# Patient Record
Sex: MALE | Race: AMERICAN INDIAN OR ALASKA NATIVE | Employment: UNEMPLOYED | URBAN - METROPOLITAN AREA
[De-identification: names, ages, dates, MRNs, and addresses within clinical notes are randomized per-mention and may not be internally consistent; named-entity substitution may affect disease eponyms.]

---

## 2017-02-14 ENCOUNTER — PRE VISIT (OUTPATIENT)
Dept: CARDIOLOGY | Facility: CLINIC | Age: 57
End: 2017-02-14

## 2017-02-14 DIAGNOSIS — D63.1 ANEMIA IN CHRONIC RENAL DISEASE: ICD-10-CM

## 2017-02-14 DIAGNOSIS — N18.9 ANEMIA IN CHRONIC RENAL DISEASE: ICD-10-CM

## 2017-02-14 DIAGNOSIS — I27.20 PULMONARY HYPERTENSION (H): Primary | ICD-10-CM

## 2017-02-14 DIAGNOSIS — I50.22 CHRONIC SYSTOLIC CONGESTIVE HEART FAILURE (H): ICD-10-CM

## 2017-02-14 DIAGNOSIS — N18.4 CRD (CHRONIC RENAL DISEASE), STAGE IV (H): ICD-10-CM

## 2017-02-14 DIAGNOSIS — I25.5 ISCHEMIC CARDIOMYOPATHY: ICD-10-CM

## 2017-02-22 ENCOUNTER — OFFICE VISIT (OUTPATIENT)
Dept: CARDIOLOGY | Facility: CLINIC | Age: 57
End: 2017-02-22
Attending: INTERNAL MEDICINE
Payer: MEDICAID

## 2017-02-22 ENCOUNTER — RADIANT APPOINTMENT (OUTPATIENT)
Dept: CARDIOLOGY | Facility: CLINIC | Age: 57
End: 2017-02-22

## 2017-02-22 VITALS
OXYGEN SATURATION: 95 % | WEIGHT: 234 LBS | BODY MASS INDEX: 35.46 KG/M2 | DIASTOLIC BLOOD PRESSURE: 85 MMHG | SYSTOLIC BLOOD PRESSURE: 123 MMHG | HEIGHT: 68 IN | HEART RATE: 74 BPM

## 2017-02-22 DIAGNOSIS — I25.5 ISCHEMIC CARDIOMYOPATHY: ICD-10-CM

## 2017-02-22 DIAGNOSIS — I50.22 CHRONIC SYSTOLIC CONGESTIVE HEART FAILURE (H): ICD-10-CM

## 2017-02-22 DIAGNOSIS — I27.20 PULMONARY HYPERTENSION (H): ICD-10-CM

## 2017-02-22 DIAGNOSIS — N18.9 ANEMIA IN CHRONIC RENAL DISEASE: ICD-10-CM

## 2017-02-22 DIAGNOSIS — D63.1 ANEMIA IN CHRONIC RENAL DISEASE: ICD-10-CM

## 2017-02-22 DIAGNOSIS — I42.9 CARDIOMYOPATHY, UNSPECIFIED (H): Primary | ICD-10-CM

## 2017-02-22 DIAGNOSIS — N18.4 CRD (CHRONIC RENAL DISEASE), STAGE IV (H): ICD-10-CM

## 2017-02-22 LAB
ALBUMIN SERPL-MCNC: 3.5 G/DL (ref 3.4–5)
ALP SERPL-CCNC: 96 U/L (ref 40–150)
ALT SERPL W P-5'-P-CCNC: 22 U/L (ref 0–70)
ANION GAP SERPL CALCULATED.3IONS-SCNC: 7 MMOL/L (ref 3–14)
AST SERPL W P-5'-P-CCNC: 19 U/L (ref 0–45)
BILIRUB SERPL-MCNC: 1 MG/DL (ref 0.2–1.3)
BUN SERPL-MCNC: 24 MG/DL (ref 7–30)
CALCIUM SERPL-MCNC: 8.6 MG/DL (ref 8.5–10.1)
CHLORIDE SERPL-SCNC: 104 MMOL/L (ref 94–109)
CO2 SERPL-SCNC: 28 MMOL/L (ref 20–32)
CREAT SERPL-MCNC: 2.25 MG/DL (ref 0.66–1.25)
ERYTHROCYTE [DISTWIDTH] IN BLOOD BY AUTOMATED COUNT: 14.3 % (ref 10–15)
GFR SERPL CREATININE-BSD FRML MDRD: 30 ML/MIN/1.7M2
GLUCOSE SERPL-MCNC: 132 MG/DL (ref 70–99)
HCT VFR BLD AUTO: 48.4 % (ref 40–53)
HGB BLD-MCNC: 15.5 G/DL (ref 13.3–17.7)
MCH RBC QN AUTO: 29.1 PG (ref 26.5–33)
MCHC RBC AUTO-ENTMCNC: 32 G/DL (ref 31.5–36.5)
MCV RBC AUTO: 91 FL (ref 78–100)
NT-PROBNP SERPL-MCNC: 3344 PG/ML (ref 0–125)
PLATELET # BLD AUTO: 215 10E9/L (ref 150–450)
POTASSIUM SERPL-SCNC: 4.6 MMOL/L (ref 3.4–5.3)
PROT SERPL-MCNC: 8.1 G/DL (ref 6.8–8.8)
RBC # BLD AUTO: 5.32 10E12/L (ref 4.4–5.9)
SODIUM SERPL-SCNC: 139 MMOL/L (ref 133–144)
WBC # BLD AUTO: 7.1 10E9/L (ref 4–11)

## 2017-02-22 PROCEDURE — 36415 COLL VENOUS BLD VENIPUNCTURE: CPT | Performed by: INTERNAL MEDICINE

## 2017-02-22 PROCEDURE — 83880 ASSAY OF NATRIURETIC PEPTIDE: CPT | Performed by: INTERNAL MEDICINE

## 2017-02-22 PROCEDURE — 80053 COMPREHEN METABOLIC PANEL: CPT | Performed by: INTERNAL MEDICINE

## 2017-02-22 PROCEDURE — 99214 OFFICE O/P EST MOD 30 MIN: CPT | Mod: 25 | Performed by: INTERNAL MEDICINE

## 2017-02-22 PROCEDURE — 99212 OFFICE O/P EST SF 10 MIN: CPT | Mod: ZF

## 2017-02-22 PROCEDURE — 85027 COMPLETE CBC AUTOMATED: CPT | Performed by: INTERNAL MEDICINE

## 2017-02-22 RX ORDER — LIDOCAINE 40 MG/G
CREAM TOPICAL
Status: CANCELLED | OUTPATIENT
Start: 2017-02-22

## 2017-02-22 RX ADMIN — Medication 4 ML: at 11:45

## 2017-02-22 ASSESSMENT — PAIN SCALES - GENERAL: PAINLEVEL: NO PAIN (0)

## 2017-02-22 NOTE — PATIENT INSTRUCTIONS
You were seen today in the Cardiovascular Clinic at the [facility].   Cardiology Providers you saw during your visit:    Dr. Alen Trujillo  Recommendations:     Check-In  Time Check-In Location Estimated Length Procedure   Name        GOLD  waiting room 60-90 minutes Right Heart Catheterization**     Procedure Preparations & Instructions     This is an invasive procedure that DOES require preparation:    - Nothing to eat for 6 hours   - You may have clear liquids up until the time of your procedure  - A ride should be arranged for you in the instance you are unable to drive home, however you should be able to function as you normally would after the procedure     *For Patients with Diabetes: ? DO NOT take any oral diabetic medication, short-acting diabetes medications/insulin, humalog or regular insulin the morning of your test  ? Take   dose of long-acting insulin (Lantus, Levemir) the day of your test  ? Remember to  bring your glucometer and insulin with you to take after your test if needed     *For Patients on anticoagulants: ? Your Coumadin Clinic will give you instructions on medication adjustments or bridging prior to the procedure      Follow-up:   After testing    For emergencies call 911.     If you have any questions regarding your visit please contact your care team:     Cheyenne Pedroza RN  [facility]  Cardiology Care Coordinator-Heart Failure    Appointment scheduling or nurse questions: [phone]    On Call Cardiologist for after hours or on weekends: [phone]    option #4    If you need a medication refill please contact your pharmacy.  Please allow 3 business days for your refill to be completed.    As always, thank you for trusting us with your health care needs!

## 2017-02-22 NOTE — MR AVS SNAPSHOT
After Visit Summary   2/22/2017    Cole Jesus    MRN: [id number]           Patient Information     Date Of Birth          1960        Visit Information        Provider Department      2/22/2017 2:00 PM Alen Trujillo MD [facility]        Today's Diagnoses     Cardiomyopathy, unspecified (H)    -  1    Pulmonary hypertension (H)          Care Instructions    You were seen today in the Cardiovascular Clinic at the [facility].   Cardiology Providers you saw during your visit:    Dr. Alen Trujillo  Recommendations:     Check-In  Time Check-In Location Estimated Length Procedure   Name        GOLD  waiting room 60-90 minutes Right Heart Catheterization**     Procedure Preparations & Instructions     This is an invasive procedure that DOES require preparation:    - Nothing to eat for 6 hours   - You may have clear liquids up until the time of your procedure  - A ride should be arranged for you in the instance you are unable to drive home, however you should be able to function as you normally would after the procedure     *For Patients with Diabetes: ? DO NOT take any oral diabetic medication, short-acting diabetes medications/insulin, humalog or regular insulin the morning of your test  ? Take   dose of long-acting insulin (Lantus, Levemir) the day of your test  ? Remember to  bring your glucometer and insulin with you to take after your test if needed     *For Patients on anticoagulants: ? Your Coumadin Clinic will give you instructions on medication adjustments or bridging prior to the procedure      Follow-up:   After testing    For emergencies call 911.     If you have any questions regarding your visit please contact your care team:     Cheyenne Pedroza RN  [facility]  Cardiology Care Coordinator-Heart Failure    Appointment scheduling or nurse questions: [phone]    On Call Cardiologist for after hours or on weekends: [phone]    option  "#4    If you need a medication refill please contact your pharmacy.  Please allow 3 business days for your refill to be completed.    As always, thank you for trusting us with your health care needs!        Follow-ups after your visit        Future tests that were ordered for you today     Open Future Orders        Priority Expected Expires Ordered    Heart Cath Right heart cath Routine  2/22/2018 2/22/2017            Who to contact     If you have questions or need follow up information about today's clinic visit or your schedule please contact [facility] directly at [phone].  Normal or non-critical lab and imaging results will be communicated to you by Exotelhart, letter or phone within 4 business days after the clinic has received the results. If you do not hear from us within 7 days, please contact the clinic through LeukoDxt or phone. If you have a critical or abnormal lab result, we will notify you by phone as soon as possible.  Submit refill requests through Shopmium or call your pharmacy and they will forward the refill request to us. Please allow 3 business days for your refill to be completed.          Additional Information About Your Visit        ExotelharDragonfly Information     Shopmium gives you secure access to your electronic health record. If you see a primary care provider, you can also send messages to your care team and make appointments. If you have questions, please call your primary care clinic.  If you do not have a primary care provider, please call [phone] and they will assist you.        Care EveryWhere ID     This is your Care EveryWhere ID. This could be used by other organizations to access your [place] medical records  [id number]        Your Vitals Were     Pulse Height Pulse Oximetry BMI (Body Mass Index)          74 1.727 m (5' 8\") 95% 35.58 kg/m2         Blood Pressure from Last 3 Encounters:   02/22/17 123/85   12/19/16 126/89   11/21/16 131/87    Weight from Last 3 " Encounters:   02/22/17 106.1 kg (234 lb)   12/19/16 107.7 kg (237 lb 6.4 oz)   11/21/16 106.5 kg (234 lb 12.8 oz)               Primary Care Provider Office Phone # Fax #    Silas Enciso [phone]        [facility] [address]  [facility] [address]        Thank you!     Thank you for choosing [facility]  for your care. Our goal is always to provide you with excellent care. Hearing back from our patients is one way we can continue to improve our services. Please take a few minutes to complete the written survey that you may receive in the mail after your visit with us. Thank you!             Your Updated Medication List - Protect others around you: Learn how to safely use, store and throw away your medicines at www.disposemymeds.org.          This list is accurate as of: 2/22/17  2:58 PM.  Always use your most recent med list.                   Brand Name Dispense Instructions for use    atorvastatin 10 MG tablet    LIPITOR    90 tablet    Take 1 tablet (10 mg) by mouth daily       cholecalciferol 1000 UNITS capsule    vitamin  -D    60 capsule    Take 2 capsules (2,000 Units) by mouth daily       clopidogrel 75 MG tablet    PLAVIX    90 tablet    Take 1 tablet (75 mg) by mouth daily       furosemide 20 MG tablet    LASIX    200 tablet    Take 1 tablet (20 mg) by mouth 2 times daily       losartan 25 MG tablet    COZAAR    90 tablet    Take 1 tablet (25 mg) by mouth daily       metoprolol 25 MG 24 hr tablet    TOPROL-XL    60 tablet    Take 2 tablets (50 mg) by mouth daily       pantoprazole 40 MG EC tablet    PROTONIX    180 tablet    Take 1 tablet (40 mg) by mouth 2 times daily       sacubitril-valsartan 24-26 MG per tablet    ENTRESTO    60 tablet    Take 1 tablet by mouth 2 times daily       thiamine 100 MG tablet     90 tablet    Take 1 tablet (100 mg) by mouth daily       vitamin B complex with vitamin C Tabs tablet      Take 1 tablet by mouth daily

## 2017-02-22 NOTE — NURSING NOTE
Med Reconcile: Reviewed and verified all current medications with the patient. The updated medication list was printed and given to the patient.  Return Appointment: Patient given instructions regarding scheduling next clinic visit. Patient demonstrated understanding of this information and agreed to call with further questions or concerns.  Right Heart Catheterization: Patient was instructed regarding right heart catheterization, including discussion of the procedure, preparation, intra-procedural steps, and recovery at home. Patient demonstrated understanding of this information and agreed to call with further questions or concerns.  Patient stated he understood all health information given and agreed to call with further questions or concerns.     Cardiology Providers you saw during your visit:    Dr. Alen Trujillo  Recommendations:     Check-In  Time Check-In Location Estimated Length Procedure   Name        [facility]  waiting room 60-90 minutes Right Heart Catheterization**     Procedure Preparations & Instructions     This is an invasive procedure that DOES require preparation:    - Nothing to eat for 6 hours   - You may have clear liquids up until the time of your procedure  - A ride should be arranged for you in the instance you are unable to drive home, however you should be able to function as you normally would after the procedure     *For Patients with Diabetes: ? DO NOT take any oral diabetic medication, short-acting diabetes medications/insulin, humalog or regular insulin the morning of your test  ? Take   dose of long-acting insulin (Lantus, Levemir) the day of your test  ? Remember to  bring your glucometer and insulin with you to take after your test if needed     *For Patients on anticoagulants: ? Your Coumadin Clinic will give you instructions on medication adjustments or bridging prior to the procedure      Follow-up:   After testing

## 2017-02-22 NOTE — PROGRESS NOTES
"Alen Trujillo M.D.  Cardiovascular Medicine    I personally saw and examined this patient, discussed care with housestaff and other consultants, reviewed current laboratories and imaging studies, and conveyed impression and diagnostic/therapeutic plan to patient.    Problem List  ASHD with angioplasty  CAD with stenting  Atrial fibrillation  History of GI bleed  CKD    History  Patient with know disease returns for follow-up.  He has exertional dyspnea but well preserved function. He has no interim history of angina, PND, orthopnea, arrhythmia or syncope.    REVIEW of SYMPTOMS    Constitutional: without fever, chills, night sweats.  Weight is ___  HEENT: without dry eyes, dry mouth, sinusitis, corryza, visual changes  Endocrine: without polyuria, polydypsia, polyphagia, heat or cold intolerance, changing mental status  Cardiology: without chest pain, tightness, heaviness, pressure, paroxysmal dyspnea, orthopnea, palpitation, pre-syncope or syncope or device discharge (if present)  Pulmonary: without asthma, wheezing, cough, hemoptysis  GI: without nausea, emesis, jaundice, pain, hematemesis, melena  : without frequency, urgency, hematuria, stones, pain, abnormal bleeding, frequency, urgency  Neurologic: without TIA, CVA, trauma, seizure  Dermatologic: without lesions, abrasion rash,   Orthopedic/Rheum: without significant joint pain, impairment, limb, polyserositis, ulceration, Raynauds  Heme: without mass, bruising, frequent infection, anemia  Psychiatric: without substance abuse, hallucination, medication, depression    Exercise tolerance:   Objective  /85 (BP Location: Right arm, Patient Position: Chair, Cuff Size: Adult Large)  Pulse 74  Ht 1.727 m (5' 8\")  Wt 106.1 kg (234 lb)  SpO2 95%  BMI 35.58 kg/m2   Constitutional: alert, oriented, normal gait and station, normal mentation.  Oral: moist mucous membrans  Lymph: without pathologic adenopathy  Chest: clear to ausculation and percussion  Cor: No " evidence of left or right ventricular activity.  Rhythm is regular.  S1 normal, S2 split physiologically. Murmurs are not present  Abdomen: without tenderness, rebound, guarding, masses, ascites  Extremities: Edema not present  Neuro: no focal defects, normal mentation  Skin: without open lesions  Psych: oriented, verbal, mental status in tact    Wt Readings from Last 5 Encounters:   02/22/17 106.1 kg (234 lb)   12/19/16 107.7 kg (237 lb 6.4 oz)   11/21/16 106.5 kg (234 lb 12.8 oz)   11/02/16 109.3 kg (241 lb)   10/31/16 112.5 kg (248 lb 1.6 oz)       Meds    Current Outpatient Prescriptions on File Prior to Visit:  atorvastatin (LIPITOR) 10 MG tablet Take 1 tablet (10 mg) by mouth daily   losartan (COZAAR) 25 MG tablet Take 1 tablet (25 mg) by mouth daily   pantoprazole (PROTONIX) 40 MG enteric coated tablet Take 1 tablet (40 mg) by mouth 2 times daily   sacubitril-valsartan (ENTRESTO) 24-26 MG per tablet Take 1 tablet by mouth 2 times daily   furosemide (LASIX) 20 MG tablet Take 1 tablet (20 mg) by mouth 2 times daily   clopidogrel (PLAVIX) 75 MG tablet Take 1 tablet (75 mg) by mouth daily   metoprolol (TOPROL-XL) 25 MG 24 hr tablet Take 2 tablets (50 mg) by mouth daily   vitamin  B complex with vitamin C (VITAMIN  B COMPLEX) TABS Take 1 tablet by mouth daily   thiamine 100 MG tablet Take 1 tablet (100 mg) by mouth daily   cholecalciferol (VITAMIN  -D) 1000 UNITS capsule Take 2 capsules (2,000 Units) by mouth daily   [DISCONTINUED] ISOSORBIDE DINITRATE PO Take 15 mg by mouth 3 times daily     Current Facility-Administered Medications on File Prior to Visit:  [COMPLETED] perflutren diluted 1mL to 2mL with saline (OPTISON) diluted injection 4 mL         Labs  Results for MERCY SHAFER (MRN [id number]) as of 2/22/2017 13:32   Ref. Range 2/22/2017 11:44 2/22/2017 12:02   Sodium Latest Ref Range: 133 - 144 mmol/L  139   Potassium Latest Ref Range: 3.4 - 5.3 mmol/L  4.6   Chloride Latest Ref Range: 94 - 109 mmol/L   104   Carbon Dioxide Latest Ref Range: 20 - 32 mmol/L  28   Urea Nitrogen Latest Ref Range: 7 - 30 mg/dL  24   Creatinine Latest Ref Range: 0.66 - 1.25 mg/dL  2.25 (H)   GFR Estimate Latest Ref Range: >60 mL/min/1.7m2  30 (L)   GFR Estimate If Black Latest Ref Range: >60 mL/min/1.7m2  36 (L)   Calcium Latest Ref Range: 8.5 - 10.1 mg/dL  8.6   Anion Gap Latest Ref Range: 3 - 14 mmol/L  7   Albumin Latest Ref Range: 3.4 - 5.0 g/dL  3.5   Protein Total Latest Ref Range: 6.8 - 8.8 g/dL  8.1   Bilirubin Total Latest Ref Range: 0.2 - 1.3 mg/dL  1.0   Alkaline Phosphatase Latest Ref Range: 40 - 150 U/L  96   ALT Latest Ref Range: 0 - 70 U/L  22   AST Latest Ref Range: 0 - 45 U/L  19   N-Terminal Pro Bnp Latest Ref Range: 0 - 125 pg/mL  3344 (H)   Glucose Latest Ref Range: 70 - 99 mg/dL  132 (H)   WBC Latest Ref Range: 4.0 - 11.0 10e9/L  7.1   Hemoglobin Latest Ref Range: 13.3 - 17.7 g/dL  15.5   Hematocrit Latest Ref Range: 40.0 - 53.0 %  48.4   Platelet Count Latest Ref Range: 150 - 450 10e9/L  215   RBC Count Latest Ref Range: 4.4 - 5.9 10e12/L  5.32   MCV Latest Ref Range: 78 - 100 fl  91   MCH Latest Ref Range: 26.5 - 33.0 pg  29.1   MCHC Latest Ref Range: 31.5 - 36.5 g/dL  32.0   RDW Latest Ref Range: 10.0 - 15.0 %  14.3   ECHO COMPLETE WITH OPTISON Unknown Rpt      Imaging     Interpretation Summary  Severe left ventricular dilation is present.  Inferior wall akinesis is present.  Lateral wall akinesis is present.  Mild to moderate right ventricular dilation is present.  Global right ventricular function is mildly reduced.  Right ventricular systolic pressure is 58mmHg above the right atrial pressure.  The inferior vena cava is normal.  Small circumferential pericardial effusion is present without any hemodynamic  significance.  Mild decrease in LV function when compared to prior study.  _____________________________________________________________________________  __        Left Ventricle  Severe left ventricular  dilation is present. Relative wall thickness is  increased consistent with concentric remodeling. Grade III or advanced  diastolic dysfunction. Inferior wall akinesis is present. Lateral wall  akinesis is present.     Right Ventricle  Mild to moderate right ventricular dilation is present. Global right  ventricular function is mildly reduced.     Atria  Mild to moderate biatrial enlargement is present. The atrial septum is intact  as assessed by color Doppler .     Mitral Valve  Moderate mitral insufficiency is present.        Aortic Valve  Aortic valve is normal in structure and function.     Tricuspid Valve  The tricuspid valve is normal. Trace to mild tricuspid insufficiency is  present. Right ventricular systolic pressure is 58mmHg above the right atrial  pressure.     Pulmonic Valve  The pulmonic valve cannot be assessed.     Vessels  The aorta root is normal. The pulmonary artery cannot be assessed. The  inferior vena cava is normal.     Pericardium  Small circumferential pericardial effusion is present without any hemodynamic  significance.     _____________________________________________________________________________  __  MMode/2D Measurements & Calculations  IVSd: 1.00 cm  LVIDd: 6.8 cm  LVIDs: 6.0 cm  LVPWd: 0.94 cm  FS: 11.7 %  EDV(Teich): 235.5 ml  ESV(Teich): 177.4 ml     LV mass(C)d: 290.9 grams  asc Aorta Diam: 3.3 cm  LVOT diam: 2.5 cm  LVOT area: 4.8 cm2  LA Volume (BP): 87.4 ml  LA Volume Index (BP): 39.7 ml/m2        Doppler Measurements & Calculations  MV E max shavonne: 109.7 cm/sec  MV A max shavonne: 51.0 cm/sec  MV E/A: 2.2  MV dec time: 0.15 sec  TR max shavonne: 381.3 cm/sec  TR max P.1 mmHg     Lateral E/e': 14.3  Medial E/e': 23.1     Assessment/Plan     Right heart catherization for enhanced treatment options.  Risks and benefits discussed.

## 2017-02-27 NOTE — PROGRESS NOTES
Reminder message sent to pt via [employer] re: C scheduled for tomorrow. Included instructions and callback number should pt have questions.

## 2017-02-28 ENCOUNTER — APPOINTMENT (OUTPATIENT)
Dept: MEDSURG UNIT | Facility: CLINIC | Age: 57
End: 2017-02-28
Attending: INTERNAL MEDICINE
Payer: MEDICAID

## 2017-02-28 ENCOUNTER — APPOINTMENT (OUTPATIENT)
Dept: CARDIOLOGY | Facility: CLINIC | Age: 57
End: 2017-02-28
Attending: INTERNAL MEDICINE
Payer: MEDICAID

## 2017-02-28 ENCOUNTER — HOSPITAL ENCOUNTER (OUTPATIENT)
Facility: CLINIC | Age: 57
Discharge: HOME | End: 2017-02-28
Attending: INTERNAL MEDICINE | Admitting: INTERNAL MEDICINE
Payer: MEDICAID

## 2017-02-28 VITALS
RESPIRATION RATE: 16 BRPM | SYSTOLIC BLOOD PRESSURE: 133 MMHG | TEMPERATURE: 97.5 F | DIASTOLIC BLOOD PRESSURE: 89 MMHG | HEART RATE: 84 BPM | OXYGEN SATURATION: 98 %

## 2017-02-28 DIAGNOSIS — I27.20 PULMONARY HYPERTENSION (H): ICD-10-CM

## 2017-02-28 DIAGNOSIS — I42.9 CARDIOMYOPATHY, UNSPECIFIED (H): ICD-10-CM

## 2017-02-28 PROCEDURE — 4A023N6 MEASUREMENT OF CARDIAC SAMPLING AND PRESSURE, RIGHT HEART, PERCUTANEOUS APPROACH: ICD-10-PCS | Performed by: INTERNAL MEDICINE

## 2017-02-28 PROCEDURE — 27210787 ZZH MANIFOLD CR2

## 2017-02-28 PROCEDURE — 27210982 ZZH KIT RT HC TOTES DISP CR7

## 2017-02-28 PROCEDURE — 93454 CORONARY ARTERY ANGIO S&I: CPT | Mod: 26 | Performed by: INTERNAL MEDICINE

## 2017-02-28 PROCEDURE — 93451 RIGHT HEART CATH: CPT

## 2017-02-28 PROCEDURE — 27211181 ZZH BALLOON TIP PRESSURE CR5

## 2017-02-28 PROCEDURE — 40000166 ZZH STATISTIC PP CARE STAGE 1

## 2017-02-28 RX ORDER — LIDOCAINE 40 MG/G
CREAM TOPICAL
Status: COMPLETED | OUTPATIENT
Start: 2017-02-28 | End: 2017-02-28

## 2017-02-28 RX ADMIN — LIDOCAINE: 40 CREAM TOPICAL at 08:48

## 2017-02-28 NOTE — DISCHARGE INSTRUCTIONS
[facility]                        Interventional Cardiology  Discharge Instructions   Post Right Heart Cath      AFTER YOU GO HOME:    DO drink plenty of fluids    DO resume your regular diet and medications unless otherwise instructed by your Primary Physician    Do Not scrub the procedure site vigorously    No lotion or powder to the puncture site for 3 days    CALL YOUR PRIMARY PHYSICIAN IF: You may resume all normal activity.  Monitor neck site for bleeding, swelling, or voice changes. If you notice bleeding or swelling immediately apply pressure to the site and call number below to speak with Cardiology Fellow.  If you experience any changes in your breathing you should call your doctor immediately or come to the closest Emergency Department.  Do not drive yourself.    ADDITIONAL INSTRUCTIONS: Medications: You are to resume all home medications including anticoagulation therapy unless otherwise advised by your primary cardiologist or nurse coordinator.    Follow Up: Per your primary cardiology team    If you have any questions or concerns regarding your procedure site please call [phone] at anytime and ask for Cardiology Fellow on call.  They are available 24 hours a day.  You may also contact the Cardiology Clinic after hours number at [phone].                                                       Telephone Numbers [phone] Monday-Friday 8:00 am to 4:30 pm    [phone] After 4:30 pm Monday-Friday, Weekends & Holidays  Ask for Interventional Cardiologist on call. Someone is on call 24 hours/day   [facility] toll free number [phone] Monday-Friday 8:00 am to 4:30 pm   [facility] Emergency Dept [phone]

## 2017-02-28 NOTE — IP AVS SNAPSHOT
[address] [address]                                       After Visit Summary   2/28/2017    Cole Jesus    MRN: [id number]           After Visit Summary Signature Page     I have received my discharge instructions, and my questions have been answered. I have discussed any challenges I see with this plan with the nurse or doctor.    ..........................................................................................................................................  Patient/Patient Representative Signature      ..........................................................................................................................................  Patient Representative Print Name and Relationship to Patient    ..................................................               ................................................  Date                                            Time    ..........................................................................................................................................  Reviewed by Signature/Title    ...................................................              ..............................................  Date                                                            Time

## 2017-02-28 NOTE — PROCEDURES
PRELIMINARY CARDIAC CATH REPORT:     PROCEDURES PERFORMED:   Right Heart Catheterization    PHYSICIANS:  Attending Physician: Mickey Gaitan MD  Interventional Cardiology Fellow: Jim Heart MD  Cardiology Fellow: Vicki Mcclain    INDICATION:  Cole Jesus is a 56 year old male with history of pulmonary hypertension, afib, CAD s/p PCI, DM2 and ischemic cardiomyopathy with EF 40-45% who follows with Dr. Trujillo who presents for right heart catheterization. Most recent RHC prior is from 11/2014    DESCRIPTION:  1. Consent obtained with discussion of risks.  All questions were answered.  2. Sterile prep and procedure.  3. Location with Sheaths:   Rt IJ  7 Fr 25 cm [long]  4. Access: Local anesthetic with lidocaine.  A standard 18 guage needle with ultrasound guidance was used to establish vascular access using a modified Seldinger technique.  5. Diagnostic Catheters:   7 Fr Nondalton Messi catheter  6. Estimated blood loss: < 5 ml    MEDICATIONS:.  The procedure was performed under no conscious sedation  Heart rate, BP, respiration, oxygen saturation and patient responses were monitored throughout the procedure with the assistance of the RN under my supervision.    Procedures:    HEMODYNAMICS:  BSA 2.26 m^2  1. HR 85 bpm  2. /83 (98) mmHg  3. RA 7/7/7   4. RV 97/13  5. PA 97/45/60   6. PCW 30/35/30   7. PA sat 65.4%   8. PCW sat --%  9. Hgb 15.3 g/dL   10. Cecilia CO 4.1   11. Cecilia CI 1.9   12. TD CO 3.8   13. TD CI 1.7   14. PVR 7.3     Sheath Removal:  The 7 Fr RIJ sheath was manually removed in the cardiac catheterization laboratory.    Contrast: Isovue, 0 ml     Fluoroscopy Time: 1.6 min    COMPLICATIONS:  1. None    SUMMARY:   >> Normal right sided filling pressures.  >> High left sided filling pressures.  >> Severe Pulmonary Hypertension  >> Reduced cardiac output, 4.1 L/min with index 1.9 L/min/m2    PLAN:   >>. Discharge today per protocol    The attending interventional cardiologist was present and  supervised all critical aspects the procedure.    Findings discussed with Dr. Gaitan and Dr. Trujillo.    See CVIS report for final draft.    Vicki Mcclain   Cardiology Fellow    Dr. Gaitan   Cardiology Staff          I was present throughout the  procedure and performed all the critical parts of it. I agree with  the note above.     Mickey Gaitan MD  Interventional Cardiology  Pager: [phone]

## 2017-02-28 NOTE — PROGRESS NOTES
Pt returned from R heart cath; right neck is flat and dry. Family at bedside. Pt up on feet, steady. Discharge instructions reviewed, copy to pt. Discharged to home with family.

## 2017-02-28 NOTE — IP AVS SNAPSHOT
MRN:[id number]                      After Visit Summary   2/28/2017    Cole Jesus    MRN: [id number]           Visit Information        Department      2/28/2017  8:26 AM [address] [facility]          Review of your medicines      UNREVIEWED medicines. Ask your doctor about these medicines        Dose / Directions    atorvastatin 10 MG tablet   Commonly known as:  LIPITOR   Used for:  Routine health maintenance        Dose:  10 mg   Take 1 tablet (10 mg) by mouth daily   Quantity:  90 tablet   Refills:  3       cholecalciferol 1000 UNITS capsule   Commonly known as:  vitamin  -D   Used for:  Vitamin D deficiency        Dose:  2 capsule   Take 2 capsules (2,000 Units) by mouth daily   Quantity:  60 capsule   Refills:  3       clopidogrel 75 MG tablet   Commonly known as:  PLAVIX   Used for:  ST elevation myocardial infarction (STEMI) of inferior wall (H)        Dose:  75 mg   Take 1 tablet (75 mg) by mouth daily   Quantity:  90 tablet   Refills:  3       furosemide 20 MG tablet   Commonly known as:  LASIX   Used for:  Chronic systolic congestive heart failure (H), Ischemic cardiomyopathy, Essential hypertension with goal blood pressure less than 130/85        Dose:  20 mg   Take 1 tablet (20 mg) by mouth 2 times daily   Quantity:  200 tablet   Refills:  11       metoprolol 25 MG 24 hr tablet   Commonly known as:  TOPROL-XL   Used for:  Ischemic cardiomyopathy        Dose:  50 mg   Take 2 tablets (50 mg) by mouth daily   Quantity:  60 tablet   Refills:  11       pantoprazole 40 MG EC tablet   Commonly known as:  PROTONIX   Used for:  Lower GI bleeding        Dose:  40 mg   Take 1 tablet (40 mg) by mouth 2 times daily   Quantity:  180 tablet   Refills:  3       sacubitril-valsartan 24-26 MG per tablet   Commonly known as:  ENTRESTO   Used for:  Chronic systolic congestive heart failure (H)        Dose:  1 tablet   Take 1 tablet by mouth 2 times daily   Quantity:  60 tablet   Refills:  11        thiamine 100 MG tablet   Used for:  Chronic constipation        Dose:  100 mg   Take 1 tablet (100 mg) by mouth daily   Quantity:  90 tablet   Refills:  3       vitamin B complex with vitamin C Tabs tablet   Used for:  Atrial tachycardia (H), Renal insufficiency, Ischemic cardiomyopathy        Dose:  1 tablet   Take 1 tablet by mouth daily   Refills:  0                Protect others around you: Learn how to safely use, store and throw away your medicines at www.disposemymeds.org.         Follow-ups after your visit         Care Instructions        Further instructions from your care team       [facility]                        Interventional Cardiology  Discharge Instructions   Post Right Heart Cath      AFTER YOU GO HOME:    DO drink plenty of fluids    DO resume your regular diet and medications unless otherwise instructed by your Primary Physician    Do Not scrub the procedure site vigorously    No lotion or powder to the puncture site for 3 days    CALL YOUR PRIMARY PHYSICIAN IF: You may resume all normal activity.  Monitor neck site for bleeding, swelling, or voice changes. If you notice bleeding or swelling immediately apply pressure to the site and call number below to speak with Cardiology Fellow.  If you experience any changes in your breathing you should call your doctor immediately or come to the closest Emergency Department.  Do not drive yourself.    ADDITIONAL INSTRUCTIONS: Medications: You are to resume all home medications including anticoagulation therapy unless otherwise advised by your primary cardiologist or nurse coordinator.    Follow Up: Per your primary cardiology team    If you have any questions or concerns regarding your procedure site please call [phone] at anytime and ask for Cardiology Fellow on call.  They are available 24 hours a day.  You may also contact the Cardiology Clinic after hours number at [phone].                                                        Telephone Numbers [phone] Monday-Friday 8:00 am to 4:30 pm    [phone] After 4:30 pm Monday-Friday, Weekends & Holidays  Ask for Interventional Cardiologist on call. Someone is on call 24 hours/day   [facility] toll free number [phone] Monday-Friday 8:00 am to 4:30 pm   [facility] Emergency Dept [phone]                   Statement of Approval     Ordered          02/28/17 0950  I have reviewed and agree with all the recommendations and orders detailed in this document.  EFFECTIVE NOW     Approved and electronically signed by:  Consuelo Mcclain MD              Additional Information About Your Visit        [facility] Information     [facility] gives you secure access to your electronic health record. If you see a primary care provider, you can also send messages to your care team and make appointments. If you have questions, please call your primary care clinic.  If you do not have a primary care provider, please call [phone] and they will assist you.        Care EveryWhere ID     This is your Care EveryWhere ID. This could be used by other organizations to access your [facility] medical records  [id number]        Your Vitals Were     Blood Pressure Pulse Temperature Respirations Pulse Oximetry       127/86 (BP Location: Left arm) 85 97.5  F (36.4  C) (Oral) 20 99%        Primary Care Provider Office Phone # Fax #    Silas Enciso [phone]      Thank you!     Thank you for choosing [facility] for your care. Our goal is always to provide you with excellent care. Hearing back from our patients is one way we can continue to improve our services. Please take a few minutes to complete the written survey that you may receive in the mail after you visit with us. Thank you!             Medication List: This is a list of all your medications and when to take them. Check marks below indicate your daily home schedule. Keep this list as a reference.      Medications            Morning Afternoon Evening Bedtime As Needed    atorvastatin 10 MG tablet   Commonly known as:  LIPITOR   Take 1 tablet (10 mg) by mouth daily                                cholecalciferol 1000 UNITS capsule   Commonly known as:  vitamin  -D   Take 2 capsules (2,000 Units) by mouth daily                                clopidogrel 75 MG tablet   Commonly known as:  PLAVIX   Take 1 tablet (75 mg) by mouth daily                                furosemide 20 MG tablet   Commonly known as:  LASIX   Take 1 tablet (20 mg) by mouth 2 times daily                                metoprolol 25 MG 24 hr tablet   Commonly known as:  TOPROL-XL   Take 2 tablets (50 mg) by mouth daily                                pantoprazole 40 MG EC tablet   Commonly known as:  PROTONIX   Take 1 tablet (40 mg) by mouth 2 times daily                                sacubitril-valsartan 24-26 MG per tablet   Commonly known as:  ENTRESTO   Take 1 tablet by mouth 2 times daily                                thiamine 100 MG tablet   Take 1 tablet (100 mg) by mouth daily                                vitamin B complex with vitamin C Tabs tablet   Take 1 tablet by mouth daily

## 2017-03-06 ENCOUNTER — CARE COORDINATION (OUTPATIENT)
Dept: CARDIOLOGY | Facility: CLINIC | Age: 57
End: 2017-03-06

## 2017-03-06 NOTE — PROGRESS NOTES
Sent [employer] message to patient this am.  He had RHC last week and his PAP are very elevated.  He needs to see Dr. Trujillo sooner than later to address results and formulate plan.  I have given him the date of next Thursday, 3/16 at 2:30 pm for appt with Dr. Trujillo and will put on schedule once he confirms.

## 2017-03-15 ENCOUNTER — PRE VISIT (OUTPATIENT)
Dept: CARDIOLOGY | Facility: CLINIC | Age: 57
End: 2017-03-15

## 2017-03-15 DIAGNOSIS — I50.22 CHRONIC SYSTOLIC CONGESTIVE HEART FAILURE (H): ICD-10-CM

## 2017-03-15 DIAGNOSIS — R06.09 DYSPNEA ON EXERTION: Primary | ICD-10-CM

## 2017-03-16 ENCOUNTER — OFFICE VISIT (OUTPATIENT)
Dept: CARDIOLOGY | Facility: CLINIC | Age: 57
End: 2017-03-16
Attending: INTERNAL MEDICINE
Payer: MEDICAID

## 2017-03-16 VITALS
DIASTOLIC BLOOD PRESSURE: 83 MMHG | BODY MASS INDEX: 36.07 KG/M2 | HEART RATE: 79 BPM | OXYGEN SATURATION: 94 % | HEIGHT: 68 IN | SYSTOLIC BLOOD PRESSURE: 122 MMHG | WEIGHT: 238 LBS

## 2017-03-16 DIAGNOSIS — I50.22 CHRONIC SYSTOLIC CONGESTIVE HEART FAILURE (H): Primary | ICD-10-CM

## 2017-03-16 DIAGNOSIS — I50.22 CHRONIC SYSTOLIC CONGESTIVE HEART FAILURE (H): ICD-10-CM

## 2017-03-16 DIAGNOSIS — R06.09 DYSPNEA ON EXERTION: ICD-10-CM

## 2017-03-16 LAB
ANION GAP SERPL CALCULATED.3IONS-SCNC: 9 MMOL/L (ref 3–14)
BUN SERPL-MCNC: 29 MG/DL (ref 7–30)
CALCIUM SERPL-MCNC: 8.3 MG/DL (ref 8.5–10.1)
CHLORIDE SERPL-SCNC: 103 MMOL/L (ref 94–109)
CO2 SERPL-SCNC: 28 MMOL/L (ref 20–32)
CREAT SERPL-MCNC: 2.39 MG/DL (ref 0.66–1.25)
ERYTHROCYTE [DISTWIDTH] IN BLOOD BY AUTOMATED COUNT: 14.2 % (ref 10–15)
GFR SERPL CREATININE-BSD FRML MDRD: 28 ML/MIN/1.7M2
GLUCOSE SERPL-MCNC: 156 MG/DL (ref 70–99)
HCT VFR BLD AUTO: 46.9 % (ref 40–53)
HGB BLD-MCNC: 15.1 G/DL (ref 13.3–17.7)
MCH RBC QN AUTO: 29.4 PG (ref 26.5–33)
MCHC RBC AUTO-ENTMCNC: 32.2 G/DL (ref 31.5–36.5)
MCV RBC AUTO: 91 FL (ref 78–100)
NT-PROBNP SERPL-MCNC: 2536 PG/ML (ref 0–125)
PLATELET # BLD AUTO: 222 10E9/L (ref 150–450)
POTASSIUM SERPL-SCNC: 4.1 MMOL/L (ref 3.4–5.3)
RBC # BLD AUTO: 5.13 10E12/L (ref 4.4–5.9)
SODIUM SERPL-SCNC: 140 MMOL/L (ref 133–144)
WBC # BLD AUTO: 7.4 10E9/L (ref 4–11)

## 2017-03-16 PROCEDURE — 83880 ASSAY OF NATRIURETIC PEPTIDE: CPT | Performed by: INTERNAL MEDICINE

## 2017-03-16 PROCEDURE — 99213 OFFICE O/P EST LOW 20 MIN: CPT | Mod: ZP | Performed by: INTERNAL MEDICINE

## 2017-03-16 PROCEDURE — 80048 BASIC METABOLIC PNL TOTAL CA: CPT | Performed by: INTERNAL MEDICINE

## 2017-03-16 PROCEDURE — 99213 OFFICE O/P EST LOW 20 MIN: CPT | Mod: ZF

## 2017-03-16 PROCEDURE — 36415 COLL VENOUS BLD VENIPUNCTURE: CPT | Performed by: INTERNAL MEDICINE

## 2017-03-16 PROCEDURE — 85027 COMPLETE CBC AUTOMATED: CPT | Performed by: INTERNAL MEDICINE

## 2017-03-16 ASSESSMENT — PAIN SCALES - GENERAL: PAINLEVEL: NO PAIN (0)

## 2017-03-16 NOTE — NURSING NOTE
Chief Complaint   Patient presents with     Follow Up For     Return HF for follow up s/p RHC with labs prior

## 2017-03-16 NOTE — LETTER
3/16/2017      RE: Cole Jesus  [address]  [facility] [address]       Dear Colleague,    Thank you for the opportunity to participate in the care of your patient, Cole Jesus, at the [facility]. Please see a copy of my visit note below.                              Current Outpatient Prescriptions   Medication     atorvastatin (LIPITOR) 10 MG tablet     pantoprazole (PROTONIX) 40 MG enteric coated tablet     sacubitril-valsartan (ENTRESTO) 24-26 MG per tablet     furosemide (LASIX) 20 MG tablet     clopidogrel (PLAVIX) 75 MG tablet     metoprolol (TOPROL-XL) 25 MG 24 hr tablet     vitamin  B complex with vitamin C (VITAMIN  B COMPLEX) TABS     thiamine 100 MG tablet     cholecalciferol (VITAMIN  -D) 1000 UNITS capsule     [DISCONTINUED] ISOSORBIDE DINITRATE PO     No current facility-administered medications for this visit.        Please do not hesitate to contact me if you have any questions/concerns.     Sincerely,     Alen Trujillo MD

## 2017-03-16 NOTE — MR AVS SNAPSHOT
After Visit Summary   3/16/2017    Cole Jesus    MRN: [id number]           Patient Information     Date Of Birth          1960        Visit Information        Provider Department      3/16/2017 2:30 PM Alen Trujillo MD [facility]        Today's Diagnoses     Chronic systolic congestive heart failure (HCC)    -  1      Care Instructions    Medication Changes:  Increase furosemide to 60 mgs every morning which is three 20mg pills  Increase Toprol XL to 75mgs/day taken as 50mgs and 25mgs in evening    Patient Instructions:      Follow up Appointment Information:  Return to see nurse practitioner end of next week and have basic metabolic panel checked in laboratory  Return to me in 4 weeks    We are located on the third floor of the Clinic [facility] Surgery Center ([facility]) on the [facility].  Our address is     [address] [place] [address] on [address]rd [place] [address]    Thank you for allowing us to be a part of your care here at the [facility]    If you have questions or concerns please contact us at:    Vicki De Paz RN, BSN    Cody Purcell (Schedule,P.A.)  Nurse Coordinator     Clinic   Pulmonary Hypertension   Pulmonary Hypertension  [facility]  [phone])[phone]    (P) [phone]        (F)[phone]    ** Please note that you will NOT receive a reminder call regarding your scheduled testing, reminder calls are for provider appointments only.  If you are scheduled for testing within the [facility] system you may receive a call regarding pre-registration for billing purposes only.**     Remember to weigh yourself daily after voiding and before you consume any food or beverages and log the numbers.  If you have gained/lost 2 pounds overnight or 5 pounds in a week contact us immediately for medication adjustments or further instructions.              Follow-ups after your visit        Follow-up notes from your care team     Return in about 4 weeks (around 4/13/2017) for with Ronnie, Return PH, with, Labs.      Your next 10 appointments already scheduled     Mar 27, 2017  8:00 AM CDT   Lab with UC LAB   [facility] ([facility])    [address] [place] [id number]   [phone]            Mar 27, 2017  8:30 AM CDT   (Arrive by 8:15 AM)   CORE NEW with Bertha Phan NP   [facility] ([facility])    [address] [place] [address]  3rd [facility] [id number] [phone]            Apr 13, 2017  9:00 AM CDT   (Arrive by 8:45 AM)   RETURN HEART FAILURE with Alen Trujillo MD   [facility] ([facility])    [address] [place] [id number] [phone]              Who to contact     If you have questions or need follow up information about today's clinic visit or your schedule please contact [facility] directly at [phone].  Normal or non-critical lab and imaging results will be communicated to you by MyChart, letter or phone within 4 business days after the clinic has received the results. If you do not hear from us within 7 days, please contact the clinic through [facility] or phone. If you have a critical or abnormal lab result, we will notify you by phone as soon as possible.  Submit refill requests through [facility] or call your pharmacy and they will forward the refill request to us. Please allow 3 business days for your refill to be completed.          Additional Information About Your Visit        MyChart Information     [facility] gives you secure access to your electronic health record. If you see a primary care provider, you can also send messages to your care team and make appointments. If you have questions, please call your primary care clinic.  If you do not have a primary  "care provider, please call [phone] and they will assist you.        Care EveryWhere ID     This is your Care EveryWhere ID. This could be used by other organizations to access your [facility] medical records  [id number]        Your Vitals Were     Pulse Height Pulse Oximetry BMI (Body Mass Index)          79 1.727 m (5' 8\") 94% 36.19 kg/m2         Blood Pressure from Last 3 Encounters:   03/16/17 122/83   02/28/17 133/89   02/22/17 123/85    Weight from Last 3 Encounters:   03/16/17 108 kg (238 lb)   02/22/17 106.1 kg (234 lb)   12/19/16 107.7 kg (237 lb 6.4 oz)              Today, you had the following     No orders found for display       Primary Care Provider Office Phone # Fax #    Silas Enciso [phone]        [facility] [address] [facility] [address]  [facility] [address]        Thank you!     Thank you for choosing [facility]  for your care. Our goal is always to provide you with excellent care. Hearing back from our patients is one way we can continue to improve our services. Please take a few minutes to complete the written survey that you may receive in the mail after your visit with us. Thank you!             Your Updated Medication List - Protect others around you: Learn how to safely use, store and throw away your medicines at www.disposemymeds.org.          This list is accurate as of: 3/16/17  4:27 PM.  Always use your most recent med list.                   Brand Name Dispense Instructions for use    atorvastatin 10 MG tablet    LIPITOR    90 tablet    Take 1 tablet (10 mg) by mouth daily       cholecalciferol 1000 UNITS capsule    vitamin  -D    60 capsule    Take 2 capsules (2,000 Units) by mouth daily       clopidogrel 75 MG tablet    PLAVIX    90 tablet    Take 1 tablet (75 mg) by mouth daily       furosemide 20 MG tablet    LASIX    200 tablet    Take 1 tablet (20 mg) by mouth 2 times daily       metoprolol 25 MG 24 hr tablet    TOPROL-XL    60 tablet    " Take 2 tablets (50 mg) by mouth daily       pantoprazole 40 MG EC tablet    PROTONIX    180 tablet    Take 1 tablet (40 mg) by mouth 2 times daily       sacubitril-valsartan 24-26 MG per tablet    ENTRESTO    60 tablet    Take 1 tablet by mouth 2 times daily       thiamine 100 MG tablet     90 tablet    Take 1 tablet (100 mg) by mouth daily       vitamin B complex with vitamin C Tabs tablet      Take 1 tablet by mouth daily

## 2017-03-16 NOTE — PROGRESS NOTES
The patient returns for follow-up.  He is still walking but describes more shortness of breath.  He has no interim history of urticarial reaction.  There is no history of syncope, arrhythmia, increasing dependent edema, orthopnea, PND>     Constitutional: alert, oriented, normal gait and station, normal mentation.  Oral: moist mucous membrans  Lymph: without pathologic adenopathy  Chest: clear to ausculation and percussion  Cor: No evidence of left or right ventricular activity.  Rhythm is regular.  S1 normal, S2 split physiologically. Murmurs are not present  Abdomen: without tenderness, rebound, guarding, masses, ascites  Extremities: Edema not present  Neuro: no focal defects, normal mentation  Skin: without open lesions  Psych: oriented, verbal, mental status in tact    Plan:    Increase furosemide to 60 mgs every morning which is three 20mg pills  Increase Toprol XL to 75mgs/day taken as 50mgs and 25mgs in evening                          Current Outpatient Prescriptions   Medication     atorvastatin (LIPITOR) 10 MG tablet     pantoprazole (PROTONIX) 40 MG enteric coated tablet     sacubitril-valsartan (ENTRESTO) 24-26 MG per tablet     furosemide (LASIX) 20 MG tablet     clopidogrel (PLAVIX) 75 MG tablet     metoprolol (TOPROL-XL) 25 MG 24 hr tablet     vitamin  B complex with vitamin C (VITAMIN  B COMPLEX) TABS     thiamine 100 MG tablet     cholecalciferol (VITAMIN  -D) 1000 UNITS capsule     [DISCONTINUED] ISOSORBIDE DINITRATE PO     No current facility-administered medications for this visit.

## 2017-03-16 NOTE — NURSING NOTE
Diet: Patient instructed regarding a heart healthy diet, including discussion of reduced fat and sodium intake. Patient demonstrated understanding of this information and agreed to call with further questions or concerns.  Labs: Patient was given results of the laboratory testing obtained today. Patient was instructed to return for the next laboratory testing next week . Patient demonstrated understanding of this information and agreed to call with further questions or concerns.   Med Reconcile: Reviewed and verified all current medications with the patient. The updated medication list was printed and given to the patient.  Return Appointment: Patient given instructions regarding scheduling next clinic visit. Patient demonstrated understanding of this information and agreed to call with further questions or concerns.  Medication Change: Patient was educated regarding prescribed medication change, including discussion of the indication, administration, side effects, and when to report to MD or RN. Patient demonstrated understanding of this information and agreed to call with further questions or concerns.  Patient stated he understood all health information given and agreed to call with further questions or concerns.    Medication Changes:  Increase furosemide to 60 mgs every morning which is three 20mg pills  Increase Toprol XL to 75mgs/day taken as 50mgs and 25mgs in evening    Patient Instructions:      Follow up Appointment Information:  Return to see nurse practitioner end of next week and have basic metabolic panel checked in laboratory  Return to me in 4 weeks

## 2017-03-16 NOTE — PATIENT INSTRUCTIONS
Medication Changes:  Increase furosemide to 60 mgs every morning which is three 20mg pills  Increase Toprol XL to 75mgs/day taken as 50mgs and 25mgs in evening    Patient Instructions:      Follow up Appointment Information:  Return to see nurse practitioner end of next week and have basic metabolic panel checked in laboratory  Return to me in 4 weeks    We are located on the third floor of the [facility] ([facility] on the [facility].  Our address is     [address] [place] [address] on [address]rd [place] [address]    Thank you for allowing us to be a part of your care here at the [facility]    If you have questions or concerns please contact us at:    Vicki De Paz RN, BSN    Cody Purcell (Schedule,P.A.)  Nurse Coordinator     Clinic   Pulmonary Hypertension   Pulmonary Hypertension  [facility]  (P)[phone]    (P) [phone]        (F)[phone]    ** Please note that you will NOT receive a reminder call regarding your scheduled testing, reminder calls are for provider appointments only.  If you are scheduled for testing within the [facility] system you may receive a call regarding pre-registration for billing purposes only.**     Remember to weigh yourself daily after voiding and before you consume any food or beverages and log the numbers.  If you have gained/lost 2 pounds overnight or 5 pounds in a week contact us immediately for medication adjustments or further instructions.

## 2017-03-18 ENCOUNTER — PRE VISIT (OUTPATIENT)
Dept: CARDIOLOGY | Facility: CLINIC | Age: 57
End: 2017-03-18

## 2017-03-18 DIAGNOSIS — I50.22 CHRONIC SYSTOLIC HEART FAILURE (H): Primary | ICD-10-CM

## 2017-03-27 ENCOUNTER — OFFICE VISIT (OUTPATIENT)
Dept: CARDIOLOGY | Facility: CLINIC | Age: 57
End: 2017-03-27
Attending: INTERNAL MEDICINE
Payer: MEDICAID

## 2017-03-27 VITALS
HEART RATE: 76 BPM | DIASTOLIC BLOOD PRESSURE: 84 MMHG | SYSTOLIC BLOOD PRESSURE: 121 MMHG | BODY MASS INDEX: 36.12 KG/M2 | HEIGHT: 68 IN | OXYGEN SATURATION: 95 % | WEIGHT: 238.3 LBS

## 2017-03-27 DIAGNOSIS — I50.22 CHRONIC SYSTOLIC CONGESTIVE HEART FAILURE (H): ICD-10-CM

## 2017-03-27 DIAGNOSIS — I25.5 ISCHEMIC CARDIOMYOPATHY: ICD-10-CM

## 2017-03-27 DIAGNOSIS — I10 ESSENTIAL HYPERTENSION WITH GOAL BLOOD PRESSURE LESS THAN 130/85: ICD-10-CM

## 2017-03-27 DIAGNOSIS — I50.22 CHRONIC SYSTOLIC HEART FAILURE (H): ICD-10-CM

## 2017-03-27 DIAGNOSIS — I50.22 CHRONIC SYSTOLIC HEART FAILURE (H): Primary | ICD-10-CM

## 2017-03-27 LAB
ANION GAP SERPL CALCULATED.3IONS-SCNC: 6 MMOL/L (ref 3–14)
BUN SERPL-MCNC: 30 MG/DL (ref 7–30)
CALCIUM SERPL-MCNC: 8.3 MG/DL (ref 8.5–10.1)
CHLORIDE SERPL-SCNC: 105 MMOL/L (ref 94–109)
CHOLEST SERPL-MCNC: 159 MG/DL
CO2 SERPL-SCNC: 30 MMOL/L (ref 20–32)
CREAT SERPL-MCNC: 2.33 MG/DL (ref 0.66–1.25)
GFR SERPL CREATININE-BSD FRML MDRD: 29 ML/MIN/1.7M2
GLUCOSE SERPL-MCNC: 140 MG/DL (ref 70–99)
HDLC SERPL-MCNC: 30 MG/DL
LDLC SERPL CALC-MCNC: 94 MG/DL
NONHDLC SERPL-MCNC: 129 MG/DL
NT-PROBNP SERPL-MCNC: 3105 PG/ML (ref 0–125)
POTASSIUM SERPL-SCNC: 4.6 MMOL/L (ref 3.4–5.3)
SODIUM SERPL-SCNC: 140 MMOL/L (ref 133–144)
TRIGL SERPL-MCNC: 178 MG/DL

## 2017-03-27 PROCEDURE — 83880 ASSAY OF NATRIURETIC PEPTIDE: CPT | Performed by: INTERNAL MEDICINE

## 2017-03-27 PROCEDURE — 99214 OFFICE O/P EST MOD 30 MIN: CPT | Performed by: NURSE PRACTITIONER

## 2017-03-27 PROCEDURE — 99212 OFFICE O/P EST SF 10 MIN: CPT | Mod: ZF

## 2017-03-27 PROCEDURE — 36415 COLL VENOUS BLD VENIPUNCTURE: CPT | Performed by: INTERNAL MEDICINE

## 2017-03-27 PROCEDURE — 80048 BASIC METABOLIC PNL TOTAL CA: CPT | Performed by: INTERNAL MEDICINE

## 2017-03-27 ASSESSMENT — PAIN SCALES - GENERAL: PAINLEVEL: NO PAIN (0)

## 2017-03-27 NOTE — MR AVS SNAPSHOT
After Visit Summary   3/27/2017    Mercy Shafer    MRN: [id number]           Patient Information     Date Of Birth          1960        Visit Information        Provider Department      3/27/2017 8:30 AM Bertha Phan NP  [facility]        Care Instructions    You were seen today in the Cardiovascular Clinic at the [facility].     Cardiology Providers you saw during your visit: Bertha Phan NP       1.  No medication changes for today.  2.  Please make a follow-up CORE/heart failure appt with Bertha in 3 months with labs prior. In the meantime, we will be doing some investigating regarding your anticoagulation meds.     Results for MERCY SHAFER (MRN [id number]) as of 3/27/2017 08:44   Ref. Range 3/27/2017 08:16   Sodium Latest Ref Range: 133 - 144 mmol/L 140   Potassium Latest Ref Range: 3.4 - 5.3 mmol/L 4.6   Chloride Latest Ref Range: 94 - 109 mmol/L 105   Carbon Dioxide Latest Ref Range: 20 - 32 mmol/L 30   Urea Nitrogen Latest Ref Range: 7 - 30 mg/dL 30   Creatinine Latest Ref Range: 0.66 - 1.25 mg/dL 2.33 (H)   GFR Estimate Latest Ref Range: >60 mL/min/1.7m2 29 (L)   GFR Estimate If Black Latest Ref Range: >60 mL/min/1.7m2 35 (L)   Calcium Latest Ref Range: 8.5 - 10.1 mg/dL 8.3 (L)   Anion Gap Latest Ref Range: 3 - 14 mmol/L 6   Cholesterol Latest Ref Range: <200 mg/dL 159   HDL Cholesterol Latest Ref Range: >39 mg/dL 30 (L)   LDL Cholesterol Calculated Latest Ref Range: <100 mg/dL 94   Non HDL Cholesterol Latest Ref Range: <130 mg/dL 129   N-Terminal Pro Bnp Latest Ref Range: 0 - 125 pg/mL 3105 (H)   Triglycerides Latest Ref Range: <150 mg/dL 178 (H)   Glucose Latest Ref Range: 70 - 99 mg/dL 140 (H)       Please limit your fluid intake to 2 L (64 ounces) daily.  2 Liters a day = 8.5 cups, or 72 ounces.  Please limit your salt intake to 2 grams a day or less.    If you gain 2# in 24 hours or 5# in one week call Livia Joe RN so we can adjust your  "medications as needed over the phone.    Please feel free to call me with any questions or concerns.      Livia Joe RN BSN CHFN  [facility]  Cardiology Care Coordinator-Heart Failure Clinic    Questions and [phone].   First press #1 for the University and then press #3 for \"Medical Questions\" to reach us Cardiology Nurses.     On Call Cardiologist for after hours or on weekends: [phone]   option #4 and ask to speak to the on-call Cardiologist. Inform them you are a CORE/heart failure patient at the [facility].        If you need a medication refill please contact your pharmacy.  Please allow 3 business days for your refill to be completed.  _______________________________________________________  C.O.R.E. CLINIC Cardiomyopathy, Optimization, Rehabilitation, Education   The C.O.R.E. CLINIC is a heart failure specialty clinic within the [facility] where you will work with specialized nurse practitioners dedicated to helping patients with heart failure carefully adjust medications, receive education, and learn who and when to call if symptoms develop. They specialize in helping you better understand your condition, slow the progression of your disease, improve the length and quality of your life, help you detect future heart problems before they become life threatening, and avoid hospitalizations.  As always, thank you for trusting us with your health care needs!             Follow-ups after your visit        Your next 10 appointments already scheduled     2017  9:30 AM CDT   (Arrive by 9:15 AM)   RETURN HEART FAILURE with Alen Trujillo MD   [facility] ([facility])    [address] [place] [address]  3rd [facility] [id number] [phone]            2017  8:30 AM CDT   Lab with  LAB   [facility] Lab ([facility])    [address] [place] [address]  1st " "Floor  [facility] [id number] [phone]            Jun 30, 2017  9:00 AM CDT   (Arrive by 8:45 AM)   CORE RETURN with Bertha Phan NP   [facility] ([facility] [place])    [address]  3rd [facility] [id number] [phone]              Who to contact     If you have questions or need follow up information about today's clinic visit or your schedule please contact [facility] directly at [phone].  Normal or non-critical lab and imaging results will be communicated to you by Circadencehart, letter or phone within 4 business days after the clinic has received the results. If you do not hear from us within 7 days, please contact the clinic through nPicker or phone. If you have a critical or abnormal lab result, we will notify you by phone as soon as possible.  Submit refill requests through nPicker or call your pharmacy and they will forward the refill request to us. Please allow 3 business days for your refill to be completed.          Additional Information About Your Visit        nPicker Information     nPicker gives you secure access to your electronic health record. If you see a primary care provider, you can also send messages to your care team and make appointments. If you have questions, please call your primary care clinic.  If you do not have a primary care provider, please call [phone] and they will assist you.        Care EveryWhere ID     This is your Care EveryWhere ID. This could be used by other organizations to access your [facility] medical records  [id number]        Your Vitals Were     Pulse Height Pulse Oximetry BMI (Body Mass Index)          76 1.727 m (5' 8\") 95% 36.23 kg/m2         Blood Pressure from Last 3 Encounters:   03/27/17 121/84   03/16/17 122/83   02/28/17 133/89    Weight from Last 3 Encounters:   03/27/17 108.1 kg (238 lb 4.8 oz)   03/16/17 108 kg (238 lb)   02/22/17 106.1 kg (234 lb)              Today, you had " the following     No orders found for display       Primary Care Provider Office Phone # Fax #    Silas Enciso [phone]        [facility] [address] [facility] [address]  [facility] [address]        Thank you!     Thank you for choosing [facility]  for your care. Our goal is always to provide you with excellent care. Hearing back from our patients is one way we can continue to improve our services. Please take a few minutes to complete the written survey that you may receive in the mail after your visit with us. Thank you!             Your Updated Medication List - Protect others around you: Learn how to safely use, store and throw away your medicines at www.disposemymeds.org.          This list is accurate as of: 3/27/17  9:33 AM.  Always use your most recent med list.                   Brand Name Dispense Instructions for use    atorvastatin 10 MG tablet    LIPITOR    90 tablet    Take 1 tablet (10 mg) by mouth daily       cholecalciferol 1000 UNITS capsule    vitamin  -D    60 capsule    Take 2 capsules (2,000 Units) by mouth daily       clopidogrel 75 MG tablet    PLAVIX    90 tablet    Take 1 tablet (75 mg) by mouth daily       furosemide 20 MG tablet    LASIX    200 tablet    Take 1 tablet (20 mg) by mouth 2 times daily       metoprolol 25 MG 24 hr tablet    TOPROL-XL    60 tablet    Take 2 tablets (50 mg) by mouth daily       pantoprazole 40 MG EC tablet    PROTONIX    180 tablet    Take 1 tablet (40 mg) by mouth 2 times daily       sacubitril-valsartan 24-26 MG per tablet    ENTRESTO    60 tablet    Take 1 tablet by mouth 2 times daily       thiamine 100 MG tablet     90 tablet    Take 1 tablet (100 mg) by mouth daily       vitamin B complex with vitamin C Tabs tablet      Take 1 tablet by mouth daily

## 2017-03-27 NOTE — PATIENT INSTRUCTIONS
You were seen today in the Cardiovascular Clinic at the [facility].     Cardiology Providers you saw during your visit: Bertha Phan NP       1.  No medication changes for today.   2.  Please make a follow-up CORE/heart failure appt with Bertha in 3 months with labs prior. In the meantime, we will be doing some investigating regarding your anticoagulation meds.     Results for MERCY SHAFER (MRN [id number]) as of 3/27/2017 08:44   Ref. Range 3/27/2017 08:16   Sodium Latest Ref Range: 133 - 144 mmol/L 140   Potassium Latest Ref Range: 3.4 - 5.3 mmol/L 4.6   Chloride Latest Ref Range: 94 - 109 mmol/L 105   Carbon Dioxide Latest Ref Range: 20 - 32 mmol/L 30   Urea Nitrogen Latest Ref Range: 7 - 30 mg/dL 30   Creatinine Latest Ref Range: 0.66 - 1.25 mg/dL 2.33 (H)   GFR Estimate Latest Ref Range: >60 mL/min/1.7m2 29 (L)   GFR Estimate If Black Latest Ref Range: >60 mL/min/1.7m2 35 (L)   Calcium Latest Ref Range: 8.5 - 10.1 mg/dL 8.3 (L)   Anion Gap Latest Ref Range: 3 - 14 mmol/L 6   Cholesterol Latest Ref Range: <200 mg/dL 159   HDL Cholesterol Latest Ref Range: >39 mg/dL 30 (L)   LDL Cholesterol Calculated Latest Ref Range: <100 mg/dL 94   Non HDL Cholesterol Latest Ref Range: <130 mg/dL 129   N-Terminal Pro Bnp Latest Ref Range: 0 - 125 pg/mL 3105 (H)   Triglycerides Latest Ref Range: <150 mg/dL 178 (H)   Glucose Latest Ref Range: 70 - 99 mg/dL 140 (H)       Please limit your fluid intake to 2 L (64 ounces) daily.  2 Liters a day = 8.5 cups, or 72 ounces.  Please limit your salt intake to 2 grams a day or less.    If you gain 2# in 24 hours or 5# in one week call Livia Joe RN so we can adjust your medications as needed over the phone.    Please feel free to call me with any questions or concerns.      Livia Joe RN BSN CHFN  [facility]  Cardiology Care Coordinator-Heart Failure Clinic    Questions and [phone].   First press #1 for the University  "and then press #3 for \"Medical Questions\" to reach us Cardiology Nurses.     On Call Cardiologist for after hours or on weekends: [phone]   option #4 and ask to speak to the on-call Cardiologist. Inform them you are a CORE/heart failure patient at the [facility].        If you need a medication refill please contact your pharmacy.  Please allow 3 business days for your refill to be completed.  _______________________________________________________  C.O.R.E. CLINIC Cardiomyopathy, Optimization, Rehabilitation, Education   The C.O.R.E. CLINIC is a heart failure specialty clinic within the [facility] where you will work with specialized nurse practitioners dedicated to helping patients with heart failure carefully adjust medications, receive education, and learn who and when to call if symptoms develop. They specialize in helping you better understand your condition, slow the progression of your disease, improve the length and quality of your life, help you detect future heart problems before they become life threatening, and avoid hospitalizations.  As always, thank you for trusting us with your health care needs!       "

## 2017-03-27 NOTE — LETTER
3/27/2017    RE: Cole Jesus  [address]  [place] [address]       Dear Colleague,    Thank you for the opportunity to participate in the care of your patient, Cole Jesus, at the [facility]. Please see a copy of my visit note below.    HPI  Mr. Jesus is a 56 year old man with a history of Aib/flutter s/p ablation (2013) CAD s/p PCI, HTN, and ICM (EF 40%) who presents to CORE clinic for follow up. He recently saw Dr. Trujillo following a RHC showing elevated filling pressures. Diuretics were increased as well as, metoprolol.     He reports feeling well with less shortness of breath, though doesn't feel like he's been limited. In retrospect, he notes some dietary changes after his daughter passed away which precipitated more bloating and lower extremity edema. With recent increases in diuretics, he is feeling better. No SOB with a flight of stairs. No CP, palpitations, lightheadedness, edema, bloating. Appetite good. No nausea or early satiety. 1 pillow. No PND.     Mr. Jesus reports a history of snoring though it has improved recently. He has had a prior sleep study at outside facility (2015) though he is unclear of the results.     Past Medical History:   Diagnosis Date     Anemia in chronic renal disease 3/9/2015     Antiplatelet or antithrombotic long-term use      Atrial fibrillation and flutter      CAD (coronary artery disease)     Stemi in 12/11, s/p angioplasty     CRD (chronic renal disease), stage IV (H) 3/9/2015     GI bleed     massive lower GI bleed secondary to a cecal ulcer, s/p ileocecal resection in 12/11     Heart failure     Biventricular systolic HF, complicated by ARDS requiring tracheostomy     History of blood transfusion      Hyperlipidemia LDL goal <100 4/28/2013     Hypertension      Ischemic cardiomyopathy 4/28/2013    TTE revealing 40% EF     Other and unspecified nonspecific immunological findings      Anti JKa     Pulmonary edema     episodes of flash pulmonary edema in      Renal insufficiency 2013    hx ATN with dialysis complicating cardiogenic shock  2012     Subclinical hypothyroidism      Social History     Social History     Marital status: Significant other     Spouse name: N/A     Number of children: N/A     Years of education: N/A     Occupational History     Not on file.     Social History Main Topics     Smoking status: Former Smoker     Quit date: 12/3/2011     Smokeless tobacco: Never Used     Alcohol use No     Drug use: No     Sexual activity: Yes     Partners: Female     Other Topics Concern     Parent/Sibling W/ Cabg, Mi Or Angioplasty Before 65f 55m? Yes     Exercise Yes     limited walking     Social History Narrative     Family History   Problem Relation Age of Onset     DIABETES Mother      Hypertension Mother      HEART DISEASE Mother      HEART DISEASE Father       of heart attack, left when he was young     DIABETES Sister      DIABETES Sister      DIABETES Sister        Current Outpatient Prescriptions on File Prior to Visit:  atorvastatin (LIPITOR) 10 MG tablet Take 1 tablet (10 mg) by mouth daily   pantoprazole (PROTONIX) 40 MG enteric coated tablet Take 1 tablet (40 mg) by mouth 2 times daily   sacubitril-valsartan (ENTRESTO) 24-26 MG per tablet Take 1 tablet by mouth 2 times daily   furosemide (LASIX) 20 MG tablet Take 1 tablet (20 mg) by mouth 2 times daily   clopidogrel (PLAVIX) 75 MG tablet Take 1 tablet (75 mg) by mouth daily   metoprolol (TOPROL-XL) 25 MG 24 hr tablet Take 2 tablets (50 mg) by mouth daily   vitamin  B complex with vitamin C (VITAMIN  B COMPLEX) TABS Take 1 tablet by mouth daily   thiamine 100 MG tablet Take 1 tablet (100 mg) by mouth daily   cholecalciferol (VITAMIN  -D) 1000 UNITS capsule Take 2 capsules (2,000 Units) by mouth daily   [DISCONTINUED] ISOSORBIDE DINITRATE PO Take 15 mg by mouth 3 times daily     No current facility-administered  "medications on file prior to visit.     Review Of Systems  Skin: negative  Eyes: negative  Ears/Nose/Throat: negative  Respiratory: No shortness of breath, dyspnea on exertion, cough, or hemoptysis  Cardiovascular: negative and as above  Gastrointestinal: negative  Genitourinary: negative  Musculoskeletal: negative  Neurologic: negative  Psychiatric: negative  Hematologic/Lymphatic/Immunologic: negative  Endocrine: negative    Physical examination:  /84 (BP Location: Right arm, Patient Position: Chair, Cuff Size: Adult Large)  Pulse 76  Ht 1.727 m (5' 8\")  Wt 108.1 kg (238 lb 4.8 oz)  SpO2 95%  BMI 36.23 kg/m2  GENERAL APPEARANCE: healthy, alert and no distress  HEENT: no icterus, no xanthelasmas, normal pupil size and reaction, normal palate, mucosa moist, no cyanosis.  NECK: no adenopathy, no asymmetry, masses, or scars, thyroid normal to palpation  CHEST: lungs clear to auscultation - no rales, rhonchi or wheezes, no use of accessory muscles, no retractions, respirations are unlabored, normal respiratory rate, no kyphosis, no scoliosis  CARDIOVASCULAR: irregular rhythm, normal S1 and S2. JVP elevated to 12 cm of water, PMI displaced laterally  ABDOMEN: soft, non tender, without hepatomegaly, no masses palpable, bowel sounds normal  EXTREMITIES:  edema  NEURO: alert and oriented to person/place/time, normal speech, gait and affect  SKIN: no ecchymoses, no rashes    Last Basic Metabolic Panel:  Lab Results   Component Value Date     03/27/2017      Lab Results   Component Value Date    POTASSIUM 4.6 03/27/2017     Lab Results   Component Value Date    CHLORIDE 105 03/27/2017     Lab Results   Component Value Date    RONNY 8.3 03/27/2017     Lab Results   Component Value Date    CO2 30 03/27/2017     Lab Results   Component Value Date    BUN 30 03/27/2017     Lab Results   Component Value Date    CR 2.33 03/27/2017     Lab Results   Component Value Date     03/27/2017       Component      " Latest Ref Rng & Units 10/31/2016 12/19/2016 2/22/2017 3/16/2017   N-Terminal Pro Bnp      0 - 125 pg/mL 15017 (H) 4865 (H) 3344 (H) 2536 (H)     Component      Latest Ref Rng & Units 3/27/2017   N-Terminal Pro Bnp      0 - 125 pg/mL 3105 (H)       Assessment and Plan  Mr. Jesus is a pleasant 56 year old man with ICM who remains mildly volume up but appears to be trending in the right direction since increasing diuretics with Dr. Trujillo. We'll make no changes today but will investigate if he needs to continue Plavix (PCI in 2011, though in the setting of STEMI) or can be switched to ASA.It appears as though he self-discontinued the ASA quite some time ago. Also,he is not anticoagulated s/p AF ablation in 2013. His pulse is irregular today and the last EKG demonstrating AF in our system is from 2014. He does have a history of significant GI bleed requiring resection and diverting colostomy. We'll place a Zio monitor.    In addition, he would benefit from the addition of an aldosterone antagonist given mildly reduced EF and advanced diastolic dysfunction but is already on Entresto. Typically, Entresto is added if symptomatic despite optimized ACE, Beta, and Ald antagonist. His EF is reduced, though is not <35%. His creatinine is marginal, so I'm hesitant to add liz today. We'll defer the decision to Dr. Trujillo who will see Mr. Jesus in 2 weeks.     30 minutes spent in direct care, >50% in counseling    Please do not hesitate to contact me if you have any questions/concerns.     Sincerely,     Bertha Phan NP

## 2017-03-27 NOTE — PROGRESS NOTES
HPI  Mr. Jesus is a 56 year old man with a history of Aib/flutter s/p ablation (2013) CAD s/p PCI, HTN, and ICM (EF 40%) who presents to [facility] for follow up. He recently saw Dr. Trujillo following a RHC showing elevated filling pressures. Diuretics were increased as well as, metoprolol.     He reports feeling well with less shortness of breath, though doesn't feel like he's been limited. In retrospect, he notes some dietary changes after his daughter passed away which precipitated more bloating and lower extremity edema. With recent increases in diuretics, he is feeling better. No SOB with a flight of stairs. No CP, palpitations, lightheadedness, edema, bloating. Appetite good. No nausea or early satiety. 1 pillow. No PND.     Mr. Jesus reports a history of snoring though it has improved recently. He has had a prior sleep study at outside facility (2015) though he is unclear of the results.     Past Medical History:   Diagnosis Date     Anemia in chronic renal disease 3/9/2015     Antiplatelet or antithrombotic long-term use      Atrial fibrillation and flutter      CAD (coronary artery disease)     Stemi in 12/11, s/p angioplasty     CRD (chronic renal disease), stage IV (H) 3/9/2015     GI bleed     massive lower GI bleed secondary to a cecal ulcer, s/p ileocecal resection in 12/11     Heart failure     Biventricular systolic HF, complicated by ARDS requiring tracheostomy     History of blood transfusion      Hyperlipidemia LDL goal <100 4/28/2013     Hypertension      Ischemic cardiomyopathy 4/28/2013    TTE revealing 40% EF     Other and unspecified nonspecific immunological findings     Anti JKa     Pulmonary edema     episodes of flash pulmonary edema in 12/11     Renal insufficiency 5/6/2013    hx ATN with dialysis complicating cardiogenic shock  1/2012     Subclinical hypothyroidism      Social History     Social History     Marital status: Significant other     Spouse name: N/A     Number of  children: N/A     Years of education: N/A     Occupational History     Not on file.     Social History Main Topics     Smoking status: Former Smoker     Quit date: 12/3/2011     Smokeless tobacco: Never Used     Alcohol use No     Drug use: No     Sexual activity: Yes     Partners: Female     Other Topics Concern     Parent/Sibling W/ Cabg, Mi Or Angioplasty Before 65f 55m? Yes     Exercise Yes     limited walking     Social History Narrative     Family History   Problem Relation Age of Onset     DIABETES Mother      Hypertension Mother      HEART DISEASE Mother      HEART DISEASE Father       of heart attack, left when he was young     DIABETES Sister      DIABETES Sister      DIABETES Sister        Current Outpatient Prescriptions on File Prior to Visit:  atorvastatin (LIPITOR) 10 MG tablet Take 1 tablet (10 mg) by mouth daily   pantoprazole (PROTONIX) 40 MG enteric coated tablet Take 1 tablet (40 mg) by mouth 2 times daily   sacubitril-valsartan (ENTRESTO) 24-26 MG per tablet Take 1 tablet by mouth 2 times daily   furosemide (LASIX) 20 MG tablet Take 1 tablet (20 mg) by mouth 2 times daily   clopidogrel (PLAVIX) 75 MG tablet Take 1 tablet (75 mg) by mouth daily   metoprolol (TOPROL-XL) 25 MG 24 hr tablet Take 2 tablets (50 mg) by mouth daily   vitamin  B complex with vitamin C (VITAMIN  B COMPLEX) TABS Take 1 tablet by mouth daily   thiamine 100 MG tablet Take 1 tablet (100 mg) by mouth daily   cholecalciferol (VITAMIN  -D) 1000 UNITS capsule Take 2 capsules (2,000 Units) by mouth daily   [DISCONTINUED] ISOSORBIDE DINITRATE PO Take 15 mg by mouth 3 times daily     No current facility-administered medications on file prior to visit.     Review Of Systems  Skin: negative  Eyes: negative  Ears/Nose/Throat: negative  Respiratory: No shortness of breath, dyspnea on exertion, cough, or hemoptysis  Cardiovascular: negative and as above  Gastrointestinal: negative  Genitourinary: negative  Musculoskeletal:  "negative  Neurologic: negative  Psychiatric: negative  Hematologic/Lymphatic/Immunologic: negative  Endocrine: negative    Physical examination:  /84 (BP Location: Right arm, Patient Position: Chair, Cuff Size: Adult Large)  Pulse 76  Ht 1.727 m (5' 8\")  Wt 108.1 kg (238 lb 4.8 oz)  SpO2 95%  BMI 36.23 kg/m2  GENERAL APPEARANCE: healthy, alert and no distress  HEENT: no icterus, no xanthelasmas, normal pupil size and reaction, normal palate, mucosa moist, no cyanosis.  NECK: no adenopathy, no asymmetry, masses, or scars, thyroid normal to palpation  CHEST: lungs clear to auscultation - no rales, rhonchi or wheezes, no use of accessory muscles, no retractions, respirations are unlabored, normal respiratory rate, no kyphosis, no scoliosis  CARDIOVASCULAR: irregular rhythm, normal S1 and S2. JVP elevated to 12 cm of water, PMI displaced laterally  ABDOMEN: soft, non tender, without hepatomegaly, no masses palpable, bowel sounds normal  EXTREMITIES:  edema  NEURO: alert and oriented to person/place/time, normal speech, gait and affect  SKIN: no ecchymoses, no rashes    Last Basic Metabolic Panel:  Lab Results   Component Value Date     03/27/2017      Lab Results   Component Value Date    POTASSIUM 4.6 03/27/2017     Lab Results   Component Value Date    CHLORIDE 105 03/27/2017     Lab Results   Component Value Date    RONNY 8.3 03/27/2017     Lab Results   Component Value Date    CO2 30 03/27/2017     Lab Results   Component Value Date    BUN 30 03/27/2017     Lab Results   Component Value Date    CR 2.33 03/27/2017     Lab Results   Component Value Date     03/27/2017       Component      Latest Ref Rng & Units 10/31/2016 12/19/2016 2/22/2017 3/16/2017   N-Terminal Pro Bnp      0 - 125 pg/mL 95327 (H) 4865 (H) 3344 (H) 2536 (H)     Component      Latest Ref Rng & Units 3/27/2017   N-Terminal Pro Bnp      0 - 125 pg/mL 3105 (H)       Assessment and Plan  Mr. Jesus is a pleasant 56 year old man " with ICM who remains mildly volume up but appears to be trending in the right direction since increasing diuretics with Dr. Trujillo. We'll make no changes today but will investigate if he needs to continue Plavix (PCI in 2011, though in the setting of STEMI) or can be switched to ASA.It appears as though he self-discontinued the ASA quite some time ago. Also,he is not anticoagulated s/p AF ablation in 2013. His pulse is irregular today and the last EKG demonstrating AF in our system is from 2014. He does have a history of significant GI bleed requiring resection and diverting colostomy. We'll place a Zio monitor.    In addition, he would benefit from the addition of an aldosterone antagonist given mildly reduced EF and advanced diastolic dysfunction but is already on Entresto. Typically, Entresto is added if symptomatic despite optimized ACE, Beta, and Ald antagonist. His EF is reduced, though is not <35%. His creatinine is marginal, so I'm hesitant to add liz today. We'll defer the decision to Dr. Trujillo who will see Mr. Jesus in 2 weeks.     30 minutes spent in direct care, >50% in counseling

## 2017-03-29 DIAGNOSIS — I50.22 CHRONIC SYSTOLIC HEART FAILURE (H): Primary | ICD-10-CM

## 2017-03-29 DIAGNOSIS — I48.91 ATRIAL FIBRILLATION, UNSPECIFIED TYPE (H): ICD-10-CM

## 2017-03-29 DIAGNOSIS — I21.19 ST ELEVATION MYOCARDIAL INFARCTION (STEMI) OF INFERIOR WALL (H): ICD-10-CM

## 2017-03-29 NOTE — NURSING NOTE
Date: 3/29/2017  Time of Call: 2:33 PM  Diagnosis:  Heart failure, hx of STEMI, A.fib  VORB - Ordering provider: Bertha Phan NP   Order: Start taking ASA 81mg daily. Needs 7 day ZioPatch put on 3/30 or 3/31. Needs to be scheduled   Order received by: Livia Joe RN   Follow-up/additional notes: Solexanthart message sent. Waiting for patient to call back in to be scheduled.

## 2017-03-31 ENCOUNTER — ALLIED HEALTH/NURSE VISIT (OUTPATIENT)
Dept: CARDIOLOGY | Facility: CLINIC | Age: 57
End: 2017-03-31
Attending: NURSE PRACTITIONER
Payer: MEDICAID

## 2017-03-31 DIAGNOSIS — I48.91 ATRIAL FIBRILLATION, UNSPECIFIED TYPE (H): ICD-10-CM

## 2017-03-31 PROCEDURE — 0296T ZIO PATCH HOLTER: CPT | Mod: ZF

## 2017-03-31 PROCEDURE — 0298T ZZC EXT ECG > 48HR TO 21 DAY REVIEW AND INTERPRETATN: CPT | Performed by: INTERNAL MEDICINE

## 2017-03-31 RX ORDER — FUROSEMIDE 20 MG
60 TABLET ORAL DAILY
Qty: 360 TABLET | Refills: 3 | Status: SHIPPED | OUTPATIENT
Start: 2017-03-31 | End: 2017-11-03

## 2017-03-31 RX ORDER — METOPROLOL SUCCINATE 25 MG/1
75 TABLET, EXTENDED RELEASE ORAL DAILY
Qty: 360 TABLET | Refills: 3 | Status: SHIPPED | OUTPATIENT
Start: 2017-03-31 | End: 2017-08-03

## 2017-03-31 NOTE — MR AVS SNAPSHOT
After Visit Summary   3/31/2017    Cole Jesus    MRN: [id number]           Patient Information     Date Of Birth          1960        Visit Information        Provider Department      3/31/2017 10:00 AM Tech, Uc Cvc Monitor, [facility]        Today's Diagnoses     Atrial fibrillation, unspecified type (H)           Follow-ups after your visit        Your next 10 appointments already scheduled     Apr 12, 2017  9:30 AM CDT   (Arrive by 9:15 AM)   RETURN HEART FAILURE with Alen Trujillo MD   [facility] ([facility])    [address] [place] [id number] [phone]            Jun 30, 2017  8:30 AM CDT   Lab with ANUSHA LAB   [facility])    [address] [place] [id number] [phone]            Jun 30, 2017  9:00 AM CDT   (Arrive by 8:45 AM)   CORE RETURN with Bertha Phan NP   [facility])    [address] [place] [address] [id number] [phone]              Who to contact     If you have questions or need follow up information about today's clinic visit or your schedule please contact [facility] directly at [phone].  Normal or non-critical lab and imaging results will be communicated to you by MyChart, letter or phone within 4 business days after the clinic has received the results. If you do not hear from us within 7 days, please contact the clinic through MyChart or phone. If you have a critical or abnormal lab result, we will notify you by phone as soon as possible.  Submit refill requests through BlogBus or call your pharmacy and they will forward the refill request to us. Please allow 3 business days for your refill to be completed.          Additional Information About Your Visit        [email or web address] Information     BlogBus gives you secure access to your electronic  health record. If you see a primary care provider, you can also send messages to your care team and make appointments. If you have questions, please call your primary care clinic.  If you do not have a primary care provider, please call [phone] and they will assist you.        Care EveryWhere ID     This is your Care EveryWhere ID. This could be used by other organizations to access your [facility] medical records  [id number]         Blood Pressure from Last 3 Encounters:   No data found for BP    Weight from Last 3 Encounters:   No data found for Wt              Today, you had the following     No orders found for display       Primary Care Provider Office Phone # Fax #    Silas Enciso [phone]        [facility] [address] [facility] [address]  [facility] [address]        Thank you!     Thank you for choosing [facility]  for your care. Our goal is always to provide you with excellent care. Hearing back from our patients is one way we can continue to improve our services. Please take a few minutes to complete the written survey that you may receive in the mail after your visit with us. Thank you!             Your Updated Medication List - Protect others around you: Learn how to safely use, store and throw away your medicines at www.disposemymeds.org.          This list is accurate as of: 3/31/17 11:59 PM.  Always use your most recent med list.                   Brand Name Dispense Instructions for use    aspirin 81 MG EC tablet     90 tablet    Take 1 tablet (81 mg) by mouth daily       atorvastatin 10 MG tablet    LIPITOR    90 tablet    Take 1 tablet (10 mg) by mouth daily       cholecalciferol 1000 UNITS capsule    vitamin  -D    60 capsule    Take 2 capsules (2,000 Units) by mouth daily       clopidogrel 75 MG tablet    PLAVIX    90 tablet    Take 1 tablet (75 mg) by mouth daily       furosemide 20 MG tablet    LASIX    360 tablet    Take 3 tablets (60 mg) by mouth daily        metoprolol 25 MG 24 hr tablet    TOPROL-XL    360 tablet    Take 3 tablets (75 mg) by mouth daily       pantoprazole 40 MG EC tablet    PROTONIX    180 tablet    Take 1 tablet (40 mg) by mouth 2 times daily       sacubitril-valsartan 24-26 MG per tablet    ENTRESTO    60 tablet    Take 1 tablet by mouth 2 times daily       thiamine 100 MG tablet     90 tablet    Take 1 tablet (100 mg) by mouth daily       vitamin B complex with vitamin C Tabs tablet      Take 1 tablet by mouth daily

## 2017-03-31 NOTE — NURSING NOTE
Per NP- Bertha Ch, patient to have 7-DAY ZIO PATCH monitor placed.  Diagnosis: AFIB  Monitor placed: Yes  Patient Instructed: Yes  Patient verbalized understanding: Yes    #[id number]  Placed By-JAMES Tracy

## 2017-04-11 ENCOUNTER — PRE VISIT (OUTPATIENT)
Dept: CARDIOLOGY | Facility: CLINIC | Age: 57
End: 2017-04-11

## 2017-04-11 DIAGNOSIS — I50.22 CHRONIC SYSTOLIC CONGESTIVE HEART FAILURE (H): Primary | ICD-10-CM

## 2017-04-11 DIAGNOSIS — I27.20 PULMONARY HYPERTENSION (H): ICD-10-CM

## 2017-04-11 DIAGNOSIS — I25.5 ISCHEMIC CARDIOMYOPATHY: ICD-10-CM

## 2017-04-11 DIAGNOSIS — N18.4 CRD (CHRONIC RENAL DISEASE), STAGE IV (H): ICD-10-CM

## 2017-04-12 ENCOUNTER — OFFICE VISIT (OUTPATIENT)
Dept: CARDIOLOGY | Facility: CLINIC | Age: 57
End: 2017-04-12
Attending: INTERNAL MEDICINE
Payer: MEDICAID

## 2017-04-12 VITALS
WEIGHT: 240.4 LBS | SYSTOLIC BLOOD PRESSURE: 127 MMHG | HEIGHT: 68 IN | DIASTOLIC BLOOD PRESSURE: 83 MMHG | OXYGEN SATURATION: 98 % | HEART RATE: 82 BPM | BODY MASS INDEX: 36.43 KG/M2

## 2017-04-12 DIAGNOSIS — I25.5 ISCHEMIC CARDIOMYOPATHY: ICD-10-CM

## 2017-04-12 DIAGNOSIS — I27.20 PULMONARY HYPERTENSION (H): ICD-10-CM

## 2017-04-12 DIAGNOSIS — I27.20 PULMONARY HYPERTENSION (H): Primary | ICD-10-CM

## 2017-04-12 LAB
ANION GAP SERPL CALCULATED.3IONS-SCNC: 5 MMOL/L (ref 3–14)
BUN SERPL-MCNC: 31 MG/DL (ref 7–30)
CALCIUM SERPL-MCNC: 8.7 MG/DL (ref 8.5–10.1)
CHLORIDE SERPL-SCNC: 103 MMOL/L (ref 94–109)
CO2 SERPL-SCNC: 31 MMOL/L (ref 20–32)
CREAT SERPL-MCNC: 2.28 MG/DL (ref 0.66–1.25)
GFR SERPL CREATININE-BSD FRML MDRD: 30 ML/MIN/1.7M2
GLUCOSE SERPL-MCNC: 140 MG/DL (ref 70–99)
MAGNESIUM SERPL-MCNC: 2.4 MG/DL (ref 1.6–2.3)
POTASSIUM SERPL-SCNC: 4.4 MMOL/L (ref 3.4–5.3)
SODIUM SERPL-SCNC: 139 MMOL/L (ref 133–144)

## 2017-04-12 PROCEDURE — 36415 COLL VENOUS BLD VENIPUNCTURE: CPT | Performed by: INTERNAL MEDICINE

## 2017-04-12 PROCEDURE — 80048 BASIC METABOLIC PNL TOTAL CA: CPT | Performed by: INTERNAL MEDICINE

## 2017-04-12 PROCEDURE — 99212 OFFICE O/P EST SF 10 MIN: CPT

## 2017-04-12 PROCEDURE — 83735 ASSAY OF MAGNESIUM: CPT | Performed by: INTERNAL MEDICINE

## 2017-04-12 PROCEDURE — 99214 OFFICE O/P EST MOD 30 MIN: CPT | Mod: ZP | Performed by: INTERNAL MEDICINE

## 2017-04-12 ASSESSMENT — PAIN SCALES - GENERAL: PAINLEVEL: NO PAIN (0)

## 2017-04-12 NOTE — MR AVS SNAPSHOT
After Visit Summary   4/12/2017    Cole Jesus    MRN: [id number]           Patient Information     Date Of Birth          1960        Visit Information        Provider Department      4/12/2017 9:30 AM Alen Trujillo MD [facility]        Today's Diagnoses     Pulmonary hypertension (H)    -  1    Ischemic cardiomyopathy          Care Instructions    You were seen at the [facility] today.  You saw Dr. Trujillo  Here are your Instructions:    1.  Labs today.  2.  Labs next Wednesday w/ f/u w/ Dr. Trujillo also at 800, with labs at 730.      Leydi Felton RN  Nurse Care Coordinator  Office:  [phone] option #1, then #3 & ask for Leydi (nurse line)  Fax:  [phone]  After Hours:  [phone]  Appointments:  [phone] option #1, then option #1                      Follow-ups after your visit        Follow-up notes from your care team     Return for Dr. Trujillo.      Your next 10 appointments already scheduled     Apr 12, 2017 10:15 AM CDT   Lab with  LAB   [facility] ([facility])    [address] [place] [phone]   [phone]            Apr 19, 2017  7:30 AM CDT   Lab with  LAB   [facility] ([facility])    [address] [place] [id number] [phone]            Apr 19, 2017  8:00 AM CDT   (Arrive by 7:45 AM)   RETURN HEART FAILURE with Alen Trujillo MD   [facility] ([facility])    [address] [place] [phone] [phone]            Jun 30, 2017  8:30 AM CDT   Lab with  LAB   [facility] ([facility])    [address] [place] [id number] [phone]            Jun 30, 2017  9:00 AM CDT   (Arrive by 8:45 AM)   CORE RETURN with Bertha Phan NP   [facility] ([facility]  [facility])    [address] [facility]  [address] [facility] [id number] [phone]              Future tests that were ordered for you today     Open Future Orders        Priority Expected Expires Ordered    Basic metabolic panel Routine 4/19/2017 4/20/2017 4/12/2017    Magnesium Routine 4/19/2017 4/20/2017 4/12/2017    Basic metabolic panel Routine 4/12/2017 4/13/2017 4/12/2017    Magnesium Routine 4/12/2017 4/13/2017 4/12/2017            Who to contact     If you have questions or need follow up information about today's clinic visit or your schedule please contact [facility] directly at [phone].  Normal or non-critical lab and imaging results will be communicated to you by KeenSkimhart, letter or phone within 4 business days after the clinic has received the results. If you do not hear from us within 7 days, please contact the clinic through CodeNgot or phone. If you have a critical or abnormal lab result, we will notify you by phone as soon as possible.  Submit refill requests through Apartment [employer]da or call your pharmacy and they will forward the refill request to us. Please allow 3 business days for your refill to be completed.          Additional Information About Your Visit        [employer] Information     [employer] gives you secure access to your electronic health record. If you see a primary care provider, you can also send messages to your care team and make appointments. If you have questions, please call your primary care clinic.  If you do not have a primary care provider, please call [phone] and they will assist you.        Care EveryWhere ID     This is your Care EveryWhere ID. This could be used by other organizations to access your [facility] medical records  [id number]        Your Vitals Were     Pulse Height Pulse Oximetry BMI (Body Mass Index)          82 1.727 m (5' 8\") 98% 36.55 kg/m2         Blood Pressure from Last 3 Encounters:   04/12/17 127/83   03/27/17 121/84 "   03/16/17 122/83    Weight from Last 3 Encounters:   04/12/17 109 kg (240 lb 6.4 oz)   03/27/17 108.1 kg (238 lb 4.8 oz)   03/16/17 108 kg (238 lb)               Primary Care Provider Office Phone # Fax #    Silas Jono Enciso [phone]        [facility] [address] [facility] [address]  [facility] [address]        Thank you!     Thank you for choosing [facility]  for your care. Our goal is always to provide you with excellent care. Hearing back from our patients is one way we can continue to improve our services. Please take a few minutes to complete the written survey that you may receive in the mail after your visit with us. Thank you!             Your Updated Medication List - Protect others around you: Learn how to safely use, store and throw away your medicines at www.disposemymeds.org.          This list is accurate as of: 4/12/17  9:53 AM.  Always use your most recent med list.                   Brand Name Dispense Instructions for use    aspirin 81 MG EC tablet     90 tablet    Take 1 tablet (81 mg) by mouth daily       atorvastatin 10 MG tablet    LIPITOR    90 tablet    Take 1 tablet (10 mg) by mouth daily       cholecalciferol 1000 UNITS capsule    vitamin  -D    60 capsule    Take 2 capsules (2,000 Units) by mouth daily       clopidogrel 75 MG tablet    PLAVIX    90 tablet    Take 1 tablet (75 mg) by mouth daily       furosemide 20 MG tablet    LASIX    360 tablet    Take 3 tablets (60 mg) by mouth daily       metoprolol 25 MG 24 hr tablet    TOPROL-XL    360 tablet    Take 3 tablets (75 mg) by mouth daily       pantoprazole 40 MG EC tablet    PROTONIX    180 tablet    Take 1 tablet (40 mg) by mouth 2 times daily       sacubitril-valsartan 24-26 MG per tablet    ENTRESTO    60 tablet    Take 1 tablet by mouth 2 times daily       thiamine 100 MG tablet     90 tablet    Take 1 tablet (100 mg) by mouth daily       vitamin B complex with vitamin C Tabs tablet      Take 1 tablet  by mouth daily

## 2017-04-12 NOTE — PROGRESS NOTES
"Alen Trujillo M.D.  Cardiovascular Medicine    I personally saw and examined this patient, discussed care with housestaff and other consultants, reviewed current laboratories and imaging studies, and conveyed impression and diagnostic/therapeutic plan to patient.    Problem List  PAH  CAD with stenting  Ischemic cardiomyopathy  Diabetes  Elevated BMI  History of GI bleed/diverting colostomy    History    Patient returns for follow-up.  He is more sedentary but eating more.  Zeo not back.  No perceptions arrhythmia or embolic phenomena.  No syncope, pre-syncope, PND or orthopnea.        Objective  /83 (BP Location: Right arm, Patient Position: Chair, Cuff Size: Adult Regular)  Pulse 82  Ht 1.727 m (5' 8\")  Wt 109 kg (240 lb 6.4 oz)  SpO2 98%  BMI 36.55 kg/m2   Constitutional: alert, oriented, normal gait and station, normal mentation.  Oral: moist mucous membrans  Lymph: without pathologic adenopathy  Chest: clear to ausculation and percussion  Cor: No evidence of left or right ventricular activity.  Rhythm is regular.  S1 normal, S2 split physiologically. TR murmur present murmurs of TR and MR  Abdomen: without tenderness, rebound, guarding, masses, ascites  Extremities: Edema not present  Neuro: no focal defects, normal mentation  Skin: without open lesions  Psych: oriented, verbal, mental status in tact    Wt Readings from Last 5 Encounters:   04/12/17 109 kg (240 lb 6.4 oz)   03/27/17 108.1 kg (238 lb 4.8 oz)   03/16/17 108 kg (238 lb)   02/22/17 106.1 kg (234 lb)   12/19/16 107.7 kg (237 lb 6.4 oz)       Meds  Current Outpatient Prescriptions   Medication     metoprolol (TOPROL-XL) 25 MG 24 hr tablet     furosemide (LASIX) 20 MG tablet     aspirin 81 MG EC tablet     atorvastatin (LIPITOR) 10 MG tablet     pantoprazole (PROTONIX) 40 MG enteric coated tablet     sacubitril-valsartan (ENTRESTO) 24-26 MG per tablet     clopidogrel (PLAVIX) 75 MG tablet     vitamin  B complex with vitamin C (VITAMIN  B " COMPLEX) TABS     thiamine 100 MG tablet     cholecalciferol (VITAMIN  -D) 1000 UNITS capsule     [DISCONTINUED] ISOSORBIDE DINITRATE PO     No current facility-administered medications for this visit.          Labs  Results for MERCY SHAFER (MRN [id number]) as of 4/12/2017 08:31   Ref. Range 2/28/2017 09:48 3/16/2017 14:19 3/27/2017 08:16 3/31/2017 00:00   Sodium Latest Ref Range: 133 - 144 mmol/L  140 140    Potassium Latest Ref Range: 3.4 - 5.3 mmol/L  4.1 4.6    Chloride Latest Ref Range: 94 - 109 mmol/L  103 105    Carbon Dioxide Latest Ref Range: 20 - 32 mmol/L  28 30    Urea Nitrogen Latest Ref Range: 7 - 30 mg/dL  29 30    Creatinine Latest Ref Range: 0.66 - 1.25 mg/dL  2.39 (H) 2.33 (H)    GFR Estimate Latest Ref Range: >60 mL/min/1.7m2  28 (L) 29 (L)    GFR Estimate If Black Latest Ref Range: >60 mL/min/1.7m2  34 (L) 35 (L)    Calcium Latest Ref Range: 8.5 - 10.1 mg/dL  8.3 (L) 8.3 (L)    Anion Gap Latest Ref Range: 3 - 14 mmol/L  9 6    Cholesterol Latest Ref Range: <200 mg/dL   159    HDL Cholesterol Latest Ref Range: >39 mg/dL   30 (L)    LDL Cholesterol Calculated Latest Ref Range: <100 mg/dL   94    Non HDL Cholesterol Latest Ref Range: <130 mg/dL   129    N-Terminal Pro Bnp Latest Ref Range: 0 - 125 pg/mL  2536 (H) 3105 (H)    Triglycerides Latest Ref Range: <150 mg/dL   178 (H)    Glucose Latest Ref Range: 70 - 99 mg/dL  156 (H) 140 (H)    WBC Latest Ref Range: 4.0 - 11.0 10e9/L  7.4     Hemoglobin Latest Ref Range: 13.3 - 17.7 g/dL  15.1     Hematocrit Latest Ref Range: 40.0 - 53.0 %  46.9     Platelet Count Latest Ref Range: 150 - 450 10e9/L  222     RBC Count Latest Ref Range: 4.4 - 5.9 10e12/L  5.13     MCV Latest Ref Range: 78 - 100 fl  91     MCH Latest Ref Range: 26.5 - 33.0 pg  29.4     MCHC Latest Ref Range: 31.5 - 36.5 g/dL  32.2     RDW Latest Ref Range: 10.0 - 15.0 %  14.2     HEART CATH RIGHT HEART CATH Unknown Attch      ZIO PATCH HOLTER Unknown    Rpt       Imaging    HEMODYNAMICS:  BSA 2.26 m^2  1. HR 85 bpm  2. /83 (98) mmHg  3. RA 7/7/7   4. RV 97/13  5. PA 97/45/60   6. PCW 30/35/30   7. PA sat 65.4%   8. PCW sat --%  9. Hgb 15.3 g/dL   10. Cecilia CO 4.1   11. Cecilia CI 1.9   12. TD CO 3.8   13. TD CI 1.7   14. PVR 7.3     Assessment/Plan     1. Increase lasix to 40 mg BID  2. BNP, CMP today  3. Return to clinic next week to see APRN with repeat labs - BNP, CMP

## 2017-04-12 NOTE — LETTER
"4/12/2017      RE: Cole Jesus  [address]  [facility] [address]       Alen Trujillo M.D.  Cardiovascular Medicine    I personally saw and examined this patient, discussed care with housestaff and other consultants, reviewed current laboratories and imaging studies, and conveyed impression and diagnostic/therapeutic plan to patient.    Problem List  PAH  CAD with stenting  Ischemic cardiomyopathy  Diabetes  Elevated BMI  History of GI bleed/diverting colostomy    History    Patient returns for follow-up.  He is more sedentary but eating more.  Zeo not back.  No perceptions arrhythmia or embolic phenomena.  No syncope, pre-syncope, PND or orthopnea.        Objective  /83 (BP Location: Right arm, Patient Position: Chair, Cuff Size: Adult Regular)  Pulse 82  Ht 1.727 m (5' 8\")  Wt 109 kg (240 lb 6.4 oz)  SpO2 98%  BMI 36.55 kg/m2   Constitutional: alert, oriented, normal gait and station, normal mentation.  Oral: moist mucous membrans  Lymph: without pathologic adenopathy  Chest: clear to ausculation and percussion  Cor: No evidence of left or right ventricular activity.  Rhythm is regular.  S1 normal, S2 split physiologically. TR murmur present murmurs of TR and MR  Abdomen: without tenderness, rebound, guarding, masses, ascites  Extremities: Edema not present  Neuro: no focal defects, normal mentation  Skin: without open lesions  Psych: oriented, verbal, mental status in tact    Wt Readings from Last 5 Encounters:   04/12/17 109 kg (240 lb 6.4 oz)   03/27/17 108.1 kg (238 lb 4.8 oz)   03/16/17 108 kg (238 lb)   02/22/17 106.1 kg (234 lb)   12/19/16 107.7 kg (237 lb 6.4 oz)       Meds  Current Outpatient Prescriptions   Medication     metoprolol (TOPROL-XL) 25 MG 24 hr tablet     furosemide (LASIX) 20 MG tablet     aspirin 81 MG EC tablet     atorvastatin (LIPITOR) 10 MG tablet     pantoprazole (PROTONIX) 40 MG enteric coated tablet     sacubitril-valsartan (ENTRESTO) 24-26 MG per tablet     " clopidogrel (PLAVIX) 75 MG tablet     vitamin  B complex with vitamin C (VITAMIN  B COMPLEX) TABS     thiamine 100 MG tablet     cholecalciferol (VITAMIN  -D) 1000 UNITS capsule     [DISCONTINUED] ISOSORBIDE DINITRATE PO     No current facility-administered medications for this visit.          Labs  Results for MERCY SHAFER (MRN [id number]) as of 4/12/2017 08:31   Ref. Range 2/28/2017 09:48 3/16/2017 14:19 3/27/2017 08:16 3/31/2017 00:00   Sodium Latest Ref Range: 133 - 144 mmol/L  140 140    Potassium Latest Ref Range: 3.4 - 5.3 mmol/L  4.1 4.6    Chloride Latest Ref Range: 94 - 109 mmol/L  103 105    Carbon Dioxide Latest Ref Range: 20 - 32 mmol/L  28 30    Urea Nitrogen Latest Ref Range: 7 - 30 mg/dL  29 30    Creatinine Latest Ref Range: 0.66 - 1.25 mg/dL  2.39 (H) 2.33 (H)    GFR Estimate Latest Ref Range: >60 mL/min/1.7m2  28 (L) 29 (L)    GFR Estimate If Black Latest Ref Range: >60 mL/min/1.7m2  34 (L) 35 (L)    Calcium Latest Ref Range: 8.5 - 10.1 mg/dL  8.3 (L) 8.3 (L)    Anion Gap Latest Ref Range: 3 - 14 mmol/L  9 6    Cholesterol Latest Ref Range: <200 mg/dL   159    HDL Cholesterol Latest Ref Range: >39 mg/dL   30 (L)    LDL Cholesterol Calculated Latest Ref Range: <100 mg/dL   94    Non HDL Cholesterol Latest Ref Range: <130 mg/dL   129    N-Terminal Pro Bnp Latest Ref Range: 0 - 125 pg/mL  2536 (H) 3105 (H)    Triglycerides Latest Ref Range: <150 mg/dL   178 (H)    Glucose Latest Ref Range: 70 - 99 mg/dL  156 (H) 140 (H)    WBC Latest Ref Range: 4.0 - 11.0 10e9/L  7.4     Hemoglobin Latest Ref Range: 13.3 - 17.7 g/dL  15.1     Hematocrit Latest Ref Range: 40.0 - 53.0 %  46.9     Platelet Count Latest Ref Range: 150 - 450 10e9/L  222     RBC Count Latest Ref Range: 4.4 - 5.9 10e12/L  5.13     MCV Latest Ref Range: 78 - 100 fl  91     MCH Latest Ref Range: 26.5 - 33.0 pg  29.4     MCHC Latest Ref Range: 31.5 - 36.5 g/dL  32.2     RDW Latest Ref Range: 10.0 - 15.0 %  14.2     HEART CATH RIGHT HEART  CATH Unknown Attch      ZIO PATCH HOLTER Unknown    Rpt       Imaging   HEMODYNAMICS:  BSA 2.26 m^2  1. HR 85 bpm  2. /83 (98) mmHg  3. RA 7/7/7   4. RV 97/13  5. PA 97/45/60   6. PCW 30/35/30   7. PA sat 65.4%   8. PCW sat --%  9. Hgb 15.3 g/dL   10. Cecilia CO 4.1   11. Cecilia CI 1.9   12. TD CO 3.8   13. TD CI 1.7   14. PVR 7.3     Assessment/Plan     1. Increase lasix to 40 mg BID  2. BNP, CMP today  3. Return to clinic next week to see APRN with repeat labs - BNP, CMP      Alen Trujillo MD

## 2017-04-12 NOTE — PATIENT INSTRUCTIONS
You were seen at the [facility] today.  You saw Dr. Trujillo  Here are your Instructions:    1.  Labs today.  2.  Labs next Wednesday w/ f/u w/ Dr. Trujillo also at 800, with labs at 730.      Leydi Felton RN  Nurse Care Coordinator  Office:  [phone] option #1, then #3 & ask for Leydi (nurse line)  Fax:  [phone]  After Hours:  [phone]  Appointments:  [phone] option #1, then option #1

## 2017-04-12 NOTE — NURSING NOTE
Chief Complaint   Patient presents with     Follow Up For     Ischemic cardiomyopathy; chronic systolic heart failure

## 2017-04-12 NOTE — LETTER
"4/12/2017      RE: Cole Jesus  [address]  [facility] [address]       Dear Colleague,    Thank you for the opportunity to participate in the care of your patient, Cole Jesus, at the [facility]. Please see a copy of my visit note below.    Alen Trujillo M.D.  Cardiovascular Medicine    I personally saw and examined this patient, discussed care with housestaff and other consultants, reviewed current laboratories and imaging studies, and conveyed impression and diagnostic/therapeutic plan to patient.    Problem List  PAH  CAD with stenting  Ischemic cardiomyopathy  Diabetes  Elevated BMI  History of GI bleed/diverting colostomy    History    Patient returns for follow-up.  He is more sedentary but eating more.  Zeo not back.  No perceptions arrhythmia or embolic phenomena.  No syncope, pre-syncope, PND or orthopnea.        Objective  /83 (BP Location: Right arm, Patient Position: Chair, Cuff Size: Adult Regular)  Pulse 82  Ht 1.727 m (5' 8\")  Wt 109 kg (240 lb 6.4 oz)  SpO2 98%  BMI 36.55 kg/m2   Constitutional: alert, oriented, normal gait and station, normal mentation.  Oral: moist mucous membrans  Lymph: without pathologic adenopathy  Chest: clear to ausculation and percussion  Cor: No evidence of left or right ventricular activity.  Rhythm is regular.  S1 normal, S2 split physiologically. TR murmur present murmurs of TR and MR  Abdomen: without tenderness, rebound, guarding, masses, ascites  Extremities: Edema not present  Neuro: no focal defects, normal mentation  Skin: without open lesions  Psych: oriented, verbal, mental status in tact    Wt Readings from Last 5 Encounters:   04/12/17 109 kg (240 lb 6.4 oz)   03/27/17 108.1 kg (238 lb 4.8 oz)   03/16/17 108 kg (238 lb)   02/22/17 106.1 kg (234 lb)   12/19/16 107.7 kg (237 lb 6.4 oz)       Meds  Current Outpatient Prescriptions   Medication     metoprolol (TOPROL-XL) 25 MG " 24 hr tablet     furosemide (LASIX) 20 MG tablet     aspirin 81 MG EC tablet     atorvastatin (LIPITOR) 10 MG tablet     pantoprazole (PROTONIX) 40 MG enteric coated tablet     sacubitril-valsartan (ENTRESTO) 24-26 MG per tablet     clopidogrel (PLAVIX) 75 MG tablet     vitamin  B complex with vitamin C (VITAMIN  B COMPLEX) TABS     thiamine 100 MG tablet     cholecalciferol (VITAMIN  -D) 1000 UNITS capsule     [DISCONTINUED] ISOSORBIDE DINITRATE PO     No current facility-administered medications for this visit.          Labs  Results for MERCY SHAFER (MRN [id number]) as of 4/12/2017 08:31   Ref. Range 2/28/2017 09:48 3/16/2017 14:19 3/27/2017 08:16 3/31/2017 00:00   Sodium Latest Ref Range: 133 - 144 mmol/L  140 140    Potassium Latest Ref Range: 3.4 - 5.3 mmol/L  4.1 4.6    Chloride Latest Ref Range: 94 - 109 mmol/L  103 105    Carbon Dioxide Latest Ref Range: 20 - 32 mmol/L  28 30    Urea Nitrogen Latest Ref Range: 7 - 30 mg/dL  29 30    Creatinine Latest Ref Range: 0.66 - 1.25 mg/dL  2.39 (H) 2.33 (H)    GFR Estimate Latest Ref Range: >60 mL/min/1.7m2  28 (L) 29 (L)    GFR Estimate If Black Latest Ref Range: >60 mL/min/1.7m2  34 (L) 35 (L)    Calcium Latest Ref Range: 8.5 - 10.1 mg/dL  8.3 (L) 8.3 (L)    Anion Gap Latest Ref Range: 3 - 14 mmol/L  9 6    Cholesterol Latest Ref Range: <200 mg/dL   159    HDL Cholesterol Latest Ref Range: >39 mg/dL   30 (L)    LDL Cholesterol Calculated Latest Ref Range: <100 mg/dL   94    Non HDL Cholesterol Latest Ref Range: <130 mg/dL   129    N-Terminal Pro Bnp Latest Ref Range: 0 - 125 pg/mL  2536 (H) 3105 (H)    Triglycerides Latest Ref Range: <150 mg/dL   178 (H)    Glucose Latest Ref Range: 70 - 99 mg/dL  156 (H) 140 (H)    WBC Latest Ref Range: 4.0 - 11.0 10e9/L  7.4     Hemoglobin Latest Ref Range: 13.3 - 17.7 g/dL  15.1     Hematocrit Latest Ref Range: 40.0 - 53.0 %  46.9     Platelet Count Latest Ref Range: 150 - 450 10e9/L  222     RBC Count Latest Ref Range: 4.4  - 5.9 10e12/L  5.13     MCV Latest Ref Range: 78 - 100 fl  91     MCH Latest Ref Range: 26.5 - 33.0 pg  29.4     MCHC Latest Ref Range: 31.5 - 36.5 g/dL  32.2     RDW Latest Ref Range: 10.0 - 15.0 %  14.2     HEART CATH RIGHT HEART CATH Unknown Attch      ZIO PATCH HOLTER Unknown    Rpt       Imaging   HEMODYNAMICS:  BSA 2.26 m^2  1. HR 85 bpm  2. /83 (98) mmHg  3. RA 7/7/7   4. RV 97/13  5. PA 97/45/60   6. PCW 30/35/30   7. PA sat 65.4%   8. PCW sat --%  9. Hgb 15.3 g/dL   10. Cecilia CO 4.1   11. Cecilia CI 1.9   12. TD CO 3.8   13. TD CI 1.7   14. PVR 7.3     Assessment/Plan     1. Increase lasix to 40 mg BID  2. BNP, CMP today  3. Return to clinic next week to see APRN with repeat labs - BNP, CMP        Please do not hesitate to contact me if you have any questions/concerns.     Sincerely,     Alen Trujillo MD

## 2017-04-18 ENCOUNTER — PRE VISIT (OUTPATIENT)
Dept: CARDIOLOGY | Facility: CLINIC | Age: 57
End: 2017-04-18

## 2017-04-18 DIAGNOSIS — I50.22 CHRONIC SYSTOLIC CONGESTIVE HEART FAILURE (H): Primary | ICD-10-CM

## 2017-04-18 DIAGNOSIS — I25.5 ISCHEMIC CARDIOMYOPATHY: ICD-10-CM

## 2017-04-19 ENCOUNTER — CARE COORDINATION (OUTPATIENT)
Dept: CARDIOLOGY | Facility: CLINIC | Age: 57
End: 2017-04-19

## 2017-04-19 NOTE — PROGRESS NOTES
Pt called, rec'd msg last night about appt with Ronnie being cancelled this am.   Wondering when he should be seen next, he thought message said next week with NP.    Pt stated he uses y chart, so home number or my chart message-either way is ok.     Home: [phone]

## 2017-04-24 ENCOUNTER — CARE COORDINATION (OUTPATIENT)
Dept: CARDIOLOGY | Facility: CLINIC | Age: 57
End: 2017-04-24

## 2017-04-24 NOTE — PROGRESS NOTES
Patient's last appt on 4/19 was canceled as he had seen Dr. Trujillo the week before and was supposed to be scheduled with one of the CORE NP's, not Dr. Trujillo.  I sent an email message for patient to come get labs this week since Dr. Trujillo had increased his lasix the week before, and if labs look ok, we could spread out the appt with the NP.  Waiting to hear from pt to see what day will work best for him to come in for labs and we will make him a lab appt.

## 2017-04-28 DIAGNOSIS — I27.20 PULMONARY HYPERTENSION (H): ICD-10-CM

## 2017-04-28 DIAGNOSIS — I25.5 ISCHEMIC CARDIOMYOPATHY: ICD-10-CM

## 2017-04-28 DIAGNOSIS — I50.22 CHRONIC SYSTOLIC CONGESTIVE HEART FAILURE (H): ICD-10-CM

## 2017-04-28 LAB
ALBUMIN SERPL-MCNC: 3.3 G/DL (ref 3.4–5)
ALP SERPL-CCNC: 90 U/L (ref 40–150)
ALT SERPL W P-5'-P-CCNC: 15 U/L (ref 0–70)
ANION GAP SERPL CALCULATED.3IONS-SCNC: 6 MMOL/L (ref 3–14)
AST SERPL W P-5'-P-CCNC: 12 U/L (ref 0–45)
BILIRUB SERPL-MCNC: 1.3 MG/DL (ref 0.2–1.3)
BUN SERPL-MCNC: 32 MG/DL (ref 7–30)
CALCIUM SERPL-MCNC: 8.2 MG/DL (ref 8.5–10.1)
CHLORIDE SERPL-SCNC: 104 MMOL/L (ref 94–109)
CO2 SERPL-SCNC: 29 MMOL/L (ref 20–32)
CREAT SERPL-MCNC: 2.28 MG/DL (ref 0.66–1.25)
GFR SERPL CREATININE-BSD FRML MDRD: 30 ML/MIN/1.7M2
GLUCOSE SERPL-MCNC: 144 MG/DL (ref 70–99)
MAGNESIUM SERPL-MCNC: 2.4 MG/DL (ref 1.6–2.3)
NT-PROBNP SERPL-MCNC: 3131 PG/ML (ref 0–125)
POTASSIUM SERPL-SCNC: 4.1 MMOL/L (ref 3.4–5.3)
PROT SERPL-MCNC: 7.7 G/DL (ref 6.8–8.8)
SODIUM SERPL-SCNC: 139 MMOL/L (ref 133–144)

## 2017-06-24 ENCOUNTER — PRE VISIT (OUTPATIENT)
Dept: CARDIOLOGY | Facility: CLINIC | Age: 57
End: 2017-06-24

## 2017-06-24 DIAGNOSIS — I50.22 CHRONIC SYSTOLIC HEART FAILURE (H): Primary | ICD-10-CM

## 2017-06-30 ENCOUNTER — OFFICE VISIT (OUTPATIENT)
Dept: CARDIOLOGY | Facility: CLINIC | Age: 57
End: 2017-06-30
Attending: NURSE PRACTITIONER
Payer: MEDICAID

## 2017-06-30 VITALS
HEART RATE: 77 BPM | BODY MASS INDEX: 36.49 KG/M2 | WEIGHT: 240.8 LBS | DIASTOLIC BLOOD PRESSURE: 81 MMHG | OXYGEN SATURATION: 97 % | SYSTOLIC BLOOD PRESSURE: 119 MMHG | HEIGHT: 68 IN

## 2017-06-30 DIAGNOSIS — I50.22 CHRONIC SYSTOLIC HEART FAILURE (H): ICD-10-CM

## 2017-06-30 DIAGNOSIS — I50.22 CHRONIC SYSTOLIC HEART FAILURE (H): Primary | ICD-10-CM

## 2017-06-30 LAB
ANION GAP SERPL CALCULATED.3IONS-SCNC: 4 MMOL/L (ref 3–14)
BUN SERPL-MCNC: 32 MG/DL (ref 7–30)
CALCIUM SERPL-MCNC: 8.2 MG/DL (ref 8.5–10.1)
CHLORIDE SERPL-SCNC: 102 MMOL/L (ref 94–109)
CO2 SERPL-SCNC: 30 MMOL/L (ref 20–32)
CREAT SERPL-MCNC: 2.31 MG/DL (ref 0.66–1.25)
GFR SERPL CREATININE-BSD FRML MDRD: 29 ML/MIN/1.7M2
GLUCOSE SERPL-MCNC: 142 MG/DL (ref 70–99)
POTASSIUM SERPL-SCNC: 4.4 MMOL/L (ref 3.4–5.3)
SODIUM SERPL-SCNC: 137 MMOL/L (ref 133–144)

## 2017-06-30 PROCEDURE — 36415 COLL VENOUS BLD VENIPUNCTURE: CPT | Performed by: NURSE PRACTITIONER

## 2017-06-30 PROCEDURE — 80048 BASIC METABOLIC PNL TOTAL CA: CPT | Performed by: NURSE PRACTITIONER

## 2017-06-30 PROCEDURE — 99214 OFFICE O/P EST MOD 30 MIN: CPT | Mod: ZP | Performed by: NURSE PRACTITIONER

## 2017-06-30 PROCEDURE — 99212 OFFICE O/P EST SF 10 MIN: CPT | Mod: ZF

## 2017-06-30 RX ORDER — SPIRONOLACTONE 25 MG/1
12.5 TABLET ORAL DAILY
Qty: 45 TABLET | Refills: 3 | Status: SHIPPED | OUTPATIENT
Start: 2017-06-30 | End: 2018-06-27

## 2017-06-30 ASSESSMENT — PAIN SCALES - GENERAL: PAINLEVEL: NO PAIN (0)

## 2017-06-30 NOTE — PATIENT INSTRUCTIONS
You were seen today in the Cardiovascular Clinic at the [facility].     Cardiology Providers you saw during your visit: Bertah Phan NP     1. Please begin aldactone (spironolactone) 12.5 mg (half of a 25 mg pill)  2. Please continue lasix dose as needed.  3. Please stop taking your aspirin.  4. Continue healthy lifestyle changes.  5. Please have repeat labs in 10-14 days.  6. Please make a follow-up CORE/heart failure appt 6 weeks with labs prior.   7. We have included a list of potassium-rich foods.  Please monitor your intake given your current potassium lab level and new medication that you will begin.     Results for MERCY SHAFER (MRN [id number]) as of 6/30/2017 09:03   Ref. Range 6/30/2017 08:22   Sodium Latest Ref Range: 133 - 144 mmol/L 137   Potassium Latest Ref Range: 3.4 - 5.3 mmol/L 4.4   Chloride Latest Ref Range: 94 - 109 mmol/L 102   Carbon Dioxide Latest Ref Range: 20 - 32 mmol/L 30   Urea Nitrogen Latest Ref Range: 7 - 30 mg/dL 32 (H)   Creatinine Latest Ref Range: 0.66 - 1.25 mg/dL 2.31 (H)   GFR Estimate Latest Ref Range: >60 mL/min/1.7m2 29 (L)   GFR Estimate If Black Latest Ref Range: >60 mL/min/1.7m2 35 (L)   Calcium Latest Ref Range: 8.5 - 10.1 mg/dL 8.2 (L)   Anion Gap Latest Ref Range: 3 - 14 mmol/L 4   Glucose Latest Ref Range: 70 - 99 mg/dL 142 (H)     Potassium-Rich Foods  The normal adult diet usually contains 2,000 mg to 4,000 mg of potassium per day. More potassium is needed when you lose too much potassium from your body. This can happen if you have diarrhea or vomiting. It can also happen if you take a medicine to make you urinate more (diuretic). To increase the amount of potassium in your diet, include these high-potassium foods.    [The (*) indicates foods highest in potassium.]  Vegetables  Artichokes. Cooked 1/2 cup, 200 mg to 300 mg*  Asparagus. Cooked 1/2 cup, 200 mg to 300 mg  Beans. White, red, cook cooked 1/2 cup, 300 mg to 500 mg*  Beets. Cooked 1/2  cup, 200 mg to 300 mg  Broccoli. Cooked or raw 1 cup, 200 mg to 500 mg*  [place] sprouts. Cooked 1/2 cup, 200 mg to 300 mg  Cabbage. Raw 1 cup, 100 mg to 200 mg  Carrots. Raw or cooked 1/2 cup, 100 mg to 200 mg  Celery. Raw 1 cup, 200 mg to 300 mg  Lima beans. Fresh or frozen 1/2 cup, 300 mg to 500 mg*   Mushrooms. Raw or cooked 1/2 cup, 100 mg to 300 mg  Peas. Cooked 1/2 cup, 150 mg to 250 mg   Potatoes. Baked 1 medium, 500 mg to 900 mg*   Spinach. Cooked 1 cup, 800 mg to 900 mg*   Spinach. Raw 2 cups, 300 mg to 400 mg *  Squash, winter. Fresh, frozen, or cooked 1/2 cup, 200 mg to 400 mg   Tomato. Fresh 1 medium, 200 mg to 300 mg   Tomato juice. Canned 1/2 cup, 200 mg to 300 mg   Fruits  Apple juice. Unsweetened 1 cup, 200 mg to 300 mg   Apricots. Canned 1/2 cup, 200 mg to 300 mg   Apricots. Dried 4 pieces, 100 mg to 200 mg   Avocado. Raw 1/2 cup, 300 mg to 400 mg*  Banana. Fresh 1 small, 300 mg to 400 mg*   Cantaloupe. Fresh 1 cup diced, 300 mg to 400 mg*   Grape juice. Unsweetened 1 cup, 200 mg to 300 mg   Honeydew melon. Fresh 1 cup diced, 300 mg to 400 mg*   Orange. Fresh 1 medium, 200 mg to 300 mg    Orange juice. Unsweetened, fresh or frozen 1/2 cup, 200 mg to 300 mg  Pineapple juice. Unsweetened 1 cup, 300 mg to 400 mg   Prune juice. Unsweetened 1/2 cup, 300 mg to 400 mg*   Prunes. Dried 5 pieces, 300 mg to 400 mg*   Strawberries. Fresh or frozen 1 cup, 200 mg to 300 mg  Meat  Red meat. Cooked 3 ounces, 100 mg to 300 mg   Seafood  Cod, flounder, halibut. Cooked 3 ounces, 100 mg to 300 mg*  [place]. Cooked, 3 ounces 300 mg to 400 mg*   Scallops. Cooked 3 ounces, 200 mg to 300 mg*  Shrimp. Cooked 3/4 cup, 100 mg to 200 mg   Tuna. Fresh or canned 3/4 cup, 200 mg to 500 mg   Date Last Reviewed: 10/1/2016    3816-7124 [employer]. [address] [place] [address], [place] [address]. All rights reserved. This information is not intended as a substitute for professional medical care. Always follow your  "healthcare professional's instructions.             Please limit your fluid intake to 2 L (64 ounces) daily.  2 Liters a day = 8.5 cups, or 72 ounces.  Please limit your salt intake to 2 grams a day or less.    If you gain 2# in 24 hours or 5# in one week call Rosibel Crawford RN so we can adjust your medications as needed over the phone.    Please feel free to call me with any questions or concerns.      Rosibel Crawford RN BSN [facility]  Cardiology Care Coordinator-Heart Failure Clinic    Questions and [phone].   First press #1 for the University and then press #3 for \"Medical Questions\" to reach the Cardiology Nurses.     On Call Cardiologist for after hours or on weekends: [phone]   option #4 and ask to speak to the on-call Cardiologist. Inform them you are a CORE/heart failure patient at the [facility].        If you need a medication refill please contact your pharmacy.  Please allow 3 business days for your refill to be completed.  _______________________________________________________  C.O.R.E[facility]IN[facility] Cardiomyopathy, Optimization, Rehabilitation, Education   The [facility].O.R.E. CLINIC is a heart failure specialty clinic within the [facility] where you will work with specialized nurse practitioners dedicated to helping patients with heart failure carefully adjust medications, receive education, and learn who and when to call if symptoms develop. They specialize in helping you better understand your condition, slow the progression of your disease, improve the length and quality of your life, help you detect future heart problems before they become life threatening, and avoid hospitalizations.  As always, thank you for trusting us with your health care needs!          "

## 2017-06-30 NOTE — PROGRESS NOTES
HPI  Mr. Jesus is a 57 year old man with a history of Aib/flutter s/p ablation (2013) CAD s/p PCI, HTN, and ICM (EF 40%) who presents to CORE clinic for follow up. He was last seen in clinic about 2 months ago when we questioned his antiplatelet and lack of anticoagulation as well as use of Entresto. He has since seen Dr. Trujillo when his diuretics were increased. He returns for follow up. Of note, he was recently evaluated with a Ziopatch which revealed NSR with NSVT that appears to be ectopy, no arrhythmia.     Mr. Jesus states that since his most recent visit, things have been going well. He continues to exercise without significant limitation. He states that he and his wife are working together to be diet compliant and make healthy lifestyle choices such as fish, low salt, fruits/vegetables with every meal. He denies any chest pain, significant KING, light headedness, dizziness. He states he sleeps with 1 pillow without complaint of orthopnea or PND. He denies any significant lower extremity edema.     PMH  Past Medical History:   Diagnosis Date     Anemia in chronic renal disease 3/9/2015     Antiplatelet or antithrombotic long-term use      Atrial fibrillation and flutter      CAD (coronary artery disease)     Stemi in 12/11, s/p angioplasty     CRD (chronic renal disease), stage IV (H) 3/9/2015     GI bleed     massive lower GI bleed secondary to a cecal ulcer, s/p ileocecal resection in 12/11     Heart failure     Biventricular systolic HF, complicated by ARDS requiring tracheostomy     History of blood transfusion      Hyperlipidemia LDL goal <100 4/28/2013     Hypertension      Ischemic cardiomyopathy 4/28/2013    TTE revealing 40% EF     Other and unspecified nonspecific immunological findings     Anti JKa     Pulmonary edema     episodes of flash pulmonary edema in 12/11     Renal insufficiency 5/6/2013    hx ATN with dialysis complicating cardiogenic shock  1/2012     Subclinical hypothyroidism       Social History     Social History     Marital status: Significant other     Spouse name: N/A     Number of children: N/A     Years of education: N/A     Occupational History     Not on file.     Social History Main Topics     Smoking status: Former Smoker     Quit date: 12/3/2011     Smokeless tobacco: Never Used     Alcohol use No     Drug use: No     Sexual activity: Yes     Partners: Female     Other Topics Concern     Parent/Sibling W/ Cabg, Mi Or Angioplasty Before 65f 55m? Yes     Exercise Yes     limited walking     Social History Narrative     Family History   Problem Relation Age of Onset     DIABETES Mother      Hypertension Mother      HEART DISEASE Mother      HEART DISEASE Father       of heart attack, left when he was young     DIABETES Sister      DIABETES Sister      DIABETES Sister      MEDS  Current Outpatient Prescriptions on File Prior to Visit:  metoprolol (TOPROL-XL) 25 MG 24 hr tablet Take 3 tablets (75 mg) by mouth daily   furosemide (LASIX) 20 MG tablet Take 3 tablets (60 mg) by mouth daily (Patient taking differently: Take 80 mg by mouth daily )   aspirin 81 MG EC tablet Take 1 tablet (81 mg) by mouth daily   atorvastatin (LIPITOR) 10 MG tablet Take 1 tablet (10 mg) by mouth daily   pantoprazole (PROTONIX) 40 MG enteric coated tablet Take 1 tablet (40 mg) by mouth 2 times daily   sacubitril-valsartan (ENTRESTO) 24-26 MG per tablet Take 1 tablet by mouth 2 times daily   clopidogrel (PLAVIX) 75 MG tablet Take 1 tablet (75 mg) by mouth daily   vitamin  B complex with vitamin C (VITAMIN  B COMPLEX) TABS Take 1 tablet by mouth daily   thiamine 100 MG tablet Take 1 tablet (100 mg) by mouth daily   cholecalciferol (VITAMIN  -D) 1000 UNITS capsule Take 2 capsules (2,000 Units) by mouth daily   [DISCONTINUED] ISOSORBIDE DINITRATE PO Take 15 mg by mouth 3 times daily     No current facility-administered medications on file prior to visit.     Review Of Systems  Skin: negative  Eyes:  "negative  Ears/Nose/Throat: negative  Respiratory: No shortness of breath, dyspnea on exertion, cough, or hemoptysis  Cardiovascular: negative and as above  Gastrointestinal: negative  Genitourinary: negative  Musculoskeletal: negative  Neurologic: negative  Psychiatric: negative  Hematologic/Lymphatic/Immunologic: negative  Endocrine: negative    Physical examination:  /81 (BP Location: Left arm, Patient Position: Chair, Cuff Size: Adult Regular)  Pulse 77  Ht 1.727 m (5' 8\")  Wt 109.2 kg (240 lb 12.8 oz)  SpO2 97%  BMI 36.61 kg/m2  GENERAL APPEARANCE: healthy, alert and no distress  HEENT: no icterus, no xanthelasmas, normal pupil size and reaction, normal palate, mucosa moist, no cyanosis.  NECK: no adenopathy, no asymmetry, masses, or scars, thyroid normal to palpation  Pulm: lungs clear to auscultation - no rales, rhonchi or wheezes, no use of accessory muscles, no retractions, respirations are unlabored, normal respiratory rate, no kyphosis, no scoliosis  CV: Regular rhythm, normal S1 and S2. JVP difficult to assess, PMI displaced laterally  ABDOMEN: soft, non tender, without hepatomegaly, no masses palpable, bowel sounds normal  EXTREMITIES:  Trace edema  NEURO: alert and oriented to person/place/time, normal speech, gait and affect  SKIN: no ecchymoses, no rashes    LABS  Last Basic Metabolic Panel:  Lab Results   Component Value Date     06/30/2017      Lab Results   Component Value Date    POTASSIUM 4.4 06/30/2017     Lab Results   Component Value Date    CHLORIDE 102 06/30/2017     Lab Results   Component Value Date    RONNY 8.2 06/30/2017     Lab Results   Component Value Date    CO2 30 06/30/2017     Lab Results   Component Value Date    BUN 32 06/30/2017     Lab Results   Component Value Date    CR 2.31 06/30/2017     Lab Results   Component Value Date     06/30/2017         Assessment and Plan  Mr. Jesus is a pleasant 57 year old man with ICM who remains mildly volume up but " appears to be trending in the right direction since increasing diuretics with Dr. Trujillo. Last visit he did have an irregular pulse however a ziopatch did not reveal any significant arrhythmia. The patient is continued on DAPT despite his most recent PCI being 6 years ago; he does not have a significant history of blood clot, PVD, PAD, or other discernable reason that he would be continued on DAPT. Given his history of GI bleed we will stop daily aspirin and continue him on monotherapy with Plavix.       We will increase his spironolactone from 12.5 mg to 25 mg with outpatient labs in 10-14 days. Typically, Entresto is added if symptomatic despite optimized ACE, Beta, and Ald antagonist. His EF is reduced, though is not <35%. Creatinine is stable at 2.31, GFR is stable at 29-30 range.     Otherwise, he continues to make healthy lifestyle changes and is encouraged to continue in this regard.     Plan:   -Continue Toprol XL 25 daily   -Continue Lasix 20 mg TID or QID prn for weight gain or increased dyspnea  -Continue Plavix daily   -Continue Lipitor   -Continue Entresto   -We will increase [place] from 12.5 -> 25 mg with plan to check labs in 10-14 days   -We will have the patient stop taking DAPT and discontinue ASA        30 minutes spent in direct care, >50% in counseling    This document integrates our joint findings and assessment into one cohesive document. --TBF

## 2017-06-30 NOTE — LETTER
6/30/2017      RE: Cole Jesus  [address]  [facility] [address]       Dear Colleague,    Thank you for the opportunity to participate in the care of your patient, Cole Jesus, at the [facility] at [facility]. Please see a copy of my visit note below.    HPI  Mr. Jesus is a 57 year old man with a history of Aib/flutter s/p ablation (2013) CAD s/p PCI, HTN, and ICM (EF 40%) who presents to CORE clinic for follow up. He was last seen in clinic about 2 months ago when we questioned his antiplatelet and lack of anticoagulation as well as use of Entresto. He has since seen Dr. Trujillo when his diuretics were increased. He returns for follow up. Of note, he was recently evaluated with a Ziopatch which revealed NSR with NSVT that appears to be ectopy, no arrhythmia.     Mr. Jesus states that since his most recent visit, things have been going well. He continues to exercise without significant limitation. He states that he and his wife are working together to be diet compliant and make healthy lifestyle choices such as fish, low salt, fruits/vegetables with every meal. He denies any chest pain, significant KING, light headedness, dizziness. He states he sleeps with 1 pillow without complaint of orthopnea or PND. He denies any significant lower extremity edema.     PMH  Past Medical History:   Diagnosis Date     Anemia in chronic renal disease 3/9/2015     Antiplatelet or antithrombotic long-term use      Atrial fibrillation and flutter      CAD (coronary artery disease)     Stemi in 12/11, s/p angioplasty     CRD (chronic renal disease), stage IV (H) 3/9/2015     GI bleed     massive lower GI bleed secondary to a cecal ulcer, s/p ileocecal resection in 12/11     Heart failure     Biventricular systolic HF, complicated by ARDS requiring tracheostomy     History of blood transfusion      Hyperlipidemia LDL goal <100 4/28/2013     Hypertension      Ischemic  cardiomyopathy 2013    TTE revealing 40% EF     Other and unspecified nonspecific immunological findings     Anti JKa     Pulmonary edema     episodes of flash pulmonary edema in      Renal insufficiency 2013    hx ATN with dialysis complicating cardiogenic shock  2012     Subclinical hypothyroidism      Social History     Social History     Marital status: Significant other     Spouse name: N/A     Number of children: N/A     Years of education: N/A     Occupational History     Not on file.     Social History Main Topics     Smoking status: Former Smoker     Quit date: 12/3/2011     Smokeless tobacco: Never Used     Alcohol use No     Drug use: No     Sexual activity: Yes     Partners: Female     Other Topics Concern     Parent/Sibling W/ Cabg, Mi Or Angioplasty Before 65f 55m? Yes     Exercise Yes     limited walking     Social History Narrative     Family History   Problem Relation Age of Onset     DIABETES Mother      Hypertension Mother      HEART DISEASE Mother      HEART DISEASE Father       of heart attack, left when he was young     DIABETES Sister      DIABETES Sister      DIABETES Sister      MEDS  Current Outpatient Prescriptions on File Prior to Visit:  metoprolol (TOPROL-XL) 25 MG 24 hr tablet Take 3 tablets (75 mg) by mouth daily   furosemide (LASIX) 20 MG tablet Take 3 tablets (60 mg) by mouth daily (Patient taking differently: Take 80 mg by mouth daily )   aspirin 81 MG EC tablet Take 1 tablet (81 mg) by mouth daily   atorvastatin (LIPITOR) 10 MG tablet Take 1 tablet (10 mg) by mouth daily   pantoprazole (PROTONIX) 40 MG enteric coated tablet Take 1 tablet (40 mg) by mouth 2 times daily   sacubitril-valsartan (ENTRESTO) 24-26 MG per tablet Take 1 tablet by mouth 2 times daily   clopidogrel (PLAVIX) 75 MG tablet Take 1 tablet (75 mg) by mouth daily   vitamin  B complex with vitamin C (VITAMIN  B COMPLEX) TABS Take 1 tablet by mouth daily   thiamine 100 MG tablet Take 1 tablet (100  "mg) by mouth daily   cholecalciferol (VITAMIN  -D) 1000 UNITS capsule Take 2 capsules (2,000 Units) by mouth daily   [DISCONTINUED] ISOSORBIDE DINITRATE PO Take 15 mg by mouth 3 times daily     No current facility-administered medications on file prior to visit.     Review Of Systems  Skin: negative  Eyes: negative  Ears/Nose/Throat: negative  Respiratory: No shortness of breath, dyspnea on exertion, cough, or hemoptysis  Cardiovascular: negative and as above  Gastrointestinal: negative  Genitourinary: negative  Musculoskeletal: negative  Neurologic: negative  Psychiatric: negative  Hematologic/Lymphatic/Immunologic: negative  Endocrine: negative    Physical examination:  /81 (BP Location: Left arm, Patient Position: Chair, Cuff Size: Adult Regular)  Pulse 77  Ht 1.727 m (5' 8\")  Wt 109.2 kg (240 lb 12.8 oz)  SpO2 97%  BMI 36.61 kg/m2  GENERAL APPEARANCE: healthy, alert and no distress  HEENT: no icterus, no xanthelasmas, normal pupil size and reaction, normal palate, mucosa moist, no cyanosis.  NECK: no adenopathy, no asymmetry, masses, or scars, thyroid normal to palpation  Pulm: lungs clear to auscultation - no rales, rhonchi or wheezes, no use of accessory muscles, no retractions, respirations are unlabored, normal respiratory rate, no kyphosis, no scoliosis  CV: Regular rhythm, normal S1 and S2. JVP difficult to assess, PMI displaced laterally  ABDOMEN: soft, non tender, without hepatomegaly, no masses palpable, bowel sounds normal  EXTREMITIES:  Trace edema  NEURO: alert and oriented to person/place/time, normal speech, gait and affect  SKIN: no ecchymoses, no rashes    LABS  Last Basic Metabolic Panel:  Lab Results   Component Value Date     06/30/2017      Lab Results   Component Value Date    POTASSIUM 4.4 06/30/2017     Lab Results   Component Value Date    CHLORIDE 102 06/30/2017     Lab Results   Component Value Date    RONNY 8.2 06/30/2017     Lab Results   Component Value Date    CO2 30 " 06/30/2017     Lab Results   Component Value Date    BUN 32 06/30/2017     Lab Results   Component Value Date    CR 2.31 06/30/2017     Lab Results   Component Value Date     06/30/2017         Assessment and Plan  Mr. Jesus is a pleasant 57 year old man with ICM who remains mildly volume up but appears to be trending in the right direction since increasing diuretics with Dr. Trujillo. Last visit he did have an irregular pulse however a ziopatch did not reveal any significant arrhythmia. The patient is continued on DAPT despite his most recent PCI being 6 years ago; he does not have a significant history of blood clot, PVD, PAD, or other discernable reason that he would be continued on DAPT. Given his history of GI bleed we will stop daily aspirin and continue him on monotherapy with Plavix.       We will increase his spironolactone from 12.5 mg to 25 mg with outpatient labs in 10-14 days. Typically, Entresto is added if symptomatic despite optimized ACE, Beta, and Ald antagonist. His EF is reduced, though is not <35%. Creatinine is stable at 2.31, GFR is stable at 29-30 range.     Otherwise, he continues to make healthy lifestyle changes and is encouraged to continue in this regard.     Plan:   -Continue Toprol XL 25 daily   -Continue Lasix 20 mg TID or QID prn for weight gain or increased dyspnea  -Continue Plavix daily   -Continue Lipitor   -Continue Entresto   -We will increase Jessup from 12.5 -> 25 mg with plan to check labs in 10-14 days   -We will have the patient stop taking DAPT and discontinue ASA        30 minutes spent in direct care, >50% in counseling    This document integrates our joint findings and assessment into one cohesive document.     Bertha Phan NP

## 2017-06-30 NOTE — NURSING NOTE
Diet: Patient instructed regarding a heart failure healthy diet, including discussion of reduced fat and 2000 mg daily sodium restriction, daily weights, medication purpose and compliance, fluid restrictions and resources for patient and family to access for assistance with heart failure management.       Labs: Patient was given results of the laboratory testing obtained today and patient was instructed about when to return for the next laboratory testing.    Med Reconcile: Reviewed and verified all current medications with the patient. The updated medication list was printed and given to the patient.    Return Appointment: Patient given instructions regarding scheduling next clinic visit.     Patient stated that he understood all health information given and agreed to call with further questions or concerns.    Patient attended appt by himself.  He is to begin aldactone 12.5 mg and have repeat labs in 2 weeks (7/14) and return to [facility] in 6 weeks.  He is to continue lasix PRN. Due to his potassium level 4.4, and addition of aldactone, he was given a list of potassium-rich foods to monitor so as to not overdo it. Pt was encouraged to continue his healthy lifestyle changes.

## 2017-06-30 NOTE — MR AVS SNAPSHOT
After Visit Summary   6/30/2017    Mercy Shafer    MRN: [id number]           Patient Information     Date Of Birth          1960        Visit Information        Provider Department      6/30/2017 9:00 AM Bertha Phan NP [facility]        Today's Diagnoses     Chronic systolic heart failure (H)    -  1      Care Instructions    You were seen today in the Cardiovascular Clinic at the [facility].     Cardiology Providers you saw during your visit: Bertha Phan NP       1. Please begin aldactone (spironolactone) 12.5 mg (half of a 25 mg pill)  2. Please continue lasix dose as needed.  3. Please stop taking your aspirin.  4. Continue healthy lifestyle changes.  5. Please have repeat labs in 10-14 days.  6. Please make a follow-up CORE/heart failure appt 6 weeks with labs prior.   7. We have included a list of potassium-rich foods.  Please monitor your intake given your current potassium lab level and new medication that you will begin.     Results for MERCY SHAFER (MRN [id number]) as of 6/30/2017 09:03   Ref. Range 6/30/2017 08:22   Sodium Latest Ref Range: 133 - 144 mmol/L 137   Potassium Latest Ref Range: 3.4 - 5.3 mmol/L 4.4   Chloride Latest Ref Range: 94 - 109 mmol/L 102   Carbon Dioxide Latest Ref Range: 20 - 32 mmol/L 30   Urea Nitrogen Latest Ref Range: 7 - 30 mg/dL 32 (H)   Creatinine Latest Ref Range: 0.66 - 1.25 mg/dL 2.31 (H)   GFR Estimate Latest Ref Range: >60 mL/min/1.7m2 29 (L)   GFR Estimate If Black Latest Ref Range: >60 mL/min/1.7m2 35 (L)   Calcium Latest Ref Range: 8.5 - 10.1 mg/dL 8.2 (L)   Anion Gap Latest Ref Range: 3 - 14 mmol/L 4   Glucose Latest Ref Range: 70 - 99 mg/dL 142 (H)     Potassium-Rich Foods  The normal adult diet usually contains 2,000 mg to 4,000 mg of potassium per day. More potassium is needed when you lose too much potassium from your body. This can happen if you have diarrhea or vomiting. It can also happen if you take a  medicine to make you urinate more (diuretic). To increase the amount of potassium in your diet, include these high-potassium foods.    [The (*) indicates foods highest in potassium.]  Vegetables  Artichokes. Cooked 1/2 cup, 200 mg to 300 mg*  Asparagus. Cooked 1/2 cup, 200 mg to 300 mg  Beans. White, red, cook cooked 1/2 cup, 300 mg to 500 mg*  Beets. Cooked 1/2 cup, 200 mg to 300 mg  Broccoli. Cooked or raw 1 cup, 200 mg to 500 mg*  [place] sprouts. Cooked 1/2 cup, 200 mg to 300 mg  Cabbage. Raw 1 cup, 100 mg to 200 mg  Carrots. Raw or cooked 1/2 cup, 100 mg to 200 mg  Celery. Raw 1 cup, 200 mg to 300 mg  Lima beans. Fresh or frozen 1/2 cup, 300 mg to 500 mg*   Mushrooms. Raw or cooked 1/2 cup, 100 mg to 300 mg  Peas. Cooked 1/2 cup, 150 mg to 250 mg   Potatoes. Baked 1 medium, 500 mg to 900 mg*   Spinach. Cooked 1 cup, 800 mg to 900 mg*   Spinach. Raw 2 cups, 300 mg to 400 mg *  Squash, winter. Fresh, frozen, or cooked 1/2 cup, 200 mg to 400 mg   Tomato. Fresh 1 medium, 200 mg to 300 mg   Tomato juice. Canned 1/2 cup, 200 mg to 300 mg   Fruits  Apple juice. Unsweetened 1 cup, 200 mg to 300 mg   Apricots. Canned 1/2 cup, 200 mg to 300 mg   Apricots. Dried 4 pieces, 100 mg to 200 mg   Avocado. Raw 1/2 cup, 300 mg to 400 mg*  Banana. Fresh 1 small, 300 mg to 400 mg*   Cantaloupe. Fresh 1 cup diced, 300 mg to 400 mg*   Grape juice. Unsweetened 1 cup, 200 mg to 300 mg   Honeydew melon. Fresh 1 cup diced, 300 mg to 400 mg*   Orange. Fresh 1 medium, 200 mg to 300 mg    Orange juice. Unsweetened, fresh or frozen 1/2 cup, 200 mg to 300 mg  Pineapple juice. Unsweetened 1 cup, 300 mg to 400 mg   Prune juice. Unsweetened 1/2 cup, 300 mg to 400 mg*   Prunes. Dried 5 pieces, 300 mg to 400 mg*   Strawberries. Fresh or frozen 1 cup, 200 mg to 300 mg  Meat  Red meat. Cooked 3 ounces, 100 mg to 300 mg   Seafood  Cod, flounder, halibut. Cooked 3 ounces, 100 mg to 300 mg*  Zapata. Cooked, 3 ounces 300 mg to 400 mg*   Scallops.  "Cooked 3 ounces, 200 mg to 300 mg*  Shrimp. Cooked 3/4 cup, 100 mg to 200 mg   Tuna. Fresh or canned 3/4 cup, 200 mg to 500 mg   Date Last Reviewed: 10/1/2016    5097-8935 [employer]. [address] [place] [address]. All rights reserved. This information is not intended as a substitute for professional medical care. Always follow your healthcare professional's instructions.             Please limit your fluid intake to 2 L (64 ounces) daily.  2 Liters a day = 8.5 cups, or 72 ounces.  Please limit your salt intake to 2 grams a day or less.    If you gain 2# in 24 hours or 5# in one week call Rosibel Crawford RN so we can adjust your medications as needed over the phone.    Please feel free to call me with any questions or concerns.      Rosibel Crawford RN BSN [facility]  Cardiology Care Coordinator-Heart Failure Clinic    Questions and [phone].   First press #1 for the University and then press #3 for \"Medical Questions\" to reach the Cardiology Nurses.     On Call Cardiologist for after hours or on weekends: [phone]   option #4 and ask to speak to the on-call Cardiologist. Inform them you are a CORE/heart failure patient at the [facility].        If you need a medication refill please contact your pharmacy.  Please allow 3 business days for your refill to be completed.  _______________________________________________________  C.O.R.E. CLINIC Cardiomyopathy, Optimization, Rehabilitation, Education   The C.O.R.E. CLINIC is a heart failure specialty clinic within the [facility] where you will work with specialized nurse practitioners dedicated to helping patients with heart failure carefully adjust medications, receive education, and learn who and when to call if symptoms develop. They specialize in helping you better understand your condition, slow the progression of your disease, improve the length and quality of your " life, help you detect future heart problems before they become life threatening, and avoid hospitalizations.  As always, thank you for trusting us with your health care needs!                  Follow-ups after your visit        Your next 10 appointments already scheduled     Jul 14, 2017  8:30 AM CDT   Lab with  LAB   [facility] ([facility])    [address] [place] [address]  [address]st [facility] [address]   [phone]            Aug 11, 2017  8:30 AM CDT   Lab with  LAB   [facility] ([facility])    [address] [place] [address]   [phone]            Aug 11, 2017  9:00 AM CDT   (Arrive by 8:45 AM)   CORE RETURN with Bertha Phan NP   [facility] ([facility])    [address] [place] [id number] [phone]              Future tests that were ordered for you today     Open Future Orders        Priority Expected Expires Ordered    Basic metabolic panel Routine 7/14/2017 7/28/2017 6/30/2017            Who to contact     If you have questions or need follow up information about today's clinic visit or your schedule please contact [facility] directly at [phone].  Normal or non-critical lab and imaging results will be communicated to you by Therasishart, letter or phone within 4 business days after the clinic has received the results. If you do not hear from us within 7 days, please contact the clinic through Therasishart or phone. If you have a critical or abnormal lab result, we will notify you by phone as soon as possible.  Submit refill requests through [employer] or call your pharmacy and they will forward the refill request to us. Please allow 3 business days for your refill to be completed.          Additional Information About Your Visit        [employer] Information     [employer] gives you secure access to your electronic health record. If you see a primary care  "provider, you can also send messages to your care team and make appointments. If you have questions, please call your primary care clinic.  If you do not have a primary care provider, please call [phone] and they will assist you.        Care EveryWhere ID     This is your Care EveryWhere ID. This could be used by other organizations to access your [place] medical records  [id number]        Your Vitals Were     Pulse Height Pulse Oximetry BMI (Body Mass Index)          77 1.727 m (5' 8\") 97% 36.61 kg/m2         Blood Pressure from Last 3 Encounters:   06/30/17 119/81   04/12/17 127/83   03/27/17 121/84    Weight from Last 3 Encounters:   06/30/17 109.2 kg (240 lb 12.8 oz)   04/12/17 109 kg (240 lb 6.4 oz)   03/27/17 108.1 kg (238 lb 4.8 oz)                 Today's Medication Changes          These changes are accurate as of: 6/30/17  9:46 AM.  If you have any questions, ask your nurse or doctor.               Start taking these medicines.        Dose/Directions    spironolactone 25 MG tablet   Commonly known as:  ALDACTONE   Used for:  Chronic systolic heart failure (H)   Started by:  Bertha Phan, NP        Dose:  12.5 mg   Take 0.5 tablets (12.5 mg) by mouth daily   Quantity:  45 tablet   Refills:  3         These medicines have changed or have updated prescriptions.        Dose/Directions    furosemide 20 MG tablet   Commonly known as:  LASIX   This may have changed:  how much to take   Used for:  Chronic systolic congestive heart failure (H), Ischemic cardiomyopathy, Essential hypertension with goal blood pressure less than 130/85        Dose:  60 mg   Take 3 tablets (60 mg) by mouth daily   Quantity:  360 tablet   Refills:  3            Where to get your medicines      These medications were sent to [facility] [id number] [place] [address] [address] [place] [address] AVE AT [address] [address]    Hours:  24-hours Phone:  [phone]     " spironolactone 25 MG tablet                Primary Care Provider Office Phone # Fax #    Silas Jono Enciso [phone]        [facility] [address] [place] [address]        Equal Access to Services     GIANFRANCO VALENTIN : Beny vásquez kyrao Soomaali, waaxda luqadaha, qaybta kaalmada adeegyada, jordy escobarin hayaacolleen sherman nahumwayne rodriguez. So [facility] [phone].    ATENCIÓN: Si habla español, tiene a brown disposición servicios gratuitos de asistencia lingüística. Ll[facility] [phone].    We comply with applicable federal civil rights laws and [place] laws. We do not discriminate on the basis of race, color, national origin, age, disability sex, sexual orientation or gender identity.            Thank you!     Thank you for choosing [facility]  for your care. Our goal is always to provide you with excellent care. Hearing back from our patients is one way we can continue to improve our services. Please take a few minutes to complete the written survey that you may receive in the mail after your visit with us. Thank you!             Your Updated Medication List - Protect others around you: Learn how to safely use, store and throw away your medicines at www.disposemymeds.org.          This list is accurate as of: 6/30/17  9:46 AM.  Always use your most recent med list.                   Brand Name Dispense Instructions for use Diagnosis    aspirin 81 MG EC tablet     90 tablet    Take 1 tablet (81 mg) by mouth daily    ST elevation myocardial infarction (STEMI) of inferior wall (H)       atorvastatin 10 MG tablet    LIPITOR    90 tablet    Take 1 tablet (10 mg) by mouth daily    Routine health maintenance       cholecalciferol 1000 UNITS capsule    vitamin  -D    60 capsule    Take 2 capsules (2,000 Units) by mouth daily    Vitamin D deficiency       clopidogrel 75 MG tablet    PLAVIX    90 tablet    Take 1 tablet (75 mg) by mouth daily    ST elevation myocardial infarction  (STEMI) of inferior wall (H)       furosemide 20 MG tablet    LASIX    360 tablet    Take 3 tablets (60 mg) by mouth daily    Chronic systolic congestive heart failure (H), Ischemic cardiomyopathy, Essential hypertension with goal blood pressure less than 130/85       metoprolol 25 MG 24 hr tablet    TOPROL-XL    360 tablet    Take 3 tablets (75 mg) by mouth daily    Ischemic cardiomyopathy       pantoprazole 40 MG EC tablet    PROTONIX    180 tablet    Take 1 tablet (40 mg) by mouth 2 times daily    Lower GI bleeding       sacubitril-valsartan 24-26 MG per tablet    ENTRESTO    60 tablet    Take 1 tablet by mouth 2 times daily    Chronic systolic congestive heart failure (H)       spironolactone 25 MG tablet    ALDACTONE    45 tablet    Take 0.5 tablets (12.5 mg) by mouth daily    Chronic systolic heart failure (H)       thiamine 100 MG tablet     90 tablet    Take 1 tablet (100 mg) by mouth daily    Chronic constipation       vitamin B complex with vitamin C Tabs tablet      Take 1 tablet by mouth daily    Atrial tachycardia (H), Renal insufficiency, Ischemic cardiomyopathy

## 2017-06-30 NOTE — NURSING NOTE
Chief Complaint   Patient presents with     Follow Up For     Return CORE appt; 57yr old male with a h/o chronic systolic HF presenting for follow up with labs prior.     Vitals were taken and medications were reconciled.     Maurilio Saunders MA  9:04 AM

## 2017-07-14 DIAGNOSIS — I50.22 CHRONIC SYSTOLIC HEART FAILURE (H): ICD-10-CM

## 2017-07-14 LAB
ANION GAP SERPL CALCULATED.3IONS-SCNC: 5 MMOL/L (ref 3–14)
BUN SERPL-MCNC: 32 MG/DL (ref 7–30)
CALCIUM SERPL-MCNC: 8.2 MG/DL (ref 8.5–10.1)
CHLORIDE SERPL-SCNC: 104 MMOL/L (ref 94–109)
CO2 SERPL-SCNC: 28 MMOL/L (ref 20–32)
CREAT SERPL-MCNC: 2.23 MG/DL (ref 0.66–1.25)
GFR SERPL CREATININE-BSD FRML MDRD: 30 ML/MIN/1.7M2
GLUCOSE SERPL-MCNC: 130 MG/DL (ref 70–99)
POTASSIUM SERPL-SCNC: 4.3 MMOL/L (ref 3.4–5.3)
SODIUM SERPL-SCNC: 137 MMOL/L (ref 133–144)

## 2017-08-03 DIAGNOSIS — I25.5 ISCHEMIC CARDIOMYOPATHY: ICD-10-CM

## 2017-08-03 DIAGNOSIS — I21.19 ST ELEVATION MYOCARDIAL INFARCTION (STEMI) OF INFERIOR WALL (H): ICD-10-CM

## 2017-08-03 RX ORDER — METOPROLOL SUCCINATE 25 MG/1
75 TABLET, EXTENDED RELEASE ORAL DAILY
Qty: 360 TABLET | Refills: 3 | Status: SHIPPED | OUTPATIENT
Start: 2017-08-03 | End: 2018-08-21

## 2017-08-05 RX ORDER — CLOPIDOGREL BISULFATE 75 MG/1
75 TABLET ORAL DAILY
Qty: 90 TABLET | Refills: 3 | Status: SHIPPED | OUTPATIENT
Start: 2017-08-05 | End: 2018-08-08

## 2017-08-08 ENCOUNTER — PRE VISIT (OUTPATIENT)
Dept: CARDIOLOGY | Facility: CLINIC | Age: 57
End: 2017-08-08

## 2017-08-08 DIAGNOSIS — I50.22 CHRONIC SYSTOLIC HEART FAILURE (H): Primary | ICD-10-CM

## 2017-10-09 ENCOUNTER — PRE VISIT (OUTPATIENT)
Dept: CARDIOLOGY | Facility: CLINIC | Age: 57
End: 2017-10-09

## 2017-10-09 DIAGNOSIS — I50.22 CHRONIC SYSTOLIC HEART FAILURE (H): Primary | ICD-10-CM

## 2017-10-12 ENCOUNTER — OFFICE VISIT (OUTPATIENT)
Dept: CARDIOLOGY | Facility: CLINIC | Age: 57
End: 2017-10-12
Attending: NURSE PRACTITIONER
Payer: MEDICAID

## 2017-10-12 VITALS
DIASTOLIC BLOOD PRESSURE: 90 MMHG | HEART RATE: 79 BPM | SYSTOLIC BLOOD PRESSURE: 133 MMHG | BODY MASS INDEX: 36.53 KG/M2 | HEIGHT: 68 IN | OXYGEN SATURATION: 96 % | WEIGHT: 241 LBS

## 2017-10-12 DIAGNOSIS — I50.22 CHRONIC SYSTOLIC HEART FAILURE (H): ICD-10-CM

## 2017-10-12 DIAGNOSIS — I50.22 CHRONIC SYSTOLIC CONGESTIVE HEART FAILURE (H): ICD-10-CM

## 2017-10-12 LAB
ANION GAP SERPL CALCULATED.3IONS-SCNC: 5 MMOL/L (ref 3–14)
BUN SERPL-MCNC: 34 MG/DL (ref 7–30)
CALCIUM SERPL-MCNC: 8.9 MG/DL (ref 8.5–10.1)
CHLORIDE SERPL-SCNC: 102 MMOL/L (ref 94–109)
CO2 SERPL-SCNC: 30 MMOL/L (ref 20–32)
CREAT SERPL-MCNC: 2.2 MG/DL (ref 0.66–1.25)
GFR SERPL CREATININE-BSD FRML MDRD: 31 ML/MIN/1.7M2
GLUCOSE SERPL-MCNC: 148 MG/DL (ref 70–99)
POTASSIUM SERPL-SCNC: 4.4 MMOL/L (ref 3.4–5.3)
SODIUM SERPL-SCNC: 138 MMOL/L (ref 133–144)

## 2017-10-12 PROCEDURE — 99213 OFFICE O/P EST LOW 20 MIN: CPT | Mod: ZP | Performed by: NURSE PRACTITIONER

## 2017-10-12 PROCEDURE — 36415 COLL VENOUS BLD VENIPUNCTURE: CPT | Performed by: NURSE PRACTITIONER

## 2017-10-12 PROCEDURE — 80048 BASIC METABOLIC PNL TOTAL CA: CPT | Performed by: NURSE PRACTITIONER

## 2017-10-12 PROCEDURE — 99212 OFFICE O/P EST SF 10 MIN: CPT | Mod: ZF

## 2017-10-12 ASSESSMENT — PAIN SCALES - GENERAL: PAINLEVEL: NO PAIN (0)

## 2017-10-12 NOTE — NURSING NOTE
Chief Complaint   Patient presents with     Follow Up For     Return CORE appt; 57yr old male with a h/o chronic systolic HF presenting for follow up with labs prior.     Vitals were taken and medications were reconciled.  GARTH Phan  12:31 PM

## 2017-10-12 NOTE — PATIENT INSTRUCTIONS
"You were seen today in the Cardiovascular Clinic at the [facility].     Cardiology Providers you saw during your visit: Bertha Phan NP      1. Please limit fluids to 2 liters daily    2. A referral has been placed for you to see a dietician. Please call to coordinate    3. We will place a prior authorization to increase Entresto dosage. Please continue Entresto / twice daily for now.    Please return to see Dr. Trujillo in 3-4 months    Results for MERCY SHAFER (MRN [id number]) as of 10/12/2017 13:01   Ref. Range 10/12/2017 12:27   Sodium Latest Ref Range: 133 - 144 mmol/L 138   Potassium Latest Ref Range: 3.4 - 5.3 mmol/L 4.4   Chloride Latest Ref Range: 94 - 109 mmol/L 102   Carbon Dioxide Latest Ref Range: 20 - 32 mmol/L 30   Urea Nitrogen Latest Ref Range: 7 - 30 mg/dL 34 (H)   Creatinine Latest Ref Range: 0.66 - 1.25 mg/dL 2.20 (H)   GFR Estimate Latest Ref Range: >60 mL/min/1.7m2 31 (L)   GFR Estimate If Black Latest Ref Range: >60 mL/min/1.7m2 37 (L)   Calcium Latest Ref Range: 8.5 - 10.1 mg/dL 8.9   Anion Gap Latest Ref Range: 3 - 14 mmol/L 5   Glucose Latest Ref Range: 70 - 99 mg/dL 148 (H)       Please limit your fluid intake to 2 L (64 ounces) daily.  2 Liters a day = 8.5 cups, or 72 ounces.  Please limit your salt intake to 2 grams a day or less.    If you gain 2# in 24 hours or 5# in one week call Livia Joe RN so we can adjust your medications as needed over the phone.    Please feel free to call me with any questions or concerns.      Livia Joe RN BSN CHFN  [facility]  Cardiology Care Coordinator-Heart Failure Clinic    Questions and [phone].   First press #1 for the University and then press #3 for \"Medical Questions\" to reach us Cardiology Nurses.     On Call Cardiologist for after hours or on weekends: [phone]   option #4 and ask to speak to the on-call Cardiologist. Inform them you are a CORE/heart failure patient " at the [facility].        If you need a medication refill please contact your pharmacy.  Please allow 3 business days for your refill to be completed.  _______________________________________________________  [facility] Cardiomyopathy, Optimization, Rehabilitation, Education   The [facility] CLINIC is a heart failure specialty clinic within the [facility] where you will work with specialized nurse practitioners dedicated to helping patients with heart failure carefully adjust medications, receive education, and learn who and when to call if symptoms develop. They specialize in helping you better understand your condition, slow the progression of your disease, improve the length and quality of your life, help you detect future heart problems before they become life threatening, and avoid hospitalizations.  As always, thank you for trusting us with your health care needs!

## 2017-10-12 NOTE — LETTER
10/12/2017      RE: Cole Jesus  [address]  [place] [address]       Dear Colleague,    Thank you for the opportunity to participate in the care of your patient, Cole Jesus, at the [facility] at [facility]. Please see a copy of my visit note below.    HPI  Mr. Jesus is a 57 year old man with a history of Aib/flutter s/p ablation (2013) CAD s/p PCI, HTN, and ICM (EF 40%) who presents to CORE clinic for follow up. He was last seen 4 months ago when his spironolactone was increased. Follow up labs were stable. He returns for follow up.    Addison is feeling great. Able to walk 3 miles without distress. No chest pain, palpitations, edema, orthopnea, or PND. He does not snore and reports a prior sleep study was negative. Appetite is good and no early satiety. Planning to start dieting though no specific program.       PMH  Past Medical History:   Diagnosis Date     Anemia in chronic renal disease 3/9/2015     Antiplatelet or antithrombotic long-term use      Atrial fibrillation and flutter      CAD (coronary artery disease)     Stemi in 12/11, s/p angioplasty     CRD (chronic renal disease), stage IV (H) 3/9/2015     GI bleed     massive lower GI bleed secondary to a cecal ulcer, s/p ileocecal resection in 12/11     Heart failure     Biventricular systolic HF, complicated by ARDS requiring tracheostomy     History of blood transfusion      Hyperlipidemia LDL goal <100 4/28/2013     Hypertension      Ischemic cardiomyopathy 4/28/2013    TTE revealing 40% EF     Other and unspecified nonspecific immunological findings     Anti JKa     Pulmonary edema     episodes of flash pulmonary edema in 12/11     Renal insufficiency 5/6/2013    hx ATN with dialysis complicating cardiogenic shock  1/2012     Subclinical hypothyroidism      Social History     Social History     Marital status: Significant other     Spouse name: N/A     Number of children: N/A     Years  of education: N/A     Occupational History     Not on file.     Social History Main Topics     Smoking status: Former Smoker     Quit date: 12/3/2011     Smokeless tobacco: Never Used     Alcohol use No     Drug use: No     Sexual activity: Yes     Partners: Female     Other Topics Concern     Parent/Sibling W/ Cabg, Mi Or Angioplasty Before 65f 55m? Yes     Exercise Yes     limited walking     Social History Narrative     Family History   Problem Relation Age of Onset     DIABETES Mother      Hypertension Mother      HEART DISEASE Mother      HEART DISEASE Father       of heart attack, left when he was young     DIABETES Sister      DIABETES Sister      DIABETES Sister      MEDS  Current Outpatient Prescriptions on File Prior to Visit:  clopidogrel (PLAVIX) 75 MG tablet Take 1 tablet (75 mg) by mouth daily   metoprolol (TOPROL-XL) 25 MG 24 hr tablet Take 3 tablets (75 mg) by mouth daily   spironolactone (ALDACTONE) 25 MG tablet Take 0.5 tablets (12.5 mg) by mouth daily   furosemide (LASIX) 20 MG tablet Take 3 tablets (60 mg) by mouth daily (Patient taking differently: Take 80 mg by mouth daily )   atorvastatin (LIPITOR) 10 MG tablet Take 1 tablet (10 mg) by mouth daily   pantoprazole (PROTONIX) 40 MG enteric coated tablet Take 1 tablet (40 mg) by mouth 2 times daily   sacubitril-valsartan (ENTRESTO) 24-26 MG per tablet Take 1 tablet by mouth 2 times daily   vitamin  B complex with vitamin C (VITAMIN  B COMPLEX) TABS Take 1 tablet by mouth daily   thiamine 100 MG tablet Take 1 tablet (100 mg) by mouth daily   cholecalciferol (VITAMIN  -D) 1000 UNITS capsule Take 2 capsules (2,000 Units) by mouth daily   aspirin 81 MG EC tablet Take 1 tablet (81 mg) by mouth daily (Patient not taking: Reported on 10/12/2017)   [DISCONTINUED] ISOSORBIDE DINITRATE PO Take 15 mg by mouth 3 times daily     No current facility-administered medications on file prior to visit.     Review Of Systems  Skin: negative  Eyes:  "negative  Ears/Nose/Throat: negative  Respiratory: No shortness of breath, dyspnea on exertion, cough, or hemoptysis  Cardiovascular: negative and as above  Gastrointestinal: negative  Genitourinary: negative  Musculoskeletal: negative  Neurologic: negative  Psychiatric: negative  Hematologic/Lymphatic/Immunologic: negative  Endocrine: negative    Physical examination:  /90 (BP Location: Right arm, Patient Position: Chair, Cuff Size: Adult Regular)  Pulse 79  Ht 1.727 m (5' 8\")  Wt 109.3 kg (241 lb)  SpO2 96%  BMI 36.64 kg/m2  GENERAL APPEARANCE: healthy, alert and no distress  HEENT: no icterus, no xanthelasmas, normal pupil size and reaction, normal palate, mucosa moist, no cyanosis.  NECK: no adenopathy, no asymmetry, masses, or scars, thyroid normal to palpation  Pulm: lungs clear to auscultation - no rales, rhonchi or wheezes, no use of accessory muscles, no retractions, respirations are unlabored, normal respiratory rate, no kyphosis, no scoliosis  CV: Regular rhythm, normal S1 and S2. JVP appears flat. PMI displaced laterally  ABDOMEN: soft, non tender, without hepatomegaly, no masses palpable, bowel sounds normal  EXTREMITIES:  Trace edema  NEURO: alert and oriented to person/place/time, normal speech, gait and affect  SKIN: no ecchymoses, no rashes    LABS  Last Basic Metabolic Panel:  Lab Results   Component Value Date     10/12/2017      Lab Results   Component Value Date    POTASSIUM 4.4 10/12/2017     Lab Results   Component Value Date    CHLORIDE 102 10/12/2017     Lab Results   Component Value Date    RONNY 8.9 10/12/2017     Lab Results   Component Value Date    CO2 30 10/12/2017     Lab Results   Component Value Date    BUN 34 10/12/2017     Lab Results   Component Value Date    CR 2.20 10/12/2017     Lab Results   Component Value Date     10/12/2017       Assessment and Plan  Mr. Jesus is a pleasant 57 year old man with ICM who appears well. He has plenty of blood pressure " and we will plan to increase Entresto to 48/51 BID after prior authorization is approved. Until then, he'll continue current dosing. He'll continue spironolactone 12.5 mg daily We've placed a nutrition referral and applauded Addison for his plans to diet. We'll get a Hgb A1C with his next labs and he'll return to clinic to see Dr. Trujillo in 3-4 months and to CORE as needed.       20 minutes spent in direct care, >50% in counseling    Bertha Phan NP

## 2017-10-12 NOTE — PROGRESS NOTES
HPI  Mr. Jesus is a 57 year old man with a history of Aib/flutter s/p ablation (2013) CAD s/p PCI, HTN, and ICM (EF 40%) who presents to CORE clinic for follow up. He was last seen 4 months ago when his spironolactone was increased. Follow up labs were stable. He returns for follow up.    Addison is feeling great. Able to walk 3 miles without distress. No chest pain, palpitations, edema, orthopnea, or PND. He does not snore and reports a prior sleep study was negative. Appetite is good and no early satiety. Planning to start dieting though no specific program.       PMH  Past Medical History:   Diagnosis Date     Anemia in chronic renal disease 3/9/2015     Antiplatelet or antithrombotic long-term use      Atrial fibrillation and flutter      CAD (coronary artery disease)     Stemi in 12/11, s/p angioplasty     CRD (chronic renal disease), stage IV (H) 3/9/2015     GI bleed     massive lower GI bleed secondary to a cecal ulcer, s/p ileocecal resection in 12/11     Heart failure     Biventricular systolic HF, complicated by ARDS requiring tracheostomy     History of blood transfusion      Hyperlipidemia LDL goal <100 4/28/2013     Hypertension      Ischemic cardiomyopathy 4/28/2013    TTE revealing 40% EF     Other and unspecified nonspecific immunological findings     Anti JKa     Pulmonary edema     episodes of flash pulmonary edema in 12/11     Renal insufficiency 5/6/2013    hx ATN with dialysis complicating cardiogenic shock  1/2012     Subclinical hypothyroidism      Social History     Social History     Marital status: Significant other     Spouse name: N/A     Number of children: N/A     Years of education: N/A     Occupational History     Not on file.     Social History Main Topics     Smoking status: Former Smoker     Quit date: 12/3/2011     Smokeless tobacco: Never Used     Alcohol use No     Drug use: No     Sexual activity: Yes     Partners: Female     Other Topics Concern     Parent/Sibling W/ Cabg, Mi  Or Angioplasty Before 65f 55m? Yes     Exercise Yes     limited walking     Social History Narrative     Family History   Problem Relation Age of Onset     DIABETES Mother      Hypertension Mother      HEART DISEASE Mother      HEART DISEASE Father       of heart attack, left when he was young     DIABETES Sister      DIABETES Sister      DIABETES Sister      MEDS  Current Outpatient Prescriptions on File Prior to Visit:  clopidogrel (PLAVIX) 75 MG tablet Take 1 tablet (75 mg) by mouth daily   metoprolol (TOPROL-XL) 25 MG 24 hr tablet Take 3 tablets (75 mg) by mouth daily   spironolactone (ALDACTONE) 25 MG tablet Take 0.5 tablets (12.5 mg) by mouth daily   furosemide (LASIX) 20 MG tablet Take 3 tablets (60 mg) by mouth daily (Patient taking differently: Take 80 mg by mouth daily )   atorvastatin (LIPITOR) 10 MG tablet Take 1 tablet (10 mg) by mouth daily   pantoprazole (PROTONIX) 40 MG enteric coated tablet Take 1 tablet (40 mg) by mouth 2 times daily   sacubitril-valsartan (ENTRESTO) 24-26 MG per tablet Take 1 tablet by mouth 2 times daily   vitamin  B complex with vitamin C (VITAMIN  B COMPLEX) TABS Take 1 tablet by mouth daily   thiamine 100 MG tablet Take 1 tablet (100 mg) by mouth daily   cholecalciferol (VITAMIN  -D) 1000 UNITS capsule Take 2 capsules (2,000 Units) by mouth daily   aspirin 81 MG EC tablet Take 1 tablet (81 mg) by mouth daily (Patient not taking: Reported on 10/12/2017)   [DISCONTINUED] ISOSORBIDE DINITRATE PO Take 15 mg by mouth 3 times daily     No current facility-administered medications on file prior to visit.     Review Of Systems  Skin: negative  Eyes: negative  Ears/Nose/Throat: negative  Respiratory: No shortness of breath, dyspnea on exertion, cough, or hemoptysis  Cardiovascular: negative and as above  Gastrointestinal: negative  Genitourinary: negative  Musculoskeletal: negative  Neurologic: negative  Psychiatric: negative  Hematologic/Lymphatic/Immunologic: negative  Endocrine:  "negative    Physical examination:  /90 (BP Location: Right arm, Patient Position: Chair, Cuff Size: Adult Regular)  Pulse 79  Ht 1.727 m (5' 8\")  Wt 109.3 kg (241 lb)  SpO2 96%  BMI 36.64 kg/m2  GENERAL APPEARANCE: healthy, alert and no distress  HEENT: no icterus, no xanthelasmas, normal pupil size and reaction, normal palate, mucosa moist, no cyanosis.  NECK: no adenopathy, no asymmetry, masses, or scars, thyroid normal to palpation  Pulm: lungs clear to auscultation - no rales, rhonchi or wheezes, no use of accessory muscles, no retractions, respirations are unlabored, normal respiratory rate, no kyphosis, no scoliosis  CV: Regular rhythm, normal S1 and S2. JVP appears flat. PMI displaced laterally  ABDOMEN: soft, non tender, without hepatomegaly, no masses palpable, bowel sounds normal  EXTREMITIES:  Trace edema  NEURO: alert and oriented to person/place/time, normal speech, gait and affect  SKIN: no ecchymoses, no rashes    LABS  Last Basic Metabolic Panel:  Lab Results   Component Value Date     10/12/2017      Lab Results   Component Value Date    POTASSIUM 4.4 10/12/2017     Lab Results   Component Value Date    CHLORIDE 102 10/12/2017     Lab Results   Component Value Date    RONNY 8.9 10/12/2017     Lab Results   Component Value Date    CO2 30 10/12/2017     Lab Results   Component Value Date    BUN 34 10/12/2017     Lab Results   Component Value Date    CR 2.20 10/12/2017     Lab Results   Component Value Date     10/12/2017       Assessment and Plan  Mr. Jesus is a pleasant 57 year old man with ICM who appears well. He has plenty of blood pressure and we will plan to increase Entresto to 48/51 BID after prior authorization is approved. Until then, he'll continue current dosing. He'll continue spironolactone 12.5 mg daily We've placed a nutrition referral and applauded Addison for his plans to diet. We'll get a Hgb A1C with his next labs and he'll return to clinic to see Dr. Trujillo " in 3-4 months and to CORE as needed.       20 minutes spent in direct care, >50% in counseling

## 2017-10-12 NOTE — MR AVS SNAPSHOT
After Visit Summary   10/12/2017    Mercy Jesus    MRN: [id number]           Patient Information     Date Of Birth          1960        Visit Information        Provider Department      10/12/2017 12:30 PM Bertha Phan NP [facility]        Today's Diagnoses     Chronic systolic congestive heart failure (HCC)          Care Instructions    You were seen today in the Cardiovascular Clinic at the [facility].     Cardiology Providers you saw during your visit: Bertha Phan NP      1. Please limit fluids to 2 liters daily    2. A referral has been placed for you to see a dietician. Please call to coordinate    3. We will place a prior authorization to increase Entresto dosage. Please continue Entresto 24/26 twice daily for now.    Please return to see Dr. Trujillo in 3-4 months    Results for MERCY JESUS (MRN [id number]) as of 10/12/2017 13:01   Ref. Range 10/12/2017 12:27   Sodium Latest Ref Range: 133 - 144 mmol/L 138   Potassium Latest Ref Range: 3.4 - 5.3 mmol/L 4.4   Chloride Latest Ref Range: 94 - 109 mmol/L 102   Carbon Dioxide Latest Ref Range: 20 - 32 mmol/L 30   Urea Nitrogen Latest Ref Range: 7 - 30 mg/dL 34 (H)   Creatinine Latest Ref Range: 0.66 - 1.25 mg/dL 2.20 (H)   GFR Estimate Latest Ref Range: >60 mL/min/1.7m2 31 (L)   GFR Estimate If Black Latest Ref Range: >60 mL/min/1.7m2 37 (L)   Calcium Latest Ref Range: 8.5 - 10.1 mg/dL 8.9   Anion Gap Latest Ref Range: 3 - 14 mmol/L 5   Glucose Latest Ref Range: 70 - 99 mg/dL 148 (H)       Please limit your fluid intake to 2 L (64 ounces) daily.  2 Liters a day = 8.5 cups, or 72 ounces.  Please limit your salt intake to 2 grams a day or less.    If you gain 2# in 24 hours or 5# in one week call Livia Joe RN so we can adjust your medications as needed over the phone.    Please feel free to call me with any questions or concerns.      Livia Joe RN BSN [facility]  [facility]  "Health  Cardiology Care Coordinator-Heart Failure Clinic    Questions and [phone].   First press #1 for the University and then press #3 for \"Medical Questions\" to reach us Cardiology Nurses.     On Call Cardiologist for after hours or on weekends: [phone]   option #4 and ask to speak to the on-call Cardiologist. Inform them you are a CORE/heart failure patient at the [facility].        If you need a medication refill please contact your pharmacy.  Please allow 3 business days for your refill to be completed.  _______________________________________________________  C.O.R.E. CLINIC Cardiomyopathy, Optimization, Rehabilitation, Education   The C.O.R.E. CLINIC is a heart failure specialty clinic within the [facility] where you will work with specialized nurse practitioners dedicated to helping patients with heart failure carefully adjust medications, receive education, and learn who and when to call if symptoms develop. They specialize in helping you better understand your condition, slow the progression of your disease, improve the length and quality of your life, help you detect future heart problems before they become life threatening, and avoid hospitalizations.  As always, thank you for trusting us with your health care needs!                Follow-ups after your visit        Additional Services     NUTRITION REFERRAL       Your provider has referred you to: [facility]: [facility] (on call location)  - [place] [phone]   [email or web address]    Please be aware that coverage of these services is subject to the terms and limitations of your health insurance plan.  Call member services at your health plan with any benefit or coverage questions.      Please bring the following with you to your appointment:    (1) This referral request  (2) Any documents given to you regarding this referral  (3) Any specific " "questions you have about diet and/or food choices                  Your next 10 appointments already scheduled     Feb 13, 2018  8:00 AM CST   Lab with  LAB   [facility] ([facility])    [address]  1st Floor  [facility] [address] [phone]            Feb 13, 2018  8:30 AM CST   (Arrive by 8:15 AM)   RETURN HEART FAILURE with Alen Trujillo MD   [facility] ([facility])    [address]  3rd [facility] [address] [phone]              Who to contact     If you have questions or need follow up information about today's clinic visit or your schedule please contact [facility] directly at [phone].  Normal or non-critical lab and imaging results will be communicated to you by MyChart, letter or phone within 4 business days after the clinic has received the results. If you do not hear from us within 7 days, please contact the clinic through [employer] or phone. If you have a critical or abnormal lab result, we will notify you by phone as soon as possible.  Submit refill requests through [employer] or call your pharmacy and they will forward the refill request to us. Please allow 3 business days for your refill to be completed.          Additional Information About Your Visit        [employer] Information     [employer] gives you secure access to your electronic health record. If you see a primary care provider, you can also send messages to your care team and make appointments. If you have questions, please call your primary care clinic.  If you do not have a primary care provider, please call [phone] and they will assist you.        Care EveryWhere ID     This is your Care EveryWhere ID. This could be used by other organizations to access your [facility] medical records  [id number]        Your Vitals Were     Pulse Height Pulse Oximetry BMI (Body Mass Index)          79 1.727 m (5' 8\") 96% 36.64 kg/m2   "       Blood Pressure from Last 3 Encounters:   10/12/17 133/90   06/30/17 119/81   04/12/17 127/83    Weight from Last 3 Encounters:   10/12/17 109.3 kg (241 lb)   06/30/17 109.2 kg (240 lb 12.8 oz)   04/12/17 109 kg (240 lb 6.4 oz)              We Performed the Following     NUTRITION REFERRAL          Today's Medication Changes          These changes are accurate as of: 10/12/17  1:14 PM.  If you have any questions, ask your nurse or doctor.               Start taking these medicines.        Dose/Directions    sacubitril-valsartan 49-51 MG per tablet   Commonly known as:  ENTRESTO   Used for:  Chronic systolic congestive heart failure (H)   Replaces:  sacubitril-valsartan 24-26 MG per tablet   Started by:  Bertha Phan NP        Dose:  1 tablet   Take 1 tablet by mouth 2 times daily   Quantity:  60 tablet   Refills:  11         These medicines have changed or have updated prescriptions.        Dose/Directions    furosemide 20 MG tablet   Commonly known as:  LASIX   This may have changed:  how much to take   Used for:  Chronic systolic congestive heart failure (H), Ischemic cardiomyopathy, Essential hypertension with goal blood pressure less than 130/85        Dose:  60 mg   Take 3 tablets (60 mg) by mouth daily   Quantity:  360 tablet   Refills:  3         Stop taking these medicines if you haven't already. Please contact your care team if you have questions.     sacubitril-valsartan 24-26 MG per tablet   Commonly known as:  ENTRESTO   Replaced by:  sacubitril-valsartan 49-51 MG per tablet   Stopped by:  Bertha Phan NP                Where to get your medicines      These medications were sent to [facility] [address] [facility] [address] [place] [address] AT [address] [address]    Hours:  24-hours Phone:  [phone]     sacubitril-valsartan 49-51 MG per tablet                Primary Care Provider Office Phone # Fax #    Silas De La Cruz  MD Zaria [phone]       [address] [place] [address] [address]  [facility] [address]        Equal Access to Services     GIANFRANCO VALENTIN : Beny Diallo, madhu rosales, kavonmorales goldmaabbey shermancourtneyabbey, waxanand escobarin hayaacolleen holdenjuvenal sunshine laChitohallie rodriguez. So [facility] [phone].    ATENCIÓN: Si habla español, tiene a brown disposición servicios gratuitos de asistencia lingüística. Llame al [phone].    We comply with applicable federal civil rights laws and [place] laws. We do not discriminate on the basis of race, color, national origin, age, disability, sex, sexual orientation, or gender identity.            Thank you!     Thank you for choosing [facility]  for your care. Our goal is always to provide you with excellent care. Hearing back from our patients is one way we can continue to improve our services. Please take a few minutes to complete the written survey that you may receive in the mail after your visit with us. Thank you!             Your Updated Medication List - Protect others around you: Learn how to safely use, store and throw away your medicines at www.disposemymeds.org.          This list is accurate as of: 10/12/17  1:14 PM.  Always use your most recent med list.                   Brand Name Dispense Instructions for use Diagnosis    aspirin 81 MG EC tablet     90 tablet    Take 1 tablet (81 mg) by mouth daily    ST elevation myocardial infarction (STEMI) of inferior wall (H)       atorvastatin 10 MG tablet    LIPITOR    90 tablet    Take 1 tablet (10 mg) by mouth daily    Routine health maintenance       cholecalciferol 1000 UNITS capsule    vitamin  -D    60 capsule    Take 2 capsules (2,000 Units) by mouth daily    Vitamin D deficiency       clopidogrel 75 MG tablet    PLAVIX    90 tablet    Take 1 tablet (75 mg) by mouth daily    ST elevation myocardial infarction (STEMI) of inferior wall (H)       furosemide 20 MG tablet    LASIX    360 tablet    Take 3 tablets (60 mg) by mouth  daily    Chronic systolic congestive heart failure (H), Ischemic cardiomyopathy, Essential hypertension with goal blood pressure less than 130/85       metoprolol 25 MG 24 hr tablet    TOPROL-XL    360 tablet    Take 3 tablets (75 mg) by mouth daily    Ischemic cardiomyopathy       pantoprazole 40 MG EC tablet    PROTONIX    180 tablet    Take 1 tablet (40 mg) by mouth 2 times daily    Lower GI bleeding       sacubitril-valsartan 49-51 MG per tablet    ENTRESTO    60 tablet    Take 1 tablet by mouth 2 times daily    Chronic systolic congestive heart failure (H)       spironolactone 25 MG tablet    ALDACTONE    45 tablet    Take 0.5 tablets (12.5 mg) by mouth daily    Chronic systolic heart failure (H)       thiamine 100 MG tablet     90 tablet    Take 1 tablet (100 mg) by mouth daily    Chronic constipation       vitamin B complex with vitamin C Tabs tablet      Take 1 tablet by mouth daily    Atrial tachycardia (H), Renal insufficiency, Ischemic cardiomyopathy

## 2017-10-18 ENCOUNTER — TELEPHONE (OUTPATIENT)
Dept: CARDIOLOGY | Facility: CLINIC | Age: 57
End: 2017-10-18

## 2017-10-18 NOTE — TELEPHONE ENCOUNTER
Prior Authorization Retail Medication Request  Medication/Dose: sacubitril-valsartan (ENTRESTO) 49-51 MG  Diagnosis and ICD code: Chronic systolic congestive heart failure (HCC) [I50.22]   New/Renewal/Insurance Change PA:  No insurance change.  PA had already been approved for the lower dose and patient has been taking this for quite some time.  Recently seen in CORE/Heart Failure clinic and we want to increase his dose.  Ordering the next size up pill promoted a PA.   Previously Tried and Failed Therapies: has been taking Entresto 24-26, data supports up-titration and symptoms continuing     [facility] [address]  Telephone Fax   [phone]   Pharmacy Address and Hours   Address Hours   [address] [facility] [address]

## 2017-10-19 NOTE — TELEPHONE ENCOUNTER
PA Initiation    Medication: sacubitril-valsartan (ENTRESTO) 49-51 MG- INITIATED  Insurance Company: [place] Medicaid ([facility]) - Phone [phone] Fax [phone]  Pharmacy Filling the Rx: [facility] [address] [address] [address] AT [facility] & [address] [place] [address]  Filling Pharmacy Phone: [phone]  Filling Pharmacy Fax: [phone]  Start Date: 10/19/2017

## 2017-10-20 DIAGNOSIS — I50.22 CHRONIC SYSTOLIC HEART FAILURE (H): Primary | ICD-10-CM

## 2017-10-20 NOTE — TELEPHONE ENCOUNTER
Prior Authorization Approval    Authorization Effective Date: 10/19/2017  Authorization Expiration Date: 10/13/2018  Medication: sacubitril-valsartan (ENTRESTO) 49-51 MG- Approved  Approved Dose/Quantity:   Reference #: [id number]   Insurance Company: [place] Medicaid ([facility]) - Phone [phone] Fax [phone]  Expected CoPay: $0.00     CoPay Card Available:      Foundation Assistance Needed:    Which Pharmacy is filling the prescription (Not needed for infusion/clinic administered): [facility] [address] [place], [place] [address] AT [address]  Pharmacy Notified: Yes  Patient Notified: Yes

## 2017-10-20 NOTE — PROGRESS NOTES
Date: 10/20/2017  Time of Call: 2:49 PM  Diagnosis:  HF  VORB - Ordering provider: Bertha Phan NP   Order: BMP 1-2 weeks after increasing entresto  Order received by: Livia Joe RN   Follow-up/additional notes:

## 2017-11-01 DIAGNOSIS — I50.22 CHRONIC SYSTOLIC HEART FAILURE (H): ICD-10-CM

## 2017-11-01 LAB
ANION GAP SERPL CALCULATED.3IONS-SCNC: 6 MMOL/L (ref 3–14)
BUN SERPL-MCNC: 31 MG/DL (ref 7–30)
CALCIUM SERPL-MCNC: 8.4 MG/DL (ref 8.5–10.1)
CHLORIDE SERPL-SCNC: 102 MMOL/L (ref 94–109)
CO2 SERPL-SCNC: 28 MMOL/L (ref 20–32)
CREAT SERPL-MCNC: 2.37 MG/DL (ref 0.66–1.25)
GFR SERPL CREATININE-BSD FRML MDRD: 28 ML/MIN/1.7M2
GLUCOSE SERPL-MCNC: 159 MG/DL (ref 70–99)
POTASSIUM SERPL-SCNC: 4.5 MMOL/L (ref 3.4–5.3)
SODIUM SERPL-SCNC: 137 MMOL/L (ref 133–144)

## 2017-11-03 ENCOUNTER — CARE COORDINATION (OUTPATIENT)
Dept: CARDIOLOGY | Facility: CLINIC | Age: 57
End: 2017-11-03

## 2017-11-03 DIAGNOSIS — I25.5 ISCHEMIC CARDIOMYOPATHY: ICD-10-CM

## 2017-11-03 DIAGNOSIS — I10 ESSENTIAL HYPERTENSION WITH GOAL BLOOD PRESSURE LESS THAN 130/85: ICD-10-CM

## 2017-11-03 DIAGNOSIS — I50.22 CHRONIC SYSTOLIC CONGESTIVE HEART FAILURE (H): ICD-10-CM

## 2017-11-03 RX ORDER — FUROSEMIDE 20 MG
TABLET ORAL
Qty: 360 TABLET | Refills: 3 | Status: SHIPPED | OUTPATIENT
Start: 2017-11-03 | End: 2018-09-12

## 2017-11-03 NOTE — PROGRESS NOTES
"Results reviewed by Bertha Phan NP and discussed with Addison via phone.  Addison states when he started the higher dose of Entresto \"it was kyle intense for a few days\".  Reports he was more tired, but states \"I'm fine now.\" States he hasn't been tracking his weights lately, but thinks his weight is stable. He's agreeable to decrease lasix slightly to 40/20 (he had been taking 40 BID).  Will have labs rechecked. Addison verbalizes understanding and agrees with plan of care. Livia Joe RN      Date: 11/3/2017  Time of Call: 2:53 PM  Diagnosis:  Heart failure  VORB - Ordering provider: Bertha Phan NP   Order: Decrease lasix to 40/20.  Repeat BMP on 11/17  Order received by: Livia Joe RN   Follow-up/additional notes:         "

## 2017-11-17 DIAGNOSIS — I50.22 CHRONIC SYSTOLIC CONGESTIVE HEART FAILURE (H): ICD-10-CM

## 2017-11-17 LAB
ANION GAP SERPL CALCULATED.3IONS-SCNC: 6 MMOL/L (ref 3–14)
BUN SERPL-MCNC: 33 MG/DL (ref 7–30)
CALCIUM SERPL-MCNC: 8.1 MG/DL (ref 8.5–10.1)
CHLORIDE SERPL-SCNC: 106 MMOL/L (ref 94–109)
CO2 SERPL-SCNC: 26 MMOL/L (ref 20–32)
CREAT SERPL-MCNC: 2.25 MG/DL (ref 0.66–1.25)
GFR SERPL CREATININE-BSD FRML MDRD: 30 ML/MIN/1.7M2
GLUCOSE SERPL-MCNC: 148 MG/DL (ref 70–99)
POTASSIUM SERPL-SCNC: 4.4 MMOL/L (ref 3.4–5.3)
SODIUM SERPL-SCNC: 139 MMOL/L (ref 133–144)

## 2017-12-08 DIAGNOSIS — I50.22 CHRONIC SYSTOLIC CONGESTIVE HEART FAILURE (H): ICD-10-CM

## 2017-12-08 DIAGNOSIS — I25.5 ISCHEMIC CARDIOMYOPATHY: ICD-10-CM

## 2017-12-08 DIAGNOSIS — I10 ESSENTIAL HYPERTENSION WITH GOAL BLOOD PRESSURE LESS THAN 130/85: ICD-10-CM

## 2017-12-12 RX ORDER — FUROSEMIDE 20 MG
TABLET ORAL
Qty: 200 TABLET | Refills: 0 | OUTPATIENT
Start: 2017-12-12

## 2017-12-14 DIAGNOSIS — K92.2 LOWER GI BLEEDING: ICD-10-CM

## 2017-12-15 RX ORDER — PANTOPRAZOLE SODIUM 40 MG/1
40 TABLET, DELAYED RELEASE ORAL 2 TIMES DAILY
Qty: 180 TABLET | Refills: 3 | Status: SHIPPED | OUTPATIENT
Start: 2017-12-15 | End: 2018-12-26

## 2017-12-28 DIAGNOSIS — Z00.00 ROUTINE HEALTH MAINTENANCE: ICD-10-CM

## 2017-12-28 NOTE — LETTER
January 2, 2018        TO: Cole Jesus  [address]  [facility] [address]         Dear Mr. Cole Jesus,    This letter is a reminder that you are overdue to see your Primary Care Provider for an Annual Visit and needed labs. You must be seen by your Primary Care Provider on a yearly basis and have appropriate labs drawn for continued care and prescription refills. Please call [phone] to schedule an appointment for an Annual Visit with ASHLEY KNAPP MD. LAST SEEN  12/19/16     You have been given a 30 day supply/refill of your atorvastatin (LIPITOR) 10 MG while you get your clinic visit/labs completed.    [facility],  Primary Care Center

## 2018-01-02 RX ORDER — ATORVASTATIN CALCIUM 10 MG/1
10 TABLET, FILM COATED ORAL DAILY
Qty: 30 TABLET | Refills: 0 | Status: SHIPPED | OUTPATIENT
Start: 2018-01-02 | End: 2018-01-31

## 2018-01-02 NOTE — TELEPHONE ENCOUNTER
atorvastatin (LIPITOR) 10 MG  Last Written Prescription Date:  12/23/16  Last Fill Quantity: 90,   # refills: 3  Last Office Visit : 12/19/16  Future Office visit:  NONE  OVER DUE MD APPT.   LETTER SENT + 30 DAY RF

## 2018-01-31 DIAGNOSIS — Z00.00 ROUTINE HEALTH MAINTENANCE: ICD-10-CM

## 2018-02-01 RX ORDER — ATORVASTATIN CALCIUM 10 MG/1
10 TABLET, FILM COATED ORAL DAILY
Qty: 30 TABLET | Refills: 0 | Status: SHIPPED | OUTPATIENT
Start: 2018-02-01 | End: 2018-03-07

## 2018-02-12 ENCOUNTER — PRE VISIT (OUTPATIENT)
Dept: CARDIOLOGY | Facility: CLINIC | Age: 58
End: 2018-02-12

## 2018-02-12 DIAGNOSIS — I25.5 ISCHEMIC CARDIOMYOPATHY: Primary | ICD-10-CM

## 2018-02-12 DIAGNOSIS — I50.22 CHRONIC SYSTOLIC CONGESTIVE HEART FAILURE (H): ICD-10-CM

## 2018-02-12 NOTE — PROGRESS NOTES
Alen Trujillo M.D.  Cardiovascular Medicine    I personally saw and examined this patient, discussed care with housestaff and other consultants, reviewed current laboratories and imaging studies, and conveyed impression and diagnostic/therapeutic plan to patient.    Problem List  PAH  CAD with stenting  Ischemic cardiomyopathy  Diabetes  Elevated BMI  History of GI bleed/diverting colostomy  CKD    History    The patient returns for follow-up of heart failure.  There is no interim history of chest pain, tightness, paroxysmal nocturnal dyspnea, orthopnea, peripheral edema, palpitation, pre-syncope, syncope, device discharge.  Exercise tolerance is stable.  The patient is attempting to exercise regularly and following a sodium restricted, calorically appropriate diet.  Medications are reviewed and the patient is taking medications as prescribed.  The patient is generally sleeping well.       Objective  PERRLA, EOM full, normal gait and station, normal mentation, skin without lesions, chest is clear, no evidence of right or left ventricular overactivity, sternum stable, no carotid bruits, regular rhythm, S1, S2, no murmurs, abdomen without tenderness, rebound guarding or masses, no edema, no focal neurologic defects.    Wt Readings from Last 24 Encounters:   02/13/18 110.4 kg (243 lb 4.8 oz)   10/12/17 109.3 kg (241 lb)   06/30/17 109.2 kg (240 lb 12.8 oz)   04/12/17 109 kg (240 lb 6.4 oz)   03/27/17 108.1 kg (238 lb 4.8 oz)   03/16/17 108 kg (238 lb)   02/22/17 106.1 kg (234 lb)   12/19/16 107.7 kg (237 lb 6.4 oz)   11/21/16 106.5 kg (234 lb 12.8 oz)   11/02/16 109.3 kg (241 lb)   10/31/16 112.5 kg (248 lb 1.6 oz)   08/16/16 111.2 kg (245 lb 1.6 oz)   05/31/16 110.7 kg (244 lb)   02/16/16 111.6 kg (246 lb 1.6 oz)   02/01/16 110 kg (242 lb 9.6 oz)   10/12/15 110 kg (242 lb 9.6 oz)   08/03/15 109.3 kg (241 lb)   07/06/15 109.1 kg (240 lb 9.6 oz)   03/31/15 108.6 kg (239 lb 8 oz)   03/24/15 106.6 kg (235 lb 1.6 oz)    03/17/15 107.7 kg (237 lb 8 oz)   03/10/15 108.3 kg (238 lb 11.2 oz)   01/08/15 106.3 kg (234 lb 4.8 oz)   01/05/15 107.4 kg (236 lb 11.2 oz)       Meds  Current Outpatient Prescriptions   Medication     atorvastatin (LIPITOR) 10 MG tablet     pantoprazole (PROTONIX) 40 MG EC tablet     furosemide (LASIX) 20 MG tablet     sacubitril-valsartan (ENTRESTO) 49-51 MG per tablet     clopidogrel (PLAVIX) 75 MG tablet     metoprolol (TOPROL-XL) 25 MG 24 hr tablet     spironolactone (ALDACTONE) 25 MG tablet     aspirin 81 MG EC tablet     vitamin  B complex with vitamin C (VITAMIN  B COMPLEX) TABS     thiamine 100 MG tablet     cholecalciferol (VITAMIN  -D) 1000 UNITS capsule     [DISCONTINUED] ISOSORBIDE DINITRATE PO     No current facility-administered medications for this visit.        Labs  Results for MERCY SHAFER (MRN [id number]) as of 2/13/2018 08:58   Ref. Range 7/14/2017 08:26 10/12/2017 12:27 11/1/2017 08:16 11/17/2017 08:31 2/13/2018 08:12   Sodium Latest Ref Range: 133 - 144 mmol/L 137 138 137 139 139   Potassium Latest Ref Range: 3.4 - 5.3 mmol/L 4.3 4.4 4.5 4.4 4.3   Chloride Latest Ref Range: 94 - 109 mmol/L 104 102 102 106 104   Carbon Dioxide Latest Ref Range: 20 - 32 mmol/L 28 30 28 26 30   Urea Nitrogen Latest Ref Range: 7 - 30 mg/dL 32 (H) 34 (H) 31 (H) 33 (H) 37 (H)   Creatinine Latest Ref Range: 0.66 - 1.25 mg/dL 2.23 (H) 2.20 (H) 2.37 (H) 2.25 (H) 2.48 (H)   GFR Estimate Latest Ref Range: >60 mL/min/1.7m2 30 (L) 31 (L) 28 (L) 30 (L) 27 (L)   GFR Estimate If Black Latest Ref Range: >60 mL/min/1.7m2 37 (L) 37 (L) 34 (L) 36 (L) 32 (L)   Calcium Latest Ref Range: 8.5 - 10.1 mg/dL 8.2 (L) 8.9 8.4 (L) 8.1 (L) 8.6   Anion Gap Latest Ref Range: 3 - 14 mmol/L 5 5 6 6 5   N-Terminal Pro Bnp Latest Ref Range: 0 - 125 pg/mL     2674 (H)   Glucose Latest Ref Range: 70 - 99 mg/dL 130 (H) 148 (H) 159 (H) 148 (H) 129 (H)       Results for MERCY SHAFER (MRN [id number]) as of 2/12/2018 07:28   Ref. Range  2017 08:16 2017 08:31   Sodium Latest Ref Range: 133 - 144 mmol/L 137 139   Potassium Latest Ref Range: 3.4 - 5.3 mmol/L 4.5 4.4   Chloride Latest Ref Range: 94 - 109 mmol/L 102 106   Carbon Dioxide Latest Ref Range: 20 - 32 mmol/L 28 26   Urea Nitrogen Latest Ref Range: 7 - 30 mg/dL 31 (H) 33 (H)   Creatinine Latest Ref Range: 0.66 - 1.25 mg/dL 2.37 (H) 2.25 (H)   GFR Estimate Latest Ref Range: >60 mL/min/1.7m2 28 (L) 30 (L)   GFR Estimate If Black Latest Ref Range: >60 mL/min/1.7m2 34 (L) 36 (L)   Calcium Latest Ref Range: 8.5 - 10.1 mg/dL 8.4 (L) 8.1 (L)   Anion Gap Latest Ref Range: 3 - 14 mmol/L 6 6   Glucose Latest Ref Range: 70 - 99 mg/dL 159 (H) 148 (H)   Results for MERCY SHAFER (MRN [id number]) as of 2018 07:28   Ref. Range 2017 12:02 3/16/2017 14:19 3/27/2017 08:16 2017 10:19 2017 08:25 2017 08:22 2017 08:26 10/12/2017 12:27 2017 08:16 2017 08:31   Creatinine Latest Ref Range: 0.66 - 1.25 mg/dL 2.25 (H) 2.39 (H) 2.33 (H) 2.28 (H) 2.28 (H) 2.31 (H) 2.23 (H) 2.20 (H) 2.37 (H) 2.25 (H)     Name: MERCY SHAFER  MRN: [id number]  : 1960  Study Date: 2017 11:02 AM  Age: 56 yrs  Gender: Male  Patient Location: [facility]  Reason For Study: Chronic systolic (congestive) heart failure  Ordering Physician: HARVEY GUNDERSON  Referring Physician: HARVEY GUNDERSON  Performed By: Bertha Gillette RDCS     BSA: 2.2 m2  Height: 68 in  Weight: 237 lb  BP: 126/89 mmHg  _____________________________________________________________________________  __        Procedure  Echocardiogram with two-dimensional, color and spectral Doppler performed.  Contrast Optison. Technically difficult study. Optison (NDC #2430-1450-57)  given intravenously. Patient was given 4 ml mixture of 3 ml Optison and 6 ml  saline. 5 ml wasted. IV start location R Upper arm .  _____________________________________________________________________________  __        Interpretation  Summary  Severe left ventricular dilation is present.  Inferior wall akinesis is present.  Lateral wall akinesis is present.  Mild to moderate right ventricular dilation is present.  Global right ventricular function is mildly reduced.  Right ventricular systolic pressure is 58mmHg above the right atrial pressure.  The inferior vena cava is normal.  Small circumferential pericardial effusion is present without any hemodynamic  significance.  Mild decrease in LV function when compared to prior study.  _____________________________________________________________________________  __        Left Ventricle  Severe left ventricular dilation is present. Relative wall thickness is  increased consistent with concentric remodeling. Grade III or advanced  diastolic dysfunction. Inferior wall akinesis is present. Lateral wall  akinesis is present.     Right Ventricle  Mild to moderate right ventricular dilation is present. Global right  ventricular function is mildly reduced.     Atria  Mild to moderate biatrial enlargement is present. The atrial septum is intact  as assessed by color Doppler .     Mitral Valve  Moderate mitral insufficiency is present.        Aortic Valve  Aortic valve is normal in structure and function.     Tricuspid Valve  The tricuspid valve is normal. Trace to mild tricuspid insufficiency is  present. Right ventricular systolic pressure is 58mmHg above the right atrial  pressure.     Pulmonic Valve  The pulmonic valve cannot be assessed.     Vessels  The aorta root is normal. The pulmonary artery cannot be assessed. The  inferior vena cava is normal.     Pericardium  Small circumferential pericardial effusion is present without any hemodynamic  significance.     _____________________________________________________________________________  __  MMode/2D Measurements & Calculations  IVSd: 1.00 cm  LVIDd: 6.8 cm  LVIDs: 6.0 cm  LVPWd: 0.94 cm  FS: 11.7 %  EDV(Teich): 235.5 ml  ESV(Teich): 177.4 ml     LV  mass(C)d: 290.9 grams  asc Aorta Diam: 3.3 cm  LVOT diam: 2.5 cm  LVOT area: 4.8 cm2  LA Volume (BP): 87.4 ml  LA Volume Index (BP): 39.7 ml/m2        Doppler Measurements & Calculations  MV E max shavonne: 109.7 cm/sec  MV A max shavonne: 51.0 cm/sec  MV E/A: 2.2  MV dec time: 0.15 sec  TR max shavonne: 381.3 cm/sec  TR max P.1 mmHg     Lateral E/e': 14.3  Medial E/e': 23.1    HEMODYNAMICS:  BSA 2.26 m^2  1. HR 85 bpm  2. /83 (98) mmHg  3. RA 7/7/7   4. RV 97/13  5. PA 97/45/60   6. PCW 30/35/30   7. PA sat 65.4%   8. PCW sat --%  9. Hgb 15.3 g/dL   10. Cecilia CO 4.1   11. Cecilia CI 1.9   12. TD CO 3.8   13. TD CI 1.7   14. PVR 7.3     Assessment/Plan   1. Renal visit to be scheduled, though creatinine stable but elevated  2. Patient is NYHA II and walks and rides stationary bicycle without shortness, pre-syncope or syncopel  3. Repeat right heart catherization and echocardiogram for assessment of volume status and PA pressure treatment.  Echo to assess LV function for consideration of defibrillator.

## 2018-02-13 ENCOUNTER — OFFICE VISIT (OUTPATIENT)
Dept: CARDIOLOGY | Facility: CLINIC | Age: 58
End: 2018-02-13
Attending: INTERNAL MEDICINE
Payer: MEDICAID

## 2018-02-13 VITALS
HEIGHT: 68 IN | BODY MASS INDEX: 36.87 KG/M2 | DIASTOLIC BLOOD PRESSURE: 78 MMHG | WEIGHT: 243.3 LBS | HEART RATE: 75 BPM | SYSTOLIC BLOOD PRESSURE: 122 MMHG | OXYGEN SATURATION: 97 %

## 2018-02-13 DIAGNOSIS — I25.5 ISCHEMIC CARDIOMYOPATHY: ICD-10-CM

## 2018-02-13 DIAGNOSIS — I50.22 CHRONIC SYSTOLIC CONGESTIVE HEART FAILURE (H): ICD-10-CM

## 2018-02-13 DIAGNOSIS — I25.5 ISCHEMIC CARDIOMYOPATHY: Primary | ICD-10-CM

## 2018-02-13 LAB
ANION GAP SERPL CALCULATED.3IONS-SCNC: 5 MMOL/L (ref 3–14)
BUN SERPL-MCNC: 37 MG/DL (ref 7–30)
CALCIUM SERPL-MCNC: 8.6 MG/DL (ref 8.5–10.1)
CHLORIDE SERPL-SCNC: 104 MMOL/L (ref 94–109)
CO2 SERPL-SCNC: 30 MMOL/L (ref 20–32)
CREAT SERPL-MCNC: 2.48 MG/DL (ref 0.66–1.25)
GFR SERPL CREATININE-BSD FRML MDRD: 27 ML/MIN/1.7M2
GLUCOSE SERPL-MCNC: 129 MG/DL (ref 70–99)
NT-PROBNP SERPL-MCNC: 2674 PG/ML (ref 0–125)
POTASSIUM SERPL-SCNC: 4.3 MMOL/L (ref 3.4–5.3)
SODIUM SERPL-SCNC: 139 MMOL/L (ref 133–144)

## 2018-02-13 PROCEDURE — G0463 HOSPITAL OUTPT CLINIC VISIT: HCPCS | Mod: ZF

## 2018-02-13 PROCEDURE — 99213 OFFICE O/P EST LOW 20 MIN: CPT | Mod: ZP | Performed by: INTERNAL MEDICINE

## 2018-02-13 PROCEDURE — 83880 ASSAY OF NATRIURETIC PEPTIDE: CPT | Performed by: INTERNAL MEDICINE

## 2018-02-13 PROCEDURE — 36415 COLL VENOUS BLD VENIPUNCTURE: CPT | Performed by: INTERNAL MEDICINE

## 2018-02-13 PROCEDURE — 80048 BASIC METABOLIC PNL TOTAL CA: CPT | Performed by: INTERNAL MEDICINE

## 2018-02-13 RX ORDER — LIDOCAINE 40 MG/G
CREAM TOPICAL
Status: CANCELLED | OUTPATIENT
Start: 2018-02-13

## 2018-02-13 ASSESSMENT — PAIN SCALES - GENERAL: PAINLEVEL: NO PAIN (0)

## 2018-02-13 NOTE — NURSING NOTE
Chief Complaint   Patient presents with     Follow Up For     hf f/u w/ labs before     Vitals were taken and medications were reconciled.    GARTH Fregoso  8:30 AM

## 2018-02-13 NOTE — MR AVS SNAPSHOT
After Visit Summary   2/13/2018    Cole Jesus    MRN: [id number]           Patient Information     Date Of Birth          1960        Visit Information        Provider Department      2/13/2018 8:30 AM Alen Trujillo MD [facility]        Today's Diagnoses     Ischemic cardiomyopathy    -  1      Care Instructions    You were seen at the [facility] clinic today.  You saw Dr. Trujillo  Here are your Instructions:    1.  Right heart cath at the [place] [address], check into Gold Waiting Room.  No food 6h before, clear liquids are ok.  You can take your meds in the morning.  You need a  home in case you get sedation.  2.  Echo same day.  3.  If your Ejection Fraction is less than 35% we will refer you to Electrophysiology for ICD.  4.  Follow up based on tests.      Leydi Felton RN  Nurse Care Coordinator  Office:  [phone] option #1, then #3 & ask for Leydi (nurse line)  Fax:  [phone]  After Hours:  [phone]  Appointments:  [phone] option #1, then option #1                                Follow-ups after your visit        Your next 10 appointments already scheduled     Feb 22, 2018  9:40 AM CST   (Arrive by 9:25 AM)   PHYSICAL with Silas Enciso MD   [facility] ([facility])    [address] [place] [id number] [phone]              Future tests that were ordered for you today     Open Future Orders        Priority Expected Expires Ordered    Echocardiogram Complete Routine  2/13/2019 2/13/2018    Heart Cath Right heart cath Routine  2/13/2019 2/13/2018            Who to contact     If you have questions or need follow up information about today's clinic visit or your schedule please contact [facility] directly at [phone].  Normal or non-critical lab and imaging results will be communicated to you by  "MyChart, letter or phone within 4 business days after the clinic has received the results. If you do not hear from us within 7 days, please contact the clinic through [employer] or phone. If you have a critical or abnormal lab result, we will notify you by phone as soon as possible.  Submit refill requests through [employer] or call your pharmacy and they will forward the refill request to us. Please allow 3 business days for your refill to be completed.          Additional Information About Your Visit        [employer] Information     [employer] gives you secure access to your electronic health record. If you see a primary care provider, you can also send messages to your care team and make appointments. If you have questions, please call your primary care clinic.  If you do not have a primary care provider, please call [phone] and they will assist you.        Care EveryWhere ID     This is your Care EveryWhere ID. This could be used by other organizations to access your [facility] medical records  [id number]        Your Vitals Were     Pulse Height Pulse Oximetry BMI (Body Mass Index)          75 1.727 m (5' 8\") 97% 36.99 kg/m2         Blood Pressure from Last 3 Encounters:   02/13/18 122/78   10/12/17 133/90   06/30/17 119/81    Weight from Last 3 Encounters:   02/13/18 110.4 kg (243 lb 4.8 oz)   10/12/17 109.3 kg (241 lb)   06/30/17 109.2 kg (240 lb 12.8 oz)               Primary Care Provider Office Phone # Fax #    Silas Jono Enciso [phone]       [address] [place] [id number]        Equal Access to Services     [facility] Center[facility] J[facility]US : Hadii aad ku hadasho Soomaali, waaxda luqadaha, qaybta kaalmada jordy white. So [facility] [phone].    ATENCIÓN: Si habla español, tiene a brown disposición servicios gratuitos de asistencia lingüística. Rosangela al [phone].    We comply with applicable federal civil rights laws and [place] laws. We do not " discriminate on the basis of race, color, national origin, age, disability, sex, sexual orientation, or gender identity.            Thank you!     Thank you for choosing [facility]  for your care. Our goal is always to provide you with excellent care. Hearing back from our patients is one way we can continue to improve our services. Please take a few minutes to complete the written survey that you may receive in the mail after your visit with us. Thank you!             Your Updated Medication List - Protect others around you: Learn how to safely use, store and throw away your medicines at www.disposemymeds.org.          This list is accurate as of 2/13/18  9:16 AM.  Always use your most recent med list.                   Brand Name Dispense Instructions for use Diagnosis    aspirin 81 MG EC tablet     90 tablet    Take 1 tablet (81 mg) by mouth daily    ST elevation myocardial infarction (STEMI) of inferior wall (H)       atorvastatin 10 MG tablet    LIPITOR    30 tablet    Take 1 tablet (10 mg) by mouth daily MUST BE SEEN IN CLINIC FOR ANY REFILLS* 2/22/18 APPT*    Routine health maintenance       cholecalciferol 1000 UNITS capsule    vitamin  -D    60 capsule    Take 2 capsules (2,000 Units) by mouth daily    Vitamin D deficiency       clopidogrel 75 MG tablet    PLAVIX    90 tablet    Take 1 tablet (75 mg) by mouth daily    ST elevation myocardial infarction (STEMI) of inferior wall (H)       furosemide 20 MG tablet    LASIX    360 tablet    Take 40mg in the AM and 20mg in the PM    Chronic systolic congestive heart failure (H), Ischemic cardiomyopathy, Essential hypertension with goal blood pressure less than 130/85       metoprolol succinate 25 MG 24 hr tablet    TOPROL-XL    360 tablet    Take 3 tablets (75 mg) by mouth daily    Ischemic cardiomyopathy       pantoprazole 40 MG EC tablet    PROTONIX    180 tablet    Take 1 tablet (40 mg) by mouth 2 times daily    Lower GI bleeding        sacubitril-valsartan 49-51 MG per tablet    ENTRESTO    60 tablet    Take 1 tablet by mouth 2 times daily    Chronic systolic congestive heart failure (H)       spironolactone 25 MG tablet    ALDACTONE    45 tablet    Take 0.5 tablets (12.5 mg) by mouth daily    Chronic systolic heart failure (H)       thiamine 100 MG tablet     90 tablet    Take 1 tablet (100 mg) by mouth daily    Chronic constipation       vitamin B complex with vitamin C Tabs tablet      Take 1 tablet by mouth daily    Atrial tachycardia (H), Renal insufficiency, Ischemic cardiomyopathy

## 2018-02-13 NOTE — LETTER
2/13/2018      RE: Cole Jesus  [address]  [facility] [address]       Dear Colleague,    Thank you for the opportunity to participate in the care of your patient, Cole Jesus, at the [facility]. Please see a copy of my visit note below.    Alen Trujillo M.D.  Cardiovascular Medicine    I personally saw and examined this patient, discussed care with housestaff and other consultants, reviewed current laboratories and imaging studies, and conveyed impression and diagnostic/therapeutic plan to patient.    Problem List  PAH  CAD with stenting  Ischemic cardiomyopathy  Diabetes  Elevated BMI  History of GI bleed/diverting colostomy  CKD    History    The patient returns for follow-up of heart failure.  There is no interim history of chest pain, tightness, paroxysmal nocturnal dyspnea, orthopnea, peripheral edema, palpitation, pre-syncope, syncope, device discharge.  Exercise tolerance is stable.  The patient is attempting to exercise regularly and following a sodium restricted, calorically appropriate diet.  Medications are reviewed and the patient is taking medications as prescribed.  The patient is generally sleeping well.       Objective  PERRLA, EOM full, normal gait and station, normal mentation, skin without lesions, chest is clear, no evidence of right or left ventricular overactivity, sternum stable, no carotid bruits, regular rhythm, S1, S2, no murmurs, abdomen without tenderness, rebound guarding or masses, no edema, no focal neurologic defects.    Wt Readings from Last 24 Encounters:   02/13/18 110.4 kg (243 lb 4.8 oz)   10/12/17 109.3 kg (241 lb)   06/30/17 109.2 kg (240 lb 12.8 oz)   04/12/17 109 kg (240 lb 6.4 oz)   03/27/17 108.1 kg (238 lb 4.8 oz)   03/16/17 108 kg (238 lb)   02/22/17 106.1 kg (234 lb)   12/19/16 107.7 kg (237 lb 6.4 oz)   11/21/16 106.5 kg (234 lb 12.8 oz)   11/02/16 109.3 kg (241 lb)   10/31/16 112.5 kg (248  lb 1.6 oz)   08/16/16 111.2 kg (245 lb 1.6 oz)   05/31/16 110.7 kg (244 lb)   02/16/16 111.6 kg (246 lb 1.6 oz)   02/01/16 110 kg (242 lb 9.6 oz)   10/12/15 110 kg (242 lb 9.6 oz)   08/03/15 109.3 kg (241 lb)   07/06/15 109.1 kg (240 lb 9.6 oz)   03/31/15 108.6 kg (239 lb 8 oz)   03/24/15 106.6 kg (235 lb 1.6 oz)   03/17/15 107.7 kg (237 lb 8 oz)   03/10/15 108.3 kg (238 lb 11.2 oz)   01/08/15 106.3 kg (234 lb 4.8 oz)   01/05/15 107.4 kg (236 lb 11.2 oz)       Meds  Current Outpatient Prescriptions   Medication     atorvastatin (LIPITOR) 10 MG tablet     pantoprazole (PROTONIX) 40 MG EC tablet     furosemide (LASIX) 20 MG tablet     sacubitril-valsartan (ENTRESTO) 49-51 MG per tablet     clopidogrel (PLAVIX) 75 MG tablet     metoprolol (TOPROL-XL) 25 MG 24 hr tablet     spironolactone (ALDACTONE) 25 MG tablet     aspirin 81 MG EC tablet     vitamin  B complex with vitamin C (VITAMIN  B COMPLEX) TABS     thiamine 100 MG tablet     cholecalciferol (VITAMIN  -D) 1000 UNITS capsule     [DISCONTINUED] ISOSORBIDE DINITRATE PO     No current facility-administered medications for this visit.        Labs  Results for MERCY SHAFER (MRN [id number]) as of 2/13/2018 08:58   Ref. Range 7/14/2017 08:26 10/12/2017 12:27 11/1/2017 08:16 11/17/2017 08:31 2/13/2018 08:12   Sodium Latest Ref Range: 133 - 144 mmol/L 137 138 137 139 139   Potassium Latest Ref Range: 3.4 - 5.3 mmol/L 4.3 4.4 4.5 4.4 4.3   Chloride Latest Ref Range: 94 - 109 mmol/L 104 102 102 106 104   Carbon Dioxide Latest Ref Range: 20 - 32 mmol/L 28 30 28 26 30   Urea Nitrogen Latest Ref Range: 7 - 30 mg/dL 32 (H) 34 (H) 31 (H) 33 (H) 37 (H)   Creatinine Latest Ref Range: 0.66 - 1.25 mg/dL 2.23 (H) 2.20 (H) 2.37 (H) 2.25 (H) 2.48 (H)   GFR Estimate Latest Ref Range: >60 mL/min/1.7m2 30 (L) 31 (L) 28 (L) 30 (L) 27 (L)   GFR Estimate If Black Latest Ref Range: >60 mL/min/1.7m2 37 (L) 37 (L) 34 (L) 36 (L) 32 (L)   Calcium Latest Ref Range: 8.5 - 10.1 mg/dL 8.2 (L) 8.9  8.4 (L) 8.1 (L) 8.6   Anion Gap Latest Ref Range: 3 - 14 mmol/L 5 5 6 6 5   N-Terminal Pro Bnp Latest Ref Range: 0 - 125 pg/mL     2674 (H)   Glucose Latest Ref Range: 70 - 99 mg/dL 130 (H) 148 (H) 159 (H) 148 (H) 129 (H)       Results for MERCY SHAFER (MRN [id number]) as of 2018 07:28   Ref. Range 2017 08:16 2017 08:31   Sodium Latest Ref Range: 133 - 144 mmol/L 137 139   Potassium Latest Ref Range: 3.4 - 5.3 mmol/L 4.5 4.4   Chloride Latest Ref Range: 94 - 109 mmol/L 102 106   Carbon Dioxide Latest Ref Range: 20 - 32 mmol/L 28 26   Urea Nitrogen Latest Ref Range: 7 - 30 mg/dL 31 (H) 33 (H)   Creatinine Latest Ref Range: 0.66 - 1.25 mg/dL 2.37 (H) 2.25 (H)   GFR Estimate Latest Ref Range: >60 mL/min/1.7m2 28 (L) 30 (L)   GFR Estimate If Black Latest Ref Range: >60 mL/min/1.7m2 34 (L) 36 (L)   Calcium Latest Ref Range: 8.5 - 10.1 mg/dL 8.4 (L) 8.1 (L)   Anion Gap Latest Ref Range: 3 - 14 mmol/L 6 6   Glucose Latest Ref Range: 70 - 99 mg/dL 159 (H) 148 (H)   Results for MERCY SHAFER (MRN [id number]) as of 2018 07:28   Ref. Range 2017 12:02 3/16/2017 14:19 3/27/2017 08:16 2017 10:19 2017 08:25 2017 08:22 2017 08:26 10/12/2017 12:27 2017 08:16 2017 08:31   Creatinine Latest Ref Range: 0.66 - 1.25 mg/dL 2.25 (H) 2.39 (H) 2.33 (H) 2.28 (H) 2.28 (H) 2.31 (H) 2.23 (H) 2.20 (H) 2.37 (H) 2.25 (H)     Name: MERCY SHAFER  MRN: [id number]  : 1960  Study Date: 2017 11:02 AM  Age: 56 yrs  Gender: Male  [place] [facility] [place]: Chronic systolic (congestive) heart failure  Ordering Physician: HARVEY GUNDERSON  Referring Physician: HARVEY GUNDERSON  Performed By: Bertha Gillette RDCS     BSA: 2.2 m2  Height: 68 in  Weight: 237 lb  BP: 126/89 mmHg  _____________________________________________________________________________  __        Procedure  Echocardiogram with two-dimensional, color and spectral Doppler performed.  Contrast  Optison. Technically difficult study. Optison (NDC #0335-1038-09)  given intravenously. Patient was given 4 ml mixture of 3 ml Optison and 6 ml  saline. 5 ml wasted. IV start location R Upper arm .  _____________________________________________________________________________  __        Interpretation Summary  Severe left ventricular dilation is present.  Inferior wall akinesis is present.  Lateral wall akinesis is present.  Mild to moderate right ventricular dilation is present.  Global right ventricular function is mildly reduced.  Right ventricular systolic pressure is 58mmHg above the right atrial pressure.  The inferior vena cava is normal.  Small circumferential pericardial effusion is present without any hemodynamic  significance.  Mild decrease in LV function when compared to prior study.  _____________________________________________________________________________  __        Left Ventricle  Severe left ventricular dilation is present. Relative wall thickness is  increased consistent with concentric remodeling. Grade III or advanced  diastolic dysfunction. Inferior wall akinesis is present. Lateral wall  akinesis is present.     Right Ventricle  Mild to moderate right ventricular dilation is present. Global right  ventricular function is mildly reduced.     Atria  Mild to moderate biatrial enlargement is present. The atrial septum is intact  as assessed by color Doppler .     Mitral Valve  Moderate mitral insufficiency is present.        Aortic Valve  Aortic valve is normal in structure and function.     Tricuspid Valve  The tricuspid valve is normal. Trace to mild tricuspid insufficiency is  present. Right ventricular systolic pressure is 58mmHg above the right atrial  pressure.     Pulmonic Valve  The pulmonic valve cannot be assessed.     Vessels  The aorta root is normal. The pulmonary artery cannot be assessed. The  inferior vena cava is normal.     Pericardium  Small circumferential pericardial  effusion is present without any hemodynamic  significance.     _____________________________________________________________________________  __  MMode/2D Measurements & Calculations  IVSd: 1.00 cm  LVIDd: 6.8 cm  LVIDs: 6.0 cm  LVPWd: 0.94 cm  FS: 11.7 %  EDV(Teich): 235.5 ml  ESV(Teich): 177.4 ml     LV mass(C)d: 290.9 grams  asc Aorta Diam: 3.3 cm  LVOT diam: 2.5 cm  LVOT area: 4.8 cm2  LA Volume (BP): 87.4 ml  LA Volume Index (BP): 39.7 ml/m2        Doppler Measurements & Calculations  MV E max shavonne: 109.7 cm/sec  MV A max shavonne: 51.0 cm/sec  MV E/A: 2.2  MV dec time: 0.15 sec  TR max shavonne: 381.3 cm/sec  TR max P.1 mmHg     Lateral E/e': 14.3  Medial E/e': 23.1    HEMODYNAMICS:  BSA 2.26 m^2  1. HR 85 bpm  2. /83 (98) mmHg  3. RA 7/7/7   4. RV 97/13  5. PA 97/45/60   6. PCW 30/35/30   7. PA sat 65.4%   8. PCW sat --%  9. Hgb 15.3 g/dL   10. Cecilia CO 4.1   11. Cecilia CI 1.9   12. TD CO 3.8   13. TD CI 1.7   14. PVR 7.3     Assessment/Plan   1. Renal visit to be scheduled, though creatinine stable but elevated  2. Patient is NYHA II and walks and rides stationary bicycle without shortness, pre-syncope or syncopel  3. Repeat right heart catherization and echocardiogram for assessment of volume status and PA pressure treatment.  Echo to assess LV function for consideration of defibrillator.      Sincerely,     Alen Trujillo MD

## 2018-02-16 ENCOUNTER — MYC MEDICAL ADVICE (OUTPATIENT)
Dept: CARDIOLOGY | Facility: CLINIC | Age: 58
End: 2018-02-16

## 2018-02-22 ENCOUNTER — TELEPHONE (OUTPATIENT)
Dept: CARDIOLOGY | Facility: CLINIC | Age: 58
End: 2018-02-22

## 2018-02-22 ENCOUNTER — OFFICE VISIT (OUTPATIENT)
Dept: INTERNAL MEDICINE | Facility: CLINIC | Age: 58
End: 2018-02-22
Payer: MEDICAID

## 2018-02-22 VITALS
OXYGEN SATURATION: 97 % | RESPIRATION RATE: 16 BRPM | BODY MASS INDEX: 36.62 KG/M2 | TEMPERATURE: 97.6 F | HEIGHT: 68 IN | DIASTOLIC BLOOD PRESSURE: 82 MMHG | HEART RATE: 74 BPM | SYSTOLIC BLOOD PRESSURE: 123 MMHG | WEIGHT: 241.6 LBS

## 2018-02-22 DIAGNOSIS — Z23 NEED FOR PROPHYLACTIC VACCINATION AND INOCULATION AGAINST INFLUENZA: ICD-10-CM

## 2018-02-22 DIAGNOSIS — R73.02 IGT (IMPAIRED GLUCOSE TOLERANCE): ICD-10-CM

## 2018-02-22 DIAGNOSIS — E03.8 SUBCLINICAL HYPOTHYROIDISM: ICD-10-CM

## 2018-02-22 DIAGNOSIS — Z13.5 SCREENING FOR DIABETIC RETINOPATHY: ICD-10-CM

## 2018-02-22 DIAGNOSIS — R73.02 IGT (IMPAIRED GLUCOSE TOLERANCE): Primary | ICD-10-CM

## 2018-02-22 DIAGNOSIS — Z13.89 SCREENING FOR DIABETIC PERIPHERAL NEUROPATHY: ICD-10-CM

## 2018-02-22 LAB
CREAT UR-MCNC: 118 MG/DL
ERYTHROCYTE [DISTWIDTH] IN BLOOD BY AUTOMATED COUNT: 13.6 % (ref 10–15)
HBA1C MFR BLD: 7.3 % (ref 4.3–6)
HCT VFR BLD AUTO: 44.5 % (ref 40–53)
HGB BLD-MCNC: 14.5 G/DL (ref 13.3–17.7)
MCH RBC QN AUTO: 30.7 PG (ref 26.5–33)
MCHC RBC AUTO-ENTMCNC: 32.6 G/DL (ref 31.5–36.5)
MCV RBC AUTO: 94 FL (ref 78–100)
MICROALBUMIN UR-MCNC: 2060 MG/L
MICROALBUMIN/CREAT UR: 1745.76 MG/G CR (ref 0–17)
PLATELET # BLD AUTO: 219 10E9/L (ref 150–450)
RBC # BLD AUTO: 4.73 10E12/L (ref 4.4–5.9)
WBC # BLD AUTO: 7.5 10E9/L (ref 4–11)

## 2018-02-22 ASSESSMENT — PAIN SCALES - GENERAL: PAINLEVEL: NO PAIN (0)

## 2018-02-22 NOTE — PATIENT INSTRUCTIONS
[facility]: [phone]     [facility] Medication Refill Request Information:  * Please contact your pharmacy regarding ANY request for medication refills.  ** [facility] Prescription Fax = [phone]  * Please allow 3 business days for routine medication refills.  * Please allow 5 business days for controlled substance medication refills.     [facility] Test Result notification information:  *You will be notified with in 7-10 days of your appointment day regarding the results of your test.  If you are on MyChart you will be notified as soon as the provider has reviewed the results and signed off on them.

## 2018-02-22 NOTE — NURSING NOTE
"Injectable Influenza Immunization Documentation    1.  Has the patient received the information for the injectable influenza vaccine? YES     2. Is the patient 6 months of age or older? YES     3. Does the patient have any of the following contraindications?         Severe allergy to eggs?  No     Severe allergic reaction to previous influenza vaccines? No   Severe allergy to latex? No       History of Guillain-[place] syndrome? No     Currently have a temperature greater than 100.4F? No        4.  Severely egg allergic patients should have flu vaccine eligibility assessed by an MD, RN, or pharmacist, and those who received flu vaccine should be observed for 15 min by an MD, RN, Pharmacist, Medical Technician, or member of clinic staff.\": YES    5. Latex-allergic patients should be given latex-free influenza vaccine Yes. Please reference the Vaccine latex table to determine if your clinic s product is latex-containing.    See immunization list for administration details.  Patient tolerated injection well.      Vaccination given by Pao Briseno LPN at 9:43 AM on 2/22/2018.        "

## 2018-02-22 NOTE — MR AVS SNAPSHOT
After Visit Summary   2/22/2018    Cole Jesus    MRN: [id number]           Patient Information     Date Of Birth          1960        Visit Information        Provider Department      2/22/2018 9:40 AM Silas Enciso MD [facility]        Today's Diagnoses     IGT (impaired glucose tolerance)    -  1    Screening for diabetic peripheral neuropathy        Need for prophylactic vaccination and inoculation against influenza        Screening for diabetic retinopathy          Care Instructions    Primary Care Center: [phone]     Primary Care Center Medication Refill Request Information:  * Please contact your pharmacy regarding ANY request for medication refills.  ** [facility] Prescription Fax = [phone]  * Please allow 3 business days for routine medication refills.  * Please allow 5 business days for controlled substance medication refills.     Primary Care Center Test Result notification information:  *You will be notified with in 7-10 days of your appointment day regarding the results of your test.  If you are on MyChart you will be notified as soon as the provider has reviewed the results and signed off on them.          Follow-ups after your visit        Additional Services     OPHTHALMOLOGY ADULT REFERRAL       Your provider has referred you to: [facility]: Eye Clinic [facility] [phone]   [email or web address]    Please be aware that coverage of these services is subject to the terms and limitations of your health insurance plan.  Call member services at your health plan with any benefit or coverage questions.      Please bring the following with you to your appointment:    (1) Any X-Rays, CTs or MRIs which have been performed.  Contact the facility where they were done to arrange for  prior to your scheduled appointment.    (2) List of current medications  (3) This referral request   (4) Any documents/labs given to you for  this referral                  Your next 10 appointments already scheduled     Feb 22, 2018 10:45 AM CST   LAB with  LAB    Health Lab ([facility])    [address]  1st Floor  [facility] [id number] [phone]           Please do not eat 10-12 hours before your appointment if you are coming in fasting for labs on lipids, cholesterol, or glucose (sugar). This does not apply to pregnant women. Water, hot tea and black coffee (with nothing added) are okay. Do not drink other fluids, diet soda or chew gum.            Feb 27, 2018  8:30 AM CST   Ech [facility]CHR[facility]   [facility]    [address] [address] [phone]           1. Please bring or wear a comfortable two-piece outfit. 2. You may eat, drink and take your normal medicines. 3. For any questions that cannot be answered, please contact the ordering physician            Feb 27, 2018 10:00 AM CST   Procedure - 2.5 hour with U2A ROOM 17   [address] [facility] ([facility])    [address] [address]               Feb 27, 2018 11:00 AM CST   Heart Cath Right Heart Cath with UUHCVR3   [facility]    [address] [address] [phone]            Mar 07, 2018 10:20 AM CST   (Arrive by 9:50 AM)   Return Visit with Venus Vigil MD   [facility] Nephrology ([facility])    [address]  [address]  [facility] [id number] [phone]              Future tests that were ordered for you today     Open Future Orders        Priority Expected Expires Ordered    HEMOGLOBIN A1C -(Today) Routine 2/22/2018 2/22/2019 2/22/2018    Albumin Random Urine Quantitative with Creat Ratio Routine 2/22/2018 2/22/2019 2/22/2018    CBC with platelets  "Routine 2/22/2018 3/8/2018 2/22/2018            Who to contact     Please call your clinic at [phone] to:    Ask questions about your health    Make or cancel appointments    Discuss your medicines    Learn about your test results    Speak to your doctor            Additional Information About Your Visit        MyChart Information     [employer] gives you secure access to your electronic health record. If you see a primary care provider, you can also send messages to your care team and make appointments. If you have questions, please call your primary care clinic.  If you do not have a primary care provider, please call [phone] and they will assist you.      [employer] is an electronic gateway that provides easy, online access to your medical records. With [employer], you can request a clinic appointment, read your test results, renew a prescription or communicate with your care team.     To access your existing account, please contact your [facility] or call [phone] for assistance.        Care EveryWhere ID     This is your Care EveryWhere ID. This could be used by other organizations to access your [place] medical records  [id number]        Your Vitals Were     Pulse Temperature Respirations Height Pulse Oximetry BMI (Body Mass Index)    74 97.6  F (36.4  C) (Oral) 16 1.727 m (5' 8\") 97% 36.74 kg/m2       Blood Pressure from Last 3 Encounters:   02/22/18 123/82   02/13/18 122/78   10/12/17 133/90    Weight from Last 3 Encounters:   02/22/18 109.6 kg (241 lb 9.6 oz)   02/13/18 110.4 kg (243 lb 4.8 oz)   10/12/17 109.3 kg (241 lb)              We Performed the Following     FLU VACCINE, 3 YRS +, IM  [76053]     FOOT EXAM - NO CHARGE     OPHTHALMOLOGY ADULT REFERRAL        Primary Care Provider Office Phone # Fax #    Silas Enciso [phone]       [address] [place] [address]        Equal Access to Services     GIANFRANCO VALENTIN AH: Hadii " chance Diallo, waadada lukandiceadaha, qaybta kamarlo white, waxanand jah hermosillocarolannwayne hinson jennifer. So [facility] [phone].    ATENCIÓN: Si habla javiañol, tiene a brown disposición servicios gratuitos de asistencia lingüística. Abbeyame al [phone].    We comply with applicable federal civil rights laws and [place] laws. We do not discriminate on the basis of race, color, national origin, age, disability, sex, sexual orientation, or gender identity.            Thank you!     Thank you for choosing [facility]  for your care. Our goal is always to provide you with excellent care. Hearing back from our patients is one way we can continue to improve our services. Please take a few minutes to complete the written survey that you may receive in the mail after your visit with us. Thank you!             Your Updated Medication List - Protect others around you: Learn how to safely use, store and throw away your medicines at www.disposemymeds.org.          This list is accurate as of 2/22/18 10:34 AM.  Always use your most recent med list.                   Brand Name Dispense Instructions for use Diagnosis    aspirin 81 MG EC tablet     90 tablet    Take 1 tablet (81 mg) by mouth daily    ST elevation myocardial infarction (STEMI) of inferior wall (H)       atorvastatin 10 MG tablet    LIPITOR    30 tablet    Take 1 tablet (10 mg) by mouth daily MUST BE SEEN IN CLINIC FOR ANY REFILLS* 2/22/18 APPT*    Routine health maintenance       cholecalciferol 1000 UNITS capsule    vitamin  -D    60 capsule    Take 2 capsules (2,000 Units) by mouth daily    Vitamin D deficiency       clopidogrel 75 MG tablet    PLAVIX    90 tablet    Take 1 tablet (75 mg) by mouth daily    ST elevation myocardial infarction (STEMI) of inferior wall (H)       furosemide 20 MG tablet    LASIX    360 tablet    Take 40mg in the AM and 20mg in the PM    Chronic systolic congestive heart failure (H), Ischemic cardiomyopathy,  Essential hypertension with goal blood pressure less than 130/85       metoprolol succinate 25 MG 24 hr tablet    TOPROL-XL    360 tablet    Take 3 tablets (75 mg) by mouth daily    Ischemic cardiomyopathy       pantoprazole 40 MG EC tablet    PROTONIX    180 tablet    Take 1 tablet (40 mg) by mouth 2 times daily    Lower GI bleeding       sacubitril-valsartan 49-51 MG per tablet    ENTRESTO    60 tablet    Take 1 tablet by mouth 2 times daily    Chronic systolic congestive heart failure (H)       spironolactone 25 MG tablet    ALDACTONE    45 tablet    Take 0.5 tablets (12.5 mg) by mouth daily    Chronic systolic heart failure (H)       thiamine 100 MG tablet     90 tablet    Take 1 tablet (100 mg) by mouth daily    Chronic constipation       vitamin B complex with vitamin C Tabs tablet      Take 1 tablet by mouth daily    Atrial tachycardia (H), Renal insufficiency, Ischemic cardiomyopathy

## 2018-02-22 NOTE — NURSING NOTE
Chief Complaint   Patient presents with     Physical     Patient is here for annual physical.      Pao Briseno LPN at 9:33 AM on 2/22/2018.

## 2018-02-22 NOTE — TELEPHONE ENCOUNTER
Patient reporting he recently was in the hospital.  Patient was told someone would be calling him to have some testing or follow up and he has not received a call.  Please call to discuss.   [phone].

## 2018-02-22 NOTE — PROGRESS NOTES
Rooming Note  Health Maintenance   Health Maintenance Due   Topic Date Due     HF ACTION PLAN Q3 YR  1960     FOOT EXAM Q1 YEAR  04/15/1959     EYE EXAM Q1 YEAR  04/15/1959     DIABETIC EDUCATION Q1 YEAR  04/15/1959     ADVANCE DIRECTIVE PLANNING Q5 YRS  04/15/2013     A1C Q6 MO  02/16/2017     MICROALBUMIN Q1 YEAR  11/21/2017     HEMOGLOBIN Q1 YR  03/16/2018     CBC Q1 YR  03/16/2018    Health maintenance items discussed and ordered/forwarded to PCP.  Gave patient a copy of advanced directive.   Pao Briseno LPN at 9:47 AM on 2/22/2018.    HPI  57-year-old presents today for physical examination.  He is been doing quite well.  He was started on the Entresto and tolerating this well.  He is exercising daily on a recumbent bike tolerating this well without chest pain dyspnea dizziness or palpitations.  His pep and energy is improved.  He has had no palpitations or other complaints.  He has monitored his blood sugar periodically finds it runs less than 100 is no longer on any diabetic treatment.  Has not had any updated evaluation of this although recent blood sugars have been running in the 130s-140s.  Otherwise doing well and has no other complaints or problems  Past Medical History:   Diagnosis Date     Anemia in chronic renal disease 3/9/2015     Antiplatelet or antithrombotic long-term use      Atrial fibrillation and flutter      CAD (coronary artery disease)     Stemi in 12/11, s/p angioplasty     CRD (chronic renal disease), stage IV (H) 3/9/2015     GI bleed     massive lower GI bleed secondary to a cecal ulcer, s/p ileocecal resection in 12/11     Heart failure     Biventricular systolic HF, complicated by ARDS requiring tracheostomy     History of blood transfusion      Hyperlipidemia LDL goal <100 4/28/2013     Hypertension      Ischemic cardiomyopathy 4/28/2013    TTE revealing 40% EF     Other and unspecified nonspecific immunological findings     Anti JKa     Pulmonary edema     episodes of  "flash pulmonary edema in      Renal insufficiency 2013    hx ATN with dialysis complicating cardiogenic shock  2012     Subclinical hypothyroidism      Past Surgical History:   Procedure Laterality Date     ESOPHAGOSCOPY, GASTROSCOPY, DUODENOSCOPY (EGD), COMBINED  2011    Procedure:COMBINED ESOPHAGOSCOPY, GASTROSCOPY, DUODENOSCOPY (EGD); Surgeon:SAMARIA PUENTE; Location:[facility]     LAPAROTOMY EXPLORATORY  2011    Procedure:LAPAROTOMY EXPLORATORY; Explore laparotomy, Illeocectomy, Diverting Illeostomy; Surgeon:GABI MOSHER; Location:[facility]     ORIF right elbow fracture  age 12     TAKEDOWN ILEOSTOMY  2014    Procedure: TAKEDOWN ILEOSTOMY;  Surgeon: Gabi Mosher MD;  Location: [facility]     TRACHEOSTOMY  2011    Procedure:TRACHEOSTOMY; Tracheostomy; 80XLTCP-Proximal Extension-Cuffed 8.0 mm I.D.; Surgeon:LIZABETH DYE; Location:[facility]     Family History   Problem Relation Age of Onset     DIABETES Mother      Hypertension Mother      HEART DISEASE Mother      HEART DISEASE Father       of heart attack, left when he was young     DIABETES Sister      DIABETES Sister      DIABETES Sister      Social History     Social History     Marital status: Significant other     Spouse name: N/A     Number of children: N/A     Years of education: N/A     Social History Main Topics     Smoking status: Former Smoker     Quit date: 12/3/2011     Smokeless tobacco: Never Used     Alcohol use No     Drug use: No     Sexual activity: Yes     Partners: Female     Other Topics Concern     Parent/Sibling W/ Cabg, Mi Or Angioplasty Before 65f 55m? Yes     Exercise Yes     limited walking     Social History Narrative     Complete review of symptoms negative except as noted above.    Exam:  /82  Pulse 74  Temp 97.6  F (36.4  C) (Oral)  Resp 16  Ht 1.727 m (5' 8\")  Wt 109.6 kg (241 lb 9.6 oz)  SpO2 97%  BMI 36.74 kg/m2  241 lbs 9.6 oz  Physical Exam   The " patient is alert, oriented with a clear sensorium.   Skin shows no lesions or rashes and good turgor.   Head is normocephalic and atraumatic.   Eyes show cataracts bilaterally.   Ears show normal TMs bilaterally.   Mouth edentulous clear oral mucosa.   Neck shows no nodes, thyromegaly or bruits.   Back is non tender.  Lungs are clear to percussion and auscultation.   Heart shows normal S1 and S2 without murmur or gallop.   Abdomen is soft and nontender without masses or organomegaly.   Genitalia show normal testes. No evidence of inguinal hernia.  Rectal shows small smooth prostate without nodules or masses.  Extremities show no edema and no evidence of active synovitis.   Neurologic examination shows cranial nerves II-XII intact. Motor shows 5/5 strength. Reflexes are symmetric. Cerebellar testing shows normal tandem gait.  Romberg negative.    Results for orders placed or performed in visit on 02/13/18   Basic metabolic panel   Result Value Ref Range    Sodium 139 133 - 144 mmol/L    Potassium 4.3 3.4 - 5.3 mmol/L    Chloride 104 94 - 109 mmol/L    Carbon Dioxide 30 20 - 32 mmol/L    Anion Gap 5 3 - 14 mmol/L    Glucose 129 (H) 70 - 99 mg/dL    Urea Nitrogen 37 (H) 7 - 30 mg/dL    Creatinine 2.48 (H) 0.66 - 1.25 mg/dL    GFR Estimate 27 (L) >60 mL/min/1.7m2    GFR Estimate If Black 32 (L) >60 mL/min/1.7m2    Calcium 8.6 8.5 - 10.1 mg/dL   N terminal pro BNP outpatient   Result Value Ref Range    N-Terminal Pro Bnp 2674 (H) 0 - 125 pg/mL     ASSESSMENT  1 heart failure well compensated  2 coronary artery disease asymptomatic  3 history of diabetes and impaired glucose tolerance needs reevaluation  4 subclinical hypothyroidism needs reevaluation  5 chronic renal insufficiency stable  6 hypertension well-controlled  7 hyperlipidemia needs reevaluation    Plan  His immunizations are current his screening procedures are current will reassess his A1c along with his lipids and TSH encouraged him regarding his exercise  activity will have him follow-up for reassessment in a year.    This note was completed using Dragon voice recognition software.  Although reviewed after completion, some word and grammatical errors may occur.    Silas Enciso MD  General Internal Medicine  Primary Care Center  [phone]

## 2018-02-26 NOTE — TELEPHONE ENCOUNTER
No mention in chart of where pt was in the hospital, CareEverywhere not activated.  Phone # listed below disconnected, [facility] msg sent.  Pt is already scheduled for testing tomorrow per Dr. Trujillo.

## 2018-02-27 ENCOUNTER — APPOINTMENT (OUTPATIENT)
Dept: MEDSURG UNIT | Facility: CLINIC | Age: 58
End: 2018-02-27
Attending: INTERNAL MEDICINE
Payer: MEDICAID

## 2018-02-27 ENCOUNTER — HOSPITAL ENCOUNTER (OUTPATIENT)
Dept: CARDIOLOGY | Facility: CLINIC | Age: 58
End: 2018-02-27
Attending: INTERNAL MEDICINE
Payer: MEDICAID

## 2018-02-27 ENCOUNTER — HOSPITAL ENCOUNTER (OUTPATIENT)
Facility: CLINIC | Age: 58
Discharge: HOME | End: 2018-02-27
Attending: INTERNAL MEDICINE | Admitting: INTERNAL MEDICINE
Payer: MEDICAID

## 2018-02-27 VITALS
HEART RATE: 78 BPM | TEMPERATURE: 97.5 F | OXYGEN SATURATION: 98 % | RESPIRATION RATE: 18 BRPM | SYSTOLIC BLOOD PRESSURE: 131 MMHG | DIASTOLIC BLOOD PRESSURE: 79 MMHG

## 2018-02-27 DIAGNOSIS — I25.5 ISCHEMIC CARDIOMYOPATHY: ICD-10-CM

## 2018-02-27 LAB — GLUCOSE BLDC GLUCOMTR-MCNC: 140 MG/DL (ref 70–99)

## 2018-02-27 PROCEDURE — 93451 RIGHT HEART CATH: CPT | Mod: 26 | Performed by: INTERNAL MEDICINE

## 2018-02-27 PROCEDURE — 93463 DRUG ADMIN & HEMODYNMIC MEAS: CPT | Performed by: INTERNAL MEDICINE

## 2018-02-27 PROCEDURE — 25000125 ZZHC RX 250: Performed by: INTERNAL MEDICINE

## 2018-02-27 PROCEDURE — 93451 RIGHT HEART CATH: CPT

## 2018-02-27 PROCEDURE — 93463 DRUG ADMIN & HEMODYNMIC MEAS: CPT

## 2018-02-27 PROCEDURE — 93306 TTE W/DOPPLER COMPLETE: CPT | Mod: 26 | Performed by: INTERNAL MEDICINE

## 2018-02-27 PROCEDURE — 27211181 ZZH BALLOON TIP PRESSURE CR5

## 2018-02-27 PROCEDURE — 27210787 ZZH MANIFOLD CR2

## 2018-02-27 PROCEDURE — 25000128 H RX IP 250 OP 636: Performed by: INTERNAL MEDICINE

## 2018-02-27 PROCEDURE — 27210807 ZZH SHEATH CR6

## 2018-02-27 PROCEDURE — 82962 GLUCOSE BLOOD TEST: CPT

## 2018-02-27 PROCEDURE — 27210982 ZZH KIT RT HC TOTES DISP CR7

## 2018-02-27 PROCEDURE — 40000264 ECHO COMPLETE WITH LUMASON

## 2018-02-27 PROCEDURE — 40000166 ZZH STATISTIC PP CARE STAGE 1

## 2018-02-27 PROCEDURE — 25500064 ZZH RX 255 OP 636: Performed by: INTERNAL MEDICINE

## 2018-02-27 RX ORDER — LIDOCAINE 40 MG/G
CREAM TOPICAL
Status: COMPLETED | OUTPATIENT
Start: 2018-02-27 | End: 2018-02-27

## 2018-02-27 RX ADMIN — SODIUM NITROPRUSSIDE 1 MCG/KG/MIN: 25 INJECTION INTRAVENOUS at 11:22

## 2018-02-27 RX ADMIN — LIDOCAINE: 40 CREAM TOPICAL at 09:42

## 2018-02-27 RX ADMIN — SULFUR HEXAFLUORIDE 5 ML: KIT at 08:46

## 2018-02-27 NOTE — PROGRESS NOTES
Patient prepped for RH procedure.  Denies pain.  Has transport available post-procedure.  Labs current.  Consent needed.  H & P current.

## 2018-02-27 NOTE — PROCEDURES
[facility]  CARDIOLOGY   Right Heart Catheterization   February 27, 2018    Attending Cardiology Staff: Acosta Cho MD  Cardiology Fellow: Otto Metcalf MD    History: 57 year-old man with ICM on entresto and known secondary severe PH, CKD stage IV here for hemodynamic evaluation with right heart catheterization.    Procedure: Right Heart Cathterization  Access: 7 Fr sheath in RIJ obtained without complications using micropuncture access and ultrasound and fluoroscopic guidance   Findings   RA: 14/14/12  /14  /31/68  PCWP 35  Cecilia CO 4.0 (1.8) TD CO 3.9 (1.8)   TPR 16.7 PVR 8.5  PaSat 63.2 Hb 14    SVR 1800    Nipride 1mcg/kg/min for 8 min  PA 66/27/40  PCWP 18   Cecilia CO 5.0/2.3  TD CO 4.1/1.9  PVR 4.4  MAP 95  SVR 1328    Conclusions:  Severe secondary (mixed) pulmonary hypertension with reduced cardiac which is reversible (PVR 8.5 --> 4.4) with afterload reduction and vasodilatation     Recommendations: Discussed results with Dr. Trujillo. Discharge home.     Acosta Cho MD  Advanced Heart Failure Cardiologist

## 2018-02-27 NOTE — PROGRESS NOTES
D: Pt arrived in cath lab for Right Heart Catheterization  I: Right heart catheterization completed per MD.  7fr sheath removed from rij site, manual pressure applied until hemostasis achieved.  A: RIJ site clean, dry and intact. Soft, no hematoma.  P: Pt to transfer to  for discharge.  Pt educated on watching neck site for bleeding, hematoma, pressure on airway and voice changes.

## 2018-02-27 NOTE — PROGRESS NOTES
Pt arrived to floor with RN from CCL s/p [facility]. VSS. RIJ CDI no hematoma. IV discontinued.DC paperwork reviewed.Patients ride here to transport home.

## 2018-02-27 NOTE — DISCHARGE INSTRUCTIONS
[facility]                        Interventional Cardiology  Discharge Instructions   Post Right Heart Cath      AFTER YOU GO HOME:    DO drink plenty of fluids    DO resume your regular diet and medications unless otherwise instructed by your Primary Physician    Do Not scrub the procedure site vigorously    No lotion or powder to the puncture site for 3 days    CALL YOUR PRIMARY PHYSICIAN IF: You may resume all normal activity.  Monitor neck site for bleeding, swelling, or voice changes. If you notice bleeding or swelling immediately apply pressure to the site and call number below to speak with Cardiology Fellow.  If you experience any changes in your breathing you should call your doctor immediately or come to the closest Emergency Department.  Do not drive yourself.    ADDITIONAL INSTRUCTIONS: Medications: You are to resume all home medications including anticoagulation therapy unless otherwise advised by your primary cardiologist or nurse coordinator.    Follow Up: Per your primary cardiology team    If you have any questions or concerns regarding your procedure site please call [phone] at anytime and ask for Cardiology Fellow on call.  They are available 24 hours a day.  You may also contact the Cardiology Clinic after hours number at [phone].                                                       Telephone Numbers [phone] Monday-Friday 8:00 am to 4:30 pm    [phone] After 4:30 pm Monday-Friday, Weekends & Holidays  Ask for Interventional Cardiologist on call. Someone is on call 24 hours/day   [facility] toll free number [phone] Monday-Friday 8:00 am to 4:30 pm   [facility] Emergency Dept [phone]

## 2018-02-27 NOTE — IP AVS SNAPSHOT
[address] [address]                                       After Visit Summary   2/27/2018    Cole Jesus    MRN: [id number]           After Visit Summary Signature Page     I have received my discharge instructions, and my questions have been answered. I have discussed any challenges I see with this plan with the nurse or doctor.    ..........................................................................................................................................  Patient/Patient Representative Signature      ..........................................................................................................................................  Patient Representative Print Name and Relationship to Patient    ..................................................               ................................................  Date                                            Time    ..........................................................................................................................................  Reviewed by Signature/Title    ...................................................              ..............................................  Date                                                            Time

## 2018-02-27 NOTE — IP AVS SNAPSHOT
MRN:[id number]                      After Visit Summary   2/27/2018    Cole Jesus    MRN: [id number]           Visit Information        Department      2/27/2018  8:57 AM [address] [facility]          Review of your medicines      UNREVIEWED medicines. Ask your doctor about these medicines        Dose / Directions    aspirin 81 MG EC tablet   Used for:  ST elevation myocardial infarction (STEMI) of inferior wall (H)        Dose:  81 mg   Take 1 tablet (81 mg) by mouth daily   Quantity:  90 tablet   Refills:  3       atorvastatin 10 MG tablet   Commonly known as:  LIPITOR   Used for:  Routine health maintenance        Dose:  10 mg   Take 1 tablet (10 mg) by mouth daily MUST BE SEEN IN CLINIC FOR ANY REFILLS* 2/22/18 APPT*   Quantity:  30 tablet   Refills:  0       cholecalciferol 1000 UNITS capsule   Commonly known as:  vitamin  -D   Used for:  Vitamin D deficiency        Dose:  2 capsule   Take 2 capsules (2,000 Units) by mouth daily   Quantity:  60 capsule   Refills:  3       clopidogrel 75 MG tablet   Commonly known as:  PLAVIX   Used for:  ST elevation myocardial infarction (STEMI) of inferior wall (H)        Dose:  75 mg   Take 1 tablet (75 mg) by mouth daily   Quantity:  90 tablet   Refills:  3       furosemide 20 MG tablet   Commonly known as:  LASIX   Used for:  Chronic systolic congestive heart failure (H), Ischemic cardiomyopathy, Essential hypertension with goal blood pressure less than 130/85        Take 40mg in the AM and 20mg in the PM   Quantity:  360 tablet   Refills:  3       metoprolol succinate 25 MG 24 hr tablet   Commonly known as:  TOPROL-XL   Used for:  Ischemic cardiomyopathy        Dose:  75 mg   Take 3 tablets (75 mg) by mouth daily   Quantity:  360 tablet   Refills:  3       pantoprazole 40 MG EC tablet   Commonly known as:  PROTONIX   Used for:  Lower GI bleeding        Dose:  40 mg   Take 1 tablet (40 mg) by mouth 2 times daily   Quantity:  180 tablet   Refills:   3       sacubitril-valsartan 49-51 MG per tablet   Commonly known as:  ENTRESTO   Used for:  Chronic systolic congestive heart failure (H)        Dose:  1 tablet   Take 1 tablet by mouth 2 times daily   Quantity:  60 tablet   Refills:  11       spironolactone 25 MG tablet   Commonly known as:  ALDACTONE   Used for:  Chronic systolic heart failure (H)        Dose:  12.5 mg   Take 0.5 tablets (12.5 mg) by mouth daily   Quantity:  45 tablet   Refills:  3       thiamine 100 MG tablet   Used for:  Chronic constipation        Dose:  100 mg   Take 1 tablet (100 mg) by mouth daily   Quantity:  90 tablet   Refills:  3       vitamin B complex with vitamin C Tabs tablet   Used for:  Atrial tachycardia (H), Renal insufficiency, Ischemic cardiomyopathy        Dose:  1 tablet   Take 1 tablet by mouth daily   Refills:  0                Protect others around you: Learn how to safely use, store and throw away your medicines at www.disposemymeds.org.         Follow-ups after your visit        Your next 10 appointments already scheduled     Feb 27, 2018 10:00 AM CST   Procedure - 2.5 hour with U2A ROOM 17   [address] [facility] ([facility])    [address] [address]               Feb 27, 2018 11:00 AM CST   Heart Cath Right Heart Cath with [id number]   [facility]    [address] [address]   [phone]            Mar 07, 2018 10:20 AM CST   (Arrive by 9:50 AM)   Return Visit with Venus Vigil MD   [facility] ([facility])    [address]  [address]  [place] [address] [phone]               Care Instructions        Further instructions from your care team       [facility]                        Interventional Cardiology  Discharge Instructions   Post Right Heart Cath      AFTER YOU GO HOME:    DO drink  plenty of fluids    DO resume your regular diet and medications unless otherwise instructed by your Primary Physician    Do Not scrub the procedure site vigorously    No lotion or powder to the puncture site for 3 days    CALL YOUR PRIMARY PHYSICIAN IF: You may resume all normal activity.  Monitor neck site for bleeding, swelling, or voice changes. If you notice bleeding or swelling immediately apply pressure to the site and call number below to speak with Cardiology Fellow.  If you experience any changes in your breathing you should call your doctor immediately or come to the closest Emergency Department.  Do not drive yourself.    ADDITIONAL INSTRUCTIONS: Medications: You are to resume all home medications including anticoagulation therapy unless otherwise advised by your primary cardiologist or nurse coordinator.    Follow Up: Per your primary cardiology team    If you have any questions or concerns regarding your procedure site please call [phone] at anytime and ask for Cardiology Fellow on call.  They are available 24 hours a day.  You may also contact the Cardiology Clinic after hours number at [phone].                                                       Telephone Numbers [phone] Monday-Friday 8:00 am to 4:30 pm    [phone] After 4:30 pm Monday-Friday, Weekends & Holidays  Ask for Interventional Cardiologist on call. Someone is on call 24 hours/day   [facility] toll free number [phone] Monday-Friday 8:00 am to 4:30 pm   [facility] Emergency Dept [phone]                    Additional Information About Your Visit        [email or web address] Information     [email or web address] gives you secure access to your electronic health record. If you see a primary care provider, you can also send messages to your care team and make appointments. If you have questions, please call your primary care clinic.  If you do not have a primary care provider, please call [phone] and they will assist you.        Care  EveryWhere ID     This is your Care EveryWhere ID. This could be used by other organizations to access your [facility] medical records  [id number]         Primary Care Provider Office Phone # Fax #    Silas Enciso [phone]      Equal Access to Services     [facility] [place] BARBIE : Hadii chance vásquez haddiono Soomaali, waaxda luqadaha, qaybta kaalmada adeegyada, waxanand jah marekcolleen hermosillocarolannwayne rodriguez. So [facility] [phone].    ATENCIÓN: Si habla español, tiene a brown disposición servicios gratuitos de asistencia lingüística. Rosangela al [phone].    We comply with applicable federal civil rights laws and [place] laws. We do not discriminate on the basis of race, color, national origin, age, disability, sex, sexual orientation, or gender identity.            Thank you!     Thank you for choosing [facility] for your care. Our goal is always to provide you with excellent care. Hearing back from our patients is one way we can continue to improve our services. Please take a few minutes to complete the written survey that you may receive in the mail after you visit with us. Thank you!             Medication List: This is a list of all your medications and when to take them. Check marks below indicate your daily home schedule. Keep this list as a reference.      Medications           Morning Afternoon Evening Bedtime As Needed    aspirin 81 MG EC tablet   Take 1 tablet (81 mg) by mouth daily                                atorvastatin 10 MG tablet   Commonly known as:  LIPITOR   Take 1 tablet (10 mg) by mouth daily MUST BE SEEN IN CLINIC FOR ANY REFILLS* 2/22/18 APPT*                                cholecalciferol 1000 UNITS capsule   Commonly known as:  vitamin  -D   Take 2 capsules (2,000 Units) by mouth daily                                clopidogrel 75 MG tablet   Commonly known as:  PLAVIX   Take 1 tablet (75 mg) by mouth daily                                furosemide 20 MG tablet   Commonly known as:   LASIX   Take 40mg in the AM and 20mg in the PM                                metoprolol succinate 25 MG 24 hr tablet   Commonly known as:  TOPROL-XL   Take 3 tablets (75 mg) by mouth daily                                pantoprazole 40 MG EC tablet   Commonly known as:  PROTONIX   Take 1 tablet (40 mg) by mouth 2 times daily                                sacubitril-valsartan 49-51 MG per tablet   Commonly known as:  ENTRESTO   Take 1 tablet by mouth 2 times daily                                spironolactone 25 MG tablet   Commonly known as:  ALDACTONE   Take 0.5 tablets (12.5 mg) by mouth daily                                thiamine 100 MG tablet   Take 1 tablet (100 mg) by mouth daily                                vitamin B complex with vitamin C Tabs tablet   Take 1 tablet by mouth daily

## 2018-03-06 DIAGNOSIS — N18.4 CRD (CHRONIC RENAL DISEASE), STAGE IV (H): Primary | ICD-10-CM

## 2018-03-07 DIAGNOSIS — Z00.00 ROUTINE HEALTH MAINTENANCE: ICD-10-CM

## 2018-03-07 RX ORDER — ATORVASTATIN CALCIUM 10 MG/1
10 TABLET, FILM COATED ORAL DAILY
Qty: 30 TABLET | Refills: 0 | Status: SHIPPED | OUTPATIENT
Start: 2018-03-07 | End: 2018-04-11

## 2018-03-14 ENCOUNTER — OFFICE VISIT (OUTPATIENT)
Dept: NEPHROLOGY | Facility: CLINIC | Age: 58
End: 2018-03-14
Attending: INTERNAL MEDICINE
Payer: MEDICAID

## 2018-03-14 VITALS
DIASTOLIC BLOOD PRESSURE: 73 MMHG | OXYGEN SATURATION: 95 % | BODY MASS INDEX: 36.07 KG/M2 | TEMPERATURE: 97.8 F | RESPIRATION RATE: 18 BRPM | HEART RATE: 74 BPM | HEIGHT: 68 IN | SYSTOLIC BLOOD PRESSURE: 109 MMHG | WEIGHT: 238 LBS

## 2018-03-14 DIAGNOSIS — E11.9 TYPE 2 DIABETES MELLITUS WITHOUT COMPLICATION, WITHOUT LONG-TERM CURRENT USE OF INSULIN (H): ICD-10-CM

## 2018-03-14 DIAGNOSIS — R80.1 PERSISTENT PROTEINURIA: Primary | ICD-10-CM

## 2018-03-14 DIAGNOSIS — N18.4 CKD (CHRONIC KIDNEY DISEASE) STAGE 4, GFR 15-29 ML/MIN (H): ICD-10-CM

## 2018-03-14 PROCEDURE — G0463 HOSPITAL OUTPT CLINIC VISIT: HCPCS | Mod: ZF

## 2018-03-14 RX ORDER — VALSARTAN 80 MG/1
80 TABLET ORAL DAILY
Qty: 30 TABLET | Refills: 1 | Status: SHIPPED | OUTPATIENT
Start: 2018-03-14 | End: 2018-05-14

## 2018-03-14 ASSESSMENT — PAIN SCALES - GENERAL: PAINLEVEL: NO PAIN (0)

## 2018-03-14 NOTE — LETTER
3/14/2018      RE: Cole Jesus  [address]  [place] [address]       ASSESSMENT AND RECOMMENDATIONS:   57 year old male with proteinuric chronic kidney disease, stage 4, Scr 2.1-2.3  mainly due to an unresolving acute tubular necrosis after cardiogenic shock in 2012.  The patient is status post STEMI and has advanced ischemic cardiomyopathy.    1. CKD- Scr 2.1-2.4 mg/dL after EVA and complicated hospitalization, stable GFR , close to 27ml/min , with overt proteinuria  -  Moderate risk for progression given heavy proteinuria, will monitor at 3-4 months intervals.  2.  Systolic heart failure from ischemic cardiomyopathy- also has severe pulmonary hypetension amenable to afterload reduction.   - will increase lvalsartan to max tolerated   3.  Pulmonary hypertension- severe, managed by Dr Trujillo  4.  Type 2 diabetes mellitus- well controlled  5.  Anemia, mostly of chronic kidney disease: Resolved: his Hb is 14.1. No need for MAGGIE will monitor.  6.  HTN: BP control has been optimal.  7. Proteinuria. Has been on valsartan  - labs in 2 months on higher dose valsartan  -RTC 6 months or sooner    Assessment and plan was discussed with patient and he voiced his understanding and agreement.    REASON FOR VISIT:      The patient is here for followup on chronic kidney disease, stage 3b-4.      HISTORY OF PRESENT ILLNESS:      This is a 57-year-old,  gentleman wiho returns for followup of CKD after EVA.  He had a complicated hospitalization that started in December 2011with an ST elevation myocardial infarction complicated by cardiogenic shock from biventricular failure.  The patient was resuscitated.  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  After his presentation with cardiogenic shock, he had a prolonged hospital and ICU stay.  His course was complicated by ARDS that required tracheostomy and had  difficulty weaning from the ventilator, as well as acute kidney injury that was mainly attributed to acute tubular necrosis, for which he received initially continuous renal replacement therapy, and then he was transitioned to hemodialysis for a few treatments.  Of note, it seems that his peak serum creatinine at that time was around 7 mg/dL and he was started on renal replacement therapy.  He had also a lot of complications in the hospital including diabetic ketoacidosis and newly diagnosed diabetes  He had also a complication with a lower GI bleed and required ileocecal resection and has an ileostomy that was placed.  He was discharged from the hospital on 01/11/2012.    He recovered fairly well and was last seen by Dr Pacheco in 2016  His EF which was 30% to 35% improvedt 35% to 40%. He had ostomy take down complicated by heart failure exacerbation  He had right heart catheterization that showed severe pulmonary hypertension, in 2014 his mean PA pressure of 56 and recent RHC done was 68    He was placed on losartan at the last visit as he had stopped lisinopril as he associated it with leg pain  He had low grade albuminuria in 2014 but in 2016 it was quantified at 1726 mg/g cr and recently was still elevated at 1745 mg/g cr  His Scr has remained between 2.1-2.3 mg/dL over the last few years.  He was started on sacubitril-valsartan (entresto) and is doing well with that  His weight is stable and he functionally feels quite well. His PA pressure is very elevated - he had RHC   He is exercising and staying active. He is not watching BP at home but has a cuff. His diabetes is well controlled with A1C 7.2  He is watching his salt intake pretty well - he stays away for the most part  His blood pressure is well controlled and he denies symptoms of dizziness, weakness.    ROS:    A comprehensive review of systems was obtained and negative, except as noted in the HPI or PMH.    Active Medical Problems:  Patient Active  Problem List   Diagnosis     ST elevation myocardial infarction (STEMI) of inferior wall (H)     Physical deconditioning     Lower GI bleeding     S/P colon resection     Atrial fibrillation (H)     Type 2 diabetes, HbA1C goal < 8% (H)     Chronic systolic congestive heart failure (HCC)     Hypotension     Ischemic cardiomyopathy     Hyperlipidemia LDL goal <100     Renal insufficiency     Vitamin D deficiency     Atrial tachycardia (H)     h/o atrial flutter, ablation 13     Hx of ileostomy     Pulmonary hypertension     CRD (chronic renal disease), stage IV (H)     Anemia in chronic renal disease     Subclinical hypothyroidism       Personal Hx:   Social History     Social History     Marital status: Significant other     Spouse name: N/A     Number of children: N/A     Years of education: N/A     Occupational History     Not on file.     Social History Main Topics     Smoking status: Former Smoker     Quit date: 12/3/2011     Smokeless tobacco: Never Used     Alcohol use No     Drug use: No     Sexual activity: Yes     Partners: Female     Other Topics Concern     Parent/Sibling W/ Cabg, Mi Or Angioplasty Before 65f 55m? Yes     Exercise Yes     limited walking     Social History Narrative       Family History   Problem Relation Age of Onset     DIABETES Mother      Hypertension Mother      HEART DISEASE Mother      HEART DISEASE Father       of heart attack, left when he was young     DIABETES Sister      DIABETES Sister      DIABETES Sister      Allergies:  Allergies   Allergen Reactions     Hydralazine      Black spots     Simvastatin Other (See Comments)     Leg muscle weakness     Tylenol [Acetaminophen] Palpitations     Medications:  Current Outpatient Prescriptions   Medication     atorvastatin (LIPITOR) 10 MG tablet     pantoprazole (PROTONIX) 40 MG EC tablet     furosemide (LASIX) 20 MG tablet     sacubitril-valsartan (ENTRESTO) 49-51 MG per tablet     clopidogrel (PLAVIX) 75 MG tablet      metoprolol (TOPROL-XL) 25 MG 24 hr tablet     spironolactone (ALDACTONE) 25 MG tablet     vitamin  B complex with vitamin C (VITAMIN  B COMPLEX) TABS     thiamine 100 MG tablet     cholecalciferol (VITAMIN  -D) 1000 UNITS capsule     [DISCONTINUED] ISOSORBIDE DINITRATE PO     No current facility-administered medications for this visit.      Vitals:    There were no vitals taken for this visit.    Exam:    GENERAL APPEARANCE: alert and no distress  HENT: mouth without ulcers or lesions  LYMPHATICS: no cervical adenopathy  RESP: lungs clear to auscultation   CV: regular rhythm, normal rate, no rub, no murmur  EDEMA: trace LE edema bilaterally  ABDOMEN:  soft, nontender and bowel sounds normal  MS: extremities normal- no gross deformities noted  SKIN: no rash  NEURO: normal strength and tone, grossly non-focal  PSYCH: mentation appears normal and affect ok    Results:    Reviewed.      Venus Vigil MD

## 2018-03-14 NOTE — PATIENT INSTRUCTIONS
Preventive Care:    Diabetic Eye Exam Screening: During our visit today, we discussed that it is recommended you receive diabetic eye exam screening. Please call or make an appointment with your primary care provider to discuss this with them. You may also call the [facility] scheduling line ([phone]) to set up an eye exam at one of the [facility]ics.

## 2018-03-14 NOTE — PROGRESS NOTES
ASSESSMENT AND RECOMMENDATIONS:   57 year old male with proteinuric chronic kidney disease, stage 4, Scr 2.1-2.3  mainly due to an unresolving acute tubular necrosis after cardiogenic shock in 2012.  The patient is status post STEMI and has advanced ischemic cardiomyopathy.    1. CKD- Scr 2.1-2.4 mg/dL after EVA and complicated hospitalization, stable GFR , close to 27ml/min , with overt proteinuria  -  Moderate risk for progression given heavy proteinuria, will monitor at 3-4 months intervals.  2.  Systolic heart failure from ischemic cardiomyopathy- also has severe pulmonary hypetension amenable to afterload reduction.   - will increase lvalsartan to max tolerated   3.  Pulmonary hypertension- severe, managed by Dr Trujillo  4.  Type 2 diabetes mellitus- well controlled  5.  Anemia, mostly of chronic kidney disease: Resolved: his Hb is 14.1. No need for MAGGIE will monitor.  6.  HTN: BP control has been optimal.  7. Proteinuria. Has been on valsartan  - labs in 2 months on higher dose valsartan  -RTC 6 months or sooner    Assessment and plan was discussed with patient and he voiced his understanding and agreement.    REASON FOR VISIT:      The patient is here for followup on chronic kidney disease, stage 3b-4.      HISTORY OF PRESENT ILLNESS:      This is a 57-year-old,  gentleman wiho returns for followup of CKD after EVA.  He had a complicated hospitalization that started in December 2011with an ST elevation myocardial infarction complicated by cardiogenic shock from biventricular failure.  The patient was resuscitated.  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  After his presentation with cardiogenic shock, he had a prolonged hospital and ICU stay.  His course was complicated by ARDS that required tracheostomy and had difficulty weaning from the ventilator, as well as acute kidney injury that was mainly  attributed to acute tubular necrosis, for which he received initially continuous renal replacement therapy, and then he was transitioned to hemodialysis for a few treatments.  Of note, it seems that his peak serum creatinine at that time was around 7 mg/dL and he was started on renal replacement therapy.  He had also a lot of complications in the hospital including diabetic ketoacidosis and newly diagnosed diabetes  He had also a complication with a lower GI bleed and required ileocecal resection and has an ileostomy that was placed.  He was discharged from the hospital on 01/11/2012.    He recovered fairly well and was last seen by Dr Pacheco in 2016  His EF which was 30% to 35% improvedt 35% to 40%. He had ostomy take down complicated by heart failure exacerbation  He had right heart catheterization that showed severe pulmonary hypertension, in 2014 his mean PA pressure of 56 and recent RHC done was 68    He was placed on losartan at the last visit as he had stopped lisinopril as he associated it with leg pain  He had low grade albuminuria in 2014 but in 2016 it was quantified at 1726 mg/g cr and recently was still elevated at 1745 mg/g cr  His Scr has remained between 2.1-2.3 mg/dL over the last few years.  He was started on sacubitril-valsartan (entresto) and is doing well with that  His weight is stable and he functionally feels quite well. His PA pressure is very elevated - he had RHC   He is exercising and staying active. He is not watching BP at home but has a cuff. His diabetes is well controlled with A1C 7.2  He is watching his salt intake pretty well - he stays away for the most part  His blood pressure is well controlled and he denies symptoms of dizziness, weakness.    ROS:    A comprehensive review of systems was obtained and negative, except as noted in the HPI or PMH.    Active Medical Problems:  Patient Active Problem List   Diagnosis     ST elevation myocardial infarction (STEMI) of inferior wall (H)      Physical deconditioning     Lower GI bleeding     S/P colon resection     Atrial fibrillation (H)     Type 2 diabetes, HbA1C goal < 8% (H)     Chronic systolic congestive heart failure (HCC)     Hypotension     Ischemic cardiomyopathy     Hyperlipidemia LDL goal <100     Renal insufficiency     Vitamin D deficiency     Atrial tachycardia (H)     h/o atrial flutter, ablation 13     Hx of ileostomy     Pulmonary hypertension     CRD (chronic renal disease), stage IV (H)     Anemia in chronic renal disease     Subclinical hypothyroidism       Personal Hx:   Social History     Social History     Marital status: Significant other     Spouse name: N/A     Number of children: N/A     Years of education: N/A     Occupational History     Not on file.     Social History Main Topics     Smoking status: Former Smoker     Quit date: 12/3/2011     Smokeless tobacco: Never Used     Alcohol use No     Drug use: No     Sexual activity: Yes     Partners: Female     Other Topics Concern     Parent/Sibling W/ Cabg, Mi Or Angioplasty Before 65f 55m? Yes     Exercise Yes     limited walking     Social History Narrative       Family History   Problem Relation Age of Onset     DIABETES Mother      Hypertension Mother      HEART DISEASE Mother      HEART DISEASE Father       of heart attack, left when he was young     DIABETES Sister      DIABETES Sister      DIABETES Sister      Allergies:  Allergies   Allergen Reactions     Hydralazine      Black spots     Simvastatin Other (See Comments)     Leg muscle weakness     Tylenol [Acetaminophen] Palpitations     Medications:  Current Outpatient Prescriptions   Medication     atorvastatin (LIPITOR) 10 MG tablet     pantoprazole (PROTONIX) 40 MG EC tablet     furosemide (LASIX) 20 MG tablet     sacubitril-valsartan (ENTRESTO) 49-51 MG per tablet     clopidogrel (PLAVIX) 75 MG tablet     metoprolol (TOPROL-XL) 25 MG 24 hr tablet     spironolactone (ALDACTONE) 25 MG tablet     vitamin   B complex with vitamin C (VITAMIN  B COMPLEX) TABS     thiamine 100 MG tablet     cholecalciferol (VITAMIN  -D) 1000 UNITS capsule     [DISCONTINUED] ISOSORBIDE DINITRATE PO     No current facility-administered medications for this visit.      Vitals:    There were no vitals taken for this visit.    Exam:    GENERAL APPEARANCE: alert and no distress  HENT: mouth without ulcers or lesions  LYMPHATICS: no cervical adenopathy  RESP: lungs clear to auscultation   CV: regular rhythm, normal rate, no rub, no murmur  EDEMA: trace LE edema bilaterally  ABDOMEN:  soft, nontender and bowel sounds normal  MS: extremities normal- no gross deformities noted  SKIN: no rash  NEURO: normal strength and tone, grossly non-focal  PSYCH: mentation appears normal and affect ok    Results:    Reviewed.

## 2018-03-14 NOTE — MR AVS SNAPSHOT
After Visit Summary   3/14/2018    Cole Jesus    MRN: [id number]           Patient Information     Date Of Birth          1960        Visit Information        Provider Department      3/14/2018 7:50 AM Venus Vigil MD [facility]        Today's Diagnoses     Persistent proteinuria    -  1    Type 2 diabetes mellitus without complication, without long-term current use of insulin (H)        CKD (chronic kidney disease) stage 4, GFR 15-29 ml/min (H)          Care Instructions    Preventive Care:    Diabetic Eye Exam Screening: During our visit today, we discussed that it is recommended you receive diabetic eye exam screening. Please call or make an appointment with your primary care provider to discuss this with them. You may also call the [facility] scheduling line ([phone]) to set up an eye exam at one of the [facility].              Follow-ups after your visit        Follow-up notes from your care team     Return in about 6 months (around 9/14/2018).      Future tests that were ordered for you today     Open Future Orders        Priority Expected Expires Ordered    Protein  random urine with Creat Ratio Routine  4/13/2018 3/14/2018    Renal panel Routine  4/13/2018 3/14/2018            Who to contact     If you have questions or need follow up information about today's clinic visit or your schedule please contact [facility] directly at [phone].  Normal or non-critical lab and imaging results will be communicated to you by MyChart, letter or phone within 4 business days after the clinic has received the results. If you do not hear from us within 7 days, please contact the clinic through MyChart or phone. If you have a critical or abnormal lab result, we will notify you by phone as soon as possible.  Submit refill requests through Vrvana or call your pharmacy and they will forward the refill request to us. Please allow 3 business days for your  "refill to be completed.          Additional Information About Your Visit        PowerPlay Mobilehart Information     [employer] gives you secure access to your electronic health record. If you see a primary care provider, you can also send messages to your care team and make appointments. If you have questions, please call your primary care clinic.  If you do not have a primary care provider, please call [phone] and they will assist you.        Care EveryWhere ID     This is your Care EveryWhere ID. This could be used by other organizations to access your [facility] medical records  [id number]        Your Vitals Were     Pulse Temperature Respirations Height Pulse Oximetry BMI (Body Mass Index)    74 97.8  F (36.6  C) (Oral) 18 1.727 m (5' 8\") 95% 36.19 kg/m2       Blood Pressure from Last 3 Encounters:   03/14/18 109/73   02/27/18 131/79   02/22/18 123/82    Weight from Last 3 Encounters:   03/14/18 108 kg (238 lb)   02/22/18 109.6 kg (241 lb 9.6 oz)   02/13/18 110.4 kg (243 lb 4.8 oz)                 Today's Medication Changes          These changes are accurate as of 3/14/18 11:59 PM.  If you have any questions, ask your nurse or doctor.               Start taking these medicines.        Dose/Directions    valsartan 80 MG tablet   Commonly known as:  DIOVAN   Used for:  Persistent proteinuria   Started by:  Venus Vigil MD        Dose:  80 mg   Take 1 tablet (80 mg) by mouth daily   Quantity:  30 tablet   Refills:  1            Where to get your medicines      These medications were sent to [facility] [place], [place] - [address]  [address] [facility] [address]     Phone:  [phone]     valsartan 80 MG tablet                Primary Care Provider Office Phone # Fax #    Silas Enciso [phone]       [address] [place] [address]        Equal Access to Services     GIANFRANCO VALENTIN AH: Hadii chance ponce Soheather, madhu rosales, qaybta " jordy lopezjuvenal hinson ah. [facility] [phone].    ATENCIÓN: Si eunice angela, tiene a brown disposición servicios gratuitos de asistencia lingüística. Rosangela al [phone].    We comply with applicable federal civil rights laws and [place] laws. We do not discriminate on the basis of race, color, national origin, age, disability, sex, sexual orientation, or gender identity.            Thank you!     Thank you for choosing [facility]  for your care. Our goal is always to provide you with excellent care. Hearing back from our patients is one way we can continue to improve our services. Please take a few minutes to complete the written survey that you may receive in the mail after your visit with us. Thank you!             Your Updated Medication List - Protect others around you: Learn how to safely use, store and throw away your medicines at www.disposemymeds.org.          This list is accurate as of 3/14/18 11:59 PM.  Always use your most recent med list.                   Brand Name Dispense Instructions for use Diagnosis    atorvastatin 10 MG tablet    LIPITOR    30 tablet    Take 1 tablet (10 mg) by mouth daily *LABS DUE*    Routine health maintenance       cholecalciferol 1000 UNITS capsule    vitamin  -D    60 capsule    Take 2 capsules (2,000 Units) by mouth daily    Vitamin D deficiency       clopidogrel 75 MG tablet    PLAVIX    90 tablet    Take 1 tablet (75 mg) by mouth daily    ST elevation myocardial infarction (STEMI) of inferior wall (H)       furosemide 20 MG tablet    LASIX    360 tablet    Take 40mg in the AM and 20mg in the PM    Chronic systolic congestive heart failure (H), Ischemic cardiomyopathy, Essential hypertension with goal blood pressure less than 130/85       metoprolol succinate 25 MG 24 hr tablet    TOPROL-XL    360 tablet    Take 3 tablets (75 mg) by mouth daily    Ischemic cardiomyopathy       pantoprazole 40 MG EC tablet    PROTONIX     180 tablet    Take 1 tablet (40 mg) by mouth 2 times daily    Lower GI bleeding       sacubitril-valsartan 49-51 MG per tablet    ENTRESTO    60 tablet    Take 1 tablet by mouth 2 times daily    Chronic systolic congestive heart failure (H)       spironolactone 25 MG tablet    ALDACTONE    45 tablet    Take 0.5 tablets (12.5 mg) by mouth daily    Chronic systolic heart failure (H)       thiamine 100 MG tablet     90 tablet    Take 1 tablet (100 mg) by mouth daily    Chronic constipation       valsartan 80 MG tablet    DIOVAN    30 tablet    Take 1 tablet (80 mg) by mouth daily    Persistent proteinuria       vitamin B complex with vitamin C Tabs tablet      Take 1 tablet by mouth daily    Atrial tachycardia (H), Renal insufficiency, Ischemic cardiomyopathy

## 2018-03-14 NOTE — NURSING NOTE
"Chief Complaint   Patient presents with     RECHECK     CKD       Initial /73  Pulse 74  Temp 97.8  F (36.6  C) (Oral)  Resp 18  Ht 1.727 m (5' 8\")  Wt 108 kg (238 lb)  SpO2 95%  BMI 36.19 kg/m2 Estimated body mass index is 36.19 kg/(m^2) as calculated from the following:    Height as of this encounter: 1.727 m (5' 8\").    Weight as of this encounter: 108 kg (238 lb).  Medication Reconciliation: complete     Coreen Bocanegra [facility]  3/14/2018 8:12 AM        "

## 2018-03-29 ENCOUNTER — CARE COORDINATION (OUTPATIENT)
Dept: CARDIOLOGY | Facility: CLINIC | Age: 58
End: 2018-03-29

## 2018-03-29 NOTE — PROGRESS NOTES
Prior Authorization Retail Medication Request    Medication/Dose: Refill. Previously approved. Entresto 49/51mg BID  ICD code (if different than what is on RX):  Chronic systolic congestive heart failure (HCC) [I50.22]   Previously Tried and Failed:  HF guideline medications    Patient has been on Entresto.  Denial of refill was sent via fax today. See below

## 2018-03-30 DIAGNOSIS — Z53.9 ERRONEOUS ENCOUNTER--DISREGARD: Primary | ICD-10-CM

## 2018-04-11 DIAGNOSIS — Z00.00 ROUTINE HEALTH MAINTENANCE: ICD-10-CM

## 2018-04-12 NOTE — TELEPHONE ENCOUNTER
atorvastatin (LIPITOR) 10 MG tablet  Last Written Prescription Date:  3/7/18  Last Fill Quantity: 30,   # refills: 0  Last Office Visit :2/22/18  Future Office visit: none    Routing  Because: LDL past due

## 2018-04-13 RX ORDER — ATORVASTATIN CALCIUM 10 MG/1
10 TABLET, FILM COATED ORAL DAILY
Qty: 30 TABLET | Refills: 0 | Status: SHIPPED | OUTPATIENT
Start: 2018-04-13 | End: 2018-05-10

## 2018-05-10 DIAGNOSIS — Z00.00 ROUTINE HEALTH MAINTENANCE: ICD-10-CM

## 2018-05-12 NOTE — TELEPHONE ENCOUNTER
atorvastatin (LIPITOR) 10 MG tablet      Last Written Prescription Date:  4/13/18  Last Fill Quantity: 30,   # refills: 0  Last Office Visit : 2/22/18  Future Office visit:  none    Routing refill request to provider for review/approval because:  Lab past due- LDL future orders in chart.  Allergy warning

## 2018-05-14 ENCOUNTER — TELEPHONE (OUTPATIENT)
Dept: NEPHROLOGY | Facility: CLINIC | Age: 58
End: 2018-05-14

## 2018-05-14 DIAGNOSIS — R80.1 PERSISTENT PROTEINURIA: ICD-10-CM

## 2018-05-14 RX ORDER — VALSARTAN 80 MG/1
80 TABLET ORAL DAILY
Qty: 90 TABLET | Refills: 3 | Status: SHIPPED | OUTPATIENT
Start: 2018-05-14 | End: 2018-07-30

## 2018-05-15 RX ORDER — ATORVASTATIN CALCIUM 10 MG/1
10 TABLET, FILM COATED ORAL DAILY
Qty: 15 TABLET | Refills: 0 | Status: SHIPPED | OUTPATIENT
Start: 2018-05-15 | End: 2018-06-01

## 2018-05-15 NOTE — TELEPHONE ENCOUNTER
Left message for patient informing him fasting labs are needed for further refills.     Mai Singletary RN

## 2018-06-01 DIAGNOSIS — R80.1 PERSISTENT PROTEINURIA: ICD-10-CM

## 2018-06-01 DIAGNOSIS — R73.02 IGT (IMPAIRED GLUCOSE TOLERANCE): ICD-10-CM

## 2018-06-01 DIAGNOSIS — N18.4 CRD (CHRONIC RENAL DISEASE), STAGE IV (H): ICD-10-CM

## 2018-06-01 DIAGNOSIS — Z00.00 ROUTINE HEALTH MAINTENANCE: ICD-10-CM

## 2018-06-01 DIAGNOSIS — E03.8 SUBCLINICAL HYPOTHYROIDISM: ICD-10-CM

## 2018-06-01 LAB
ALBUMIN SERPL-MCNC: 3.7 G/DL (ref 3.4–5)
ANION GAP SERPL CALCULATED.3IONS-SCNC: 9 MMOL/L (ref 3–14)
BUN SERPL-MCNC: 40 MG/DL (ref 7–30)
CALCIUM SERPL-MCNC: 8.8 MG/DL (ref 8.5–10.1)
CHLORIDE SERPL-SCNC: 105 MMOL/L (ref 94–109)
CHOLEST SERPL-MCNC: 192 MG/DL
CO2 SERPL-SCNC: 25 MMOL/L (ref 20–32)
CREAT SERPL-MCNC: 2.7 MG/DL (ref 0.66–1.25)
CREAT UR-MCNC: 195 MG/DL
DEPRECATED CALCIDIOL+CALCIFEROL SERPL-MC: 49 UG/L (ref 20–75)
ERYTHROCYTE [DISTWIDTH] IN BLOOD BY AUTOMATED COUNT: 13.6 % (ref 10–15)
FERRITIN SERPL-MCNC: 30 NG/ML (ref 26–388)
GFR SERPL CREATININE-BSD FRML MDRD: 24 ML/MIN/1.7M2
GLUCOSE SERPL-MCNC: 128 MG/DL (ref 70–99)
HCT VFR BLD AUTO: 45.8 % (ref 40–53)
HDLC SERPL-MCNC: 28 MG/DL
HGB BLD-MCNC: 14.8 G/DL (ref 13.3–17.7)
IRON SATN MFR SERPL: 21 % (ref 15–46)
IRON SERPL-MCNC: 73 UG/DL (ref 35–180)
LDLC SERPL CALC-MCNC: 129 MG/DL
MCH RBC QN AUTO: 30.2 PG (ref 26.5–33)
MCHC RBC AUTO-ENTMCNC: 32.3 G/DL (ref 31.5–36.5)
MCV RBC AUTO: 94 FL (ref 78–100)
NONHDLC SERPL-MCNC: 164 MG/DL
PHOSPHATE SERPL-MCNC: 3.4 MG/DL (ref 2.5–4.5)
PLATELET # BLD AUTO: 220 10E9/L (ref 150–450)
POTASSIUM SERPL-SCNC: 4.5 MMOL/L (ref 3.4–5.3)
PROT UR-MCNC: 2.63 G/L
PROT/CREAT 24H UR: 1.35 G/G CR (ref 0–0.2)
PTH-INTACT SERPL-MCNC: 123 PG/ML (ref 18–80)
RBC # BLD AUTO: 4.9 10E12/L (ref 4.4–5.9)
SODIUM SERPL-SCNC: 139 MMOL/L (ref 133–144)
T4 FREE SERPL-MCNC: 0.96 NG/DL (ref 0.76–1.46)
TIBC SERPL-MCNC: 342 UG/DL (ref 240–430)
TRIGL SERPL-MCNC: 174 MG/DL
TSH SERPL DL<=0.005 MIU/L-ACNC: 6.07 MU/L (ref 0.4–4)
WBC # BLD AUTO: 7.6 10E9/L (ref 4–11)

## 2018-06-01 PROCEDURE — 82306 VITAMIN D 25 HYDROXY: CPT | Performed by: INTERNAL MEDICINE

## 2018-06-01 PROCEDURE — 83970 ASSAY OF PARATHORMONE: CPT | Performed by: INTERNAL MEDICINE

## 2018-06-03 RX ORDER — ATORVASTATIN CALCIUM 10 MG/1
10 TABLET, FILM COATED ORAL DAILY
Qty: 90 TABLET | Refills: 2 | Status: SHIPPED | OUTPATIENT
Start: 2018-06-03 | End: 2018-06-05

## 2018-06-05 DIAGNOSIS — Z00.00 ROUTINE HEALTH MAINTENANCE: ICD-10-CM

## 2018-06-05 RX ORDER — ATORVASTATIN CALCIUM 10 MG/1
20 TABLET, FILM COATED ORAL DAILY
Qty: 90 TABLET | Refills: 3 | Status: SHIPPED | OUTPATIENT
Start: 2018-06-05 | End: 2019-03-08

## 2018-06-27 DIAGNOSIS — I50.22 CHRONIC SYSTOLIC HEART FAILURE (H): ICD-10-CM

## 2018-06-27 RX ORDER — SPIRONOLACTONE 25 MG/1
12.5 TABLET ORAL DAILY
Qty: 45 TABLET | Refills: 3 | Status: SHIPPED | OUTPATIENT
Start: 2018-06-27 | End: 2018-08-02

## 2018-07-30 ENCOUNTER — TELEPHONE (OUTPATIENT)
Dept: NEPHROLOGY | Facility: CLINIC | Age: 58
End: 2018-07-30

## 2018-07-30 DIAGNOSIS — I10 BENIGN ESSENTIAL HYPERTENSION: Primary | ICD-10-CM

## 2018-07-30 DIAGNOSIS — I50.22 CHRONIC SYSTOLIC HEART FAILURE (H): ICD-10-CM

## 2018-07-30 RX ORDER — SPIRONOLACTONE 25 MG/1
12.5 TABLET ORAL 2 TIMES DAILY
Qty: 45 TABLET | Refills: 3 | Status: CANCELLED | OUTPATIENT
Start: 2018-07-30

## 2018-07-30 NOTE — TELEPHONE ENCOUNTER
[facility] Call Center    Phone Message    May a detailed message be left on voicemail: yes    Reason for Call: Other: Please follow up with [facility] to give alternative medication due to valsartan (DIOVAN) 80 MG tab being recalled.     Action Taken: Message routed to:  [facility] hay alejandra

## 2018-07-30 NOTE — TELEPHONE ENCOUNTER
Message sent to Dr. Yeboah for recommendation on alternative.  Kavita Chapman LPN  Nephrology  [phone]

## 2018-07-31 ENCOUNTER — TELEPHONE (OUTPATIENT)
Dept: NEPHROLOGY | Facility: CLINIC | Age: 58
End: 2018-07-31

## 2018-07-31 DIAGNOSIS — N18.4 CRD (CHRONIC RENAL DISEASE), STAGE IV (H): Primary | ICD-10-CM

## 2018-07-31 NOTE — TELEPHONE ENCOUNTER
Call to patient per Dr. Yeboah regarding Valsartan and Entresto prescription. Left VM. Provided number for call back regarding clarification on Valsartan. Will try again.  Kavita Chapman LPN  Nephrology  [phone]        Spoke to patient, who reports he was taking both valsartan and Entresto, reviewed his other BP medications, up to date. Patient states that he is not checking his BP, needs batteries for his cuff.   Kavita

## 2018-07-31 NOTE — TELEPHONE ENCOUNTER
Reviewed with Dr. Yeboah, stopped valsartan, recommend increasing spironolactone 12.5 to BID and BMP on Monday. Patient has been informed.  Kavita Chapman LPN  Nephrology  [phone]

## 2018-08-02 ENCOUNTER — TELEPHONE (OUTPATIENT)
Dept: INTERNAL MEDICINE | Facility: CLINIC | Age: 58
End: 2018-08-02

## 2018-08-02 DIAGNOSIS — I50.22 CHRONIC SYSTOLIC HEART FAILURE (H): ICD-10-CM

## 2018-08-02 RX ORDER — SPIRONOLACTONE 25 MG/1
12.5 TABLET ORAL 2 TIMES DAILY
Qty: 90 TABLET | Refills: 3 | Status: SHIPPED | OUTPATIENT
Start: 2018-08-02 | End: 2018-09-12

## 2018-08-02 NOTE — TELEPHONE ENCOUNTER
[facility] Call Center    Phone Message    May a detailed message be left on voicemail: yes    Reason for Call: Other: Please follow up with [facility] to get verification of medication atorvastatin (LIPITOR) 10 MG tablet      Action Taken: Message routed to:  [facility] hay mcgill

## 2018-08-02 NOTE — TELEPHONE ENCOUNTER
Verified atorvastatin dosage 20mg daily. Pharmacy verbalizes understanding.  Yakelin Ruelas LPN

## 2018-08-08 DIAGNOSIS — I21.19 ST ELEVATION MYOCARDIAL INFARCTION (STEMI) OF INFERIOR WALL (H): ICD-10-CM

## 2018-08-08 RX ORDER — CLOPIDOGREL BISULFATE 75 MG/1
TABLET ORAL
Qty: 180 TABLET | Refills: 1 | Status: SHIPPED | OUTPATIENT
Start: 2018-08-08 | End: 2019-08-16

## 2018-08-21 DIAGNOSIS — I25.5 ISCHEMIC CARDIOMYOPATHY: ICD-10-CM

## 2018-08-21 RX ORDER — METOPROLOL SUCCINATE 25 MG/1
75 TABLET, EXTENDED RELEASE ORAL DAILY
Qty: 270 TABLET | Refills: 3 | Status: SHIPPED | OUTPATIENT
Start: 2018-08-21 | End: 2019-02-06

## 2018-08-29 ENCOUNTER — MYC MEDICAL ADVICE (OUTPATIENT)
Dept: INTERNAL MEDICINE | Facility: CLINIC | Age: 58
End: 2018-08-29

## 2018-08-29 DIAGNOSIS — I47.19 ATRIAL TACHYCARDIA (H): Primary | ICD-10-CM

## 2018-08-29 DIAGNOSIS — I48.91 ATRIAL FIBRILLATION (H): ICD-10-CM

## 2018-09-10 ENCOUNTER — MYC MEDICAL ADVICE (OUTPATIENT)
Dept: CARDIOLOGY | Facility: CLINIC | Age: 58
End: 2018-09-10

## 2018-09-11 DIAGNOSIS — I50.22 CHRONIC SYSTOLIC CONGESTIVE HEART FAILURE (H): Primary | ICD-10-CM

## 2018-09-11 RX ORDER — LOSARTAN POTASSIUM 50 MG/1
50 TABLET ORAL DAILY
Qty: 90 TABLET | Refills: 3 | Status: SHIPPED | OUTPATIENT
Start: 2018-09-11 | End: 2018-09-11

## 2018-09-11 NOTE — TELEPHONE ENCOUNTER
I called the pharmacy and it appears that the patient does have an Entresto refill available for . Losartan canceled, pharmacy notified of discontinuation.    It appears from notes that patient was taking both Valsartan and Entresto at one point. I will route message to cardiology and nephrology for clarification.    Mai Singletary RN

## 2018-09-12 ENCOUNTER — OFFICE VISIT (OUTPATIENT)
Dept: CARDIOLOGY | Facility: CLINIC | Age: 58
End: 2018-09-12
Attending: INTERNAL MEDICINE
Payer: MEDICAID

## 2018-09-12 ENCOUNTER — TELEPHONE (OUTPATIENT)
Dept: CARDIOLOGY | Facility: CLINIC | Age: 58
End: 2018-09-12

## 2018-09-12 VITALS
SYSTOLIC BLOOD PRESSURE: 121 MMHG | HEART RATE: 62 BPM | DIASTOLIC BLOOD PRESSURE: 83 MMHG | BODY MASS INDEX: 38.04 KG/M2 | HEIGHT: 68 IN | WEIGHT: 251 LBS | OXYGEN SATURATION: 97 %

## 2018-09-12 DIAGNOSIS — I50.22 CHRONIC SYSTOLIC CONGESTIVE HEART FAILURE (H): ICD-10-CM

## 2018-09-12 DIAGNOSIS — I10 ESSENTIAL HYPERTENSION WITH GOAL BLOOD PRESSURE LESS THAN 130/85: ICD-10-CM

## 2018-09-12 DIAGNOSIS — I25.5 ISCHEMIC CARDIOMYOPATHY: Primary | ICD-10-CM

## 2018-09-12 LAB
ANION GAP SERPL CALCULATED.3IONS-SCNC: 7 MMOL/L (ref 3–14)
BUN SERPL-MCNC: 32 MG/DL (ref 7–30)
CALCIUM SERPL-MCNC: 8.9 MG/DL (ref 8.5–10.1)
CHLORIDE SERPL-SCNC: 103 MMOL/L (ref 94–109)
CO2 SERPL-SCNC: 28 MMOL/L (ref 20–32)
CREAT SERPL-MCNC: 2.68 MG/DL (ref 0.66–1.25)
GFR SERPL CREATININE-BSD FRML MDRD: 24 ML/MIN/1.7M2
GLUCOSE SERPL-MCNC: 134 MG/DL (ref 70–99)
POTASSIUM SERPL-SCNC: 4.4 MMOL/L (ref 3.4–5.3)
SODIUM SERPL-SCNC: 138 MMOL/L (ref 133–144)

## 2018-09-12 PROCEDURE — G0463 HOSPITAL OUTPT CLINIC VISIT: HCPCS | Mod: 25,ZF

## 2018-09-12 PROCEDURE — 99214 OFFICE O/P EST MOD 30 MIN: CPT | Mod: ZP | Performed by: NURSE PRACTITIONER

## 2018-09-12 PROCEDURE — 36415 COLL VENOUS BLD VENIPUNCTURE: CPT | Performed by: NURSE PRACTITIONER

## 2018-09-12 PROCEDURE — 80048 BASIC METABOLIC PNL TOTAL CA: CPT | Performed by: NURSE PRACTITIONER

## 2018-09-12 PROCEDURE — 93010 ELECTROCARDIOGRAM REPORT: CPT | Mod: ZP | Performed by: INTERNAL MEDICINE

## 2018-09-12 RX ORDER — FUROSEMIDE 20 MG
40 TABLET ORAL 2 TIMES DAILY
Qty: 360 TABLET | Refills: 3 | Status: SHIPPED | OUTPATIENT
Start: 2018-09-12 | End: 2018-09-13

## 2018-09-12 ASSESSMENT — PAIN SCALES - GENERAL: PAINLEVEL: NO PAIN (0)

## 2018-09-12 NOTE — NURSING NOTE
Diet: Patient instructed regarding a heart healthy diet, including discussion of reduced fat and sodium intake. Patient demonstrated understanding of this information and agreed to call with further questions or concerns.  Labs: Patient was given results of the laboratory testing obtained today. Patient was instructed to return for the next laboratory testing in 2 weeks . Patient demonstrated understanding of this information and agreed to call with further questions or concerns.   Return Appointment: Patient given instructions regarding scheduling next clinic visit. Patient demonstrated understanding of this information and agreed to call with further questions or concerns.  Medication Change: Patient was educated regarding prescribed medication change, including discussion of the indication, administration, side effects, and when to report to MD or RN. Patient demonstrated understanding of this information and agreed to call with further questions or concerns.  Patient stated he understood all health information given and agreed to call with further questions or concerns.   Patricia Vo RN

## 2018-09-12 NOTE — PATIENT INSTRUCTIONS
Cardiology Providers you saw during your visit:  Chayito Luque CNP    Medication changes: STOP taking spironolactone. Increase Entresto to 97/103 mg two times a day. A new prescription has been sent to your pharmacy.       Follow up:  Please have labs drawn in 2 weeks.     Please return to clinic for follow-up:   Follow up in 4 months with [facility]    Results for MERCY SHAFER (MRN [id number]) as of 9/12/2018 08:43   Ref. Range 6/1/2018 08:23 9/12/2018 08:06   Sodium Latest Ref Range: 133 - 144 mmol/L  138   Potassium Latest Ref Range: 3.4 - 5.3 mmol/L  4.4   Chloride Latest Ref Range: 94 - 109 mmol/L  103   Carbon Dioxide Latest Ref Range: 20 - 32 mmol/L  28   Urea Nitrogen Latest Ref Range: 7 - 30 mg/dL  32 (H)   Creatinine Latest Ref Range: 0.66 - 1.25 mg/dL  2.68 (H)   GFR Estimate Latest Ref Range: >60 mL/min/1.7m2  24 (L)   GFR Estimate If Black Latest Ref Range: >60 mL/min/1.7m2  30 (L)   Calcium Latest Ref Range: 8.5 - 10.1 mg/dL  8.9   Anion Gap Latest Ref Range: 3 - 14 mmol/L  7   Creatinine Urine Latest Units: mg/dL 195    Protein Random Urine Latest Units: g/L 2.63    Protein Total Urine g/gr Creatinine Latest Ref Range: 0 - 0.2 g/g Cr 1.35 (H)    Glucose Latest Ref Range: 70 - 99 mg/dL  134 (H)       Please call if you have:  1. Weight gain of more than 2 pounds in a day or 5 pounds in a week  2. Increased shortness of breath, swelling or bloating  3. Dizziness, lightheadedness   4. Any questions or concerns.     Follow the American Heart Association Diet and Lifestyle recommendations:  Limit saturated fat, trans fat, sodium, red meat, sweets and sugar-sweetened beverages. If you choose to eat red meat, compare labels and select the leanest cuts available.  Aim for at least 150 minutes of moderate physical activity or 75 minutes of vigorous physical activity - or an equal combination of both - each week.    During business hours: [phone], press option # 1 to be routed to the [facility] then  option # 3 for medical questions to speak with a nurse.     After hours, weekends or holidays: On Call Cardiologist- [phone]   option #4 and ask to speak to the on-call Cardiologist. Inform them you are a CORE/heart failure patient at the [facility].      Bobbi Guillen, RN  Cardiology Nurse Care Coordinator    Keep up the good work!    Take Care!

## 2018-09-12 NOTE — MR AVS SNAPSHOT
After Visit Summary   9/12/2018    Mercy Jesus    MRN: [id number]           Patient Information     Date Of Birth          1960        Visit Information        Provider Department      9/12/2018 8:30 AM Hoa Luque APRN CNP [facility]        Today's Diagnoses     Ischemic cardiomyopathy    -  1    Chronic systolic congestive heart failure (HCC)          Care Instructions    Cardiology Providers you saw during your visit:  Chayito Luque CNP    Medication changes: STOP taking spironolactone. Increase Entresto to 97/103 mg two times a day. A new prescription has been sent to your pharmacy.       Follow up:  Please have labs drawn in 2 weeks.     Please return to clinic for follow-up:   Follow up in 4 months with [facility]    Results for MERCY JESUS (MRN [id number]) as of 9/12/2018 08:43   Ref. Range 6/1/2018 08:23 9/12/2018 08:06   Sodium Latest Ref Range: 133 - 144 mmol/L  138   Potassium Latest Ref Range: 3.4 - 5.3 mmol/L  4.4   Chloride Latest Ref Range: 94 - 109 mmol/L  103   Carbon Dioxide Latest Ref Range: 20 - 32 mmol/L  28   Urea Nitrogen Latest Ref Range: 7 - 30 mg/dL  32 (H)   Creatinine Latest Ref Range: 0.66 - 1.25 mg/dL  2.68 (H)   GFR Estimate Latest Ref Range: >60 mL/min/1.7m2  24 (L)   GFR Estimate If Black Latest Ref Range: >60 mL/min/1.7m2  30 (L)   Calcium Latest Ref Range: 8.5 - 10.1 mg/dL  8.9   Anion Gap Latest Ref Range: 3 - 14 mmol/L  7   Creatinine Urine Latest Units: mg/dL 195    Protein Random Urine Latest Units: g/L 2.63    Protein Total Urine g/gr Creatinine Latest Ref Range: 0 - 0.2 g/g Cr 1.35 (H)    Glucose Latest Ref Range: 70 - 99 mg/dL  134 (H)       Please call if you have:  1. Weight gain of more than 2 pounds in a day or 5 pounds in a week  2. Increased shortness of breath, swelling or bloating  3. Dizziness, lightheadedness   4. Any questions or concerns.     Follow the American Heart Association Diet and Lifestyle recommendations:  Limit  saturated fat, trans fat, sodium, red meat, sweets and sugar-sweetened beverages. If you choose to eat red meat, compare labels and select the leanest cuts available.  Aim for at least 150 minutes of moderate physical activity or 75 minutes of vigorous physical activity - or an equal combination of both - each week.    During business hours: [phone], press option # 1 to be routed to the [facility] then option # 3 for medical questions to speak with a nurse.     After hours, weekends or holidays: On Call Cardiologist- [phone]   option #4 and ask to speak to the on-call Cardiologist. Inform them you are a CORE/heart failure patient at the [facility].      Bobbi Guillen, RN  Cardiology Nurse Care Coordinator    Keep up the good work!    Take Care!            Follow-ups after your visit        Additional Services     Follow-Up with [facility]       In 4 mos in [facility]                  Your next 10 appointments already scheduled     Sep 26, 2018  8:30 AM CDT   LAB with [facility] ([facility])    [address] [place] [address] [phone]           Please do not eat 10-12 hours before your appointment if you are coming in fasting for labs on lipids, cholesterol, or glucose (sugar). This does not apply to pregnant women. Water, hot tea and black coffee (with nothing added) are okay. Do not drink other fluids, diet soda or chew gum.            Jan 09, 2019  8:00 AM CST   Lab with [facility] ([facility])    [address] [place] [address] [phone]            Jan 09, 2019  8:30 AM CST   (Arrive by 8:15 AM)   CORE RETURN with [facility] ([facility])    [address] [place] [address]  [address] [place] [address] [phone]              Future tests that were ordered for you today     Open Future Orders        Priority  "Expected Expires Ordered    Follow-Up with [facility] Routine 1/1/2019 12/1/2020 9/12/2018    Basic metabolic panel Routine 9/24/2018 9/12/2019 9/12/2018            Who to contact     If you have questions or need follow up information about today's clinic visit or your schedule please contact [facility] directly at [phone].  Normal or non-critical lab and imaging results will be communicated to you by Playtikahart, letter or phone within 4 business days after the clinic has received the results. If you do not hear from us within 7 days, please contact the clinic through Buzzwiret or phone. If you have a critical or abnormal lab result, we will notify you by phone as soon as possible.  Submit refill requests through [employer] or call your pharmacy and they will forward the refill request to us. Please allow 3 business days for your refill to be completed.          Additional Information About Your Visit        Playtikahart Information     [employer] gives you secure access to your electronic health record. If you see a primary care provider, you can also send messages to your care team and make appointments. If you have questions, please call your primary care clinic.  If you do not have a primary care provider, please call [phone] and they will assist you.        Care EveryWhere ID     This is your Care EveryWhere ID. This could be used by other organizations to access your [facility] medical records  [id number]        Your Vitals Were     Pulse Height Pulse Oximetry BMI (Body Mass Index)          62 1.727 m (5' 8\") 97% 38.16 kg/m2         Blood Pressure from Last 3 Encounters:   09/12/18 121/83   03/14/18 109/73   02/27/18 131/79    Weight from Last 3 Encounters:   09/12/18 113.9 kg (251 lb)   03/14/18 108 kg (238 lb)   02/22/18 109.6 kg (241 lb 9.6 oz)              We Performed the Following     EKG 12-lead, tracing only (Same Day)          Today's Medication Changes          These changes are accurate as of " 9/12/18  9:15 AM.  If you have any questions, ask your nurse or doctor.               Start taking these medicines.        Dose/Directions    sacubitril-valsartan  MG per tablet   Commonly known as:  ENTRESTO   Used for:  Ischemic cardiomyopathy, Chronic systolic congestive heart failure (H)   Replaces:  sacubitril-valsartan 49-51 MG per tablet   Started by:  Hoa Luque APRN CNP        Dose:  1 tablet   Take 1 tablet by mouth 2 times daily   Quantity:  60 tablet   Refills:  3         Stop taking these medicines if you haven't already. Please contact your care team if you have questions.     sacubitril-valsartan 49-51 MG per tablet   Commonly known as:  ENTRESTO   Replaced by:  sacubitril-valsartan  MG per tablet   Stopped by:  Hoa Luque APRN CNP           spironolactone 25 MG tablet   Commonly known as:  ALDACTONE   Stopped by:  Hoa Luque APRN CNP                Where to get your medicines      These medications were sent to [facility] [address]  [address] [facility] [address]     Phone:  [phone]     sacubitril-valsartan  MG per tablet                Primary Care Provider Office Phone # Fax #    Silas Enciso [phone]       [address] [place] [address]  [facility] [address]        Equal Access to Services     [facility]: Hadii chance vásquez haddiono Soheather, waaxda luqadaha, qaybta kaalmada adejuvenalyada, jordy hinson . So [facility] [phone].    ATENCIÓN: Si habla español, tiene a brown disposición servicios gratuitos de asistencia lingüística. Rosangela tsang [phone].    We comply with applicable federal civil rights laws and [place] laws. We do not discriminate on the basis of race, color, national origin, age, disability, sex, sexual orientation, or gender identity.            Thank you!     Thank you for choosing [facility]  for your care. Our goal is always to provide  you with excellent care. Hearing back from our patients is one way we can continue to improve our services. Please take a few minutes to complete the written survey that you may receive in the mail after your visit with us. Thank you!             Your Updated Medication List - Protect others around you: Learn how to safely use, store and throw away your medicines at www.disposemymeds.org.          This list is accurate as of 9/12/18  9:15 AM.  Always use your most recent med list.                   Brand Name Dispense Instructions for use Diagnosis    atorvastatin 10 MG tablet    LIPITOR    90 tablet    Take 2 tablets (20 mg) by mouth daily    Routine health maintenance       cholecalciferol 1000 units capsule    vitamin  -D    60 capsule    Take 2 capsules (2,000 Units) by mouth daily    Vitamin D deficiency       clopidogrel 75 MG tablet    PLAVIX    180 tablet    TAKE ONE TABLET BY MOUTH EVERY DAY    ST elevation myocardial infarction (STEMI) of inferior wall (H)       furosemide 20 MG tablet    LASIX    360 tablet    Take 40mg in the AM and 20mg in the PM    Chronic systolic congestive heart failure (H), Ischemic cardiomyopathy, Essential hypertension with goal blood pressure less than 130/85       metoprolol succinate 25 MG 24 hr tablet    TOPROL-XL    270 tablet    Take 3 tablets (75 mg) by mouth daily    Ischemic cardiomyopathy       pantoprazole 40 MG EC tablet    PROTONIX    180 tablet    Take 1 tablet (40 mg) by mouth 2 times daily    Lower GI bleeding       sacubitril-valsartan  MG per tablet    ENTRESTO    60 tablet    Take 1 tablet by mouth 2 times daily    Ischemic cardiomyopathy, Chronic systolic congestive heart failure (H)       thiamine 100 MG tablet     90 tablet    Take 1 tablet (100 mg) by mouth daily    Chronic constipation       vitamin B complex with vitamin C Tabs tablet      Take 1 tablet by mouth daily    Atrial tachycardia (H), Renal insufficiency, Ischemic cardiomyopathy

## 2018-09-12 NOTE — LETTER
9/12/2018      RE: Cole Jesus  [address]  [facility] [address]       Dear Colleague,    Thank you for the opportunity to participate in the care of your patient, Cole Jesus, at the [facility]. Please see a copy of my visit note below.    HPI:   Mr. Jesus is a 58 year old male with a past medical history including  history of Afib/flutter s/p ablation (2013) CAD s/p PCI, HTN, and ICM who presents to [facility] clinic for follow up. He was last seen by Dr Trujillo in February.  Has had an echo since last visit and his ejection fraction was noted between 25 and 30% mild to moderate RV systolic dysfunction and pulmonary hypertension. He also had a right heart catheterization that showed an RA pressure of 12 PA pressure of 107/34 with a mean of 67 and a wedge pressure of 20.  His cardiac index by Td was 1.9. There were some recent phone calls from patient where we found he had been taking valsartan and trazodone.  He presents today to discuss.  He stopped taking the losartan about a month ago when he stopped the recall on the news.  He does not monitor blood pressures at home.  He denies lower extremity edema, orthopnea, PND, chest pain with exertion.  He continues to walk 3 miles a day.  He denies palpitations, presyncope, or syncope. He does not snore and reports a prior sleep study was negative.  He has no specific complaints today.    PAST MEDICAL HISTORY:  Past Medical History:   Diagnosis Date     Anemia in chronic renal disease 3/9/2015     Antiplatelet or antithrombotic long-term use      Atrial fibrillation and flutter      CAD (coronary artery disease)     Stemi in 12/11, s/p angioplasty     CRD (chronic renal disease), stage IV (H) 3/9/2015     GI bleed     massive lower GI bleed secondary to a cecal ulcer, s/p ileocecal resection in 12/11     Heart failure     Biventricular systolic HF, complicated by ARDS requiring tracheostomy      History of blood transfusion      Hyperlipidemia LDL goal <100 2013     Hypertension      Ischemic cardiomyopathy 2013    TTE revealing 40% EF     Other and unspecified nonspecific immunological findings     Anti JKa     Pulmonary edema     episodes of flash pulmonary edema in      Renal insufficiency 2013    hx ATN with dialysis complicating cardiogenic shock  2012     Subclinical hypothyroidism        FAMILY HISTORY:  Family History   Problem Relation Age of Onset     Diabetes Mother      Hypertension Mother      HEART DISEASE Mother      HEART DISEASE Father       of heart attack, left when he was young     Diabetes Sister      Diabetes Sister      Diabetes Sister        SOCIAL HISTORY:  Social History     Social History     Marital status: Significant other     Spouse name: N/A     Number of children: N/A     Years of education: N/A     Social History Main Topics     Smoking status: Former Smoker     Quit date: 12/3/2011     Smokeless tobacco: Never Used     Alcohol use No     Drug use: No     Sexual activity: Yes     Partners: Female     Other Topics Concern     Parent/Sibling W/ Cabg, Mi Or Angioplasty Before 65f 55m? Yes     Exercise Yes     limited walking     Social History Narrative       CURRENT MEDICATIONS:    Current Outpatient Prescriptions on File Prior to Visit:  atorvastatin (LIPITOR) 10 MG tablet Take 2 tablets (20 mg) by mouth daily   cholecalciferol (VITAMIN  -D) 1000 UNITS capsule Take 2 capsules (2,000 Units) by mouth daily   clopidogrel (PLAVIX) 75 MG tablet TAKE ONE TABLET BY MOUTH EVERY DAY   furosemide (LASIX) 20 MG tablet Take 40mg in the AM and 20mg in the PM   metoprolol succinate (TOPROL-XL) 25 MG 24 hr tablet Take 3 tablets (75 mg) by mouth daily   pantoprazole (PROTONIX) 40 MG EC tablet Take 1 tablet (40 mg) by mouth 2 times daily   sacubitril-valsartan (ENTRESTO) 49-51 MG per tablet Take 1 tablet by mouth 2 times daily   spironolactone (ALDACTONE) 25 MG  "tablet Take 0.5 tablets (12.5 mg) by mouth 2 times daily   thiamine 100 MG tablet Take 1 tablet (100 mg) by mouth daily   vitamin  B complex with vitamin C (VITAMIN  B COMPLEX) TABS Take 1 tablet by mouth daily   [DISCONTINUED] ISOSORBIDE DINITRATE PO Take 15 mg by mouth 3 times daily     No current facility-administered medications on file prior to visit.     ROS:   CONSTITUTIONAL: Denies fever, chills, fatigue, or weight fluctuations.   HEENT: Denies headache, vision changes, and changes in speech.   CV: Refer to HPI.   PULMONARY:Refer to HPI.   GI:Denies nausea, vomiting, diarrhea, and abdominal pain. Bowel movements are regular.   :Denies urinary alterations, dysuria, urinary frequency, hematuria, and abnormal drainage.   EXT:Denies lower extremity edema.   SKIN:Denies abnormal rashes or lesions.   MUSCULOSKELETAL:Denies upper or lower extremity weakness and pain.   NEUROLOGIC:Denies lightheadedness, dizziness, seizures, or upper or lower extremity paresthesia.     EXAM:  /83 (BP Location: Left arm, Patient Position: Chair, Cuff Size: Adult Large)  Pulse 62  Ht 1.727 m (5' 8\")  Wt 113.9 kg (251 lb)  SpO2 97%  BMI 38.16 kg/m2     GENERAL: Appears comfortable, in no acute distress.   HEENT: Eye symmetrical, no discharge or icterus bilaterally. Mucous membranes moist and without lesions.  CV: RRR, +S1S2, no murmur, rub, or gallop. JVP 2 centimeters above clavicle at 60 degrees.   RESPIRATORY: Respirations regular, even, and unlabored. Lungs CTA throughout.   GI: Soft, obese and non distended with normoactive bowel sounds present in all quadrants. No tenderness, rebound, guarding. No hepatomegaly.   EXTREMITIES: No peripheral edema. 2+ bilateral pedal pulses.   NEUROLOGIC: Alert and oriented x 3. No focal deficits.   MUSCULOSKELETAL: No joint swelling or tenderness.   SKIN: No jaundice. No rashes or lesions.     Labs, reviewed with patient in clinic today:  CBC RESULTS:  Lab Results   Component Value " Date    WBC 7.6 06/01/2018    RBC 4.90 06/01/2018    HGB 14.8 06/01/2018    HCT 45.8 06/01/2018    MCV 94 06/01/2018    MCH 30.2 06/01/2018    MCHC 32.3 06/01/2018    RDW 13.6 06/01/2018     06/01/2018       CMP RESULTS:  Lab Results   Component Value Date     06/01/2018    POTASSIUM 4.5 06/01/2018    CHLORIDE 105 06/01/2018    CO2 25 06/01/2018    ANIONGAP 9 06/01/2018     (H) 06/01/2018    BUN 40 (H) 06/01/2018    CR 2.70 (H) 06/01/2018    GFRESTIMATED 24 (L) 06/01/2018    GFRESTBLACK 29 (L) 06/01/2018    RONNY 8.8 06/01/2018    BILITOTAL 1.3 04/28/2017    ALBUMIN 3.7 06/01/2018    ALKPHOS 90 04/28/2017    ALT 15 04/28/2017    AST 12 04/28/2017        INR RESULTS:  Lab Results   Component Value Date    INR 1.26 (H) 06/06/2014       Lab Results   Component Value Date    MAG 2.4 (H) 04/28/2017     Lab Results   Component Value Date    NTBNPI 78982 (H) 01/12/2012     Lab Results   Component Value Date    NTBNP 2674 (H) 02/13/2018       Diagnostics:  TTE 2/27/18  Ischemic cardiomyopathy.Severe inferolateral and anterolateral wall  hypokinesis to akinesis. The Ejection Fraction is estimated at 25-30%.Traced  EF is 28%  Global right ventricular function is mildly to moderately reduced. Right  ventricular systolic pressure is 85mmHg above the right atrial pressure.Severe  (pulmonary artery systolic pressure >75mmHg) pulmonary hypertension is  present.  Moderate mitral insufficiency is present.  The inferior vena cava was normal in size with preserved respiratory  variability.Estimated mean right atrial pressure is 3 mmHg.  No pericardial effusion is present.  This study was compared with the study from 2/22/17, PAP has increased .    RHC 2/27/18      NM exercise test 7/13/15  1. Severely abnormal myocardial SPECT study with a summed stress score  of 32. A summed stress score of greater than 18 is associated with an  annual event rate of 2.5% and 4.2% for myocardial infarction and  cardiac death,  respectively (Pat. Circulation  1998;98:535-43).  2. Myocardial perfusion study consistent with prior transmural  myocardial infarction infarction in the inferior and lateral wall  segments of the left ventricle. No inducible ischemia was  demonstrated. The sensitivity of the test is reduced due to inability  to reach target heart rate. Consider vasodilator SPECT or cardiac  stress MRI to assess ischemia.  3. Severely dilated left ventricle.  4. Poor functional capacity.  5. Compared to prior study dated September 3, 2013 there has been no  significant change.      Assessment and Plan:   Mr. Jesus is a 58 year old male with history of Afib/flutter s/p ablation (2013) CAD s/p PCI, HTN, DM, CKD, and ICM (EF 25-30%) who presents for follow-up after noted confusion of his medications as he was taking both valsartan and Entresto.  He has since stopped the valsartan after the recall.  Creatinine is at baseline so we will maximize his Entresto today in favor of Spironolactone.  He has severe pulmonary hypertension by right heart cath so will discuss with his primary cardiologist.    # Chronic systolic heart failure/HFrEF secondary to ICM    # Pulmonary hypertension likely 2/2 left heart disease  Stage B. NYHA Class II.    Fluid status: difficult exam but appears slightly volume up and weight up from RHC when RA 12 and PCWP 20, increase lasix to 40 mg bid, higher Entresto should help as well  ACEi/ARB/ARNI: increase Entresto 97/103 mg bid, f/u BMP 10-14 days   BB: Toprol 75 mg daily  Aldosterone antagonist: d/c spironolactone as Cr >2.5  SCD prophylaxis: EF <35% on last echo, recommend EP consult to discuss primary prevention ICD for prevention of SCD, javi in light of NSVT on last Zio, patient declines but states he will think about it, should continue to discuss   NSAID use: contraindicated  Sleep apnea evaluation: tested negative  Remote monitoring: patient will monitor BP at home and contact if <100/60 with  Entresto change  Other: will discuss PA pressures with Dr. Gunderson    # CAD s/p PCI  -no angina or exertional symptoms, he is on Plavix, statin, and BB    # Hx afib/flutter s/p ablation  -ECG in clinic shows SR HR 70        Follow up BMP in 10-14 days and CORE in 4 months, attempt to increase Toprol at that time.       25 minutes spent face-to-face with patient, >50% in counseling and/or coordination of care as described above    Chayito Luuqe, MARITA, NP-C  9/12/2018    CC  HARVEY GUNDERSON

## 2018-09-12 NOTE — TELEPHONE ENCOUNTER
Prior Authorization Retail Medication Request    Medication/Dose: Entresto 97-103mg tabs  ICD code (if different than what is on RX):    Previously Tried and Failed:    Rationale:      Insurance Name:    Insurance ID:        Pharmacy Information (if different than what is on RX)  Name:  [facility] [place], [place] - [address]  Phone:  [phone]

## 2018-09-12 NOTE — PROGRESS NOTES
HPI:   Mr. Jesus is a 58 year old male with a past medical history including  history of Afib/flutter s/p ablation (2013) CAD s/p PCI, HTN, and ICM who presents to [facility] clinic for follow up. He was last seen by Dr Trujillo in February.  Has had an echo since last visit and his ejection fraction was noted between 25 and 30% mild to moderate RV systolic dysfunction and pulmonary hypertension. He also had a right heart catheterization that showed an RA pressure of 12 PA pressure of 107/34 with a mean of 67 and a wedge pressure of 20.  His cardiac index by Td was 1.9. There were some recent phone calls from patient where we found he had been taking valsartan and Entresto.  He presents today to discuss.  He stopped taking the losartan about a month ago when he stopped the recall on the news.  He does not monitor blood pressures at home.  He denies lower extremity edema, orthopnea, PND, chest pain with exertion.  He continues to walk 3 miles a day.  He denies palpitations, presyncope, or syncope. He does not snore and reports a prior sleep study was negative.  He has no specific complaints today.    PAST MEDICAL HISTORY:  Past Medical History:   Diagnosis Date     Anemia in chronic renal disease 3/9/2015     Antiplatelet or antithrombotic long-term use      Atrial fibrillation and flutter      CAD (coronary artery disease)     Stemi in 12/11, s/p angioplasty     CRD (chronic renal disease), stage IV (H) 3/9/2015     GI bleed     massive lower GI bleed secondary to a cecal ulcer, s/p ileocecal resection in 12/11     Heart failure     Biventricular systolic HF, complicated by ARDS requiring tracheostomy     History of blood transfusion      Hyperlipidemia LDL goal <100 4/28/2013     Hypertension      Ischemic cardiomyopathy 4/28/2013    TTE revealing 40% EF     Other and unspecified nonspecific immunological findings     Anti JKa     Pulmonary edema     episodes of flash pulmonary edema in 12/11     Renal insufficiency  2013    hx ATN with dialysis complicating cardiogenic shock  2012     Subclinical hypothyroidism        FAMILY HISTORY:  Family History   Problem Relation Age of Onset     Diabetes Mother      Hypertension Mother      HEART DISEASE Mother      HEART DISEASE Father       of heart attack, left when he was young     Diabetes Sister      Diabetes Sister      Diabetes Sister        SOCIAL HISTORY:  Social History     Social History     Marital status: Significant other     Spouse name: N/A     Number of children: N/A     Years of education: N/A     Social History Main Topics     Smoking status: Former Smoker     Quit date: 12/3/2011     Smokeless tobacco: Never Used     Alcohol use No     Drug use: No     Sexual activity: Yes     Partners: Female     Other Topics Concern     Parent/Sibling W/ Cabg, Mi Or Angioplasty Before 65f 55m? Yes     Exercise Yes     limited walking     Social History Narrative       CURRENT MEDICATIONS:    Current Outpatient Prescriptions on File Prior to Visit:  atorvastatin (LIPITOR) 10 MG tablet Take 2 tablets (20 mg) by mouth daily   cholecalciferol (VITAMIN  -D) 1000 UNITS capsule Take 2 capsules (2,000 Units) by mouth daily   clopidogrel (PLAVIX) 75 MG tablet TAKE ONE TABLET BY MOUTH EVERY DAY   furosemide (LASIX) 20 MG tablet Take 40mg in the AM and 20mg in the PM   metoprolol succinate (TOPROL-XL) 25 MG 24 hr tablet Take 3 tablets (75 mg) by mouth daily   pantoprazole (PROTONIX) 40 MG EC tablet Take 1 tablet (40 mg) by mouth 2 times daily   sacubitril-valsartan (ENTRESTO) 49-51 MG per tablet Take 1 tablet by mouth 2 times daily   spironolactone (ALDACTONE) 25 MG tablet Take 0.5 tablets (12.5 mg) by mouth 2 times daily   thiamine 100 MG tablet Take 1 tablet (100 mg) by mouth daily   vitamin  B complex with vitamin C (VITAMIN  B COMPLEX) TABS Take 1 tablet by mouth daily   [DISCONTINUED] ISOSORBIDE DINITRATE PO Take 15 mg by mouth 3 times daily     No current facility-administered  "medications on file prior to visit.     ROS:   CONSTITUTIONAL: Denies fever, chills, fatigue, or weight fluctuations.   HEENT: Denies headache, vision changes, and changes in speech.   CV: Refer to HPI.   PULMONARY:Refer to HPI.   GI:Denies nausea, vomiting, diarrhea, and abdominal pain. Bowel movements are regular.   :Denies urinary alterations, dysuria, urinary frequency, hematuria, and abnormal drainage.   EXT:Denies lower extremity edema.   SKIN:Denies abnormal rashes or lesions.   MUSCULOSKELETAL:Denies upper or lower extremity weakness and pain.   NEUROLOGIC:Denies lightheadedness, dizziness, seizures, or upper or lower extremity paresthesia.     EXAM:  /83 (BP Location: Left arm, Patient Position: Chair, Cuff Size: Adult Large)  Pulse 62  Ht 1.727 m (5' 8\")  Wt 113.9 kg (251 lb)  SpO2 97%  BMI 38.16 kg/m2     GENERAL: Appears comfortable, in no acute distress.   HEENT: Eye symmetrical, no discharge or icterus bilaterally. Mucous membranes moist and without lesions.  CV: RRR, +S1S2, no murmur, rub, or gallop. JVP 2 centimeters above clavicle at 60 degrees.   RESPIRATORY: Respirations regular, even, and unlabored. Lungs CTA throughout.   GI: Soft, obese and non distended with normoactive bowel sounds present in all quadrants. No tenderness, rebound, guarding. No hepatomegaly.   EXTREMITIES: No peripheral edema. 2+ bilateral pedal pulses.   NEUROLOGIC: Alert and oriented x 3. No focal deficits.   MUSCULOSKELETAL: No joint swelling or tenderness.   SKIN: No jaundice. No rashes or lesions.     Labs, reviewed with patient in clinic today:  CBC RESULTS:  Lab Results   Component Value Date    WBC 7.6 06/01/2018    RBC 4.90 06/01/2018    HGB 14.8 06/01/2018    HCT 45.8 06/01/2018    MCV 94 06/01/2018    MCH 30.2 06/01/2018    MCHC 32.3 06/01/2018    RDW 13.6 06/01/2018     06/01/2018       CMP RESULTS:  Lab Results   Component Value Date     06/01/2018    POTASSIUM 4.5 06/01/2018    CHLORIDE " 105 06/01/2018    CO2 25 06/01/2018    ANIONGAP 9 06/01/2018     (H) 06/01/2018    BUN 40 (H) 06/01/2018    CR 2.70 (H) 06/01/2018    GFRESTIMATED 24 (L) 06/01/2018    GFRESTBLACK 29 (L) 06/01/2018    RONNY 8.8 06/01/2018    BILITOTAL 1.3 04/28/2017    ALBUMIN 3.7 06/01/2018    ALKPHOS 90 04/28/2017    ALT 15 04/28/2017    AST 12 04/28/2017        INR RESULTS:  Lab Results   Component Value Date    INR 1.26 (H) 06/06/2014       Lab Results   Component Value Date    MAG 2.4 (H) 04/28/2017     Lab Results   Component Value Date    NTBNPI 88812 (H) 01/12/2012     Lab Results   Component Value Date    NTBNP 2674 (H) 02/13/2018       Diagnostics:  TTE 2/27/18  Ischemic cardiomyopathy.Severe inferolateral and anterolateral wall  hypokinesis to akinesis. The Ejection Fraction is estimated at 25-30%.Traced  EF is 28%  Global right ventricular function is mildly to moderately reduced. Right  ventricular systolic pressure is 85mmHg above the right atrial pressure.Severe  (pulmonary artery systolic pressure >75mmHg) pulmonary hypertension is  present.  Moderate mitral insufficiency is present.  The inferior vena cava was normal in size with preserved respiratory  variability.Estimated mean right atrial pressure is 3 mmHg.  No pericardial effusion is present.  This study was compared with the study from 2/22/17, PAP has increased .    RHC 2/27/18      NM exercise test 7/13/15  1. Severely abnormal myocardial SPECT study with a summed stress score  of 32. A summed stress score of greater than 18 is associated with an  annual event rate of 2.5% and 4.2% for myocardial infarction and  cardiac death, respectively (Pat. Circulation  1998;98:535-43).  2. Myocardial perfusion study consistent with prior transmural  myocardial infarction infarction in the inferior and lateral wall  segments of the left ventricle. No inducible ischemia was  demonstrated. The sensitivity of the test is reduced due to inability  to reach  target heart rate. Consider vasodilator SPECT or cardiac  stress MRI to assess ischemia.  3. Severely dilated left ventricle.  4. Poor functional capacity.  5. Compared to prior study dated September 3, 2013 there has been no  significant change.      Assessment and Plan:   Mr. Jesus is a 58 year old male with history of Afib/flutter s/p ablation (2013) CAD s/p PCI, HTN, DM, CKD, and ICM (EF 25-30%) who presents for follow-up after noted confusion of his medications as he was taking both valsartan and Entresto.  He has since stopped the valsartan after the recall.  Creatinine is at baseline so we will maximize his Entresto today in favor of Spironolactone.  He has severe pulmonary hypertension by right heart cath so will discuss with his primary cardiologist.    # Chronic systolic heart failure/HFrEF secondary to ICM    # Pulmonary hypertension likely 2/2 left heart disease  Stage B. NYHA Class II.    Fluid status: difficult exam but appears slightly volume up and weight up from RHC when RA 12 and PCWP 20, increase lasix to 40 mg bid, higher Entresto should help as well  ACEi/ARB/ARNI: increase Entresto 97/103 mg bid, f/u BMP 10-14 days   BB: Toprol 75 mg daily  Aldosterone antagonist: d/c spironolactone as Cr >2.5  SCD prophylaxis: EF <35% on last echo, recommend EP consult to discuss primary prevention ICD for prevention of SCD, javi in light of NSVT on last Zio, patient declines but states he will think about it, should continue to discuss   NSAID use: contraindicated  Sleep apnea evaluation: tested negative  Remote monitoring: patient will monitor BP at home and contact if <100/60 with Entresto change  Other: will discuss PA pressures with Dr. Trujillo    # CAD s/p PCI  -no angina or exertional symptoms, he is on Plavix, statin, and BB    # Hx afib/flutter s/p ablation  -ECG in clinic shows SR HR 70        Follow up BMP in 10-14 days and CORE in 4 months, attempt to increase Toprol at that time.       25  minutes spent face-to-face with patient, >50% in counseling and/or coordination of care as described above    Chayito Luque DNP, NP-C  9/12/2018          HARVEY GUERIN

## 2018-09-12 NOTE — NURSING NOTE
Chief Complaint   Patient presents with     Follow Up For     58 year old male presents with h/o chronic systolic heart failure, EF 25-30% for follow up, labs prior     Vitals were taken and medications were reconciled.     Margarita Alston RMA  8:15 AM

## 2018-09-13 DIAGNOSIS — I50.22 CHRONIC SYSTOLIC CONGESTIVE HEART FAILURE (H): ICD-10-CM

## 2018-09-13 DIAGNOSIS — I10 ESSENTIAL HYPERTENSION WITH GOAL BLOOD PRESSURE LESS THAN 130/85: ICD-10-CM

## 2018-09-13 DIAGNOSIS — I25.5 ISCHEMIC CARDIOMYOPATHY: ICD-10-CM

## 2018-09-13 LAB — INTERPRETATION ECG - MUSE: NORMAL

## 2018-09-13 RX ORDER — FUROSEMIDE 20 MG
40 TABLET ORAL 2 TIMES DAILY
Qty: 360 TABLET | Refills: 3 | Status: ON HOLD | OUTPATIENT
Start: 2018-09-13 | End: 2019-07-06

## 2018-09-13 NOTE — TELEPHONE ENCOUNTER
Central Prior Authorization Team   Phone: [phone]      PA Initiation    Medication: Entresto 97-103mg tabs  Insurance Company: [place] Medicaid ([facility]) - Phone [phone] Fax [phone]  Pharmacy Filling the Rx: [place], [place] - [address]  Filling Pharmacy Phone:    Filling Pharmacy Fax:    Start Date: 9/13/2018

## 2018-09-17 NOTE — TELEPHONE ENCOUNTER
Prior Authorization Approval    Authorization Effective Date: 9/12/2018  Authorization Expiration Date: 1/9/2019  Medication: Entresto 97-103mg tabs - Approved  Approved Dose/Quantity:   Reference #:     Insurance Company: [place] Medicaid ([facility]) - Phone [phone] Fax [phone]  Expected CoPay:       CoPay Card Available:      Foundation Assistance Needed:    Which Pharmacy is filling the prescription (Not needed for infusion/clinic administered): [facility] [place], [place] - [address]  Pharmacy Notified:    Patient Notified:

## 2018-09-28 ENCOUNTER — CARE COORDINATION (OUTPATIENT)
Dept: CARDIOLOGY | Facility: CLINIC | Age: 58
End: 2018-09-28

## 2018-09-28 NOTE — PROGRESS NOTES
Pt was to have labs drawn 2 weeks after CORE appt with Rebel SCHREIBER. Pt called and canceled appt, stating appt was no longer needed. Appt is needed.     Called pt, both pt's number and emergency contact number have been disconnected.     Bobbi Guillen, RN [facility] Care Coordinator

## 2018-10-31 DIAGNOSIS — I25.5 ISCHEMIC CARDIOMYOPATHY: ICD-10-CM

## 2018-10-31 LAB
ANION GAP SERPL CALCULATED.3IONS-SCNC: 8 MMOL/L (ref 3–14)
BUN SERPL-MCNC: 27 MG/DL (ref 7–30)
CALCIUM SERPL-MCNC: 8.4 MG/DL (ref 8.5–10.1)
CHLORIDE SERPL-SCNC: 103 MMOL/L (ref 94–109)
CO2 SERPL-SCNC: 27 MMOL/L (ref 20–32)
CREAT SERPL-MCNC: 2.63 MG/DL (ref 0.66–1.25)
GFR SERPL CREATININE-BSD FRML MDRD: 25 ML/MIN/1.7M2
GLUCOSE SERPL-MCNC: 199 MG/DL (ref 70–99)
POTASSIUM SERPL-SCNC: 3.8 MMOL/L (ref 3.4–5.3)
SODIUM SERPL-SCNC: 138 MMOL/L (ref 133–144)

## 2018-12-26 DIAGNOSIS — I50.22 CHRONIC SYSTOLIC CONGESTIVE HEART FAILURE (H): Primary | ICD-10-CM

## 2018-12-26 DIAGNOSIS — K92.2 LOWER GI BLEEDING: ICD-10-CM

## 2018-12-27 RX ORDER — PANTOPRAZOLE SODIUM 40 MG/1
TABLET, DELAYED RELEASE ORAL
Qty: 180 TABLET | Refills: 2 | Status: SHIPPED | OUTPATIENT
Start: 2018-12-27 | End: 2019-10-17

## 2019-01-03 ENCOUNTER — PATIENT OUTREACH (OUTPATIENT)
Dept: CARE COORDINATION | Facility: CLINIC | Age: 59
End: 2019-01-03

## 2019-01-17 DIAGNOSIS — I50.22 CHRONIC SYSTOLIC CONGESTIVE HEART FAILURE (H): Primary | ICD-10-CM

## 2019-01-22 ENCOUNTER — MYC REFILL (OUTPATIENT)
Dept: CARDIOLOGY | Facility: CLINIC | Age: 59
End: 2019-01-22

## 2019-01-22 DIAGNOSIS — I50.22 CHRONIC SYSTOLIC CONGESTIVE HEART FAILURE (H): ICD-10-CM

## 2019-01-22 DIAGNOSIS — I25.5 ISCHEMIC CARDIOMYOPATHY: ICD-10-CM

## 2019-01-24 ENCOUNTER — TELEPHONE (OUTPATIENT)
Dept: CARDIOLOGY | Facility: CLINIC | Age: 59
End: 2019-01-24

## 2019-01-24 ENCOUNTER — MYC REFILL (OUTPATIENT)
Dept: CARDIOLOGY | Facility: CLINIC | Age: 59
End: 2019-01-24

## 2019-01-24 DIAGNOSIS — I50.22 CHRONIC SYSTOLIC CONGESTIVE HEART FAILURE (H): ICD-10-CM

## 2019-01-24 DIAGNOSIS — I25.5 ISCHEMIC CARDIOMYOPATHY: ICD-10-CM

## 2019-01-24 NOTE — TELEPHONE ENCOUNTER
[facility] Call Center    Phone Message    May a detailed message be left on voicemail: yes    Reason for Call: Medication Question or concern regarding medication   Prescription Clarification  Name of Medication: ENTRESTO    Prescribing Provider: Bertha hPan   Pharmacy: [facility] [address] in [place]   What on the order needs clarification? Prior Auth Needed          Action Taken: Message routed to:  [facility] ([facility] cardiology

## 2019-01-28 ENCOUNTER — TELEPHONE (OUTPATIENT)
Dept: CARDIOLOGY | Facility: CLINIC | Age: 59
End: 2019-01-28

## 2019-01-28 DIAGNOSIS — I50.22 CHRONIC SYSTOLIC CONGESTIVE HEART FAILURE (H): Primary | ICD-10-CM

## 2019-01-28 NOTE — TELEPHONE ENCOUNTER
Prior Authorization Retail Medication Request    Medication/Dose: Entresto 97/104  ICD code (if different than what is on RX):  same  Previously Tried and Failed:  Previous PA last fall, per insurance, another PA is needed.   Rationale:  Pt at increased dose.     Insurance Name:  Medicaid  Insurance ID:  [id number]      Pharmacy Information (if different than what is on RX)  Name:  Same  Phone:  Same

## 2019-01-29 ENCOUNTER — MYC MEDICAL ADVICE (OUTPATIENT)
Dept: CARDIOLOGY | Facility: CLINIC | Age: 59
End: 2019-01-29

## 2019-01-29 DIAGNOSIS — I25.5 ISCHEMIC CARDIOMYOPATHY: ICD-10-CM

## 2019-01-29 DIAGNOSIS — I50.22 CHRONIC SYSTOLIC CONGESTIVE HEART FAILURE (H): ICD-10-CM

## 2019-01-30 NOTE — TELEPHONE ENCOUNTER
Central Prior Authorization Team   Phone: [phone]      PA Initiation    Medication: Entresto 97/103  Insurance Company: [place] Medicaid ([facility]) - Phone [phone] Fax [phone]  Pharmacy Filling the Rx: [place], [place] - [address]  Filling Pharmacy Phone: [phone]  Filling Pharmacy Fax:    Start Date: 1/30/2019

## 2019-02-04 ENCOUNTER — TELEPHONE (OUTPATIENT)
Dept: CARDIOLOGY | Facility: CLINIC | Age: 59
End: 2019-02-04

## 2019-02-04 NOTE — TELEPHONE ENCOUNTER
Called and spoke to patient, he received his medication yesterday. Notified PA department that prior auth not needed.

## 2019-02-04 NOTE — TELEPHONE ENCOUNTER
Prior Authorization Retail Medication Request    Medication/Dose: Entresto  MG, take one tablet twice daily.  ICD code (if different than what is on RX):    Previously Tried and Failed:  Lisinopril  Rationale:  More effective, good control of Heart failure     Insurance Name:    Insurance ID:  =COVERAGE INFORMATION:  [place]   Subscriber: Cole Dunn Group #:     Subscriber Sex: Male       Subscriber : 1960 Patient Rel'ship: Self   Subscriber Effective Date:   Member Effective Date: 2013    Subscriber ID [id number] Member ID: [id number]    COVERAGE INFORMATION:  [place]   Subscriber: Cole Dunn Group #:     Subscriber Sex: Male       Subscriber : 1960 Patient Rel'ship: Self   Subscriber Effective Date:   Member Effective Date: 2013    Subscriber ID [id number] Member ID: [id number]            Pharmacy Information (if different than what is on RX)  Name:    Phone:

## 2019-02-06 ENCOUNTER — HOSPITAL ENCOUNTER (OUTPATIENT)
Facility: CLINIC | Age: 59
End: 2019-02-06
Attending: INTERNAL MEDICINE | Admitting: INTERNAL MEDICINE
Payer: MEDICAID

## 2019-02-06 ENCOUNTER — OFFICE VISIT (OUTPATIENT)
Dept: CARDIOLOGY | Facility: CLINIC | Age: 59
End: 2019-02-06
Attending: NURSE PRACTITIONER
Payer: MEDICAID

## 2019-02-06 VITALS
BODY MASS INDEX: 36.43 KG/M2 | OXYGEN SATURATION: 98 % | DIASTOLIC BLOOD PRESSURE: 85 MMHG | SYSTOLIC BLOOD PRESSURE: 130 MMHG | HEIGHT: 68 IN | WEIGHT: 240.4 LBS | HEART RATE: 81 BPM

## 2019-02-06 DIAGNOSIS — N18.4 CRD (CHRONIC RENAL DISEASE), STAGE IV (H): ICD-10-CM

## 2019-02-06 DIAGNOSIS — I50.22 CHRONIC SYSTOLIC CONGESTIVE HEART FAILURE (H): ICD-10-CM

## 2019-02-06 DIAGNOSIS — I48.91 ATRIAL FIBRILLATION, UNSPECIFIED TYPE (H): ICD-10-CM

## 2019-02-06 DIAGNOSIS — I27.21 SEVERE PULMONARY ARTERIAL SYSTOLIC HYPERTENSION (H): ICD-10-CM

## 2019-02-06 DIAGNOSIS — I50.22 CHRONIC SYSTOLIC CONGESTIVE HEART FAILURE (H): Primary | ICD-10-CM

## 2019-02-06 DIAGNOSIS — I25.5 ISCHEMIC CARDIOMYOPATHY: ICD-10-CM

## 2019-02-06 LAB
ANION GAP SERPL CALCULATED.3IONS-SCNC: 5 MMOL/L (ref 3–14)
BUN SERPL-MCNC: 26 MG/DL (ref 7–30)
CALCIUM SERPL-MCNC: 8.5 MG/DL (ref 8.5–10.1)
CHLORIDE SERPL-SCNC: 102 MMOL/L (ref 94–109)
CO2 SERPL-SCNC: 31 MMOL/L (ref 20–32)
CREAT SERPL-MCNC: 2.45 MG/DL (ref 0.66–1.25)
GFR SERPL CREATININE-BSD FRML MDRD: 27 ML/MIN/{1.73_M2}
GLUCOSE SERPL-MCNC: 260 MG/DL (ref 70–99)
POTASSIUM SERPL-SCNC: 4.1 MMOL/L (ref 3.4–5.3)
SODIUM SERPL-SCNC: 138 MMOL/L (ref 133–144)

## 2019-02-06 PROCEDURE — 80048 BASIC METABOLIC PNL TOTAL CA: CPT | Performed by: NURSE PRACTITIONER

## 2019-02-06 PROCEDURE — 99214 OFFICE O/P EST MOD 30 MIN: CPT | Mod: ZP | Performed by: NURSE PRACTITIONER

## 2019-02-06 PROCEDURE — G0463 HOSPITAL OUTPT CLINIC VISIT: HCPCS | Mod: ZF

## 2019-02-06 PROCEDURE — 36415 COLL VENOUS BLD VENIPUNCTURE: CPT | Performed by: NURSE PRACTITIONER

## 2019-02-06 RX ORDER — METOPROLOL SUCCINATE 100 MG/1
100 TABLET, EXTENDED RELEASE ORAL DAILY
Qty: 90 TABLET | Refills: 3 | Status: SHIPPED | OUTPATIENT
Start: 2019-02-06 | End: 2019-07-03

## 2019-02-06 ASSESSMENT — MIFFLIN-ST. JEOR: SCORE: 1874.95

## 2019-02-06 ASSESSMENT — PAIN SCALES - GENERAL: PAINLEVEL: NO PAIN (0)

## 2019-02-06 NOTE — NURSING NOTE
Chief Complaint   Patient presents with     Follow Up     - reason for visit: Return CORE, 59 yo male, Systolic HF, EF 25-30%, labs prior.      Vitals were taken and medications were reconciled.    GARTH France  11:54 AM

## 2019-02-06 NOTE — LETTER
"2/6/2019      RE: Cole Jesus  [address]  [facility] [address]       Dear Colleague,    Thank you for the opportunity to participate in the care of your patient, Cole Jesus, at the [facility] at [facility]. Please see a copy of my visit note below.    HPI:   Mr. Jesus is a 58 year old male with a past medical history including history of Afib/flutter s/p ablation (2013) CAD s/p PCI, HTN, and ICM who presents to [facility] clinic for follow up. His last echo showed EF 25-30% mild to moderate RV systolic dysfunction and pulmonary hypertension. He had a right heart catheterization 2/2018 that showed an RA pressure of 12, PA pressure of 107/34 with a mean of 67, and a wedge pressure of 20.  His cardiac index by Td was 1.9. PA pressures responded well to nipride. No changes made at that time. However he has since been start on Entresto and dose has been increased. He did not have f/u BMP after this change as recommended.    Since last core visit in September patient reports feeling well.  He feels \"Entresto is doing its job\".  Feels he is much stronger and has better energy.  He denies any fluid retention issues, no swelling, bloating, orthopnea, PND.  States he is able to walk 3 miles on a flat surface without having to stop to rest.  He uses stationary bike bike and weights every day without exertional chest pain or shortness of breath.  No lightheadedness or dizziness.  Overall he has been losing weight.  His blood pressures are 110-140 over 80s.  Denies palpitations, syncope.     PAST MEDICAL HISTORY:  Past Medical History:   Diagnosis Date     Anemia in chronic renal disease 3/9/2015     Antiplatelet or antithrombotic long-term use      Atrial fibrillation and flutter      CAD (coronary artery disease)     Stemi in 12/11, s/p angioplasty     CRD (chronic renal disease), stage IV (H) 3/9/2015     GI bleed     massive lower GI bleed secondary to a cecal " ulcer, s/p ileocecal resection in      Heart failure     Biventricular systolic HF, complicated by ARDS requiring tracheostomy     History of blood transfusion      Hyperlipidemia LDL goal <100 2013     Hypertension      Ischemic cardiomyopathy 2013    TTE revealing 40% EF     Other and unspecified nonspecific immunological findings     Anti JKa     Pulmonary edema     episodes of flash pulmonary edema in      Renal insufficiency 2013    hx ATN with dialysis complicating cardiogenic shock  2012     Subclinical hypothyroidism      FAMILY HISTORY:  Family History   Problem Relation Age of Onset     Diabetes Mother      Hypertension Mother      Heart Disease Mother      Heart Disease Father          of heart attack, left when he was young     Diabetes Sister      Diabetes Sister      Diabetes Sister        SOCIAL HISTORY:  Social History     Marital status: Significant other     Social History Main Topics     Smoking status: Former Smoker     Quit date: 12/3/2011     Smokeless tobacco: Never Used     Alcohol use No     Drug use: No     Sexual activity: Yes     Partners: Female     Other Topics Concern     Parent/Sibling W/ Cabg, Mi Or Angioplasty Before 65f 55m? Yes     Exercise Yes     limited walking     CURRENT MEDICATIONS:    Current Outpatient Medications on File Prior to Visit:  atorvastatin (LIPITOR) 10 MG tablet Take 2 tablets (20 mg) by mouth daily   cholecalciferol (VITAMIN  -D) 1000 UNITS capsule Take 2 capsules (2,000 Units) by mouth daily   clopidogrel (PLAVIX) 75 MG tablet TAKE ONE TABLET BY MOUTH EVERY DAY   furosemide (LASIX) 20 MG tablet Take 2 tablets (40 mg) by mouth 2 times daily   pantoprazole (PROTONIX) 40 MG EC tablet TAKE ONE TABLET BY MOUTH TWICE A DAY   sacubitril-valsartan (ENTRESTO)  MG per tablet Take 1 tablet by mouth 2 times daily   thiamine 100 MG tablet Take 1 tablet (100 mg) by mouth daily   vitamin  B complex with vitamin C (VITAMIN  B COMPLEX) TABS  "Take 1 tablet by mouth daily   [DISCONTINUED] ISOSORBIDE DINITRATE PO Take 15 mg by mouth 3 times daily     No current facility-administered medications on file prior to visit.     ROS:   CONSTITUTIONAL: Denies fever, chills, fatigue, or weight fluctuations.   HEENT: Denies headache, vision changes, and changes in speech.   CV: Refer to HPI.   PULMONARY:Refer to HPI.   GI:Denies nausea, vomiting, diarrhea, and abdominal pain. Bowel movements are regular.   :Denies urinary alterations, dysuria, urinary frequency, hematuria, and abnormal drainage.   EXT:Denies lower extremity edema.   SKIN:Denies abnormal rashes or lesions.   MUSCULOSKELETAL:Denies upper or lower extremity weakness and pain.   NEUROLOGIC:Denies lightheadedness, dizziness, seizures, or upper or lower extremity paresthesia.     EXAM:  /85 (BP Location: Left arm, Patient Position: Chair, Cuff Size: Adult Large)   Pulse 81   Ht 1.727 m (5' 8\")   Wt 109 kg (240 lb 6.4 oz)   SpO2 98%   BMI 36.55 kg/m        GENERAL: Appears comfortable, in no acute distress.   HEENT: Eye symmetrical, no discharge or icterus bilaterally. Mucous membranes moist and without lesions.  CV: RRR, +S1S2, no murmur, rub, or gallop. JVP below clavicle at 60 degrees.   RESPIRATORY: Respirations regular, even, and unlabored. Lungs CTA throughout.   GI: Soft, obese and non distended with normoactive bowel sounds present in all quadrants. No tenderness, rebound, guarding. No hepatomegaly.   EXTREMITIES: No peripheral edema. 2+ bilateral pedal pulses.   NEUROLOGIC: Alert and oriented x 3. No focal deficits.   MUSCULOSKELETAL: No joint swelling or tenderness.   SKIN: No jaundice. No rashes or lesions.     Labs, reviewed with patient in clinic today:  CBC RESULTS:  Lab Results   Component Value Date    WBC 7.6 06/01/2018    RBC 4.90 06/01/2018    HGB 14.8 06/01/2018    HCT 45.8 06/01/2018    MCV 94 06/01/2018    MCH 30.2 06/01/2018    MCHC 32.3 06/01/2018    RDW 13.6 06/01/2018 "     06/01/2018       CMP RESULTS:  Lab Results   Component Value Date     02/06/2019    POTASSIUM 4.1 02/06/2019    CHLORIDE 102 02/06/2019    CO2 31 02/06/2019    ANIONGAP 5 02/06/2019     (H) 02/06/2019    BUN 26 02/06/2019    CR 2.45 (H) 02/06/2019    GFRESTIMATED 27 (L) 02/06/2019    GFRESTBLACK 32 (L) 02/06/2019    RONNY 8.5 02/06/2019    BILITOTAL 1.3 04/28/2017    ALBUMIN 3.7 06/01/2018    ALKPHOS 90 04/28/2017    ALT 15 04/28/2017    AST 12 04/28/2017        INR RESULTS:  Lab Results   Component Value Date    INR 1.26 (H) 06/06/2014       Lab Results   Component Value Date    MAG 2.4 (H) 04/28/2017     Lab Results   Component Value Date    NTBNPI 98718 (H) 01/12/2012     Lab Results   Component Value Date    NTBNP 2,674 (H) 02/13/2018     Diagnostics:  TTE 2/27/18  Ischemic cardiomyopathy.Severe inferolateral and anterolateral wall  hypokinesis to akinesis. The Ejection Fraction is estimated at 25-30%. Traced EF is 28%  Global right ventricular function is mildly to moderately reduced. Right ventricular systolic pressure is 85mmHg above the right atrial pressure. Severe(pulmonary artery systolic pressure >75mmHg) pulmonary hypertension is present.  Moderate mitral insufficiency is present.  The inferior vena cava was normal in size with preserved respiratory variability.Estimated mean right atrial pressure is 3 mmHg.  No pericardial effusion is present.  This study was compared with the study from 2/22/17, PAP has increased .    RHC 2/27/18      NM exercise test 7/13/15  1. Severely abnormal myocardial SPECT study with a summed stress score  of 32. A summed stress score of greater than 18 is associated with an  annual event rate of 2.5% and 4.2% for myocardial infarction and  cardiac death, respectively (Pat. Circulation  1998;98:535-43).  2. Myocardial perfusion study consistent with prior transmural  myocardial infarction infarction in the inferior and lateral wall  segments of  the left ventricle. No inducible ischemia was  demonstrated. The sensitivity of the test is reduced due to inability  to reach target heart rate. Consider vasodilator SPECT or cardiac  stress MRI to assess ischemia.  3. Severely dilated left ventricle.  4. Poor functional capacity.  5. Compared to prior study dated September 3, 2013 there has been no significant change.    Assessment and Plan:   Mr. Jesus is a 58 year old male with history of Afib/flutter s/p ablation (2013) CAD s/p PCI, HTN, DM, CKD, and ICM (EF 25-30%) who presents for follow-up after several canceled appointments in [facility].  Clinically he is doing very well on maximum dose of Entresto and he is euvolemic on exam.  We will increase beta-blocker today.  Discussed again recommendation for him to see EP to discuss primary prevention ICD and patient declines.  Will continue to address.  We will have him repeat echo and right heart cath in a few months when he is due to see Dr. Trujillo.     # Chronic systolic heart failure/HFrEF secondary to ICM    # Pulmonary hypertension likely 2/2 left heart disease, reversible with afterload reduction and vasodilation   Stage B. NYHA Class II.    -Fluid status: euvolemic on lasix to 40 mg bid  -ACEi/ARB/ARNI: Entresto 97/103 mg bid  -BB: increase Toprol to 100 mg daily  -Aldosterone antagonist: contraindicated due to renal dysfunction  -SCD prophylaxis: EF <35% on last echo, recommend EP consult to discuss primary prevention ICD for prevention of SCD, javi in light of NSVT on last Zio, patient declines but states he will think about it  -NSAID use: contraindicated  -Sleep apnea evaluation: tested negative  -Remote monitoring: patient will monitor BP at home and contact if <100/60 with Toprol increase    # CAD s/p PCI  No angina or exertional symptoms, he is on Plavix, statin, and BB    # Hx afib/flutter s/p ablation  Last ECG with SR HR 70. No longer anticoagulated.     # CKD Cr stable (below baseline) on  Entresto. He is overdue to see nephrology - recommend he call to schedule this.       Follow up with Dr Trujillo in two months after RHC and echo      25 minutes spent face-to-face with patient, >50% in counseling and/or coordination of care as described above    Chayito Luque, MARITA, NP-C  2/6/2019      HARVEY GUERIN

## 2019-02-06 NOTE — PROGRESS NOTES
"HPI:   Mr. Jesus is a 58 year old male with a past medical history including history of Afib/flutter s/p ablation (2013) CAD s/p PCI, HTN, and ICM who presents to CORE clinic for follow up. His last echo showed EF 25-30% mild to moderate RV systolic dysfunction and pulmonary hypertension. He had a right heart catheterization 2/2018 that showed an RA pressure of 12, PA pressure of 107/34 with a mean of 67, and a wedge pressure of 20.  His cardiac index by Td was 1.9. PA pressures responded well to nipride. No changes made at that time. However he has since been start on Entresto and dose has been increased. He did not have f/u BMP after this change as recommended.    Since last core visit in September patient reports feeling well.  He feels \"Entresto is doing its job\".  Feels he is much stronger and has better energy.  He denies any fluid retention issues, no swelling, bloating, orthopnea, PND.  States he is able to walk 3 miles on a flat surface without having to stop to rest.  He uses stationary bike bike and weights every day without exertional chest pain or shortness of breath.  No lightheadedness or dizziness.  Overall he has been losing weight.  His blood pressures are 110-140 over 80s.  Denies palpitations, syncope.     PAST MEDICAL HISTORY:  Past Medical History:   Diagnosis Date     Anemia in chronic renal disease 3/9/2015     Antiplatelet or antithrombotic long-term use      Atrial fibrillation and flutter      CAD (coronary artery disease)     Stemi in 12/11, s/p angioplasty     CRD (chronic renal disease), stage IV (H) 3/9/2015     GI bleed     massive lower GI bleed secondary to a cecal ulcer, s/p ileocecal resection in 12/11     Heart failure     Biventricular systolic HF, complicated by ARDS requiring tracheostomy     History of blood transfusion      Hyperlipidemia LDL goal <100 4/28/2013     Hypertension      Ischemic cardiomyopathy 4/28/2013    TTE revealing 40% EF     Other and unspecified " nonspecific immunological findings     Anti JKa     Pulmonary edema     episodes of flash pulmonary edema in      Renal insufficiency 2013    hx ATN with dialysis complicating cardiogenic shock  2012     Subclinical hypothyroidism      FAMILY HISTORY:  Family History   Problem Relation Age of Onset     Diabetes Mother      Hypertension Mother      Heart Disease Mother      Heart Disease Father          of heart attack, left when he was young     Diabetes Sister      Diabetes Sister      Diabetes Sister        SOCIAL HISTORY:  Social History     Marital status: Significant other     Social History Main Topics     Smoking status: Former Smoker     Quit date: 12/3/2011     Smokeless tobacco: Never Used     Alcohol use No     Drug use: No     Sexual activity: Yes     Partners: Female     Other Topics Concern     Parent/Sibling W/ Cabg, Mi Or Angioplasty Before 65f 55m? Yes     Exercise Yes     limited walking     CURRENT MEDICATIONS:    Current Outpatient Medications on File Prior to Visit:  atorvastatin (LIPITOR) 10 MG tablet Take 2 tablets (20 mg) by mouth daily   cholecalciferol (VITAMIN  -D) 1000 UNITS capsule Take 2 capsules (2,000 Units) by mouth daily   clopidogrel (PLAVIX) 75 MG tablet TAKE ONE TABLET BY MOUTH EVERY DAY   furosemide (LASIX) 20 MG tablet Take 2 tablets (40 mg) by mouth 2 times daily   pantoprazole (PROTONIX) 40 MG EC tablet TAKE ONE TABLET BY MOUTH TWICE A DAY   sacubitril-valsartan (ENTRESTO)  MG per tablet Take 1 tablet by mouth 2 times daily   thiamine 100 MG tablet Take 1 tablet (100 mg) by mouth daily   vitamin  B complex with vitamin C (VITAMIN  B COMPLEX) TABS Take 1 tablet by mouth daily   [DISCONTINUED] ISOSORBIDE DINITRATE PO Take 15 mg by mouth 3 times daily     No current facility-administered medications on file prior to visit.     ROS:   CONSTITUTIONAL: Denies fever, chills, fatigue, or weight fluctuations.   HEENT: Denies headache, vision changes, and changes  "in speech.   CV: Refer to HPI.   PULMONARY:Refer to HPI.   GI:Denies nausea, vomiting, diarrhea, and abdominal pain. Bowel movements are regular.   :Denies urinary alterations, dysuria, urinary frequency, hematuria, and abnormal drainage.   EXT:Denies lower extremity edema.   SKIN:Denies abnormal rashes or lesions.   MUSCULOSKELETAL:Denies upper or lower extremity weakness and pain.   NEUROLOGIC:Denies lightheadedness, dizziness, seizures, or upper or lower extremity paresthesia.     EXAM:  /85 (BP Location: Left arm, Patient Position: Chair, Cuff Size: Adult Large)   Pulse 81   Ht 1.727 m (5' 8\")   Wt 109 kg (240 lb 6.4 oz)   SpO2 98%   BMI 36.55 kg/m       GENERAL: Appears comfortable, in no acute distress.   HEENT: Eye symmetrical, no discharge or icterus bilaterally. Mucous membranes moist and without lesions.  CV: RRR, +S1S2, no murmur, rub, or gallop. JVP below clavicle at 60 degrees.   RESPIRATORY: Respirations regular, even, and unlabored. Lungs CTA throughout.   GI: Soft, obese and non distended with normoactive bowel sounds present in all quadrants. No tenderness, rebound, guarding. No hepatomegaly.   EXTREMITIES: No peripheral edema. 2+ bilateral pedal pulses.   NEUROLOGIC: Alert and oriented x 3. No focal deficits.   MUSCULOSKELETAL: No joint swelling or tenderness.   SKIN: No jaundice. No rashes or lesions.     Labs, reviewed with patient in clinic today:  CBC RESULTS:  Lab Results   Component Value Date    WBC 7.6 06/01/2018    RBC 4.90 06/01/2018    HGB 14.8 06/01/2018    HCT 45.8 06/01/2018    MCV 94 06/01/2018    MCH 30.2 06/01/2018    MCHC 32.3 06/01/2018    RDW 13.6 06/01/2018     06/01/2018       CMP RESULTS:  Lab Results   Component Value Date     02/06/2019    POTASSIUM 4.1 02/06/2019    CHLORIDE 102 02/06/2019    CO2 31 02/06/2019    ANIONGAP 5 02/06/2019     (H) 02/06/2019    BUN 26 02/06/2019    CR 2.45 (H) 02/06/2019    GFRESTIMATED 27 (L) 02/06/2019    " GFRESTBLACK 32 (L) 02/06/2019    RONNY 8.5 02/06/2019    BILITOTAL 1.3 04/28/2017    ALBUMIN 3.7 06/01/2018    ALKPHOS 90 04/28/2017    ALT 15 04/28/2017    AST 12 04/28/2017        INR RESULTS:  Lab Results   Component Value Date    INR 1.26 (H) 06/06/2014       Lab Results   Component Value Date    MAG 2.4 (H) 04/28/2017     Lab Results   Component Value Date    NTBNPI 23437 (H) 01/12/2012     Lab Results   Component Value Date    NTBNP 2,674 (H) 02/13/2018     Diagnostics:  TTE 2/27/18  Ischemic cardiomyopathy.Severe inferolateral and anterolateral wall  hypokinesis to akinesis. The Ejection Fraction is estimated at 25-30%. Traced EF is 28%  Global right ventricular function is mildly to moderately reduced. Right ventricular systolic pressure is 85mmHg above the right atrial pressure. Severe(pulmonary artery systolic pressure >75mmHg) pulmonary hypertension is present.  Moderate mitral insufficiency is present.  The inferior vena cava was normal in size with preserved respiratory variability.Estimated mean right atrial pressure is 3 mmHg.  No pericardial effusion is present.  This study was compared with the study from 2/22/17, PAP has increased .    RHC 2/27/18      NM exercise test 7/13/15  1. Severely abnormal myocardial SPECT study with a summed stress score  of 32. A summed stress score of greater than 18 is associated with an  annual event rate of 2.5% and 4.2% for myocardial infarction and  cardiac death, respectively (Pat. Circulation  1998;98:535-43).  2. Myocardial perfusion study consistent with prior transmural  myocardial infarction infarction in the inferior and lateral wall  segments of the left ventricle. No inducible ischemia was  demonstrated. The sensitivity of the test is reduced due to inability  to reach target heart rate. Consider vasodilator SPECT or cardiac  stress MRI to assess ischemia.  3. Severely dilated left ventricle.  4. Poor functional capacity.  5. Compared to prior study  dated September 3, 2013 there has been no significant change.    Assessment and Plan:   Mr. Jesus is a 58 year old male with history of Afib/flutter s/p ablation (2013) CAD s/p PCI, HTN, DM, CKD, and ICM (EF 25-30%) who presents for follow-up after several canceled appointments in [facility].  Clinically he is doing very well on maximum dose of Entresto and he is euvolemic on exam.  We will increase beta-blocker today.  Discussed again recommendation for him to see EP to discuss primary prevention ICD and patient declines.  Will continue to address.  We will have him repeat echo and right heart cath in a few months when he is due to see Dr. Trujillo.     # Chronic systolic heart failure/HFrEF secondary to ICM    # Pulmonary hypertension likely 2/2 left heart disease, reversible with afterload reduction and vasodilation   Stage B. NYHA Class II.    -Fluid status: euvolemic on lasix to 40 mg bid  -ACEi/ARB/ARNI: Entresto 97/103 mg bid  -BB: increase Toprol to 100 mg daily  -Aldosterone antagonist: contraindicated due to renal dysfunction  -SCD prophylaxis: EF <35% on last echo, recommend EP consult to discuss primary prevention ICD for prevention of SCD, javi in light of NSVT on last Zio, patient declines but states he will think about it  -NSAID use: contraindicated  -Sleep apnea evaluation: tested negative  -Remote monitoring: patient will monitor BP at home and contact if <100/60 with Toprol increase    # CAD s/p PCI  No angina or exertional symptoms, he is on Plavix, statin, and BB    # Hx afib/flutter s/p ablation  Last ECG with SR HR 70. No longer anticoagulated.     # CKD Cr stable (below baseline) on Entresto. He is overdue to see nephrology - recommend he call to schedule this.       Follow up with Dr Trujillo in two months after RHC and echo      25 minutes spent face-to-face with patient, >50% in counseling and/or coordination of care as described above    Chayito Luque DNP,  NP-C  2/6/2019            HARVEY GUERIN

## 2019-02-06 NOTE — PATIENT INSTRUCTIONS
Cardiology Provider you saw during your visit: Chayito Luque NP    Results for [facility] SHAF[facility] (MRN [id number]) as of 2/6/2019 11:56   Ref. Range 2/6/2019 11:28   Sodium Latest Ref Range: 133 - 144 mmol/L 138   Potassium Latest Ref Range: 3.4 - 5.3 mmol/L 4.1   Chloride Latest Ref Range: 94 - 109 mmol/L 102   Carbon Dioxide Latest Ref Range: 20 - 32 mmol/L 31   Urea Nitrogen Latest Ref Range: 7 - 30 mg/dL 26   Creatinine Latest Ref Range: 0.66 - 1.25 mg/dL 2.45 (H)   GFR Estimate Latest Ref Range: >60 mL/min/1.73_m2 27 (L)   GFR Estimate If Black Latest Ref Range: >60 mL/min/1.73_m2 32 (L)   Calcium Latest Ref Range: 8.5 - 10.1 mg/dL 8.5   Anion Gap Latest Ref Range: 3 - 14 mmol/L 5   Glucose Latest Ref Range: 70 - 99 mg/dL 260 (H)       Medication changes:  1. INCREASE Metoprolol to 10 mg daily. I will call your insurance and let you know if any Prior Auth is needed.     Follow up:    Please return to clinic for follow-up:  Right Heart Cath and Echo in 2 months, then follow-up with Dr. Head.     Please call us if you change your mind in the ICD.       Please call ELIDA Martines if you have any of the following symptoms so we can adjust your medications as needed over the phone:  1. Weight gain of more than 2 pounds in a day or 5 pounds in a week.  2. Increased shortness of breath, swelling or bloating.  3. Dizziness, lightheadedness.   4. Any questions or concerns.       Please limit your fluid intake to 2 L (68 ounces) daily.  2 Liters a day = 8.5 cups, or 68 ounces.    Please limit your salt intake to 2 grams a day or less.    Follow the American Heart Association Diet and Lifestyle recommendations:    Limit saturated fat, trans fat, sodium, red meat, sweets and sugar-sweetened beverages. If you choose to eat red meat, compare labels and select the leanest cuts available.    Aim for at least 150 minutes of moderate physical activity or 75 minutes of vigorous physical activity - or an equal combination of both -  each week.    During business hours: [phone], press option # 1 to be routed to the [facility] then option # 3 for medical questions to speak with a nurse.     After hours, weekends or holidays: On Call Cardiologist- [phone]   option #4 and ask to speak to the on-call Cardiologist. Inform them you are a CORE/heart failure patient at the [facility].        Bobbi Guillen, RN  Cardiology Nurse Care Coordinator    Keep up the good work!  _______________________________________________________  [facility] Cardiomyopathy, Optimization, Rehabilitation, Education   The [facility] is a heart failure specialty clinic within the [facility] where you will work with specialized nurse practitioners dedicated to helping patients with heart failure carefully adjust medications, receive education, and learn who and when to call if symptoms develop. They specialize in helping you better understand your condition, slow the progression of your disease, improve the length and quality of your life, help you detect future heart problems before they become life threatening, and avoid hospitalizations.  As always, thank you for trusting us with your health care needs!

## 2019-03-07 DIAGNOSIS — Z00.00 ROUTINE HEALTH MAINTENANCE: ICD-10-CM

## 2019-03-08 RX ORDER — ATORVASTATIN CALCIUM 10 MG/1
20 TABLET, FILM COATED ORAL DAILY
Qty: 180 TABLET | Refills: 0 | Status: SHIPPED | OUTPATIENT
Start: 2019-03-08 | End: 2019-04-08

## 2019-03-08 NOTE — TELEPHONE ENCOUNTER
atorvastatin (LIPITOR) 10 MG tablet  Last Written Prescription Date:  6/5/18  Last Fill Quantity: 90,   # refills: 3  Last Office Visit : 2/22/28  Future Office visit:  None    Scheduling has been notified to contact the pt for appointment.    90 day to pharm

## 2019-03-08 NOTE — TELEPHONE ENCOUNTER
Patient called and schedule annual appointment with PCP on Monday, April 8 at 6:20 PM. Appointment confirm via patient.

## 2019-04-08 ENCOUNTER — OFFICE VISIT (OUTPATIENT)
Dept: INTERNAL MEDICINE | Facility: CLINIC | Age: 59
End: 2019-04-08
Payer: MEDICAID

## 2019-04-08 ENCOUNTER — TELEPHONE (OUTPATIENT)
Dept: PEDIATRICS | Facility: CLINIC | Age: 59
End: 2019-04-08

## 2019-04-08 ENCOUNTER — HOSPITAL ENCOUNTER (OUTPATIENT)
Facility: CLINIC | Age: 59
Setting detail: OBSERVATION
Discharge: HOME | End: 2019-04-10
Attending: EMERGENCY MEDICINE | Admitting: EMERGENCY MEDICINE
Payer: MEDICAID

## 2019-04-08 ENCOUNTER — NURSE TRIAGE (OUTPATIENT)
Dept: NURSING | Facility: CLINIC | Age: 59
End: 2019-04-08

## 2019-04-08 ENCOUNTER — APPOINTMENT (OUTPATIENT)
Dept: GENERAL RADIOLOGY | Facility: CLINIC | Age: 59
End: 2019-04-08
Attending: EMERGENCY MEDICINE
Payer: MEDICAID

## 2019-04-08 VITALS
BODY MASS INDEX: 36.95 KG/M2 | DIASTOLIC BLOOD PRESSURE: 79 MMHG | WEIGHT: 243 LBS | OXYGEN SATURATION: 96 % | SYSTOLIC BLOOD PRESSURE: 138 MMHG | HEART RATE: 82 BPM

## 2019-04-08 DIAGNOSIS — E11.65 UNCONTROLLED TYPE 2 DIABETES MELLITUS WITH HYPERGLYCEMIA (H): ICD-10-CM

## 2019-04-08 DIAGNOSIS — E11.22 DIABETES MELLITUS WITH STAGE 4 CHRONIC KIDNEY DISEASE (H): ICD-10-CM

## 2019-04-08 DIAGNOSIS — R73.9 HYPERGLYCEMIA: ICD-10-CM

## 2019-04-08 DIAGNOSIS — E03.8 SUBCLINICAL HYPOTHYROIDISM: Primary | ICD-10-CM

## 2019-04-08 DIAGNOSIS — E11.65 TYPE 2 DIABETES MELLITUS WITH HYPERGLYCEMIA, WITHOUT LONG-TERM CURRENT USE OF INSULIN (H): ICD-10-CM

## 2019-04-08 DIAGNOSIS — N18.4 DIABETES MELLITUS WITH STAGE 4 CHRONIC KIDNEY DISEASE (H): ICD-10-CM

## 2019-04-08 DIAGNOSIS — N18.4 CRD (CHRONIC RENAL DISEASE), STAGE IV (H): Chronic | ICD-10-CM

## 2019-04-08 DIAGNOSIS — I48.91 ATRIAL FIBRILLATION, UNSPECIFIED TYPE (H): Chronic | ICD-10-CM

## 2019-04-08 DIAGNOSIS — I27.21 SEVERE PULMONARY ARTERIAL SYSTOLIC HYPERTENSION (H): ICD-10-CM

## 2019-04-08 DIAGNOSIS — E11.9 TYPE 2 DIABETES MELLITUS WITHOUT COMPLICATION, WITHOUT LONG-TERM CURRENT USE OF INSULIN (H): Primary | Chronic | ICD-10-CM

## 2019-04-08 DIAGNOSIS — I50.22 CHRONIC SYSTOLIC CONGESTIVE HEART FAILURE (H): ICD-10-CM

## 2019-04-08 DIAGNOSIS — Z79.01 LONG TERM (CURRENT) USE OF ANTICOAGULANTS: ICD-10-CM

## 2019-04-08 DIAGNOSIS — E11.40 TYPE 2 DIABETES MELLITUS WITH DIABETIC NEUROPATHY, WITHOUT LONG-TERM CURRENT USE OF INSULIN (H): Chronic | ICD-10-CM

## 2019-04-08 DIAGNOSIS — Z00.00 ROUTINE HEALTH MAINTENANCE: ICD-10-CM

## 2019-04-08 DIAGNOSIS — E03.8 SUBCLINICAL HYPOTHYROIDISM: ICD-10-CM

## 2019-04-08 LAB
ALBUMIN SERPL-MCNC: 3.4 G/DL (ref 3.4–5)
ALBUMIN SERPL-MCNC: 3.6 G/DL (ref 3.4–5)
ALBUMIN UR-MCNC: 30 MG/DL
ALP SERPL-CCNC: 111 U/L (ref 40–150)
ALP SERPL-CCNC: 112 U/L (ref 40–150)
ALT SERPL W P-5'-P-CCNC: 16 U/L (ref 0–70)
ALT SERPL W P-5'-P-CCNC: 18 U/L (ref 0–70)
ANION GAP SERPL CALCULATED.3IONS-SCNC: 8 MMOL/L (ref 3–14)
ANION GAP SERPL CALCULATED.3IONS-SCNC: 9 MMOL/L (ref 3–14)
APPEARANCE UR: CLEAR
AST SERPL W P-5'-P-CCNC: 12 U/L (ref 0–45)
AST SERPL W P-5'-P-CCNC: 13 U/L (ref 0–45)
BASOPHILS # BLD AUTO: 0 10E9/L (ref 0–0.2)
BASOPHILS # BLD AUTO: 0.1 10E9/L (ref 0–0.2)
BASOPHILS NFR BLD AUTO: 0.3 %
BASOPHILS NFR BLD AUTO: 0.7 %
BILIRUB SERPL-MCNC: 0.9 MG/DL (ref 0.2–1.3)
BILIRUB SERPL-MCNC: 1 MG/DL (ref 0.2–1.3)
BILIRUB UR QL STRIP: NEGATIVE
BUN SERPL-MCNC: 38 MG/DL (ref 7–30)
BUN SERPL-MCNC: 38 MG/DL (ref 7–30)
CALCIUM SERPL-MCNC: 8.2 MG/DL (ref 8.5–10.1)
CALCIUM SERPL-MCNC: 8.4 MG/DL (ref 8.5–10.1)
CHLORIDE SERPL-SCNC: 94 MMOL/L (ref 94–109)
CHLORIDE SERPL-SCNC: 94 MMOL/L (ref 94–109)
CHOLEST SERPL-MCNC: 175 MG/DL
CO2 SERPL-SCNC: 26 MMOL/L (ref 20–32)
CO2 SERPL-SCNC: 26 MMOL/L (ref 20–32)
COLOR UR AUTO: ABNORMAL
CREAT SERPL-MCNC: 2.19 MG/DL (ref 0.66–1.25)
CREAT SERPL-MCNC: 2.2 MG/DL (ref 0.66–1.25)
CREAT UR-MCNC: 37 MG/DL
DIFFERENTIAL METHOD BLD: NORMAL
DIFFERENTIAL METHOD BLD: NORMAL
EOSINOPHIL # BLD AUTO: 0.1 10E9/L (ref 0–0.7)
EOSINOPHIL # BLD AUTO: 0.2 10E9/L (ref 0–0.7)
EOSINOPHIL NFR BLD AUTO: 1.8 %
EOSINOPHIL NFR BLD AUTO: 2.6 %
ERYTHROCYTE [DISTWIDTH] IN BLOOD BY AUTOMATED COUNT: 12.9 % (ref 10–15)
ERYTHROCYTE [DISTWIDTH] IN BLOOD BY AUTOMATED COUNT: 13 % (ref 10–15)
GFR SERPL CREATININE-BSD FRML MDRD: 31 ML/MIN/{1.73_M2}
GFR SERPL CREATININE-BSD FRML MDRD: 31 ML/MIN/{1.73_M2}
GLUCOSE BLDC GLUCOMTR-MCNC: >600 MG/DL (ref 70–99)
GLUCOSE BLDC GLUCOMTR-MCNC: >600 MG/DL (ref 70–99)
GLUCOSE SERPL-MCNC: 649 MG/DL (ref 70–99)
GLUCOSE SERPL-MCNC: 690 MG/DL (ref 70–99)
GLUCOSE UR STRIP-MCNC: >1000 MG/DL
HBA1C MFR BLD: 13.4 % (ref 0–5.6)
HCT VFR BLD AUTO: 43.7 % (ref 40–53)
HCT VFR BLD AUTO: 44.4 % (ref 40–53)
HDLC SERPL-MCNC: 30 MG/DL
HGB BLD-MCNC: 14.3 G/DL (ref 13.3–17.7)
HGB BLD-MCNC: 14.3 G/DL (ref 13.3–17.7)
HGB UR QL STRIP: NEGATIVE
IMM GRANULOCYTES # BLD: 0 10E9/L (ref 0–0.4)
IMM GRANULOCYTES # BLD: 0 10E9/L (ref 0–0.4)
IMM GRANULOCYTES NFR BLD: 0.3 %
IMM GRANULOCYTES NFR BLD: 0.4 %
KETONES UR STRIP-MCNC: NEGATIVE MG/DL
LDLC SERPL CALC-MCNC: ABNORMAL MG/DL
LEUKOCYTE ESTERASE UR QL STRIP: NEGATIVE
LYMPHOCYTES # BLD AUTO: 0.8 10E9/L (ref 0.8–5.3)
LYMPHOCYTES # BLD AUTO: 0.9 10E9/L (ref 0.8–5.3)
LYMPHOCYTES NFR BLD AUTO: 10.1 %
LYMPHOCYTES NFR BLD AUTO: 11.4 %
MAGNESIUM SERPL-MCNC: 2.3 MG/DL (ref 1.6–2.3)
MCH RBC QN AUTO: 29.5 PG (ref 26.5–33)
MCH RBC QN AUTO: 30.2 PG (ref 26.5–33)
MCHC RBC AUTO-ENTMCNC: 32.2 G/DL (ref 31.5–36.5)
MCHC RBC AUTO-ENTMCNC: 32.7 G/DL (ref 31.5–36.5)
MCV RBC AUTO: 92 FL (ref 78–100)
MCV RBC AUTO: 92 FL (ref 78–100)
MICROALBUMIN UR-MCNC: 404 MG/L
MICROALBUMIN/CREAT UR: 1097.83 MG/G CR (ref 0–17)
MONOCYTES # BLD AUTO: 0.5 10E9/L (ref 0–1.3)
MONOCYTES # BLD AUTO: 0.6 10E9/L (ref 0–1.3)
MONOCYTES NFR BLD AUTO: 7.3 %
MONOCYTES NFR BLD AUTO: 8 %
MUCOUS THREADS #/AREA URNS LPF: PRESENT /LPF
NEUTROPHILS # BLD AUTO: 5.8 10E9/L (ref 1.6–8.3)
NEUTROPHILS # BLD AUTO: 6.1 10E9/L (ref 1.6–8.3)
NEUTROPHILS NFR BLD AUTO: 77.7 %
NEUTROPHILS NFR BLD AUTO: 79.4 %
NITRATE UR QL: NEGATIVE
NONHDLC SERPL-MCNC: 146 MG/DL
NRBC # BLD AUTO: 0 10*3/UL
NRBC # BLD AUTO: 0 10*3/UL
NRBC BLD AUTO-RTO: 0 /100
NRBC BLD AUTO-RTO: 0 /100
PH UR STRIP: 6 PH (ref 5–7)
PLATELET # BLD AUTO: 199 10E9/L (ref 150–450)
PLATELET # BLD AUTO: 204 10E9/L (ref 150–450)
POTASSIUM SERPL-SCNC: 4.1 MMOL/L (ref 3.4–5.3)
POTASSIUM SERPL-SCNC: 4.2 MMOL/L (ref 3.4–5.3)
PROT SERPL-MCNC: 7.6 G/DL (ref 6.8–8.8)
PROT SERPL-MCNC: 7.8 G/DL (ref 6.8–8.8)
RBC # BLD AUTO: 4.74 10E12/L (ref 4.4–5.9)
RBC # BLD AUTO: 4.85 10E12/L (ref 4.4–5.9)
RBC #/AREA URNS AUTO: 1 /HPF (ref 0–2)
SODIUM SERPL-SCNC: 128 MMOL/L (ref 133–144)
SODIUM SERPL-SCNC: 129 MMOL/L (ref 133–144)
SOURCE: ABNORMAL
SP GR UR STRIP: 1.01 (ref 1–1.03)
TRIGL SERPL-MCNC: 408 MG/DL
TSH SERPL DL<=0.005 MIU/L-ACNC: 3.55 MU/L (ref 0.4–4)
UROBILINOGEN UR STRIP-MCNC: NORMAL MG/DL (ref 0–2)
WBC # BLD AUTO: 7.4 10E9/L (ref 4–11)
WBC # BLD AUTO: 7.6 10E9/L (ref 4–11)
WBC #/AREA URNS AUTO: <1 /HPF (ref 0–5)

## 2019-04-08 PROCEDURE — 96361 HYDRATE IV INFUSION ADD-ON: CPT | Performed by: EMERGENCY MEDICINE

## 2019-04-08 PROCEDURE — 25000131 ZZH RX MED GY IP 250 OP 636 PS 637: Performed by: EMERGENCY MEDICINE

## 2019-04-08 PROCEDURE — 99285 EMERGENCY DEPT VISIT HI MDM: CPT | Mod: Z6 | Performed by: EMERGENCY MEDICINE

## 2019-04-08 PROCEDURE — 93005 ELECTROCARDIOGRAM TRACING: CPT | Performed by: EMERGENCY MEDICINE

## 2019-04-08 PROCEDURE — 83735 ASSAY OF MAGNESIUM: CPT | Performed by: EMERGENCY MEDICINE

## 2019-04-08 PROCEDURE — 71045 X-RAY EXAM CHEST 1 VIEW: CPT

## 2019-04-08 PROCEDURE — 81001 URINALYSIS AUTO W/SCOPE: CPT | Performed by: EMERGENCY MEDICINE

## 2019-04-08 PROCEDURE — 99285 EMERGENCY DEPT VISIT HI MDM: CPT | Mod: 25 | Performed by: EMERGENCY MEDICINE

## 2019-04-08 PROCEDURE — 00000146 ZZHCL STATISTIC GLUCOSE BY METER IP

## 2019-04-08 PROCEDURE — 96360 HYDRATION IV INFUSION INIT: CPT | Mod: 59 | Performed by: EMERGENCY MEDICINE

## 2019-04-08 PROCEDURE — 80053 COMPREHEN METABOLIC PANEL: CPT | Performed by: EMERGENCY MEDICINE

## 2019-04-08 PROCEDURE — 25000128 H RX IP 250 OP 636: Performed by: EMERGENCY MEDICINE

## 2019-04-08 PROCEDURE — 85025 COMPLETE CBC W/AUTO DIFF WBC: CPT | Performed by: EMERGENCY MEDICINE

## 2019-04-08 PROCEDURE — 96372 THER/PROPH/DIAG INJ SC/IM: CPT | Performed by: EMERGENCY MEDICINE

## 2019-04-08 RX ORDER — SODIUM CHLORIDE 9 MG/ML
1000 INJECTION, SOLUTION INTRAVENOUS CONTINUOUS
Status: DISCONTINUED | OUTPATIENT
Start: 2019-04-08 | End: 2019-04-09

## 2019-04-08 RX ORDER — LIDOCAINE 40 MG/G
CREAM TOPICAL
Status: CANCELLED | OUTPATIENT
Start: 2019-04-08

## 2019-04-08 RX ORDER — ATORVASTATIN CALCIUM 20 MG/1
20 TABLET, FILM COATED ORAL DAILY
Qty: 100 TABLET | Refills: 3 | Status: SHIPPED | OUTPATIENT
Start: 2019-04-08 | End: 2019-06-27 | Stop reason: DRUGHIGH

## 2019-04-08 RX ORDER — SODIUM CHLORIDE 9 MG/ML
1000 INJECTION, SOLUTION INTRAVENOUS CONTINUOUS
Status: DISCONTINUED | OUTPATIENT
Start: 2019-04-08 | End: 2019-04-08

## 2019-04-08 RX ADMIN — SODIUM CHLORIDE 250 ML: 0.9 INJECTION, SOLUTION INTRAVENOUS at 22:23

## 2019-04-08 RX ADMIN — INSULIN ASPART 6 UNITS: 100 INJECTION, SOLUTION INTRAVENOUS; SUBCUTANEOUS at 22:43

## 2019-04-08 ASSESSMENT — PAIN SCALES - GENERAL: PAINLEVEL: NO PAIN (0)

## 2019-04-08 ASSESSMENT — ENCOUNTER SYMPTOMS
FEVER: 0
DIARRHEA: 0
SHORTNESS OF BREATH: 0
ABDOMINAL PAIN: 0
CONFUSION: 0
VOMITING: 0
NAUSEA: 0
FREQUENCY: 1

## 2019-04-08 NOTE — PATIENT INSTRUCTIONS
[facility] Medication Refill Request Information:  * Please contact your pharmacy regarding ANY request for medication refills.  ** [facility] Prescription Fax = [phone]  * Please allow 3 business days for routine medication refills.  * Please allow 5 business days for controlled substance medication refills.     [facility] Test Result notification information:  *You will be notified with in 7-10 days of your appointment day regarding the results of your test.  If you are on MyChart you will be notified as soon as the provider has reviewed the results and signed off on them.    [facility]: [phone]

## 2019-04-08 NOTE — PROGRESS NOTES
HPI  58-year-old returns today for physical examination doing extremely well.  He is riding exercise bike up to an hour a day and supplementing this with some walking.  He is tolerating this well without chest pain dyspnea PND orthopnea fluid retention or other complaints.  He had no problems on his present medications no dizziness or lightheadedness.  He is otherwise been doing well and has no symptoms or problems today of concern.  Try to eat a healthy diet and his weight is stable.  Past Medical History:   Diagnosis Date     Anemia in chronic renal disease 3/9/2015     Antiplatelet or antithrombotic long-term use      Atrial fibrillation and flutter      CAD (coronary artery disease)     Stemi in 12/11, s/p angioplasty     CRD (chronic renal disease), stage IV (H) 03/09/2015    hx ATN with dialysis complicating cardiogenic shock  1/2012     GI bleed     massive lower GI bleed secondary to a cecal ulcer, s/p ileocecal resection in 12/11     Heart failure     Biventricular systolic HF, complicated by ARDS requiring tracheostomy     Hyperlipidemia LDL goal <100 4/28/2013     Hypertension      Ischemic cardiomyopathy 4/28/2013    TTE revealing 40% EF     Other and unspecified nonspecific immunological findings     Anti JKa     Pulmonary edema     episodes of flash pulmonary edema in 12/11     Subclinical hypothyroidism      Past Surgical History:   Procedure Laterality Date     ESOPHAGOSCOPY, GASTROSCOPY, DUODENOSCOPY (EGD), COMBINED  12/25/2011    Procedure:COMBINED ESOPHAGOSCOPY, GASTROSCOPY, DUODENOSCOPY (EGD); Surgeon:SAMARIA PUENTE; Location:[facility]     LAPAROTOMY EXPLORATORY  12/31/2011    Procedure:LAPAROTOMY EXPLORATORY; Explore laparotomy, Illeocectomy, Diverting Illeostomy; Surgeon:GABI MOSHER; Location:[facility]     ORIF right elbow fracture  age 12     TAKEDOWN ILEOSTOMY  6/5/2014    Procedure: TAKEDOWN ILEOSTOMY;  Surgeon: Gabi Mosher MD;  Location: [facility]     TRACHEOSTOMY   2011    Procedure:TRACHEOSTOMY; Tracheostomy; 80XLTCP-Proximal Extension-Cuffed 8.0 mm I.D.; Surgeon:LIZABETH DYE; Location: OR     Family History   Problem Relation Age of Onset     Diabetes Mother      Hypertension Mother      Heart Disease Mother      Heart Disease Father          of heart attack, left when he was young     Diabetes Sister      Diabetes Sister      Diabetes Sister      Social History     Socioeconomic History     Marital status: Significant other     Spouse name: None     Number of children: None     Years of education: None     Highest education level: None   Occupational History     None   Social Needs     Financial resource strain: None     Food insecurity:     Worry: None     Inability: None     Transportation needs:     Medical: None     Non-medical: None   Tobacco Use     Smoking status: Former Smoker     Last attempt to quit: 12/3/2011     Years since quittin.3     Smokeless tobacco: Never Used   Substance and Sexual Activity     Alcohol use: No     Alcohol/week: 0.0 oz     Drug use: No     Sexual activity: Yes     Partners: Female   Lifestyle     Physical activity:     Days per week: None     Minutes per session: None     Stress: None   Relationships     Social connections:     Talks on phone: None     Gets together: None     Attends Hindu service: None     Active member of club or organization: None     Attends meetings of clubs or organizations: None     Relationship status: None     Intimate partner violence:     Fear of current or ex partner: None     Emotionally abused: None     Physically abused: None     Forced sexual activity: None   Other Topics Concern     Parent/sibling w/ CABG, MI or angioplasty before 65F 55M? Yes      Service Not Asked     Blood Transfusions Not Asked     Caffeine Concern Not Asked     Occupational Exposure Not Asked     Hobby Hazards Not Asked     Sleep Concern Not Asked     Stress Concern Not Asked     Weight Concern  Not Asked     Special Diet Not Asked     Back Care Not Asked     Exercise Yes     Comment: limited walking     Bike Helmet Not Asked     Seat Belt Not Asked     Self-Exams Not Asked   Social History Narrative     None       Exam:  /79   Pulse 82   Wt 110.2 kg (243 lb)   SpO2 96%   BMI 36.95 kg/m    243 lbs 0 oz  Physical Exam   The patient is alert, oriented with a clear sensorium.   Skin shows no lesions or rashes and good turgor.   Head is normocephalic and atraumatic.   Eyes show PERRLA with poorly seen optic fundi.   Ears show normal TMs bilaterally.   Mouth shows clear oral mucosa.   Neck shows no nodes, thyromegaly or bruits.   Back is non tender.  Lungs are clear to percussion and auscultation.   Heart shows normal S1 and S2 with 1/6 SE murmur.   Abdomen is obese, soft and nontender without masses or organomegaly.   Genitalia show normal testes. No evidence of inguinal hernia.  Rectal shows small smooth prostate without nodules or masses.  Extremities show no edema and no evidence of active synovitis.   Neurologic examination shows cranial nerves II-XII intact. Motor shows 5/5 strength. Reflexes are symmetric. Cerebellar testing shows normal tandem gait.  Romberg negative.    ASSESSMENT  1 congestive heart failure well compensated  2 chronic renal insufficiency needs reevaluation   3 history of diabetes very poor control needs follow-up  4 subclinical hypothyroidism needs reassessment  5 history of paroxysmal atrial fibrillation  6 pulmonary hypertension stable  7 hyperlipidemia needs reassessment    Plan  Order refilled his atorvastatin reassess his labs and notify him of these results and follow-up  This note was completed using Dragon voice recognition software.  Although reviewed after completion, some word and grammatical errors may occur.    Silas Enciso MD  General Internal Medicine  [facility]  [phone]

## 2019-04-08 NOTE — NURSING NOTE
Chief Complaint   Patient presents with     Physical     Pt is here for annual physical.      Pao Briseno LPN at 6:00 PM on 4/8/2019.

## 2019-04-09 PROBLEM — R73.9 HYPERGLYCEMIA: Status: ACTIVE | Noted: 2019-04-09

## 2019-04-09 LAB
ANION GAP SERPL CALCULATED.3IONS-SCNC: 7 MMOL/L (ref 3–14)
BUN SERPL-MCNC: 36 MG/DL (ref 7–30)
CALCIUM SERPL-MCNC: 8.9 MG/DL (ref 8.5–10.1)
CHLORIDE SERPL-SCNC: 104 MMOL/L (ref 94–109)
CO2 SERPL-SCNC: 28 MMOL/L (ref 20–32)
CREAT SERPL-MCNC: 2.16 MG/DL (ref 0.66–1.25)
GFR SERPL CREATININE-BSD FRML MDRD: 32 ML/MIN/{1.73_M2}
GLUCOSE BLDC GLUCOMTR-MCNC: 172 MG/DL (ref 70–99)
GLUCOSE BLDC GLUCOMTR-MCNC: 205 MG/DL (ref 70–99)
GLUCOSE BLDC GLUCOMTR-MCNC: 224 MG/DL (ref 70–99)
GLUCOSE BLDC GLUCOMTR-MCNC: 269 MG/DL (ref 70–99)
GLUCOSE BLDC GLUCOMTR-MCNC: 278 MG/DL (ref 70–99)
GLUCOSE BLDC GLUCOMTR-MCNC: 285 MG/DL (ref 70–99)
GLUCOSE BLDC GLUCOMTR-MCNC: 347 MG/DL (ref 70–99)
GLUCOSE BLDC GLUCOMTR-MCNC: 405 MG/DL (ref 70–99)
GLUCOSE BLDC GLUCOMTR-MCNC: 482 MG/DL (ref 70–99)
GLUCOSE BLDC GLUCOMTR-MCNC: >600 MG/DL (ref 70–99)
GLUCOSE SERPL-MCNC: 182 MG/DL (ref 70–99)
INTERPRETATION ECG - MUSE: NORMAL
POTASSIUM SERPL-SCNC: 3.8 MMOL/L (ref 3.4–5.3)
SODIUM SERPL-SCNC: 139 MMOL/L (ref 133–144)

## 2019-04-09 PROCEDURE — G0378 HOSPITAL OBSERVATION PER HR: HCPCS

## 2019-04-09 PROCEDURE — 99220 ZZC INITIAL OBSERVATION CARE,LEVL III: CPT | Mod: Z6 | Performed by: NURSE PRACTITIONER

## 2019-04-09 PROCEDURE — 25000131 ZZH RX MED GY IP 250 OP 636 PS 637: Performed by: EMERGENCY MEDICINE

## 2019-04-09 PROCEDURE — 96361 HYDRATE IV INFUSION ADD-ON: CPT

## 2019-04-09 PROCEDURE — 96372 THER/PROPH/DIAG INJ SC/IM: CPT

## 2019-04-09 PROCEDURE — 25000132 ZZH RX MED GY IP 250 OP 250 PS 637: Performed by: NURSE PRACTITIONER

## 2019-04-09 PROCEDURE — 36415 COLL VENOUS BLD VENIPUNCTURE: CPT | Performed by: NURSE PRACTITIONER

## 2019-04-09 PROCEDURE — 96372 THER/PROPH/DIAG INJ SC/IM: CPT | Performed by: EMERGENCY MEDICINE

## 2019-04-09 PROCEDURE — 25800030 ZZH RX IP 258 OP 636: Performed by: NURSE PRACTITIONER

## 2019-04-09 PROCEDURE — 25000131 ZZH RX MED GY IP 250 OP 636 PS 637: Performed by: NURSE PRACTITIONER

## 2019-04-09 PROCEDURE — 80048 BASIC METABOLIC PNL TOTAL CA: CPT | Performed by: NURSE PRACTITIONER

## 2019-04-09 PROCEDURE — 00000146 ZZHCL STATISTIC GLUCOSE BY METER IP

## 2019-04-09 RX ORDER — ATORVASTATIN CALCIUM 20 MG/1
20 TABLET, FILM COATED ORAL DAILY
Status: DISCONTINUED | OUTPATIENT
Start: 2019-04-09 | End: 2019-04-10 | Stop reason: HOSPADM

## 2019-04-09 RX ORDER — NICOTINE POLACRILEX 4 MG
15-30 LOZENGE BUCCAL
Status: DISCONTINUED | OUTPATIENT
Start: 2019-04-09 | End: 2019-04-10 | Stop reason: HOSPADM

## 2019-04-09 RX ORDER — CLOPIDOGREL BISULFATE 75 MG/1
75 TABLET ORAL DAILY
Status: DISCONTINUED | OUTPATIENT
Start: 2019-04-09 | End: 2019-04-10 | Stop reason: HOSPADM

## 2019-04-09 RX ORDER — DEXTROSE MONOHYDRATE 25 G/50ML
25-50 INJECTION, SOLUTION INTRAVENOUS
Status: DISCONTINUED | OUTPATIENT
Start: 2019-04-09 | End: 2019-04-10 | Stop reason: HOSPADM

## 2019-04-09 RX ORDER — PANTOPRAZOLE SODIUM 40 MG/1
40 TABLET, DELAYED RELEASE ORAL 2 TIMES DAILY
Status: DISCONTINUED | OUTPATIENT
Start: 2019-04-09 | End: 2019-04-10 | Stop reason: HOSPADM

## 2019-04-09 RX ORDER — ONDANSETRON 2 MG/ML
4 INJECTION INTRAMUSCULAR; INTRAVENOUS EVERY 6 HOURS PRN
Status: DISCONTINUED | OUTPATIENT
Start: 2019-04-09 | End: 2019-04-10 | Stop reason: HOSPADM

## 2019-04-09 RX ORDER — FUROSEMIDE 40 MG
40 TABLET ORAL 2 TIMES DAILY
Status: DISCONTINUED | OUTPATIENT
Start: 2019-04-09 | End: 2019-04-10 | Stop reason: HOSPADM

## 2019-04-09 RX ORDER — METOPROLOL SUCCINATE 100 MG/1
100 TABLET, EXTENDED RELEASE ORAL DAILY
Status: DISCONTINUED | OUTPATIENT
Start: 2019-04-09 | End: 2019-04-10 | Stop reason: HOSPADM

## 2019-04-09 RX ORDER — SODIUM CHLORIDE 9 MG/ML
INJECTION, SOLUTION INTRAVENOUS CONTINUOUS
Status: DISCONTINUED | OUTPATIENT
Start: 2019-04-09 | End: 2019-04-10 | Stop reason: HOSPADM

## 2019-04-09 RX ORDER — LIDOCAINE 40 MG/G
CREAM TOPICAL
Status: DISCONTINUED | OUTPATIENT
Start: 2019-04-09 | End: 2019-04-10 | Stop reason: HOSPADM

## 2019-04-09 RX ADMIN — SACUBITRIL AND VALSARTAN 1 TABLET: 97; 103 TABLET, FILM COATED ORAL at 10:19

## 2019-04-09 RX ADMIN — FUROSEMIDE 40 MG: 40 TABLET ORAL at 16:03

## 2019-04-09 RX ADMIN — CLOPIDOGREL BISULFATE 75 MG: 75 TABLET, FILM COATED ORAL at 08:07

## 2019-04-09 RX ADMIN — PANTOPRAZOLE SODIUM 40 MG: 40 TABLET, DELAYED RELEASE ORAL at 20:41

## 2019-04-09 RX ADMIN — ATORVASTATIN CALCIUM 20 MG: 20 TABLET, FILM COATED ORAL at 08:07

## 2019-04-09 RX ADMIN — SACUBITRIL AND VALSARTAN 1 TABLET: 97; 103 TABLET, FILM COATED ORAL at 20:41

## 2019-04-09 RX ADMIN — INSULIN GLARGINE 27 UNITS: 100 INJECTION, SOLUTION SUBCUTANEOUS at 10:21

## 2019-04-09 RX ADMIN — FUROSEMIDE 40 MG: 40 TABLET ORAL at 08:07

## 2019-04-09 RX ADMIN — SODIUM CHLORIDE: 9 INJECTION, SOLUTION INTRAVENOUS at 08:03

## 2019-04-09 RX ADMIN — METOPROLOL SUCCINATE 100 MG: 100 TABLET, EXTENDED RELEASE ORAL at 08:07

## 2019-04-09 RX ADMIN — PANTOPRAZOLE SODIUM 40 MG: 40 TABLET, DELAYED RELEASE ORAL at 08:07

## 2019-04-09 RX ADMIN — INSULIN ASPART 8 UNITS: 100 INJECTION, SOLUTION INTRAVENOUS; SUBCUTANEOUS at 01:23

## 2019-04-09 NOTE — PLAN OF CARE
List all goals to be met before discharge home:    - Blood Glucose greater than 70 less than 250 on two consecutive readings.- Yes.   - Ketones absent from urine.- Yes   - Tolerating oral intake to maintain hydration.-Yes   - Safe disposition plan has been identified.- Yes     Received DM 2 education from NP. Pt to have heart cath this afternoon.

## 2019-04-09 NOTE — TELEPHONE ENCOUNTER
Lab notified me of critical high sugar of 690 and A1c 13.4. I called the patient and informed him of the result. He has cardiology procedure scheduled tomorrow. Informed patient he will have to cancel it. Clinically feels fine and his examination with Dr. Enciso today was good. Advice to go to [facility] or Clinic tomorrow to start Insulin. He indicates he was on insulin 5 years ago. Informed he needs to get back on Insulin.    Basil Matias MD

## 2019-04-09 NOTE — ED PROVIDER NOTES
History     Chief Complaint   Patient presents with     Hyperglycemia     HPI  Cole Jesus is a 58 year old male with a history of HTN, CAD, CRD stage IV, ischemic cardiomyopathy, atrial fibrillation, DM-2 who presents to the Emergency Department today for evaluation of hyperglycemia.  The patient reports that he was in clinic for a physical this morning and received a call this evening informing him that his blood glucose level was 690.  The patient reports that he was previously diagnosed with type 2 diabetes following a heart attack; however, he has not needed insulin for several years and states his blood glucose levels have been normal.  He reports that he has had increased thirst and urination for the past week.  The patient denies any blurry vision, fevers, chills.  No recent nausea, vomiting, diarrhea, chest pain, abdominal pain, shortness of breath, confusion.    I have reviewed the Medications, Allergies, Past Medical and Surgical History, and Social History in the [employer] system.    Past Medical History:   Diagnosis Date     Anemia in chronic renal disease 3/9/2015     Antiplatelet or antithrombotic long-term use      Atrial fibrillation and flutter      CAD (coronary artery disease)     Stemi in 12/11, s/p angioplasty     CRD (chronic renal disease), stage IV (H) 03/09/2015    hx ATN with dialysis complicating cardiogenic shock  1/2012     GI bleed     massive lower GI bleed secondary to a cecal ulcer, s/p ileocecal resection in 12/11     Heart failure     Biventricular systolic HF, complicated by ARDS requiring tracheostomy     Hyperlipidemia LDL goal <100 4/28/2013     Hypertension      Ischemic cardiomyopathy 4/28/2013    TTE revealing 40% EF     Other and unspecified nonspecific immunological findings     Anti JKa     Pulmonary edema     episodes of flash pulmonary edema in 12/11     Subclinical hypothyroidism        Past Surgical History:   Procedure Laterality Date     ESOPHAGOSCOPY,  GASTROSCOPY, DUODENOSCOPY (EGD), COMBINED  2011    Procedure:COMBINED ESOPHAGOSCOPY, GASTROSCOPY, DUODENOSCOPY (EGD); Surgeon:SAMARIA PUENTE; Location:[facility]     LAPAROTOMY EXPLORATORY  2011    Procedure:LAPAROTOMY EXPLORATORY; Explore laparotomy, Illeocectomy, Diverting Illeostomy; Surgeon:GABI MOSHER; Location:[facility]     ORIF right elbow fracture  age 12     TAKEDOWN ILEOSTOMY  2014    Procedure: TAKEDOWN ILEOSTOMY;  Surgeon: Gabi Mosher MD;  Location: [facility]     TRACHEOSTOMY  2011    Procedure:TRACHEOSTOMY; Tracheostomy; 80XLTCP-Proximal Extension-Cuffed 8.0 mm I.D.; Surgeon:LIZABETH DYE; Location:[facility]       Family History   Problem Relation Age of Onset     Diabetes Mother      Hypertension Mother      Heart Disease Mother      Heart Disease Father          of heart attack, left when he was young     Diabetes Sister      Diabetes Sister      Diabetes Sister        Social History     Tobacco Use     Smoking status: Former Smoker     Last attempt to quit: 12/3/2011     Years since quittin.3     Smokeless tobacco: Never Used   Substance Use Topics     Alcohol use: No     Alcohol/week: 0.0 oz       Current Facility-Administered Medications   Medication     insulin (regular) (HumuLIN R/NovoLIN R) 1 unit/mL injection 6 Units     sodium chloride 0.9% infusion     Current Outpatient Medications   Medication     atorvastatin (LIPITOR) 20 MG tablet     cholecalciferol (VITAMIN  -D) 1000 UNITS capsule     clopidogrel (PLAVIX) 75 MG tablet     furosemide (LASIX) 20 MG tablet     metoprolol succinate ER (TOPROL-XL) 100 MG 24 hr tablet     pantoprazole (PROTONIX) 40 MG EC tablet     sacubitril-valsartan (ENTRESTO)  MG per tablet     thiamine 100 MG tablet     vitamin  B complex with vitamin C (VITAMIN  B COMPLEX) TABS        Allergies   Allergen Reactions     Hydralazine      Black spots     Simvastatin Other (See Comments)     Leg muscle weakness      Tylenol [Acetaminophen] Palpitations      Review of Systems   Constitutional: Negative for fever.   Respiratory: Negative for shortness of breath.    Cardiovascular: Negative for chest pain.   Gastrointestinal: Negative for abdominal pain, diarrhea, nausea and vomiting.   Genitourinary: Positive for frequency.   Psychiatric/Behavioral: Negative for confusion.   All other systems reviewed and are negative.    Physical Exam   BP: 142/81  Pulse: 79  Heart Rate: 77  Temp: 97.8  F (36.6  C)  Resp: 18  Weight: 110.6 kg (243 lb 12.8 oz)  SpO2: 98 %    Physical Exam   Constitutional: He is oriented to person, place, and time. He appears well-developed and well-nourished. No distress.   HENT:   Head: Atraumatic.   Mouth/Throat: Oropharynx is clear and moist.   Eyes: Pupils are equal, round, and reactive to light. No scleral icterus.   Cardiovascular: Normal rate, regular rhythm, normal heart sounds and intact distal pulses.   Pulmonary/Chest: Breath sounds normal. No respiratory distress.   Abdominal: Soft. Bowel sounds are normal. There is no tenderness.   Musculoskeletal: He exhibits no edema or tenderness.   Neurological: He is alert and oriented to person, place, and time.   Skin: Skin is warm. No rash noted. He is not diaphoretic.   Nursing note and vitals reviewed.      ED Course   10:23 PM  The patient was seen and examined by Dr. Feliz in Room 01.       Procedures             Critical Care time:  none             Labs Ordered and Resulted from Time of ED Arrival Up to the Time of Departure from the ED   GLUCOSE MONITOR NURSING POCT   UA MACROSCOPIC WITH REFLEX TO MICRO AND CULTURE   CBC WITH PLATELETS DIFFERENTIAL   COMPREHENSIVE METABOLIC PANEL   MAGNESIUM   PERIPHERAL IV CATHETER          Assessments & Plan (with Medical Decision Making)     Cole Jesus is a 58 year old male with a history of HTN, CAD, CRD stage IV, ischemic cardiomyopathy, atrial fibrillation, DM-2 who presents to the Emergency Department  today for evaluation of hyperglycemia.  IV established, labs drawn sent reviewed and documented in epic remarkable for serum glucose of 649, BUN/creatinine of 30 and 2.2, urine with large glucosuria but negative for ketones, magnesium 2.3, normal anion gap at 9, normal bicarbonate 26.  Patient was given 250 cc bolus of IV normal saline followed by slow infusion.  He was given short acting insulin, NovoLog 6 units x2 followed by a units x1.  His repeat fingerstick still in the 400 range.  At this point, case discussed with ED observation unit with plan for endocrine consult and diabetic education in the morning in order to establish the patient on an appropriate insulin regimen.    I have reviewed the nursing notes.    I have reviewed the findings, diagnosis, plan and need for follow up with the patient.     Medication List      There are no discharge medications for this visit.       Final diagnoses:   Hyperglycemia   Anibal DE LA O, am serving as a trained medical scribe to document services personally performed by Amish Feliz MD, based on the provider's statements to me.   Amish DE LA O MD, was physically present and have reviewed and verified the accuracy of this note documented by Anibal Galo.    4/8/2019   [facility], EMERGENCY DEPARTMENT     Amish Feliz MD  04/09/19 [id number]

## 2019-04-09 NOTE — CONSULTS
Diabetes/Hyperglycemia Management Consult    Chief Complaint hyperglycemia ( glucose 690, A1C 13.4) uncontrolled diabetes, please advise on medications for discharge  Consult requested by: Bridgett Camejo CNP  History of Present Illness Cole Jesus is a 58 year old male with history of type 2 diabetes, hypertension, chronic renal disease stage IV, hyperlipidemia, subclinical hypothyroidism, ischemic cardiomyopathy, heart failure presented to the emergency room with hyperglycemia.. Admitted to Observation for further evaluation (4/92019) and for hyperglycemia.    Informaitno was obtained from review of medical records and interview with patient.     reports being diagnosed with diabetes when he had his heart attack in 12/2011. Family thought diabetes was due to severity of illness. He was discharged home on insulin both long acting and short acting. Was testing his blood sugars and taking insulin for approximately 6 months. Glucose remained  and stopped testing and taking insulin.  Has been experiencing polydipsia and polyuria, but contributed being dehydrated from a recent  GI. illness.  Per review, A1C on 12/3/2011, 7.3%, ( when initially dx).   Hgb A1C had been monitored since 2011 and range was in the pre-diabets category until (8/16/2016) when  A1C was 6.9%. A1C 7.3% (2/22/2018). A1C was ordered by cardiology on (8/16/2016) but unable to locate follow-up on the test results. Plus  Review of random glucose on chemistry noted elevated glucose and especially with glucose of 199 ( 10/31/2018) and 260 on (2/6/2019).       On presentation, glucose > 600 and A1C 13.4%, laboratory studies did not indicate DKA ( bicarb 26, anion gap 9,) was given aspart boluses x 4 = 24 units from 2243 to 0303. glucose gradually decreased from > 600--> 400's--> 205 by morning.    Currently, denies fever, chills, chest pain, shortness of breath, abdominal pain, nausea and or vomiting, bowel or bladder issues.      Review of  "diet:  Breakfast: oatmeal with brown sugar versus Rice Krispie cereal, banana and 2 pieces of toast with butter.  Lunch: does not always eat. May have peanut butter sandwich or sleeve of saltine crackers or white rice  Dinner: meat, veggies and some starch like Rice-a-Fred  Snacks: ice cream, cake, chris crackers, popsicles  -does not eat much meat, likes carbohydrates    Recent Labs   Lab 04/09/19  0635 04/09/19  0603 04/09/19  0429 04/09/19  0250 04/09/19  0216 04/09/19  0113 04/08/19  2347  04/08/19  2207  04/08/19  1831   *  --   --   --   --   --   --   --  649*  --  690*   BGM  --  172* 224* 347* 405* 482* >600*   < >  --    < >  --     < > = values in this interval not displayed.         Diabetes Type:   Type 2  Diabetes Duration: dx in 2011  Usual Diabetes Regimen: per chart, \"diet controlled\"      Ability to [place] Prescribed Regimen: unknown  Diabetes Control:   Lab Results   Component Value Date    A1C 13.4 04/08/2019    A1C 7.3 02/22/2018    A1C 6.9 08/16/2016    A1C 5.7 03/26/2014    A1C 5.9 07/10/2013     Diabetes Complications: peripheral neuropathy, + albuminuria   History of DKA: no  Able to Detect Hypoglycemia: not experienced hypoglycemia  Usual Diabetes Care Provider: Dr. Enciso,   Factors Impacting Glucose Control: diet, ? Decrease in exercise/activity      Review of Systems  10 point ROS completed with pertinent positives and negatives noted in the HPI    Past medical, family and social histories are reviewed and updated.    Past Medical History  Past Medical History:   Diagnosis Date     Anemia in chronic renal disease 3/9/2015     Antiplatelet or antithrombotic long-term use      Atrial fibrillation and flutter      CAD (coronary artery disease)     Stemi in 12/11, s/p angioplasty     CRD (chronic renal disease), stage IV (H) 03/09/2015    hx ATN with dialysis complicating cardiogenic shock  1/2012     GI bleed     massive lower GI bleed secondary to a cecal ulcer, s/p ileocecal " resection in      Heart failure     Biventricular systolic HF, complicated by ARDS requiring tracheostomy     Hyperlipidemia LDL goal <100 2013     Hypertension      Ischemic cardiomyopathy 2013    TTE revealing 40% EF     Other and unspecified nonspecific immunological findings     Anti JKa     Pulmonary edema     episodes of flash pulmonary edema in      Subclinical hypothyroidism        Family History  Family History   Problem Relation Age of Onset     Diabetes Mother      Hypertension Mother      Heart Disease Mother      Heart Disease Father          of heart attack, left when he was young     Diabetes Sister      Diabetes Sister      Diabetes Sister        Social History  Social History     Socioeconomic History     Marital status: Significant other     Spouse name: None     Number of children: None     Years of education: None     Highest education level: None   Occupational History     None   Social Needs     Financial resource strain: None     Food insecurity:     Worry: None     Inability: None     Transportation needs:     Medical: None     Non-medical: None   Tobacco Use     Smoking status: Former Smoker     Last attempt to quit: 12/3/2011     Years since quittin.3     Smokeless tobacco: Never Used   Substance and Sexual Activity     Alcohol use: No     Alcohol/week: 0.0 oz     Drug use: No     Sexual activity: Yes     Partners: Female   Lifestyle     Physical activity:     Days per week: None     Minutes per session: None     Stress: None   Relationships     Social connections:     Talks on phone: None     Gets together: None     Attends [employer] service: None     Active member of club or organization: None     Attends meetings of clubs or organizations: None     Relationship status: None     Intimate partner violence:     Fear of current or ex partner: None     Emotionally abused: None     Physically abused: None     Forced sexual activity: None   Other Topics Concern      Parent/sibling w/ CABG, MI or angioplasty before 65F 55M? Yes      Service Not Asked     Blood Transfusions Not Asked     Caffeine Concern Not Asked     Occupational Exposure Not Asked     Hobby Hazards Not Asked     Sleep Concern Not Asked     Stress Concern Not Asked     Weight Concern Not Asked     Special Diet Not Asked     Back Care Not Asked     Exercise Yes     Comment: limited walking     Bike Helmet Not Asked     Seat Belt Not Asked     Self-Exams Not Asked   Social History Narrative     None         Physical Exam  /70 (BP Location: Left arm)   Pulse 75   Temp 98  F (36.7  C) (Oral)   Resp 16   Wt 110.6 kg (243 lb 12.8 oz)   SpO2 99%   BMI 37.07 kg/m      General:  pleasant  resting in bed, in no distress.   HEENT: PER and anicteric, non-injected, oral mucous membranes moist.   Lungs:  Non-labored  ABD: soft  Skin: warm and dry  MSK:  Moving all extremties  Mental status:  alert, oriented x3, communicating clearly  Psych:  calm, even mood    Laboratory  Recent Labs   Lab Test 04/09/19  0635 04/08/19  2207    129*   POTASSIUM 3.8 4.2   CHLORIDE 104 94   CO2 28 26   ANIONGAP 7 9   * 649*   BUN 36* 38*   CR 2.16* 2.20*   RONNY 8.9 8.2*     CBC RESULTS:   Recent Labs   Lab Test 04/08/19 2207   WBC 7.6   RBC 4.74   HGB 14.3   HCT 43.7   MCV 92   MCH 30.2   MCHC 32.7   RDW 13.0          Liver Function Studies -   Recent Labs   Lab Test 04/08/19 2207   PROTTOTAL 7.6   ALBUMIN 3.4   BILITOTAL 0.9   ALKPHOS 111   AST 12   ALT 18       Active Medications  Current Facility-Administered Medications   Medication     atorvastatin (LIPITOR) tablet 20 mg     clopidogrel (PLAVIX) tablet 75 mg     glucose gel 15-30 g    Or     dextrose 50 % injection 25-50 mL    Or     glucagon injection 1 mg     furosemide (LASIX) tablet 40 mg     insulin aspart (NovoLOG) inj (RAPID ACTING)     insulin aspart (NovoLOG) inj (RAPID ACTING)     lidocaine (LMX4) cream     lidocaine 1 % 0.1-1 mL      metoprolol succinate ER (TOPROL-XL) 24 hr tablet 100 mg     ondansetron (ZOFRAN) injection 4 mg     pantoprazole (PROTONIX) EC tablet 40 mg     prochlorperazine (COMPAZINE) injection 10 mg     sacubitril-valsartan (ENTRESTO)  MG per tablet 1 tablet     sodium chloride (PF) 0.9% PF flush 3 mL     sodium chloride (PF) 0.9% PF flush 3 mL     sodium chloride 0.9% infusion     sodium chloride 0.9% infusion     No current outpatient medications on file.       Current Diet  Orders Placed This Encounter      Moderate Consistent CHO Diet        Assessment: Cole Jesus is a 58 year old male with history of type 2 diabetes, hypertension, chronic renal disease stage IV, hyperlipidemia, subclinical hypothyroidism, ischemic cardiomyopathy, heart failure presented to the emergency room with hyperglycemia.. Admitted to Observation for further evaluation (4/92019) and for hyperglycemia.    Possible HHS ( hyperosmolar hyperglycemia State): no evidence of DKA, but glcusoe > 600, no ketoacidosis, do not have a serum osmolality    Type 2 diabetic: poorly controlled with A1C > 13 %. Unclear why such a dramatic jump in A1C from 7.3% (2/22/2018) to 13.4% (4/8/2019) when all other increase was more gradual.   reviewed possibility of weight gain, see results as listed below, but no significant increase in weight.  Possible decrease in activity, no new s/s of infection    Wt Readings from Last 4 Encounters:   04/08/19 110.6 kg (243 lb 12.8 oz)   04/08/19 110.2 kg (243 lb)   02/06/19 109 kg (240 lb 6.4 oz)   09/12/18 113.9 kg (251 lb)     - due to chronic renal disease and A1C > 13, will start on insulin.  At follow-up appointment with Endocrinology, consider adding SGLT2   Plan  - start with Lantus 27 units  -novolog 1 unit per 10 grams of CHO for meals and snacks  -novolog custom correction scale 1 unit per 30 > 140 before meals and > 200 HS  Addendum: changed to 1 unit per 25 > 140 before meals and > 200 HS  -monitor glucose  before meals, HS and 0200  -will continue to follow  -consults placed for RD and diabetic educator  -will follow-up with [facility] for diabetes    Beck Sun, -1620    Diabetes Management Team job code: [id number]  Time Spent on this Encounter   I spent 80 minutes on the unit/floor managing the care of Cole Jesus. Over 50% of my time was spent counseling the patient and/or coordinating care regarding services listed in this note.

## 2019-04-09 NOTE — PROGRESS NOTES
S. He remains asymptomatic, he was surprised when his glucose was so high as he felt ok.    O. He is alert, vss, glucose has come down to acceptable range. Lab and imaging studies, medical records, mediations and consults reviewed.     A/P  Marked hyperglycemia, type 2 DM. Discussed with endocrine and in detail with JOSE. Will follow their recommendations and keep pt overnight to more accurately assess insulin needs, Plan discharge likely tomorrow.

## 2019-04-09 NOTE — PLAN OF CARE
List all goals to be met before discharge home:    - Blood Glucose greater than 70 less than 250 on two consecutive readings.- No.   - Ketones absent from urine.- Yes   - Tolerating oral intake to maintain hydration.-Yes   - Safe disposition plan has been identified.- Yes

## 2019-04-09 NOTE — H&P
"[facility], [place]    History and Physical - ED Observation       Date of Admission:  4/8/2019    Assessment & Plan   Cole Jesus is a 58 year old male with a history of HTN, CAD, CKD stage IV, ischemic cardiomyopathy, atrial fibrillation, DM-2 who presents to the Emergency Department today for evaluation of hyperglycemia.     1. Hypergylcemia: Pt reports primary care visit and labs yesterday, 4/9/19 in preparation for his annual right heart cath. Clinic called him in the evening and sent to ED due to serum glucose 690 and HgbA1c 13.4. Pt and wife both had diarrhea for 1-2 days about a month ago with polydipsia and polyuria ever since. Pt was diagnosed with diabetes at the time of his MI, 7 yrs ago. He used insulin for several months then \"didn't need it anymore\". In the ED, /79, HR 82, temp 97.8, RR 18, 98% on RA. remarkable for serum glucose of 649, BUN/creatinine of 30 and 2.2, urine with large glucosuria but negative for ketones, magnesium 2.3, normal anion gap at 9, normal bicarbonate 26. HgbA1c 13.4.  Patient was given 250 cc bolus of IV normal saline followed by slow infusion.  He was given short acting insulin, NovoLog 6 units x2 followed by 8 units x1. His repeat fingerstick still in the 400 range, he was given another 4 units.  On arrival in Obs, the patient was stable.  -Admit to ED Obs  -VS Q4  -BMP STAT  -IVF at 125ml/hr  -Novolog per sliding scale  -Endocrine consult  -Diabetes CNS consult  -[facility] Novolog      Chronic Medical Problems  ##CAD: continue PTA Lipitor   ##Ischemic cardiomyopathy: scheduled for right heart cath and resting echo today, cath lab has been notified pt wont make it. Continue PTA Plavix, Lasix, Entresto  ##HTN: Continue PTA metoprolol  ##CKD stage IV       Diet: NPO  DVT Prophylaxis: Low Risk/Ambulatory with no VTE prophylaxis indicated  [place]once Catheter: in place, indication:    Code Status: full    Disposition Plan   Expected discharge: " "Tomorrow, recommended to prior living arrangement once blood glucose stablized.  Entered: Bridgett Camejo CNP 04/09/2019, 3:34 AM         Bridgett Camejo CNP  [facility], [place]  ED Observation, 6D  [place] [id number]    ______________________________________________________________________    Chief Complaint   Hyperglycemia    History is obtained from the patient    History of Present Illness   Per ED, \"Cole Jesus is a 58 year old male with a history of HTN, CAD, CRD stage IV, ischemic cardiomyopathy, atrial fibrillation, DM-2 who presents to the Emergency Department today for evaluation of hyperglycemia.  The patient reports that he was in clinic for a physical this morning and received a call this evening informing him that his blood glucose level was 690.  The patient reports that he was previously diagnosed with type 2 diabetes following a heart attack; however, he has not needed insulin for several years and states his blood glucose levels have been normal.  He reports that he has had increased thirst and urination for the past week.  The patient denies any blurry vision, fevers, chills.  No recent nausea, vomiting, diarrhea, chest pain, abdominal pain, shortness of breath, confusion.\"      Review of Systems    The 10 point Review of Systems is negative other than noted in the HPI or here.   Constitutional: blurry vision, thirsty  : urinary frequency    Past Medical History    I have reviewed this patient's medical history and updated it with pertinent information if needed.   Past Medical History:   Diagnosis Date     Anemia in chronic renal disease 3/9/2015     Antiplatelet or antithrombotic long-term use      Atrial fibrillation and flutter      CAD (coronary artery disease)     Stemi in 12/11, s/p angioplasty     CRD (chronic renal disease), stage IV (H) 03/09/2015    hx ATN with dialysis complicating cardiogenic shock  1/2012     GI bleed     massive lower GI bleed secondary to a " cecal ulcer, s/p ileocecal resection in      Heart failure     Biventricular systolic HF, complicated by ARDS requiring tracheostomy     Hyperlipidemia LDL goal <100 2013     Hypertension      Ischemic cardiomyopathy 2013    TTE revealing 40% EF     Other and unspecified nonspecific immunological findings     Anti JKa     Pulmonary edema     episodes of flash pulmonary edema in      Subclinical hypothyroidism        Past Surgical History   I have reviewed this patient's surgical history and updated it with pertinent information if needed.  Past Surgical History:   Procedure Laterality Date     ESOPHAGOSCOPY, GASTROSCOPY, DUODENOSCOPY (EGD), COMBINED  2011    Procedure:COMBINED ESOPHAGOSCOPY, GASTROSCOPY, DUODENOSCOPY (EGD); Surgeon:SAMARIA PUENTE; Location:[facility]     LAPAROTOMY EXPLORATORY  2011    Procedure:LAPAROTOMY EXPLORATORY; Explore laparotomy, Illeocectomy, Diverting Illeostomy; Surgeon:GABI MOSHER; Location:[facility] OR     ORIF right elbow fracture  age 12     TAKEDOWN ILEOSTOMY  2014    Procedure: TAKEDOWN ILEOSTOMY;  Surgeon: Gabi Mosher MD;  Location: [facility] OR     TRACHEOSTOMY  2011    Procedure:TRACHEOSTOMY; Tracheostomy; 80XLTCP-Proximal Extension-Cuffed 8.0 mm I.D.; Surgeon:LIZABETH DYE; Location:[facility] OR       Social History   I have reviewed this patient's social history and updated it with pertinent information if needed.  Social History     Tobacco Use     Smoking status: Former Smoker     Last attempt to quit: 12/3/2011     Years since quittin.3     Smokeless tobacco: Never Used   Substance Use Topics     Alcohol use: No     Alcohol/week: 0.0 oz     Drug use: No       Family History   I have reviewed this patient's family history and updated it with pertinent information if needed.   Family History   Problem Relation Age of Onset     Diabetes Mother      Hypertension Mother      Heart Disease Mother      Heart Disease  Father          of heart attack, left when he was young     Diabetes Sister      Diabetes Sister      Diabetes Sister        Prior to Admission Medications   Prior to Admission Medications   Prescriptions Last Dose Informant Patient Reported? Taking?   atorvastatin (LIPITOR) 20 MG tablet   No No   Sig: Take 1 tablet (20 mg) by mouth daily   cholecalciferol (VITAMIN  -D) 1000 UNITS capsule   No No   Sig: Take 2 capsules (2,000 Units) by mouth daily   clopidogrel (PLAVIX) 75 MG tablet   No No   Sig: TAKE ONE TABLET BY MOUTH EVERY DAY   furosemide (LASIX) 20 MG tablet   No No   Sig: Take 2 tablets (40 mg) by mouth 2 times daily   metoprolol succinate ER (TOPROL-XL) 100 MG 24 hr tablet   No No   Sig: Take 1 tablet (100 mg) by mouth daily   pantoprazole (PROTONIX) 40 MG EC tablet   No No   Sig: TAKE ONE TABLET BY MOUTH TWICE A DAY   sacubitril-valsartan (ENTRESTO)  MG per tablet   No No   Sig: Take 1 tablet by mouth 2 times daily   thiamine 100 MG tablet   No No   Sig: Take 1 tablet (100 mg) by mouth daily   vitamin  B complex with vitamin C (VITAMIN  B COMPLEX) TABS   Yes No   Sig: Take 1 tablet by mouth daily      Facility-Administered Medications: None     Allergies   Allergies   Allergen Reactions     Hydralazine      Black spots     Simvastatin Other (See Comments)     Leg muscle weakness     Tylenol [Acetaminophen] Palpitations       Physical Exam   Vital Signs: Temp: 98.6  F (37  C) Temp src: Oral BP: 135/78 Pulse: 77 Heart Rate: 69 Resp: 15 SpO2: 98 %      Weight: 243 lbs 12.8 oz    Constitutional: awake, alert, cooperative, no apparent distress, and appears stated age  Eyes: Lids and lashes normal, pupils equal, round and reactive to light, extra ocular muscles intact, sclera clear, conjunctiva normal  Respiratory: No increased work of breathing, good air exchange, clear to auscultation bilaterally, no crackles or wheezing  Cardiovascular: Normal apical impulse, regular rate and rhythm, normal S1 and  S2, no S3 or S4, and no murmur noted  GI: No scars, normal bowel sounds, soft, non-distended, non-tender  Skin: normal skin color, texture, turgor  Musculoskeletal: There is no redness, warmth, or swelling of the joints.  Full range of motion noted.  Motor strength is 5 out of 5 all extremities bilaterally.  Tone is normal.  Neurologic: Awake, alert, oriented to name, place and time.  Cranial nerves II-XII are grossly intact.     Data   Data reviewed today: I reviewed all medications, new labs and imaging results over the last 24 hours. I personally reviewed the chest xray:     1. Chronic bilateral pleural effusions, decreased compared to most  recent comparison from 10/31/2016.  2. Associated basilar streaky opacities, atelectasis versus  consolidation.    Recent Labs   Lab 04/08/19  2207 04/08/19  1831   WBC 7.6 7.4   HGB 14.3 14.3   MCV 92 92    204   * 128*   POTASSIUM 4.2 4.1   CHLORIDE 94 94   CO2 26 26   BUN 38* 38*   CR 2.20* 2.19*   ANIONGAP 9 8   RONNY 8.2* 8.4*   * 690*   ALBUMIN 3.4 3.6   PROTTOTAL 7.6 7.8   BILITOTAL 0.9 1.0   ALKPHOS 111 112   ALT 18 16   AST 12 13

## 2019-04-09 NOTE — CONSULTS
"Diabetes Education  Received consult request to see this 58 year old male for diabetes education.  Patient presented to ED after glucose found to be >600 mg/dL during physical exam 4/8/19.    Patient with history of type 2 diabetes since ~2012.  He states he did take insulin, both long-acting and rapid-acting but this was discontinued after a few months as his glucoses were well controlled.  He did use insulin pens.  He no longer has a blood glucose monitor.    Demonstrated insulin pen and injection technique; he stated \"it's all coming back to me now.\"    Provided with, and instructed on, use of an Accu-chek Guide blood glucose monitor kit.  He stated \"it seems pretty easy.\"  Discussed that testing frequency will be pre-meal and bedtime.    Patient states he had been feeling well, and was surprised with glucose results.    When discharged, will need prescriptions for:  1.  Accu-chek Guide test strips  2.  Accu-chek FastClix lancet drums  3.  Insulin pen needles (4mm, 32 gauge)  4.  Sharps container  5.  Alcohol swabs    Sheba Archer MS, APRN, CNS, CDE, CDTC  [phone]    "

## 2019-04-09 NOTE — TELEPHONE ENCOUNTER
Silva from [facility] lab calling with a critical blood glucose lab from today. They have not been able to reach Dr Enciso with this, so they are calling for my assistance. I called the  for [facility] clinic and Dr Matias is on call for Dr Enciso. He is on his cell phone, so the  connected us directly.  I asked Dr Matias to call Silva at the lab at [phone], as my phone could not transfer him when the  has been involoved. Dr Matias voices understanding and is in agreement with this plan.     Monet Doty RN, [facility]

## 2019-04-09 NOTE — PLAN OF CARE
List all goals to be met before discharge home:    - Blood Glucose greater than 70 less than 250 on two consecutive readings.- No.   - Ketones absent from urine.- Yes   - Tolerating oral intake to maintain hydration.-Yes   - Safe disposition plan has been identified.- Yes     Pt to have possible heart cath today (was already scheduled before admission). Up ad dary. Voiding. No BM. Compliant and receptive to diabetes education. Continue with POC.

## 2019-04-09 NOTE — PROGRESS NOTES
CLINICAL NUTRITION SERVICES    Reason for Assessment:  T2DM nutrition/diet education    Diet History:  Pt shares history of receiving education of carbohydrate sources in the past, but it was several years ago (~2011). In the past, pt reports being on a fixed insulin regimen.     Nutrition Prescription/Recs:  Continue Moderate-Consistent CHO diet.      Interventions:  Nutrition Education - Provided written and verbal instruction on sources of carbohydrate by food group, reading nutrition labels consistent carbohydrate intakes (60-75 gm CHO/meal and 15-30 gm CHO/snack), portion sizes, pairing carbohydrate intake with protein/fat foods.    Patient to ask any further nutrition-related questions before discharge.  In addition, pt may request outpatient RD appointment.    Ofelia Moreno RD, LD   6D RD Pager: [phone]

## 2019-04-09 NOTE — PROGRESS NOTES
"[facility], [facility] Progress Note - Emergency Department Observation Unit       Date of Admission:  4/8/2019  Assessment & Plan   Cole Jesus is a 58 year old male with a history of HTN, CAD, CKD stage IV, ischemic cardiomyopathy, atrial fibrillation, DM-2 who presents to the Emergency Department today for evaluation of hyperglycemia.      1. Hypergylcemia: Pt reports primary care visit and labs yesterday, 4/9/19 in preparation for his annual right heart cath. Clinic called him in the evening and sent to ED due to serum glucose 690 and HgbA1c 13.4. Pt and wife both had diarrhea for 1-2 days about a month ago with polydipsia and polyuria ever since. Pt was diagnosed with diabetes at the time of his MI, 7 yrs ago. He used insulin for several months then \"didn't need it anymore\". Glucose in the ED was 649. BG over the course of his obs stay: > 600, 482, 405, 347, 224, 172, 182.  BUN/creatinine of 36 and 2.16, urine with large glucosuria but negative for ketones.  HgbA1c 13.4.  Patient was given 250 cc bolus of IV normal saline followed by slow infusion. He was not started on insulin pump.  He was given short acting insulin, NovoLog 6 units x2 followed by 8 units x1. On arrival in Obs, the patient was stable.  -VS Q4  -IVF at 125ml/hr  -Novolog per sliding scale  -Endocrine consulted and recommended  - start with Lantus 27 units  -novolog 1 unit per 10 grams of CHO for meals and snacks  -novolog custom correction scale 1 unit per 30 . 140 before meals and > 200 HS    2.CKD: stage IV BUN/creatinine of 36 and 2.16, urine with large glucosuria but negative for ketones  - Avoid nephrotoxic meds  - Repeat BMP in am     Chronic Medical Problems  ##CAD: continue PTA Lipitor   ##Ischemic cardiomyopathy: scheduled for right heart cath this am. Cancelled it due to patient's metabolic state. Patient will reschedule once blood sugars are normalized.  Continue PTA Plavix, Lasix, " Entresto  ##HTN: Continue PTA metoprolol    Diet: Moderate Consistent CHO Diet    DVT Prophylaxis: Low Risk/Ambulatory with no VTE prophylaxis indicated  Ponce Catheter: in place, indication:    Code Status: Full Code      Disposition Plan   Expected discharge: Tomorrow, recommended to prior living arrangement once BG WNL and insulin regimen recommended. .  Entered: GUILLE Diaz CNP 04/09/2019, 1:26 PM       The patient's care was discussed with the Attending Physician, Dr. Santana, Bedside Nurse, Care Coordinator/ and Patient.    GUILLE Hernandez, NP  Emergency Department  [phone] Ex [phone]  ______________________________________________________________________    Interval History   No events overnight    Data reviewed today: I reviewed all medications, new labs and imaging results over the last 24 hours.    Physical Exam   Vital Signs: Temp: 98  F (36.7  C) Temp src: Oral BP: 107/70 Pulse: 75 Heart Rate: 76 Resp: 16 SpO2: 99 % O2 Device: None (Room air)    Weight: 243 lbs 12.8 oz  Constitutional: awake, alert, cooperative, no apparent distress, and appears stated age  Eyes: Lids and lashes normal, pupils equal, round and reactive to light, extra ocular muscles intact, sclera clear, conjunctiva normal  Respiratory: No increased work of breathing, good air exchange, clear to auscultation bilaterally, no crackles or wheezing  Cardiovascular: Normal apical impulse, regular rate and rhythm, normal S1 and S2, no S3 or S4, and no murmur noted  GI: No scars, normal bowel sounds, soft, non-distended, non-tender  Skin: normal skin color, texture, turgor  Musculoskeletal: There is no redness, warmth, or swelling of the joints.  Full range of motion noted.  Motor strength is 5 out of 5 all extremities bilaterally.  Tone is normal.  Neurologic: Awake, alert, oriented to name, place and time.  Cranial nerves II-XII are grossly intact.         Data   Recent Labs   Lab 04/09/19  0635 04/08/19  3399  04/08/19  1831   WBC  --  7.6 7.4   HGB  --  14.3 14.3   MCV  --  92 92   PLT  --  199 204    129* 128*   POTASSIUM 3.8 4.2 4.1   CHLORIDE 104 94 94   CO2 28 26 26   BUN 36* 38* 38*   CR 2.16* 2.20* 2.19*   ANIONGAP 7 9 8   RONNY 8.9 8.2* 8.4*   * 649* 690*   ALBUMIN  --  3.4 3.6   PROTTOTAL  --  7.6 7.8   BILITOTAL  --  0.9 1.0   ALKPHOS  --  111 112   ALT  --  18 16   AST  --  12 13     Recent Results (from the past 24 hour(s))   XR Chest Port 1 View    Narrative    XR CHEST PORT 1 VW 4/8/2019 10:15 PM    Comparison: 10/31/2016    History: ? infx    Findings: AP views of the chest. Cardiac silhouette is enlarged.   Pulmonary vasculature is distinct. Pleural effusions, decreased on the  right. Streaky basilar associated opacities. No pneumothorax. The  upper abdomen is unremarkable.       Impression    Impression:   1. Chronic bilateral pleural effusions, decreased compared to most  recent comparison from 10/31/2016.  2. Associated basilar streaky opacities, atelectasis versus  consolidation.    I have personally reviewed the examination and initial interpretation  and I agree with the findings.    DOROTA TA MD     Medications     sodium chloride 125 mL/hr at 04/09/19 0900       atorvastatin  20 mg Oral Daily     clopidogrel  75 mg Oral Daily     furosemide  40 mg Oral BID     insulin aspart   Subcutaneous TID w/meals     insulin aspart  1-8 Units Subcutaneous TID w/meals     insulin aspart  1-6 Units Subcutaneous At Bedtime     insulin glargine  27 Units Subcutaneous Q24H     metoprolol succinate ER  100 mg Oral Daily     pantoprazole  40 mg Oral BID     sacubitril-valsartan  1 tablet Oral BID     sodium chloride (PF)  3 mL Intracatheter Q8H

## 2019-04-09 NOTE — ED TRIAGE NOTES
Called by MD due to high sugar >600,   No history of diabetes started intrestal higher dose about two months ago    Has heart history, takes metropolol

## 2019-04-10 VITALS
TEMPERATURE: 97.8 F | DIASTOLIC BLOOD PRESSURE: 84 MMHG | OXYGEN SATURATION: 96 % | WEIGHT: 243.8 LBS | SYSTOLIC BLOOD PRESSURE: 130 MMHG | HEART RATE: 70 BPM | BODY MASS INDEX: 37.07 KG/M2 | RESPIRATION RATE: 16 BRPM

## 2019-04-10 LAB
ANION GAP SERPL CALCULATED.3IONS-SCNC: 10 MMOL/L (ref 3–14)
BUN SERPL-MCNC: 34 MG/DL (ref 7–30)
CALCIUM SERPL-MCNC: 8.8 MG/DL (ref 8.5–10.1)
CHLORIDE SERPL-SCNC: 104 MMOL/L (ref 94–109)
CO2 SERPL-SCNC: 27 MMOL/L (ref 20–32)
CREAT SERPL-MCNC: 2.27 MG/DL (ref 0.66–1.25)
GFR SERPL CREATININE-BSD FRML MDRD: 30 ML/MIN/{1.73_M2}
GLUCOSE BLDC GLUCOMTR-MCNC: 220 MG/DL (ref 70–99)
GLUCOSE BLDC GLUCOMTR-MCNC: 239 MG/DL (ref 70–99)
GLUCOSE BLDC GLUCOMTR-MCNC: 256 MG/DL (ref 70–99)
GLUCOSE SERPL-MCNC: 251 MG/DL (ref 70–99)
POTASSIUM SERPL-SCNC: 4.1 MMOL/L (ref 3.4–5.3)
SODIUM SERPL-SCNC: 141 MMOL/L (ref 133–144)

## 2019-04-10 PROCEDURE — 00000146 ZZHCL STATISTIC GLUCOSE BY METER IP

## 2019-04-10 PROCEDURE — 99217 ZZC OBSERVATION CARE DISCHARGE: CPT | Mod: Z6 | Performed by: EMERGENCY MEDICINE

## 2019-04-10 PROCEDURE — G0378 HOSPITAL OBSERVATION PER HR: HCPCS

## 2019-04-10 PROCEDURE — 96372 THER/PROPH/DIAG INJ SC/IM: CPT

## 2019-04-10 PROCEDURE — 80048 BASIC METABOLIC PNL TOTAL CA: CPT | Performed by: NURSE PRACTITIONER

## 2019-04-10 PROCEDURE — 36415 COLL VENOUS BLD VENIPUNCTURE: CPT | Performed by: NURSE PRACTITIONER

## 2019-04-10 PROCEDURE — 25000132 ZZH RX MED GY IP 250 OP 250 PS 637: Performed by: NURSE PRACTITIONER

## 2019-04-10 RX ORDER — CONTAINER,EMPTY
1 EACH MISCELLANEOUS
Qty: 1 EACH | Refills: 3 | Status: ON HOLD | OUTPATIENT
Start: 2019-04-10 | End: 2021-01-01

## 2019-04-10 RX ORDER — BLOOD PRESSURE TEST KIT
1 KIT MISCELLANEOUS
Qty: 100 EACH | Refills: 3 | Status: ON HOLD | OUTPATIENT
Start: 2019-04-10 | End: 2021-01-01

## 2019-04-10 RX ORDER — LANCETS
EACH MISCELLANEOUS
Qty: 102 EACH | Refills: 3 | Status: SHIPPED | OUTPATIENT
Start: 2019-04-10 | End: 2019-09-16

## 2019-04-10 RX ADMIN — ATORVASTATIN CALCIUM 20 MG: 20 TABLET, FILM COATED ORAL at 07:39

## 2019-04-10 RX ADMIN — CLOPIDOGREL BISULFATE 75 MG: 75 TABLET, FILM COATED ORAL at 07:38

## 2019-04-10 RX ADMIN — SACUBITRIL AND VALSARTAN 1 TABLET: 97; 103 TABLET, FILM COATED ORAL at 09:10

## 2019-04-10 RX ADMIN — PANTOPRAZOLE SODIUM 40 MG: 40 TABLET, DELAYED RELEASE ORAL at 07:38

## 2019-04-10 RX ADMIN — FUROSEMIDE 40 MG: 40 TABLET ORAL at 07:38

## 2019-04-10 RX ADMIN — METOPROLOL SUCCINATE 100 MG: 100 TABLET, EXTENDED RELEASE ORAL at 07:39

## 2019-04-10 NOTE — PLAN OF CARE
Observation goals PRIOR TO DISCHARGE     Comments: List all goals to be met before discharge home:   - Blood Glucose greater than 70 less than 250 on two consecutive readings.   No    - Ketones absent from urine.   yes  - Tolerating oral intake to maintain hydration yes  - Safe disposition plan has been identified  yes  - Nurse to notify provider when observation goals have been met and patient is ready for discharge.

## 2019-04-10 NOTE — PLAN OF CARE
List all goals to be met before discharge home:    - Blood Glucose greater than 70 less than 250 on two consecutive readings.- No.   - Ketones absent from urine.- Yes   - Tolerating oral intake to maintain hydration.-Yes   - Safe disposition plan has been identified.- Yes   Pt ate, voided well. Pt denies pain or discomfort.  Will continue to monitor.  /72 (BP Location: Left arm)   Pulse 75   Temp 98.4  F (36.9  C) (Oral)   Resp 16   Wt 110.6 kg (243 lb 12.8 oz)   SpO2 97%   BMI 37.07 kg/m

## 2019-04-10 NOTE — PLAN OF CARE
Observation goals:  - Blood Glucose greater than 70 less than 250 on two consecutive readings - not met, BG at 0200 was 256  - Ketones absent from urine - met  - Tolerating oral intake to maintain hydration - met  - Safe disposition plan has been identified - met     A&Ox4, afebrile, AVSS on RA; on tele, SR, HR 60s-70s. Denies pain, nausea or dyspnea. Up ad dary. Voiding spontaneously. RPIV SL. BG at 0200 256, no insulin given per order. Will continue to monitor and follow POC.    /80 (BP Location: Left arm)   Pulse 70   Temp 98  F (36.7  C) (Oral)   Resp 18   Wt 110.6 kg (243 lb 12.8 oz)   SpO2 96%   BMI 37.07 kg/m

## 2019-04-10 NOTE — PLAN OF CARE
Observation goals:  - Blood Glucose greater than 70 less than 250 on two consecutive readings - not met, last   - Ketones absent from urine - met  - Tolerating oral intake to maintain hydration - met  - Safe disposition plan has been identified - met    A&Ox4, afebrile, AVSS on RA; on tele, SR, HR mid-60s. Denies pain, nausea or dyspnea. Voiding spontaneously. RPIV SL. Will continue to monitor and follow POC.

## 2019-04-10 NOTE — PROGRESS NOTES
Diabetes Consult Daily  Progress Note          Assessment/Plan:    Cole Jesus is a 58 year old male with history of type 2 diabetes, hypertension, chronic renal disease stage IV, hyperlipidemia, subclinical hypothyroidism, ischemic cardiomyopathy, heart failure presented to the emergency room with hyperglycemia.. Admitted to Observation for further evaluation (4/92019) and for hyperglycemia.     Type 2 diabetic: poorly controlled with A1C > 13 %. Unclear why such a dramatic jump in A1C from 7.3% (2/22/2018) to 13.4% (4/8/2019) when all other increase was more gradual.     - due to chronic renal disease and A1C > 13, will start on insulin.  At follow-up appointment with Endocrinology, consider adding SGLT2     Plan  - start with Lantus 27 units  -novolog 1 unit per 10 grams of CHO for meals and snacks  - Novolog sliding scale custom 1 unit per 25 > 150 before meals and > 200 HS  -monitor glucose before meals, HS and 0200  - hypoglycemic protocol    Plan for Discharge:  Lantus 30 units every morning  Novolog 5 units with each meal ( if you do not eat a meal, do not take)  Novolog sliding scale with meals and at bedtime ( as below) and add this amount of insulin to the 5 units of Novolog.  -if not eating, just test your blood sugar and give the sliding scale insulin    Custom Correction Scale ,use with meals    Do Not give Bedtime Correction Insulin if BG less than 200.    For  - 224 give 1 units.    For  - 249 give 2 units.    For  - 274 give 3 units.    For  - 299 give 4 units.    For  - 324 give 5 units.    For  - 349 give 6 units.    For BG greater than or equal to 350 give 7 units.        HIGH INSULIN RESISTANCE DOSING , use at bedtime    Do Not give Bedtime Correction Insulin if BG less than 200.    For  - 224 give 1 units.    For  - 249 give 2 units.    For  - 274 give 3 units.    For  - 299 give 4 units.    For  - 324  give 5 units.    For  - 349 give 6 units.    For BG greater than or equal to 350 give 7 units.      Test blood sugars before meals and at bedtime  Bring your glucometer and log book to you follow-up appointment                              Outpatient diabetes follow up: scheduled with Smiley Argueta PA-C with [facility] Endocrinology for April 23, 2019  Plan discussed with patient, family,  bedside RN, and primary team.           Interval History:   The last 24 hours progress and nursing notes reviewed.  Blood sugars are improving with the start of basal and prandial insulin    glucose in the 200/s ( glucose at ~ 0200, 256 and fasting 239) Increased basal insulin to 30 units on day of discharge  Patient and family would prefer not to do carbohydrate counting, therefore determined a fix meal dose of 5 units. Was having 5-7 units for his meals ( based on 1 unit per 10 grams of CHO).  Reviewed findings from consult and A1C along with elevated glucose. Discussed the huge increase in A1C from 2/2018 to present. Mr. Jesus stated he may have changed his diet and eating more carbohydrates and sweets.    Reviewed discharge plan with patient and family, all questions were answered.        Recent Labs   Lab 04/10/19  0646 04/10/19  0644 04/10/19  0213 04/09/19  2232 04/09/19  1731 04/09/19  1414 04/09/19  0912 04/09/19  0635  04/08/19  2207  04/08/19  1831   GLC  --  251*  --   --   --   --   --  182*  --  649*  --  690*   *  --  256* 285* 278* 269* 205*  --    < >  --    < >  --     < > = values in this interval not displayed.               Review of Systems:   See interval hx          Medications:     Orders Placed This Encounter      Moderate Consistent CHO Diet       Physical Exam:  Gen: Alert, resting in bed, in NAD   HEENT:  mucous membranes are moist  Resp: non-labored  Ext: moving all extremteis  Neuro:oriented x3, communicating clearly  /71 (BP Location: Left arm)   Pulse 70   Temp 98  F (36.7  C)  (Oral)   Resp 16   Wt 110.6 kg (243 lb 12.8 oz)   SpO2 95%   BMI 37.07 kg/m             Data:     Lab Results   Component Value Date    A1C 13.4 04/08/2019    A1C 7.3 02/22/2018    A1C 6.9 08/16/2016    A1C 5.7 03/26/2014    A1C 5.9 07/10/2013              CBC RESULTS:   Recent Labs   Lab Test 04/08/19  2207   WBC 7.6   RBC 4.74   HGB 14.3   HCT 43.7   MCV 92   MCH 30.2   MCHC 32.7   RDW 13.0        Recent Labs   Lab Test 04/10/19  0644 04/09/19  0635    139   POTASSIUM 4.1 3.8   CHLORIDE 104 104   CO2 27 28   ANIONGAP 10 7   * 182*   BUN 34* 36*   CR 2.27* 2.16*   RONNY 8.8 8.9     Liver Function Studies -   Recent Labs   Lab Test 04/08/19  2207   PROTTOTAL 7.6   ALBUMIN 3.4   BILITOTAL 0.9   ALKPHOS 111   AST 12   ALT 18     Lab Results   Component Value Date    INR 1.26 06/06/2014    INR 1.08 06/04/2014    INR 1.16 01/19/2012    INR 1.14 01/10/2012    INR 1.05 01/06/2012    INR 1.03 01/06/2012    INR 1.30 01/01/2012    INR 1.33 12/31/2011    INR 1.08 12/31/2011    INR 1.32 12/30/2011    INR 1.55 12/30/2011    INR 1.86 12/29/2011         I spent a total of 35 minutes bedside and on the inpatient unit managing the glycemic care of Cole Jesus. Over 50% of my time on the unit was spent counseling the patient  and/or coordinating care regarding .  See note for details.      Beck Sun [phone]  Diabetes Management job code [id number]

## 2019-04-10 NOTE — DISCHARGE SUMMARY
"[facility]  Hospitalist Discharge Summary       Date of Admission:  4/8/2019  Date of Discharge:  4/10/2019  Discharging Provider: GUILLE Diaz CNP  Discharge Team: Emergency Department Observation Unit    Discharge Diagnoses   Hyperglycemia    Follow-ups Needed After Discharge   {Additional follow-up instructions/to-do's for PCP    : Diabetes management    Unresulted Labs Ordered in the Past 30 Days of this Admission     No orders found from 2/7/2019 to 4/9/2019.      These results will be followed up by PCP    Discharge Disposition   Discharged to home  Condition at discharge: Stable    Hospital Course   Cole Jesus is a 58 year old male with a history of HTN, CAD, CKD stage IV, ischemic cardiomyopathy, atrial fibrillation, DM-2 who presents to the Emergency Department today for evaluation of hyperglycemia.      1. Hypergylcemia: Pt reports primary care visit and labs yesterday, 4/9/19 in preparation for his annual right heart cath. Clinic called him in the evening and sent to ED due to serum glucose 690 and HgbA1c 13.4. Pt and wife both had diarrhea for 1-2 days about a month ago with polydipsia and polyuria ever since. Pt was diagnosed with diabetes at the time of his MI, 7 yrs ago. He used insulin for several months then \"didn't need it anymore\". Glucose in the ED was 649. BG over the course of his obs stay: > 600, 482, 405, 347, 224, 172, 182.  BUN/creatinine of 36 and 2.16, urine with large glucosuria but negative for ketones.  HgbA1c 13.4.  Patient was given 250 cc bolus of IV normal saline followed by slow infusion. He was not started on insulin pump.  He was given short acting insulin, NovoLog 6 units x2 followed by 8 units x1. On arrival in Obs, the patient was stable.  -VS Q4  -IVF at 125ml/hr  -Novolog per sliding scale  -Endocrine consulted and recommended  Lantus 30 units every morning  Novolog 5 units with each meal ( if you do not eat a meal, do not " take)  Novolog sliding scale with meals and at bedtime ( as below) and add this amount of insulin to the 5 units of Novolog.  -if not eating, just test your blood sugar and give the sliding scale insulin     Custom Correction Scale ,use with meals    Do Not give Correction Insulin if BG less than 200.    For  - 224 give 1 units.    For  - 249 give 2 units.    For  - 274 give 3 units.    For  - 299 give 4 units.    For  - 324 give 5 units.    For  - 349 give 6 units.    For BG greater than or equal to 350 give 7 units.          HIGH INSULIN RESISTANCE DOSING , use at bedtime    Do Not give Bedtime Correction Insulin if BG less than 200.    For  - 224 give 1 units.    For  - 249 give 2 units.    For  - 274 give 3 units.    For  - 299 give 4 units.    For  - 324 give 5 units.    For  - 349 give 6 units.    For BG greater than or equal to 350 give 7 units.       Test blood sugars before meals and at bedtime  Bring your glucometer and log book to you follow-up appointment                           Outpatient diabetes follow up: scheduled with Smiley Argueta PA-C with [facility] Endocrinology for April 23, 2019     2.CKD: stage IV BUN/creatinine of 34 and 2.27, urine with large glucosuria but negative for ketones       Chronic Medical Problems  ##CAD: continue PTA Lipitor   ##Ischemic cardiomyopathy: scheduled for right heart cath yesterday.  Cancelled it due to patient's metabolic state. Patient will reschedule once blood sugars are normalized.  Continue PTA Plavix, Lasix, Entresto  ##HTN: Continue PTA metoprolol          Consultations This Hospital Stay   CNS DIABETES IP CONSULT  ENDOCRINE DIABETES ADULT IP CONSULT  NUTRITION SERVICES ADULT IP CONSULT    Code Status   Full Code    Time Spent on this Encounter   I, Arina Haynes, personally saw the patient today and spent less than or equal to 30 minutes discharging this patient.       Arina Haynes,  GUILLE CNP  [facility], [place]  ______________________________________________________________________    Physical Exam   Vital Signs: Temp: 98  F (36.7  C) Temp src: Oral BP: 108/71 Pulse: 70 Heart Rate: 70 Resp: 16 SpO2: 95 % O2 Device: None (Room air)    Weight: 243 lbs 12.8 oz  Eyes: Lids and lashes normal, pupils equal, round and reactive to light, extra ocular muscles intact, sclera clear, conjunctiva normal  Respiratory: No increased work of breathing, good air exchange, clear to auscultation bilaterally, no crackles or wheezing  Cardiovascular: Normal apical impulse, regular rate and rhythm, normal S1 and S2, no S3 or S4, and no murmur noted  GI: No scars, normal bowel sounds, soft, non-distended, non-tender  Skin: normal skin color, texture, turgor  Musculoskeletal: There is no redness, warmth, or swelling of the joints.  Full range of motion noted.  Motor strength is 5 out of 5 all extremities bilaterally.  Tone is normal.  Neurologic: Awake, alert, oriented to name, place and time.  Cranial nerves II-XII are grossly intact.                    Primary Care Physician   Silas Enciso    Discharge Orders   No discharge procedures on file.    Significant Results and Procedures   Results for orders placed or performed during the hospital encounter of 04/08/19   XR Chest Port 1 View    Narrative    XR CHEST PORT 1 VW 4/8/2019 10:15 PM    Comparison: 10/31/2016    History: ? infx    Findings: AP views of the chest. Cardiac silhouette is enlarged.   Pulmonary vasculature is distinct. Pleural effusions, decreased on the  right. Streaky basilar associated opacities. No pneumothorax. The  upper abdomen is unremarkable.       Impression    Impression:   1. Chronic bilateral pleural effusions, decreased compared to most  recent comparison from 10/31/2016.  2. Associated basilar streaky opacities, atelectasis versus  consolidation.    I have personally reviewed the examination and  initial interpretation  and I agree with the findings.    DOROTA TA MD       Discharge Medications   Current Discharge Medication List      START taking these medications    Details   Alcohol Swabs PADS 1 applicator 4 times daily (before meals and nightly)  Qty: 100 each, Refills: 3    Associated Diagnoses: Type 2 diabetes mellitus without complication, without long-term current use of insulin (H)      blood glucose (NO BRAND SPECIFIED) test strip Use to test blood sugar times times daily. Before meals and at bedtime.  Qty: 100 each, Refills: 3    Associated Diagnoses: Type 2 diabetes mellitus without complication, without long-term current use of insulin (H)      blood glucose monitoring (ACCU-CHEK FASTCLIX) lancets Use to test blood sugar 4 times daily. At meals and bedtime.  Qty: 102 each, Refills: 3    Associated Diagnoses: Type 2 diabetes mellitus without complication, without long-term current use of insulin (H)      insulin pen needle (32G X 4 MM) 32G X 4 MM miscellaneous Use 5 pen needles daily or as directed.  Qty: 200 each, Refills: 3    Associated Diagnoses: Type 2 diabetes mellitus without complication, without long-term current use of insulin (H)      Sharps Container MISC 1 Device every 30 days  Qty: 1 each, Refills: 3    Associated Diagnoses: Type 2 diabetes mellitus without complication, without long-term current use of insulin (H)         CONTINUE these medications which have NOT CHANGED    Details   atorvastatin (LIPITOR) 20 MG tablet Take 1 tablet (20 mg) by mouth daily  Qty: 100 tablet, Refills: 3    Associated Diagnoses: Routine health maintenance      cholecalciferol (VITAMIN  -D) 1000 UNITS capsule Take 2 capsules (2,000 Units) by mouth daily  Qty: 60 capsule, Refills: 3    Associated Diagnoses: Vitamin D deficiency      clopidogrel (PLAVIX) 75 MG tablet TAKE ONE TABLET BY MOUTH EVERY DAY  Qty: 180 tablet, Refills: 1    Associated Diagnoses: ST elevation myocardial infarction (STEMI) of  inferior wall (H)      furosemide (LASIX) 20 MG tablet Take 2 tablets (40 mg) by mouth 2 times daily  Qty: 360 tablet, Refills: 3    Associated Diagnoses: Chronic systolic congestive heart failure (H); Ischemic cardiomyopathy; Essential hypertension with goal blood pressure less than 130/85      metoprolol succinate ER (TOPROL-XL) 100 MG 24 hr tablet Take 1 tablet (100 mg) by mouth daily  Qty: 90 tablet, Refills: 3    Associated Diagnoses: Ischemic cardiomyopathy      pantoprazole (PROTONIX) 40 MG EC tablet TAKE ONE TABLET BY MOUTH TWICE A DAY  Qty: 180 tablet, Refills: 2    Associated Diagnoses: Lower GI bleeding      sacubitril-valsartan (ENTRESTO)  MG per tablet Take 1 tablet by mouth 2 times daily  Qty: 180 tablet, Refills: 3    Associated Diagnoses: Ischemic cardiomyopathy; Chronic systolic congestive heart failure (H)      thiamine 100 MG tablet Take 1 tablet (100 mg) by mouth daily  Qty: 90 tablet, Refills: 3    Associated Diagnoses: Chronic constipation      vitamin  B complex with vitamin C (VITAMIN  B COMPLEX) TABS Take 1 tablet by mouth daily    Associated Diagnoses: Atrial tachycardia (H); Renal insufficiency; Ischemic cardiomyopathy           Allergies   Allergies   Allergen Reactions     Hydralazine      Black spots     Simvastatin Other (See Comments)     Leg muscle weakness     Tylenol [Acetaminophen] Palpitations           This patient was discussed with the Care Team in the OBS Unit.  The patient's chart was reviewed and the patient was also seen and evaluated by me.  The plan of care was discussed and reviewed with the Care Team.  The above documentation reflects the evaluation, medical decision making and plan under my supervision.    Chip Zapien MD, FACEP  [facility] Staff Emergency Physician

## 2019-04-10 NOTE — PROGRESS NOTES
piv removed.  Discharge instructions reviewed with pt and pt family.  All questoins answered.  Pt left with all belongings

## 2019-04-10 NOTE — DISCHARGE INSTRUCTIONS
Diabetes Plan for discharge:    Lantus 30 units every morning  Novolog 5 units with each meal ( if you do not eat a meal, do not take)  Novolog sliding scale with meals and at bedtime ( as below) and add this amount of insulin to the 5 units of Novolog.  -if not eating, just test your blood sugar and give the sliding scale insulin     Custom Correction Scale ,use with meals    Do Not give Bedtime Correction Insulin if BG less than 200.    For  - 224 give 1 units.    For  - 249 give 2 units.    For  - 274 give 3 units.    For  - 299 give 4 units.    For  - 324 give 5 units.    For  - 349 give 6 units.    For BG greater than or equal to 350 give 7 units.          HIGH INSULIN RESISTANCE DOSING , use at bedtime    Do Not give Bedtime Correction Insulin if BG less than 200.    For  - 224 give 1 units.    For  - 249 give 2 units.    For  - 274 give 3 units.    For  - 299 give 4 units.    For  - 324 give 5 units.    For  - 349 give 6 units.    For BG greater than or equal to 350 give 7 units.       Test blood sugars before meals and at bedtime  Bring your glucometer and log book to you follow-up appointment                               Outpatient diabetes follow up: scheduled with Smiley Argueta PA-C with [facility] for April 23, 2019

## 2019-04-10 NOTE — PROGRESS NOTES
Care Coordinator - Discharge Planning    Admission Date/Time:  4/8/2019  Attending MD:  Cecily att. providers found     Data  Date of initial CC assessment:    Chart reviewed, discussed with interdisciplinary team.   Patient was admitted for:   1. Type 2 diabetes mellitus without complication, without long-term current use of insulin (H)    2. Hyperglycemia    3. Chronic systolic congestive heart failure (H)    4. Severe pulmonary arterial systolic hypertension (H)         Assessment   Concerns with insurance coverage for discharge needs: None.  Current Living Situation: Patient lives with spouse.  Support System: Supportive and Involved  Services Involved: None  Transportation at Discharge: Car and Family or friend will provide  Transportation to Medical Appointments:    - Name of caregiver: Self and spouse  Barriers to Discharge: Medical stability, endocrinology recommendations, CDE assessment     Pt admitted to observation unit from clinic secondary to hyperglycemia.  Pt with type II DM.  Endocrinology consulted.  Pt and wife met with CDE yesterday.  Per discussion with Jazmin ALCOCER anticipate pt will discharge home today with plan for f/u with endocrinology and PCP.   RNCC met with pt and his wife.  Pt and spouse note no concerns or needs at this time.  Pt scheduled for outpatient f/u with endocrinology on 4/23.       Plan  Anticipated Discharge Date:  4/10/19  Anticipated Discharge Plan:  Home    Ellie Lea RN BSN, PHN RN Care Coordinator  Medicine Teams: Gold 1; Gold 2; ED/Observation   [phone]  Pager: [phone]  Weekend RN Care Coordinator job code * * * [id number]  4/10/2019 12:09 PM

## 2019-04-10 NOTE — PLAN OF CARE
Observation goals PRIOR TO DISCHARGE     Comments: List all goals to be met before discharge home:   - Blood Glucose greater than 70 less than 250 on two consecutive readings.   yes    - Ketones absent from urine.   yes  - Tolerating oral intake to maintain hydration yes  - Safe disposition plan has been identified  yes  - Nurse to notify provider when observation goals have been met and patient is ready for discharge.

## 2019-04-11 ENCOUNTER — TELEPHONE (OUTPATIENT)
Dept: CALL CENTER | Age: 59
End: 2019-04-11

## 2019-04-11 ENCOUNTER — TELEPHONE (OUTPATIENT)
Facility: CLINIC | Age: 59
End: 2019-04-11

## 2019-04-11 ENCOUNTER — TELEPHONE (OUTPATIENT)
Dept: ENDOCRINOLOGY | Facility: CLINIC | Age: 59
End: 2019-04-11

## 2019-04-11 DIAGNOSIS — Z79.4 TYPE 2 DIABETES MELLITUS WITH HYPOGLYCEMIA WITHOUT COMA, WITH LONG-TERM CURRENT USE OF INSULIN (H): Primary | ICD-10-CM

## 2019-04-11 DIAGNOSIS — E11.649 TYPE 2 DIABETES MELLITUS WITH HYPOGLYCEMIA WITHOUT COMA, WITH LONG-TERM CURRENT USE OF INSULIN (H): Primary | ICD-10-CM

## 2019-04-11 NOTE — TELEPHONE ENCOUNTER
Cole is calling Endocrine Clinic for insulin advice.  He reports he was discharged from the hospital yesterday after having high blood sugars. Apparently diabetes had been in remission for several years prior to this.  He reports prior to eating dinner last night at 7:30 PM his blood glucose was 328, he took 11 units of novolog.  At 11 PM his blood sugar ws 230 and he took 7 units of novolog and went to bed.  At 2 AM he woke up and was sweaty, blood sugar was 47, he ate fruit and candy at 2:31 AM blood glucose was 80, he felt fine and went back to sleep.  At 7:22 this morning glucose was 226, took 30 units of lantus.  At 9:15 took 7 units of novolog and ate breakfast.  States he feels fine now, just calling with update and seeking advice.    Has Endocrine appointment with Kendall 4/23.  States he hasn't been in the Endo clinic before so would appear to be new patient, although appointment marked return.  He was seen in the hospital by that team.    I contacted Kailee LUGO in Endo clinic.  She states she will contact Erma Sun who saw him in the hospital and request call to patient.    I gave this information to Cole.  He will await call from Erma.

## 2019-04-11 NOTE — TELEPHONE ENCOUNTER
[facility] Call Center    Phone Message    May a detailed message be left on voicemail: yes    Reason for Call: Other: PT calling to report blood pressure fluctuations and hoping to speak with provider or care team in regards to regulating. PT reporting that Blood sugar was 328 at dinnertime on [id number], and he took 11 units of insulin. By bedtime it was down to 226 and states he took another 7 units. PT woke up somewhere around 2AM on [id number] and tested with Blood Sugar reading at 47. PT ate some apple, banana and orange, as well as some candy and got it back to 80 and felt much better after. PT reported that Blood Sugar was back up to 226 at 7:30 on [id number]. Please contact PT in regards to Glucose levels at [phone].     Action Taken: Message routed to:  [facility] Laura

## 2019-04-11 NOTE — TELEPHONE ENCOUNTER
Erma Sun who saw Cole inpatient  was notified  and will call Cole for directions until seen in Diabetes clinic 4/23/19

## 2019-04-12 ENCOUNTER — TELEPHONE (OUTPATIENT)
Facility: CLINIC | Age: 59
End: 2019-04-12

## 2019-04-12 ENCOUNTER — PATIENT OUTREACH (OUTPATIENT)
Dept: CARDIOLOGY | Facility: CLINIC | Age: 59
End: 2019-04-12

## 2019-04-12 DIAGNOSIS — E11.8 TYPE 2 DIABETES MELLITUS WITH COMPLICATION, UNSPECIFIED WHETHER LONG TERM INSULIN USE: Primary | ICD-10-CM

## 2019-04-12 NOTE — TELEPHONE ENCOUNTER
Inpatient Diabetes Service Note 4/12/2019 at 0800 ( late4 entry)    Had a follow-up phone conversation with Mr. Jesus and his girlfriend Haven regarding the previous day of hypoglycemia.  On 94/11)  Took lantus 30 units in the morning  glucose at breakfast 226 took sliding scale + 5 units of novolog for meal  glucose at lunch ( 1300) 317  Took Novolog sliding scale + 5 units for meal  Glucose at dinner 84 ( reprots being active)  Did not take sliding scale nor did he give himself 5 units of novolog for his meal.    Bedtime glcusoe 217    Tested glcusoe at 0200, 160 and fasting glcusoe 125    Recommendations:  lantus 27 units in the morning  Novolog 5 units for breakfast and 3 units for lunch and dinner  Continued with current sliding scale before meals for glucose of 150 or greater.    Beck uSn, CANDIE  Pager [phone]  Inpatient Diabetes Service

## 2019-04-12 NOTE — TELEPHONE ENCOUNTER
Erma Sun  from hospital  consult is in touch with Cole until he is seen in clinic 4/23/19 by Smiley Argueta.

## 2019-04-12 NOTE — PROGRESS NOTES
Patient was recently in the ER for hyperglycemia. Called patient's home and mobile phone, no answer and voice mail box full.

## 2019-04-23 ENCOUNTER — OFFICE VISIT (OUTPATIENT)
Dept: ENDOCRINOLOGY | Facility: CLINIC | Age: 59
End: 2019-04-23
Payer: MEDICAID

## 2019-04-23 VITALS
WEIGHT: 241.7 LBS | HEART RATE: 73 BPM | BODY MASS INDEX: 36.63 KG/M2 | DIASTOLIC BLOOD PRESSURE: 72 MMHG | HEIGHT: 68 IN | SYSTOLIC BLOOD PRESSURE: 113 MMHG

## 2019-04-23 DIAGNOSIS — I25.5 ISCHEMIC CARDIOMYOPATHY: ICD-10-CM

## 2019-04-23 DIAGNOSIS — E55.9 VITAMIN D DEFICIENCY: ICD-10-CM

## 2019-04-23 DIAGNOSIS — E11.65 TYPE 2 DIABETES MELLITUS WITH HYPERGLYCEMIA, WITHOUT LONG-TERM CURRENT USE OF INSULIN (H): Primary | ICD-10-CM

## 2019-04-23 ASSESSMENT — MIFFLIN-ST. JEOR: SCORE: 1875.84

## 2019-04-23 ASSESSMENT — PAIN SCALES - GENERAL: PAINLEVEL: NO PAIN (0)

## 2019-04-23 NOTE — PATIENT INSTRUCTIONS
It is good to meet you.    Indeed, most important is healthy diet and activity.    Please remember you will always be diabetic , even if able to again control with diet and activity, you still need to check on control every 3 months.     Lantus 26 units every morning.   If any morning BG <90, please decrease by 1 unit more.  Please also call if feeling low blood sugar symptoms overnight - any blood sugars <70.    Goal is morning blood sugar 95 - 140.  If often outside this range, please call office.        For now:   Novolog 3-5 units with each meal  when blood sugar >130 ( if you do not eat a meal, do not take)  Novolog sliding scale with meals and at bedtime ( as below) and add this amount of insulin to the 3-5 units of Novolog.  -if not eating, just test your blood sugar and give the sliding scale insulin     Custom Correction Scale ,use with meals    Do Not give Correction Insulin if BG less than 200.    For  - 224 give 1 units.    For  - 249 give 2 units.    For  - 274 give 3 units.    For  - 299 give 4 units.    For  - 324 give 5 units.    For  - 349 give 6 units.    For BG greater than or equal to 350 give 7 units.          HIGH INSULIN RESISTANCE DOSING , use at bedtime    Do Not give Bedtime Correction Insulin if BG less than 225.       For  - 249 give 1 units.    For  - 274 give 2 units.    For  - 299 give 3 units.    For  - 324 give 4 units.    For  - 349 give 5 units.    For BG greater than or equal to 350 give 6 units.       Test blood sugars before meals and at bedtime.  Do NOT give Novolog (short acting mealtime and correction insulin) LESS THAN 4 hours apart.     However, as soon as we get GLP-1 agonist class of medication (Dulaglutide/Trulicity, Semaglutide, or Liraglutide)  I think you only will rarely need correction insulin for BG >250 - give 2 less units than what is indicated above.   Do not give mealtime insulin unless BG <250,  once you start the new medication.          WITH NEW MEDICATION:       Custom Correction Scale ,use with meals    Do Not give Correction Insulin if BG less than 250.          For  - 274 give 1 units.    For  - 299 give 2 units.    For  - 324 give 3 units.    For  - 349 give 4 units.    For BG greater than or equal to 350 give 5 units.          HIGH INSULIN RESISTANCE DOSING , use at bedtime    Do Not give Bedtime Correction Insulin if BG less than 225.       For  - 249 give 1 units.    For  - 274 give 2 units.    For  - 299 give 3 units.    For  - 324 give 4 units.    For  - 349 give 5 units.    For BG greater than or equal to 350 give 6 units.      Please call with questions or concerns.  ALWAYS CALL IF ANY BG <70.    My best wishes,    Smiley Argueta PA-C, MPAS  [facility]  Diabetes, Endocrinology, and Metabolism  [phone] Appointments/Nurse  [phone] Fax  [phone] nurse line  [phone] URGENTafter hours/weekend Endocrinologist on call

## 2019-04-23 NOTE — PROGRESS NOTES
[facility]  Endocrinology  Smiley Argueta PA-C  2019      Chief Complaint:   Diabetes    History of Present Illness:   Cole Jesus is a 59 year old male with a history of type 2 diabetes mellitus, congestive heart failure, CKD stage 4 and hyperglycemia who presents for diabetic follow-up. He was initially diagnosed with type 2 diabetes mellitus at the time of his myocardial infarction 7 years ago (). He started using insulin in 2012. He then started checking his blood sugars in May of that same year and found they were consistently around 82. His insulin eventually  at which time he stopped using insulin. He had prediabetic level A1C in  and .     ED course: He was admitted to the [facility] from -4/10/19 for hyperglycemia detected following a physical exam earlier that day. His serum glucose was 690 and his A1c was 13.4%. Blood glucoses during his hospital stay: >600, 482, 405, 347, 224, 172, 182. Upon discharge, he was advised to use a Novolog sliding scale (200-224 give 1 unit, 225-249 give 2 units, ect.)  with meals in addition to 30 units Lantus every morning and 5 units Novolog with every meal.     Interval history: He has been checking his blood sugars regularly since his discharge. He notes there are some days that he has not taking any mealtime insulin if blood sugars <150. He is currently doing 27 units Lantus in the morning, reduced from 30 at discharge after having low BG the following night. His blood sugar was 328 at 7 pm one night for which he gave 5 units of insulin. He then checked his blood sugar again at 11 pm and found it to still be high, so he took additional insulin. He then went low to 48 and called Erma the following morning. She advised him to not take any insulin at night in order to avoid lows. He denies ever waking up feeling shaky or sweaty. He notes that he eats a whole pack of soda crackers between lunch and dinner and realizes he  should not be doing this. He also notes that his son took him to dinner for his birthday and his blood sugar was around 182 before eating. He did not give himself any insulin because the bathroom was too dirty and he subsequently ate quite a bit of food and woke up at 206. Of note, he and his girlfriend have started consuming a new diet and she has lost 10 lbs but he has not lost any weight. He has never taken Metformin in the past. He denies family history of multiple endocrine neoplasias or pancreatitis.      Blood Glucose Monitoring:  We reviewed glucometer and CGM data together. He has been checking his blood sugar about 4 times per day.   Fasting blood glucose range :  mg/dl  Blood glucose range later in the day:  mg/dl  Average blood glucose value: 169 mg/dl  Lowest value: 47 mg/dl  Highest value: 328 mg/dl    Diabetes monitoring and complications:  CAD: Yes: Plavix  Last eye exam results: Not Found  Last dental exam: not discussed  Microalbuminuria: 1097.83 on 4/8/19, chronic kidney disease stage IV  HTN: Yes: Metoprolol   On Statin: Yes  On Aspirin: No, on Plavix  Depression: No    Review of Systems:   Pertinent items are noted in HPI.  All other systems are negative.    Active Medications:      Alcohol Swabs PADS, 1 applicator 4 times daily (before meals and nightly), Disp: 100 each, Rfl: 3     atorvastatin (LIPITOR) 20 MG tablet, Take 1 tablet (20 mg) by mouth daily, Disp: 100 tablet, Rfl: 3     cholecalciferol (VITAMIN  -D) 1000 UNITS capsule, Take 2 capsules (2,000 Units) by mouth daily, Disp: 60 capsule, Rfl: 3     clopidogrel (PLAVIX) 75 MG tablet, TAKE ONE TABLET BY MOUTH EVERY DAY, Disp: 180 tablet, Rfl: 1     furosemide (LASIX) 20 MG tablet, Take 2 tablets (40 mg) by mouth 2 times daily, Disp: 360 tablet, Rfl: 3     insulin aspart (NOVOLOG PEN) 100 UNIT/ML pen, Custom Correction Scale ,use with meals  Do Not give Correction Insulin if BG less than 200.  For  - 224 give 1 units.   For  - 249 give 2 units.  For  - 274 give 3 units.  For  - 299 give 4 units.  For  - 324 give 5 units.  For  - 349 give 6 units.  For BG greater than or equal to 350 give 7 units.  Novolog sliding scale with meals and add this amount of insulin to the 5 units of Novolog. -if not eating, just test your blood sugar and give the sliding scale insulin, Disp: 3 mL, Rfl: 3     insulin aspart (NOVOLOG PEN) 100 UNIT/ML pen, Inject 5 Units Subcutaneous 3 times daily (with meals) Novolog 5 units with each meal ( if you do not eat a meal, do not take), Disp: 3 mL, Rfl: 3     insulin aspart (NOVOLOG PEN) 100 UNIT/ML pen, HIGH INSULIN RESISTANCE DOSING , use at bedtime  Do Not give Bedtime Correction Insulin if BG less than 200.  For  - 224 give 1 units.  For  - 249 give 2 units.  For  - 274 give 3 units.  For  - 299 give 4 units.  For  - 324 give 5 units.  For  - 349 give 6 units.  For BG greater than or equal to 350 give 7 units., Disp: 3 mL, Rfl: 3     insulin glargine (LANTUS SOLOSTAR PEN) 100 UNIT/ML pen, Inject 30 Units Subcutaneous every morning, Disp: 3 mL, Rfl: 3     insulin pen needle (32G X 4 MM) 32G X 4 MM miscellaneous, Use 5 pen needles daily or as directed., Disp: 200 each, Rfl: 3     metoprolol succinate ER (TOPROL-XL) 100 MG 24 hr tablet, Take 1 tablet (100 mg) by mouth daily, Disp: 90 tablet, Rfl: 3     pantoprazole (PROTONIX) 40 MG EC tablet, TAKE ONE TABLET BY MOUTH TWICE A DAY, Disp: 180 tablet, Rfl: 2     sacubitril-valsartan (ENTRESTO)  MG per tablet, Take 1 tablet by mouth 2 times daily, Disp: 180 tablet, Rfl: 3     Sharps Container MISC, 1 Device every 30 days, Disp: 1 each, Rfl: 3     thiamine 100 MG tablet, Take 1 tablet (100 mg) by mouth daily, Disp: 90 tablet, Rfl: 3     vitamin  B complex with vitamin C (VITAMIN  B COMPLEX) TABS, Take 1 tablet by mouth daily, Disp: , Rfl:       Allergies:   Hydralazine; Simvastatin; and Tylenol  "[acetaminophen]      Past Medical History:  Anemia   Coronary artery disease   Chronic kidney disease stage IV  GI bleed  Hyperlipidemia  Hypertension   Ischemic cardiomyopathy  Pulmonary edema  Subclinical hypothyroidism  ST elevation myocardial infarction   Atrial fibrillation   Type 2 diabetes mellitus   Chronic systolic congestive heart failure   Ischemic cardiomyopathy  Vitamin D deficiency   Hyperglycemia     Past Surgical History:  Laparotomy exploratory - 2011  ORIF right elbow fracture - age 12  Takedown ileostomy - 2014  Tracheostomy - 2011    Family History:   Diabetes - mother, sister  Hypertension - mother  Heart disease - father ( of heart attack)      Social History:   The patient has a significant other (girlfriend of about 40 years), is a former smoker (quit date 12/3/2011), and does not consume alcohol. He is retired from [employer].      Physical Exam:   /72   Pulse 73   Ht 1.727 m (5' 8\")   Wt 109.6 kg (241 lb 11.2 oz)   BMI 36.75 kg/m       Wt Readings from Last 10 Encounters:   19 109.6 kg (241 lb 11.2 oz)   19 110.6 kg (243 lb 12.8 oz)   19 110.2 kg (243 lb)   19 109 kg (240 lb 6.4 oz)   18 113.9 kg (251 lb)   18 108 kg (238 lb)   18 109.6 kg (241 lb 9.6 oz)   18 110.4 kg (243 lb 4.8 oz)   10/12/17 109.3 kg (241 lb)   17 109.2 kg (240 lb 12.8 oz)        General: Pleasant, well nourished and hydrated male in NAD.   Psych:  Mood is \"good,\" affect is appropriate.  Thought form and content are fluid and coherent.    HEENT: Eyes and sclera are clear. Extraocular movements are grossly intact without proptosis.  Nares are patent, mucous membranes moist.  Neck: No masses or JVD are noted.    Resp: Easy and unlabored breathing.   Neuro: Alert and oriented, communicating clearly.   Ext: no swelling or edema      Data:  Lab Results   Component Value Date     04/10/2019    POTASSIUM 4.1 04/10/2019    CHLORIDE " 104 04/10/2019    CO2 27 04/10/2019    ANIONGAP 10 04/10/2019     (H) 04/10/2019    BUN 34 (H) 04/10/2019    CR 2.27 (H) 04/10/2019    RONNY 8.8 04/10/2019     Lab Results   Component Value Date    GFRESTIMATED 30 (L) 04/10/2019    GFRESTIMATED 32 (L) 04/09/2019    GFRESTIMATED 31 (L) 04/08/2019    GFRESTBLACK 35 (L) 04/10/2019    GFRESTBLACK 37 (L) 04/09/2019    GFRESTBLACK 36 (L) 04/08/2019      Lab Results   Component Value Date    MICROL 404 04/08/2019    UMALCR 1,097.83 (H) 04/08/2019        Lab Results   Component Value Date    A1C 13.4 (H) 04/08/2019    A1C 7.3 (H) 02/22/2018    A1C 6.9 (H) 08/16/2016    A1C 5.7 03/26/2014    A1C 5.9 07/10/2013     No results found for: CPEPT, GADAB, ISCAB    Lab Results   Component Value Date    CHOL 175 04/08/2019    CHOL 192 06/01/2018    TRIG 408 (H) 04/08/2019    TRIG 174 (H) 06/01/2018    HDL 30 (L) 04/08/2019    HDL 28 (L) 06/01/2018    LDL  04/08/2019     Cannot estimate LDL when triglyceride exceeds 400 mg/dL     (H) 06/01/2018    NHDL 146 (H) 04/08/2019    NHDL 164 (H) 06/01/2018        Assessment and Plan:  Type 2 diabetes mellitus with hyperglycemia, without long-term current use of insulin (H)  Cole is a 59 year old male with long standing type 2 diabetes mellitus, ischemic cardiomyopathy, CKD stage 4 who unfortunately became uncontrolled recently with a  hospitalized due to hyperglycemia earlier this month. He is now doing well on MDI insulin. Today we decreased his Lantus from 27 to 26 units in the morning. If any BG <90 he will decrease this by one more unit. Advised him to continue to do this until he is waking up with fasting BG between . Informed him he may get down to 15 units with careful diet and exercise. Also advised him to take 3-5 units of Novolog with meals when blood sugar >130, depending on the size of the meal, with sliding scale when blood sugar >200. Instructed him to do a bedtime check if he did not give insulin with  dinner. Emphasized that he wait 3-4 hours after eating before checking and to only give a correction once at night. Education to avoid stacking short acting insulin doses. We also discussed addition of Metformin or GLP1 agonists with potential elimination of mealtime insulin. He is interested in starting a GLP-1 agonist as the benefits of weight loss, cardiovascular protection, and renal protection are desirable for him. I did warn him of some GI side effects that could occur and suggested he take his first dose on a day when he would be able to stay home. Order made today for him to start low dose, once a week Trulicity. Once he starts this, he will only need correction insulin when BG >250 at meals. No mealtime insulin when BG <250 in hopes of avoiding further hypoglycemia. We will reevaluate Trulicity dosage at his next visit to clinic in 1-3 months to consider increasing dosage. He will try to meet with nutrition in the interim. If he does stay on insulin I advised him to meet with a diabetic educator.   At RTC needs foot exam and eye and dental health counseling.   - dulaglutide (TRULICITY) 0.75 MG/0.5ML pen  Dispense: 2 mL; Refill: 3  - NUTRITION REFERRAL       Follow-up: Return in about 1 month (around 5/21/2019).     >50% of 45 minute visit spent in face to face counseling, education and coordination of care related to insulin safety, options for better glycemic control and prevention of complications as well as preventing, detecting, and treating hypoglycemia.      It is my privilege to be involved in the care of the above patient.     Smiley Argueta PA-C, MPAS  [facility]  Diabetes, Endocrinology, and Metabolism  [phone] Appointments/Nurse  [phone] Fax  [phone] pager  [phone] nurse line           Scribe Disclosure:  I, Lynsey Arevalo, am serving as a scribe to document services personally performed by Smiley Argueta PA-C at this visit, based upon the provider's  statements to me. All documentation has been reviewed by the aforementioned provider prior to being entered into the official medical record.     Portions of this medical record were completed by a scribe. UPON MY REVIEW AND AUTHENTICATION BY ELECTRONIC SIGNATURE, this confirms (a) I performed the applicable clinical services, and (b) the record is accurate.

## 2019-04-23 NOTE — LETTER
2019       RE: Cole Jesus  [address]  [facility] [address]     Dear Colleague,    Thank you for referring your patient, Cole Jesus, to the [facility] ENDOCRINOLOGY at [facility]. Please see a copy of my visit note below.    [facility]  Endocrinology  Smiley Argueta PA-C  2019      Chief Complaint:   Diabetes    History of Present Illness:   Cole Jesus is a 59 year old male with a history of type 2 diabetes mellitus, congestive heart failure, CKD stage 4 and hyperglycemia who presents for diabetic follow-up. He was initially diagnosed with type 2 diabetes mellitus at the time of his myocardial infarction 7 years ago (). He started using insulin in 2012. He then started checking his blood sugars in May of that same year and found they were consistently around 82. His insulin eventually  at which time he stopped using insulin. He had prediabetic level A1C in  and .     ED course: He was admitted to the [facility] from -4/10/19 for hyperglycemia detected following a physical exam earlier that day. His serum glucose was 690 and his A1c was 13.4%. Blood glucoses during his hospital stay: >600, 482, 405, 347, 224, 172, 182. Upon discharge, he was advised to use a Novolog sliding scale (200-224 give 1 unit, 225-249 give 2 units, ect.)  with meals in addition to 30 units Lantus every morning and 5 units Novolog with every meal.     Interval history: He has been checking his blood sugars regularly since his discharge. He notes there are some days that he has not taking any mealtime insulin if blood sugars <150. He is currently doing 27 units Lantus in the morning, reduced from 30 at discharge after having low BG the following night. His blood sugar was 328 at 7 pm one night for which he gave 5 units of insulin. He then checked his blood sugar again at 11 pm and found it to still be high, so he took additional insulin.  He then went low to 48 and called Erma the following morning. She advised him to not take any insulin at night in order to avoid lows. He denies ever waking up feeling shaky or sweaty. He notes that he eats a whole pack of soda crackers between lunch and dinner and realizes he should not be doing this. He also notes that his son took him to dinner for his birthday and his blood sugar was around 182 before eating. He did not give himself any insulin because the bathroom was too dirty and he subsequently ate quite a bit of food and woke up at 206. Of note, he and his girlfriend have started consuming a new diet and she has lost 10 lbs but he has not lost any weight. He has never taken Metformin in the past. He denies family history of multiple endocrine neoplasias or pancreatitis.      Blood Glucose Monitoring:  We reviewed glucometer and CGM data together. He has been checking his blood sugar about 4 times per day.   Fasting blood glucose range :  mg/dl  Blood glucose range later in the day:  mg/dl  Average blood glucose value: 169 mg/dl  Lowest value: 47 mg/dl  Highest value: 328 mg/dl    Diabetes monitoring and complications:  CAD: Yes: Plavix  Last eye exam results: Not Found  Last dental exam: not discussed  Microalbuminuria: 1097.83 on 4/8/19, chronic kidney disease stage IV  HTN: Yes: Metoprolol   On Statin: Yes  On Aspirin: No, on Plavix  Depression: No    Review of Systems:   Pertinent items are noted in HPI.  All other systems are negative.    Active Medications:      Alcohol Swabs PADS, 1 applicator 4 times daily (before meals and nightly), Disp: 100 each, Rfl: 3     atorvastatin (LIPITOR) 20 MG tablet, Take 1 tablet (20 mg) by mouth daily, Disp: 100 tablet, Rfl: 3     cholecalciferol (VITAMIN  -D) 1000 UNITS capsule, Take 2 capsules (2,000 Units) by mouth daily, Disp: 60 capsule, Rfl: 3     clopidogrel (PLAVIX) 75 MG tablet, TAKE ONE TABLET BY MOUTH EVERY DAY, Disp: 180 tablet, Rfl: 1      furosemide (LASIX) 20 MG tablet, Take 2 tablets (40 mg) by mouth 2 times daily, Disp: 360 tablet, Rfl: 3     insulin aspart (NOVOLOG PEN) 100 UNIT/ML pen, Custom Correction Scale ,use with meals  Do Not give Correction Insulin if BG less than 200.  For  - 224 give 1 units.  For  - 249 give 2 units.  For  - 274 give 3 units.  For  - 299 give 4 units.  For  - 324 give 5 units.  For  - 349 give 6 units.  For BG greater than or equal to 350 give 7 units.  Novolog sliding scale with meals and add this amount of insulin to the 5 units of Novolog. -if not eating, just test your blood sugar and give the sliding scale insulin, Disp: 3 mL, Rfl: 3     insulin aspart (NOVOLOG PEN) 100 UNIT/ML pen, Inject 5 Units Subcutaneous 3 times daily (with meals) Novolog 5 units with each meal ( if you do not eat a meal, do not take), Disp: 3 mL, Rfl: 3     insulin aspart (NOVOLOG PEN) 100 UNIT/ML pen, HIGH INSULIN RESISTANCE DOSING , use at bedtime  Do Not give Bedtime Correction Insulin if BG less than 200.  For  - 224 give 1 units.  For  - 249 give 2 units.  For  - 274 give 3 units.  For  - 299 give 4 units.  For  - 324 give 5 units.  For  - 349 give 6 units.  For BG greater than or equal to 350 give 7 units., Disp: 3 mL, Rfl: 3     insulin glargine (LANTUS SOLOSTAR PEN) 100 UNIT/ML pen, Inject 30 Units Subcutaneous every morning, Disp: 3 mL, Rfl: 3     insulin pen needle (32G X 4 MM) 32G X 4 MM miscellaneous, Use 5 pen needles daily or as directed., Disp: 200 each, Rfl: 3     metoprolol succinate ER (TOPROL-XL) 100 MG 24 hr tablet, Take 1 tablet (100 mg) by mouth daily, Disp: 90 tablet, Rfl: 3     pantoprazole (PROTONIX) 40 MG EC tablet, TAKE ONE TABLET BY MOUTH TWICE A DAY, Disp: 180 tablet, Rfl: 2     sacubitril-valsartan (ENTRESTO)  MG per tablet, Take 1 tablet by mouth 2 times daily, Disp: 180 tablet, Rfl: 3     Sharps Container MISC, 1 Device every 30  "days, Disp: 1 each, Rfl: 3     thiamine 100 MG tablet, Take 1 tablet (100 mg) by mouth daily, Disp: 90 tablet, Rfl: 3     vitamin  B complex with vitamin C (VITAMIN  B COMPLEX) TABS, Take 1 tablet by mouth daily, Disp: , Rfl:       Allergies:   Hydralazine; Simvastatin; and Tylenol [acetaminophen]      Past Medical History:  Anemia   Coronary artery disease   Chronic kidney disease stage IV  GI bleed  Hyperlipidemia  Hypertension   Ischemic cardiomyopathy  Pulmonary edema  Subclinical hypothyroidism  ST elevation myocardial infarction   Atrial fibrillation   Type 2 diabetes mellitus   Chronic systolic congestive heart failure   Ischemic cardiomyopathy  Vitamin D deficiency   Hyperglycemia     Past Surgical History:  Laparotomy exploratory - 2011  ORIF right elbow fracture - age 12  Takedown ileostomy - 2014  Tracheostomy - 2011    Family History:   Diabetes - mother, sister  Hypertension - mother  Heart disease - father ( of heart attack)      Social History:   The patient has a significant other (girlfriend of about 40 years), is a former smoker (quit date 12/3/2011), and does not consume alcohol. He is retired from [employer].      Physical Exam:   /72   Pulse 73   Ht 1.727 m (5' 8\")   Wt 109.6 kg (241 lb 11.2 oz)   BMI 36.75 kg/m        Wt Readings from Last 10 Encounters:   19 109.6 kg (241 lb 11.2 oz)   19 110.6 kg (243 lb 12.8 oz)   19 110.2 kg (243 lb)   19 109 kg (240 lb 6.4 oz)   18 113.9 kg (251 lb)   18 108 kg (238 lb)   18 109.6 kg (241 lb 9.6 oz)   18 110.4 kg (243 lb 4.8 oz)   10/12/17 109.3 kg (241 lb)   17 109.2 kg (240 lb 12.8 oz)        General: Pleasant, well nourished and hydrated male in NAD.   Psych:  Mood is \"good,\" affect is appropriate.  Thought form and content are fluid and coherent.    HEENT: Eyes and sclera are clear. Extraocular movements are grossly intact without proptosis.  Nares are patent, " mucous membranes moist.  Neck: No masses or JVD are noted.    Resp: Easy and unlabored breathing.   Neuro: Alert and oriented, communicating clearly.   Ext: no swelling or edema      Data:  Lab Results   Component Value Date     04/10/2019    POTASSIUM 4.1 04/10/2019    CHLORIDE 104 04/10/2019    CO2 27 04/10/2019    ANIONGAP 10 04/10/2019     (H) 04/10/2019    BUN 34 (H) 04/10/2019    CR 2.27 (H) 04/10/2019    RONNY 8.8 04/10/2019     Lab Results   Component Value Date    GFRESTIMATED 30 (L) 04/10/2019    GFRESTIMATED 32 (L) 04/09/2019    GFRESTIMATED 31 (L) 04/08/2019    GFRESTBLACK 35 (L) 04/10/2019    GFRESTBLACK 37 (L) 04/09/2019    GFRESTBLACK 36 (L) 04/08/2019      Lab Results   Component Value Date    MICROL 404 04/08/2019    UMALCR 1,097.83 (H) 04/08/2019        Lab Results   Component Value Date    A1C 13.4 (H) 04/08/2019    A1C 7.3 (H) 02/22/2018    A1C 6.9 (H) 08/16/2016    A1C 5.7 03/26/2014    A1C 5.9 07/10/2013     No results found for: CPEPT, GADAB, ISCAB    Lab Results   Component Value Date    CHOL 175 04/08/2019    CHOL 192 06/01/2018    TRIG 408 (H) 04/08/2019    TRIG 174 (H) 06/01/2018    HDL 30 (L) 04/08/2019    HDL 28 (L) 06/01/2018    LDL  04/08/2019     Cannot estimate LDL when triglyceride exceeds 400 mg/dL     (H) 06/01/2018    NHDL 146 (H) 04/08/2019    NHDL 164 (H) 06/01/2018        Assessment and Plan:  Type 2 diabetes mellitus with hyperglycemia, without long-term current use of insulin (H)  Cole is a 59 year old male with long standing type 2 diabetes mellitus, ischemic cardiomyopathy, CKD stage 4 who unfortunately became uncontrolled recently with a  hospitalized due to hyperglycemia earlier this month. He is now doing well on MDI insulin. Today we decreased his Lantus from 27 to 26 units in the morning. If any BG <90 he will decrease this by one more unit. Advised him to continue to do this until he is waking up with fasting BG between . Informed him he may  get down to 15 units with careful diet and exercise. Also advised him to take 3-5 units of Novolog with meals when blood sugar >130, depending on the size of the meal, with sliding scale when blood sugar >200. Instructed him to do a bedtime check if he did not give insulin with dinner. Emphasized that he wait 3-4 hours after eating before checking and to only give a correction once at night. Education to avoid stacking short acting insulin doses. We also discussed addition of Metformin or GLP1 agonists with potential elimination of mealtime insulin. He is interested in starting a GLP-1 agonist as the benefits of weight loss, cardiovascular protection, and renal protection are desirable for him. I did warn him of some GI side effects that could occur and suggested he take his first dose on a day when he would be able to stay home. Order made today for him to start low dose, once a week Trulicity. Once he starts this, he will only need correction insulin when BG >250 at meals. No mealtime insulin when BG <250 in hopes of avoiding further hypoglycemia. We will reevaluate Trulicity dosage at his next visit to clinic in 1-3 months to consider increasing dosage. He will try to meet with nutrition in the interim. If he does stay on insulin I advised him to meet with a diabetic educator.   At RTC needs foot exam and eye and dental health counseling.   - dulaglutide (TRULICITY) 0.75 MG/0.5ML pen  Dispense: 2 mL; Refill: 3  - NUTRITION REFERRAL       Follow-up: Return in about 1 month (around 5/21/2019).     >50% of 45 minute visit spent in face to face counseling, education and coordination of care related to insulin safety, options for better glycemic control and prevention of complications as well as preventing, detecting, and treating hypoglycemia.      It is my privilege to be involved in the care of the above patient.     Smiley Argueta PA-C, MPAS  [facility]  Diabetes, Endocrinology, and  [facility]  [phone] Appointments/Nurse  [phone] Fax  [phone] pager  [phone] nurse line           Scribe Disclosure:  I, Lynsey Arevalo, am serving as a scribe to document services personally performed by Smiley Argueta PA-C at this visit, based upon the provider's statements to me. All documentation has been reviewed by the aforementioned provider prior to being entered into the official medical record.     Portions of this medical record were completed by a scribe. UPON MY REVIEW AND AUTHENTICATION BY ELECTRONIC SIGNATURE, this confirms (a) I performed the applicable clinical services, and (b) the record is accurate.

## 2019-04-24 ENCOUNTER — TELEPHONE (OUTPATIENT)
Dept: ENDOCRINOLOGY | Facility: CLINIC | Age: 59
End: 2019-04-24

## 2019-04-24 DIAGNOSIS — I25.5 ISCHEMIC CARDIOMYOPATHY: ICD-10-CM

## 2019-04-24 DIAGNOSIS — E11.65 TYPE 2 DIABETES MELLITUS WITH HYPERGLYCEMIA, WITHOUT LONG-TERM CURRENT USE OF INSULIN (H): Primary | ICD-10-CM

## 2019-04-24 NOTE — LETTER
Patient:  Cole Jesus  :   1960  MRN:     [id number]        Mr.Vernon Jesus  [address]  [facility] [address]  Medicaid ID  : [id number]       May 2, 2019    To Whom It May Concern Letter Of Appeal    Trulicity 0.75 mg  once weekly was denied  In a PA but is  medically necessary  for  Cole. He has stage 4 kidney disease and a history of hypoglycemia, disabling MI leading to CHF.  GLP-1 does has less risk of hypoglycemia than sulfonylurea or meglitinide, it also decreases  cardiovascular risk whereas sulfonylurea increases it.  For these two reasons, GLP-1 is the preferred agent for this patient. Metformin and  Sulfonylureas  would not be used safely for this patieint.  With appreciation,     TORIBIO Montes

## 2019-04-24 NOTE — TELEPHONE ENCOUNTER
Prior Authorization Retail Medication Request    Medication/Dose: dulaglutide (TRULICITY) 0.75 MG/0.5ML pen. Inject 0.75 mg Subcutaneous every 7 days     ICD code : E11.65    Previously Tried and Failed:    Rationale: Patient has been checking his BG regularly and some days he doesn't have to take mealtime insulin. The benefit to patient while taking trulicity is weight loss, cardiovascular protection, and renal protection with the possible elimination of meal time insulin(novolog)      Insurance Name:  MEDICAID [place]   Insurance ID: [id number]       Pharmacy Information   Name:  [facility]   Phone:  [phone]

## 2019-04-25 NOTE — TELEPHONE ENCOUNTER
Central Prior Authorization Team   Phone: [phone]      PA Initiation    Medication: Trulicity - PA initiated  Insurance Company: [place] Medicaid ([facility]) - Phone [phone] Fax [phone]  Pharmacy Filling the Rx: [place], [place] - [address]  Filling Pharmacy Phone: [phone]  Filling Pharmacy Fax:    Start Date: 4/25/2019

## 2019-04-26 NOTE — TELEPHONE ENCOUNTER
PRIOR AUTHORIZATION DENIED    Medication: Trulicity - PA denied    Denial Date: 4/25/2019    Denial Rational: Try/Fail      Appeal Information:

## 2019-04-29 NOTE — TELEPHONE ENCOUNTER
Smiley,  Insurance  Say's he needs to  Try metformin or Sulfonylrea first, has he been on any oral  Medications or used any GLP 1's in the past. If you feel he can't use metformin you can  do  A letter of medical necessity.

## 2019-04-30 NOTE — TELEPHONE ENCOUNTER
Medication Appeal Initiation    We have initiated an appeal for the requested medication:  Medication: Trulicity - PA appeal initiated  Appeal Start Date:  4/30/2019  Insurance Company: [place] Medicaid ([facility]) - Phone [phone] Fax [phone]  Comments:  Appeal and letter of medical necessity faxed to [employer] BUSTAMANTE

## 2019-05-01 NOTE — TELEPHONE ENCOUNTER
Smiley can you put together a letter to insurance  for an appeal for the Trulicity 0.75  Including this Yes, he has stage 4 kidney disease and a history of hypoglycemia, disabling MI leading to CHF.  GLP-1 does has less risk of hypoglycemia than sulfonylurea or meglitinide, it also decreases  cardiovascular risk whereas sulfonylurea increases it.  For these two reasons, GLP-1 is the preferred agent for this patient.   And anything else that  On why Trulicity is  Medically necessary

## 2019-05-01 NOTE — TELEPHONE ENCOUNTER
MEDICATION APPEAL DENIED    Medication: Trulicity - PA appeal denied    Denial Date: 4/30/2019    Denial Rational:         Second Level Appeal Information:     Second level appeals will be managed by the clinic staff and provider. Please contact the [employer] Prior Authorization Team if additional information about the denial is needed.

## 2019-05-02 NOTE — TELEPHONE ENCOUNTER
Appeal for Trulicity  0.75 mg  faxed to medicaid  with letter of medical necessity attached and the denial.

## 2019-05-03 NOTE — TELEPHONE ENCOUNTER
MEDICATION APPEAL DENIED    Medication: Trulicity - PA 2nd appeal denied    Denial Date: 4/30/2019    Denial Rational:

## 2019-05-06 ENCOUNTER — TELEPHONE (OUTPATIENT)
Dept: ENDOCRINOLOGY | Facility: CLINIC | Age: 59
End: 2019-05-06

## 2019-05-06 DIAGNOSIS — E11.65 TYPE 2 DIABETES MELLITUS WITH HYPERGLYCEMIA, WITHOUT LONG-TERM CURRENT USE OF INSULIN (H): ICD-10-CM

## 2019-05-06 DIAGNOSIS — I25.5 ISCHEMIC CARDIOMYOPATHY: ICD-10-CM

## 2019-05-06 RX ORDER — LIRAGLUTIDE 6 MG/ML
INJECTION SUBCUTANEOUS
Qty: 9 ML | Refills: 0 | Status: SHIPPED | OUTPATIENT
Start: 2019-05-06 | End: 2019-05-07

## 2019-05-06 NOTE — TELEPHONE ENCOUNTER
Rx updated 0.6 mg daily x days then 1.2 mg daily.  It is my privilege to be involved in the care of the above patient.     Smiley Argueta PA-C, MPAS  [facility]  Diabetes, Endocrinology, and Metabolism  [phone] Appointments/Nurse  [phone] Fax  [phone] pager  [phone] nurse line             Health Call Center    Phone Message    May a detailed message be left on voicemail: no    Reason for Call: Other: Estefany at [place] [address] called requesting clarification on the directions for liraglutide (VICTOZA PEN) 18 MG/3ML solution 9 mL . She stated that the directions repeat themselves. Please F/U with pharmacy and call back at: [phone]     Action Taken: Message routed to:  [facility] Laura

## 2019-05-07 RX ORDER — LIRAGLUTIDE 6 MG/ML
INJECTION SUBCUTANEOUS
Qty: 9 ML | Refills: 0 | Status: SHIPPED | OUTPATIENT
Start: 2019-05-07 | End: 2019-07-03

## 2019-06-05 ENCOUNTER — TELEPHONE (OUTPATIENT)
Dept: CARDIOLOGY | Facility: CLINIC | Age: 59
End: 2019-06-05

## 2019-06-05 NOTE — TELEPHONE ENCOUNTER
Prior Authorization Retail Medication Request    Medication/Dose: Entresto  MG  ICD code (if different than what is on RX):    Previously Tried and Failed:   Rationale:     Insurance Name:  [place]icaid [place]  Insurance ID:  [id number]       Pharmacy Information (if different than what is on RX)  Name:  [facility]  Phone:  [phone]  Fax: [phone]

## 2019-06-07 ENCOUNTER — MYC REFILL (OUTPATIENT)
Dept: CARDIOLOGY | Facility: CLINIC | Age: 59
End: 2019-06-07

## 2019-06-07 DIAGNOSIS — I25.5 ISCHEMIC CARDIOMYOPATHY: ICD-10-CM

## 2019-06-07 DIAGNOSIS — I50.22 CHRONIC SYSTOLIC CONGESTIVE HEART FAILURE (H): ICD-10-CM

## 2019-06-07 NOTE — TELEPHONE ENCOUNTER
Last Clinic Visit: 2/6/2019  [facility]  Elevated Creat noted on discharge summarry with no medication changes

## 2019-06-13 ENCOUNTER — MYC MEDICAL ADVICE (OUTPATIENT)
Dept: CARDIOLOGY | Facility: CLINIC | Age: 59
End: 2019-06-13

## 2019-06-13 DIAGNOSIS — I25.5 ISCHEMIC CARDIOMYOPATHY: ICD-10-CM

## 2019-06-13 DIAGNOSIS — I50.22 CHRONIC SYSTOLIC CONGESTIVE HEART FAILURE (H): ICD-10-CM

## 2019-06-14 NOTE — TELEPHONE ENCOUNTER
Do we know the update on this PA? Patient has been out of medication for 7 days now.     Thank you

## 2019-06-15 ENCOUNTER — TRANSFERRED RECORDS (OUTPATIENT)
Dept: HEALTH INFORMATION MANAGEMENT | Facility: CLINIC | Age: 59
End: 2019-06-15

## 2019-06-17 ENCOUNTER — TELEPHONE (OUTPATIENT)
Dept: INTERNAL MEDICINE | Facility: CLINIC | Age: 59
End: 2019-06-17

## 2019-06-17 LAB — INR PPP: 1.1

## 2019-06-18 ENCOUNTER — ANTICOAGULATION THERAPY VISIT (OUTPATIENT)
Dept: ANTICOAGULATION | Facility: CLINIC | Age: 59
End: 2019-06-18

## 2019-06-18 ENCOUNTER — TELEPHONE (OUTPATIENT)
Dept: INTERNAL MEDICINE | Facility: CLINIC | Age: 59
End: 2019-06-18

## 2019-06-18 DIAGNOSIS — I48.91 ATRIAL FIBRILLATION, UNSPECIFIED TYPE (H): ICD-10-CM

## 2019-06-18 DIAGNOSIS — I48.91 ATRIAL FIBRILLATION, UNSPECIFIED TYPE (H): Primary | Chronic | ICD-10-CM

## 2019-06-18 LAB — INR PPP: 1.1

## 2019-06-18 NOTE — PROGRESS NOTES
Dates of hospitalization: 6/15 to 6/18 at [facility]  Reason for hospitalization: Chest Pain  Procedures performed: Percutaneous coronory interventions and sten placed   Vitamin K or FFP administered? No  INR Goal Range Confirmed to be:2.0-3.0  Inpatient warfarin doses added to calendar? Yes  Medication changes at discharge: ASA 81mg daily, Coreg 3.125mg BID, and Nitrostat 0.4mg sublingual Q 5 minutes PRN. Continue Lipito 40mg Daily, Protonix 40mg Daily, Plavix 75mg Daily, Lasix 20mg BID, Entresto 1 tablet BID, Vitamin B 1 cap daily, and Vitamin D3 1000 units BID.  Discontinue Meoprolol.   Warfarin dosing after DC: 5mg daily  Patient discharged on Lovenox? No  Next INR date: Friday 6/21  Where is the patient discharging to? (home, TCU, staying locally, etc.): Home in [place]  Will patient have home care? No    Tried calling pt to introduce clinic and where to get labs drawn, but was unable to get a hold of pt since phone kept ringing with no way to leave a voice message. Will try to f/u with pt tomorrow. Resent new INR lab order and INR referral order for Dr. Silas Enciso to sign. First INR referral was incomplete.     Addendum 6/19/19    Spoke with Cole today he is taking 5mgs of warfarin daily and will go to the [facility] on 6/21 for an INR.    Sudhir Diamond [facility]

## 2019-06-18 NOTE — TELEPHONE ENCOUNTER
Pharmacy states that the prior auth was approved on 6/14 and that patient was notified. Called and left message for patient on cell phone stating that the medications is ready for .

## 2019-06-18 NOTE — TELEPHONE ENCOUNTER
Spoke to Gely to relay that the patient was referred to INR clinic and that they will follow the patient with INR's and therapy recommendations. Latosha Wallace LPN 6/18/2019 2:23 PM

## 2019-06-18 NOTE — TELEPHONE ENCOUNTER
[facility] Call Center    Phone Message    May a detailed message be left on voicemail: yes    Reason for Call: Order(s): Other:   Reason for requested: Need for INR order; need to have INR draw on 6/21. Per Gely at [facility], pt is discharging from there today, and has been prescribed to be on Coumadin for 1 month, per Dr. Patricia Alaniz in cardiology. They were hoping to have Dr. Enciso place an order for the INR draw, and follow him for the next month with this care plan. Order for this and provider notes being faxed to clinic.   Date needed: 6/21/2019  Provider name: Dr. Enciso      Action Taken: Message routed to:  [facility] YOLA

## 2019-06-21 ENCOUNTER — ANTICOAGULATION THERAPY VISIT (OUTPATIENT)
Dept: ANTICOAGULATION | Facility: CLINIC | Age: 59
End: 2019-06-21

## 2019-06-21 ENCOUNTER — TELEPHONE (OUTPATIENT)
Dept: INTERNAL MEDICINE | Facility: CLINIC | Age: 59
End: 2019-06-21

## 2019-06-21 DIAGNOSIS — I48.91 ATRIAL FIBRILLATION, UNSPECIFIED TYPE (H): ICD-10-CM

## 2019-06-21 LAB — INR PPP: 3.71 (ref 0.86–1.14)

## 2019-06-21 NOTE — PROGRESS NOTES
ANTICOAGULATION FOLLOW-UP CLINIC VISIT    Patient Name:  Cole Jesus  Date:  6/21/2019  Contact Type:  Telephone    SUBJECTIVE:  Patient Findings     Comments:   INR jumped from 1.1 to 3.71 in 2 days.    Discussed with Pharmacist Sudhir Diamond [facility] for patient's new Anticoagulation recommendation.  Reviewed signs of bleeding with Cole--he reports he does not have any right now.  He will be seen urgently in the ER if he develops any or if he falls.  Verified dose of warfarin he has been taking.          Clinical Outcomes     Comments:   INR jumped from 1.1 to 3.71 in 2 days.    Discussed with Pharmacist Sudhir Diamond [facility] for patient's new Anticoagulation recommendation.  Reviewed signs of bleeding with Cole--he reports he does not have any right now.  He will be seen urgently in the ER if he develops any or if he falls.  Verified dose of warfarin he has been taking.             OBJECTIVE    INR   Date Value Ref Range Status   06/21/2019 3.71 (H) 0.86 - 1.14 Final       ASSESSMENT / PLAN  INR assessment SUPRA    Recheck INR In: 3 DAYS    INR Location Clinic      Anticoagulation Summary  As of 6/21/2019    INR goal:   2.0-3.0   TTR:   --   INR used for dosing:   3.71! (6/21/2019)   Warfarin maintenance plan:   No maintenance plan   Full warfarin instructions:   6/21: Hold; 6/22: Hold; 6/23: 2.5 mg; Otherwise No maintenance plan   Next INR check:   6/24/2019   Priority:   INR   Target end date:       Indications    Atrial fibrillation (H) [I48.91]             Anticoagulation Episode Summary     INR check location:       Preferred lab:       Send INR reminders to:   [facility]    Comments:   HIPPA Forms mailed 6/18/19 Hx GI Bleed Epic note 12/25/11 ETOH Abuse (1/2 bottle of vodka + 6 Pack Tall Boy)  Duration of Therapy 30 Days (Start 6/17 End 7/16) Previously on Anticoagulation, but stopped due to bleed  Also taking ASA + Plavix      Anticoagulation Care Providers     Provider Role Specialty Phone number     Silas Enciso MD [facility] Internal Medicine [phone]            See the Encounter Report to view Anticoagulation Flowsheet and Dosing Calendar (Go to Encounters tab in chart review, and find the Anticoagulation Therapy Visit)    Spoke with Cole.  Discussed with Pharmacist Sudhir Diamond [facility] for patient's new Anticoagulation recommendation.    Cole started warfarin 6/17/19.      Tawny Segovia RN     6/21/19 Addendum:  Vernon called back.  He took a 5 mg warfarin tablet today by mistake (he was given recommendations to hold).  He will hold warfarin 6/22 and 6/23 and recheck INR 6/24/19.  Tawny Segovia RN

## 2019-06-21 NOTE — TELEPHONE ENCOUNTER
Health Call Center    Phone Message    May a detailed message be left on voicemail: no    Reason for Call: Medication Question or concern regarding medication   Prescription Clarification  Name of Medication: clopidogrel (PLAVIX) 75 MG tablet  Baby Aspirin  Prescribing Provider: Dr. Trujillo [facility]   Pharmacy: N/A   What on the order needs clarification? Pt's INR results shot up past 3 from his lab test today and was told to stop his warfarin, he want to know if he should stop his aspirin and lipitor too. Please call pt back.            Action Taken: Message routed to:  [facility]

## 2019-06-21 NOTE — TELEPHONE ENCOUNTER
Patient was called and voice message left stating that patient should only hold the warfarin as directed by the coumadin clinic. Patient should stay on all his other medications.

## 2019-06-24 ENCOUNTER — ANTICOAGULATION THERAPY VISIT (OUTPATIENT)
Dept: ANTICOAGULATION | Facility: CLINIC | Age: 59
End: 2019-06-24

## 2019-06-24 DIAGNOSIS — I48.91 ATRIAL FIBRILLATION, UNSPECIFIED TYPE (H): ICD-10-CM

## 2019-06-24 LAB — INR PPP: 2.59 (ref 0.86–1.14)

## 2019-06-24 NOTE — PROGRESS NOTES
ANTICOAGULATION FOLLOW-UP CLINIC VISIT    Patient Name:  Cole Jesus  Date:  2019  Contact Type:  Telephone    SUBJECTIVE:         OBJECTIVE    INR   Date Value Ref Range Status   2019 2.59 (H) 0.86 - 1.14 Final       ASSESSMENT / PLAN  INR assessment THER    Recheck INR In: 4 DAYS    INR Location Clinic      Anticoagulation Summary  As of 2019    INR goal:   2.0-3.0   TTR:   --   INR used for dosin.59 (2019)   Warfarin maintenance plan:   No maintenance plan   Full warfarin instructions:   : 2.5 mg; : 2.5 mg; : 2.5 mg; : 5 mg; Otherwise No maintenance plan   Next INR check:   2019   Priority:   INR   Target end date:       Indications    Atrial fibrillation (H) [I48.91]             Anticoagulation Episode Summary     INR check location:       Preferred lab:       Send INR reminders to:   [facility]    Comments:   HIPPA Forms mailed 19 Hx GI Bleed Epic note 11 ETOH Abuse (1/2 bottle of vodka + 6 Pack Tall Boy)  Duration of Therapy 30 Days (Start  End ) Previously on Anticoagulation, but stopped due to bleed  Also taking ASA + Plavix      Anticoagulation Care Providers     Provider Role Specialty Phone number    Silas Enciso MD [facility] [phone]            See the Encounter Report to view Anticoagulation Flowsheet and Dosing Calendar (Go to Encounters tab in chart review, and find the Anticoagulation Therapy Visit)  Spoke with patient.    Deisy Barrios RN

## 2019-06-27 ENCOUNTER — ANCILLARY PROCEDURE (OUTPATIENT)
Dept: CARDIOLOGY | Facility: CLINIC | Age: 59
End: 2019-06-27
Attending: INTERNAL MEDICINE
Payer: MEDICAID

## 2019-06-27 ENCOUNTER — PATIENT OUTREACH (OUTPATIENT)
Dept: CARDIOLOGY | Facility: CLINIC | Age: 59
End: 2019-06-27

## 2019-06-27 VITALS
OXYGEN SATURATION: 97 % | SYSTOLIC BLOOD PRESSURE: 112 MMHG | BODY MASS INDEX: 35.52 KG/M2 | WEIGHT: 234.4 LBS | DIASTOLIC BLOOD PRESSURE: 76 MMHG | HEIGHT: 68 IN | HEART RATE: 76 BPM

## 2019-06-27 DIAGNOSIS — I48.91 ATRIAL FIBRILLATION, UNSPECIFIED TYPE (H): Chronic | ICD-10-CM

## 2019-06-27 DIAGNOSIS — K92.2 LOWER GI BLEEDING: Chronic | ICD-10-CM

## 2019-06-27 DIAGNOSIS — E78.5 HYPERLIPIDEMIA LDL GOAL <100: Chronic | ICD-10-CM

## 2019-06-27 DIAGNOSIS — I48.91 ATRIAL FIBRILLATION, UNSPECIFIED TYPE (H): Primary | Chronic | ICD-10-CM

## 2019-06-27 DIAGNOSIS — I25.5 ISCHEMIC CARDIOMYOPATHY: Chronic | ICD-10-CM

## 2019-06-27 LAB
ALBUMIN SERPL-MCNC: 3.8 G/DL (ref 3.4–5)
ALP SERPL-CCNC: 104 U/L (ref 40–150)
ALT SERPL W P-5'-P-CCNC: 18 U/L (ref 0–70)
ANION GAP SERPL CALCULATED.3IONS-SCNC: 3 MMOL/L (ref 3–14)
AST SERPL W P-5'-P-CCNC: 13 U/L (ref 0–45)
BILIRUB SERPL-MCNC: 0.9 MG/DL (ref 0.2–1.3)
BUN SERPL-MCNC: 49 MG/DL (ref 7–30)
CALCIUM SERPL-MCNC: 8.6 MG/DL (ref 8.5–10.1)
CHLORIDE SERPL-SCNC: 103 MMOL/L (ref 94–109)
CK SERPL-CCNC: 122 U/L (ref 30–300)
CO2 SERPL-SCNC: 29 MMOL/L (ref 20–32)
CREAT SERPL-MCNC: 2.54 MG/DL (ref 0.66–1.25)
GFR SERPL CREATININE-BSD FRML MDRD: 26 ML/MIN/{1.73_M2}
GLUCOSE SERPL-MCNC: 157 MG/DL (ref 70–99)
HGB BLD-MCNC: 14.2 G/DL (ref 13.3–17.7)
IRON SERPL-MCNC: 84 UG/DL (ref 35–180)
MAGNESIUM SERPL-MCNC: 2.7 MG/DL (ref 1.6–2.3)
NT-PROBNP SERPL-MCNC: 2650 PG/ML (ref 0–125)
POTASSIUM SERPL-SCNC: 4.6 MMOL/L (ref 3.4–5.3)
PROT SERPL-MCNC: 8.5 G/DL (ref 6.8–8.8)
SODIUM SERPL-SCNC: 134 MMOL/L (ref 133–144)
TROPONIN I SERPL-MCNC: <0.015 UG/L (ref 0–0.04)
TSH SERPL DL<=0.005 MIU/L-ACNC: 5.82 MU/L (ref 0.4–4)

## 2019-06-27 PROCEDURE — 83540 ASSAY OF IRON: CPT | Performed by: INTERNAL MEDICINE

## 2019-06-27 PROCEDURE — 99214 OFFICE O/P EST MOD 30 MIN: CPT | Mod: 25 | Performed by: INTERNAL MEDICINE

## 2019-06-27 PROCEDURE — G0463 HOSPITAL OUTPT CLINIC VISIT: HCPCS | Mod: ZF

## 2019-06-27 PROCEDURE — 36415 COLL VENOUS BLD VENIPUNCTURE: CPT | Performed by: INTERNAL MEDICINE

## 2019-06-27 PROCEDURE — 0298T ZZC EXT ECG > 48HR TO 21 DAY REVIEW AND INTERPRETATN: CPT | Performed by: INTERNAL MEDICINE

## 2019-06-27 PROCEDURE — 85018 HEMOGLOBIN: CPT | Performed by: INTERNAL MEDICINE

## 2019-06-27 PROCEDURE — 80053 COMPREHEN METABOLIC PANEL: CPT | Performed by: INTERNAL MEDICINE

## 2019-06-27 PROCEDURE — 84443 ASSAY THYROID STIM HORMONE: CPT | Performed by: INTERNAL MEDICINE

## 2019-06-27 PROCEDURE — 93010 ELECTROCARDIOGRAM REPORT: CPT | Mod: ZP | Performed by: INTERNAL MEDICINE

## 2019-06-27 PROCEDURE — 84484 ASSAY OF TROPONIN QUANT: CPT | Performed by: INTERNAL MEDICINE

## 2019-06-27 PROCEDURE — 83880 ASSAY OF NATRIURETIC PEPTIDE: CPT | Performed by: INTERNAL MEDICINE

## 2019-06-27 PROCEDURE — 82550 ASSAY OF CK (CPK): CPT | Performed by: INTERNAL MEDICINE

## 2019-06-27 PROCEDURE — 0296T ZIO PATCH HOLTER ADULT PEDIATRIC GREATER THAN 48 HRS: CPT | Mod: ZF

## 2019-06-27 PROCEDURE — 83735 ASSAY OF MAGNESIUM: CPT | Performed by: INTERNAL MEDICINE

## 2019-06-27 RX ORDER — WARFARIN SODIUM 5 MG/1
2.5 TABLET ORAL DAILY
Status: ON HOLD | COMMUNITY
Start: 2019-06-18 | End: 2019-07-06

## 2019-06-27 RX ORDER — CARVEDILOL 3.12 MG/1
3.12 TABLET ORAL 2 TIMES DAILY WITH MEALS
COMMUNITY
Start: 2019-06-18 | End: 2019-07-22

## 2019-06-27 RX ORDER — ATORVASTATIN CALCIUM 80 MG/1
80 TABLET, FILM COATED ORAL DAILY
Qty: 90 TABLET | Refills: 3 | Status: SHIPPED | OUTPATIENT
Start: 2019-06-27 | End: 2019-08-26

## 2019-06-27 ASSESSMENT — PAIN SCALES - GENERAL: PAINLEVEL: NO PAIN (0)

## 2019-06-27 ASSESSMENT — MIFFLIN-ST. JEOR: SCORE: 1842.73

## 2019-06-27 NOTE — PATIENT INSTRUCTIONS
Increase atorvastatin to 80mgs once daily    Blood draw today: CPK, troponin, TSH, BNP, CMP, magnesium, hemoglobin and iron, INR    ECG today    Regular walking program    Accelerate return to endocrine for consideration of SGLT2 for cardiovascular risk reduction    Overnight recording oximetry    ZIOPATCH for assessment of atrial fibrillation    Thank you for your visit today.  Please call me with any questions or concerns.   Zach Phelan RN  Cardiology Care Coordinator  [phone], press option 1 then option 4

## 2019-06-27 NOTE — PROGRESS NOTES
Alen Trujillo M.D.  Cardiovascular Medicine    I personally saw and examined this patient, discussed care with housestaff and other consultants, reviewed current laboratories and imaging studies, and conveyed impression and diagnostic/therapeutic plan to patient.    Problem List  1. Acute inferior MI treated with angioplasty and stenting  2. Atrial fibrlllation  3. Remote history of GI bleeding  4. Chronic cardiomyopathy  5. Chronic renal insufficiency  6. New warfarin anticoagulation    History    The patient was recently hospitalized in [place] with acute occlusion of the proximal right coronary artery, treated successfully with balloon angioplasty and stenting.  The patient's course was complicated by atrial fibrillation.  He has a remote history of atrial fibrillation, treated with Warfarin and complicated by gastrointestinal bleeding.  At the time (prior to my assumption of care) he was advised to stay off of warfarin (2103).  He has had no documented recurrence of atrial fibrillation with episodic monitoring including ZEOPATCH,  He has no interim history of GI bleeding prior to this event.  However, he is now on both plavix and warfarin.  So far he has no symptomatic or visible evidence of GI bleeding.  He does not know or perceive being out of rhythm.  His most recent echocardiogram (technically difficult0 showed stable ejection fraction in the range of 35%.    There is no interim history of increasing shortness of breath, orthopnea, PND, ankle edema, increasing abdominal girth, palpitation, pre-syncope, syncope, bleeding or thromboembolic complications.  The patient is taking medication regularly, following a low salt diet, and exercising regularly as outlined.    Alert, oriented, normal gait and station, normal mental, JVP within normal limits, HJR negative,thyroid not enlarged, mucous membranes clear, abdomen without tenderness, rebound or guarding, skin clear of lesion, PMI normal, S1 S2 regular rhythm,  "no gallop, no edema    AICD has been previous discussed and patient did not feel over the years that it was desirable (extensive discussion of risks and benefits)    Objective  /76 (BP Location: Left arm, Patient Position: Chair, Cuff Size: Adult Large)   Pulse 76   Ht 1.727 m (5' 8\")   Wt 106.3 kg (234 lb 6.4 oz)   SpO2 97%   BMI 35.64 kg/m   PERRLA, EOM full, normal gait and station, normal mentation, skin without lesions, chest is clear, no evidence of right or left ventricular overactivity, sternum stable, no carotid bruits, regular rhythm, [id number], S2, no murmurs, abdomen without tenderness, rebound guarding or masses, no edema, no focal neurologic defects.    Wt Readings from Last 5 Encounters:   06/27/19 106.3 kg (234 lb 6.4 oz)   04/23/19 109.6 kg (241 lb 11.2 oz)   04/08/19 110.6 kg (243 lb 12.8 oz)   04/08/19 110.2 kg (243 lb)   02/06/19 109 kg (240 lb 6.4 oz)       Meds  Current Outpatient Medications   Medication     Alcohol Swabs PADS     atorvastatin (LIPITOR) 20 MG tablet     blood glucose (NO BRAND SPECIFIED) test strip     blood glucose monitoring (ACCU-CHEK FASTCLIX) lancets     carvedilol (COREG) 3.125 MG tablet     cholecalciferol (VITAMIN  -D) 1000 UNITS capsule     clopidogrel (PLAVIX) 75 MG tablet     furosemide (LASIX) 20 MG tablet     insulin aspart (NOVOLOG PEN) 100 UNIT/ML pen     insulin aspart (NOVOLOG PEN) 100 UNIT/ML pen     insulin aspart (NOVOLOG PEN) 100 UNIT/ML pen     insulin glargine (LANTUS SOLOSTAR PEN) 100 UNIT/ML pen     insulin pen needle (32G X 4 MM) 32G X 4 MM miscellaneous     pantoprazole (PROTONIX) 40 MG EC tablet     sacubitril-valsartan (ENTRESTO)  MG per tablet     Sharps Container MISC     vitamin  B complex with vitamin C (VITAMIN  B COMPLEX) TABS     warfarin (COUMADIN) 5 MG tablet     liraglutide (VICTOZA PEN) 18 MG/3ML solution     metoprolol succinate ER (TOPROL-XL) 100 MG 24 hr tablet     thiamine 100 MG tablet     No current " facility-administered medications for this visit.      Results for [facility] (MRN [id number]) as of 6/28/2019 09:23   Ref. Range 6/27/2019 12:26 6/28/2019 08:40   Sodium Latest Ref Range: 133 - 144 mmol/L 134 136   Potassium Latest Ref Range: 3.4 - 5.3 mmol/L 4.6 4.6   Chloride Latest Ref Range: 94 - 109 mmol/L 103 104   Carbon Dioxide Latest Ref Range: 20 - 32 mmol/L 29 28   Urea Nitrogen Latest Ref Range: 7 - 30 mg/dL 49 (H) 53 (H)   Creatinine Latest Ref Range: 0.66 - 1.25 mg/dL 2.54 (H) 2.67 (H)   GFR Estimate Latest Ref Range: >60 mL/min/1.73_m2 26 (L) 25 (L)   GFR Estimate If Black Latest Ref Range: >60 mL/min/1.73_m2 30 (L) 29 (L)   Calcium Latest Ref Range: 8.5 - 10.1 mg/dL 8.6 8.3 (L)   Anion Gap Latest Ref Range: 3 - 14 mmol/L 3 4   Magnesium Latest Ref Range: 1.6 - 2.3 mg/dL 2.7 (H)    Albumin Latest Ref Range: 3.4 - 5.0 g/dL 3.8    Protein Total Latest Ref Range: 6.8 - 8.8 g/dL 8.5    Bilirubin Total Latest Ref Range: 0.2 - 1.3 mg/dL 0.9    Alkaline Phosphatase Latest Ref Range: 40 - 150 U/L 104    ALT Latest Ref Range: 0 - 70 U/L 18    AST Latest Ref Range: 0 - 45 U/L 13    CK Total Latest Ref Range: 30 - 300 U/L 122    Iron Latest Ref Range: 35 - 180 ug/dL 84    N-Terminal Pro Bnp Latest Ref Range: 0 - 125 pg/mL 2,650 (H)    Troponin I ES Latest Ref Range: 0.000 - 0.045 ug/L <0.015    TSH Latest Ref Range: 0.40 - 4.00 mU/L 5.82 (H)    Glucose Latest Ref Range: 70 - 99 mg/dL 157 (H) 110 (H)   Hemoglobin Latest Ref Range: 13.3 - 17.7 g/dL 14.2    INR Latest Ref Range: 0.86 - 1.14   3.72 (H)     Assessment/Plan     Increase atorvastatin to 80mgs once daily    Blood draw today: CPK, troponin, TSH, BNP, CMP, magnesium, hemoglobin and iron    ECG today demonstrates AF with controlled ventricular response    Regular walking program    Accelerate return to endocrine for consideration of SGLT2 for cardiovascular risk reduction    Overnight recording oximetry    Re-assess LVEF and re-discuss AICD  prophylaxis.    ZIOPATCH for assessment of atrial fibrillation    RTC 1 month with echocardiogram    Should the patient have recurrent chest pain or symptoms of abnormal heart rhythm including palpitation, pre-syncope or syncope, he was instructed to call 911 and return immediately to hospital.

## 2019-06-27 NOTE — PROGRESS NOTES
Patient has a remote history of being allergic to Simvastatin. Patient was switched to Atorvastatin and has been tolerating it. Atorvastatin increased related to recent MI. Pharmacy notified.

## 2019-06-27 NOTE — PROGRESS NOTES
Per Dr. SEN, patient to have 7 days ziopatch monitor placed.  Diagnosis: AF  Monitor placed: Yes  Patient Instructed: Yes  Patient verbalized understanding: Yes  Holter # [id number]  Placed by; Ellie White CMA

## 2019-06-27 NOTE — LETTER
6/27/2019      RE: Cole Jesus  [address]  [facility] [address]       Dear Colleague,    Thank you for the opportunity to participate in the care of your patient, Cole Jesus, at the [facility]. Please see a copy of my visit note below.    Alen Trujillo M.D.  Cardiovascular Medicine    I personally saw and examined this patient, discussed care with housestaff and other consultants, reviewed current laboratories and imaging studies, and conveyed impression and diagnostic/therapeutic plan to patient.    Problem List  1. Acute inferior MI treated with angioplasty and stenting  2. Atrial fibrlllation  3. Remote history of GI bleeding  4. Chronic cardiomyopathy  5. Chronic renal insufficiency  6. New warfarin anticoagulation    History    The patient was recently hospitalized in [place] with acute occlusion of the proximal right coronary artery, treated successfully with balloon angioplasty and stenting.  The patient's course was complicated by atrial fibrillation.  He has a remote history of atrial fibrillation, treated with Warfarin and complicated by gastrointestinal bleeding.  At the time (prior to my assumption of care) he was advised to stay off of warfarin (2103).  He has had no documented recurrence of atrial fibrillation with episodic monitoring including ZEOPATCH,  He has no interim history of GI bleeding prior to this event.  However, he is now on both plavix and warfarin.  So far he has no symptomatic or visible evidence of GI bleeding.  He does not know or perceive being out of rhythm.  His most recent echocardiogram (technically difficult0 showed stable ejection fraction in the range of 35%.    There is no interim history of increasing shortness of breath, orthopnea, PND, ankle edema, increasing abdominal girth, palpitation, pre-syncope, syncope, bleeding or thromboembolic complications.  The patient is taking medication  "regularly, following a low salt diet, and exercising regularly as outlined.    Alert, oriented, normal gait and station, normal mental, JVP within normal limits, HJR negative,thyroid not enlarged, mucous membranes clear, abdomen without tenderness, rebound or guarding, skin clear of lesion, PMI normal, S1 S2 regular rhythm, no gallop, no edema    AICD has been previous discussed and patient did not feel over the years that it was desirable (extensive discussion of risks and benefits)    Objective  /76 (BP Location: Left arm, Patient Position: Chair, Cuff Size: Adult Large)   Pulse 76   Ht 1.727 m (5' 8\")   Wt 106.3 kg (234 lb 6.4 oz)   SpO2 97%   BMI 35.64 kg/m    PERRLA, EOM full, normal gait and station, normal mentation, skin without lesions, chest is clear, no evidence of right or left ventricular overactivity, sternum stable, no carotid bruits, regular rhythm, [id number], S2, no murmurs, abdomen without tenderness, rebound guarding or masses, no edema, no focal neurologic defects.    Wt Readings from Last 5 Encounters:   06/27/19 106.3 kg (234 lb 6.4 oz)   04/23/19 109.6 kg (241 lb 11.2 oz)   04/08/19 110.6 kg (243 lb 12.8 oz)   04/08/19 110.2 kg (243 lb)   02/06/19 109 kg (240 lb 6.4 oz)       Meds  Current Outpatient Medications   Medication     Alcohol Swabs PADS     atorvastatin (LIPITOR) 20 MG tablet     blood glucose (NO BRAND SPECIFIED) test strip     blood glucose monitoring (ACCU-CHEK FASTCLIX) lancets     carvedilol (COREG) 3.125 MG tablet     cholecalciferol (VITAMIN  -D) 1000 UNITS capsule     clopidogrel (PLAVIX) 75 MG tablet     furosemide (LASIX) 20 MG tablet     insulin aspart (NOVOLOG PEN) 100 UNIT/ML pen     insulin aspart (NOVOLOG PEN) 100 UNIT/ML pen     insulin aspart (NOVOLOG PEN) 100 UNIT/ML pen     insulin glargine (LANTUS SOLOSTAR PEN) 100 UNIT/ML pen     insulin pen needle (32G X 4 MM) 32G X 4 MM miscellaneous     pantoprazole (PROTONIX) 40 MG EC tablet     " sacubitril-valsartan (ENTRESTO)  MG per tablet     Sharps Container MISC     vitamin  B complex with vitamin C (VITAMIN  B COMPLEX) TABS     warfarin (COUMADIN) 5 MG tablet     liraglutide (VICTOZA PEN) 18 MG/3ML solution     metoprolol succinate ER (TOPROL-XL) 100 MG 24 hr tablet     thiamine 100 MG tablet     No current facility-administered medications for this visit.      Results for [facility] (MRN [id number]) as of 6/28/2019 09:23   Ref. Range 6/27/2019 12:26 6/28/2019 08:40   Sodium Latest Ref Range: 133 - 144 mmol/L 134 136   Potassium Latest Ref Range: 3.4 - 5.3 mmol/L 4.6 4.6   Chloride Latest Ref Range: 94 - 109 mmol/L 103 104   Carbon Dioxide Latest Ref Range: 20 - 32 mmol/L 29 28   Urea Nitrogen Latest Ref Range: 7 - 30 mg/dL 49 (H) 53 (H)   Creatinine Latest Ref Range: 0.66 - 1.25 mg/dL 2.54 (H) 2.67 (H)   GFR Estimate Latest Ref Range: >60 mL/min/1.73_m2 26 (L) 25 (L)   GFR Estimate If Black Latest Ref Range: >60 mL/min/1.73_m2 30 (L) 29 (L)   Calcium Latest Ref Range: 8.5 - 10.1 mg/dL 8.6 8.3 (L)   Anion Gap Latest Ref Range: 3 - 14 mmol/L 3 4   Magnesium Latest Ref Range: 1.6 - 2.3 mg/dL 2.7 (H)    Albumin Latest Ref Range: 3.4 - 5.0 g/dL 3.8    Protein Total Latest Ref Range: 6.8 - 8.8 g/dL 8.5    Bilirubin Total Latest Ref Range: 0.2 - 1.3 mg/dL 0.9    Alkaline Phosphatase Latest Ref Range: 40 - 150 U/L 104    ALT Latest Ref Range: 0 - 70 U/L 18    AST Latest Ref Range: 0 - 45 U/L 13    CK Total Latest Ref Range: 30 - 300 U/L 122    Iron Latest Ref Range: 35 - 180 ug/dL 84    N-Terminal Pro Bnp Latest Ref Range: 0 - 125 pg/mL 2,650 (H)    Troponin I ES Latest Ref Range: 0.000 - 0.045 ug/L <0.015    TSH Latest Ref Range: 0.40 - 4.00 mU/L 5.82 (H)    Glucose Latest Ref Range: 70 - 99 mg/dL 157 (H) 110 (H)   Hemoglobin Latest Ref Range: 13.3 - 17.7 g/dL 14.2    INR Latest Ref Range: 0.86 - 1.14   3.72 (H)     Assessment/Plan     Increase atorvastatin to 80mgs once daily    Blood draw  today: CPK, troponin, TSH, BNP, CMP, magnesium, hemoglobin and iron    ECG today demonstrates AF with controlled ventricular response    Regular walking program    Accelerate return to endocrine for consideration of SGLT2 for cardiovascular risk reduction    Overnight recording oximetry    Re-assess LVEF and re-discuss AICD prophylaxis.    ZIOPATCH for assessment of atrial fibrillation    RTC 1 month with echocardiogram    Should the patient have recurrent chest pain or symptoms of abnormal heart rhythm including palpitation, pre-syncope or syncope, he was instructed to call 911 and return immediately to hospital.      Please do not hesitate to contact me if you have any questions/concerns.     Sincerely,     Alen Trujillo MD

## 2019-06-28 ENCOUNTER — ANTICOAGULATION THERAPY VISIT (OUTPATIENT)
Dept: ANTICOAGULATION | Facility: CLINIC | Age: 59
End: 2019-06-28

## 2019-06-28 DIAGNOSIS — I50.22 CHRONIC SYSTOLIC CONGESTIVE HEART FAILURE (H): ICD-10-CM

## 2019-06-28 DIAGNOSIS — I48.91 ATRIAL FIBRILLATION, UNSPECIFIED TYPE (H): ICD-10-CM

## 2019-06-28 LAB
ANION GAP SERPL CALCULATED.3IONS-SCNC: 4 MMOL/L (ref 3–14)
BUN SERPL-MCNC: 53 MG/DL (ref 7–30)
CALCIUM SERPL-MCNC: 8.3 MG/DL (ref 8.5–10.1)
CHLORIDE SERPL-SCNC: 104 MMOL/L (ref 94–109)
CO2 SERPL-SCNC: 28 MMOL/L (ref 20–32)
CREAT SERPL-MCNC: 2.67 MG/DL (ref 0.66–1.25)
GFR SERPL CREATININE-BSD FRML MDRD: 25 ML/MIN/{1.73_M2}
GLUCOSE SERPL-MCNC: 110 MG/DL (ref 70–99)
INR PPP: 3.72 (ref 0.86–1.14)
INTERPRETATION ECG - MUSE: NORMAL
POTASSIUM SERPL-SCNC: 4.6 MMOL/L (ref 3.4–5.3)
SODIUM SERPL-SCNC: 136 MMOL/L (ref 133–144)

## 2019-06-28 NOTE — PROGRESS NOTES
ANTICOAGULATION FOLLOW-UP CLINIC VISIT    Patient Name:  Cole Jesus  Date:  6/28/2019  Contact Type:  Telephone    SUBJECTIVE:  Patient Findings     Positives:   Change in medications (atorvastatin dose increased)             OBJECTIVE    INR   Date Value Ref Range Status   06/28/2019 3.72 (H) 0.86 - 1.14 Final       ASSESSMENT / PLAN  INR assessment SUPRA    Recheck INR In: 3 DAYS    INR Location Clinic      Anticoagulation Summary  As of 6/28/2019    INR goal:   2.0-3.0   TTR:   --   INR used for dosing:   3.72! (6/28/2019)   Warfarin maintenance plan:   No maintenance plan   Full warfarin instructions:   6/28: Hold; 6/29: Hold; 6/30: 2.5 mg; Otherwise No maintenance plan   Next INR check:   7/1/2019   Priority:   INR   Target end date:       Indications    Atrial fibrillation (H) [I48.91]             Anticoagulation Episode Summary     INR check location:       Preferred lab:       Send INR reminders to:   [facility]    Comments:   HIPPA Forms mailed 6/18/19 Hx GI Bleed Epic note 12/25/11 ETOH Abuse (1/2 bottle of vodka + 6 Pack Tall Boy)  Duration of Therapy 30 Days (Start 6/17 End 7/16) Previously on Anticoagulation, but stopped due to bleed  Also taking ASA + Plavix      Anticoagulation Care Providers     Provider Role Specialty Phone number    Silas Enciso MD [facility] Internal [facility] [phone]            See the Encounter Report to view Anticoagulation Flowsheet and Dosing Calendar (Go to Encounters tab in chart review, and find the Anticoagulation Therapy Visit)    Spoke with Cole.  He does not have any signs of bleeding. Reviewed signs of bleeding with Cole, he will be seen urgently if he develops any.  Discussed with Pharmacist Sudhir Diamond [facility] for patient's new Anticoagulation recommendation.    Cole reports he is not drinking alcohol.      Tanwy Segovia, RN

## 2019-07-01 ENCOUNTER — ANTICOAGULATION THERAPY VISIT (OUTPATIENT)
Dept: ANTICOAGULATION | Facility: CLINIC | Age: 59
End: 2019-07-01

## 2019-07-01 DIAGNOSIS — E11.65 TYPE 2 DIABETES MELLITUS WITH HYPERGLYCEMIA, WITHOUT LONG-TERM CURRENT USE OF INSULIN (H): ICD-10-CM

## 2019-07-01 DIAGNOSIS — I25.5 ISCHEMIC CARDIOMYOPATHY: ICD-10-CM

## 2019-07-01 DIAGNOSIS — I48.91 ATRIAL FIBRILLATION, UNSPECIFIED TYPE (H): ICD-10-CM

## 2019-07-01 DIAGNOSIS — E55.9 VITAMIN D DEFICIENCY: ICD-10-CM

## 2019-07-01 LAB
ALBUMIN SERPL-MCNC: 3.6 G/DL (ref 3.4–5)
ALP SERPL-CCNC: 89 U/L (ref 40–150)
ALT SERPL W P-5'-P-CCNC: 17 U/L (ref 0–70)
ANION GAP SERPL CALCULATED.3IONS-SCNC: 4 MMOL/L (ref 3–14)
AST SERPL W P-5'-P-CCNC: 13 U/L (ref 0–45)
BILIRUB SERPL-MCNC: 0.7 MG/DL (ref 0.2–1.3)
BUN SERPL-MCNC: 45 MG/DL (ref 7–30)
CALCIUM SERPL-MCNC: 8.2 MG/DL (ref 8.5–10.1)
CHLORIDE SERPL-SCNC: 103 MMOL/L (ref 94–109)
CO2 SERPL-SCNC: 30 MMOL/L (ref 20–32)
CREAT SERPL-MCNC: 2.49 MG/DL (ref 0.66–1.25)
DEPRECATED CALCIDIOL+CALCIFEROL SERPL-MC: 56 UG/L (ref 20–75)
GFR SERPL CREATININE-BSD FRML MDRD: 27 ML/MIN/{1.73_M2}
GLUCOSE SERPL-MCNC: 109 MG/DL (ref 70–99)
INR PPP: 2.63 (ref 0.86–1.14)
POTASSIUM SERPL-SCNC: 4.1 MMOL/L (ref 3.4–5.3)
PROT SERPL-MCNC: 7.6 G/DL (ref 6.8–8.8)
SODIUM SERPL-SCNC: 138 MMOL/L (ref 133–144)
T4 FREE SERPL-MCNC: 0.93 NG/DL (ref 0.76–1.46)
TSH SERPL DL<=0.005 MIU/L-ACNC: 6.42 MU/L (ref 0.4–4)

## 2019-07-01 PROCEDURE — 82306 VITAMIN D 25 HYDROXY: CPT | Performed by: PHYSICIAN ASSISTANT

## 2019-07-01 NOTE — PROGRESS NOTES
19  Please send a My Chart message with results and recommendations on .  Cole will check his messages.  He has plans to be out of town.    Austyn Zapien RN  ANTICOAGULATION FOLLOW-UP CLINIC VISIT    Patient Name:  Cole Jesus  Date:  2019  Contact Type:  Telephone    SUBJECTIVE:  Patient Findings     Positives:   Change in medications (Lipitor has increased, change in Beta Blockers.)    Comments:   Patient is monitoring stool color.  Stated it is dark brown.  Reviewed with patient when to seek medical attention.        Clinical Outcomes     Comments:   Patient is monitoring stool color.  Stated it is dark brown.  Reviewed with patient when to seek medical attention.           OBJECTIVE    INR   Date Value Ref Range Status   2019 2.63 (H) 0.86 - 1.14 Final       ASSESSMENT / PLAN  INR assessment THER    Recheck INR In: 4 DAYS    INR Location Clinic      Anticoagulation Summary  As of 2019    INR goal:   2.0-3.0   TTR:   37.4 % (3 d)   INR used for dosin.63 (2019)   Warfarin maintenance plan:   No maintenance plan   Full warfarin instructions:   : 2.5 mg; : 2.5 mg; 7/3: 2.5 mg; 4: 2.5 mg; Otherwise No maintenance plan   Next INR check:   2019   Priority:   INR   Target end date:       Indications    Atrial fibrillation (H) [I48.91]             Anticoagulation Episode Summary     INR check location:       Preferred lab:       [facility] INR reminders to:   [facility]    Comments:   HIPPA Forms mailed 19 Hx GI Bleed Epic note 11 ETOH Abuse (1/2 bottle of vodka + 6 Pack Tall Boy)  Duration of Therapy 30 Days (Start  End ) Previously on Anticoagulation, but stopped due to bleed  Also taking ASA + Plavix      Anticoagulation Care Providers     Provider Role Specialty Phone number    Silas Enciso MD [facility] Internal Medicine [phone]            See the Encounter Report to view Anticoagulation Flowsheet and Dosing Calendar (Go to  Encounters tab in chart review, and find the Anticoagulation Therapy Visit)    Spoke with patient. Gave them their lab results and new warfarin recommendation.  No changes in health, medication, or diet. No missed doses, no falls. Monitoring stool color.  Please send a My Chart message on 7/5.  Patient will be out of town.      Austyn Zapien RN

## 2019-07-03 ENCOUNTER — APPOINTMENT (OUTPATIENT)
Dept: GENERAL RADIOLOGY | Facility: CLINIC | Age: 59
End: 2019-07-03
Attending: EMERGENCY MEDICINE
Payer: MEDICAID

## 2019-07-03 ENCOUNTER — HOSPITAL ENCOUNTER (INPATIENT)
Facility: CLINIC | Age: 59
LOS: 3 days | Discharge: HOME | End: 2019-07-06
Attending: EMERGENCY MEDICINE | Admitting: INTERNAL MEDICINE
Payer: MEDICAID

## 2019-07-03 DIAGNOSIS — Z79.01 LONG TERM (CURRENT) USE OF ANTICOAGULANTS: ICD-10-CM

## 2019-07-03 DIAGNOSIS — N18.4 CKD (CHRONIC KIDNEY DISEASE) STAGE 4, GFR 15-29 ML/MIN (H): Chronic | ICD-10-CM

## 2019-07-03 DIAGNOSIS — I25.10 CORONARY ARTERY DISEASE DUE TO CALCIFIED CORONARY LESION: ICD-10-CM

## 2019-07-03 DIAGNOSIS — K92.2 GASTROINTESTINAL HEMORRHAGE, UNSPECIFIED GASTROINTESTINAL HEMORRHAGE TYPE: ICD-10-CM

## 2019-07-03 DIAGNOSIS — I48.19 PERSISTENT ATRIAL FIBRILLATION (H): ICD-10-CM

## 2019-07-03 DIAGNOSIS — I25.84 CORONARY ARTERY DISEASE DUE TO CALCIFIED CORONARY LESION: ICD-10-CM

## 2019-07-03 DIAGNOSIS — Z87.891 PERSONAL HISTORY OF TOBACCO USE, PRESENTING HAZARDS TO HEALTH: ICD-10-CM

## 2019-07-03 DIAGNOSIS — I13.10 HYPERTENSIVE HEART AND RENAL DISEASE WITH RENAL FAILURE, STAGE 1 THROUGH STAGE 4 OR UNSPECIFIED CHRONIC KIDNEY DISEASE, WITHOUT HEART FAILURE: ICD-10-CM

## 2019-07-03 DIAGNOSIS — I25.84 CORONARY ATHEROSCLEROSIS DUE TO SEVERELY CALCIFIED CORONARY LESION: ICD-10-CM

## 2019-07-03 PROBLEM — R53.1 WEAKNESS: Status: ACTIVE | Noted: 2019-07-03

## 2019-07-03 LAB
ALBUMIN SERPL-MCNC: 3.6 G/DL (ref 3.4–5)
ALP SERPL-CCNC: 78 U/L (ref 40–150)
ALT SERPL W P-5'-P-CCNC: 18 U/L (ref 0–70)
ANION GAP SERPL CALCULATED.3IONS-SCNC: 7 MMOL/L (ref 3–14)
AST SERPL W P-5'-P-CCNC: 7 U/L (ref 0–45)
BASOPHILS # BLD AUTO: 0 10E9/L (ref 0–0.2)
BASOPHILS NFR BLD AUTO: 0.4 %
BILIRUB SERPL-MCNC: 0.6 MG/DL (ref 0.2–1.3)
BUN SERPL-MCNC: 60 MG/DL (ref 7–30)
CALCIUM SERPL-MCNC: 8.4 MG/DL (ref 8.5–10.1)
CHLORIDE SERPL-SCNC: 102 MMOL/L (ref 94–109)
CO2 SERPL-SCNC: 28 MMOL/L (ref 20–32)
CREAT SERPL-MCNC: 2.4 MG/DL (ref 0.66–1.25)
DIFFERENTIAL METHOD BLD: ABNORMAL
EOSINOPHIL # BLD AUTO: 0.2 10E9/L (ref 0–0.7)
EOSINOPHIL NFR BLD AUTO: 1.7 %
ERYTHROCYTE [DISTWIDTH] IN BLOOD BY AUTOMATED COUNT: 13.4 % (ref 10–15)
GFR SERPL CREATININE-BSD FRML MDRD: 28 ML/MIN/{1.73_M2}
GLUCOSE SERPL-MCNC: 123 MG/DL (ref 70–99)
HCT VFR BLD AUTO: 35.7 % (ref 40–53)
HGB BLD-MCNC: 10.1 G/DL (ref 13.3–17.7)
HGB BLD-MCNC: 11.1 G/DL (ref 13.3–17.7)
IMM GRANULOCYTES # BLD: 0.1 10E9/L (ref 0–0.4)
IMM GRANULOCYTES NFR BLD: 0.5 %
INR PPP: 3.08 (ref 0.86–1.14)
LYMPHOCYTES # BLD AUTO: 0.9 10E9/L (ref 0.8–5.3)
LYMPHOCYTES NFR BLD AUTO: 9.3 %
MCH RBC QN AUTO: 29.4 PG (ref 26.5–33)
MCHC RBC AUTO-ENTMCNC: 31.1 G/DL (ref 31.5–36.5)
MCV RBC AUTO: 95 FL (ref 78–100)
MONOCYTES # BLD AUTO: 0.8 10E9/L (ref 0–1.3)
MONOCYTES NFR BLD AUTO: 8.5 %
NEUTROPHILS # BLD AUTO: 7.5 10E9/L (ref 1.6–8.3)
NEUTROPHILS NFR BLD AUTO: 79.6 %
NRBC # BLD AUTO: 0 10*3/UL
NRBC BLD AUTO-RTO: 0 /100
NT-PROBNP SERPL-MCNC: 2194 PG/ML (ref 0–900)
PLATELET # BLD AUTO: 261 10E9/L (ref 150–450)
POTASSIUM SERPL-SCNC: 4 MMOL/L (ref 3.4–5.3)
PROT SERPL-MCNC: 7.7 G/DL (ref 6.8–8.8)
RBC # BLD AUTO: 3.77 10E12/L (ref 4.4–5.9)
SODIUM SERPL-SCNC: 137 MMOL/L (ref 133–144)
TROPONIN I SERPL-MCNC: <0.015 UG/L (ref 0–0.04)
WBC # BLD AUTO: 9.4 10E9/L (ref 4–11)

## 2019-07-03 PROCEDURE — C9113 INJ PANTOPRAZOLE SODIUM, VIA: HCPCS | Performed by: EMERGENCY MEDICINE

## 2019-07-03 PROCEDURE — 25800030 ZZH RX IP 258 OP 636: Performed by: EMERGENCY MEDICINE

## 2019-07-03 PROCEDURE — 99285 EMERGENCY DEPT VISIT HI MDM: CPT | Mod: 25 | Performed by: EMERGENCY MEDICINE

## 2019-07-03 PROCEDURE — 83880 ASSAY OF NATRIURETIC PEPTIDE: CPT | Performed by: EMERGENCY MEDICINE

## 2019-07-03 PROCEDURE — 80053 COMPREHEN METABOLIC PANEL: CPT | Performed by: EMERGENCY MEDICINE

## 2019-07-03 PROCEDURE — 96368 THER/DIAG CONCURRENT INF: CPT | Performed by: EMERGENCY MEDICINE

## 2019-07-03 PROCEDURE — 00000146 ZZHCL STATISTIC GLUCOSE BY METER IP

## 2019-07-03 PROCEDURE — 99285 EMERGENCY DEPT VISIT HI MDM: CPT | Mod: Z6 | Performed by: EMERGENCY MEDICINE

## 2019-07-03 PROCEDURE — 93005 ELECTROCARDIOGRAM TRACING: CPT | Performed by: EMERGENCY MEDICINE

## 2019-07-03 PROCEDURE — 96366 THER/PROPH/DIAG IV INF ADDON: CPT | Performed by: EMERGENCY MEDICINE

## 2019-07-03 PROCEDURE — 96365 THER/PROPH/DIAG IV INF INIT: CPT | Performed by: EMERGENCY MEDICINE

## 2019-07-03 PROCEDURE — 12000004 ZZH R&B IMCU UMMC

## 2019-07-03 PROCEDURE — 84484 ASSAY OF TROPONIN QUANT: CPT | Performed by: EMERGENCY MEDICINE

## 2019-07-03 PROCEDURE — 99223 1ST HOSP IP/OBS HIGH 75: CPT | Mod: AI | Performed by: INTERNAL MEDICINE

## 2019-07-03 PROCEDURE — 85610 PROTHROMBIN TIME: CPT | Performed by: EMERGENCY MEDICINE

## 2019-07-03 PROCEDURE — 85018 HEMOGLOBIN: CPT | Performed by: EMERGENCY MEDICINE

## 2019-07-03 PROCEDURE — 85025 COMPLETE CBC W/AUTO DIFF WBC: CPT | Performed by: EMERGENCY MEDICINE

## 2019-07-03 PROCEDURE — 71046 X-RAY EXAM CHEST 2 VIEWS: CPT

## 2019-07-03 PROCEDURE — 25000128 H RX IP 250 OP 636: Performed by: EMERGENCY MEDICINE

## 2019-07-03 RX ORDER — CLOPIDOGREL BISULFATE 75 MG/1
75 TABLET ORAL DAILY
Status: DISCONTINUED | OUTPATIENT
Start: 2019-07-04 | End: 2019-07-06 | Stop reason: HOSPADM

## 2019-07-03 RX ORDER — LIDOCAINE 40 MG/G
CREAM TOPICAL
Status: DISCONTINUED | OUTPATIENT
Start: 2019-07-03 | End: 2019-07-06 | Stop reason: HOSPADM

## 2019-07-03 RX ORDER — NITROGLYCERIN 0.4 MG/1
0.4 TABLET SUBLINGUAL EVERY 5 MIN PRN
COMMUNITY
End: 2019-07-15

## 2019-07-03 RX ORDER — LANOLIN ALCOHOL/MO/W.PET/CERES
6 CREAM (GRAM) TOPICAL
Status: DISCONTINUED | OUTPATIENT
Start: 2019-07-03 | End: 2019-07-06 | Stop reason: HOSPADM

## 2019-07-03 RX ORDER — ATORVASTATIN CALCIUM 40 MG/1
80 TABLET, FILM COATED ORAL DAILY
Status: DISCONTINUED | OUTPATIENT
Start: 2019-07-03 | End: 2019-07-06 | Stop reason: HOSPADM

## 2019-07-03 RX ORDER — NALOXONE HYDROCHLORIDE 0.4 MG/ML
.1-.4 INJECTION, SOLUTION INTRAMUSCULAR; INTRAVENOUS; SUBCUTANEOUS
Status: DISCONTINUED | OUTPATIENT
Start: 2019-07-03 | End: 2019-07-06 | Stop reason: HOSPADM

## 2019-07-03 RX ORDER — ASPIRIN 81 MG/1
81 TABLET, CHEWABLE ORAL DAILY
Status: DISCONTINUED | OUTPATIENT
Start: 2019-07-04 | End: 2019-07-06 | Stop reason: HOSPADM

## 2019-07-03 RX ORDER — ASPIRIN 81 MG/1
81 TABLET, CHEWABLE ORAL DAILY
Status: ON HOLD | COMMUNITY
End: 2021-01-01

## 2019-07-03 RX ORDER — CARVEDILOL 3.12 MG/1
3.12 TABLET ORAL 2 TIMES DAILY WITH MEALS
Status: DISCONTINUED | OUTPATIENT
Start: 2019-07-03 | End: 2019-07-06 | Stop reason: HOSPADM

## 2019-07-03 RX ADMIN — SODIUM CHLORIDE 8 MG/HR: 9 INJECTION, SOLUTION INTRAVENOUS at 18:31

## 2019-07-03 RX ADMIN — SODIUM CHLORIDE 80 MG: 9 INJECTION, SOLUTION INTRAVENOUS at 18:31

## 2019-07-03 RX ADMIN — SODIUM CHLORIDE 250 ML: 9 INJECTION, SOLUTION INTRAVENOUS at 20:54

## 2019-07-03 ASSESSMENT — MIFFLIN-ST. JEOR: SCORE: 1849.5

## 2019-07-03 ASSESSMENT — ENCOUNTER SYMPTOMS
HEADACHES: 0
SHORTNESS OF BREATH: 0
UNEXPECTED WEIGHT CHANGE: 1
WEAKNESS: 1
DIARRHEA: 0
CONSTIPATION: 0
ABDOMINAL PAIN: 0
BLOOD IN STOOL: 0
HEMATURIA: 0
FATIGUE: 1

## 2019-07-03 NOTE — ED PROVIDER NOTES
"    [place] EMERGENCY DEPARTMENT ([facility])  7/03/19 ED  23   History     Chief Complaint   Patient presents with     Generalized Weakness     Weak x 2 days/STEMI 6/15     The history is provided by the patient and medical records.     Cole Jesus is a 59 year old male with a history of hypertension, coronary artery disease, stage IV chronic kidney disease, ischemic cardiomyopathy, type II diabetes and atrial fibrillation, with recent hospitalization at [facility] (6/15/2019 to 6/18/2019) for STEMI found to be in atrial fibrillation status post RCA stenting; who presents to the Emergency Department accompanied by his significant other for evaluation of generalized weakness ongoing for approximately 48 hours. Patient reports that he woke up this morning feeling particularly fatigued and weak. This reportedly persisted throughout the day prompting him to contact his provider who instructed him to proceed to the ED. Patient states that he was able to ambulate and generally get around today, though he states he just feels \"off\". Patient notes that he had some outdoor exposure today with the elevated ambient temperatures, but mostly spent the day indoors. Patient believes he has had adequate fluid intake today. He denies any chest pain, headaches, abdominal pain or shortness of breath. He denies any changes in his bowel habits, and denies any constipation or diarrhea. He notes that his stools did appear somewhat darker in color but he denies any hematochezia or melena. He denies any hematuria, bruising or any saad bleeding. Patient does endorse a 15 pound weight loss since his discharge from hospital on 6/18/2019. Patient notes that his symptoms today don't feel consistent with his recent STEMI, though he did not have nay pain or shortness of breath at that time. Patient reports that he mainly felt \"sluggish\" at that time. Patient is anticoagulated on warfarin, and continues to take Plavix and Asprin. " Patient is followed by Cardiology here by Dr. Trujillo.       I have reviewed the Medications, Allergies, Past Medical and Surgical History, and Social History in the [facility] system.  PAST MEDICAL HISTORY:   Past Medical History:   Diagnosis Date     Anemia in chronic renal disease 3/9/2015     Antiplatelet or antithrombotic long-term use      Atrial fibrillation and flutter      CAD (coronary artery disease)     Stemi in 12/11, s/p angioplasty     CRD (chronic renal disease), stage IV (H) 03/09/2015    hx ATN with dialysis complicating cardiogenic shock  1/2012     GI bleed     massive lower GI bleed secondary to a cecal ulcer, s/p ileocecal resection in 12/11     Heart failure     Biventricular systolic HF, complicated by ARDS requiring tracheostomy     Hyperlipidemia LDL goal <100 4/28/2013     Hypertension      Ischemic cardiomyopathy 4/28/2013    TTE revealing 40% EF     Myocardial infarction (H)      Other and unspecified nonspecific immunological findings     Anti JKa     Pulmonary edema     episodes of flash pulmonary edema in 12/11     Subclinical hypothyroidism        PAST SURGICAL HISTORY:   Past Surgical History:   Procedure Laterality Date     ESOPHAGOSCOPY, GASTROSCOPY, DUODENOSCOPY (EGD), COMBINED  12/25/2011    Procedure:COMBINED ESOPHAGOSCOPY, GASTROSCOPY, DUODENOSCOPY (EGD); Surgeon:SAMARIA PUENTE; Location:[facility]     LAPAROTOMY EXPLORATORY  12/31/2011    Procedure:LAPAROTOMY EXPLORATORY; Explore laparotomy, Illeocectomy, Diverting Illeostomy; Surgeon:GABI NAJERA; Location:[facility] OR     ORIF right elbow fracture  age 12     TAKEDOWN ILEOSTOMY  6/5/2014    Procedure: TAKEDOWN ILEOSTOMY;  Surgeon: Gabi Najera MD;  Location: [facility] OR     TRACHEOSTOMY  12/22/2011    Procedure:TRACHEOSTOMY; Tracheostomy; 80XLTCP-Proximal Extension-Cuffed 8.0 mm I.D.; Surgeon:LIZABETH DYE; Location:[facility] OR       FAMILY HISTORY:   Family History   Problem Relation Age of Onset     Diabetes  Mother      Hypertension Mother      Heart Disease Mother      Heart Disease Father          of heart attack, left when he was young     Diabetes Sister      Diabetes Sister      Diabetes Sister        SOCIAL HISTORY:   Social History     Tobacco Use     Smoking status: Former Smoker     Last attempt to quit: 12/3/2011     Years since quittin.5     Smokeless tobacco: Never Used   Substance Use Topics     Alcohol use: No     Alcohol/week: 0.0 oz       Patient's Medications   New Prescriptions    No medications on file   Previous Medications    ALCOHOL SWABS PADS    1 applicator 4 times daily (before meals and nightly)    ATORVASTATIN (LIPITOR) 80 MG TABLET    Take 1 tablet (80 mg) by mouth daily    BLOOD GLUCOSE (NO BRAND SPECIFIED) TEST STRIP    Use to test blood sugar times times daily. Before meals and at bedtime.    BLOOD GLUCOSE MONITORING (ACCU-CHEK FASTCLIX) LANCETS    Use to test blood sugar 4 times daily. At meals and bedtime.    CARVEDILOL (COREG) 3.125 MG TABLET    Take 3.125 mg by mouth    CHOLECALCIFEROL (VITAMIN  -D) 1000 UNITS CAPSULE    Take 2 capsules (2,000 Units) by mouth daily    CLOPIDOGREL (PLAVIX) 75 MG TABLET    TAKE ONE TABLET BY MOUTH EVERY DAY    FUROSEMIDE (LASIX) 20 MG TABLET    Take 2 tablets (40 mg) by mouth 2 times daily    INSULIN ASPART (NOVOLOG PEN) 100 UNIT/ML PEN    Custom Correction Scale ,use with meals   Do Not give Correction Insulin if BG less than 200.   For  - 224 give 1 units.   For  - 249 give 2 units.   For  - 274 give 3 units.   For  - 299 give 4 units.   For  - 324 give 5 units.   For  - 349 give 6 units.   For BG greater than or equal to 350 give 7 units.   Novolog sliding scale with meals and add this amount of insulin to the 5 units of Novolog.  -if not eating, just test your blood sugar and give the sliding scale insulin    INSULIN ASPART (NOVOLOG PEN) 100 UNIT/ML PEN    Inject 5 Units Subcutaneous 3 times daily (with  meals) Novolog 5 units with each meal ( if you do not eat a meal, do not take)    INSULIN ASPART (NOVOLOG PEN) 100 UNIT/ML PEN    HIGH INSULIN RESISTANCE DOSING , use at bedtime   Do Not give Bedtime Correction Insulin if BG less than 200.   For  - 224 give 1 units.   For  - 249 give 2 units.   For  - 274 give 3 units.   For  - 299 give 4 units.   For  - 324 give 5 units.   For  - 349 give 6 units.   For BG greater than or equal to 350 give 7 units.    INSULIN GLARGINE (LANTUS SOLOSTAR PEN) 100 UNIT/ML PEN    Inject 30 Units Subcutaneous every morning    INSULIN PEN NEEDLE (32G X 4 MM) 32G X 4 MM MISCELLANEOUS    Use 5 pen needles daily or as directed.    LIRAGLUTIDE (VICTOZA PEN) 18 MG/3ML SOLUTION    Inject 0.6 mg Subcutaneous daily for 7 days, THEN 1.2 mg daily.    METOPROLOL SUCCINATE ER (TOPROL-XL) 100 MG 24 HR TABLET    Take 1 tablet (100 mg) by mouth daily    PANTOPRAZOLE (PROTONIX) 40 MG EC TABLET    TAKE ONE TABLET BY MOUTH TWICE A DAY    SACUBITRIL-VALSARTAN (ENTRESTO)  MG PER TABLET    Take 1 tablet by mouth 2 times daily    SHARPS CONTAINER MISC    1 Device every 30 days    THIAMINE 100 MG TABLET    Take 1 tablet (100 mg) by mouth daily    VITAMIN  B COMPLEX WITH VITAMIN C (VITAMIN  B COMPLEX) TABS    Take 1 tablet by mouth daily    WARFARIN (COUMADIN) 5 MG TABLET    Take 5 mg by mouth   Modified Medications    No medications on file   Discontinued Medications    No medications on file          Allergies   Allergen Reactions     Hydralazine      Black spots     Simvastatin Other (See Comments)     Leg muscle weakness     Tylenol [Acetaminophen] Palpitations      Review of Systems   Constitutional: Positive for fatigue and unexpected weight change.   Respiratory: Negative for shortness of breath.    Cardiovascular: Negative for chest pain.   Gastrointestinal: Negative for abdominal pain, blood in stool, constipation and diarrhea.   Genitourinary: Negative for  "hematuria.   Neurological: Positive for weakness (generalized). Negative for headaches.   All other systems reviewed and are negative.      Physical Exam   BP: 125/75  Heart Rate: 86  Temp: 97.7  F (36.5  C)  Height: 172.7 cm (5' 8\")  Weight: 107 kg (235 lb 14.3 oz)  SpO2: 98 %      Physical Exam   Constitutional: He is oriented to person, place, and time. He appears well-developed and well-nourished.   HENT:   Head: Normocephalic and atraumatic.   Neck: Normal range of motion. Neck supple.   Cardiovascular: Normal rate, regular rhythm and normal heart sounds.   Pulmonary/Chest: Effort normal. No respiratory distress. He has no wheezes.   Abdominal: Soft. He exhibits no distension. There is no tenderness. There is no rebound.   Genitourinary:   Genitourinary Comments: Dark brown stool; + occult heme positive    Musculoskeletal: He exhibits no edema or deformity.   Neurological: He is alert and oriented to person, place, and time. No cranial nerve deficit. Coordination normal.   Skin: Skin is warm.   Psychiatric: He has a normal mood and affect. His behavior is normal. Thought content normal.       ED Course        Procedures             Critical Care time:  none         Results for orders placed or performed during the hospital encounter of 07/03/19   XR Chest 2 Views    Narrative    Exam: XR CHEST 2 VW, 7/3/2019 6:24 PM    Indication: sob, weakness    Comparison: Radiograph on 4/8/2019    Findings:   Frontal and lateral views of chest.  Enlarged cardiac silhouette, unchanged. Prominent pulmonary  vasculatures. Small bilateral pleural effusion. No appreciable  pneumothorax. Hiatus hernia. Visualized upper abdomen is unremarkable.  No acute osseous abnormality.      Impression    Impression:   1. Chronic bilateral pleural effusion with associated basilar  atelectasis.  2. Cardiomegaly and cephalization of pulmonary vasculature suggestive  of pulmonary edema.    I have personally reviewed the examination and initial " interpretation  and I agree with the findings.    MYRNA FOREMAN MD   CBC with platelets differential   Result Value Ref Range    WBC 9.4 4.0 - 11.0 10e9/L    RBC Count 3.77 (L) 4.4 - 5.9 10e12/L    Hemoglobin 11.1 (L) 13.3 - 17.7 g/dL    Hematocrit 35.7 (L) 40.0 - 53.0 %    MCV 95 78 - 100 fl    MCH 29.4 26.5 - 33.0 pg    MCHC 31.1 (L) 31.5 - 36.5 g/dL    RDW 13.4 10.0 - 15.0 %    Platelet Count 261 150 - 450 10e9/L    Diff Method Automated Method     % Neutrophils 79.6 %    % Lymphocytes 9.3 %    % Monocytes 8.5 %    % Eosinophils 1.7 %    % Basophils 0.4 %    % Immature Granulocytes 0.5 %    Nucleated RBCs 0 0 /100    Absolute Neutrophil 7.5 1.6 - 8.3 10e9/L    Absolute Lymphocytes 0.9 0.8 - 5.3 10e9/L    Absolute Monocytes 0.8 0.0 - 1.3 10e9/L    Absolute Eosinophils 0.2 0.0 - 0.7 10e9/L    Absolute Basophils 0.0 0.0 - 0.2 10e9/L    Abs Immature Granulocytes 0.1 0 - 0.4 10e9/L    Absolute Nucleated RBC 0.0    Comprehensive metabolic panel   Result Value Ref Range    Sodium 137 133 - 144 mmol/L    Potassium 4.0 3.4 - 5.3 mmol/L    Chloride 102 94 - 109 mmol/L    Carbon Dioxide 28 20 - 32 mmol/L    Anion Gap 7 3 - 14 mmol/L    Glucose 123 (H) 70 - 99 mg/dL    Urea Nitrogen 60 (H) 7 - 30 mg/dL    Creatinine 2.40 (H) 0.66 - 1.25 mg/dL    GFR Estimate 28 (L) >60 mL/min/[1.73_m2]    GFR Estimate If Black 32 (L) >60 mL/min/[1.73_m2]    Calcium 8.4 (L) 8.5 - 10.1 mg/dL    Bilirubin Total 0.6 0.2 - 1.3 mg/dL    Albumin 3.6 3.4 - 5.0 g/dL    Protein Total 7.7 6.8 - 8.8 g/dL    Alkaline Phosphatase 78 40 - 150 U/L    ALT 18 0 - 70 U/L    AST 7 0 - 45 U/L   Troponin I   Result Value Ref Range    Troponin I ES <0.015 0.000 - 0.045 ug/L   INR   Result Value Ref Range    INR 3.08 (H) 0.86 - 1.14   Nt probnp inpatient   Result Value Ref Range    N-Terminal Pro BNP Inpatient 2,194 (H) 0 - 900 pg/mL   Hemoglobin   Result Value Ref Range    Hemoglobin 10.1 (L) 13.3 - 17.7 g/dL   EKG 12-lead, tracing only   Result Value Ref  Range    Interpretation ECG Click View Image link to view waveform and result      Medications   pantoprazole (PROTONIX) 80 mg in sodium chloride 0.9 % 100 mL infusion (8 mg/hr Intravenous Rate/Dose Verify 7/3/19 2203)   0.9% sodium chloride BOLUS (250 mLs Intravenous New Bag 7/3/19 2054)   pantoprazole (PROTONIX) 80 mg in sodium chloride 0.9 % 100 mL intermittent infusion (0 mg Intravenous Stopped 7/3/19 1855)            Labs Ordered and Resulted from Time of ED Arrival Up to the Time of Departure from the ED   CBC WITH PLATELETS DIFFERENTIAL   COMPREHENSIVE METABOLIC PANEL   TROPONIN I   INR   CARDIAC CONTINUOUS MONITORING            Assessments & Plan (with Medical Decision Making)     Patient is a 59 year old male with a recent RCA stent currently on aspirin and Plavix as well as history of A. fib on Coumadin with an INR of 3, who presented to the ER due to increased weakness.  Patient says that he was feeling off today and more tired so he came to the ER for evaluation.  Patient here has a stable exam.  Patient has stable vital signs.  His blood pressure and heart rate are both normal.  Patient did note some darker stools so we checked a rectal exam which is occult heme positive. Patient's hemoglobin today is 11. Per review of patient's hemoglobin, patient had a hemoglobin of 14 last week but prior to that patient had multiple hemoglobins in the 11 to 12 range.  Unknown whether patient had a significant bleed versus he is just at his normal hemoglobin baseline. Patient has not been having any increased stool burden.  Case was discussed with Dr. Chema Herron who is a Cardiology staff on call.  He recommends holding the Coumadin, but at this point recommends continuing the aspirin and Plavix due to his recent stent.  Case was discussed with Dr. Rob Conway who is the hospitalist on call.  She recommended observation admission.  Therefore discussed the case with Observation JOSE who agrees with patient being  admitted.  Plan for the patient will be to do serial hemoglobins and to get a Cardiology consult in the morning for their recommendations regarding anticoagulation and to get a GI consult. If patient has increased GI bleed, patient can be transferred to Internal Medicine at that point. Patient stable for Observation admission at this time.    Patient was kept in the ED for repeat hemoglobin to be checked.  Repeat hemoglobin was done and is decreased from 11-10.  With this change in 4 hours recommend patient be admitted to inpatient this patient is lost anywhere from 4 g to 2 g of blood in the last week.  Case was rediscussed with Dr. Conway.  Patient will be admitted to internal medicine for further care.    This part of the medical record was transcribed by Wilda Reardon, Medical Scribe, from a dictation done by Brina Morales MD.     I have reviewed the nursing notes.    I have reviewed the findings, diagnosis, plan and need for follow up with the patient.       Medication List      There are no discharge medications for this visit.         Final diagnoses:   Gastrointestinal hemorrhage, unspecified gastrointestinal hemorrhage type   Persistent atrial fibrillation (H)   Coronary artery disease due to calcified coronary lesion     I, Wilda Reardon, am serving as a trained medical scribe to document services personally performed by Brina Morales MD, based on the provider's statements to me.   IBrina MD, was physically present and have reviewed and verified the accuracy of this note documented by Wilda Reardon.    7/3/2019   [facility], [place], EMERGENCY DEPARTMENT     Brina Morales MD  07/03/19 7839

## 2019-07-03 NOTE — ED TRIAGE NOTES
Pt reports weakness for the past 48 hours and feeling 'off.'. Pt's PCP instructed him to go to the E.D. If he feels anything odd. Pt had STEMI/A-fib 6/15/19

## 2019-07-03 NOTE — PHARMACY-ADMISSION MEDICATION HISTORY
Admission medication history interview status for the 7/3/2019 admission is complete. See Epic admission navigator for allergy information, pharmacy, prior to admission medications and immunization status.     Medication history interview sources:  Patient, Verify Rx Benefits, [facility], Care Everywhere    Changes made to PTA medication list (reason)  Added: Aspirin and nitroglycerin (per patient report, Verify Rx Benefits, and Care Everywhere)  Deleted: metoprolol (per patient and Care Everywhere stopped 6/18/2019); Victoza (patient reports not taking anymore); thiamine (per patient takes a vitamin B complex that contains thiamine)  Changed: None    Warfarin:  - Indication: Atrial fibrillation (goal 2-3)  - Maintenance plan: None  - Current dosing instructions: Take 2.5 mg (one-half tablet) by mouth once daily 7/1/2019-7/4/2019.  INR recheck scheduled for 7/5/2019.  - Uses [facility]    Additional medication history information (including reliability of information, actions taken by pharmacist):  - Patient was a good historian and was able to articulate the name, strength, directions, and last dose of each of his medications.  - Novolog: Patient reports he adminiters Novolog per a sliding scale three times a day with meals if his blood sugars are greater than 200 mg/dL.  Has not had to use in a month and a half.  - Patient [facility] his prescriptions at the [facility].     Prior to Admission medications    Medication Sig Last Dose Taking? Auth Provider   aspirin (ASA) 81 MG chewable tablet Take 81 mg by mouth daily 7/3/2019 at AM Yes Unknown, Entered By History   atorvastatin (LIPITOR) 80 MG tablet Take 1 tablet (80 mg) by mouth daily 7/2/2019 at PM Yes Alen Trujillo MD   carvedilol (COREG) 3.125 MG tablet Take 3.125 mg by mouth 2 times daily (with meals)  7/3/2019 at AM Yes Reported, Patient   cholecalciferol (VITAMIN  -D) 1000 UNITS capsule Take 2 capsules (2,000  Units) by mouth daily  Patient taking differently: Take 1 capsule by mouth 2 times daily  7/3/2019 at AM Yes Darren Florez MD   clopidogrel (PLAVIX) 75 MG tablet TAKE ONE TABLET BY MOUTH EVERY DAY 7/3/2019 at AM Yes Alen Trujillo MD   furosemide (LASIX) 20 MG tablet Take 2 tablets (40 mg) by mouth 2 times daily 7/3/2019 at AM Yes Bertha Phan NP   insulin glargine (LANTUS SOLOSTAR PEN) 100 UNIT/ML pen Inject 30 Units Subcutaneous every morning  Patient taking differently: Inject 26 Units Subcutaneous every morning  7/3/2019 at AM Yes Arina Haynes APRN CNP   pantoprazole (PROTONIX) 40 MG EC tablet TAKE ONE TABLET BY MOUTH TWICE A DAY 7/3/2019 at AM Yes Alen Trujillo MD   sacubitril-valsartan (ENTRESTO)  MG per tablet Take 1 tablet by mouth 2 times daily 7/3/2019 at AM Yes Alen Trujillo MD   vitamin  B complex with vitamin C (VITAMIN  B COMPLEX) TABS Take 1 tablet by mouth daily 7/3/2019 at AM Yes Reported, Patient   warfarin (COUMADIN) 5 MG tablet Take 2.5 mg by mouth daily (No maintenance plan; 7/1/2019-7/4/2019 2.5 mg once daily) 7/3/2019 at 1200 Yes Reported, Patient   Alcohol Swabs PADS 1 applicator 4 times daily (before meals and nightly)   Arina Haynes APRN CNP   blood glucose (NO BRAND SPECIFIED) test strip Use to test blood sugar times times daily. Before meals and at bedtime.   Arina Haynes APRN CNP   blood glucose monitoring (ACCU-CHEK FASTCLIX) lancets Use to test blood sugar 4 times daily. At meals and bedtime.   Arina Haynes APRN CNP   insulin aspart (NOVOLOG PEN) 100 UNIT/ML pen Custom Correction Scale ,use with meals   Do Not give Correction Insulin if BG less than 200.   For  - 224 give 1 units.   For  - 249 give 2 units.   For  - 274 give 3 units.   For  - 299 give 4 units.   For  - 324 give 5 units.   For  - 349 give 6 units.   For BG greater than or equal to 350 give 7 units.   Novolog sliding scale  with meals and add this amount of insulin to the 5 units of Novolog.  -if not eating, just test your blood sugar and give the sliding scale insulin More than a month at Unknown time  Arina Haynes APRN CNP   insulin pen needle (32G X 4 MM) 32G X 4 MM miscellaneous Use 5 pen needles daily or as directed.   Arina Haynes APRN CNP   nitroGLYcerin (NITROSTAT) 0.4 MG sublingual tablet Place 0.4 mg under the tongue every 5 minutes as needed for chest pain For chest pain place 1 tablet under the tongue every 5 minutes for 3 doses. If symptoms persist 5 minutes after 1st dose call 911.   Unknown, Entered By History   Sharps Container MISC 1 Device every 30 days   Arina Haynes APRN CNP     Medication history completed by: Myrna English, PharmD IV Student

## 2019-07-04 LAB
APTT PPP: 60 SEC (ref 22–37)
ERYTHROCYTE [DISTWIDTH] IN BLOOD BY AUTOMATED COUNT: 13.4 % (ref 10–15)
FOLATE SERPL-MCNC: 21.8 NG/ML
GLUCOSE BLDC GLUCOMTR-MCNC: 107 MG/DL (ref 70–99)
GLUCOSE BLDC GLUCOMTR-MCNC: 113 MG/DL (ref 70–99)
GLUCOSE BLDC GLUCOMTR-MCNC: 116 MG/DL (ref 70–99)
GLUCOSE BLDC GLUCOMTR-MCNC: 150 MG/DL (ref 70–99)
GLUCOSE BLDC GLUCOMTR-MCNC: 153 MG/DL (ref 70–99)
HCT VFR BLD AUTO: 30.4 % (ref 40–53)
HGB BLD-MCNC: 8.7 G/DL (ref 13.3–17.7)
HGB BLD-MCNC: 9 G/DL (ref 13.3–17.7)
HGB BLD-MCNC: 9.2 G/DL (ref 13.3–17.7)
HGB BLD-MCNC: 9.9 G/DL (ref 13.3–17.7)
INR PPP: 1.79 (ref 0.86–1.14)
INR PPP: 2.66 (ref 0.86–1.14)
INR PPP: 3.83 (ref 0.86–1.14)
INR PPP: 3.87 (ref 0.86–1.14)
INTERPRETATION ECG - MUSE: NORMAL
MCH RBC QN AUTO: 29.4 PG (ref 26.5–33)
MCHC RBC AUTO-ENTMCNC: 30.3 G/DL (ref 31.5–36.5)
MCV RBC AUTO: 97 FL (ref 78–100)
PLATELET # BLD AUTO: 207 10E9/L (ref 150–450)
RBC # BLD AUTO: 3.13 10E12/L (ref 4.4–5.9)
VIT B12 SERPL-MCNC: 704 PG/ML (ref 193–986)
WBC # BLD AUTO: 8.8 10E9/L (ref 4–11)

## 2019-07-04 PROCEDURE — 36415 COLL VENOUS BLD VENIPUNCTURE: CPT | Performed by: STUDENT IN AN ORGANIZED HEALTH CARE EDUCATION/TRAINING PROGRAM

## 2019-07-04 PROCEDURE — 12000004 ZZH R&B IMCU UMMC

## 2019-07-04 PROCEDURE — 85730 THROMBOPLASTIN TIME PARTIAL: CPT | Performed by: STUDENT IN AN ORGANIZED HEALTH CARE EDUCATION/TRAINING PROGRAM

## 2019-07-04 PROCEDURE — 40000556 ZZH STATISTIC PERIPHERAL IV START W US GUIDANCE

## 2019-07-04 PROCEDURE — 36415 COLL VENOUS BLD VENIPUNCTURE: CPT | Performed by: INTERNAL MEDICINE

## 2019-07-04 PROCEDURE — 25800030 ZZH RX IP 258 OP 636: Performed by: PATHOLOGY

## 2019-07-04 PROCEDURE — 00000146 ZZHCL STATISTIC GLUCOSE BY METER IP

## 2019-07-04 PROCEDURE — 85018 HEMOGLOBIN: CPT | Performed by: STUDENT IN AN ORGANIZED HEALTH CARE EDUCATION/TRAINING PROGRAM

## 2019-07-04 PROCEDURE — 36415 COLL VENOUS BLD VENIPUNCTURE: CPT | Performed by: NURSE PRACTITIONER

## 2019-07-04 PROCEDURE — 36415 COLL VENOUS BLD VENIPUNCTURE: CPT | Performed by: PATHOLOGY

## 2019-07-04 PROCEDURE — C9113 INJ PANTOPRAZOLE SODIUM, VIA: HCPCS | Performed by: STUDENT IN AN ORGANIZED HEALTH CARE EDUCATION/TRAINING PROGRAM

## 2019-07-04 PROCEDURE — 85610 PROTHROMBIN TIME: CPT | Performed by: STUDENT IN AN ORGANIZED HEALTH CARE EDUCATION/TRAINING PROGRAM

## 2019-07-04 PROCEDURE — 25000128 H RX IP 250 OP 636: Performed by: PATHOLOGY

## 2019-07-04 PROCEDURE — 85610 PROTHROMBIN TIME: CPT | Performed by: INTERNAL MEDICINE

## 2019-07-04 PROCEDURE — 25000131 ZZH RX MED GY IP 250 OP 636 PS 637: Performed by: STUDENT IN AN ORGANIZED HEALTH CARE EDUCATION/TRAINING PROGRAM

## 2019-07-04 PROCEDURE — 25000132 ZZH RX MED GY IP 250 OP 250 PS 637: Performed by: STUDENT IN AN ORGANIZED HEALTH CARE EDUCATION/TRAINING PROGRAM

## 2019-07-04 PROCEDURE — 99221 1ST HOSP IP/OBS SF/LOW 40: CPT | Mod: GC | Performed by: INTERNAL MEDICINE

## 2019-07-04 PROCEDURE — 82746 ASSAY OF FOLIC ACID SERUM: CPT | Performed by: NURSE PRACTITIONER

## 2019-07-04 PROCEDURE — 25000128 H RX IP 250 OP 636: Performed by: STUDENT IN AN ORGANIZED HEALTH CARE EDUCATION/TRAINING PROGRAM

## 2019-07-04 PROCEDURE — 82607 VITAMIN B-12: CPT | Performed by: NURSE PRACTITIONER

## 2019-07-04 PROCEDURE — 85027 COMPLETE CBC AUTOMATED: CPT | Performed by: STUDENT IN AN ORGANIZED HEALTH CARE EDUCATION/TRAINING PROGRAM

## 2019-07-04 PROCEDURE — 99223 1ST HOSP IP/OBS HIGH 75: CPT | Mod: AI | Performed by: INTERNAL MEDICINE

## 2019-07-04 PROCEDURE — 85610 PROTHROMBIN TIME: CPT | Performed by: PATHOLOGY

## 2019-07-04 PROCEDURE — 85018 HEMOGLOBIN: CPT | Performed by: INTERNAL MEDICINE

## 2019-07-04 RX ORDER — DEXTROSE MONOHYDRATE 25 G/50ML
25-50 INJECTION, SOLUTION INTRAVENOUS
Status: DISCONTINUED | OUTPATIENT
Start: 2019-07-04 | End: 2019-07-06 | Stop reason: HOSPADM

## 2019-07-04 RX ORDER — NICOTINE POLACRILEX 4 MG
15-30 LOZENGE BUCCAL
Status: DISCONTINUED | OUTPATIENT
Start: 2019-07-04 | End: 2019-07-06 | Stop reason: HOSPADM

## 2019-07-04 RX ADMIN — PANTOPRAZOLE SODIUM 40 MG: 40 INJECTION, POWDER, FOR SOLUTION INTRAVENOUS at 08:16

## 2019-07-04 RX ADMIN — VITAMIN D, TAB 1000IU (100/BT) 1000 UNITS: 25 TAB at 02:09

## 2019-07-04 RX ADMIN — PANTOPRAZOLE SODIUM 40 MG: 40 INJECTION, POWDER, FOR SOLUTION INTRAVENOUS at 19:45

## 2019-07-04 RX ADMIN — SACUBITRIL AND VALSARTAN 1 TABLET: 97; 103 TABLET, FILM COATED ORAL at 02:09

## 2019-07-04 RX ADMIN — SACUBITRIL AND VALSARTAN 1 TABLET: 97; 103 TABLET, FILM COATED ORAL at 12:20

## 2019-07-04 RX ADMIN — PHYTONADIONE 2 MG: 10 INJECTION, EMULSION INTRAMUSCULAR; INTRAVENOUS; SUBCUTANEOUS at 07:08

## 2019-07-04 RX ADMIN — INSULIN GLARGINE 26 UNITS: 100 INJECTION, SOLUTION SUBCUTANEOUS at 09:25

## 2019-07-04 RX ADMIN — CARVEDILOL 3.12 MG: 3.12 TABLET, FILM COATED ORAL at 18:08

## 2019-07-04 RX ADMIN — VITAMIN D, TAB 1000IU (100/BT) 1000 UNITS: 25 TAB at 08:16

## 2019-07-04 RX ADMIN — CARVEDILOL 3.12 MG: 3.12 TABLET, FILM COATED ORAL at 12:23

## 2019-07-04 RX ADMIN — ASPIRIN 81 MG CHEWABLE TABLET 81 MG: 81 TABLET CHEWABLE at 12:23

## 2019-07-04 RX ADMIN — CARVEDILOL 3.12 MG: 3.12 TABLET, FILM COATED ORAL at 02:06

## 2019-07-04 RX ADMIN — ATORVASTATIN CALCIUM 80 MG: 40 TABLET, FILM COATED ORAL at 02:07

## 2019-07-04 RX ADMIN — VITAMIN D, TAB 1000IU (100/BT) 1000 UNITS: 25 TAB at 19:45

## 2019-07-04 RX ADMIN — CLOPIDOGREL BISULFATE 75 MG: 75 TABLET ORAL at 12:23

## 2019-07-04 RX ADMIN — ATORVASTATIN CALCIUM 80 MG: 40 TABLET, FILM COATED ORAL at 08:16

## 2019-07-04 ASSESSMENT — ACTIVITIES OF DAILY LIVING (ADL)
ADLS_ACUITY_SCORE: 12
ADLS_ACUITY_SCORE: 12
RETIRED_EATING: 0-->INDEPENDENT
FALL_HISTORY_WITHIN_LAST_SIX_MONTHS: NO
RETIRED_COMMUNICATION: 0-->UNDERSTANDS/COMMUNICATES WITHOUT DIFFICULTY
SWALLOWING: 0-->SWALLOWS FOODS/LIQUIDS WITHOUT DIFFICULTY
ADLS_ACUITY_SCORE: 12
ADLS_ACUITY_SCORE: 12
AMBULATION: 0-->INDEPENDENT
COGNITION: 0 - NO COGNITION ISSUES REPORTED
TRANSFERRING: 0-->INDEPENDENT
ADLS_ACUITY_SCORE: 12
DRESS: 0-->INDEPENDENT
TOILETING: 0-->INDEPENDENT
BATHING: 0-->INDEPENDENT

## 2019-07-04 ASSESSMENT — MIFFLIN-ST. JEOR: SCORE: 1835.93

## 2019-07-04 NOTE — PROGRESS NOTES
"SPIRITUAL HEALTH SERVICES  SPIRITUAL ASSESSMENT Progress Note  [facility] ([place]) 6B     PRIMARY FOCUS:     I met with Cole and his significant other, Haven.    We explored Cole's meaning-making and spiritual framework.    He asked for prayer, which I offered.    REFERRAL SOURCE: Hospital  request upon admission    ILLNESS CIRCUMSTANCES:     Context of Serious Illness/Symptom(s) - We explored Cole's illness narrative. Haven and Cole described the last month as \"chaotic.\" Cole said, \"I've been in and out of the hospital three times, once for blood sugar, I had a heart attack in  and now this.\"    DISTRESS:     Emotional/Spiritual/Existential Distress - Haven asked me to include their grandchildren in my prayer. \"My daughter  in the past couple of years and they are being raised by their father and they need all the support they can get.\"    SPIRITUAL/Evangelical COPING:     Catholic/Barb - His framework includes the understanding that \"God\" and the \"Great Spirit\" are the same and he includes prayers using language for both.    Spiritual Practice(s) - Prayer (\"when we need it, which now we clearly do\") and pipe ceremonies. I oriented Cole to the availability of ritual/sacramental support including Yazidi sacraments and smudging. He asked for a prayer, which I offered.    Emotional/Relational/Existential Connections - His family, including Haven and his grandchildren.    GOALS OF CARE:    Goals of Care - \"I know I am going to walk out of the this hospital healthy once again.\"    Meaning/Sense-Making -     \"I'm comfortable here. I was born in this hospital and they've saved my life more than once, even when it surprised them.\"    \"We are spiritual people.\"    \"I believe in the power of prayer. Right now I have millions of prayers through the power of [employer].\" And, \"prayer is one of the things that has gotten me through hard times before.\"    PLAN: I remain available for ongoing support " for the duration of patient's hospitalization. If there is a request, please put in a consult.    Irene Schroeder  Chaplain Resident  Pager [phone]  [facility] remains available 24/7 for emergent requests/referrals, either by having the switchboard page the on-call  or by entering an ASAP/STAT consult in Epic (this will also page the on-call ).

## 2019-07-04 NOTE — PROGRESS NOTES
Admission          7/3/2019  4:33 PM  -----------------------------------------------------------  Reason for admission: GIB  Primary team notified of pt arrival.  Admitted from: ED  Via: stretcher  Accompanied by: self  Belongings: Placed in closet; valuables sent home with family  Admission Profile: complete  Teaching: orientation to unit and call light- call light within reach, call don't fall, use of console, meal times, when to call for the RN, and enforced importance of safety   Access: PIV  Telemetry:Placed on pt  Ht./Wt.: complete  2 RN Skin Assessment Completed By: Lorena LEIGH  Pt status:    Temp:  [97.7  F (36.5  C)-98.5  F (36.9  C)] 98.5  F (36.9  C)  Pulse:  [] 87  Heart Rate:  [75-98] 88  Resp:  [8-20] 16  BP: ()/(53-88) 109/74  SpO2:  [97 %-100 %] 97 %

## 2019-07-04 NOTE — PROGRESS NOTES
"/65 (BP Location: Left arm)   Pulse 81   Temp 97.9  F (36.6  C) (Oral)   Resp 18   Ht 1.727 m (5' 8\")   Wt 105.6 kg (232 lb 14.4 oz)   SpO2 100%   BMI 35.41 kg/m      Neuro: A&Ox4.   Cardiac: A fib, rates 80-90. VSS.   Respiratory: Sating >95% on RA.  GI/: Adequate urine output. BM X1 prior to this RN's shift   Diet/appetite: Tolerating 2gm Na+ diet. Eating well.  Activity:  Up independently in room and halls  Pain: Denies  Skin: No new deficits noted.  LDA's: Bilateral PIV saline locked    Plan:   Continue with POC. Notify primary team with changes.  NPO at 0000  Hgb checks q8h.     "

## 2019-07-04 NOTE — PLAN OF CARE
Temp: 98.5  F (36.9  C) Temp src: Oral BP: 109/74 Pulse: 87 Heart Rate: 88 Resp: 16 SpO2: 97 % O2 Device: None (Room air)       Neuro: A&Ox4.   Cardiac: Afib, rates controlled 80s. BP stable. No CP, no edema.   Respiratory: Room air, denies SOB.  GI/: Adequate urine output. LBM 7/3 in the AM. No abdominal pain.  Diet/appetite: Tolerating 2gm Na/CHO/Low sat fat diet.   Activity:  SBA up to bathroom.  Pain: Denies.  Skin: No new deficits noted.  LDA's: Large bore PIV x2.    Plan: Continue to monitor for s/s of bleeding.

## 2019-07-04 NOTE — ED NOTES
[facility], [place]   ED Nurse to Floor Handoff     Cole Jesus is a 59 year old male who speaks English and lives with a spouse,  in a home  They arrived in the ED by car from home    ED Chief Complaint: Generalized Weakness (Weak x 2 days/STEMI 6/15)    ED Dx;   Final diagnoses:   Gastrointestinal hemorrhage, unspecified gastrointestinal hemorrhage type   Persistent atrial fibrillation (H)   Coronary artery disease due to calcified coronary lesion         Needed?: No    Allergies:   Allergies   Allergen Reactions     Hydralazine      Black spots     Simvastatin Other (See Comments)     Leg muscle weakness     Tylenol [Acetaminophen] Palpitations   .  Past Medical Hx:   Past Medical History:   Diagnosis Date     Anemia in chronic renal disease 3/9/2015     Antiplatelet or antithrombotic long-term use      Atrial fibrillation and flutter      CAD (coronary artery disease)     Stemi in 12/11, s/p angioplasty     CRD (chronic renal disease), stage IV (H) 03/09/2015    hx ATN with dialysis complicating cardiogenic shock  1/2012     GI bleed     massive lower GI bleed secondary to a cecal ulcer, s/p ileocecal resection in 12/11     Heart failure     Biventricular systolic HF, complicated by ARDS requiring tracheostomy     Hyperlipidemia LDL goal <100 4/28/2013     Hypertension      Ischemic cardiomyopathy 4/28/2013    TTE revealing 40% EF     Myocardial infarction (H)      Other and unspecified nonspecific immunological findings     Anti JKa     Pulmonary edema     episodes of flash pulmonary edema in 12/11     Subclinical hypothyroidism       Baseline Mental status: WDL  Current Mental Status changes: at basesline    Infection present or suspected this encounter: no  Sepsis suspected: No  Isolation type: No active isolations     Activity level - Baseline/Home:  Independent  Activity Level - Current:   Stand with Assist    Bariatric equipment needed?: No    In the ED these  meds were given:   Medications   pantoprazole (PROTONIX) 80 mg in sodium chloride 0.9 % 100 mL infusion (8 mg/hr Intravenous Rate/Dose Verify 7/3/19 1937)   0.9% sodium chloride BOLUS (250 mLs Intravenous New Bag 7/3/19 2054)   pantoprazole (PROTONIX) 80 mg in sodium chloride 0.9 % 100 mL intermittent infusion (0 mg Intravenous Stopped 7/3/19 1855)       Drips running?  Yes Protonix IV    Home pump  No    Current LDAs  Peripheral IV 04/08/19 Right Upper forearm (Active)   Number of days: 86       Closed/Suction Drain 1 Right Abdomen Bulb 19 [place]z (Active)   Number of days: 1854       Ileostomy (Active)   Number of days: 2740       Urethral Catheter (Active)   Number of days: 2769       Urethral Catheter Non-latex (Active)   Number of days: 2735       Wound 01/11/12 Lower;Midline Abdomen Other (comment) inferior portion of midline incision opened, wound, tunneled (Active)   Number of days: 2730       Incision/Surgical Site 12/22/11 Midline Neck (Active)   Number of days: 2750       Incision/Surgical Site 12/31/11 Midline Abdomen (Active)   Number of days: 2741       Incision/Surgical Site 12/31/11 Right Abdomen (Active)   Number of days: 2741       Incision/Surgical Site 06/05/14 Midline Abdomen (Active)   Number of days: 1854       Incision/Surgical Site 06/05/14 Right Abdomen (Active)   Number of days: 1854       Incision/Surgical Site 06/05/14 Bilateral Abdomen (Active)   Number of days: 1854       Labs results:   Labs Ordered and Resulted from Time of ED Arrival Up to the Time of Departure from the ED   CBC WITH PLATELETS DIFFERENTIAL - Abnormal; Notable for the following components:       Result Value    RBC Count 3.77 (*)     Hemoglobin 11.1 (*)     Hematocrit 35.7 (*)     MCHC 31.1 (*)     All other components within normal limits   COMPREHENSIVE METABOLIC PANEL - Abnormal; Notable for the following components:    Glucose 123 (*)     Urea Nitrogen 60 (*)     Creatinine 2.40 (*)     GFR Estimate 28 (*)     GFR  "Estimate If Black 32 (*)     Calcium 8.4 (*)     All other components within normal limits   INR - Abnormal; Notable for the following components:    INR 3.08 (*)     All other components within normal limits   NT PROBNP INPATIENT - Abnormal; Notable for the following components:    N-Terminal Pro BNP Inpatient 2,194 (*)     All other components within normal limits   HEMOGLOBIN - Abnormal; Notable for the following components:    Hemoglobin 10.1 (*)     All other components within normal limits   TROPONIN I   CARDIAC CONTINUOUS MONITORING   CARDIAC CONTINUOUS MONITORING       Imaging Studies:   Recent Results (from the past 24 hour(s))   XR Chest 2 Views    Narrative    Exam: XR CHEST 2 VW, 7/3/2019 6:24 PM    Indication: sob, weakness    Comparison: Radiograph on 4/8/2019    Findings:   Frontal and lateral views of chest.  Enlarged cardiac silhouette, unchanged. Prominent pulmonary  vasculatures. Small bilateral pleural effusion. No appreciable  pneumothorax. Hiatus hernia. Visualized upper abdomen is unremarkable.  No acute osseous abnormality.      Impression    Impression:   1. Chronic bilateral pleural effusion with associated basilar  atelectasis.  2. Cardiomegaly and cephalization of pulmonary vasculature suggestive  of pulmonary edema.    I have personally reviewed the examination and initial interpretation  and I agree with the findings.    MYRNA FOREMAN MD       Recent vital signs:   /73   Pulse 86   Temp 97.7  F (36.5  C) (Oral)   Resp 12   Ht 1.727 m (5' 8\")   Wt 107 kg (235 lb 14.3 oz)   SpO2 99%   BMI 35.87 kg/m      [place] Coma Scale Score: 15 (07/03/19 1933)        Cardiac Rhythm: A fib  Pt needs tele? Yes  Skin/wound Issues: None  Full Code  Code Status:     Pain control: pt had none    Nausea control: pt had none    Abnormal labs/tests/findings requiring intervention: HgB 11.1, recheck 10.1 since start of ER visit, down from 14 last visit.    Family present during ED course? " Yes   Family Comments/Social Situation comments: Wife is at bedside    Tasks needing completion: None    Karissa Jansen, RN  [phone] [facility]

## 2019-07-04 NOTE — CONSULTS
ATTENDING ATTESTATION:  DATE SEEN: 7/4/2019    Mr Jesus presents with melena and anemia.  He has history of cardiac disease with recent coronary stent.  He is on ASA/Plavix + warfarin, currently with INR 3.8.  Given degree of anemia, will recommend proceeding with upper endoscopy once INR <2.0.    Patient was discussed, seen, and examined by me, Anibal Gooden. The plan of care and pertinent data/imaging were also reviewed with the GI Consult team.   Agree with the joint assessment and plan as delineated above.    Please contact me with any further questions.    Anibal Gooden MD  [facility]  Division of Gastroenterology  [phone]      GASTROENTEROLOGY CONSULTATION      Date of Admission: 7/3/2019       Date of Consult: 7/4/19          Chief Complaint:   We were asked by GUILLE Downey CNP to evaluate this patient with dark stools and acute on chronic anemia.          ASSESSMENT AND RECOMMENDATIONS:   Assessment:  Cole Jesus is a 59 year old male with a history of GI bleed s/t a cecal ulcer s/p ileocecal resection in 2011, ischemic cardiomyopathy on aspirin and Plavix, HFrEF (35% EF on echo 6/17/19), atrial fibrillation on warfarin, CKD stage IV, DM type II hypertension, hyperlipidemia, subclinical hypothyroidism, and recent ostomy s/p RCA stent placement on 6/15/19 who is now presenting with dark stools and 5 grams of hemoglobin drop in the setting of 3.83 INR.     #Melena  #Acute on chronic anemia   #Supratherapeutic INR   #On dual antiplatelet therapy  Patient reports 1 week history of melena, appetite loss, and 15 pounds of weight loss since 6/18/2018.  He denies abdominal pain, n/v, dysphagia, or odynophagia.  No NSAID use.  And last GI bleed was in 2011. EGD from 2011 was unremarkable.  Colonoscopy from 2011 revealed actively bleeding shallow ulceration in the cecum that was thought to be related to hemic ulcer from his cardiogenic shock s/p ileocecal  resection. Baseline hgb is 14.2 and today is down to 9.2 (MCV 97/RDW 13.4), plts 207 and INR is 3.87.    Melena and 5 grams of hgb drop in the setting of ASA/plavix/warfarin therapy and multiple medical comorbidity concerns for GI bleed s/t esophagitis, gastritis, PUD, angiodysplasia, and dieulafoy lesion. Ischemia and malignancy cannot be ruled out either. Plan is to wait till INR trends down to 2 or less, and then will proceed with EGD. In the meantime, will treat patient with PPI.     Recommendations  --Clear liquid and NPO after midnight  --Trend INR, if INR is less than 2 or close to 2 pt will have EGD tomorrow   --Monitor CBC and transfuse if hgb is less than 7  --Maintain 2 large bore IVs  --Accurate documentation of output (color, characteristics and amount)  --GI will follow     Gastroenterology follow up recommendations:   --Outpatient colonoscopy for weight loss and hx of iron deficiency of anemia     Patient care plan discussed with Dr. Gooden, GI staff physician. Thank you for involving us in this patient's care. Please do not hesitate to contact the GI service with any questions or concerns.     Rabia Day CNP  Department of Gastroenterology   -------------------------------------------------------------------------------------------------------------------   History is obtained from the patient and the medical record.          History of Present Illness:   Cole Jesus is a 59 year old male with a history of GI bleed s/t a cecal ulcer s/p ileocecal resection in 2011, ischemic cardiomyopathy on aspirin and Plavix, HFrEF (35% EF on echo 6/17/19), atrial fibrillation on warfarin, CKD stage IV, DM type II hypertension, hyperlipidemia, subclinical hypothyroidism, and recent ostomy s/p RCA stent placement on 6/15/19 who is now presenting with dark stools and 5 grams of hemoglobin drop in the setting of 3.83 INR.    Patient reports 1 week history of melena, appetite loss, and 15 pounds of  weight loss since 6/18/2018.  He denies abdominal pain, fever, n/v, bloody emesis, red blood stools, dysphagia, or odynophagia.  He thinks it is all r/t anticoagulation use as he had subsequent intractable GI bleed requiring a partial bowel resection and ileostomy when he was on heparin. No oral iron,  NSAID, or current alcohol/tobacco use.        Past Medical History:   Reviewed and edited as appropriate  Past Medical History:   Diagnosis Date     Anemia in chronic renal disease 3/9/2015     Antiplatelet or antithrombotic long-term use      Atrial fibrillation and flutter      CAD (coronary artery disease)     Stemi in 12/11, s/p angioplasty     CRD (chronic renal disease), stage IV (H) 03/09/2015    hx ATN with dialysis complicating cardiogenic shock  1/2012     GI bleed     massive lower GI bleed secondary to a cecal ulcer, s/p ileocecal resection in 12/11     Heart failure     Biventricular systolic HF, complicated by ARDS requiring tracheostomy     Hyperlipidemia LDL goal <100 4/28/2013     Hypertension      Ischemic cardiomyopathy 4/28/2013    TTE revealing 40% EF     Myocardial infarction (H)      Other and unspecified nonspecific immunological findings     Anti JKa     Pulmonary edema     episodes of flash pulmonary edema in 12/11     Subclinical hypothyroidism             Past Surgical History:   Reviewed and edited as appropriate   Past Surgical History:   Procedure Laterality Date     ESOPHAGOSCOPY, GASTROSCOPY, DUODENOSCOPY (EGD), COMBINED  12/25/2011    Procedure:COMBINED ESOPHAGOSCOPY, GASTROSCOPY, DUODENOSCOPY (EGD); Surgeon:SAMARIA PUENTE; Location: GI     LAPAROTOMY EXPLORATORY  12/31/2011    Procedure:LAPAROTOMY EXPLORATORY; Explore laparotomy, Illeocectomy, Diverting Illeostomy; Surgeon:GABI MOSHER; Location:[facility] OR     ORIF right elbow fracture  age 12     TAKEDOWN ILEOSTOMY  6/5/2014    Procedure: TAKEDOWN ILEOSTOMY;  Surgeon: Gabi Mosher MD;  Location: [facility]      TRACHEOSTOMY  12/22/2011    Procedure:TRACHEOSTOMY; Tracheostomy; 80XLTCP-Proximal Extension-Cuffed 8.0 mm I.D.; Surgeon:LIZABETH DYE; Location:[facility] OR            Previous Endoscopy:     Results for orders placed or performed during the hospital encounter of 12/03/11   COLONOSCOPY   Result Value Ref Range    COLONOSCOPY       [facility], [place]   [address] [place] [address] [phone]     Endoscopy Department  _______________________________________________________________________________  Patient Name: Cole Dunn         Procedure Date: 12/29/2011 10:12:SS   A12/P12  MRN: [id number]                       Account Number: [id number]                  YOB: 1960              Admit Type: Inpatient                       Age: 51                               Room:                                       Gender: Male                          Note Status: Finalized                      Attending MD: Seb Sims MD         Pause for the Cause: Pause for the cause   completed  _______________________________________________________________________________     Procedure:                Colonoscopy  Indications:              Hematochezia, Pt in ICU, had bleeding from cecum                             48h ago, injected with epi, now bleeding again.  Providers:                Seb Sims MD, Carlos Castillo RN  Referring MD:             Javier Gan Md, MD  Medicines:                Midazolam 3 mg IV, Fentanyl 100 micrograms IV  Complications:            No immediate complications  _______________________________________________________________________________  Procedure:                Pre-Anesthesia Assessment:                            - Prior to the procedure, a History and Physical                             was performed, and patient medications and                             allergies were reviewed. The patient is unable to                              give consent secondary to the patient's altered                             mental status. The risks and benefits of the                             procedure and the sedation options and risks were                             discussed with the patient's spouse. All questions                             were answered and informed consent was obtained.                             Patient identification and proposed procedure were                             verified by the physician and the nurse in the                             procedure room. Mental Status Examination: sedated.                             Airway Examination: normal oropharyngeal airway and                             neck mobility. Respiratory Examination: clear to                             auscultation. CV Examination: normal. Prophylactic                             Antibiotics: The patient does not require                             prophylactic antibiotics. Prior Anticoagulants: The                             patient has taken no previous anticoagulant or                             antiplatelet agents. ASA Grade Assessment: II - A                             patient with mild systemic disease. After reviewing                             the risks and benefits, the patient was deemed in                             satisfactory condition to undergo the procedure.                             The anesthesia plan was to use moderate sedation /                             analgesia (conscious sedation). Immediately prior                             to administration of medications, the patient was                             re-assessed for adequacy to receive sedatives. The                             heart rate, respiratory rate, oxygen saturations,                             blood pressure, adequacy of pulmonary ventilation,                             and response to care were monitored throughout the                              procedure. The physical status of the patient was                             re-assessed after the procedure.                            After obtaining informed consent, the colonoscope                             was passed under direct vision. Throughout the                             procedure, the patient's blood pressure, pulse, and                             oxygen saturations were monitored continuously. The                             Colonoscope was introduced through the anus and                             advanced to the cecum, identified by appendiceal                             orifice & ileocecal valve. The colonoscopy was                             performed without difficulty. The patient tolerated                             the procedure well. The quality of the bowel                             preparation was fair.                                                                                   Findings:       The perianal and digital rectal examinations were normal. Red blood was        found in the entire colon. A single (solitary) 8 mm ulcer was found in        the cecum. Ulcer had a vessel, spurting bleeding was present. Three        hemostatic clips were successfully placed. Bleeding had stopped at the        end of the procedure. Area was successfully injected with 3.5 mL of a        1:10,000 solution of epinephrine for drug delivery.                                                                                   Impression:               - A single (solitary) ulcer in cecum with a                             bleeding vessel, treated by placement of three                             clips and epinephrine injection.  Recommendation:           - Monitor hgb/hct                            - Avoid anticoagulation.                            - Clear liquid diet.                            - Call GI team if evidence of re-bleeding                                                                                      electronically signed by DORINDA Sims  ________________  Seb Sims MD  Signed Date: 2011 18:12:ROSITA KULKARNI/JOSE  Number of Addenda: 0  I was physically present for the entire viewing portion of the exam.  __________________________  Signature of teaching physician  B4c/D4c  Note Initiated On: 2011 10:12:ROSITA SÁNCHEZ/  Scope Withdrawal Time: 0 hours 0 minutes 0 seconds   Total Procedure Duration: 0 hours 0 minutes 0 seconds             Social History:   Reviewed and edited as appropriate  Social History     Socioeconomic History     Marital status: Significant other     Spouse name: Not on file     Number of children: Not on file     Years of education: Not on file     Highest education level: Not on file   Occupational History     Not on file   Social Needs     Financial resource strain: Not on file     Food insecurity:     Worry: Not on file     Inability: Not on file     Transportation needs:     Medical: Not on file     Non-medical: Not on file   Tobacco Use     Smoking status: Former Smoker     Last attempt to quit: 12/3/2011     Years since quittin.5     Smokeless tobacco: Never Used   Substance and Sexual Activity     Alcohol use: No     Alcohol/week: 0.0 oz     Drug use: No     Sexual activity: Yes     Partners: Female   Lifestyle     Physical activity:     Days per week: Not on file     Minutes per session: Not on file     Stress: Not on file   Relationships     Social connections:     Talks on phone: Not on file     Gets together: Not on file     Attends Rastafarian service: Not on file     Active member of club or organization: Not on file     Attends meetings of clubs or organizations: Not on file     Relationship status: Not on file     Intimate partner violence:     Fear of current or ex partner: Not on file     Emotionally abused: Not on file     Physically abused: Not on file     Forced sexual activity: Not on file   Other Topics Concern     Parent/sibling w/ CABG, MI  or angioplasty before 65F 55M? Yes      Service Not Asked     Blood Transfusions Not Asked     Caffeine Concern Not Asked     Occupational Exposure Not Asked     Hobby Hazards Not Asked     Sleep Concern Not Asked     Stress Concern Not Asked     Weight Concern Not Asked     Special Diet Not Asked     Back Care Not Asked     Exercise Yes     Comment: limited walking     Bike Helmet Not Asked     Seat Belt Not Asked     Self-Exams Not Asked   Social History Narrative     Not on file            Family History:   Reviewed and edited as appropriate  Family History   Problem Relation Age of Onset     Diabetes Mother      Hypertension Mother      Heart Disease Mother      Heart Disease Father          of heart attack, left when he was young     Diabetes Sister      Diabetes Sister      Diabetes Sister            Allergies:   Reviewed and edited as appropriate     Allergies   Allergen Reactions     Hydralazine      Black spots     Simvastatin Other (See Comments)     Leg muscle weakness     Tylenol [Acetaminophen] Palpitations            Medications:     Medications Prior to Admission   Medication Sig Dispense Refill Last Dose     aspirin (ASA) 81 MG chewable tablet Take 81 mg by mouth daily   7/3/2019 at AM     atorvastatin (LIPITOR) 80 MG tablet Take 1 tablet (80 mg) by mouth daily 90 tablet 3 2019 at PM     carvedilol (COREG) 3.125 MG tablet Take 3.125 mg by mouth 2 times daily (with meals)    7/3/2019 at AM     cholecalciferol (VITAMIN  -D) 1000 UNITS capsule Take 2 capsules (2,000 Units) by mouth daily (Patient taking differently: Take 1 capsule by mouth 2 times daily ) 60 capsule 3 7/3/2019 at AM     clopidogrel (PLAVIX) 75 MG tablet TAKE ONE TABLET BY MOUTH EVERY  tablet 1 7/3/2019 at AM     furosemide (LASIX) 20 MG tablet Take 2 tablets (40 mg) by mouth 2 times daily 360 tablet 3 7/3/2019 at AM     insulin glargine (LANTUS SOLOSTAR PEN) 100 UNIT/ML pen Inject 30 Units Subcutaneous every  morning (Patient taking differently: Inject 26 Units Subcutaneous every morning ) 3 mL 3 7/3/2019 at AM     pantoprazole (PROTONIX) 40 MG EC tablet TAKE ONE TABLET BY MOUTH TWICE A  tablet 2 7/3/2019 at AM     sacubitril-valsartan (ENTRESTO)  MG per tablet Take 1 tablet by mouth 2 times daily 180 tablet 3 7/3/2019 at AM     vitamin  B complex with vitamin C (VITAMIN  B COMPLEX) TABS Take 1 tablet by mouth daily   7/3/2019 at AM     warfarin (COUMADIN) 5 MG tablet Take 2.5 mg by mouth daily (No maintenance plan; 7/1/2019-7/4/2019 2.5 mg once daily)   7/3/2019 at 1200     Alcohol Swabs PADS 1 applicator 4 times daily (before meals and nightly) 100 each 3 Taking     blood glucose (NO BRAND SPECIFIED) test strip Use to test blood sugar times times daily. Before meals and at bedtime. 100 each 3 Taking     blood glucose monitoring (ACCU-CHEK FASTCLIX) lancets Use to test blood sugar 4 times daily. At meals and bedtime. 102 each 3 Taking     insulin aspart (NOVOLOG PEN) 100 UNIT/ML pen Custom Correction Scale ,use with meals   Do Not give Correction Insulin if BG less than 200.   For  - 224 give 1 units.   For  - 249 give 2 units.   For  - 274 give 3 units.   For  - 299 give 4 units.   For  - 324 give 5 units.   For  - 349 give 6 units.   For BG greater than or equal to 350 give 7 units.   Novolog sliding scale with meals and add this amount of insulin to the 5 units of Novolog.  -if not eating, just test your blood sugar and give the sliding scale insulin 3 mL 3 More than a month at Unknown time     insulin pen needle (32G X 4 MM) 32G X 4 MM miscellaneous Use 5 pen needles daily or as directed. 200 each 3 Taking     nitroGLYcerin (NITROSTAT) 0.4 MG sublingual tablet Place 0.4 mg under the tongue every 5 minutes as needed for chest pain For chest pain place 1 tablet under the tongue every 5 minutes for 3 doses. If symptoms persist 5 minutes after 1st dose call 911.         "Sharps Container MISC 1 Device every 30 days 1 each 3 Taking             Review of Systems:   A complete review of systems was performed and is negative except as noted in the HPI           Physical Exam:   /68 (BP Location: Left arm)   Pulse 87   Temp 97.8  F (36.6  C) (Oral)   Resp 18   Ht 1.727 m (5' 8\")   Wt 105.6 kg (232 lb 14.4 oz)   SpO2 100%   BMI 35.41 kg/m    Wt:   Wt Readings from Last 2 Encounters:   07/04/19 105.6 kg (232 lb 14.4 oz)   06/27/19 106.3 kg (234 lb 6.4 oz)      Constitutional: cooperative, pleasant, not dyspneic/diaphoretic, no acute distress  Eyes: Sclera anicteric/injected  Ears/nose/mouth/throat: Normal oropharynx without ulcers or exudate, mucus membranes moist, hearing intact  Neck: supple, thyroid normal size  CV: RRR. No edema in LE bilaterally   Respiratory: Unlabored breathing. CTA bilaterally   Lymph: No axillary, submandibular, supraclavicular or inguinal lymphadenopathy  Abd: Nondistended, +bs, no hepatosplenomegaly, nontender, no peritoneal signs  Skin: warm, perfused, no jaundice  Neuro: AAO x 3, No asterixis  Psych: Normal affect  MSK: No deformity          Data:   Labs and imaging below were independently reviewed and interpreted    BMP  Recent Labs   Lab 07/03/19  1649 07/01/19  0822 06/28/19  0840 06/27/19  1226    138 136 134   POTASSIUM 4.0 4.1 4.6 4.6   CHLORIDE 102 103 104 103   RONNY 8.4* 8.2* 8.3* 8.6   CO2 28 30 28 29   BUN 60* 45* 53* 49*   CR 2.40* 2.49* 2.67* 2.54*   * 109* 110* 157*     CBC  Recent Labs   Lab 07/04/19  0608  07/03/19  1649   WBC 8.8  --  9.4   RBC 3.13*  --  3.77*   HGB 9.2*   < > 11.1*   HCT 30.4*  --  35.7*   MCV 97  --  95   MCH 29.4  --  29.4   MCHC 30.3*  --  31.1*   RDW 13.4  --  13.4     --  261    < > = values in this interval not displayed.     INR  Recent Labs   Lab 07/04/19  0608 07/03/19  1649 07/01/19  0822 06/28/19  0840   INR 3.83* 3.08* 2.63* 3.72*     LFTs  Recent Labs   Lab 07/03/19  1649 " 07/01/19  0822 06/27/19  1226   ALKPHOS 78 89 104   AST 7 13 13   ALT 18 17 18   BILITOTAL 0.6 0.7 0.9   PROTTOTAL 7.7 7.6 8.5   ALBUMIN 3.6 3.6 3.8      PANCNo lab results found in last 7 days.

## 2019-07-04 NOTE — H&P
[facility] [place]    History and Physical - [facility]        Date of Admission:  7/3/2019    Assessment & Plan   Cole Jesus is a 58yo male with a history of HTN, CAD, CKD IV, ischemic CM, DM II, and atrial fibrillation. He was recently hospitalized at [facility] (6/15/19-6/18/19) where he was diagnosed with STEMI and atrial fibrillation (s/p RCA stent); admitted for workup of anemia in the setting of darker stools. PTA was on warfarin for afib.     # Dark stools in setting of worsening Anemia  # Hx iron deficiency anemia  # Hx of GIB on AC  Patient reports 'very dark brown' stools over the past week shortly after starting warfarin therapy 6/18. He denies that they are black and has not had a change in BM frequency or abdominal pain. He does have a history of iron deficiency anemia as well as GIB on AC. Hgb has ranged 10-14 in the past 5 years. Reports that his stool this morning was normal colored.  - Hgb was 12.7 on 6/18/19; Dropped from 11.1 to 10.1 over the span of 4 hours in the ED.  - Stool guac positive; type & cross pending  - Recheck Hgb at 7/4 0000 and 0400; for stable CAD, transfuse if Hgb < 8  - Continue high-dose IV PPI  - GI consult  - If hemodynamic instability or rapid decline in Hgb, call GI stat  - Reverse INR if pt has further episode of melena    # Atrial fibrillation, chronic  At the time of his most recent STEMI (6/15/19) he was in rate controlled atrial fibrillation; OSH noted: was not on anticoagulation in past believed to be d/t GI bleed. Will begin warfarin for 30 days (goal INR 2.0-3.0) and then discontinue. Pharmacy to dose. No bridging recommended d/t risk of bleeding with GI bleed history. This will be in addition to aspirin and plavix which will continue x 1 year per Woest trial.   - Holding warfarin for now; INR 3.08 7/3  - Currently rate controlled    # CAD  # Ischemic Cardiomyopathy  # HFrEF  6/17/19 Echo: Irregular rhythm, Mild LVH  with LVEF 35%, no discrete regional wall motion abnormalities. Severe mitral insufficiency. The study was not technically sufficient to allow evaluation of LV diastolic function due to an underlying irregular rhythm.  Moderately reduced RV systolic function; Moderate tricuspid regurgitation; est PA peak pressure 51mmHg  - KEVYN placed 6/15/19 within RCA for 100% occlusion. Per patient, had 3 stents placed in 2012 following STEMI. ECG & troponins negative for acute change. No concern for ACS at this time.   - Continue ASA + clopidogrel, per cardiology  - Continue PTA atorvastatin 80, coreg 3.125, Entresto   - Hold furosemide 40 BID for now  - Appreciate any cardiology recs    # DM:  - Continue PTA Glargine 26 in AM before breakfast with regular POC glucose checks  - May add sliding scale dosing if perssistently hyperglycemic; previous mention of high resistance dosing    # Other: continue PTA vitamin D  - Resume PO PPI as indicated    Diet: Cardiac, consistent carb  Fluids: PO only  DVT Prophylaxis: Pt is on therapeutic anticoagulation, GIB  [place]: none  Code Status: Full    Disposition Plan   Expected discharge: 2 - 3 days, recommended to prior living arrangement once hemoglobin stable.  Entered: Alea Marlow MD 07/03/2019, 9:52 PM     The patient's care was discussed with the Attending Physician, Dr. Conway.    Alea Marlow MD  [facility] [place]  Pager: [phone]  Please see sticky note for cross cover information    Physician Attestation  I, Cookie Conway MD, saw this patient with the resident and agree with the resident s findings and plan of care as documented in the resident s note with my edits.     I personally reviewed vital signs, medications, labs and imaging.    Plan:   Check Hgb at MN and then q6h, threshold to transfuse Hgb <8  Hold warfarin  GI consult  Cardiology consult  PPI drip  Continue ASA, plavix  Hold lasix  If hemodynamic instability or  "rapid decline in Hgb, call GI stat.  Reverse INR if further episodes of melena       Cookie Conway MD  Date of Service (when I saw the patient): 7/3/19          ______________________________________________________________________    Chief Complaint   Generalized weakness x 2 days    History is obtained from the patient    History of Present Illness      Cole Jesus is a 58yo male with a history of HTN, CAD, CKD IV, ischemic CM, DM II, and atrial fibrillation. He was recently hospitalized at [facility] (6/15/19-6/18/19) where he was diagnosed with STEMI and atrial fibrillation (s/p RCA stent); chief complaint today is generalized weakness for 2 days. He contacted his PCP when he woke feeling particularly fatigued and weak. He was directed to come to the ED.     He describes feeling weak and very fatigued. Denies prolonged time out in the heat, chest pain, headache, abdominal pain, shortness of breath, and fevers/chills. States that his last BM did appear darker than usual. He reports decreased appetite and a 15lb weight loss since discharge on 6/18/18. He denies changes in sleep, anhedonia, and depression.  Though he presented as just \"sluggish\" when he had his STEMI, but he does not feel like this is the same. Patient is anticoagulated on warfarin, and continues to take Plavix and Asprin. Patient is followed by Cardiology here by Dr. Trujillo.     In the ED he was worked up for ACS: troponins and ECG negative for acute change. Stool guac was positive. His Hgb went from 11.1 to 10.1 in the span of hours, so he was admitted for observation, serial Hgbs, and cardiology/GI consults.     He monitors his blood pressure and blood glucose daily; over the past two weeks both have been stable (normotensive, normoglycemic).     Of note: both he and his partner voiced concern about his using anticoagulants. In December 2011 he was admitted with STEMI and while on heparin (inpatient) had subsequent intractable GI bleed requiring " a partial bowel resection and ileostomy. From cursory chart review, it also appears he went into cardiogenic shock and at some point needed a trach.     Review of Systems    General: No unexpected weight gain.  No fevers, chills night sweats. + 13lb weight loss since mid-June  HEENT: No vision changes. No hearing loss. No dysphagia or sore throat. No neck masses  Cardiovascular: denies chest pain, palpitations  Respiratory: No cough or hemoptysis  Gastroenterology: No nausea, vomiting, diarrhea or constipation. No hematochezia; + for 'very dark brown stools' over the past week  Genitourinary: No dysuria, hematuria  Neuro: No stroke, TIA or seizure symptoms  Musculoskeletal:  no arthralgia or myalgias  Skin: No rashes or pruritis  Heme: No bleeding gums, or bleeding cuts/wounds  Psych: No evidence of depression; + for anxiety, particularly surrounding his health    Past Medical History    I have reviewed this patient's medical history and updated it with pertinent information if needed.   Past Medical History:   Diagnosis Date     Anemia in chronic renal disease 3/9/2015     Antiplatelet or antithrombotic long-term use      Atrial fibrillation and flutter      CAD (coronary artery disease)     Stemi in 12/11, s/p angioplasty     CRD (chronic renal disease), stage IV (H) 03/09/2015    hx ATN with dialysis complicating cardiogenic shock  1/2012     GI bleed     massive lower GI bleed secondary to a cecal ulcer, s/p ileocecal resection in 12/11     Heart failure     Biventricular systolic HF, complicated by ARDS requiring tracheostomy     Hyperlipidemia LDL goal <100 4/28/2013     Hypertension      Ischemic cardiomyopathy 4/28/2013    TTE revealing 40% EF     Myocardial infarction (H)      Other and unspecified nonspecific immunological findings     Anti JKa     Pulmonary edema     episodes of flash pulmonary edema in 12/11     Subclinical hypothyroidism      Past Surgical History   I have reviewed this patient's  surgical history and updated it with pertinent information if needed.  Past Surgical History:   Procedure Laterality Date     ESOPHAGOSCOPY, GASTROSCOPY, DUODENOSCOPY (EGD), COMBINED  2011    Procedure:COMBINED ESOPHAGOSCOPY, GASTROSCOPY, DUODENOSCOPY (EGD); Surgeon:SAMARIA PUENTE; Location:[facility]     LAPAROTOMY EXPLORATORY  2011    Procedure:LAPAROTOMY EXPLORATORY; Explore laparotomy, Illeocectomy, Diverting Illeostomy; Surgeon:GIA NAJERA; Location:[facility]     ORIF right elbow fracture  age 12     TAKEDOWN ILEOSTOMY  2014    Procedure: TAKEDOWN ILEOSTOMY;  Surgeon: Gia Najera MD;  Location: [facility]     TRACHEOSTOMY  2011    Procedure:TRACHEOSTOMY; Tracheostomy; 80XLTCP-Proximal Extension-Cuffed 8.0 mm I.D.; Surgeon:LIZABETH DYE; Location:[facility]        Social History   I have reviewed this patient's social history and updated it with pertinent information if needed. Cole Jesus  reports that he quit smoking about 7 years ago. He has never used smokeless tobacco. He reports that he does not drink alcohol or use drugs.    Family History   I have reviewed this patient's family history and updated it with pertinent information if needed.   Family History   Problem Relation Age of Onset     Diabetes Mother      Hypertension Mother      Heart Disease Mother      Heart Disease Father          of heart attack, left when he was young     Diabetes Sister      Diabetes Sister      Diabetes Sister        Prior to Admission Medications   Prior to Admission Medications   Prescriptions Last Dose Informant Patient Reported? Taking?   Alcohol Swabs PADS   No No   Si applicator 4 times daily (before meals and nightly)   Sharps Container MISC   No No   Si Device every 30 days   aspirin (ASA) 81 MG chewable tablet 7/3/2019 at AM  Yes Yes   Sig: Take 81 mg by mouth daily   atorvastatin (LIPITOR) 80 MG tablet 2019 at PM  No Yes   Sig: Take 1 tablet (80  mg) by mouth daily   blood glucose (NO BRAND SPECIFIED) test strip   No No   Sig: Use to test blood sugar times times daily. Before meals and at bedtime.   blood glucose monitoring (ACCU-CHEK FASTCLIX) lancets   No No   Sig: Use to test blood sugar 4 times daily. At meals and bedtime.   carvedilol (COREG) 3.125 MG tablet 7/3/2019 at AM  Yes Yes   Sig: Take 3.125 mg by mouth 2 times daily (with meals)    cholecalciferol (VITAMIN  -D) 1000 UNITS capsule 7/3/2019 at AM  No Yes   Sig: Take 2 capsules (2,000 Units) by mouth daily   Patient taking differently: Take 1 capsule by mouth 2 times daily    clopidogrel (PLAVIX) 75 MG tablet 7/3/2019 at AM  No Yes   Sig: TAKE ONE TABLET BY MOUTH EVERY DAY   furosemide (LASIX) 20 MG tablet 7/3/2019 at AM  No Yes   Sig: Take 2 tablets (40 mg) by mouth 2 times daily   insulin aspart (NOVOLOG PEN) 100 UNIT/ML pen More than a month at Unknown time  No No   Sig: Custom Correction Scale ,use with meals   Do Not give Correction Insulin if BG less than 200.   For  - 224 give 1 units.   For  - 249 give 2 units.   For  - 274 give 3 units.   For  - 299 give 4 units.   For  - 324 give 5 units.   For  - 349 give 6 units.   For BG greater than or equal to 350 give 7 units.   Novolog sliding scale with meals and add this amount of insulin to the 5 units of Novolog.  -if not eating, just test your blood sugar and give the sliding scale insulin   insulin glargine (LANTUS SOLOSTAR PEN) 100 UNIT/ML pen 7/3/2019 at AM  No Yes   Sig: Inject 30 Units Subcutaneous every morning   Patient taking differently: Inject 26 Units Subcutaneous every morning    insulin pen needle (32G X 4 MM) 32G X 4 MM miscellaneous   No No   Sig: Use 5 pen needles daily or as directed.   nitroGLYcerin (NITROSTAT) 0.4 MG sublingual tablet   Yes No   Sig: Place 0.4 mg under the tongue every 5 minutes as needed for chest pain For chest pain place 1 tablet under the tongue every 5 minutes for 3  doses. If symptoms persist 5 minutes after 1st dose call 911.   pantoprazole (PROTONIX) 40 MG EC tablet 7/3/2019 at AM  No Yes   Sig: TAKE ONE TABLET BY MOUTH TWICE A DAY   sacubitril-valsartan (ENTRESTO)  MG per tablet 7/3/2019 at AM  No Yes   Sig: Take 1 tablet by mouth 2 times daily   vitamin  B complex with vitamin C (VITAMIN  B COMPLEX) TABS 7/3/2019 at AM  Yes Yes   Sig: Take 1 tablet by mouth daily   warfarin (COUMADIN) 5 MG tablet 7/3/2019 at 1200  Yes Yes   Sig: Take 2.5 mg by mouth daily (No maintenance plan; 7/1/2019-7/4/2019 2.5 mg once daily)      Facility-Administered Medications: None     Allergies   Allergies   Allergen Reactions     Hydralazine      Black spots     Simvastatin Other (See Comments)     Leg muscle weakness     Tylenol [Acetaminophen] Palpitations       Physical Exam   Vital Signs: Temp: 97.7  F (36.5  C) Temp src: Oral BP: 103/76 Pulse: 73 Heart Rate: 87 Resp: 12 SpO2: 100 % O2 Device: None (Room air)    Weight: 235 lbs 14.28 oz    General: Pleasant male; no acute distress; obese body habitus  Skin: No rashes, eruptions, tattoos, or jaundice; scars on abdomen from previous surgeries (ileostomy, bowel resection) and evidence of previous enterocutaneous fistula  Lymph: No enlarged/tender cervical LAD detected   HEENT: Head normocephalic, atraumatic; no conjunctival injection; extraocular eye movements intact; pupils equal, round, and reactive to light; external ears appear normal; no nasal flaring, grunting; oropharyngeal mucosa moist & without lesion; uvula midline; neck supple w/ trachea midline  Respiratory: Breath sounds equal & clear bilaterally; mild rales that clear with cough; no rhonchi, wheezes, rubs  Cardiac: Irrgular rate and rhythm; 3/6 systolic murmur heard best at apex  ----Radial and dorsalis pedis pulses 3+  Abdomen: Soft/nontender; normal bowel sounds x 4; no guarding  MSK: No amputations, deformities or visible joint swelling; normal strength  bilaterally  Extremities: No LE edema  Neuro: A&Ox3, no focal deficits  Psych: Pleasant mood, full affect    Data   Data reviewed today: I reviewed all medications, new labs and imaging results over the last 24 hours. I personally reviewed the EKG tracing showing rate-controlled afib.     Recent Labs   Lab 07/03/19 2019 07/03/19  1649 07/01/19  0822 06/28/19  0840 06/27/19  1226   WBC  --  9.4  --   --   --    HGB 10.1* 11.1*  --   --  14.2   MCV  --  95  --   --   --    PLT  --  261  --   --   --    INR  --  3.08* 2.63* 3.72*  --    NA  --  137 138 136 134   POTASSIUM  --  4.0 4.1 4.6 4.6   CHLORIDE  --  102 103 104 103   CO2  --  28 30 28 29   BUN  --  60* 45* 53* 49*   CR  --  2.40* 2.49* 2.67* 2.54*   ANIONGAP  --  7 4 4 3   RONNY  --  8.4* 8.2* 8.3* 8.6   GLC  --  123* 109* 110* 157*   ALBUMIN  --  3.6 3.6  --  3.8   PROTTOTAL  --  7.7 7.6  --  8.5   BILITOTAL  --  0.6 0.7  --  0.9   ALKPHOS  --  78 89  --  104   ALT  --  18 17  --  18   AST  --  7 13  --  13   TROPI  --  <0.015  --   --  <0.015

## 2019-07-04 NOTE — PLAN OF CARE
Neuro: A&Ox4.   Cardiac: Afib rate 80-90's. VSS.   Respiratory: Sating > 90% on RA.  GI/: Adequate urine output. BM X1, BM was formed and black. MD aware of black stools.  Diet/appetite: Tolerating 2 gram NA, low fat, consistent carb diet. Eating well.  Activity:  Independent, up to chair and in halls.  Pain: At acceptable level on current regimen. Denies pain.   Skin: No new deficits noted. Skin intact.   LDA's: Bilateral PIV, saline locked.     Plan: Continue with POC. Notify primary team with changes. Plan for EGD once INR is at acceptable level. Plan to transfuse blood if hemoglobin drops below 8, hemoglobin checked Q 8.

## 2019-07-04 NOTE — CONSULTS
Cardiology Inpatient Consultation  July 4, 2019    Reason for Consult:  A cardiology consult was requested by to provide clinical guidance regarding management of Afib and CHF in the setting of GIB while on Coumadin. He reported a GI bleeds in the past after his previous MI while on anticoagulation for afib. He had been off of AC for several years until his recent STEMI/KEVYN to RCA at which time he was  discharged on triple therapy for one month (warfarin for 30 days without bridging, then aspirin and plavix alone for at least 1 year following PCI).       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 C/w ASA+Plavix given recent stent  2. Hold Coumadin, will evaluate candidacy for KASIA closure s/p GI procedure.  3. C/w Entresto  4. Hold Lasix and Coreg. Ok to add back Coreg on 7/5 if BP remains stable  5. Ok for statin    Patient seen and discussed with Dr. Aparicio, who agrees with above plan.    Thank you for consulting the cardiovascular services at the [facility]. Please do not hesitate to call us with any questions.     Octavia Krueger MD  PGY-4  [facility] Cardiology Consult Team  [phone]    HPI:     Cole Jesus is a 59 year old male history of HTN, CAD s/p KEVYN to RCA (6/15/19), CKD IV, ischemic CM, EF 25%, Severe pulmonary HTN (likely WHO II), DM II, and atrial fibrillation on Coumadin. He was recently hospitalized at [place] (6/15/19-6/18/19) where he was diagnosed with STEMI and atrial fibrillation (s/p KEVYN to RCA). He presented to the ER with generalized weakness for 2 days and found to have a Hb drop from 14->9 . Patient is anticoagulated on warfarin, and continues to take Plavix and Asprin. Patient is followed by Cardiology here by Dr. Trujillo.      Endorses dark  BMs over the past week shortly after starting warfarin, although none since admission. Stool guac was positive. He reports decreased appetite and a 15lb weight loss since discharge on 6/18/18. BP 90s-120s/60s.    In the ED: troponins negative and ECG showed Afib without ischemic changes.     CXR showed pulmonary congestion and chronic pleural effusions    Of note, patient has a h/o GIB while on AC.    AICD has been previous discussed and patient did not feel over the years that it was desirable (extensive discussion of risks and benefits)    At the time of interview, the patient denies chest pain, dyspnea at rest or with exertion, orthopnea, PND, palpitations, lightheadedness, or syncope.     Review of Systems:    Complete review of systems was performed and negative except per HPI.    PMH:    Past Medical History:   Diagnosis Date     Anemia in chronic renal disease 3/9/2015     Antiplatelet or antithrombotic long-term use      Atrial fibrillation and flutter      CAD (coronary artery disease)     Stemi in 12/11, s/p angioplasty     CRD (chronic renal disease), stage IV (H) 03/09/2015    hx ATN with dialysis complicating cardiogenic shock  1/2012     GI bleed     massive lower GI bleed secondary to a cecal ulcer, s/p ileocecal resection in 12/11     Heart failure     Biventricular systolic HF, complicated by ARDS requiring tracheostomy     Hyperlipidemia LDL goal <100 4/28/2013     Hypertension      Ischemic cardiomyopathy 4/28/2013    TTE revealing 40% EF     Myocardial infarction (H)      Other and unspecified nonspecific immunological findings     Anti JKa     Pulmonary edema     episodes of flash pulmonary edema in 12/11     Subclinical hypothyroidism      Active Problems:  Patient Active Problem List    Diagnosis Date Noted     Weakness 07/03/2019     Priority: Medium     Hyperglycemia 04/09/2019     Priority: Medium     Severe pulmonary arterial systolic hypertension (H) 02/06/2019     Priority: Medium      Added automatically from request for surgery [id number]       Subclinical hypothyroidism 2018     Priority: Medium     CKD (chronic kidney disease) stage 4, GFR 15-29 ml/min (H) 2015     Priority: Medium     Anemia in chronic renal disease 2015     Priority: Medium     Hx of ileostomy 2014     Priority: Medium     h/o atrial flutter, ablation 2013     Priority: Medium     Vitamin D deficiency 2013     Priority: Medium     Imo Update utility       Ischemic cardiomyopathy 2013     Priority: Medium     Hyperlipidemia LDL goal <100 2013     Priority: Medium     Lower GI bleeding 2012     Priority: Medium     S/p ileocecal resection.       S/P colon resection 2012     Priority: Medium     iliocecal         Atrial fibrillation (H) 2012     Priority: Medium     Type 2 diabetes, HbA1C goal < 8% (H) 2012     Priority: Medium     Chronic systolic congestive heart failure (HCC) 2012     Priority: Medium     ST elevation myocardial infarction (STEMI) of inferior wall (H) 2011     Priority: Medium     Social History:  Social History     Tobacco Use     Smoking status: Former Smoker     Last attempt to quit: 12/3/2011     Years since quittin.5     Smokeless tobacco: Never Used   Substance Use Topics     Alcohol use: No     Alcohol/week: 0.0 oz     Drug use: No     Family History:  Family History   Problem Relation Age of Onset     Diabetes Mother      Hypertension Mother      Heart Disease Mother      Heart Disease Father          of heart attack, left when he was young     Diabetes Sister      Diabetes Sister      Diabetes Sister        Medications:    aspirin  81 mg Oral Daily     atorvastatin  80 mg Oral Daily     carvedilol  3.125 mg Oral BID w/meals     clopidogrel  75 mg Oral Daily     insulin glargine  26 Units Subcutaneous QAM     pantoprazole (PROTONIX) IV  40 mg Intravenous BID     sacubitril-valsartan  1 tablet Oral BID      sodium chloride (PF)  3 mL Intracatheter Q8H     vitamin D3  1,000 Units Oral BID         - MEDICATION INSTRUCTIONS -         Physical Exam:  Temp:  [97.7  F (36.5  C)-98.5  F (36.9  C)] 97.8  F (36.6  C)  Pulse:  [] 87  Heart Rate:  [75-98] 86  Resp:  [8-20] 18  BP: ()/(53-88) 111/68  SpO2:  [97 %-100 %] 100 %    Intake/Output Summary (Last 24 hours) at 7/4/2019 0901  Last data filed at 7/4/2019 0815  Gross per 24 hour   Intake 490 ml   Output 1100 ml   Net -610 ml     GEN: pleasant, no acute distress  HEENT: no icterus  CV: RRR, normal s1/s2, no murmurs/rubs/s3/s4, no heave. JVP 8cm.   CHEST: clear to ausculation bilaterally, no rales or wheezing  ABD: soft, non-tender, normal active bowel sounds  EXTR: pulses 2+ bilaterally. No clubbing, cyanosis or edema.   NEURO: alert oriented, speech fluent/appropriate, motor grossly nonfocal      Diagnostics:  All labs and imaging were reviewed, of note:    CMP  Recent Labs   Lab 07/03/19  1649 07/01/19  0822 06/28/19  0840 06/27/19  1226    138 136 134   POTASSIUM 4.0 4.1 4.6 4.6   CHLORIDE 102 103 104 103   CO2 28 30 28 29   ANIONGAP 7 4 4 3   * 109* 110* 157*   BUN 60* 45* 53* 49*   CR 2.40* 2.49* 2.67* 2.54*   GFRESTIMATED 28* 27* 25* 26*   GFRESTBLACK 32* 31* 29* 30*   RONNY 8.4* 8.2* 8.3* 8.6   MAG  --   --   --  2.7*   PROTTOTAL 7.7 7.6  --  8.5   ALBUMIN 3.6 3.6  --  3.8   BILITOTAL 0.6 0.7  --  0.9   ALKPHOS 78 89  --  104   AST 7 13  --  13   ALT 18 17  --  18     CBC  Recent Labs   Lab 07/04/19  0608 07/04/19  0117 07/03/19 2019 07/03/19  1649   WBC 8.8  --   --  9.4   RBC 3.13*  --   --  3.77*   HGB 9.2* 9.9* 10.1* 11.1*   HCT 30.4*  --   --  35.7*   MCV 97  --   --  95   MCH 29.4  --   --  29.4   MCHC 30.3*  --   --  31.1*   RDW 13.4  --   --  13.4     --   --  261     INR  Recent Labs   Lab 07/04/19  0608 07/03/19  1649 07/01/19  0822 06/28/19  0840   INR 3.83* 3.08* 2.63* 3.72*     Arterial Blood GasNo lab results found in last 7  days.    Lab Results   Component Value Date    TROPI <0.015 2019    TROPI <0.015 2019    TROPI 0.083 (H) 2012    TROPI 0.136 (HH) 2012    TROPI 0.220 (HH) 2011     EK/3/19   Afib, no ischemic changes  Transthoracic echocardiogram:   19  Ischemic cardiomyopathy.Severe inferolateral and anterolateral wall  hypokinesis to akinesis. The Ejection Fraction is estimated at 25-30%.Traced  EF is 28%  Global right ventricular function is mildly to moderately reduced. Right  ventricular systolic pressure is 85mmHg above the right atrial pressure.Severe  (pulmonary artery systolic pressure >75mmHg) pulmonary hypertension is  present.  Moderate mitral insufficiency is present.  The inferior vena cava was normal in size with preserved respiratory  variability.Estimated mean right atrial pressure is 3 mmHg.  No pericardial effusion is present.    Nuclear stress test:

## 2019-07-04 NOTE — PROGRESS NOTES
[facility], [place]    Progress Note - Maroon 3 Service        Date of Admission:  7/3/2019    Assessment & Plan   Cole Jesus is a 58yo male with a history of HTN, CAD, CKD IV, ischemic CM, DM II, and atrial fibrillation. He was recently hospitalized at [place] (6/15/19-6/18/19) where he was diagnosed with STEMI (s/p RCA stent, currently on dual-antiplatelets) and atrial fibrillation (tx warfarin); admitted for GI bleeding.     #GI bleeding on warfarin and dual antiplatelet therapy  Patient presents with 1 week of maroon colored stools and a Hg of 10.1, down from baseline Hg ~13. Hemodynamically stable and asymptomatic. INR on admission 3.0. Intermittent PPI drip started in ED. This AM, Hg 9.2 from 10.1 in ED last night. Received 10mg Vit K this morning for warfarin reversal. INR 2.66 from 3.87 after Vit K. Repeat Hg this afternoon stable at 9.0.    - Daily CBC, INR, BMP   - Check Hg q8 hours (scheduled 2000, 0400, 1200)   - Per cardiology, hold warfarin, continue dual antiplatelets   - Receiving intermittent PPI drip   - GI Luminal consulted, EGD tomorrow; possible colonoscopy Sunday   - Cardiology consulted, appreciate recs   - NPO from midnight    Chronic Problems:   # Atrial fibrillation, chronic  At the time of his most recent STEMI (6/15/19) he was in rate controlled atrial fibrillation; OSH noted: was not on anticoagulation in past believed to be d/t GI bleed. Will begin warfarin for 30 days (goal INR 2.0-3.0) and then discontinue. Pharmacy to dose. No bridging recommended d/t risk of bleeding with GI bleed history. This will be in addition to aspirin and plavix which will continue x 1 year per Woest trial.   - Holding warfarin for now  - Currently rate controlled     # CAD  # Ischemic Cardiomyopathy  # HFrEF  6/17/19 Echo: Irregular rhythm, Mild LVH with LVEF 35%, no discrete regional wall motion abnormalities. Severe mitral insufficiency. The study was not technically  sufficient to allow evaluation of LV diastolic function due to an underlying irregular rhythm.  Moderately reduced RV systolic function; Moderate tricuspid regurgitation; est PA peak pressure 51mmHg  - KEVYN placed 6/15/19 within RCA for 100% occlusion. Per patient, had 3 stents placed in 2012 following STEMI. ECG & troponins negative for acute change. No concern for ACS at this time.    - Monitor on telemetry  - Cardiology consulted, appreciate recs  - Continue ASA + clopidogrel, per cardiology  - Continue PTA atorvastatin 80, Entresto   - Hold furosemide 40 BID for now  - Holding coreg until tomorrow, to resume if BP's stable     # DM:  - Continue PTA Glargine 26 in AM before breakfast with regular POC glucose checks  - May add sliding scale dosing if perssistently hyperglycemic; previous mention of high resistance dosing     #CKD IV - continue PTA vitamin D  #Subclinical hypothyroidism  #Hx of ARDS and Pulmonary Edema      Diet: Combination Diet Regular Diet Adult; 4601-5563 Calories: Moderate Consistent CHO (4-6 CHO units/meal); 2 gm NA Diet; Low Saturated Fat Diet    Fluids: N/A  Lines: PIV  DVT Prophylaxis: VTE Prophylaxis contraindicated due to acute GI bleed  Ponce Catheter: in place, indication:    Code Status: Full Code      Disposition Plan   Expected discharge: 4 - 7 days, recommended to prior living arrangement once hemoglobin stable.  Entered: Sadie Kauffman MD 07/04/2019, 6:44 AM       The patient's care was discussed with the Attending Physician, Dr. Gonzales, Bedside Nurse and Patient.    Sadie Kauffman MD  [address]  Pager: [phone]  Please see sticky note for cross cover information  ______________________________________________________________________    Interval History   O/N: No acute events overnight     Patient states that he is feeling better this afternoon, and he was even able to walk around without becoming short of breath.   He continues to deny chest pain/shortness of breath, abdominal pain, and denies any further episodes or of fatigue, weakness, or lightheadedness. Reports one black, tarry bowel movement this morning.     Data reviewed today: I reviewed all medications, new labs and imaging results over the last 24 hours. I personally reviewed no images or EKG's today.    Physical Exam   Vital Signs: Temp: 98.5  F (36.9  C) Temp src: Oral BP: 109/74 Pulse: 87 Heart Rate: 88 Resp: 16 SpO2: 97 % O2 Device: None (Room air)    Weight: 232 lbs 14.4 oz  General: Pleasant male; no acute distress; obese body habitus  Skin: No rashes, eruptions, tattoos, or jaundice; scars on abdomen from previous surgeries (ileostomy, bowel resection) and evidence of previous enterocutaneous fistula  HEENT: Head normocephalic, atraumatic; no conjunctival injection; extraocular eye movements intact; pupils equal, round, and reactive to light; external ears appear normal; no nasal flaring, grunting; oropharyngeal mucosa moist & without lesion; uvula midline; neck supple w/ trachea midline  Respiratory: Breath sounds equal & clear bilaterally; mild rales that clear with cough; no rhonchi, wheezes, rubs  Cardiac: Regular rate and rhythm, no edema  Abdomen: Soft/nontender; normal bowel sounds x 4; no guarding  MSK: No amputations, deformities or visible joint swelling; normal strength bilaterally  Extremities: No LE edema  Neuro: A&Ox3, no focal deficits  Psych: Pleasant mood, full affect  Data   Recent Labs   Lab 07/04/19  1224 07/04/19  1158 07/04/19  0805 07/04/19  0608 07/04/19  0117  07/03/19  1649 07/01/19  0822 06/28/19  0840   WBC  --   --   --  8.8  --   --  9.4  --   --    HGB 9.0*  --   --  9.2* 9.9*   < > 11.1*  --   --    MCV  --   --   --  97  --   --  95  --   --    PLT  --   --   --  207  --   --  261  --   --    INR  --  2.66* 3.87* 3.83*  --   --  3.08* 2.63* 3.72*   NA  --   --   --   --   --   --  137 138 136   POTASSIUM  --   --   --   --    --   --  4.0 4.1 4.6   CHLORIDE  --   --   --   --   --   --  102 103 104   CO2  --   --   --   --   --   --  28 30 28   BUN  --   --   --   --   --   --  60* 45* 53*   CR  --   --   --   --   --   --  2.40* 2.49* 2.67*   ANIONGAP  --   --   --   --   --   --  7 4 4   RONNY  --   --   --   --   --   --  8.4* 8.2* 8.3*   GLC  --   --   --   --   --   --  123* 109* 110*   ALBUMIN  --   --   --   --   --   --  3.6 3.6  --    PROTTOTAL  --   --   --   --   --   --  7.7 7.6  --    BILITOTAL  --   --   --   --   --   --  0.6 0.7  --    ALKPHOS  --   --   --   --   --   --  78 89  --    ALT  --   --   --   --   --   --  18 17  --    AST  --   --   --   --   --   --  7 13  --    TROPI  --   --   --   --   --   --  <0.015  --   --     < > = values in this interval not displayed.     No results found for this or any previous visit (from the past 24 hour(s)).  Medications     - MEDICATION INSTRUCTIONS -         aspirin  81 mg Oral Daily     atorvastatin  80 mg Oral Daily     carvedilol  3.125 mg Oral BID w/meals     clopidogrel  75 mg Oral Daily     insulin glargine  26 Units Subcutaneous QAM     pantoprazole (PROTONIX) IV  40 mg Intravenous BID     sacubitril-valsartan  1 tablet Oral BID     sodium chloride (PF)  3 mL Intracatheter Q8H     vitamin D3  1,000 Units Oral BID       Physician Attestation   I, Antony Gonzales, saw this patient with the resident and agree with the resident/fellow's findings and plan of care as documented in the note.      I personally reviewed vital signs, medications and labs.      Antony Gonzales MD  Date of Service (when I saw the patient): 7/4/2019

## 2019-07-05 LAB
ABO + RH BLD: NORMAL
ABO + RH BLD: NORMAL
ANION GAP SERPL CALCULATED.3IONS-SCNC: 5 MMOL/L (ref 3–14)
BLD GP AB SCN SERPL QL: NORMAL
BLD PROD TYP BPU: NORMAL
BLD PROD TYP BPU: NORMAL
BLD UNIT ID BPU: 0
BLOOD BANK CMNT PATIENT-IMP: NORMAL
BLOOD BANK CMNT PATIENT-IMP: NORMAL
BLOOD PRODUCT CODE: NORMAL
BPU ID: NORMAL
BUN SERPL-MCNC: 72 MG/DL (ref 7–30)
CALCIUM SERPL-MCNC: 8.2 MG/DL (ref 8.5–10.1)
CHLORIDE SERPL-SCNC: 110 MMOL/L (ref 94–109)
CO2 SERPL-SCNC: 26 MMOL/L (ref 20–32)
CREAT SERPL-MCNC: 2.33 MG/DL (ref 0.66–1.25)
ERYTHROCYTE [DISTWIDTH] IN BLOOD BY AUTOMATED COUNT: 13.5 % (ref 10–15)
GFR SERPL CREATININE-BSD FRML MDRD: 29 ML/MIN/{1.73_M2}
GLUCOSE BLDC GLUCOMTR-MCNC: 125 MG/DL (ref 70–99)
GLUCOSE BLDC GLUCOMTR-MCNC: 143 MG/DL (ref 70–99)
GLUCOSE BLDC GLUCOMTR-MCNC: 83 MG/DL (ref 70–99)
GLUCOSE SERPL-MCNC: 123 MG/DL (ref 70–99)
HCT VFR BLD AUTO: 26.9 % (ref 40–53)
HGB BLD-MCNC: 8.1 G/DL (ref 13.3–17.7)
HGB BLD-MCNC: 8.1 G/DL (ref 13.3–17.7)
HGB BLD-MCNC: 8.6 G/DL (ref 13.3–17.7)
INR PPP: 1.7 (ref 0.86–1.14)
MCH RBC QN AUTO: 28.8 PG (ref 26.5–33)
MCHC RBC AUTO-ENTMCNC: 30.1 G/DL (ref 31.5–36.5)
MCV RBC AUTO: 96 FL (ref 78–100)
NUM BPU REQUESTED: 1
PLATELET # BLD AUTO: 198 10E9/L (ref 150–450)
POTASSIUM SERPL-SCNC: 4.5 MMOL/L (ref 3.4–5.3)
RBC # BLD AUTO: 2.81 10E12/L (ref 4.4–5.9)
SODIUM SERPL-SCNC: 141 MMOL/L (ref 133–144)
SPECIMEN EXP DATE BLD: NORMAL
TRANSFUSION STATUS PATIENT QL: NORMAL
TRANSFUSION STATUS PATIENT QL: NORMAL
UPPER GI ENDOSCOPY: NORMAL
WBC # BLD AUTO: 9.1 10E9/L (ref 4–11)

## 2019-07-05 PROCEDURE — 99233 SBSQ HOSP IP/OBS HIGH 50: CPT | Mod: GC | Performed by: INTERNAL MEDICINE

## 2019-07-05 PROCEDURE — 25000132 ZZH RX MED GY IP 250 OP 250 PS 637: Performed by: INTERNAL MEDICINE

## 2019-07-05 PROCEDURE — 25000125 ZZHC RX 250: Performed by: INTERNAL MEDICINE

## 2019-07-05 PROCEDURE — 0W3P8ZZ CONTROL BLEEDING IN GASTROINTESTINAL TRACT, VIA NATURAL OR ARTIFICIAL OPENING ENDOSCOPIC: ICD-10-PCS | Performed by: INTERNAL MEDICINE

## 2019-07-05 PROCEDURE — 86850 RBC ANTIBODY SCREEN: CPT | Performed by: INTERNAL MEDICINE

## 2019-07-05 PROCEDURE — 80048 BASIC METABOLIC PNL TOTAL CA: CPT | Performed by: STUDENT IN AN ORGANIZED HEALTH CARE EDUCATION/TRAINING PROGRAM

## 2019-07-05 PROCEDURE — 85018 HEMOGLOBIN: CPT | Performed by: INTERNAL MEDICINE

## 2019-07-05 PROCEDURE — 85018 HEMOGLOBIN: CPT | Performed by: STUDENT IN AN ORGANIZED HEALTH CARE EDUCATION/TRAINING PROGRAM

## 2019-07-05 PROCEDURE — 36415 COLL VENOUS BLD VENIPUNCTURE: CPT | Performed by: INTERNAL MEDICINE

## 2019-07-05 PROCEDURE — P9016 RBC LEUKOCYTES REDUCED: HCPCS | Performed by: INTERNAL MEDICINE

## 2019-07-05 PROCEDURE — 25000128 H RX IP 250 OP 636: Performed by: INTERNAL MEDICINE

## 2019-07-05 PROCEDURE — 36415 COLL VENOUS BLD VENIPUNCTURE: CPT | Performed by: STUDENT IN AN ORGANIZED HEALTH CARE EDUCATION/TRAINING PROGRAM

## 2019-07-05 PROCEDURE — 86900 BLOOD TYPING SEROLOGIC ABO: CPT | Performed by: STUDENT IN AN ORGANIZED HEALTH CARE EDUCATION/TRAINING PROGRAM

## 2019-07-05 PROCEDURE — 86902 BLOOD TYPE ANTIGEN DONOR EA: CPT | Performed by: INTERNAL MEDICINE

## 2019-07-05 PROCEDURE — 25000128 H RX IP 250 OP 636: Performed by: STUDENT IN AN ORGANIZED HEALTH CARE EDUCATION/TRAINING PROGRAM

## 2019-07-05 PROCEDURE — G0500 MOD SEDAT ENDO SERVICE >5YRS: HCPCS | Performed by: INTERNAL MEDICINE

## 2019-07-05 PROCEDURE — 27210582 ZZH DEVICE CLIP RESOLUTION, EACH: Performed by: INTERNAL MEDICINE

## 2019-07-05 PROCEDURE — 25000132 ZZH RX MED GY IP 250 OP 250 PS 637: Performed by: STUDENT IN AN ORGANIZED HEALTH CARE EDUCATION/TRAINING PROGRAM

## 2019-07-05 PROCEDURE — 00000146 ZZHCL STATISTIC GLUCOSE BY METER IP

## 2019-07-05 PROCEDURE — 86901 BLOOD TYPING SEROLOGIC RH(D): CPT | Performed by: INTERNAL MEDICINE

## 2019-07-05 PROCEDURE — 12000004 ZZH R&B IMCU UMMC

## 2019-07-05 PROCEDURE — 86922 COMPATIBILITY TEST ANTIGLOB: CPT | Performed by: INTERNAL MEDICINE

## 2019-07-05 PROCEDURE — 43255 EGD CONTROL BLEEDING ANY: CPT | Performed by: INTERNAL MEDICINE

## 2019-07-05 PROCEDURE — 86850 RBC ANTIBODY SCREEN: CPT | Performed by: STUDENT IN AN ORGANIZED HEALTH CARE EDUCATION/TRAINING PROGRAM

## 2019-07-05 PROCEDURE — 99153 MOD SED SAME PHYS/QHP EA: CPT | Performed by: INTERNAL MEDICINE

## 2019-07-05 PROCEDURE — 85610 PROTHROMBIN TIME: CPT | Performed by: STUDENT IN AN ORGANIZED HEALTH CARE EDUCATION/TRAINING PROGRAM

## 2019-07-05 PROCEDURE — 86905 BLOOD TYPING RBC ANTIGENS: CPT | Performed by: INTERNAL MEDICINE

## 2019-07-05 PROCEDURE — C9113 INJ PANTOPRAZOLE SODIUM, VIA: HCPCS | Performed by: STUDENT IN AN ORGANIZED HEALTH CARE EDUCATION/TRAINING PROGRAM

## 2019-07-05 PROCEDURE — 86900 BLOOD TYPING SEROLOGIC ABO: CPT | Performed by: INTERNAL MEDICINE

## 2019-07-05 PROCEDURE — 25000132 ZZH RX MED GY IP 250 OP 250 PS 637: Performed by: NURSE PRACTITIONER

## 2019-07-05 PROCEDURE — 86901 BLOOD TYPING SEROLOGIC RH(D): CPT | Performed by: STUDENT IN AN ORGANIZED HEALTH CARE EDUCATION/TRAINING PROGRAM

## 2019-07-05 PROCEDURE — 85027 COMPLETE CBC AUTOMATED: CPT | Performed by: STUDENT IN AN ORGANIZED HEALTH CARE EDUCATION/TRAINING PROGRAM

## 2019-07-05 PROCEDURE — 27211325 ZZ H NEEDLE SCLERO ADDITIONAL: Performed by: INTERNAL MEDICINE

## 2019-07-05 RX ORDER — FENTANYL CITRATE 50 UG/ML
INJECTION, SOLUTION INTRAMUSCULAR; INTRAVENOUS PRN
Status: DISCONTINUED | OUTPATIENT
Start: 2019-07-05 | End: 2019-07-05 | Stop reason: HOSPADM

## 2019-07-05 RX ORDER — BISACODYL 5 MG
10 TABLET, DELAYED RELEASE (ENTERIC COATED) ORAL ONCE
Status: COMPLETED | OUTPATIENT
Start: 2019-07-05 | End: 2019-07-05

## 2019-07-05 RX ORDER — SIMETHICONE
LIQUID (ML) MISCELLANEOUS PRN
Status: DISCONTINUED | OUTPATIENT
Start: 2019-07-05 | End: 2019-07-05 | Stop reason: HOSPADM

## 2019-07-05 RX ORDER — POLYETHYLENE GLYCOL 3350 17 G/17G
17 POWDER, FOR SOLUTION ORAL ONCE
Status: COMPLETED | OUTPATIENT
Start: 2019-07-05 | End: 2019-07-05

## 2019-07-05 RX ADMIN — ATORVASTATIN CALCIUM 80 MG: 40 TABLET, FILM COATED ORAL at 08:10

## 2019-07-05 RX ADMIN — POLYETHYLENE GLYCOL 3350 17 G: 17 POWDER, FOR SOLUTION ORAL at 08:10

## 2019-07-05 RX ADMIN — PANTOPRAZOLE SODIUM 40 MG: 40 INJECTION, POWDER, FOR SOLUTION INTRAVENOUS at 20:14

## 2019-07-05 RX ADMIN — PANTOPRAZOLE SODIUM 40 MG: 40 INJECTION, POWDER, FOR SOLUTION INTRAVENOUS at 08:10

## 2019-07-05 RX ADMIN — CARVEDILOL 3.12 MG: 3.12 TABLET, FILM COATED ORAL at 18:17

## 2019-07-05 RX ADMIN — SACUBITRIL AND VALSARTAN 1 TABLET: 97; 103 TABLET, FILM COATED ORAL at 20:14

## 2019-07-05 RX ADMIN — VITAMIN D, TAB 1000IU (100/BT) 1000 UNITS: 25 TAB at 20:14

## 2019-07-05 RX ADMIN — BISACODYL 10 MG: 5 TABLET, COATED ORAL at 08:10

## 2019-07-05 RX ADMIN — SACUBITRIL AND VALSARTAN 1 TABLET: 97; 103 TABLET, FILM COATED ORAL at 08:11

## 2019-07-05 RX ADMIN — CARVEDILOL 3.12 MG: 3.12 TABLET, FILM COATED ORAL at 08:11

## 2019-07-05 RX ADMIN — ASPIRIN 81 MG CHEWABLE TABLET 81 MG: 81 TABLET CHEWABLE at 08:10

## 2019-07-05 RX ADMIN — VITAMIN D, TAB 1000IU (100/BT) 1000 UNITS: 25 TAB at 08:10

## 2019-07-05 RX ADMIN — CLOPIDOGREL BISULFATE 75 MG: 75 TABLET ORAL at 08:10

## 2019-07-05 ASSESSMENT — ACTIVITIES OF DAILY LIVING (ADL)
ADLS_ACUITY_SCORE: 12

## 2019-07-05 ASSESSMENT — MIFFLIN-ST. JEOR: SCORE: 1843.5

## 2019-07-05 NOTE — PROGRESS NOTES
[facility], [place]    Progress Note - Maroon 3 Service        Date of Admission:  7/3/2019  Resident/Fellow Attestation   I, Sadie Kauffman, was present with the medical student who participated in the service and in the documentation of the note.  I have verified the history and personally performed the physical exam and medical decision making.  I agree with the assessment and plan of care as documented in the note.  I have made changes where appropriate and these notes reflect my plan.    Sadie Kauffman MD  PGY1  Date of Service (when I saw the patient): 07/05/19      Assessment & Plan      Changes today:   - Hg 8.1 this AM, patient consented and received 1U pRBCs   - EGD showing AVM, clipped   - Continuing to monitor 24 hours Hg stable    Cole Jesus is a 60yo male with a history of HTN, CAD, CKD IV, ischemic CM, DM II, and atrial fibrillation. He was recently hospitalized at [place] (6/15/19-6/18/19) where he was diagnosed with STEMI (s/p RCA stent, currently on dual-antiplatelets) and atrial fibrillation (tx warfarin); admitted for symptomatic upper GI bleeding.     #Arteriovenous Malformation   ##presenting as GI bleeding on warfarin and dual antiplatelet therapy  Patient presents with 1 week of maroon colored stools and a Hg of 10.1, down from baseline Hg ~13. Hemodynamically stable and asymptomatic. INR on admission 3.0. Intermittent PPI drip started in ED. Hg decreased this AM to 8.1 from 8.7 yesterday evening and 9.2 yesterday morning. Received 10mg Vit K yesterday for warfarin reversal, INR this AM 1.70 from 3.87 on admission.   - Daily CBC, INR, BMP   - Receiving IV pantoprazole 40mg BID   - Hg 8.1 this AM, patient consented and received 1U pRBCs   - Cardiology consulted, recommendations appreciated    - Continue dual antiplatelet therapy given recent stent    - Touch base with GI re: safety for Coumadin or NOAC    - Follow up in Dr. Trujillo's clinic to discuss  anticoagulation   - GI luminal consulted    - EGD 7/5/2019; actively bleeding duodenal AVM, clipped    - Patient to f/u outpatient for colonoscopy   - Continue monitor with Hg q8 for at least 24 hours, then consider discharge      Chronic Problems:   # Atrial fibrillation, chronic  At the time of his most recent STEMI (6/15/19) he was in rate controlled atrial fibrillation; OSH noted: was not on anticoagulation in past believed to be d/t GI bleed. Will begin warfarin for 30 days (goal INR 2.0-3.0) and then discontinue. Pharmacy to dose. No bridging recommended d/t risk of bleeding with GI bleed history. This will be in addition to aspirin and plavix which will continue x1 year per Woest trial.   - Holding warfarin for now  - Currently rate controlled     # CAD  # Ischemic Cardiomyopathy  # HFrEF  6/17/19 Echo: Irregular rhythm, Mild LVH with LVEF 35%, no discrete regional wall motion abnormalities. Severe mitral insufficiency. The study was not technically sufficient to allow evaluation of LV diastolic function due to an underlying irregular rhythm.  Moderately reduced RV systolic function; Moderate tricuspid regurgitation; est PA peak pressure 51mmHg  - KEVYN placed 6/15/19 within RCA for 100% occlusion. Per patient, had 3 stents placed in 2012 following STEMI. ECG & troponins negative for acute change. No concern for ACS at this time.    - Monitor on telemetry  - Cardiology consulted, appreciate recs:   - Continue ASA + clopidogrel, per cardiology   - Continue PTA atorvastatin and Entresto, and safe to resume PTA Coreg   - Restart Lasix when not NPO     # DM:  - Continue PTA Glargine 26 in AM before breakfast with regular POC glucose checks (received 13U glargine this AM while NPO)  - May add sliding scale dosing if persistently hyperglycemic; previous mention of high resistance dosing     #CKD IV - continue PTA vitamin D  #Subclinical hypothyroidism  #Hx of ARDS and Pulmonary Edema      Diet: Combination Diet  Regular Diet Adult; 3009-7324 Calories: Moderate Consistent CHO (4-6 CHO units/meal); 2 gm NA Diet; Low Saturated Fat Diet; No Red Food or Beverage - for certain tests    Fluids: N/A  Lines: PIV  DVT Prophylaxis: VTE Prophylaxis contraindicated due to acute GI bleed  Ponce Catheter: in place, indication:    Code Status: Full Code      Disposition Plan   Expected discharge: 1-2 days, recommended to discharge to prior living arrangement after >24 hours monitoring post-EGD procedure, hemoglobin stable, and with cardiology recommendations.      Lyndsey Paulino, medical student  [facility] 3 Service  ______________________________________________________________________    Interval History   O/N: No acute events overnight. Morning Hg 8.1--patient consented and received 1U pRBCs.    Patient states that he is feeling well this morning. Pt denies chest pain/shortness of breath, nausea/vomiting, abdominal pain, and denies any further episodes or of fatigue, weakness, or lightheadedness. No bowel movements since yesterday. NPO status until EGD procedure.    Data reviewed today: I reviewed all medications, new labs and imaging results over the last 24 hours.    Physical Exam   Vital Signs: Temp: 98.2  F (36.8  C) Temp src: Oral BP: 98/70 Pulse: 90 Heart Rate: 80 Resp: 16 SpO2: 100 % O2 Device: Nasal cannula Oxygen Delivery: 2 LPM  Weight: 234 lbs 9.11 oz  General: Pleasant male; no acute distress; obese body habitus  Skin: No rashes or jaundice  HEENT: Head normocephalic, atraumatic; conjunctiva normal; EOM intact; external ears appear normal; no nasal flaring, grunting; oropharyngeal mucosa moist & without lesion  Respiratory: Breath sounds equal & clear bilaterally  Cardiac: Normal S1, S2, systolic murmur present, no BLE edema  Abdomen: Soft/nontender; bowel sounds present; no guarding  MSK: Normal strength bilaterally, full range of motion  Extremities: No LE edema  Neuro: A&Ox3, no focal deficits  Psych: Pleasant mood, full  affect    Data   Results for orders placed or performed during the hospital encounter of 07/03/19 (from the past 24 hour(s))   Glucose by meter   Result Value Ref Range    Glucose 113 (H) 70 - 99 mg/dL   INR   Result Value Ref Range    INR 1.79 (H) 0.86 - 1.14   Hemoglobin   Result Value Ref Range    Hemoglobin 8.7 (L) 13.3 - 17.7 g/dL   Glucose by meter   Result Value Ref Range    Glucose 153 (H) 70 - 99 mg/dL   INR   Result Value Ref Range    INR 1.70 (H) 0.86 - 1.14   CBC with platelets   Result Value Ref Range    WBC 9.1 4.0 - 11.0 10e9/L    RBC Count 2.81 (L) 4.4 - 5.9 10e12/L    Hemoglobin 8.1 (L) 13.3 - 17.7 g/dL    Hematocrit 26.9 (L) 40.0 - 53.0 %    MCV 96 78 - 100 fl    MCH 28.8 26.5 - 33.0 pg    MCHC 30.1 (L) 31.5 - 36.5 g/dL    RDW 13.5 10.0 - 15.0 %    Platelet Count 198 150 - 450 10e9/L   Basic metabolic panel   Result Value Ref Range    Sodium 141 133 - 144 mmol/L    Potassium 4.5 3.4 - 5.3 mmol/L    Chloride 110 (H) 94 - 109 mmol/L    Carbon Dioxide 26 20 - 32 mmol/L    Anion Gap 5 3 - 14 mmol/L    Glucose 123 (H) 70 - 99 mg/dL    Urea Nitrogen 72 (H) 7 - 30 mg/dL    Creatinine 2.33 (H) 0.66 - 1.25 mg/dL    GFR Estimate 29 (L) >60 mL/min/[1.73_m2]    GFR Estimate If Black 34 (L) >60 mL/min/[1.73_m2]    Calcium 8.2 (L) 8.5 - 10.1 mg/dL   ABO/Rh type and screen   Result Value Ref Range    Units Ordered 1     ABO O     RH(D) Pos     Antibody Screen Neg     Test Valid Only At          [facility]    Specimen Expires 07/08/2019     Crossmatch Red Blood Cells     Antigen Type Not done    Blood component   Result Value Ref Range    Unit Number [id number]     Blood Component Type Red Blood Cells Leukocyte Reduced     Division Number 00     Status of Unit Released to care unit 07/05/2019 1534     Blood Product Code [id number]     Unit Status ISS    Glucose by meter   Result Value Ref Range    Glucose 125 (H) 70 - 99 mg/dL   Hemoglobin   Result Value Ref Range     Hemoglobin 8.1 (L) 13.3 - 17.7 g/dL   Glucose by meter   Result Value Ref Range    Glucose 143 (H) 70 - 99 mg/dL     Current Facility-Administered Medications   Medication     aspirin (ASA) chewable tablet 81 mg     atorvastatin (LIPITOR) tablet 80 mg     carvedilol (COREG) tablet 3.125 mg     clopidogrel (PLAVIX) tablet 75 mg     glucose gel 15-30 g    Or     dextrose 50 % injection 25-50 mL    Or     glucagon injection 1 mg     HOLD: warfarin (COUMADIN) therapy     insulin glargine (LANTUS PEN) injection 26 Units     lidocaine (LMX4) cream     lidocaine 1 % 0.1-1 mL     melatonin tablet 6 mg     naloxone (NARCAN) injection 0.1-0.4 mg     pantoprazole (PROTONIX) 40 mg IV push injection     Patient is already receiving anticoagulation with heparin, enoxaparin (LOVENOX), warfarin (COUMADIN)  or other anticoagulant medication     sacubitril-valsartan (ENTRESTO)  MG per tablet 1 tablet     sodium chloride (PF) 0.9% PF flush 3 mL     sodium chloride (PF) 0.9% PF flush 3 mL     vitamin D3 (CHOLECALCIFEROL) 1000 units (25 mcg) tablet 1,000 Units

## 2019-07-05 NOTE — PLAN OF CARE
[id number]  Pt admitted 7/3 for a GI bleed  Hx of HTN, CAD, CKD, Ischemic CM, DM2, STEMI (2012, 6/15/19) s/p RCA stent on anticoagulation    Neuro: A/Ox4.  Cardiac: A. fib with HR 70s-80s. AVSS.   Respiratory: O2 sats stable on RA  GI/: Voiding adequately, no BM this shift. Last BM 7/4  Diet/appetite: 2g Na, combo regular, and diabetic diet. Will be NPO at midnight.   Activity: independent.   Pain: No complaints of pain this shift.   Skin: no new deficits noted.  LDA's: 2 PIV SL.   Labs: trending hgb q8hr, last hgb was at 2013 resulting 8.7, INR 1.79.  at bedtime.   Plan: EGD tomorrow, possible colonoscopy on Sunday. Continue with POC. Notify primary team with changes.

## 2019-07-05 NOTE — OR NURSING
Pt tolerated EGD very well. Bleeding noted in the duodenum. 3.5 ml 1:21512 epi injected, using sclero needle. Blanching noted. 2 [place] [employer] Resolution 360 clips were placed. Area watched after treating, hemodynamically stable. Report was called to floor nurse. Return Pt to PCU

## 2019-07-05 NOTE — PLAN OF CARE
Neuro: A&Ox4.   Cardiac: Afib with rare PVC. BP soft, SBP 90's. VSS.   Respiratory: Sating > 90% on RA.  GI/: Adequate urine output. No BM this shift. Underwent EGD today, tolerated well.   Diet/appetite: Tolerating consistent carb, low sodium low fat diet. Eating well.  Activity:  Standby assist, up to chair and in halls.  Pain: At acceptable level on current regimen. Denies pain.  Skin: No new deficits noted.  LDA's: Bilateral PIV, received 1 unit PRBC today.     Plan: Continue with POC. Notify primary team with changes. Plan to monitor Hgb.

## 2019-07-05 NOTE — PROGRESS NOTES
Cardiology Inpatient Consultation  July 5, 2019    Reason for Consult:  A cardiology consult was requested by to provide clinical guidance regarding management of Afib and CHF in the setting of GIB while on Coumadin. He reported a GI bleeds in the past after his previous MI while on anticoagulation for afib. He had been off of AC for several years until his recent STEMI/KEVYN to RCA at which time he was  discharged on triple therapy for one month (warfarin for 30 days without bridging, then aspirin and plavix alone for at least 1 year following PCI).       Assessment and Recommendation:  # GIB on AC  # AVM s/p sclerotherpay  # Anemia  # paroxysmal Afib on Coumadin ([id number]= 3)  # CAD s/p KEVYN to RCA on 6/15/19, s/p multiple stent 2012  # ICM, EF 35%  # Severe MR  # Severe pulmonary HTN, likely WHO II  # STEMI (6/15/19) s/p KEVYN to RCA  # CKD IV    1. C/w ASA+Plavix given recent stent  2. Follow up in Dr. Trujillo's clinic to discuss restarting Coumadin vs NOAC vs KASIA closure as an outpatient once GI feels it is safe to restart given elevated [id number]  3. C/w Entresto and Coreg  4. Restart Lasix when once taking good PO  5. Ok for statin  6. Will sign off for now     Patient seen and discussed with Dr. Aparicio, who agrees with above plan.    Thank you for consulting the cardiovascular services at the [facility]. Please do not hesitate to call us with any questions.     Octavia Krueger MD  PGY-4  [facility] Cardiology Consult Team  [phone]    HPI:     Cole Jesus is a 59 year old male history of HTN, CAD s/p KEVYN to RCA (6/15/19), CKD IV, ischemic CM, EF 25%, Severe pulmonary HTN (likely WHO II), DM II, and atrial fibrillation on Coumadin. He was recently hospitalized at [facility] (6/15/19-6/18/19) where he was diagnosed with STEMI and atrial fibrillation (s/p KEVYN to RCA). He presented to the ER with generalized weakness for 2 days and found to have a Hb drop from 14->9 . Patient is  anticoagulated on warfarin, and continues to take Plavix and Asprin. Patient is followed by Cardiology here by Dr. Trujillo.      Endorses dark BMs over the past week shortly after starting warfarin, although none since admission. Stool guac was positive. He reports decreased appetite and a 15lb weight loss since discharge on 6/18/18. BP 90s-120s/60s.    In the ED: troponins negative and ECG showed Afib without ischemic changes.     CXR showed pulmonary congestion and chronic pleural effusions    Of note, patient has a h/o GIB while on AC.    AICD has been previous discussed and patient did not feel over the years that it was desirable (extensive discussion of risks and benefits)    At the time of interview, the patient denies chest pain, dyspnea at rest or with exertion, orthopnea, PND, palpitations, lightheadedness, or syncope.     Review of Systems:    Complete review of systems was performed and negative except per HPI.    PMH:    Past Medical History:   Diagnosis Date     Anemia in chronic renal disease 3/9/2015     Antiplatelet or antithrombotic long-term use      Atrial fibrillation and flutter      CAD (coronary artery disease)     Stemi in 12/11, s/p angioplasty     CRD (chronic renal disease), stage IV (H) 03/09/2015    hx ATN with dialysis complicating cardiogenic shock  1/2012     GI bleed     massive lower GI bleed secondary to a cecal ulcer, s/p ileocecal resection in 12/11     Heart failure     Biventricular systolic HF, complicated by ARDS requiring tracheostomy     Hyperlipidemia LDL goal <100 4/28/2013     Hypertension      Ischemic cardiomyopathy 4/28/2013    TTE revealing 40% EF     Myocardial infarction (H)      Other and unspecified nonspecific immunological findings     Anti JKa     Pulmonary edema     episodes of flash pulmonary edema in 12/11     Subclinical hypothyroidism      Active Problems:  Patient Active Problem List    Diagnosis Date Noted     Weakness 07/03/2019     Priority: Medium      Hyperglycemia 2019     Priority: Medium     Severe pulmonary arterial systolic hypertension (H) 2019     Priority: Medium     Added automatically from request for surgery [id number]       Subclinical hypothyroidism 2018     Priority: Medium     CKD (chronic kidney disease) stage 4, GFR 15-29 ml/min (H) 2015     Priority: Medium     Anemia in chronic renal disease 2015     Priority: Medium     Hx of ileostomy 2014     Priority: Medium     h/o atrial flutter, ablation 2013     Priority: Medium     Vitamin D deficiency 2013     Priority: Medium     Imo Update utility       Ischemic cardiomyopathy 2013     Priority: Medium     Hyperlipidemia LDL goal <100 2013     Priority: Medium     Lower GI bleeding 2012     Priority: Medium     S/p ileocecal resection.       S/P colon resection 2012     Priority: Medium     iliocecal         Atrial fibrillation (H) 2012     Priority: Medium     Type 2 diabetes, HbA1C goal < 8% (H) 2012     Priority: Medium     Chronic systolic congestive heart failure (HCC) 2012     Priority: Medium     ST elevation myocardial infarction (STEMI) of inferior wall (H) 2011     Priority: Medium     Social History:  Social History     Tobacco Use     Smoking status: Former Smoker     Last attempt to quit: 12/3/2011     Years since quittin.5     Smokeless tobacco: Never Used   Substance Use Topics     Alcohol use: No     Alcohol/week: 0.0 oz     Drug use: No     Family History:  Family History   Problem Relation Age of Onset     Diabetes Mother      Hypertension Mother      Heart Disease Mother      Heart Disease Father          of heart attack, left when he was young     Diabetes Sister      Diabetes Sister      Diabetes Sister        Medications:    aspirin  81 mg Oral Daily     atorvastatin  80 mg Oral Daily     carvedilol  3.125 mg Oral BID w/meals     clopidogrel  75 mg Oral Daily      insulin glargine  26 Units Subcutaneous QAM     pantoprazole (PROTONIX) IV  40 mg Intravenous BID     sacubitril-valsartan  1 tablet Oral BID     sodium chloride (PF)  3 mL Intracatheter Q8H     vitamin D3  1,000 Units Oral BID         - MEDICATION INSTRUCTIONS -         Physical Exam:  Temp:  [97.4  F (36.3  C)-98.6  F (37  C)] 98.2  F (36.8  C)  Pulse:  [] 90  Heart Rate:  [] 80  Resp:  [4-20] 16  BP: ()/(58-86) 98/70  SpO2:  [98 %-100 %] 100 %    Intake/Output Summary (Last 24 hours) at 7/4/2019 0901  Last data filed at 7/4/2019 0815  Gross per 24 hour   Intake 490 ml   Output 1100 ml   Net -610 ml     GEN: pleasant, no acute distress  HEENT: no icterus  CV: RRR, normal s1/s2, no murmurs/rubs/s3/s4, no heave. JVP 8cm.   CHEST: clear to ausculation bilaterally, no rales or wheezing  ABD: soft, non-tender, normal active bowel sounds  EXTR: pulses 2+ bilaterally. No clubbing, cyanosis or edema.   NEURO: alert oriented, speech fluent/appropriate, motor grossly nonfocal      Diagnostics:  All labs and imaging were reviewed, of note:    CMP  Recent Labs   Lab 07/05/19  0430 07/03/19  1649 07/01/19  0822    137 138   POTASSIUM 4.5 4.0 4.1   CHLORIDE 110* 102 103   CO2 26 28 30   ANIONGAP 5 7 4   * 123* 109*   BUN 72* 60* 45*   CR 2.33* 2.40* 2.49*   GFRESTIMATED 29* 28* 27*   GFRESTBLACK 34* 32* 31*   RONNY 8.2* 8.4* 8.2*   PROTTOTAL  --  7.7 7.6   ALBUMIN  --  3.6 3.6   BILITOTAL  --  0.6 0.7   ALKPHOS  --  78 89   AST  --  7 13   ALT  --  18 17     CBC  Recent Labs   Lab 07/05/19  1234 07/05/19  0430 07/04/19 2013 07/04/19  1224 07/04/19  0608  07/03/19  1649   WBC  --  9.1  --   --  8.8  --  9.4   RBC  --  2.81*  --   --  3.13*  --  3.77*   HGB 8.1* 8.1* 8.7* 9.0* 9.2*   < > 11.1*   HCT  --  26.9*  --   --  30.4*  --  35.7*   MCV  --  96  --   --  97  --  95   MCH  --  28.8  --   --  29.4  --  29.4   MCHC  --  30.1*  --   --  30.3*  --  31.1*   RDW  --  13.5  --   --  13.4  --  13.4    PLT  --  198  --   --  207  --  261    < > = values in this interval not displayed.     INR  Recent Labs   Lab 19  0430 19  1158 19  0805   INR 1.70* 1.79* 2.66* 3.87*     Arterial Blood GasNo lab results found in last 7 days.    Lab Results   Component Value Date    TROPI <0.015 2019    TROPI <0.015 2019    TROPI 0.083 (H) 2012    TROPI 0.136 (HH) 2012    TROPI 0.220 (HH) 2011     EK/3/19   Afib, no ischemic changes  Transthoracic echocardiogram:   19  Ischemic cardiomyopathy.Severe inferolateral and anterolateral wall  hypokinesis to akinesis. The Ejection Fraction is estimated at 25-30%.Traced  EF is 28%  Global right ventricular function is mildly to moderately reduced. Right  ventricular systolic pressure is 85mmHg above the right atrial pressure.Severe  (pulmonary artery systolic pressure >75mmHg) pulmonary hypertension is  present.  Moderate mitral insufficiency is present.  The inferior vena cava was normal in size with preserved respiratory  variability.Estimated mean right atrial pressure is 3 mmHg.  No pericardial effusion is present.

## 2019-07-05 NOTE — PROGRESS NOTES
Gastroenterology Endoscopy Suite Brief Operative Note    Procedure:  Upper endoscopy   Post-operative diagnosis:  Actively bleeding duodenal AVM   Staff Physician:  Dr. Juliana Alexander   Fellow/Assistant(s):  Monserrat LUO, Keith Blount    Specimens:  Please see final procedure note for further details.   Findings:  Actively bleeding duodenal AVM   Complications:  None.   Condition:  Stable   Recommendations  Diet:  Clear liquid diet  PPI:  PPI IV BID  Anti-coagulants/platelets:  Continue holding DAPLTs, AC for today  Octreotide:  N/A  Discharge Planning:   Pending hospital course

## 2019-07-05 NOTE — PLAN OF CARE
"BP 99/59 (BP Location: Left arm)   Pulse 81   Temp 98.4  F (36.9  C) (Oral)   Resp 16   Ht 1.727 m (5' 8\")   Wt 106.4 kg (234 lb 9.1 oz)   SpO2 100%   BMI 35.67 kg/m      VSS. Afebrile. Alert and oriented x 4. RA. No pain. Good UOP per urinal. No BM overnight. Independent in room. NPO for EGD today. Continue to monitor.  "

## 2019-07-05 NOTE — PROGRESS NOTES
GASTROENTEROLOGY PROGRESS NOTE       Patient Summary   Cole Jesus is a 59 year old male with a past medical history of GI bleed s/t a cecal ulcer s/p ileocecal resection in 2011, ischemic cardiomyopathy on aspirin and Plavix, HFrEF (35% EF on echo 6/17/19), atrial fibrillation on warfarin, CKD stage IV, DM type II hypertension, hyperlipidemia, subclinical hypothyroidism, and recent STEMI s/p RCA stent placement on 6/15/19 who is now presenting with dark stools and 5 grams of hemoglobin drop in the setting of 3.83 INR.        ASSESSMENT AND RECOMMENDATIONS:   #Melena  #Acute on chronic anemia   #Supratherapeutic INR   #On dual antiplatelet therapy  Patient reports 1 week history of melena, appetite loss, and 15 pounds of weight loss since 6/18/2018.  He denies abdominal pain, n/v, dysphagia, or odynophagia.  No NSAID use.  And last GI bleed was in 2011. EGD from 2011 was unremarkable.  Colonoscopy from 2011 revealed actively bleeding shallow ulceration in the cecum that was thought to be related to hemic ulcer from his cardiogenic shock s/p ileocecal resection. Baseline hgb is 14.2 and today is down to 8.1 (MCV 96/RDW 13.5), plts 198 and INR is 1.70.     Melena and 5 grams of hgb drop in the setting of ASA/plavix/warfarin therapy and multiple medical comorbidity concerns for GI bleed s/t esophagitis, gastritis, PUD, angiodysplasia, and dieulafoy lesion. Ischemia and malignancy cannot be ruled out either. Plan is to proceed with EGD today.     Recommendations  --NPO for EGD   --Monitor CBC and transfuse if hgb is less than 7  --Maintain 2 large bore IVs  --Accurate documentation of output (color, characteristics and amount)     Gastroenterology follow up recommendations:   --Outpatient colonoscopy for weight loss and hx of iron deficiency of anemia      Pt care plan discussed with Dr. Alexander, GI staff physician. Thank you for involving us in this patient's care. Please do not hesitate to contact the GI service  "with any questions or concerns.    GUILLE Lemons [facility]  Department of Gastroenterology   P: [phone]  Subjective\events within the 24 hours:   Patient reports having black and hard stools, he feels constipated. Will treat constipation with Miralax and Dulcolax.     4 point ROS performed and negative unless noted above.           Medications:     Current Facility-Administered Medications   Medication     aspirin (ASA) chewable tablet 81 mg     atorvastatin (LIPITOR) tablet 80 mg     carvedilol (COREG) tablet 3.125 mg     clopidogrel (PLAVIX) tablet 75 mg     glucose gel 15-30 g    Or     dextrose 50 % injection 25-50 mL    Or     glucagon injection 1 mg     HOLD: warfarin (COUMADIN) therapy     insulin glargine (LANTUS PEN) injection 26 Units     lidocaine (LMX4) cream     lidocaine 1 % 0.1-1 mL     melatonin tablet 6 mg     naloxone (NARCAN) injection 0.1-0.4 mg     pantoprazole (PROTONIX) 40 mg IV push injection     Patient is already receiving anticoagulation with heparin, enoxaparin (LOVENOX), warfarin (COUMADIN)  or other anticoagulant medication     sacubitril-valsartan (ENTRESTO)  MG per tablet 1 tablet     sodium chloride (PF) 0.9% PF flush 3 mL     sodium chloride (PF) 0.9% PF flush 3 mL     vitamin D3 (CHOLECALCIFEROL) 1000 units (25 mcg) tablet 1,000 Units        Physical Exam   Blood pressure 101/68, pulse 81, temperature 97.8  F (36.6  C), temperature source Oral, resp. rate 16, height 1.727 m (5' 8\"), weight 106.4 kg (234 lb 9.1 oz), SpO2 100 %.    Constitutional: cooperative, pleasant, not dyspneic/diaphoretic, no acute distress  Eyes: Sclera anicteric/injected  Ears/nose/mouth/throat: Normal oropharynx without ulcers or exudate, mucus membranes moist, hearing intact  Neck: supple, thyroid normal size  CV: RRR. No edema in LE bilaterally   Respiratory: Unlabored breathing. CTA bilaterally   Lymph: No axillary, submandibular, supraclavicular or inguinal lymphadenopathy  Abd: " Nondistended, +bs, no hepatosplenomegaly, nontender, no peritoneal signs  Skin: warm, perfused, no jaundice  Neuro: AAO x 3, No asterixis  Psych: Normal affect  MSK: No deformity     Data   Current Labs  CBC  Recent Labs   Lab 07/05/19  0430 07/04/19 2013 07/04/19  1224 07/04/19  0608  07/03/19  1649   WBC 9.1  --   --  8.8  --  9.4   RBC 2.81*  --   --  3.13*  --  3.77*   HGB 8.1* 8.7* 9.0* 9.2*   < > 11.1*   HCT 26.9*  --   --  30.4*  --  35.7*   MCV 96  --   --  97  --  95   MCH 28.8  --   --  29.4  --  29.4   MCHC 30.1*  --   --  30.3*  --  31.1*   RDW 13.5  --   --  13.4  --  13.4     --   --  207  --  261    < > = values in this interval not displayed.     BMP  Recent Labs   Lab 07/05/19 0430 07/03/19 1649 07/01/19  0822    137 138   POTASSIUM 4.5 4.0 4.1   CHLORIDE 110* 102 103   CO2 26 28 30   ANIONGAP 5 7 4   * 123* 109*   BUN 72* 60* 45*   CR 2.33* 2.40* 2.49*   GFRESTIMATED 29* 28* 27*   GFRESTBLACK 34* 32* 31*   RONNY 8.2* 8.4* 8.2*      INR  Recent Labs   Lab 07/05/19  0430 07/04/19 2013 07/04/19  1158 07/04/19  0805   INR 1.70* 1.79* 2.66* 3.87*     Liver panel  Recent Labs   Lab 07/03/19  1649 07/01/19  0822   PROTTOTAL 7.7 7.6   ALBUMIN 3.6 3.6   BILITOTAL 0.6 0.7   ALKPHOS 78 89   AST 7 13   ALT 18 17     History of Present Illness:   Cole Jesus is a 59 year old male with a history of GI bleed s/t a cecal ulcer s/p ileocecal resection in 2011, ischemic cardiomyopathy on aspirin and Plavix, HFrEF (35% EF on echo 6/17/19), atrial fibrillation on warfarin, CKD stage IV, DM type II hypertension, hyperlipidemia, subclinical hypothyroidism, and recent ostomy s/p RCA stent placement on 6/15/19 who is now presenting with dark stools and 5 grams of hemoglobin drop in the setting of 3.83 INR.     Patient reports 1 week history of melena, appetite loss, and 15 pounds of weight loss since 6/18/2018.  He denies abdominal pain, fever, n/v, bloody emesis, red blood stools, dysphagia, or  odynophagia.  He thinks it is all r/t anticoagulation use as he had subsequent intractable GI bleed requiring a partial bowel resection and ileostomy when he was on heparin. No oral iron,  NSAID, or current alcohol/tobacco use.

## 2019-07-06 VITALS
HEART RATE: 77 BPM | DIASTOLIC BLOOD PRESSURE: 68 MMHG | RESPIRATION RATE: 16 BRPM | OXYGEN SATURATION: 100 % | TEMPERATURE: 97.5 F | HEIGHT: 68 IN | SYSTOLIC BLOOD PRESSURE: 101 MMHG | WEIGHT: 231.04 LBS | BODY MASS INDEX: 35.02 KG/M2

## 2019-07-06 LAB
ANION GAP SERPL CALCULATED.3IONS-SCNC: 5 MMOL/L (ref 3–14)
BUN SERPL-MCNC: 64 MG/DL (ref 7–30)
CALCIUM SERPL-MCNC: 8.2 MG/DL (ref 8.5–10.1)
CHLORIDE SERPL-SCNC: 111 MMOL/L (ref 94–109)
CO2 SERPL-SCNC: 26 MMOL/L (ref 20–32)
CREAT SERPL-MCNC: 2.2 MG/DL (ref 0.66–1.25)
GFR SERPL CREATININE-BSD FRML MDRD: 31 ML/MIN/{1.73_M2}
GLUCOSE BLDC GLUCOMTR-MCNC: 127 MG/DL (ref 70–99)
GLUCOSE BLDC GLUCOMTR-MCNC: 137 MG/DL (ref 70–99)
GLUCOSE SERPL-MCNC: 95 MG/DL (ref 70–99)
HGB BLD-MCNC: 8.4 G/DL (ref 13.3–17.7)
HGB BLD-MCNC: 8.4 G/DL (ref 13.3–17.7)
INR PPP: 1.53 (ref 0.86–1.14)
MAGNESIUM SERPL-MCNC: 2.5 MG/DL (ref 1.6–2.3)
PHOSPHATE SERPL-MCNC: 2.8 MG/DL (ref 2.5–4.5)
POTASSIUM SERPL-SCNC: 4.3 MMOL/L (ref 3.4–5.3)
SODIUM SERPL-SCNC: 141 MMOL/L (ref 133–144)

## 2019-07-06 PROCEDURE — 85610 PROTHROMBIN TIME: CPT | Performed by: INTERNAL MEDICINE

## 2019-07-06 PROCEDURE — 25000132 ZZH RX MED GY IP 250 OP 250 PS 637: Performed by: PATHOLOGY

## 2019-07-06 PROCEDURE — 83735 ASSAY OF MAGNESIUM: CPT | Performed by: INTERNAL MEDICINE

## 2019-07-06 PROCEDURE — 25000132 ZZH RX MED GY IP 250 OP 250 PS 637: Performed by: STUDENT IN AN ORGANIZED HEALTH CARE EDUCATION/TRAINING PROGRAM

## 2019-07-06 PROCEDURE — 80048 BASIC METABOLIC PNL TOTAL CA: CPT | Performed by: INTERNAL MEDICINE

## 2019-07-06 PROCEDURE — 36415 COLL VENOUS BLD VENIPUNCTURE: CPT | Performed by: INTERNAL MEDICINE

## 2019-07-06 PROCEDURE — 25000128 H RX IP 250 OP 636: Performed by: STUDENT IN AN ORGANIZED HEALTH CARE EDUCATION/TRAINING PROGRAM

## 2019-07-06 PROCEDURE — 00000146 ZZHCL STATISTIC GLUCOSE BY METER IP

## 2019-07-06 PROCEDURE — 25800030 ZZH RX IP 258 OP 636: Performed by: PATHOLOGY

## 2019-07-06 PROCEDURE — 85018 HEMOGLOBIN: CPT | Performed by: INTERNAL MEDICINE

## 2019-07-06 PROCEDURE — 84100 ASSAY OF PHOSPHORUS: CPT | Performed by: INTERNAL MEDICINE

## 2019-07-06 PROCEDURE — C9113 INJ PANTOPRAZOLE SODIUM, VIA: HCPCS | Performed by: STUDENT IN AN ORGANIZED HEALTH CARE EDUCATION/TRAINING PROGRAM

## 2019-07-06 PROCEDURE — 99239 HOSP IP/OBS DSCHRG MGMT >30: CPT | Mod: GC | Performed by: INTERNAL MEDICINE

## 2019-07-06 RX ORDER — FUROSEMIDE 20 MG
40 TABLET ORAL
Status: DISCONTINUED | OUTPATIENT
Start: 2019-07-06 | End: 2019-07-06 | Stop reason: HOSPADM

## 2019-07-06 RX ADMIN — CLOPIDOGREL BISULFATE 75 MG: 75 TABLET ORAL at 08:24

## 2019-07-06 RX ADMIN — CARVEDILOL 3.12 MG: 3.12 TABLET, FILM COATED ORAL at 08:24

## 2019-07-06 RX ADMIN — FUROSEMIDE 40 MG: 20 TABLET ORAL at 10:04

## 2019-07-06 RX ADMIN — INSULIN GLARGINE 26 UNITS: 100 INJECTION, SOLUTION SUBCUTANEOUS at 08:25

## 2019-07-06 RX ADMIN — SODIUM CHLORIDE, POTASSIUM CHLORIDE, SODIUM LACTATE AND CALCIUM CHLORIDE 1000 ML: 600; 310; 30; 20 INJECTION, SOLUTION INTRAVENOUS at 16:23

## 2019-07-06 RX ADMIN — CARVEDILOL 3.12 MG: 3.12 TABLET, FILM COATED ORAL at 18:11

## 2019-07-06 RX ADMIN — VITAMIN D, TAB 1000IU (100/BT) 1000 UNITS: 25 TAB at 08:24

## 2019-07-06 RX ADMIN — ATORVASTATIN CALCIUM 80 MG: 40 TABLET, FILM COATED ORAL at 08:24

## 2019-07-06 RX ADMIN — PANTOPRAZOLE SODIUM 40 MG: 40 INJECTION, POWDER, FOR SOLUTION INTRAVENOUS at 08:24

## 2019-07-06 RX ADMIN — SACUBITRIL AND VALSARTAN 1 TABLET: 97; 103 TABLET, FILM COATED ORAL at 08:25

## 2019-07-06 RX ADMIN — ASPIRIN 81 MG CHEWABLE TABLET 81 MG: 81 TABLET CHEWABLE at 08:24

## 2019-07-06 ASSESSMENT — ACTIVITIES OF DAILY LIVING (ADL)
ADLS_ACUITY_SCORE: 12

## 2019-07-06 ASSESSMENT — MIFFLIN-ST. JEOR: SCORE: 1827.5

## 2019-07-06 NOTE — PROVIDER NOTIFICATION
Time of notification: 11:25 AM  Provider notified: Dr. Kauffman w/ [facility]  Patient status: BP soft, SBP 92. Patient denies dizziness or any other symptoms of hypotension.   Temp:  [97.4  F (36.3  C)-98.5  F (36.9  C)] 97.6  F (36.4  C)  Pulse:  [] 77  Heart Rate:  [] 88  Resp:  [4-20] 14  BP: ()/(54-86) 92/58  SpO2:  [98 %-100 %] 100 %  Orders received: Continue to monitor, Dr. Kauffman will pass along info to Cards team.

## 2019-07-06 NOTE — DISCHARGE SUMMARY
[facility], [place]  Discharge Summary - Medicine & Pediatrics       Date of Admission:  7/3/2019  Date of Discharge:  7/6/2019  Discharging Provider: Dr. 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 REESE Enciso, within 1 week for   post-hospitalization follow up; Need weight recheck, evaluate if Lasix   should be restarted (if not done so already), and recheck CBC and BMP.   Please also schedule colonoscopy as outpatient follow up.      - Follow up with Dr. Trujillo of Cardiology at earliest available time. Will   need to discuss restarting Coumadin vs NOAC vs KASIA closure as outpatient   once GI feels it is safe to restart given elevated [id number].    Appointments on [place] and/or [facility] (with [facility] or [facility]   provider or service). Call [phone] if you haven't heard regarding   these appointments within 7 days of discharge.        Unresulted Labs Ordered in the Past 30 Days of this Admission     Date and Time Order Name Status Description    7/1/2019 0822 T4 free In process       These results will be followed up by Dr Enciso, the patient's PCP.    Discharge Disposition   Discharged to home  Condition at discharge: Stable    Hospital Course   Cole Jesus is a 58yo male with a history of HTN, CAD, CKD IV, ischemic CM, DM II, and atrial fibrillation. He was recently hospitalized at [place] (6/15/19-6/18/19) where he was diagnosed with STEMI (s/p RCA stent, currently on dual-antiplatelets) and atrial fibrillation (tx  warfarin); admitted for upper GI bleeding.      #Arteriovenous Malformation   #GI bleed in setting of warfarin and dual antiplatelet therapy  Patient presents with 1 week of maroon colored stools and a Hg of 10.1, down from baseline Hg ~13. Hemodynamically stable and asymptomatic. INR on admission 3.0. Intermittent PPI drip started in ED. Received 10mg Vit K  for warfarin reversal. S/p 1U pRBCs on 7/5. S/p IV pantoprazole 40mg BID. EGD 7/5/2019; actively bleeding duodenal AVM, clipped. Hemoglobin stabilized at 8.4 at time of discharge with no e/o bleeding.               - Patient to f/u outpatient for colonoscopy              - Cardiology recommendations as follows:                    - Continue dual antiplatelet therapy given recent stent    - Continue Entresto, Coreg, statin                    - Follow up in Dr. Trujillo's clinic to discuss anticoagulation (Coumadin vs NOAC) vs KASIA closure                          - Restart Lasix when weight back to baseline and taking good PO    Chronic Problems:   # Atrial fibrillation, chronic  At the time of his most recent STEMI (6/15/19) he was in rate controlled atrial fibrillation; OSH noted: was not on anticoagulation in past believed to be d/t GI bleed. Will begin warfarin for 30 days (goal INR 2.0-3.0) and then discontinue. Pharmacy to dose. No bridging recommended d/t risk of bleeding with GI bleed history. This will be in addition to aspirin and plavix which will continue x1 year per Woest trial.   - Stop warfarin for now, discuss restarting at follow up with Cardiology at earliest available time  - Currently rate controlled     # CAD  # Ischemic Cardiomyopathy  # HFrEF  6/17/19 Echo: Irregular rhythm, Mild LVH with LVEF 35%, no discrete regional wall motion abnormalities. Severe mitral insufficiency. The study was not technically sufficient to allow evaluation of LV diastolic function due to an underlying irregular rhythm. Moderately reduced RV systolic function;  moderate tricuspid regurgitation; est PA peak pressure 51mmHg. KEVYN placed 6/15/19 within RCA for 100% occlusion. Per patient, had 3 stents placed in 2012 following STEMI. ECG & troponins negative for acute change. No concern for ACS at this time.   - Cardiology recs as above     # DM:  - Continue PTA insulin and BG checks     #CKD IV - Continue PTA vitamin D  #Subclinical hypothyroidism  #Hx of ARDS and Pulmonary Edema     Consultations This Hospital Stay   GI LUMINAL ADULT IP CONSULT  CARDIOLOGY GENERAL ADULT IP CONSULT  ADVANCE DIRECTIVE IP CONSULT  VASCULAR ACCESS CARE ADULT IP CONSULT    Code Status   Full Code     The patient was discussed with Dr. JULIA Panchal MD MPH, PGY-5  [facility] Service  [facility]  Pager: [phone]  ______________________________________________________________________    Physical Exam   Vital Signs: Temp: 97.5  F (36.4  C) Temp src: Oral BP: 101/68 Pulse: 77 Heart Rate: 74 Resp: 16 SpO2: 100 % O2 Device: None (Room air)    Weight: 231 lbs .67 oz  General: Pleasant male; no acute distress; obese body habitus  Skin: Normal color, no rash  Cardiac: Normal S1/S2 with regular rate and rhythm. Regular 2+ bilateral radial and dorsalis pedis pulses  Respiratory: Breath sounds equal & clear bilaterally  Abdomen: Soft/nontender; bowel sounds present  MSK: Full range of motion, no erythema or tenderness of calves bilaterally  Extremities: No LE edema  Neuro: A&Ox3, no focal deficits  Psych: Pleasant mood, full affect      Primary Care Physician   Silas Enciso    Discharge Orders      Medication Therapy Management Referral      Reason for your hospital stay    You were admitted with a GI bleed. Endoscopy of your upper GI tract showed an arteriovascular malformation, which was clipped, and after which your hemoglobin levels stabilized (indicating that you were no longer bleeding).     Adult [facility] Follow-up and recommended  labs and tests    Follow up with primary care provider, Dr. REESE Enciso, within 1 week for post-hospitalization follow up; Need weight recheck, evaluate if Lasix should be restarted (if not done so already), and recheck CBC and BMP.     Follow up with Dr. Trujillo of Cardiology at earliest available time. Will need to discuss restarting Coumadin vs NOAC vs KASIA closure as outpatient once GI feels it is safe to restart given elevated [id number].    Schedule outpatient colonoscopy.    Appointments on [place] and/or [facility] (with [facility] or [facility] provider or service). Call [phone] if you haven't heard regarding these appointments within 7 days of discharge.     Activity    Your activity upon discharge: activity as tolerated and no driving for today     When to contact your care team    Call your primary doctor if you have any of the following: temperature greater than 100.4F, black stools, bleeding, vomitting, dizziness     Discharge Instructions    - Stop taking Lasix for now. Restart Lasix when you are back at your normal weight and intake of food and drink is back to normal. Follow up with your primary care physician Dr. Enciso within 1 week to assess.     - Stop taking Coumadin due to recent GI bleed.    - Follow up with Dr. Trujillo in Cardiology at earliest available time to discuss restarting anticoagulation therapy.     Full Code     Diet    Follow this diet upon discharge: Regular diet. Make an effort to eat a heart healthy diet (low in fat, low in sodium).   For 1 week, do not eat any red food or drinks.       Significant Results and Procedures   Most Recent 3 Hemoglobins:  Recent Labs   Lab Test 07/06/19  1254 07/06/19  0517 07/05/19  2018   HGB 8.4* 8.4* 8.6*       Discharge Medications   Current Discharge Medication List      CONTINUE these medications which have NOT CHANGED    Details   aspirin (ASA) 81 MG chewable tablet Take 81 mg by mouth daily      atorvastatin (LIPITOR) 80 MG tablet Take  1 tablet (80 mg) by mouth daily  Qty: 90 tablet, Refills: 3    Associated Diagnoses: Hyperlipidemia LDL goal <100      carvedilol (COREG) 3.125 MG tablet Take 3.125 mg by mouth 2 times daily (with meals)       cholecalciferol (VITAMIN  -D) 1000 UNITS capsule Take 2 capsules (2,000 Units) by mouth daily  Qty: 60 capsule, Refills: 3    Associated Diagnoses: Vitamin D deficiency      clopidogrel (PLAVIX) 75 MG tablet TAKE ONE TABLET BY MOUTH EVERY DAY  Qty: 180 tablet, Refills: 1    Associated Diagnoses: ST elevation myocardial infarction (STEMI) of inferior wall (H)      insulin glargine (LANTUS SOLOSTAR PEN) 100 UNIT/ML pen Inject 30 Units Subcutaneous every morning  Qty: 3 mL, Refills: 3    Associated Diagnoses: Type 2 diabetes mellitus without complication, without long-term current use of insulin (H)      pantoprazole (PROTONIX) 40 MG EC tablet TAKE ONE TABLET BY MOUTH TWICE A DAY  Qty: 180 tablet, Refills: 2    Associated Diagnoses: Lower GI bleeding      sacubitril-valsartan (ENTRESTO)  MG per tablet Take 1 tablet by mouth 2 times daily  Qty: 180 tablet, Refills: 3    Associated Diagnoses: Ischemic cardiomyopathy; Chronic systolic congestive heart failure (H)      vitamin  B complex with vitamin C (VITAMIN  B COMPLEX) TABS Take 1 tablet by mouth daily    Associated Diagnoses: Atrial tachycardia (H); Renal insufficiency; Ischemic cardiomyopathy      Alcohol Swabs PADS 1 applicator 4 times daily (before meals and nightly)  Qty: 100 each, Refills: 3    Associated Diagnoses: Type 2 diabetes mellitus without complication, without long-term current use of insulin (H)      blood glucose (NO BRAND SPECIFIED) test strip Use to test blood sugar times times daily. Before meals and at bedtime.  Qty: 100 each, Refills: 3    Associated Diagnoses: Type 2 diabetes mellitus without complication, without long-term current use of insulin (H)      blood glucose monitoring (ACCU-CHEK FASTCLIX) lancets Use to test blood sugar 4  times daily. At meals and bedtime.  Qty: 102 each, Refills: 3    Associated Diagnoses: Type 2 diabetes mellitus without complication, without long-term current use of insulin (H)      insulin aspart (NOVOLOG PEN) 100 UNIT/ML pen Custom Correction Scale ,use with meals   Do Not give Correction Insulin if BG less than 200.   For  - 224 give 1 units.   For  - 249 give 2 units.   For  - 274 give 3 units.   For  - 299 give 4 units.   For  - 324 give 5 units.   For  - 349 give 6 units.   For BG greater than or equal to 350 give 7 units.   Novolog sliding scale with meals and add this amount of insulin to the 5 units of Novolog.  -if not eating, just test your blood sugar and give the sliding scale insulin  Qty: 3 mL, Refills: 3    Associated Diagnoses: Type 2 diabetes mellitus without complication, without long-term current use of insulin (H)      insulin pen needle (32G X 4 MM) 32G X 4 MM miscellaneous Use 5 pen needles daily or as directed.  Qty: 200 each, Refills: 3    Associated Diagnoses: Type 2 diabetes mellitus without complication, without long-term current use of insulin (H)      nitroGLYcerin (NITROSTAT) 0.4 MG sublingual tablet Place 0.4 mg under the tongue every 5 minutes as needed for chest pain For chest pain place 1 tablet under the tongue every 5 minutes for 3 doses. If symptoms persist 5 minutes after 1st dose call 911.      Sharps Container MISC 1 Device every 30 days  Qty: 1 each, Refills: 3    Associated Diagnoses: Type 2 diabetes mellitus without complication, without long-term current use of insulin (H)         STOP taking these medications       furosemide (LASIX) 20 MG tablet Comments:   Reason for Stopping:         warfarin (COUMADIN) 5 MG tablet Comments:   Reason for Stopping:             Allergies   Allergies   Allergen Reactions     Hydralazine      Black spots     Simvastatin Other (See Comments)     Leg muscle weakness     Tylenol [Acetaminophen]  Palpitations     Physician Attestation   I, Antony Gonzales, saw and evaluated this patient prior to discharge.  I discussed the patient with the resident/fellow and agree with plan of care as documented in the note.      I personally reviewed vital signs, medications and labs.    I personally spent 35 minutes on discharge activities.    Antony Gonzales MD  Date of Service (when I saw the patient): 7/6/2019

## 2019-07-06 NOTE — PLAN OF CARE
Neuro: A&Ox4.   Cardiac: A-fib, soft BPs overnight w/SBP 80-90. Run of V-tach around 11pm, 5 beats, 168/min. Asymptomatic.   Respiratory: Sating 100% on RA.  GI/: Adequate urine output. No BM this shift  Diet/appetite: Tolerating Consistent Carb, Low Fat, 2g NA diet. Declined evening meal.   Activity:  SBA, up to bathroom.  Pain: At acceptable level on current regimen.   Skin: No new deficits noted.  LDA's: No changes    Plan: Monitor Hgb. Continue with POC. Notify primary team with changes.

## 2019-07-06 NOTE — PLAN OF CARE
DISCHARGE                         7/6/2019  6:30 PM  ----------------------------------------------------------------------------  Discharged to: Home  Via: private transportation  Accompanied by: Family  Discharge Instructions: heart healthy diet, regular activity, medications, follow up appointments, when to call the MD, aftercare instructions.  Prescriptions: To be filled by   Patient's regular   pharmacy; medication list reviewed & sent with pt  Follow Up Appointments: arranged; information given  Belongings: All sent with pt  IV: d/c'd  Telemetry: d/c'd  Pt exhibits understanding of above discharge instructions; all questions answered.    Discharge Paperwork: Signed, copied, and sent home with patient.

## 2019-07-06 NOTE — PROVIDER NOTIFICATION
Time of notification: 2:53 AM  Provider notified: [facility] (Estefany)  Patient status: Patient had 5 beats of V-tach @ 168/min around 11pm  Temp:  [97.4  F (36.3  C)-98.6  F (37  C)] 97.7  F (36.5  C)  Pulse:  [] 101  Heart Rate:  [] 75  Resp:  [4-20] 18  BP: ()/(54-86) 91/54  SpO2:  [98 %-100 %] 100 %  Orders received:     No new orders at this time

## 2019-07-07 ENCOUNTER — PATIENT OUTREACH (OUTPATIENT)
Dept: CARE COORDINATION | Facility: CLINIC | Age: 59
End: 2019-07-07

## 2019-07-07 ENCOUNTER — MYC MEDICAL ADVICE (OUTPATIENT)
Dept: CARDIOLOGY | Facility: CLINIC | Age: 59
End: 2019-07-07

## 2019-07-08 ENCOUNTER — ANTICOAGULATION THERAPY VISIT (OUTPATIENT)
Dept: ANTICOAGULATION | Facility: CLINIC | Age: 59
End: 2019-07-08

## 2019-07-08 DIAGNOSIS — I48.19 PERSISTENT ATRIAL FIBRILLATION (H): ICD-10-CM

## 2019-07-08 NOTE — PROGRESS NOTES
Dates of hospitalization: 7/3 to 7/6  Reason for hospitalization: GIB, blood in stools.  Procedures performed: Duodenal AVM clipped. Received blood products.  Vitamin K or FFP administered? Yes,  10mg for reversal.  INR Goal Range Confirmed to be:2-3  Inpatient warfarin doses added to calendar? yes  Medication changes at discharge: discontinued lasix.  Coumadin is on hold, and a decision will be made at patient's 8/8 appt with Dr. Trujillo whether it will be restarted vs. NOACvs. KASIA closure. (See 7/6 note)  Warfarin dosing after DC: on hold  Patient discharged on Lovenox? no  Next INR date: unsure  Where is the patient discharging to? (home, TCU, staying locally, etc.): home  Will patient have home care? unsure

## 2019-07-08 NOTE — TELEPHONE ENCOUNTER
I called the pt and he stated that he is feeling much better.  He said last night his BP was 99/70s and he was feeling much better.  He stated that he increased his fluid intake and felt much better.  He is going to continue to monitor his BP and will call us if his SBP goes below 90. Cheyenne Perdoza RN on 7/8/2019 at 8:02 AM

## 2019-07-09 ENCOUNTER — ALLIED HEALTH/NURSE VISIT (OUTPATIENT)
Dept: PHARMACY | Facility: CLINIC | Age: 59
End: 2019-07-09
Payer: MEDICAID

## 2019-07-09 DIAGNOSIS — I21.19 ST ELEVATION MYOCARDIAL INFARCTION (STEMI) OF INFERIOR WALL (H): ICD-10-CM

## 2019-07-09 DIAGNOSIS — E78.5 HYPERLIPIDEMIA LDL GOAL <100: ICD-10-CM

## 2019-07-09 DIAGNOSIS — K92.2 LOWER GI BLEEDING: Primary | ICD-10-CM

## 2019-07-09 DIAGNOSIS — I50.22 CHRONIC SYSTOLIC CONGESTIVE HEART FAILURE (H): ICD-10-CM

## 2019-07-09 DIAGNOSIS — R73.9 HYPERGLYCEMIA: ICD-10-CM

## 2019-07-09 DIAGNOSIS — I48.19 PERSISTENT ATRIAL FIBRILLATION (H): ICD-10-CM

## 2019-07-09 PROCEDURE — 99605 MTMS BY PHARM NP 15 MIN: CPT | Performed by: PHARMACIST

## 2019-07-09 PROCEDURE — 99607 MTMS BY PHARM ADDL 15 MIN: CPT | Performed by: PHARMACIST

## 2019-07-09 NOTE — PROGRESS NOTES
SUBJECTIVE/OBJECTIVE:                Cole Jesus is a 59 year old male called for a transitions of care visit.  He was discharged from [facility] on 7/6/19 for GIB in setting of warfarin and DAPT. He was diagnosed with a STEMI in June and AFIB. At that time DAPT and warfarin were started.  INR was 3 on admit. Pt had sx of weakness, dark brown stools. Lighter stools since being home.     Chief Complaint: no specific questions today.    Allergies/ADRs: Reviewed in Epic  Tobacco: No tobacco use   Alcohol: not currently using  Caffeine: no caffeine  Activity: energy level is improving  PMH: Reviewed in Epic    Medication Adherence/Access:  Patient uses pill box(es).  Patient takes medications 2 time(s) per day. 8:30am, 6:30pm  Per patient, misses medication 0 times per week.     GIB: Pt is taking Pantoprazole 40mg BID. Denies sx of heartburn today. Second bleed, had one right after his last heart attack in 2011.     HX CAD with stent placement/ AFIB: Carvedilol 3.125mg BID, Aspirin 81mg daily, Clopidogrel 75mg daily, stents placed in June. Had a STEMI 6/2019, and another 2011.      Hyperlipidemia: Current therapy includes Atorvastatin 80mg once daily.  Pt reports no significant myalgias or other side effects.  The ASCVD Risk score (Ruso ELIO Jr., et al., 2013) failed to calculate for the following reasons:    The patient has a prior MI or stroke diagnosis     HFrEF: Current medications include Entresto  1 tablet BID, carvedilol 3.125mg twice daily.  Patient reports no current medication side effects. Holding Lasix until he sees cardiology. Holding until seeing MD on Monday. States weight is pretty stable. Weights: 236 lbs yesterday, 237 lbs.   Pt is not complaining of sx of HF.    Pt is measuring daily weights. Stable.   Patient does self-monitor BP. Last /62.  Pt is following a low sodium diet, is avoiding EtOH.    Diabetes:  Pt currently taking Lantus 26 units daily, Novolog sliding scale. Pt is not  experiencing side effects.  SMBG: four times daily.   Ranges (patient reported): states BG goal is 200  AM:   Patient is not experiencing hypoglycemia  Recent symptoms of high blood sugar? none  Urine Albumin:   Lab Results   Component Value Date    UMALCR 1,097.83 (H) 04/08/2019     Lab Results   Component Value Date    A1C 13.4 04/08/2019    A1C 7.3 02/22/2018    A1C 6.9 08/16/2016    A1C 5.7 03/26/2014    A1C 5.9 07/10/2013     Today's Vitals: There were no vitals taken for this visit.    ASSESSMENT:                 Current medications were reviewed today.      Medication Adherence: good, no issues identified. Pt is a good historian.     GIB: Stable. Discussed monitoring for sx of GIB, contact MD if this occurs.     CAD with stents/ AFIB: Stable. Pt to discuss anticoagulation with Cardiologist. High bleed risk, has had 2 GIB post CVA.      Hyperlipidemia: Stable.      HFrEF: Stable. Pt to continue to monitor weights while off of Furosemide. If he gains more than 3 lbs overnight or 5 lbs in a week, contact cardiology.     Diabetes: Stable. BG seems well controlled. Due for A1c in the near future.     PLAN:                Post Discharge Medication Reconciliation Status: discharge medications reconciled, continue medications without change.    Pt to...  1. Continue monitoring weight, if he gains more than 3 lbs overnight or 5 lbs in a week, contact cardiology.    I spent 30 minutes with this patient today. All changes were made via collaborative practice agreement with Dr. Enciso. A copy of the visit note was provided to the patient's primary care provider.    Will follow up as needed.    The patient was sent via [employer] a summary of these recommendations as an after visit summary.    Jorge Gibbs, PharmD  MTM Pharmacist    Phone: [phone]

## 2019-07-09 NOTE — PATIENT INSTRUCTIONS
Recommendations from today's MTM visit:                                                      1. Continue monitoring your weights. If you gain more than 3lbs overnight or 5 lbs in a week, contact cardiology    To schedule another MTM appointment, please call the clinic directly or you may call the MTM scheduling line at [phone] or toll-free at [phone].     My Clinical Pharmacist's contact information:                                                      It was a pleasure talking with you today!  Please feel free to contact me with any questions or concerns you have.      Jorge Gibbs, PharmD  MTM Pharmacist    Phone: [phone]     You may receive a survey about the MTM services you received by email and/or US Mail.  I would appreciate your feedback to help me serve you better in the future. Your comments will be anonymous.

## 2019-07-10 ENCOUNTER — TELEPHONE (OUTPATIENT)
Dept: CARDIOLOGY | Facility: CLINIC | Age: 59
End: 2019-07-10

## 2019-07-10 DIAGNOSIS — I50.22 CHRONIC SYSTOLIC CONGESTIVE HEART FAILURE (H): ICD-10-CM

## 2019-07-10 DIAGNOSIS — I25.5 ISCHEMIC CARDIOMYOPATHY: ICD-10-CM

## 2019-07-10 DIAGNOSIS — I10 ESSENTIAL HYPERTENSION WITH GOAL BLOOD PRESSURE LESS THAN 130/85: ICD-10-CM

## 2019-07-10 RX ORDER — FUROSEMIDE 20 MG
40 TABLET ORAL 2 TIMES DAILY
Qty: 360 TABLET | Refills: 3 | Status: SHIPPED | OUTPATIENT
Start: 2019-07-10 | End: 2019-08-26

## 2019-07-10 NOTE — TELEPHONE ENCOUNTER
"Called and spoke with patient. Per discharge instructions:  \" Restart Lasix when you are back at your normal weight and intake of food and drink is back to normal\"  Patient states he is gaining weight and eating and drinking well. He will restart his Lasix and will follow up with primary care next Monday. All questions answered to patient's satisfaction.    "

## 2019-07-10 NOTE — TELEPHONE ENCOUNTER
[facility] Call Center    Phone Message    May a detailed message be left on voicemail: yes    Reason for Call: Other: Pt was told to stop taking his Lasix during his recent hospital stay, but he has gained a few pounds recently and is wondering if he should start taking it again. Please give him a call back at his home number.     Action Taken: Message routed to:  [facility] ([facility]): Cardiology

## 2019-07-15 ENCOUNTER — MYC MEDICAL ADVICE (OUTPATIENT)
Dept: ENDOCRINOLOGY | Facility: CLINIC | Age: 59
End: 2019-07-15

## 2019-07-15 ENCOUNTER — ANTICOAGULATION THERAPY VISIT (OUTPATIENT)
Dept: ANTICOAGULATION | Facility: CLINIC | Age: 59
End: 2019-07-15

## 2019-07-15 ENCOUNTER — OFFICE VISIT (OUTPATIENT)
Dept: INTERNAL MEDICINE | Facility: CLINIC | Age: 59
End: 2019-07-15
Payer: MEDICAID

## 2019-07-15 VITALS
DIASTOLIC BLOOD PRESSURE: 70 MMHG | TEMPERATURE: 97.7 F | HEART RATE: 80 BPM | WEIGHT: 238.6 LBS | OXYGEN SATURATION: 100 % | BODY MASS INDEX: 36.28 KG/M2 | SYSTOLIC BLOOD PRESSURE: 107 MMHG

## 2019-07-15 DIAGNOSIS — Z79.4 TYPE 2 DIABETES MELLITUS WITH COMPLICATION, WITH LONG-TERM CURRENT USE OF INSULIN (H): ICD-10-CM

## 2019-07-15 DIAGNOSIS — E11.8 TYPE 2 DIABETES MELLITUS WITH COMPLICATION, WITH LONG-TERM CURRENT USE OF INSULIN (H): ICD-10-CM

## 2019-07-15 DIAGNOSIS — I25.10 ASCVD (ARTERIOSCLEROTIC CARDIOVASCULAR DISEASE): ICD-10-CM

## 2019-07-15 DIAGNOSIS — I48.19 PERSISTENT ATRIAL FIBRILLATION (H): ICD-10-CM

## 2019-07-15 DIAGNOSIS — I48.91 ATRIAL FIBRILLATION, UNSPECIFIED TYPE (H): ICD-10-CM

## 2019-07-15 DIAGNOSIS — I48.20 CHRONIC ATRIAL FIBRILLATION (H): ICD-10-CM

## 2019-07-15 DIAGNOSIS — K92.2 GASTROINTESTINAL HEMORRHAGE, UNSPECIFIED GASTROINTESTINAL HEMORRHAGE TYPE: ICD-10-CM

## 2019-07-15 DIAGNOSIS — K92.2 GASTROINTESTINAL HEMORRHAGE, UNSPECIFIED GASTROINTESTINAL HEMORRHAGE TYPE: Primary | ICD-10-CM

## 2019-07-15 LAB
HBA1C MFR BLD: 5.7 % (ref 0–5.6)
HGB BLD-MCNC: 7.8 G/DL (ref 13.3–17.7)
HIV 1+2 AB+HIV1 P24 AG SERPL QL IA: NONREACTIVE
INR PPP: 1.18 (ref 0.86–1.14)

## 2019-07-15 PROCEDURE — 87389 HIV-1 AG W/HIV-1&-2 AB AG IA: CPT | Performed by: INTERNAL MEDICINE

## 2019-07-15 RX ORDER — NITROGLYCERIN 0.4 MG/1
0.4 TABLET SUBLINGUAL EVERY 5 MIN PRN
Qty: 25 TABLET | Refills: 0 | Status: SHIPPED | OUTPATIENT
Start: 2019-07-15 | End: 2020-01-01

## 2019-07-15 ASSESSMENT — PAIN SCALES - GENERAL: PAINLEVEL: NO PAIN (0)

## 2019-07-15 NOTE — NURSING NOTE
EKG performed  Results to provider  Patient tolerated well. Janis Serna CMA at 8:41 AM on 7/15/2019

## 2019-07-15 NOTE — PATIENT INSTRUCTIONS
[facility] Medication Refill Request Information:  * Please contact your pharmacy regarding ANY request for medication refills.  ** [facility] Prescription Fax = [phone]  * Please allow 3 business days for routine medication refills.  * Please allow 5 business days for controlled substance medication refills.     [facility] Test Result notification information:  *You will be notified with in 7-10 days of your appointment day regarding the results of your test.  If you are on MyChart you will be notified as soon as the provider has reviewed the results and signed off on them.    [facility]: [phone]

## 2019-07-15 NOTE — NURSING NOTE
Chief Complaint   Patient presents with     Hospital F/U     pt here following a hospital visit       Janis Serna CMA at 7:29 AM on 7/15/2019.

## 2019-07-15 NOTE — PROGRESS NOTES
ADDENDUM from 7/22:  Pt had an INR today - 1.24.  See note below.  The appointment with Dr. Trujillo is now on 7/25/19.  No call made today.  An [employer] message is sent to follow up on 7/25  Riccardo MARRERO        INR today is 1.18.  Warfarin is on hold due to recent GI bleed.  He has an appt 8/8/19 with Dr. Trujillo and will discuss whether to restart warfarin.  No call made today.  Tawny Segovia RN

## 2019-07-16 NOTE — PROGRESS NOTES
"                     [facility]       SUBJECTIVE:  Cole Jesus is a 59 year old male with a PMHx of .  Past Medical History:   Diagnosis Date     Anemia in chronic renal disease 3/9/2015     Antiplatelet or antithrombotic long-term use      Atrial fibrillation and flutter      CAD (coronary artery disease)     Stemi in 12/11, s/p angioplasty     CRD (chronic renal disease), stage IV (H) 03/09/2015    hx ATN with dialysis complicating cardiogenic shock  1/2012     GI bleed     massive lower GI bleed secondary to a cecal ulcer, s/p ileocecal resection in 12/11     Heart failure     Biventricular systolic HF, complicated by ARDS requiring tracheostomy     Hyperlipidemia LDL goal <100 4/28/2013     Hypertension      Ischemic cardiomyopathy 4/28/2013    TTE revealing 40% EF     Myocardial infarction (H)      Other and unspecified nonspecific immunological findings     Anti JKa     Pulmonary edema     episodes of flash pulmonary edema in 12/11     Subclinical hypothyroidism     who comes in for  Follow up of an acute upper G-I bleed (AVM  Duodenal S/p clipping ). He fee;s sell  stools brown, no hematochezia currently.   He has had two prior episodes of G_I blood loss. he has a  Recent   MI, (June 2019 ) is anticoagulated and is in\"Af\" .  His hemoglobin in April 2019 was 14. He is more sob than usual  And has no chest  pain with ADL today. He has been off anticoagulation since his recent G-I bleed.  He has been diabetes med compliant.    Medications and allergies reviewed by me today.     ROS:   10 pt ros is neg except for above  OBJECTIVE:    /70 (BP Location: Right arm, Patient Position: Sitting, Cuff Size: Adult Large)   Pulse 80   Temp 97.7  F (36.5  C) (Oral)   Wt 108.2 kg (238 lb 9.6 oz)   SpO2 100%   BMI 36.28 kg/m     Wt Readings from Last 1 Encounters:   07/15/19 108.2 kg (238 lb 9.6 oz)     Mr Contreras is a cheerful pleasant man in no acute distress   sitting with his  He is able to climb " for chair to exam table without aid  Or dyspnea   HEENT neck supple thyroid not felt  Pharynx benign ,EOM intact no icterus  No cervical adenopathy  Lungs resonant to P fine rales R base cleat with cough to a degree  Heart irregular slow  Rate no murmur  abd soft BS present no guarding healed surgical scar lower quadrants   Trace peripheral edema bilateral  Neuro non focal  ASSESSMENT/PLAN:    Cole was seen today for hospital f/u.    Diagnoses and all orders for this visit:    Gastrointestinal hemorrhage, unspecified gastrointestinal hemorrhage type  -     HIV Screening  -     Hemoglobin; Future    Type 2 diabetes mellitus with complication, with long-term current use of insulin (H)  -     Hemoglobin A1c; Future  -     nitroGLYcerin (NITROSTAT) 0.4 MG sublingual tablet; Place 1 tablet (0.4 mg) under the tongue every 5 minutes as needed for chest pain For chest pain place 1 tablet under the tongue every 5 minutes for 3 doses. If symptoms persist 5 minutes after 1st dose call 911.    ASCVD (arteriosclerotic cardiovascular disease)  -     nitroGLYcerin (NITROSTAT) 0.4 MG sublingual tablet; Place 1 tablet (0.4 mg) under the tongue every 5 minutes as needed for chest pain For chest pain place 1 tablet under the tongue every 5 minutes for 3 doses. If symptoms persist 5 minutes after 1st dose call 911.    Chronic atrial fibrillation (H)  -     EKG 12-lead, tracing only     he asked about restarting anticoagulation but his hemoglobin is below 8 not changed fromD'C, with three G-I bleeds on coumadin I am reluctant to restart anticoagulation today.  EKG rhytm strip reveals a slow irregular rhythm without discernable P waves  On the positive side hisA1c is vastly improved . I will consult with Dr Trujillo re his anti coagulation and get back to Mr Jesus in AM.      Salo Jones MD  Jul 15, 2019

## 2019-07-17 NOTE — TELEPHONE ENCOUNTER
Spoke to patient to schedule follow up appointment regarding labs. Scheduled appointment for patient. Latosha Wallace LPN 7/17/2019 8:20 AM

## 2019-07-19 ENCOUNTER — PATIENT OUTREACH (OUTPATIENT)
Dept: CARDIOLOGY | Facility: CLINIC | Age: 59
End: 2019-07-19

## 2019-07-19 NOTE — PROGRESS NOTES
Patient had a recent heart monitor that shows 100 % a-fib burden. Patient recently in the hospital with GI bleed and coumadin is held at this time. Discharge summary states:  Follow up with Dr. Trujillo of Cardiology at earliest available time. Will   need to discuss restarting Coumadin vs NOAC vs KASIA closure as outpatient   once GI feels it is safe to restart given elevated [id number].  Patient was called and results reviewed with patient and appointment made for next week with Dr. Trujillo. Patient is agreeable to plan.Provider notified.

## 2019-07-22 ENCOUNTER — HOSPITAL ENCOUNTER (INPATIENT)
Facility: CLINIC | Age: 59
LOS: 2 days | Discharge: HOME | End: 2019-07-24
Attending: INTERNAL MEDICINE | Admitting: INTERNAL MEDICINE
Payer: MEDICAID

## 2019-07-22 ENCOUNTER — OFFICE VISIT (OUTPATIENT)
Dept: INTERNAL MEDICINE | Facility: CLINIC | Age: 59
End: 2019-07-22
Payer: MEDICAID

## 2019-07-22 VITALS
TEMPERATURE: 98.3 F | BODY MASS INDEX: 36.27 KG/M2 | SYSTOLIC BLOOD PRESSURE: 100 MMHG | WEIGHT: 238.54 LBS | HEART RATE: 81 BPM | DIASTOLIC BLOOD PRESSURE: 69 MMHG | RESPIRATION RATE: 17 BRPM

## 2019-07-22 DIAGNOSIS — D64.9 ACUTE ANEMIA: Primary | ICD-10-CM

## 2019-07-22 DIAGNOSIS — I48.91 ATRIAL FIBRILLATION, UNSPECIFIED TYPE (H): Chronic | ICD-10-CM

## 2019-07-22 DIAGNOSIS — I25.5 ISCHEMIC CARDIOMYOPATHY: Chronic | ICD-10-CM

## 2019-07-22 DIAGNOSIS — K92.2 LOWER GI BLEEDING: Chronic | ICD-10-CM

## 2019-07-22 DIAGNOSIS — D62 ANEMIA DUE TO ACUTE BLOOD LOSS: Primary | ICD-10-CM

## 2019-07-22 DIAGNOSIS — I50.22 CHRONIC SYSTOLIC CONGESTIVE HEART FAILURE (H): ICD-10-CM

## 2019-07-22 DIAGNOSIS — E78.5 HYPERLIPIDEMIA LDL GOAL <100: Chronic | ICD-10-CM

## 2019-07-22 DIAGNOSIS — D62 ANEMIA DUE TO ACUTE BLOOD LOSS: ICD-10-CM

## 2019-07-22 DIAGNOSIS — I10 BENIGN ESSENTIAL HYPERTENSION: ICD-10-CM

## 2019-07-22 LAB
ABO + RH BLD: NORMAL
ABO + RH BLD: NORMAL
ALBUMIN SERPL-MCNC: 3.4 G/DL (ref 3.4–5)
ALBUMIN UR-MCNC: NEGATIVE MG/DL
ALP SERPL-CCNC: 88 U/L (ref 40–150)
ALT SERPL W P-5'-P-CCNC: 17 U/L (ref 0–70)
ANION GAP SERPL CALCULATED.3IONS-SCNC: 6 MMOL/L (ref 3–14)
ANION GAP SERPL CALCULATED.3IONS-SCNC: 8 MMOL/L (ref 3–14)
APPEARANCE UR: CLEAR
AST SERPL W P-5'-P-CCNC: 6 U/L (ref 0–45)
BILIRUB SERPL-MCNC: 0.8 MG/DL (ref 0.2–1.3)
BILIRUB UR QL STRIP: NEGATIVE
BLD GP AB SCN SERPL QL: NORMAL
BLD PROD TYP BPU: NORMAL
BLD PROD TYP BPU: NORMAL
BLD UNIT ID BPU: 0
BLOOD BANK CMNT PATIENT-IMP: NORMAL
BLOOD PRODUCT CODE: NORMAL
BPU ID: NORMAL
BUN SERPL-MCNC: 49 MG/DL (ref 7–30)
BUN SERPL-MCNC: 54 MG/DL (ref 7–30)
CALCIUM SERPL-MCNC: 8.3 MG/DL (ref 8.5–10.1)
CALCIUM SERPL-MCNC: 8.4 MG/DL (ref 8.5–10.1)
CHLORIDE SERPL-SCNC: 104 MMOL/L (ref 94–109)
CHLORIDE SERPL-SCNC: 104 MMOL/L (ref 94–109)
CHOLEST SERPL-MCNC: 87 MG/DL
CO2 SERPL-SCNC: 25 MMOL/L (ref 20–32)
CO2 SERPL-SCNC: 26 MMOL/L (ref 20–32)
COLOR UR AUTO: NORMAL
CREAT SERPL-MCNC: 2.89 MG/DL (ref 0.66–1.25)
CREAT SERPL-MCNC: 3.08 MG/DL (ref 0.66–1.25)
ERYTHROCYTE [DISTWIDTH] IN BLOOD BY AUTOMATED COUNT: 15.2 % (ref 10–15)
ERYTHROCYTE [DISTWIDTH] IN BLOOD BY AUTOMATED COUNT: 15.3 % (ref 10–15)
FERRITIN SERPL-MCNC: 8 NG/ML (ref 26–388)
GFR SERPL CREATININE-BSD FRML MDRD: 21 ML/MIN/{1.73_M2}
GFR SERPL CREATININE-BSD FRML MDRD: 22 ML/MIN/{1.73_M2}
GLUCOSE BLDC GLUCOMTR-MCNC: 116 MG/DL (ref 70–99)
GLUCOSE SERPL-MCNC: 122 MG/DL (ref 70–99)
GLUCOSE SERPL-MCNC: 132 MG/DL (ref 70–99)
GLUCOSE UR STRIP-MCNC: NEGATIVE MG/DL
HCT VFR BLD AUTO: 23.1 % (ref 40–53)
HCT VFR BLD AUTO: 23.6 % (ref 40–53)
HDLC SERPL-MCNC: 30 MG/DL
HGB BLD-MCNC: 6.8 G/DL (ref 13.3–17.7)
HGB BLD-MCNC: 6.9 G/DL (ref 13.3–17.7)
HGB BLD-MCNC: 7.6 G/DL (ref 13.3–17.7)
HGB UR QL STRIP: NEGATIVE
INR PPP: 1.24 (ref 0.86–1.14)
KETONES UR STRIP-MCNC: NEGATIVE MG/DL
LDLC SERPL CALC-MCNC: 43 MG/DL
LEUKOCYTE ESTERASE UR QL STRIP: NEGATIVE
MAGNESIUM SERPL-MCNC: 2.5 MG/DL (ref 1.6–2.3)
MCH RBC QN AUTO: 26.2 PG (ref 26.5–33)
MCH RBC QN AUTO: 27.5 PG (ref 26.5–33)
MCHC RBC AUTO-ENTMCNC: 28.8 G/DL (ref 31.5–36.5)
MCHC RBC AUTO-ENTMCNC: 29.9 G/DL (ref 31.5–36.5)
MCV RBC AUTO: 91 FL (ref 78–100)
MCV RBC AUTO: 92 FL (ref 78–100)
NITRATE UR QL: NEGATIVE
NONHDLC SERPL-MCNC: 57 MG/DL
NT-PROBNP SERPL-MCNC: 7337 PG/ML (ref 0–125)
NUM BPU REQUESTED: 1
PH UR STRIP: 6.5 PH (ref 5–7)
PLATELET # BLD AUTO: 347 10E9/L (ref 150–450)
PLATELET # BLD AUTO: 359 10E9/L (ref 150–450)
POTASSIUM SERPL-SCNC: 4.1 MMOL/L (ref 3.4–5.3)
POTASSIUM SERPL-SCNC: 4.2 MMOL/L (ref 3.4–5.3)
PROT SERPL-MCNC: 7.3 G/DL (ref 6.8–8.8)
RBC # BLD AUTO: 2.51 10E12/L (ref 4.4–5.9)
RBC # BLD AUTO: 2.6 10E12/L (ref 4.4–5.9)
RBC #/AREA URNS AUTO: 0 /HPF (ref 0–2)
RETICS # AUTO: 65 10E9/L (ref 25–95)
RETICS/RBC NFR AUTO: 2.6 % (ref 0.5–2)
SODIUM SERPL-SCNC: 135 MMOL/L (ref 133–144)
SODIUM SERPL-SCNC: 137 MMOL/L (ref 133–144)
SOURCE: NORMAL
SP GR UR STRIP: 1.01 (ref 1–1.03)
SPECIMEN EXP DATE BLD: NORMAL
TRANSFUSION STATUS PATIENT QL: NORMAL
TRANSFUSION STATUS PATIENT QL: NORMAL
TRIGL SERPL-MCNC: 69 MG/DL
TROPONIN I SERPL-MCNC: <0.015 UG/L (ref 0–0.04)
UROBILINOGEN UR STRIP-MCNC: NORMAL MG/DL (ref 0–2)
WBC # BLD AUTO: 7.8 10E9/L (ref 4–11)
WBC # BLD AUTO: 9.4 10E9/L (ref 4–11)
WBC #/AREA URNS AUTO: 0 /HPF (ref 0–5)

## 2019-07-22 PROCEDURE — 83735 ASSAY OF MAGNESIUM: CPT | Performed by: PHYSICIAN ASSISTANT

## 2019-07-22 PROCEDURE — 00000146 ZZHCL STATISTIC GLUCOSE BY METER IP

## 2019-07-22 PROCEDURE — 86901 BLOOD TYPING SEROLOGIC RH(D): CPT | Performed by: PHYSICIAN ASSISTANT

## 2019-07-22 PROCEDURE — 93005 ELECTROCARDIOGRAM TRACING: CPT

## 2019-07-22 PROCEDURE — C9113 INJ PANTOPRAZOLE SODIUM, VIA: HCPCS | Performed by: PHYSICIAN ASSISTANT

## 2019-07-22 PROCEDURE — 85018 HEMOGLOBIN: CPT | Performed by: PHYSICIAN ASSISTANT

## 2019-07-22 PROCEDURE — 86900 BLOOD TYPING SEROLOGIC ABO: CPT | Performed by: PHYSICIAN ASSISTANT

## 2019-07-22 PROCEDURE — 25000128 H RX IP 250 OP 636: Performed by: PHYSICIAN ASSISTANT

## 2019-07-22 PROCEDURE — 84484 ASSAY OF TROPONIN QUANT: CPT | Performed by: PHYSICIAN ASSISTANT

## 2019-07-22 PROCEDURE — 81001 URINALYSIS AUTO W/SCOPE: CPT | Performed by: PHYSICIAN ASSISTANT

## 2019-07-22 PROCEDURE — 80048 BASIC METABOLIC PNL TOTAL CA: CPT | Performed by: PHYSICIAN ASSISTANT

## 2019-07-22 PROCEDURE — 36415 COLL VENOUS BLD VENIPUNCTURE: CPT | Performed by: PHYSICIAN ASSISTANT

## 2019-07-22 PROCEDURE — 86922 COMPATIBILITY TEST ANTIGLOB: CPT | Performed by: PHYSICIAN ASSISTANT

## 2019-07-22 PROCEDURE — 85027 COMPLETE CBC AUTOMATED: CPT | Performed by: PHYSICIAN ASSISTANT

## 2019-07-22 PROCEDURE — 25000132 ZZH RX MED GY IP 250 OP 250 PS 637: Performed by: PHYSICIAN ASSISTANT

## 2019-07-22 PROCEDURE — P9016 RBC LEUKOCYTES REDUCED: HCPCS | Performed by: PHYSICIAN ASSISTANT

## 2019-07-22 PROCEDURE — 99223 1ST HOSP IP/OBS HIGH 75: CPT | Mod: AI | Performed by: INTERNAL MEDICINE

## 2019-07-22 PROCEDURE — 40000141 ZZH STATISTIC PERIPHERAL IV START W/O US GUIDANCE

## 2019-07-22 PROCEDURE — 86902 BLOOD TYPE ANTIGEN DONOR EA: CPT | Performed by: PHYSICIAN ASSISTANT

## 2019-07-22 PROCEDURE — 93010 ELECTROCARDIOGRAM REPORT: CPT | Performed by: INTERNAL MEDICINE

## 2019-07-22 PROCEDURE — 12000004 ZZH R&B IMCU UMMC

## 2019-07-22 PROCEDURE — 99207 ZZC APP CREDIT; MD BILLING SHARED VISIT: CPT | Performed by: PHYSICIAN ASSISTANT

## 2019-07-22 PROCEDURE — 86850 RBC ANTIBODY SCREEN: CPT | Performed by: PHYSICIAN ASSISTANT

## 2019-07-22 RX ORDER — AMOXICILLIN 250 MG
1 CAPSULE ORAL 2 TIMES DAILY
Status: DISCONTINUED | OUTPATIENT
Start: 2019-07-22 | End: 2019-07-23

## 2019-07-22 RX ORDER — ONDANSETRON 2 MG/ML
4 INJECTION INTRAMUSCULAR; INTRAVENOUS EVERY 6 HOURS PRN
Status: DISCONTINUED | OUTPATIENT
Start: 2019-07-22 | End: 2019-07-24 | Stop reason: HOSPADM

## 2019-07-22 RX ORDER — FUROSEMIDE 40 MG
40 TABLET ORAL 2 TIMES DAILY
Status: DISCONTINUED | OUTPATIENT
Start: 2019-07-23 | End: 2019-07-24 | Stop reason: HOSPADM

## 2019-07-22 RX ORDER — FUROSEMIDE 40 MG
40 TABLET ORAL ONCE
Status: DISCONTINUED | OUTPATIENT
Start: 2019-07-22 | End: 2019-07-22

## 2019-07-22 RX ORDER — DEXTROSE MONOHYDRATE 25 G/50ML
25-50 INJECTION, SOLUTION INTRAVENOUS
Status: DISCONTINUED | OUTPATIENT
Start: 2019-07-22 | End: 2019-07-24 | Stop reason: HOSPADM

## 2019-07-22 RX ORDER — BISACODYL 5 MG
15 TABLET, DELAYED RELEASE (ENTERIC COATED) ORAL DAILY PRN
Status: DISCONTINUED | OUTPATIENT
Start: 2019-07-22 | End: 2019-07-24 | Stop reason: HOSPADM

## 2019-07-22 RX ORDER — NITROGLYCERIN 0.4 MG/1
0.4 TABLET SUBLINGUAL EVERY 5 MIN PRN
Status: DISCONTINUED | OUTPATIENT
Start: 2019-07-22 | End: 2019-07-24 | Stop reason: HOSPADM

## 2019-07-22 RX ORDER — ASPIRIN 81 MG/1
81 TABLET, CHEWABLE ORAL DAILY
Status: DISCONTINUED | OUTPATIENT
Start: 2019-07-23 | End: 2019-07-24 | Stop reason: HOSPADM

## 2019-07-22 RX ORDER — CARVEDILOL 3.12 MG/1
3.12 TABLET ORAL 2 TIMES DAILY WITH MEALS
Status: DISCONTINUED | OUTPATIENT
Start: 2019-07-22 | End: 2019-07-24 | Stop reason: HOSPADM

## 2019-07-22 RX ORDER — AMOXICILLIN 250 MG
2 CAPSULE ORAL 2 TIMES DAILY
Status: DISCONTINUED | OUTPATIENT
Start: 2019-07-22 | End: 2019-07-23

## 2019-07-22 RX ORDER — NICOTINE POLACRILEX 4 MG
15-30 LOZENGE BUCCAL
Status: DISCONTINUED | OUTPATIENT
Start: 2019-07-22 | End: 2019-07-24 | Stop reason: HOSPADM

## 2019-07-22 RX ORDER — ONDANSETRON 4 MG/1
4 TABLET, ORALLY DISINTEGRATING ORAL EVERY 6 HOURS PRN
Status: DISCONTINUED | OUTPATIENT
Start: 2019-07-22 | End: 2019-07-24 | Stop reason: HOSPADM

## 2019-07-22 RX ORDER — NALOXONE HYDROCHLORIDE 0.4 MG/ML
.1-.4 INJECTION, SOLUTION INTRAMUSCULAR; INTRAVENOUS; SUBCUTANEOUS
Status: DISCONTINUED | OUTPATIENT
Start: 2019-07-22 | End: 2019-07-24 | Stop reason: HOSPADM

## 2019-07-22 RX ORDER — BISACODYL 5 MG
5 TABLET, DELAYED RELEASE (ENTERIC COATED) ORAL DAILY PRN
Status: DISCONTINUED | OUTPATIENT
Start: 2019-07-22 | End: 2019-07-24 | Stop reason: HOSPADM

## 2019-07-22 RX ORDER — FUROSEMIDE 40 MG
40 TABLET ORAL ONCE
Status: COMPLETED | OUTPATIENT
Start: 2019-07-22 | End: 2019-07-22

## 2019-07-22 RX ORDER — BISACODYL 5 MG
10 TABLET, DELAYED RELEASE (ENTERIC COATED) ORAL DAILY PRN
Status: DISCONTINUED | OUTPATIENT
Start: 2019-07-22 | End: 2019-07-24 | Stop reason: HOSPADM

## 2019-07-22 RX ORDER — BISACODYL 10 MG
10 SUPPOSITORY, RECTAL RECTAL DAILY PRN
Status: DISCONTINUED | OUTPATIENT
Start: 2019-07-22 | End: 2019-07-24 | Stop reason: HOSPADM

## 2019-07-22 RX ORDER — NALOXONE HYDROCHLORIDE 0.4 MG/ML
.1-.4 INJECTION, SOLUTION INTRAMUSCULAR; INTRAVENOUS; SUBCUTANEOUS
Status: DISCONTINUED | OUTPATIENT
Start: 2019-07-22 | End: 2019-07-22

## 2019-07-22 RX ORDER — CLOPIDOGREL BISULFATE 75 MG/1
75 TABLET ORAL DAILY
Status: DISCONTINUED | OUTPATIENT
Start: 2019-07-23 | End: 2019-07-24 | Stop reason: HOSPADM

## 2019-07-22 RX ORDER — POLYETHYLENE GLYCOL 3350 17 G/17G
17 POWDER, FOR SOLUTION ORAL DAILY PRN
Status: DISCONTINUED | OUTPATIENT
Start: 2019-07-22 | End: 2019-07-24 | Stop reason: HOSPADM

## 2019-07-22 RX ORDER — LIDOCAINE 40 MG/G
CREAM TOPICAL
Status: DISCONTINUED | OUTPATIENT
Start: 2019-07-22 | End: 2019-07-24 | Stop reason: HOSPADM

## 2019-07-22 RX ORDER — ATORVASTATIN CALCIUM 40 MG/1
80 TABLET, FILM COATED ORAL DAILY
Status: DISCONTINUED | OUTPATIENT
Start: 2019-07-23 | End: 2019-07-24 | Stop reason: HOSPADM

## 2019-07-22 RX ORDER — CARVEDILOL 3.12 MG/1
3.12 TABLET ORAL 2 TIMES DAILY WITH MEALS
Qty: 60 TABLET | Refills: 0 | Status: SHIPPED | OUTPATIENT
Start: 2019-07-22 | End: 2019-08-23

## 2019-07-22 RX ADMIN — SENNOSIDES AND DOCUSATE SODIUM 1 TABLET: 8.6; 5 TABLET ORAL at 20:15

## 2019-07-22 RX ADMIN — FUROSEMIDE 40 MG: 40 TABLET ORAL at 18:16

## 2019-07-22 RX ADMIN — PANTOPRAZOLE SODIUM 40 MG: 40 INJECTION, POWDER, FOR SOLUTION INTRAVENOUS at 20:16

## 2019-07-22 RX ADMIN — CARVEDILOL 3.12 MG: 3.12 TABLET, FILM COATED ORAL at 18:16

## 2019-07-22 ASSESSMENT — ACTIVITIES OF DAILY LIVING (ADL)
ADLS_ACUITY_SCORE: 12
AMBULATION: 0-->INDEPENDENT
TOILETING: 0-->INDEPENDENT
RETIRED_EATING: 0-->INDEPENDENT
ADLS_ACUITY_SCORE: 12
RETIRED_COMMUNICATION: 0-->UNDERSTANDS/COMMUNICATES WITHOUT DIFFICULTY
TRANSFERRING: 0-->INDEPENDENT
SWALLOWING: 0-->SWALLOWS FOODS/LIQUIDS WITHOUT DIFFICULTY
FALL_HISTORY_WITHIN_LAST_SIX_MONTHS: NO
COGNITION: 0 - NO COGNITION ISSUES REPORTED
DRESS: 0-->INDEPENDENT
BATHING: 0-->INDEPENDENT

## 2019-07-22 ASSESSMENT — PAIN SCALES - GENERAL: PAINLEVEL: SEVERE PAIN (7)

## 2019-07-22 NOTE — H&P
[facility]    Internal Medicine History and Physical - Gold Service       Date of Admission: 7/22/2019    Assessment & Plan  Cole Jesus is a 59 year old male with a hx of CAD with recent admission for STEMI (6/2019) s/p RCA stent on dual antiplatelet therapy, atrial fibrillation, ischemic cardiomyopathy (LVEF 35%), stage IV CKD and recent admission 7/3-7/6/2019 for acute anemia due to  GIB found to have AVM (7/5/19). Patient was transferred from PCP clinic today after labs were positive for acute anemia of 6.9 and EVA with Creatinine 3.0. Given his CHF, was transferred to [facility] for transfusional support and for further work-up.      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 He reports dizziness, but no SOB, chest pain or KING. He is on dual antiplatelet therapy for hx of recent STEMI (6/2019). Previously on warfarin for history of afib which was started in 6/2019, however, warfarin has been on hold since his admission 7/3-7/5/2019; in which patient had 1 week hx of melena found to have Hgb 10.1 (which was down from BL 13.0) which he underwent transfusion of 1U pRBC on 7/5 with EGD 7/5/19 remarkable for actively bleeding duodenal AVM which was clipped. On discharge, patient had stable Hgb with instructions to f/u for OP colonoscopy. Cardiology recommended continuing dual antiplatelet therapy given recent stent STEMI with plans to follow-up in Dr. Trujillo's clinic to discuss anticoagulation plan (Coumadin vs NOAC) vs KASIA closure. On recheck, on admission Hgb 6.8. He is well appearing, HD stable. INR 1.24.    - Transfuse 1U pRBC now (Addendum: repeat Hgb 7.6 --> Discussed with Dr. Viera; will follow Hgb at 0400. If  <8.0, will evaluate fluid status as goal Hgb >8.0 for hx of CHF)   - 2 PIV in place   - Trend Hgb q6hrs    - OK to eat now; will keep patient NPO after midnight    - Discuss with GI team in AM if any recommendation for procedure    - Transition PO PPI to IV PPI for now    - Continue ASA 81mg daily and Clopidogrel 75mg daily given recent STEMI with KEVYN in 6/2019   - Patient will need to have close follow-up with Cardiology Dr. Trujillo for anticoagulation plan after discharge (could consider discussing while inpatient) for anticoagulant vs KASIA closure.    Elevated Creatinine  History of CKD stage IV: Creatinine at baseline ~2.3-2.5. Has hx of stage IV chronic kidney disease due to ATN after cardiogenic shock in 2012. Previously seen by Nephrology (last visit 3/2018 per review). At clinic today, patient noted to have Creatinine elevated at 3.08. Upon recheck today Creatinine 2.89. On Entresto (Sacubitril-Valsartan) 97-103mg BID and Furosemide 40mg BID. However, during last admission 7/5/19, Lasix was held given soft BP and GIB. He resumed Lasix 40mg BID ~1 week ago. He denies any dysuria, frequency or urgency, but does not nocturia. No hx of BPH. Denies any flank pain but notes lower lumbar back pain. DDx includes pre-renal given history of anemia, medication induced given Entresto and Lasix vs cardiogenic vs and less likely infectious vs other. PVR w/ 42ml; negative for urinary retention. Recheck today at 2.89, which is not significantly elevated to baseline.   -Transfuse 1U pRBC now   -Obtain UA   -BMP in AM  -Decrease Entresto dose from 97-103mg BID to 49-51mg BID w/ hold parameters   -Continue Lasix 40mg BID for now w/ hold parameters     Ischemic cardiomyopathy  Coronary artery disease  HFrEF   History of recent STEMI s/p RCA stent (6/2019) on dual-antiplatelet therapy  Severe mitral insufficiency:   Had history of STEMI s/p KEVYN in RCA for 100% occlusion on 6/15/19. ECHO from 6/17/19 with irregular rhythm, mild  LVH with LVEF 35%, no discrete regional wall motion abnormalities. Severe mitral insufficiency. The study was not technically sufficient to allow evaluation of LV diastonic function due to underlying irregular rhythm. There was moderately reduced RV systolic function; moderate triscuspid regurgitation, est PA peak pressure 51mmHg.  Per patient, had 3 stents placed in 2012 following STEMI as well. Follows with Dr. Trujillo, Cardiology- last seen on 6/26/19 with plans to have repeat ECHO in 1 month. On dual-antiplatelet therapy and has been complaint. No chest pain, SOB, or KING. Laxis was held during last admission for GIB and soft BP, and patient resumed Lasix 40mg BID ~1 week ago. Reports his weight fluctuates between 234-238lb. On exam, patient appears mildly fluid up with e/o 2+ pitting edema bilaterally. However, lungs CTA and no hypoxia. BNP 7,000s at clinic today (from 2,000s on discharge 7/3/19). Denies KING, orthopnea, cough, sputum or PND. ECG with atrial fibrillation w/ junctional ST depression, no acute changes compared to 7/3/19. Troponin negative.    - Continue ASA 81mg daily and Plavix 75mg daily   - Give Lasix 40mg once this evening (given transfusion of 1uPRB)   - Continue Lasix 40mg BID w/ hold parameters   - Continue Atorvastatin 80mg daily    - Decrease Entresto dose from 97-103mg BID to 49-51mg BID (given softer BP and elevated Cr above)   - Continue Carvediolol 3.125mg BID w/ hold parameters    - Nitroglycerin prn   - Patient needs close follow-up with Dr. Trujillo, Cardiology for follow-up for repeat ECHO as OP    - PT/OT    Atrial fibrillation, chronic:   CHADsVASC score 4. Had history of chronic afib. At the time of his STEMI in 6/2019, patient had not been on anticoagulation in past believed due to history of previous GIB (pt notes 2012). During his admission in 6/2019 patient was resumed on Warfarin. However, this was held due to admission for GIB on 7/5/2019. Currently rate controlled. Last  saw Dr. Trujillo, Cardiology in 6/27/19 and was started on Ziopatch for 1 month.    - Continue Carvediolol 3.125mg BID    - Patient will need to have close follow-up with Cardiology Dr. Trujillo for anticoagulation plan after discharge (could consider discussing while inpatient) for anticoagulant vs KASIA closure    Diabetes Mellitus on insulin therapy:   On PTA Lantus 26units every morning and custom sliding scale insulin. Last HgbA1C was 5.7 on 7/15/19. Last dose of Lantus was this AM. Pt reports that he rarely needs sliding scale insulin. Per Dr. Trujillo's note, he wanted further assessment in SGLT2 inhibitors for CV risk reduction.   -Will start Lantus 20units ordered for AM (dose decreased given NPO status in AM and hospitalization)  -Custom sliding scale insulin resumed. Low sliding scale correction at bedtime initiated.  -Hypoglycemia protocol  -BG checks TID w/meals, HS and 0200   -Patient to follow-up with Endocrine as OP     HTN: On PTA Carvediolol 3.125mg BID and Entresto (Sacubitril-Valsartan) 97-103mg BID and Furosemide 40mg BID. BP on admission normotensive at 110/67.  -Continue Carvediolol 3.125mg BID w/ hold parameters  -Decrease Entresto dose from 97-103mg BID to 49-51mg BID (given softer BP and elevated Cr above)  -Continue Furosemide 40mg BID w/ hold parameters for now      Hx of subclinical hypothyroidism: Last TSH on 7/1 with TSH 6.42 and normal T4. Not on any thyroid replacement.   -TSH w/ reflex to Free T4    Acute on chronic low back pain: No flank pain or CVA tenderness. There is tenderness to the lumbar spine. No urinary retention with negative PVR. No loss of bowel or bladder dysfunction or saddle anesthesia. Bough lidoderm patch today with relief.  -Continue Lidoderm patch prn   -Monitor       Diet: Low sodium <2g/day diet; NPO after midnight pending any possible procedures in AM  DVT Prophylaxis: Pneumatic compression devices  Ponce catheter: None   Code Status: Full Code     Disposition  Plan   Expected discharge: 2 - 3 days; recommended to prior living arrangement once patient's Hgb is stable without evidence of recurrent GIB, and when creatinine at baseline and pending PT/OT disposition.     Patient was seen and plan was discussed collaboratively with Dr. Deisy Franklin who agrees with plan of care.     Zohreh Smith PA-C  Hospitalist Service  [facility]  Pager [phone]    Chief Complaint   Acute anemia     History is obtained from the patient and the patient's wife.     History of Present Illness   Cole Jesus is a 59 year old male with a hx of CAD with recent admission for STEMI (6/2019) s/p RCA stent on dual antiplatelet therapy, atrial fibrillation, ischemic cardiomyopathy (LVEF 35%), stage IV CKD and recent admission 7/3-7/6/2019 for acute anemia due to  GIB found to have AVM (7/5/19). Patient was transferred from PCP clinic today after labs were positive for acute anemia of 6.9 and elevated Creatinine from baseline concerning for EVA. Given his CHF, was transferred to [facility] for transfusional support and for further work-up.      Patient was hospitalized in [place] 6/15-6/18/19 diagnosed with a STEMI and is s/p PCA with KEVYN to RCA given a 100% occlusion. He was started on dual antiplatelet therapy. Has a history of atrial fibrillation, and given his CHADsVASC score, was reinitiated on warfarin during that admission. He had a previous hx of GIB in the past, so it was presumed he was not on anticoagulation in 6/2019 due to that history.     He then was hospitalized at [facility] from 7/3-7/6/19 for melenic stools for ~1 week found to have an acute on chronic anemia with Hgb 10.1 from 13.0 at baseline. He was started on IV PPI and was transfused 1unit PRB on 7/5. GI was consulted and had EGD on 7/5/19 which was positive for an actively bleeding AVM in the duodenum s/p clipping. He was given IV Vitamin K and warfarin was held on discharge. Cardiology was consulted and  recommended continuing dual antiplatelet therapy with plans to have the patient follow-up with his OP Cardiologist Dr. Trujillo to determine plan for anticoagulation (warfarin vs NOAC vs KASIA closure). Patient was to follow-up for OP colonoscopy. His Lasix and Carvediolol was initially held during hospitalization. Patient resumed Carvediolol on discharge and resumed Lasix 40mg BID 1 week ago.     Today, patient reports he was seen by his PCP in clinic and was noted to have Hgb of 6.9 and rise in his creatinine. He denies any chest pain, SOB, KING, PND, orthopnea, cough, or hemoptysis. He endorses peripheral edema. He reports his weight has been fluctuating between 234-238lbs. He denies any abdominal pain, nausea/vomiting, melena, hematochezia, hematemesis. He notes mild lightheadedness, but he states he thought this was due to having fatigue from recent hospitalizations. He reports having a low sodium diet at home. He denies any dysuria, hematuria, frequency or urgency but does not nocturia. Also reports acute on chronic low back pain and has lidoderm patch in place with relief. No flank pain. No loss of bowel/bladder function or saddle anesthesia. No radiculopathy. No other acute concerns at this time.     Review of Systems   10 point ROS performed and negative unless otherwise noted in HPI    Past Medical History    I have reviewed this patient's medical history and updated it with pertinent information if needed.   Past Medical History:   Diagnosis Date     Anemia in chronic renal disease 3/9/2015     Antiplatelet or antithrombotic long-term use      Atrial fibrillation and flutter      CAD (coronary artery disease)     Stemi in 12/11, s/p angioplasty     CRD (chronic renal disease), stage IV (H) 03/09/2015    hx ATN with dialysis complicating cardiogenic shock  1/2012     GI bleed     massive lower GI bleed secondary to a cecal ulcer, s/p ileocecal resection in 12/11     Heart failure     Biventricular systolic  HF, complicated by ARDS requiring tracheostomy     Hyperlipidemia LDL goal <100 2013     Hypertension      Ischemic cardiomyopathy 2013    TTE revealing 40% EF     Myocardial infarction (H)      Other and unspecified nonspecific immunological findings     Anti JKa     Pulmonary edema     episodes of flash pulmonary edema in      Subclinical hypothyroidism         Past Surgical History   I have reviewed this patient's surgical history and updated it with pertinent information if needed.  Past Surgical History:   Procedure Laterality Date     ESOPHAGOSCOPY, GASTROSCOPY, DUODENOSCOPY (EGD), COMBINED  2011    Procedure:COMBINED ESOPHAGOSCOPY, GASTROSCOPY, DUODENOSCOPY (EGD); Surgeon:SAMARIA PUENTE; Location:[facility]     LAPAROTOMY EXPLORATORY  2011    Procedure:LAPAROTOMY EXPLORATORY; Explore laparotomy, Illeocectomy, Diverting Illeostomy; Surgeon:GABI MOSHER; Location:[facility]     ORIF right elbow fracture  age 12     TAKEDOWN ILEOSTOMY  2014    Procedure: TAKEDOWN ILEOSTOMY;  Surgeon: Gabi Mosher MD;  Location: [facility]     TRACHEOSTOMY  2011    Procedure:TRACHEOSTOMY; Tracheostomy; 80XLTCP-Proximal Extension-Cuffed 8.0 mm I.D.; Surgeon:LIZABETH DYE; Location:[facility]        Social History   Social History     Tobacco Use     Smoking status: Former Smoker     Last attempt to quit: 12/3/2011     Years since quittin.6     Smokeless tobacco: Never Used   Substance Use Topics     Alcohol use: No     Alcohol/week: 0.0 oz     Drug use: No       Family History   I have reviewed this patient's family history and updated it with pertinent information if needed.   Family History   Problem Relation Age of Onset     Diabetes Mother      Hypertension Mother      Heart Disease Mother      Heart Disease Father          of heart attack, left when he was young     Diabetes Sister      Diabetes Sister      Diabetes Sister        Prior to Admission  Medications   Prior to Admission Medications   Prescriptions Last Dose Informant Patient Reported? Taking?   Alcohol Swabs PADS   No No   Si applicator 4 times daily (before meals and nightly)   Sharps Container MISC   No No   Si Device every 30 days   aspirin (ASA) 81 MG chewable tablet   Yes No   Sig: Take 81 mg by mouth daily   atorvastatin (LIPITOR) 80 MG tablet   No No   Sig: Take 1 tablet (80 mg) by mouth daily   blood glucose (NO BRAND SPECIFIED) test strip   No No   Sig: Use to test blood sugar times times daily. Before meals and at bedtime.   blood glucose monitoring (ACCU-CHEK FASTCLIX) lancets   No No   Sig: Use to test blood sugar 4 times daily. At meals and bedtime.   carvedilol (COREG) 3.125 MG tablet   No No   Sig: Take 1 tablet (3.125 mg) by mouth 2 times daily (with meals)   cholecalciferol (VITAMIN  -D) 1000 UNITS capsule   No No   Sig: Take 2 capsules (2,000 Units) by mouth daily   clopidogrel (PLAVIX) 75 MG tablet   No No   Sig: TAKE ONE TABLET BY MOUTH EVERY DAY   furosemide (LASIX) 20 MG tablet   No No   Sig: Take 2 tablets (40 mg) by mouth 2 times daily   insulin aspart (NOVOLOG PEN) 100 UNIT/ML pen   No No   Sig: Custom Correction Scale ,use with meals   Do Not give Correction Insulin if BG less than 200.   For  - 224 give 1 units.   For  - 249 give 2 units.   For  - 274 give 3 units.   For  - 299 give 4 units.   For  - 324 give 5 units.   For  - 349 give 6 units.   For BG greater than or equal to 350 give 7 units.   Novolog sliding scale with meals and add this amount of insulin to the 5 units of Novolog.  -if not eating, just test your blood sugar and give the sliding scale insulin   Patient not taking: Reported on 7/15/2019   insulin glargine (LANTUS SOLOSTAR PEN) 100 UNIT/ML pen   No No   Sig: Inject 30 Units Subcutaneous every morning   Patient taking differently: Inject 26 Units Subcutaneous every morning    insulin pen needle (32G X 4 MM) 32G  X 4 MM miscellaneous   No No   Sig: Use 5 pen needles daily or as directed.   nitroGLYcerin (NITROSTAT) 0.4 MG sublingual tablet   No No   Sig: Place 1 tablet (0.4 mg) under the tongue every 5 minutes as needed for chest pain For chest pain place 1 tablet under the tongue every 5 minutes for 3 doses. If symptoms persist 5 minutes after 1st dose call 911.   pantoprazole (PROTONIX) 40 MG EC tablet   No No   Sig: TAKE ONE TABLET BY MOUTH TWICE A DAY   sacubitril-valsartan (ENTRESTO)  MG per tablet   No No   Sig: Take 1 tablet by mouth 2 times daily   vitamin  B complex with vitamin C (VITAMIN  B COMPLEX) TABS   Yes No   Sig: Take 1 tablet by mouth daily      Facility-Administered Medications: None     Allergies   Allergies   Allergen Reactions     Hydralazine      Black spots     Simvastatin Other (See Comments)     Leg muscle weakness     Tylenol [Acetaminophen] Palpitations       Physical Exam   /67   Temp 97.7  F (36.5  C) (Oral)   Resp 18   SpO2 100%   GENERAL: Alert and oriented x 3. Obese. No acute distress. Answering questions appropriately.   HEENT: Anicteric sclera. PERRL. Mucous membranes moist and without lesions.   CV: Irregularly irregular. S1, S2. Holosystolic murmur heard best at LLSB. JVD non-elevated.   RESPIRATORY: Effort normal on room air. Lungs CTAB with no wheezing, rales, rhonchi.   GI: Abdomen soft and non distended, bowel sounds present. No tenderness, rebound, guarding. No CVA tenderness.   MUSCULOSKELETAL: No joint swelling or tenderness. There is tenderness to lumbar spine and paraspinal musculature. Lidoderm patch in place.   NEUROLOGICAL: No focal deficits. Moves all extremities. 5/5 strength in BLE. Sensation intact.   EXTREMITIES:2+ peripheral edema bilaterally; L>R. Intact bilateral pedal pulses.   SKIN: No jaundice. No rashes.     Data   Data reviewed today: I reviewed all medications, new labs and imaging results over the last 24 hours. I personally reviewed labs as  well as ECG.

## 2019-07-22 NOTE — PROGRESS NOTES
Admission          7/22/2019  2:46 PM  -----------------------------------------------------------  Reason for admission: Anemia  Primary team notified of pt arrival.  Admitted from: clinic  Via: ambulation  Accompanied by: wife  Belongings: Placed in closet; valuables sent home with family  Admission Profile: complete  Teaching: orientation to unit and call light- call light within reach, call don't fall, use of console, meal times, when to call for the RN, and enforced importance of safety   Access: PIV  Telemetry:Placed on pt  Ht./Wt.: complete  2 RN Skin Assessment Completed By: Cecilia Heath  Pt status: Alert and oriented, back pain otherwise no complaints. C/o very slight shortness of breath with activity. No supplemental O2 necessary. Steadily ambulating, no dizziness. Cards at bedside now. Will follow POC.       Temp:  [97.7  F (36.5  C)-98.3  F (36.8  C)] 97.7  F (36.5  C)  Pulse:  [81] 81  Heart Rate:  [76] 76  Resp:  [17-18] 18  BP: (100-110)/(67-69) 110/67  SpO2:  [100 %] 100 %

## 2019-07-22 NOTE — PROGRESS NOTES
"                     [facility]       SUBJECTIVE:  Cole Jesus is a 59 year old male with a PMHx of   Past Medical History:   Diagnosis Date     Anemia in chronic renal disease 3/9/2015     Antiplatelet or antithrombotic long-term use      Atrial fibrillation and flutter      CAD (coronary artery disease)     Stemi in 12/11, s/p angioplasty     CRD (chronic renal disease), stage IV (H) 03/09/2015    hx ATN with dialysis complicating cardiogenic shock  1/2012     GI bleed     massive lower GI bleed secondary to a cecal ulcer, s/p ileocecal resection in 12/11     Heart failure     Biventricular systolic HF, complicated by ARDS requiring tracheostomy     Hyperlipidemia LDL goal <100 4/28/2013     Hypertension      Ischemic cardiomyopathy 4/28/2013    TTE revealing 40% EF     Myocardial infarction (H)      Other and unspecified nonspecific immunological findings     Anti JKa     Pulmonary edema     episodes of flash pulmonary edema in 12/11     Subclinical hypothyroidism     who comes in for  Follow up hemoglobin after G-I bleed and clipping of angio  ectasia 2 weeks ago. he has ,as noted  a complex PMH with  possibly three episodes  bleeding on anti coagulants(.AF and recent MI) . he is comfortable in clinic ,not more dyspneic  But with increased (by hx ) peripheral edema. He does endorse recent onset musculoskeletal  low back pain and his activities are limited to ADL about his house . He is med compliant.  Medications and allergies reviewed by me today.     ROS:   ROS limited  No chest pain with ADL  Dyspnea with mod activity,stools \"could be dark-maybe something I ate\". Low back pain with standing upright  In mid sacral area  Does not radiate    OBJECTIVE:    /69 (BP Location: Right arm, Patient Position: Sitting, Cuff Size: Adult Regular)   Pulse 81   Temp 98.3  F (36.8  C) (Oral)   Resp 17   Wt 108.2 kg (238 lb 8.6 oz)   BMI 36.27 kg/m     Wt Readings from Last 1 Encounters:   07/22/19 " 108.2 kg (238 lb 8.6 oz)     Heavy set man sitting in wheel chair able to stand and walk with mild discomfort  Gait normal  No dyspnea walking about exam room  HEENT  No icterus  ,neck supple  Could not assess neck veins   Chest   No cvat  Pendulous breasts for male  BS distant  No adventitious sounds  Heart  Irregular   Sounds distant systolic M  At mid sternal border  ABD large pannus  BS present   Ext 2 +iperal edema  Skin  Not palor  Neuro non focal.  ASSESSMENT/PLAN:    Cole was seen today for results and back pain.    Diagnoses and all orders for this visit:    Anemia due to acute blood loss  -     CBC with platelets; Future  -     Reticulocyte count; Future  -     Ferritin; Future  -     Fecal cancer screen FIT; Future    Benign essential hypertension  -     carvedilol (COREG) 3.125 MG tablet; Take 1 tablet (3.125 mg) by mouth 2 times daily (with meals)     His hemoglobin has continued to fall (6.9) today with increased creatinine since hosp discharge. in addition,  retic count  in appropriate low and ferritin as well. his CHF still a concern. I am uncomfortable  transfusing him as out patient  With the above concerns. Anticoagulation continues to be held since D'[facility] hosp..  He would also be a candidate for IV iron replacement   He has been accepted for hospitalization today.  Pt should return to clinic for f/u with after discharge  Salo Jones MD  Jul 22, 2019

## 2019-07-22 NOTE — PROGRESS NOTES
[facility]  Transfer Triage Note    Date of call: 07/22/19  Time of call: 11:13 AM    Reason for Transfer:Patient has established care here  Further diagnostic work up, management, and consultation for specialized care  Diagnosis: Acute on chronic anemia, EVA, CHF    Outside Records: Available  Additional records requested to be faxed to [phone].    Stability of Patient: Patient is vitally stable, with no critical labs, and will likely remain stable throughout the transfer process    Expected Time of Arrival for Transfer: 0-8 hours    Recommendations for Management and Stabilization: Not needed    Additional Comments Patient is a 59 year old with ischemic cardiomyopathy, CAD, Stage IV CKD, and acute on chronic anemia secondary to GIB who was seen in clinic today. Overall the patient was feeling well, without major symptoms outside of musculoskeletal back pain.  However labs showed a hgb 6.9 (down from 7.8 a week ago), EVA (Cr of 3.0).  Given his CHF, decision making around anemia and EVA (pre-renal vs cardiorenal), transfusional support will be initiated in the hospital.  Beds will be available this afternoon, overall the assessment is that the patient will be stable while waiting, however if his clinical circumstances change or he develops symptoms, he will be instructed to go the the ED.     Champ Eagle MD

## 2019-07-22 NOTE — NURSING NOTE
Chief Complaint   Patient presents with     Results     Patient is here to follow up on lab results     Back Pain     Also here for low back pain       Larry Couch CMA (AAMA) at 8:32 AM on 7/22/2019

## 2019-07-23 LAB
ALBUMIN SERPL-MCNC: 3.2 G/DL (ref 3.4–5)
ALP SERPL-CCNC: 84 U/L (ref 40–150)
ALT SERPL W P-5'-P-CCNC: 14 U/L (ref 0–70)
ANION GAP SERPL CALCULATED.3IONS-SCNC: 8 MMOL/L (ref 3–14)
AST SERPL W P-5'-P-CCNC: 6 U/L (ref 0–45)
BILIRUB SERPL-MCNC: 0.8 MG/DL (ref 0.2–1.3)
BUN SERPL-MCNC: 49 MG/DL (ref 7–30)
CALCIUM SERPL-MCNC: 8.6 MG/DL (ref 8.5–10.1)
CHLORIDE SERPL-SCNC: 106 MMOL/L (ref 94–109)
CO2 SERPL-SCNC: 26 MMOL/L (ref 20–32)
CREAT SERPL-MCNC: 2.92 MG/DL (ref 0.66–1.25)
ERYTHROCYTE [DISTWIDTH] IN BLOOD BY AUTOMATED COUNT: 14.9 % (ref 10–15)
ERYTHROCYTE [DISTWIDTH] IN BLOOD BY AUTOMATED COUNT: 15.2 % (ref 10–15)
GFR SERPL CREATININE-BSD FRML MDRD: 22 ML/MIN/{1.73_M2}
GLUCOSE BLDC GLUCOMTR-MCNC: 131 MG/DL (ref 70–99)
GLUCOSE BLDC GLUCOMTR-MCNC: 80 MG/DL (ref 70–99)
GLUCOSE BLDC GLUCOMTR-MCNC: 84 MG/DL (ref 70–99)
GLUCOSE BLDC GLUCOMTR-MCNC: 91 MG/DL (ref 70–99)
GLUCOSE BLDC GLUCOMTR-MCNC: 92 MG/DL (ref 70–99)
GLUCOSE SERPL-MCNC: 85 MG/DL (ref 70–99)
HCT VFR BLD AUTO: 25.3 % (ref 40–53)
HCT VFR BLD AUTO: 26 % (ref 40–53)
HGB BLD-MCNC: 7.6 G/DL (ref 13.3–17.7)
HGB BLD-MCNC: 7.8 G/DL (ref 13.3–17.7)
INTERPRETATION ECG - MUSE: NORMAL
IRON SATN MFR SERPL: 4 % (ref 15–46)
IRON SERPL-MCNC: 15 UG/DL (ref 35–180)
MAGNESIUM SERPL-MCNC: 2.4 MG/DL (ref 1.6–2.3)
MCH RBC QN AUTO: 27.1 PG (ref 26.5–33)
MCH RBC QN AUTO: 27.3 PG (ref 26.5–33)
MCHC RBC AUTO-ENTMCNC: 30 G/DL (ref 31.5–36.5)
MCHC RBC AUTO-ENTMCNC: 30 G/DL (ref 31.5–36.5)
MCV RBC AUTO: 90 FL (ref 78–100)
MCV RBC AUTO: 91 FL (ref 78–100)
PHOSPHATE SERPL-MCNC: 3.5 MG/DL (ref 2.5–4.5)
PLATELET # BLD AUTO: 332 10E9/L (ref 150–450)
PLATELET # BLD AUTO: 340 10E9/L (ref 150–450)
POTASSIUM SERPL-SCNC: 4 MMOL/L (ref 3.4–5.3)
PROT SERPL-MCNC: 7.1 G/DL (ref 6.8–8.8)
RBC # BLD AUTO: 2.78 10E12/L (ref 4.4–5.9)
RBC # BLD AUTO: 2.88 10E12/L (ref 4.4–5.9)
SODIUM SERPL-SCNC: 140 MMOL/L (ref 133–144)
T4 FREE SERPL-MCNC: 0.98 NG/DL (ref 0.76–1.46)
TIBC SERPL-MCNC: 361 UG/DL (ref 240–430)
TSH SERPL DL<=0.005 MIU/L-ACNC: 4.64 MU/L (ref 0.4–4)
WBC # BLD AUTO: 9.1 10E9/L (ref 4–11)
WBC # BLD AUTO: 9.6 10E9/L (ref 4–11)

## 2019-07-23 PROCEDURE — 25800030 ZZH RX IP 258 OP 636: Performed by: INTERNAL MEDICINE

## 2019-07-23 PROCEDURE — 99232 SBSQ HOSP IP/OBS MODERATE 35: CPT | Performed by: INTERNAL MEDICINE

## 2019-07-23 PROCEDURE — 00000146 ZZHCL STATISTIC GLUCOSE BY METER IP

## 2019-07-23 PROCEDURE — 36415 COLL VENOUS BLD VENIPUNCTURE: CPT | Performed by: PHYSICIAN ASSISTANT

## 2019-07-23 PROCEDURE — 84100 ASSAY OF PHOSPHORUS: CPT | Performed by: PHYSICIAN ASSISTANT

## 2019-07-23 PROCEDURE — 25000132 ZZH RX MED GY IP 250 OP 250 PS 637: Performed by: NURSE PRACTITIONER

## 2019-07-23 PROCEDURE — 83540 ASSAY OF IRON: CPT | Performed by: PHYSICIAN ASSISTANT

## 2019-07-23 PROCEDURE — 84439 ASSAY OF FREE THYROXINE: CPT | Performed by: PHYSICIAN ASSISTANT

## 2019-07-23 PROCEDURE — 25000132 ZZH RX MED GY IP 250 OP 250 PS 637: Performed by: PHYSICIAN ASSISTANT

## 2019-07-23 PROCEDURE — 85027 COMPLETE CBC AUTOMATED: CPT | Performed by: PHYSICIAN ASSISTANT

## 2019-07-23 PROCEDURE — 84443 ASSAY THYROID STIM HORMONE: CPT | Performed by: PHYSICIAN ASSISTANT

## 2019-07-23 PROCEDURE — 80053 COMPREHEN METABOLIC PANEL: CPT | Performed by: PHYSICIAN ASSISTANT

## 2019-07-23 PROCEDURE — 36415 COLL VENOUS BLD VENIPUNCTURE: CPT | Performed by: INTERNAL MEDICINE

## 2019-07-23 PROCEDURE — 25000128 H RX IP 250 OP 636: Performed by: PHYSICIAN ASSISTANT

## 2019-07-23 PROCEDURE — 83735 ASSAY OF MAGNESIUM: CPT | Performed by: PHYSICIAN ASSISTANT

## 2019-07-23 PROCEDURE — 83550 IRON BINDING TEST: CPT | Performed by: PHYSICIAN ASSISTANT

## 2019-07-23 PROCEDURE — C9113 INJ PANTOPRAZOLE SODIUM, VIA: HCPCS | Performed by: PHYSICIAN ASSISTANT

## 2019-07-23 PROCEDURE — 25000128 H RX IP 250 OP 636: Performed by: INTERNAL MEDICINE

## 2019-07-23 PROCEDURE — 85027 COMPLETE CBC AUTOMATED: CPT | Performed by: INTERNAL MEDICINE

## 2019-07-23 PROCEDURE — 12000012 ZZH R&B MS OVERFLOW UMMC

## 2019-07-23 PROCEDURE — 25000132 ZZH RX MED GY IP 250 OP 250 PS 637: Performed by: INTERNAL MEDICINE

## 2019-07-23 RX ORDER — LIDOCAINE 4 G/G
1 PATCH TOPICAL
Status: DISCONTINUED | OUTPATIENT
Start: 2019-07-23 | End: 2019-07-24 | Stop reason: HOSPADM

## 2019-07-23 RX ORDER — METHYLPREDNISOLONE SODIUM SUCCINATE 125 MG/2ML
125 INJECTION, POWDER, LYOPHILIZED, FOR SOLUTION INTRAMUSCULAR; INTRAVENOUS
Status: DISCONTINUED | OUTPATIENT
Start: 2019-07-23 | End: 2019-07-24 | Stop reason: HOSPADM

## 2019-07-23 RX ORDER — POLYETHYLENE GLYCOL 3350 17 G/17G
17 POWDER, FOR SOLUTION ORAL ONCE
Status: COMPLETED | OUTPATIENT
Start: 2019-07-23 | End: 2019-07-23

## 2019-07-23 RX ORDER — DIPHENHYDRAMINE HYDROCHLORIDE 50 MG/ML
50 INJECTION INTRAMUSCULAR; INTRAVENOUS
Status: DISCONTINUED | OUTPATIENT
Start: 2019-07-23 | End: 2019-07-24 | Stop reason: HOSPADM

## 2019-07-23 RX ADMIN — METHYLCELLULOSE 500 MG: 500 TABLET ORAL at 14:05

## 2019-07-23 RX ADMIN — CARVEDILOL 3.12 MG: 3.12 TABLET, FILM COATED ORAL at 08:31

## 2019-07-23 RX ADMIN — ATORVASTATIN CALCIUM 80 MG: 40 TABLET, FILM COATED ORAL at 08:31

## 2019-07-23 RX ADMIN — POLYETHYLENE GLYCOL 3350 17 G: 17 POWDER, FOR SOLUTION ORAL at 14:05

## 2019-07-23 RX ADMIN — PANTOPRAZOLE SODIUM 40 MG: 40 INJECTION, POWDER, FOR SOLUTION INTRAVENOUS at 08:31

## 2019-07-23 RX ADMIN — B-COMPLEX W/ C & FOLIC ACID TAB 1 TABLET: TAB at 08:31

## 2019-07-23 RX ADMIN — CLOPIDOGREL BISULFATE 75 MG: 75 TABLET, FILM COATED ORAL at 08:31

## 2019-07-23 RX ADMIN — ASPIRIN 81 MG CHEWABLE TABLET 81 MG: 81 TABLET CHEWABLE at 08:31

## 2019-07-23 RX ADMIN — LIDOCAINE 1 PATCH: 560 PATCH PERCUTANEOUS; TOPICAL; TRANSDERMAL at 14:05

## 2019-07-23 RX ADMIN — PANTOPRAZOLE SODIUM 40 MG: 40 INJECTION, POWDER, FOR SOLUTION INTRAVENOUS at 19:59

## 2019-07-23 RX ADMIN — MELATONIN 2000 UNITS: at 08:31

## 2019-07-23 RX ADMIN — IRON SUCROSE 200 MG: 20 INJECTION, SOLUTION INTRAVENOUS at 19:59

## 2019-07-23 RX ADMIN — SENNOSIDES AND DOCUSATE SODIUM 2 TABLET: 8.6; 5 TABLET ORAL at 08:31

## 2019-07-23 RX ADMIN — CARVEDILOL 3.12 MG: 3.12 TABLET, FILM COATED ORAL at 18:23

## 2019-07-23 ASSESSMENT — ACTIVITIES OF DAILY LIVING (ADL)
ADLS_ACUITY_SCORE: 12

## 2019-07-23 NOTE — PLAN OF CARE
B/P: 103/68, T: 97.4, P: 83, R: 16    Neuro: A&Ox4.   Cardiac: SR. VSS. SBP soft 's.  Respiratory: Sating 100% on RA.  GI/: Adequate urine output. Last BM this morning per pt.   Diet/appetite: Tolerating 2g Na diet. Eating well.   Activity:  Up independently in room.   Pain: Denies.  Skin: No new deficits noted.   LDA's: PIV x2    Plan: Hgb 6.8, one unit PRBC's given. Hgb recheck 7.6. Trending hgb q6h overnight. Sig other at bedside and involved in POC. Continue with POC. Notify primary team with changes.

## 2019-07-23 NOTE — CONSULTS
GASTROENTEROLOGY CONSULTATION      Date of Admission: 7/22/2019       Date of Consult: 7/22/19          Chief Complaint:   We were asked by Silas Atwood MD to evaluate this patient with recurrent anemia.          ASSESSMENT AND RECOMMENDATIONS:   Assessment:  Cole Jesus is a 59 year old male with a history of GI bleed s/t a cecal ulcer s/p ileocecal resection in 2011, GI bleed on 7/5/19 s/t duodenal angioectasia, ischemic cardiomyopathy on aspirin and Plavix, HFrEF (35% EF on echo 6/17/19), atrial fibrillation (warfarin stopped on 7/4/19), CKD stage IV, DM type II hypertension, hyperlipidemia, subclinical hypothyroidism, STEMI on 6/15/19 s/p RCA stent placement and now admitted for hgb drop from 8.4 to 6.9.    #Iron deficiency anemia   #Recent GI bleed s/t duodenal angioectasia  #On dual antiplatelet therapy  Patient reports feeling better than the last admission, he denies hematochezia, hematemesis or symptoms of anemia including chest pain, shortness of breath or dizziness. On 7/4/19, patient presented with melena and 5 grams hgb drop, EGD from 7/5/19 showed actively bleeding angioectasia in second portion of the duodenum s/p injected, and clipped. Colonoscopy from 2011 revealed actively bleeding shallow ulceration in the cecum that was thought to be related to hemic ulcer from his cardiogenic shock s/p ileocecal resection. Previous baseline hgb was 14.2 and discharge hgb was stable at 8.4. On admission, hgb was 6.8 s/p 1unit RBC transfusion. Today hgb is at 7.6 (MCV 91/RDW15.2), plts 332 and INR is 1.24. Significant iron deficiency noted, ferritin 8, iron 15, saturation index of 4 but binding capacity is normal at 361. Rectal exam is negative for blood.     Patient's hgb trended over couple of weeks, which is suspicious for chronic source of GI blood loss versus malabsorption issue. Plan is monitor patient overnight and if he does not develop acute blood loss, plan would be outpatient chronic anemia work  up including colonoscopy.      Recommendations  --Diet as tolerated but make him NPO if patient develops GI bleed  --Continue 40mg of PPI twice daily  --Monitor CBC and transfuse if hgb is less than 7  --Maintain 2 large bore IVs  --Iron replacement therapy  --Tissue transglutaminase IgA antibody and IgA added in am  --If patient does not bleed overnight, he will need PCP follow up for iron deficiency management     Gastroenterology follow up recommendations:   --Outpatient colonoscopy for hx of iron deficiency of anemia     Patient care plan discussed with Dr. Alexander, GI staff physician. Thank you for involving us in this patient's care. Please do not hesitate to contact the GI service with any questions or concerns.     Rabia Day CNP  Department of Gastroenterology   -------------------------------------------------------------------------------------------------------------------   History is obtained from the patient and the medical record.          History of Present Illness:   Cole Jesus is a 59 year old male with a history of GI bleed s/t a cecal ulcer s/p ileocecal resection in 2011, GI bleed on 7/5/19 s/t duodenal angioectasia, ischemic cardiomyopathy on aspirin and Plavix, HFrEF (35% EF on echo 6/17/19), atrial fibrillation (warfarin stopped on 7/4/19), CKD stage IV, DM type II hypertension, hyperlipidemia, subclinical hypothyroidism, STEMI on 6/15/19 s/p RCA stent placement and now admitted for hgb drop from 8.4 to 6.9. Patient reports feeling better than the last admission, he denies hematochezia, hematemesis or symptoms of anemia including chest pain, shortness of breath or dizziness. He is eating and drinking well. He denies abdominal pain, n/v, odynophagia and dysphagia. No NSAID, alcohol or tobacco use.         Past Medical History:   Reviewed and edited as appropriate  Past Medical History:   Diagnosis Date     Anemia in chronic renal disease 3/9/2015     Antiplatelet or  antithrombotic long-term use      Atrial fibrillation and flutter      CAD (coronary artery disease)     Stemi in 12/11, s/p angioplasty     CRD (chronic renal disease), stage IV (H) 03/09/2015    hx ATN with dialysis complicating cardiogenic shock  1/2012     GI bleed     massive lower GI bleed secondary to a cecal ulcer, s/p ileocecal resection in 12/11     Heart failure     Biventricular systolic HF, complicated by ARDS requiring tracheostomy     Hyperlipidemia LDL goal <100 4/28/2013     Hypertension      Ischemic cardiomyopathy 4/28/2013    TTE revealing 40% EF     Myocardial infarction (H)      Other and unspecified nonspecific immunological findings     Anti JKa     Pulmonary edema     episodes of flash pulmonary edema in 12/11     Subclinical hypothyroidism             Past Surgical History:   Reviewed and edited as appropriate   Past Surgical History:   Procedure Laterality Date     ESOPHAGOSCOPY, GASTROSCOPY, DUODENOSCOPY (EGD), COMBINED  12/25/2011    Procedure:COMBINED ESOPHAGOSCOPY, GASTROSCOPY, DUODENOSCOPY (EGD); Surgeon:SAMARIA PUENTE; Location:[facility]     LAPAROTOMY EXPLORATORY  12/31/2011    Procedure:LAPAROTOMY EXPLORATORY; Explore laparotomy, Illeocectomy, Diverting Illeostomy; Surgeon:GABI MOSHER; Location:[facility]     ORIF right elbow fracture  age 12     TAKEDOWN ILEOSTOMY  6/5/2014    Procedure: TAKEDOWN ILEOSTOMY;  Surgeon: Gabi Mosher MD;  Location: [facility]     TRACHEOSTOMY  12/22/2011    Procedure:TRACHEOSTOMY; Tracheostomy; 80XLTCP-Proximal Extension-Cuffed 8.0 mm I.D.; Surgeon:LIZABETH DYE; Location:[facility]            Previous Endoscopy:     Results for orders placed or performed during the hospital encounter of 12/03/11   COLONOSCOPY   Result Value Ref Range    COLONOSCOPY       [facility], [place]   [address] [place] [place] [address] [phone]     Endoscopy  Department  _______________________________________________________________________________  Patient Name: Cole Dunn         Procedure Date: 12/29/2011 10:12:SS   A12/P12  MRN: [id number]                       Account Number: [id number]                  YOB: 1960              Admit Type: Inpatient                       Age: 51                               Room:                                       Gender: Male                          Note Status: Finalized                      Attending MD: Seb Sims MD         Pause for the Cause: Pause for the cause   completed  _______________________________________________________________________________     Procedure:                Colonoscopy  Indications:              Hematochezia, Pt in ICU, had bleeding from cecum                             48h ago, injected with epi, now bleeding again.  Providers:                Seb Sims MD, Carlos Castillo RN  Referring MD:             Javier Gan Md, MD  Medicines:                Midazolam 3 mg IV, Fentanyl 100 micrograms IV  Complications:            No immediate complications  _______________________________________________________________________________  Procedure:                Pre-Anesthesia Assessment:                            - Prior to the procedure, a History and Physical                             was performed, and patient medications and                             allergies were reviewed. The patient is unable to                             give consent secondary to the patient's altered                             mental status. The risks and benefits of the                             procedure and the sedation options and risks were                             discussed with the patient's spouse. All questions                             were answered and informed consent was obtained.                             Patient identification and proposed procedure were                              verified by the physician and the nurse in the                             procedure room. Mental Status Examination: sedated.                             Airway Examination: normal oropharyngeal airway and                             neck mobility. Respiratory Examination: clear to                             auscultation. CV Examination: normal. Prophylactic                             Antibiotics: The patient does not require                             prophylactic antibiotics. Prior Anticoagulants: The                             patient has taken no previous anticoagulant or                             antiplatelet agents. ASA Grade Assessment: II - A                             patient with mild systemic disease. After reviewing                             the risks and benefits, the patient was deemed in                             satisfactory condition to undergo the procedure.                             The anesthesia plan was to use moderate sedation /                             analgesia (conscious sedation). Immediately prior                             to administration of medications, the patient was                             re-assessed for adequacy to receive sedatives. The                             heart rate, respiratory rate, oxygen saturations,                             blood pressure, adequacy of pulmonary ventilation,                             and response to care were monitored throughout the                             procedure. The physical status of the patient was                             re-assessed after the procedure.                            After obtaining informed consent, the colonoscope                             was passed under direct vision. Throughout the                             procedure, the patient's blood pressure, pulse, and                             oxygen saturations were monitored continuously. The                              Colonoscope was introduced through the anus and                             advanced to the cecum, identified by appendiceal                             orifice & ileocecal valve. The colonoscopy was                             performed without difficulty. The patient tolerated                             the procedure well. The quality of the bowel                             preparation was fair.                                                                                   Findings:       The perianal and digital rectal examinations were normal. Red blood was        found in the entire colon. A single (solitary) 8 mm ulcer was found in        the cecum. Ulcer had a vessel, spurting bleeding was present. Three        hemostatic clips were successfully placed. Bleeding had stopped at the        end of the procedure. Area was successfully injected with 3.5 mL of a        1:10,000 solution of epinephrine for drug delivery.                                                                                   Impression:               - A single (solitary) ulcer in cecum with a                             bleeding vessel, treated by placement of three                             clips and epinephrine injection.  Recommendation:           - Monitor hgb/hct                            - Avoid anticoagulation.                            - Clear liquid diet.                            - Call GI team if evidence of re-bleeding                                                                                     electronically signed by DORINDA Sims  ________________  Seb Sims MD  Signed Date: 12/29/2011 18:12:ROSITA SÁNCHEZ2/P12  Number of Addenda: 0  I was physically present for the entire viewing portion of the exam.  __________________________  Signature of teaching physician  B4c/D4c  Note Initiated On: 12/29/2011 10:12:ROSITA A12/P12  Scope Withdrawal Time: 0 hours 0 minutes 0 seconds   Total Procedure Duration: 0 hours 0 minutes 0  seconds             Social History:   Reviewed and edited as appropriate  Social History     Socioeconomic History     Marital status: Significant other     Spouse name: Not on file     Number of children: Not on file     Years of education: Not on file     Highest education level: Not on file   Occupational History     Not on file   Social Needs     Financial resource strain: Not on file     Food insecurity:     Worry: Not on file     Inability: Not on file     Transportation needs:     Medical: Not on file     Non-medical: Not on file   Tobacco Use     Smoking status: Former Smoker     Last attempt to quit: 12/3/2011     Years since quittin.6     Smokeless tobacco: Never Used   Substance and Sexual Activity     Alcohol use: No     Alcohol/week: 0.0 oz     Drug use: No     Sexual activity: Yes     Partners: Female   Lifestyle     Physical activity:     Days per week: Not on file     Minutes per session: Not on file     Stress: Not on file   Relationships     Social connections:     Talks on phone: Not on file     Gets together: Not on file     Attends [employer] service: Not on file     Active member of club or organization: Not on file     Attends meetings of clubs or organizations: Not on file     Relationship status: Not on file     Intimate partner violence:     Fear of current or ex partner: Not on file     Emotionally abused: Not on file     Physically abused: Not on file     Forced sexual activity: Not on file   Other Topics Concern     Parent/sibling w/ CABG, MI or angioplasty before 65F 55M? Yes      Service Not Asked     Blood Transfusions Not Asked     Caffeine Concern Not Asked     Occupational Exposure Not Asked     Hobby Hazards Not Asked     Sleep Concern Not Asked     Stress Concern Not Asked     Weight Concern Not Asked     Special Diet Not Asked     Back Care Not Asked     Exercise Yes     Comment: limited walking     Bike Helmet Not Asked     Seat Belt Not Asked     Self-Exams Not  Asked   Social History Narrative     Not on file            Family History:   Reviewed and edited as appropriate  Family History   Problem Relation Age of Onset     Diabetes Mother      Hypertension Mother      Heart Disease Mother      Heart Disease Father          of heart attack, left when he was young     Diabetes Sister      Diabetes Sister      Diabetes Sister            Allergies:   Reviewed and edited as appropriate     Allergies   Allergen Reactions     Hydralazine      Black spots     Simvastatin Other (See Comments)     Leg muscle weakness     Tylenol [Acetaminophen] Palpitations            Medications:     Current Facility-Administered Medications   Medication     aspirin (ASA) chewable tablet 81 mg     atorvastatin (LIPITOR) tablet 80 mg     bisacodyl (DULCOLAX) EC tablet 5 mg    Or     bisacodyl (DULCOLAX) EC tablet 10 mg    Or     bisacodyl (DULCOLAX) EC tablet 15 mg     bisacodyl (DULCOLAX) Suppository 10 mg     carvedilol (COREG) tablet 3.125 mg     clopidogrel (PLAVIX) tablet 75 mg     glucose gel 15-30 g    Or     dextrose 50 % injection 25-50 mL    Or     glucagon injection 1 mg     furosemide (LASIX) tablet 40 mg     insulin aspart (NovoLOG) inj (RAPID ACTING)     insulin aspart (NovoLOG) inj (RAPID ACTING)     insulin glargine (LANTUS PEN) injection 20 Units     Lidocaine (LIDOCARE) 4 % Patch 1 patch     lidocaine (LMX4) cream     lidocaine 1 % 0.1-1 mL     lidocaine patch in PLACE     lidocaine patch REMOVAL     melatonin tablet 1 mg     methylcellulose (CITRUCEL) tablet 500 mg     naloxone (NARCAN) injection 0.1-0.4 mg     nitroGLYcerin (NITROSTAT) sublingual tablet 0.4 mg     ondansetron (ZOFRAN-ODT) ODT tab 4 mg    Or     ondansetron (ZOFRAN) injection 4 mg     pantoprazole (PROTONIX) 40 mg IV push injection     polyethylene glycol (MIRALAX/GLYCOLAX) Packet 17 g     sacubitril-valsartan (ENTRESTO) 49-51 MG per tablet 1 tablet     sodium chloride (PF) 0.9% PF flush 3 mL     sodium  chloride (PF) 0.9% PF flush 3 mL     vitamin B complex with vitamin C (STRESS TAB) tablet 1 tablet     vitamin D3 (CHOLECALCIFEROL) 1000 units (25 mcg) tablet 2,000 Units             Review of Systems:   A complete review of systems was performed and is negative except as noted in the HPI           Physical Exam:   /84 (BP Location: Right arm)   Pulse 86   Temp 97.7  F (36.5  C) (Oral)   Resp 18   SpO2 100%   Wt:   Wt Readings from Last 2 Encounters:   07/22/19 108.2 kg (238 lb 8.6 oz)   07/15/19 108.2 kg (238 lb 9.6 oz)      Constitutional: cooperative, pleasant, not dyspneic/diaphoretic, no acute distress  Eyes: Sclera anicteric/injected  Ears/nose/mouth/throat: Normal oropharynx without ulcers or exudate, mucus membranes moist, hearing intact  Neck: supple, thyroid normal size  CV: RRR with holosystolic murmur. Trace edema in LE bilaterally   Respiratory: Unlabored breathing. CTA bilaterally   Lymph: No axillary, submandibular, supraclavicular or inguinal lymphadenopathy  Abd: Nondistended, +bs, no hepatosplenomegaly, nontender, no peritoneal signs  SENG: Brown stools on glove.   Skin: warm, perfused, no jaundice  Neuro: AAO x 3, No asterixis  Psych: Normal affect  MSK: No deformity noted          Data:   Labs and imaging below were independently reviewed and interpreted    BMP  Recent Labs   Lab 07/23/19  0431 07/22/19  1630 07/22/19  0927    137 135   POTASSIUM 4.0 4.1 4.2   CHLORIDE 106 104 104   RONNY 8.6 8.3* 8.4*   CO2 26 25 26   BUN 49* 49* 54*   CR 2.92* 2.89* 3.08*   GLC 85 132* 122*     CBC  Recent Labs   Lab 07/23/19  0431  07/22/19  1630 07/22/19  0927   WBC 9.1  --  7.8 9.4   RBC 2.78*  --  2.60* 2.51*   HGB 7.6*   < > 6.8* 6.9*   HCT 25.3*  --  23.6* 23.1*   MCV 91  --  91 92   MCH 27.3  --  26.2* 27.5   MCHC 30.0*  --  28.8* 29.9*   RDW 14.9  --  15.2* 15.3*     --  359 347    < > = values in this interval not displayed.     INR  Recent Labs   Lab 07/22/19  0927   INR 1.24*      LFTs  Recent Labs   Lab 07/23/19  0431 07/22/19  0927   ALKPHOS 84 88   AST 6 6   ALT 14 17   BILITOTAL 0.8 0.8   PROTTOTAL 7.1 7.3   ALBUMIN 3.2* 3.4      PANCNo lab results found in last 7 days.

## 2019-07-23 NOTE — PROGRESS NOTES
CLINICAL NUTRITION SERVICES - BRIEF NOTE    REASON FOR BRIEF  Cole Jesus is a/an 59 year old male with positive Admission Nutrition Risk Screen for new/uncontrolled diabetes. However appears to be inappropriate risk screen per chart review as pt received thorough carbohydrate nutrition education with RD on 4/9/19. Last HgbA1C was 5.7 on 7/15/19. Five blood glucose checks so far this admission, all between . Pt to follow with Endocrine as OP.     RD will follow per nutrition protocol.    Mark Engel MS, RD, LD  [address] RD Pager [phone]

## 2019-07-23 NOTE — PROGRESS NOTES
[facility], [facility] Progress Note - Hospitalist Service, Gold 8       Date of Admission:  7/22/2019  Assessment & Plan   Cole Jesus is a 59 year old male with a hx of CAD with STEMI 6/2019 s/p RCA stent, Afib, HFrEF, CKDIV with recent admisison 7/3-7/6/2019 for acute blood loss anemia from GIB found to have angioectasia who was admitted on 7/22 for acute anemia and EVA.     Acute on chronic anemia  Iron deficiency anemia  Hx of AVM of the doudenum  - with hx of recent acute blood loss anemia 2 weeks ago due to lower GI bleeding. No stool patient per patient  - GI consulted    - Venofer ordered   - Cardiac diet started   - continue PPI  - complicated by iron deficiency anemia  - monitor hgb    HFrEF  Hx of STEMI s/p RCI 6/019 on DAPT  Severe MI  - follows with Dr. Trujillo Cardiology  - mildly fluid up on exam  - BNP elevated on exam  - Continue ASA 81 daily, plavic 75mg daily  - continue carvedilol  - Entresto decreased on admission to 49-51mg BID  - Continue Atorvastatin 80mg daily  - nitroglycerin PRN    EVA on CKDIV  - likely due to HFrEF  - monitor I/O fluid status    Afib, chronic  - CHADsVasc 4, holding warfarin due to GI bleed  - continue carvedilol    DM2 on insulin therapy  - Lantus 20u AM, sliding scare   - hypoglycemia protocol  - BG checks TID w meals, HS    Acute on Chronic Low back pain  - lidoderm patches ordered    Diet: NPO per Anesthesia Guidelines for Procedure/Surgery Except for: Meds, Ice Chips    DVT Prophylaxis: Pneumatic Compression Devices  Ponce Catheter: not present  Code Status: Full Code      Disposition Plan   Expected discharge: 2 - 3 days, recommended to prior living arrangement once hemoglobin stable and renal function improved.  Entered: Silas Atwood MD 07/23/2019, 10:45 AM       The patient's care was discussed with the Bedside Nurse, Care Coordinator/, Patient and Patient's Family.    Silas Atwood MD  Hospitalist  Service, Gold 8  [facility], [place]  Pager: [phone]    Please see sticky note for cross cover information  ______________________________________________________________________    Interval History   Feeling ok today. No chest pain, no shortness of breath, no diaphoresis. No melena/blood in the stool. NPO for GI    Data reviewed today: I reviewed all medications, new labs and imaging results over the last 24 hours. I personally reviewed the EKG tracing showing NSR.    Physical Exam   Vital Signs: Temp: 98.2  F (36.8  C) Temp src: Oral BP: 106/74 Pulse: 83 Heart Rate: 76 Resp: 16 SpO2: 97 % O2 Device: None (Room air)    Weight: 0 lbs 0 oz  Gen: NAD, sitting comfortably in bed  OP clear, no lesions,  No cervical LAD  CV: RRR, holosystolic murmur radiating to the axila  RESP: CTA bilaterally  Abd: soft, nontender  Ext: 1+ lower extremity edema    Data   Recent Labs   Lab 07/23/19  0431 07/22/19  2155 07/22/19  1630 07/22/19  0927   WBC 9.1  --  7.8 9.4   HGB 7.6* 7.6* 6.8* 6.9*   MCV 91  --  91 92     --  359 347   INR  --   --   --  1.24*     --  137 135   POTASSIUM 4.0  --  4.1 4.2   CHLORIDE 106  --  104 104   CO2 26  --  25 26   BUN 49*  --  49* 54*   CR 2.92*  --  2.89* 3.08*   ANIONGAP 8  --  8 6   RONNY 8.6  --  8.3* 8.4*   GLC 85  --  132* 122*   ALBUMIN 3.2*  --   --  3.4   PROTTOTAL 7.1  --   --  7.3   BILITOTAL 0.8  --   --  0.8   ALKPHOS 84  --   --  88   ALT 14  --   --  17   AST 6  --   --  6   TROPI  --   --  <0.015  --    Hgb Stable

## 2019-07-23 NOTE — PLAN OF CARE
BP 96/64 (BP Location: Right arm, Cuff Size: Adult Regular)   Pulse 83   Temp 98.3  F (36.8  C) (Oral)   Resp 16   SpO2 95%     Reason for admission: Acute anemia, EVA, Hx CAD, diabetes,  recent admission for STEMI (6/2019), s/p RCA stent, A-fib, cardiomyopathy, CKD, recent admission for anemia.  Vitals: VSS.  Activity: Up independently.  Pain: Mild back pain, declines pain medication.  Neuro: AO x 4  Cardiac: A-fib  Respiratory: WDL  GI/: Voiding spontaneously.  Diet: NPO since 0000.   Lines: PIV x 2 SL  Skin/Wounds: Scars.  Otherwise WDL.   Plan: GI consult/ possible procedure today.      Continue to monitor and follow POC    Champ Kelly RN on 7/23/2019 at 5:49 AM

## 2019-07-24 ENCOUNTER — TELEPHONE (OUTPATIENT)
Dept: GASTROENTEROLOGY | Facility: CLINIC | Age: 59
End: 2019-07-24

## 2019-07-24 ENCOUNTER — DOCUMENTATION ONLY (OUTPATIENT)
Dept: CARE COORDINATION | Facility: CLINIC | Age: 59
End: 2019-07-24

## 2019-07-24 ENCOUNTER — PATIENT OUTREACH (OUTPATIENT)
Dept: CARE COORDINATION | Facility: CLINIC | Age: 59
End: 2019-07-24

## 2019-07-24 ENCOUNTER — PATIENT OUTREACH (OUTPATIENT)
Dept: CARDIOLOGY | Facility: CLINIC | Age: 59
End: 2019-07-24

## 2019-07-24 ENCOUNTER — DOCUMENTATION ONLY (OUTPATIENT)
Dept: PHARMACY | Facility: CLINIC | Age: 59
End: 2019-07-24

## 2019-07-24 VITALS
DIASTOLIC BLOOD PRESSURE: 89 MMHG | TEMPERATURE: 97.6 F | BODY MASS INDEX: 36.1 KG/M2 | HEART RATE: 86 BPM | OXYGEN SATURATION: 98 % | WEIGHT: 237.4 LBS | SYSTOLIC BLOOD PRESSURE: 112 MMHG | RESPIRATION RATE: 16 BRPM

## 2019-07-24 DIAGNOSIS — E78.5 HYPERLIPIDEMIA LDL GOAL <130: ICD-10-CM

## 2019-07-24 DIAGNOSIS — I50.22 CHRONIC SYSTOLIC CONGESTIVE HEART FAILURE (H): Primary | ICD-10-CM

## 2019-07-24 LAB
ANION GAP SERPL CALCULATED.3IONS-SCNC: 8 MMOL/L (ref 3–14)
BUN SERPL-MCNC: 45 MG/DL (ref 7–30)
CALCIUM SERPL-MCNC: 8.3 MG/DL (ref 8.5–10.1)
CHLORIDE SERPL-SCNC: 109 MMOL/L (ref 94–109)
CO2 SERPL-SCNC: 24 MMOL/L (ref 20–32)
CREAT SERPL-MCNC: 2.75 MG/DL (ref 0.66–1.25)
GFR SERPL CREATININE-BSD FRML MDRD: 24 ML/MIN/{1.73_M2}
GLUCOSE BLDC GLUCOMTR-MCNC: 110 MG/DL (ref 70–99)
GLUCOSE BLDC GLUCOMTR-MCNC: 99 MG/DL (ref 70–99)
GLUCOSE SERPL-MCNC: 101 MG/DL (ref 70–99)
IGA SERPL-MCNC: 281 MG/DL (ref 70–380)
POTASSIUM SERPL-SCNC: 3.9 MMOL/L (ref 3.4–5.3)
SODIUM SERPL-SCNC: 140 MMOL/L (ref 133–144)
TTG IGA SER-ACNC: <1 U/ML
TTG IGG SER-ACNC: <1 U/ML

## 2019-07-24 PROCEDURE — 80048 BASIC METABOLIC PNL TOTAL CA: CPT | Performed by: NURSE PRACTITIONER

## 2019-07-24 PROCEDURE — 82784 ASSAY IGA/IGD/IGG/IGM EACH: CPT | Performed by: NURSE PRACTITIONER

## 2019-07-24 PROCEDURE — 25000132 ZZH RX MED GY IP 250 OP 250 PS 637: Performed by: PHYSICIAN ASSISTANT

## 2019-07-24 PROCEDURE — 36415 COLL VENOUS BLD VENIPUNCTURE: CPT | Performed by: NURSE PRACTITIONER

## 2019-07-24 PROCEDURE — 83516 IMMUNOASSAY NONANTIBODY: CPT | Performed by: NURSE PRACTITIONER

## 2019-07-24 PROCEDURE — C9113 INJ PANTOPRAZOLE SODIUM, VIA: HCPCS | Performed by: PHYSICIAN ASSISTANT

## 2019-07-24 PROCEDURE — 25000131 ZZH RX MED GY IP 250 OP 636 PS 637: Performed by: PHYSICIAN ASSISTANT

## 2019-07-24 PROCEDURE — 25000132 ZZH RX MED GY IP 250 OP 250 PS 637: Performed by: INTERNAL MEDICINE

## 2019-07-24 PROCEDURE — 00000146 ZZHCL STATISTIC GLUCOSE BY METER IP

## 2019-07-24 PROCEDURE — 99239 HOSP IP/OBS DSCHRG MGMT >30: CPT | Performed by: INTERNAL MEDICINE

## 2019-07-24 PROCEDURE — 25000128 H RX IP 250 OP 636: Performed by: PHYSICIAN ASSISTANT

## 2019-07-24 RX ADMIN — MELATONIN 2000 UNITS: at 08:09

## 2019-07-24 RX ADMIN — CLOPIDOGREL BISULFATE 75 MG: 75 TABLET, FILM COATED ORAL at 08:09

## 2019-07-24 RX ADMIN — PANTOPRAZOLE SODIUM 40 MG: 40 INJECTION, POWDER, FOR SOLUTION INTRAVENOUS at 08:08

## 2019-07-24 RX ADMIN — B-COMPLEX W/ C & FOLIC ACID TAB 1 TABLET: TAB at 08:08

## 2019-07-24 RX ADMIN — ASPIRIN 81 MG CHEWABLE TABLET 81 MG: 81 TABLET CHEWABLE at 08:09

## 2019-07-24 RX ADMIN — CARVEDILOL 3.12 MG: 3.12 TABLET, FILM COATED ORAL at 08:08

## 2019-07-24 RX ADMIN — SACUBITRIL AND VALSARTAN 1 TABLET: 49; 51 TABLET, FILM COATED ORAL at 08:07

## 2019-07-24 RX ADMIN — INSULIN GLARGINE 20 UNITS: 100 INJECTION, SOLUTION SUBCUTANEOUS at 08:08

## 2019-07-24 RX ADMIN — LIDOCAINE 1 PATCH: 560 PATCH PERCUTANEOUS; TOPICAL; TRANSDERMAL at 08:07

## 2019-07-24 RX ADMIN — FUROSEMIDE 40 MG: 40 TABLET ORAL at 08:08

## 2019-07-24 RX ADMIN — ATORVASTATIN CALCIUM 80 MG: 40 TABLET, FILM COATED ORAL at 08:09

## 2019-07-24 ASSESSMENT — ACTIVITIES OF DAILY LIVING (ADL)
ADLS_ACUITY_SCORE: 12

## 2019-07-24 NOTE — PROGRESS NOTES
Patient has clinic visit within 24-48 hours of Discharge so no post DC follow up call is needed    /25/2019 Status: Jose Martin    Time: 9:30 AM Length: 30   Visit Type: UMP RETURN HEART FAILURE [[id number]] GARTH: [id number]   Provider: Alen Trujillo MD Department:  [facility]

## 2019-07-24 NOTE — DISCHARGE SUMMARY
[facility] acute hospital, [place]  Hospitalist Discharge Summary       Date of Admission:  7/22/2019  Date of Discharge:  7/24/2019  1:11 PM  Discharging Provider: Silas Atwood MD  Discharge Team: Hospitalist Service, Gold 8    Discharge Diagnoses   EVA on CKD from medication effects  Acute anemia in the setting of Iron Deficient Anemia   Heart Failure with Reduced Ejection Fraction    Follow-ups Needed After Discharge   Follow-up Appointments     Adult [facility] Follow-up and recommended labs and tests      Follow up with primary care provider, Silas Enciso, within 7   days to evaluate medication change.  The following labs/tests are   recommended: schedule continued iron infusions.      - Patient was given 1 dose of Venofer 200mg inpatient  - Patient's Entresto dose was decreased to 49-51 given EVA on admission with improvement in EVA over the course of the hospitalization      Appointments on [place] and/or [facility] (with [facility] or [facility]   provider or service). Call [phone] if you haven't heard regarding   these appointments within 7 days of discharge.             Unresulted Labs Ordered in the Past 30 Days of this Admission     Date and Time Order Name Status Description    7/23/2019 0431 T4 free In process     7/1/2019 0822 T4 free In process       These results will be followed up by Silas Enciso    Discharge Disposition   Discharged to home  Condition at discharge: Stable    Hospital Course      Cole Jesus is a 59 year old male with a hx of CAD with STEMI 6/2019 s/p RCA stent, Afib, HFrEF, CKDIV with recent admisison 7/3-7/6/2019 for acute blood loss anemia from GIB found to have angioectasia who was admitted on 7/22 for acute anemia and EVA.     Acute on chronic anemia  Iron deficiency anemia  Hx of AVM of the doudenum  - with hx of recent acute blood loss anemia 2 weeks ago due to lower GI bleeding. No stool patient per patient  - GI consulted    -  Venofer ordered, they would recommend IV Iron replacement   - Cardiac diet started   - continue PPI   - GI will arrange for outpatient colonoscopy  - monitored hgb while inpatient, but this was stable    HFrEF  Hx of STEMI s/p RCI 6/019 on DAPT  Severe MI  - follows with Dr. Trujillo Cardiology  - mildly fluid up on exam  - BNP elevated on exam  - Continue ASA 81 daily, plavic 75mg daily  - continue carvedilol  - Entresto decreased on admission to 49-51mg BID given EVA on admission  - Continue Atorvastatin 80mg daily  - nitroglycerin PRN    EVA on CKDIV  - likely due to HFrEF  - monitor I/O fluid status  - with improvement during admission with decreased Entresto dose    Afib, chronic  - CHADsVasc 4, holding warfarin due to GI bleed  - continue carvedilol    DM2 on insulin therapy  - Lantus 20u AM, sliding scare   - hypoglycemia protocol  - BG checks TID w meals, HS    Acute on Chronic Low back pain  - lidoderm patches ordered with good effect      Consultations This Hospital Stay   VASCULAR ACCESS CARE ADULT IP CONSULT  ADVANCE DIRECTIVE IP CONSULT  GI LUMINAL ADULT IP CONSULT    Code Status   Full Code    Time Spent on this Encounter   I, Silas Atwood, personally saw the patient today and spent greater than 30 minutes discharging this patient.       Silas Atwood MD  [facility], [place]  ______________________________________________________________________    Physical Exam   Vital Signs: Temp: 97.6  F (36.4  C) Temp src: Oral BP: 112/89   Heart Rate: 85 Resp: 16 SpO2: 98 % O2 Device: None (Room air)    Weight: 237 lbs 6.4 oz  Gen: NAD, Smiling  Head: NCAT  Eyes: PERRLA, EOMI  Mouth: OP clear, MMM  Neck: No cervical LAD  CV: RRR, +holosystolic murmur  RESP: CTA bilaterally  ABD: soft, nontender, nondistended  Ext: trace edema       Primary Care Physician   Silas Enciso    Discharge Orders      GASTROENTEROLOGY ADULT REF PROCEDURE ONLY [facility] [phone]       Reason for your hospital stay    You were admitted due to anemia and slight kidney injury. The anemia improved with blood transfusion and iron transfusion and has been stable. This means there is at this time, no serious acute bleeding. Your heart failure medication, Entresto, dosage was changed while you were admitted. With this and extra lasix dose, your kidney function improved.     Activity    Your activity upon discharge: activity as tolerated     Monitor and record    weight every day     Adult [facility] Follow-up and recommended labs and tests    Follow up with primary care provider, Silas Enciso, within 7 days to evaluate medication change.  The following labs/tests are recommended: schedule continued iron infusions.      Appointments on [place] and/or [facility] (with [facility] or [facility] provider or service). Call [phone] if you haven't heard regarding these appointments within 7 days of discharge.     Diet    Follow this diet upon discharge: follow a low sodium (<2g/day) diet       Significant Results and Procedures   Results for orders placed or performed during the hospital encounter of 07/03/19   XR Chest 2 Views    Narrative    Exam: XR CHEST 2 VW, 7/3/2019 6:24 PM    Indication: sob, weakness    Comparison: Radiograph on 4/8/2019    Findings:   Frontal and lateral views of chest.  Enlarged cardiac silhouette, unchanged. Prominent pulmonary  vasculatures. Small bilateral pleural effusion. No appreciable  pneumothorax. Hiatus hernia. Visualized upper abdomen is unremarkable.  No acute osseous abnormality.      Impression    Impression:   1. Chronic bilateral pleural effusion with associated basilar  atelectasis.  2. Cardiomegaly and cephalization of pulmonary vasculature suggestive  of pulmonary edema.    I have personally reviewed the examination and initial interpretation  and I agree with the findings.    MYRNA FOREMAN MD       Discharge Medications   Discharge Medication List  as of 7/24/2019 12:38 PM      START taking these medications    Details   sacubitril-valsartan (ENTRESTO) 49-51 MG per tablet Take 1 tablet by mouth 2 times daily, Disp-60 tablet, R-1, E-Prescribe         CONTINUE these medications which have NOT CHANGED    Details   Alcohol Swabs PADS 1 applicator 4 times daily (before meals and nightly), Disp-100 each, R-3, E-Prescribe      aspirin (ASA) 81 MG chewable tablet Take 81 mg by mouth daily, Historical      atorvastatin (LIPITOR) 80 MG tablet Take 1 tablet (80 mg) by mouth daily, Disp-90 tablet, R-3, E-Prescribe      blood glucose (NO BRAND SPECIFIED) test strip Use to test blood sugar times times daily. Before meals and at bedtime., Disp-100 each, R-3, E-Prescribe      blood glucose monitoring (ACCU-CHEK FASTCLIX) lancets Use to test blood sugar 4 times daily. At meals and bedtime., Disp-102 each, R-3, E-Prescribe      carvedilol (COREG) 3.125 MG tablet Take 1 tablet (3.125 mg) by mouth 2 times daily (with meals), Disp-60 tablet, R-0, E-Prescribe      cholecalciferol (VITAMIN  -D) 1000 UNITS capsule Take 2 capsules (2,000 Units) by mouth daily, Disp-60 capsule, R-3, Fax      clopidogrel (PLAVIX) 75 MG tablet TAKE ONE TABLET BY MOUTH EVERY DAY, Disp-180 tablet, R-1, E-Prescribe      furosemide (LASIX) 20 MG tablet Take 2 tablets (40 mg) by mouth 2 times daily, Disp-360 tablet, R-3, E-Prescribe      insulin aspart (NOVOLOG PEN) 100 UNIT/ML pen Custom Correction Scale ,use with meals   Do Not give Correction Insulin if BG less than 200.   For  - 224 give 1 units.   For  - 249 give 2 units.   For  - 274 give 3 units.   For  - 299 give 4 units.   For  - 324 give 5 un its.   For  - 349 give 6 units.   For BG greater than or equal to 350 give 7 units.   Novolog sliding scale with meals and add this amount of insulin to the 5 units of Novolog.  -if not eating, just test your blood sugar and give the sliding scale  insulin, Disp-3 mL, R-3,  Local Print      insulin glargine (LANTUS SOLOSTAR PEN) 100 UNIT/ML pen Inject 30 Units Subcutaneous every morning, Disp-3 mL, R-3, E-Prescribe      insulin pen needle (32G X 4 MM) 32G X 4 MM miscellaneous Use 5 pen needles daily or as directed.Disp-200 each, [id number]      nitroGLYcerin (NITROSTAT) 0.4 MG sublingual tablet Place 1 tablet (0.4 mg) under the tongue every 5 minutes as needed for chest pain For chest pain place 1 tablet under the tongue every 5 minutes for 3 doses. If symptoms persist 5 minutes after 1st dose call 911., Disp-25 tablet, R-0, Local Print      pantoprazole (PROTONIX) 40 MG EC tablet TAKE ONE TABLET BY MOUTH TWICE A DAY, Disp-180 tablet, R-2, E-Prescribe      Sharps Container MISC 1 Device every 30 days, Disp-1 each, R-3, E-Prescribe      vitamin  B complex with vitamin C (VITAMIN  B COMPLEX) TABS Take 1 tablet by mouth daily, Historical         STOP taking these medications       sacubitril-valsartan (ENTRESTO)  MG per tablet Comments:   Reason for Stopping:             Allergies   Allergies   Allergen Reactions     Hydralazine      Black spots     Simvastatin Other (See Comments)     Leg muscle weakness     Tylenol [Acetaminophen] Palpitations

## 2019-07-24 NOTE — PLAN OF CARE
VS: BP's soft, 's/60-70's. OVSS on RA. Telemetry NSR but does go in and out of A-fib. Rate controlled in the 70's.   B at 0200  Labs: last Hgb check was 7.8.   Pain/Nausea: denies pain or nausea  Diet: tolerating a low fat, low sodium diet  LDA: PIV saline locked. Received a dose of Venofer last evening.   GI: had a large, brown stool yesterday. None reported since.   : voiding, not saving  Skin: intact  Mobility: able to ambulate independently  Plan: unsure of the plan today. Patient states he was told that he could discharge potentially Thursday.

## 2019-07-24 NOTE — PLAN OF CARE
DISCHARGE:  D: Patient with orders to discharge to home.  I: Discharge instructions, medications & follow ups reviewed with patient. Copy of discharge summary given to patient. PIV removed. All belongings packed & sent with patient. Medications filled & picked up at discharge pharmacy.  A: Patient in stable condition. AVSS. Patient had no further questions regarding discharge instructions and medications. Patient transferred out by self & left with wife.  P: Plan for outpatient appointment tomorrow at the clinic.

## 2019-07-24 NOTE — PLAN OF CARE
/69 (BP Location: Left arm)   Pulse 86   Temp 97.9  F (36.6  C) (Oral)   Resp 16   SpO2 97%       Patient VSS on RA; afebrile. BG in 80's. Denies pain. Tolerating low fat/low sodium diet with good appetite. PIV SL. BM today. Voiding, not saving. Up around room independently. Will continue with POC and notify MD with changes or concerns.

## 2019-07-24 NOTE — PROGRESS NOTES
GASTROENTEROLOGY PROGRESS NOTE       Patient Summary   Cole Jesus is a 59 year old male with a past medical history of GI bleed s/t a cecal ulcer s/p ileocecal resection in 2011, GI bleed on 7/5/19 s/t duodenal angioectasia, ischemic cardiomyopathy on aspirin and Plavix, HFrEF (35% EF on echo 6/17/19), atrial fibrillation (warfarin stopped on 7/4/19), CKD stage IV, DM type II hypertension, hyperlipidemia, subclinical hypothyroidism, STEMI on 6/15/19 s/p RCA stent placement and now admitted for hgb drop from 8.4 to 6.9.       ASSESSMENT AND RECOMMENDATIONS:   #Iron deficiency anemia   #Recent GI bleed s/t duodenal angioectasia  #On dual antiplatelet therapy  Patient reports feeling better than the last admission, he denies hematochezia, hematemesis or symptoms of anemia including chest pain, shortness of breath or dizziness. On 7/4/19, patient presented with melena and 5 grams hgb drop, EGD from 7/5/19 showed actively bleeding angioectasia in second portion of the duodenum s/p injected, and clipped. Colonoscopy from 2011 revealed actively bleeding shallow ulceration in the cecum that was thought to be related to hemic ulcer from his cardiogenic shock s/p ileocecal resection. Previous baseline hgb was 14.2 and discharge hgb was stable at 8.4. On admission, hgb was 6.8 s/p 1unit RBC transfusion. On 7/23/19, hgb is at 7.6 (MCV 91/RDW15.2), plts 332 and INR is 1.24. Significant iron deficiency noted, ferritin 8, iron 15, saturation index of 4 but binding capacity is normal at 361. Rectal exam is negative for blood. Tissue transglutaminase IgA antibody and IgA came back normal.     Hgb is stable this morning at 7.8 and patient is having brown stools. Plan is for patient to follow up with PCP for iron deficiency management and GI will arrange colonoscopy for iron deficiency anemia.     Recommendations  --Diet as tolerated   --Continue 40mg of PPI twice daily  --Follow up with PCP for iron deficiency management    --Outpatient colonoscopy for hx of iron deficiency of anemia  --GI team is signing off patient to primary team     Pt care plan discussed with Dr. Alexnader, GI staff physician. Thank you for involving us in this patient's care. Please do not hesitate to contact the GI service with any questions or concerns.    [facility]  Department of Gastroenterology   P: [phone]  Subjective\events within the 24 hours:   Patient is feeling well this morning and he denies black/blooy stools.     4 point ROS performed and negative unless noted above.           Medications:     Current Facility-Administered Medications   Medication     aspirin (ASA) chewable tablet 81 mg     atorvastatin (LIPITOR) tablet 80 mg     bisacodyl (DULCOLAX) EC tablet 5 mg    Or     bisacodyl (DULCOLAX) EC tablet 10 mg    Or     bisacodyl (DULCOLAX) EC tablet 15 mg     bisacodyl (DULCOLAX) Suppository 10 mg     carvedilol (COREG) tablet 3.125 mg     clopidogrel (PLAVIX) tablet 75 mg     glucose gel 15-30 g    Or     dextrose 50 % injection 25-50 mL    Or     glucagon injection 1 mg     diphenhydrAMINE (BENADRYL) injection 50 mg     EPINEPHrine (ADRENALIN) kit 0.3 mg     furosemide (LASIX) tablet 40 mg     insulin aspart (NovoLOG) inj (RAPID ACTING)     insulin aspart (NovoLOG) inj (RAPID ACTING)     insulin glargine (LANTUS PEN) injection 20 Units     iron sucrose (VENOFER) 200 mg in sodium chloride 0.9 % 100 mL intermittent infusion     Lidocaine (LIDOCARE) 4 % Patch 1 patch     lidocaine (LMX4) cream     lidocaine 1 % 0.1-1 mL     lidocaine patch in PLACE     lidocaine patch REMOVAL     melatonin tablet 1 mg     methylcellulose (CITRUCEL) tablet 500 mg     methylPREDNISolone sodium succinate (solu-MEDROL) injection 125 mg     naloxone (NARCAN) injection 0.1-0.4 mg     nitroGLYcerin (NITROSTAT) sublingual tablet 0.4 mg     ondansetron (ZOFRAN-ODT) ODT tab 4 mg    Or     ondansetron (ZOFRAN) injection 4 mg     pantoprazole (PROTONIX)  40 mg IV push injection     polyethylene glycol (MIRALAX/GLYCOLAX) Packet 17 g     ranitidine (ZANTAC) injection 50 mg     sacubitril-valsartan (ENTRESTO) 49-51 MG per tablet 1 tablet     sodium chloride (PF) 0.9% PF flush 3 mL     sodium chloride (PF) 0.9% PF flush 3 mL     vitamin B complex with vitamin C (STRESS TAB) tablet 1 tablet     vitamin D3 (CHOLECALCIFEROL) 1000 units (25 mcg) tablet 2,000 Units     Current Outpatient Medications   Medication Sig     Alcohol Swabs PADS 1 applicator 4 times daily (before meals and nightly)     aspirin (ASA) 81 MG chewable tablet Take 81 mg by mouth daily     atorvastatin (LIPITOR) 80 MG tablet Take 1 tablet (80 mg) by mouth daily     blood glucose (NO BRAND SPECIFIED) test strip Use to test blood sugar times times daily. Before meals and at bedtime.     blood glucose monitoring (ACCU-CHEK FASTCLIX) lancets Use to test blood sugar 4 times daily. At meals and bedtime.     carvedilol (COREG) 3.125 MG tablet Take 1 tablet (3.125 mg) by mouth 2 times daily (with meals)     cholecalciferol (VITAMIN  -D) 1000 UNITS capsule Take 2 capsules (2,000 Units) by mouth daily     clopidogrel (PLAVIX) 75 MG tablet TAKE ONE TABLET BY MOUTH EVERY DAY     furosemide (LASIX) 20 MG tablet Take 2 tablets (40 mg) by mouth 2 times daily     insulin glargine (LANTUS SOLOSTAR PEN) 100 UNIT/ML pen Inject 30 Units Subcutaneous every morning (Patient taking differently: Inject 26 Units Subcutaneous every morning )     insulin pen needle (32G X 4 MM) 32G X 4 MM miscellaneous Use 5 pen needles daily or as directed.     pantoprazole (PROTONIX) 40 MG EC tablet TAKE ONE TABLET BY MOUTH TWICE A DAY     sacubitril-valsartan (ENTRESTO) 49-51 MG per tablet Take 1 tablet by mouth 2 times daily     Sharps Container MISC 1 Device every 30 days     vitamin  B complex with vitamin C (VITAMIN  B COMPLEX) TABS Take 1 tablet by mouth daily     insulin aspart (NOVOLOG PEN) 100 UNIT/ML pen Custom Correction Scale ,use  with meals   Do Not give Correction Insulin if BG less than 200.   For  - 224 give 1 units.   For  - 249 give 2 units.   For  - 274 give 3 units.   For  - 299 give 4 units.   For  - 324 give 5 units.   For  - 349 give 6 units.   For BG greater than or equal to 350 give 7 units.   Novolog sliding scale with meals and add this amount of insulin to the 5 units of Novolog.  -if not eating, just test your blood sugar and give the sliding scale insulin (Patient not taking: Reported on 7/15/2019)     nitroGLYcerin (NITROSTAT) 0.4 MG sublingual tablet Place 1 tablet (0.4 mg) under the tongue every 5 minutes as needed for chest pain For chest pain place 1 tablet under the tongue every 5 minutes for 3 doses. If symptoms persist 5 minutes after 1st dose call 911.        Physical Exam   Blood pressure 112/89, pulse 86, temperature 97.6  F (36.4  C), temperature source Oral, resp. rate 16, weight 107.7 kg (237 lb 6.4 oz), SpO2 98 %.    Constitutional: cooperative, pleasant, not dyspneic/diaphoretic, no acute distress  Eyes: Sclera anicteric/injected  Ears/nose/mouth/throat: Normal oropharynx without ulcers or exudate, mucus membranes moist, hearing intact  Neck: supple, thyroid normal size  CV: RRR with holosystolic murmur. Trace edema in LE bilaterally   Respiratory: Unlabored breathing. CTA bilaterally   Lymph: No axillary, submandibular, supraclavicular or inguinal lymphadenopathy  Abd: Nondistended, +bs, no hepatosplenomegaly, nontender, no peritoneal signs  Skin: warm, perfused, no jaundice  Neuro: AAO x 3, No asterixis  Psych: Normal affect  MSK: No deformity noted     Data   Current Labs  CBC  Recent Labs   Lab 07/23/19  2028 07/23/19  0431 07/22/19  2155 07/22/19  1630 07/22/19  0927   WBC 9.6 9.1  --  7.8 9.4   RBC 2.88* 2.78*  --  2.60* 2.51*   HGB 7.8* 7.6* 7.6* 6.8* 6.9*   HCT 26.0* 25.3*  --  23.6* 23.1*   MCV 90 91  --  91 92   MCH 27.1 27.3  --  26.2* 27.5   MCHC 30.0* 30.0*  --   28.8* 29.9*   RDW 15.2* 14.9  --  15.2* 15.3*    332  --  359 347     BMP  Recent Labs   Lab 07/24/19  0611 07/23/19  0431 07/22/19  1630 07/22/19  0927    140 137 135   POTASSIUM 3.9 4.0 4.1 4.2   CHLORIDE 109 106 104 104   CO2 24 26 25 26   ANIONGAP 8 8 8 6   * 85 132* 122*   BUN 45* 49* 49* 54*   CR 2.75* 2.92* 2.89* 3.08*   GFRESTIMATED 24* 22* 22* 21*   GFRESTBLACK 28* 26* 26* 24*   RONNY 8.3* 8.6 8.3* 8.4*   MAG  --  2.4* 2.5*  --    PHOS  --  3.5  --   --       INR  Recent Labs   Lab 07/22/19  0927   INR 1.24*     Liver panel  Recent Labs   Lab 07/23/19  0431 07/22/19  0927   PROTTOTAL 7.1 7.3   ALBUMIN 3.2* 3.4   BILITOTAL 0.8 0.8   ALKPHOS 84 88   AST 6 6   ALT 14 17          History of Present Illness:   Cole Jesus is a 59 year old male with a history of GI bleed s/t a cecal ulcer s/p ileocecal resection in 2011, GI bleed on 7/5/19 s/t duodenal angioectasia, ischemic cardiomyopathy on aspirin and Plavix, HFrEF (35% EF on echo 6/17/19), atrial fibrillation (warfarin stopped on 7/4/19), CKD stage IV, DM type II hypertension, hyperlipidemia, subclinical hypothyroidism, STEMI on 6/15/19 s/p RCA stent placement and now admitted for hgb drop from 8.4 to 6.9. Patient reports feeling better than the last admission, he denies hematochezia, hematemesis or symptoms of anemia including chest pain, shortness of breath or dizziness. He is eating and drinking well. He denies abdominal pain, n/v, odynophagia and dysphagia. No NSAID, alcohol or tobacco use.

## 2019-07-25 ENCOUNTER — HOSPITAL ENCOUNTER (OUTPATIENT)
Facility: CLINIC | Age: 59
End: 2019-07-25
Attending: INTERNAL MEDICINE | Admitting: INTERNAL MEDICINE
Payer: MEDICAID

## 2019-07-25 ENCOUNTER — ANTICOAGULATION THERAPY VISIT (OUTPATIENT)
Dept: ANTICOAGULATION | Facility: CLINIC | Age: 59
End: 2019-07-25

## 2019-07-25 DIAGNOSIS — I48.91 ATRIAL FIBRILLATION (H): ICD-10-CM

## 2019-07-25 NOTE — PROGRESS NOTES
Dates of hospitalization: 7/22/ to 7/24/19  Reason for hospitalization: Acute Anemia and EVA  Warfarin has been discontinued   Patient is on Aspirin and Plavix    We will reactivate Cole if warfarin is restarted.

## 2019-07-25 NOTE — PROGRESS NOTES
Prior Authorization Approval    entresto 49-51mg tabs  Date Initiated: 7/24/2019  Date Completed: 7/24/2019  Prior Auth Type: Clinical                Status: Approved    Effective Date: 07/24/2019 - 09/21/2019  Copay: 0.00     [place] Filled: Yes    Insurance: [place] Medicaid ([facility] - Phone [phone] Fax [phone]  ID: [id number]  Case Number: [id number]   Submitted Via: Faelayne Rosen  [facility] Liaison  Ph: [phone] Page: [phone]

## 2019-08-01 ENCOUNTER — TELEPHONE (OUTPATIENT)
Dept: ENDOCRINOLOGY | Facility: CLINIC | Age: 59
End: 2019-08-01

## 2019-08-01 DIAGNOSIS — E11.9 TYPE 2 DIABETES MELLITUS WITHOUT COMPLICATION, WITHOUT LONG-TERM CURRENT USE OF INSULIN (H): Chronic | ICD-10-CM

## 2019-08-01 NOTE — TELEPHONE ENCOUNTER
[facility]    Phone Message    May a detailed message be left on voicemail: yes    Reason for Call: Other: Discuss his blood sugars, per pt.      Action Taken: Message routed to:  [facility] Endo

## 2019-08-01 NOTE — TELEPHONE ENCOUNTER
Cole was discharged  from the hospital a week ago Wednesday . He had a GI bleed and  has been too weak to come in . Hgb was 6 .  He reports taking Lantus  26 units in the AM and now having lows . He is now  Taking  20 units  Starting today  and feels better.  HX   7/29/19  - 26 units lantus   7/30/19  AM  86 - 26 units  Lantus   7/31/19  7:31 AM 96 24 units  Lantus = 1 PM 70= 4   8/1/19  AM  76 took 20 units lantus = 12 noon 127= 4:30    He has not needed the  Novolog for sliding scale . He has lost a little weight and reports a Heart Attack 6/15/19 .   Should he continue Lantus  20 units for now   He needs to schedule to be seen  also which he is aware of. . Kailee Aiken RN on 8/1/2019 at 4:33 PM

## 2019-08-01 NOTE — TELEPHONE ENCOUNTER
I was able to reach Cole by phone.  He was discharged from hospital on 20 units Lantus daily, but had again increased to pre hospital 26 nits.   He had low BG with this however and decreased to 24 and then this to just 20 units.  BG most recently 103.  He has not had to use any sliding scale insulin.  I advised him to check again before bedtime and have a snack if BG is <140.  Also if still <100 in morning he will further decrease dose to 18 units and watch carefully.  He is advise to call us with any BG <70.  He plans to reschedule f/u appt.    It is my privilege to be involved in the care of the above patient.     Smiley Argueta PA-C, MPAS  [facility]  Diabetes, Endocrinology, and Metabolism  [phone] Appointments/Nurse  [phone] Fax  [phone] pager  [phone] nurse line

## 2019-08-02 NOTE — TELEPHONE ENCOUNTER
Cole contacted nursing  staff  Late yesterday via my chart because nobody called him back from this pt call. This was dealt  with  yesterday by  Triage . The message did not get routed to the  Nurse Triage  Team yesterday until now from  8/1/19 8:48 AM Kailee Aiken, RN on 8/2/2019 at 1:13 PM     - - -

## 2019-08-08 ENCOUNTER — TELEPHONE (OUTPATIENT)
Dept: GASTROENTEROLOGY | Facility: CLINIC | Age: 59
End: 2019-08-08

## 2019-08-08 NOTE — TELEPHONE ENCOUNTER
Patient Name: Cole Jesus   : 1960  MRN: [id number]       : [x] N/A   [] Yes:  Language /  ID:     VM with information needed to complete pre-assessment call.  Request pt contact Endoscopy Pre-assessment RN to complete upcoming procedure information.  This information may be complete by VM if necessary. Telephone call-back number provided.    Bridgett Domingo, RN  [facility] Endoscopy    Additional Information regarding appointment:      Patient scheduled for:  [] EGD  [x] Colonoscopy  [] EUS  [] Flex Sig   [] Other:     Indication for procedure. [] Screening   [x] Acute anemia     Sedation Type: [x] Conscious Sedation   [] MAC   [] None    Procedure Provider:  Bobbi      Referring Provider. Silas Casas (PCP)    Arrival time verified: //900    Facility location verified:   [x][facility] - [address], 1st Floor, Rm 1[address]301    Pt meets medical necessity for outpatient procedure in hospital Endoscopy Unit:     [x] N/A for this Payor (non-BCBS)  [] [facility], 5th floor     [][facility] - [address], 3rd Floor Surgery check-in      Prep Type:   [x]Golytely eRx: (not sent);  [] MoviPrep: , [] MiraLax: , [] Fleet x 2:   []NPO /p , No solid food /p 2200 the night before    Anticoagulants or blood thinners: []None [x] ASA 81mg  - may continue           [] Warfarin   [] Warfarin + Lovenox bridge [x] Plavix [] Effient [] Eliquis         [] Xarelto  [] Brilinta [] NSAIDS  [] Other    LAST anticoagulant dose: Date/Time: Will continue both ASA et clopidogrel owing recent PCI - Bobbi is aware  INR: N/A    Electronic implanted devices: [x] No  [] IPG  []  ICD  []  LVAD  []     H&P / Pre op physical completed: [x] N/A, [] Complete, Date , [] Scheduled, Date , [] No,     Additional Information:     _______________________________________________

## 2019-08-13 DIAGNOSIS — I21.19 ST ELEVATION MYOCARDIAL INFARCTION (STEMI) OF INFERIOR WALL (H): ICD-10-CM

## 2019-08-16 ENCOUNTER — MYC MEDICAL ADVICE (OUTPATIENT)
Dept: CARDIOLOGY | Facility: CLINIC | Age: 59
End: 2019-08-16

## 2019-08-16 ENCOUNTER — TELEPHONE (OUTPATIENT)
Dept: CARDIOLOGY | Facility: CLINIC | Age: 59
End: 2019-08-16

## 2019-08-16 DIAGNOSIS — E78.5 HYPERLIPIDEMIA LDL GOAL <100: Chronic | ICD-10-CM

## 2019-08-16 DIAGNOSIS — I21.19 ST ELEVATION MYOCARDIAL INFARCTION (STEMI) OF INFERIOR WALL (H): ICD-10-CM

## 2019-08-16 RX ORDER — CLOPIDOGREL BISULFATE 75 MG/1
75 TABLET ORAL DAILY
Qty: 90 TABLET | Refills: 3 | Status: SHIPPED | OUTPATIENT
Start: 2019-08-16 | End: 2019-08-26

## 2019-08-16 NOTE — TELEPHONE ENCOUNTER
Medication Refill double check:    Medications Refilled: Plavix    Last office visit was with Alen Trujillo MD on 6/27/19.    Follow up was recommended: YES, 1 month with Echo    Any additional encounters with changes to requested med? NO    Authorizing provider is :  Alen Trujillo MD      Refill was approved for 1 year.    Additional orders placed: None     Cheyenne Pedroza RN on 8/16/2019 at 8:51 AM

## 2019-08-16 NOTE — TELEPHONE ENCOUNTER
[facility] Call Center    Phone Message    May a detailed message be left on voicemail: yes    Reason for Call: Medication Refill Request    Has the patient contacted the pharmacy for the refill? Yes   Name of medication being requested: clopidogrel (PLAVIX) 75 MG tablet  Provider who prescribed the medication: Dr. Trujillo  Pharmacy: [facility]   Date medication is needed: 8/16/19 Today   Pt needing this script today, pt will be out this weekend. Pt sent the request out 2 days ago and has still not received approval      Action Taken: Message routed to:  [facility] cardio

## 2019-08-17 RX ORDER — CLOPIDOGREL BISULFATE 75 MG/1
75 TABLET ORAL DAILY
Qty: 90 TABLET | Refills: 3 | Status: SHIPPED | OUTPATIENT
Start: 2019-08-17 | End: 2019-08-19

## 2019-08-19 RX ORDER — CLOPIDOGREL BISULFATE 75 MG/1
75 TABLET ORAL DAILY
Qty: 90 TABLET | Refills: 3 | Status: SHIPPED | OUTPATIENT
Start: 2019-08-19 | End: 2019-08-26

## 2019-08-23 ENCOUNTER — MYC REFILL (OUTPATIENT)
Dept: INTERNAL MEDICINE | Facility: CLINIC | Age: 59
End: 2019-08-23

## 2019-08-23 ENCOUNTER — MYC MEDICAL ADVICE (OUTPATIENT)
Dept: CARDIOLOGY | Facility: CLINIC | Age: 59
End: 2019-08-23

## 2019-08-23 ENCOUNTER — TELEPHONE (OUTPATIENT)
Dept: CARDIOLOGY | Facility: CLINIC | Age: 59
End: 2019-08-23

## 2019-08-23 DIAGNOSIS — I10 BENIGN ESSENTIAL HYPERTENSION: ICD-10-CM

## 2019-08-23 DIAGNOSIS — I50.22 CHRONIC SYSTOLIC CONGESTIVE HEART FAILURE (H): ICD-10-CM

## 2019-08-23 RX ORDER — CARVEDILOL 3.12 MG/1
3.12 TABLET ORAL 2 TIMES DAILY WITH MEALS
Qty: 60 TABLET | Refills: 0 | Status: CANCELLED | OUTPATIENT
Start: 2019-08-23

## 2019-08-23 RX ORDER — CARVEDILOL 3.12 MG/1
3.12 TABLET ORAL 2 TIMES DAILY WITH MEALS
Qty: 60 TABLET | Refills: 0 | Status: SHIPPED | OUTPATIENT
Start: 2019-08-23 | End: 2019-08-26

## 2019-08-23 NOTE — TELEPHONE ENCOUNTER
Called pharmacy to verify that scripts had arrived.  Let Cole know and recommended that he follow up with cardiology clinic.,    Ingris Martin RN

## 2019-08-23 NOTE — TELEPHONE ENCOUNTER
[facility] Call Center    Phone Message    May a detailed message be left on voicemail: yes    Reason for Call: Medication Refill Request    Has the patient contacted the pharmacy for the refill? Yes   Name of medication being requested: carvedilol (COREG) 3.125 MG tablet and sacubitril-valsartan (ENTRESTO) 49-51 MG per tablet  Provider who prescribed the medication: Dr. Trujillo  Pharmacy: [facility] [address]  Date medication is needed: as soon as possible, pt states he is out and had put in a request to the pharmacy on Monday         Action Taken: Message routed to:  [facility] ANUSHA Cardiology

## 2019-08-26 ENCOUNTER — OFFICE VISIT (OUTPATIENT)
Dept: INTERNAL MEDICINE | Facility: CLINIC | Age: 59
End: 2019-08-26
Payer: MEDICAID

## 2019-08-26 VITALS
OXYGEN SATURATION: 96 % | WEIGHT: 236 LBS | SYSTOLIC BLOOD PRESSURE: 105 MMHG | DIASTOLIC BLOOD PRESSURE: 71 MMHG | BODY MASS INDEX: 35.88 KG/M2 | HEART RATE: 91 BPM

## 2019-08-26 DIAGNOSIS — D62 ANEMIA DUE TO ACUTE BLOOD LOSS: ICD-10-CM

## 2019-08-26 DIAGNOSIS — I50.22 CHRONIC SYSTOLIC CONGESTIVE HEART FAILURE (H): ICD-10-CM

## 2019-08-26 DIAGNOSIS — I10 BENIGN ESSENTIAL HYPERTENSION: ICD-10-CM

## 2019-08-26 DIAGNOSIS — I10 ESSENTIAL HYPERTENSION WITH GOAL BLOOD PRESSURE LESS THAN 130/85: ICD-10-CM

## 2019-08-26 DIAGNOSIS — I25.5 ISCHEMIC CARDIOMYOPATHY: Primary | Chronic | ICD-10-CM

## 2019-08-26 DIAGNOSIS — E61.1 IRON DEFICIENCY: ICD-10-CM

## 2019-08-26 DIAGNOSIS — N18.4 CKD (CHRONIC KIDNEY DISEASE) STAGE 4, GFR 15-29 ML/MIN (H): Chronic | ICD-10-CM

## 2019-08-26 DIAGNOSIS — I25.5 ISCHEMIC CARDIOMYOPATHY: ICD-10-CM

## 2019-08-26 DIAGNOSIS — I48.91 ATRIAL FIBRILLATION, UNSPECIFIED TYPE (H): Chronic | ICD-10-CM

## 2019-08-26 DIAGNOSIS — E78.5 HYPERLIPIDEMIA LDL GOAL <100: Chronic | ICD-10-CM

## 2019-08-26 DIAGNOSIS — I21.19 ST ELEVATION MYOCARDIAL INFARCTION (STEMI) OF INFERIOR WALL (H): ICD-10-CM

## 2019-08-26 LAB
ANION GAP SERPL CALCULATED.3IONS-SCNC: 5 MMOL/L (ref 3–14)
BASOPHILS # BLD AUTO: 0.1 10E9/L (ref 0–0.2)
BASOPHILS NFR BLD AUTO: 0.6 %
BUN SERPL-MCNC: 49 MG/DL (ref 7–30)
CALCIUM SERPL-MCNC: 9 MG/DL (ref 8.5–10.1)
CHLORIDE SERPL-SCNC: 107 MMOL/L (ref 94–109)
CO2 SERPL-SCNC: 28 MMOL/L (ref 20–32)
CREAT SERPL-MCNC: 2.76 MG/DL (ref 0.66–1.25)
DIFFERENTIAL METHOD BLD: ABNORMAL
EOSINOPHIL # BLD AUTO: 0.4 10E9/L (ref 0–0.7)
EOSINOPHIL NFR BLD AUTO: 3.9 %
ERYTHROCYTE [DISTWIDTH] IN BLOOD BY AUTOMATED COUNT: 18.5 % (ref 10–15)
FERRITIN SERPL-MCNC: 16 NG/ML (ref 26–388)
GFR SERPL CREATININE-BSD FRML MDRD: 24 ML/MIN/{1.73_M2}
GLUCOSE SERPL-MCNC: 131 MG/DL (ref 70–99)
HCT VFR BLD AUTO: 30.4 % (ref 40–53)
HGB BLD-MCNC: 8.7 G/DL (ref 13.3–17.7)
IMM GRANULOCYTES # BLD: 0 10E9/L (ref 0–0.4)
IMM GRANULOCYTES NFR BLD: 0.3 %
INR PPP: 1.27 (ref 0.86–1.14)
IRON SATN MFR SERPL: 4 % (ref 15–46)
IRON SERPL-MCNC: 17 UG/DL (ref 35–180)
LYMPHOCYTES # BLD AUTO: 0.8 10E9/L (ref 0.8–5.3)
LYMPHOCYTES NFR BLD AUTO: 7.6 %
MCH RBC QN AUTO: 24.1 PG (ref 26.5–33)
MCHC RBC AUTO-ENTMCNC: 28.6 G/DL (ref 31.5–36.5)
MCV RBC AUTO: 84 FL (ref 78–100)
MONOCYTES # BLD AUTO: 1.1 10E9/L (ref 0–1.3)
MONOCYTES NFR BLD AUTO: 10.7 %
NEUTROPHILS # BLD AUTO: 7.8 10E9/L (ref 1.6–8.3)
NEUTROPHILS NFR BLD AUTO: 76.9 %
NRBC # BLD AUTO: 0 10*3/UL
NRBC BLD AUTO-RTO: 0 /100
PLATELET # BLD AUTO: 352 10E9/L (ref 150–450)
POTASSIUM SERPL-SCNC: 4.3 MMOL/L (ref 3.4–5.3)
RBC # BLD AUTO: 3.61 10E12/L (ref 4.4–5.9)
SODIUM SERPL-SCNC: 139 MMOL/L (ref 133–144)
TIBC SERPL-MCNC: 447 UG/DL (ref 240–430)
WBC # BLD AUTO: 10.1 10E9/L (ref 4–11)

## 2019-08-26 RX ORDER — CLOPIDOGREL BISULFATE 75 MG/1
75 TABLET ORAL DAILY
Qty: 90 TABLET | Refills: 3 | Status: ON HOLD | OUTPATIENT
Start: 2019-08-26 | End: 2020-04-21

## 2019-08-26 RX ORDER — CARVEDILOL 3.12 MG/1
3.12 TABLET ORAL 2 TIMES DAILY WITH MEALS
Qty: 60 TABLET | Refills: 0 | Status: SHIPPED | OUTPATIENT
Start: 2019-08-26 | End: 2019-10-21

## 2019-08-26 RX ORDER — FUROSEMIDE 20 MG
TABLET ORAL
Qty: 360 TABLET | Refills: 3 | Status: SHIPPED | OUTPATIENT
Start: 2019-08-26 | End: 2019-08-30

## 2019-08-26 RX ORDER — ATORVASTATIN CALCIUM 80 MG/1
80 TABLET, FILM COATED ORAL DAILY
Qty: 90 TABLET | Refills: 3 | Status: SHIPPED | OUTPATIENT
Start: 2019-08-26 | End: 2020-01-01 | Stop reason: DRUGHIGH

## 2019-08-26 RX ORDER — FUROSEMIDE 20 MG
40 TABLET ORAL 2 TIMES DAILY
Qty: 360 TABLET | Refills: 3 | Status: SHIPPED | OUTPATIENT
Start: 2019-08-26 | End: 2019-08-26

## 2019-08-26 ASSESSMENT — PAIN SCALES - GENERAL: PAINLEVEL: NO PAIN (0)

## 2019-08-26 NOTE — NURSING NOTE
Chief Complaint   Patient presents with     Hospital F/U     Pao Briseno LPN at 6:22 PM on 8/26/2019.

## 2019-08-26 NOTE — PROGRESS NOTES
HPI  59-year-old presents today for follow-up regarding recent hospitalizations.  He initially presented to [place] in early July with an acute myocardial infarction.  Subsequent to that he was started on warfarin.  This resulted in a significant GI bleed.  Subsequently hospitalized with this and exacerbation of his underlying heart failure and chronic kidney disease.  Is discharged a month ago and has been doing well since that time however he has gained some weight and he is increasing with fluid retention in the feet.  He also reports that he is having more difficulty breathing at night.  This been no associated chest pain or exertional symptoms.  Has not noted any further bleeding or black stools.  Said no problems on his present medications.  Past Medical History:   Diagnosis Date     Anemia in chronic renal disease 3/9/2015     Antiplatelet or antithrombotic long-term use      Atrial fibrillation and flutter      CAD (coronary artery disease)     Stemi in 12/11, s/p angioplasty     CRD (chronic renal disease), stage IV (H) 03/09/2015    hx ATN with dialysis complicating cardiogenic shock  1/2012     GI bleed     massive lower GI bleed secondary to a cecal ulcer, s/p ileocecal resection in 12/11     Heart failure     Biventricular systolic HF, complicated by ARDS requiring tracheostomy     Hyperlipidemia LDL goal <100 4/28/2013     Hypertension      Ischemic cardiomyopathy 4/28/2013    TTE revealing 40% EF     Myocardial infarction (H)      Other and unspecified nonspecific immunological findings     Anti JKa     Pulmonary edema     episodes of flash pulmonary edema in 12/11     Subclinical hypothyroidism      Past Surgical History:   Procedure Laterality Date     ESOPHAGOSCOPY, GASTROSCOPY, DUODENOSCOPY (EGD), COMBINED  12/25/2011    Procedure:COMBINED ESOPHAGOSCOPY, GASTROSCOPY, DUODENOSCOPY (EGD); Surgeon:SAMARIA PUENTE; Location:[facility]     LAPAROTOMY EXPLORATORY  12/31/2011    Procedure:LAPAROTOMY  EXPLORATORY; Explore laparotomy, Illeocectomy, Diverting Illeostomy; Surgeon:GABI MOSHER; Location:[facility] OR     ORIF right elbow fracture  age 12     TAKEDOWN ILEOSTOMY  2014    Procedure: TAKEDOWN ILEOSTOMY;  Surgeon: Gabi Mosher MD;  Location: [facility] OR     TRACHEOSTOMY  2011    Procedure:TRACHEOSTOMY; Tracheostomy; 80XLTCP-Proximal Extension-Cuffed 8.0 mm I.D.; Surgeon:LIZABETH DYE; Location:[facility] OR     Family History   Problem Relation Age of Onset     Diabetes Mother      Hypertension Mother      Heart Disease Mother      Heart Disease Father          of heart attack, left when he was young     Diabetes Sister      Diabetes Sister      Diabetes Sister      Social History     Socioeconomic History     Marital status: Significant other     Spouse name: None     Number of children: None     Years of education: None     Highest education level: None   Occupational History     None   Social Needs     Financial resource strain: None     Food insecurity:     Worry: None     Inability: None     Transportation needs:     Medical: None     Non-medical: None   Tobacco Use     Smoking status: Former Smoker     Last attempt to quit: 12/3/2011     Years since quittin.7     Smokeless tobacco: Never Used   Substance and Sexual Activity     Alcohol use: No     Alcohol/week: 0.0 oz     Drug use: No     Sexual activity: Yes     Partners: Female   Lifestyle     Physical activity:     Days per week: None     Minutes per session: None     Stress: None   Relationships     Social connections:     Talks on phone: None     Gets together: None     Attends Taoism service: None     Active member of club or organization: None     Attends meetings of clubs or organizations: None     Relationship status: None     Intimate partner violence:     Fear of current or ex partner: None     Emotionally abused: None     Physically abused: None     Forced sexual activity: None   Other Topics  Concern     Parent/sibling w/ CABG, MI or angioplasty before 65F 55M? Yes      Service Not Asked     Blood Transfusions Not Asked     Caffeine Concern Not Asked     Occupational Exposure Not Asked     Hobby Hazards Not Asked     Sleep Concern Not Asked     Stress Concern Not Asked     Weight Concern Not Asked     Special Diet Not Asked     Back Care Not Asked     Exercise Yes     Comment: limited walking     Bike Helmet Not Asked     Seat Belt Not Asked     Self-Exams Not Asked   Social History Narrative     None       Exam:  /71   Pulse 91   Wt 107 kg (236 lb)   SpO2 96%   BMI 35.88 kg/m    236 lbs 0 oz  The patient is alert, oriented with a clear sensorium.   Skin shows no lesions or rashes and good turgor.   Head is normocephalic and atraumatic.    Neck shows no nodes, thyromegaly.     Lungs are clear.   Heart shows normal S1 and S2 with 3/6 holosystolic murmur.    Extremities show 2+ pretibial edema.    ASSESSMENT  1 chronic kidney disease aggravated by recent blood loss  2 GI bleeding resolved  3 congestive heart failure with mild recent exacerbation  4 hypertension well controlled  5 hyperlipidemia  6 iron deficiency anemia related to 1 and 2 above needs iron infusions    Plan  On a double his Lasix to 80 mg twice a day for a week and then have him resume the 40 mg twice daily.  We will check his iron studies today along with a CBC and BMP and follow-up in 2 weeks with lab with a BMP.  If his iron is low really give him another iron infusion.  Over 25 minutes spent with patient the majority in counseling and coordinating care.    This note was completed using Dragon voice recognition software.  Although reviewed after completion, some word and grammatical errors may occur.    Silas Enciso MD  General Internal Medicine  [facility]  [phone]

## 2019-08-26 NOTE — PATIENT INSTRUCTIONS
[facility] Medication Refill Request Information:  * Please contact your pharmacy regarding ANY request for medication refills.  ** [facility] Prescription Fax = [phone]  * Please allow 3 business days for routine medication refills.  * Please allow 5 business days for controlled substance medication refills.     [facility] Test Result notification information:  *You will be notified with in 7-10 days of your appointment day regarding the results of your test.  If you are on MyChart you will be notified as soon as the provider has reviewed the results and signed off on them.    [facility]: [phone]

## 2019-08-26 NOTE — TELEPHONE ENCOUNTER
carvedilol (COREG) 3.125 MG tablet  Last Written Prescription Date:  8/23/19  Last Fill Quantity: 60,   # refills: 0  Last Office Visit :7/22/19  Future Office visit:  8/26/19    Routing because: pt has appt today. Last rf for 15 day supply. routed pending visit plan of care.      August 26, 2019  9:08 AM    Duplicate refill.        Megan Greco RN BSN

## 2019-08-27 ENCOUNTER — TELEPHONE (OUTPATIENT)
Dept: INTERNAL MEDICINE | Facility: CLINIC | Age: 59
End: 2019-08-27

## 2019-08-27 PROBLEM — E61.1 IRON DEFICIENCY: Status: ACTIVE | Noted: 2019-08-27

## 2019-08-27 PROBLEM — D62 ANEMIA DUE TO ACUTE BLOOD LOSS: Status: ACTIVE | Noted: 2019-08-27

## 2019-08-27 NOTE — TELEPHONE ENCOUNTER
I spoke to Cole and reviewed recent results and recommendations. PCP placed iron infusion orders. Cole voiced understanding. He was provided the scheduling number for [facility].    Mai Singletary RN

## 2019-08-30 ENCOUNTER — TELEPHONE (OUTPATIENT)
Dept: INTERNAL MEDICINE | Facility: CLINIC | Age: 59
End: 2019-08-30

## 2019-08-30 DIAGNOSIS — I50.22 CHRONIC SYSTOLIC CONGESTIVE HEART FAILURE (H): ICD-10-CM

## 2019-08-30 DIAGNOSIS — I25.5 ISCHEMIC CARDIOMYOPATHY: Chronic | ICD-10-CM

## 2019-08-30 DIAGNOSIS — I10 ESSENTIAL HYPERTENSION WITH GOAL BLOOD PRESSURE LESS THAN 130/85: ICD-10-CM

## 2019-08-30 RX ORDER — FUROSEMIDE 20 MG
TABLET ORAL
Qty: 360 TABLET | Refills: 3 | Status: SHIPPED | OUTPATIENT
Start: 2019-08-30 | End: 2019-09-03

## 2019-08-30 NOTE — TELEPHONE ENCOUNTER
New Rx sent over with clearer directions. Called pharmacy who states that the patient has been taking 4 tablets a day for 2 weeks already. Pharmacy will hold new Rx until they hear back from clinic or a new Rx is sent into pharmacy.     Attempted to call the patient to confirm and there was no answer and no option to leave a voicemail. Will attempt to call the patient again. Latosha Wallace LPN 8/30/2019 4:33 PM

## 2019-08-30 NOTE — TELEPHONE ENCOUNTER
Health Call Center    Phone Message    May a detailed message be left on voicemail: yes    Reason for Call: Medication Question or concern regarding medication   Prescription Clarification  Name of Medication: furosemide (LASIX) 20 MG tablet     Prescribing Provider: Silas Enciso MD   Pharmacy: [facility] [place], [place] - [address]   What on the order needs clarification?     The patient needs medication but the pharmacy won t fill it because the medication order instructions are very unclear to them.  They would like a new order sent with better detailed medication taking instructions. Please call pharmacy to address any concerns and call patient to update thank you          Action Taken: Message routed to:  [facility] ([facility] pcc

## 2019-09-03 RX ORDER — FUROSEMIDE 40 MG
40 TABLET ORAL 2 TIMES DAILY
Qty: 186 TABLET | Refills: 3 | Status: SHIPPED | OUTPATIENT
Start: 2019-09-03 | End: 2019-09-16

## 2019-09-03 NOTE — TELEPHONE ENCOUNTER
Patient took Four in the AM and four in the PM for one week. Patient states that his feet are still a little puff. Patient states blood pressure has been good,102/62 today and that is what it has been. Patient took his regular dose of lasix this AM. Will need to know what patient should take going forward, as pharmacy has not filled Rx for patient, since it was too soon and patient still had plenty of lasix left over from before. Latosha Wallace LPN 9/3/2019 9:10 AM      The plan was for him to take 80 bid last week and then resume 40 bid this week and going forward.  SHERI

## 2019-09-04 ENCOUNTER — INFUSION THERAPY VISIT (OUTPATIENT)
Dept: INFUSION THERAPY | Facility: CLINIC | Age: 59
End: 2019-09-04
Attending: INTERNAL MEDICINE
Payer: MEDICAID

## 2019-09-04 VITALS
DIASTOLIC BLOOD PRESSURE: 75 MMHG | HEART RATE: 82 BPM | TEMPERATURE: 97.7 F | RESPIRATION RATE: 16 BRPM | SYSTOLIC BLOOD PRESSURE: 116 MMHG

## 2019-09-04 DIAGNOSIS — D62 ANEMIA DUE TO ACUTE BLOOD LOSS: Primary | ICD-10-CM

## 2019-09-04 DIAGNOSIS — N18.4 ANEMIA IN STAGE 4 CHRONIC KIDNEY DISEASE (H): ICD-10-CM

## 2019-09-04 DIAGNOSIS — E61.1 IRON DEFICIENCY: ICD-10-CM

## 2019-09-04 DIAGNOSIS — D63.1 ANEMIA IN STAGE 4 CHRONIC KIDNEY DISEASE (H): ICD-10-CM

## 2019-09-04 PROCEDURE — 25000128 H RX IP 250 OP 636: Mod: ZF | Performed by: INTERNAL MEDICINE

## 2019-09-04 PROCEDURE — 96365 THER/PROPH/DIAG IV INF INIT: CPT

## 2019-09-04 PROCEDURE — 96366 THER/PROPH/DIAG IV INF ADDON: CPT

## 2019-09-04 PROCEDURE — 25800030 ZZH RX IP 258 OP 636: Mod: ZF | Performed by: INTERNAL MEDICINE

## 2019-09-04 RX ADMIN — IRON SUCROSE 300 MG: 20 INJECTION, SOLUTION INTRAVENOUS at 07:31

## 2019-09-04 ASSESSMENT — PAIN SCALES - GENERAL: PAINLEVEL: NO PAIN (0)

## 2019-09-04 NOTE — PATIENT INSTRUCTIONS
Patient Education     Iron Sucrose injection  Brand Name: Venofer  What is this medicine?  IRON SUCROSE (TRICIA velez) is an iron complex. Iron is used to make healthy red blood cells, which carry oxygen and nutrients throughout the body. This medicine is used to treat iron deficiency anemia in people with chronic kidney disease.  How should I use this medicine?  This medicine is for infusion into a vein. It is given by a health care professional in a hospital or clinic setting.  Talk to your pediatrician regarding the use of this medicine in children. While this drug may be prescribed for children as young as 2 years for selected conditions, precautions do apply.  What side effects may I notice from receiving this medicine?  Side effects that you should report to your doctor or health care professional as soon as possible:    allergic reactions like skin rash, itching or hives, swelling of the face, lips, or tongue    breathing problems    changes in blood pressure    cough    fast, irregular heartbeat    feeling faint or lightheaded, falls    fever or chills    flushing, sweating, or hot feelings    joint or muscle aches/pains    seizures    swelling of the ankles or feet    unusually weak or tired  Side effects that usually do not require medical attention (report to your doctor or health care professional if they continue or are bothersome):    diarrhea    feeling achy    headache    irritation at site where injected    nausea, vomiting    stomach upset    tiredness  What may interact with this medicine?  Do not take this medicine with any of the following medications:    deferoxamine    dimercaprol    other iron products  This medicine may also interact with the following medications:    chloramphenicol    deferasirox  What if I miss a dose?  It is important not to miss your dose. Call your doctor or health care professional if you are unable to keep an appointment.  Where should I keep my  medicine?  This drug is given in a hospital or clinic and will not be stored at home.  What should I tell my health care provider before I take this medicine?  They need to know if you have any of these conditions:    anemia not caused by low iron levels    heart disease    high levels of iron in the blood    kidney disease    liver disease    an unusual or allergic reaction to iron, other medicines, foods, dyes, or preservatives    pregnant or trying to get pregnant    breast-feeding  What should I watch for while using this medicine?  Visit your doctor or healthcare professional regularly. Tell your doctor or healthcare professional if your symptoms do not start to get better or if they get worse. You may need blood work done while you are taking this medicine.  You may need to follow a special diet. Talk to your doctor. Foods that contain iron include: whole grains/cereals, dried fruits, beans, or peas, leafy green vegetables, and organ meats (liver, kidney).  NOTE:This sheet is a summary. It may not cover all possible information. If you have questions about this medicine, talk to your doctor, pharmacist, or health care provider. Copyright  2019 [employer]

## 2019-09-04 NOTE — PROGRESS NOTES
Infusion nursing note:    Cole Jesus presents today to [facility] for a venofer infusion.  During today's [facility] appointment orders from Dr. Enciso were completed.  Frequency: 1 of 3 infusions.    Progress note:  ID verified by name and .  Assessment completed.  Vitals were stable throughout time in [facility].  verbal and printed education given to patient/representative regarding infusion and possible side effects.  Patient verbalized understanding.      Infusion given over approximately  90 minutes     Administrations This Visit     iron sucrose (VENOFER) 300 mg in sodium chloride 0.9 % 250 mL intermittent infusion     Admin Date  2019 Action  New Bag Dose  300 mg Rate  193.3 mL/hr Route  Intravenous Administered By  Christelle Aviles RN                /75   Pulse 82   Temp 97.7  F (36.5  C)   Resp 16         Discharge plan:    Discharge instructions were reviewed with patient: Yes  Patient/representative verbalized understanding of discharge instructions and all questions answered: Yes.    Discharged from [facility] in stable condition.    Christelle Aviles RN

## 2019-09-09 ENCOUNTER — MYC MEDICAL ADVICE (OUTPATIENT)
Dept: INTERNAL MEDICINE | Facility: CLINIC | Age: 59
End: 2019-09-09

## 2019-09-09 DIAGNOSIS — I48.91 ATRIAL FIBRILLATION, UNSPECIFIED TYPE (H): Chronic | ICD-10-CM

## 2019-09-09 DIAGNOSIS — I50.22 CHRONIC SYSTOLIC CONGESTIVE HEART FAILURE (H): ICD-10-CM

## 2019-09-09 DIAGNOSIS — N18.4 CKD (CHRONIC KIDNEY DISEASE) STAGE 4, GFR 15-29 ML/MIN (H): Chronic | ICD-10-CM

## 2019-09-09 LAB
ANION GAP SERPL CALCULATED.3IONS-SCNC: 9 MMOL/L (ref 3–14)
BUN SERPL-MCNC: 59 MG/DL (ref 7–30)
CALCIUM SERPL-MCNC: 8.7 MG/DL (ref 8.5–10.1)
CHLORIDE SERPL-SCNC: 104 MMOL/L (ref 94–109)
CO2 SERPL-SCNC: 24 MMOL/L (ref 20–32)
CREAT SERPL-MCNC: 3.07 MG/DL (ref 0.66–1.25)
GFR SERPL CREATININE-BSD FRML MDRD: 21 ML/MIN/{1.73_M2}
GLUCOSE SERPL-MCNC: 178 MG/DL (ref 70–99)
INR PPP: 1.3 (ref 0.86–1.14)
POTASSIUM SERPL-SCNC: 4 MMOL/L (ref 3.4–5.3)
SODIUM SERPL-SCNC: 138 MMOL/L (ref 133–144)

## 2019-09-10 NOTE — TELEPHONE ENCOUNTER
staff told patient that he did not have an appointment after getting his labs done. I investigated and it looks like the appointment was made for 2020 by mistake. I reported this to supervisor Erma.     Gabrielle Roche LPN, EMT at 1:47 PM on 9/10/2019.      I spoke with Dr. Enciso and was able to reschedule the patient.   Gabrielle Roche LPN, EMT at 4:09 PM on 9/10/2019.

## 2019-09-11 ENCOUNTER — MYC MEDICAL ADVICE (OUTPATIENT)
Dept: INTERNAL MEDICINE | Facility: CLINIC | Age: 59
End: 2019-09-11

## 2019-09-11 DIAGNOSIS — E61.1 IRON DEFICIENCY: Primary | ICD-10-CM

## 2019-09-13 ENCOUNTER — INFUSION THERAPY VISIT (OUTPATIENT)
Dept: INFUSION THERAPY | Facility: CLINIC | Age: 59
End: 2019-09-13
Attending: INTERNAL MEDICINE
Payer: MEDICAID

## 2019-09-13 VITALS
HEART RATE: 73 BPM | RESPIRATION RATE: 16 BRPM | TEMPERATURE: 97.4 F | OXYGEN SATURATION: 99 % | DIASTOLIC BLOOD PRESSURE: 74 MMHG | SYSTOLIC BLOOD PRESSURE: 107 MMHG

## 2019-09-13 DIAGNOSIS — D62 ANEMIA DUE TO ACUTE BLOOD LOSS: Primary | ICD-10-CM

## 2019-09-13 DIAGNOSIS — E61.1 IRON DEFICIENCY: ICD-10-CM

## 2019-09-13 DIAGNOSIS — D63.1 ANEMIA IN STAGE 4 CHRONIC KIDNEY DISEASE (H): ICD-10-CM

## 2019-09-13 DIAGNOSIS — N18.4 ANEMIA IN STAGE 4 CHRONIC KIDNEY DISEASE (H): ICD-10-CM

## 2019-09-13 PROCEDURE — 96366 THER/PROPH/DIAG IV INF ADDON: CPT

## 2019-09-13 PROCEDURE — 25800030 ZZH RX IP 258 OP 636: Mod: ZF | Performed by: INTERNAL MEDICINE

## 2019-09-13 PROCEDURE — 25000128 H RX IP 250 OP 636: Mod: ZF | Performed by: INTERNAL MEDICINE

## 2019-09-13 PROCEDURE — 96365 THER/PROPH/DIAG IV INF INIT: CPT

## 2019-09-13 RX ADMIN — IRON SUCROSE 300 MG: 20 INJECTION, SOLUTION INTRAVENOUS at 07:27

## 2019-09-13 NOTE — PROGRESS NOTES
Nursing Note  Cole Jesus presents today to [facility] for:   Chief Complaint   Patient presents with     Infusion     IV Venofer     During today's [facility] appointment, orders from Dr MARV Enciso  were completed.  Frequency: today is dose 2 of 3 total.    Progress note:  Patient identification verified by name and date of birth.  Assessment completed.  Vitals recorded in Doc Flowsheets.  Patient was provided with education regarding infusion and possible side effects.  Patient verbalized understanding.     present during visit today: Not Applicable.    Treatment Conditions: patient denies fever, chills, signs of infection, recent illness, on antibiotics, productive cough or elevated temperature.    Premedications: were not ordered.    Drug Waste Record: No    Infusion length and rate:  infusion given over approximately 1.5 hours    Labs: were not ordered for this appointment.    Vascular access: peripheral IV placed today.    Post Infusion Assessment:  Patient tolerated infusion without incident.  Blood return noted pre and post infusion.  Site patent and intact, free from redness, edema or discomfort.  No evidence of extravasations.  Access discontinued per protocol.     Discharge Plan:   Follow up plan of care with: ongoing infusions at [facility].  Discharge instructions were reviewed with patient.  Patient/representative verbalized understanding of discharge instructions and all questions answered.  Patient discharged from [facility] in stable condition.    Jl Young RN       Administrations This Visit     iron sucrose (VENOFER) 300 mg in sodium chloride 0.9 % 250 mL intermittent infusion     Admin Date  09/13/2019 Action  New Bag Dose  300 mg Rate  193.3 mL/hr Route  Intravenous Administered By  Jl Young RN                  /78   Pulse 82   Temp 97.4   F (36.3  C) (Oral)   Resp 16   SpO2 99%

## 2019-09-16 ENCOUNTER — OFFICE VISIT (OUTPATIENT)
Dept: INTERNAL MEDICINE | Facility: CLINIC | Age: 59
End: 2019-09-16
Payer: MEDICAID

## 2019-09-16 VITALS
HEART RATE: 85 BPM | OXYGEN SATURATION: 99 % | BODY MASS INDEX: 35.58 KG/M2 | SYSTOLIC BLOOD PRESSURE: 107 MMHG | DIASTOLIC BLOOD PRESSURE: 73 MMHG | WEIGHT: 234 LBS

## 2019-09-16 DIAGNOSIS — E61.1 IRON DEFICIENCY: ICD-10-CM

## 2019-09-16 DIAGNOSIS — N18.4 CKD (CHRONIC KIDNEY DISEASE) STAGE 4, GFR 15-29 ML/MIN (H): Chronic | ICD-10-CM

## 2019-09-16 DIAGNOSIS — I25.5 ISCHEMIC CARDIOMYOPATHY: Chronic | ICD-10-CM

## 2019-09-16 DIAGNOSIS — I48.91 ATRIAL FIBRILLATION, UNSPECIFIED TYPE (H): Chronic | ICD-10-CM

## 2019-09-16 DIAGNOSIS — I10 ESSENTIAL HYPERTENSION WITH GOAL BLOOD PRESSURE LESS THAN 130/85: ICD-10-CM

## 2019-09-16 DIAGNOSIS — E11.9 TYPE 2 DIABETES MELLITUS WITHOUT COMPLICATION, WITHOUT LONG-TERM CURRENT USE OF INSULIN (H): Chronic | ICD-10-CM

## 2019-09-16 DIAGNOSIS — E61.1 IRON DEFICIENCY: Primary | ICD-10-CM

## 2019-09-16 DIAGNOSIS — I50.22 CHRONIC SYSTOLIC CONGESTIVE HEART FAILURE (H): ICD-10-CM

## 2019-09-16 LAB
ANION GAP SERPL CALCULATED.3IONS-SCNC: 4 MMOL/L (ref 3–14)
BUN SERPL-MCNC: 58 MG/DL (ref 7–30)
CALCIUM SERPL-MCNC: 8.6 MG/DL (ref 8.5–10.1)
CHLORIDE SERPL-SCNC: 107 MMOL/L (ref 94–109)
CO2 SERPL-SCNC: 27 MMOL/L (ref 20–32)
CREAT SERPL-MCNC: 2.88 MG/DL (ref 0.66–1.25)
ERYTHROCYTE [DISTWIDTH] IN BLOOD BY AUTOMATED COUNT: 23.8 % (ref 10–15)
FERRITIN SERPL-MCNC: 103 NG/ML (ref 26–388)
GFR SERPL CREATININE-BSD FRML MDRD: 22 ML/MIN/{1.73_M2}
GLUCOSE SERPL-MCNC: 114 MG/DL (ref 70–99)
HCT VFR BLD AUTO: 32.7 % (ref 40–53)
HGB BLD-MCNC: 9.2 G/DL (ref 13.3–17.7)
INR PPP: 1.3 (ref 0.86–1.14)
IRON SATN MFR SERPL: 9 % (ref 15–46)
IRON SERPL-MCNC: 36 UG/DL (ref 35–180)
MCH RBC QN AUTO: 23.7 PG (ref 26.5–33)
MCHC RBC AUTO-ENTMCNC: 28.1 G/DL (ref 31.5–36.5)
MCV RBC AUTO: 84 FL (ref 78–100)
PLATELET # BLD AUTO: 323 10E9/L (ref 150–450)
POTASSIUM SERPL-SCNC: 4.9 MMOL/L (ref 3.4–5.3)
RBC # BLD AUTO: 3.89 10E12/L (ref 4.4–5.9)
SODIUM SERPL-SCNC: 137 MMOL/L (ref 133–144)
TIBC SERPL-MCNC: 412 UG/DL (ref 240–430)
WBC # BLD AUTO: 8.9 10E9/L (ref 4–11)

## 2019-09-16 RX ORDER — FUROSEMIDE 40 MG
TABLET ORAL
Qty: 186 TABLET | Refills: 11 | Status: SHIPPED | OUTPATIENT
Start: 2019-09-16 | End: 2019-10-31

## 2019-09-16 ASSESSMENT — PAIN SCALES - GENERAL: PAINLEVEL: NO PAIN (0)

## 2019-09-16 NOTE — PATIENT INSTRUCTIONS
[facility] Medication Refill Request Information:  * Please contact your pharmacy regarding ANY request for medication refills.  ** [facility] Prescription Fax = [phone]  * Please allow 3 business days for routine medication refills.  * Please allow 5 business days for controlled substance medication refills.     [facility] Test Result notification information:  *You will be notified with in 7-10 days of your appointment day regarding the results of your test.  If you are on MyChart you will be notified as soon as the provider has reviewed the results and signed off on them.    [facility]: [phone]

## 2019-09-16 NOTE — NURSING NOTE
Chief Complaint   Patient presents with     Results     Pt is here to follow up on test results.      Pao Briseno LPN at 5:57 PM on 9/16/2019.

## 2019-09-17 NOTE — PROGRESS NOTES
HPI  59-year-old returns today for reevaluation of his anemia and edema.  We did increase his Lasix for a week if this resulted in some improvement in the edema but not resolution of his peripheral edema.  He is also in the midst of receiving his iron infusions.  Otherwise he is been stable he has no chest pain dyspnea or other complaints.  Past Medical History:   Diagnosis Date     Anemia in chronic renal disease 3/9/2015     Antiplatelet or antithrombotic long-term use      Atrial fibrillation and flutter      CAD (coronary artery disease)     Stemi in 12/11, s/p angioplasty     CRD (chronic renal disease), stage IV (H) 03/09/2015    hx ATN with dialysis complicating cardiogenic shock  1/2012     GI bleed     massive lower GI bleed secondary to a cecal ulcer, s/p ileocecal resection in 12/11     Heart failure     Biventricular systolic HF, complicated by ARDS requiring tracheostomy     Hyperlipidemia LDL goal <100 4/28/2013     Hypertension      Ischemic cardiomyopathy 4/28/2013    TTE revealing 40% EF     Myocardial infarction (H)      Other and unspecified nonspecific immunological findings     Anti JKa     Pulmonary edema     episodes of flash pulmonary edema in 12/11     Subclinical hypothyroidism      Past Surgical History:   Procedure Laterality Date     ESOPHAGOSCOPY, GASTROSCOPY, DUODENOSCOPY (EGD), COMBINED  12/25/2011    Procedure:COMBINED ESOPHAGOSCOPY, GASTROSCOPY, DUODENOSCOPY (EGD); Surgeon:SAMARIA PUENTE; Location:[facility]     LAPAROTOMY EXPLORATORY  12/31/2011    Procedure:LAPAROTOMY EXPLORATORY; Explore laparotomy, Illeocectomy, Diverting Illeostomy; Surgeon:GABI MOSHER; Location:[facility]     ORIF right elbow fracture  age 12     TAKEDOWN ILEOSTOMY  6/5/2014    Procedure: TAKEDOWN ILEOSTOMY;  Surgeon: Gabi Mosher MD;  Location: [facility]     TRACHEOSTOMY  12/22/2011    Procedure:TRACHEOSTOMY; Tracheostomy; 80XLTCP-Proximal Extension-Cuffed 8.0 mm I.D.; Surgeon:HARDIK  LIZABETH HIRSCH; Location: OR     Family History   Problem Relation Age of Onset     Diabetes Mother      Hypertension Mother      Heart Disease Mother      Heart Disease Father          of heart attack, left when he was young     Diabetes Sister      Diabetes Sister      Diabetes Sister      Social History     Socioeconomic History     Marital status: Significant other     Spouse name: None     Number of children: None     Years of education: None     Highest education level: None   Occupational History     None   Social Needs     Financial resource strain: None     Food insecurity:     Worry: None     Inability: None     Transportation needs:     Medical: None     Non-medical: None   Tobacco Use     Smoking status: Former Smoker     Last attempt to quit: 12/3/2011     Years since quittin.7     Smokeless tobacco: Never Used   Substance and Sexual Activity     Alcohol use: No     Alcohol/week: 0.0 oz     Drug use: No     Sexual activity: Yes     Partners: Female   Lifestyle     Physical activity:     Days per week: None     Minutes per session: None     Stress: None   Relationships     Social connections:     Talks on phone: None     Gets together: None     Attends Sabianist service: None     Active member of club or organization: None     Attends meetings of clubs or organizations: None     Relationship status: None     Intimate partner violence:     Fear of current or ex partner: None     Emotionally abused: None     Physically abused: None     Forced sexual activity: None   Other Topics Concern     Parent/sibling w/ CABG, MI or angioplasty before 65F 55M? Yes      Service Not Asked     Blood Transfusions Not Asked     Caffeine Concern Not Asked     Occupational Exposure Not Asked     Hobby Hazards Not Asked     Sleep Concern Not Asked     Stress Concern Not Asked     Weight Concern Not Asked     Special Diet Not Asked     Back Care Not Asked     Exercise Yes     Comment: limited walking      Bike Helmet Not Asked     Seat Belt Not Asked     Self-Exams Not Asked   Social History Narrative     None       Exam:  /73   Pulse 85   Wt 106.1 kg (234 lb)   SpO2 99%   BMI 35.58 kg/m    234 lbs 0 oz  The patient is alert, oriented with a clear sensorium.   Skin shows no lesions or rashes and good turgor.   Head is normocephalic and atraumatic.    Neck shows no nodes, thyromegaly.     Lungs are clear.   Heart shows normal S1 and S2 without murmur or gallop.    Extremities show 1-2+ pretibial edema.    Labs reviewed with him:  Results for orders placed or performed in visit on 09/09/19   Basic metabolic panel   Result Value Ref Range    Sodium 138 133 - 144 mmol/L    Potassium 4.0 3.4 - 5.3 mmol/L    Chloride 104 94 - 109 mmol/L    Carbon Dioxide 24 20 - 32 mmol/L    Anion Gap 9 3 - 14 mmol/L    Glucose 178 (H) 70 - 99 mg/dL    Urea Nitrogen 59 (H) 7 - 30 mg/dL    Creatinine 3.07 (H) 0.66 - 1.25 mg/dL    GFR Estimate 21 (L) >60 mL/min/[1.73_m2]    GFR Estimate If Black 24 (L) >60 mL/min/[1.73_m2]    Calcium 8.7 8.5 - 10.1 mg/dL   INR   Result Value Ref Range    INR 1.30 (H) 0.86 - 1.14       ASSESSMENT  1 chronic renal insufficiency stable  2 edema secondary to above  3 anemia related to #1 above aggravated by iron deficiency improved with infusions    Plan  We will increase Lasix to 80 mg for another 10 days.  I am to have him follow-up in another 3 weeks with a BMP we will plan to continue to monitor his iron levels and CBC.    This note was completed using Dragon voice recognition software.  Although reviewed after completion, some word and grammatical errors may occur.    Silas Enciso MD  General Internal Medicine  [facility]  [phone]

## 2019-09-18 RX ORDER — BLOOD SUGAR DIAGNOSTIC
STRIP MISCELLANEOUS
Qty: 400 EACH | Refills: 3 | Status: ON HOLD | OUTPATIENT
Start: 2019-09-18 | End: 2021-01-01

## 2019-09-18 RX ORDER — LANCETS
EACH MISCELLANEOUS
Qty: 400 EACH | Refills: 3 | Status: ON HOLD | OUTPATIENT
Start: 2019-09-18 | End: 2021-01-01

## 2019-09-18 NOTE — TELEPHONE ENCOUNTER
PLEASE HELP SCHEDULE THE FOLLOWING APPT(S): Return Appointment    TIME FRAME NEEDED BY: First available.       SCHEDULING COMMENTS (optional): overdue for return to clinic per notes.   Bryanna Daniels RN on 9/18/2019 at 12:55 PM

## 2019-09-20 ENCOUNTER — INFUSION THERAPY VISIT (OUTPATIENT)
Dept: INFUSION THERAPY | Facility: CLINIC | Age: 59
End: 2019-09-20
Attending: INTERNAL MEDICINE
Payer: MEDICAID

## 2019-09-20 VITALS
TEMPERATURE: 97.5 F | RESPIRATION RATE: 18 BRPM | SYSTOLIC BLOOD PRESSURE: 115 MMHG | DIASTOLIC BLOOD PRESSURE: 73 MMHG | HEART RATE: 73 BPM | OXYGEN SATURATION: 97 %

## 2019-09-20 DIAGNOSIS — E61.1 IRON DEFICIENCY: ICD-10-CM

## 2019-09-20 DIAGNOSIS — D62 ANEMIA DUE TO ACUTE BLOOD LOSS: Primary | ICD-10-CM

## 2019-09-20 DIAGNOSIS — D63.1 ANEMIA IN STAGE 4 CHRONIC KIDNEY DISEASE (H): ICD-10-CM

## 2019-09-20 DIAGNOSIS — N18.4 ANEMIA IN STAGE 4 CHRONIC KIDNEY DISEASE (H): ICD-10-CM

## 2019-09-20 PROCEDURE — 96366 THER/PROPH/DIAG IV INF ADDON: CPT

## 2019-09-20 PROCEDURE — 25800030 ZZH RX IP 258 OP 636: Mod: ZF | Performed by: INTERNAL MEDICINE

## 2019-09-20 PROCEDURE — 25000128 H RX IP 250 OP 636: Mod: ZF | Performed by: INTERNAL MEDICINE

## 2019-09-20 PROCEDURE — 96365 THER/PROPH/DIAG IV INF INIT: CPT

## 2019-09-20 RX ADMIN — IRON SUCROSE 300 MG: 20 INJECTION, SOLUTION INTRAVENOUS at 07:24

## 2019-09-20 NOTE — PROGRESS NOTES
Nursing Note  Cole Jesus presents today to [facility] for:   Chief Complaint   Patient presents with     Infusion     Venofer     During today's [facility] appointment, orders from Dr MARV Enciso were completed.  Frequency: today is dose 3 of 3 total.    Progress note:  Patient identification verified by name and date of birth.  Assessment completed.  Vitals recorded in Doc Flowsheets.  Patient was provided with education regarding infusion and possible side effects.  Patient verbalized understanding.     present during visit today: Not Applicable.    Treatment Conditions: patient denies fever, chills, signs of infection, recent illness, on antibiotics, productive cough or elevated temperature.    Premedications: were not ordered.    Drug Waste Record: No    Infusion length and rate:  infusion given over approximately 1.5 hours    Labs: were not ordered for this appointment.    Vascular access: peripheral IV placed today.    Post Infusion Assessment:  Patient tolerated infusion without incident.  Blood return noted pre and post infusion.  Site patent and intact, free from redness, edema or discomfort.  No evidence of extravasations.  Access discontinued per protocol.     Discharge Plan:   Follow up plan of care with: ongoing infusions at [facility].  Discharge instructions were reviewed with patient.  Patient/representative verbalized understanding of discharge instructions and all questions answered.  Patient discharged from [facility] in stable condition.    Dianna Kelly RN     Administrations This Visit     iron sucrose (VENOFER) 300 mg in sodium chloride 0.9 % 250 mL intermittent infusion     Admin Date  09/20/2019 Action  New Bag Dose  300 mg Route  Intravenous Administered By  Dianna Kelly RN                Vital signs:  Temp: 97.5  F (36.4  C) Temp src: Oral BP: 105/67  "Pulse: 84   Resp: 18 SpO2: 97 % O2 Device: None (Room air)        Estimated body mass index is 35.58 kg/m  as calculated from the following:    Height as of 7/4/19: 1.727 m (5' 8\").    Weight as of 9/16/19: 106.1 kg (234 lb).          "

## 2019-10-04 ENCOUNTER — HEALTH MAINTENANCE LETTER (OUTPATIENT)
Age: 59
End: 2019-10-04

## 2019-10-07 ENCOUNTER — OFFICE VISIT (OUTPATIENT)
Dept: INTERNAL MEDICINE | Facility: CLINIC | Age: 59
End: 2019-10-07
Payer: MEDICAID

## 2019-10-07 ENCOUNTER — TELEPHONE (OUTPATIENT)
Dept: INTERNAL MEDICINE | Facility: CLINIC | Age: 59
End: 2019-10-07

## 2019-10-07 ENCOUNTER — OFFICE VISIT (OUTPATIENT)
Dept: ENDOCRINOLOGY | Facility: CLINIC | Age: 59
End: 2019-10-07
Payer: MEDICAID

## 2019-10-07 VITALS
BODY MASS INDEX: 35.86 KG/M2 | HEART RATE: 98 BPM | SYSTOLIC BLOOD PRESSURE: 97 MMHG | DIASTOLIC BLOOD PRESSURE: 63 MMHG | WEIGHT: 236.6 LBS | HEIGHT: 68 IN

## 2019-10-07 DIAGNOSIS — N18.4 CKD (CHRONIC KIDNEY DISEASE) STAGE 4, GFR 15-29 ML/MIN (H): Chronic | ICD-10-CM

## 2019-10-07 DIAGNOSIS — E61.1 IRON DEFICIENCY: ICD-10-CM

## 2019-10-07 DIAGNOSIS — E11.9 TYPE 2 DIABETES MELLITUS WITHOUT COMPLICATION, WITHOUT LONG-TERM CURRENT USE OF INSULIN (H): Primary | ICD-10-CM

## 2019-10-07 DIAGNOSIS — I50.22 CHRONIC SYSTOLIC CONGESTIVE HEART FAILURE (H): ICD-10-CM

## 2019-10-07 DIAGNOSIS — D62 ANEMIA DUE TO ACUTE BLOOD LOSS: Primary | ICD-10-CM

## 2019-10-07 DIAGNOSIS — E78.5 HYPERLIPIDEMIA LDL GOAL <130: ICD-10-CM

## 2019-10-07 LAB
ALBUMIN SERPL-MCNC: 3.6 G/DL (ref 3.4–5)
ALP SERPL-CCNC: 107 U/L (ref 40–150)
ALT SERPL W P-5'-P-CCNC: 15 U/L (ref 0–70)
ANION GAP SERPL CALCULATED.3IONS-SCNC: 7 MMOL/L (ref 3–14)
AST SERPL W P-5'-P-CCNC: 12 U/L (ref 0–45)
BASOPHILS # BLD AUTO: 0.1 10E9/L (ref 0–0.2)
BASOPHILS NFR BLD AUTO: 0.6 %
BILIRUB SERPL-MCNC: 1.6 MG/DL (ref 0.2–1.3)
BUN SERPL-MCNC: 49 MG/DL (ref 7–30)
CALCIUM SERPL-MCNC: 8.5 MG/DL (ref 8.5–10.1)
CHLORIDE SERPL-SCNC: 108 MMOL/L (ref 94–109)
CHOLEST SERPL-MCNC: 76 MG/DL
CO2 SERPL-SCNC: 24 MMOL/L (ref 20–32)
CREAT SERPL-MCNC: 2.64 MG/DL (ref 0.66–1.25)
DIFFERENTIAL METHOD BLD: ABNORMAL
EOSINOPHIL # BLD AUTO: 0.2 10E9/L (ref 0–0.7)
EOSINOPHIL NFR BLD AUTO: 1.9 %
ERYTHROCYTE [DISTWIDTH] IN BLOOD BY AUTOMATED COUNT: 26.2 % (ref 10–15)
FERRITIN SERPL-MCNC: 48 NG/ML (ref 26–388)
GFR SERPL CREATININE-BSD FRML MDRD: 25 ML/MIN/{1.73_M2}
GLUCOSE SERPL-MCNC: 125 MG/DL (ref 70–99)
HBA1C MFR BLD: 6 % (ref 4.3–6)
HCT VFR BLD AUTO: 34.6 % (ref 40–53)
HDLC SERPL-MCNC: 29 MG/DL
HGB BLD-MCNC: 9.9 G/DL (ref 13.3–17.7)
IMM GRANULOCYTES # BLD: 0 10E9/L (ref 0–0.4)
IMM GRANULOCYTES NFR BLD: 0.2 %
IRON SATN MFR SERPL: 7 % (ref 15–46)
IRON SERPL-MCNC: 27 UG/DL (ref 35–180)
LDLC SERPL CALC-MCNC: 33 MG/DL
LYMPHOCYTES # BLD AUTO: 0.6 10E9/L (ref 0.8–5.3)
LYMPHOCYTES NFR BLD AUTO: 7.5 %
MCH RBC QN AUTO: 24.9 PG (ref 26.5–33)
MCHC RBC AUTO-ENTMCNC: 28.6 G/DL (ref 31.5–36.5)
MCV RBC AUTO: 87 FL (ref 78–100)
MONOCYTES # BLD AUTO: 0.9 10E9/L (ref 0–1.3)
MONOCYTES NFR BLD AUTO: 10.2 %
NEUTROPHILS # BLD AUTO: 6.6 10E9/L (ref 1.6–8.3)
NEUTROPHILS NFR BLD AUTO: 79.6 %
NONHDLC SERPL-MCNC: 48 MG/DL
NRBC # BLD AUTO: 0 10*3/UL
NRBC BLD AUTO-RTO: 0 /100
NT-PROBNP SERPL-MCNC: 7978 PG/ML (ref 0–125)
PLATELET # BLD AUTO: 266 10E9/L (ref 150–450)
POTASSIUM SERPL-SCNC: 4.3 MMOL/L (ref 3.4–5.3)
PROT SERPL-MCNC: 8 G/DL (ref 6.8–8.8)
RBC # BLD AUTO: 3.97 10E12/L (ref 4.4–5.9)
SODIUM SERPL-SCNC: 139 MMOL/L (ref 133–144)
TIBC SERPL-MCNC: 388 UG/DL (ref 240–430)
TRIGL SERPL-MCNC: 70 MG/DL
WBC # BLD AUTO: 8.4 10E9/L (ref 4–11)

## 2019-10-07 ASSESSMENT — MIFFLIN-ST. JEOR: SCORE: 1852.71

## 2019-10-07 ASSESSMENT — PAIN SCALES - GENERAL: PAINLEVEL: NO PAIN (0)

## 2019-10-07 NOTE — LETTER
10/7/2019       RE: Cole Jesus  [address]  [facility] [address]     Dear Colleague,    Thank you for referring your patient, Cole Jesus, to the [facility] ENDOCRINOLOGY at [facility]. Please see a copy of my visit note below.    [facility]- Outpatient Diabetes Follow up    Cole is a 59 year old male with past history of type II DM (since 2011), HTN, HLP, CAD with prior STEMI, CKD stage IV, and HFrEF who presents for follow up of diabetes.     He was diagnosed with TIIDM at the time of his myocardial infarction in 2011. He was on insulin beginning in 2012, but glycemic control was excellent, so he stopped insulin when Rx ran out. His A1c were in the prediabetic range subsequently. He was admitted 4/2019 for hyperglycemia that was seen in a routine physical exam, and was placed back on basal bolus insulin regimen. His A1c was >13% at this time.     He was previously seen by Smiley Argueta PA-C, last visit was 4/23/2019. He was experiencing hypoglycemia overnight in relation to insulin stacking for high BG near bedtime. He was instructed to reduce his Lantus dose, and continue fixed meal insulin dosing with meals. He was also instructed to continue high intensity sliding scale at a more conservative threshold of >200 AC and >225 HS. He was going to consider starting Trulicity with the hope to eliminate mealtime insulin requirement.     He was hospitalized 7/2019 for acute anemia and EVA on CKD (Cr on hospitalization was 3.08 with GFR 21). Through this hospital stay, his BG were consistently 80-130s with 20 units of Lantus daily.     On 8/1, Cole sent a [employer] message regarding FBG <80 for two days in a row. He had self titrated his Lantus to 26 units (from 20 upon last hospital discharge). He was to have a snack at bedtime if BG <140, and to reduce Lantus further to 18 units daily if FBG persistently <100.     Upon visiting today, Cole endorses  "that his BG have been \"good\", and he began self titrating off insulin one month ago. Once he reached Lantus 10 units daily, he stopped taking it.     Due to his CKD, and on iron infusions, A1c not reliable method of monitoring glycemic control. Most recent BMP shows Cr 2.8, GFR 22, and glucose of 114, all of which are stable baselines for him.     Pt denies headaches, visual changes, n/v, SOB at rest, chest pain, abd pain, nausea/vomiting, diarrhea, dysuria, hematuria or foot ulcers.    Current Diabetes Regimen:   Lantus- stopped one month ago  Novolog- stopped one month ago.  Trulicity- never started (insurance did not cover)    Glucometer Settings  Checking 1-3 times per day on average   Average glucose: 139  SD: 26  Fasting glucose range:  (70% are greater than 120)  Prandial glucose range: 104-240 (40% are over 150)  Documented hypoglycemia: no    Weight: 236, compared to recent of 234lb  Physical Activity: \"as much as I can\"- breathing sometimes limiting, as well as weakness.   Nutrition: three meals a day, snacks   SF jello and pudding, fruit, unsalted crackers for snacks  Does a lot of travelling.     Diabetes Care  Retinopathy: not known, needs to see ophthalmologist    Nephropathy: BP well controlled. + Hx microalbuminuria. Creatinine 2.8 (stable). Taking ACEi/ARB: on Entresto.    Neuropathy: no    Foot Exam: no wounds or ulcers.    Lipids: Taking ASA: yes, and on Plavix, statin: yes  LDL Cholesterol Calculated   Date Value Ref Range Status   07/22/2019 43 <100 mg/dL Final     Comment:     Desirable:       <100 mg/dl      ROS: 10 point review of systems completed; pertinent positives and negatives documented in HPI    Allergies  Allergies   Allergen Reactions     Hydralazine      Black spots     Simvastatin Other (See Comments)     Leg muscle weakness     Tylenol [Acetaminophen] Palpitations       Medications  Current Outpatient Medications   Medication Sig Dispense Refill     ACCU-CHEK GUIDE test " strip USE TO TEST BLOOD SUGAR FOUR TIMES A DAY BEFORE MEALS AND AT BEDTIME 400 each 3     Alcohol Swabs PADS 1 applicator 4 times daily (before meals and nightly) 100 each 3     aspirin (ASA) 81 MG chewable tablet Take 81 mg by mouth daily       atorvastatin (LIPITOR) 80 MG tablet Take 1 tablet (80 mg) by mouth daily 90 tablet 3     blood glucose monitoring (ACCU-CHEK FASTCLIX) lancets USE TO TEST BLOOD SUGAR FOUR TIMES A DAY AT MEALS AND AT BEDTIME 400 each 3     carvedilol (COREG) 3.125 MG tablet Take 1 tablet (3.125 mg) by mouth 2 times daily (with meals) 60 tablet 0     cholecalciferol (VITAMIN  -D) 1000 UNITS capsule Take 2 capsules (2,000 Units) by mouth daily 60 capsule 3     clopidogrel (PLAVIX) 75 MG tablet Take 1 tablet (75 mg) by mouth daily 90 tablet 3     furosemide (LASIX) 40 MG tablet Take 2 tablets twice a day for 10 days then 1 twice a day 186 tablet 11     insulin glargine (LANTUS PEN) 100 UNIT/ML pen Inject 7 Units Subcutaneous every morning 6 mL 3     insulin pen needle (32G X 4 MM) 32G X 4 MM miscellaneous Use 5 pen needles daily or as directed. 200 each 3     nitroGLYcerin (NITROSTAT) 0.4 MG sublingual tablet Place 1 tablet (0.4 mg) under the tongue every 5 minutes as needed for chest pain For chest pain place 1 tablet under the tongue every 5 minutes for 3 doses. If symptoms persist 5 minutes after 1st dose call 911. 25 tablet 0     pantoprazole (PROTONIX) 40 MG EC tablet TAKE ONE TABLET BY MOUTH TWICE A  tablet 2     sacubitril-valsartan (ENTRESTO) 49-51 MG per tablet Take 1 tablet by mouth 2 times daily 60 tablet 1     Sharps Container MISC 1 Device every 30 days 1 each 3     vitamin  B complex with vitamin C (VITAMIN  B COMPLEX) TABS Take 1 tablet by mouth daily       insulin aspart (NOVOLOG PEN) 100 UNIT/ML pen Custom Correction Scale ,use with meals   Do Not give Correction Insulin if BG less than 200.   For  - 224 give 1 units.   For  - 249 give 2 units.   For  -  274 give 3 units.   For  - 299 give 4 units.   For  - 324 give 5 units.   For  - 349 give 6 units.   For BG greater than or equal to 350 give 7 units.   Novolog sliding scale with meals and add this amount of insulin to the 5 units of Novolog.  -if not eating, just test your blood sugar and give the sliding scale insulin (Patient not taking: Reported on 7/15/2019) 3 mL 3     Family History  family history includes Diabetes in his mother, sister, sister, and sister; Heart Disease in his father and mother; Hypertension in his mother.    Social History   reports that he quit smoking about 7 years ago. He has never used smokeless tobacco. He reports that he does not drink alcohol or use drugs.     Past Medical History  Past Medical History:   Diagnosis Date     Anemia in chronic renal disease 3/9/2015     Antiplatelet or antithrombotic long-term use      Atrial fibrillation and flutter      CAD (coronary artery disease)     Stemi in 12/11, s/p angioplasty     CRD (chronic renal disease), stage IV (H) 03/09/2015    hx ATN with dialysis complicating cardiogenic shock  1/2012     GI bleed     massive lower GI bleed secondary to a cecal ulcer, s/p ileocecal resection in 12/11     Heart failure     Biventricular systolic HF, complicated by ARDS requiring tracheostomy     Hyperlipidemia LDL goal <100 4/28/2013     Hypertension      Ischemic cardiomyopathy 4/28/2013    TTE revealing 40% EF     Myocardial infarction (H)      Other and unspecified nonspecific immunological findings     Anti JKa     Pulmonary edema     episodes of flash pulmonary edema in 12/11     Subclinical hypothyroidism        Past Surgical History:   Procedure Laterality Date     ESOPHAGOSCOPY, GASTROSCOPY, DUODENOSCOPY (EGD), COMBINED  12/25/2011    Procedure:COMBINED ESOPHAGOSCOPY, GASTROSCOPY, DUODENOSCOPY (EGD); Surgeon:SAMARIA PUENTE; Location:[facility]     LAPAROTOMY EXPLORATORY  12/31/2011    Procedure:LAPAROTOMY EXPLORATORY; Explore  "laparotomy, Illeocectomy, Diverting Illeostomy; Surgeon:GABI MOSHER; Location:[facility] OR     ORIF right elbow fracture  age 12     TAKEDOWN ILEOSTOMY  6/5/2014    Procedure: TAKEDOWN ILEOSTOMY;  Surgeon: Gabi Mosher MD;  Location: [facility] OR     TRACHEOSTOMY  12/22/2011    Procedure:TRACHEOSTOMY; Tracheostomy; 80XLTCP-Proximal Extension-Cuffed 8.0 mm I.D.; Surgeon:LIZABETH DYE; Location:[facility] OR       Physical Exam  BP 97/63   Pulse 98   Ht 1.727 m (5' 8\")   Wt 107.3 kg (236 lb 9.6 oz)   BMI 35.97 kg/m     Body mass index is 35.97 kg/m .    GENERAL : In no apparent distress  SKIN: Normal color, normal temperature, texture.  No  suspicious lesions or rashes  EYES: PERRLA.  No proptosis.  MOUTH: Moist, pink; pharynx clear  NECK: No visible masses. No palpable adenopathy, or masses. No goiter.  RESP: normal respiratory effort.  cough   ABDOMEN: soft, nontender to palpation   NEURO: awake, alert, responds appropriately to questions.  Moves all extremities; Gait normal.     EXTREMITIES: No ulcers or open wounds.     RESULTS    Lab Results   Component Value Date    A1C 5.7 07/15/2019    A1C 13.4 04/08/2019    A1C 7.3 02/22/2018    A1C 6.9 08/16/2016    A1C 5.7 03/26/2014       Creatinine   Date Value Ref Range Status   09/16/2019 2.88 (H) 0.66 - 1.25 mg/dL Final     GFR Estimate   Date Value Ref Range Status   09/16/2019 22 (L) >60 mL/min/[1.73_m2] Final     Comment:     Non  GFR Calc  Starting 12/18/2018, serum creatinine based estimated GFR (eGFR) will be   calculated using the Chronic Kidney Disease Epidemiology Collaboration   (CKD-EPI) equation.       Hemoglobin A1C   Date Value Ref Range Status   07/15/2019 5.7 (H) 0 - 5.6 % Final     Comment:     Normal <5.7% Prediabetes 5.7-6.4%  Diabetes 6.5% or higher - adopted from ADA   consensus guidelines.       Potassium   Date Value Ref Range Status   09/16/2019 4.9 3.4 - 5.3 mmol/L Final     ALT   Date Value Ref Range " "Status   07/23/2019 14 0 - 70 U/L Final     AST   Date Value Ref Range Status   07/23/2019 6 0 - 45 U/L Final     CK Total   Date Value Ref Range Status   05/11/2012 4.1 (A) 20 - 200 U/L      TSH   Date Value Ref Range Status   07/23/2019 4.64 (H) 0.40 - 4.00 mU/L Final     T4 Free   Date Value Ref Range Status   07/23/2019 0.98 0.76 - 1.46 ng/dL Final       TSH   Date Value Ref Range Status   07/23/2019 4.64 (H) 0.40 - 4.00 mU/L Final   07/01/2019 6.42 (H) 0.40 - 4.00 mU/L Final   06/27/2019 5.82 (H) 0.40 - 4.00 mU/L Final   04/08/2019 3.55 0.40 - 4.00 mU/L Final   06/01/2018 6.07 (H) 0.40 - 4.00 mU/L Final     T4 Free   Date Value Ref Range Status   07/23/2019 0.98 0.76 - 1.46 ng/dL Final   07/01/2019 0.93 0.76 - 1.46 ng/dL Final   06/01/2018 0.96 0.76 - 1.46 ng/dL Final   11/21/2016 0.89 0.76 - 1.46 ng/dL Final   08/04/2014 1.28 0.76 - 1.46 ng/dL Final     Comment:     Effective 7/30/2014, the reference range for this assay has changed to reflect   new instrumentation/methodology.         Creatinine   Date Value Ref Range Status   09/16/2019 2.88 (H) 0.66 - 1.25 mg/dL Final       Recent Labs   Lab Test 07/22/19  0927 04/08/19  1831  07/06/15  0835 06/03/14  1012   CHOL 87 175   < > 129 158   HDL 30* 30*   < > 34* 36*   LDL 43 Cannot estimate LDL when triglyceride exceeds 400 mg/dL   < > 79 98   TRIG 69 408*   < > 81 122   CHOLHDLRATIO  --   --   --  3.8 4.4    < > = values in this interval not displayed.     No results found for: [id number], [id number], [id number]    ASSESSMENT/PLAN:    Cole stopped taking insulin completely nearly one month ago, citing his BG have been looking \"good\". They are certainly reasonable, however fasting glucoses are above target most of the time. I am concerned with maintaining off insulin given his prior history of stopping insulin, with a subsequent A1c >13% and BG in to 600's earlier this year.     Discussed continued efforts at dietary control, which he will work on. We " discussed re-trialing Lantus as a method to improve his fasting blood sugars, and he was agreeable to trying this. He knows that if his BG are persistently <80 fasting, we will reassess insulin requirement. Metformin is CI with his renal disease.     1. Diabetes type II: with coronary artery disease, microalbuminuria and CKD IV   -A1c not reliable in the setting of CKD IV and receiving iron infusions.    -resume Lantus 7 units daily AM   -he has not routinely required Novolog for correction, thus will not plan to resume.    -referral to ophthalmology     2. Thyroid: pt carries a history of subclinical hypothyroidism   -most recent TSH 4.64 7/23/19, which is improved from prior testing, with normal free T4.    -will continue to monitor for sx, and trend values      Refills today:  1. Lantus     Follow up:  1. With [facility] in 2-3 months.   2. Diabetes Educator follow up: PRN    The patient is  enrolled in [employer]    This patient has met [place] community measures for diabetes control: no (D5: Control blood pressure , Lower bad cholesterol Maintain blood sugar, Be tobacco-free, Take aspirin as recommended)    This patient is eligible for graduation from [facility] clinic: no    I spent 25 minutes with this patient face to face and explained the conditions and plans (more than 50% of time was counseling/coordination of care, diabetes management, follow up plan for worsening hyper and hypoglycemia) . The patient understood and is satisfied with today's visit.     RAISA Stuart PA-C  [facility]   Pager [phone]        Again, thank you for allowing me to participate in the care of your patient.      Sincerely,    Meron Jackson PA-C

## 2019-10-07 NOTE — NURSING NOTE
Chief Complaint   Patient presents with     Recheck Medication     follow up. Pt got labs done prior.      Kimberly Nissen, EMT at 9:40 AM on 10/7/2019

## 2019-10-07 NOTE — LETTER
"10/7/2019      RE: Cole Jesus  [address]  [facility] [address]       [facility]- Outpatient Diabetes Follow up    Cole is a 59 year old male with past history of type II DM (since 2011), HTN, HLP, CAD with prior STEMI, CKD stage IV, and HFrEF who presents for follow up of diabetes.     He was diagnosed with TIIDM at the time of his myocardial infarction in 2011. He was on insulin beginning in 2012, but glycemic control was excellent, so he stopped insulin when Rx ran out. His A1c were in the prediabetic range subsequently. He was admitted 4/2019 for hyperglycemia that was seen in a routine physical exam, and was placed back on basal bolus insulin regimen. His A1c was >13% at this time.     He was previously seen by Smiley Argueta PA-C, last visit was 4/23/2019. He was experiencing hypoglycemia overnight in relation to insulin stacking for high BG near bedtime. He was instructed to reduce his Lantus dose, and continue fixed meal insulin dosing with meals. He was also instructed to continue high intensity sliding scale at a more conservative threshold of >200 AC and >225 HS. He was going to consider starting Trulicity with the hope to eliminate mealtime insulin requirement.     He was hospitalized 7/2019 for acute anemia and EVA on CKD (Cr on hospitalization was 3.08 with GFR 21). Through this hospital stay, his BG were consistently 80-130s with 20 units of Lantus daily.     On 8/1, Cole sent a [employer] message regarding FBG <80 for two days in a row. He had self titrated his Lantus to 26 units (from 20 upon last hospital discharge). He was to have a snack at bedtime if BG <140, and to reduce Lantus further to 18 units daily if FBG persistently <100.     Upon visiting today, Cole endorses that his BG have been \"good\", and he began self titrating off insulin one month ago. Once he reached Lantus 10 units daily, he stopped taking it.     Due to his CKD, and on iron infusions, A1c not " "reliable method of monitoring glycemic control. Most recent BMP shows Cr 2.8, GFR 22, and glucose of 114, all of which are stable baselines for him.     Pt denies headaches, visual changes, n/v, SOB at rest, chest pain, abd pain, nausea/vomiting, diarrhea, dysuria, hematuria or foot ulcers.    Current Diabetes Regimen:   Lantus- stopped one month ago  Novolog- stopped one month ago.  Trulicity- never started (insurance did not cover)    Glucometer Settings  Checking 1-3 times per day on average   Average glucose: 139  SD: 26  Fasting glucose range:  (70% are greater than 120)  Prandial glucose range: 104-240 (40% are over 150)  Documented hypoglycemia: no    Weight: 236, compared to recent of 234lb  Physical Activity: \"as much as I can\"- breathing sometimes limiting, as well as weakness.   Nutrition: three meals a day, snacks   SF jello and pudding, fruit, unsalted crackers for snacks  Does a lot of travelling.     Diabetes Care  Retinopathy: not known, needs to see ophthalmologist    Nephropathy: BP well controlled. + Hx microalbuminuria. Creatinine 2.8 (stable). Taking ACEi/ARB: on Entresto.    Neuropathy: no    Foot Exam: no wounds or ulcers.    Lipids: Taking ASA: yes, and on Plavix, statin: yes  LDL Cholesterol Calculated   Date Value Ref Range Status   07/22/2019 43 <100 mg/dL Final     Comment:     Desirable:       <100 mg/dl      ROS: 10 point review of systems completed; pertinent positives and negatives documented in HPI    Allergies  Allergies   Allergen Reactions     Hydralazine      Black spots     Simvastatin Other (See Comments)     Leg muscle weakness     Tylenol [Acetaminophen] Palpitations       Medications  Current Outpatient Medications   Medication Sig Dispense Refill     ACCU-CHEK GUIDE test strip USE TO TEST BLOOD SUGAR FOUR TIMES A DAY BEFORE MEALS AND AT BEDTIME 400 each 3     Alcohol Swabs PADS 1 applicator 4 times daily (before meals and nightly) 100 each 3     aspirin (ASA) 81 MG " chewable tablet Take 81 mg by mouth daily       atorvastatin (LIPITOR) 80 MG tablet Take 1 tablet (80 mg) by mouth daily 90 tablet 3     blood glucose monitoring (ACCU-CHEK FASTCLIX) lancets USE TO TEST BLOOD SUGAR FOUR TIMES A DAY AT MEALS AND AT BEDTIME 400 each 3     carvedilol (COREG) 3.125 MG tablet Take 1 tablet (3.125 mg) by mouth 2 times daily (with meals) 60 tablet 0     cholecalciferol (VITAMIN  -D) 1000 UNITS capsule Take 2 capsules (2,000 Units) by mouth daily 60 capsule 3     clopidogrel (PLAVIX) 75 MG tablet Take 1 tablet (75 mg) by mouth daily 90 tablet 3     furosemide (LASIX) 40 MG tablet Take 2 tablets twice a day for 10 days then 1 twice a day 186 tablet 11     insulin glargine (LANTUS PEN) 100 UNIT/ML pen Inject 7 Units Subcutaneous every morning 6 mL 3     insulin pen needle (32G X 4 MM) 32G X 4 MM miscellaneous Use 5 pen needles daily or as directed. 200 each 3     nitroGLYcerin (NITROSTAT) 0.4 MG sublingual tablet Place 1 tablet (0.4 mg) under the tongue every 5 minutes as needed for chest pain For chest pain place 1 tablet under the tongue every 5 minutes for 3 doses. If symptoms persist 5 minutes after 1st dose call 911. 25 tablet 0     pantoprazole (PROTONIX) 40 MG EC tablet TAKE ONE TABLET BY MOUTH TWICE A  tablet 2     sacubitril-valsartan (ENTRESTO) 49-51 MG per tablet Take 1 tablet by mouth 2 times daily 60 tablet 1     Sharps Container MISC 1 Device every 30 days 1 each 3     vitamin  B complex with vitamin C (VITAMIN  B COMPLEX) TABS Take 1 tablet by mouth daily       insulin aspart (NOVOLOG PEN) 100 UNIT/ML pen Custom Correction Scale ,use with meals   Do Not give Correction Insulin if BG less than 200.   For  - 224 give 1 units.   For  - 249 give 2 units.   For  - 274 give 3 units.   For  - 299 give 4 units.   For  - 324 give 5 units.   For  - 349 give 6 units.   For BG greater than or equal to 350 give 7 units.   Novolog sliding scale with  meals and add this amount of insulin to the 5 units of Novolog.  -if not eating, just test your blood sugar and give the sliding scale insulin (Patient not taking: Reported on 7/15/2019) 3 mL 3     Family History  family history includes Diabetes in his mother, sister, sister, and sister; Heart Disease in his father and mother; Hypertension in his mother.    Social History   reports that he quit smoking about 7 years ago. He has never used smokeless tobacco. He reports that he does not drink alcohol or use drugs.     Past Medical History  Past Medical History:   Diagnosis Date     Anemia in chronic renal disease 3/9/2015     Antiplatelet or antithrombotic long-term use      Atrial fibrillation and flutter      CAD (coronary artery disease)     Stemi in 12/11, s/p angioplasty     CRD (chronic renal disease), stage IV (H) 03/09/2015    hx ATN with dialysis complicating cardiogenic shock  1/2012     GI bleed     massive lower GI bleed secondary to a cecal ulcer, s/p ileocecal resection in 12/11     Heart failure     Biventricular systolic HF, complicated by ARDS requiring tracheostomy     Hyperlipidemia LDL goal <100 4/28/2013     Hypertension      Ischemic cardiomyopathy 4/28/2013    TTE revealing 40% EF     Myocardial infarction (H)      Other and unspecified nonspecific immunological findings     Anti JKa     Pulmonary edema     episodes of flash pulmonary edema in 12/11     Subclinical hypothyroidism        Past Surgical History:   Procedure Laterality Date     ESOPHAGOSCOPY, GASTROSCOPY, DUODENOSCOPY (EGD), COMBINED  12/25/2011    Procedure:COMBINED ESOPHAGOSCOPY, GASTROSCOPY, DUODENOSCOPY (EGD); Surgeon:SAMARIA PUENTE; Location:[facility]     LAPAROTOMY EXPLORATORY  12/31/2011    Procedure:LAPAROTOMY EXPLORATORY; Explore laparotomy, Illeocectomy, Diverting Illeostomy; Surgeon:GABI NAJERA; Location:[facility]     ORIF right elbow fracture  age 12     TAKEDOWN ILEOSTOMY  6/5/2014    Procedure: TAKEDOWN  "ILEOSTOMY;  Surgeon: Gia Mosher MD;  Location: [facility]     TRACHEOSTOMY  12/22/2011    Procedure:TRACHEOSTOMY; Tracheostomy; 80XLTCP-Proximal Extension-Cuffed 8.0 mm I.D.; Surgeon:LIZABETH DYE; Location:[facility]       Physical Exam  BP 97/63   Pulse 98   Ht 1.727 m (5' 8\")   Wt 107.3 kg (236 lb 9.6 oz)   BMI 35.97 kg/m     Body mass index is 35.97 kg/m .    GENERAL : In no apparent distress  SKIN: Normal color, normal temperature, texture.  No  suspicious lesions or rashes  EYES: PERRLA.  No proptosis.  MOUTH: Moist, pink; pharynx clear  NECK: No visible masses. No palpable adenopathy, or masses. No goiter.  RESP: normal respiratory effort.  cough   ABDOMEN: soft, nontender to palpation   NEURO: awake, alert, responds appropriately to questions.  Moves all extremities; Gait normal.     EXTREMITIES: No ulcers or open wounds.     RESULTS    Lab Results   Component Value Date    A1C 5.7 07/15/2019    A1C 13.4 04/08/2019    A1C 7.3 02/22/2018    A1C 6.9 08/16/2016    A1C 5.7 03/26/2014       Creatinine   Date Value Ref Range Status   09/16/2019 2.88 (H) 0.66 - 1.25 mg/dL Final     GFR Estimate   Date Value Ref Range Status   09/16/2019 22 (L) >60 mL/min/[1.73_m2] Final     Comment:     Non  GFR Calc  Starting 12/18/2018, serum creatinine based estimated GFR (eGFR) will be   calculated using the Chronic Kidney Disease Epidemiology Collaboration   (CKD-EPI) equation.       Hemoglobin A1C   Date Value Ref Range Status   07/15/2019 5.7 (H) 0 - 5.6 % Final     Comment:     Normal <5.7% Prediabetes 5.7-6.4%  Diabetes 6.5% or higher - adopted from ADA   consensus guidelines.       Potassium   Date Value Ref Range Status   09/16/2019 4.9 3.4 - 5.3 mmol/L Final     ALT   Date Value Ref Range Status   07/23/2019 14 0 - 70 U/L Final     AST   Date Value Ref Range Status   07/23/2019 6 0 - 45 U/L Final     CK Total   Date Value Ref Range Status   05/11/2012 4.1 (A) 20 - 200 U/L      TSH " "  Date Value Ref Range Status   07/23/2019 4.64 (H) 0.40 - 4.00 mU/L Final     T4 Free   Date Value Ref Range Status   07/23/2019 0.98 0.76 - 1.46 ng/dL Final       TSH   Date Value Ref Range Status   07/23/2019 4.64 (H) 0.40 - 4.00 mU/L Final   07/01/2019 6.42 (H) 0.40 - 4.00 mU/L Final   06/27/2019 5.82 (H) 0.40 - 4.00 mU/L Final   04/08/2019 3.55 0.40 - 4.00 mU/L Final   06/01/2018 6.07 (H) 0.40 - 4.00 mU/L Final     T4 Free   Date Value Ref Range Status   07/23/2019 0.98 0.76 - 1.46 ng/dL Final   07/01/2019 0.93 0.76 - 1.46 ng/dL Final   06/01/2018 0.96 0.76 - 1.46 ng/dL Final   11/21/2016 0.89 0.76 - 1.46 ng/dL Final   08/04/2014 1.28 0.76 - 1.46 ng/dL Final     Comment:     Effective 7/30/2014, the reference range for this assay has changed to reflect   new instrumentation/methodology.         Creatinine   Date Value Ref Range Status   09/16/2019 2.88 (H) 0.66 - 1.25 mg/dL Final       Recent Labs   Lab Test 07/22/19  0927 04/08/19  1831  07/06/15  0835 06/03/14  1012   CHOL 87 175   < > 129 158   HDL 30* 30*   < > 34* 36*   LDL 43 Cannot estimate LDL when triglyceride exceeds 400 mg/dL   < > 79 98   TRIG 69 408*   < > 81 122   CHOLHDLRATIO  --   --   --  3.8 4.4    < > = values in this interval not displayed.     No results found for: [id number], [id number], [id number]    ASSESSMENT/PLAN:    Cole stopped taking insulin completely nearly one month ago, citing his BG have been looking \"good\". They are certainly reasonable, however fasting glucoses are above target most of the time. I am concerned with maintaining off insulin given his prior history of stopping insulin, with a subsequent A1c >13% and BG in to 600's earlier this year.     Discussed continued efforts at dietary control, which he will work on. We discussed re-trialing Lantus as a method to improve his fasting blood sugars, and he was agreeable to trying this. He knows that if his BG are persistently <80 fasting, we will reassess insulin " requirement. Metformin is CI with his renal disease.     1. Diabetes type II: with coronary artery disease, microalbuminuria and CKD IV   -A1c not reliable in the setting of CKD IV and receiving iron infusions.    -resume Lantus 7 units daily AM   -he has not routinely required Novolog for correction, thus will not plan to resume.    -referral to ophthalmology     2. Thyroid: pt carries a history of subclinical hypothyroidism   -most recent TSH 4.64 7/23/19, which is improved from prior testing, with normal free T4.    -will continue to monitor for sx, and trend values      Refills today:  1. Lantus     Follow up:  1. With [facility] in 2-3 months.   2. Diabetes Educator follow up: PRN    The patient is  enrolled in [employer] services    This patient has met [place] community measures for diabetes control: no (D5: Control blood pressure , Lower bad cholesterol Maintain blood sugar, Be tobacco-free, Take aspirin as recommended)    This patient is eligible for graduation from [facility] clinic: no    I spent 25 minutes with this patient face to face and explained the conditions and plans (more than 50% of time was counseling/coordination of care, diabetes management, follow up plan for worsening hyper and hypoglycemia) . The patient understood and is satisfied with today's visit.     RAISA Stuart, PA-C  [facility]   Pager [phone]

## 2019-10-07 NOTE — PATIENT INSTRUCTIONS
1. Please continue to check your blood sugars at least twice daily (once in the morning, and once before dinner)  2. Lantus 7 units daily in the morning.       Due to your underlying kidney disease, the insulin stays in your system a little longer than expected. This is likely why you are requiring less insulin over time. Monitor your blood sugars carefully, as this predisposes you to more frequent hypoglycemia.     Please see an ophthalmologist to check for damage to your eyes related to diabetes.

## 2019-10-07 NOTE — PROGRESS NOTES
"[facility] Endocrinology- Outpatient Diabetes Follow up    Cole is a 59 year old male with past history of type II DM (since 2011), HTN, HLP, CAD with prior STEMI, CKD stage IV, and HFrEF who presents for follow up of diabetes.     He was diagnosed with TIIDM at the time of his myocardial infarction in 2011. He was on insulin beginning in 2012, but glycemic control was excellent, so he stopped insulin when Rx ran out. His A1c were in the prediabetic range subsequently. He was admitted 4/2019 for hyperglycemia that was seen in a routine physical exam, and was placed back on basal bolus insulin regimen. His A1c was >13% at this time.     He was previously seen by Smiley Argueta PA-C, last visit was 4/23/2019. He was experiencing hypoglycemia overnight in relation to insulin stacking for high BG near bedtime. He was instructed to reduce his Lantus dose, and continue fixed meal insulin dosing with meals. He was also instructed to continue high intensity sliding scale at a more conservative threshold of >200 AC and >225 HS. He was going to consider starting Trulicity with the hope to eliminate mealtime insulin requirement.     He was hospitalized 7/2019 for acute anemia and EVA on CKD (Cr on hospitalization was 3.08 with GFR 21). Through this hospital stay, his BG were consistently 80-130s with 20 units of Lantus daily.     On 8/1, Cole sent a Espressi message regarding FBG <80 for two days in a row. He had self titrated his Lantus to 26 units (from 20 upon last hospital discharge). He was to have a snack at bedtime if BG <140, and to reduce Lantus further to 18 units daily if FBG persistently <100.     Upon visiting today, Cole endorses that his BG have been \"good\", and he began self titrating off insulin one month ago. Once he reached Lantus 10 units daily, he stopped taking it.     Due to his CKD, and on iron infusions, A1c not reliable method of monitoring glycemic control. Most recent BMP shows Cr 2.8, GFR 22, and " "glucose of 114, all of which are stable baselines for him.     Pt denies headaches, visual changes, n/v, SOB at rest, chest pain, abd pain, nausea/vomiting, diarrhea, dysuria, hematuria or foot ulcers.    Current Diabetes Regimen:   Lantus- stopped one month ago  Novolog- stopped one month ago.  Trulicity- never started (insurance did not cover)    Glucometer Settings  Checking 1-3 times per day on average   Average glucose: 139  SD: 26  Fasting glucose range:  (70% are greater than 120)  Prandial glucose range: 104-240 (40% are over 150)  Documented hypoglycemia: no    Weight: 236, compared to recent of 234lb  Physical Activity: \"as much as I can\"- breathing sometimes limiting, as well as weakness.   Nutrition: three meals a day, snacks   SF jello and pudding, fruit, unsalted crackers for snacks  Does a lot of travelling.     Diabetes Care  Retinopathy: not known, needs to see ophthalmologist    Nephropathy: BP well controlled. + Hx microalbuminuria. Creatinine 2.8 (stable). Taking ACEi/ARB: on Entresto.    Neuropathy: no    Foot Exam: no wounds or ulcers.    Lipids: Taking ASA: yes, and on Plavix, statin: yes  LDL Cholesterol Calculated   Date Value Ref Range Status   07/22/2019 43 <100 mg/dL Final     Comment:     Desirable:       <100 mg/dl      ROS: 10 point review of systems completed; pertinent positives and negatives documented in HPI    Allergies  Allergies   Allergen Reactions     Hydralazine      Black spots     Simvastatin Other (See Comments)     Leg muscle weakness     Tylenol [Acetaminophen] Palpitations       Medications  Current Outpatient Medications   Medication Sig Dispense Refill     ACCU-CHEK GUIDE test strip USE TO TEST BLOOD SUGAR FOUR TIMES A DAY BEFORE MEALS AND AT BEDTIME 400 each 3     Alcohol Swabs PADS 1 applicator 4 times daily (before meals and nightly) 100 each 3     aspirin (ASA) 81 MG chewable tablet Take 81 mg by mouth daily       atorvastatin (LIPITOR) 80 MG tablet Take 1 " tablet (80 mg) by mouth daily 90 tablet 3     blood glucose monitoring (ACCU-CHEK FASTCLIX) lancets USE TO TEST BLOOD SUGAR FOUR TIMES A DAY AT MEALS AND AT BEDTIME 400 each 3     carvedilol (COREG) 3.125 MG tablet Take 1 tablet (3.125 mg) by mouth 2 times daily (with meals) 60 tablet 0     cholecalciferol (VITAMIN  -D) 1000 UNITS capsule Take 2 capsules (2,000 Units) by mouth daily 60 capsule 3     clopidogrel (PLAVIX) 75 MG tablet Take 1 tablet (75 mg) by mouth daily 90 tablet 3     furosemide (LASIX) 40 MG tablet Take 2 tablets twice a day for 10 days then 1 twice a day 186 tablet 11     insulin glargine (LANTUS PEN) 100 UNIT/ML pen Inject 7 Units Subcutaneous every morning 6 mL 3     insulin pen needle (32G X 4 MM) 32G X 4 MM miscellaneous Use 5 pen needles daily or as directed. 200 each 3     nitroGLYcerin (NITROSTAT) 0.4 MG sublingual tablet Place 1 tablet (0.4 mg) under the tongue every 5 minutes as needed for chest pain For chest pain place 1 tablet under the tongue every 5 minutes for 3 doses. If symptoms persist 5 minutes after 1st dose call 911. 25 tablet 0     pantoprazole (PROTONIX) 40 MG EC tablet TAKE ONE TABLET BY MOUTH TWICE A  tablet 2     sacubitril-valsartan (ENTRESTO) 49-51 MG per tablet Take 1 tablet by mouth 2 times daily 60 tablet 1     Sharps Container MISC 1 Device every 30 days 1 each 3     vitamin  B complex with vitamin C (VITAMIN  B COMPLEX) TABS Take 1 tablet by mouth daily       insulin aspart (NOVOLOG PEN) 100 UNIT/ML pen Custom Correction Scale ,use with meals   Do Not give Correction Insulin if BG less than 200.   For  - 224 give 1 units.   For  - 249 give 2 units.   For  - 274 give 3 units.   For  - 299 give 4 units.   For  - 324 give 5 units.   For  - 349 give 6 units.   For BG greater than or equal to 350 give 7 units.   Novolog sliding scale with meals and add this amount of insulin to the 5 units of Novolog.  -if not eating, just test  your blood sugar and give the sliding scale insulin (Patient not taking: Reported on 7/15/2019) 3 mL 3     Family History  family history includes Diabetes in his mother, sister, sister, and sister; Heart Disease in his father and mother; Hypertension in his mother.    Social History   reports that he quit smoking about 7 years ago. He has never used smokeless tobacco. He reports that he does not drink alcohol or use drugs.     Past Medical History  Past Medical History:   Diagnosis Date     Anemia in chronic renal disease 3/9/2015     Antiplatelet or antithrombotic long-term use      Atrial fibrillation and flutter      CAD (coronary artery disease)     Stemi in 12/11, s/p angioplasty     CRD (chronic renal disease), stage IV (H) 03/09/2015    hx ATN with dialysis complicating cardiogenic shock  1/2012     GI bleed     massive lower GI bleed secondary to a cecal ulcer, s/p ileocecal resection in 12/11     Heart failure     Biventricular systolic HF, complicated by ARDS requiring tracheostomy     Hyperlipidemia LDL goal <100 4/28/2013     Hypertension      Ischemic cardiomyopathy 4/28/2013    TTE revealing 40% EF     Myocardial infarction (H)      Other and unspecified nonspecific immunological findings     Anti JKa     Pulmonary edema     episodes of flash pulmonary edema in 12/11     Subclinical hypothyroidism        Past Surgical History:   Procedure Laterality Date     ESOPHAGOSCOPY, GASTROSCOPY, DUODENOSCOPY (EGD), COMBINED  12/25/2011    Procedure:COMBINED ESOPHAGOSCOPY, GASTROSCOPY, DUODENOSCOPY (EGD); Surgeon:SAMARIA PUENTE; Location:[facility]     LAPAROTOMY EXPLORATORY  12/31/2011    Procedure:LAPAROTOMY EXPLORATORY; Explore laparotomy, Illeocectomy, Diverting Illeostomy; Surgeon:GABI MOSHER; Location:[facility]     ORIF right elbow fracture  age 12     TAKEDOWN ILEOSTOMY  6/5/2014    Procedure: TAKEDOWN ILEOSTOMY;  Surgeon: Gabi Mosher MD;  Location: [facility]     TRACHEOSTOMY   "12/22/2011    Procedure:TRACHEOSTOMY; Tracheostomy; 80XLTCP-Proximal Extension-Cuffed 8.0 mm I.D.; Surgeon:LIZABETH DYE; Location:[facility]       Physical Exam  BP 97/63   Pulse 98   Ht 1.727 m (5' 8\")   Wt 107.3 kg (236 lb 9.6 oz)   BMI 35.97 kg/m    Body mass index is 35.97 kg/m .    GENERAL : In no apparent distress  SKIN: Normal color, normal temperature, texture.  No  suspicious lesions or rashes  EYES: PERRLA.  No proptosis.  MOUTH: Moist, pink; pharynx clear  NECK: No visible masses. No palpable adenopathy, or masses. No goiter.  RESP: normal respiratory effort.  cough   ABDOMEN: soft, nontender to palpation   NEURO: awake, alert, responds appropriately to questions.  Moves all extremities; Gait normal.     EXTREMITIES: No ulcers or open wounds.     RESULTS    Lab Results   Component Value Date    A1C 5.7 07/15/2019    A1C 13.4 04/08/2019    A1C 7.3 02/22/2018    A1C 6.9 08/16/2016    A1C 5.7 03/26/2014       Creatinine   Date Value Ref Range Status   09/16/2019 2.88 (H) 0.66 - 1.25 mg/dL Final     GFR Estimate   Date Value Ref Range Status   09/16/2019 22 (L) >60 mL/min/[1.73_m2] Final     Comment:     Non  GFR Calc  Starting 12/18/2018, serum creatinine based estimated GFR (eGFR) will be   calculated using the Chronic Kidney Disease Epidemiology Collaboration   (CKD-EPI) equation.       Hemoglobin A1C   Date Value Ref Range Status   07/15/2019 5.7 (H) 0 - 5.6 % Final     Comment:     Normal <5.7% Prediabetes 5.7-6.4%  Diabetes 6.5% or higher - adopted from ADA   consensus guidelines.       Potassium   Date Value Ref Range Status   09/16/2019 4.9 3.4 - 5.3 mmol/L Final     ALT   Date Value Ref Range Status   07/23/2019 14 0 - 70 U/L Final     AST   Date Value Ref Range Status   07/23/2019 6 0 - 45 U/L Final     CK Total   Date Value Ref Range Status   05/11/2012 4.1 (A) 20 - 200 U/L      TSH   Date Value Ref Range Status   07/23/2019 4.64 (H) 0.40 - 4.00 mU/L Final     T4 Free " "  Date Value Ref Range Status   07/23/2019 0.98 0.76 - 1.46 ng/dL Final       TSH   Date Value Ref Range Status   07/23/2019 4.64 (H) 0.40 - 4.00 mU/L Final   07/01/2019 6.42 (H) 0.40 - 4.00 mU/L Final   06/27/2019 5.82 (H) 0.40 - 4.00 mU/L Final   04/08/2019 3.55 0.40 - 4.00 mU/L Final   06/01/2018 6.07 (H) 0.40 - 4.00 mU/L Final     T4 Free   Date Value Ref Range Status   07/23/2019 0.98 0.76 - 1.46 ng/dL Final   07/01/2019 0.93 0.76 - 1.46 ng/dL Final   06/01/2018 0.96 0.76 - 1.46 ng/dL Final   11/21/2016 0.89 0.76 - 1.46 ng/dL Final   08/04/2014 1.28 0.76 - 1.46 ng/dL Final     Comment:     Effective 7/30/2014, the reference range for this assay has changed to reflect   new instrumentation/methodology.         Creatinine   Date Value Ref Range Status   09/16/2019 2.88 (H) 0.66 - 1.25 mg/dL Final       Recent Labs   Lab Test 07/22/19  0927 04/08/19  1831  07/06/15  0835 06/03/14  1012   CHOL 87 175   < > 129 158   HDL 30* 30*   < > 34* 36*   LDL 43 Cannot estimate LDL when triglyceride exceeds 400 mg/dL   < > 79 98   TRIG 69 408*   < > 81 122   CHOLHDLRATIO  --   --   --  3.8 4.4    < > = values in this interval not displayed.     No results found for: [id number], [id number], [id number]    ASSESSMENT/PLAN:    Cole stopped taking insulin completely nearly one month ago, citing his BG have been looking \"good\". They are certainly reasonable, however fasting glucoses are above target most of the time. I am concerned with maintaining off insulin given his prior history of stopping insulin, with a subsequent A1c >13% and BG in to 600's earlier this year.     Discussed continued efforts at dietary control, which he will work on. We discussed re-trialing Lantus as a method to improve his fasting blood sugars, and he was agreeable to trying this. He knows that if his BG are persistently <80 fasting, we will reassess insulin requirement. Metformin is CI with his renal disease.     1. Diabetes type II: with coronary " artery disease, microalbuminuria and CKD IV   -A1c not reliable in the setting of CKD IV and receiving iron infusions.    -resume Lantus 7 units daily AM   -he has not routinely required Novolog for correction, thus will not plan to resume.    -referral to ophthalmology     2. Thyroid: pt carries a history of subclinical hypothyroidism   -most recent TSH 4.64 7/23/19, which is improved from prior testing, with normal free T4.    -will continue to monitor for sx, and trend values      Refills today:  1. Lantus     Follow up:  1. With [facility] in 2-3 months.   2. Diabetes Educator follow up: PRN    The patient is  enrolled in [employer] services    This patient has met [place] community measures for diabetes control: no (D5: Control blood pressure , Lower bad cholesterol Maintain blood sugar, Be tobacco-free, Take aspirin as recommended)    This patient is eligible for graduation from [facility] clinic: no    I spent 25 minutes with this patient face to face and explained the conditions and plans (more than 50% of time was counseling/coordination of care, diabetes management, follow up plan for worsening hyper and hypoglycemia) . The patient understood and is satisfied with today's visit.     RAISA Stuart, PA-C  [facility]   Pager [phone]

## 2019-10-07 NOTE — TELEPHONE ENCOUNTER
Message left for Cole regarding results, recommendation for additional iron infusions. I provided the phone number for specialty infusion scheduling as well as the clinic number should Cole have any questions.    Mai Singletary RN

## 2019-10-07 NOTE — PROGRESS NOTES
HPI  59-year-old returns today for reevaluation of his anemia and heart failure.  He has significantly improved his pep and energy.  He is not as tired fatigued or sleepy as he was in the past.  Still however gets some dyspnea with exertion early in the morning as a day progresses he does better he is not getting short of breath.  He has not had any chest pain in association with this.  He continues to have swelling in his feet and his ankles largely unchanged from before.  He is reduce the Lasix dose down to 40 mg twice a day has tolerated this well.  He has no PND orthopnea, fever chills or sweats.  Otherwise overall he is improved and satisfied with his progress.  Past Medical History:   Diagnosis Date     Anemia in chronic renal disease 3/9/2015     Antiplatelet or antithrombotic long-term use      Atrial fibrillation and flutter      CAD (coronary artery disease)     Stemi in 12/11, s/p angioplasty     CRD (chronic renal disease), stage IV (H) 03/09/2015    hx ATN with dialysis complicating cardiogenic shock  1/2012     GI bleed     massive lower GI bleed secondary to a cecal ulcer, s/p ileocecal resection in 12/11     Heart failure     Biventricular systolic HF, complicated by ARDS requiring tracheostomy     Hyperlipidemia LDL goal <100 4/28/2013     Hypertension      Ischemic cardiomyopathy 4/28/2013    TTE revealing 40% EF     Myocardial infarction (H)      Other and unspecified nonspecific immunological findings     Anti JKa     Pulmonary edema     episodes of flash pulmonary edema in 12/11     Subclinical hypothyroidism      Past Surgical History:   Procedure Laterality Date     ESOPHAGOSCOPY, GASTROSCOPY, DUODENOSCOPY (EGD), COMBINED  12/25/2011    Procedure:COMBINED ESOPHAGOSCOPY, GASTROSCOPY, DUODENOSCOPY (EGD); Surgeon:SAMARIA PUENTE; [facility]     LAPAROTOMY EXPLORATORY  12/31/2011    Procedure:LAPAROTOMY EXPLORATORY; Explore laparotomy, Illeocectomy, Diverting Illeostomy; Surgeon:REINALDO  GBAI RAMIREZ; Location:[facility]     ORIF right elbow fracture  age 12     TAKEDOWN ILEOSTOMY  2014    Procedure: TAKEDOWN ILEOSTOMY;  Surgeon: Gabi Mosher MD;  Location: [facility]     TRACHEOSTOMY  2011    Procedure:TRACHEOSTOMY; Tracheostomy; 80XLTCP-Proximal Extension-Cuffed 8.0 mm I.D.; Surgeon:LIZABETH DYE; Location:[facility]     Family History   Problem Relation Age of Onset     Diabetes Mother      Hypertension Mother      Heart Disease Mother      Heart Disease Father          of heart attack, left when he was young     Diabetes Sister      Diabetes Sister      Diabetes Sister      Social History     Socioeconomic History     Marital status: Significant other     Spouse name: None     Number of children: None     Years of education: None     Highest education level: None   Occupational History     None   Social Needs     Financial resource strain: None     Food insecurity:     Worry: None     Inability: None     Transportation needs:     Medical: None     Non-medical: None   Tobacco Use     Smoking status: Former Smoker     Last attempt to quit: 12/3/2011     Years since quittin.8     Smokeless tobacco: Never Used   Substance and Sexual Activity     Alcohol use: No     Alcohol/week: 0.0 standard drinks     Drug use: No     Sexual activity: Yes     Partners: Female   Lifestyle     Physical activity:     Days per week: None     Minutes per session: None     Stress: None   Relationships     Social connections:     Talks on phone: None     Gets together: None     Attends Caodaism service: None     Active member of club or organization: None     Attends meetings of clubs or organizations: None     Relationship status: None     Intimate partner violence:     Fear of current or ex partner: None     Emotionally abused: None     Physically abused: None     Forced sexual activity: None   Other Topics Concern     Parent/sibling w/ CABG, MI or angioplasty before 65F 55M? Yes       Service Not Asked     Blood Transfusions Not Asked     Caffeine Concern Not Asked     Occupational Exposure Not Asked     Hobby Hazards Not Asked     Sleep Concern Not Asked     Stress Concern Not Asked     Weight Concern Not Asked     Special Diet Not Asked     Back Care Not Asked     Exercise Yes     Comment: limited walking     Bike Helmet Not Asked     Seat Belt Not Asked     Self-Exams Not Asked   Social History Narrative     None     Complete review of symptoms negative except as noted above.    Exam:  There were no vitals taken for this visit.  The patient is alert, oriented with a clear sensorium.   Skin shows no lesions or rashes and good turgor.   Head is normocephalic and atraumatic.    Neck shows no nodes, thyromegaly.     Lungs are clear.   Heart shows normal S1 and S2 without murmur or gallop.    Extremities show 2+ pretibial edema.    Labs reviewed with him:  Results for orders placed or performed in visit on 10/07/19   Hemoglobin A1c POCT   Result Value Ref Range    Hemoglobin A1C 6.0 4.3 - 6.0 %   Results for MERCY SHAFER (MRN [id number]) as of 10/7/2019 09:44   Ref. Range 10/7/2019 09:25   WBC Latest Ref Range: 4.0 - 11.0 10e9/L 8.4   Hemoglobin Latest Ref Range: 13.3 - 17.7 g/dL 9.9 (L)   Hematocrit Latest Ref Range: 40.0 - 53.0 % 34.6 (L)   Platelet Count Latest Ref Range: 150 - 450 10e9/L 266   RBC Count Latest Ref Range: 4.4 - 5.9 10e12/L 3.97 (L)   MCV Latest Ref Range: 78 - 100 fl 87   MCH Latest Ref Range: 26.5 - 33.0 pg 24.9 (L)   MCHC Latest Ref Range: 31.5 - 36.5 g/dL 28.6 (L)   RDW Latest Ref Range: 10.0 - 15.0 % 26.2 (H)   Diff Method Unknown Automated Method   % Neutrophils Latest Units: % 79.6   % Lymphocytes Latest Units: % 7.5   % Monocytes Latest Units: % 10.2   % Eosinophils Latest Units: % 1.9   % Basophils Latest Units: % 0.6       ASSESSMENT  1 chronic kidney disease stable  2 anemia related to above as well as iron deficiency improved  3 congestive heart failure  appears adequately compensated   4 diabetes mellitus well controlled  5 subclinical hypothyroidism.    Plan  We will await the results of his iron studies today.  His iron remains low we do not repeat the iron infusions.  His iron is back to normal we will plan to reassess his blood count in a month.  Encouraged him regarding his physical activity walking we will continue him on the same dose of Lasix.    This note was completed using Dragon voice recognition software.  Although reviewed after completion, some word and grammatical errors may occur.    Silas Enciso MD  General Internal Medicine  [facility]  [phone]

## 2019-10-17 DIAGNOSIS — K92.2 LOWER GI BLEEDING: ICD-10-CM

## 2019-10-19 NOTE — TELEPHONE ENCOUNTER
pantoprazole (PROTONIX) 40 MG EC tablet  Last Written Prescription Date:  12/27/18  Last Fill Quantity: 180,   # refills: 2  Last Office Visit   6/27/19  Future Office visit:  10/31/19    Routing refill request to provider for review/approval because: not on protocol

## 2019-10-21 DIAGNOSIS — I10 BENIGN ESSENTIAL HYPERTENSION: Primary | ICD-10-CM

## 2019-10-21 RX ORDER — PANTOPRAZOLE SODIUM 40 MG/1
40 TABLET, DELAYED RELEASE ORAL 2 TIMES DAILY
Qty: 180 TABLET | Refills: 2 | Status: SHIPPED | OUTPATIENT
Start: 2019-10-21 | End: 2020-04-02

## 2019-10-23 RX ORDER — CARVEDILOL 3.12 MG/1
3.12 TABLET ORAL 2 TIMES DAILY WITH MEALS
Qty: 180 TABLET | Refills: 1 | Status: ON HOLD | OUTPATIENT
Start: 2019-10-23 | End: 2019-11-17

## 2019-10-24 ENCOUNTER — INFUSION THERAPY VISIT (OUTPATIENT)
Dept: INFUSION THERAPY | Facility: CLINIC | Age: 59
End: 2019-10-24
Attending: INTERNAL MEDICINE
Payer: MEDICAID

## 2019-10-24 VITALS
DIASTOLIC BLOOD PRESSURE: 67 MMHG | OXYGEN SATURATION: 95 % | HEART RATE: 85 BPM | SYSTOLIC BLOOD PRESSURE: 98 MMHG | TEMPERATURE: 97.5 F

## 2019-10-24 DIAGNOSIS — E61.1 IRON DEFICIENCY: Primary | ICD-10-CM

## 2019-10-24 PROCEDURE — 25000128 H RX IP 250 OP 636: Mod: ZF | Performed by: INTERNAL MEDICINE

## 2019-10-24 PROCEDURE — 96365 THER/PROPH/DIAG IV INF INIT: CPT

## 2019-10-24 PROCEDURE — 96366 THER/PROPH/DIAG IV INF ADDON: CPT

## 2019-10-24 PROCEDURE — 25800030 ZZH RX IP 258 OP 636: Mod: ZF | Performed by: INTERNAL MEDICINE

## 2019-10-24 RX ADMIN — IRON SUCROSE 300 MG: 20 INJECTION, SOLUTION INTRAVENOUS at 08:51

## 2019-10-24 NOTE — PATIENT INSTRUCTIONS
Patient Education     Patient Education    Iron Sucrose Chewable tablet    Iron Sucrose Solution for injection  Iron Sucrose Solution for injection  What is this medicine?  IRON SUCROSE (TRICIA velez) is an iron complex. Iron is used to make healthy red blood cells, which carry oxygen and nutrients throughout the body. This medicine is used to treat iron deficiency anemia in people with chronic kidney disease.  This medicine may be used for other purposes; ask your health care provider or pharmacist if you have questions.  What should I tell my health care provider before I take this medicine?  They need to know if you have any of these conditions:    anemia not caused by low iron levels    heart disease    high levels of iron in the blood    kidney disease    liver disease    an unusual or allergic reaction to iron, other medicines, foods, dyes, or preservatives    pregnant or trying to get pregnant    breast-feeding  How should I use this medicine?  This medicine is for infusion into a vein. It is given by a health care professional in a hospital or clinic setting.  Talk to your pediatrician regarding the use of this medicine in children. While this drug may be prescribed for children as young as 2 years for selected conditions, precautions do apply.  Overdosage: If you think you have taken too much of this medicine contact a poison control center or emergency room at once.  NOTE: This medicine is only for you. Do not share this medicine with others.  What if I miss a dose?  It is important not to miss your dose. Call your doctor or health care professional if you are unable to keep an appointment.  What may interact with this medicine?  Do not take this medicine with any of the following medications:    deferoxamine    dimercaprol    other iron products  This medicine may also interact with the following medications:    chloramphenicol    deferasirox  This list may not describe all possible interactions.  Give your health care provider a list of all the medicines, herbs, non-prescription drugs, or dietary supplements you use. Also tell them if you smoke, drink alcohol, or use illegal drugs. Some items may interact with your medicine.  What should I watch for while using this medicine?  Visit your doctor or healthcare professional regularly. Tell your doctor or healthcare professional if your symptoms do not start to get better or if they get worse. You may need blood work done while you are taking this medicine.  You may need to follow a special diet. Talk to your doctor. Foods that contain iron include: whole grains/cereals, dried fruits, beans, or peas, leafy green vegetables, and organ meats (liver, kidney).  What side effects may I notice from receiving this medicine?  Side effects that you should report to your doctor or health care professional as soon as possible:    allergic reactions like skin rash, itching or hives, swelling of the face, lips, or tongue    breathing problems    changes in blood pressure    cough    fast, irregular heartbeat    feeling faint or lightheaded, falls    fever or chills    flushing, sweating, or hot feelings    joint or muscle aches/pains    seizures    swelling of the ankles or feet    unusually weak or tired  Side effects that usually do not require medical attention (report to your doctor or health care professional if they continue or are bothersome):    diarrhea    feeling achy    headache    irritation at site where injected    nausea, vomiting    stomach upset    tiredness  This list may not describe all possible side effects. Call your doctor for medical advice about side effects. You may report side effects to FDA at 7-710-FDA-4944.  Where should I keep my medicine?  This drug is given in a hospital or clinic and will not be stored at home.  NOTE:This sheet is a summary. It may not cover all possible information. If you have questions about this medicine, talk to your  doctor, pharmacist, or health care provider. Copyright  2016 Gold Standard            normal...

## 2019-10-24 NOTE — PROGRESS NOTES
Nursing Note  Cole Jesus presents today to [facility] for:   Chief Complaint   Patient presents with     Infusion     venofer     During today's [facility] appointment, orders from Dr. Enciso were completed.  Frequency: today is dose 1 of 3 total.    Progress note:  Patient identification verified by name and date of birth.  Assessment completed.  Vitals recorded in Doc Flowsheets.  Patient was provided with education regarding infusion and possible side effects.  Patient verbalized understanding.     present during visit today: Not Applicable.    Treatment Conditions: non-applicable.    Premedications: were not ordered.    Drug Waste Record: No    Infusion length and rate:  infusion given over approximately 90 minutes  193 ml/hr.    Labs: were not ordered for this appointment.    Vascular access: peripheral IV placed today.    Post Infusion Assessment:  Patient tolerated infusion without incident.     Discharge Plan:   Follow up plan of care with: ongoing infusions at [facility]. and primary medical doctor.  Discharge instructions were reviewed with patient.  Patient/representative verbalized understanding of discharge instructions and all questions answered.  Patient discharged from [facility] in stable condition.    Jolie Junior RN      Administrations This Visit     iron sucrose (VENOFER) 300 mg in sodium chloride 0.9 % 250 mL intermittent infusion     Admin Date  10/24/2019 Action  New Bag Dose  300 mg Route  Intravenous Administered By  Jolie Junior, ELIDA                BP 99/67   Pulse 88   Temp 97.5  F (36.4  C) (Oral)   SpO2 95%

## 2019-10-28 ENCOUNTER — PATIENT OUTREACH (OUTPATIENT)
Dept: CARDIOLOGY | Facility: CLINIC | Age: 59
End: 2019-10-28

## 2019-10-28 DIAGNOSIS — I50.22 CHRONIC SYSTOLIC CONGESTIVE HEART FAILURE (H): Primary | ICD-10-CM

## 2019-10-29 ENCOUNTER — INFUSION THERAPY VISIT (OUTPATIENT)
Dept: INFUSION THERAPY | Facility: CLINIC | Age: 59
End: 2019-10-29
Attending: INTERNAL MEDICINE
Payer: MEDICAID

## 2019-10-29 VITALS
DIASTOLIC BLOOD PRESSURE: 75 MMHG | HEART RATE: 90 BPM | RESPIRATION RATE: 16 BRPM | SYSTOLIC BLOOD PRESSURE: 119 MMHG | TEMPERATURE: 97.7 F

## 2019-10-29 DIAGNOSIS — E61.1 IRON DEFICIENCY: Primary | ICD-10-CM

## 2019-10-29 PROCEDURE — 96365 THER/PROPH/DIAG IV INF INIT: CPT

## 2019-10-29 PROCEDURE — 96366 THER/PROPH/DIAG IV INF ADDON: CPT

## 2019-10-29 PROCEDURE — 25000128 H RX IP 250 OP 636: Mod: ZF | Performed by: INTERNAL MEDICINE

## 2019-10-29 PROCEDURE — 25800030 ZZH RX IP 258 OP 636: Mod: ZF | Performed by: INTERNAL MEDICINE

## 2019-10-29 RX ADMIN — IRON SUCROSE 300 MG: 20 INJECTION, SOLUTION INTRAVENOUS at 09:06

## 2019-10-29 ASSESSMENT — PAIN SCALES - GENERAL: PAINLEVEL: NO PAIN (0)

## 2019-10-29 NOTE — PROGRESS NOTES
Nursing Note  Cole Jesus presents today to [facility] for:   Chief Complaint   Patient presents with     Infusion     venofer     During today's [facility] appointment, orders from Dr. Enciso were completed.  Frequency: today is dose 2 of 3 total.    Progress note:  Patient identification verified by name and date of birth.  Assessment completed.  Vitals recorded in Doc Flowsheets.  Patient was provided with education regarding infusion and possible side effects.  Patient verbalized understanding.     present during visit today: Not Applicable.    Treatment Conditions: non-applicable.    Premedications: were not ordered.    Drug Waste Record: No    Infusion length and rate:  infusion given over approximately 90 minutes  193 ml/hr.    Labs: were not ordered for this appointment.    Vascular access: peripheral IV placed today.    Post Infusion Assessment:  Patient tolerated infusion without incident.     Discharge Plan:   Follow up plan of care with: ongoing infusions at [facility]. and primary medical doctor.  Discharge instructions were reviewed with patient.  Patient/representative verbalized understanding of discharge instructions and all questions answered.  Patient discharged from [facility] in stable condition.    Nora Lay RN    Administrations This Visit     iron sucrose (VENOFER) 300 mg in sodium chloride 0.9 % 250 mL intermittent infusion     Admin Date  10/29/2019 Action  New Bag Dose  300 mg Rate  193.3 mL/hr Route  Intravenous Administered By  Nora Lay RN                /76   Pulse 74   Temp 97.7  F (36.5  C) (Oral)   Resp 16

## 2019-10-31 ENCOUNTER — OFFICE VISIT (OUTPATIENT)
Dept: CARDIOLOGY | Facility: CLINIC | Age: 59
End: 2019-10-31
Attending: INTERNAL MEDICINE
Payer: MEDICAID

## 2019-10-31 VITALS
DIASTOLIC BLOOD PRESSURE: 71 MMHG | OXYGEN SATURATION: 97 % | WEIGHT: 233 LBS | HEIGHT: 68 IN | SYSTOLIC BLOOD PRESSURE: 103 MMHG | BODY MASS INDEX: 35.31 KG/M2 | HEART RATE: 82 BPM

## 2019-10-31 DIAGNOSIS — K92.2 LOWER GI BLEEDING: Chronic | ICD-10-CM

## 2019-10-31 DIAGNOSIS — I10 ESSENTIAL HYPERTENSION WITH GOAL BLOOD PRESSURE LESS THAN 130/85: ICD-10-CM

## 2019-10-31 DIAGNOSIS — I50.22 CHRONIC SYSTOLIC CONGESTIVE HEART FAILURE (H): ICD-10-CM

## 2019-10-31 DIAGNOSIS — I51.89 OTHER ILL-DEFINED HEART DISEASES: Primary | ICD-10-CM

## 2019-10-31 DIAGNOSIS — I48.91 ATRIAL FIBRILLATION, UNSPECIFIED TYPE (H): Chronic | ICD-10-CM

## 2019-10-31 DIAGNOSIS — D62 ANEMIA DUE TO ACUTE BLOOD LOSS: ICD-10-CM

## 2019-10-31 DIAGNOSIS — E78.5 HYPERLIPIDEMIA LDL GOAL <100: Chronic | ICD-10-CM

## 2019-10-31 DIAGNOSIS — I25.5 ISCHEMIC CARDIOMYOPATHY: Chronic | ICD-10-CM

## 2019-10-31 LAB
ANION GAP SERPL CALCULATED.3IONS-SCNC: 7 MMOL/L (ref 3–14)
BUN SERPL-MCNC: 41 MG/DL (ref 7–30)
CALCIUM SERPL-MCNC: 8.7 MG/DL (ref 8.5–10.1)
CHLORIDE SERPL-SCNC: 106 MMOL/L (ref 94–109)
CO2 SERPL-SCNC: 25 MMOL/L (ref 20–32)
CREAT SERPL-MCNC: 2.64 MG/DL (ref 0.66–1.25)
ERYTHROCYTE [DISTWIDTH] IN BLOOD BY AUTOMATED COUNT: 26.5 % (ref 10–15)
GFR SERPL CREATININE-BSD FRML MDRD: 25 ML/MIN/{1.73_M2}
GLUCOSE SERPL-MCNC: 138 MG/DL (ref 70–99)
HCT VFR BLD AUTO: 37.7 % (ref 40–53)
HGB BLD-MCNC: 11 G/DL (ref 13.3–17.7)
INR PPP: 1.38 (ref 0.86–1.14)
IRON SATN MFR SERPL: 15 % (ref 15–46)
IRON SERPL-MCNC: 59 UG/DL (ref 35–180)
MCH RBC QN AUTO: 24.7 PG (ref 26.5–33)
MCHC RBC AUTO-ENTMCNC: 29.2 G/DL (ref 31.5–36.5)
MCV RBC AUTO: 85 FL (ref 78–100)
PLATELET # BLD AUTO: 256 10E9/L (ref 150–450)
POTASSIUM SERPL-SCNC: 4.3 MMOL/L (ref 3.4–5.3)
RBC # BLD AUTO: 4.46 10E12/L (ref 4.4–5.9)
SODIUM SERPL-SCNC: 139 MMOL/L (ref 133–144)
TIBC SERPL-MCNC: 396 UG/DL (ref 240–430)
WBC # BLD AUTO: 7.7 10E9/L (ref 4–11)

## 2019-10-31 PROCEDURE — 85610 PROTHROMBIN TIME: CPT | Performed by: INTERNAL MEDICINE

## 2019-10-31 PROCEDURE — 80048 BASIC METABOLIC PNL TOTAL CA: CPT | Performed by: INTERNAL MEDICINE

## 2019-10-31 PROCEDURE — G0463 HOSPITAL OUTPT CLINIC VISIT: HCPCS | Mod: ZF

## 2019-10-31 PROCEDURE — 99214 OFFICE O/P EST MOD 30 MIN: CPT | Mod: ZP | Performed by: INTERNAL MEDICINE

## 2019-10-31 PROCEDURE — 93010 ELECTROCARDIOGRAM REPORT: CPT | Mod: ZP | Performed by: INTERNAL MEDICINE

## 2019-10-31 PROCEDURE — 85027 COMPLETE CBC AUTOMATED: CPT | Performed by: INTERNAL MEDICINE

## 2019-10-31 PROCEDURE — 83540 ASSAY OF IRON: CPT | Performed by: INTERNAL MEDICINE

## 2019-10-31 PROCEDURE — 36415 COLL VENOUS BLD VENIPUNCTURE: CPT | Performed by: INTERNAL MEDICINE

## 2019-10-31 PROCEDURE — 83550 IRON BINDING TEST: CPT | Performed by: INTERNAL MEDICINE

## 2019-10-31 RX ORDER — FUROSEMIDE 40 MG
60 TABLET ORAL 2 TIMES DAILY
Qty: 270 TABLET | Refills: 3 | Status: SHIPPED | OUTPATIENT
Start: 2019-10-31 | End: 2019-11-04

## 2019-10-31 RX ORDER — ASPIRIN 81 MG/1
81 TABLET, CHEWABLE ORAL DAILY
Status: ON HOLD | COMMUNITY
End: 2019-11-13

## 2019-10-31 RX ORDER — LIDOCAINE 40 MG/G
CREAM TOPICAL
Status: CANCELLED | OUTPATIENT
Start: 2019-10-31

## 2019-10-31 ASSESSMENT — PAIN SCALES - GENERAL: PAINLEVEL: NO PAIN (0)

## 2019-10-31 ASSESSMENT — MIFFLIN-ST. JEOR: SCORE: 1836.38

## 2019-10-31 NOTE — PATIENT INSTRUCTIONS
Please increase Lasix to 60 MG twice daily.    The right heart cath is scheduled for Tuesday, November 12th at the [facility] waiting room, arriving at 8:00 AM. Please don't eat or drink for 6 hours prior to procedure. The [facility] waiting room is located on the main floor of the hospital.     You will be admitted to the hospital on November 19th, arriving at 2:00 PM. The hospital is located at [address] [place] [address], [facility]. Please go to the admissions office by the main entrance.    Thank you for your visit today.  Please call me with any questions or concerns.   Zach Phelan RN  Cardiology Care Coordinator  [phone], press option 1 then option 4

## 2019-10-31 NOTE — PROGRESS NOTES
Alen Trujillo M.D.  Cardiovascular Medicine    I personally saw and examined this patient, discussed care with housestaff and other consultants, reviewed current laboratories and imaging studies, and conveyed impression and diagnostic/therapeutic plan to patient.    Problem List  1. Acute inferior MI treated with angioplasty and stenting  2. Atrial fibrlllation  3. History of GI bleeding  4. Chronic cardiomyopathy with reduced ejection fraction  5. Chronic renal insufficiency    History    The patient has a history of acute occlusion of the proximal right coronary artery, treated successfully with balloon angioplasty and stenting in 6/2019.  The patient's course was complicated by atrial fibrillation.  He has a remote history of atrial fibrillation, treated with Warfarin and complicated by gastrointestinal bleeding.  At the time (prior to my/Dr. Trujillo's assumption of care) he was advised to stay off of warfarin (2013).  He has had no documented recurrence of atrial fibrillation with episodic monitoring including ZEOPATCH. He was put back on warfarin and had GIB from his stomach, gastric lesions per patient. He also has a history of a large GIB back around 2011. He does not know or perceive being out of rhythm.  His most recent echocardiogram (technically difficult showed stable ejection fraction in the range of 35%.    Mr. Jesus is feeling better than he was in June when he had his MI and GIB, but he is still feeling overall limited. He has less fatigue, he notes that recently he has been able to start doing his own laundry again, which involves carrying a basket and going up and down the basement stairs. He still is not grocery shopping because he cannot walk around the whole store. Walking from the parking lot to the clinic makes him SOB. He rides a stationary bike does this for 1/2 hour a day with breaks. Walking from clinic to the parking lot he might have to take breaks.  The patient is taking medication  "regularly, following a low salt diet, and exercising regularly as outlined.    He does have some LE edema, but he thinks this is on his \"low end.\" He also has a slight amount of abdominal bloating, but he attributes this to his iron infusions. No SOB at rest. No orthopnea. No PND. No syncope, dizziness, palpitations, chest pain, or decreased appetite. No GIB symptoms.    AICD has been previous discussed and patient did not feel over the years that it was desirable (extensive discussion of risks and benefits)    Objective  /71 (BP Location: Left arm, Patient Position: Sitting, Cuff Size: Adult Regular)   Pulse 82   Ht 1.727 m (5' 8\")   Wt 105.7 kg (233 lb)   SpO2 97%   BMI 35.43 kg/m     Exam: General, chronically ill appearing although today with good energy, articulate, able to move around the room  Derm: skin without lesions  Pulm: chest is clear, normal respiratory effort  Cardiac: no evidence of right or left ventricular overactivity, sternum stable, no carotid bruits, rhythm is irregular, [id number], S2, no murmur  Abdominal: abdomen without tenderness, rebound guarding or masses  Extremities: 1+ LE edema  Neuro: no focal neurologic defects.    Wt Readings from Last 5 Encounters:   10/31/19 105.7 kg (233 lb)   10/07/19 107.3 kg (236 lb 9.6 oz)   09/16/19 106.1 kg (234 lb)   08/26/19 107 kg (236 lb)   07/24/19 107.7 kg (237 lb 6.4 oz)       Meds  Current Outpatient Medications   Medication     ACCU-CHEK GUIDE test strip     Alcohol Swabs PADS     aspirin (ASA) 81 MG chewable tablet     atorvastatin (LIPITOR) 80 MG tablet     carvedilol (COREG) 3.125 MG tablet     cholecalciferol (VITAMIN  -D) 1000 UNITS capsule     clopidogrel (PLAVIX) 75 MG tablet     furosemide (LASIX) 40 MG tablet     pantoprazole (PROTONIX) 40 MG EC tablet     sacubitril-valsartan (ENTRESTO) 49-51 MG per tablet     vitamin  B complex with vitamin C (VITAMIN  B COMPLEX) TABS     blood glucose monitoring (ACCU-CHEK FASTCLIX) lancets "     insulin glargine (LANTUS PEN) 100 UNIT/ML pen     insulin pen needle (32G X 4 MM) 32G X 4 MM miscellaneous     nitroGLYcerin (NITROSTAT) 0.4 MG sublingual tablet     [employer] Container MISC     No current facility-administered medications for this visit.      Diagnostics    10/31/2019 CBC  Component Value Flag Ref Range Units Status Collected Lab   WBC 7.7   4.0 - 11.0 10e9/L Final 10/31/2019  7:32 AM 1740   RBC Count 4.46   4.4 - 5.9 10e12/L Final 10/31/2019  7:32 AM 1740   Hemoglobin 11.0  Low   13.3 - 17.7 g/dL Final 10/31/2019  7:32 AM 1740   Hematocrit 37.7  Low   40.0 - 53.0 % Final 10/31/2019  7:32 AM 1740   MCV 85   78 - 100 fl Final 10/31/2019  7:32 AM 1740   MCH 24.7  Low   26.5 - 33.0 pg Final 10/31/2019  7:32 AM 1740   MCHC 29.2  Low   31.5 - 36.5 g/dL Final 10/31/2019  7:32 AM 1740   RDW 26.5  High   10.0 - 15.0 % Final 10/31/2019  7:32 AM 1740   Platelet Count 256   150 - 450 10e9/L        10/31/2019 INR  1.38    10/31/2019 BMP   Results for [facility] (MRN [id number]) as of 10/31/2019 08:30   Ref. Range 10/31/2019 07:32   Sodium Latest Ref Range: 133 - 144 mmol/L 139   Potassium Latest Ref Range: 3.4 - 5.3 mmol/L 4.3   Chloride Latest Ref Range: 94 - 109 mmol/L 106   Carbon Dioxide Latest Ref Range: 20 - 32 mmol/L 25   Urea Nitrogen Latest Ref Range: 7 - 30 mg/dL 41 (H)   Creatinine Latest Ref Range: 0.66 - 1.25 mg/dL 2.64 (H)   GFR Estimate Latest Ref Range: >60 mL/min/1.73_m2 25 (L)   GFR Estimate If Black Latest Ref Range: >60 mL/min/1.73_m2 29 (L)   Calcium Latest Ref Range: 8.5 - 10.1 mg/dL 8.7   Anion Gap Latest Ref Range: 3 - 14 mmol/L 7       10/7/2019 Iron studies  Component Value Flag Ref Range Units Status Collected Lab   Iron 27  Low   35 - 180 ug/dL Final 10/07/2019  9:25 AM 1740   Iron Binding Cap 388   240 - 430 ug/dL Final 10/07/2019  9:25 AM 1740   Iron Saturation Index 7  Low   15 - 46 % Final 10/07/2019  9:25 AM      Ferritin 48   26 - 388 ng/mL Final 10/07/2019  9:25 AM  1740     2/27/2018 ECHO     Interpretation Summary  Ischemic cardiomyopathy.Severe inferolateral and anterolateral wall  hypokinesis to akinesis. The Ejection Fraction is estimated at 25-30%.Traced  EF is 28%  Global right ventricular function is mildly to moderately reduced. Right  ventricular systolic pressure is 85mmHg above the right atrial pressure.Severe  (pulmonary artery systolic pressure >75mmHg) pulmonary hypertension is  present.  Moderate mitral insufficiency is present.  The inferior vena cava was normal in size with preserved respiratory  variability.Estimated mean right atrial pressure is 3 mmHg.  No pericardial effusion is present.     This study was compared with the study from 2/22/17, PAP has increased .    6/17/2019 ECHO  Impressions:  The echocardiographic study was abnormal. Mild LVH with LVEF 35%. Cannot  identify a discrete regional wall motion abnormalities. Severe mitral  insufficiency. Moderate pulmonary hypertension. Biatrial enlargement with  underlying irregular rhythm. No previous echocardiograms for comparison.    Summary:  Procedure information: This was a technically difficult study. Intravenous  contrast ( Definity, 3.5 ml) was administered to opacify the left ventricle.  Left ventricle: Size was normal. Systolic function was moderately reduced by  visual assessment. Ejection fraction was estimated to be 35 %. This study was  inadequate for the evaluation of regional wall motion. Wall thickness was  mildly increased. The study was not technically sufficient to allow evaluation  of LV diastolic function due to an underlying irregular rhythm.  Mitral valve: There was no evidence for stenosis. There was severe  regurgitation.  Left atrium: The atrium was mildly to moderately dilated.  Right ventricle: The size was normal. Systolic function was moderately reduced.  Pulmonary arteries: There was moderate pulmonary artery hypertension. Estimated  pulmonary artery peak systolic pressure  was 51 mmHg.  Tricuspid valve: There was mild to moderate regurgitation.  Right atrium: The atrium was mildly dilated.  Pericardium, pleura: Not well visualized.    ZEOPATCH 6/2019      Medications:  ASA 81 mg   Plavix 75 mg   Lipitor 80 mg daily    Coreg 3.125 mg BID  Entresto 49-51  Lasix 40 mg BID    Assessment/Plan     60 yo male with history of : Acute inferior MI treated with angioplasty and stenting (most recently 6/2019), Paraxsymal Atrial fibrillation, History of GI bleeding x2 in the setting of warfarin, chronic cardiomyopathy with reduced ejection fraction, and chronic renal insufficiency (stage III-VI) who is presenting for heart failure follow-up.    Mr. Jesus did have a set back this year when he had an acute MI with course c/b a fib and GIB. However, now we are about 5 months beyond this and he has not improved to the point that we would hope. His functional capacity is extremely low and I think that it is time to start evaluating for advanced therapies. Cole is will to start the initial testing, but does want to think more about these therapies. In the meantime, he needs to be seen in [facility] once per week. Will decide regarding elective admission based on his RHC.    # Chronic systolic heart failure/HFrEF (EF 35%) secondary to ischemic cardiomyopathy  NYHA Symptom Class III  Stage C  Primary Cardiologist: Dr. Trujillo; Last seen 10/31/2019  ACE-I/ARB/ARNi: Entresto 49-51, appears to be tolerating, but would not increase further given renal function  BB Coreg 3.125 BID, defer further increase until RHC  Aldosterone antagonist contraindicated due to renal dysfunction  SCD prophylaxis Patient Declined  Fluid status Hypervolemic- increase lasix to 60 mg BID  Follow-up [facility] in one week    # Iron Deficiency Anemia    - Continue IV Iron infusions with outside provider  - Will add on iron studies to today's labs    # A.fib   - EKG today today shows he is back in A. Fib, rate controlled  - Pt has had 2  large GIB on coumadin in the past, he does not want to retrial     Follow-up: Needs to be seen in [facility] in one week, RHC and ECHO in one week, will likely need an elective admission Nov. 19th, but will await RHC results    Should the patient have recurrent chest pain or symptoms of abnormal heart rhythm including palpitation, pre-syncope or syncope, he was instructed to call 911 and return immediately to hospital.    Ena Kruger  [facility]    This patient was seen and discussed with Dr. Phillips, please see his attestation.

## 2019-10-31 NOTE — LETTER
10/31/2019      RE: Cole Jesus  [address]  [facility] [address]       Dear Colleague,    Thank you for the opportunity to participate in the care of your patient, Cole Jesus, at the [facility]. Please see a copy of my visit note below.    Alen Trujillo M.D.  Cardiovascular Medicine    I personally saw and examined this patient, discussed care with housestaff and other consultants, reviewed current laboratories and imaging studies, and conveyed impression and diagnostic/therapeutic plan to patient.    Problem List  1. Acute inferior MI treated with angioplasty and stenting  2. Atrial fibrlllation  3. History of GI bleeding  4. Chronic cardiomyopathy with reduced ejection fraction  5. Chronic renal insufficiency    History    The patient has a history of acute occlusion of the proximal right coronary artery, treated successfully with balloon angioplasty and stenting in 6/2019.  The patient's course was complicated by atrial fibrillation.  He has a remote history of atrial fibrillation, treated with Warfarin and complicated by gastrointestinal bleeding.  At the time (prior to my/Dr. Trujillo's assumption of care) he was advised to stay off of warfarin (2013).  He has had no documented recurrence of atrial fibrillation with episodic monitoring including ZEOPATCH. He was put back on warfarin and had GIB from his stomach, gastric lesions per patient. He also has a history of a large GIB back around 2011. He does not know or perceive being out of rhythm.  His most recent echocardiogram (technically difficult showed stable ejection fraction in the range of 35%.    Mr. Jesus is feeling better than he was in June when he had his MI and GIB, but he is still feeling overall limited. He has less fatigue, he notes that recently he has been able to start doing his own laundry again, which involves carrying a basket and going up and down the  "basement stairs. He still is not grocery shopping because he cannot walk around the whole store. Walking from the parking lot to the clinic makes him SOB. He rides a stationary bike does this for 1/2 hour a day with breaks. Walking from clinic to the parking lot he might have to take breaks.  The patient is taking medication regularly, following a low salt diet, and exercising regularly as outlined.    He does have some LE edema, but he thinks this is on his \"low end.\" He also has a slight amount of abdominal bloating, but he attributes this to his iron infusions. No SOB at rest. No orthopnea. No PND. No syncope, dizziness, palpitations, chest pain, or decreased appetite. No GIB symptoms.    AICD has been previous discussed and patient did not feel over the years that it was desirable (extensive discussion of risks and benefits)    Objective  /71 (BP Location: Left arm, Patient Position: Sitting, Cuff Size: Adult Regular)   Pulse 82   Ht 1.727 m (5' 8\")   Wt 105.7 kg (233 lb)   SpO2 97%   BMI 35.43 kg/m      Exam: General, chronically ill appearing although today with good energy, articulate, able to move around the room  Derm: skin without lesions  Pulm: chest is clear, normal respiratory effort  Cardiac: no evidence of right or left ventricular overactivity, sternum stable, no carotid bruits, rhythm is irregular, [id number], S2, no murmur  Abdominal: abdomen without tenderness, rebound guarding or masses  Extremities: 1+ LE edema  Neuro: no focal neurologic defects.    Wt Readings from Last 5 Encounters:   10/31/19 105.7 kg (233 lb)   10/07/19 107.3 kg (236 lb 9.6 oz)   09/16/19 106.1 kg (234 lb)   08/26/19 107 kg (236 lb)   07/24/19 107.7 kg (237 lb 6.4 oz)       Meds  Current Outpatient Medications   Medication     ACCU-CHEK GUIDE test strip     Alcohol Swabs PADS     aspirin (ASA) 81 MG chewable tablet     atorvastatin (LIPITOR) 80 MG tablet     carvedilol (COREG) 3.125 MG tablet     cholecalciferol " (VITAMIN  -D) 1000 UNITS capsule     clopidogrel (PLAVIX) 75 MG tablet     furosemide (LASIX) 40 MG tablet     pantoprazole (PROTONIX) 40 MG EC tablet     sacubitril-valsartan (ENTRESTO) 49-51 MG per tablet     vitamin  B complex with vitamin C (VITAMIN  B COMPLEX) TABS     blood glucose monitoring (ACCU-CHEK FASTCLIX) lancets     insulin glargine (LANTUS PEN) 100 UNIT/ML pen     insulin pen needle (32G X 4 MM) 32G X 4 MM miscellaneous     nitroGLYcerin (NITROSTAT) 0.4 MG sublingual tablet     Sharps Container MISC     No current facility-administered medications for this visit.      Diagnostics    10/31/2019 CBC  Component Value Flag Ref Range Units Status Collected Lab   WBC 7.7   4.0 - 11.0 10e9/L Final 10/31/2019  7:32 AM 1740   RBC Count 4.46   4.4 - 5.9 10e12/L Final 10/31/2019  7:32 AM 1740   Hemoglobin 11.0  Low   13.3 - 17.7 g/dL Final 10/31/2019  7:32 AM 1740   Hematocrit 37.7  Low   40.0 - 53.0 % Final 10/31/2019  7:32 AM 1740   MCV 85   78 - 100 fl Final 10/31/2019  7:32 AM 1740   MCH 24.7  Low   26.5 - 33.0 pg Final 10/31/2019  7:32 AM 1740   MCHC 29.2  Low   31.5 - 36.5 g/dL Final 10/31/2019  7:32 AM 1740   RDW 26.5  High   10.0 - 15.0 % Final 10/31/2019  7:32 AM 1740   Platelet Count 256   150 - 450 10e9/L        10/31/2019 INR  1.38    10/31/2019 BMP   Results for MERCY SHAFER (MRN [id number]) as of 10/31/2019 08:30   Ref. Range 10/31/2019 07:32   Sodium Latest Ref Range: 133 - 144 mmol/L 139   Potassium Latest Ref Range: 3.4 - 5.3 mmol/L 4.3   Chloride Latest Ref Range: 94 - 109 mmol/L 106   Carbon Dioxide Latest Ref Range: 20 - 32 mmol/L 25   Urea Nitrogen Latest Ref Range: 7 - 30 mg/dL 41 (H)   Creatinine Latest Ref Range: 0.66 - 1.25 mg/dL 2.64 (H)   GFR Estimate Latest Ref Range: >60 mL/min/1.73_m2 25 (L)   GFR Estimate If Black Latest Ref Range: >60 mL/min/1.73_m2 29 (L)   Calcium Latest Ref Range: 8.5 - 10.1 mg/dL 8.7   Anion Gap Latest Ref Range: 3 - 14 mmol/L 7       10/7/2019 Iron  studies  Component Value Flag Ref Range Units Status Collected Lab   Iron 27  Low   35 - 180 ug/dL Final 10/07/2019  9:25 AM 1740   Iron Binding Cap 388   240 - 430 ug/dL Final 10/07/2019  9:25 AM 1740   Iron Saturation Index 7  Low   15 - 46 % Final 10/07/2019  9:25 AM      Ferritin 48   26 - 388 ng/mL Final 10/07/2019  9:25 AM 1740     2/27/2018 ECHO     Interpretation Summary  Ischemic cardiomyopathy.Severe inferolateral and anterolateral wall  hypokinesis to akinesis. The Ejection Fraction is estimated at 25-30%.Traced  EF is 28%  Global right ventricular function is mildly to moderately reduced. Right  ventricular systolic pressure is 85mmHg above the right atrial pressure.Severe  (pulmonary artery systolic pressure >75mmHg) pulmonary hypertension is  present.  Moderate mitral insufficiency is present.  The inferior vena cava was normal in size with preserved respiratory  variability.Estimated mean right atrial pressure is 3 mmHg.  No pericardial effusion is present.     This study was compared with the study from 2/22/17, PAP has increased .    6/17/2019 ECHO  Impressions:  The echocardiographic study was abnormal. Mild LVH with LVEF 35%. Cannot  identify a discrete regional wall motion abnormalities. Severe mitral  insufficiency. Moderate pulmonary hypertension. Biatrial enlargement with  underlying irregular rhythm. No previous echocardiograms for comparison.    Summary:  Procedure information: This was a technically difficult study. Intravenous  contrast ( Definity, 3.5 ml) was administered to opacify the left ventricle.  Left ventricle: Size was normal. Systolic function was moderately reduced by  visual assessment. Ejection fraction was estimated to be 35 %. This study was  inadequate for the evaluation of regional wall motion. Wall thickness was  mildly increased. The study was not technically sufficient to allow evaluation  of LV diastolic function due to an underlying irregular rhythm.  Mitral valve:  There was no evidence for stenosis. There was severe  regurgitation.  Left atrium: The atrium was mildly to moderately dilated.  Right ventricle: The size was normal. Systolic function was moderately reduced.  Pulmonary arteries: There was moderate pulmonary artery hypertension. Estimated  pulmonary artery peak systolic pressure was 51 mmHg.  Tricuspid valve: There was mild to moderate regurgitation.  Right atrium: The atrium was mildly dilated.  Pericardium, pleura: Not well visualized.    ZEOPATCH 6/2019      Medications:  ASA 81 mg   Plavix 75 mg   Lipitor 80 mg daily    Coreg 3.125 mg BID  Entresto 49-51  Lasix 40 mg BID    Assessment/Plan     60 yo male with history of : Acute inferior MI treated with angioplasty and stenting (most recently 6/2019), Paraxsymal Atrial fibrillation, History of GI bleeding x2 in the setting of warfarin, chronic cardiomyopathy with reduced ejection fraction, and chronic renal insufficiency (stage III-VI) who is presenting for heart failure follow-up.    Mr. Jesus did have a set back this year when he had an acute MI with course c/b a fib and GIB. However, now we are about 5 months beyond this and he has not improved to the point that we would hope. His functional capacity is extremely low and I think that it is time to start evaluating for advanced therapies. Cole is will to start the initial testing, but does want to think more about these therapies. In the meantime, he needs to be seen in [facility] once per week. Will decide regarding elective admission based on his RHC.    # Chronic systolic heart failure/HFrEF (EF 35%) secondary to ischemic cardiomyopathy  NYHA Symptom Class III  Stage C  Primary Cardiologist: Dr. Trujillo; Last seen 10/31/2019  ACE-I/ARB/ARNi: Entresto 49-51, appears to be tolerating, but would not increase further given renal function  BB Coreg 3.125 BID, defer further increase until RHC  Aldosterone antagonist contraindicated due to renal dysfunction  SCD  "prophylaxis Patient Declined  Fluid status Hypervolemic- increase lasix to 60 mg BID  Follow-up [facility] in one week    # Iron Deficiency Anemia    - Continue IV Iron infusions with outside provider  - Will add on iron studies to today's labs    # A.fib   - EKG today today shows he is back in A. Fib, rate controlled  - Pt has had 2 large GIB on coumadin in the past, he does not want to retrial     Follow-up: Needs to be seen in [facility] in one week, RHC and ECHO in one week, will likely need an elective admission Nov. 19th, but will await RHC results    Should the patient have recurrent chest pain or symptoms of abnormal heart rhythm including palpitation, pre-syncope or syncope, he was instructed to call 911 and return immediately to hospital.    Ena Kruger  [facility]    This patient was seen and discussed with Dr. Phillips, please see his attestation.    Alen Trujillo M.D.  Cardiovascular Medicine    I personally saw and examined Cole Jesus, discussed care with housestaff and other consultants, reviewed current laboratories and imaging studies, and conveyed impression and diagnostic/therapeutic plan to patient.  I spent 30 minutes with the patient of which > 50% involved counseling and decision making.    I saw this patient with Ms. Ryan and agree with her note this date.  Briefly, patient seen for continuing management of congestive heart failure and consideration of alternative management in view of deteriorating functional capacity.  We discussed early admission or waiting until I was attending  Discussed the risk and benefits of both strategies.  He wished to wait for admission when I was the attending,          /71 (BP Location: Left arm, Patient Position: Sitting, Cuff Size: Adult Regular)   Pulse 82   Ht 1.727 m (5' 8\")   Wt 105.7 kg (233 lb)   SpO2 97%   BMI 35.43 kg/m    Alert, oriented, normal gait and station, slightly irregular rhythm, increased JVP, +HJR, abdomen soft, S1 S2, " murmur TR and MR, modest edema.     WEIGHT  Wt Readings from Last 5 Encounters:   10/31/19 105.7 kg (233 lb)   10/07/19 107.3 kg (236 lb 9.6 oz)   09/16/19 106.1 kg (234 lb)   08/26/19 107 kg (236 lb)   07/24/19 107.7 kg (237 lb 6.4 oz)       Meds  Current Outpatient Medications   Medication Sig Dispense Refill     ACCU-CHEK GUIDE test strip USE TO TEST BLOOD SUGAR FOUR TIMES A DAY BEFORE MEALS AND AT BEDTIME 400 each 3     Alcohol Swabs PADS 1 applicator 4 times daily (before meals and nightly) 100 each 3     aspirin (ASA) 81 MG chewable tablet Take 81 mg by mouth daily       aspirin (ASA) 81 MG chewable tablet Take 81 mg by mouth daily       atorvastatin (LIPITOR) 80 MG tablet Take 1 tablet (80 mg) by mouth daily 90 tablet 3     carvedilol (COREG) 3.125 MG tablet Take 1 tablet (3.125 mg) by mouth 2 times daily (with meals) 180 tablet 1     cholecalciferol (VITAMIN  -D) 1000 UNITS capsule Take 2 capsules (2,000 Units) by mouth daily 60 capsule 3     clopidogrel (PLAVIX) 75 MG tablet Take 1 tablet (75 mg) by mouth daily 90 tablet 3     furosemide (LASIX) 40 MG tablet Take 1.5 tablets (60 mg) by mouth 2 times daily Take 2 tablets twice a day for 10 days then 1 twice a day 270 tablet 3     pantoprazole (PROTONIX) 40 MG EC tablet Take 1 tablet (40 mg) by mouth 2 times daily 180 tablet 2     sacubitril-valsartan (ENTRESTO) 49-51 MG per tablet Take 1 tablet by mouth 2 times daily 60 tablet 1     vitamin  B complex with vitamin C (VITAMIN  B COMPLEX) TABS Take 1 tablet by mouth daily       blood glucose monitoring (ACCU-CHEK FASTCLIX) lancets USE TO TEST BLOOD SUGAR FOUR TIMES A DAY AT MEALS AND AT BEDTIME (Patient not taking: Reported on 10/31/2019) 400 each 3     insulin glargine (LANTUS PEN) 100 UNIT/ML pen Inject 7 Units Subcutaneous every morning (Patient not taking: Reported on 10/31/2019) 6 mL 3     insulin pen needle (32G X 4 MM) 32G X 4 MM miscellaneous Use 5 pen needles daily or as directed. (Patient not taking:  Reported on 10/31/2019) 200 each 3     nitroGLYcerin (NITROSTAT) 0.4 MG sublingual tablet Place 1 tablet (0.4 mg) under the tongue every 5 minutes as needed for chest pain For chest pain place 1 tablet under the tongue every 5 minutes for 3 doses. If symptoms persist 5 minutes after 1st dose call 911. (Patient not taking: Reported on 10/31/2019) 25 tablet 0     Sharps Container MISC 1 Device every 30 days (Patient not taking: Reported on 10/31/2019) 1 each 3       LABORATORY RESULTS    CBC Results  Lab Results   Component Value Date    WBC 7.7 10/31/2019    RBC 4.46 10/31/2019    HGB 11.0 (L) 10/31/2019    HCT 37.7 (L) 10/31/2019    MCV 85 10/31/2019    MCH 24.7 (L) 10/31/2019    MCHC 29.2 (L) 10/31/2019    RDW 26.5 (H) 10/31/2019     10/31/2019       CMP RESULTS:  Lab Results   Component Value Date     10/31/2019    POTASSIUM 4.3 10/31/2019    CHLORIDE 106 10/31/2019    CO2 25 10/31/2019    ANIONGAP 7 10/31/2019     (H) 10/31/2019    BUN 41 (H) 10/31/2019    CR 2.64 (H) 10/31/2019    GFRESTIMATED 25 (L) 10/31/2019    GFRESTBLACK 29 (L) 10/31/2019    RONNY 8.7 10/31/2019    BILITOTAL 1.6 (H) 10/07/2019    ALBUMIN 3.6 10/07/2019    ALKPHOS 107 10/07/2019    ALT 15 10/07/2019    AST 12 10/07/2019        INR RESULTS:  Lab Results   Component Value Date    INR 1.38 (H) 10/31/2019       MAGNESIUM RESULTS  Lab Results   Component Value Date    MAG 2.4 (H) 2019       BNP RESULTS  Lab Results   Component Value Date    NTBNP 7,978 (H) 10/07/2019       ASSESSMENT/PLAN    Admit for continuing medical management and evaluation for advanced therapies.      Name: MERCY SHAFER  MRN: [id number]  : 1960  Study Date: 2019 10:08 AM  Age: 59 yrs  Gender: Male  Patient Location: [facility]  Reason For Study: Ischemic cardiomyopathy, Chronic systolic congestive heart  failu  Ordering Physician: HARVEY GUNDERSON  Referring Physician: HARVEY GUNDERSON  Performed By: Jenny Edwards RDCS     BSA:  2.2 m2  Height: 68 in  Weight: 233 lb  BP: 103/71 mmHg  _____________________________________________________________________________  __        Procedure  Echocardiogram with two-dimensional, color and spectral Doppler performed.  Optison (NDC #2150-1293-95) given intravenously. Patient was given 3.0 ml  mixture of 3 ml Optison and 6 ml saline. 6.0 ml wasted. IV start location  LAC .  _____________________________________________________________________________  __        Interpretation Summary  Moderately (EF 30-35%) reduced left ventricular function is present. LVEF  estimated at 35% by Biplane (Cruz's) method). Mild left ventricular  dilation is present. Severe hypokinesis of basal to mid inferior walls and  basal inferoseptal wall.  Global right ventricular function is moderately reduced.Moderate right  ventricular dilation is present.  Moderate to severe functional mitral insufficiency is present.  Estimated pulmonary artery pressure of 89 mmHg (severely elevated).  Dilation of the inferior vena cava is present with abnormal respiratory  variation in diameter.  Small posterior pericardial effusion without any significant echocardiographic  evidence of tamponade.     This study was compared with the study from 2/27/18. There is now small  pericaridal effusion and IVC is dilated.  Mitral insufficiency has worsened.  _____________________________________________________________________________  __        Left Ventricle  Left ventricular wall thickness is normal. Mild left ventricular dilation is  present. Diastolic function not assessed due to atrial fibrillation.  Moderately (EF 30-35%) reduced left ventricular function is present. LVEF  estimated at 35% by Biplane (Cruz's) method). Flattened septum is  consistent with right ventricular pressure and volume overload. Severe  hypokinesis of basal to mid inferior walls and basal inferoseptal wall. There  is no thrombus.     Right Ventricle  Right ventricular  wall thickness is normal. Moderate right ventricular  dilation is present. Global right ventricular function is moderately reduced.     Atria  Severe left atrial enlargement is present. Mild to moderate right atrial  enlargement is present. The atrial septum is intact as assessed by color  Doppler .     Mitral Valve  Mild focal calcification of anteiror mitral valve leaflet. Moderate to severe  mitral insufficiency is present. EROA 0.29 cm^2. Regurgitant volume 41 mL.        Aortic Valve  The aortic valve is tricuspid. There is mild aortic sclerosis of the non-  coronary cusp.     Tricuspid Valve  The tricuspid valve is normal. Mild to moderate tricuspid insufficiency is  present. Right ventricular systolic pressure is 74mmHg above the right atrial  pressure.     Pulmonic Valve  The pulmonic valve is normal.     Vessels  Normal indexed ascending thoracic aorta at 1.5 cm/m^2. The aorta root is  normal. Dilation of the inferior vena cava is present with abnormal  respiratory variation in diameter. IVC diameter >2.1 cm collapsing <50% with  sniff suggests a high RA pressure estimated at 15 mmHg or greater.     Pericardium  Small posterior pericardial effusion measuring 1.1 cm without any significant  echocardiographic evidence of tamponade.        Compared to Previous Study  This study was compared with the study from 2/27/18 . There is no a small  pericaridal effusion and IVC is now dilated. Mitral insufficiency has  worsened.     Attestation  I have personally viewed the imaging and agree with the interpretation and  report as documented by the fellow, Ludin Pimentel, and/or edited by me.  _____________________________________________________________________________  __     MMode/2D Measurements & Calculations  IVSd: 1.2 cm  LVIDd: 6.2 cm  LVIDs: 5.2 cm  LVPWd: 0.87 cm  FS: 16.3 %  LV mass(C)d: 267.8 grams  LV mass(C)dI: 122.8 grams/m2  MV Diam: 3.3 cm  Ao root diam: 3.5 cm  asc Aorta Diam: 3.4 cm  LVOT diam: 2.3  cm  LVOT area: 4.2 cm2  LA Volume (BP): 112.0 ml     LA Volume Index (BP): 51.4 ml/m2  RWT: 0.28        Doppler Measurements & Calculations  MV E max shavonne: 118.3 cm/sec  MV max P.1 mmHg  MV mean P.37 mmHg  MV V2 VTI: 6.5 cm  MV Flow area(1diam): 8.8 cm2  MV dec time: 0.15 sec  MR ERO: 0.26 cm2  MR volume: 36.9 ml  SV(MV 1 diam): 56.9 ml  SI(MV 1 diam): 26.1 ml/m2  PA acc time: 0.08 sec  TR max shavonne: 449.4 cm/sec  TR max P.8 mmHg  E/E' av.5  Lateral E/e': 13.6  Medial E/e': 33.4       Sincerely yours,    Alen Trujillo MD dictated but not signed send out immediately

## 2019-10-31 NOTE — NURSING NOTE
Chief Complaint   Patient presents with     Follow Up     history of Afib/flutter s/p ablation (2013) CAD s/p PCI, HTN, and ICM       Sarah Ag CMA

## 2019-11-01 DIAGNOSIS — I50.22 CHRONIC SYSTOLIC CONGESTIVE HEART FAILURE (H): Primary | ICD-10-CM

## 2019-11-01 LAB — INTERPRETATION ECG - MUSE: NORMAL

## 2019-11-03 NOTE — PROGRESS NOTES
"Alen Trujillo M.D.  Cardiovascular Medicine    I personally saw and examined Cole Jesus, discussed care with housestaff and other consultants, reviewed current laboratories and imaging studies, and conveyed impression and diagnostic/therapeutic plan to patient.  I spent 30 minutes with the patient of which > 50% involved counseling and decision making.    I saw this patient with Ms. Ryan and agree with her note this date.  Briefly, patient seen for continuing management of congestive heart failure and consideration of alternative management in view of deteriorating functional capacity.  We discussed early admission or waiting until I was attending  Discussed the risk and benefits of both strategies.  He wished to wait for admission when I was the attending,          /71 (BP Location: Left arm, Patient Position: Sitting, Cuff Size: Adult Regular)   Pulse 82   Ht 1.727 m (5' 8\")   Wt 105.7 kg (233 lb)   SpO2 97%   BMI 35.43 kg/m   Alert, oriented, normal gait and station, slightly irregular rhythm, increased JVP, +HJR, abdomen soft, S1 S2, murmur TR and MR, modest edema.     WEIGHT  Wt Readings from Last 5 Encounters:   10/31/19 105.7 kg (233 lb)   10/07/19 107.3 kg (236 lb 9.6 oz)   09/16/19 106.1 kg (234 lb)   08/26/19 107 kg (236 lb)   07/24/19 107.7 kg (237 lb 6.4 oz)       Meds  Current Outpatient Medications   Medication Sig Dispense Refill     ACCU-CHEK GUIDE test strip USE TO TEST BLOOD SUGAR FOUR TIMES A DAY BEFORE MEALS AND AT BEDTIME 400 each 3     Alcohol Swabs PADS 1 applicator 4 times daily (before meals and nightly) 100 each 3     aspirin (ASA) 81 MG chewable tablet Take 81 mg by mouth daily       aspirin (ASA) 81 MG chewable tablet Take 81 mg by mouth daily       atorvastatin (LIPITOR) 80 MG tablet Take 1 tablet (80 mg) by mouth daily 90 tablet 3     carvedilol (COREG) 3.125 MG tablet Take 1 tablet (3.125 mg) by mouth 2 times daily (with meals) 180 tablet 1     cholecalciferol " (VITAMIN  -D) 1000 UNITS capsule Take 2 capsules (2,000 Units) by mouth daily 60 capsule 3     clopidogrel (PLAVIX) 75 MG tablet Take 1 tablet (75 mg) by mouth daily 90 tablet 3     furosemide (LASIX) 40 MG tablet Take 1.5 tablets (60 mg) by mouth 2 times daily Take 2 tablets twice a day for 10 days then 1 twice a day 270 tablet 3     pantoprazole (PROTONIX) 40 MG EC tablet Take 1 tablet (40 mg) by mouth 2 times daily 180 tablet 2     sacubitril-valsartan (ENTRESTO) 49-51 MG per tablet Take 1 tablet by mouth 2 times daily 60 tablet 1     vitamin  B complex with vitamin C (VITAMIN  B COMPLEX) TABS Take 1 tablet by mouth daily       blood glucose monitoring (ACCU-CHEK FASTCLIX) lancets USE TO TEST BLOOD SUGAR FOUR TIMES A DAY AT MEALS AND AT BEDTIME (Patient not taking: Reported on 10/31/2019) 400 each 3     insulin glargine (LANTUS PEN) 100 UNIT/ML pen Inject 7 Units Subcutaneous every morning (Patient not taking: Reported on 10/31/2019) 6 mL 3     insulin pen needle (32G X 4 MM) 32G X 4 MM miscellaneous Use 5 pen needles daily or as directed. (Patient not taking: Reported on 10/31/2019) 200 each 3     nitroGLYcerin (NITROSTAT) 0.4 MG sublingual tablet Place 1 tablet (0.4 mg) under the tongue every 5 minutes as needed for chest pain For chest pain place 1 tablet under the tongue every 5 minutes for 3 doses. If symptoms persist 5 minutes after 1st dose call 911. (Patient not taking: Reported on 10/31/2019) 25 tablet 0     Sharps Container MISC 1 Device every 30 days (Patient not taking: Reported on 10/31/2019) 1 each 3       LABORATORY RESULTS    CBC Results  Lab Results   Component Value Date    WBC 7.7 10/31/2019    RBC 4.46 10/31/2019    HGB 11.0 (L) 10/31/2019    HCT 37.7 (L) 10/31/2019    MCV 85 10/31/2019    MCH 24.7 (L) 10/31/2019    MCHC 29.2 (L) 10/31/2019    RDW 26.5 (H) 10/31/2019     10/31/2019       CMP RESULTS:  Lab Results   Component Value Date     10/31/2019    POTASSIUM 4.3 10/31/2019     CHLORIDE 106 10/31/2019    CO2 25 10/31/2019    ANIONGAP 7 10/31/2019     (H) 10/31/2019    BUN 41 (H) 10/31/2019    CR 2.64 (H) 10/31/2019    GFRESTIMATED 25 (L) 10/31/2019    GFRESTBLACK 29 (L) 10/31/2019    RONNY 8.7 10/31/2019    BILITOTAL 1.6 (H) 10/07/2019    ALBUMIN 3.6 10/07/2019    ALKPHOS 107 10/07/2019    ALT 15 10/07/2019    AST 12 10/07/2019        INR RESULTS:  Lab Results   Component Value Date    INR 1.38 (H) 10/31/2019       MAGNESIUM RESULTS  Lab Results   Component Value Date    MAG 2.4 (H) 2019       BNP RESULTS  Lab Results   Component Value Date    NTBNP 7,978 (H) 10/07/2019       ASSESSMENT/PLAN    Admit for continuing medical management and evaluation for advanced therapies.      Name: MERCY SHAFER  MRN: [id number]  : 1960  Study Date: 2019 10:08 AM  Age: 59 yrs  Gender: Male  Patient Location: [facility]  Reason For Study: Ischemic cardiomyopathy, Chronic systolic congestive heart  failu  Ordering Physician: HARVEY GUNDERSON  Referring Physician: HARVEY GUNDERSON  Performed By: Jenny Edwards RDCS     BSA: 2.2 m2  Height: 68 in  Weight: 233 lb  BP: 103/71 mmHg  _____________________________________________________________________________  __        Procedure  Echocardiogram with two-dimensional, color and spectral Doppler performed.  Optison (NDC #9520-1428-75) given intravenously. Patient was given 3.0 ml  mixture of 3 ml Optison and 6 ml saline. 6.0 ml wasted. IV start location  LAC .  _____________________________________________________________________________  __        Interpretation Summary  Moderately (EF 30-35%) reduced left ventricular function is present. LVEF  estimated at 35% by Biplane (Cruz's) method). Mild left ventricular  dilation is present. Severe hypokinesis of basal to mid inferior walls and  basal inferoseptal wall.  Global right ventricular function is moderately reduced.Moderate right  ventricular dilation is present.  Moderate to  severe functional mitral insufficiency is present.  Estimated pulmonary artery pressure of 89 mmHg (severely elevated).  Dilation of the inferior vena cava is present with abnormal respiratory  variation in diameter.  Small posterior pericardial effusion without any significant echocardiographic  evidence of tamponade.     This study was compared with the study from 2/27/18. There is now small  pericaridal effusion and IVC is dilated.  Mitral insufficiency has worsened.  _____________________________________________________________________________  __        Left Ventricle  Left ventricular wall thickness is normal. Mild left ventricular dilation is  present. Diastolic function not assessed due to atrial fibrillation.  Moderately (EF 30-35%) reduced left ventricular function is present. LVEF  estimated at 35% by Biplane (Cruz's) method). Flattened septum is  consistent with right ventricular pressure and volume overload. Severe  hypokinesis of basal to mid inferior walls and basal inferoseptal wall. There  is no thrombus.     Right Ventricle  Right ventricular wall thickness is normal. Moderate right ventricular  dilation is present. Global right ventricular function is moderately reduced.     Atria  Severe left atrial enlargement is present. Mild to moderate right atrial  enlargement is present. The atrial septum is intact as assessed by color  Doppler .     Mitral Valve  Mild focal calcification of anteiror mitral valve leaflet. Moderate to severe  mitral insufficiency is present. EROA 0.29 cm^2. Regurgitant volume 41 mL.        Aortic Valve  The aortic valve is tricuspid. There is mild aortic sclerosis of the non-  coronary cusp.     Tricuspid Valve  The tricuspid valve is normal. Mild to moderate tricuspid insufficiency is  present. Right ventricular systolic pressure is 74mmHg above the right atrial  pressure.     Pulmonic Valve  The pulmonic valve is normal.     Vessels  Normal indexed ascending thoracic  aorta at 1.5 cm/m^2. The aorta root is  normal. Dilation of the inferior vena cava is present with abnormal  respiratory variation in diameter. IVC diameter >2.1 cm collapsing <50% with  sniff suggests a high RA pressure estimated at 15 mmHg or greater.     Pericardium  Small posterior pericardial effusion measuring 1.1 cm without any significant  echocardiographic evidence of tamponade.        Compared to Previous Study  This study was compared with the study from 18 . There is no a small  pericaridal effusion and IVC is now dilated. Mitral insufficiency has  worsened.     Attestation  I have personally viewed the imaging and agree with the interpretation and  report as documented by the fellow, Ludin Pimentel, and/or edited by me.  _____________________________________________________________________________  __     MMode/2D Measurements & Calculations  IVSd: 1.2 cm  LVIDd: 6.2 cm  LVIDs: 5.2 cm  LVPWd: 0.87 cm  FS: 16.3 %  LV mass(C)d: 267.8 grams  LV mass(C)dI: 122.8 grams/m2  MV Diam: 3.3 cm  Ao root diam: 3.5 cm  asc Aorta Diam: 3.4 cm  LVOT diam: 2.3 cm  LVOT area: 4.2 cm2  LA Volume (BP): 112.0 ml     LA Volume Index (BP): 51.4 ml/m2  RWT: 0.28        Doppler Measurements & Calculations  MV E max shavonne: 118.3 cm/sec  MV max P.1 mmHg  MV mean P.37 mmHg  MV V2 VTI: 6.5 cm  MV Flow area(1diam): 8.8 cm2  MV dec time: 0.15 sec  MR ERO: 0.26 cm2  MR volume: 36.9 ml  SV(MV 1 diam): 56.9 ml  SI(MV 1 diam): 26.1 ml/m2  PA acc time: 0.08 sec  TR max shavonne: 449.4 cm/sec  TR max P.8 mmHg  E/E' av.5  Lateral E/e': 13.6  Medial E/e': 33.4       Sincerely yours,    Alen Trujillo MD dictated but not signed send out immediately

## 2019-11-04 ENCOUNTER — HOSPITAL ENCOUNTER (OUTPATIENT)
Age: 59
Discharge: HOME | End: 2019-11-04
Payer: MEDICAID

## 2019-11-04 ENCOUNTER — INFUSION THERAPY VISIT (OUTPATIENT)
Dept: INFUSION THERAPY | Facility: CLINIC | Age: 59
End: 2019-11-04
Attending: INTERNAL MEDICINE
Payer: MEDICAID

## 2019-11-04 ENCOUNTER — TELEPHONE (OUTPATIENT)
Dept: CARDIOLOGY | Facility: CLINIC | Age: 59
End: 2019-11-04

## 2019-11-04 VITALS
HEART RATE: 87 BPM | SYSTOLIC BLOOD PRESSURE: 112 MMHG | OXYGEN SATURATION: 98 % | DIASTOLIC BLOOD PRESSURE: 78 MMHG | RESPIRATION RATE: 16 BRPM | TEMPERATURE: 98 F

## 2019-11-04 DIAGNOSIS — E61.1 IRON DEFICIENCY: Primary | ICD-10-CM

## 2019-11-04 DIAGNOSIS — I25.5 ISCHEMIC CARDIOMYOPATHY: Chronic | ICD-10-CM

## 2019-11-04 DIAGNOSIS — I10 ESSENTIAL HYPERTENSION WITH GOAL BLOOD PRESSURE LESS THAN 130/85: ICD-10-CM

## 2019-11-04 DIAGNOSIS — I50.22 CHRONIC SYSTOLIC CONGESTIVE HEART FAILURE (H): ICD-10-CM

## 2019-11-04 PROCEDURE — 25000128 H RX IP 250 OP 636: Mod: ZF | Performed by: INTERNAL MEDICINE

## 2019-11-04 PROCEDURE — 25800030 ZZH RX IP 258 OP 636: Mod: ZF | Performed by: INTERNAL MEDICINE

## 2019-11-04 PROCEDURE — 96365 THER/PROPH/DIAG IV INF INIT: CPT

## 2019-11-04 RX ORDER — FUROSEMIDE 40 MG
60 TABLET ORAL 2 TIMES DAILY
Qty: 270 TABLET | Refills: 3 | Status: ON HOLD | OUTPATIENT
Start: 2019-11-04 | End: 2019-11-17

## 2019-11-04 RX ADMIN — IRON SUCROSE 300 MG: 20 INJECTION, SOLUTION INTRAVENOUS at 15:33

## 2019-11-04 NOTE — TELEPHONE ENCOUNTER
[facility] Call Center    Phone Message    May a detailed message be left on voicemail: yes    Reason for Call: Other: Estefany from [facility] calling to get clarification on the directions for Cole's furosemide (LASIX) 40 MG tablet prescription. Please give a call back at your earliest convenience to discuss.     Action Taken: Message routed to:  [facility] ([facility]): ANUSHA Cardio

## 2019-11-04 NOTE — PROGRESS NOTES
Nursing Note  Cole Jesus [facility] for:   Chief Complaint   Patient presents with     Infusion     iron sucrose     During today's [facility] appointment, orders from Dr. Enciso were completed.  Frequency: every 48 hours and today is dose 3 of 3 total.    Progress note:  Patient identification verified by name and date of birth.  Assessment completed.  Vitals recorded in Doc Flowsheets.  Patient was provided with education regarding infusion and possible side effects.  Patient verbalized understanding.     present during visit today: Not Applicable.    Treatment Conditions: patient denies fever, chills, signs of infection, recent illness, on antibiotics, productive cough or elevated temperature.    Premedications: were not ordered.    Drug Waste Record: No    Infusion length and rate:  infusion given over approximately 90 minutes  193 ml/hr.    Labs: were not ordered for this appointment.    Vascular access: peripheral IV placed today.    Post Infusion Assessment:  Patient tolerated infusion without incident.     Discharge Plan:   Follow up plan of care with: primary medical doctor.  Discharge instructions were reviewed with patient.  Patient/representative verbalized understanding of discharge instructions and all questions answered.  Patient discharged from [facility] in stable condition.    Jenny Billingsley RN    Administrations This Visit     iron sucrose (VENOFER) 300 mg in sodium chloride 0.9 % 250 mL intermittent infusion     Admin Date  11/04/2019 Action  New Bag Dose  300 mg Rate  193.3 mL/hr Route  Intravenous Administered By  Jenny Billingsley RN                /80   Pulse 72   Temp 98  F (36.7  C) (Oral)   Resp 16   SpO2 98%

## 2019-11-04 NOTE — PATIENT INSTRUCTIONS
Dear Cole Jesus    Thank you for choosing [facility] ([facility]) for your infusion.  The following information is a summary of our appointment as well as important reminders.          We look forward in seeing you on your next appointment here at [facility] ([facility]).  Please don t hesitate to call us at [phone] to reschedule any of your appointments or to speak with one of the [facility] registered nurses.  It was a pleasure taking care of you today.    Sincerely,    [facility]  [facility]  [address] [place] [address]  [id number]  Phone:  [phone]  Patient Education     Iron Sucrose injection  Brand Name: Venofer  What is this medicine?  IRON SUCROSE (ROSARIOY brittany NISHA krohs) is an iron complex. Iron is used to make healthy red blood cells, which carry oxygen and nutrients throughout the body. This medicine is used to treat iron deficiency anemia in people with chronic kidney disease.  How should I use this medicine?  This medicine is for infusion into a vein. It is given by a health care professional in a hospital or clinic setting.  Talk to your pediatrician regarding the use of this medicine in children. While this drug may be prescribed for children as young as 2 years for selected conditions, precautions do apply.  What side effects may I notice from receiving this medicine?  Side effects that you should report to your doctor or health care professional as soon as possible:    allergic reactions like skin rash, itching or hives, swelling of the face, lips, or tongue    breathing problems    changes in blood pressure    cough    fast, irregular heartbeat    feeling faint or lightheaded, falls    fever or chills    flushing, sweating, or hot feelings    joint or muscle aches/pains    seizures    swelling of the ankles or feet    unusually weak or tired  Side effects  that usually do not require medical attention (report to your doctor or health care professional if they continue or are bothersome):    diarrhea    feeling achy    headache    irritation at site where injected    nausea, vomiting    stomach upset    tiredness  What may interact with this medicine?  Do not take this medicine with any of the following medications:    deferoxamine    dimercaprol    other iron products  This medicine may also interact with the following medications:    chloramphenicol    deferasirox  What if I miss a dose?  It is important not to miss your dose. Call your doctor or health care professional if you are unable to keep an appointment.  Where should I keep my medicine?  This drug is given in a hospital or clinic and will not be stored at home.  What should I tell my health care provider before I take this medicine?  They need to know if you have any of these conditions:    anemia not caused by low iron levels    heart disease    high levels of iron in the blood    kidney disease    liver disease    an unusual or allergic reaction to iron, other medicines, foods, dyes, or preservatives    pregnant or trying to get pregnant    breast-feeding  What should I watch for while using this medicine?  Visit your doctor or healthcare professional regularly. Tell your doctor or healthcare professional if your symptoms do not start to get better or if they get worse. You may need blood work done while you are taking this medicine.  You may need to follow a special diet. Talk to your doctor. Foods that contain iron include: whole grains/cereals, dried fruits, beans, or peas, leafy green vegetables, and organ meats (liver, kidney).  NOTE:This sheet is a summary. It may not cover all possible information. If you have questions about this medicine, talk to your doctor, pharmacist, or health care provider. Copyright  2019 [employer]

## 2019-11-07 ENCOUNTER — ANCILLARY PROCEDURE (OUTPATIENT)
Dept: CARDIOLOGY | Facility: CLINIC | Age: 59
End: 2019-11-07
Attending: INTERNAL MEDICINE
Payer: MEDICAID

## 2019-11-07 VITALS
SYSTOLIC BLOOD PRESSURE: 106 MMHG | DIASTOLIC BLOOD PRESSURE: 65 MMHG | HEART RATE: 70 BPM | WEIGHT: 229 LBS | OXYGEN SATURATION: 98 % | BODY MASS INDEX: 34.71 KG/M2 | HEIGHT: 68 IN

## 2019-11-07 DIAGNOSIS — N18.4 CKD (CHRONIC KIDNEY DISEASE) STAGE 4, GFR 15-29 ML/MIN (H): Chronic | ICD-10-CM

## 2019-11-07 DIAGNOSIS — E61.1 IRON DEFICIENCY: ICD-10-CM

## 2019-11-07 DIAGNOSIS — I50.22 CHRONIC SYSTOLIC CONGESTIVE HEART FAILURE (H): ICD-10-CM

## 2019-11-07 DIAGNOSIS — D62 ANEMIA DUE TO ACUTE BLOOD LOSS: ICD-10-CM

## 2019-11-07 DIAGNOSIS — I48.91 ATRIAL FIBRILLATION, UNSPECIFIED TYPE (H): Chronic | ICD-10-CM

## 2019-11-07 DIAGNOSIS — E11.9 TYPE 2 DIABETES, HBA1C GOAL < 8% (H): Chronic | ICD-10-CM

## 2019-11-07 DIAGNOSIS — I48.20 CHRONIC ATRIAL FIBRILLATION (H): Chronic | ICD-10-CM

## 2019-11-07 DIAGNOSIS — I50.22 CHRONIC SYSTOLIC CONGESTIVE HEART FAILURE (H): Primary | ICD-10-CM

## 2019-11-07 DIAGNOSIS — I25.5 ISCHEMIC CARDIOMYOPATHY: Chronic | ICD-10-CM

## 2019-11-07 LAB
ALBUMIN SERPL-MCNC: 3.8 G/DL (ref 3.4–5)
ALP SERPL-CCNC: 115 U/L (ref 40–150)
ALT SERPL W P-5'-P-CCNC: 15 U/L (ref 0–70)
ANION GAP SERPL CALCULATED.3IONS-SCNC: 8 MMOL/L (ref 3–14)
AST SERPL W P-5'-P-CCNC: 9 U/L (ref 0–45)
BILIRUB SERPL-MCNC: 1.9 MG/DL (ref 0.2–1.3)
BUN SERPL-MCNC: 42 MG/DL (ref 7–30)
CALCIUM SERPL-MCNC: 9 MG/DL (ref 8.5–10.1)
CHLORIDE SERPL-SCNC: 103 MMOL/L (ref 94–109)
CO2 SERPL-SCNC: 26 MMOL/L (ref 20–32)
CREAT SERPL-MCNC: 2.58 MG/DL (ref 0.66–1.25)
ERYTHROCYTE [DISTWIDTH] IN BLOOD BY AUTOMATED COUNT: 28.1 % (ref 10–15)
GFR SERPL CREATININE-BSD FRML MDRD: 26 ML/MIN/{1.73_M2}
GLUCOSE SERPL-MCNC: 124 MG/DL (ref 70–99)
HCT VFR BLD AUTO: 38.4 % (ref 40–53)
HGB BLD-MCNC: 11.3 G/DL (ref 13.3–17.7)
INR PPP: 1.26 (ref 0.86–1.14)
MCH RBC QN AUTO: 25.4 PG (ref 26.5–33)
MCHC RBC AUTO-ENTMCNC: 29.4 G/DL (ref 31.5–36.5)
MCV RBC AUTO: 86 FL (ref 78–100)
PLATELET # BLD AUTO: 206 10E9/L (ref 150–450)
POTASSIUM SERPL-SCNC: 4.1 MMOL/L (ref 3.4–5.3)
PROT SERPL-MCNC: 7.9 G/DL (ref 6.8–8.8)
RBC # BLD AUTO: 4.45 10E12/L (ref 4.4–5.9)
SODIUM SERPL-SCNC: 137 MMOL/L (ref 133–144)
WBC # BLD AUTO: 7 10E9/L (ref 4–11)

## 2019-11-07 PROCEDURE — 80053 COMPREHEN METABOLIC PANEL: CPT | Performed by: INTERNAL MEDICINE

## 2019-11-07 PROCEDURE — G0463 HOSPITAL OUTPT CLINIC VISIT: HCPCS | Mod: ZF

## 2019-11-07 PROCEDURE — 85610 PROTHROMBIN TIME: CPT | Performed by: INTERNAL MEDICINE

## 2019-11-07 PROCEDURE — 36415 COLL VENOUS BLD VENIPUNCTURE: CPT | Performed by: INTERNAL MEDICINE

## 2019-11-07 RX ADMIN — Medication 3 ML: at 10:45

## 2019-11-07 ASSESSMENT — PAIN SCALES - GENERAL: PAINLEVEL: NO PAIN (0)

## 2019-11-07 ASSESSMENT — MIFFLIN-ST. JEOR: SCORE: 1818.24

## 2019-11-07 NOTE — PATIENT INSTRUCTIONS
Take your medicines every day, as directed    Changes made today:  o Increase Lasix to 60 mg twice a day    Monitor Your Weight and Symptoms    Contact us if you:      Gain 2 pounds in one day or 5 pounds in one week    Feel more short of breath    Notice more leg swelling    Feel lightheadeded   Change your lifestyle    Limit Salt or Sodium:    2000 mg  Limit Fluids:    2000 mL or approximately 64 ounces  Eat a Heart Healthy Diet    Low in saturated fats  Stay Active:    Aim to move at least 150 minutes every  week         To Contact us    During Business Hours:  [phone], option # 1 (University)  Then option # 4 (medical questions)     After hours, weekends or holidays:   [phone], Option #4  Ask to speak to the On-Call Cardiologist. Inform them you are a CORE/heart failure patient at the [facility].     Use [employer] allows you to communicate directly with your heart team through secure messaging.    [employer] can be accessed any time on your phone, computer, or tablet.    If you need assistance, we'd be happy to help!         Keep your Heart Appointments:    Follow up as indicated - testing next week and CORE in 2 weeks

## 2019-11-07 NOTE — PROGRESS NOTES
HPI: Cole is a 59 year old  or  male with a past medical history of acute occlusion of the proximal right coronary artery, treated successfully with balloon angioplasty and stenting in 6/2019.  The patient's course was complicated by atrial fibrillation.  He has a remote history of atrial fibrillation, treated with Warfarin and complicated by gastrointestinal bleeding.  At the time (prior to my/Dr. Trujillo's assumption of care) he was advised to stay off of warfarin (2013).  He has had no documented recurrence of atrial fibrillation with episodic monitoring including ZEOPATCH. He was put back on warfarin and had GIB from his stomach, gastric lesions per patient. He also has a history of a large GIB back around 2011. He does not know or perceive being out of rhythm.  His most recent echocardiogram (technically difficult showed stable ejection fraction in the range of 35%.    Had an iron infusion since the 10/31/19 visit with Dr. Trujillo. Patient says he is feeling better since last week. He is feeling more energetic today. He is able to do his laundry now , he couldn't do this earlier due to SOB/fatigue . Weight at home 228 lbs.  Patient has no SOB today. The extra dose of Lasix has been helpful he says. Appetite has been good. RHC is next week. Echo was done today. Patient has been taking 60 mg am and 40 mg pm for Lasix daily instead of what Dr. Trujillo had suggested 60 mg BID. Patient is taking Entresto and tolerating it well.       Denies SOB, KING, PND, orthopnea, edema, weight gain, chest pain, palpitations, lightheadedness, dizziness, near syncopal/syncopal episodes. Cole has been following salt and fluid restrictions.      PAST MEDICAL HISTORY:  Past Medical History:   Diagnosis Date     Anemia in chronic renal disease 3/9/2015     Antiplatelet or antithrombotic long-term use      Atrial fibrillation and flutter      CAD (coronary artery disease)     Stemi in 12/11, s/p  angioplasty     CRD (chronic renal disease), stage IV (H) 2015    hx ATN with dialysis complicating cardiogenic shock  2012     GI bleed     massive lower GI bleed secondary to a cecal ulcer, s/p ileocecal resection in      Heart failure     Biventricular systolic HF, complicated by ARDS requiring tracheostomy     Hyperlipidemia LDL goal <100 2013     Hypertension      Ischemic cardiomyopathy 2013    TTE revealing 40% EF     Myocardial infarction (H)      Other and unspecified nonspecific immunological findings     Anti JKa     Pulmonary edema     episodes of flash pulmonary edema in      Subclinical hypothyroidism        FAMILY HISTORY:  Family History   Problem Relation Age of Onset     Diabetes Mother      Hypertension Mother      Heart Disease Mother      Heart Disease Father          of heart attack, left when he was young     Diabetes Sister      Diabetes Sister      Diabetes Sister        SOCIAL HISTORY:  Social History     Tobacco Use     Smoking status: Former Smoker     Last attempt to quit: 12/3/2011     Years since quittin.9     Smokeless tobacco: Never Used   Substance Use Topics     Alcohol use: No     Alcohol/week: 0.0 standard drinks     Drug use: No           CURRENT MEDICATIONS:  Current Outpatient Medications   Medication Sig Dispense Refill     ACCU-CHEK GUIDE test strip USE TO TEST BLOOD SUGAR FOUR TIMES A DAY BEFORE MEALS AND AT BEDTIME 400 each 3     Alcohol Swabs PADS 1 applicator 4 times daily (before meals and nightly) 100 each 3     aspirin (ASA) 81 MG chewable tablet Take 81 mg by mouth daily       atorvastatin (LIPITOR) 80 MG tablet Take 1 tablet (80 mg) by mouth daily 90 tablet 3     blood glucose monitoring (ACCU-CHEK FASTCLIX) lancets USE TO TEST BLOOD SUGAR FOUR TIMES A DAY AT MEALS AND AT BEDTIME 400 each 3     carvedilol (COREG) 3.125 MG tablet Take 1 tablet (3.125 mg) by mouth 2 times daily (with meals) 180 tablet 1     cholecalciferol (VITAMIN   "-D) 1000 UNITS capsule Take 2 capsules (2,000 Units) by mouth daily 60 capsule 3     clopidogrel (PLAVIX) 75 MG tablet Take 1 tablet (75 mg) by mouth daily 90 tablet 3     furosemide (LASIX) 40 MG tablet Take 1.5 tablets (60 mg) by mouth 2 times daily 270 tablet 3     insulin glargine (LANTUS PEN) 100 UNIT/ML pen Inject 7 Units Subcutaneous every morning 6 mL 3     insulin pen needle (32G X 4 MM) 32G X 4 MM miscellaneous Use 5 pen needles daily or as directed. 200 each 3     nitroGLYcerin (NITROSTAT) 0.4 MG sublingual tablet Place 1 tablet (0.4 mg) under the tongue every 5 minutes as needed for chest pain For chest pain place 1 tablet under the tongue every 5 minutes for 3 doses. If symptoms persist 5 minutes after 1st dose call 911. 25 tablet 0     pantoprazole (PROTONIX) 40 MG EC tablet Take 1 tablet (40 mg) by mouth 2 times daily 180 tablet 2     sacubitril-valsartan (ENTRESTO) 49-51 MG per tablet Take 1 tablet by mouth 2 times daily 60 tablet 1     Sharps Container MISC 1 Device every 30 days 1 each 3     vitamin  B complex with vitamin C (VITAMIN  B COMPLEX) TABS Take 1 tablet by mouth daily       aspirin (ASA) 81 MG chewable tablet Take 81 mg by mouth daily         ROS:  Review Of Systems  Skin: negative  Eyes: negative  Ears/Nose/Throat: negative  Respiratory: No shortness of breath, dyspnea on exertion, cough, or hemoptysis  Cardiovascular: negative and lower extremity edema  Gastrointestinal: negative  Genitourinary: negative  Musculoskeletal: negative  Neurologic: negative  Psychiatric: negative  Hematologic/Lymphatic/Immunologic: negative  Endocrine: negative      EXAM:  /65 (BP Location: Right arm, Patient Position: Chair, Cuff Size: Adult Regular)   Pulse 70   Ht 1.727 m (5' 8\")   Wt 103.9 kg (229 lb)   SpO2 98%   BMI 34.82 kg/m    Home weight: see above  General: alert, articulate, and in no acute distress.  HEENT: normocephalic, atraumatic, anicteric sclera, EOMI, mucosa moist, no cyanosis. "   Neck: neck supple.  No adenopathy, masses, or carotid bruits.  JVP not detected at 45 degrees  Heart: regular rhythm, normal S1/S2, no murmur, gallop, rub.  Precordium quiet with normal PMI.     Lungs: clear, no rales, ronchi, or wheezing.  No accessory muscle use, respirations unlabored.   Abdomen: soft, non-tender, bowel sounds present, no hepatomegaly  Extremities:1-2+ edema.   No cyanosis.    Neurological: alert and oriented x 3.  normal speech and affect, no gross motor deficits  Skin:  No ecchymoses, rashes, or clubbing.    Labs:  CBC RESULTS:  Lab Results   Component Value Date    WBC 7.0 11/07/2019    RBC 4.45 11/07/2019    HGB 11.3 (L) 11/07/2019    HCT 38.4 (L) 11/07/2019    MCV 86 11/07/2019    MCH 25.4 (L) 11/07/2019    MCHC 29.4 (L) 11/07/2019    RDW 28.1 (H) 11/07/2019     11/07/2019       CMP RESULTS:  Lab Results   Component Value Date     11/07/2019    POTASSIUM 4.1 11/07/2019    CHLORIDE 103 11/07/2019    CO2 26 11/07/2019    ANIONGAP 8 11/07/2019     (H) 11/07/2019    BUN 42 (H) 11/07/2019    CR 2.58 (H) 11/07/2019    GFRESTIMATED 26 (L) 11/07/2019    GFRESTBLACK 30 (L) 11/07/2019    RONNY 9.0 11/07/2019    BILITOTAL 1.9 (H) 11/07/2019    ALBUMIN 3.8 11/07/2019    ALKPHOS 115 11/07/2019    ALT 15 11/07/2019    AST 9 11/07/2019        INR RESULTS:  Lab Results   Component Value Date    INR 1.26 (H) 11/07/2019       No components found for: CK  Lab Results   Component Value Date    MAG 2.4 (H) 07/23/2019     Lab Results   Component Value Date    NTBNP 7,978 (H) 10/07/2019     @BRIEFLABR (dig)@    Most recent echocardiogram:  No results found for this or any previous visit (from the past 8760 hour(s)).      Assessment and Plan:    In summary,Cole  is a  59 year old male who is here for a follow up appt with [facility]. He will start taking 60 mg Lasix BID .  He will be seen in 2 weeks in [facility], although it seems he will be admitted on 11/19. He will have the [facility] next week.     #  Chronic systolic heart failure/HFrEF (EF 35%) secondary to ischemic cardiomyopathy  NYHA Symptom Class III  Stage C  Primary Cardiologist: Dr. Trujillo; Last seen 10/31/2019  ACE-I/ARB/ARNi: Entresto 49-51, appears to be tolerating, but would not increase further given renal function  BB Coreg 3.125 BID, defer further increase until RHC  Aldosterone antagonist contraindicated due to renal dysfunction  SCD prophylaxis:AICD has been previous discussed and patient did not feel over the years that it was desirable (extensive discussion of risks and benefits)  Fluid status Hypervolemic- increase lasix to 60 mg BID  Follow-up [facility] in two weeks     # Iron Deficiency Anemia    - Continue IV Iron infusions with outside provider- one IV infusion is completed.        # A.fib   -  rate controlled per last EKG  - Pt has had 2 large GIB on coumadin in the past, he does not want to retrial        Follow-up and plan:  [facility] next week   [facility] in 2 weeks , planned admission on 11/19 per Dr. Trujillo's last visit.      Dian HAN CNP  [facility]

## 2019-11-07 NOTE — LETTER
11/7/2019      RE: Cole Jesus  [address]  [facility] [address]       Dear Colleague,    Thank you for the opportunity to participate in the care of your patient, Cole Jesus, at the [facility]. Please see a copy of my visit note below.        HPI: Cole is a 59 year old  or  male with a past medical history of acute occlusion of the proximal right coronary artery, treated successfully with balloon angioplasty and stenting in 6/2019.  The patient's course was complicated by atrial fibrillation.  He has a remote history of atrial fibrillation, treated with Warfarin and complicated by gastrointestinal bleeding.  At the time (prior to my/Dr. Trujillo's assumption of care) he was advised to stay off of warfarin (2013).  He has had no documented recurrence of atrial fibrillation with episodic monitoring including ZEOPATCH. He was put back on warfarin and had GIB from his stomach, gastric lesions per patient. He also has a history of a large GIB back around 2011. He does not know or perceive being out of rhythm.  His most recent echocardiogram (technically difficult showed stable ejection fraction in the range of 35%.    Had an iron infusion since the 10/31/19 visit with Dr. Trujillo. Patient says he is feeling better since last week. He is feeling more energetic today. He is able to do his laundry now , he couldn't do this earlier due to SOB/fatigue . Weight at home 228 lbs.  Patient has no SOB today. The extra dose of Lasix has been helpful he says. Appetite has been good. RHC is next week. Echo was done today. Patient has been taking 60 mg am and 40 mg pm for Lasix daily instead of what Dr. Trujillo had suggested 60 mg BID. Patient is taking Entresto and tolerating it well.       Denies SOB, KING, PND, orthopnea, edema, weight gain, chest pain, palpitations, lightheadedness, dizziness, near syncopal/syncopal  chong Galvan has been following salt and fluid restrictions.      PAST MEDICAL HISTORY:  Past Medical History:   Diagnosis Date     Anemia in chronic renal disease 3/9/2015     Antiplatelet or antithrombotic long-term use      Atrial fibrillation and flutter      CAD (coronary artery disease)     Stemi in , s/p angioplasty     CRD (chronic renal disease), stage IV (H) 2015    hx ATN with dialysis complicating cardiogenic shock  2012     GI bleed     massive lower GI bleed secondary to a cecal ulcer, s/p ileocecal resection in      Heart failure     Biventricular systolic HF, complicated by ARDS requiring tracheostomy     Hyperlipidemia LDL goal <100 2013     Hypertension      Ischemic cardiomyopathy 2013    TTE revealing 40% EF     Myocardial infarction (H)      Other and unspecified nonspecific immunological findings     Anti JKa     Pulmonary edema     episodes of flash pulmonary edema in      Subclinical hypothyroidism        FAMILY HISTORY:  Family History   Problem Relation Age of Onset     Diabetes Mother      Hypertension Mother      Heart Disease Mother      Heart Disease Father          of heart attack, left when he was young     Diabetes Sister      Diabetes Sister      Diabetes Sister        SOCIAL HISTORY:  Social History     Tobacco Use     Smoking status: Former Smoker     Last attempt to quit: 12/3/2011     Years since quittin.9     Smokeless tobacco: Never Used   Substance Use Topics     Alcohol use: No     Alcohol/week: 0.0 standard drinks     Drug use: No           CURRENT MEDICATIONS:  Current Outpatient Medications   Medication Sig Dispense Refill     ACCU-CHEK GUIDE test strip USE TO TEST BLOOD SUGAR FOUR TIMES A DAY BEFORE MEALS AND AT BEDTIME 400 each 3     Alcohol Swabs PADS 1 applicator 4 times daily (before meals and nightly) 100 each 3     aspirin (ASA) 81 MG chewable tablet Take 81 mg by mouth daily       atorvastatin (LIPITOR) 80 MG tablet  Take 1 tablet (80 mg) by mouth daily 90 tablet 3     blood glucose monitoring (ACCU-CHEK FASTCLIX) lancets USE TO TEST BLOOD SUGAR FOUR TIMES A DAY AT MEALS AND AT BEDTIME 400 each 3     carvedilol (COREG) 3.125 MG tablet Take 1 tablet (3.125 mg) by mouth 2 times daily (with meals) 180 tablet 1     cholecalciferol (VITAMIN  -D) 1000 UNITS capsule Take 2 capsules (2,000 Units) by mouth daily 60 capsule 3     clopidogrel (PLAVIX) 75 MG tablet Take 1 tablet (75 mg) by mouth daily 90 tablet 3     furosemide (LASIX) 40 MG tablet Take 1.5 tablets (60 mg) by mouth 2 times daily 270 tablet 3     insulin glargine (LANTUS PEN) 100 UNIT/ML pen Inject 7 Units Subcutaneous every morning 6 mL 3     insulin pen needle (32G X 4 MM) 32G X 4 MM miscellaneous Use 5 pen needles daily or as directed. 200 each 3     nitroGLYcerin (NITROSTAT) 0.4 MG sublingual tablet Place 1 tablet (0.4 mg) under the tongue every 5 minutes as needed for chest pain For chest pain place 1 tablet under the tongue every 5 minutes for 3 doses. If symptoms persist 5 minutes after 1st dose call 911. 25 tablet 0     pantoprazole (PROTONIX) 40 MG EC tablet Take 1 tablet (40 mg) by mouth 2 times daily 180 tablet 2     sacubitril-valsartan (ENTRESTO) 49-51 MG per tablet Take 1 tablet by mouth 2 times daily 60 tablet 1     Sharps Container MISC 1 Device every 30 days 1 each 3     vitamin  B complex with vitamin C (VITAMIN  B COMPLEX) TABS Take 1 tablet by mouth daily       aspirin (ASA) 81 MG chewable tablet Take 81 mg by mouth daily         ROS:  Review Of Systems  Skin: negative  Eyes: negative  Ears/Nose/Throat: negative  Respiratory: No shortness of breath, dyspnea on exertion, cough, or hemoptysis  Cardiovascular: negative and lower extremity edema  Gastrointestinal: negative  Genitourinary: negative  Musculoskeletal: negative  Neurologic: negative  Psychiatric: negative  Hematologic/Lymphatic/Immunologic: negative  Endocrine: negative      EXAM:  /65 (BP  "Location: Right arm, Patient Position: Chair, Cuff Size: Adult Regular)   Pulse 70   Ht 1.727 m (5' 8\")   Wt 103.9 kg (229 lb)   SpO2 98%   BMI 34.82 kg/m     Home weight: see above  General: alert, articulate, and in no acute distress.  HEENT: normocephalic, atraumatic, anicteric sclera, EOMI, mucosa moist, no cyanosis.   Neck: neck supple.  No adenopathy, masses, or carotid bruits.  JVP not detected at 45 degrees  Heart: regular rhythm, normal S1/S2, no murmur, gallop, rub.  Precordium quiet with normal PMI.     Lungs: clear, no rales, ronchi, or wheezing.  No accessory muscle use, respirations unlabored.   Abdomen: soft, non-tender, bowel sounds present, no hepatomegaly  Extremities:1-2+ edema.   No cyanosis.    Neurological: alert and oriented x 3.  normal speech and affect, no gross motor deficits  Skin:  No ecchymoses, rashes, or clubbing.    Labs:  CBC RESULTS:  Lab Results   Component Value Date    WBC 7.0 11/07/2019    RBC 4.45 11/07/2019    HGB 11.3 (L) 11/07/2019    HCT 38.4 (L) 11/07/2019    MCV 86 11/07/2019    MCH 25.4 (L) 11/07/2019    MCHC 29.4 (L) 11/07/2019    RDW 28.1 (H) 11/07/2019     11/07/2019       CMP RESULTS:  Lab Results   Component Value Date     11/07/2019    POTASSIUM 4.1 11/07/2019    CHLORIDE 103 11/07/2019    CO2 26 11/07/2019    ANIONGAP 8 11/07/2019     (H) 11/07/2019    BUN 42 (H) 11/07/2019    CR 2.58 (H) 11/07/2019    GFRESTIMATED 26 (L) 11/07/2019    GFRESTBLACK 30 (L) 11/07/2019    RONNY 9.0 11/07/2019    BILITOTAL 1.9 (H) 11/07/2019    ALBUMIN 3.8 11/07/2019    ALKPHOS 115 11/07/2019    ALT 15 11/07/2019    AST 9 11/07/2019        INR RESULTS:  Lab Results   Component Value Date    INR 1.26 (H) 11/07/2019       No components found for: CK  Lab Results   Component Value Date    MAG 2.4 (H) 07/23/2019     Lab Results   Component Value Date    NTBNP 7,978 (H) 10/07/2019     @BRIEFLABR (dig)@    Most recent echocardiogram:  No results found for this or any " previous visit (from the past 8760 hour(s)).      Assessment and Plan:    In summary,Cole  is a  59 year old male who is here for a follow up appt with [facility]. He will start taking 60 mg Lasix BID .  He will be seen in 2 weeks in [facility], although it seems he will be admitted on 11/19. He will have the [facility] next week.     # Chronic systolic heart failure/HFrEF (EF 35%) secondary to ischemic cardiomyopathy  NYHA Symptom Class III  Stage C  Primary Cardiologist: Dr. Trujillo; Last seen 10/31/2019  ACE-I/ARB/ARNi: Entresto 49-51, appears to be tolerating, but would not increase further given renal function  BB Coreg 3.125 BID, defer further increase until RHC  Aldosterone antagonist contraindicated due to renal dysfunction  SCD prophylaxis:AICD has been previous discussed and patient did not feel over the years that it was desirable (extensive discussion of risks and benefits)  Fluid status Hypervolemic- increase lasix to 60 mg BID  Follow-up [facility] in two weeks     # Iron Deficiency Anemia    - Continue IV Iron infusions with outside provider- one IV infusion is completed.        # A.fib   -  rate controlled per last EKG  - Pt has had 2 large GIB on coumadin in the past, he does not want to retrial        Follow-up and plan:  [facility] next week   [facility] in 2 weeks , planned admission on 11/19 per Dr. Trujillo's last visit.      Dian HAN, CNP  [facility]

## 2019-11-07 NOTE — NURSING NOTE
Chief Complaint   Patient presents with     Lab Only     Removed PIV. Lab only.      Leslie Prado RN

## 2019-11-12 ENCOUNTER — HOSPITAL ENCOUNTER (INPATIENT)
Facility: CLINIC | Age: 59
LOS: 5 days | Discharge: HOME | End: 2019-11-17
Attending: INTERNAL MEDICINE | Admitting: INTERNAL MEDICINE
Payer: MEDICAID

## 2019-11-12 ENCOUNTER — APPOINTMENT (OUTPATIENT)
Dept: MEDSURG UNIT | Facility: CLINIC | Age: 59
End: 2019-11-12
Attending: INTERNAL MEDICINE
Payer: MEDICAID

## 2019-11-12 ENCOUNTER — APPOINTMENT (OUTPATIENT)
Dept: GENERAL RADIOLOGY | Facility: CLINIC | Age: 59
End: 2019-11-12
Attending: INTERNAL MEDICINE
Payer: MEDICAID

## 2019-11-12 DIAGNOSIS — I50.22 CHRONIC SYSTOLIC CONGESTIVE HEART FAILURE (H): ICD-10-CM

## 2019-11-12 DIAGNOSIS — K92.2 LOWER GI BLEEDING: ICD-10-CM

## 2019-11-12 DIAGNOSIS — I48.91 ATRIAL FIBRILLATION, UNSPECIFIED TYPE (H): ICD-10-CM

## 2019-11-12 DIAGNOSIS — I10 ESSENTIAL HYPERTENSION WITH GOAL BLOOD PRESSURE LESS THAN 130/85: ICD-10-CM

## 2019-11-12 DIAGNOSIS — I25.5 ISCHEMIC CARDIOMYOPATHY: ICD-10-CM

## 2019-11-12 DIAGNOSIS — I48.91 ATRIAL FIBRILLATION, UNSPECIFIED TYPE (H): Chronic | ICD-10-CM

## 2019-11-12 DIAGNOSIS — I50.22 CHRONIC SYSTOLIC CONGESTIVE HEART FAILURE (H): Primary | ICD-10-CM

## 2019-11-12 DIAGNOSIS — E78.5 HYPERLIPIDEMIA LDL GOAL <100: ICD-10-CM

## 2019-11-12 DIAGNOSIS — I51.89 OTHER ILL-DEFINED HEART DISEASES: ICD-10-CM

## 2019-11-12 DIAGNOSIS — N18.4 CKD (CHRONIC KIDNEY DISEASE) STAGE 4, GFR 15-29 ML/MIN (H): Chronic | ICD-10-CM

## 2019-11-12 DIAGNOSIS — D62 ANEMIA DUE TO ACUTE BLOOD LOSS: ICD-10-CM

## 2019-11-12 PROBLEM — I50.9: Status: ACTIVE | Noted: 2019-11-12

## 2019-11-12 LAB
ALBUMIN SERPL-MCNC: 3.6 G/DL (ref 3.4–5)
ALP SERPL-CCNC: 115 U/L (ref 40–150)
ALT SERPL W P-5'-P-CCNC: 17 U/L (ref 0–70)
ANION GAP SERPL CALCULATED.3IONS-SCNC: 7 MMOL/L (ref 3–14)
AST SERPL W P-5'-P-CCNC: 9 U/L (ref 0–45)
BASE EXCESS BLDV CALC-SCNC: 0.5 MMOL/L
BASE EXCESS BLDV CALC-SCNC: 1.5 MMOL/L
BILIRUB SERPL-MCNC: 1.4 MG/DL (ref 0.2–1.3)
BUN SERPL-MCNC: 45 MG/DL (ref 7–30)
CALCIUM SERPL-MCNC: 8.7 MG/DL (ref 8.5–10.1)
CHLORIDE SERPL-SCNC: 107 MMOL/L (ref 94–109)
CO2 SERPL-SCNC: 25 MMOL/L (ref 20–32)
CREAT SERPL-MCNC: 2.47 MG/DL (ref 0.66–1.25)
ERYTHROCYTE [DISTWIDTH] IN BLOOD BY AUTOMATED COUNT: 28.4 % (ref 10–15)
GFR SERPL CREATININE-BSD FRML MDRD: 27 ML/MIN/{1.73_M2}
GLUCOSE BLDC GLUCOMTR-MCNC: 143 MG/DL (ref 70–99)
GLUCOSE BLDC GLUCOMTR-MCNC: 176 MG/DL (ref 70–99)
GLUCOSE BLDC GLUCOMTR-MCNC: 187 MG/DL (ref 70–99)
GLUCOSE SERPL-MCNC: 130 MG/DL (ref 70–99)
HCO3 BLDV-SCNC: 25 MMOL/L (ref 21–28)
HCO3 BLDV-SCNC: 26 MMOL/L (ref 21–28)
HCT VFR BLD AUTO: 39.6 % (ref 40–53)
HGB BLD-MCNC: 11.4 G/DL (ref 13.3–17.7)
INR PPP: 1.28 (ref 0.86–1.14)
MCH RBC QN AUTO: 25.6 PG (ref 26.5–33)
MCHC RBC AUTO-ENTMCNC: 28.8 G/DL (ref 31.5–36.5)
MCV RBC AUTO: 89 FL (ref 78–100)
O2/TOTAL GAS SETTING VFR VENT: 21 %
O2/TOTAL GAS SETTING VFR VENT: 21 %
OXYHGB MFR BLDV: 57 %
OXYHGB MFR BLDV: 58 %
PCO2 BLDV: 40 MM HG (ref 40–50)
PCO2 BLDV: 41 MM HG (ref 40–50)
PH BLDV: 7.41 PH (ref 7.32–7.43)
PH BLDV: 7.42 PH (ref 7.32–7.43)
PLATELET # BLD AUTO: 208 10E9/L (ref 150–450)
PO2 BLDV: 33 MM HG (ref 25–47)
PO2 BLDV: 35 MM HG (ref 25–47)
POTASSIUM SERPL-SCNC: 4.1 MMOL/L (ref 3.4–5.3)
PROT SERPL-MCNC: 8 G/DL (ref 6.8–8.8)
RBC # BLD AUTO: 4.45 10E12/L (ref 4.4–5.9)
SODIUM SERPL-SCNC: 139 MMOL/L (ref 133–144)
WBC # BLD AUTO: 6.7 10E9/L (ref 4–11)

## 2019-11-12 PROCEDURE — 25000125 ZZHC RX 250: Performed by: INTERNAL MEDICINE

## 2019-11-12 PROCEDURE — 36620 INSERTION CATHETER ARTERY: CPT | Performed by: INTERNAL MEDICINE

## 2019-11-12 PROCEDURE — 4A023N6 MEASUREMENT OF CARDIAC SAMPLING AND PRESSURE, RIGHT HEART, PERCUTANEOUS APPROACH: ICD-10-PCS | Performed by: INTERNAL MEDICINE

## 2019-11-12 PROCEDURE — 27210794 ZZH OR GENERAL SUPPLY STERILE: Performed by: INTERNAL MEDICINE

## 2019-11-12 PROCEDURE — 00000146 ZZHCL STATISTIC GLUCOSE BY METER IP

## 2019-11-12 PROCEDURE — 4A133B3 MONITORING OF ARTERIAL PRESSURE, PULMONARY, PERCUTANEOUS APPROACH: ICD-10-PCS | Performed by: INTERNAL MEDICINE

## 2019-11-12 PROCEDURE — 99221 1ST HOSP IP/OBS SF/LOW 40: CPT | Mod: AI | Performed by: INTERNAL MEDICINE

## 2019-11-12 PROCEDURE — 93463 DRUG ADMIN & HEMODYNMIC MEAS: CPT | Performed by: INTERNAL MEDICINE

## 2019-11-12 PROCEDURE — 40000986 XR CHEST PORT 1 VW

## 2019-11-12 PROCEDURE — 40000172 ZZH STATISTIC PROCEDURE PREP ONLY

## 2019-11-12 PROCEDURE — 93451 RIGHT HEART CATH: CPT | Performed by: INTERNAL MEDICINE

## 2019-11-12 PROCEDURE — 20000004 ZZH R&B ICU UMMC

## 2019-11-12 PROCEDURE — 36415 COLL VENOUS BLD VENIPUNCTURE: CPT | Performed by: INTERNAL MEDICINE

## 2019-11-12 PROCEDURE — 93451 RIGHT HEART CATH: CPT | Mod: 26 | Performed by: INTERNAL MEDICINE

## 2019-11-12 PROCEDURE — C1894 INTRO/SHEATH, NON-LASER: HCPCS | Performed by: INTERNAL MEDICINE

## 2019-11-12 PROCEDURE — 25000125 ZZHC RX 250: Performed by: STUDENT IN AN ORGANIZED HEALTH CARE EDUCATION/TRAINING PROGRAM

## 2019-11-12 PROCEDURE — 25000128 H RX IP 250 OP 636: Performed by: STUDENT IN AN ORGANIZED HEALTH CARE EDUCATION/TRAINING PROGRAM

## 2019-11-12 PROCEDURE — 36415 COLL VENOUS BLD VENIPUNCTURE: CPT | Performed by: NURSE PRACTITIONER

## 2019-11-12 PROCEDURE — 25800030 ZZH RX IP 258 OP 636: Performed by: INTERNAL MEDICINE

## 2019-11-12 PROCEDURE — 25000128 H RX IP 250 OP 636: Performed by: INTERNAL MEDICINE

## 2019-11-12 PROCEDURE — 25000132 ZZH RX MED GY IP 250 OP 250 PS 637: Performed by: STUDENT IN AN ORGANIZED HEALTH CARE EDUCATION/TRAINING PROGRAM

## 2019-11-12 PROCEDURE — 85027 COMPLETE CBC AUTOMATED: CPT | Performed by: NURSE PRACTITIONER

## 2019-11-12 PROCEDURE — 4A1239Z MONITORING OF CARDIAC OUTPUT, PERCUTANEOUS APPROACH: ICD-10-PCS | Performed by: INTERNAL MEDICINE

## 2019-11-12 PROCEDURE — 25800030 ZZH RX IP 258 OP 636: Performed by: STUDENT IN AN ORGANIZED HEALTH CARE EDUCATION/TRAINING PROGRAM

## 2019-11-12 PROCEDURE — 02HP32Z INSERTION OF MONITORING DEVICE INTO PULMONARY TRUNK, PERCUTANEOUS APPROACH: ICD-10-PCS | Performed by: INTERNAL MEDICINE

## 2019-11-12 PROCEDURE — 82805 BLOOD GASES W/O2 SATURATION: CPT | Performed by: STUDENT IN AN ORGANIZED HEALTH CARE EDUCATION/TRAINING PROGRAM

## 2019-11-12 PROCEDURE — 80053 COMPREHEN METABOLIC PANEL: CPT | Performed by: INTERNAL MEDICINE

## 2019-11-12 PROCEDURE — 85610 PROTHROMBIN TIME: CPT | Performed by: NURSE PRACTITIONER

## 2019-11-12 RX ORDER — NICOTINE POLACRILEX 4 MG
15-30 LOZENGE BUCCAL
Status: DISCONTINUED | OUTPATIENT
Start: 2019-11-12 | End: 2019-11-12

## 2019-11-12 RX ORDER — ATORVASTATIN CALCIUM 40 MG/1
80 TABLET, FILM COATED ORAL DAILY
Status: DISCONTINUED | OUTPATIENT
Start: 2019-11-12 | End: 2019-11-12

## 2019-11-12 RX ORDER — NICOTINE POLACRILEX 4 MG
15-30 LOZENGE BUCCAL
Status: DISCONTINUED | OUTPATIENT
Start: 2019-11-12 | End: 2019-11-14

## 2019-11-12 RX ORDER — CLOPIDOGREL BISULFATE 75 MG/1
75 TABLET ORAL DAILY
Status: DISCONTINUED | OUTPATIENT
Start: 2019-11-12 | End: 2019-11-12

## 2019-11-12 RX ORDER — ASPIRIN 81 MG/1
81 TABLET, CHEWABLE ORAL DAILY
Status: DISCONTINUED | OUTPATIENT
Start: 2019-11-13 | End: 2019-11-17 | Stop reason: HOSPADM

## 2019-11-12 RX ORDER — VITAMIN B COMPLEX
2000 TABLET ORAL DAILY
Status: DISCONTINUED | OUTPATIENT
Start: 2019-11-12 | End: 2019-11-17 | Stop reason: HOSPADM

## 2019-11-12 RX ORDER — ATORVASTATIN CALCIUM 80 MG/1
80 TABLET, FILM COATED ORAL DAILY
Status: DISCONTINUED | OUTPATIENT
Start: 2019-11-13 | End: 2019-11-17 | Stop reason: HOSPADM

## 2019-11-12 RX ORDER — AMLODIPINE BESYLATE 2.5 MG/1
2.5 TABLET ORAL DAILY
Status: DISCONTINUED | OUTPATIENT
Start: 2019-11-12 | End: 2019-11-13

## 2019-11-12 RX ORDER — DEXTROSE MONOHYDRATE 25 G/50ML
25-50 INJECTION, SOLUTION INTRAVENOUS
Status: DISCONTINUED | OUTPATIENT
Start: 2019-11-12 | End: 2019-11-12

## 2019-11-12 RX ORDER — CLOPIDOGREL BISULFATE 75 MG/1
75 TABLET ORAL DAILY
Status: DISCONTINUED | OUTPATIENT
Start: 2019-11-13 | End: 2019-11-17 | Stop reason: HOSPADM

## 2019-11-12 RX ORDER — NIFEDIPINE 30 MG/1
30 TABLET, EXTENDED RELEASE ORAL DAILY
Status: DISCONTINUED | OUTPATIENT
Start: 2019-11-13 | End: 2019-11-12

## 2019-11-12 RX ORDER — DEXTROSE MONOHYDRATE 25 G/50ML
25-50 INJECTION, SOLUTION INTRAVENOUS
Status: DISCONTINUED | OUTPATIENT
Start: 2019-11-12 | End: 2019-11-14

## 2019-11-12 RX ORDER — FUROSEMIDE 10 MG/ML
80 INJECTION INTRAMUSCULAR; INTRAVENOUS ONCE
Status: COMPLETED | OUTPATIENT
Start: 2019-11-12 | End: 2019-11-12

## 2019-11-12 RX ORDER — PANTOPRAZOLE SODIUM 40 MG/1
40 TABLET, DELAYED RELEASE ORAL 2 TIMES DAILY
Status: DISCONTINUED | OUTPATIENT
Start: 2019-11-12 | End: 2019-11-17 | Stop reason: HOSPADM

## 2019-11-12 RX ORDER — FUROSEMIDE 10 MG/ML
80 INJECTION INTRAMUSCULAR; INTRAVENOUS
Status: DISCONTINUED | OUTPATIENT
Start: 2019-11-12 | End: 2019-11-13

## 2019-11-12 RX ORDER — FERROUS SULFATE 325(65) MG
325 TABLET ORAL EVERY OTHER DAY
Status: DISCONTINUED | OUTPATIENT
Start: 2019-11-12 | End: 2019-11-17 | Stop reason: HOSPADM

## 2019-11-12 RX ORDER — ASPIRIN 81 MG/1
81 TABLET, CHEWABLE ORAL DAILY
Status: DISCONTINUED | OUTPATIENT
Start: 2019-11-12 | End: 2019-11-12

## 2019-11-12 RX ORDER — NIFEDIPINE 30 MG/1
30 TABLET, EXTENDED RELEASE ORAL DAILY
Status: DISCONTINUED | OUTPATIENT
Start: 2019-11-12 | End: 2019-11-12

## 2019-11-12 RX ORDER — LIDOCAINE 40 MG/G
CREAM TOPICAL
Status: COMPLETED | OUTPATIENT
Start: 2019-11-12 | End: 2019-11-12

## 2019-11-12 RX ADMIN — SODIUM THIOSULFATE 1 MCG/KG/MIN: 250 INJECTION, SOLUTION INTRAVENOUS at 10:47

## 2019-11-12 RX ADMIN — FUROSEMIDE 80 MG: 10 INJECTION, SOLUTION INTRAVENOUS at 11:09

## 2019-11-12 RX ADMIN — PANTOPRAZOLE SODIUM 40 MG: 40 TABLET, DELAYED RELEASE ORAL at 19:45

## 2019-11-12 RX ADMIN — FUROSEMIDE 80 MG: 10 INJECTION, SOLUTION INTRAVENOUS at 18:04

## 2019-11-12 RX ADMIN — FERROUS SULFATE TAB 325 MG (65 MG ELEMENTAL FE) 325 MG: 325 (65 FE) TAB at 16:23

## 2019-11-12 RX ADMIN — SODIUM THIOSULFATE 1 MCG/KG/MIN: 250 INJECTION, SOLUTION INTRAVENOUS at 18:03

## 2019-11-12 RX ADMIN — MELATONIN 2000 UNITS: at 14:38

## 2019-11-12 RX ADMIN — AMLODIPINE BESYLATE 2.5 MG: 2.5 TABLET ORAL at 18:44

## 2019-11-12 RX ADMIN — FUROSEMIDE 80 MG: 10 INJECTION, SOLUTION INTRAVENOUS at 14:38

## 2019-11-12 RX ADMIN — LIDOCAINE: 40 CREAM TOPICAL at 08:54

## 2019-11-12 ASSESSMENT — ACTIVITIES OF DAILY LIVING (ADL)
SWALLOWING: 0-->SWALLOWS FOODS/LIQUIDS WITHOUT DIFFICULTY
RETIRED_EATING: 0-->INDEPENDENT
TOILETING: 0-->INDEPENDENT
DRESS: 0-->INDEPENDENT
ADLS_ACUITY_SCORE: 12
BATHING: 0-->INDEPENDENT
COGNITION: 0 - NO COGNITION ISSUES REPORTED
ADLS_ACUITY_SCORE: 13
RETIRED_COMMUNICATION: 0-->UNDERSTANDS/COMMUNICATES WITHOUT DIFFICULTY
ADLS_ACUITY_SCORE: 14
FALL_HISTORY_WITHIN_LAST_SIX_MONTHS: NO
AMBULATION: 0-->INDEPENDENT
TRANSFERRING: 0-->INDEPENDENT

## 2019-11-12 ASSESSMENT — MIFFLIN-ST. JEOR: SCORE: 1795.56

## 2019-11-12 NOTE — DISCHARGE INSTRUCTIONS
[facility]                        Interventional Cardiology  Discharge Instructions   Post Right Heart Cath      AFTER YOU GO HOME:    DO drink plenty of fluids    DO resume your regular diet and medications unless otherwise instructed by your Primary Physician    Do Not scrub the procedure site vigorously    No lotion or powder to the puncture site for 3 days    CALL YOUR PRIMARY PHYSICIAN IF: You may resume all normal activity.  Monitor neck site for bleeding, swelling, or voice changes. If you notice bleeding or swelling immediately apply pressure to the site and call number below to speak with Cardiology Fellow.  If you experience any changes in your breathing you should call your doctor immediately or come to the closest Emergency Department.  Do not drive yourself.    ADDITIONAL INSTRUCTIONS: Medications: You are to resume all home medications including anticoagulation therapy unless otherwise advised by your primary cardiologist or nurse coordinator.    Follow Up: Per your primary cardiology team    If you have any questions or concerns regarding your procedure site please call [phone] at anytime and ask for Cardiology Fellow on call.  They are available 24 hours a day.  You may also contact the Cardiology Clinic after hours number at [phone].                                                       Telephone Numbers [phone] Monday-Friday 8:00 am to 4:30 pm    [phone] After 4:30 pm Monday-Friday, Weekends & Holidays  Ask for Interventional Cardiologist on call. Someone is on call 24 hours/day   [facility] toll free number [phone] Monday-Friday 8:00 am to 4:30 pm   [facility] Emergency Dept [phone]

## 2019-11-12 NOTE — IP AVS SNAPSHOT
MRN:[id number]                      After Visit Summary   11/12/2019    Cole Jesus    MRN: [id number]           Visit Information        Department      11/12/2019  7:59 AM [address] [facility]          Review of your medicines      UNREVIEWED medicines. Ask your doctor about these medicines       Dose / Directions   * aspirin 81 MG chewable tablet  Commonly known as:  ASA      Dose:  81 mg  Take 81 mg by mouth daily  Refills:  0     * aspirin 81 MG chewable tablet  Commonly known as:  ASA      Dose:  81 mg  Take 81 mg by mouth daily  Refills:  0     atorvastatin 80 MG tablet  Commonly known as:  LIPITOR  Used for:  Hyperlipidemia LDL goal <100      Dose:  80 mg  Take 1 tablet (80 mg) by mouth daily  Quantity:  90 tablet  Refills:  3     carvedilol 3.125 MG tablet  Commonly known as:  COREG  Used for:  Benign essential hypertension      Dose:  3.125 mg  Take 1 tablet (3.125 mg) by mouth 2 times daily (with meals)  Quantity:  180 tablet  Refills:  1     cholecalciferol 1000 units (25 mcg) capsule  Commonly known as:  VITAMIN D3  Used for:  Vitamin D deficiency      Dose:  2 capsule  Take 2 capsules (2,000 Units) by mouth daily  Quantity:  60 capsule  Refills:  3     clopidogrel 75 MG tablet  Commonly known as:  PLAVIX  Used for:  ST elevation myocardial infarction (STEMI) of inferior wall (H)      Dose:  75 mg  Take 1 tablet (75 mg) by mouth daily  Quantity:  90 tablet  Refills:  3     furosemide 40 MG tablet  Commonly known as:  LASIX  Used for:  Chronic systolic congestive heart failure (H), Ischemic cardiomyopathy, Essential hypertension with goal blood pressure less than 130/85      Dose:  60 mg  Take 1.5 tablets (60 mg) by mouth 2 times daily  Quantity:  270 tablet  Refills:  3     insulin glargine 100 UNIT/ML pen  Commonly known as:  LANTUS PEN  Used for:  Type 2 diabetes mellitus without complication, without long-term current use of insulin (H)      Dose:  7 Units  Inject 7 Units  Subcutaneous every morning  Quantity:  6 mL  Refills:  3     nitroGLYcerin 0.4 MG sublingual tablet  Commonly known as:  NITROSTAT  Used for:  Type 2 diabetes mellitus with complication, with long-term current use of insulin (H), ASCVD (arteriosclerotic cardiovascular disease)      Dose:  0.4 mg  Place 1 tablet (0.4 mg) under the tongue every 5 minutes as needed for chest pain For chest pain place 1 tablet under the tongue every 5 minutes for 3 doses. If symptoms persist 5 minutes after 1st dose call 911.  Quantity:  25 tablet  Refills:  0     pantoprazole 40 MG EC tablet  Commonly known as:  PROTONIX  Used for:  Lower GI bleeding      Dose:  40 mg  Take 1 tablet (40 mg) by mouth 2 times daily  Quantity:  180 tablet  Refills:  2     sacubitril-valsartan 49-51 MG per tablet  Commonly known as:  ENTRESTO  Used for:  Chronic systolic congestive heart failure (HCC)      Dose:  1 tablet  Take 1 tablet by mouth 2 times daily  Quantity:  60 tablet  Refills:  1     vitamin B complex with vitamin C tablet  Used for:  Atrial tachycardia (H), Renal insufficiency, Ischemic cardiomyopathy      Dose:  1 tablet  Take 1 tablet by mouth daily  Refills:  0         * This list has 2 medication(s) that are the same as other medications prescribed for you. Read the directions carefully, and ask your doctor or other care provider to review them with you.            CONTINUE these medicines which have NOT CHANGED       Dose / Directions   ACCU-CHEK GUIDE test strip  Used for:  Type 2 diabetes mellitus without complication, without long-term current use of insulin (H)  Generic drug:  blood glucose      USE TO TEST BLOOD SUGAR FOUR TIMES A DAY BEFORE MEALS AND AT BEDTIME  Quantity:  400 each  Refills:  3     Alcohol Swabs Pads  Used for:  Type 2 diabetes mellitus without complication, without long-term current use of insulin (H)      Dose:  1 applicator  1 applicator 4 times daily (before meals and nightly)  Quantity:  100 each  Refills:   3     blood glucose monitoring lancets  Used for:  Type 2 diabetes mellitus without complication, without long-term current use of insulin (H)      USE TO TEST BLOOD SUGAR FOUR TIMES A DAY AT MEALS AND AT BEDTIME  Quantity:  400 each  Refills:  3     insulin pen needle 32G X 4 MM miscellaneous  Commonly known as:  32G X 4 MM  Used for:  Type 2 diabetes mellitus without complication, without long-term current use of insulin (H)      Use 5 pen needles daily or as directed.  Quantity:  200 each  Refills:  3     Sharps Container Misc  Used for:  Type 2 diabetes mellitus without complication, without long-term current use of insulin (H)      Dose:  1 Device  1 Device every 30 days  Quantity:  1 each  Refills:  3              Protect others around you: Learn how to safely use, store and throw away your medicines at www.disposemymeds.org.       Follow-ups after your visit       Your next 10 appointments already scheduled    Nov 12, 2019  Procedure with Fox Gerard MD  [facility],  Heart Cath Lab ([facility]) [address] [place] [id number] [phone]   The [facility] is located on the corner of [facility] and [facility] on the [facility]. It is easily accessible from virtually any point in the [facility] area, via [address] and [address].   Nov 21, 2019 10:00 AM CST  Lab with  LAB  [facility] Lab ([facility]) [address] [place] [id number]  [phone]      Nov 21, 2019 10:30 AM CST  (Arrive by 10:15 AM)  CORE RETURN with GUILLE Fierro CNP  [facility] Heart Care ([facility]) [address] [place] [address]  [address] [place] [id number] [phone]      Dec 05, 2019 11:30 AM CST  Lab with UC LAB   Health Lab ([facility]) [address] [place] [id number] [phone]      Dec  05, 2019 12:00 PM CST  (Arrive by 11:45 AM)  CORE RETURN with GUILLE Fierro CNP [facility] Heart Care ([facility]) [address]  [address]  [facility] [address] [phone]      Dec 09, 2019  8:30 AM CST  (Arrive by 8:15 AM)  RETURN DIABETES with AKUA Holguin [facility] Endocrinology ([facility]) [address]  3rd Floor  [facility] [address] [phone]         Care Instructions       Further instructions from your care team       [facility]                        Interventional Cardiology  Discharge Instructions   Post Right Heart Cath      AFTER YOU GO HOME:    DO drink plenty of fluids    DO resume your regular diet and medications unless otherwise instructed by your Primary Physician    Do Not scrub the procedure site vigorously    No lotion or powder to the puncture site for 3 days    CALL YOUR PRIMARY PHYSICIAN IF: You may resume all normal activity.  Monitor neck site for bleeding, swelling, or voice changes. If you notice bleeding or swelling immediately apply pressure to the site and call number below to speak with Cardiology Fellow.  If you experience any changes in your breathing you should call your doctor immediately or come to the closest Emergency Department.  Do not drive yourself.    ADDITIONAL INSTRUCTIONS: Medications: You are to resume all home medications including anticoagulation therapy unless otherwise advised by your primary cardiologist or nurse coordinator.    Follow Up: Per your primary cardiology team    If you have any questions or concerns regarding your procedure site please call [phone] at anytime and ask for Cardiology Fellow on call.  They are available 24 hours a day.  You may also contact the Cardiology Clinic after hours number at [phone].                                                       Telephone Numbers [phone] Monday-Friday 8:00 am to  "4:30 pm    [phone] After 4:30 pm Monday-Friday, Weekends & Holidays  Ask for Interventional Cardiologist on call. Someone is on call 24 hours/day   [facility] toll free number [phone] Monday-Friday 8:00 am to 4:30 pm   [facility] Emergency Dept [phone]                   Additional Information About Your Visit       [employer] Information    [employer] gives you secure access to your electronic health record. If you see a primary care provider, you can also send messages to your care team and make appointments. If you have questions, please call your primary care clinic.  If you do not have a primary care provider, please call [phone] and they will assist you.       Care EveryWhere ID    This is your Care EveryWhere ID. This could be used by other organizations to access your [facility] medical records  [id number]       Your Vitals Were  Most recent update: 11/12/2019  8:49 AM    Blood Pressure   97/70 (BP Location: Right arm, Cuff Size: Adult Regular)          Temperature   97.5  F (36.4  C) (Axillary)          Respirations   16          Height   1.727 m (5' 8\")          Weight   101.6 kg (224 lb)             Pulse Oximetry   96%    BMI (Body Mass Index)   34.06 kg/m           Primary Care Provider Office Phone # Fax #    Silas Jono Enciso [phone]      Equal Access to Services    [facility] AH: Hadii aad ku hadasho Soomaali, waaxda luqadaha, qaybta kaalmada adeegyada, jordy hinson . So [facility] [phone].    ATENCIÓN: Si habla español, tiene a brown disposición servicios gratuitos de asistencia lingüística. Rosangela tsang [phone].    We comply with applicable federal civil rights laws and [place] laws. We do not discriminate on the basis of race, color, national origin, age, disability, sex, sexual orientation, or gender identity.           Thank you!    Thank you for choosing [facility] for your care. Our goal is always to provide you with " excellent care. Hearing back from our patients is one way we can continue to improve our services. Please take a few minutes to complete the written survey that you may receive in the mail after you visit with us. Thank you!            Medication List      Medications          Morning Afternoon Evening Bedtime As Needed    ACCU-CHEK GUIDE test strip  INSTRUCTIONS:  USE TO TEST BLOOD SUGAR FOUR TIMES A DAY BEFORE MEALS AND AT BEDTIME  Generic drug:  blood glucose                     Alcohol Swabs Pads  INSTRUCTIONS:  1 applicator 4 times daily (before meals and nightly)                     blood glucose monitoring lancets  INSTRUCTIONS:  USE TO TEST BLOOD SUGAR FOUR TIMES A DAY AT MEALS AND AT BEDTIME                     insulin pen needle 32G X 4 MM miscellaneous  Also known as:  32G X 4 MM  INSTRUCTIONS:  Use 5 pen needles daily or as directed.                     Sharps Container Misc  INSTRUCTIONS:  1 Device every 30 days                       ASK your doctor about these medications          Morning Afternoon Evening Bedtime As Needed    * aspirin 81 MG chewable tablet  Also known as:  ASA  INSTRUCTIONS:  Take 81 mg by mouth daily                     * aspirin 81 MG chewable tablet  Also known as:  ASA  INSTRUCTIONS:  Take 81 mg by mouth daily                     atorvastatin 80 MG tablet  Also known as:  LIPITOR  INSTRUCTIONS:  Take 1 tablet (80 mg) by mouth daily                     carvedilol 3.125 MG tablet  Also known as:  COREG  INSTRUCTIONS:  Take 1 tablet (3.125 mg) by mouth 2 times daily (with meals)                     cholecalciferol 1000 units (25 mcg) capsule  Also known as:  VITAMIN D3  INSTRUCTIONS:  Take 2 capsules (2,000 Units) by mouth daily                     clopidogrel 75 MG tablet  Also known as:  PLAVIX  INSTRUCTIONS:  Take 1 tablet (75 mg) by mouth daily                     furosemide 40 MG tablet  Also known as:  LASIX  INSTRUCTIONS:  Take 1.5 tablets (60 mg) by mouth 2 times daily                      insulin glargine 100 UNIT/ML pen  Also known as:  LANTUS PEN  INSTRUCTIONS:  Inject 7 Units Subcutaneous every morning  Doctor's comments:  If Lantus is not covered by insurance, may substitute Basaglar at same dose and frequency.                       nitroGLYcerin 0.4 MG sublingual tablet  Also known as:  NITROSTAT  INSTRUCTIONS:  Place 1 tablet (0.4 mg) under the tongue every 5 minutes as needed for chest pain For chest pain place 1 tablet under the tongue every 5 minutes for 3 doses. If symptoms persist 5 minutes after 1st dose call 911.                     pantoprazole 40 MG EC tablet  Also known as:  PROTONIX  INSTRUCTIONS:  Take 1 tablet (40 mg) by mouth 2 times daily  Doctor's comments:  Please keep appt 10/31/19                     sacubitril-valsartan 49-51 MG per tablet  Also known as:  ENTRESTO  INSTRUCTIONS:  Take 1 tablet by mouth 2 times daily                     vitamin B complex with vitamin C tablet  INSTRUCTIONS:  Take 1 tablet by mouth daily                        * This list has 2 medication(s) that are the same as other medications prescribed for you. Read the directions carefully, and ask your doctor or other care provider to review them with you.

## 2019-11-12 NOTE — Clinical Note
Suturing in zulay white@56cm       Air is out of syringe, biopatch is on, blue caps on along with tegaderm.

## 2019-11-12 NOTE — Clinical Note
dry, intact and no bleeding. 7 Fr RIJ Sutured in place Pito Swenson @ 56 cm, biopatch, capped, suture, tegaderm   20 aimee R Radial Artline sutured, capped, tegaderm

## 2019-11-12 NOTE — H&P
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)  Moderate to severe mitral regurgitation  Atrial fibrillation (rate controlled)   S/p RFA for Atach/aflutter in 2013   C/w aspirin 81 mg PO daily and plavix 75 mg PO daily for CAD  C/w atorvastatin 80 mg PO daily for CAD  Will hold off anticoagulation as records indicate GIB while on warfarin, patient could potentially be a candidate for KASIA closure device   C/w nipride gtt to keep CI >2   Will hold off carvedilol as patient in low output failure   Added amlodipine 2.5 mg PO for afterload reduction and will increase tomorrow (patient intolerant of hydralazine - apparently had black spots/rash on hydralazine in the past)   Will likely restart entresto 49/51 mg PO bid or afterload reduction depending on his renal function tomorrow morning   Will start lasix 80 mg IV tid (home dose lasix 60 mg PO bid)    Monitor I/os , daily weights  HD q6h     Iron deficiency anemia  Hx of recurrent GI bleeds most recent in 7/2019 , previously oalso cecal ulcer requiring ielocecal resection with temporary diverting ileostomy  Hgb stable   Started ferrous sulfate every other day   Monitor CBC    CKD stage 4  Cr baseline 2.2-3  Cr today 2.47   Monitor UOP    DM  Last HgbA1c 6  ISS  hypoglycemia protocol    Pt was discussed and evaluated with Jose A Medina MD, attending physician, who agrees with the assessment and plan above.     Negro Wolff MD  Cardiology Fellow  PGY 4     HPI:  58 y/o M with PMH of STEMI, acute occlusion of pRCA s/p balloon angioplasty and stenting in 6/2019 (XIENCE JAMILA 3.5 X 28 KEVYN), STEMI s/p DESx3 for total occlusion of RCA and Lcx (2012), GIB due to AV malformation (in the setting of warfarin and DAPT),  ischemic cardiomyopathy, HFrEF (last EF=30-35%), severe pHTN, severe mitral regurgitation, atrial fibrillation not on AC due to GIB in the past, CKD stage IV,  who presented for elective RHC.  Found to be in low cardiac output failure (CI ~ 1.5) , elevated CWP (~43 mmHg). Started on nipride and lasix IV.      Patient has lost weight since last admission. Denied early satiety, nausea. He wakes up at night multiple times to go to the restroom. Denied SOB , orthopnea. Does not complaint of air-hunger. He has lower extremity swelling. He sleeps on 1 pillow at night. His functional capacity is very limited. He has been unable to walk for than 30 feet till recently. He felt his limitation was mostly due to his anemia since his last admission in July. Denied syncope, dizziness.      Patient lasix was recently increased to 60 mg PO bid.     Review Of Systems  A 4-point ROS was negative aside from those listed above.    OBJECTIVE FINDINGS:    Temp:  [97.5  F (36.4  C)-98  F (36.7  C)] 98  F (36.7  C)  Heart Rate:  [] 95  Resp:  [16] 16  BP: ()/(70-82) 119/82  MAP:  [68 mmHg-102 mmHg] 70 mmHg  Arterial Line BP: ()/(55-83) 92/60  SpO2:  [91 %-98 %] 98 %    Gen: Patient is awake and alert, NAD. Appears comfortable.    HEENT: PERRLA, EOMI, MMM  Resp: clear to auscultation bilaterally, no crackles or wheezing   CV: RRR, 3/6 systolic murmur best heard at apex radiating to axilla   Abd: soft, NT  Ext: warm, 2+ pitting edema up to knees b/l   Skin: RIJ catheter , R radial a line     Lines, Tubes, and Devices:  Vascular Access:   - RIJ [place]  (Insertion date: 11/12/2019)    Tubes:  Devices:      Invasive Hemodynamic Monitoring:  CVP: 13  PCWP: 36    PAP: 90/38  Mixed venous O2 sat: 58  Estimated CO/ CI: 4.5 / 2      Intake/Output Summary (Last 24 hours) at 11/12/2019 1706  Last data filed at 11/12/2019 1600  Gross per 24 hour   Intake 559.29 ml   Output 1740 ml   Net -1180.71 ml     Wt Readings from Last 5 Encounters:   11/12/19 101.6 kg (224 lb)   11/07/19 103.9 kg (229 lb)   10/31/19 105.7 kg (233 lb)   10/07/19 107.3 kg (236 lb 9.6 oz)   09/16/19 106.1 kg (234 lb)       Current medications   Current Facility-Administered Medications   Medication     [START ON 11/13/2019] aspirin (ASA) chewable tablet 81 mg     [START ON 11/13/2019] atorvastatin (LIPITOR) tablet 80 mg     [START ON 11/13/2019] clopidogrel (PLAVIX) tablet 75 mg     glucose gel 15-30 g    Or     dextrose 50 % injection 25-50 mL    Or     glucagon injection 1 mg     ferrous sulfate (FEROSUL) tablet 325 mg     furosemide (LASIX) injection 80 mg     HOLD nitroGLYcerin IF     NIFEdipine ER OSMOTIC (PROCARDIA XL) 24 hr tablet 30 mg     nitroPRUsside (NIPRIDE) 50 mg, sodium thiosulfate 500 mg in D5W 125 mL IV infusion (conc: 0.4mg/mL)     pantoprazole (PROTONIX) EC tablet 40 mg     Patient is already receiving mechanical prophylaxis     Reason beta blocker not prescribed     Vitamin D3 (CHOLECALCIFEROL) 25 mcg (1000 units) tablet 2,000 Units       LABS Reviewed  9/14/12 [facility]  Skin, left upper back, shave biopsy:    -Lichenoid interface dermatitis    IMAGES Reviewed    I have reviewed today's vital signs, notes, medications, labs and imaging.  I have also seen and examined the patient and agree with the findings and plan as outlined above.  Pt is 58 yo  well known to me with severe CAD, S/P STEMI, NSTEMI, pulmonary hypertension, CKD, DM who presented 3-4 months ago with MI and GI bleed who states that past several months pt has had progressive fatigue, SOB, KING, orthopnea, PND with unchanged appetite, no presyncope or syncope, no fevers, no chills or night  sweats.  Pt admitted from cath lab with wet and cold hemodynamics with PCW 46 and CI 1.3 to 1.5.  Lungs with rales bilateral and S1 and S2 with loud S3 and II/VI HSM at apex and II/VI HSM at LSB.  Labs as above.  Assessment: Pt with cardiogenic shock with pulmonary edema, severe pulm htn in setting of severe pulm edema and elevated PCW.  Plan is to initiate nipride. Will need arterial line monitoring and aggressive diuresis.  Oral afterload reducing agents once euvolemic.  Follow renal fn closely with nipride.  Pt is full code.     Jose A Mckee MD, PhD  Professor, Heart Failure and Cardiac Transplantation  [facility]

## 2019-11-12 NOTE — IP AVS SNAPSHOT
[address] [address]                                    After Visit Summary   11/12/2019    Cole Jesus    MRN: [id number]           After Visit Summary Signature Page    I have received my discharge instructions, and my questions have been answered. I have discussed any challenges I see with this plan with the nurse or doctor.    ..........................................................................................................................................  Patient/Patient Representative Signature      ..........................................................................................................................................  Patient Representative Print Name and Relationship to Patient    ..................................................               ................................................  Date                                   Time    ..........................................................................................................................................  Reviewed by Signature/Title    ...................................................              ..............................................  Date                                               Time          22EPIC Rev 08/18

## 2019-11-13 ENCOUNTER — APPOINTMENT (OUTPATIENT)
Dept: GENERAL RADIOLOGY | Facility: CLINIC | Age: 59
End: 2019-11-13
Attending: INTERNAL MEDICINE
Payer: MEDICAID

## 2019-11-13 ENCOUNTER — APPOINTMENT (OUTPATIENT)
Dept: OCCUPATIONAL THERAPY | Facility: CLINIC | Age: 59
End: 2019-11-13
Attending: INTERNAL MEDICINE
Payer: MEDICAID

## 2019-11-13 LAB
ALBUMIN SERPL-MCNC: 3.4 G/DL (ref 3.4–5)
ALP SERPL-CCNC: 106 U/L (ref 40–150)
ALT SERPL W P-5'-P-CCNC: 15 U/L (ref 0–70)
ANION GAP SERPL CALCULATED.3IONS-SCNC: 7 MMOL/L (ref 3–14)
AST SERPL W P-5'-P-CCNC: 11 U/L (ref 0–45)
BASE EXCESS BLDA CALC-SCNC: 2.7 MMOL/L
BASE EXCESS BLDV CALC-SCNC: 3.2 MMOL/L
BASE EXCESS BLDV CALC-SCNC: 3.5 MMOL/L
BASE EXCESS BLDV CALC-SCNC: 3.8 MMOL/L
BASE EXCESS BLDV CALC-SCNC: 4.1 MMOL/L
BASOPHILS # BLD AUTO: 0.1 10E9/L (ref 0–0.2)
BASOPHILS NFR BLD AUTO: 0.8 %
BILIRUB SERPL-MCNC: 1.4 MG/DL (ref 0.2–1.3)
BUN SERPL-MCNC: 45 MG/DL (ref 7–30)
BUN SERPL-MCNC: 45 MG/DL (ref 7–30)
BUN SERPL-MCNC: 48 MG/DL (ref 7–30)
CALCIUM SERPL-MCNC: 8.5 MG/DL (ref 8.5–10.1)
CALCIUM SERPL-MCNC: 8.5 MG/DL (ref 8.5–10.1)
CALCIUM SERPL-MCNC: 8.6 MG/DL (ref 8.5–10.1)
CHLORIDE SERPL-SCNC: 104 MMOL/L (ref 94–109)
CHOLEST SERPL-MCNC: 85 MG/DL
CO2 SERPL-SCNC: 26 MMOL/L (ref 20–32)
CO2 SERPL-SCNC: 29 MMOL/L (ref 20–32)
CO2 SERPL-SCNC: 29 MMOL/L (ref 20–32)
CREAT SERPL-MCNC: 2.37 MG/DL (ref 0.66–1.25)
CREAT SERPL-MCNC: 2.38 MG/DL (ref 0.66–1.25)
CREAT SERPL-MCNC: 2.42 MG/DL (ref 0.66–1.25)
DIFFERENTIAL METHOD BLD: ABNORMAL
EOSINOPHIL # BLD AUTO: 0.2 10E9/L (ref 0–0.7)
EOSINOPHIL NFR BLD AUTO: 2.7 %
ERYTHROCYTE [DISTWIDTH] IN BLOOD BY AUTOMATED COUNT: 28.3 % (ref 10–15)
GFR SERPL CREATININE-BSD FRML MDRD: 28 ML/MIN/{1.73_M2}
GLUCOSE BLDC GLUCOMTR-MCNC: 153 MG/DL (ref 70–99)
GLUCOSE BLDC GLUCOMTR-MCNC: 165 MG/DL (ref 70–99)
GLUCOSE BLDC GLUCOMTR-MCNC: 175 MG/DL (ref 70–99)
GLUCOSE BLDC GLUCOMTR-MCNC: 177 MG/DL (ref 70–99)
GLUCOSE BLDC GLUCOMTR-MCNC: 241 MG/DL (ref 70–99)
GLUCOSE SERPL-MCNC: 140 MG/DL (ref 70–99)
GLUCOSE SERPL-MCNC: 154 MG/DL (ref 70–99)
GLUCOSE SERPL-MCNC: 185 MG/DL (ref 70–99)
HCO3 BLD-SCNC: 27 MMOL/L (ref 21–28)
HCO3 BLDV-SCNC: 28 MMOL/L (ref 21–28)
HCO3 BLDV-SCNC: 28 MMOL/L (ref 21–28)
HCO3 BLDV-SCNC: 29 MMOL/L (ref 21–28)
HCO3 BLDV-SCNC: 29 MMOL/L (ref 21–28)
HCT VFR BLD AUTO: 36.3 % (ref 40–53)
HDLC SERPL-MCNC: 29 MG/DL
HGB BLD-MCNC: 10.9 G/DL (ref 13.3–17.7)
IMM GRANULOCYTES # BLD: 0 10E9/L (ref 0–0.4)
IMM GRANULOCYTES NFR BLD: 0.4 %
INR PPP: 1.3 (ref 0.86–1.14)
LDLC SERPL CALC-MCNC: 38 MG/DL
LYMPHOCYTES # BLD AUTO: 0.6 10E9/L (ref 0.8–5.3)
LYMPHOCYTES NFR BLD AUTO: 7.7 %
MAGNESIUM SERPL-MCNC: 2.5 MG/DL (ref 1.6–2.3)
MAGNESIUM SERPL-MCNC: 2.5 MG/DL (ref 1.6–2.3)
MCH RBC QN AUTO: 25.6 PG (ref 26.5–33)
MCHC RBC AUTO-ENTMCNC: 30 G/DL (ref 31.5–36.5)
MCV RBC AUTO: 85 FL (ref 78–100)
MONOCYTES # BLD AUTO: 1 10E9/L (ref 0–1.3)
MONOCYTES NFR BLD AUTO: 13.1 %
NEUTROPHILS # BLD AUTO: 5.5 10E9/L (ref 1.6–8.3)
NEUTROPHILS NFR BLD AUTO: 75.3 %
NONHDLC SERPL-MCNC: 56 MG/DL
NRBC # BLD AUTO: 0 10*3/UL
NRBC BLD AUTO-RTO: 0 /100
O2/TOTAL GAS SETTING VFR VENT: 2 %
O2/TOTAL GAS SETTING VFR VENT: 21 %
OXYHGB MFR BLD: 93 % (ref 92–100)
OXYHGB MFR BLDV: 52 %
OXYHGB MFR BLDV: 54 %
OXYHGB MFR BLDV: 57 %
OXYHGB MFR BLDV: 58 %
PCO2 BLD: 37 MM HG (ref 35–45)
PCO2 BLDV: 43 MM HG (ref 40–50)
PCO2 BLDV: 44 MM HG (ref 40–50)
PCO2 BLDV: 44 MM HG (ref 40–50)
PCO2 BLDV: 46 MM HG (ref 40–50)
PH BLD: 7.46 PH (ref 7.35–7.45)
PH BLDV: 7.42 PH (ref 7.32–7.43)
PH BLDV: 7.43 PH (ref 7.32–7.43)
PLATELET # BLD AUTO: 198 10E9/L (ref 150–450)
PO2 BLD: 72 MM HG (ref 80–105)
PO2 BLDV: 31 MM HG (ref 25–47)
PO2 BLDV: 32 MM HG (ref 25–47)
PO2 BLDV: 34 MM HG (ref 25–47)
PO2 BLDV: 35 MM HG (ref 25–47)
POTASSIUM SERPL-SCNC: 3.4 MMOL/L (ref 3.4–5.3)
POTASSIUM SERPL-SCNC: 3.7 MMOL/L (ref 3.4–5.3)
POTASSIUM SERPL-SCNC: 3.8 MMOL/L (ref 3.4–5.3)
PROT SERPL-MCNC: 7.4 G/DL (ref 6.8–8.8)
RBC # BLD AUTO: 4.25 10E12/L (ref 4.4–5.9)
SODIUM SERPL-SCNC: 137 MMOL/L (ref 133–144)
SODIUM SERPL-SCNC: 140 MMOL/L (ref 133–144)
SODIUM SERPL-SCNC: 140 MMOL/L (ref 133–144)
TRIGL SERPL-MCNC: 94 MG/DL
URATE SERPL-MCNC: 8.7 MG/DL (ref 3.5–7.2)
WBC # BLD AUTO: 7.3 10E9/L (ref 4–11)

## 2019-11-13 PROCEDURE — 25000128 H RX IP 250 OP 636: Performed by: STUDENT IN AN ORGANIZED HEALTH CARE EDUCATION/TRAINING PROGRAM

## 2019-11-13 PROCEDURE — 25000132 ZZH RX MED GY IP 250 OP 250 PS 637: Performed by: STUDENT IN AN ORGANIZED HEALTH CARE EDUCATION/TRAINING PROGRAM

## 2019-11-13 PROCEDURE — 97165 OT EVAL LOW COMPLEX 30 MIN: CPT | Mod: GO | Performed by: OCCUPATIONAL THERAPIST

## 2019-11-13 PROCEDURE — 80053 COMPREHEN METABOLIC PANEL: CPT | Performed by: STUDENT IN AN ORGANIZED HEALTH CARE EDUCATION/TRAINING PROGRAM

## 2019-11-13 PROCEDURE — 25000125 ZZHC RX 250: Performed by: STUDENT IN AN ORGANIZED HEALTH CARE EDUCATION/TRAINING PROGRAM

## 2019-11-13 PROCEDURE — 40000048 ZZH STATISTIC DAILY SWAN MONITORING

## 2019-11-13 PROCEDURE — 00000146 ZZHCL STATISTIC GLUCOSE BY METER IP

## 2019-11-13 PROCEDURE — 80048 BASIC METABOLIC PNL TOTAL CA: CPT | Performed by: INTERNAL MEDICINE

## 2019-11-13 PROCEDURE — 20000004 ZZH R&B ICU UMMC

## 2019-11-13 PROCEDURE — 25000131 ZZH RX MED GY IP 250 OP 636 PS 637: Performed by: STUDENT IN AN ORGANIZED HEALTH CARE EDUCATION/TRAINING PROGRAM

## 2019-11-13 PROCEDURE — 80061 LIPID PANEL: CPT | Performed by: STUDENT IN AN ORGANIZED HEALTH CARE EDUCATION/TRAINING PROGRAM

## 2019-11-13 PROCEDURE — 25800030 ZZH RX IP 258 OP 636: Performed by: STUDENT IN AN ORGANIZED HEALTH CARE EDUCATION/TRAINING PROGRAM

## 2019-11-13 PROCEDURE — 40000986 XR CHEST PORT 1 VW

## 2019-11-13 PROCEDURE — 83735 ASSAY OF MAGNESIUM: CPT | Performed by: STUDENT IN AN ORGANIZED HEALTH CARE EDUCATION/TRAINING PROGRAM

## 2019-11-13 PROCEDURE — 85025 COMPLETE CBC W/AUTO DIFF WBC: CPT | Performed by: STUDENT IN AN ORGANIZED HEALTH CARE EDUCATION/TRAINING PROGRAM

## 2019-11-13 PROCEDURE — 25000125 ZZHC RX 250: Performed by: INTERNAL MEDICINE

## 2019-11-13 PROCEDURE — 82805 BLOOD GASES W/O2 SATURATION: CPT | Performed by: STUDENT IN AN ORGANIZED HEALTH CARE EDUCATION/TRAINING PROGRAM

## 2019-11-13 PROCEDURE — 84550 ASSAY OF BLOOD/URIC ACID: CPT | Performed by: STUDENT IN AN ORGANIZED HEALTH CARE EDUCATION/TRAINING PROGRAM

## 2019-11-13 PROCEDURE — 99291 CRITICAL CARE FIRST HOUR: CPT | Mod: GC | Performed by: INTERNAL MEDICINE

## 2019-11-13 PROCEDURE — 25000132 ZZH RX MED GY IP 250 OP 250 PS 637: Performed by: INTERNAL MEDICINE

## 2019-11-13 PROCEDURE — 85610 PROTHROMBIN TIME: CPT | Performed by: STUDENT IN AN ORGANIZED HEALTH CARE EDUCATION/TRAINING PROGRAM

## 2019-11-13 PROCEDURE — 25800030 ZZH RX IP 258 OP 636: Performed by: INTERNAL MEDICINE

## 2019-11-13 PROCEDURE — 97110 THERAPEUTIC EXERCISES: CPT | Mod: GO | Performed by: OCCUPATIONAL THERAPIST

## 2019-11-13 PROCEDURE — 83735 ASSAY OF MAGNESIUM: CPT | Performed by: INTERNAL MEDICINE

## 2019-11-13 PROCEDURE — 80048 BASIC METABOLIC PNL TOTAL CA: CPT | Performed by: STUDENT IN AN ORGANIZED HEALTH CARE EDUCATION/TRAINING PROGRAM

## 2019-11-13 RX ORDER — POTASSIUM CHLORIDE 750 MG/1
40 TABLET, EXTENDED RELEASE ORAL ONCE
Status: COMPLETED | OUTPATIENT
Start: 2019-11-13 | End: 2019-11-13

## 2019-11-13 RX ORDER — AMLODIPINE BESYLATE 5 MG/1
5 TABLET ORAL DAILY
Status: DISCONTINUED | OUTPATIENT
Start: 2019-11-14 | End: 2019-11-14

## 2019-11-13 RX ORDER — AMLODIPINE BESYLATE 2.5 MG/1
2.5 TABLET ORAL ONCE
Status: COMPLETED | OUTPATIENT
Start: 2019-11-13 | End: 2019-11-13

## 2019-11-13 RX ADMIN — SACUBITRIL AND VALSARTAN 1 TABLET: 49; 51 TABLET, FILM COATED ORAL at 08:54

## 2019-11-13 RX ADMIN — MELATONIN 2000 UNITS: at 08:52

## 2019-11-13 RX ADMIN — ATORVASTATIN CALCIUM 80 MG: 40 TABLET, FILM COATED ORAL at 08:50

## 2019-11-13 RX ADMIN — FUROSEMIDE 80 MG: 10 INJECTION, SOLUTION INTRAVENOUS at 08:58

## 2019-11-13 RX ADMIN — INSULIN ASPART 1 UNITS: 100 INJECTION, SOLUTION INTRAVENOUS; SUBCUTANEOUS at 16:56

## 2019-11-13 RX ADMIN — FUROSEMIDE 80 MG: 10 INJECTION, SOLUTION INTRAVENOUS at 11:11

## 2019-11-13 RX ADMIN — CLOPIDOGREL BISULFATE 75 MG: 75 TABLET ORAL at 08:53

## 2019-11-13 RX ADMIN — FUROSEMIDE 15 MG/HR: 10 INJECTION, SOLUTION INTRAVENOUS at 18:26

## 2019-11-13 RX ADMIN — SODIUM THIOSULFATE 1.25 MCG/KG/MIN: 250 INJECTION, SOLUTION INTRAVENOUS at 12:56

## 2019-11-13 RX ADMIN — AMLODIPINE BESYLATE 2.5 MG: 2.5 TABLET ORAL at 08:51

## 2019-11-13 RX ADMIN — SACUBITRIL AND VALSARTAN 1 TABLET: 49; 51 TABLET, FILM COATED ORAL at 20:09

## 2019-11-13 RX ADMIN — POTASSIUM CHLORIDE 40 MEQ: 750 TABLET, EXTENDED RELEASE ORAL at 08:40

## 2019-11-13 RX ADMIN — AMLODIPINE BESYLATE 2.5 MG: 2.5 TABLET ORAL at 20:23

## 2019-11-13 RX ADMIN — FUROSEMIDE 160 MG: 10 INJECTION, SOLUTION INTRAVENOUS at 17:36

## 2019-11-13 RX ADMIN — ASPIRIN 81 MG CHEWABLE TABLET 81 MG: 81 TABLET CHEWABLE at 08:41

## 2019-11-13 RX ADMIN — PANTOPRAZOLE SODIUM 40 MG: 40 TABLET, DELAYED RELEASE ORAL at 20:08

## 2019-11-13 RX ADMIN — INSULIN ASPART 2 UNITS: 100 INJECTION, SOLUTION INTRAVENOUS; SUBCUTANEOUS at 11:53

## 2019-11-13 RX ADMIN — SODIUM THIOSULFATE 1.5 MCG/KG/MIN: 250 INJECTION, SOLUTION INTRAVENOUS at 16:48

## 2019-11-13 RX ADMIN — PANTOPRAZOLE SODIUM 40 MG: 40 TABLET, DELAYED RELEASE ORAL at 08:52

## 2019-11-13 ASSESSMENT — ACTIVITIES OF DAILY LIVING (ADL)
ADLS_ACUITY_SCORE: 14
PREVIOUS_RESPONSIBILITIES: MEAL PREP;HOUSEKEEPING;LAUNDRY;MEDICATION MANAGEMENT
ADLS_ACUITY_SCORE: 12
ADLS_ACUITY_SCORE: 14
IADL_COMMENTS: FAMILY ABLE TO ASSIST WITH IADLS PRN
ADLS_ACUITY_SCORE: 12
ADLS_ACUITY_SCORE: 12
ADLS_ACUITY_SCORE: 14

## 2019-11-13 NOTE — PROGRESS NOTES
11/13/19 1600   Quick Adds   Type of Visit Initial Occupational Therapy Evaluation   Living Environment   Lives With child(jose r), adult;significant other   Living Arrangements house   Home Accessibility stairs within home   Number of Stairs, Within Home, Primary   (flight to basement for laundry)   Transportation Anticipated family or friend will provide   Living Environment Comment Pt lives with his girlfriend and adult son   Self-Care   Usual Activity Tolerance fair   Current Activity Tolerance moderate   Regular Exercise Yes   Activity/Exercise Type   (stationary bike)   Exercise Amount/Frequency 5 mins;10 mins   Equipment Currently Used at Home none   Activity/Exercise/Self-Care Comment Pt reports that since June, his activity tolerance has been quite limited.  Had MI in June though never followed up with OP CR as he returned to hospital with GIB in July.  Had been completing activity primarily within his home until recently.  SO had been assisting with laundry and son assisting with yardwork and home management.  pt SO and son both work outside of the home, pt does not work.  pt reports he has maintained independence with self cares and mobility through.  Recently was able to leave the home to go grocery shopping demonstrating improved activity tolerance for this pt.  pt states he tries to stay as active as he can, doing 5-10 minutes on his stationary bike at home daily   Functional Level   Ambulation 0-->independent   Transferring 0-->independent   Toileting 0-->independent   Bathing 0-->independent   Dressing 0-->independent   Eating 0-->independent   Cognition 0 - no cognition issues reported   Fall history within last six months no   Which of the above functional risks had a recent onset or change? none       Present no   Language none   General Information   Onset of Illness/Injury or Date of Surgery - Date 11/12/19   Referring Physician Cynthia Barroso MD   Patient/Family Goals  Statement progress activity   Additional Occupational Profile Info/Pertinent History of Current Problem 60 y/o M with PMH of STEMI, acute occlusion of pRCA s/p balloon angioplasty and stenting in 6/2019 (XIENCE JAMILA 3.5 X 28 KEVYN), STEMI s/p DESx3 for total occlusion of RCA and Lcx (2012), GIB due to AV malformation (in the setting of warfarin and DAPT),  ischemic cardiomyopathy, HFrEF (last EF=30-35%), severe pHTN, severe mitral regurgitation, atrial fibrillation not on AC due to GIB in the past, CKD stage IV,  who presented for elective RHC.  admitted for HF optimization   Precautions/Limitations no known precautions/limitations   Heart Disease Risk Factors Lack of physical activity;Overweight;Medical history;Gender   General Observations Pt is pleasant and agreeable, motivated for activity, WILLY   General Info Comments Activity: up ad dary   Cognitive Status Examination   Orientation orientation to person, place and time   Level of Consciousness alert   Follows Commands (Cognition) WNL   Memory intact   Cognitive Comment no acute cognitive concerns noted   Visual Perception   Visual Perception Comments pt denies acute visual changes   Sensory Examination   Sensory Quick Adds No deficits were identified   Range of Motion (ROM)   ROM Quick Adds No deficits were identified   Strength   Strength Comments strength grossly 5/5; overall activity tolerance limited    Mobility   Bed Mobility Bed mobility skill: Supine to sit   Bed Mobility Skill: Supine to Sit   Level of [place]: Supine/Sit stand-by assist   Transfer Skill: Bed to Chair/Chair to Bed   Level of [place]: Bed to Chair stand-by assist   Transfer Skill: Sit to Stand   Level of [place]: Sit/Stand stand-by assist   Balance   Balance Comments no overt LOB with functional ambulation and transfers throughout session   Instrumental Activities of Daily Living (IADL)   Previous Responsibilities meal prep;housekeeping;laundry;medication management   IADL  Comments family able to assist with IADLs prn   Activities of Daily Living Analysis   Impairments Contributing to Impaired Activities of Daily Living strength decreased   ADL Comments deconditioning   General Therapy Interventions   Planned Therapy Interventions ADL retraining;IADL retraining;strengthening;home program guidelines;progressive activity/exercise;risk factor education   Clinical Impression   Criteria for Skilled Therapeutic Interventions Met yes, treatment indicated   OT Diagnosis decreased activity tolerance for daily activity   Influenced by the following impairments SOB, deconditioning, fluid status   Assessment of Occupational Performance 3-5 Performance Deficits   Identified Performance Deficits home management, shopping, laundry, stairs, mobility   Clinical Decision Making (Complexity) Low complexity   Therapy Frequency 3x/week   Predicted Duration of Therapy Intervention (days/wks) 11/19/19   Anticipated Discharge Disposition Home with Outpatient Therapy   Risks and Benefits of Treatment have been explained. Yes   Patient, Family & other staff in agreement with plan of care Yes   Total Evaluation Time   Total Evaluation Time (Minutes) 5

## 2019-11-13 NOTE — PROGRESS NOTES
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)  Moderate to severe mitral regurgitation  Atrial fibrillation (rate controlled)   S/p RFA for Atach/aflutter in 2013   C/w aspirin 81 mg PO daily and plavix 75 mg PO daily for CAD  C/w atorvastatin 80 mg PO daily for CAD  Will hold off anticoagulation as records indicate GIB while on warfarin, patient could potentially be a candidate for KASIA closure device   C/w nipride gtt to keep CI >2 , now at 2 mcg/kg/min  Will hold off carvedilol as patient in low output failure   Added amlodipine 2.5 mg PO for afterload reduction (patient intolerant of hydralazine - apparently had black spots/rash on hydralazine in the past)   Restarted entresto 49/51 mg PO bid  Switched to lasix 160 mg IV and then lasix gtt 15 mg/hr (home dose lasix 60 mg PO bid)    Monitor I/os , daily weights  HD q6h     Iron deficiency anemia  Hx of recurrent GI bleeds most recent in 7/2019 , previously oalso cecal ulcer requiring ielocecal resection with temporary diverting ileostomy  Hgb stable   Started ferrous sulfate every other day   Monitor CBC    CKD stage 4  Cr baseline 2.2-3  Cr today 2.3  Monitor UOP    DM  Last HgbA1c 6  ISS  hypoglycemia protocol    Pt was discussed and evaluated with Dr. Bedoya, attending physician, who agrees with the assessment and plan above.     Negro Wolff MD  Cardiology Fellow PGY 4     24 h events:    Patient feels much better today , able to walk more.      HPI:  58 y/o M with PMH of STEMI, acute occlusion of pRCA s/p balloon angioplasty and stenting in 6/2019 (XIENCE JAMILA 3.5 X 28 KEVYN), STEMI s/p DESx3 for total occlusion of RCA and Lcx (2012), GIB due to AV malformation (in the setting of warfarin and DAPT),  ischemic cardiomyopathy, HFrEF (last EF=30-35%), severe pHTN, severe mitral regurgitation, atrial fibrillation not on AC due to GIB in the past, CKD stage IV,  who presented for elective RHC.  Found to be in low cardiac output failure (CI ~ 1.5) , elevated CWP (~43 mmHg). Started on nipride and lasix IV.      Patient has lost weight since last admission. Denied early satiety, nausea. He wakes up at night multiple times to go to the restroom. Denied SOB , orthopnea. Does not complaint of air-hunger. He has lower extremity swelling. He sleeps on 1 pillow at night. His functional capacity is very limited. He has been unable to walk for than 30 feet till recently. He felt his limitation was mostly due to his anemia since his last admission in July. Denied syncope, dizziness.      Patient lasix was recently increased to 60 mg PO bid.     Review Of Systems  A 4-point ROS was negative aside from those listed above.    OBJECTIVE FINDINGS:    Temp:  [97.7  F (36.5  C)-98.5  F (36.9  C)] 98.3  F (36.8  C)  Heart Rate:  [] 93  Resp:  [10-26] 16  MAP:  [64 mmHg-107 mmHg] 92 mmHg  Arterial Line BP: ()/(53-92) 124/76  SpO2:  [92 %-100 %] 97 %    Gen: Patient is awake and alert, NAD. Appears comfortable.    HEENT: PERRLA, EOMI, MMM  Resp: clear to auscultation bilaterally, no crackles or wheezing   CV: RRR, 3/6 systolic murmur best heard at apex radiating to axilla   Abd: soft, NT  Ext: warm, 2+ pitting edema up to knees b/l   Skin: RIJ catheter , R radial a line     Lines, Tubes, and Devices:  Vascular Access:   - VIVIENNE [place]  (Insertion date: 11/12/2019)    Tubes:  Devices:      Invasive Hemodynamic Monitoring:  CVP: 13  PCWP: 34  PAP: 76/35  Mixed venous O2  sat: 56  Estimated CO/ CI: 4.4 / 2       Intake/Output Summary (Last 24 hours) at 11/12/2019 1706  Last data filed at 11/12/2019 1600  Gross per 24 hour   Intake 559.29 ml   Output 1740 ml   Net -1180.71 ml     Wt Readings from Last 5 Encounters:   11/12/19 101.6 kg (224 lb)   11/07/19 103.9 kg (229 lb)   10/31/19 105.7 kg (233 lb)   10/07/19 107.3 kg (236 lb 9.6 oz)   09/16/19 106.1 kg (234 lb)       Current medications   Current Facility-Administered Medications   Medication     amLODIPine (NORVASC) tablet 2.5 mg     aspirin (ASA) chewable tablet 81 mg     atorvastatin (LIPITOR) tablet 80 mg     clopidogrel (PLAVIX) tablet 75 mg     glucose gel 15-30 g    Or     dextrose 50 % injection 25-50 mL    Or     glucagon injection 1 mg     ferrous sulfate (FEROSUL) tablet 325 mg     furosemide (LASIX) 500 mg/50mL infusion ADULT MAX CONC     HOLD nitroGLYcerin IF     insulin aspart (NovoLOG) inj (RAPID ACTING)     insulin aspart (NovoLOG) inj (RAPID ACTING)     nitroPRUsside (NIPRIDE) 100 mg, sodium thiosulfate 1,000 mg in D5W 62.5 mL IV infusion (conc: 1.6mg/mL)     pantoprazole (PROTONIX) EC tablet 40 mg     Patient is already receiving mechanical prophylaxis     Reason beta blocker not prescribed     sacubitril-valsartan (ENTRESTO) 49-51 MG per tablet 1 tablet     Vitamin D3 (CHOLECALCIFEROL) 25 mcg (1000 units) tablet 2,000 Units       LABS Reviewed  9/14/12 [facility]  Skin, left upper back, shave biopsy:    -Lichenoid interface dermatitis    IMAGES Reviewed

## 2019-11-13 NOTE — PLAN OF CARE
ICU End of Shift Summary. See flowsheets for vital signs and detailed assessment.    Changes this shift:     Admit from CCL d/t high PA pressures 100s/50s and need for diuresis. [place] in place. DONOVAN q 6hrs. PWP 36. Afib 80s. Nipride drip for pulm HTN, titrate to MAPs 70-80. Total of 240mg IV lasix given, goal 5-6L off/day. Up SBA.     Plan: continue DONOVAN monitoring, Nipride drip, start amlodipine      Problem: Adult Inpatient Plan of Care  Goal: Patient-Specific Goal (Individualization)  Outcome: No Change        Problem: Adult Inpatient Plan of Care  Goal: Plan of Care Review  Outcome: No Change     Problem: Cardiac Disease Comorbidity  Goal: Cardiac Disease  Description  Patient comorbidity will be monitored for signs and symptoms of Cardiac Disease.  Problems will be absent, minimized or managed by discharge/transition of care.  Outcome: No Change     Problem: Diabetes Comorbidity  Goal: Blood Glucose Level Within Desired Range  Outcome: No Change     Problem: Heart Failure Comorbidity  Goal: Maintenance of Heart Failure Symptom Control  Outcome: No Change     Problem: Cardiac Output Decreased (Heart Failure)  Goal: Optimal Cardiac Output  Outcome: No Change     Problem: Fluid Imbalance (Heart Failure)  Goal: Fluid Balance  Outcome: No Change

## 2019-11-13 NOTE — PLAN OF CARE
OT/CR 4C: Evaluation complete and treatment initiated.   Discharge Planner OT   Patient plan for discharge: Home with OP CR  Current status: Pt is progressing activity well.  Able to ambulate approx 850ft with SBA, no assistive device and no LOB, VSS on RA, only mild SOB.  Barriers to return to prior living situation: deconditioning, acute cardiac needs  Recommendations for discharge: Home with OP Phase II CR  Rationale for recommendations: Pt is mobilizing safely and able to complete basic self cares independently.  Safe to return to home.  Pt would benefit from OP Phase II CR to progress functional endurance as well as maximize cardiac outcomes in setting of HF and recent MI (6/2019)         Entered by: Ro Fowler 11/13/2019 4:37 PM

## 2019-11-13 NOTE — PLAN OF CARE
ICU End of Shift Summary. See flowsheets for vital signs and detailed assessment.    Changes this shift: Denies pain, placed on 2 liters nasal canula, desat into 80's when sleeping, nipride drip off since 0030, MAP >70, Afib with CVR and occasional PVC's, [place]ilia q6, see chart for result.    Plan: Continue to monitor hemodynamic.    Problem: Cardiac Disease Comorbidity  Goal: Cardiac Disease  Description  Patient comorbidity will be monitored for signs and symptoms of Cardiac Disease.  Problems will be absent, minimized or managed by discharge/transition of care.  11/13/2019 0630 by Gilda Uriostegui, RN  Outcome: No Change  11/12/2019 1836 by Emily Nicholas, RN  Outcome: No Change     Problem: Heart Failure Comorbidity  Goal: Maintenance of Heart Failure Symptom Control  11/13/2019 0630 by Gilda Uriostegui, RN  Outcome: No Change  11/12/2019 1836 by Emily Nicholas, RN  Outcome: No Change

## 2019-11-13 NOTE — PLAN OF CARE
ICU End of Shift Summary. See flowsheets for vital signs and detailed assessment.    Changes this shift:     PA pressures still elevated 80/32, CI 1.9. PWP 34. [place] in place. DONOVAN q 6hrs. Afib 80s. Nipride drip for pulm HTN, titrate to keep MAPs 65-75. Total of  240mg IV lasix given, goal 5-6L off/day. Starting lasix drip. Up SBA. Ambulated in halls with no dyspnea or fatigue.     Plan: Start lasix drip. Lowered MAP goal to see if this improves PA pressures and re-evaluate tomorrow. Physician did bring up LVAD and transplant briefly to patient and patient was upset by this.       Problem: Adult Inpatient Plan of Care  Goal: Plan of Care Review  11/13/2019 1742 by Emily Nicholas RN  Outcome: No Change     Problem: Adult Inpatient Plan of Care  Goal: Readiness for Transition of Care  Outcome: No Change     Problem: Cardiac Disease Comorbidity  Goal: Cardiac Disease  Description  Patient comorbidity will be monitored for signs and symptoms of Cardiac Disease.  Problems will be absent, minimized or managed by discharge/transition of care.  11/13/2019 1742 by Emily Nicholas RN  Outcome: No Change     Problem: Heart Failure Comorbidity  Goal: Maintenance of Heart Failure Symptom Control  11/13/2019 1742 by Emily Nicholas RN  Outcome: No Change     Problem: Persistent Pulmonary Hypertension  Goal: Effective Oxygenation  11/13/2019 1742 by Emily Nicholas RN  Outcome: No Change     Problem: Arrhythmia/Dysrhythmia (Heart Failure)  Goal: Stable Heart Rate and Rhythm  11/13/2019 1742 by Emily Nicholas RN  Outcome: No Change     Problem: Fluid Imbalance (Heart Failure)  Goal: Fluid Balance  Outcome: Improving

## 2019-11-13 NOTE — PROGRESS NOTES
CLINICAL NUTRITION SERVICES    Reason for Assessment:  Low-sodium (2 g/day) nutrition education    Diet History:  Unable to obtain, Pt busy with cares and then asleep upon multilpe attempts     Nutrition Diagnosis:  Food- and nutrition-related knowledge deficit r/t no previous knowledge of low-sodium diet AEB pt report of no previous formal low-sodium nutrition education.    Nutrition Prescription/Recs:  Continue low-sodium diet.      Interventions:  Nutrition Education: Unable to complete  RD to provide handouts regarding low sodium education. [place] sodium food list, low sodium diet, heart healthy education, how to reduce your fluids intakes.    Goals:    Pt will verbalize at least five high sodium foods and the importance of avoiding added salt to foods for cooking or seasoning foods.     Follow-up:   Patient to ask any further nutrition-related questions before discharge. In addition, pt may request outpatient RD appointment.      Bhumi Butt RD, MS, LD  SICU: [phone]

## 2019-11-14 ENCOUNTER — APPOINTMENT (OUTPATIENT)
Dept: GENERAL RADIOLOGY | Facility: CLINIC | Age: 59
End: 2019-11-14
Attending: INTERNAL MEDICINE
Payer: MEDICAID

## 2019-11-14 ENCOUNTER — APPOINTMENT (OUTPATIENT)
Dept: CARDIOLOGY | Facility: CLINIC | Age: 59
End: 2019-11-14
Attending: INTERNAL MEDICINE
Payer: MEDICAID

## 2019-11-14 LAB
ALBUMIN SERPL-MCNC: 3.6 G/DL (ref 3.4–5)
ALP SERPL-CCNC: 107 U/L (ref 40–150)
ALT SERPL W P-5'-P-CCNC: 16 U/L (ref 0–70)
ANION GAP SERPL CALCULATED.3IONS-SCNC: 7 MMOL/L (ref 3–14)
ANION GAP SERPL CALCULATED.3IONS-SCNC: 8 MMOL/L (ref 3–14)
AST SERPL W P-5'-P-CCNC: 8 U/L (ref 0–45)
BASE EXCESS BLDV CALC-SCNC: 2.7 MMOL/L
BASE EXCESS BLDV CALC-SCNC: 3.1 MMOL/L
BASE EXCESS BLDV CALC-SCNC: 5 MMOL/L
BASE EXCESS BLDV CALC-SCNC: 5.2 MMOL/L
BASOPHILS # BLD AUTO: 0 10E9/L (ref 0–0.2)
BASOPHILS NFR BLD AUTO: 0.6 %
BILIRUB SERPL-MCNC: 1.5 MG/DL (ref 0.2–1.3)
BUN SERPL-MCNC: 44 MG/DL (ref 7–30)
BUN SERPL-MCNC: 47 MG/DL (ref 7–30)
CALCIUM SERPL-MCNC: 8.1 MG/DL (ref 8.5–10.1)
CALCIUM SERPL-MCNC: 8.8 MG/DL (ref 8.5–10.1)
CHLORIDE SERPL-SCNC: 102 MMOL/L (ref 94–109)
CHLORIDE SERPL-SCNC: 106 MMOL/L (ref 94–109)
CO2 SERPL-SCNC: 26 MMOL/L (ref 20–32)
CO2 SERPL-SCNC: 29 MMOL/L (ref 20–32)
CREAT SERPL-MCNC: 2.37 MG/DL (ref 0.66–1.25)
CREAT SERPL-MCNC: 2.39 MG/DL (ref 0.66–1.25)
DIFFERENTIAL METHOD BLD: ABNORMAL
EOSINOPHIL # BLD AUTO: 0.2 10E9/L (ref 0–0.7)
EOSINOPHIL NFR BLD AUTO: 3.1 %
ERYTHROCYTE [DISTWIDTH] IN BLOOD BY AUTOMATED COUNT: 27.9 % (ref 10–15)
GFR SERPL CREATININE-BSD FRML MDRD: 28 ML/MIN/{1.73_M2}
GFR SERPL CREATININE-BSD FRML MDRD: 28 ML/MIN/{1.73_M2}
GLUCOSE BLDC GLUCOMTR-MCNC: 141 MG/DL (ref 70–99)
GLUCOSE BLDC GLUCOMTR-MCNC: 163 MG/DL (ref 70–99)
GLUCOSE BLDC GLUCOMTR-MCNC: 168 MG/DL (ref 70–99)
GLUCOSE BLDC GLUCOMTR-MCNC: 174 MG/DL (ref 70–99)
GLUCOSE BLDC GLUCOMTR-MCNC: 221 MG/DL (ref 70–99)
GLUCOSE SERPL-MCNC: 135 MG/DL (ref 70–99)
GLUCOSE SERPL-MCNC: 184 MG/DL (ref 70–99)
HCO3 BLDV-SCNC: 27 MMOL/L (ref 21–28)
HCO3 BLDV-SCNC: 28 MMOL/L (ref 21–28)
HCO3 BLDV-SCNC: 30 MMOL/L (ref 21–28)
HCO3 BLDV-SCNC: 30 MMOL/L (ref 21–28)
HCT VFR BLD AUTO: 37.7 % (ref 40–53)
HGB BLD-MCNC: 11.5 G/DL (ref 13.3–17.7)
IMM GRANULOCYTES # BLD: 0 10E9/L (ref 0–0.4)
IMM GRANULOCYTES NFR BLD: 0.3 %
INR PPP: 1.27 (ref 0.86–1.14)
LYMPHOCYTES # BLD AUTO: 0.7 10E9/L (ref 0.8–5.3)
LYMPHOCYTES NFR BLD AUTO: 9.8 %
MAGNESIUM SERPL-MCNC: 2.5 MG/DL (ref 1.6–2.3)
MCH RBC QN AUTO: 25.4 PG (ref 26.5–33)
MCHC RBC AUTO-ENTMCNC: 30.5 G/DL (ref 31.5–36.5)
MCV RBC AUTO: 83 FL (ref 78–100)
MONOCYTES # BLD AUTO: 1 10E9/L (ref 0–1.3)
MONOCYTES NFR BLD AUTO: 14.2 %
NEUTROPHILS # BLD AUTO: 5.1 10E9/L (ref 1.6–8.3)
NEUTROPHILS NFR BLD AUTO: 72 %
NRBC # BLD AUTO: 0 10*3/UL
NRBC BLD AUTO-RTO: 0 /100
O2/TOTAL GAS SETTING VFR VENT: 21 %
O2/TOTAL GAS SETTING VFR VENT: ABNORMAL %
OXYHGB MFR BLDV: 50 %
OXYHGB MFR BLDV: 60 %
OXYHGB MFR BLDV: 64 %
OXYHGB MFR BLDV: 65 %
PCO2 BLDV: 41 MM HG (ref 40–50)
PCO2 BLDV: 42 MM HG (ref 40–50)
PCO2 BLDV: 45 MM HG (ref 40–50)
PCO2 BLDV: 45 MM HG (ref 40–50)
PH BLDV: 7.42 PH (ref 7.32–7.43)
PH BLDV: 7.43 PH (ref 7.32–7.43)
PH BLDV: 7.44 PH (ref 7.32–7.43)
PH BLDV: 7.44 PH (ref 7.32–7.43)
PLATELET # BLD AUTO: 205 10E9/L (ref 150–450)
PO2 BLDV: 31 MM HG (ref 25–47)
PO2 BLDV: 36 MM HG (ref 25–47)
PO2 BLDV: 37 MM HG (ref 25–47)
PO2 BLDV: 38 MM HG (ref 25–47)
POTASSIUM SERPL-SCNC: 3.2 MMOL/L (ref 3.4–5.3)
POTASSIUM SERPL-SCNC: 3.6 MMOL/L (ref 3.4–5.3)
PROT SERPL-MCNC: 7.4 G/DL (ref 6.8–8.8)
RBC # BLD AUTO: 4.52 10E12/L (ref 4.4–5.9)
SODIUM SERPL-SCNC: 139 MMOL/L (ref 133–144)
SODIUM SERPL-SCNC: 139 MMOL/L (ref 133–144)
WBC # BLD AUTO: 7 10E9/L (ref 4–11)

## 2019-11-14 PROCEDURE — 80048 BASIC METABOLIC PNL TOTAL CA: CPT | Performed by: STUDENT IN AN ORGANIZED HEALTH CARE EDUCATION/TRAINING PROGRAM

## 2019-11-14 PROCEDURE — 25000132 ZZH RX MED GY IP 250 OP 250 PS 637: Performed by: STUDENT IN AN ORGANIZED HEALTH CARE EDUCATION/TRAINING PROGRAM

## 2019-11-14 PROCEDURE — 71045 X-RAY EXAM CHEST 1 VIEW: CPT

## 2019-11-14 PROCEDURE — 40000048 ZZH STATISTIC DAILY SWAN MONITORING

## 2019-11-14 PROCEDURE — 83735 ASSAY OF MAGNESIUM: CPT | Performed by: STUDENT IN AN ORGANIZED HEALTH CARE EDUCATION/TRAINING PROGRAM

## 2019-11-14 PROCEDURE — 80053 COMPREHEN METABOLIC PANEL: CPT | Performed by: STUDENT IN AN ORGANIZED HEALTH CARE EDUCATION/TRAINING PROGRAM

## 2019-11-14 PROCEDURE — 82805 BLOOD GASES W/O2 SATURATION: CPT | Performed by: STUDENT IN AN ORGANIZED HEALTH CARE EDUCATION/TRAINING PROGRAM

## 2019-11-14 PROCEDURE — 00000146 ZZHCL STATISTIC GLUCOSE BY METER IP

## 2019-11-14 PROCEDURE — 25000132 ZZH RX MED GY IP 250 OP 250 PS 637: Performed by: INTERNAL MEDICINE

## 2019-11-14 PROCEDURE — 99291 CRITICAL CARE FIRST HOUR: CPT | Mod: 25 | Performed by: INTERNAL MEDICINE

## 2019-11-14 PROCEDURE — 25800030 ZZH RX IP 258 OP 636: Performed by: INTERNAL MEDICINE

## 2019-11-14 PROCEDURE — 85025 COMPLETE CBC W/AUTO DIFF WBC: CPT | Performed by: STUDENT IN AN ORGANIZED HEALTH CARE EDUCATION/TRAINING PROGRAM

## 2019-11-14 PROCEDURE — 85610 PROTHROMBIN TIME: CPT | Performed by: STUDENT IN AN ORGANIZED HEALTH CARE EDUCATION/TRAINING PROGRAM

## 2019-11-14 PROCEDURE — 25000125 ZZHC RX 250: Performed by: INTERNAL MEDICINE

## 2019-11-14 PROCEDURE — 25500064 ZZH RX 255 OP 636: Performed by: INTERNAL MEDICINE

## 2019-11-14 PROCEDURE — 25000125 ZZHC RX 250: Performed by: STUDENT IN AN ORGANIZED HEALTH CARE EDUCATION/TRAINING PROGRAM

## 2019-11-14 PROCEDURE — 40000264 ECHOCARDIOGRAM COMPLETE

## 2019-11-14 PROCEDURE — 20000004 ZZH R&B ICU UMMC

## 2019-11-14 PROCEDURE — 93306 TTE W/DOPPLER COMPLETE: CPT | Mod: 26 | Performed by: INTERNAL MEDICINE

## 2019-11-14 RX ORDER — DEXTROSE MONOHYDRATE 25 G/50ML
25-50 INJECTION, SOLUTION INTRAVENOUS
Status: DISCONTINUED | OUTPATIENT
Start: 2019-11-14 | End: 2019-11-17 | Stop reason: HOSPADM

## 2019-11-14 RX ORDER — POTASSIUM CHLORIDE 1.5 G/1.58G
20-40 POWDER, FOR SOLUTION ORAL
Status: DISCONTINUED | OUTPATIENT
Start: 2019-11-14 | End: 2019-11-17 | Stop reason: HOSPADM

## 2019-11-14 RX ORDER — NICOTINE POLACRILEX 4 MG
15-30 LOZENGE BUCCAL
Status: DISCONTINUED | OUTPATIENT
Start: 2019-11-14 | End: 2019-11-17 | Stop reason: HOSPADM

## 2019-11-14 RX ORDER — POTASSIUM CHLORIDE 750 MG/1
40 TABLET, EXTENDED RELEASE ORAL ONCE
Status: COMPLETED | OUTPATIENT
Start: 2019-11-14 | End: 2019-11-14

## 2019-11-14 RX ORDER — POTASSIUM CHLORIDE 7.45 MG/ML
10 INJECTION INTRAVENOUS
Status: DISCONTINUED | OUTPATIENT
Start: 2019-11-14 | End: 2019-11-17 | Stop reason: HOSPADM

## 2019-11-14 RX ORDER — AMLODIPINE BESYLATE 2.5 MG/1
2.5 TABLET ORAL ONCE
Status: COMPLETED | OUTPATIENT
Start: 2019-11-14 | End: 2019-11-14

## 2019-11-14 RX ORDER — POTASSIUM CHLORIDE 750 MG/1
40 TABLET, EXTENDED RELEASE ORAL DAILY
Status: DISCONTINUED | OUTPATIENT
Start: 2019-11-15 | End: 2019-11-14

## 2019-11-14 RX ORDER — POTASSIUM CHLORIDE 750 MG/1
20-40 TABLET, EXTENDED RELEASE ORAL
Status: DISCONTINUED | OUTPATIENT
Start: 2019-11-14 | End: 2019-11-17 | Stop reason: HOSPADM

## 2019-11-14 RX ORDER — POTASSIUM CL/LIDO/0.9 % NACL 10MEQ/0.1L
10 INTRAVENOUS SOLUTION, PIGGYBACK (ML) INTRAVENOUS
Status: DISCONTINUED | OUTPATIENT
Start: 2019-11-14 | End: 2019-11-17 | Stop reason: HOSPADM

## 2019-11-14 RX ORDER — POTASSIUM CHLORIDE 29.8 MG/ML
20 INJECTION INTRAVENOUS
Status: DISCONTINUED | OUTPATIENT
Start: 2019-11-14 | End: 2019-11-17 | Stop reason: HOSPADM

## 2019-11-14 RX ADMIN — POTASSIUM CHLORIDE 40 MEQ: 750 TABLET, EXTENDED RELEASE ORAL at 06:19

## 2019-11-14 RX ADMIN — POTASSIUM CHLORIDE 40 MEQ: 750 TABLET, EXTENDED RELEASE ORAL at 18:27

## 2019-11-14 RX ADMIN — FUROSEMIDE 10 MG/HR: 10 INJECTION, SOLUTION INTRAVENOUS at 17:52

## 2019-11-14 RX ADMIN — SACUBITRIL AND VALSARTAN 1 TABLET: 97; 103 TABLET, FILM COATED ORAL at 19:35

## 2019-11-14 RX ADMIN — CLOPIDOGREL BISULFATE 75 MG: 75 TABLET ORAL at 08:01

## 2019-11-14 RX ADMIN — SODIUM THIOSULFATE 0.75 MCG/KG/MIN: 250 INJECTION, SOLUTION INTRAVENOUS at 13:58

## 2019-11-14 RX ADMIN — PANTOPRAZOLE SODIUM 40 MG: 40 TABLET, DELAYED RELEASE ORAL at 19:35

## 2019-11-14 RX ADMIN — MELATONIN 2000 UNITS: at 08:01

## 2019-11-14 RX ADMIN — INSULIN ASPART 1 UNITS: 100 INJECTION, SOLUTION INTRAVENOUS; SUBCUTANEOUS at 08:01

## 2019-11-14 RX ADMIN — FERROUS SULFATE TAB 325 MG (65 MG ELEMENTAL FE) 325 MG: 325 (65 FE) TAB at 08:16

## 2019-11-14 RX ADMIN — ASPIRIN 81 MG CHEWABLE TABLET 81 MG: 81 TABLET CHEWABLE at 08:01

## 2019-11-14 RX ADMIN — SODIUM THIOSULFATE 1 MCG/KG/MIN: 250 INJECTION, SOLUTION INTRAVENOUS at 21:43

## 2019-11-14 RX ADMIN — ATORVASTATIN CALCIUM 80 MG: 40 TABLET, FILM COATED ORAL at 08:16

## 2019-11-14 RX ADMIN — PANTOPRAZOLE SODIUM 40 MG: 40 TABLET, DELAYED RELEASE ORAL at 08:01

## 2019-11-14 RX ADMIN — SACUBITRIL AND VALSARTAN 1 TABLET: 49; 51 TABLET, FILM COATED ORAL at 08:03

## 2019-11-14 RX ADMIN — HUMAN ALBUMIN MICROSPHERES AND PERFLUTREN 5 ML: 10; .22 INJECTION, SOLUTION INTRAVENOUS at 13:30

## 2019-11-14 RX ADMIN — AMLODIPINE BESYLATE 5 MG: 5 TABLET ORAL at 08:01

## 2019-11-14 RX ADMIN — AMLODIPINE BESYLATE 2.5 MG: 2.5 TABLET ORAL at 11:56

## 2019-11-14 RX ADMIN — INSULIN ASPART 1 UNITS: 100 INJECTION, SOLUTION INTRAVENOUS; SUBCUTANEOUS at 11:51

## 2019-11-14 ASSESSMENT — ACTIVITIES OF DAILY LIVING (ADL)
ADLS_ACUITY_SCORE: 12

## 2019-11-14 ASSESSMENT — MIFFLIN-ST. JEOR: SCORE: 1753.5

## 2019-11-14 NOTE — PROGRESS NOTES
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)  Moderate to severe mitral regurgitation  Atrial fibrillation (rate controlled)   S/p RFA for Atach/aflutter in 2013   C/w aspirin 81 mg PO daily and plavix 75 mg PO daily for CAD  C/w atorvastatin 80 mg PO daily for CAD  Will hold off anticoagulation as records indicate GIB while on warfarin, patient could potentially be a candidate for KASIA closure device   C/w nipride gtt to keep CI >2 , now at 1 mcg/kg/min  Will hold off carvedilol as patient in low output failure   Increased amlodipine to 7.5 mg PO for afterload reduction (patient intolerant of hydralazine - apparently had black spots/rash on hydralazine in the past)   C/w entresto 49/51 mg PO bid  Decreased  lasix gtt to 10 mg/hr (home dose lasix 60 mg PO bid)    Monitor I/os , daily weights  HD q6h   Will order repeat TTE to evaluate for LV function and MR severity      Iron deficiency anemia  Hx of recurrent GI bleeds most recent in 7/2019 , previously oalso cecal ulcer requiring ielocecal resection with temporary diverting ileostomy  Hgb stable   Started ferrous sulfate every other day   Monitor CBC    CKD stage 4  Cr baseline 2.2-3  Cr stable  today 2.3  Monitor UOP    DM  Last HgbA1c 6  ISS  hypoglycemia protocol    Pt was discussed and evaluated with Dr. Bedoya, attending physician, who agrees with the assessment and plan above.     Negro Wolff MD  Cardiology Fellow PGY 4     24 h  events:    Patient denied chest pain, sob, palpitations.     HPI:  60 y/o M with PMH of STEMI, acute occlusion of pRCA s/p balloon angioplasty and stenting in 6/2019 (XIENCE JAMILA 3.5 X 28 KEVYN), STEMI s/p DESx3 for total occlusion of RCA and Lcx (2012), GIB due to AV malformation (in the setting of warfarin and DAPT),  ischemic cardiomyopathy, HFrEF (last EF=30-35%), severe pHTN, severe mitral regurgitation, atrial fibrillation not on AC due to GIB in the past, CKD stage IV,  who presented for elective RHC.  Found to be in low cardiac output failure (CI ~ 1.5) , elevated CWP (~43 mmHg). Started on nipride and lasix IV.      Patient has lost weight since last admission. Denied early satiety, nausea. He wakes up at night multiple times to go to the restroom. Denied SOB , orthopnea. Does not complaint of air-hunger. He has lower extremity swelling. He sleeps on 1 pillow at night. His functional capacity is very limited. He has been unable to walk for than 30 feet till recently. He felt his limitation was mostly due to his anemia since his last admission in July. Denied syncope, dizziness.      Patient lasix was recently increased to 60 mg PO bid.     Review Of Systems  A 4-point ROS was negative aside from those listed above.    OBJECTIVE FINDINGS:    Temp:  [97.5  F (36.4  C)-98.3  F (36.8  C)] 97.6  F (36.4  C)  Pulse:  [81-98] 83  Heart Rate:  [] 89  Resp:  [8-27] 13  BP: ()/(43-85) 88/74  MAP:  [47 mmHg-117 mmHg] 65 mmHg  Arterial Line BP: ()/(37-99) 77/54  SpO2:  [89 %-99 %] 99 %    Gen: Patient is awake and alert, NAD. Appears comfortable.    HEENT: PERRLA, EOMI, MMM  Resp: clear to auscultation bilaterally, no crackles or wheezing   CV: RRR, 3/6 systolic murmur best heard at apex radiating to axilla   Abd: soft, NT  Ext: warm, 2+ pitting edema up to knees b/l   Skin: RIJ catheter , R radial a line     Lines, Tubes, and Devices:  Vascular Access:   - RIJ [place]  (Insertion date:  11/12/2019)    Tubes:  Devices:      Invasive Hemodynamic Monitoring:  CVP: 10  PCWP: 24  PAP: 70/35  Mixed venous O2 sat: 65  Estimated CO/ CI: 5.1 / 2.4   SVR: 1197    Intake/Output Summary (Last 24 hours) at 11/12/2019 1706  Last data filed at 11/12/2019 1600  Gross per 24 hour   Intake 559.29 ml   Output 1740 ml   Net -1180.71 ml     Wt Readings from Last 5 Encounters:   11/14/19 97.4 kg (214 lb 11.7 oz)   11/07/19 103.9 kg (229 lb)   10/31/19 105.7 kg (233 lb)   10/07/19 107.3 kg (236 lb 9.6 oz)   09/16/19 106.1 kg (234 lb)       Current medications   Current Facility-Administered Medications   Medication     [START ON 11/15/2019] amLODIPine (NORVASC) tablet 7.5 mg     aspirin (ASA) chewable tablet 81 mg     atorvastatin (LIPITOR) tablet 80 mg     clopidogrel (PLAVIX) tablet 75 mg     glucose gel 15-30 g    Or     dextrose 50 % injection 25-50 mL    Or     glucagon injection 1 mg     ferrous sulfate (FEROSUL) tablet 325 mg     furosemide (LASIX) 500 mg/50mL infusion ADULT MAX CONC     HOLD nitroGLYcerin IF     insulin aspart (NovoLOG) inj (RAPID ACTING)     insulin aspart (NovoLOG) inj (RAPID ACTING)     nitroPRUsside (NIPRIDE) 100 mg, sodium thiosulfate 1,000 mg in D5W 62.5 mL IV infusion (conc: 1.6mg/mL)     pantoprazole (PROTONIX) EC tablet 40 mg     Patient is already receiving mechanical prophylaxis     [START ON 11/15/2019] potassium chloride ER (K-DUR/KLOR-CON M) CR tablet 40 mEq     Reason beta blocker not prescribed     sacubitril-valsartan (ENTRESTO) 49-51 MG per tablet 1 tablet     sodium chloride (PF) 0.9% PF flush 10 mL     Vitamin D3 (CHOLECALCIFEROL) 25 mcg (1000 units) tablet 2,000 Units       LABS Reviewed  9/14/12 [facility]  Skin, left upper back, shave biopsy:    -Lichenoid interface dermatitis    IMAGES Reviewed

## 2019-11-14 NOTE — PLAN OF CARE
ICU End of Shift Summary. See flowsheets for vital signs and detailed assessment.    Changes this shift: PA pressures still elevated but trending down ,[place] in place. DONOVAN q 6hrs. Afib with CVR, Nipride drip for pulm HTN on hold due to 's,  MAPs 65-75.  lasix drip infusing at 15 mg/hr, urine output goal 5-6L off/day, potassium 3.2, MD notified, one time dose of potassium order and given,denies shortness of breath,  dyspnea or fatigue.    Plan: Continue to monitor hemodynamic,I&O's and daily wt, keep MAP between 65-75, SBP <130 and CI>2.

## 2019-11-15 ENCOUNTER — APPOINTMENT (OUTPATIENT)
Dept: GENERAL RADIOLOGY | Facility: CLINIC | Age: 59
End: 2019-11-15
Attending: INTERNAL MEDICINE
Payer: MEDICAID

## 2019-11-15 ENCOUNTER — APPOINTMENT (OUTPATIENT)
Dept: OCCUPATIONAL THERAPY | Facility: CLINIC | Age: 59
End: 2019-11-15
Attending: INTERNAL MEDICINE
Payer: MEDICAID

## 2019-11-15 DIAGNOSIS — I50.22 CHRONIC SYSTOLIC HEART FAILURE (H): Primary | ICD-10-CM

## 2019-11-15 LAB
ALBUMIN SERPL-MCNC: 3.2 G/DL (ref 3.4–5)
ALP SERPL-CCNC: 100 U/L (ref 40–150)
ALT SERPL W P-5'-P-CCNC: 16 U/L (ref 0–70)
ANION GAP SERPL CALCULATED.3IONS-SCNC: 5 MMOL/L (ref 3–14)
ANION GAP SERPL CALCULATED.3IONS-SCNC: 5 MMOL/L (ref 3–14)
ANION GAP SERPL CALCULATED.3IONS-SCNC: 8 MMOL/L (ref 3–14)
AST SERPL W P-5'-P-CCNC: 12 U/L (ref 0–45)
BASE EXCESS BLDV CALC-SCNC: 0.9 MMOL/L
BASE EXCESS BLDV CALC-SCNC: 1.8 MMOL/L
BASOPHILS # BLD AUTO: 0.1 10E9/L (ref 0–0.2)
BASOPHILS NFR BLD AUTO: 0.9 %
BILIRUB SERPL-MCNC: 1.3 MG/DL (ref 0.2–1.3)
BUN SERPL-MCNC: 51 MG/DL (ref 7–30)
BUN SERPL-MCNC: 52 MG/DL (ref 7–30)
BUN SERPL-MCNC: 53 MG/DL (ref 7–30)
CALCIUM SERPL-MCNC: 8.5 MG/DL (ref 8.5–10.1)
CALCIUM SERPL-MCNC: 8.6 MG/DL (ref 8.5–10.1)
CALCIUM SERPL-MCNC: 8.8 MG/DL (ref 8.5–10.1)
CHLORIDE SERPL-SCNC: 103 MMOL/L (ref 94–109)
CHLORIDE SERPL-SCNC: 105 MMOL/L (ref 94–109)
CHLORIDE SERPL-SCNC: 105 MMOL/L (ref 94–109)
CO2 SERPL-SCNC: 27 MMOL/L (ref 20–32)
CO2 SERPL-SCNC: 27 MMOL/L (ref 20–32)
CO2 SERPL-SCNC: 28 MMOL/L (ref 20–32)
CREAT SERPL-MCNC: 2.56 MG/DL (ref 0.66–1.25)
CREAT SERPL-MCNC: 2.58 MG/DL (ref 0.66–1.25)
CREAT SERPL-MCNC: 2.59 MG/DL (ref 0.66–1.25)
DIFFERENTIAL METHOD BLD: ABNORMAL
EOSINOPHIL # BLD AUTO: 0.2 10E9/L (ref 0–0.7)
EOSINOPHIL NFR BLD AUTO: 3.5 %
ERYTHROCYTE [DISTWIDTH] IN BLOOD BY AUTOMATED COUNT: 27.8 % (ref 10–15)
GFR SERPL CREATININE-BSD FRML MDRD: 25 ML/MIN/{1.73_M2}
GFR SERPL CREATININE-BSD FRML MDRD: 26 ML/MIN/{1.73_M2}
GFR SERPL CREATININE-BSD FRML MDRD: 26 ML/MIN/{1.73_M2}
GLUCOSE BLDC GLUCOMTR-MCNC: 140 MG/DL (ref 70–99)
GLUCOSE BLDC GLUCOMTR-MCNC: 155 MG/DL (ref 70–99)
GLUCOSE BLDC GLUCOMTR-MCNC: 169 MG/DL (ref 70–99)
GLUCOSE BLDC GLUCOMTR-MCNC: 216 MG/DL (ref 70–99)
GLUCOSE SERPL-MCNC: 120 MG/DL (ref 70–99)
GLUCOSE SERPL-MCNC: 131 MG/DL (ref 70–99)
GLUCOSE SERPL-MCNC: 132 MG/DL (ref 70–99)
HCO3 BLDV-SCNC: 26 MMOL/L (ref 21–28)
HCO3 BLDV-SCNC: 26 MMOL/L (ref 21–28)
HCT VFR BLD AUTO: 36.8 % (ref 40–53)
HGB BLD-MCNC: 11.3 G/DL (ref 13.3–17.7)
IMM GRANULOCYTES # BLD: 0 10E9/L (ref 0–0.4)
IMM GRANULOCYTES NFR BLD: 0.3 %
INR PPP: 1.26 (ref 0.86–1.14)
LYMPHOCYTES # BLD AUTO: 0.6 10E9/L (ref 0.8–5.3)
LYMPHOCYTES NFR BLD AUTO: 9.2 %
MAGNESIUM SERPL-MCNC: 2.6 MG/DL (ref 1.6–2.3)
MCH RBC QN AUTO: 25.4 PG (ref 26.5–33)
MCHC RBC AUTO-ENTMCNC: 30.7 G/DL (ref 31.5–36.5)
MCV RBC AUTO: 83 FL (ref 78–100)
MONOCYTES # BLD AUTO: 0.9 10E9/L (ref 0–1.3)
MONOCYTES NFR BLD AUTO: 13.4 %
NEUTROPHILS # BLD AUTO: 5 10E9/L (ref 1.6–8.3)
NEUTROPHILS NFR BLD AUTO: 72.7 %
NRBC # BLD AUTO: 0 10*3/UL
NRBC BLD AUTO-RTO: 0 /100
O2/TOTAL GAS SETTING VFR VENT: 21 %
O2/TOTAL GAS SETTING VFR VENT: 21 %
OXYHGB MFR BLDV: 60 %
OXYHGB MFR BLDV: 65 %
PCO2 BLDV: 40 MM HG (ref 40–50)
PCO2 BLDV: 41 MM HG (ref 40–50)
PH BLDV: 7.41 PH (ref 7.32–7.43)
PH BLDV: 7.42 PH (ref 7.32–7.43)
PLATELET # BLD AUTO: 205 10E9/L (ref 150–450)
PLATELET # BLD EST: ABNORMAL 10*3/UL
PO2 BLDV: 36 MM HG (ref 25–47)
PO2 BLDV: 38 MM HG (ref 25–47)
POTASSIUM SERPL-SCNC: 3.9 MMOL/L (ref 3.4–5.3)
POTASSIUM SERPL-SCNC: 3.9 MMOL/L (ref 3.4–5.3)
POTASSIUM SERPL-SCNC: 4 MMOL/L (ref 3.4–5.3)
PROT SERPL-MCNC: 7.2 G/DL (ref 6.8–8.8)
RBC # BLD AUTO: 4.45 10E12/L (ref 4.4–5.9)
SODIUM SERPL-SCNC: 136 MMOL/L (ref 133–144)
SODIUM SERPL-SCNC: 137 MMOL/L (ref 133–144)
SODIUM SERPL-SCNC: 140 MMOL/L (ref 133–144)
WBC # BLD AUTO: 6.9 10E9/L (ref 4–11)

## 2019-11-15 PROCEDURE — 97110 THERAPEUTIC EXERCISES: CPT | Mod: GO | Performed by: OCCUPATIONAL THERAPIST

## 2019-11-15 PROCEDURE — 85610 PROTHROMBIN TIME: CPT | Performed by: STUDENT IN AN ORGANIZED HEALTH CARE EDUCATION/TRAINING PROGRAM

## 2019-11-15 PROCEDURE — 85025 COMPLETE CBC W/AUTO DIFF WBC: CPT | Performed by: STUDENT IN AN ORGANIZED HEALTH CARE EDUCATION/TRAINING PROGRAM

## 2019-11-15 PROCEDURE — 20000004 ZZH R&B ICU UMMC

## 2019-11-15 PROCEDURE — 82805 BLOOD GASES W/O2 SATURATION: CPT | Performed by: STUDENT IN AN ORGANIZED HEALTH CARE EDUCATION/TRAINING PROGRAM

## 2019-11-15 PROCEDURE — 71045 X-RAY EXAM CHEST 1 VIEW: CPT

## 2019-11-15 PROCEDURE — 80053 COMPREHEN METABOLIC PANEL: CPT | Performed by: STUDENT IN AN ORGANIZED HEALTH CARE EDUCATION/TRAINING PROGRAM

## 2019-11-15 PROCEDURE — 25000132 ZZH RX MED GY IP 250 OP 250 PS 637: Performed by: STUDENT IN AN ORGANIZED HEALTH CARE EDUCATION/TRAINING PROGRAM

## 2019-11-15 PROCEDURE — 83735 ASSAY OF MAGNESIUM: CPT | Performed by: STUDENT IN AN ORGANIZED HEALTH CARE EDUCATION/TRAINING PROGRAM

## 2019-11-15 PROCEDURE — 40000048 ZZH STATISTIC DAILY SWAN MONITORING

## 2019-11-15 PROCEDURE — 25000128 H RX IP 250 OP 636: Performed by: INTERNAL MEDICINE

## 2019-11-15 PROCEDURE — 00000146 ZZHCL STATISTIC GLUCOSE BY METER IP

## 2019-11-15 PROCEDURE — 99233 SBSQ HOSP IP/OBS HIGH 50: CPT | Mod: GC | Performed by: INTERNAL MEDICINE

## 2019-11-15 PROCEDURE — 80048 BASIC METABOLIC PNL TOTAL CA: CPT | Performed by: STUDENT IN AN ORGANIZED HEALTH CARE EDUCATION/TRAINING PROGRAM

## 2019-11-15 PROCEDURE — 25000128 H RX IP 250 OP 636: Performed by: STUDENT IN AN ORGANIZED HEALTH CARE EDUCATION/TRAINING PROGRAM

## 2019-11-15 PROCEDURE — 40000893 ZZH STATISTIC PT IP EVAL DEFER

## 2019-11-15 RX ORDER — FUROSEMIDE 10 MG/ML
80 INJECTION INTRAMUSCULAR; INTRAVENOUS EVERY 12 HOURS
Status: COMPLETED | OUTPATIENT
Start: 2019-11-15 | End: 2019-11-16

## 2019-11-15 RX ADMIN — CLOPIDOGREL BISULFATE 75 MG: 75 TABLET ORAL at 09:03

## 2019-11-15 RX ADMIN — ASPIRIN 81 MG CHEWABLE TABLET 81 MG: 81 TABLET CHEWABLE at 09:03

## 2019-11-15 RX ADMIN — ALTEPLASE 2 MG: 2.2 INJECTION, POWDER, LYOPHILIZED, FOR SOLUTION INTRAVENOUS at 17:21

## 2019-11-15 RX ADMIN — FUROSEMIDE 80 MG: 10 INJECTION, SOLUTION INTRAVENOUS at 16:01

## 2019-11-15 RX ADMIN — ATORVASTATIN CALCIUM 80 MG: 40 TABLET, FILM COATED ORAL at 09:02

## 2019-11-15 RX ADMIN — SACUBITRIL AND VALSARTAN 1 TABLET: 49; 51 TABLET, FILM COATED ORAL at 09:04

## 2019-11-15 RX ADMIN — SACUBITRIL AND VALSARTAN 1 TABLET: 97; 103 TABLET, FILM COATED ORAL at 20:27

## 2019-11-15 RX ADMIN — PANTOPRAZOLE SODIUM 40 MG: 40 TABLET, DELAYED RELEASE ORAL at 20:27

## 2019-11-15 RX ADMIN — AMLODIPINE BESYLATE 7.5 MG: 5 TABLET ORAL at 09:03

## 2019-11-15 RX ADMIN — PANTOPRAZOLE SODIUM 40 MG: 40 TABLET, DELAYED RELEASE ORAL at 09:03

## 2019-11-15 RX ADMIN — MELATONIN 2000 UNITS: at 09:03

## 2019-11-15 ASSESSMENT — ACTIVITIES OF DAILY LIVING (ADL)
ADLS_ACUITY_SCORE: 12

## 2019-11-15 ASSESSMENT — MIFFLIN-ST. JEOR: SCORE: 1755.5

## 2019-11-15 NOTE — PROGRESS NOTES
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)  Moderate to severe mitral regurgitation  Atrial fibrillation (rate controlled)   S/p RFA for Atach/aflutter in 2013   Repeat TTE showed moderate MR and decreased PASP  C/w aspirin 81 mg PO daily and plavix 75 mg PO daily for CAD  C/w atorvastatin 80 mg PO daily for CAD  Will hold off anticoagulation as records indicate GIB while on warfarin, patient could potentially be a candidate for KASIA closure device   Will wean off nipride gtt and get HDS off nipride (cath madelin in Issue as found clogged)   Will hold off carvedilol as patient in low output failure   Cw amlodipine to 7.5 mg PO for afterload reduction (patient intolerant of hydralazine - apparently had black spots/rash on hydralazine in the past)   C/w entresto 49/51 mg in am and 97/103 mg in pm  C/w lasix 80 mg IV bid (home dose lasix 60 mg PO bid)    Monitor I/os , daily weights  Patient does not seem to want to pursue advanced therapies at this point. Will try to see how he does on medication for now.     Iron deficiency anemia  Hx of recurrent GI bleeds most recent in 7/2019 , previously oalso cecal ulcer requiring ielocecal resection with temporary diverting ileostomy  Hgb stable   Started ferrous sulfate every other day   Monitor CBC    CKD stage 4  Cr baseline 2.2-3  Cr stable  today 2.5  Monitor UOP    DM  Last HgbA1c 6  ISS  hypoglycemia protocol    Pt was discussed and evaluated with Dr. Benjamin,  attending physician, who agrees with the assessment and plan above.     Negro Wolff MD  Cardiology Fellow PGY 4     24 h events:    Patient denied chest pain, sob, palpitations.     HPI:  60 y/o M with PMH of STEMI, acute occlusion of pRCA s/p balloon angioplasty and stenting in 6/2019 (XIENCE JAMILA 3.5 X 28 KEVYN), STEMI s/p DESx3 for total occlusion of RCA and Lcx (2012), GIB due to AV malformation (in the setting of warfarin and DAPT),  ischemic cardiomyopathy, HFrEF (last EF=30-35%), severe pHTN, severe mitral regurgitation, atrial fibrillation not on AC due to GIB in the past, CKD stage IV,  who presented for elective RHC.  Found to be in low cardiac output failure (CI ~ 1.5) , elevated CWP (~43 mmHg). Started on nipride and lasix IV.      Patient has lost weight since last admission. Denied early satiety, nausea. He wakes up at night multiple times to go to the restroom. Denied SOB , orthopnea. Does not complaint of air-hunger. He has lower extremity swelling. He sleeps on 1 pillow at night. His functional capacity is very limited. He has been unable to walk for than 30 feet till recently. He felt his limitation was mostly due to his anemia since his last admission in July. Denied syncope, dizziness.      Patient lasix was recently increased to 60 mg PO bid.     Review Of Systems  A 4-point ROS was negative aside from those listed above.    OBJECTIVE FINDINGS:    Temp:  [97.5  F (36.4  C)-97.9  F (36.6  C)] 97.7  F (36.5  C)  Pulse:  [87-93] 88  Heart Rate:  [] 91  Resp:  [8-28] 17  BP: ()/(52-94) 98/67  MAP:  [57 mmHg-92 mmHg] 68 mmHg  Arterial Line BP: ()/(46-80) 92/54  SpO2:  [86 %-100 %] 100 %    Gen: Patient is awake and alert, NAD. Appears comfortable.    HEENT: PERRLA, EOMI, MMM  Resp: clear to auscultation bilaterally, no crackles or wheezing   CV: RRR, 3/6 systolic murmur best heard at apex radiating to axilla   Abd: soft, NT  Ext: warm, 2+ pitting edema up to knees b/l    Skin: RIJ catheter , R radial a line     Lines, Tubes, and Devices:  Vascular Access:   - RIJ [place]  (Insertion date: 11/12/2019)    Tubes:  Devices:      Invasive Hemodynamic Monitoring:  CVP: 8  PCWP: -  PAP: 43/38  Mixed venous O2 sat: 65  Estimated CO/ CI: 5.6 / 2.6   SVR: 818    Intake/Output Summary (Last 24 hours) at 11/12/2019 1706  Last data filed at 11/12/2019 1600  Gross per 24 hour   Intake 559.29 ml   Output 1740 ml   Net -1180.71 ml     Wt Readings from Last 5 Encounters:   11/15/19 97.6 kg (215 lb 2.7 oz)   11/07/19 103.9 kg (229 lb)   10/31/19 105.7 kg (233 lb)   10/07/19 107.3 kg (236 lb 9.6 oz)   09/16/19 106.1 kg (234 lb)       Current medications   Current Facility-Administered Medications   Medication     amLODIPine (NORVASC) tablet 7.5 mg     aspirin (ASA) chewable tablet 81 mg     atorvastatin (LIPITOR) tablet 80 mg     clopidogrel (PLAVIX) tablet 75 mg     glucose gel 15-30 g    Or     dextrose 50 % injection 25-50 mL    Or     glucagon injection 1 mg     ferrous sulfate (FEROSUL) tablet 325 mg     furosemide (LASIX) injection 80 mg     HOLD nitroGLYcerin IF     insulin aspart (NovoLOG) inj (RAPID ACTING)     insulin aspart (NovoLOG) inj (RAPID ACTING)     nitroPRUsside (NIPRIDE) 100 mg, sodium thiosulfate 1,000 mg in D5W 62.5 mL IV infusion (conc: 1.6mg/mL)     pantoprazole (PROTONIX) EC tablet 40 mg     Patient is already receiving mechanical prophylaxis     potassium chloride (KLOR-CON) Packet 20-40 mEq     potassium chloride 10 mEq in 100 mL intermittent infusion with 10 mg lidocaine     potassium chloride 10 mEq in 100 mL sterile water intermittent infusion (premix)     potassium chloride 20 mEq in 50 mL intermittent infusion     potassium chloride ER (K-DUR/KLOR-CON M) CR tablet 20-40 mEq     Reason beta blocker not prescribed     sacubitril-valsartan (ENTRESTO) 49-51 MG per tablet 1 tablet     sacubitril-valsartan (ENTRESTO)  MG per tablet 1 tablet     sodium chloride (PF) 0.9%  PF flush 10 mL     Vitamin D3 (CHOLECALCIFEROL) 25 mcg (1000 units) tablet 2,000 Units       LABS Reviewed  9/14/12 [facility]  Skin, left upper back, shave biopsy:    -Lichenoid interface dermatitis    IMAGES Reviewed

## 2019-11-15 NOTE — PLAN OF CARE
ICU End of Shift Summary. See flowsheets for vital signs and detailed assessment.    Changes this shift: Pt continued on a Nipride and Lasix drip. MAP goal 65-75. Ambulated in francis x1, tolerated well.     Plan: Continue FICKS q6h and follow plan of care.       Problem: Cardiac Disease Comorbidity  Goal: Cardiac Disease  Description  Patient comorbidity will be monitored for signs and symptoms of Cardiac Disease.  Problems will be absent, minimized or managed by discharge/transition of care.  11/14/2019 1801 by Caitlin Beard, RN  Outcome: No Change  11/14/2019 0446 by Gilda Uriostegui RN  Outcome: No Change     Problem: Diabetes Comorbidity  Goal: Blood Glucose Level Within Desired Range  Outcome: No Change     Problem: Heart Failure Comorbidity  Goal: Maintenance of Heart Failure Symptom Control  11/14/2019 1801 by Caitlin Beard, RN  Outcome: No Change  11/14/2019 0446 by Gilda Uriostegui RN  Outcome: No Change

## 2019-11-15 NOTE — PLAN OF CARE
Problem: Cardiac Disease Comorbidity  Goal: Cardiac Disease  Description  Patient comorbidity will be monitored for signs and symptoms of Cardiac Disease.  Problems will be absent, minimized or managed by discharge/transition of care.  11/15/2019 0744 by Batsheva Jimenez, RN  Outcome: No Change  11/14/2019 1801 by Caitlin Beard, RN  Outcome: No Change     ICU End of Shift Summary. See flowsheets for vital signs and detailed assessment.    Changes this shift: Nipride gtt down to 0.8 mcg/kg/min. No pain. Voiding adequately. Up with standby assist in room. No other changes overnight.     Plan: Continue to monitor heart rate and Cecilia numbers. Titrate Nipride to maintain MAPs 65-75.

## 2019-11-15 NOTE — PLAN OF CARE
Discharge Planner OT   Patient plan for discharge: home   Current status:  Pt ambulated 1,400ft w/ no rest breaks and SBA pushing IV throughout.  OT educated pt on signs/symptoms of activity intolerance throughout and modulating rest breaks prn. Pt's HR high 80s to low 110s throughout.  Barriers to return to prior living situation: medical status  Recommendations for discharge: Home w/ OP CR  Rationale for recommendations: to increase ind in ADLS/IADLS and endurance       Entered by: Mellissa Longoria 11/15/2019 3:49 PM

## 2019-11-16 LAB
ALBUMIN SERPL-MCNC: 3.4 G/DL (ref 3.4–5)
ALP SERPL-CCNC: 114 U/L (ref 40–150)
ALT SERPL W P-5'-P-CCNC: 18 U/L (ref 0–70)
ANION GAP SERPL CALCULATED.3IONS-SCNC: 6 MMOL/L (ref 3–14)
ANION GAP SERPL CALCULATED.3IONS-SCNC: 8 MMOL/L (ref 3–14)
AST SERPL W P-5'-P-CCNC: 18 U/L (ref 0–45)
BASOPHILS # BLD AUTO: 0.1 10E9/L (ref 0–0.2)
BASOPHILS NFR BLD AUTO: 0.8 %
BILIRUB SERPL-MCNC: 1.2 MG/DL (ref 0.2–1.3)
BUN SERPL-MCNC: 59 MG/DL (ref 7–30)
BUN SERPL-MCNC: 60 MG/DL (ref 7–30)
CALCIUM SERPL-MCNC: 8.7 MG/DL (ref 8.5–10.1)
CALCIUM SERPL-MCNC: 9 MG/DL (ref 8.5–10.1)
CHLORIDE SERPL-SCNC: 102 MMOL/L (ref 94–109)
CHLORIDE SERPL-SCNC: 106 MMOL/L (ref 94–109)
CO2 SERPL-SCNC: 25 MMOL/L (ref 20–32)
CO2 SERPL-SCNC: 29 MMOL/L (ref 20–32)
CREAT SERPL-MCNC: 2.62 MG/DL (ref 0.66–1.25)
CREAT SERPL-MCNC: 2.69 MG/DL (ref 0.66–1.25)
DIFFERENTIAL METHOD BLD: ABNORMAL
EOSINOPHIL # BLD AUTO: 0.3 10E9/L (ref 0–0.7)
EOSINOPHIL NFR BLD AUTO: 3.8 %
ERYTHROCYTE [DISTWIDTH] IN BLOOD BY AUTOMATED COUNT: 28 % (ref 10–15)
GFR SERPL CREATININE-BSD FRML MDRD: 24 ML/MIN/{1.73_M2}
GFR SERPL CREATININE-BSD FRML MDRD: 25 ML/MIN/{1.73_M2}
GLUCOSE BLDC GLUCOMTR-MCNC: 124 MG/DL (ref 70–99)
GLUCOSE BLDC GLUCOMTR-MCNC: 128 MG/DL (ref 70–99)
GLUCOSE BLDC GLUCOMTR-MCNC: 209 MG/DL (ref 70–99)
GLUCOSE BLDC GLUCOMTR-MCNC: 215 MG/DL (ref 70–99)
GLUCOSE BLDC GLUCOMTR-MCNC: 239 MG/DL (ref 70–99)
GLUCOSE SERPL-MCNC: 134 MG/DL (ref 70–99)
GLUCOSE SERPL-MCNC: 180 MG/DL (ref 70–99)
HCT VFR BLD AUTO: 39.2 % (ref 40–53)
HGB BLD-MCNC: 11.9 G/DL (ref 13.3–17.7)
IMM GRANULOCYTES # BLD: 0 10E9/L (ref 0–0.4)
IMM GRANULOCYTES NFR BLD: 0.4 %
INR PPP: 1.25 (ref 0.86–1.14)
LYMPHOCYTES # BLD AUTO: 0.8 10E9/L (ref 0.8–5.3)
LYMPHOCYTES NFR BLD AUTO: 10.2 %
MAGNESIUM SERPL-MCNC: 2.7 MG/DL (ref 1.6–2.3)
MCH RBC QN AUTO: 25.6 PG (ref 26.5–33)
MCHC RBC AUTO-ENTMCNC: 30.4 G/DL (ref 31.5–36.5)
MCV RBC AUTO: 84 FL (ref 78–100)
MONOCYTES # BLD AUTO: 1 10E9/L (ref 0–1.3)
MONOCYTES NFR BLD AUTO: 12.1 %
NEUTROPHILS # BLD AUTO: 5.8 10E9/L (ref 1.6–8.3)
NEUTROPHILS NFR BLD AUTO: 72.7 %
NRBC # BLD AUTO: 0 10*3/UL
NRBC BLD AUTO-RTO: 0 /100
PLATELET # BLD AUTO: 209 10E9/L (ref 150–450)
POTASSIUM SERPL-SCNC: 3.9 MMOL/L (ref 3.4–5.3)
POTASSIUM SERPL-SCNC: 4 MMOL/L (ref 3.4–5.3)
PROT SERPL-MCNC: 7.5 G/DL (ref 6.8–8.8)
RBC # BLD AUTO: 4.65 10E12/L (ref 4.4–5.9)
SODIUM SERPL-SCNC: 137 MMOL/L (ref 133–144)
SODIUM SERPL-SCNC: 138 MMOL/L (ref 133–144)
WBC # BLD AUTO: 8 10E9/L (ref 4–11)

## 2019-11-16 PROCEDURE — 80053 COMPREHEN METABOLIC PANEL: CPT | Performed by: STUDENT IN AN ORGANIZED HEALTH CARE EDUCATION/TRAINING PROGRAM

## 2019-11-16 PROCEDURE — 00000146 ZZHCL STATISTIC GLUCOSE BY METER IP

## 2019-11-16 PROCEDURE — 12000004 ZZH R&B IMCU UMMC

## 2019-11-16 PROCEDURE — 36415 COLL VENOUS BLD VENIPUNCTURE: CPT | Performed by: INTERNAL MEDICINE

## 2019-11-16 PROCEDURE — 25000132 ZZH RX MED GY IP 250 OP 250 PS 637: Performed by: INTERNAL MEDICINE

## 2019-11-16 PROCEDURE — 80048 BASIC METABOLIC PNL TOTAL CA: CPT | Performed by: INTERNAL MEDICINE

## 2019-11-16 PROCEDURE — 99233 SBSQ HOSP IP/OBS HIGH 50: CPT | Mod: GC | Performed by: INTERNAL MEDICINE

## 2019-11-16 PROCEDURE — 85610 PROTHROMBIN TIME: CPT | Performed by: STUDENT IN AN ORGANIZED HEALTH CARE EDUCATION/TRAINING PROGRAM

## 2019-11-16 PROCEDURE — 25000132 ZZH RX MED GY IP 250 OP 250 PS 637: Performed by: STUDENT IN AN ORGANIZED HEALTH CARE EDUCATION/TRAINING PROGRAM

## 2019-11-16 PROCEDURE — 12000012 ZZH R&B MS OVERFLOW UMMC

## 2019-11-16 PROCEDURE — 85025 COMPLETE CBC W/AUTO DIFF WBC: CPT | Performed by: STUDENT IN AN ORGANIZED HEALTH CARE EDUCATION/TRAINING PROGRAM

## 2019-11-16 PROCEDURE — 25000128 H RX IP 250 OP 636: Performed by: INTERNAL MEDICINE

## 2019-11-16 PROCEDURE — 83735 ASSAY OF MAGNESIUM: CPT | Performed by: STUDENT IN AN ORGANIZED HEALTH CARE EDUCATION/TRAINING PROGRAM

## 2019-11-16 RX ORDER — FUROSEMIDE 40 MG
80 TABLET ORAL
Status: DISCONTINUED | OUTPATIENT
Start: 2019-11-16 | End: 2019-11-17 | Stop reason: HOSPADM

## 2019-11-16 RX ORDER — FUROSEMIDE 20 MG
40 TABLET ORAL
Status: DISCONTINUED | OUTPATIENT
Start: 2019-11-16 | End: 2019-11-16

## 2019-11-16 RX ADMIN — SACUBITRIL AND VALSARTAN 1 TABLET: 97; 103 TABLET, FILM COATED ORAL at 20:29

## 2019-11-16 RX ADMIN — PANTOPRAZOLE SODIUM 40 MG: 40 TABLET, DELAYED RELEASE ORAL at 08:50

## 2019-11-16 RX ADMIN — SACUBITRIL AND VALSARTAN 1 TABLET: 49; 51 TABLET, FILM COATED ORAL at 08:50

## 2019-11-16 RX ADMIN — ASPIRIN 81 MG CHEWABLE TABLET 81 MG: 81 TABLET CHEWABLE at 08:49

## 2019-11-16 RX ADMIN — AMLODIPINE BESYLATE 7.5 MG: 5 TABLET ORAL at 08:50

## 2019-11-16 RX ADMIN — FERROUS SULFATE TAB 325 MG (65 MG ELEMENTAL FE) 325 MG: 325 (65 FE) TAB at 08:50

## 2019-11-16 RX ADMIN — FUROSEMIDE 80 MG: 40 TABLET ORAL at 10:52

## 2019-11-16 RX ADMIN — ATORVASTATIN CALCIUM 80 MG: 40 TABLET, FILM COATED ORAL at 08:50

## 2019-11-16 RX ADMIN — FUROSEMIDE 80 MG: 10 INJECTION, SOLUTION INTRAVENOUS at 04:57

## 2019-11-16 RX ADMIN — PANTOPRAZOLE SODIUM 40 MG: 40 TABLET, DELAYED RELEASE ORAL at 20:29

## 2019-11-16 RX ADMIN — MELATONIN 2000 UNITS: at 08:49

## 2019-11-16 RX ADMIN — CLOPIDOGREL BISULFATE 75 MG: 75 TABLET ORAL at 08:50

## 2019-11-16 RX ADMIN — FUROSEMIDE 80 MG: 40 TABLET ORAL at 15:34

## 2019-11-16 ASSESSMENT — ACTIVITIES OF DAILY LIVING (ADL)
ADLS_ACUITY_SCORE: 12

## 2019-11-16 ASSESSMENT — MIFFLIN-ST. JEOR: SCORE: 1769.5

## 2019-11-16 NOTE — PLAN OF CARE
ICU End of Shift Summary. See flowsheets for vital signs and detailed assessment.    Changes this shift: SWAN occluded all day, no DONOVAN numbers obtain. MD aware and currently attempting to TPA line.     Plan: Continue to monitor and follow POC.       Problem: Heart Failure Comorbidity  Goal: Maintenance of Heart Failure Symptom Control  11/15/2019 1937 by Lisha Love, RN  Outcome: No Change  Problem: Cardiac Disease Comorbidity  Goal: Cardiac Disease  Description  Patient comorbidity will be monitored for signs and symptoms of Cardiac Disease.  Problems will be absent, minimized or managed by discharge/transition of care.  11/15/2019 1937 by Lisha Love, RN  Outcome: No Change  Problem: Diabetes Comorbidity  Goal: Blood Glucose Level Within Desired Range  11/15/2019 1937 by Lisha Love, RN  Outcome: No Change

## 2019-11-16 NOTE — PLAN OF CARE
PT 4C: Cancel and Defer- PT orders received. Patient is mobilizing well, working with OT for cardiac rehab while inpatient. No acute PT needs identified at this time. Will complete orders. Please re-consult if there is a change in functional status.

## 2019-11-16 NOTE — PROGRESS NOTES
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)  Moderate mitral regurgitation  Atrial fibrillation (rate controlled)   S/p RFA for Atach/aflutter in 2013   Repeat TTE showed moderate MR and decreased PASP  -C/w aspirin 81 mg PO daily and plavix 75 mg PO daily for CAD  -C/w atorvastatin 80 mg PO daily for CAD  -Will hold off anticoagulation as records indicate GIB while on warfarin, patient could potentially be a candidate for KASIA closure device   - swan out (PA port clogged), transfer out of ICU   -Will hold off carvedilol as patient in low output failure   -Cw amlodipine to 7.5 mg PO for afterload reduction (patient intolerant of hydralazine - apparently had black spots/rash on hydralazine in the past)   -C/w entresto 49/51 mg in am and 97/103 mg in pm  -transition to PO lasix 80 mg BID (home dose lasix 60 mg PO bid), consider torsemide tomorrow if inadequate response   -Monitor I/os , daily weights  -Patient does not seem to want to pursue advanced therapies at this point. Will try to see how he does on medication for now.   -follow MR over time and consider clip in future     Iron deficiency anemia  Hx of recurrent GI bleeds most recent in 7/2019 , previously oalso cecal ulcer requiring ielocecal resection with temporary diverting ileostomy  Hgb stable   Started ferrous sulfate every other day   Monitor CBC    CKD stage 4  Cr baseline 2.2-3  Cr stable  today 2.5  Monitor UOP    DM  Last HgbA1c  6  ISS  hypoglycemia protocol    Pt was discussed and evaluated with Dr. Benjamin, attending physician, who agrees with the assessment and plan above.     Dane Ross MD  Cardiology Fellow, PGY-4  [phone]    24 h events:    Able to ambulate in halls yesterday without difficulty. No chest pain, sob, palpitations.     HPI:  60 y/o M with PMH of STEMI, acute occlusion of pRCA s/p balloon angioplasty and stenting in 6/2019 (XIENCE JAMILA 3.5 X 28 KEVYN), STEMI s/p DESx3 for total occlusion of RCA and Lcx (2012), GIB due to AV malformation (in the setting of warfarin and DAPT),  ischemic cardiomyopathy, HFrEF (last EF=30-35%), severe pHTN, severe mitral regurgitation, atrial fibrillation not on AC due to GIB in the past, CKD stage IV,  who presented for elective RHC.  Found to be in low cardiac output failure (CI ~ 1.5) , elevated CWP (~43 mmHg). Started on nipride and lasix IV.      Patient has lost weight since last admission. Denied early satiety, nausea. He wakes up at night multiple times to go to the restroom. Denied SOB , orthopnea. Does not complaint of air-hunger. He has lower extremity swelling. He sleeps on 1 pillow at night. His functional capacity is very limited. He has been unable to walk for than 30 feet till recently. He felt his limitation was mostly due to his anemia since his last admission in July. Denied syncope, dizziness.      Patient lasix was recently increased to 60 mg PO bid.     Review Of Systems  A 4-point ROS was negative aside from those listed above.    OBJECTIVE FINDINGS:    Temp:  [97.6  F (36.4  C)-98  F (36.7  C)] 97.8  F (36.6  C)  Heart Rate:  [] 80  Resp:  [16-22] 16  BP: ()/(63-94) 102/63  MAP:  [65 mmHg-91 mmHg] 90 mmHg  Arterial Line BP: ()/(53-78) 114/71  SpO2:  [95 %-100 %] 98 %    Gen: Patient is awake and alert, NAD. Appears comfortable.    HEENT: PERRLA, EOMI, MMM, JVP 8-9 cm  Resp: clear to auscultation bilaterally, no crackles or wheezing   CV:  RRR, 3/6 systolic murmur best heard at apex radiating to axilla   Abd: soft, NT  Ext: warm, 2+ pitting edema up to knees b/l   Skin: RIJ catheter , R radial a line     Lines, Tubes, and Devices:  Vascular Access:   - RIJ [place]  (Insertion date: 11/12/2019)    Tubes:  Devices:      Invasive Hemodynamic Monitoring:  CVP: 8  PCWP: -  PAP: 43/38  Mixed venous O2 sat: 65  Estimated CO/ CI: 5.6 / 2.6   SVR: 818    Intake/Output Summary (Last 24 hours) at 11/12/2019 1706  Last data filed at 11/12/2019 1600  Gross per 24 hour   Intake 559.29 ml   Output 1740 ml   Net -1180.71 ml     Wt Readings from Last 5 Encounters:   11/16/19 99 kg (218 lb 4.1 oz)   11/07/19 103.9 kg (229 lb)   10/31/19 105.7 kg (233 lb)   10/07/19 107.3 kg (236 lb 9.6 oz)   09/16/19 106.1 kg (234 lb)       Current medications   Current Facility-Administered Medications   Medication     amLODIPine (NORVASC) tablet 7.5 mg     aspirin (ASA) chewable tablet 81 mg     atorvastatin (LIPITOR) tablet 80 mg     clopidogrel (PLAVIX) tablet 75 mg     glucose gel 15-30 g    Or     dextrose 50 % injection 25-50 mL    Or     glucagon injection 1 mg     ferrous sulfate (FEROSUL) tablet 325 mg     HOLD nitroGLYcerin IF     insulin aspart (NovoLOG) inj (RAPID ACTING)     insulin aspart (NovoLOG) inj (RAPID ACTING)     nitroPRUsside (NIPRIDE) 100 mg, sodium thiosulfate 1,000 mg in D5W 62.5 mL IV infusion (conc: 1.6mg/mL)     pantoprazole (PROTONIX) EC tablet 40 mg     Patient is already receiving mechanical prophylaxis     potassium chloride (KLOR-CON) Packet 20-40 mEq     potassium chloride 10 mEq in 100 mL intermittent infusion with 10 mg lidocaine     potassium chloride 10 mEq in 100 mL sterile water intermittent infusion (premix)     potassium chloride 20 mEq in 50 mL intermittent infusion     potassium chloride ER (K-DUR/KLOR-CON M) CR tablet 20-40 mEq     Reason beta blocker not prescribed     sacubitril-valsartan (ENTRESTO) 49-51 MG per tablet 1 tablet      sacubitril-valsartan (ENTRESTO)  MG per tablet 1 tablet     sodium chloride (PF) 0.9% PF flush 10 mL     Vitamin D3 (CHOLECALCIFEROL) 25 mcg (1000 units) tablet 2,000 Units       LABS Reviewed  9/14/12  Dermatology  Skin, left upper back, shave biopsy:    -Lichenoid interface dermatitis    IMAGES Reviewed

## 2019-11-16 NOTE — PLAN OF CARE
ICU End of Shift Summary. See flowsheets for vital signs and detailed assessment.    Changes this shift: [place] removed this shift. Arterial line removed this shift.      Plan: Plan to discharge to home tomorrow, per MD.

## 2019-11-16 NOTE — PLAN OF CARE
ICU End of Shift Summary. See flowsheets for vital signs and detailed assessment.    Changes this shift: [place] continues to be occluded despite tpa; fellow at bedside this evening. No other events overnight.     Plan: Possible removal of PA cath today. Continue to monitor & notify MD noguera/ changes/concerns.

## 2019-11-17 ENCOUNTER — PATIENT OUTREACH (OUTPATIENT)
Dept: CARE COORDINATION | Facility: CLINIC | Age: 59
End: 2019-11-17

## 2019-11-17 VITALS
OXYGEN SATURATION: 96 % | SYSTOLIC BLOOD PRESSURE: 104 MMHG | HEART RATE: 95 BPM | WEIGHT: 210.7 LBS | HEIGHT: 68 IN | TEMPERATURE: 97.8 F | RESPIRATION RATE: 16 BRPM | BODY MASS INDEX: 31.93 KG/M2 | DIASTOLIC BLOOD PRESSURE: 75 MMHG

## 2019-11-17 LAB
ALBUMIN SERPL-MCNC: 3.5 G/DL (ref 3.4–5)
ALP SERPL-CCNC: 116 U/L (ref 40–150)
ALT SERPL W P-5'-P-CCNC: 24 U/L (ref 0–70)
ANION GAP SERPL CALCULATED.3IONS-SCNC: 8 MMOL/L (ref 3–14)
AST SERPL W P-5'-P-CCNC: 15 U/L (ref 0–45)
BASOPHILS # BLD AUTO: 0.1 10E9/L (ref 0–0.2)
BASOPHILS NFR BLD AUTO: 0.8 %
BILIRUB SERPL-MCNC: 1.1 MG/DL (ref 0.2–1.3)
BUN SERPL-MCNC: 56 MG/DL (ref 7–30)
CALCIUM SERPL-MCNC: 8.8 MG/DL (ref 8.5–10.1)
CHLORIDE SERPL-SCNC: 104 MMOL/L (ref 94–109)
CO2 SERPL-SCNC: 26 MMOL/L (ref 20–32)
CREAT SERPL-MCNC: 2.66 MG/DL (ref 0.66–1.25)
DIFFERENTIAL METHOD BLD: ABNORMAL
EOSINOPHIL # BLD AUTO: 0.3 10E9/L (ref 0–0.7)
EOSINOPHIL NFR BLD AUTO: 4.8 %
ERYTHROCYTE [DISTWIDTH] IN BLOOD BY AUTOMATED COUNT: 27.5 % (ref 10–15)
GFR SERPL CREATININE-BSD FRML MDRD: 25 ML/MIN/{1.73_M2}
GLUCOSE BLDC GLUCOMTR-MCNC: 116 MG/DL (ref 70–99)
GLUCOSE BLDC GLUCOMTR-MCNC: 140 MG/DL (ref 70–99)
GLUCOSE BLDC GLUCOMTR-MCNC: 143 MG/DL (ref 70–99)
GLUCOSE BLDC GLUCOMTR-MCNC: 216 MG/DL (ref 70–99)
GLUCOSE SERPL-MCNC: 122 MG/DL (ref 70–99)
HCT VFR BLD AUTO: 41 % (ref 40–53)
HGB BLD-MCNC: 12.5 G/DL (ref 13.3–17.7)
IMM GRANULOCYTES # BLD: 0 10E9/L (ref 0–0.4)
IMM GRANULOCYTES NFR BLD: 0.2 %
INR PPP: 1.16 (ref 0.86–1.14)
LYMPHOCYTES # BLD AUTO: 0.7 10E9/L (ref 0.8–5.3)
LYMPHOCYTES NFR BLD AUTO: 10.2 %
MAGNESIUM SERPL-MCNC: 2.7 MG/DL (ref 1.6–2.3)
MCH RBC QN AUTO: 26 PG (ref 26.5–33)
MCHC RBC AUTO-ENTMCNC: 30.5 G/DL (ref 31.5–36.5)
MCV RBC AUTO: 85 FL (ref 78–100)
MONOCYTES # BLD AUTO: 0.9 10E9/L (ref 0–1.3)
MONOCYTES NFR BLD AUTO: 14 %
NEUTROPHILS # BLD AUTO: 4.6 10E9/L (ref 1.6–8.3)
NEUTROPHILS NFR BLD AUTO: 70 %
NRBC # BLD AUTO: 0 10*3/UL
NRBC BLD AUTO-RTO: 0 /100
PLATELET # BLD AUTO: 222 10E9/L (ref 150–450)
POTASSIUM SERPL-SCNC: 3.6 MMOL/L (ref 3.4–5.3)
PROT SERPL-MCNC: 7.6 G/DL (ref 6.8–8.8)
RBC # BLD AUTO: 4.8 10E12/L (ref 4.4–5.9)
SODIUM SERPL-SCNC: 138 MMOL/L (ref 133–144)
WBC # BLD AUTO: 6.5 10E9/L (ref 4–11)

## 2019-11-17 PROCEDURE — 36415 COLL VENOUS BLD VENIPUNCTURE: CPT | Performed by: STUDENT IN AN ORGANIZED HEALTH CARE EDUCATION/TRAINING PROGRAM

## 2019-11-17 PROCEDURE — 25000132 ZZH RX MED GY IP 250 OP 250 PS 637: Performed by: STUDENT IN AN ORGANIZED HEALTH CARE EDUCATION/TRAINING PROGRAM

## 2019-11-17 PROCEDURE — 83735 ASSAY OF MAGNESIUM: CPT | Performed by: STUDENT IN AN ORGANIZED HEALTH CARE EDUCATION/TRAINING PROGRAM

## 2019-11-17 PROCEDURE — 99239 HOSP IP/OBS DSCHRG MGMT >30: CPT | Mod: GC | Performed by: INTERNAL MEDICINE

## 2019-11-17 PROCEDURE — 00000146 ZZHCL STATISTIC GLUCOSE BY METER IP

## 2019-11-17 PROCEDURE — 80053 COMPREHEN METABOLIC PANEL: CPT | Performed by: STUDENT IN AN ORGANIZED HEALTH CARE EDUCATION/TRAINING PROGRAM

## 2019-11-17 PROCEDURE — 25000132 ZZH RX MED GY IP 250 OP 250 PS 637: Performed by: INTERNAL MEDICINE

## 2019-11-17 PROCEDURE — 85025 COMPLETE CBC W/AUTO DIFF WBC: CPT | Performed by: STUDENT IN AN ORGANIZED HEALTH CARE EDUCATION/TRAINING PROGRAM

## 2019-11-17 PROCEDURE — 85610 PROTHROMBIN TIME: CPT | Performed by: STUDENT IN AN ORGANIZED HEALTH CARE EDUCATION/TRAINING PROGRAM

## 2019-11-17 PROCEDURE — 80048 BASIC METABOLIC PNL TOTAL CA: CPT | Performed by: INTERNAL MEDICINE

## 2019-11-17 RX ORDER — FERROUS SULFATE 325(65) MG
325 TABLET ORAL EVERY OTHER DAY
Qty: 15 TABLET | Refills: 0 | Status: ON HOLD | OUTPATIENT
Start: 2019-11-18 | End: 2019-12-21

## 2019-11-17 RX ORDER — AMLODIPINE BESYLATE 2.5 MG/1
7.5 TABLET ORAL DAILY
Qty: 90 TABLET | Refills: 0 | Status: SHIPPED | OUTPATIENT
Start: 2019-11-18 | End: 2019-12-17

## 2019-11-17 RX ORDER — FUROSEMIDE 80 MG
80 TABLET ORAL
Qty: 60 TABLET | Refills: 0 | Status: SHIPPED | OUTPATIENT
Start: 2019-11-17 | End: 2019-12-05

## 2019-11-17 RX ADMIN — ATORVASTATIN CALCIUM 80 MG: 40 TABLET, FILM COATED ORAL at 07:55

## 2019-11-17 RX ADMIN — ASPIRIN 81 MG CHEWABLE TABLET 81 MG: 81 TABLET CHEWABLE at 07:55

## 2019-11-17 RX ADMIN — FUROSEMIDE 80 MG: 40 TABLET ORAL at 07:55

## 2019-11-17 RX ADMIN — PANTOPRAZOLE SODIUM 40 MG: 40 TABLET, DELAYED RELEASE ORAL at 07:55

## 2019-11-17 RX ADMIN — MELATONIN 2000 UNITS: at 07:55

## 2019-11-17 RX ADMIN — CLOPIDOGREL BISULFATE 75 MG: 75 TABLET ORAL at 07:55

## 2019-11-17 RX ADMIN — AMLODIPINE BESYLATE 7.5 MG: 5 TABLET ORAL at 07:55

## 2019-11-17 RX ADMIN — SACUBITRIL AND VALSARTAN 1 TABLET: 49; 51 TABLET, FILM COATED ORAL at 07:55

## 2019-11-17 ASSESSMENT — ACTIVITIES OF DAILY LIVING (ADL)
ADLS_ACUITY_SCORE: 12

## 2019-11-17 ASSESSMENT — MIFFLIN-ST. JEOR: SCORE: 1735.23

## 2019-11-17 NOTE — PLAN OF CARE
Transferred to: 6B, report given to Ellie and all questions were answered.   Status at time of transfer: stable, alert and oriented.   Belongings: two bags removed from closet and sent. Two bags packed with chart, medications toiletries sent.   Harrison removed? (if no, why?): No harrison in place, continent and using urinal.   Chart and medications: sent with patient in patient belonging bag.  Family notified: no, pt will contact significant other himself.

## 2019-11-17 NOTE — DISCHARGE SUMMARY
[facility]   Cardiology II Service / Advanced Heart Failure  Discharge Summary     Cole Jesus MRN# [id number]   YOB: 1960 Age: 59 year old     DATE OF ADMISSION:  11/12/2019  DATE OF DISCHARGE: 11/17/2019  ADMITTING PROVIDER: Jose A Mckee MD  DISCHARGE PROVIDER: Jose A Mckee  PRIMARY PROVIDER: Silas Enciso         Reason for Admission:   Mr. Cole Jesus is a pleasant 59-year-old gentleman with a past medical history of STEMI, acute occlusion of pRCA s/p balloon angioplasty and stenting in 6/2019 (XIENCE JAMILA 3.5 X 28 KEVYN), STEMI s/p DESx3 for total occlusion of RCA and Lcx (2012), GIB due to AV malformation (in the setting of warfarin and DAPT), ischemic cardiomyopathy, HFrEF (last EF=30-35%), severe pHTN, severe mitral regurgitation, atrial fibrillation not on AC due to GIB in the past, CKD stage IV, who presented for elective RHC. Found to be in low cardiac output failure (CI ~ 1.5) , elevated CWP (~43 mmHg). Started on nipride and lasix IV.       Patient has lost weight since last admission. Denied early satiety, nausea. He wakes up at night multiple times to go to the restroom. Denied SOB , orthopnea. Does not complaint of air-hunger. He has lower extremity swelling. He sleeps on 1 pillow at night. His functional capacity is very limited. He has been unable to walk for than 30 feet till recently. He felt his limitation was mostly due to his anemia since his last admission in July. Denied syncope, dizziness.       Patient lasix was recently increased to 60 mg PO bid.     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 Follow-up with cardiology outpatient within 2 weeks for consideration of a MitraClip and CardioMEMS placement.         Pending Results:   None.         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 Patient was found to be in low cardiac output failure (CI ~ 1.5), elevated CWP (~43 mmHg) and was initiated on nipride and lasix IV. Repeat TTE showed moderate MR and decreased PASP. Patient had a swan in place until 11/16/2017. Upon discharge, patient was transitioned to oral lasix 80mg twice a day. Anticoagulation as records indicate history of GI bleed on warfarin and patient could potentially be a candidate for KASIA closure device. At this time, it does not appear that patient would be a candidate for advanced therapies based on his degree of renal dysfunction and recurrent GI bleeding preventing anticoagulation for atrial fibrillation.   - Lasix 80mg PO daily  - ASA, Plavix  - Atorvastatin 80mg daily  - Home carvedilol held on discharge due to low output failure   - Amlodipine 7.5mg PO daily (patient intolerant of hydralazine with rash/black spots in the past)  - Entresto 49/51 mg in am and 97/103 mg in pm  - Follow-up with [facility] clinic on 11/21/2019 as scheduled  - Follow-up with cardiology outpatient to also discuss consideration for CardioMEMS placement and MitraClip in the future       Iron deficiency anemia  Hx of recurrent GI bleeds most recent in 7/2019 , previously cecal ulcer requiring ileocecal resection with temporary  "diverting ileostomy  Hemoglobin stable during admission.   - Started ferrous sulfate every other day      CKD stage 4  Cr baseline 2.2-3. Creatinine 2.66 upon discharge.      DM  Last HgbA1c 6. Initiated sliding scale insulin and hypoglycemic protocol in place.      Physical Exam on day of Discharge:  Blood pressure 103/74, pulse 80, temperature 97.6  F (36.4  C), temperature source Oral, resp. rate 18, height 1.727 m (5' 8\"), weight 95.6 kg (210 lb 11.2 oz), SpO2 97 %.  Gen: Patient is awake and alert, NAD. Appears comfortable.    HEENT: PERRLA, EOM grossly intact, JVP 8-9 cm  Resp: clear to auscultation bilaterally, no crackles or wheezing   CV: RRR, 3/6 systolic murmur best heard at apex   Abd: soft, NT  Ext: warm, 1+ pitting edema BLE  Neuro: awake and alert, grossly non-focal     Lab Studies on Day of Discharge:   Last CBC:   Recent Labs   Lab Test 11/17/19  0453   WBC 6.5   RBC 4.80   HGB 12.5*   HCT 41.0   MCV 85   MCH 26.0*   MCHC 30.5*   RDW 27.5*          Last CMP:  Recent Labs   Lab Test 11/17/19  0453      POTASSIUM 3.6   CHLORIDE 104   RONNY 8.8   CO2 26   BUN 56*   CR 2.66*   *   AST 15   ALT 24   BILITOTAL 1.1   ALBUMIN 3.5   PROTTOTAL 7.6   ALKPHOS 116            Procedures & Significant Findings:   TTE (11/12/2019):  Moderately (EF 30-35%) reduced left ventricular function is present. Severe  left ventricular dilation is present based on LVEDV of 109 mL/m^2 and LVIDd  6.7 cm.  Global right ventricular function is moderately reduced. Moderate right  ventricular dilation is present.  Moderate mitral regurgitation.  Estimated pulmonary artery pressure of 42 mmHg.  Small posterior pericardial effusion without any significant echocardiographic  evidence of tamponade.  Dilation of the inferior vena cava is present with normal respiratory  variation in diameter.    RHC (11/12/2019):  Right sided filling pressures are severely elevated.  Severely elevated pulmonary artery " hypertension.  Left sided filling pressures are severely elevated.  Reduced cardiac output level.  Hemodynamic data has been modified in Epic per physician review.            Consultations:   None.         Discharge Medications:     Current Discharge Medication List      START taking these medications    Details   amLODIPine (NORVASC) 2.5 MG tablet Take 3 tablets (7.5 mg) by mouth daily  Qty: 90 tablet, Refills: 0    Associated Diagnoses: Essential hypertension with goal blood pressure less than 130/85      ferrous sulfate (FEROSUL) 325 (65 Fe) MG tablet Take 1 tablet (325 mg) by mouth every other day  Qty: 15 tablet, Refills: 0    Associated Diagnoses: Lower GI bleeding      sacubitril-valsartan (ENTRESTO)  MG per tablet Take 1 tablet by mouth At Bedtime  Qty: 30 tablet, Refills: 0    Associated Diagnoses: Ischemic cardiomyopathy         CONTINUE these medications which have CHANGED    Details   furosemide (LASIX) 80 MG tablet Take 1 tablet (80 mg) by mouth 2 times daily  Qty: 60 tablet, Refills: 0    Associated Diagnoses: Ischemic cardiomyopathy      sacubitril-valsartan (ENTRESTO) 49-51 MG per tablet Take 1 tablet by mouth daily  Qty: 30 tablet, Refills: 0    Associated Diagnoses: Ischemic cardiomyopathy         CONTINUE these medications which have NOT CHANGED    Details   aspirin (ASA) 81 MG chewable tablet Take 81 mg by mouth daily      atorvastatin (LIPITOR) 80 MG tablet Take 1 tablet (80 mg) by mouth daily  Qty: 90 tablet, Refills: 3    Associated Diagnoses: Hyperlipidemia LDL goal <100      cholecalciferol (VITAMIN  -D) 1000 UNITS capsule Take 2 capsules (2,000 Units) by mouth daily  Qty: 60 capsule, Refills: 3    Associated Diagnoses: Vitamin D deficiency      clopidogrel (PLAVIX) 75 MG tablet Take 1 tablet (75 mg) by mouth daily  Qty: 90 tablet, Refills: 3    Associated Diagnoses: ST elevation myocardial infarction (STEMI) of inferior wall (H)      pantoprazole (PROTONIX) 40 MG EC tablet Take 1  tablet (40 mg) by mouth 2 times daily  Qty: 180 tablet, Refills: 2    Comments: Please keep appt 10/31/19  Associated Diagnoses: Lower GI bleeding      vitamin  B complex with vitamin C (VITAMIN  B COMPLEX) TABS Take 1 tablet by mouth daily    Associated Diagnoses: Atrial tachycardia (H); Renal insufficiency; Ischemic cardiomyopathy      ACCU-CHEK GUIDE test strip USE TO TEST BLOOD SUGAR FOUR TIMES A DAY BEFORE MEALS AND AT BEDTIME  Qty: 400 each, Refills: 3    Associated Diagnoses: Type 2 diabetes mellitus without complication, without long-term current use of insulin (H)      Alcohol Swabs PADS 1 applicator 4 times daily (before meals and nightly)  Qty: 100 each, Refills: 3    Associated Diagnoses: Type 2 diabetes mellitus without complication, without long-term current use of insulin (H)      blood glucose monitoring (ACCU-CHEK FASTCLIX) lancets USE TO TEST BLOOD SUGAR FOUR TIMES A DAY AT MEALS AND AT BEDTIME  Qty: 400 each, Refills: 3    Associated Diagnoses: Type 2 diabetes mellitus without complication, without long-term current use of insulin (H)      insulin glargine (LANTUS PEN) 100 UNIT/ML pen Inject 7 Units Subcutaneous every morning  Qty: 6 mL, Refills: 3    Comments: If Lantus is not covered by insurance, may substitute Basaglar at same dose and frequency.    Associated Diagnoses: Type 2 diabetes mellitus without complication, without long-term current use of insulin (H)      insulin pen needle (32G X 4 MM) 32G X 4 MM miscellaneous Use 5 pen needles daily or as directed.  Qty: 200 each, Refills: 3    Associated Diagnoses: Type 2 diabetes mellitus without complication, without long-term current use of insulin (H)      nitroGLYcerin (NITROSTAT) 0.4 MG sublingual tablet Place 1 tablet (0.4 mg) under the tongue every 5 minutes as needed for chest pain For chest pain place 1 tablet under the tongue every 5 minutes for 3 doses. If symptoms persist 5 minutes after 1st dose call 911.  Qty: 25 tablet, Refills: 0     Associated Diagnoses: Type 2 diabetes mellitus with complication, with long-term current use of insulin (H); ASCVD (arteriosclerotic cardiovascular disease)      Sharps Container MISC 1 Device every 30 days  Qty: 1 each, Refills: 3    Associated Diagnoses: Type 2 diabetes mellitus without complication, without long-term current use of insulin (H)         STOP taking these medications       carvedilol (COREG) 3.125 MG tablet Comments:   Reason for Stopping:                    Discharge Instructions and Follow-Up:     Discharge Procedure Orders   CARDIAC REHAB REFERRAL   Referral Priority: Routine Referral Type: Rehab Therapy Cardiac Therapy   Number of Visits Requested: 1     Reason for your hospital stay   Order Comments: Cardiogenic shock.     Activity   Order Comments: Your activity upon discharge: activity as tolerated     Order Specific Question Answer Comments   Is discharge order? Yes      When to contact your care team   Order Comments: If you experience chest pain, worsening shortness of breath, please come in to the emergency room for further evaluation.     Adult [facility] Follow-up and recommended labs and tests   Order Comments: 1. Follow-up with CORE clinic on 11/21/2019 and 12/5/2019 as scheduled.   2. Please follow-up with cardiology in 2-3 weeks.     Appointments on [place] and/or [facility] (with [facility] or [facility] provider or service). Call [phone] if you haven't heard regarding these appointments within 7 days of discharge.     Discharge Instructions   Order Comments: 1. Follow-up with CORE clinic on 11/21/2019 and 12/5/2019 as scheduled.   2. Please follow-up with cardiology in 2-3 weeks.   2. If you experience chest pain, worsening shortness of breath, please come in to the emergency room for further evaluation.     Full Code     Order Specific Question Answer Comments   Code status determined by: Discussion with patient/legal decision maker      Diet   Order Comments: Follow this diet  upon discharge: Orders Placed This Encounter      Fluid restriction 2000 ML FLUID      2 Gram Sodium Diet     Order Specific Question Answer Comments   Is discharge order? Yes              Discharge Disposition:   Good         Condition on Discharge:   Discharge condition: Good   Code status on discharge: Full Code        Date of service: 11/17/2019    The patient was discussed with Dr. Benjamin.    60 minutes spent in discharge, including >50% in counseling and coordination of care, medication review and plan of care recommended on follow up. Questions were answered.     It was our pleasure to care for Cole Jesus during this hospitalization. Please do not hesitate to contact me should there be questions regarding the hospital course or discharge plan.      Kristen Reynolds MD  Internal Medicine, PGY-3  Pager: [phone]

## 2019-11-17 NOTE — PROGRESS NOTES
"Transfer  Transferred from: 4C   Via:bed  Reason for transfer:Pt appropriate for 6B- improved patient condition  Family: Aware of transfer  Belongings: Received with pt  Chart: Received with pt  Medications: Meds received from old unit with pt  2 RN Skin Assessment Completed By: Pat RN   Report received from: ELIDA Loomis   Pt status:  /83 (BP Location: Right arm)   Pulse 88   Temp 97.3  F (36.3  C) (Oral)   Resp 18   Ht 1.727 m (5' 8\")   Wt 99 kg (218 lb 4.1 oz)   SpO2 98%   BMI 33.19 kg/m        "

## 2019-11-17 NOTE — PLAN OF CARE
D AVSS with sat's 98% on room air. Heart irregular/Afib and lung sounds clear. Voiced no c/o pain or nausea during the night and slept well between cares. Up to bathroom independently to void an adeqate number of times and showed good strength/stability. Patient reports that he show be going home today. Weight is down several Kg's this AM using standing scale.   I Vital's, assessment and med's per order.   A Resting in bed with call light in reach.   P Continue to monitor and update MD with changes.

## 2019-11-18 ENCOUNTER — DOCUMENTATION ONLY (OUTPATIENT)
Dept: PHARMACY | Facility: CLINIC | Age: 59
End: 2019-11-18

## 2019-11-18 NOTE — PLAN OF CARE
Occupational Therapy Discharge Summary    Reason for therapy discharge:    Discharged to home with outpatient therapy.    Progress towards therapy goal(s). See goals on Care Plan in [facility] electronic health record for goal details.  Goals partially met.  Barriers to achieving goals:   discharge from facility.    Therapy recommendation(s):    Continued therapy is recommended.  Rationale/Recommendations:  Recommend discharge to home with OP Phase II CR to maximize functional endurance and cardiac outcomes..

## 2019-11-19 ENCOUNTER — DOCUMENTATION ONLY (OUTPATIENT)
Dept: PHARMACY | Facility: CLINIC | Age: 59
End: 2019-11-19

## 2019-11-19 NOTE — PROGRESS NOTES
Prior Authorization Approval    entresto 49-51mg tablets  Date Initiated: 11/18/2019  Date Completed: 11/18/2019  Prior Auth Type: Clinical                Status: Approved    Effective Date: 11/17/2019 - 11/10/2020  Copay: 0.00     Filling Pharmacy: [facility] - [place], [place] - [address] [place] [address]    Insurance: [place] Medicaid ([facility]) - Phone [phone] Fax [phone]  ID: [id number]  Case Number: [id number]   Submitted Via: Fax    Jazmin Rosen  [facility] Liaison  Ph: [phone] Page: [phone]

## 2019-11-19 NOTE — PROGRESS NOTES
Prior Authorization Approval    entresto 97-103mg tablet  Date Initiated: 11/18/2019  Date Completed: 11/18/2019  Prior Auth Type: Clinical                Status: Approved    Effective Date: 11/17/2019 - 11/10/2020  Copay: 0.00     Filling Pharmacy: [facility] - [place], [place] - [address] [place] [address]    Insurance: [place] Medicaid ([facility]) - Phone [phone] Fax [phone]  ID: [id number]  Case Number: [id number]   Submitted Via: Fax    Jazmin Rosen  [facility] Liaison  Ph: [phone] Page: [phone]

## 2019-11-21 ENCOUNTER — TELEPHONE (OUTPATIENT)
Dept: CARDIOLOGY | Facility: CLINIC | Age: 59
End: 2019-11-21

## 2019-11-21 ENCOUNTER — OFFICE VISIT (OUTPATIENT)
Dept: CARDIOLOGY | Facility: CLINIC | Age: 59
End: 2019-11-21
Attending: NURSE PRACTITIONER
Payer: MEDICAID

## 2019-11-21 VITALS
BODY MASS INDEX: 33.04 KG/M2 | OXYGEN SATURATION: 97 % | HEIGHT: 68 IN | DIASTOLIC BLOOD PRESSURE: 66 MMHG | WEIGHT: 218 LBS | SYSTOLIC BLOOD PRESSURE: 102 MMHG | HEART RATE: 74 BPM

## 2019-11-21 DIAGNOSIS — N18.4 CKD (CHRONIC KIDNEY DISEASE) STAGE 4, GFR 15-29 ML/MIN (H): Chronic | ICD-10-CM

## 2019-11-21 DIAGNOSIS — E61.1 IRON DEFICIENCY: ICD-10-CM

## 2019-11-21 DIAGNOSIS — D62 ANEMIA DUE TO ACUTE BLOOD LOSS: ICD-10-CM

## 2019-11-21 DIAGNOSIS — I25.5 ISCHEMIC CARDIOMYOPATHY: ICD-10-CM

## 2019-11-21 DIAGNOSIS — E11.9 TYPE 2 DIABETES, HBA1C GOAL < 8% (H): Chronic | ICD-10-CM

## 2019-11-21 DIAGNOSIS — I50.22 CHRONIC SYSTOLIC CONGESTIVE HEART FAILURE (H): Primary | ICD-10-CM

## 2019-11-21 DIAGNOSIS — I48.91 ATRIAL FIBRILLATION, UNSPECIFIED TYPE (H): Chronic | ICD-10-CM

## 2019-11-21 DIAGNOSIS — I50.22 CHRONIC SYSTOLIC HEART FAILURE (H): ICD-10-CM

## 2019-11-21 LAB
ANION GAP SERPL CALCULATED.3IONS-SCNC: 6 MMOL/L (ref 3–14)
BUN SERPL-MCNC: 84 MG/DL (ref 7–30)
CALCIUM SERPL-MCNC: 8.5 MG/DL (ref 8.5–10.1)
CHLORIDE SERPL-SCNC: 101 MMOL/L (ref 94–109)
CO2 SERPL-SCNC: 26 MMOL/L (ref 20–32)
CREAT SERPL-MCNC: 2.73 MG/DL (ref 0.66–1.25)
ERYTHROCYTE [DISTWIDTH] IN BLOOD BY AUTOMATED COUNT: 26.8 % (ref 10–15)
GFR SERPL CREATININE-BSD FRML MDRD: 24 ML/MIN/{1.73_M2}
GLUCOSE SERPL-MCNC: 152 MG/DL (ref 70–99)
HCT VFR BLD AUTO: 37.6 % (ref 40–53)
HGB BLD-MCNC: 11.3 G/DL (ref 13.3–17.7)
INR PPP: 1.15 (ref 0.86–1.14)
MCH RBC QN AUTO: 26 PG (ref 26.5–33)
MCHC RBC AUTO-ENTMCNC: 30.1 G/DL (ref 31.5–36.5)
MCV RBC AUTO: 87 FL (ref 78–100)
PLATELET # BLD AUTO: 213 10E9/L (ref 150–450)
POTASSIUM SERPL-SCNC: 4.6 MMOL/L (ref 3.4–5.3)
RBC # BLD AUTO: 4.34 10E12/L (ref 4.4–5.9)
SODIUM SERPL-SCNC: 134 MMOL/L (ref 133–144)
WBC # BLD AUTO: 6 10E9/L (ref 4–11)

## 2019-11-21 PROCEDURE — 99214 OFFICE O/P EST MOD 30 MIN: CPT | Mod: ZP | Performed by: NURSE PRACTITIONER

## 2019-11-21 PROCEDURE — 80048 BASIC METABOLIC PNL TOTAL CA: CPT | Performed by: NURSE PRACTITIONER

## 2019-11-21 PROCEDURE — G0463 HOSPITAL OUTPT CLINIC VISIT: HCPCS | Mod: ZF

## 2019-11-21 PROCEDURE — 36415 COLL VENOUS BLD VENIPUNCTURE: CPT | Performed by: NURSE PRACTITIONER

## 2019-11-21 PROCEDURE — 85610 PROTHROMBIN TIME: CPT | Performed by: NURSE PRACTITIONER

## 2019-11-21 ASSESSMENT — MIFFLIN-ST. JEOR: SCORE: 1768.34

## 2019-11-21 ASSESSMENT — PAIN SCALES - GENERAL: PAINLEVEL: NO PAIN (0)

## 2019-11-21 NOTE — PATIENT INSTRUCTIONS
Take your medicines every day, as directed    Changes made today:  o Entresto 49-51 twice a day     Monitor Your Weight and Symptoms    Contact us if you:      Gain 2 pounds in one day or 5 pounds in one week    Feel more short of breath    Notice more leg swelling    Feel lightheadeded   Change your lifestyle    Limit Salt or Sodium:    2000 mg  Limit Fluids:    2000 mL or approximately 64 ounces  Eat a Heart Healthy Diet    Low in saturated fats  Stay Active:    Aim to move at least 150 minutes every  week         To Contact us    During Business Hours:  [phone], option # 1 (University)  Then option # 4 (medical questions)     After hours, weekends or holidays:   [phone], Option #4  Ask to speak to the On-Call Cardiologist. Inform them you are a CORE/heart failure patient at the [facility].     Use [employer] allows you to communicate directly with your heart team through secure messaging.    [employer] can be accessed any time on your phone, computer, or tablet.    If you need assistance, we'd be happy to help!         Keep your Heart Appointments:    CORE on 12/5    Dr. Trujillo 12/12

## 2019-11-21 NOTE — TELEPHONE ENCOUNTER
Health Call Center    Phone Message    May a detailed message be left on voicemail: yes    Reason for Call: Medication Question or concern regarding medication   Prescription Clarification  Name of Medication: sacubitril-valsartan (ENTRESTO) 49-51 MG per tablet  Prescribing Provider: Dr. Prasad    Pharmacy: [facility] [id number]   What on the order needs clarification? Pharmacy needs to know if this prerscription is for two weeks or for a full  Month. Instructions are not clear and requesting a call back to confirm so they can fill for the Pt.     Action Taken: Message routed to:  [facility] Cardio Clinic

## 2019-11-21 NOTE — LETTER
11/21/2019      RE: Cole Jesus  [address]  [facility] [id number]       Dear Colleague,    Thank you for the opportunity to participate in the care of your patient, Cole Jesus, at the [facility]. Please see a copy of my visit note below.        HPI: Cole is a 59 year old  or  male with a past medical history of acute occlusion of the proximal right coronary artery, treated successfully with balloon angioplasty and stenting in 6/2019.  The patient's course was complicated by atrial fibrillation.  He has a remote history of atrial fibrillation, treated with Warfarin and complicated by gastrointestinal bleeding.  At the time (prior to my/Dr. Trujillo's assumption of care) he was advised to stay off of warfarin (2013).  He has had no documented recurrence of atrial fibrillation with episodic monitoring including ZEOPATCH. He was put back on warfarin and had GIB from his stomach, gastric lesions per patient. He also has a history of a large GIB back around 2011. He does not know or perceive being out of rhythm.  His most recent echocardiogram (technically difficult showed stable ejection fraction in the range of 35%.    Patient reports no SOB. He says he has no swelling in his abdomen or legs.  Appetite is good. Weight at home 214 lbs. BP at home 102/66. No lightheaded no dizziness. He is very conversational today. He feels so much better after leaving the hospital. He is taking 80 mgLasix BID.    Denies SOB, KING, PND, orthopnea, edema, weight gain, chest pain, palpitations, lightheadedness, dizziness, near syncopal/syncopal episodes. Cole has been following salt and fluid restrictions.      PAST MEDICAL HISTORY:  Past Medical History:   Diagnosis Date     Anemia in chronic renal disease 3/9/2015     Antiplatelet or antithrombotic long-term use      Atrial fibrillation and flutter      CAD (coronary artery  disease)     Stemi in , s/p angioplasty     CRD (chronic renal disease), stage IV (H) 2015    hx ATN with dialysis complicating cardiogenic shock  2012     GI bleed     massive lower GI bleed secondary to a cecal ulcer, s/p ileocecal resection in      Heart failure     Biventricular systolic HF, complicated by ARDS requiring tracheostomy     Hyperlipidemia LDL goal <100 2013     Hypertension      Ischemic cardiomyopathy 2013    TTE revealing 40% EF     Myocardial infarction (H)      Other and unspecified nonspecific immunological findings     Anti JKa     Pulmonary edema     episodes of flash pulmonary edema in      Subclinical hypothyroidism        FAMILY HISTORY:  Family History   Problem Relation Age of Onset     Diabetes Mother      Hypertension Mother      Heart Disease Mother      Heart Disease Father          of heart attack, left when he was young     Diabetes Sister      Diabetes Sister      Diabetes Sister        SOCIAL HISTORY:  Social History     Tobacco Use     Smoking status: Former Smoker     Last attempt to quit: 12/3/2011     Years since quittin.9     Smokeless tobacco: Never Used   Substance Use Topics     Alcohol use: No     Alcohol/week: 0.0 standard drinks     Drug use: No           CURRENT MEDICATIONS:  Current Outpatient Medications   Medication Sig Dispense Refill     ACCU-CHEK GUIDE test strip USE TO TEST BLOOD SUGAR FOUR TIMES A DAY BEFORE MEALS AND AT BEDTIME 400 each 3     Alcohol Swabs PADS 1 applicator 4 times daily (before meals and nightly) 100 each 3     amLODIPine (NORVASC) 2.5 MG tablet Take 3 tablets (7.5 mg) by mouth daily 90 tablet 0     aspirin (ASA) 81 MG chewable tablet Take 81 mg by mouth daily       atorvastatin (LIPITOR) 80 MG tablet Take 1 tablet (80 mg) by mouth daily 90 tablet 3     blood glucose monitoring (ACCU-CHEK FASTCLIX) lancets USE TO TEST BLOOD SUGAR FOUR TIMES A DAY AT MEALS AND AT BEDTIME 400 each 3     cholecalciferol  "(VITAMIN  -D) 1000 UNITS capsule Take 2 capsules (2,000 Units) by mouth daily (Patient taking differently: Take 1 capsule by mouth 2 times daily ) 60 capsule 3     clopidogrel (PLAVIX) 75 MG tablet Take 1 tablet (75 mg) by mouth daily 90 tablet 3     ferrous sulfate (FEROSUL) 325 (65 Fe) MG tablet Take 1 tablet (325 mg) by mouth every other day 15 tablet 0     furosemide (LASIX) 80 MG tablet Take 1 tablet (80 mg) by mouth 2 times daily 60 tablet 0     insulin glargine (LANTUS PEN) 100 UNIT/ML pen Inject 7 Units Subcutaneous every morning 6 mL 3     insulin pen needle (32G X 4 MM) 32G X 4 MM miscellaneous Use 5 pen needles daily or as directed. 200 each 3     nitroGLYcerin (NITROSTAT) 0.4 MG sublingual tablet Place 1 tablet (0.4 mg) under the tongue every 5 minutes as needed for chest pain For chest pain place 1 tablet under the tongue every 5 minutes for 3 doses. If symptoms persist 5 minutes after 1st dose call 911. 25 tablet 0     pantoprazole (PROTONIX) 40 MG EC tablet Take 1 tablet (40 mg) by mouth 2 times daily 180 tablet 2     sacubitril-valsartan (ENTRESTO) 49-51 MG per tablet Take 1 tablet by mouth daily 30 tablet 0     sacubitril-valsartan (ENTRESTO)  MG per tablet Take 1 tablet by mouth At Bedtime 30 tablet 0     Sharps Container MISC 1 Device every 30 days 1 each 3     vitamin  B complex with vitamin C (VITAMIN  B COMPLEX) TABS Take 1 tablet by mouth daily         ROS:  Review Of Systems  Skin: negative  Eyes: negative  Ears/Nose/Throat: negative  Respiratory: No shortness of breath, dyspnea on exertion, cough, or hemoptysis  Cardiovascular: negative and lower extremity edema  Gastrointestinal: negative  Genitourinary: negative  Musculoskeletal: negative  Neurologic: negative  Psychiatric: negative  Hematologic/Lymphatic/Immunologic: negative  Endocrine: negative      EXAM:  /66 (BP Location: Right arm, Patient Position: Chair, Cuff Size: Adult Regular)   Pulse 74   Ht 1.727 m (5' 8\")   Wt " 98.9 kg (218 lb)   SpO2 97%   BMI 33.15 kg/m     Home weight: see above  General: alert, articulate, and in no acute distress.  HEENT: normocephalic, atraumatic, anicteric sclera, EOMI, mucosa moist, no cyanosis.   Neck: neck supple.  No adenopathy, masses, or carotid bruits.  JVP not detected at 45 degrees  Heart: regular rhythm, normal S1/S2, no murmur, gallop, rub.  Precordium quiet with normal PMI.     Lungs: clear, no rales, ronchi, or wheezing.  No accessory muscle use, respirations unlabored.   Abdomen: soft, non-tender, bowel sounds present, no hepatomegaly  Extremities:1 +edema.   No cyanosis.    Neurological: alert and oriented x 3.  normal speech and affect, no gross motor deficits  Skin:  No ecchymoses, rashes, or clubbing.    Labs:  CBC RESULTS:  Lab Results   Component Value Date    WBC 6.0 11/21/2019    RBC 4.34 (L) 11/21/2019    HGB 11.3 (L) 11/21/2019    HCT 37.6 (L) 11/21/2019    MCV 87 11/21/2019    MCH 26.0 (L) 11/21/2019    MCHC 30.1 (L) 11/21/2019    RDW 26.8 (H) 11/21/2019     11/21/2019       CMP RESULTS:  Lab Results   Component Value Date     11/21/2019    POTASSIUM 4.6 11/21/2019    CHLORIDE 101 11/21/2019    CO2 26 11/21/2019    ANIONGAP 6 11/21/2019     (H) 11/21/2019    BUN 84 (H) 11/21/2019    CR 2.73 (H) 11/21/2019    GFRESTIMATED 24 (L) 11/21/2019    GFRESTBLACK 28 (L) 11/21/2019    RONNY 8.5 11/21/2019    BILITOTAL 1.1 11/17/2019    ALBUMIN 3.5 11/17/2019    ALKPHOS 116 11/17/2019    ALT 24 11/17/2019    AST 15 11/17/2019        INR RESULTS:  Lab Results   Component Value Date    INR 1.15 (H) 11/21/2019       No components found for: CK  Lab Results   Component Value Date    MAG 2.7 (H) 11/17/2019     Lab Results   Component Value Date    NTBNP 7,978 (H) 10/07/2019     @BRIEFLABR (dig)@    Most recent echocardiogram:  No results found for this or any previous visit (from the past 8760 hour(s)).      Assessment and Plan:    In summary,Cole  is a  59 year old  male who is here for a follow up appt with [facility]. He is interested in the cardiomems he would like to consider it, however he wants to discuss it with Dr. Trujillo. He will be seen in 2 weeks in [facility]. Patient is very happy with how he feels.  His creatinine is higher and we will titrate the Entresto down to 49-51 BID. He is euvolemic today.      # Chronic systolic heart failure/HFrEF (EF 35%) secondary to ischemic cardiomyopathy  NYHA Symptom Class III  Stage C  Primary Cardiologist: Dr. Trujillo; Last seen 10/31/2019  ACE-I/ARB/ARNi: Entresto 49-51 BID   BB- none due to low output failure .   Aldosterone antagonist contraindicated due to renal dysfunction  SCD prophylaxis:AICD has been previous discussed and patient did not feel over the years that it was desirable (extensive discussion of risks and benefits)  Fluid status  Euvolemic - continue the 80 mg Lasix BID  Follow-up [facility] in two weeks     # Iron Deficiency Anemia    - Continue IV Iron infusions with outside provider- one IV infusion is completed.        # A.fib   -  rate controlled per last EKG  - Pt has had 2 large GIB on coumadin in the past, he does not want to retrial       Follow-up and plan:   [facility] in 2 weeks   Dr. Trujillo on 12/12.      Dian HAN, CNP  [facility]

## 2019-11-21 NOTE — PROGRESS NOTES
HPI: Cole is a 59 year old  or  male with a past medical history of acute occlusion of the proximal right coronary artery, treated successfully with balloon angioplasty and stenting in 6/2019.  The patient's course was complicated by atrial fibrillation.  He has a remote history of atrial fibrillation, treated with Warfarin and complicated by gastrointestinal bleeding.  At the time (prior to my/Dr. Trujillo's assumption of care) he was advised to stay off of warfarin (2013).  He has had no documented recurrence of atrial fibrillation with episodic monitoring including ZEOPATCH. He was put back on warfarin and had GIB from his stomach, gastric lesions per patient. He also has a history of a large GIB back around 2011. He does not know or perceive being out of rhythm.  His most recent echocardiogram (technically difficult showed stable ejection fraction in the range of 35%.    Patient reports no SOB. He says he has no swelling in his abdomen or legs.  Appetite is good. Weight at home 214 lbs. BP at home 102/66. No lightheaded no dizziness. He is very conversational today. He feels so much better after leaving the hospital. He is taking 80 mgLasix BID.    Denies SOB, KING, PND, orthopnea, edema, weight gain, chest pain, palpitations, lightheadedness, dizziness, near syncopal/syncopal episodes. Cole has been following salt and fluid restrictions.      PAST MEDICAL HISTORY:  Past Medical History:   Diagnosis Date     Anemia in chronic renal disease 3/9/2015     Antiplatelet or antithrombotic long-term use      Atrial fibrillation and flutter      CAD (coronary artery disease)     Stemi in 12/11, s/p angioplasty     CRD (chronic renal disease), stage IV (H) 03/09/2015    hx ATN with dialysis complicating cardiogenic shock  1/2012     GI bleed     massive lower GI bleed secondary to a cecal ulcer, s/p ileocecal resection in 12/11     Heart failure     Biventricular systolic HF,  complicated by ARDS requiring tracheostomy     Hyperlipidemia LDL goal <100 2013     Hypertension      Ischemic cardiomyopathy 2013    TTE revealing 40% EF     Myocardial infarction (H)      Other and unspecified nonspecific immunological findings     Anti JKa     Pulmonary edema     episodes of flash pulmonary edema in      Subclinical hypothyroidism        FAMILY HISTORY:  Family History   Problem Relation Age of Onset     Diabetes Mother      Hypertension Mother      Heart Disease Mother      Heart Disease Father          of heart attack, left when he was young     Diabetes Sister      Diabetes Sister      Diabetes Sister        SOCIAL HISTORY:  Social History     Tobacco Use     Smoking status: Former Smoker     Last attempt to quit: 12/3/2011     Years since quittin.9     Smokeless tobacco: Never Used   Substance Use Topics     Alcohol use: No     Alcohol/week: 0.0 standard drinks     Drug use: No           CURRENT MEDICATIONS:  Current Outpatient Medications   Medication Sig Dispense Refill     ACCU-CHEK GUIDE test strip USE TO TEST BLOOD SUGAR FOUR TIMES A DAY BEFORE MEALS AND AT BEDTIME 400 each 3     Alcohol Swabs PADS 1 applicator 4 times daily (before meals and nightly) 100 each 3     amLODIPine (NORVASC) 2.5 MG tablet Take 3 tablets (7.5 mg) by mouth daily 90 tablet 0     aspirin (ASA) 81 MG chewable tablet Take 81 mg by mouth daily       atorvastatin (LIPITOR) 80 MG tablet Take 1 tablet (80 mg) by mouth daily 90 tablet 3     blood glucose monitoring (ACCU-CHEK FASTCLIX) lancets USE TO TEST BLOOD SUGAR FOUR TIMES A DAY AT MEALS AND AT BEDTIME 400 each 3     cholecalciferol (VITAMIN  -D) 1000 UNITS capsule Take 2 capsules (2,000 Units) by mouth daily (Patient taking differently: Take 1 capsule by mouth 2 times daily ) 60 capsule 3     clopidogrel (PLAVIX) 75 MG tablet Take 1 tablet (75 mg) by mouth daily 90 tablet 3     ferrous sulfate (FEROSUL) 325 (65 Fe) MG tablet Take 1 tablet  "(325 mg) by mouth every other day 15 tablet 0     furosemide (LASIX) 80 MG tablet Take 1 tablet (80 mg) by mouth 2 times daily 60 tablet 0     insulin glargine (LANTUS PEN) 100 UNIT/ML pen Inject 7 Units Subcutaneous every morning 6 mL 3     insulin pen needle (32G X 4 MM) 32G X 4 MM miscellaneous Use 5 pen needles daily or as directed. 200 each 3     nitroGLYcerin (NITROSTAT) 0.4 MG sublingual tablet Place 1 tablet (0.4 mg) under the tongue every 5 minutes as needed for chest pain For chest pain place 1 tablet under the tongue every 5 minutes for 3 doses. If symptoms persist 5 minutes after 1st dose call 911. 25 tablet 0     pantoprazole (PROTONIX) 40 MG EC tablet Take 1 tablet (40 mg) by mouth 2 times daily 180 tablet 2     sacubitril-valsartan (ENTRESTO) 49-51 MG per tablet Take 1 tablet by mouth daily 30 tablet 0     sacubitril-valsartan (ENTRESTO)  MG per tablet Take 1 tablet by mouth At Bedtime 30 tablet 0     Sharps Container MISC 1 Device every 30 days 1 each 3     vitamin  B complex with vitamin C (VITAMIN  B COMPLEX) TABS Take 1 tablet by mouth daily         ROS:  Review Of Systems  Skin: negative  Eyes: negative  Ears/Nose/Throat: negative  Respiratory: No shortness of breath, dyspnea on exertion, cough, or hemoptysis  Cardiovascular: negative and lower extremity edema  Gastrointestinal: negative  Genitourinary: negative  Musculoskeletal: negative  Neurologic: negative  Psychiatric: negative  Hematologic/Lymphatic/Immunologic: negative  Endocrine: negative      EXAM:  /66 (BP Location: Right arm, Patient Position: Chair, Cuff Size: Adult Regular)   Pulse 74   Ht 1.727 m (5' 8\")   Wt 98.9 kg (218 lb)   SpO2 97%   BMI 33.15 kg/m    Home weight: see above  General: alert, articulate, and in no acute distress.  HEENT: normocephalic, atraumatic, anicteric sclera, EOMI, mucosa moist, no cyanosis.   Neck: neck supple.  No adenopathy, masses, or carotid bruits.  JVP not detected at 45 " degrees  Heart: regular rhythm, normal S1/S2, no murmur, gallop, rub.  Precordium quiet with normal PMI.     Lungs: clear, no rales, ronchi, or wheezing.  No accessory muscle use, respirations unlabored.   Abdomen: soft, non-tender, bowel sounds present, no hepatomegaly  Extremities:1 +edema.   No cyanosis.    Neurological: alert and oriented x 3.  normal speech and affect, no gross motor deficits  Skin:  No ecchymoses, rashes, or clubbing.    Labs:  CBC RESULTS:  Lab Results   Component Value Date    WBC 6.0 11/21/2019    RBC 4.34 (L) 11/21/2019    HGB 11.3 (L) 11/21/2019    HCT 37.6 (L) 11/21/2019    MCV 87 11/21/2019    MCH 26.0 (L) 11/21/2019    MCHC 30.1 (L) 11/21/2019    RDW 26.8 (H) 11/21/2019     11/21/2019       CMP RESULTS:  Lab Results   Component Value Date     11/21/2019    POTASSIUM 4.6 11/21/2019    CHLORIDE 101 11/21/2019    CO2 26 11/21/2019    ANIONGAP 6 11/21/2019     (H) 11/21/2019    BUN 84 (H) 11/21/2019    CR 2.73 (H) 11/21/2019    GFRESTIMATED 24 (L) 11/21/2019    GFRESTBLACK 28 (L) 11/21/2019    RONNY 8.5 11/21/2019    BILITOTAL 1.1 11/17/2019    ALBUMIN 3.5 11/17/2019    ALKPHOS 116 11/17/2019    ALT 24 11/17/2019    AST 15 11/17/2019        INR RESULTS:  Lab Results   Component Value Date    INR 1.15 (H) 11/21/2019       No components found for: CK  Lab Results   Component Value Date    MAG 2.7 (H) 11/17/2019     Lab Results   Component Value Date    NTBNP 7,978 (H) 10/07/2019     @BRIEFLABR (dig)@    Most recent echocardiogram:  No results found for this or any previous visit (from the past 8760 hour(s)).      Assessment and Plan:    In summary,Cole  is a  59 year old male who is here for a follow up appt with [facility]. He is interested in the cardiomems he would like to consider it, however he wants to discuss it with Dr. Trujillo. He will be seen in 2 weeks in [facility]. Patient is very happy with how he feels.  His creatinine is higher and we will titrate the Entresto  down to 49-51 BID. He is euvolemic today.      # Chronic systolic heart failure/HFrEF (EF 35%) secondary to ischemic cardiomyopathy  NYHA Symptom Class III  Stage C  Primary Cardiologist: Dr. Trujillo; Last seen 10/31/2019  ACE-I/ARB/ARNi: Entresto 49-51 BID   BB- none due to low output failure .   Aldosterone antagonist contraindicated due to renal dysfunction  SCD prophylaxis:AICD has been previous discussed and patient did not feel over the years that it was desirable (extensive discussion of risks and benefits)  Fluid status Euvolemic - continue the 80 mg Lasix BID  Follow-up [facility] in two weeks     # Iron Deficiency Anemia    - Continue IV Iron infusions with outside provider- one IV infusion is completed.        # A.fib   -  rate controlled per last EKG  - Pt has had 2 large GIB on coumadin in the past, he does not want to retrial       Follow-up and plan:   [facility] in 2 weeks   Dr. Trujillo on 12/12.      Dian HAN, CNP  [facility]

## 2019-11-26 DIAGNOSIS — I50.22 CHRONIC SYSTOLIC CONGESTIVE HEART FAILURE (H): Primary | ICD-10-CM

## 2019-12-05 ENCOUNTER — OFFICE VISIT (OUTPATIENT)
Dept: CARDIOLOGY | Facility: CLINIC | Age: 59
End: 2019-12-05
Attending: NURSE PRACTITIONER
Payer: MEDICAID

## 2019-12-05 VITALS
DIASTOLIC BLOOD PRESSURE: 73 MMHG | OXYGEN SATURATION: 95 % | BODY MASS INDEX: 33.34 KG/M2 | HEIGHT: 68 IN | WEIGHT: 220 LBS | HEART RATE: 82 BPM | SYSTOLIC BLOOD PRESSURE: 123 MMHG

## 2019-12-05 DIAGNOSIS — I50.22 CHRONIC SYSTOLIC CONGESTIVE HEART FAILURE (H): Primary | ICD-10-CM

## 2019-12-05 DIAGNOSIS — I48.91 ATRIAL FIBRILLATION, UNSPECIFIED TYPE (H): Chronic | ICD-10-CM

## 2019-12-05 DIAGNOSIS — I27.21 SEVERE PULMONARY ARTERIAL SYSTOLIC HYPERTENSION (H): ICD-10-CM

## 2019-12-05 DIAGNOSIS — N18.4 CKD (CHRONIC KIDNEY DISEASE) STAGE 4, GFR 15-29 ML/MIN (H): Chronic | ICD-10-CM

## 2019-12-05 DIAGNOSIS — I50.22 CHRONIC SYSTOLIC CONGESTIVE HEART FAILURE (H): ICD-10-CM

## 2019-12-05 DIAGNOSIS — I25.5 ISCHEMIC CARDIOMYOPATHY: ICD-10-CM

## 2019-12-05 LAB
ANION GAP SERPL CALCULATED.3IONS-SCNC: 6 MMOL/L (ref 3–14)
BUN SERPL-MCNC: 60 MG/DL (ref 7–30)
CALCIUM SERPL-MCNC: 8.7 MG/DL (ref 8.5–10.1)
CHLORIDE SERPL-SCNC: 106 MMOL/L (ref 94–109)
CO2 SERPL-SCNC: 25 MMOL/L (ref 20–32)
CREAT SERPL-MCNC: 3.12 MG/DL (ref 0.66–1.25)
GFR SERPL CREATININE-BSD FRML MDRD: 20 ML/MIN/{1.73_M2}
GLUCOSE SERPL-MCNC: 138 MG/DL (ref 70–99)
INR PPP: 1.19 (ref 0.86–1.14)
POTASSIUM SERPL-SCNC: 4.2 MMOL/L (ref 3.4–5.3)
SODIUM SERPL-SCNC: 137 MMOL/L (ref 133–144)

## 2019-12-05 PROCEDURE — G0463 HOSPITAL OUTPT CLINIC VISIT: HCPCS | Mod: ZF

## 2019-12-05 PROCEDURE — 85610 PROTHROMBIN TIME: CPT | Performed by: NURSE PRACTITIONER

## 2019-12-05 PROCEDURE — 99214 OFFICE O/P EST MOD 30 MIN: CPT | Mod: ZP | Performed by: NURSE PRACTITIONER

## 2019-12-05 PROCEDURE — 36415 COLL VENOUS BLD VENIPUNCTURE: CPT | Performed by: NURSE PRACTITIONER

## 2019-12-05 PROCEDURE — 80048 BASIC METABOLIC PNL TOTAL CA: CPT | Performed by: NURSE PRACTITIONER

## 2019-12-05 RX ORDER — FUROSEMIDE 20 MG
20 TABLET ORAL DAILY
Qty: 60 TABLET | Refills: 1 | Status: SHIPPED | OUTPATIENT
Start: 2019-12-05 | End: 2020-01-24

## 2019-12-05 RX ORDER — FUROSEMIDE 80 MG
80 TABLET ORAL
Qty: 60 TABLET | Refills: 0 | Status: SHIPPED | OUTPATIENT
Start: 2019-12-05 | End: 2020-01-24

## 2019-12-05 ASSESSMENT — MIFFLIN-ST. JEOR: SCORE: 1777.41

## 2019-12-05 ASSESSMENT — PAIN SCALES - GENERAL: PAINLEVEL: NO PAIN (0)

## 2019-12-05 NOTE — PROGRESS NOTES
HPI: Cole is a 59 year old  or  male with a past medical history of acute occlusion of the proximal right coronary artery, treated successfully with balloon angioplasty and stenting in 6/2019.  The patient's course was complicated by atrial fibrillation.  He has a remote history of atrial fibrillation, treated with Warfarin and complicated by gastrointestinal bleeding.  At the time (prior to my/Dr. Trujillo's assumption of care) he was advised to stay off of warfarin (2013).  He has had no documented recurrence of atrial fibrillation with episodic monitoring including ZEOPATCH. He was put back on warfarin and had GIB from his stomach, gastric lesions per patient. He also has a history of a large GIB back around 2011. He does not know or perceive being out of rhythm.  His most recent echocardiogram (technically difficult showed stable ejection fraction in the range of 35%.    Patient reports no SOB. His wife feels he is breathing with his belly. She feels there is some swelling in his abdomen and legs.  Appetite is good, he is eating more.  Weight at home 218-219 lbs. BP at home 106/67. No lightheaded no dizziness.   He is taking 80 mg Lasix BID.  He is walking much more and he doesn't need to stop. He goes up and down the stairs more without any shortness of breath.     Denies SOB, KING, PND, orthopnea, edema, weight gain, chest pain, palpitations, lightheadedness, dizziness, near syncopal/syncopal episodes. Cole has been following salt and fluid restrictions.      PAST MEDICAL HISTORY:  Past Medical History:   Diagnosis Date     Anemia in chronic renal disease 3/9/2015     Antiplatelet or antithrombotic long-term use      Atrial fibrillation and flutter      CAD (coronary artery disease)     Stemi in 12/11, s/p angioplasty     CRD (chronic renal disease), stage IV (H) 03/09/2015    hx ATN with dialysis complicating cardiogenic shock  1/2012     GI bleed     massive lower GI bleed  secondary to a cecal ulcer, s/p ileocecal resection in      Heart failure     Biventricular systolic HF, complicated by ARDS requiring tracheostomy     Hyperlipidemia LDL goal <100 2013     Hypertension      Ischemic cardiomyopathy 2013    TTE revealing 40% EF     Myocardial infarction (H)      Other and unspecified nonspecific immunological findings     Anti JKa     Pulmonary edema     episodes of flash pulmonary edema in      Subclinical hypothyroidism        FAMILY HISTORY:  Family History   Problem Relation Age of Onset     Diabetes Mother      Hypertension Mother      Heart Disease Mother      Heart Disease Father          of heart attack, left when he was young     Diabetes Sister      Diabetes Sister      Diabetes Sister        SOCIAL HISTORY:  Social History     Tobacco Use     Smoking status: Former Smoker     Last attempt to quit: 12/3/2011     Years since quittin.0     Smokeless tobacco: Never Used   Substance Use Topics     Alcohol use: No     Alcohol/week: 0.0 standard drinks     Drug use: No           CURRENT MEDICATIONS:  Current Outpatient Medications   Medication Sig Dispense Refill     ACCU-CHEK GUIDE test strip USE TO TEST BLOOD SUGAR FOUR TIMES A DAY BEFORE MEALS AND AT BEDTIME 400 each 3     Alcohol Swabs PADS 1 applicator 4 times daily (before meals and nightly) 100 each 3     amLODIPine (NORVASC) 2.5 MG tablet Take 3 tablets (7.5 mg) by mouth daily 90 tablet 0     aspirin (ASA) 81 MG chewable tablet Take 81 mg by mouth daily       atorvastatin (LIPITOR) 80 MG tablet Take 1 tablet (80 mg) by mouth daily 90 tablet 3     blood glucose monitoring (ACCU-CHEK FASTCLIX) lancets USE TO TEST BLOOD SUGAR FOUR TIMES A DAY AT MEALS AND AT BEDTIME 400 each 3     cholecalciferol (VITAMIN  -D) 1000 UNITS capsule Take 2 capsules (2,000 Units) by mouth daily (Patient taking differently: Take 1 capsule by mouth 2 times daily ) 60 capsule 3     clopidogrel (PLAVIX) 75 MG tablet Take 1  "tablet (75 mg) by mouth daily 90 tablet 3     ferrous sulfate (FEROSUL) 325 (65 Fe) MG tablet Take 1 tablet (325 mg) by mouth every other day 15 tablet 0     furosemide (LASIX) 80 MG tablet Take 1 tablet (80 mg) by mouth 2 times daily 60 tablet 0     insulin glargine (LANTUS PEN) 100 UNIT/ML pen Inject 7 Units Subcutaneous every morning 6 mL 3     insulin pen needle (32G X 4 MM) 32G X 4 MM miscellaneous Use 5 pen needles daily or as directed. 200 each 3     nitroGLYcerin (NITROSTAT) 0.4 MG sublingual tablet Place 1 tablet (0.4 mg) under the tongue every 5 minutes as needed for chest pain For chest pain place 1 tablet under the tongue every 5 minutes for 3 doses. If symptoms persist 5 minutes after 1st dose call 911. 25 tablet 0     pantoprazole (PROTONIX) 40 MG EC tablet Take 1 tablet (40 mg) by mouth 2 times daily 180 tablet 2     sacubitril-valsartan (ENTRESTO) 49-51 MG per tablet Take 1 tablet by mouth 2 times daily 30 tablet 0     Sharps Container MISC 1 Device every 30 days 1 each 3     vitamin  B complex with vitamin C (VITAMIN  B COMPLEX) TABS Take 1 tablet by mouth daily         ROS:  Review Of Systems  Skin: negative  Eyes: negative  Ears/Nose/Throat: negative  Respiratory: No shortness of breath, dyspnea on exertion, cough, or hemoptysis  Cardiovascular: negative and lower extremity edema  Gastrointestinal: negative  Genitourinary: negative  Musculoskeletal: negative  Neurologic: negative  Psychiatric: negative  Hematologic/Lymphatic/Immunologic: negative  Endocrine: negative      EXAM:  /73 (BP Location: Right arm, Patient Position: Chair, Cuff Size: Adult Regular)   Pulse 94   Ht 1.727 m (5' 8\")   Wt 99.8 kg (220 lb)   SpO2 95%   BMI 33.45 kg/m    Home weight: see above  General: alert, articulate, and in no acute distress.  HEENT: normocephalic, atraumatic, anicteric sclera, EOMI, mucosa moist, no cyanosis.   Neck: neck supple.  No adenopathy, masses, or carotid bruits.  JVP 12 cm 45 " degrees  Heart: regular rhythm, normal S1/S2, no murmur, gallop, rub.  Precordium quiet with normal PMI.     Lungs: clear, no rales, ronchi, or wheezing.  No accessory muscle use, respirations unlabored.   Abdomen: soft, non-tender, bowel sounds present, no hepatomegaly  Extremities:1-2 +edema.   No cyanosis.    Neurological: alert and oriented x 3.  normal speech and affect, no gross motor deficits  Skin:  No ecchymoses, rashes, or clubbing.    Labs:  CBC RESULTS:  Lab Results   Component Value Date    WBC 6.0 11/21/2019    RBC 4.34 (L) 11/21/2019    HGB 11.3 (L) 11/21/2019    HCT 37.6 (L) 11/21/2019    MCV 87 11/21/2019    MCH 26.0 (L) 11/21/2019    MCHC 30.1 (L) 11/21/2019    RDW 26.8 (H) 11/21/2019     11/21/2019       CMP RESULTS:  Lab Results   Component Value Date     12/05/2019    POTASSIUM 4.2 12/05/2019    CHLORIDE 106 12/05/2019    CO2 25 12/05/2019    ANIONGAP 6 12/05/2019     (H) 12/05/2019    BUN 60 (H) 12/05/2019    CR 3.12 (H) 12/05/2019    GFRESTIMATED 20 (L) 12/05/2019    GFRESTBLACK 24 (L) 12/05/2019    RONNY 8.7 12/05/2019    BILITOTAL 1.1 11/17/2019    ALBUMIN 3.5 11/17/2019    ALKPHOS 116 11/17/2019    ALT 24 11/17/2019    AST 15 11/17/2019        INR RESULTS:  Lab Results   Component Value Date    INR 1.19 (H) 12/05/2019       No components found for: CK  Lab Results   Component Value Date    MAG 2.7 (H) 11/17/2019     Lab Results   Component Value Date    NTBNP 7,978 (H) 10/07/2019     @BRIEFLABR (dig)@    Most recent echocardiogram:  No results found for this or any previous visit (from the past 8760 hour(s)).      Assessment and Plan:    In summary,Cole  is a  59 year old male who is here for a follow up appt with [facility]. Patient is very happy with how he feels.  He is interested in the cardiomems he would like to consider it, however he wants to discuss it with Dr. Trujillo.   His creatinine is higher today . He appears hypervolemic today on exam and per symptoms and will  benefit from an increase in Lasix.       # Chronic systolic heart failure/HFrEF (EF 35%) secondary to ischemic cardiomyopathy  NYHA Symptom Class III  Stage C  Primary Cardiologist: Dr. Trujillo; Last seen 10/31/2019  ACE-I/ARB/ARNi: Entresto 49-51 BID   BB- none due to low output failure .   Aldosterone antagonist contraindicated due to renal dysfunction  SCD prophylaxis:AICD has been previous discussed and patient did not feel over the years that it was desirable (extensive discussion of risks and benefits)  Fluid status hypervolemic - increase to 100 mg Lasix BID  Follow-up [facility] in two weeks     # Iron Deficiency Anemia    - Continue IV Iron infusions with outside provider- one IV infusion is completed.        # A.fib   -  rate controlled per last EKG  - Pt has had 2 large GIB on coumadin in the past, he does not want to retrial       Follow-up and plan:     Lasix 100 mg BID until seen by Dr. Trujillo next week to reassess.   Dr. Trujillo on 12/12.      Dian HAN, CNP  [facility]

## 2019-12-05 NOTE — LETTER
12/5/2019      RE: Cole Jesus  [address]  [facility] [address]       Dear Colleague,    Thank you for the opportunity to participate in the care of your patient, Cole Jesus, at the [facility]. Please see a copy of my visit note below.        HPI: Cole is a 59 year old  or  male with a past medical history of acute occlusion of the proximal right coronary artery, treated successfully with balloon angioplasty and stenting in 6/2019.  The patient's course was complicated by atrial fibrillation.  He has a remote history of atrial fibrillation, treated with Warfarin and complicated by gastrointestinal bleeding.  At the time (prior to my/Dr. Trujillo's assumption of care) he was advised to stay off of warfarin (2013).  He has had no documented recurrence of atrial fibrillation with episodic monitoring including ZEOPATCH. He was put back on warfarin and had GIB from his stomach, gastric lesions per patient. He also has a history of a large GIB back around 2011. He does not know or perceive being out of rhythm.  His most recent echocardiogram (technically difficult showed stable ejection fraction in the range of 35%.    Patient reports no SOB. His wife feels he is breathing with his belly. She feels there is some swelling in his abdomen and legs.  Appetite is good, he is eating more.  Weight at home 218-219 lbs. BP at home 106/67. No lightheaded no dizziness.   He is taking 80 mg Lasix BID.  He is walking much more and he doesn't need to stop. He goes up and down the stairs more without any shortness of breath.     Denies SOB, KING, PND, orthopnea, edema, weight gain, chest pain, palpitations, lightheadedness, dizziness, near syncopal/syncopal episodes. Cole has been following salt and fluid restrictions.      PAST MEDICAL HISTORY:  Past Medical History:   Diagnosis Date     Anemia in chronic renal disease  3/9/2015     Antiplatelet or antithrombotic long-term use      Atrial fibrillation and flutter      CAD (coronary artery disease)     Stemi in , s/p angioplasty     CRD (chronic renal disease), stage IV (H) 2015    hx ATN with dialysis complicating cardiogenic shock  2012     GI bleed     massive lower GI bleed secondary to a cecal ulcer, s/p ileocecal resection in      Heart failure     Biventricular systolic HF, complicated by ARDS requiring tracheostomy     Hyperlipidemia LDL goal <100 2013     Hypertension      Ischemic cardiomyopathy 2013    TTE revealing 40% EF     Myocardial infarction (H)      Other and unspecified nonspecific immunological findings     Anti JKa     Pulmonary edema     episodes of flash pulmonary edema in      Subclinical hypothyroidism        FAMILY HISTORY:  Family History   Problem Relation Age of Onset     Diabetes Mother      Hypertension Mother      Heart Disease Mother      Heart Disease Father          of heart attack, left when he was young     Diabetes Sister      Diabetes Sister      Diabetes Sister        SOCIAL HISTORY:  Social History     Tobacco Use     Smoking status: Former Smoker     Last attempt to quit: 12/3/2011     Years since quittin.0     Smokeless tobacco: Never Used   Substance Use Topics     Alcohol use: No     Alcohol/week: 0.0 standard drinks     Drug use: No           CURRENT MEDICATIONS:  Current Outpatient Medications   Medication Sig Dispense Refill     ACCU-CHEK GUIDE test strip USE TO TEST BLOOD SUGAR FOUR TIMES A DAY BEFORE MEALS AND AT BEDTIME 400 each 3     Alcohol Swabs PADS 1 applicator 4 times daily (before meals and nightly) 100 each 3     amLODIPine (NORVASC) 2.5 MG tablet Take 3 tablets (7.5 mg) by mouth daily 90 tablet 0     aspirin (ASA) 81 MG chewable tablet Take 81 mg by mouth daily       atorvastatin (LIPITOR) 80 MG tablet Take 1 tablet (80 mg) by mouth daily 90 tablet 3     blood glucose monitoring  (ACCU-CHEK FASTCLIX) lancets USE TO TEST BLOOD SUGAR FOUR TIMES A DAY AT MEALS AND AT BEDTIME 400 each 3     cholecalciferol (VITAMIN  -D) 1000 UNITS capsule Take 2 capsules (2,000 Units) by mouth daily (Patient taking differently: Take 1 capsule by mouth 2 times daily ) 60 capsule 3     clopidogrel (PLAVIX) 75 MG tablet Take 1 tablet (75 mg) by mouth daily 90 tablet 3     ferrous sulfate (FEROSUL) 325 (65 Fe) MG tablet Take 1 tablet (325 mg) by mouth every other day 15 tablet 0     furosemide (LASIX) 80 MG tablet Take 1 tablet (80 mg) by mouth 2 times daily 60 tablet 0     insulin glargine (LANTUS PEN) 100 UNIT/ML pen Inject 7 Units Subcutaneous every morning 6 mL 3     insulin pen needle (32G X 4 MM) 32G X 4 MM miscellaneous Use 5 pen needles daily or as directed. 200 each 3     nitroGLYcerin (NITROSTAT) 0.4 MG sublingual tablet Place 1 tablet (0.4 mg) under the tongue every 5 minutes as needed for chest pain For chest pain place 1 tablet under the tongue every 5 minutes for 3 doses. If symptoms persist 5 minutes after 1st dose call 911. 25 tablet 0     pantoprazole (PROTONIX) 40 MG EC tablet Take 1 tablet (40 mg) by mouth 2 times daily 180 tablet 2     sacubitril-valsartan (ENTRESTO) 49-51 MG per tablet Take 1 tablet by mouth 2 times daily 30 tablet 0     Sharps Container MISC 1 Device every 30 days 1 each 3     vitamin  B complex with vitamin C (VITAMIN  B COMPLEX) TABS Take 1 tablet by mouth daily         ROS:  Review Of Systems  Skin: negative  Eyes: negative  Ears/Nose/Throat: negative  Respiratory: No shortness of breath, dyspnea on exertion, cough, or hemoptysis  Cardiovascular: negative and lower extremity edema  Gastrointestinal: negative  Genitourinary: negative  Musculoskeletal: negative  Neurologic: negative  Psychiatric: negative  Hematologic/Lymphatic/Immunologic: negative  Endocrine: negative      EXAM:  /73 (BP Location: Right arm, Patient Position: Chair, Cuff Size: Adult Regular)   Pulse  "94   Ht 1.727 m (5' 8\")   Wt 99.8 kg (220 lb)   SpO2 95%   BMI 33.45 kg/m     Home weight: see above  General: alert, articulate, and in no acute distress.  HEENT: normocephalic, atraumatic, anicteric sclera, EOMI, mucosa moist, no cyanosis.   Neck: neck supple.  No adenopathy, masses, or carotid bruits.  JVP 12 cm 45 degrees  Heart: regular rhythm, normal S1/S2, no murmur, gallop, rub.  Precordium quiet with normal PMI.     Lungs: clear, no rales, ronchi, or wheezing.  No accessory muscle use, respirations unlabored.   Abdomen: soft, non-tender, bowel sounds present, no hepatomegaly  Extremities:1-2 +edema.   No cyanosis.    Neurological: alert and oriented x 3.  normal speech and affect, no gross motor deficits  Skin:  No ecchymoses, rashes, or clubbing.    Labs:  CBC RESULTS:  Lab Results   Component Value Date    WBC 6.0 11/21/2019    RBC 4.34 (L) 11/21/2019    HGB 11.3 (L) 11/21/2019    HCT 37.6 (L) 11/21/2019    MCV 87 11/21/2019    MCH 26.0 (L) 11/21/2019    MCHC 30.1 (L) 11/21/2019    RDW 26.8 (H) 11/21/2019     11/21/2019       CMP RESULTS:  Lab Results   Component Value Date     12/05/2019    POTASSIUM 4.2 12/05/2019    CHLORIDE 106 12/05/2019    CO2 25 12/05/2019    ANIONGAP 6 12/05/2019     (H) 12/05/2019    BUN 60 (H) 12/05/2019    CR 3.12 (H) 12/05/2019    GFRESTIMATED 20 (L) 12/05/2019    GFRESTBLACK 24 (L) 12/05/2019    RONNY 8.7 12/05/2019    BILITOTAL 1.1 11/17/2019    ALBUMIN 3.5 11/17/2019    ALKPHOS 116 11/17/2019    ALT 24 11/17/2019    AST 15 11/17/2019        INR RESULTS:  Lab Results   Component Value Date    INR 1.19 (H) 12/05/2019       No components found for: CK  Lab Results   Component Value Date    MAG 2.7 (H) 11/17/2019     Lab Results   Component Value Date    NTBNP 7,338 (H) 10/07/2019     @BRIEFLABR (dig)@    Most recent echocardiogram:  No results found for this or any previous visit (from the past 8760 hour(s)).      Assessment and Plan:    In summary,Cole"  is a  59 year old male who is here for a follow up appt with [facility]. Patient is very happy with how he feels.  He is interested in the cardiomems he would like to consider it, however he wants to discuss it with Dr. Trujillo.   His creatinine is higher today . He appears hypervolemic today on exam and per symptoms and will benefit from an increase in Lasix.       # Chronic systolic heart failure/HFrEF (EF 35%) secondary to ischemic cardiomyopathy  NYHA Symptom Class III  Stage C  Primary Cardiologist: Dr. Trujillo; Last seen 10/31/2019  ACE-I/ARB/ARNi: Entresto 49-51 BID   BB- none due to low output failure .   Aldosterone antagonist contraindicated due to renal dysfunction  SCD prophylaxis:AICD has been previous discussed and patient did not feel over the years that it was desirable (extensive discussion of risks and benefits)  Fluid status hypervolemic - increase to 100 mg Lasix BID  Follow-up [facility] in two weeks     # Iron Deficiency Anemia    - Continue IV Iron infusions with outside provider- one IV infusion is completed.        # A.fib   -  rate controlled per last EKG  - Pt has had 2 large GIB on coumadin in the past, he does not want to retrial       Follow-up and plan:     Lasix 100 mg BID until seen by Dr. Trujillo next week to reassess.   Dr. Trujillo on 12/12.      Dian HAN, CNP  [facility]

## 2019-12-05 NOTE — PATIENT INSTRUCTIONS
Take your medicines every day, as directed    Changes made today:  -  increase the lasix 100 mg twice a day   Monitor Your Weight and Symptoms    Contact us if you:      Gain 2 pounds in one day or 5 pounds in one week    Feel more short of breath    Notice more leg swelling    Feel lightheadeded   Change your lifestyle    Limit Salt or Sodium:    2000 mg  Limit Fluids:    2000 mL or approximately 64 ounces  Eat a Heart Healthy Diet    Low in saturated fats  Stay Active:    Aim to move at least 150 minutes every  week         To Contact us    During Business Hours:  [phone], option # 1 (University)  Then option # 4 (medical questions)     After hours, weekends or holidays:   [phone], Option #4  Ask to speak to the On-Call Cardiologist. Inform them you are a [facility]/heart failure patient at the [facility].     Use [employer] allows you to communicate directly with your heart team through secure messaging.    [employer] can be accessed any time on your phone, computer, or tablet.    If you need assistance, we'd be happy to help!         Keep your Heart Appointments:  Labs 10:00 am  -Dr. Trujillo 12/12

## 2019-12-05 NOTE — NURSING NOTE
Chief Complaint   Patient presents with     Follow Up     Return [facility], 60 yo male, HFrEF, EF 28%, labs prior.      Vitals were taken and medications were reconciled.     Margarita Alston RMA  12:00 PM

## 2019-12-12 ENCOUNTER — OFFICE VISIT (OUTPATIENT)
Dept: CARDIOLOGY | Facility: CLINIC | Age: 59
End: 2019-12-12
Attending: NURSE PRACTITIONER
Payer: MEDICAID

## 2019-12-12 VITALS
BODY MASS INDEX: 33.75 KG/M2 | HEART RATE: 88 BPM | WEIGHT: 222.7 LBS | OXYGEN SATURATION: 90 % | HEIGHT: 68 IN | DIASTOLIC BLOOD PRESSURE: 62 MMHG | SYSTOLIC BLOOD PRESSURE: 104 MMHG

## 2019-12-12 DIAGNOSIS — I50.22 CHRONIC SYSTOLIC CONGESTIVE HEART FAILURE (H): ICD-10-CM

## 2019-12-12 DIAGNOSIS — I48.91 ATRIAL FIBRILLATION, UNSPECIFIED TYPE (H): Chronic | ICD-10-CM

## 2019-12-12 DIAGNOSIS — D62 ANEMIA DUE TO ACUTE BLOOD LOSS: ICD-10-CM

## 2019-12-12 DIAGNOSIS — N18.4 CKD (CHRONIC KIDNEY DISEASE) STAGE 4, GFR 15-29 ML/MIN (H): Chronic | ICD-10-CM

## 2019-12-12 DIAGNOSIS — I25.5 ISCHEMIC CARDIOMYOPATHY: ICD-10-CM

## 2019-12-12 LAB
ANION GAP SERPL CALCULATED.3IONS-SCNC: 10 MMOL/L (ref 3–14)
BUN SERPL-MCNC: 56 MG/DL (ref 7–30)
CALCIUM SERPL-MCNC: 8.7 MG/DL (ref 8.5–10.1)
CHLORIDE SERPL-SCNC: 101 MMOL/L (ref 94–109)
CO2 SERPL-SCNC: 23 MMOL/L (ref 20–32)
CREAT SERPL-MCNC: 3.27 MG/DL (ref 0.66–1.25)
ERYTHROCYTE [DISTWIDTH] IN BLOOD BY AUTOMATED COUNT: 23.9 % (ref 10–15)
FERRITIN SERPL-MCNC: 37 NG/ML (ref 26–388)
GFR SERPL CREATININE-BSD FRML MDRD: 19 ML/MIN/{1.73_M2}
GLUCOSE SERPL-MCNC: 125 MG/DL (ref 70–99)
HCT VFR BLD AUTO: 29.8 % (ref 40–53)
HGB BLD-MCNC: 8.9 G/DL (ref 13.3–17.7)
INR PPP: 1.23 (ref 0.86–1.14)
IRON SATN MFR SERPL: 8 % (ref 15–46)
IRON SERPL-MCNC: 31 UG/DL (ref 35–180)
MCH RBC QN AUTO: 27.7 PG (ref 26.5–33)
MCHC RBC AUTO-ENTMCNC: 29.9 G/DL (ref 31.5–36.5)
MCV RBC AUTO: 93 FL (ref 78–100)
NT-PROBNP SERPL-MCNC: 6050 PG/ML (ref 0–125)
PLATELET # BLD AUTO: 267 10E9/L (ref 150–450)
POTASSIUM SERPL-SCNC: 4 MMOL/L (ref 3.4–5.3)
RBC # BLD AUTO: 3.21 10E12/L (ref 4.4–5.9)
SODIUM SERPL-SCNC: 134 MMOL/L (ref 133–144)
TIBC SERPL-MCNC: 366 UG/DL (ref 240–430)
WBC # BLD AUTO: 7.1 10E9/L (ref 4–11)

## 2019-12-12 PROCEDURE — 83550 IRON BINDING TEST: CPT | Performed by: INTERNAL MEDICINE

## 2019-12-12 PROCEDURE — 82728 ASSAY OF FERRITIN: CPT | Performed by: INTERNAL MEDICINE

## 2019-12-12 PROCEDURE — G0463 HOSPITAL OUTPT CLINIC VISIT: HCPCS | Mod: ZF

## 2019-12-12 PROCEDURE — 80048 BASIC METABOLIC PNL TOTAL CA: CPT | Performed by: INTERNAL MEDICINE

## 2019-12-12 PROCEDURE — 83540 ASSAY OF IRON: CPT | Performed by: INTERNAL MEDICINE

## 2019-12-12 PROCEDURE — 99214 OFFICE O/P EST MOD 30 MIN: CPT | Mod: ZP | Performed by: INTERNAL MEDICINE

## 2019-12-12 PROCEDURE — 83880 ASSAY OF NATRIURETIC PEPTIDE: CPT | Performed by: INTERNAL MEDICINE

## 2019-12-12 PROCEDURE — 85610 PROTHROMBIN TIME: CPT | Performed by: INTERNAL MEDICINE

## 2019-12-12 PROCEDURE — 36415 COLL VENOUS BLD VENIPUNCTURE: CPT | Performed by: INTERNAL MEDICINE

## 2019-12-12 PROCEDURE — 85027 COMPLETE CBC AUTOMATED: CPT | Performed by: INTERNAL MEDICINE

## 2019-12-12 ASSESSMENT — PAIN SCALES - GENERAL: PAINLEVEL: NO PAIN (0)

## 2019-12-12 ASSESSMENT — MIFFLIN-ST. JEOR: SCORE: 1789.66

## 2019-12-12 NOTE — PATIENT INSTRUCTIONS
Thank you for your visit today.  Please call me with any questions or concerns.   Zach Phelan RN  Cardiology Care Coordinator  [phone], press option 1 then option 4

## 2019-12-12 NOTE — NURSING NOTE
Chief Complaint   Patient presents with     Follow Up     history of Afib/flutter s/p ablation (2013) CAD s/p PCI, HTN, and ICM     Vitals were taken and medications were reconciled.     Emily Case  10:18 AM

## 2019-12-12 NOTE — LETTER
"12/12/2019      RE: Cole Jesus  [address]  [facility] [address]       Dear Colleague,    Thank you for the opportunity to participate in the care of your patient, Cole Jesus, at the [facility]. Please see a copy of my visit note below.    Alen Trujillo M.D.  Cardiovascular Medicine    I personally saw and examined this patient, discussed care with housestaff and other consultants, reviewed current laboratories and imaging studies, and conveyed impression and diagnostic/therapeutic plan to patient.    Problem List  Atrial fibrillation, unspecified type (H) [I48.91 (ICD-10-CM)]  Lower GI bleeding [K92.2 (ICD-10-CM)]  Hyperlipidemia LDL goal <100 [E78.5 (ICD-10-CM)]  Ischemic cardiomyopathy [I25.5 (ICD-10-CM)]  Chronic systolic congestive heart failure (H) [I50.22 (ICD-10-CM)]  Essential hypertension with goal blood pressure less than 130/85 [I10 (ICD-10-CM)]  Chronic renal failure      History    Mr. Cole Jesus is a pleasant 59-year-old gentleman with a past medical history of STEMI, acute occlusion of pRCA s/p balloon angioplasty and stenting in 6/2019 (XIENCE JAMILA 3.5 X 28 KEVYN), STEMI s/p DESx3 for total occlusion of RCA and Lcx (2012), GIB due to AV malformation (in the setting of warfarin and DAPT), ischemic cardiomyopathy, HFrEF (last EF=30-35%), severe pHTN, severe mitral regurgitation, atrial fibrillation not on AC due to GIB in the past, CKD stage IV, who presented for elective RHC. Found to be in low cardiac output failure (CI ~ 1.5) , elevated CWP (~43 mmHg). Started on nipride and lasix IV.        Objective  /62 (BP Location: Left arm, Patient Position: Chair, Cuff Size: Adult Regular)   Pulse 88   Ht 1.727 m (5' 8\")   Wt 101 kg (222 lb 11.2 oz)   SpO2 90%   BMI 33.86 kg/m      Constitutional: alert, oriented, normal gait and station, normal mentation.  Oral: moist mucous membranes  Lymph: without pathologic " adenopathy  Chest: clear to ausculation and percussion save basilar rales  Cor: No evidence of left or right ventricular activity.  Rhythm is irregular.  [id number], S2 split physiologically. Murmurs are not present  Abdomen: without tenderness, rebound, guarding, masses, ascites  Extremities: Edema not present  Neuro: no focal defects, normal mentation  Skin: without open lesions  Psych: oriented, verbal, mental status in tact    Wt Readings from Last 5 Encounters:   12/12/19 101 kg (222 lb 11.2 oz)   12/05/19 99.8 kg (220 lb)   11/21/19 98.9 kg (218 lb)   11/17/19 95.6 kg (210 lb 11.2 oz)   11/07/19 103.9 kg (229 lb)       Meds  Current Outpatient Medications   Medication     ACCU-CHEK GUIDE test strip     Alcohol Swabs PADS     amLODIPine (NORVASC) 2.5 MG tablet     aspirin (ASA) 81 MG chewable tablet     atorvastatin (LIPITOR) 80 MG tablet     blood glucose monitoring (ACCU-CHEK FASTCLIX) lancets     cholecalciferol (VITAMIN  -D) 1000 UNITS capsule     clopidogrel (PLAVIX) 75 MG tablet     ferrous sulfate (FEROSUL) 325 (65 Fe) MG tablet     furosemide (LASIX) 20 MG tablet     furosemide (LASIX) 80 MG tablet     insulin glargine (LANTUS PEN) 100 UNIT/ML pen     insulin pen needle (32G X 4 MM) 32G X 4 MM miscellaneous     nitroGLYcerin (NITROSTAT) 0.4 MG sublingual tablet     pantoprazole (PROTONIX) 40 MG EC tablet     sacubitril-valsartan (ENTRESTO) 49-51 MG per tablet     Sharps Container MISC     vitamin  B complex with vitamin C (VITAMIN  B COMPLEX) TABS     No current facility-administered medications for this visit.          Labs    Results for MERCY SHAFER (MRN [id number]) as of 12/12/2019 11:46   Ref. Range 7/23/2019 04:31 7/24/2019 06:11 8/26/2019 19:19 9/9/2019 10:53 9/16/2019 18:54 10/7/2019 09:25 10/31/2019 07:32 11/7/2019 11:03 11/12/2019 08:30 11/13/2019 05:45 11/13/2019 16:17 11/13/2019 20:15 11/14/2019 04:05 11/14/2019 15:53 11/15/2019 03:34 11/15/2019 06:01 11/15/2019 15:32 11/16/2019 05:14  2019 16:07 2019 04:53 2019 10:14 2019 11:30 2019 10:57   Creatinine Latest Ref Range: 0.66 - 1.25 mg/dL 2.92 (H) 2.75 (H) 2.76 (H) 3.07 (H) 2.88 (H) 2.64 (H) 2.64 (H) 2.58 (H) 2.47 (H) 2.38 (H) 2.37 (H) 2.42 (H) 2.37 (H) 2.39 (H) 2.56 (H) 2.58 (H) 2.59 (H) 2.62 (H) 2.69 (H) 2.66 (H) 2.73 (H) 3.12 (H) 3.27 (H)     Results for MERCY SHAFER (MRN [id number]) as of 2019 11:46   Ref. Range 2019 10:57   Sodium Latest Ref Range: 133 - 144 mmol/L 134   Potassium Latest Ref Range: 3.4 - 5.3 mmol/L 4.0   Chloride Latest Ref Range: 94 - 109 mmol/L 101   Carbon Dioxide Latest Ref Range: 20 - 32 mmol/L 23   Urea Nitrogen Latest Ref Range: 7 - 30 mg/dL 56 (H)   Creatinine Latest Ref Range: 0.66 - 1.25 mg/dL 3.27 (H)   GFR Estimate Latest Ref Range: >60 mL/min/1.73_m2 19 (L)   GFR Estimate If Black Latest Ref Range: >60 mL/min/1.73_m2 22 (L)   Calcium Latest Ref Range: 8.5 - 10.1 mg/dL 8.7   Anion Gap Latest Ref Range: 3 - 14 mmol/L 10   Glucose Latest Ref Range: 70 - 99 mg/dL 125 (H)   INR Latest Ref Range: 0.86 - 1.14  1.23 (H)       Imaging   Name: MERCY SHAFER  MRN: [id number]  : 1960  Study Date: 2019 01:07 PM  Age: 59 yrs  Gender: Male  [place] [facility] [place] For Study: Cardiomyopathy  Ordering Physician: JEFF KAN  Referring Physician: ASHLEY KNAPP  Performed By: Christina Peck NIKKO     BSA: 2.1 m2  Height: 68 in  Weight: 214 lb  HR: 86  BP: 85/59 mmHg  _____________________________________________________________________________  __        Procedure  Complete Portable Echo Adult. Contrast Optison. Optison (NDC #1375-9730-61)  given intravenously. Patient was given 5 ml mixture of 3 ml Optison and 6 ml  saline. 4 ml wasted.  _____________________________________________________________________________  __        Interpretation Summary  Moderately (EF 30-35%) reduced left ventricular function is present. Severe  left ventricular dilation is  present based on LVEDV of 109 mL/m^2 and LVIDd  6.7 cm.  Global right ventricular function is moderately reduced. Moderate right  ventricular dilation is present.  Moderate mitral regurgitation.  Estimated pulmonary artery pressure of 42 mmHg.  Small posterior pericardial effusion without any significant echocardiographic  evidence of tamponade.  Dilation of the inferior vena cava is present with normal respiratory  variation in diameter.     This study was compared with the study from 11/7/19 . Compared to previous  study, estimated PA pressure had decreased. IVC size remains same but  collapsing now.  _____________________________________________________________________________  __        Left Ventricle  Left ventricular wall thickness is normal. Severe left ventricular dilation is  present. Indexed LVEDV is 109 mL/m^2. LVIDd 6.7 cm. Moderately (EF 30-35%)  reduced left ventricular function is present. Diastolic function not assessed  due to atrial fibrillation. LVEF 30% by Biplane (Cruz's method). Moderate  diffuse hypokinesis is present.     Right Ventricle  Right ventricular wall thickness is normal. Moderate right ventricular  dilation is present. Global right ventricular function is moderately reduced.  A right heart catheter is noted in the right ventricle.     Atria  Moderate biatrial enlargement is present. Cannot assess inter-atrial septum. A  catheter is noted in the right atrium.     Mitral Valve  Moderate mitral insufficiency is present. EROA 0.3 cm^2. Regurgitant volume  34  mL.        Aortic Valve  The valve leaflets are not well visualized. On Doppler interrogation, there is  no significant stenosis or regurgitation.     Tricuspid Valve  The tricuspid valve is normal. Trace tricuspid insufficiency is present. Right  ventricular systolic pressure is 34mmHg above the right atrial pressure.     Pulmonic Valve  The pulmonic valve cannot be assessed.     Vessels  The thoracic aorta cannot be assessed.  The aorta root cannot be assessed.  Dilation of the inferior vena cava is present with normal respiratory  variation in diameter. IVC diameter and respiratory changes fall into an  intermediate range suggesting an RA pressure of 8 mmHg.     Pericardium  Small posterior pericardial effusion without any significant echocardiographic  evidence of tamponade.        Compared to Previous Study  This study was compared with the study from 19 . Compared to previous  study, estimated PA pressure had decreased.  _____________________________________________________________________________  __  MMode/2D Measurements & Calculations  IVSd: 0.81 cm     LVIDd: 6.7 cm  LVPWd: 0.83 cm  LV mass(C)d: 231.5 grams  LV mass(C)dI: 110.1 grams/m2  Ao root diam: 1.1 cm     EF(MOD-bp): 30.1 %  RWT: 0.25  TAPSE: 0.72 cm        Doppler Measurements & Calculations  MR ERO: 0.25 cm2  MR volume: 32.5 ml  TV max P.6 mmHg  TR max shavonne: 313.9 cm/sec  TR max P.6 mmHg    Heart Catherization    Hemodynamics     Right Heart Pressures     RA    A wave: 26 mmHg  V-wave: 21 mmHg  Mean: 19 mmHg  HR: 86 BPM    RV:     Systolic: 100mmHg  EDP: 19 mmHg  HR: 45 BPM    PA    Systolic: 98 mmHg  Diastolic: 50 mmHg  Mean: 68 mmHg  HR: 75 BPM     PCW:   A wave: 38 mmHg  V wave: 43 mmHg  Mean: 42 mmHg  HR: 75 BPM    CO(Cecilia): 3.45 L/min  CI(Cecilia): 1.61 L/min/m2    CO(TD) 3.2 L/min  CI(TD): 1.49 L/min/m2  Right sided filling pressures are severely elevated.Left sided filling pressures are severely elevated. Severely elevated pulmonary artery hypertension.Reduced cardiac output level.Hemodynamic data has been modified in Epic per physician review.       Assessment/Plan   1. Add iron, ferritin and BNP to todays laboratories  2. Renal appointment with Dr. Musa BUNN  3. BMP next week  4. Discontinue oral iron  5. Me again in one month          Please do not hesitate to contact me if you have any questions/concerns.     Sincerely,     Alen  Ifeanyi Trujillo MD

## 2019-12-12 NOTE — PROGRESS NOTES
"Alen Trujillo M.D.  Cardiovascular Medicine    I personally saw and examined this patient, discussed care with housestaff and other consultants, reviewed current laboratories and imaging studies, and conveyed impression and diagnostic/therapeutic plan to patient.    Problem List  Atrial fibrillation, unspecified type (H) [I48.91 (ICD-10-CM)]  Lower GI bleeding [K92.2 (ICD-10-CM)]  Hyperlipidemia LDL goal <100 [E78.5 (ICD-10-CM)]  Ischemic cardiomyopathy [I25.5 (ICD-10-CM)]  Chronic systolic congestive heart failure (H) [I50.22 (ICD-10-CM)]  Essential hypertension with goal blood pressure less than 130/85 [I10 (ICD-10-CM)]  Chronic renal failure      History    Mr. Cole Jesus is a pleasant 59-year-old gentleman with a past medical history of STEMI, acute occlusion of pRCA s/p balloon angioplasty and stenting in 6/2019 (XIENCE JAMILA 3.5 X 28 KEVYN), STEMI s/p DESx3 for total occlusion of RCA and Lcx (2012), GIB due to AV malformation (in the setting of warfarin and DAPT), ischemic cardiomyopathy, HFrEF (last EF=30-35%), severe pHTN, severe mitral regurgitation, atrial fibrillation not on AC due to GIB in the past, CKD stage IV, who presented for elective RHC. Found to be in low cardiac output failure (CI ~ 1.5) , elevated CWP (~43 mmHg). Started on nipride and lasix IV.        Objective  /62 (BP Location: Left arm, Patient Position: Chair, Cuff Size: Adult Regular)   Pulse 88   Ht 1.727 m (5' 8\")   Wt 101 kg (222 lb 11.2 oz)   SpO2 90%   BMI 33.86 kg/m     Constitutional: alert, oriented, normal gait and station, normal mentation.  Oral: moist mucous membranes  Lymph: without pathologic adenopathy  Chest: clear to ausculation and percussion save basilar rales  Cor: No evidence of left or right ventricular activity.  Rhythm is irregular.  [id number], S2 split physiologically. Murmurs are not present  Abdomen: without tenderness, rebound, guarding, masses, ascites  Extremities: Edema not present  Neuro: " no focal defects, normal mentation  Skin: without open lesions  Psych: oriented, verbal, mental status in tact    Wt Readings from Last 5 Encounters:   12/12/19 101 kg (222 lb 11.2 oz)   12/05/19 99.8 kg (220 lb)   11/21/19 98.9 kg (218 lb)   11/17/19 95.6 kg (210 lb 11.2 oz)   11/07/19 103.9 kg (229 lb)       Meds  Current Outpatient Medications   Medication     ACCU-CHEK GUIDE test strip     Alcohol Swabs PADS     amLODIPine (NORVASC) 2.5 MG tablet     aspirin (ASA) 81 MG chewable tablet     atorvastatin (LIPITOR) 80 MG tablet     blood glucose monitoring (ACCU-CHEK FASTCLIX) lancets     cholecalciferol (VITAMIN  -D) 1000 UNITS capsule     clopidogrel (PLAVIX) 75 MG tablet     ferrous sulfate (FEROSUL) 325 (65 Fe) MG tablet     furosemide (LASIX) 20 MG tablet     furosemide (LASIX) 80 MG tablet     insulin glargine (LANTUS PEN) 100 UNIT/ML pen     insulin pen needle (32G X 4 MM) 32G X 4 MM miscellaneous     nitroGLYcerin (NITROSTAT) 0.4 MG sublingual tablet     pantoprazole (PROTONIX) 40 MG EC tablet     sacubitril-valsartan (ENTRESTO) 49-51 MG per tablet     Sharps Container MISC     vitamin  B complex with vitamin C (VITAMIN  B COMPLEX) TABS     No current facility-administered medications for this visit.          Labs    Results for MERCY SHAFER (MRN [id number]) as of 12/12/2019 11:46   Ref. Range 7/23/2019 04:31 7/24/2019 06:11 8/26/2019 19:19 9/9/2019 10:53 9/16/2019 18:54 10/7/2019 09:25 10/31/2019 07:32 11/7/2019 11:03 11/12/2019 08:30 11/13/2019 05:45 11/13/2019 16:17 11/13/2019 20:15 11/14/2019 04:05 11/14/2019 15:53 11/15/2019 03:34 11/15/2019 06:01 11/15/2019 15:32 11/16/2019 05:14 11/16/2019 16:07 11/17/2019 04:53 11/21/2019 10:14 12/5/2019 11:30 12/12/2019 10:57   Creatinine Latest Ref Range: 0.66 - 1.25 mg/dL 2.92 (H) 2.75 (H) 2.76 (H) 3.07 (H) 2.88 (H) 2.64 (H) 2.64 (H) 2.58 (H) 2.47 (H) 2.38 (H) 2.37 (H) 2.42 (H) 2.37 (H) 2.39 (H) 2.56 (H) 2.58 (H) 2.59 (H) 2.62 (H) 2.69 (H) 2.66 (H) 2.73 (H)  3.12 (H) 3.27 (H)     Results for MERCY SHAFER (MRN [id number]) as of 2019 11:46   Ref. Range 2019 10:57   Sodium Latest Ref Range: 133 - 144 mmol/L 134   Potassium Latest Ref Range: 3.4 - 5.3 mmol/L 4.0   Chloride Latest Ref Range: 94 - 109 mmol/L 101   Carbon Dioxide Latest Ref Range: 20 - 32 mmol/L 23   Urea Nitrogen Latest Ref Range: 7 - 30 mg/dL 56 (H)   Creatinine Latest Ref Range: 0.66 - 1.25 mg/dL 3.27 (H)   GFR Estimate Latest Ref Range: >60 mL/min/1.73_m2 19 (L)   GFR Estimate If Black Latest Ref Range: >60 mL/min/1.73_m2 22 (L)   Calcium Latest Ref Range: 8.5 - 10.1 mg/dL 8.7   Anion Gap Latest Ref Range: 3 - 14 mmol/L 10   Glucose Latest Ref Range: 70 - 99 mg/dL 125 (H)   INR Latest Ref Range: 0.86 - 1.14  1.23 (H)       Imaging   Name: MERCY SHAFER  MRN: [id number]  : 1960  Study Date: 2019 01:07 PM  Age: 59 yrs  Gender: Male  Patient Location: [facility] [place]  Reason For Study: Cardiomyopathy  Ordering Physician: JEFF KAN  Referring Physician: ASHLEY KNAPP  Performed By: Christina Peck RDCS     BSA: 2.1 m2  Height: 68 in  Weight: 214 lb  HR: 86  BP: 85/59 mmHg  _____________________________________________________________________________  __        Procedure  Complete Portable Echo Adult. Contrast Optison. Optison (NDC #8448-1500-38)  given intravenously. Patient was given 5 ml mixture of 3 ml Optison and 6 ml  saline. 4 ml wasted.  _____________________________________________________________________________  __        Interpretation Summary  Moderately (EF 30-35%) reduced left ventricular function is present. Severe  left ventricular dilation is present based on LVEDV of 109 mL/m^2 and LVIDd  6.7 cm.  Global right ventricular function is moderately reduced. Moderate right  ventricular dilation is present.  Moderate mitral regurgitation.  Estimated pulmonary artery pressure of 42 mmHg.  Small posterior pericardial effusion without any significant  echocardiographic  evidence of tamponade.  Dilation of the inferior vena cava is present with normal respiratory  variation in diameter.     This study was compared with the study from 11/7/19 . Compared to previous  study, estimated PA pressure had decreased. IVC size remains same but  collapsing now.  _____________________________________________________________________________  __        Left Ventricle  Left ventricular wall thickness is normal. Severe left ventricular dilation is  present. Indexed LVEDV is 109 mL/m^2. LVIDd 6.7 cm. Moderately (EF 30-35%)  reduced left ventricular function is present. Diastolic function not assessed  due to atrial fibrillation. LVEF 30% by Biplane (Cruz's method). Moderate  diffuse hypokinesis is present.     Right Ventricle  Right ventricular wall thickness is normal. Moderate right ventricular  dilation is present. Global right ventricular function is moderately reduced.  A right heart catheter is noted in the right ventricle.     Atria  Moderate biatrial enlargement is present. Cannot assess inter-atrial septum. A  catheter is noted in the right atrium.     Mitral Valve  Moderate mitral insufficiency is present. EROA 0.3 cm^2. Regurgitant volume  34  mL.        Aortic Valve  The valve leaflets are not well visualized. On Doppler interrogation, there is  no significant stenosis or regurgitation.     Tricuspid Valve  The tricuspid valve is normal. Trace tricuspid insufficiency is present. Right  ventricular systolic pressure is 34mmHg above the right atrial pressure.     Pulmonic Valve  The pulmonic valve cannot be assessed.     Vessels  The thoracic aorta cannot be assessed. The aorta root cannot be assessed.  Dilation of the inferior vena cava is present with normal respiratory  variation in diameter. IVC diameter and respiratory changes fall into an  intermediate range suggesting an RA pressure of 8 mmHg.     Pericardium  Small posterior pericardial effusion without any  significant echocardiographic  evidence of tamponade.        Compared to Previous Study  This study was compared with the study from 19 . Compared to previous  study, estimated PA pressure had decreased.  _____________________________________________________________________________  __  MMode/2D Measurements & Calculations  IVSd: 0.81 cm     LVIDd: 6.7 cm  LVPWd: 0.83 cm  LV mass(C)d: 231.5 grams  LV mass(C)dI: 110.1 grams/m2  Ao root diam: 1.1 cm     EF(MOD-bp): 30.1 %  RWT: 0.25  TAPSE: 0.72 cm        Doppler Measurements & Calculations  MR ERO: 0.25 cm2  MR volume: 32.5 ml  TV max P.6 mmHg  TR max shavonne: 313.9 cm/sec  TR max P.6 mmHg    Heart Catherization    Hemodynamics     Right Heart Pressures     RA    A wave: 26 mmHg  V-wave: 21 mmHg  Mean: 19 mmHg  HR: 86 BPM    RV:     Systolic: 100mmHg  EDP: 19 mmHg  HR: 45 BPM    PA    Systolic: 98 mmHg  Diastolic: 50 mmHg  Mean: 68 mmHg  HR: 75 BPM     PCW:   A wave: 38 mmHg  V wave: 43 mmHg  Mean: 42 mmHg  HR: 75 BPM    CO(Cecilia): 3.45 L/min  CI(Cecilia): 1.61 L/min/m2    CO(TD) 3.2 L/min  CI(TD): 1.49 L/min/m2  Right sided filling pressures are severely elevated.Left sided filling pressures are severely elevated. Severely elevated pulmonary artery hypertension.Reduced cardiac output level.Hemodynamic data has been modified in Epic per physician review.       Assessment/Plan   1. Add iron, ferritin and BNP to todays laboratories  2. Renal appointment with Dr. Musa BUNN  3. BMP next week  4. Discontinue oral iron  5. Me again in one month

## 2019-12-16 ENCOUNTER — DOCUMENTATION ONLY (OUTPATIENT)
Dept: ENDOCRINOLOGY | Facility: CLINIC | Age: 59
End: 2019-12-16

## 2019-12-16 DIAGNOSIS — I10 ESSENTIAL HYPERTENSION WITH GOAL BLOOD PRESSURE LESS THAN 130/85: ICD-10-CM

## 2019-12-16 NOTE — PROGRESS NOTES
Patient did not show up for scheduled diabetes follow up appointment.     No charge for this encounter.     Meron Jackson PA-C  Diabetes Management Service  Pager [phone]

## 2019-12-17 RX ORDER — AMLODIPINE BESYLATE 2.5 MG/1
7.5 TABLET ORAL DAILY
Qty: 270 TABLET | Refills: 3 | Status: ON HOLD | OUTPATIENT
Start: 2019-12-17 | End: 2019-12-21

## 2019-12-17 NOTE — TELEPHONE ENCOUNTER
amLODIPine (NORVASC) 2.5 MG tablet      Last Written Prescription Date:  Inpatient orders only     Last Office Visit : 10/7/19  Future Office visit:  none    Routing refill request to provider for review/approval because:  Failed protocol: abnormal lab- Creatinine.  Not ordered/reviewed by PCP  *Cr >3 pt following up with Renal

## 2019-12-18 ENCOUNTER — HOSPITAL ENCOUNTER (INPATIENT)
Facility: CLINIC | Age: 59
LOS: 3 days | Discharge: HOME | End: 2019-12-21
Attending: EMERGENCY MEDICINE | Admitting: INTERNAL MEDICINE
Payer: MEDICAID

## 2019-12-18 ENCOUNTER — OFFICE VISIT (OUTPATIENT)
Dept: NEPHROLOGY | Facility: CLINIC | Age: 59
End: 2019-12-18
Attending: INTERNAL MEDICINE
Payer: MEDICAID

## 2019-12-18 ENCOUNTER — TELEPHONE (OUTPATIENT)
Dept: NEPHROLOGY | Facility: CLINIC | Age: 59
End: 2019-12-18

## 2019-12-18 VITALS
HEART RATE: 100 BPM | BODY MASS INDEX: 33.97 KG/M2 | TEMPERATURE: 97.5 F | DIASTOLIC BLOOD PRESSURE: 70 MMHG | OXYGEN SATURATION: 93 % | SYSTOLIC BLOOD PRESSURE: 103 MMHG | WEIGHT: 223.4 LBS

## 2019-12-18 DIAGNOSIS — D63.1 ANEMIA IN STAGE 4 CHRONIC KIDNEY DISEASE (H): ICD-10-CM

## 2019-12-18 DIAGNOSIS — I50.22 CHRONIC SYSTOLIC CONGESTIVE HEART FAILURE (H): ICD-10-CM

## 2019-12-18 DIAGNOSIS — D62 ANEMIA DUE TO BLOOD LOSS, ACUTE: ICD-10-CM

## 2019-12-18 DIAGNOSIS — K92.2 GASTROINTESTINAL HEMORRHAGE, UNSPECIFIED GASTROINTESTINAL HEMORRHAGE TYPE: ICD-10-CM

## 2019-12-18 DIAGNOSIS — D64.9 ANEMIA, UNSPECIFIED TYPE: ICD-10-CM

## 2019-12-18 DIAGNOSIS — R53.1 WEAKNESS: ICD-10-CM

## 2019-12-18 DIAGNOSIS — R00.0 SINUS TACHYCARDIA: ICD-10-CM

## 2019-12-18 DIAGNOSIS — I48.91 ATRIAL FIBRILLATION, UNSPECIFIED TYPE (H): Chronic | ICD-10-CM

## 2019-12-18 DIAGNOSIS — N18.4 CKD (CHRONIC KIDNEY DISEASE) STAGE 4, GFR 15-29 ML/MIN (H): Primary | ICD-10-CM

## 2019-12-18 DIAGNOSIS — K92.1 MELENA: ICD-10-CM

## 2019-12-18 DIAGNOSIS — N18.4 ANEMIA IN STAGE 4 CHRONIC KIDNEY DISEASE (H): ICD-10-CM

## 2019-12-18 DIAGNOSIS — N18.4 CKD (CHRONIC KIDNEY DISEASE) STAGE 4, GFR 15-29 ML/MIN (H): Chronic | ICD-10-CM

## 2019-12-18 LAB
ABO + RH BLD: NORMAL
ABO + RH BLD: NORMAL
ALBUMIN SERPL-MCNC: 3.8 G/DL (ref 3.4–5)
ALP SERPL-CCNC: 101 U/L (ref 40–150)
ALT SERPL W P-5'-P-CCNC: 13 U/L (ref 0–70)
ANION GAP SERPL CALCULATED.3IONS-SCNC: 9 MMOL/L (ref 3–14)
AST SERPL W P-5'-P-CCNC: 6 U/L (ref 0–45)
BILIRUB SERPL-MCNC: 1.4 MG/DL (ref 0.2–1.3)
BLD GP AB SCN SERPL QL: NORMAL
BLD PROD TYP BPU: NORMAL
BLD PROD TYP BPU: NORMAL
BLD UNIT ID BPU: 0
BLOOD BANK CMNT PATIENT-IMP: NORMAL
BLOOD PRODUCT CODE: NORMAL
BPU ID: NORMAL
BUN SERPL-MCNC: 65 MG/DL (ref 7–30)
CALCIUM SERPL-MCNC: 8.7 MG/DL (ref 8.5–10.1)
CHLORIDE SERPL-SCNC: 105 MMOL/L (ref 94–109)
CO2 SERPL-SCNC: 23 MMOL/L (ref 20–32)
CREAT SERPL-MCNC: 3.13 MG/DL (ref 0.66–1.25)
ERYTHROCYTE [DISTWIDTH] IN BLOOD BY AUTOMATED COUNT: 22.4 % (ref 10–15)
GFR SERPL CREATININE-BSD FRML MDRD: 20 ML/MIN/{1.73_M2}
GLUCOSE SERPL-MCNC: 139 MG/DL (ref 70–99)
HCT VFR BLD AUTO: 27.3 % (ref 40–53)
HGB BLD-MCNC: 7.9 G/DL (ref 13.3–17.7)
INR PPP: 1.34 (ref 0.86–1.14)
MCH RBC QN AUTO: 27.1 PG (ref 26.5–33)
MCHC RBC AUTO-ENTMCNC: 28.9 G/DL (ref 31.5–36.5)
MCV RBC AUTO: 94 FL (ref 78–100)
NT-PROBNP SERPL-MCNC: 8243 PG/ML (ref 0–125)
NUM BPU REQUESTED: 2
PLATELET # BLD AUTO: 270 10E9/L (ref 150–450)
POTASSIUM SERPL-SCNC: 4 MMOL/L (ref 3.4–5.3)
PROT SERPL-MCNC: 7.8 G/DL (ref 6.8–8.8)
RBC # BLD AUTO: 2.92 10E12/L (ref 4.4–5.9)
SODIUM SERPL-SCNC: 138 MMOL/L (ref 133–144)
SPECIMEN EXP DATE BLD: NORMAL
TRANSFUSION STATUS PATIENT QL: NORMAL
TRANSFUSION STATUS PATIENT QL: NORMAL
WBC # BLD AUTO: 7.1 10E9/L (ref 4–11)

## 2019-12-18 PROCEDURE — C9113 INJ PANTOPRAZOLE SODIUM, VIA: HCPCS | Performed by: EMERGENCY MEDICINE

## 2019-12-18 PROCEDURE — 86850 RBC ANTIBODY SCREEN: CPT | Performed by: EMERGENCY MEDICINE

## 2019-12-18 PROCEDURE — 25000128 H RX IP 250 OP 636: Performed by: EMERGENCY MEDICINE

## 2019-12-18 PROCEDURE — 96365 THER/PROPH/DIAG IV INF INIT: CPT

## 2019-12-18 PROCEDURE — 99285 EMERGENCY DEPT VISIT HI MDM: CPT | Mod: Z6 | Performed by: EMERGENCY MEDICINE

## 2019-12-18 PROCEDURE — 96376 TX/PRO/DX INJ SAME DRUG ADON: CPT

## 2019-12-18 PROCEDURE — P9016 RBC LEUKOCYTES REDUCED: HCPCS | Performed by: EMERGENCY MEDICINE

## 2019-12-18 PROCEDURE — 12000001 ZZH R&B MED SURG/OB UMMC

## 2019-12-18 PROCEDURE — 99223 1ST HOSP IP/OBS HIGH 75: CPT | Mod: AI | Performed by: INTERNAL MEDICINE

## 2019-12-18 PROCEDURE — 86900 BLOOD TYPING SEROLOGIC ABO: CPT | Performed by: EMERGENCY MEDICINE

## 2019-12-18 PROCEDURE — 25800030 ZZH RX IP 258 OP 636: Performed by: EMERGENCY MEDICINE

## 2019-12-18 PROCEDURE — 86902 BLOOD TYPE ANTIGEN DONOR EA: CPT | Performed by: EMERGENCY MEDICINE

## 2019-12-18 PROCEDURE — 96366 THER/PROPH/DIAG IV INF ADDON: CPT

## 2019-12-18 PROCEDURE — 86901 BLOOD TYPING SEROLOGIC RH(D): CPT | Performed by: EMERGENCY MEDICINE

## 2019-12-18 PROCEDURE — 99285 EMERGENCY DEPT VISIT HI MDM: CPT | Mod: 25

## 2019-12-18 PROCEDURE — 86922 COMPATIBILITY TEST ANTIGLOB: CPT | Performed by: EMERGENCY MEDICINE

## 2019-12-18 PROCEDURE — 96375 TX/PRO/DX INJ NEW DRUG ADDON: CPT

## 2019-12-18 PROCEDURE — G0463 HOSPITAL OUTPT CLINIC VISIT: HCPCS | Mod: ZF

## 2019-12-18 RX ORDER — LIDOCAINE 40 MG/G
CREAM TOPICAL
Status: DISCONTINUED | OUTPATIENT
Start: 2019-12-18 | End: 2019-12-21 | Stop reason: HOSPADM

## 2019-12-18 RX ORDER — NITROGLYCERIN 0.4 MG/1
0.4 TABLET SUBLINGUAL EVERY 5 MIN PRN
Status: DISCONTINUED | OUTPATIENT
Start: 2019-12-18 | End: 2019-12-21 | Stop reason: HOSPADM

## 2019-12-18 RX ORDER — NALOXONE HYDROCHLORIDE 0.4 MG/ML
.1-.4 INJECTION, SOLUTION INTRAMUSCULAR; INTRAVENOUS; SUBCUTANEOUS
Status: DISCONTINUED | OUTPATIENT
Start: 2019-12-18 | End: 2019-12-21 | Stop reason: HOSPADM

## 2019-12-18 RX ORDER — ATORVASTATIN CALCIUM 40 MG/1
80 TABLET, FILM COATED ORAL DAILY
Status: DISCONTINUED | OUTPATIENT
Start: 2019-12-19 | End: 2019-12-21 | Stop reason: HOSPADM

## 2019-12-18 RX ADMIN — SODIUM CHLORIDE 8 MG/HR: 9 INJECTION, SOLUTION INTRAVENOUS at 19:48

## 2019-12-18 RX ADMIN — PANTOPRAZOLE SODIUM 80 MG: 40 INJECTION, POWDER, FOR SOLUTION INTRAVENOUS at 18:40

## 2019-12-18 ASSESSMENT — PAIN SCALES - GENERAL: PAINLEVEL: NO PAIN (0)

## 2019-12-18 ASSESSMENT — ENCOUNTER SYMPTOMS
FATIGUE: 1
WEAKNESS: 1
ABDOMINAL PAIN: 0
FEVER: 0
VOMITING: 0
NAUSEA: 0
BLOOD IN STOOL: 1

## 2019-12-18 ASSESSMENT — MIFFLIN-ST. JEOR: SCORE: 1791.02

## 2019-12-18 NOTE — ED PROVIDER NOTES
History     Chief Complaint   Patient presents with     Abnormal Labs     HPI  Cole Jesus is a 59 year old male with history of STEMI, acute occlusion of pRCA s/p balloon angioplasty and stenting in 6/2019, STEMI s/p DESx3 for total occlusion of RCA and Lcx (2012), GIB due to AV malformation (in the setting of warfarin and DAPT), ischemic cardiomyopathy, HFrEF (last EF=30-35%), severe pHTN, severe mitral regurgitation, atrial fibrillation, CKD stage IV who presents to the Emergency Department today from clinic for management of low hemoglobin.  Patient was in clinic today for follow-up of CKD.  While in clinic the patient had labs performed which revealed that the patient's hemoglobin was at 7.9, down from 8.9 last week.  He was also reported to have a hemoglobin of 11.3 on 11/12.  The patient noted that he had been having dark stools even though he had stopped taking iron supplements 1 week prior.  Patient was then sent to our ED for further evaluation and management of low hemoglobin.  Patient's wife also reports that the patient has been becoming increasingly weak and fatigued.  She reports that when his hemoglobin was at 11.3 he was active and able to keep up with her.  Over the course the past couple weeks, the wife notes that the patient has been becoming increasingly weak and quick to become fatigued.  The patient has been taking aspirin and Plavix with no changes.  Patient denies abdominal pain, nausea, or vomiting.    I have reviewed the Medications, Allergies, Past Medical and Surgical History, and Social History in the [employer] system.    Past Medical History:   Diagnosis Date     Anemia in chronic renal disease 3/9/2015     Antiplatelet or antithrombotic long-term use      Atrial fibrillation and flutter      CAD (coronary artery disease)     Stemi in 12/11, s/p angioplasty     CRD (chronic renal disease), stage IV (H) 03/09/2015    hx ATN with dialysis complicating cardiogenic shock  1/2012     GI  bleed     massive lower GI bleed secondary to a cecal ulcer, s/p ileocecal resection in      Heart failure     Biventricular systolic HF, complicated by ARDS requiring tracheostomy     Hyperlipidemia LDL goal <100 2013     Hypertension      Ischemic cardiomyopathy 2013    TTE revealing 40% EF     Myocardial infarction (H)      Other and unspecified nonspecific immunological findings     Anti JKa     Pulmonary edema     episodes of flash pulmonary edema in      Subclinical hypothyroidism      Past Surgical History:   Procedure Laterality Date     CV RIGHT HEART CATH N/A 2019    Procedure: CV RIGHT HEART CATH;  Surgeon: Fox Gerard MD;  Location:  HEART CARDIAC CATH LAB     ESOPHAGOSCOPY, GASTROSCOPY, DUODENOSCOPY (EGD), COMBINED  2011    Procedure:COMBINED ESOPHAGOSCOPY, GASTROSCOPY, DUODENOSCOPY (EGD); Surgeon:SAMARIA PUENTE; Location: GI     LAPAROTOMY EXPLORATORY  2011    Procedure:LAPAROTOMY EXPLORATORY; Explore laparotomy, Illeocectomy, Diverting Illeostomy; Surgeon:GIA NAJERA; Location:[facility] OR     ORIF right elbow fracture  age 12     TAKEDOWN ILEOSTOMY  2014    Procedure: TAKEDOWN ILEOSTOMY;  Surgeon: Gia Najera MD;  Location: [facility] OR     TRACHEOSTOMY  2011    Procedure:TRACHEOSTOMY; Tracheostomy; 80XLTCP-Proximal Extension-Cuffed 8.0 mm I.D.; Surgeon:LIZABETH DYE; Location: OR     Family History   Problem Relation Age of Onset     Diabetes Mother      Hypertension Mother      Heart Disease Mother      Heart Disease Father          of heart attack, left when he was young     Diabetes Sister      Diabetes Sister      Diabetes Sister      Social History     Tobacco Use     Smoking status: Former Smoker     Last attempt to quit: 12/3/2011     Years since quittin.0     Smokeless tobacco: Never Used   Substance Use Topics     Alcohol use: No     Alcohol/week: 0.0 standard drinks     No  "current facility-administered medications for this encounter.      Current Outpatient Medications   Medication     ACCU-CHEK GUIDE test strip     Alcohol Swabs PADS     amLODIPine (NORVASC) 2.5 MG tablet     aspirin (ASA) 81 MG chewable tablet     atorvastatin (LIPITOR) 80 MG tablet     blood glucose monitoring (ACCU-CHEK FASTCLIX) lancets     cholecalciferol (VITAMIN  -D) 1000 UNITS capsule     clopidogrel (PLAVIX) 75 MG tablet     ferrous sulfate (FEROSUL) 325 (65 Fe) MG tablet     furosemide (LASIX) 20 MG tablet     furosemide (LASIX) 80 MG tablet     insulin glargine (LANTUS PEN) 100 UNIT/ML pen     insulin pen needle (32G X 4 MM) 32G X 4 MM miscellaneous     nitroGLYcerin (NITROSTAT) 0.4 MG sublingual tablet     pantoprazole (PROTONIX) 40 MG EC tablet     sacubitril-valsartan (ENTRESTO) 49-51 MG per tablet     Sharps Container MISC     vitamin  B complex with vitamin C (VITAMIN  B COMPLEX) TABS     Allergies   Allergen Reactions     Hydralazine      Black spots     Simvastatin Other (See Comments)     Leg muscle weakness     Tylenol [Acetaminophen] Palpitations      Review of Systems   Constitutional: Positive for fatigue. Negative for fever.   Gastrointestinal: Positive for blood in stool. Negative for abdominal pain, nausea and vomiting.   Neurological: Positive for weakness.   All other systems reviewed and are negative.    Physical Exam   BP: 114/68  Pulse: 93  Temp: 97.6  F (36.4  C)  Resp: 16  Height: 172.7 cm (5' 8\")  Weight: 101.2 kg (223 lb)  SpO2: 94 %    Physical Exam  Constitutional:       Appearance: He is well-developed.   HENT:      Head: Normocephalic and atraumatic.   Neck:      Musculoskeletal: Normal range of motion and neck supple.   Cardiovascular:      Rate and Rhythm: Normal rate and regular rhythm.      Heart sounds: Normal heart sounds.   Pulmonary:      Effort: Pulmonary effort is normal. No respiratory distress.      Breath sounds: No wheezing.   Abdominal:      General: There is no " distension.      Palpations: Abdomen is soft. There is no mass.      Tenderness: There is no abdominal tenderness. There is no rebound.      Hernia: No hernia is present.   Genitourinary:     Comments: Rectal-darkish stool; heme positive   Skin:     General: Skin is warm.   Neurological:      Mental Status: He is alert and oriented to person, place, and time.   Psychiatric:         Behavior: Behavior normal.         Thought Content: Thought content normal.         ED Course   6:00 PM  The patient was seen and examined by Dr. Morales in Room 06.       Procedures             Critical Care time:  none         Results for orders placed or performed during the hospital encounter of 12/18/19   ABO/Rh type and screen     Status: None (In process)   Result Value Ref Range    ABO PENDING     Antibody Screen PENDING     Test Valid Only At          [facility]    Specimen Expires 12/21/2019    Results for orders placed or performed in visit on 12/18/19   N terminal pro BNP outpatient     Status: Abnormal   Result Value Ref Range    N-Terminal Pro Bnp 8,243 (H) 0 - 125 pg/mL   CBC with platelets     Status: Abnormal   Result Value Ref Range    WBC 7.1 4.0 - 11.0 10e9/L    RBC Count 2.92 (L) 4.4 - 5.9 10e12/L    Hemoglobin 7.9 (L) 13.3 - 17.7 g/dL    Hematocrit 27.3 (L) 40.0 - 53.0 %    MCV 94 78 - 100 fl    MCH 27.1 26.5 - 33.0 pg    MCHC 28.9 (L) 31.5 - 36.5 g/dL    RDW 22.4 (H) 10.0 - 15.0 %    Platelet Count 270 150 - 450 10e9/L   Comprehensive metabolic panel     Status: Abnormal   Result Value Ref Range    Sodium 138 133 - 144 mmol/L    Potassium 4.0 3.4 - 5.3 mmol/L    Chloride 105 94 - 109 mmol/L    Carbon Dioxide 23 20 - 32 mmol/L    Anion Gap 9 3 - 14 mmol/L    Glucose 139 (H) 70 - 99 mg/dL    Urea Nitrogen 65 (H) 7 - 30 mg/dL    Creatinine 3.13 (H) 0.66 - 1.25 mg/dL    GFR Estimate 20 (L) >60 mL/min/[1.73_m2]    GFR Estimate If Black 23 (L) >60 mL/min/[1.73_m2]    Calcium  8.7 8.5 - 10.1 mg/dL    Bilirubin Total 1.4 (H) 0.2 - 1.3 mg/dL    Albumin 3.8 3.4 - 5.0 g/dL    Protein Total 7.8 6.8 - 8.8 g/dL    Alkaline Phosphatase 101 40 - 150 U/L    ALT 13 0 - 70 U/L    AST 6 0 - 45 U/L   INR     Status: Abnormal   Result Value Ref Range    INR 1.34 (H) 0.86 - 1.14     Medications   pantoprazole (PROTONIX) 80 mg in sodium chloride 0.9 % 100 mL infusion (8 mg/hr Intravenous New Bag 12/18/19 1948)   pantoprazole (PROTONIX) 40 mg IV push injection (80 mg Intravenous Given 12/18/19 1840)            Labs Ordered and Resulted from Time of ED Arrival Up to the Time of Departure from the ED - No data to display         Assessments & Plan (with Medical Decision Making)     Patient is a very nice 59-year-old male who presented to the ER as a referral from clinic. Patient has been having more darker stools for the past 1 week. His hemoglobin has dropped from a baseline of 12 to 11 one month prior in November to 8.9 a couple days prior, and now 7.9. Patient does have a history of having GI bleeds in the past. Last scope was done in July of this year that did show an area of bleeding that was stopped by GI. Patient here is heme positive on occult rectal exam. Patient will be started on Protonix bolus and Protonix drip. Patient will be transfused 1 unit of blood due to his hemoglobin being 7.9 and having severe cardiac history and feeling short of breath with exertion. Patient will be admitted to internal medicine for further care.      This part of the medical record was transcribed by Keith Jon, Medical Scribe, from a dictation done by Brina Morales MD.     I have reviewed the nursing notes.    I have reviewed the findings, diagnosis, plan and need for follow up with the patient.  New Prescriptions    No medications on file     Final diagnoses:   Gastrointestinal hemorrhage, unspecified gastrointestinal hemorrhage type   Anemia, unspecified type   Weakness   I, Anibal Galo, am serving as a  trained medical scribe to document services personally performed by Brina Morales MD, based on the provider's statements to me.   I, Brina Morales MD, was physically present and have reviewed and verified the accuracy of this note documented by Anibal Galo.    12/18/2019   [facility], [place], EMERGENCY DEPARTMENT     Brina Morales MD  12/18/19 2003

## 2019-12-18 NOTE — NURSING NOTE
Chief Complaint   Patient presents with     RECHECK     Creatinine level       /70   Pulse 100   Temp 97.5  F (36.4  C) (Oral)   Wt 101.3 kg (223 lb 6.4 oz)   SpO2 93%   BMI 33.97 kg/m          Quiana Callaway CMA     12/18/2019 4:32 PM

## 2019-12-18 NOTE — ED TRIAGE NOTES
Patient presents ambulatory to triage with wife. Patient was seen in clinic today and told to come to the ED for low hemoglobin.

## 2019-12-18 NOTE — TELEPHONE ENCOUNTER
Patient sent to ED per Dr. Vigil for drop in Hgb 12/18 7.9 from 8.9 1 week ago and dark stools. Communicated to ED. Patient driving over with wife.  Kavita Chapman LPN  Nephrology  [phone]

## 2019-12-18 NOTE — ED NOTES
Bed: ED06  Expected date: 12/18/19  Expected time:   Means of arrival: Ambulance  Comments:  Cole Jesus  Coming from Nephrology clinic, having blacks stools, Hgb 7.9, 1.0 drop in the past week

## 2019-12-18 NOTE — LETTER
12/18/2019      RE: Cole Jesus  [address]  [facility] [address]       ASSESSMENT AND RECOMMENDATIONS:   57 year old male with proteinuric chronic kidney disease, stage 4, Scr 2.1-2.3  mainly due to an unresolving acute tubular necrosis after cardiogenic shock in 2012.  The patient is status post STEMI and has advanced ischemic cardiomyopathy.    1. CKD stage 4- Scr 2.1-2.4 mg/dL after EVA and complicated hospitalization. Has been 2.3-2.8 in recent months    - Scr is recently 3-3.2, eGFR low 20s.    - will need to do more education and planning but given acute GI bleeding today, will need to come back for further followup in coming weeks  - GI bleeding episode in July, and cardiac stents.    - his stool is dark again and hgb down from 11 to 7.9 today, suspect GI bleeding, will send to ER for monitoring and consideration of endoscopy  .  2.  Systolic heart failure from ischemic cardiomyopathy- also has severe pulmonary hypetension amenable to afterload reduction.   - BP low and tachycardic, no changes today  3.  Pulmonary hypertension- severe, managed by Dr Trujillo  4.  Type 2 diabetes mellitus- well controlled  5.  Anemia- suspect acute GI bleeding, dark stools. He is on ASA and Plavix, protonix bid  - hgb down fro 11s to 7.9 in a month and dark colored stools. They are concerned, and not sure outpatient endoscopy is a good option, will thus send to ER  - needs IV iron infusion as he is iron deficient due to GI bleeding  6.  HTN: BP control has been optimal.  7. Proteinuria. Has been on ARB, would hold in setting of hypotension or acute bleeding    -RTC 1month    Assessment and plan was discussed with patient and he voiced his understanding and agreement.    REASON FOR VISIT:      The patient is here for followup on chronic kidney disease, stage 3b-4.      HISTORY OF PRESENT ILLNESS:      This is a 57-year-old,  gentleman wiho returns for followup of CKD after EVA.  He had a  complicated hospitalization that started in December 2011with an ST elevation myocardial infarction complicated by cardiogenic shock from biventricular failure.  The patient was resuscitated.  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  After his presentation with cardiogenic shock, he had a prolonged hospital and ICU stay.  His course was complicated by ARDS that required tracheostomy and had difficulty weaning from the ventilator, as well as acute kidney injury that was mainly attributed to acute tubular necrosis, for which he received initially continuous renal replacement therapy, and then he was transitioned to hemodialysis for a few treatments.  Of note, it seems that his peak serum creatinine at that time was around 7 mg/dL and he was started on renal replacement therapy.  He had also a lot of complications in the hospital including diabetic ketoacidosis and newly diagnosed diabetes  He had also a complication with a lower GI bleed and required ileocecal resection and has an ileostomy that was placed.  He was discharged from the hospital on 01/11/2012.    He recovered fairly well and was last seen by Dr Pacheco in 2016  His EF which was 30% to 35% improvedt 35% to 40%. He had ostomy take down complicated by heart failure exacerbation  He had right heart catheterization that showed severe pulmonary hypertension, in 2014 his mean PA pressure of 56 and recent RHC done was 68      Since last visit last year, he has had a lot happen. Most notable is GI bleeding in July 2019 with gastric ulcer and clot. He is on protonix twice a day. Today his hemoglobin is down to 7.9 and he states his stool color is dark again despite stopping iron pills.  It is down 1 gram since last week, and down from >11 last month.  I am not certain if we can do outpatient endoscopy, but given he had significant bleeding a few months ago, will send to  ER for further evaluation.      ROS:    A comprehensive review of systems was obtained and negative, except as noted in the HPI or PMH.    Active Medical Problems:  Patient Active Problem List   Diagnosis     ST elevation myocardial infarction (STEMI) of inferior wall (H)     Lower GI bleeding     S/P colon resection     Atrial fibrillation (H)     Type 2 diabetes, HbA1C goal < 8% (H)     Chronic systolic congestive heart failure (HCC)     Ischemic cardiomyopathy     Hyperlipidemia LDL goal <100     Vitamin D deficiency     h/o atrial flutter, ablation 13     Hx of ileostomy     CKD (chronic kidney disease) stage 4, GFR 15-29 ml/min (H)     Anemia in chronic renal disease     Subclinical hypothyroidism     Severe pulmonary arterial systolic hypertension (H)     Hyperglycemia     Weakness     Acute anemia     Anemia due to acute blood loss     Iron deficiency     Atrial fibrillation, unspecified type (H)     Essential hypertension with goal blood pressure less than 130/85     Other ill-defined heart diseases     Low cardiac output syndrome (H)       Personal Hx:   Social History     Socioeconomic History     Marital status: Significant other     Spouse name: Not on file     Number of children: Not on file     Years of education: Not on file     Highest education level: Not on file   Occupational History     Not on file   Social Needs     Financial resource strain: Not on file     Food insecurity:     Worry: Not on file     Inability: Not on file     Transportation needs:     Medical: Not on file     Non-medical: Not on file   Tobacco Use     Smoking status: Former Smoker     Last attempt to quit: 12/3/2011     Years since quittin.0     Smokeless tobacco: Never Used   Substance and Sexual Activity     Alcohol use: No     Alcohol/week: 0.0 standard drinks     Drug use: No     Sexual activity: Yes     Partners: Female   Lifestyle     Physical activity:     Days per week: Not on file     Minutes per session: Not  on file     Stress: Not on file   Relationships     Social connections:     Talks on phone: Not on file     Gets together: Not on file     Attends Rastafari service: Not on file     Active member of club or organization: Not on file     Attends meetings of clubs or organizations: Not on file     Relationship status: Not on file     Intimate partner violence:     Fear of current or ex partner: Not on file     Emotionally abused: Not on file     Physically abused: Not on file     Forced sexual activity: Not on file   Other Topics Concern     Parent/sibling w/ CABG, MI or angioplasty before 65F 55M? Yes      Service Not Asked     Blood Transfusions Not Asked     Caffeine Concern Not Asked     Occupational Exposure Not Asked     Hobby Hazards Not Asked     Sleep Concern Not Asked     Stress Concern Not Asked     Weight Concern Not Asked     Special Diet Not Asked     Back Care Not Asked     Exercise Yes     Comment: limited walking     Bike Helmet Not Asked     Seat Belt Not Asked     Self-Exams Not Asked   Social History Narrative     Not on file       Family History   Problem Relation Age of Onset     Diabetes Mother      Hypertension Mother      Heart Disease Mother      Heart Disease Father          of heart attack, left when he was young     Diabetes Sister      Diabetes Sister      Diabetes Sister      Allergies:  Allergies   Allergen Reactions     Hydralazine      Black spots     Simvastatin Other (See Comments)     Leg muscle weakness     Tylenol [Acetaminophen] Palpitations     Medications:  Current Outpatient Medications   Medication     ACCU-CHEK GUIDE test strip     Alcohol Swabs PADS     amLODIPine (NORVASC) 2.5 MG tablet     aspirin (ASA) 81 MG chewable tablet     atorvastatin (LIPITOR) 80 MG tablet     blood glucose monitoring (ACCU-CHEK FASTCLIX) lancets     cholecalciferol (VITAMIN  -D) 1000 UNITS capsule     clopidogrel (PLAVIX) 75 MG tablet     furosemide (LASIX) 20 MG tablet      furosemide (LASIX) 80 MG tablet     insulin pen needle (32G X 4 MM) 32G X 4 MM miscellaneous     pantoprazole (PROTONIX) 40 MG EC tablet     sacubitril-valsartan (ENTRESTO) 49-51 MG per tablet     Sharps Container MISC     vitamin  B complex with vitamin C (VITAMIN  B COMPLEX) TABS     ferrous sulfate (FEROSUL) 325 (65 Fe) MG tablet     insulin glargine (LANTUS PEN) 100 UNIT/ML pen     nitroGLYcerin (NITROSTAT) 0.4 MG sublingual tablet     No current facility-administered medications for this visit.      Vitals:    /70   Pulse 100   Temp 97.5  F (36.4  C) (Oral)   Wt 101.3 kg (223 lb 6.4 oz)   SpO2 93%   BMI 33.97 kg/m       Exam:    GENERAL APPEARANCE: alert and no distress  HENT: mouth without ulcers or lesions  LYMPHATICS: no cervical adenopathy  RESP: lungs clear to auscultation   CV: regular rhythm, normal rate, no rub, no murmur  EDEMA: trace LE edema bilaterally  ABDOMEN:  soft, nontender and bowel sounds normal  MS: extremities normal- no gross deformities noted  SKIN: no rash  NEURO: normal strength and tone, grossly non-focal  PSYCH: mentation appears normal and affect ok    Results:    Reviewed.      Venus Vigil MD

## 2019-12-18 NOTE — PROGRESS NOTES
ASSESSMENT AND RECOMMENDATIONS:   57 year old male with proteinuric chronic kidney disease, stage 4, Scr 2.1-2.3  mainly due to an unresolving acute tubular necrosis after cardiogenic shock in 2012.  The patient is status post STEMI and has advanced ischemic cardiomyopathy.    1. CKD stage 4- Scr 2.1-2.4 mg/dL after EVA and complicated hospitalization. Has been 2.3-2.8 in recent months    - Scr is recently 3-3.2, eGFR low 20s.    - will need to do more education and planning but given acute GI bleeding today, will need to come back for further followup in coming weeks  - GI bleeding episode in July, and cardiac stents.    - his stool is dark again and hgb down from 11 to 7.9 today, suspect GI bleeding, will send to ER for monitoring and consideration of endoscopy  .  2.  Systolic heart failure from ischemic cardiomyopathy- also has severe pulmonary hypetension amenable to afterload reduction.   - BP low and tachycardic, no changes today  3.  Pulmonary hypertension- severe, managed by Dr Trujillo  4.  Type 2 diabetes mellitus- well controlled  5.  Anemia- suspect acute GI bleeding, dark stools. He is on ASA and Plavix, protonix bid  - hgb down fro 11s to 7.9 in a month and dark colored stools. They are concerned, and not sure outpatient endoscopy is a good option, will thus send to ER  - needs IV iron infusion as he is iron deficient due to GI bleeding  6.  HTN: BP control has been optimal.  7. Proteinuria. Has been on ARB, would hold in setting of hypotension or acute bleeding    -RTC 1month    Assessment and plan was discussed with patient and he voiced his understanding and agreement.    REASON FOR VISIT:      The patient is here for followup on chronic kidney disease, stage 3b-4.      HISTORY OF PRESENT ILLNESS:      This is a 57-year-old,  gentleman wiho returns for followup of CKD after EVA.  He had a complicated hospitalization that started in December 2011with an ST elevation myocardial  infarction complicated by cardiogenic shock from biventricular failure.  The patient was resuscitated.  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  After his presentation with cardiogenic shock, he had a prolonged hospital and ICU stay.  His course was complicated by ARDS that required tracheostomy and had difficulty weaning from the ventilator, as well as acute kidney injury that was mainly attributed to acute tubular necrosis, for which he received initially continuous renal replacement therapy, and then he was transitioned to hemodialysis for a few treatments.  Of note, it seems that his peak serum creatinine at that time was around 7 mg/dL and he was started on renal replacement therapy.  He had also a lot of complications in the hospital including diabetic ketoacidosis and newly diagnosed diabetes  He had also a complication with a lower GI bleed and required ileocecal resection and has an ileostomy that was placed.  He was discharged from the hospital on 01/11/2012.    He recovered fairly well and was last seen by Dr Pacheco in 2016  His EF which was 30% to 35% improvedt 35% to 40%. He had ostomy take down complicated by heart failure exacerbation  He had right heart catheterization that showed severe pulmonary hypertension, in 2014 his mean PA pressure of 56 and recent RHC done was 68      Since last visit last year, he has had a lot happen. Most notable is GI bleeding in July 2019 with gastric ulcer and clot. He is on protonix twice a day. Today his hemoglobin is down to 7.9 and he states his stool color is dark again despite stopping iron pills.  It is down 1 gram since last week, and down from >11 last month.  I am not certain if we can do outpatient endoscopy, but given he had significant bleeding a few months ago, will send to ER for further evaluation.      ROS:    A comprehensive review of systems was obtained  and negative, except as noted in the HPI or PMH.    Active Medical Problems:  Patient Active Problem List   Diagnosis     ST elevation myocardial infarction (STEMI) of inferior wall (H)     Lower GI bleeding     S/P colon resection     Atrial fibrillation (H)     Type 2 diabetes, HbA1C goal < 8% (H)     Chronic systolic congestive heart failure (HCC)     Ischemic cardiomyopathy     Hyperlipidemia LDL goal <100     Vitamin D deficiency     h/o atrial flutter, ablation 13     Hx of ileostomy     CKD (chronic kidney disease) stage 4, GFR 15-29 ml/min (H)     Anemia in chronic renal disease     Subclinical hypothyroidism     Severe pulmonary arterial systolic hypertension (H)     Hyperglycemia     Weakness     Acute anemia     Anemia due to acute blood loss     Iron deficiency     Atrial fibrillation, unspecified type (H)     Essential hypertension with goal blood pressure less than 130/85     Other ill-defined heart diseases     Low cardiac output syndrome (H)       Personal Hx:   Social History     Socioeconomic History     Marital status: Significant other     Spouse name: Not on file     Number of children: Not on file     Years of education: Not on file     Highest education level: Not on file   Occupational History     Not on file   Social Needs     Financial resource strain: Not on file     Food insecurity:     Worry: Not on file     Inability: Not on file     Transportation needs:     Medical: Not on file     Non-medical: Not on file   Tobacco Use     Smoking status: Former Smoker     Last attempt to quit: 12/3/2011     Years since quittin.0     Smokeless tobacco: Never Used   Substance and Sexual Activity     Alcohol use: No     Alcohol/week: 0.0 standard drinks     Drug use: No     Sexual activity: Yes     Partners: Female   Lifestyle     Physical activity:     Days per week: Not on file     Minutes per session: Not on file     Stress: Not on file   Relationships     Social connections:     Talks on  phone: Not on file     Gets together: Not on file     Attends Confucianist service: Not on file     Active member of club or organization: Not on file     Attends meetings of clubs or organizations: Not on file     Relationship status: Not on file     Intimate partner violence:     Fear of current or ex partner: Not on file     Emotionally abused: Not on file     Physically abused: Not on file     Forced sexual activity: Not on file   Other Topics Concern     Parent/sibling w/ CABG, MI or angioplasty before 65F 55M? Yes      Service Not Asked     Blood Transfusions Not Asked     Caffeine Concern Not Asked     Occupational Exposure Not Asked     Hobby Hazards Not Asked     Sleep Concern Not Asked     Stress Concern Not Asked     Weight Concern Not Asked     Special Diet Not Asked     Back Care Not Asked     Exercise Yes     Comment: limited walking     Bike Helmet Not Asked     Seat Belt Not Asked     Self-Exams Not Asked   Social History Narrative     Not on file       Family History   Problem Relation Age of Onset     Diabetes Mother      Hypertension Mother      Heart Disease Mother      Heart Disease Father          of heart attack, left when he was young     Diabetes Sister      Diabetes Sister      Diabetes Sister      Allergies:  Allergies   Allergen Reactions     Hydralazine      Black spots     Simvastatin Other (See Comments)     Leg muscle weakness     Tylenol [Acetaminophen] Palpitations     Medications:  Current Outpatient Medications   Medication     ACCU-CHEK GUIDE test strip     Alcohol Swabs PADS     amLODIPine (NORVASC) 2.5 MG tablet     aspirin (ASA) 81 MG chewable tablet     atorvastatin (LIPITOR) 80 MG tablet     blood glucose monitoring (ACCU-CHEK FASTCLIX) lancets     cholecalciferol (VITAMIN  -D) 1000 UNITS capsule     clopidogrel (PLAVIX) 75 MG tablet     furosemide (LASIX) 20 MG tablet     furosemide (LASIX) 80 MG tablet     insulin pen needle (32G X 4 MM) 32G X 4 MM miscellaneous      pantoprazole (PROTONIX) 40 MG EC tablet     sacubitril-valsartan (ENTRESTO) 49-51 MG per tablet     Sharps Container MISC     vitamin  B complex with vitamin C (VITAMIN  B COMPLEX) TABS     ferrous sulfate (FEROSUL) 325 (65 Fe) MG tablet     insulin glargine (LANTUS PEN) 100 UNIT/ML pen     nitroGLYcerin (NITROSTAT) 0.4 MG sublingual tablet     No current facility-administered medications for this visit.      Vitals:    /70   Pulse 100   Temp 97.5  F (36.4  C) (Oral)   Wt 101.3 kg (223 lb 6.4 oz)   SpO2 93%   BMI 33.97 kg/m      Exam:    GENERAL APPEARANCE: alert and no distress  HENT: mouth without ulcers or lesions  LYMPHATICS: no cervical adenopathy  RESP: lungs clear to auscultation   CV: regular rhythm, normal rate, no rub, no murmur  EDEMA: trace LE edema bilaterally  ABDOMEN:  soft, nontender and bowel sounds normal  MS: extremities normal- no gross deformities noted  SKIN: no rash  NEURO: normal strength and tone, grossly non-focal  PSYCH: mentation appears normal and affect ok    Results:    Reviewed.

## 2019-12-19 ENCOUNTER — APPOINTMENT (OUTPATIENT)
Dept: GENERAL RADIOLOGY | Facility: CLINIC | Age: 59
End: 2019-12-19
Attending: INTERNAL MEDICINE
Payer: MEDICAID

## 2019-12-19 ENCOUNTER — APPOINTMENT (OUTPATIENT)
Dept: CARDIOLOGY | Facility: CLINIC | Age: 59
End: 2019-12-19
Attending: INTERNAL MEDICINE
Payer: MEDICAID

## 2019-12-19 LAB
ANION GAP SERPL CALCULATED.3IONS-SCNC: 8 MMOL/L (ref 3–14)
BASOPHILS # BLD AUTO: 0.1 10E9/L (ref 0–0.2)
BASOPHILS NFR BLD AUTO: 0.9 %
BUN SERPL-MCNC: 65 MG/DL (ref 7–30)
CALCIUM SERPL-MCNC: 8.8 MG/DL (ref 8.5–10.1)
CHLORIDE SERPL-SCNC: 109 MMOL/L (ref 94–109)
CO2 SERPL-SCNC: 23 MMOL/L (ref 20–32)
CREAT SERPL-MCNC: 2.93 MG/DL (ref 0.66–1.25)
DIFFERENTIAL METHOD BLD: ABNORMAL
EOSINOPHIL # BLD AUTO: 0.2 10E9/L (ref 0–0.7)
EOSINOPHIL NFR BLD AUTO: 2.4 %
ERYTHROCYTE [DISTWIDTH] IN BLOOD BY AUTOMATED COUNT: 21.5 % (ref 10–15)
FERRITIN SERPL-MCNC: 35 NG/ML (ref 26–388)
GFR SERPL CREATININE-BSD FRML MDRD: 22 ML/MIN/{1.73_M2}
GLUCOSE BLDC GLUCOMTR-MCNC: 116 MG/DL (ref 70–99)
GLUCOSE BLDC GLUCOMTR-MCNC: 158 MG/DL (ref 70–99)
GLUCOSE SERPL-MCNC: 106 MG/DL (ref 70–99)
HCT VFR BLD AUTO: 28.4 % (ref 40–53)
HGB BLD-MCNC: 8.6 G/DL (ref 13.3–17.7)
HGB BLD-MCNC: 8.7 G/DL (ref 13.3–17.7)
IMM GRANULOCYTES # BLD: 0.1 10E9/L (ref 0–0.4)
IMM GRANULOCYTES NFR BLD: 0.7 %
IRON SATN MFR SERPL: 11 % (ref 15–46)
IRON SERPL-MCNC: 37 UG/DL (ref 35–180)
LYMPHOCYTES # BLD AUTO: 0.4 10E9/L (ref 0.8–5.3)
LYMPHOCYTES NFR BLD AUTO: 6.3 %
MCH RBC QN AUTO: 27.2 PG (ref 26.5–33)
MCHC RBC AUTO-ENTMCNC: 30.3 G/DL (ref 31.5–36.5)
MCV RBC AUTO: 90 FL (ref 78–100)
MONOCYTES # BLD AUTO: 0.7 10E9/L (ref 0–1.3)
MONOCYTES NFR BLD AUTO: 10.9 %
NEUTROPHILS # BLD AUTO: 5.3 10E9/L (ref 1.6–8.3)
NEUTROPHILS NFR BLD AUTO: 78.8 %
NRBC # BLD AUTO: 0 10*3/UL
NRBC BLD AUTO-RTO: 0 /100
PLATELET # BLD AUTO: 248 10E9/L (ref 150–450)
POTASSIUM SERPL-SCNC: 4 MMOL/L (ref 3.4–5.3)
RBC # BLD AUTO: 3.16 10E12/L (ref 4.4–5.9)
SODIUM SERPL-SCNC: 140 MMOL/L (ref 133–144)
TIBC SERPL-MCNC: 339 UG/DL (ref 240–430)
TRANSFERRIN SERPL-MCNC: 281 MG/DL (ref 210–360)
TROPONIN I SERPL-MCNC: <0.015 UG/L (ref 0–0.04)
WBC # BLD AUTO: 6.7 10E9/L (ref 4–11)

## 2019-12-19 PROCEDURE — 12000001 ZZH R&B MED SURG/OB UMMC

## 2019-12-19 PROCEDURE — 85025 COMPLETE CBC W/AUTO DIFF WBC: CPT | Performed by: EMERGENCY MEDICINE

## 2019-12-19 PROCEDURE — 83540 ASSAY OF IRON: CPT | Performed by: INTERNAL MEDICINE

## 2019-12-19 PROCEDURE — 00000146 ZZHCL STATISTIC GLUCOSE BY METER IP

## 2019-12-19 PROCEDURE — 93308 TTE F-UP OR LMTD: CPT | Mod: 26 | Performed by: INTERNAL MEDICINE

## 2019-12-19 PROCEDURE — 36415 COLL VENOUS BLD VENIPUNCTURE: CPT | Performed by: EMERGENCY MEDICINE

## 2019-12-19 PROCEDURE — 25500064 ZZH RX 255 OP 636: Performed by: INTERNAL MEDICINE

## 2019-12-19 PROCEDURE — 83550 IRON BINDING TEST: CPT | Performed by: INTERNAL MEDICINE

## 2019-12-19 PROCEDURE — 84466 ASSAY OF TRANSFERRIN: CPT | Performed by: INTERNAL MEDICINE

## 2019-12-19 PROCEDURE — 84484 ASSAY OF TROPONIN QUANT: CPT | Performed by: INTERNAL MEDICINE

## 2019-12-19 PROCEDURE — 80048 BASIC METABOLIC PNL TOTAL CA: CPT | Performed by: INTERNAL MEDICINE

## 2019-12-19 PROCEDURE — 99233 SBSQ HOSP IP/OBS HIGH 50: CPT | Performed by: INTERNAL MEDICINE

## 2019-12-19 PROCEDURE — 25000128 H RX IP 250 OP 636: Performed by: INTERNAL MEDICINE

## 2019-12-19 PROCEDURE — 93308 TTE F-UP OR LMTD: CPT

## 2019-12-19 PROCEDURE — 93321 DOPPLER ECHO F-UP/LMTD STD: CPT | Mod: 26 | Performed by: INTERNAL MEDICINE

## 2019-12-19 PROCEDURE — 85018 HEMOGLOBIN: CPT | Performed by: PHYSICIAN ASSISTANT

## 2019-12-19 PROCEDURE — 25000132 ZZH RX MED GY IP 250 OP 250 PS 637: Performed by: STUDENT IN AN ORGANIZED HEALTH CARE EDUCATION/TRAINING PROGRAM

## 2019-12-19 PROCEDURE — 71045 X-RAY EXAM CHEST 1 VIEW: CPT

## 2019-12-19 PROCEDURE — 82728 ASSAY OF FERRITIN: CPT | Performed by: INTERNAL MEDICINE

## 2019-12-19 PROCEDURE — 93325 DOPPLER ECHO COLOR FLOW MAPG: CPT | Mod: 26 | Performed by: INTERNAL MEDICINE

## 2019-12-19 PROCEDURE — 36415 COLL VENOUS BLD VENIPUNCTURE: CPT | Performed by: PHYSICIAN ASSISTANT

## 2019-12-19 PROCEDURE — 25000128 H RX IP 250 OP 636: Performed by: STUDENT IN AN ORGANIZED HEALTH CARE EDUCATION/TRAINING PROGRAM

## 2019-12-19 PROCEDURE — C9113 INJ PANTOPRAZOLE SODIUM, VIA: HCPCS | Performed by: STUDENT IN AN ORGANIZED HEALTH CARE EDUCATION/TRAINING PROGRAM

## 2019-12-19 RX ORDER — FUROSEMIDE 10 MG/ML
40 INJECTION INTRAMUSCULAR; INTRAVENOUS EVERY 12 HOURS
Status: DISCONTINUED | OUTPATIENT
Start: 2019-12-19 | End: 2019-12-19

## 2019-12-19 RX ORDER — NICOTINE POLACRILEX 4 MG
15-30 LOZENGE BUCCAL
Status: DISCONTINUED | OUTPATIENT
Start: 2019-12-19 | End: 2019-12-21 | Stop reason: HOSPADM

## 2019-12-19 RX ORDER — DEXTROSE MONOHYDRATE 25 G/50ML
25-50 INJECTION, SOLUTION INTRAVENOUS
Status: DISCONTINUED | OUTPATIENT
Start: 2019-12-19 | End: 2019-12-21 | Stop reason: HOSPADM

## 2019-12-19 RX ADMIN — PANTOPRAZOLE SODIUM 40 MG: 40 INJECTION, POWDER, FOR SOLUTION INTRAVENOUS at 21:43

## 2019-12-19 RX ADMIN — ATORVASTATIN CALCIUM 80 MG: 40 TABLET, FILM COATED ORAL at 08:45

## 2019-12-19 RX ADMIN — PANTOPRAZOLE SODIUM 40 MG: 40 INJECTION, POWDER, FOR SOLUTION INTRAVENOUS at 08:45

## 2019-12-19 RX ADMIN — FUROSEMIDE 40 MG: 10 INJECTION, SOLUTION INTRAVENOUS at 14:38

## 2019-12-19 RX ADMIN — HUMAN ALBUMIN MICROSPHERES AND PERFLUTREN 5 ML: 10; .22 INJECTION, SOLUTION INTRAVENOUS at 13:33

## 2019-12-19 ASSESSMENT — ACTIVITIES OF DAILY LIVING (ADL)
AMBULATION: 0-->INDEPENDENT
TOILETING: 0-->INDEPENDENT
TRANSFERRING: 0-->INDEPENDENT
ADLS_ACUITY_SCORE: 12
RETIRED_EATING: 0-->INDEPENDENT
DRESS: 0-->INDEPENDENT
COGNITION: 0 - NO COGNITION ISSUES REPORTED
FALL_HISTORY_WITHIN_LAST_SIX_MONTHS: NO
BATHING: 0-->INDEPENDENT
RETIRED_COMMUNICATION: 0-->UNDERSTANDS/COMMUNICATES WITHOUT DIFFICULTY
SWALLOWING: 0-->SWALLOWS FOODS/LIQUIDS WITHOUT DIFFICULTY

## 2019-12-19 ASSESSMENT — MIFFLIN-ST. JEOR: SCORE: 1768.34

## 2019-12-19 NOTE — ED NOTES
[facility] [place]   ED Nurse to Floor Handoff     Cole Jesus is a 59 year old male who speaks English and lives with a spouse,  in a home  They arrived in the ED by car from home    ED Chief Complaint: Abnormal Labs    ED Dx;   Final diagnoses:   Gastrointestinal hemorrhage, unspecified gastrointestinal hemorrhage type   Anemia, unspecified type   Weakness         Needed?: No    Allergies:   Allergies   Allergen Reactions     Hydralazine      Black spots     Simvastatin Other (See Comments)     Leg muscle weakness     Tylenol [Acetaminophen] Palpitations   .  Past Medical Hx:   Past Medical History:   Diagnosis Date     Anemia in chronic renal disease 3/9/2015     Antiplatelet or antithrombotic long-term use      Atrial fibrillation and flutter      CAD (coronary artery disease)     Stemi in 12/11, s/p angioplasty     CRD (chronic renal disease), stage IV (H) 03/09/2015    hx ATN with dialysis complicating cardiogenic shock  1/2012     GI bleed     massive lower GI bleed secondary to a cecal ulcer, s/p ileocecal resection in 12/11     Heart failure     Biventricular systolic HF, complicated by ARDS requiring tracheostomy     Hyperlipidemia LDL goal <100 4/28/2013     Hypertension      Ischemic cardiomyopathy 4/28/2013    TTE revealing 40% EF     Myocardial infarction (H)      Other and unspecified nonspecific immunological findings     Anti JKa     Pulmonary edema     episodes of flash pulmonary edema in 12/11     Subclinical hypothyroidism       Baseline Mental status: WDL  Current Mental Status changes: at basesline    Infection present or suspected this encounter: no  Sepsis suspected: No  Isolation type: No active isolations     Activity level - Baseline/Home:  Independent  Activity Level - Current:   Stand with Assist    Bariatric equipment needed?: No    In the ED these meds were given:   Medications   pantoprazole (PROTONIX) 80 mg in sodium chloride 0.9 % 100 mL  "infusion (has no administration in time range)   pantoprazole (PROTONIX) 40 mg IV push injection (80 mg Intravenous Given 12/18/19 8910)       Drips running?  Yes Protonix     Home pump  No    Current LDAs  Peripheral IV 12/18/19 Right Upper arm (Active)   Site Assessment WDL 12/18/2019  6:03 PM   Line Status Saline locked 12/18/2019  6:03 PM   Phlebitis Scale 0-->no symptoms 12/18/2019  6:03 PM   Infiltration Scale 0 12/18/2019  6:03 PM   Number of days: 0       Labs results:   Labs Ordered and Resulted from Time of ED Arrival Up to the Time of Departure from the ED   ABO/RH TYPE AND SCREEN   RED BLOOD CELL PREPARE ORDER UNIT       Imaging Studies: No results found for this or any previous visit (from the past 24 hour(s)).    Recent vital signs:   /68   Pulse 93   Temp 97.6  F (36.4  C) (Oral)   Resp 16   Ht 1.727 m (5' 8\")   Wt 101.2 kg (223 lb)   SpO2 94%   BMI 33.91 kg/m      Yogesh Coma Scale Score: 15 (12/18/19 1738)       Cardiac Rhythm: Normal Sinus  Pt needs tele? Yes  Skin/wound Issues: None    Code Status: Full Code    Pain control: pt had none    Nausea control: pt had none    Abnormal labs/tests/findings requiring intervention: HGB 7.9 in clinic    Family present during ED course? Yes   Family Comments/Social Situation comments: family at bedside    Tasks needing completion: None    Diego Jovel RN    [phone] [facility] ED      "

## 2019-12-19 NOTE — CONSULTS
Brief Cardiology Consult Note    Cole Jesus is a 59 y.o.M with a PMHx of GIB due to AV malformation, NSTEMI s/p PCI RCA (11/19, 6/19), STEMI in 2012 s/p PCI x 3 RCA, LCx, ICM (Ef 30-35%), pulmonary hypertension, severe mitral regurgitation, atrial fibrillation, and CKD who is admitted with fatigue, found to have acute anemia with concern for GIB.     While in ED, patient with hemodynamically stable vital signs, hgb 7.9, down from 8.9 1 week ago, baseline around 11-12. He also endorsed black tarry stools over last several days.     Cardiology was asked to weigh in about utility in triple therapy in a patient with multiple PCI, most recently 11/2019 as well as possibility of holding medications for GI procedures.   Per chart review, patient not placed on triple therapy (ASA/Warfarin/Plavix) given history of GIB, but has been on DAPT with ASA/Plavix.     Given recent PCI, unless the risk of bleeding (hemodynamically unstable, receiving massive blood transfusion outweighs the risk of STEMI/thrombosis of stent, would not hold DAPT for 3 months after PCI (until 02/19).  At that point in time, we can hold DAPT for 5 days for procedure, but ASA/Plavix would need to be resumed after procedure for full treatment duration of 1 year (11/2020).    GUILLE Franz, CNP  [facility]  [phone]

## 2019-12-19 NOTE — H&P
[facility], [place]    History and Physical - [facility] Night Float Service        Date of Admission:  12/18/2019    Assessment & Plan   Cole Jesus is a 59 year old male admitted on 12/18/2019. He presents with drop in hgb and fatigue of several weeks in the setting of acute occlusion of pRCA s/p balloon angioplasty and stenting in 6/2019, STEMI s/p DESx3 for total occlusion of RCA and Lcx (2012), GIB due to AV malformation (in the setting of warfarin and DAPT) s/p EGD with clip (7/5/2019), ischemic cardiomyopathy, HFrEF (last EF=30-35% 11/12/19), severe pHTN, severe mitral regurgitation, atrial fibrillation, CKD stage IV.    # Acute blood loss anemia  # h/o duodenal AV malformation  Afebrile, vital signs stable on admission.  Hemoglobin 7.9, down from 8.9 one week ago.  Recent hgb baseline 11-12.  Endorses black stools over the last few days.  Recent duodenal AVM status post EGD clip on 7/4/2019.  Given recent history, current symptoms with hemoglobin drop, concern is for GI bleed at this time.  -GI consult placed for a.m.  -N.p.o. at midnight  -40 mg IV BID pantoprazole starting tomorrow, given 80 milligram bolus in the ER this evening.  -Holding PTA losartan, Lasix, amlodipine  -Holding PTA aspirin, clopidogrel    ----Chronic Medical Problems-----    # STEMI s/p DESx3 (2012)  # acute occlusion pRCA s/p angio/stent (6/2019)  # ischemic cardiomyopathy (EF 30-35% 11/12/19)  - Holding PTA losartan, lasix, amlodipine, entresto while suspecting acute bleed  - Holding ASA, clopidogrel, atorvastatin while suspecting bleed  - consider adding beta-blocker, as patient is not taking one outpatient    # T2DM  - holding insulin while NPO    # CKD IV  - holding losartan      Diet: NPO at midnight  Fluids: none  DVT Prophylaxis: Ambulate every shift  Ponce Catheter: not present  Code Status: FULL    Disposition Plan   Expected discharge: 2 - 3 days, recommended to prior living arrangement once  "hemoglobin stable.  Entered: Jose A Lobato MD 12/18/2019, 9:34 PM       The patient's care was discussed with the Attending Physician, Dr. Briceno.    Jose A Lobato MD  [facility] Night Float Service  [facility]  Please see sticky note for cross cover information  ______________________________________________________________________    Chief Complaint   Fatigue and low hgb    History is obtained from the patient    History of Present Illness   Cole Jesus is a 59 year old male who presents with 2 to 3 weeks of \"sluggishness\" and fatigue.  Patient has been unable to walk up or down stairs in his house over the last 2 weeks due to shortness of breath.  He has needed his wife to drop  him off closer to doors as he cannot walk long distances from the car up driveways.  Prior to these last 2 weeks, he had no difficulties with ambulation, walking stairs, walking long distances.  He was concerned that he may be more fluid overloaded from his heart failure, as this is how he felt on a prior exacerbation.  He had an appointment with his nephrologist today to discuss his fluid status, when he is found to have a hemoglobin of 7.9, down 1 point from last week.  Recent baseline over the last few months has been hemoglobin of 11-12.  He had noticed his stools over the last 3 to 4 days have been more black, this in light of his lower hemoglobin prompted him to come to the ER for further evaluation.  He denies any chest pain, swelling in his extremities, shortness of breath at rest or laying down over the last 2 weeks.  He denies fever/chills, myalgias, arthralgias, abdominal pain, bloating, nausea, diarrhea, constipation, hematemesis, hematochezia.  Denies any recent illnesses, no recent sick contacts.  He has been taking his medications as prescribed.  Denies any NSAID use, tobacco use.    Review of Systems    The 10 point Review of Systems is negative other than noted " in the HPI or here.     Past Medical History    I have reviewed this patient's medical history and updated it with pertinent information if needed.   Past Medical History:   Diagnosis Date     Anemia in chronic renal disease 3/9/2015     Antiplatelet or antithrombotic long-term use      Atrial fibrillation and flutter      CAD (coronary artery disease)     Stemi in 12/11, s/p angioplasty     CRD (chronic renal disease), stage IV (H) 03/09/2015    hx ATN with dialysis complicating cardiogenic shock  1/2012     GI bleed     massive lower GI bleed secondary to a cecal ulcer, s/p ileocecal resection in 12/11     Heart failure     Biventricular systolic HF, complicated by ARDS requiring tracheostomy     Hyperlipidemia LDL goal <100 4/28/2013     Hypertension      Ischemic cardiomyopathy 4/28/2013    TTE revealing 40% EF     Myocardial infarction (H)      Other and unspecified nonspecific immunological findings     Anti JKa     Pulmonary edema     episodes of flash pulmonary edema in 12/11     Subclinical hypothyroidism         Past Surgical History   I have reviewed this patient's surgical history and updated it with pertinent information if needed.  Past Surgical History:   Procedure Laterality Date     CV RIGHT HEART CATH N/A 11/12/2019    Procedure: CV RIGHT HEART CATH;  Surgeon: Fox Gerard MD;  Location:  HEART CARDIAC CATH LAB     ESOPHAGOSCOPY, GASTROSCOPY, DUODENOSCOPY (EGD), COMBINED  12/25/2011    Procedure:COMBINED ESOPHAGOSCOPY, GASTROSCOPY, DUODENOSCOPY (EGD); Surgeon:SAMARIA PUENTE; Location: GI     LAPAROTOMY EXPLORATORY  12/31/2011    Procedure:LAPAROTOMY EXPLORATORY; Explore laparotomy, Illeocectomy, Diverting Illeostomy; Surgeon:GABI MOSHER; Location:[facility] OR     ORIF right elbow fracture  age 12     TAKEDOWN ILEOSTOMY  6/5/2014    Procedure: TAKEDOWN ILEOSTOMY;  Surgeon: Gabi Mosher MD;  Location: [facility] OR     TRACHEOSTOMY  12/22/2011     Procedure:TRACHEOSTOMY; Tracheostomy; 80XLTCP-Proximal Extension-Cuffed 8.0 mm I.D.; Surgeon:LIZABETH DYE; Location:[facility] OR        Social History   I have reviewed this patient's social history and updated it with pertinent information if needed. Cole Jesus  reports that he quit smoking about 8 years ago. He has never used smokeless tobacco. He reports that he does not drink alcohol or use drugs.  Lives at home with partner.    Family History   I have reviewed this patient's family history and updated it with pertinent information if needed.   Family History   Problem Relation Age of Onset     Diabetes Mother      Hypertension Mother      Heart Disease Mother      Heart Disease Father          of heart attack, left when he was young     Diabetes Sister      Diabetes Sister      Diabetes Sister        Prior to Admission Medications   Prior to Admission Medications   Prescriptions Last Dose Informant Patient Reported? Taking?   ACCU-CHEK GUIDE test strip 2019 Self No Yes   Sig: USE TO TEST BLOOD SUGAR FOUR TIMES A DAY BEFORE MEALS AND AT BEDTIME   Alcohol Swabs PADS 2019 Self No Yes   Si applicator 4 times daily (before meals and nightly)   Sharps Container MISC  Self No No   Si Device every 30 days   amLODIPine (NORVASC) 2.5 MG tablet 2019 at AM  No Yes   Sig: Take 3 tablets (7.5 mg) by mouth daily   aspirin (ASA) 81 MG chewable tablet 2019 at AM Self Yes Yes   Sig: Take 81 mg by mouth daily   atorvastatin (LIPITOR) 80 MG tablet 2019 at PM Self No Yes   Sig: Take 1 tablet (80 mg) by mouth daily   blood glucose monitoring (ACCU-CHEK FASTCLIX) lancets 2019 Self No Yes   Sig: USE TO TEST BLOOD SUGAR FOUR TIMES A DAY AT MEALS AND AT BEDTIME   cholecalciferol (VITAMIN  -D) 1000 UNITS capsule 2019 at AM Self No Yes   Sig: Take 2 capsules (2,000 Units) by mouth daily   Patient taking differently: Take 1 capsule by mouth 2 times daily    clopidogrel  (PLAVIX) 75 MG tablet 12/18/2019 at AM Self No Yes   Sig: Take 1 tablet (75 mg) by mouth daily   ferrous sulfate (FEROSUL) 325 (65 Fe) MG tablet Past Week  No Yes   Sig: Take 1 tablet (325 mg) by mouth every other day   furosemide (LASIX) 20 MG tablet 12/18/2019 at AM  No Yes   Sig: Take 1 tablet (20 mg) by mouth daily Take a total of 100 mg twice a day   furosemide (LASIX) 80 MG tablet 12/18/2019 at AM  No Yes   Sig: Take 1 tablet (80 mg) by mouth 2 times daily Total of 100 mg twice a day   insulin glargine (LANTUS PEN) 100 UNIT/ML pen Past Month Self No Yes   Sig: Inject 7 Units Subcutaneous every morning   insulin pen needle (32G X 4 MM) 32G X 4 MM miscellaneous Past Month Self No Yes   Sig: Use 5 pen needles daily or as directed.   nitroGLYcerin (NITROSTAT) 0.4 MG sublingual tablet Unknown at NEVER Self No No   Sig: Place 1 tablet (0.4 mg) under the tongue every 5 minutes as needed for chest pain For chest pain place 1 tablet under the tongue every 5 minutes for 3 doses. If symptoms persist 5 minutes after 1st dose call 911.   Patient not taking: Reported on 12/18/2019   pantoprazole (PROTONIX) 40 MG EC tablet 12/18/2019 at AM Self No Yes   Sig: Take 1 tablet (40 mg) by mouth 2 times daily   sacubitril-valsartan (ENTRESTO) 49-51 MG per tablet 12/18/2019 at AM  No Yes   Sig: Take 1 tablet by mouth 2 times daily   vitamin  B complex with vitamin C (VITAMIN  B COMPLEX) TABS 12/18/2019 at AM Self Yes Yes   Sig: Take 1 tablet by mouth daily      Facility-Administered Medications: None     Allergies   Allergies   Allergen Reactions     Hydralazine      Black spots     Simvastatin Other (See Comments)     Leg muscle weakness     Tylenol [Acetaminophen] Palpitations       Physical Exam   Vital Signs: Temp: 97.6  F (36.4  C) Temp src: Oral BP: 92/65 Pulse: 99 Heart Rate: 84 Resp: 18 SpO2: 97 % O2 Device: None (Room air)    Weight: 223 lbs 0 oz    Constitutional: awake, alert, cooperative, no apparent distress, and  appears stated age  Eyes: Lids and lashes normal, pupils equal, round and reactive to light, extra ocular muscles intact, sclera clear, conjunctiva normal  ENT: Normocephalic, without obvious abnormality, atraumatic, oral pharynx with moist mucous membranes, tonsils without erythema or exudates, gums normal. poor dentition.  Respiratory: No increased work of breathing, good air exchange, clear to auscultation bilaterally, no crackles or wheezing  Cardiovascular: regular rate and rhythm, normal S1 and S2, no S3 or S4.  Holosystolic murmur best appreciated over mitral valve.  GI: normal bowel sounds, soft, non-distended, non-tender, no masses palpated, no hepatosplenomegally  Skin: no bruising or bleeding, no redness, warmth, or swelling and no rashes  Musculoskeletal: There is no redness, warmth, or swelling of the joints.  Full range of motion noted. Tone is normal.  Neurologic: Awake, alert, oriented to name, place and time.    Data   Data reviewed today: I reviewed all medications, new labs and imaging results over the last 24 hours. I personally reviewed. no images or EKG's today.    Recent Labs   Lab 12/18/19  1607 12/12/19  1057   WBC 7.1 7.1   HGB 7.9* 8.9*   MCV 94 93    267   INR 1.34* 1.23*    134   POTASSIUM 4.0 4.0   CHLORIDE 105 101   CO2 23 23   BUN 65* 56*   CR 3.13* 3.27*   ANIONGAP 9 10   RONNY 8.7 8.7   * 125*   ALBUMIN 3.8  --    PROTTOTAL 7.8  --    BILITOTAL 1.4*  --    ALKPHOS 101  --    ALT 13  --    AST 6  --

## 2019-12-19 NOTE — CONSULTS
GASTROENTEROLOGY CONSULTATION      Date of Admission:  12/18/2019           Reason for Consultation:   We were asked by Dr. Jose A Lobato of Internal Medicine - [facility] Service to evaluate this patient with melena and a Hgb drop           ASSESSMENT AND RECOMMENDATIONS:   Assessment:  Mr. Cole Jesus is a pleasant 59-year-old gentleman with a past medical history of STEMI, acute occlusion of pRCA s/p balloon angioplasty and stenting in 6/2019 (XIENCE JAMILA 3.5 X 28 KEVYN), STEMI s/p DESx3 for total occlusion of RCA and Lcx (2012), GIB due to AV malformation (in the setting of warfarin and DAPT), ischemic cardiomyopathy, HFrEF (last EF=30-35%), severe pHTN, severe mitral regurgitation, atrial fibrillation not on AC due to GIB in the past, CKD stage IV presenting with two weeks of weakness and exertional dyspnea, four days of melenic stool, and a 4.5 gram hemoglobin drop since mid November 2019 c/f recurrent GIB.     This patient's presentation likely represents a gastrointestinal bleed, more likely to be upper vs lower given melenic stools and patient's relatively recent small bowel AVM. However, a slower right-sided colonic bleed could also be the culprit. DDx includes AVMs, PUD, anastomotic ulcer (ileocecteomy), colonic ulcer (previous hx). 4.5 gram hemoglobin drop over approximately 30 days, depressed cardiac function, melena, long term DAPT, and exertional dyspnea are concerning features in this patient.     His situation is further complicate by his ischemic cardiomyopathy and need for DAPT given his numerous (and recent, 6/2019) coronary stent burden/PCI. We appreciate the support and expertise of the cardiology consult service in the care of this patient.     GBS: 13 pts  AIMS65: 1 pt  Ally: cannot calculate until lesion, if present, is visualized     Recommendations:  - NPO at [place]  - Resuscitate patient with blood and crystalloid, likely will have to be gentle given ICM/HFrEF  - Plan for EGD  "12/20/19, if nondiagnostic likely move forward with lower endoscopy  - transfuse to goal of 7-8 hgb, would ask cardiology for specific goals  - GI will continue to follow with you    Gastroenterology outpatient follow up recommendations: BY[facility]    Thank you for involving us in this patient's care. Please do not hesitate to contact the GI service with any questions or concerns.     Pt care plan discussed with Dr. Rasmussen, GI staff physician.    Internal Medicine PGY2  Darci Adkins MD  -------------------------------------------------------------------------------------------------------------------           History of Present Illness:   Mr. Cole Jesus is a pleasant 59-year-old gentleman with a past medical history of STEMI, acute occlusion of pRCA s/p balloon angioplasty and stenting in 6/2019 (XIENCE JAMILA 3.5 X 28 KEVYN), STEMI s/p DESx3 for total occlusion of RCA and Lcx (2012), GIB due to AV malformation (in the setting of warfarin and DAPT), ischemic cardiomyopathy, HFrEF (last EF=30-35%), severe pHTN, severe mitral regurgitation, atrial fibrillation not on AC due to GIB in the past, CKD stage IV presenting with two weeks of weakness and exertional dyspnea, four days of melenic stool, and a 4.5 gram hemoglobin drop since mid November 2019 c/f recurrent GIB.       Briefly, patient describes 3-4 days of jet black melenic stools. He claims that they look \"exactly like\" the stools he was having leading up to his bleed last summer (7/2019). He has had these stools 2-3x/day over this time period. The stools are without dyschezia, tenesmus, excess flatus. Denies any real generalized abdominal pain as well. No nausea, vomiting, hematemesis. Appetite intact. Also, over the last approximately 10 days patient has had increased dyspnea on exertion, malaise, and has been in general more fatigued. Denies pre-syncope, lightheadedness, or falls. Denies any chest pain.     Patient denies hx of EtoH use, previous though " not current smoker. Denies illicits.     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           Past Medical History:   Reviewed and edited as appropriate  Past Medical History:   Diagnosis Date     Anemia in chronic renal disease 3/9/2015     Antiplatelet or antithrombotic long-term use      Atrial fibrillation and flutter      CAD (coronary artery disease)     Stemi in 12/11, s/p angioplasty     CRD (chronic renal disease), stage IV (H) 03/09/2015    hx ATN with dialysis complicating cardiogenic shock  1/2012     GI bleed     massive lower GI bleed secondary to a cecal ulcer, s/p ileocecal resection in 12/11     Heart failure     Biventricular systolic HF, complicated by ARDS requiring tracheostomy     Hyperlipidemia LDL goal <100 4/28/2013     Hypertension      Ischemic cardiomyopathy 4/28/2013    TTE revealing 40% EF     Myocardial infarction (H)      Other and unspecified nonspecific immunological findings     Anti JKa     Pulmonary edema     episodes of flash pulmonary edema in 12/11     Subclinical hypothyroidism             Past Surgical History:   Reviewed and edited as appropriate   Past Surgical History:   Procedure Laterality Date     CV RIGHT HEART CATH N/A 11/12/2019    Procedure: CV RIGHT HEART CATH;  Surgeon: Fox Gerard MD;  Location:  HE[facility] CARDIAC CATH LAB     ESOPHAGOSCOPY, GASTROSCOPY, DUODENOSCOPY (EGD), COMBINED  12/25/2011    Procedure:COMBINED ESOPHAGOSCOPY, GASTROSCOPY, DUODENOSCOPY (EGD); Surgeon:SAMARIA PUENTE; [facility]     LAPAROTOMY EXPLORATORY  12/31/2011    Procedure:LAPAROTOMY EXPLORATORY; Explore laparotomy, Illeocectomy, Diverting Illeostomy; Surgeon:GABI NAJERA; Location:[facility]     ORIF right elbow fracture  age 12     TAKEDOWN ILEOSTOMY  6/5/2014    Procedure: TAKEDOWN ILEOSTOMY;  Surgeon: Nomi  Gia Bustamante MD;  Location: [facility]     TRACHEOSTOMY  12/22/2011    Procedure:TRACHEOSTOMY; Tracheostomy; 80XLTCP-Proximal Extension-Cuffed 8.0 mm I.D.; Surgeon:LIZABETH DYE; Location:[facility]            Previous Endoscopy:     Results for orders placed or performed during the hospital encounter of 12/03/11   COLONOSCOPY   Result Value Ref Range    COLONOSCOPY       [facility], [place]   [address] [place] [address] [phone]     Endoscopy Department  _______________________________________________________________________________  Patient Name: Cole Dunn         Procedure Date: 12/29/2011 10:12:SS   A12/P12  MRN: [id number]                       Account Number: [id number]                  YOB: 1960              Admit Type: Inpatient                       Age: 51                               Room:                                       Gender: Male                          Note Status: Finalized                      Attending MD: Seb Sims MD         Pause for the Cause: Pause for the cause   completed  _______________________________________________________________________________     Procedure:                Colonoscopy  Indications:              Hematochezia, Pt in ICU, had bleeding from cecum                             48h ago, injected with epi, now bleeding again.  Providers:                Seb Sims MD, Carlos Castillo, ELIDA  Referring MD:             Javier Gan Md, MD  Medicines:                Midazolam 3 mg IV, Fentanyl 100 micrograms IV  Complications:            No immediate complications  _______________________________________________________________________________  Procedure:                Pre-Anesthesia Assessment:                            - Prior to the procedure, a History and Physical                             was performed, and patient medications and                             allergies were reviewed. The  patient is unable to                             give consent secondary to the patient's altered                             mental status. The risks and benefits of the                             procedure and the sedation options and risks were                             discussed with the patient's spouse. All questions                             were answered and informed consent was obtained.                             Patient identification and proposed procedure were                             verified by the physician and the nurse in the                             procedure room. Mental Status Examination: sedated.                             Airway Examination: normal oropharyngeal airway and                             neck mobility. Respiratory Examination: clear to                             auscultation. CV Examination: normal. Prophylactic                             Antibiotics: The patient does not require                             prophylactic antibiotics. Prior Anticoagulants: The                             patient has taken no previous anticoagulant or                             antiplatelet agents. ASA Grade Assessment: II - A                             patient with mild systemic disease. After reviewing                             the risks and benefits, the patient was deemed in                             satisfactory condition to undergo the procedure.                             The anesthesia plan was to use moderate sedation /                             analgesia (conscious sedation). Immediately prior                             to administration of medications, the patient was                             re-assessed for adequacy to receive sedatives. The                             heart rate, respiratory rate, oxygen saturations,                             blood pressure, adequacy of pulmonary ventilation,                             and response to care were monitored  throughout the                             procedure. The physical status of the patient was                             re-assessed after the procedure.                            After obtaining informed consent, the colonoscope                             was passed under direct vision. Throughout the                             procedure, the patient's blood pressure, pulse, and                             oxygen saturations were monitored continuously. The                             Colonoscope was introduced through the anus and                             advanced to the cecum, identified by appendiceal                             orifice & ileocecal valve. The colonoscopy was                             performed without difficulty. The patient tolerated                             the procedure well. The quality of the bowel                             preparation was fair.                                                                                   Findings:       The perianal and digital rectal examinations were normal. Red blood was        found in the entire colon. A single (solitary) 8 mm ulcer was found in        the cecum. Ulcer had a vessel, spurting bleeding was present. Three        hemostatic clips were successfully placed. Bleeding had stopped at the        end of the procedure. Area was successfully injected with 3.5 mL of a        1:10,000 solution of epinephrine for drug delivery.                                                                                   Impression:               - A single (solitary) ulcer in cecum with a                             bleeding vessel, treated by placement of three                             clips and epinephrine injection.  Recommendation:           - Monitor hgb/hct                            - Avoid anticoagulation.                            - Clear liquid diet.                            - Call GI team if evidence of re-bleeding                                                                                      electronically signed by DORINDA Sims  ________________  Seb Sims MD  Signed Date: 2011 18:12:ROSITA KULKARNI/JOSE  Number of Addenda: 0  I was physically present for the entire viewing portion of the exam.  __________________________  Signature of teaching physician  B4c/D4c  Note Initiated On: 2011 10:12:ROSITA KULKARNI/  Scope Withdrawal Time: 0 hours 0 minutes 0 seconds   Total Procedure Duration: 0 hours 0 minutes 0 seconds             Social History:   Reviewed and edited as appropriate  Social History     Socioeconomic History     Marital status: Significant other     Spouse name: Not on file     Number of children: Not on file     Years of education: Not on file     Highest education level: Not on file   Occupational History     Not on file   Social Needs     Financial resource strain: Not on file     Food insecurity:     Worry: Not on file     Inability: Not on file     Transportation needs:     Medical: Not on file     Non-medical: Not on file   Tobacco Use     Smoking status: Former Smoker     Last attempt to quit: 12/3/2011     Years since quittin.0     Smokeless tobacco: Never Used   Substance and Sexual Activity     Alcohol use: No     Alcohol/week: 0.0 standard drinks     Drug use: No     Sexual activity: Yes     Partners: Female   Lifestyle     Physical activity:     Days per week: Not on file     Minutes per session: Not on file     Stress: Not on file   Relationships     Social connections:     Talks on phone: Not on file     Gets together: Not on file     Attends L[employer] service: Not on file     Active member of club or organization: Not on file     Attends meetings of clubs or organizations: Not on file     Relationship status: Not on file     Intimate partner violence:     Fear of current or ex partner: Not on file     Emotionally abused: Not on file     Physically abused: Not on file     Forced sexual activity: Not on file    Other Topics Concern     Parent/sibling w/ CABG, MI or angioplasty before 65F 55M? Yes      Service Not Asked     Blood Transfusions Not Asked     Caffeine Concern Not Asked     Occupational Exposure Not Asked     Hobby Hazards Not Asked     Sleep Concern Not Asked     Stress Concern Not Asked     Weight Concern Not Asked     Special Diet Not Asked     Back Care Not Asked     Exercise Yes     Comment: limited walking     Bike Helmet Not Asked     Seat Belt Not Asked     Self-Exams Not Asked   Social History Narrative     Not on file            Family History:   Reviewed and edited as appropriate  Family History   Problem Relation Age of Onset     Diabetes Mother      Hypertension Mother      Heart Disease Mother      Heart Disease Father          of heart attack, left when he was young     Diabetes Sister      Diabetes Sister      Diabetes Sister        No known history of colorectal cancer, liver disease, or inflammatory bowel disease.       Allergies:   Reviewed and edited as appropriate     Allergies   Allergen Reactions     Hydralazine      Black spots     Simvastatin Other (See Comments)     Leg muscle weakness     Tylenol [Acetaminophen] Palpitations            Medications:     Current Facility-Administered Medications   Medication     atorvastatin (LIPITOR) tablet 80 mg     lidocaine (LMX4) cream     lidocaine 1 % 0.1-1 mL     melatonin tablet 1 mg     naloxone (NARCAN) injection 0.1-0.4 mg     nitroGLYcerin (NITROSTAT) sublingual tablet 0.4 mg     pantoprazole (PROTONIX) 40 mg IV push injection     sodium chloride (PF) 0.9% PF flush 3 mL     sodium chloride (PF) 0.9% PF flush 3 mL     Current Outpatient Medications   Medication Sig     ACCU-CHEK GUIDE test strip USE TO TEST BLOOD SUGAR FOUR TIMES A DAY BEFORE MEALS AND AT BEDTIME     Alcohol Swabs PADS 1 applicator 4 times daily (before meals and nightly)     amLODIPine (NORVASC) 2.5 MG tablet Take 3 tablets (7.5 mg) by mouth daily     aspirin  "(ASA) 81 MG chewable tablet Take 81 mg by mouth daily     atorvastatin (LIPITOR) 80 MG tablet Take 1 tablet (80 mg) by mouth daily     blood glucose monitoring (ACCU-CHEK FASTCLIX) lancets USE TO TEST BLOOD SUGAR FOUR TIMES A DAY AT MEALS AND AT BEDTIME     cholecalciferol (VITAMIN  -D) 1000 UNITS capsule Take 2 capsules (2,000 Units) by mouth daily (Patient taking differently: Take 1 capsule by mouth 2 times daily )     clopidogrel (PLAVIX) 75 MG tablet Take 1 tablet (75 mg) by mouth daily     furosemide (LASIX) 20 MG tablet Take 1 tablet (20 mg) by mouth daily Take a total of 100 mg twice a day     furosemide (LASIX) 80 MG tablet Take 1 tablet (80 mg) by mouth 2 times daily Total of 100 mg twice a day     insulin glargine (LANTUS PEN) 100 UNIT/ML pen Inject 7 Units Subcutaneous every morning     insulin pen needle (32G X 4 MM) 32G X 4 MM miscellaneous Use 5 pen needles daily or as directed.     pantoprazole (PROTONIX) 40 MG EC tablet Take 1 tablet (40 mg) by mouth 2 times daily     sacubitril-valsartan (ENTRESTO) 49-51 MG per tablet Take 1 tablet by mouth 2 times daily     vitamin  B complex with vitamin C (VITAMIN  B COMPLEX) TABS Take 1 tablet by mouth daily     nitroGLYcerin (NITROSTAT) 0.4 MG sublingual tablet Place 1 tablet (0.4 mg) under the tongue every 5 minutes as needed for chest pain For chest pain place 1 tablet under the tongue every 5 minutes for 3 doses. If symptoms persist 5 minutes after 1st dose call 911. (Patient not taking: Reported on 12/18/2019)     Sharps Container MISC 1 Device every 30 days             Review of Systems:   A complete review of systems was performed and is negative except as noted in the HPI           Physical Exam:   /72   Pulse 80   Temp 97.6  F (36.4  C) (Oral)   Resp 23   Ht 1.727 m (5' 8\")   Wt 101.2 kg (223 lb)   SpO2 93%   BMI 33.91 kg/m    Wt:   Wt Readings from Last 2 Encounters:   12/18/19 101.2 kg (223 lb)   12/18/19 101.3 kg (223 lb 6.4 oz)    "   Constitutional: cooperative, pleasant, not dyspneic/diaphoretic, no acute distress  Eyes: Sclera anicteric/injected  Ears/nose/mouth/throat: Normal oropharynx without ulcers or exudate, mucus membranes moist, hearing intact  Neck: supple, thyroid normal size  CV: No edema  Respiratory: Unlabored breathing  Lymph: No axillary, submandibular, supraclavicular or inguinal lymphadenopathy  Abd: Well healed midline/RLQ scar, Nondistended, +bs, no hepatosplenomegaly, mildly tender in epigastrium on deep palpation, no peritoneal signs  SENG: brown stool in rectal vault  Skin: warm, perfused, no jaundice  Neuro: AAO x 3, No asterixis  Psych: Normal affect  MSK: No gross deformities         Data:   Labs and imaging below were independently reviewed and interpreted    BMP  Recent Labs   Lab 12/18/19  1607 12/12/19  1057    134   POTASSIUM 4.0 4.0   CHLORIDE 105 101   RONNY 8.7 8.7   CO2 23 23   BUN 65* 56*   CR 3.13* 3.27*   * 125*     CBC  Recent Labs   Lab 12/19/19  0546 12/18/19  1607 12/12/19  1057   WBC 6.7 7.1 7.1   RBC 3.16* 2.92* 3.21*   HGB 8.6* 7.9* 8.9*   HCT 28.4* 27.3* 29.8*   MCV 90 94 93   MCH 27.2 27.1 27.7   MCHC 30.3* 28.9* 29.9*   RDW 21.5* 22.4* 23.9*    270 267     INR  Recent Labs   Lab 12/18/19  1607 12/12/19  1057   INR 1.34* 1.23*     LFTs  Recent Labs   Lab 12/18/19  1607   ALKPHOS 101   AST 6   ALT 13   BILITOTAL 1.4*   PROTTOTAL 7.8   ALBUMIN 3.8      PANCNo lab results found in last 7 days.    Imaging:  None obtained this admission

## 2019-12-19 NOTE — PHARMACY-ADMISSION MEDICATION HISTORY
"Admission medication history interview status for the 12/18/2019 admission is complete. See Epic admission navigator for allergy information, pharmacy, prior to admission medications and immunization status.     Medication history interview sources:  Patient, [employer] (Dispense Report), Patient's Partner ([place])    Changes made to PTA medication list (reason)  Added: N/A  Deleted: N/A  Changed: N/A    Additional medication history information (including reliability of information, actions taken by pharmacist):  1.) Patient was a good historian; he knew all of his medications, their indications, directions, and when he last took them.  2.) Patient reports not using Lantus for over a month because his blood sugar readings have been within goal and he doses using a sliding scale.   3.) Patient reported that he lost is SL Nitroglycerin tablets and has not had access to them for several months.   4.) Patient reported testing blood glucose \"at least one time a day\".  5.) Patient reported that he began taking amlodipine one month ago.         Prior to Admission medications    Medication Sig Last Dose Taking? Auth Provider   ACCU-CHEK GUIDE test strip USE TO TEST BLOOD SUGAR FOUR TIMES A DAY BEFORE MEALS AND AT BEDTIME 12/17/2019 Yes Smiley Argueta PA-C   Alcohol Swabs PADS 1 applicator 4 times daily (before meals and nightly) 12/17/2019 Yes Arina Haynes APRN CNP   amLODIPine (NORVASC) 2.5 MG tablet Take 3 tablets (7.5 mg) by mouth daily 12/17/2019 at AM Yes Alen Trujillo MD   aspirin (ASA) 81 MG chewable tablet Take 81 mg by mouth daily 12/18/2019 at AM Yes Unknown, Entered By History   atorvastatin (LIPITOR) 80 MG tablet Take 1 tablet (80 mg) by mouth daily 12/17/2019 at PM Yes Alen Trujillo MD   blood glucose monitoring (ACCU-CHEK FASTCLIX) lancets USE TO TEST BLOOD SUGAR FOUR TIMES A DAY AT MEALS AND AT BEDTIME 12/17/2019 Yes Smiley Argueta PA-C   cholecalciferol (VITAMIN  -D) 1000 " UNITS capsule Take 2 capsules (2,000 Units) by mouth daily  Patient taking differently: Take 1 capsule by mouth 2 times daily  12/18/2019 at AM Yes Darren Florez MD   clopidogrel (PLAVIX) 75 MG tablet Take 1 tablet (75 mg) by mouth daily 12/18/2019 at AM Yes Alen Trujillo MD   ferrous sulfate (FEROSUL) 325 (65 Fe) MG tablet Take 1 tablet (325 mg) by mouth every other day Past Week Yes Kristen Reynolds MD   furosemide (LASIX) 20 MG tablet Take 1 tablet (20 mg) by mouth daily Take a total of 100 mg twice a day 12/18/2019 at AM Yes Dian Leblanc APRN CNP   furosemide (LASIX) 80 MG tablet Take 1 tablet (80 mg) by mouth 2 times daily Total of 100 mg twice a day 12/18/2019 at AM Yes Dian Leblanc APRN CNP   insulin glargine (LANTUS PEN) 100 UNIT/ML pen Inject 7 Units Subcutaneous every morning Past Month Yes Meron Jackson PA-C   insulin pen needle (32G X 4 MM) 32G X 4 MM miscellaneous Use 5 pen needles daily or as directed. Past Month Yes Arina Haynes APRN CNP   pantoprazole (PROTONIX) 40 MG EC tablet Take 1 tablet (40 mg) by mouth 2 times daily 12/18/2019 at AM Yes Alen Trujillo MD   sacubitril-valsartan (ENTRESTO) 49-51 MG per tablet Take 1 tablet by mouth 2 times daily 12/18/2019 at AM Yes Dian Leblanc APRN CNP   vitamin  B complex with vitamin C (VITAMIN  B COMPLEX) TABS Take 1 tablet by mouth daily 12/18/2019 at AM Yes Reported, Patient   nitroGLYcerin (NITROSTAT) 0.4 MG sublingual tablet Place 1 tablet (0.4 mg) under the tongue every 5 minutes as needed for chest pain For chest pain place 1 tablet under the tongue every 5 minutes for 3 doses. If symptoms persist 5 minutes after 1st dose call 911.  Patient not taking: Reported on 12/18/2019 Unknown at NEVER  Salo Jones MD   Sharps Container MISC 1 Device every 30 days   Belshan, Arina R, APRN CNP         Medication history completed by: Caden Castro, PD2 Pharmacy Intern

## 2019-12-19 NOTE — SUMMARY OF CARE
Pt arrived to 5B with the following belongings: Change of clothing, and shoes. Belongings at bedside.

## 2019-12-19 NOTE — ED NOTES
Pt's protonix drip was beeping as finished. RN flushed the line and emptied the pt's urinal. Upon looking at the MAR it appeared that the drip was discontinued at 0231 but did not get stopped at that time. Josiah berumen was informed. No need to delay the 40 mg dose push for the morning

## 2019-12-20 LAB
ANION GAP SERPL CALCULATED.3IONS-SCNC: 8 MMOL/L (ref 3–14)
BUN SERPL-MCNC: 61 MG/DL (ref 7–30)
CALCIUM SERPL-MCNC: 8.7 MG/DL (ref 8.5–10.1)
CHLORIDE SERPL-SCNC: 110 MMOL/L (ref 94–109)
CO2 SERPL-SCNC: 23 MMOL/L (ref 20–32)
CREAT SERPL-MCNC: 2.87 MG/DL (ref 0.66–1.25)
ERYTHROCYTE [DISTWIDTH] IN BLOOD BY AUTOMATED COUNT: 21.6 % (ref 10–15)
GFR SERPL CREATININE-BSD FRML MDRD: 23 ML/MIN/{1.73_M2}
GLUCOSE BLDC GLUCOMTR-MCNC: 111 MG/DL (ref 70–99)
GLUCOSE BLDC GLUCOMTR-MCNC: 116 MG/DL (ref 70–99)
GLUCOSE BLDC GLUCOMTR-MCNC: 117 MG/DL (ref 70–99)
GLUCOSE BLDC GLUCOMTR-MCNC: 154 MG/DL (ref 70–99)
GLUCOSE BLDC GLUCOMTR-MCNC: 186 MG/DL (ref 70–99)
GLUCOSE SERPL-MCNC: 95 MG/DL (ref 70–99)
HCT VFR BLD AUTO: 27.6 % (ref 40–53)
HGB BLD-MCNC: 8.3 G/DL (ref 13.3–17.7)
HGB BLD-MCNC: 9.2 G/DL (ref 13.3–17.7)
MCH RBC QN AUTO: 27.1 PG (ref 26.5–33)
MCHC RBC AUTO-ENTMCNC: 30.1 G/DL (ref 31.5–36.5)
MCV RBC AUTO: 90 FL (ref 78–100)
PLATELET # BLD AUTO: 263 10E9/L (ref 150–450)
POTASSIUM SERPL-SCNC: 3.8 MMOL/L (ref 3.4–5.3)
RBC # BLD AUTO: 3.06 10E12/L (ref 4.4–5.9)
SODIUM SERPL-SCNC: 141 MMOL/L (ref 133–144)
WBC # BLD AUTO: 7 10E9/L (ref 4–11)

## 2019-12-20 PROCEDURE — 36415 COLL VENOUS BLD VENIPUNCTURE: CPT | Performed by: INTERNAL MEDICINE

## 2019-12-20 PROCEDURE — 80048 BASIC METABOLIC PNL TOTAL CA: CPT | Performed by: INTERNAL MEDICINE

## 2019-12-20 PROCEDURE — 25000128 H RX IP 250 OP 636: Performed by: INTERNAL MEDICINE

## 2019-12-20 PROCEDURE — 85018 HEMOGLOBIN: CPT | Performed by: INTERNAL MEDICINE

## 2019-12-20 PROCEDURE — C9113 INJ PANTOPRAZOLE SODIUM, VIA: HCPCS | Performed by: STUDENT IN AN ORGANIZED HEALTH CARE EDUCATION/TRAINING PROGRAM

## 2019-12-20 PROCEDURE — 12000001 ZZH R&B MED SURG/OB UMMC

## 2019-12-20 PROCEDURE — 99233 SBSQ HOSP IP/OBS HIGH 50: CPT | Performed by: INTERNAL MEDICINE

## 2019-12-20 PROCEDURE — 25000132 ZZH RX MED GY IP 250 OP 250 PS 637: Performed by: STUDENT IN AN ORGANIZED HEALTH CARE EDUCATION/TRAINING PROGRAM

## 2019-12-20 PROCEDURE — 25000128 H RX IP 250 OP 636: Performed by: STUDENT IN AN ORGANIZED HEALTH CARE EDUCATION/TRAINING PROGRAM

## 2019-12-20 PROCEDURE — 25800030 ZZH RX IP 258 OP 636: Performed by: INTERNAL MEDICINE

## 2019-12-20 PROCEDURE — 00000146 ZZHCL STATISTIC GLUCOSE BY METER IP

## 2019-12-20 PROCEDURE — 85027 COMPLETE CBC AUTOMATED: CPT | Performed by: INTERNAL MEDICINE

## 2019-12-20 PROCEDURE — 25000132 ZZH RX MED GY IP 250 OP 250 PS 637: Performed by: INTERNAL MEDICINE

## 2019-12-20 RX ORDER — METHYLPREDNISOLONE SODIUM SUCCINATE 125 MG/2ML
125 INJECTION, POWDER, LYOPHILIZED, FOR SOLUTION INTRAMUSCULAR; INTRAVENOUS
Status: DISCONTINUED | OUTPATIENT
Start: 2019-12-20 | End: 2019-12-21 | Stop reason: HOSPADM

## 2019-12-20 RX ORDER — FUROSEMIDE 40 MG
80 TABLET ORAL
Status: DISCONTINUED | OUTPATIENT
Start: 2019-12-20 | End: 2019-12-21 | Stop reason: HOSPADM

## 2019-12-20 RX ORDER — ASPIRIN 81 MG/1
81 TABLET, CHEWABLE ORAL DAILY
Status: DISCONTINUED | OUTPATIENT
Start: 2019-12-20 | End: 2019-12-21 | Stop reason: HOSPADM

## 2019-12-20 RX ORDER — CLOPIDOGREL BISULFATE 75 MG/1
75 TABLET ORAL DAILY
Status: DISCONTINUED | OUTPATIENT
Start: 2019-12-20 | End: 2019-12-21 | Stop reason: HOSPADM

## 2019-12-20 RX ORDER — DIPHENHYDRAMINE HYDROCHLORIDE 50 MG/ML
50 INJECTION INTRAMUSCULAR; INTRAVENOUS
Status: DISCONTINUED | OUTPATIENT
Start: 2019-12-20 | End: 2019-12-21 | Stop reason: HOSPADM

## 2019-12-20 RX ADMIN — ATORVASTATIN CALCIUM 80 MG: 40 TABLET, FILM COATED ORAL at 08:12

## 2019-12-20 RX ADMIN — CLOPIDOGREL BISULFATE 75 MG: 75 TABLET ORAL at 15:11

## 2019-12-20 RX ADMIN — IRON SUCROSE 500 MG: 20 INJECTION, SOLUTION INTRAVENOUS at 18:16

## 2019-12-20 RX ADMIN — PANTOPRAZOLE SODIUM 40 MG: 40 INJECTION, POWDER, FOR SOLUTION INTRAVENOUS at 08:12

## 2019-12-20 RX ADMIN — Medication 40 MG: at 17:28

## 2019-12-20 RX ADMIN — ASPIRIN 81 MG CHEWABLE TABLET 81 MG: 81 TABLET CHEWABLE at 15:12

## 2019-12-20 RX ADMIN — FUROSEMIDE 80 MG: 40 TABLET ORAL at 17:28

## 2019-12-20 ASSESSMENT — MIFFLIN-ST. JEOR: SCORE: 1752.01

## 2019-12-20 ASSESSMENT — ACTIVITIES OF DAILY LIVING (ADL)
ADLS_ACUITY_SCORE: 12

## 2019-12-20 NOTE — PLAN OF CARE
Writer assumed cares from [id number]. Oral lasix given X1. Pt had 1 dark brown stool without blood. Iron infusing now. Cont to monitor VS Q15 min and monitor for signs of iron reaction.

## 2019-12-20 NOTE — PROGRESS NOTES
[facility], [facility] Progress Note - Hospitalist Service, Gold 9       Date of Admission:  12/18/2019  Assessment & Plan   Cole Jesus is a 59 year old male admitted on 12/18/2019. He presents with drop in hgb and fatigue of several weeks in the setting of acute occlusion of pRCA s/p balloon angioplasty and stenting in 6/2019, STEMI s/p DESx3 for total occlusion of RCA and Lcx (2012), GIB due to AV malformation (in the setting of warfarin and DAPT) s/p EGD with clip (7/5/2019), ischemic cardiomyopathy, HFrEF (last EF=30-35% 11/12/19), severe pHTN, severe mitral regurgitation, atrial fibrillation, CKD stage IV.    Patient was recently admitted in November for low cardiac output failure after elective right heart cath.  Was on cards 2 service during that time and was started on nipride and Lasix IV.  Was discharged during that time on Lasix 80 mg daily .  Along with aspirin and Plavix and not using warfarin anymore due to previous history of GI bleed.  Carvedilol was held on discharge due to low output failure.  Also was started on iron tablet every other day during that time.   Patient discharge weight was 95.6.   Was seen in nephrology clinic on 12/18.  Patient reportedly had few days of black or grayish colored stool despite not being on iron tablet currently.  Also had hemoglobin down to 7.9 from 11 in 1 month.  Overall feeling sluggish, with bloating of stomach and shortness of breath on ambulation.     Changes today:  Discussed with GI-since there is no overt sign of bleeding since admission and hemodynamics has been stable; plan to hold off on EGD or colonoscopy  And to be arranged as an outpatient  IV iron therapy today-500 mg iron sucrose; repeat iron sucrose in 2 weeks  Resume aspirin and Plavix  Resume  diuretics today; continue to hold antihypertensives for now-will need a clinic visit in few days to resume it  Plan to closely watch hemoglobin tonight and  tomorrow-patient really wants to get discharged by tomorrow.   Change pantoprazole to 40 mg twice daily         # Acute blood loss anemia  # h/o duodenal AV malformation  #Concern for iron deficiency anemia  Afebrile, vital signs stable on admission.  Hemoglobin 7.9, down from 8.9 one week ago.  Recent hgb baseline 11-12.  Endorses black stools over the last few days.  Recent duodenal AVM status post EGD clip on 7/4/2019.  Given recent history, current symptoms with hemoglobin drop, concern is for GI bleed at this time.  -Reportedly had rectal exam , and blood tarry stool yesterday but not this morning  - hemoglobin to be kept above 8  -Continue with pantoprazole 40 mg twice daily  -Discussed with gastroenterology-original plan was to pursue EGD and colonoscopy; however given patient's debility in terms of hemodynamics and hemoglobin, with lack of further black tarry stool-plan would be to pursue EGD and colonoscopy as an outpatient  -We will resume aspirin and Plavix  -Resume PTA furosemide  -We will continue to monitor hemoglobin tonight and in the morning for stability  -Iron studies although are not reliable posttransfusion; ferritin has been up to 35 with low iron saturation index.  We will give 1 dose of IV iron sucrose 500 mg and plan to repeat another dose in 2 weeks.  Since patient has not tolerated oral iron and also this will confound any change in color of stool.       ----Chronic Medical Problems-----     # STEMI s/p DESx3 (2012)  # acute occlusion pRCA s/p angio/stent (6/2019)  # ischemic cardiomyopathy (EF 30-35% 11/12/19)  Patient has acute occlusion of RCA status post balloon angioplasty and stenting in 6/2019, also had a STEMI status post DESx 3 for total occlusion of RCA in 2012, ischemic cardiomyopathy following cardiogenic shock in 2012  -Resume PTA furosemide; continue to hold amlodipine, Entresto given blood pressure on lower side ; core clinic referral on discharge and PTA meds can be resumed  in few days if continues to be stable  -Resume aspirin and Plavix today  -Patient is not on beta-blocker due to low output failure; will need to follow as an outpatient    Atrial fibrillation  Anticoagulation not currently due to history of GI bleed on warfarin  Currently rate controlled; based on cardiology note patient could be a candidate for KASIA closure device.   Need to follow with cardiology as an outpatient severe pulmonary hypertension    Severe pulmonary hypertension  Severe mitral regurgitation  Being followed by Dr. Trujillo as an outpatient;     # T2DM  - holding insulin while NPO  -PTA regimen of 7 units insulin glargine every morning; sliding scale insulin     # CKD IV  -Baseline serum creatinine between 2.1-2.3; this is secondary to acute tubular necrosis on resolved after cardiogenic shock in 2012.  Patient is post STEMI and has advanced ischemic cardiomyopathy  Has been 2.3-2.8 in recent month  2.93 on admission         Diet: Dialysis Diet    DVT Prophylaxis: Low Risk/Ambulatory with no VTE prophylaxis indicated  Ponce Catheter: not present  Code Status: Full Code      Disposition Plan   Expected discharge: 2 - 3 days, recommended to prior living arrangement once hemoglobin stabilized and further GI work-up  Entered: Jama Chong MD 12/20/2019, 4:40 PM       The patient's care was discussed with the Bedside Nurse.    Jama Chong MD  Hospitalist Service, [address], [place]  Pager: [phone]  Please see sticky note for cross cover information  ______________________________________________________________________    Interval History   Patient was feeling sluggish for the past few days; found to have grayish colored stool for past few days and noted to have drop in hemoglobin from 11-7.9 in 1 month    Continues to feel a little bit better; denies chest pain, shortness of breath, cough, abdominal pain or lower extremity swelling; denies any black tarry  stool    Last 24 hr care team notes reviewed.   ROS:  4 point ROS including Respiratory, CV, GI and , other than that noted above, is negative.           Data reviewed today: I reviewed all medications, new labs and imaging results over the last 24 hours. I personally reviewed the chest x-ray image(s) showing Interstitial edema and slightly worsening loculated effusion.    Physical Exam   Vital Signs: Temp: 95.2  F (35.1  C) Temp src: Oral BP: 108/70 Pulse: 98 Heart Rate: 100 Resp: 17 SpO2: 97 % O2 Device: None (Room air)    Weight: 214 lbs 6.4 oz  General Appearance: Patient appears comfortable on exam  Respiratory: Bilateral bibasilar decreased breath sounds  Cardiovascular: S1-S2 heard with grade 2 systolic murmur  GI: Soft nontender bowel sounds noted  Skin: No rash  Other: Awake alert and oriented to time place and person    Data   Recent Labs   Lab 12/20/19  0504 12/19/19 2012 12/19/19  0546 12/19/19  0545 12/18/19  1607   WBC 7.0  --  6.7  --  7.1   HGB 8.3* 8.7* 8.6*  --  7.9*   MCV 90  --  90  --  94     --  248  --  270   INR  --   --   --   --  1.34*     --   --  140 138   POTASSIUM 3.8  --   --  4.0 4.0   CHLORIDE 110*  --   --  109 105   CO2 23  --   --  23 23   BUN 61*  --   --  65* 65*   CR 2.87*  --   --  2.93* 3.13*   ANIONGAP 8  --   --  8 9   RONNY 8.7  --   --  8.8 8.7   GLC 95  --   --  106* 139*   ALBUMIN  --   --   --   --  3.8   PROTTOTAL  --   --   --   --  7.8   BILITOTAL  --   --   --   --  1.4*   ALKPHOS  --   --   --   --  101   ALT  --   --   --   --  13   AST  --   --   --   --  6   TROPI  --   --   --  <0.015  --

## 2019-12-20 NOTE — PLAN OF CARE
Patient A&O x 4 this shift. VSS on RA. Patient up independently. No c/o pain this shift. Admission assessment completed. Telemetry monitoring in place and Normal Sinus Rhythm. Patient to be NPO at midnight for possible Endoscopy tomorrow. Continue with current plan of care.

## 2019-12-20 NOTE — PROGRESS NOTES
"GASTROENTEROLOGY PROGRESS NOTE    ASSESSMENT:    Possible GIB:  Acute on chronic anemia:  May have stopped now as no signs of overt bleed since admission, hemodynamics have been stable, and hemoglobin has remained in 8s. Due to previous hx of AVMs, colonic ulcers, and 4.5 gram drop in month, a GIB remains a real possibility and close outpatient EGD will be arranged.       RECOMMENDATIONS:  - No plans for EGD while inpatient, resume diet, we will arrange for close follow up EGD as outpatient  - Repeat iron panel when sufficiently distant from any blood transfusion, r if indicated recommend arranging for IV iron repletion, patient has not tolerated PO supplementation previously  - CBC 7-10 days from discharge for PCP follow up  - If recommended by cardiology, OK to resume DAPT   - Educate patient to be vigilant to watch for melena, hematochezia at time of discharge  - GI will sign off, please call with questions    The patient was discussed and plan agreed upon with GI staff, Dr. Enamorado.     Darci Adkins MD  Internal Medicine, PGY2    _______________________________________________________________  S: RN notes reviewed, SEVERINOO. Feels fine today. One large brown liquid stool in the evening of 12/19/19. Denies hematochezia or melena. No abdominal pain. Is hungry and is wondering when he can leave.     O:  Blood pressure 103/69, pulse 77, temperature 97.2  F (36.2  C), temperature source Oral, resp. rate 16, height 1.727 m (5' 8\"), weight 98.9 kg (218 lb), SpO2 97 %.    Constitutional: cooperative, pleasant, not dyspneic/diaphoretic, no acute distress  Eyes: Sclera anicteric/injected  Ears/nose/mouth/throat: Normal oropharynx without ulcers or exudate, mucus membranes moist, hearing intact  Neck: supple, thyroid normal size  CV: No edema  Respiratory: Unlabored breathing  Lymph: No axillary, submandibular, supraclavicular or inguinal lymphadenopathy  Abd: Well healed midline/RLQ scar, Nondistended, +bs, no " hepatosplenomegaly, nontender  SENG: not performed today  Skin: warm, perfused, no jaundice  Neuro: AAO x 3, No asterixis  Psych: Normal affect  MSK: No gross deformities      LABS:  BMP  Recent Labs   Lab 12/20/19  0504 12/19/19  0545 12/18/19  1607    140 138   POTASSIUM 3.8 4.0 4.0   CHLORIDE 110* 109 105   RONNY 8.7 8.8 8.7   CO2 23 23 23   BUN 61* 65* 65*   CR 2.87* 2.93* 3.13*   GLC 95 106* 139*     CBC  Recent Labs   Lab 12/20/19  0504  12/19/19  0546 12/18/19  1607   WBC 7.0  --  6.7 7.1   RBC 3.06*  --  3.16* 2.92*   HGB 8.3*   < > 8.6* 7.9*   HCT 27.6*  --  28.4* 27.3*   MCV 90  --  90 94   MCH 27.1  --  27.2 27.1   MCHC 30.1*  --  30.3* 28.9*   RDW 21.6*  --  21.5* 22.4*     --  248 270    < > = values in this interval not displayed.     INR  Recent Labs   Lab 12/18/19  1607   INR 1.34*     LFTs  Recent Labs   Lab 12/18/19  1607   ALKPHOS 101   AST 6   ALT 13   BILITOTAL 1.4*   PROTTOTAL 7.8   ALBUMIN 3.8      PANCNo lab results found in last 7 days.     CXR:    EXAM: XR CHEST PORT 1 VW  12/19/2019 2:15 PM      HISTORY:  concern for pulmonary edema        COMPARISON:  11/15/2019     FINDINGS:   Single frontal radiograph of the chest. Interval removal of pulmonary  artery catheter.     Trachea is midline. Stable enlarged cardiomediastinal silhouette with  indistinct pulmonary vasculature.     Increased bibasilar and perihilar opacities. Bilateral loculated  pleural effusions. No pneumothorax.                                                                      IMPRESSION: Cardiomegaly with interstitial pulmonary edema and  slightly enlarged bilateral loculated pleural effusions. Overlying  bibasilar atelectasis versus consolidation.     I have personally reviewed the examination and initial interpretation  and I agree with the findings.

## 2019-12-20 NOTE — PLAN OF CARE
AxOx4. VSS on RA. Denies pain and nausea. NPO for possible endoscopy today. Voiding spontaneously; no BM. Continue to monitor and follow plan of care.

## 2019-12-20 NOTE — PROGRESS NOTES
[facility], [facility] Progress Note - Hospitalist Service, Gold 9       Date of Admission:  12/18/2019  Assessment & Plan   Cole Jesus is a 59 year old male admitted on 12/18/2019. He presents with drop in hgb and fatigue of several weeks in the setting of acute occlusion of pRCA s/p balloon angioplasty and stenting in 6/2019, STEMI s/p DESx3 for total occlusion of RCA and Lcx (2012), GIB due to AV malformation (in the setting of warfarin and DAPT) s/p EGD with clip (7/5/2019), ischemic cardiomyopathy, HFrEF (last EF=30-35% 11/12/19), severe pHTN, severe mitral regurgitation, atrial fibrillation, CKD stage IV.    Patient was recently admitted in November for low cardiac output failure after elective right heart cath.  Was on cards 2 service during that time and was started on nipride and Lasix IV.  Was discharged during that time on Lasix 80 mg daily .  Along with aspirin and Plavix and not using warfarin anymore due to previous history of GI bleed.  Carvedilol was held on discharge due to low output failure.  Also was started on iron tablet every other day during that time.   Patient discharge weight was 95.6.   Was seen in nephrology clinic on 12/18.  Patient reportedly had few days of black or grayish colored stool despite not being on iron tablet currently.  Also had hemoglobin down to 7.9 from 11 in 1 month.  Overall feeling sluggish, with bloating of stomach and shortness of breath on ambulation.     Changes today:  GI consulted-plan for EGD tomorrow  Slightly elevated proBNP   Iron panel was added -that was in the setting of recent blood transfusion  Continue with Protonix IV twice daily  Received 1 dose of diuretics this morning-we will hold off further dose  Continue to hold aspirin and Plavix based on GI recs           # Acute blood loss anemia  # h/o duodenal AV malformation  Afebrile, vital signs stable on admission.  Hemoglobin 7.9, down from 8.9 one week ago.   Recent hgb baseline 11-12.  Endorses black stools over the last few days.  Recent duodenal AVM status post EGD clip on 7/4/2019.  Given recent history, current symptoms with hemoglobin drop, concern is for GI bleed at this time.  -Reportedly had rectal exam , and blood tarry stool yesterday but not this morning  -Concern for acute GI bleed; hemoglobin to be kept above 8  -Continue with pantoprazole IV twice daily; discussed with GI  -Plan for EGD tomorrow  -Holding PTA losartan, amlodipine  -Holding PTA aspirin, clopidogrel     ----Chronic Medical Problems-----     # STEMI s/p DESx3 (2012)  # acute occlusion pRCA s/p angio/stent (6/2019)  # ischemic cardiomyopathy (EF 30-35% 11/12/19)  Patient has acute occlusion of RCA status post balloon angioplasty and stenting in 6/2019, also had a STEMI status post DESx 3 for total occlusion of RCA in 2012, ischemic cardiomyopathy following cardiogenic shock in 2012  - Holding PTA losartan, lasix, amlodipine, entresto while suspecting acute bleed  - Holding ASA, clopidogrel while suspecting bleed  -Patient is not on beta-blocker due to low output failure; will need to follow as an outpatient  -Currently blood pressure of 125/75 so continue to hold antihypertensives; also continue to hold diuretics- however should be resumed early if continues to be stable  -Patient did have JVD elevation on my exam; however known right ventricular dilatation from before.  We will repeat echocardiogram and currently hold diuretics given GI bleed and patient is not symptomatic.     Atrial fibrillation  Anticoagulation not currently due to history of GI bleed on warfarin  Currently rate controlled; based on cardiology note patient could be a candidate for KASIA closure device.   Need to follow with cardiology as an outpatient severe pulmonary hypertension    Severe pulmonary hypertension  Severe mitral regurgitation  Being followed by Dr. Trujillo as an outpatient;     # T2DM  - holding insulin  while NPO  -PTA regimen of 7 units insulin glargine every morning; sliding scale insulin     # CKD IV  -Baseline serum creatinine between 2.1-2.3; this is secondary to acute tubular necrosis on resolved after cardiogenic shock in 2012.  Patient is post STEMI and has advanced ischemic cardiomyopathy  Has been 2.3-2.8 in recent month  2.93 on admission         Diet: NPO per Anesthesia Guidelines for Procedure/Surgery Except for: Meds  2 Gram Sodium Diet    DVT Prophylaxis: Low Risk/Ambulatory with no VTE prophylaxis indicated  Ponce Catheter: not present  Code Status: Full Code      Disposition Plan   Expected discharge: 2 - 3 days, recommended to prior living arrangement once hemoglobin stabilized and further GI work-up  Entered: Jama Chong MD 12/19/2019, 6:00 PM       The patient's care was discussed with the Bedside Nurse.    Jama Chong MD  Hospitalist Service, [address], [place]  Pager: [phone]  Please see sticky note for cross cover information  ______________________________________________________________________    Interval History   Patient was feeling sluggish for the past few days; found to have grayish colored stool for past few days and noted to have drop in hemoglobin from 11-7.9 in 1 month  Denies chest pain; has shortness of breath on ambulation; denies lower extremity swelling and abdominal distention    Last 24 hr care team notes reviewed.   ROS:  4 point ROS including Respiratory, CV, GI and , other than that noted above, is negative.           Data reviewed today: I reviewed all medications, new labs and imaging results over the last 24 hours. I personally reviewed the chest x-ray image(s) showing Interstitial edema and slightly worsening loculated effusion.    Physical Exam   Vital Signs: Temp: 95.2  F (35.1  C) Temp src: Oral BP: 125/75 Pulse: 80 Heart Rate: 77 Resp: 16 SpO2: 95 % O2 Device: None (Room air)    Weight: 218 lbs 0 oz  General  Appearance: Patient appears comfortable on exam  Respiratory: Bilateral bibasilar decreased breath sounds  Cardiovascular: S1-S2 heard with grade 2 systolic murmur  GI: Soft nontender bowel sounds noted  Skin: No rash  Other: Awake alert and oriented to time place and person    Data   Recent Labs   Lab 12/19/19  0546 12/19/19  0545 12/18/19  1607   WBC 6.7  --  7.1   HGB 8.6*  --  7.9*   MCV 90  --  94     --  270   INR  --   --  1.34*   NA  --  140 138   POTASSIUM  --  4.0 4.0   CHLORIDE  --  109 105   CO2  --  23 23   BUN  --  65* 65*   CR  --  2.93* 3.13*   ANIONGAP  --  8 9   RONNY  --  8.8 8.7   GLC  --  106* 139*   ALBUMIN  --   --  3.8   PROTTOTAL  --   --  7.8   BILITOTAL  --   --  1.4*   ALKPHOS  --   --  101   ALT  --   --  13   AST  --   --  6   TROPI  --  <0.015  --

## 2019-12-20 NOTE — PROVIDER NOTIFICATION
Paged G9 MD at [phone]    LOW  [phone] CAMILA Gordon RN [phone] pt wife wants to talk to MD re: discharge.  [place] (wife) [phone] okay to leave voicemail.

## 2019-12-20 NOTE — PLAN OF CARE
Writer assumed cares [id number]. Pt denies n/v and abd pain. Pt educated to save all urine/stool for strict I/Os. No stool yet today. Pt NPO today for upper endoscopy then GI decided pt hgb is stable and decided not to do the test today. Pt belkis regular diet. Will give iron infusion this evening. Tele in place=NSR.  Cont to monitor hgb and for dizziness, sob related to low hgb.

## 2019-12-21 VITALS
SYSTOLIC BLOOD PRESSURE: 100 MMHG | RESPIRATION RATE: 18 BRPM | BODY MASS INDEX: 32.49 KG/M2 | OXYGEN SATURATION: 96 % | WEIGHT: 214.4 LBS | DIASTOLIC BLOOD PRESSURE: 67 MMHG | TEMPERATURE: 96.7 F | HEIGHT: 68 IN | HEART RATE: 91 BPM

## 2019-12-21 LAB
ANION GAP SERPL CALCULATED.3IONS-SCNC: 11 MMOL/L (ref 3–14)
BUN SERPL-MCNC: 56 MG/DL (ref 7–30)
CALCIUM SERPL-MCNC: 8.7 MG/DL (ref 8.5–10.1)
CHLORIDE SERPL-SCNC: 106 MMOL/L (ref 94–109)
CO2 SERPL-SCNC: 23 MMOL/L (ref 20–32)
CREAT SERPL-MCNC: 2.82 MG/DL (ref 0.66–1.25)
ERYTHROCYTE [DISTWIDTH] IN BLOOD BY AUTOMATED COUNT: 21 % (ref 10–15)
GFR SERPL CREATININE-BSD FRML MDRD: 23 ML/MIN/{1.73_M2}
GLUCOSE BLDC GLUCOMTR-MCNC: 104 MG/DL (ref 70–99)
GLUCOSE BLDC GLUCOMTR-MCNC: 128 MG/DL (ref 70–99)
GLUCOSE BLDC GLUCOMTR-MCNC: 183 MG/DL (ref 70–99)
GLUCOSE SERPL-MCNC: 103 MG/DL (ref 70–99)
HCT VFR BLD AUTO: 30.1 % (ref 40–53)
HGB BLD-MCNC: 9 G/DL (ref 13.3–17.7)
MCH RBC QN AUTO: 27.1 PG (ref 26.5–33)
MCHC RBC AUTO-ENTMCNC: 29.9 G/DL (ref 31.5–36.5)
MCV RBC AUTO: 91 FL (ref 78–100)
PLATELET # BLD AUTO: 295 10E9/L (ref 150–450)
POTASSIUM SERPL-SCNC: 3.4 MMOL/L (ref 3.4–5.3)
RBC # BLD AUTO: 3.32 10E12/L (ref 4.4–5.9)
SODIUM SERPL-SCNC: 140 MMOL/L (ref 133–144)
WBC # BLD AUTO: 7.2 10E9/L (ref 4–11)

## 2019-12-21 PROCEDURE — 80048 BASIC METABOLIC PNL TOTAL CA: CPT | Performed by: INTERNAL MEDICINE

## 2019-12-21 PROCEDURE — 36415 COLL VENOUS BLD VENIPUNCTURE: CPT | Performed by: INTERNAL MEDICINE

## 2019-12-21 PROCEDURE — 25000132 ZZH RX MED GY IP 250 OP 250 PS 637: Performed by: STUDENT IN AN ORGANIZED HEALTH CARE EDUCATION/TRAINING PROGRAM

## 2019-12-21 PROCEDURE — 00000146 ZZHCL STATISTIC GLUCOSE BY METER IP

## 2019-12-21 PROCEDURE — 99239 HOSP IP/OBS DSCHRG MGMT >30: CPT | Performed by: INTERNAL MEDICINE

## 2019-12-21 PROCEDURE — 40000893 ZZH STATISTIC PT IP EVAL DEFER

## 2019-12-21 PROCEDURE — 25000131 ZZH RX MED GY IP 250 OP 636 PS 637: Performed by: INTERNAL MEDICINE

## 2019-12-21 PROCEDURE — 85027 COMPLETE CBC AUTOMATED: CPT | Performed by: INTERNAL MEDICINE

## 2019-12-21 PROCEDURE — 25000132 ZZH RX MED GY IP 250 OP 250 PS 637: Performed by: INTERNAL MEDICINE

## 2019-12-21 RX ORDER — HEPARIN SODIUM (PORCINE) LOCK FLUSH IV SOLN 100 UNIT/ML 100 UNIT/ML
5 SOLUTION INTRAVENOUS
Status: CANCELLED | OUTPATIENT
Start: 2020-01-03

## 2019-12-21 RX ORDER — HEPARIN SODIUM,PORCINE 10 UNIT/ML
5 VIAL (ML) INTRAVENOUS
Status: CANCELLED | OUTPATIENT
Start: 2020-01-03

## 2019-12-21 RX ADMIN — CLOPIDOGREL BISULFATE 75 MG: 75 TABLET ORAL at 10:43

## 2019-12-21 RX ADMIN — INSULIN GLARGINE 7 UNITS: 100 INJECTION, SOLUTION SUBCUTANEOUS at 10:46

## 2019-12-21 RX ADMIN — ASPIRIN 81 MG CHEWABLE TABLET 81 MG: 81 TABLET CHEWABLE at 10:43

## 2019-12-21 RX ADMIN — Medication 40 MG: at 10:43

## 2019-12-21 RX ADMIN — ATORVASTATIN CALCIUM 80 MG: 40 TABLET, FILM COATED ORAL at 10:43

## 2019-12-21 RX ADMIN — FUROSEMIDE 80 MG: 40 TABLET ORAL at 10:43

## 2019-12-21 ASSESSMENT — ACTIVITIES OF DAILY LIVING (ADL)
ADLS_ACUITY_SCORE: 12

## 2019-12-21 NOTE — DISCHARGE INSTRUCTIONS
We are holding amlodipine and entresto until you see your primary care in few days   CBC, BMP in 12/23  Also another dose of IV iron infusion on 1/3/2020  Discontinue oral iron tablet

## 2019-12-21 NOTE — PLAN OF CARE
Pt hgb trending up. Pt denies dizziness, n/v and bloody stools. Pt stable for discharge. PIV X2 removed without complication. Discharge instructions reviewed with questions answered via teachback. Pt left 5B and walked downstairs to lobby with family to get a ride home.

## 2019-12-21 NOTE — PLAN OF CARE
5B PT DEFERRAL     PT -   Discharge Planner PT   Patient plan for discharge: home  Current status: patient greeted up ambulating IND in room. Patient ambulated x400' in hallway IND with PT demonstrating no overt gait impairments. PT educated patient to mobilize regularly while in house.   Barriers to return to prior living situation: medical status  Recommendations for discharge: home with OP PT  Rationale for recommendations: Anticipate patient will be appropriate to return home from PT perspective when medically stable. Per discussion and observation patient functioning at baseline with no acute PT needs. Patient will benefit from OP PT follow-up for cardiac rehab but states he has had difficulty with insurance in the past.   Entered by: Jey Padilla 12/21/2019 10:05 AM

## 2019-12-21 NOTE — PROGRESS NOTES
Care Coordinator - Discharge Planning    Admission Date/Time:  12/18/2019  Attending MD:  No att. providers found     Data  Date of initial CC assessment:  None on file  Chart reviewed, discussed with interdisciplinary team.   Patient was admitted for:   1. Gastrointestinal hemorrhage, unspecified gastrointestinal hemorrhage type    2. Anemia, unspecified type    3. Weakness         Assessment   Assessment deferred d/t heavy caseload today.  Brief chart review and focused intervention.    Coordination of Care and Referrals: Received consult request from Dr. Jama Potts  Internal Medicine for follow-up appointments.with primary care (ideally Tuesday or Wednesday of the coming week) and the infusion center (in two weeks for one-time iron infusion).      Set up appointment with provider at pt's primary care clinic for Monday, December 23 at 11:15am with note that patient was taken off his anti-hypertensive medications while hospitalized and needs evaluation and counseling for restarting them.  Set up appointment at [facility], the infusion center at [facility], for iron infusion on January 3rd at 2pm.  Appointment information entered in Epic discharge orders and discussed with discharging RN by phone.  RN will review appointments with patient prior to discharge.        Plan  Anticipated Discharge Date:  12/21/2019  Anticipated Discharge Plan:  Home, self-care and close follow-up as detailed above.      CTS Handoff completed:  No - deferred due to heavy caseload today.    Nguyễn Najera, RN, PHN  RN Care Coordinator   [address] [address]   [email or web address]   Casual Employee - Weekend Coverage only  To contact weekend RNCC, dial [phone] and enter pager number [phone] at prompt OR use [employer] to text page.  This pager can not be contacted by outside line.

## 2019-12-21 NOTE — PLAN OF CARE
[id number]:    AxOx4. VSS on RA. Denied pain and nausea. On regular diet and tolerating well. Voiding spontaneously; no BM. Continue to monitor and follow plan of care.

## 2019-12-21 NOTE — PROVIDER NOTIFICATION
Paged G9 MD at [phone]    [facility] [phone] CAMILA Gordon RN [phone] pt ready to go home. can he discharge today?    MD wrote discharge orders for today

## 2019-12-22 ENCOUNTER — DOCUMENTATION ONLY (OUTPATIENT)
Dept: CARE COORDINATION | Facility: CLINIC | Age: 59
End: 2019-12-22

## 2019-12-22 LAB
BLD PROD TYP BPU: NORMAL
BLD UNIT ID BPU: 0
BLOOD PRODUCT CODE: NORMAL
BPU ID: NORMAL
TRANSFUSION STATUS PATIENT QL: NORMAL
TRANSFUSION STATUS PATIENT QL: NORMAL

## 2019-12-22 NOTE — DISCHARGE SUMMARY
[facility] [place]  Hospitalist Discharge Summary       Date of Admission:  12/18/2019  Date of Discharge:  12/21/2019  2:15 PM  Discharging Provider: Jama Chong MD  Discharge Team: Hospitalist Service, Gold 9    Discharge Diagnoses   Acute blood loss anemia   H/o duodenal AV malformation   Concern for iron deficiency anemia   STEMI s/p DESx3 in 2012   Acute occlusion pRCA s/p angio/stent (6/2019)  Ischemic cardiomyopathy (EF 30-35% 11/12/19)  Atrial fibrillation   Severe pulmonary HTN  Severe MR  Type 2 DM  CKD stage 4    Follow-ups Needed After Discharge   Follow-up Appointments     Adult [facility] Follow-up and recommended labs and tests      Follow up with primary care provider within 3 days for hospital follow-   up (please see below for appointment information).  The following   labs/tests are recommended: cbc, bmp.  Ok to resume amlodipine and   entresto if blood pressure is stable.      Clinic Appointment:  Primary Care   Clinic Name/Clinician--- [facility]   - you will be seeing Dr. Dane Lazaro  Primary Provider- Dr. Silas Enciso  Reason for Follow up---hospitalization; anti-hypertensive medication   stopped in the hospital, need blood pressure re-check and direction on   restarting medications  Timing of Follow up--- As scheduled below    Appointment: Date Monday, December 23, 2019 Time  11:15AM  Please check in 15 minutes before your appointment with an [facility]   .       Address:  [address] [place] [address], 4th floor  Phone # [phone] for Scheduling                 [phone] for Primary Care Clinic    ______________________________________________    Clinic Appointment:  Infusion Clinic for Iron Infusion     Clinic Name/Clinician--- [facility]   Primary Provider- Dr. Silas Enciso   Ordering Provider - Dr. Jama Chong, Hospitalist [facility]    Reason for Follow up --- anemia  Timing of Follow up --- 2 weeks post discharge    Appointment: Date --- January 3, 2020  Time --- 2:00PM    Please check in 15 minutes before your appointment.       Address: [address] [place] [address], 2nd floor, [address]    Phone # [phone]    ______________________________________________         Follow Up and recommended labs and tests      Duplicate - please disregard             Unresulted Labs Ordered in the Past 30 Days of this Admission     No orders found from 11/18/2019 to 12/19/2019.          Discharge Disposition   Discharged to home  Condition at discharge: Stable    Hospital Course   Cole Jesus is a 59 year old male admitted on 12/18/2019. He presents with drop in hgb and fatigue of several weeks in the setting of acute occlusion of pRCA s/p balloon angioplasty and stenting in 6/2019, STEMI s/p DESx3 for total occlusion of RCA and Lcx (2012), GIB due to AV malformation (in the setting of warfarin and DAPT) s/p EGD with clip (7/5/2019), ischemic cardiomyopathy, HFrEF (last EF=30-35% 11/12/19), severe pHTN, severe mitral regurgitation, atrial fibrillation, CKD stage IV.     Was seen in nephrology clinic on 12/18.  Patient reportedly had few days of black or grayish colored stool despite not being on iron tablet currently.  Also had hemoglobin down to 7.9 from 11 in 1 month.  Overall feeling sluggish, with bloating of stomach and shortness of breath on ambulation. Due to concern for GI bleed patient was sent to ED.            # Acute blood loss anemia  # h/o duodenal AV malformation  #Concern for iron deficiency anemia  Afebrile, vital signs stable on admission.  Hemoglobin 7.9, down from 8.9 one week ago.  Recent hgb baseline 11-12.  Endorses black stools over the last few days.  Recent duodenal AVM status post EGD clip on 7/4/2019.  Given recent history, current symptoms with hemoglobin drop, concern is for GI bleed at the time of admission.  Also reportedly had black tarry stool in ED but not next day.   Patient was given 1 unit blood transfusion to keep hemoglobin above 8. Also GI consulted. Given hemodynamic stability and stabilization of hemoglobin GI recommended further work up with EGD as outpatient. This will be set up by GI. Here iron panel was repeated and showed low iron saturation index and ferritin of 35. Although this value was obtained after blood transfusion still low iron saturation index likely 2/2 iron deficiency anemia     -initially ASA, plavix was held but after discussing with GI, plan to resume it.  -Continue with pantoprazole 40 mg twice daily  --We will resume aspirin and Plavix-patient was observed on it and no further  evidence of black colored stool. Hemoglobin remained stable   -Patient was also given one dose IV iron sucrose 500mg  day before discharge; also repeat IV iron sucrose in 2 weeks was set up for outpatient by [facility]   -Resume PTA furosemide    ----Chronic Medical Problems-----     # STEMI s/p DESx3 (2012)  # acute occlusion pRCA s/p angio/stent (6/2019)  # ischemic cardiomyopathy (EF 30-35% 11/12/19)  Patient has acute occlusion of RCA status post balloon angioplasty and stenting in 6/2019, also had a STEMI status post DESx 3 for total occlusion of RCA in 2012, ischemic cardiomyopathy following cardiogenic shock in 2012  -Resume PTA furosemide;   continue to hold amlodipine, Entresto on discharge given blood pressure on lower side ; primary care follow up on 12/23; can be resumed if BP stable then   Also ordered CBC, BMP for that day   -Resume aspirin and Plavix day before discharge   -Patient is not on beta-blocker due to low output failure; will need to follow as an outpatient     Atrial fibrillation  Anticoagulation not currently due to history of GI bleed on warfarin  Currently rate controlled; based on cardiology note patient could be a candidate for KASIA closure device.   Need to follow with cardiology as an  outpatient severe pulmonary hypertension  Has cardiology follow up on 1/16/2020     Severe pulmonary hypertension  Severe mitral regurgitation  Being followed by Dr. Trujillo as an outpatient;      # T2DM  -PTA regimen of 7 units insulin glargine every morning; blood glucose has been stable      # CKD IV  -Baseline serum creatinine between 2.1-2.3; this is secondary to acute tubular necrosis on resolved after cardiogenic shock in 2012.  Patient is post STEMI and has advanced ischemic cardiomyopathy  Has been 2.3-2.8 in recent month  3.13 on admission- down to 2.82 on discharge        Consultations This Hospital Stay   MEDICATION HISTORY IP PHARMACY CONSULT  GI LUMINAL ADULT IP CONSULT  MEDICATION HISTORY IP PHARMACY CONSULT  CARDIOLOGY GENERAL ADULT IP CONSULT  PHYSICAL THERAPY ADULT IP CONSULT    Code Status   Full Code    Time Spent on this Encounter   I, Jama Chong MD, personally saw the patient today and spent greater than 30 minutes discharging this patient.       Jama Chong MD  [facility] [place]  ______________________________________________________________________    Physical Exam   Vital Signs: Temp: 96.7  F (35.9  C) Temp src: Oral BP: 100/67 Pulse: 91 Heart Rate: 95 Resp: 18 SpO2: 96 % O2 Device: None (Room air)    Weight: 214 lbs 6.4 oz  General Appearance: Patient feels comfortable on exam   Respiratory: b/l equal breath sounds with no added sound  Cardiovascular: s1s2 with soft systolic murmur is present   GI: soft, non tender, bowel sounds noted   Skin: no rash   Other: AAOx3, b/l UE and LE-5/5        Primary Care Physician   Silas Enciso    Discharge Orders      CBC with platelets differential    Last Lab Result: Hemoglobin (g/dL)       Date                     Value                 12/21/2019               9.0 (L)          ----------     Basic metabolic panel     Physical Therapy Referral      Reason for your hospital stay    Dear Cole  Ann Marie Daniel were hospitalized at [facility] with concern for gastrointestinal bleeding and treated with blood transfusion and iron therapy.  Over your hospitalization your symptoms improved and today you are ready to be discharged home.  If you continue current therapy you should continue to improve but if you develop fever, shortness of breath, light headedness, chest pain or abdominal pain or change in color of stool please seek medical attention.    We are suggesting the following medication changes:  Hold amlodipine and entresto for next few days ; follow up with your primary care  As scheduledon Monday to resume   Or can monitor Blood pressure at home if stays above 100/80 then can resume   Discontinue oral iron     It was a pleasure meeting with you today. Thank you for allowing me and my team the privilege of caring for you today. You are the reason we are here, and I truly hope we provided you with the excellent service you deserve. Please let us know if there is anything else we can do for you so that we can be sure you are leaving completely satisfied with your care experience.    Your hospital unit at the time of discharge is 5B so if you have any questions please call the hospital at [phone] and ask to talk to a nurse on 5B.    Take care!  Jama Chong MD  Hospitalist Service  Pager [phone]     Adult [facility] Follow-up and recommended labs and tests    Follow up with primary care provider within 3 days for hospital follow- up (please see below for appointment information).  The following labs/tests are recommended: cbc, bmp.  Ok to resume amlodipine and entresto if blood pressure is stable.      Clinic Appointment:  Primary Care   Clinic Name/Clinician--- [facility] - you will be seeing Dr. Dane Lazaro  Primary Provider- Dr. Silas Enciso  Reason for Follow up---hospitalization; anti-hypertensive medication stopped  in the hospital, need blood pressure re-check and direction on restarting medications  Timing of Follow up--- As scheduled below    Appointment: Date Monday, December 23, 2019 Time  11:15AM  Please check in 15 minutes before your appointment with an [facility] .       Address:  [address] [place] [address], 4th floor  Phone # [phone] for Scheduling                 [phone] for Primary Care Clinic    ______________________________________________    Clinic Appointment:  Infusion Clinic for Iron Infusion     Clinic Name/Clinician--- [facility]   Primary Provider- Dr. Silas Enciso   Ordering Provider - Dr. Jama Chong, Hospitalist [facility]   Reason for Follow up --- anemia  Timing of Follow up --- 2 weeks post discharge    Appointment: Date --- January 3, 2020  Time --- 2:00PM    Please check in 15 minutes before your appointment.       Address: [address] [place] [address], 2nd floor, [address]    Phone # [phone]    ______________________________________________     Follow Up and recommended labs and tests    Duplicate - please disregard     Activity    Your activity upon discharge: activity as tolerated     Monitor and record    blood pressure daily  fluid intake and output daily  Daily     Discharge Instructions     Full Code     Diet    Follow this diet upon discharge: Orders Placed This Encounter      Low salt  Diet       Significant Results and Procedures   Most Recent 3 BMP's:  Recent Labs   Lab Test 12/21/19  0844 12/20/19  0504 12/19/19  0545    141 140   POTASSIUM 3.4 3.8 4.0   CHLORIDE 106 110* 109   CO2 23 23 23   BUN 56* 61* 65*   CR 2.82* 2.87* 2.93*   ANIONGAP 11 8 8   RONNY 8.7 8.7 8.8   * 95 106*       Discharge Medications   Discharge Medication List as of 12/21/2019 12:55 PM      CONTINUE these medications which have NOT CHANGED    Details   ACCU-CHEK GUIDE test strip USE TO TEST BLOOD SUGAR  FOUR TIMES A DAY BEFORE MEALS AND AT BEDTIME, Disp-400 each, R-3, E-Prescribe      Alcohol Swabs PADS 1 applicator 4 times daily (before meals and nightly), Disp-100 each, R-3, E-Prescribe      aspirin (ASA) 81 MG chewable tablet Take 81 mg by mouth daily, Historical      atorvastatin (LIPITOR) 80 MG tablet Take 1 tablet (80 mg) by mouth daily, Disp-90 tablet, R-3, E-Prescribe      blood glucose monitoring (ACCU-CHEK FASTCLIX) lancets USE TO TEST BLOOD SUGAR FOUR TIMES A DAY AT MEALS AND AT BEDTIME, Disp-400 each, R-3, E-Prescribe      cholecalciferol (VITAMIN  -D) 1000 UNITS capsule Take 2 capsules (2,000 Units) by mouth daily, Disp-60 capsule, R-3, Fax      clopidogrel (PLAVIX) 75 MG tablet Take 1 tablet (75 mg) by mouth daily, Disp-90 tablet, R-3, E-Prescribe      !! furosemide (LASIX) 20 MG tablet Take 1 tablet (20 mg) by mouth daily Take a total of 100 mg twice a day, Disp-60 tablet, R-1, E-Prescribe      !! furosemide (LASIX) 80 MG tablet Take 1 tablet (80 mg) by mouth 2 times daily Total of 100 mg twice a day, Disp-60 tablet, R-0, E-Prescribe      insulin glargine (LANTUS PEN) 100 UNIT/ML pen Inject 7 Units Subcutaneous every morning, Disp-6 mL, R-3, E-PrescribeIf Lantus is not covered by insurance, may substitute Basaglar at same dose and frequency.        insulin pen needle (32G X 4 MM) 32G X 4 MM miscellaneous Use 5 pen needles daily or as directed.Disp-200 each, [id number]      pantoprazole (PROTONIX) 40 MG EC tablet Take 1 tablet (40 mg) by mouth 2 times daily, Disp-180 tablet, R-2, E-PrescribePlease keep appt 10/31/19      vitamin  B complex with vitamin C (VITAMIN  B COMPLEX) TABS Take 1 tablet by mouth daily, Historical      nitroGLYcerin (NITROSTAT) 0.4 MG sublingual tablet Place 1 tablet (0.4 mg) under the tongue every 5 minutes as needed for chest pain For chest pain place 1 tablet under the tongue every 5 minutes for 3 doses. If symptoms persist 5 minutes after 1st dose call 911., Disp-25  tablet, R-0, Local Print      Sharps Container MISC 1 Device every 30 days, Disp-1 each, R-3, E-Prescribe       !! - Potential duplicate medications found. Please discuss with provider.      STOP taking these medications       amLODIPine (NORVASC) 2.5 MG tablet Comments:   Reason for Stopping:         ferrous sulfate (FEROSUL) 325 (65 Fe) MG tablet Comments:   Reason for Stopping:         sacubitril-valsartan (ENTRESTO) 49-51 MG per tablet Comments:   Reason for Stopping:             Allergies   Allergies   Allergen Reactions     Hydralazine      Black spots     Simvastatin Other (See Comments)     Leg muscle weakness     Tylenol [Acetaminophen] Palpitations

## 2019-12-23 ENCOUNTER — TELEPHONE (OUTPATIENT)
Dept: GASTROENTEROLOGY | Facility: CLINIC | Age: 59
End: 2019-12-23

## 2019-12-23 ENCOUNTER — OFFICE VISIT (OUTPATIENT)
Dept: FAMILY MEDICINE | Facility: CLINIC | Age: 59
End: 2019-12-23
Payer: MEDICAID

## 2019-12-23 VITALS
OXYGEN SATURATION: 96 % | TEMPERATURE: 97.6 F | WEIGHT: 217.3 LBS | HEART RATE: 83 BPM | SYSTOLIC BLOOD PRESSURE: 102 MMHG | BODY MASS INDEX: 32.93 KG/M2 | HEIGHT: 68 IN | DIASTOLIC BLOOD PRESSURE: 57 MMHG | RESPIRATION RATE: 18 BRPM

## 2019-12-23 DIAGNOSIS — K92.2 GASTROINTESTINAL HEMORRHAGE, UNSPECIFIED GASTROINTESTINAL HEMORRHAGE TYPE: ICD-10-CM

## 2019-12-23 DIAGNOSIS — D64.9 ANEMIA, UNSPECIFIED TYPE: Primary | ICD-10-CM

## 2019-12-23 DIAGNOSIS — I48.91 ATRIAL FIBRILLATION, UNSPECIFIED TYPE (H): Chronic | ICD-10-CM

## 2019-12-23 DIAGNOSIS — D64.9 ANEMIA, UNSPECIFIED TYPE: ICD-10-CM

## 2019-12-23 DIAGNOSIS — Z09 HOSPITAL DISCHARGE FOLLOW-UP: ICD-10-CM

## 2019-12-23 LAB
ANION GAP SERPL CALCULATED.3IONS-SCNC: 5 MMOL/L (ref 3–14)
BASOPHILS # BLD AUTO: 0.1 10E9/L (ref 0–0.2)
BASOPHILS NFR BLD AUTO: 0.6 %
BUN SERPL-MCNC: 44 MG/DL (ref 7–30)
CALCIUM SERPL-MCNC: 8.9 MG/DL (ref 8.5–10.1)
CHLORIDE SERPL-SCNC: 105 MMOL/L (ref 94–109)
CO2 SERPL-SCNC: 27 MMOL/L (ref 20–32)
CREAT SERPL-MCNC: 2.7 MG/DL (ref 0.66–1.25)
DIFFERENTIAL METHOD BLD: ABNORMAL
EOSINOPHIL # BLD AUTO: 0.4 10E9/L (ref 0–0.7)
EOSINOPHIL NFR BLD AUTO: 5 %
ERYTHROCYTE [DISTWIDTH] IN BLOOD BY AUTOMATED COUNT: 21.3 % (ref 10–15)
GFR SERPL CREATININE-BSD FRML MDRD: 24 ML/MIN/{1.73_M2}
GLUCOSE SERPL-MCNC: 135 MG/DL (ref 70–99)
HCT VFR BLD AUTO: 32.6 % (ref 40–53)
HGB BLD-MCNC: 9.6 G/DL (ref 13.3–17.7)
IMM GRANULOCYTES # BLD: 0 10E9/L (ref 0–0.4)
IMM GRANULOCYTES NFR BLD: 0.4 %
INR PPP: 1.16 (ref 0.86–1.14)
LYMPHOCYTES # BLD AUTO: 0.6 10E9/L (ref 0.8–5.3)
LYMPHOCYTES NFR BLD AUTO: 7.7 %
MCH RBC QN AUTO: 27.7 PG (ref 26.5–33)
MCHC RBC AUTO-ENTMCNC: 29.4 G/DL (ref 31.5–36.5)
MCV RBC AUTO: 94 FL (ref 78–100)
MONOCYTES # BLD AUTO: 1 10E9/L (ref 0–1.3)
MONOCYTES NFR BLD AUTO: 12.1 %
NEUTROPHILS # BLD AUTO: 6.2 10E9/L (ref 1.6–8.3)
NEUTROPHILS NFR BLD AUTO: 74.2 %
NRBC # BLD AUTO: 0 10*3/UL
NRBC BLD AUTO-RTO: 0 /100
PLATELET # BLD AUTO: 326 10E9/L (ref 150–450)
POTASSIUM SERPL-SCNC: 3.8 MMOL/L (ref 3.4–5.3)
RBC # BLD AUTO: 3.47 10E12/L (ref 4.4–5.9)
SODIUM SERPL-SCNC: 137 MMOL/L (ref 133–144)
WBC # BLD AUTO: 8.4 10E9/L (ref 4–11)

## 2019-12-23 ASSESSMENT — MIFFLIN-ST. JEOR: SCORE: 1765.17

## 2019-12-23 ASSESSMENT — PAIN SCALES - GENERAL: PAINLEVEL: NO PAIN (0)

## 2019-12-23 NOTE — PROGRESS NOTES
Patient has clinic visit within 24-48 hours of Discharge so no post DC follow up call is needed    Name: Cole Jesus MRN: [id number]   Date: 12/23/2019 Status: [facility]   Time: 11:15 AM Length: 25   Visit Type: UMP RETURN [[id number]] GARTH:     Provider: Dane Lazaro MD Department: [facility] FAMILY MED

## 2019-12-23 NOTE — PROGRESS NOTES
[facility]  Primary Care Center   Dane Lazaro MD  12/23/2019      Chief Complaint:   Hospital F/U     History of Present Illness:   Cole Jesus is a 59 year old male with a history of coronary artery disease, chronic kidney disease, GI bleed, systolic heart failure, atrial fibrillation, STEMI, and type 2 diabetes mellitus who presents for hospital follow-up.     ED to hospital course (12/18-12/21 [facility] [place]): He presented to ED from clinic for low hemoglobin (7.9). He was positive on rectal exam. He had elevated NT Pro BNP and high INR. He was treated with 1 unit of blood and IV Protonix. Given recent history and symptoms with hemoglobin drop, concern for GI bleed at the time of admission, GI was consulted and given hemodynamic stability and stabilization of hemoglobin, GI recommended further work up with EGD as outpatient. Iron panel was repeated and showed low iron saturation index and ferritin of 35. Although this value was obtained after blood transfusion still low iron saturation index likely 2/2 iron deficiency anemia. Initially Plavix and aspirin were held, but were resumed with GI input. He was given one dose IV iron sucrose 500 mg and was instructed to repeat infusion in 2 weeks. Amlodipine and Entresto were held and he was instructed to follow-up with PCP and discuss resuming. Creatinine was 3.13 on admission and down to 2.82 on discharge. Hemoglobin was 9.0 on discharge.    Interval period:  He reports he has been doing well since discharge. He feels his energy level has been pretty much restored since his hospital treatments. He reports he has been eating and drinking normally since getting home. His blood pressure in clinic today is 102/57 and it was similar this morning at home. He checks his blood pressure at home regularly.      Review of Systems:   Pertinent items are noted in HPI or as in patient entered ROS below, remainder of complete ROS is negative.     Active Medications:       aspirin (ASA) 81 MG chewable tablet, Take 81 mg by mouth daily, Disp: , Rfl:      atorvastatin (LIPITOR) 80 MG tablet, Take 1 tablet (80 mg) by mouth daily, Disp: 90 tablet, Rfl: 3     cholecalciferol (VITAMIN  -D) 1000 UNITS capsule, Take 2 capsules (2,000 Units) by mouth daily (Patient taking differently: Take 1 capsule by mouth 2 times daily ), Disp: 60 capsule, Rfl: 3     clopidogrel (PLAVIX) 75 MG tablet, Take 1 tablet (75 mg) by mouth daily, Disp: 90 tablet, Rfl: 3     furosemide (LASIX) 20 MG tablet, Take 1 tablet (20 mg) by mouth daily Take a total of 100 mg twice a day, Disp: 60 tablet, Rfl: 1     furosemide (LASIX) 80 MG tablet, Take 1 tablet (80 mg) by mouth 2 times daily Total of 100 mg twice a day, Disp: 60 tablet, Rfl: 0     insulin glargine (LANTUS PEN) 100 UNIT/ML pen, Inject 7 Units Subcutaneous every morning, Disp: 6 mL, Rfl: 3     nitroGLYcerin (NITROSTAT) 0.4 MG sublingual tablet, Place 1 tablet (0.4 mg) under the tongue every 5 minutes as needed for chest pain For chest pain place 1 tablet under the tongue every 5 minutes for 3 doses. If symptoms persist 5 minutes after 1st dose call 911., Disp: 25 tablet, Rfl: 0     pantoprazole (PROTONIX) 40 MG EC tablet, Take 1 tablet (40 mg) by mouth 2 times daily, Disp: 180 tablet, Rfl: 23     vitamin  B complex with vitamin C (VITAMIN  B COMPLEX) TABS, Take 1 tablet by mouth daily, Disp: , Rfl:       Allergies:   Hydralazine; Simvastatin; and Tylenol [acetaminophen]      Past Medical History:  Anemia in chronic renal disease  Atrial fibrillation and flutter  Coronary artery disease  Chronic kidney disease   GI bleed  Systolic heart failure  Hypertension  Ischemic cardiomyopathy  STEMI  Anti-JKa  Pulmonary edema  Subclinical hypothyroidism  Type 2 diabetes mellitus  Hyperlipidemia  Vitamin D deficiency  Severe pulmonary arterial systolic hypertension  Iron deficiency  Low cardiac output syndrome     Past Surgical History:  Right heart catheterization -  "2019  EGD - 2011  Laparotomy exploratory - 2011  ORIF right elbow fracture - age 12  Takedown ileostomy - 2014  Tracheostomy - 2011    Family History:   Diabetes - mother, sister  Hypertension - mother  Heart disease - mother, father ( of MI)      Social History:   The patient has a significant other, is a former smoker (quit ), and does not consume alcohol.      Physical Exam:   /57 (BP Location: Right arm, Patient Position: Chair, Cuff Size: Adult Large)   Pulse 83   Temp 97.6  F (36.4  C) (Oral)   Resp 18   Ht 1.727 m (5' 8\")   Wt 98.6 kg (217 lb 4.8 oz)   SpO2 96%   BMI 33.04 kg/m     Constitutional: Alert. In no distress.  Head: Normocephalic.   Musculoskeletal: No gross deformities noted.   Psychiatric: Mentation appears normal. Normal affect.     Recent Labs:  Component      Latest Ref Rng & Units 2019   WBC      4.0 - 11.0 10e9/L 8.4   RBC Count      4.4 - 5.9 10e12/L 3.47 (L)   Hemoglobin      13.3 - 17.7 g/dL 9.6 (L)   Hematocrit      40.0 - 53.0 % 32.6 (L)   MCV      78 - 100 fl 94   MCH      26.5 - 33.0 pg 27.7   MCHC      31.5 - 36.5 g/dL 29.4 (L)   RDW      10.0 - 15.0 % 21.3 (H)   Platelet Count      150 - 450 10e9/L 326   Diff Method       Automated Method   % Neutrophils      % 74.2   % Lymphocytes      % 7.7   % Monocytes      % 12.1   % Eosinophils      % 5.0   % Basophils      % 0.6   % Immature Granulocytes      % 0.4   Nucleated RBCs      0 /100 0   Absolute Neutrophil      1.6 - 8.3 10e9/L 6.2   Absolute Lymphocytes      0.8 - 5.3 10e9/L 0.6 (L)   Absolute Monocytes      0.0 - 1.3 10e9/L 1.0   Absolute Eosinophils      0.0 - 0.7 10e9/L 0.4   Absolute Basophils      0.0 - 0.2 10e9/L 0.1   Abs Immature Granulocytes      0 - 0.4 10e9/L 0.0   Absolute Nucleated RBC       0.0     Component      Latest Ref Rng & Units 2019   INR      0.86 - 1.14 1.16 (H)       Assessment and Plan:  Anemia, unspecified type  This is improving.  - CBC with " platelets differential  - Basic metabolic panel     Chronic kidney disease and hypertension   He will try resuming Entresto, but if his blood pressure dips he will stop it. He will wait on resuming Norvasc. I will get him in with his usual PCP in the near future.     Follow-up: No follow-ups on file.        Scribe Disclosure:  I, Magnolia Winter, am serving as a scribe to document services personally performed by Dane Lazaro MD at this visit, based upon the provider's statements to me. All documentation has been reviewed by the aforementioned provider prior to being entered into the official medical record.     Portions of this medical record were completed by a scribe. UPON MY REVIEW AND AUTHENTICATION BY ELECTRONIC SIGNATURE, this confirms (a) I performed the applicable clinical services, and (b) the record is accurate.   Dane Lazaro MD MD

## 2019-12-23 NOTE — NURSING NOTE
Chief Complaint   Patient presents with     Hospital F/U     pt. was seen at [facility] for a GI bleed. He was admitted on12/19 and discharged  on 12/21       Karyn Shen EMT

## 2019-12-23 NOTE — PATIENT INSTRUCTIONS
Primary Care Center Medication Refill Request Information:  * Please contact your pharmacy regarding ANY request for medication refills.  ** [facility] Prescription Fax = [phone]  * Please allow 3 business days for routine medication refills.  * Please allow 5 business days for controlled substance medication refills.     Primary Care Center Test Result notification information:  *You will be notified with in 7-10 days of your appointment day regarding the results of your test.  If you are on MyChart you will be notified as soon as the provider has reviewed the results and signed off on them.

## 2019-12-26 NOTE — PROGRESS NOTES
"[facility] Endocrinology- Outpatient Diabetes Follow up    Cole is a 59 year old male with past history of type II DM (since 2011), HTN, HLP, CAD with prior STEMI, CKD stage IV, and HFrEF with pulmonary HTN who presents for follow up of diabetes.     He was diagnosed with TIIDM at the time of his myocardial infarction in 2011. He was on insulin beginning in 2012, but glycemic control was excellent, so he stopped insulin when Rx ran out. His A1c were in the prediabetic range subsequently. He was admitted 4/2019 for hyperglycemia that was seen in a routine physical exam, and was placed back on basal bolus insulin regimen. His A1c was >13% at this time.     When last seen by me 10/7/19, Cole endorsed that his BG have been \"good\", and he began self titrating off insulin. Once he reached Lantus 10 units daily, he stopped taking it. His fasting BG were above goal the majority of the time, thus we resumed low dose Lantus.     In recent follow up TTE, ED 30-35%, severe LV dilatation and moderate MR. He re-established care with nephrology due to worsening renal function.     Of note, he was hospitalized 12/18-12/21 for anemia, diagnosed with GIB due to AV malformation. His BG appeared to be stable. Per BMP within the past week (not known if fasting), -183. Cole reports he was not given Lantus while hospitalized (mostly due to NPO status); notes say \"resumed home regimen\" Cole stopped taking Lantus 1.5 months ago as he felt his BG were stable.     He has lost 17 lbs since last visit; he reports he had diuresis while hospitalized, and his weight loss is somewhat intentional, wants to get under 200lbs.     Due to his CKD, and on iron infusions, as well as recent blood transfusion, A1c not reliable method of monitoring glycemic control, and was 5.9% today.     Pt denies headaches, visual changes, n/v, SOB at rest, chest pain, abd pain, nausea/vomiting, diarrhea, dysuria, hematuria or foot ulcers.    Current Diabetes " Regimen:   None, self discontinued 1.5 months ago.     Glucometer Settings  Checking 1 times per day on average, daily AM. 17 values obtained in the past month.  *note, he intermittently lets other family members use his meter; a BG of 90 and of 256 on data, he reports, are not his*  Average glucose: 136  SD: 34  Fasting glucose range: 116-148  Prandial glucose range: unknown, does not check  Documented hypoglycemia: no    Weight: 219, compared to recent of 236lb  Physical Activity: more active, walking. Able to do chores with less SOB.  Nutrition: improved since hospitalization. Three meals daily, not always healthy. Watching salt primarily. Moderates sweets.    Diabetes Care  Retinopathy: not known, needs to see ophthalmologist    Nephropathy: BP well  controlled. + Hx microalbuminuria. Creatinine 2.7. Taking ACEi/ARB: back on Entresto.    Neuropathy: no    Foot Exam: no wounds or ulcers.    Lipids: Taking ASA: yes, and on Plavix, statin: yes  LDL Cholesterol Calculated   Date Value Ref Range Status   11/13/2019 38 <100 mg/dL Final     Comment:     Desirable:       <100 mg/dl      ROS: 10 point review of systems completed; pertinent positives and negatives documented in HPI    Allergies  Allergies   Allergen Reactions     Hydralazine      Black spots     Simvastatin Other (See Comments)     Leg muscle weakness     Tylenol [Acetaminophen] Palpitations       Medications  Current Outpatient Medications   Medication Sig Dispense Refill     ACCU-CHEK GUIDE test strip USE TO TEST BLOOD SUGAR FOUR TIMES A DAY BEFORE MEALS AND AT BEDTIME 400 each 3     Alcohol Swabs PADS 1 applicator 4 times daily (before meals and nightly) 100 each 3     aspirin (ASA) 81 MG chewable tablet Take 81 mg by mouth daily       atorvastatin (LIPITOR) 80 MG tablet Take 1 tablet (80 mg) by mouth daily 90 tablet 3     blood glucose monitoring (ACCU-CHEK FASTCLIX) lancets USE TO TEST BLOOD SUGAR FOUR TIMES A DAY AT MEALS AND AT BEDTIME 400 each 3      cholecalciferol (VITAMIN  -D) 1000 UNITS capsule Take 2 capsules (2,000 Units) by mouth daily (Patient taking differently: Take 1 capsule by mouth 2 times daily ) 60 capsule 3     clopidogrel (PLAVIX) 75 MG tablet Take 1 tablet (75 mg) by mouth daily 90 tablet 3     ENTRESTO 49-51 MG per tablet        furosemide (LASIX) 20 MG tablet Take 1 tablet (20 mg) by mouth daily Take a total of 100 mg twice a day 60 tablet 1     furosemide (LASIX) 80 MG tablet Take 1 tablet (80 mg) by mouth 2 times daily Total of 100 mg twice a day 60 tablet 0     insulin pen needle (32G X 4 MM) 32G X 4 MM miscellaneous Use 5 pen needles daily or as directed. 200 each 3     pantoprazole (PROTONIX) 40 MG EC tablet Take 1 tablet (40 mg) by mouth 2 times daily 180 tablet 2     Sharps Container MISC 1 Device every 30 days 1 each 3     vitamin  B complex with vitamin C (VITAMIN  B COMPLEX) TABS Take 1 tablet by mouth daily       nitroGLYcerin (NITROSTAT) 0.4 MG sublingual tablet Place 1 tablet (0.4 mg) under the tongue every 5 minutes as needed for chest pain For chest pain place 1 tablet under the tongue every 5 minutes for 3 doses. If symptoms persist 5 minutes after 1st dose call 911. (Patient not taking: Reported on 12/30/2019) 25 tablet 0     Family History  family history includes Diabetes in his mother, sister, sister, and sister; Heart Disease in his father and mother; Hypertension in his mother.    Social History   reports that he quit smoking about 8 years ago. He has never used smokeless tobacco. He reports that he does not drink alcohol or use drugs.     Past Medical History  Past Medical History:   Diagnosis Date     Anemia in chronic renal disease 3/9/2015     Antiplatelet or antithrombotic long-term use      Atrial fibrillation and flutter      CAD (coronary artery disease)     Stemi in 12/11, s/p angioplasty     CRD (chronic renal disease), stage IV (H) 03/09/2015    hx ATN with dialysis complicating cardiogenic shock  1/2012  "    GI bleed     massive lower GI bleed secondary to a cecal ulcer, s/p ileocecal resection in 12/11     Heart failure     Biventricular systolic HF, complicated by ARDS requiring tracheostomy     Hyperlipidemia LDL goal <100 4/28/2013     Hypertension      Ischemic cardiomyopathy 4/28/2013    TTE revealing 40% EF     Myocardial infarction (H)      Other and unspecified nonspecific immunological findings     Anti JKa     Pulmonary edema     episodes of flash pulmonary edema in 12/11     Subclinical hypothyroidism        Past Surgical History:   Procedure Laterality Date     CV RIGHT HEART CATH N/A 11/12/2019    Procedure: CV RIGHT HEART CATH;  Surgeon: Fox Gerard MD;  Location:  HEART CARDIAC CATH LAB     ESOPHAGOSCOPY, GASTROSCOPY, DUODENOSCOPY (EGD), COMBINED  12/25/2011    Procedure:COMBINED ESOPHAGOSCOPY, GASTROSCOPY, DUODENOSCOPY (EGD); Surgeon:SAMARIA PUENTE; Location: GI     LAPAROTOMY EXPLORATORY  12/31/2011    Procedure:LAPAROTOMY EXPLORATORY; Explore laparotomy, Illeocectomy, Diverting Illeostomy; Surgeon:GIA NAJERA; Location:[facility]     ORIF right elbow fracture  age 12     TAKEDOWN ILEOSTOMY  6/5/2014    Procedure: TAKEDOWN ILEOSTOMY;  Surgeon: Gia Najera MD;  Location: [facility] OR     TRACHEOSTOMY  12/22/2011    Procedure:TRACHEOSTOMY; Tracheostomy; 80XLTCP-Proximal Extension-Cuffed 8.0 mm I.D.; Surgeon:LIZABETH DYE; Location: OR       Physical Exam  /76   Pulse 90   Ht 1.727 m (5' 8\")   Wt 99.7 kg (219 lb 11.2 oz)   BMI 33.41 kg/m    Body mass index is 33.41 kg/m .    GENERAL : In no apparent distress  SKIN: Normal color, normal temperature, texture.  No  suspicious lesions or rashes  EYES: PERRLA.  No proptosis.  MOUTH: Moist, pink; pharynx clear  NECK: No visible masses. No palpable adenopathy, or masses. No goiter.  RESP: normal respiratory effort.  cough   ABDOMEN: soft, nontender to palpation   NEURO: awake, alert, " responds appropriately to questions.  Moves all extremities; Gait normal.     EXTREMITIES: No ulcers or open wounds.     RESULTS    Lab Results   Component Value Date    A1C 5.7 07/15/2019    A1C 13.4 04/08/2019    A1C 7.3 02/22/2018    A1C 6.9 08/16/2016    A1C 5.7 03/26/2014       Creatinine   Date Value Ref Range Status   12/23/2019 2.70 (H) 0.66 - 1.25 mg/dL Final     GFR Estimate   Date Value Ref Range Status   12/23/2019 24 (L) >60 mL/min/[1.73_m2] Final     Comment:     Non  GFR Calc  Starting 12/18/2018, serum creatinine based estimated GFR (eGFR) will be   calculated using the Chronic Kidney Disease Epidemiology Collaboration   (CKD-EPI) equation.       Hemoglobin A1C   Date Value Ref Range Status   07/15/2019 5.7 (H) 0 - 5.6 % Final     Comment:     Normal <5.7% Prediabetes 5.7-6.4%  Diabetes 6.5% or higher - adopted from ADA   consensus guidelines.       Potassium   Date Value Ref Range Status   12/23/2019 3.8 3.4 - 5.3 mmol/L Final     ALT   Date Value Ref Range Status   12/18/2019 13 0 - 70 U/L Final     AST   Date Value Ref Range Status   12/18/2019 6 0 - 45 U/L Final     CK Total   Date Value Ref Range Status   05/11/2012 4.1 (A) 20 - 200 U/L      TSH   Date Value Ref Range Status   07/23/2019 4.64 (H) 0.40 - 4.00 mU/L Final     T4 Free   Date Value Ref Range Status   07/23/2019 0.98 0.76 - 1.46 ng/dL Final       TSH   Date Value Ref Range Status   07/23/2019 4.64 (H) 0.40 - 4.00 mU/L Final   07/01/2019 6.42 (H) 0.40 - 4.00 mU/L Final   06/27/2019 5.82 (H) 0.40 - 4.00 mU/L Final   04/08/2019 3.55 0.40 - 4.00 mU/L Final   06/01/2018 6.07 (H) 0.40 - 4.00 mU/L Final     T4 Free   Date Value Ref Range Status   07/23/2019 0.98 0.76 - 1.46 ng/dL Final   07/01/2019 0.93 0.76 - 1.46 ng/dL Final   06/01/2018 0.96 0.76 - 1.46 ng/dL Final   11/21/2016 0.89 0.76 - 1.46 ng/dL Final   08/04/2014 1.28 0.76 - 1.46 ng/dL Final     Comment:     Effective 7/30/2014, the reference range for this assay has  changed to reflect   new instrumentation/methodology.         Creatinine   Date Value Ref Range Status   12/23/2019 2.70 (H) 0.66 - 1.25 mg/dL Final       Recent Labs   Lab Test 07/22/19  0927 04/08/19  1831  07/06/15  0835 06/03/14  1012   CHOL 87 175   < > 129 158   HDL 30* 30*   < > 34* 36*   LDL 43 Cannot estimate LDL when triglyceride exceeds 400 mg/dL   < > 79 98   TRIG 69 408*   < > 81 122   CHOLHDLRATIO  --   --   --  3.8 4.4    < > = values in this interval not displayed.     No results found for: [id number], [id number], [id number]    ASSESSMENT/PLAN:    1. Diabetes type II: with coronary artery disease, microalbuminuria and CKD IV (non-insulin therapies for diabetes management are not indicated at this point)     Cole self titrated off insulin again 1.5 months ago. His BG are within a reasonable range off insulin, given the fasting data I have. I do not have any afternoon data, and I suspect he may be having some prandial hyperglycemia that would be amenable to resumption of insulin. In the past off insulin, he stopped checking BG, and ended up in the hospital with A1c >13, so he needs to continue close monitoring.     -continue to monitor BG at least 1-2 times daily, alternating timing to obtain data across the course of the day   -he may benefit from use of Novolog AC/HS, if not resumption of Lantus, in the future to maintain glycemic control.    -referral to ophthalmology placed 10/2019, needs to schedule. Discussed again today.    2. Thyroid: pt carries a history of subclinical hypothyroidism   -most recent TSH 4.64 7/23/19, which is improved from prior testing, with normal free T4.    -will continue to monitor for sx, and trend values      Refills today:  1. None needed per patient     Follow up:  1. With [facility] staff in 3 months.   2. Diabetes Educator follow up: PRN    The patient is  enrolled in "[employer] services    This patient has met [place] community measures for diabetes control:  no (D5: Control blood pressure , Lower bad cholesterol Maintain blood sugar, Be tobacco-free, Take aspirin as recommended)    This patient is eligible for graduation from [facility] clinic: no    I spent 25 minutes with this patient face to face and explained the conditions and plans (more than 50% of time was counseling/coordination of care, diabetes management, follow up plan for worsening hyper and hypoglycemia) . The patient understood and is satisfied with today's visit.     RAISA Stuart, PA-C  [facility]   Pager [phone]

## 2019-12-30 ENCOUNTER — OFFICE VISIT (OUTPATIENT)
Dept: ENDOCRINOLOGY | Facility: CLINIC | Age: 59
End: 2019-12-30
Payer: MEDICAID

## 2019-12-30 VITALS
WEIGHT: 219.7 LBS | DIASTOLIC BLOOD PRESSURE: 76 MMHG | HEART RATE: 90 BPM | HEIGHT: 68 IN | SYSTOLIC BLOOD PRESSURE: 120 MMHG | BODY MASS INDEX: 33.3 KG/M2

## 2019-12-30 DIAGNOSIS — E11.9 TYPE 2 DIABETES, HBA1C GOAL < 8% (H): Primary | Chronic | ICD-10-CM

## 2019-12-30 LAB — HBA1C MFR BLD: 5.9 % (ref 4.3–6)

## 2019-12-30 RX ORDER — SACUBITRIL AND VALSARTAN 49; 51 MG/1; MG/1
TABLET, FILM COATED ORAL
COMMUNITY
Start: 2019-12-05 | End: 2020-01-16

## 2019-12-30 ASSESSMENT — PAIN SCALES - GENERAL: PAINLEVEL: NO PAIN (0)

## 2019-12-30 ASSESSMENT — MIFFLIN-ST. JEOR: SCORE: 1776.05

## 2019-12-30 NOTE — LETTER
"12/30/2019      RE: Cole Jesus  [address]  [place] [address]       [facility] Endocrinology- Outpatient Diabetes Follow up    Cole is a 59 year old male with past history of type II DM (since 2011), HTN, HLP, CAD with prior STEMI, CKD stage IV, and HFrEF with pulmonary HTN who presents for follow up of diabetes.     He was diagnosed with TIIDM at the time of his myocardial infarction in 2011. He was on insulin beginning in 2012, but glycemic control was excellent, so he stopped insulin when Rx ran out. His A1c were in the prediabetic range subsequently. He was admitted 4/2019 for hyperglycemia that was seen in a routine physical exam, and was placed back on basal bolus insulin regimen. His A1c was >13% at this time.     When last seen by me 10/7/19, Cole endorsed that his BG have been \"good\", and he began self titrating off insulin. Once he reached Lantus 10 units daily, he stopped taking it. His fasting BG were above goal the majority of the time, thus we resumed low dose Lantus.     In recent follow up TTE, ED 30-35%, severe LV dilatation and moderate MR. He re-established care with nephrology due to worsening renal function.     Of note, he was hospitalized 12/18-12/21 for anemia, diagnosed with GIB due to AV malformation. His BG appeared to be stable. Per BMP within the past week (not known if fasting), -183. Cole reports he was not given Lantus while hospitalized (mostly due to NPO status); notes say \"resumed home regimen\" Cole stopped taking Lantus 1.5 months ago as he felt his BG were stable.     He has lost 17 lbs since last visit; he reports he had diuresis while hospitalized, and his weight loss is somewhat intentional, wants to get under 200lbs.     Due to his CKD, and on iron infusions, as well as recent blood transfusion, A1c not reliable method of monitoring glycemic control, and was 5.9% today.     Pt denies headaches, visual changes, n/v, SOB at rest, chest pain, abd " pain, nausea/vomiting, diarrhea, dysuria, hematuria or foot ulcers.    Current Diabetes Regimen:   None, self discontinued 1.5 months ago.     Glucometer Settings  Checking 1 times per day on average, daily AM. 17 values obtained in the past month.  *note, he intermittently lets other family members use his meter; a BG of 90 and of 256 on data, he reports, are not his*  Average glucose: 136  SD: 34  Fasting glucose range: 116-148  Prandial glucose range: unknown, does not check  Documented hypoglycemia: no    Weight: 219, compared to recent of 236lb  Physical Activity: more active, walking. Able to do chores with less SOB.  Nutrition: improved since hospitalization. Three meals daily, not always healthy. Watching salt primarily. Moderates sweets.    Diabetes Care  Retinopathy: not known, needs to see ophthalmologist    Nephropathy: BP well  controlled. + Hx microalbuminuria. Creatinine 2.7. Taking ACEi/ARB: back on Entresto.    Neuropathy: no    Foot Exam: no wounds or ulcers.    Lipids: Taking ASA: yes, and on Plavix, statin: yes  LDL Cholesterol Calculated   Date Value Ref Range Status   11/13/2019 38 <100 mg/dL Final     Comment:     Desirable:       <100 mg/dl      ROS: 10 point review of systems completed; pertinent positives and negatives documented in HPI    Allergies  Allergies   Allergen Reactions     Hydralazine      Black spots     Simvastatin Other (See Comments)     Leg muscle weakness     Tylenol [Acetaminophen] Palpitations       Medications  Current Outpatient Medications   Medication Sig Dispense Refill     ACCU-CHEK GUIDE test strip USE TO TEST BLOOD SUGAR FOUR TIMES A DAY BEFORE MEALS AND AT BEDTIME 400 each 3     Alcohol Swabs PADS 1 applicator 4 times daily (before meals and nightly) 100 each 3     aspirin (ASA) 81 MG chewable tablet Take 81 mg by mouth daily       atorvastatin (LIPITOR) 80 MG tablet Take 1 tablet (80 mg) by mouth daily 90 tablet 3     blood glucose monitoring (ACCU-CHEK  FASTCLIX) lancets USE TO TEST BLOOD SUGAR FOUR TIMES A DAY AT MEALS AND AT BEDTIME 400 each 3     cholecalciferol (VITAMIN  -D) 1000 UNITS capsule Take 2 capsules (2,000 Units) by mouth daily (Patient taking differently: Take 1 capsule by mouth 2 times daily ) 60 capsule 3     clopidogrel (PLAVIX) 75 MG tablet Take 1 tablet (75 mg) by mouth daily 90 tablet 3     ENTRESTO 49-51 MG per tablet        furosemide (LASIX) 20 MG tablet Take 1 tablet (20 mg) by mouth daily Take a total of 100 mg twice a day 60 tablet 1     furosemide (LASIX) 80 MG tablet Take 1 tablet (80 mg) by mouth 2 times daily Total of 100 mg twice a day 60 tablet 0     insulin pen needle (32G X 4 MM) 32G X 4 MM miscellaneous Use 5 pen needles daily or as directed. 200 each 3     pantoprazole (PROTONIX) 40 MG EC tablet Take 1 tablet (40 mg) by mouth 2 times daily 180 tablet 2     Sharps Container MISC 1 Device every 30 days 1 each 3     vitamin  B complex with vitamin C (VITAMIN  B COMPLEX) TABS Take 1 tablet by mouth daily       nitroGLYcerin (NITROSTAT) 0.4 MG sublingual tablet Place 1 tablet (0.4 mg) under the tongue every 5 minutes as needed for chest pain For chest pain place 1 tablet under the tongue every 5 minutes for 3 doses. If symptoms persist 5 minutes after 1st dose call 911. (Patient not taking: Reported on 12/30/2019) 25 tablet 0     Family History  family history includes Diabetes in his mother, sister, sister, and sister; Heart Disease in his father and mother; Hypertension in his mother.    Social History   reports that he quit smoking about 8 years ago. He has never used smokeless tobacco. He reports that he does not drink alcohol or use drugs.     Past Medical History  Past Medical History:   Diagnosis Date     Anemia in chronic renal disease 3/9/2015     Antiplatelet or antithrombotic long-term use      Atrial fibrillation and flutter      CAD (coronary artery disease)     Stemi in 12/11, s/p angioplasty     CRD (chronic renal  "disease), stage IV (H) 03/09/2015    hx ATN with dialysis complicating cardiogenic shock  1/2012     GI bleed     massive lower GI bleed secondary to a cecal ulcer, s/p ileocecal resection in 12/11     Heart failure     Biventricular systolic HF, complicated by ARDS requiring tracheostomy     Hyperlipidemia LDL goal <100 4/28/2013     Hypertension      Ischemic cardiomyopathy 4/28/2013    TTE revealing 40% EF     Myocardial infarction (H)      Other and unspecified nonspecific immunological findings     Anti JKa     Pulmonary edema     episodes of flash pulmonary edema in 12/11     Subclinical hypothyroidism        Past Surgical History:   Procedure Laterality Date     CV RIGHT HEART CATH N/A 11/12/2019    Procedure: CV RIGHT HEART CATH;  Surgeon: Fox Gerard MD;  Location:  HEART CARDIAC CATH LAB     ESOPHAGOSCOPY, GASTROSCOPY, DUODENOSCOPY (EGD), COMBINED  12/25/2011    Procedure:COMBINED ESOPHAGOSCOPY, GASTROSCOPY, DUODENOSCOPY (EGD); Surgeon:SAMARIA PUENTE; Location:[facility]     LAPAROTOMY EXPLORATORY  12/31/2011    Procedure:LAPAROTOMY EXPLORATORY; Explore laparotomy, Illeocectomy, Diverting Illeostomy; Surgeon:GIA NAJERA; Location:[facility] OR     ORIF right elbow fracture  age 12     TAKEDOWN ILEOSTOMY  6/5/2014    Procedure: TAKEDOWN ILEOSTOMY;  Surgeon: Gia Najera MD;  Location: [facility] OR     TRACHEOSTOMY  12/22/2011    Procedure:TRACHEOSTOMY; Tracheostomy; 80XLTCP-Proximal Extension-Cuffed 8.0 mm I.D.; Surgeon:LIZABETH DYE; Location:[facility]       Physical Exam  /76   Pulse 90   Ht 1.727 m (5' 8\")   Wt 99.7 kg (219 lb 11.2 oz)   BMI 33.41 kg/m     Body mass index is 33.41 kg/m .    GENERAL : In no apparent distress  SKIN: Normal color, normal temperature, texture.  No  suspicious lesions or rashes  EYES: PERRLA.  No proptosis.  MOUTH: Moist, pink; pharynx clear  NECK: No visible masses. No palpable adenopathy, or masses. No goiter.  RESP: normal " respiratory effort.  cough   ABDOMEN: soft, nontender to palpation   NEURO: awake, alert, responds appropriately to questions.  Moves all extremities; Gait normal.     EXTREMITIES: No ulcers or open wounds.     RESULTS    Lab Results   Component Value Date    A1C 5.7 07/15/2019    A1C 13.4 04/08/2019    A1C 7.3 02/22/2018    A1C 6.9 08/16/2016    A1C 5.7 03/26/2014       Creatinine   Date Value Ref Range Status   12/23/2019 2.70 (H) 0.66 - 1.25 mg/dL Final     GFR Estimate   Date Value Ref Range Status   12/23/2019 24 (L) >60 mL/min/[1.73_m2] Final     Comment:     Non  GFR Calc  Starting 12/18/2018, serum creatinine based estimated GFR (eGFR) will be   calculated using the Chronic Kidney Disease Epidemiology Collaboration   (CKD-EPI) equation.       Hemoglobin A1C   Date Value Ref Range Status   07/15/2019 5.7 (H) 0 - 5.6 % Final     Comment:     Normal <5.7% Prediabetes 5.7-6.4%  Diabetes 6.5% or higher - adopted from ADA   consensus guidelines.       Potassium   Date Value Ref Range Status   12/23/2019 3.8 3.4 - 5.3 mmol/L Final     ALT   Date Value Ref Range Status   12/18/2019 13 0 - 70 U/L Final     AST   Date Value Ref Range Status   12/18/2019 6 0 - 45 U/L Final     CK Total   Date Value Ref Range Status   05/11/2012 4.1 (A) 20 - 200 U/L      TSH   Date Value Ref Range Status   07/23/2019 4.64 (H) 0.40 - 4.00 mU/L Final     T4 Free   Date Value Ref Range Status   07/23/2019 0.98 0.76 - 1.46 ng/dL Final       TSH   Date Value Ref Range Status   07/23/2019 4.64 (H) 0.40 - 4.00 mU/L Final   07/01/2019 6.42 (H) 0.40 - 4.00 mU/L Final   06/27/2019 5.82 (H) 0.40 - 4.00 mU/L Final   04/08/2019 3.55 0.40 - 4.00 mU/L Final   06/01/2018 6.07 (H) 0.40 - 4.00 mU/L Final     T4 Free   Date Value Ref Range Status   07/23/2019 0.98 0.76 - 1.46 ng/dL Final   07/01/2019 0.93 0.76 - 1.46 ng/dL Final   06/01/2018 0.96 0.76 - 1.46 ng/dL Final   11/21/2016 0.89 0.76 - 1.46 ng/dL Final   08/04/2014 1.28 0.76 -  1.46 ng/dL Final     Comment:     Effective 7/30/2014, the reference range for this assay has changed to reflect   new instrumentation/methodology.         Creatinine   Date Value Ref Range Status   12/23/2019 2.70 (H) 0.66 - 1.25 mg/dL Final       Recent Labs   Lab Test 07/22/19  0927 04/08/19  1831  07/06/15  0835 06/03/14  1012   CHOL 87 175   < > 129 158   HDL 30* 30*   < > 34* 36*   LDL 43 Cannot estimate LDL when triglyceride exceeds 400 mg/dL   < > 79 98   TRIG 69 408*   < > 81 122   CHOLHDLRATIO  --   --   --  3.8 4.4    < > = values in this interval not displayed.     No results found for: [id number], [id number], [id number]    ASSESSMENT/PLAN:    1. Diabetes type II: with coronary artery disease, microalbuminuria and CKD IV (non-insulin therapies for diabetes management are not indicated at this point)     Cole self titrated off insulin again 1.5 months ago. His BG are within a reasonable range off insulin, given the fasting data I have. I do not have any afternoon data, and I suspect he may be having some prandial hyperglycemia that would be amenable to resumption of insulin. In the past off insulin, he stopped checking BG, and ended up in the hospital with A1c >13, so he needs to continue close monitoring.     -continue to monitor BG at least 1-2 times daily, alternating timing to obtain data across the course of the day   -he may benefit from use of Novolog AC/HS, if not resumption of Lantus, in the future to maintain glycemic control.    -referral to ophthalmology placed 10/2019, needs to schedule. Discussed again today.    2. Thyroid: pt carries a history of subclinical hypothyroidism   -most recent TSH 4.64 7/23/19, which is improved from prior testing, with normal free T4.    -will continue to monitor for sx, and trend values      Refills today:  1. None needed per patient     Follow up:  1. With [facility] staff in 3 months.   2. Diabetes Educator follow up: PRN    The patient is   enrolled in [employer] services    This patient has met [place] community measures for diabetes control: no (D5: Control blood pressure , Lower bad cholesterol Maintain blood sugar, Be tobacco-free, Take aspirin as recommended)    This patient is eligible for graduation from [facility] clinic: no    I spent 25 minutes with this patient face to face and explained the conditions and plans (more than 50% of time was counseling/coordination of care, diabetes management, follow up plan for worsening hyper and hypoglycemia) . The patient understood and is satisfied with today's visit.     RAISA Stuart, PA-C  [facility]   Pager [phone]

## 2019-12-30 NOTE — LETTER
"12/30/2019       RE: Cole Jesus  [address]  [facility] [address]     Dear Colleague,    Thank you for referring your patient, Cole Jesus, to the [facility] ENDOCR[facility]OLOGY at [facility]. Please see a copy of my visit note below.    [facility]- Outpatient Diabetes Follow up    Cole is a 59 year old male with past history of type II DM (since 2011), HTN, HLP, CAD with prior STEMI, CKD stage IV, and HFrEF with pulmonary HTN who presents for follow up of diabetes.     He was diagnosed with TIIDM at the time of his myocardial infarction in 2011. He was on insulin beginning in 2012, but glycemic control was excellent, so he stopped insulin when Rx ran out. His A1c were in the prediabetic range subsequently. He was admitted 4/2019 for hyperglycemia that was seen in a routine physical exam, and was placed back on basal bolus insulin regimen. His A1c was >13% at this time.     When last seen by me 10/7/19, Cole endorsed that his BG have been \"good\", and he began self titrating off insulin. Once he reached Lantus 10 units daily, he stopped taking it. His fasting BG were above goal the majority of the time, thus we resumed low dose Lantus.     In recent follow up TTE, ED 30-35%, severe LV dilatation and moderate MR. He re-established care with nephrology due to worsening renal function.     Of note, he was hospitalized 12/18-12/21 for anemia, diagnosed with GIB due to AV malformation. His BG appeared to be stable. Per BMP within the past week (not known if fasting), -183. Cole reports he was not given Lantus while hospitalized (mostly due to NPO status); notes say \"resumed home regimen\" Cole stopped taking Lantus 1.5 months ago as he felt his BG were stable.     He has lost 17 lbs since last visit; he reports he had diuresis while hospitalized, and his weight loss is somewhat intentional, wants to get under 200lbs.     Due to his CKD, and on iron " infusions, as well as recent blood transfusion, A1c not reliable method of monitoring glycemic control, and was 5.9% today.     Pt denies headaches, visual changes, n/v, SOB at rest, chest pain, abd pain, nausea/vomiting, diarrhea, dysuria, hematuria or foot ulcers.    Current Diabetes Regimen:   None, self discontinued 1.5 months ago.     Glucometer Settings  Checking 1 times per day on average, daily AM. 17 values obtained in the past month.  *note, he intermittently lets other family members use his meter; a BG of 90 and of 256 on data, he reports, are not his*  Average glucose: 136  SD: 34  Fasting glucose range: 116-148  Prandial glucose range: unknown, does not check  Documented hypoglycemia: no    Weight: 219, compared to recent of 236lb  Physical Activity: more active, walking. Able to do chores with less SOB.  Nutrition: improved since hospitalization. Three meals daily, not always healthy. Watching salt primarily. Moderates sweets.    Diabetes Care  Retinopathy: not known, needs to see ophthalmologist    Nephropathy: BP well  controlled. + Hx microalbuminuria. Creatinine 2.7. Taking ACEi/ARB: back on Entresto.    Neuropathy: no    Foot Exam: no wounds or ulcers.    Lipids: Taking ASA: yes, and on Plavix, statin: yes  LDL Cholesterol Calculated   Date Value Ref Range Status   11/13/2019 38 <100 mg/dL Final     Comment:     Desirable:       <100 mg/dl      ROS: 10 point review of systems completed; pertinent positives and negatives documented in HPI    Allergies  Allergies   Allergen Reactions     Hydralazine      Black spots     Simvastatin Other (See Comments)     Leg muscle weakness     Tylenol [Acetaminophen] Palpitations       Medications  Current Outpatient Medications   Medication Sig Dispense Refill     ACCU-CHEK GUIDE test strip USE TO TEST BLOOD SUGAR FOUR TIMES A DAY BEFORE MEALS AND AT BEDTIME 400 each 3     Alcohol Swabs PADS 1 applicator 4 times daily (before meals and nightly) 100 each 3      aspirin (ASA) 81 MG chewable tablet Take 81 mg by mouth daily       atorvastatin (LIPITOR) 80 MG tablet Take 1 tablet (80 mg) by mouth daily 90 tablet 3     blood glucose monitoring (ACCU-CHEK FASTCLIX) lancets USE TO TEST BLOOD SUGAR FOUR TIMES A DAY AT MEALS AND AT BEDTIME 400 each 3     cholecalciferol (VITAMIN  -D) 1000 UNITS capsule Take 2 capsules (2,000 Units) by mouth daily (Patient taking differently: Take 1 capsule by mouth 2 times daily ) 60 capsule 3     clopidogrel (PLAVIX) 75 MG tablet Take 1 tablet (75 mg) by mouth daily 90 tablet 3     ENTRESTO 49-51 MG per tablet        furosemide (LASIX) 20 MG tablet Take 1 tablet (20 mg) by mouth daily Take a total of 100 mg twice a day 60 tablet 1     furosemide (LASIX) 80 MG tablet Take 1 tablet (80 mg) by mouth 2 times daily Total of 100 mg twice a day 60 tablet 0     insulin pen needle (32G X 4 MM) 32G X 4 MM miscellaneous Use 5 pen needles daily or as directed. 200 each 3     pantoprazole (PROTONIX) 40 MG EC tablet Take 1 tablet (40 mg) by mouth 2 times daily 180 tablet 2     Sharps Container MISC 1 Device every 30 days 1 each 3     vitamin  B complex with vitamin C (VITAMIN  B COMPLEX) TABS Take 1 tablet by mouth daily       nitroGLYcerin (NITROSTAT) 0.4 MG sublingual tablet Place 1 tablet (0.4 mg) under the tongue every 5 minutes as needed for chest pain For chest pain place 1 tablet under the tongue every 5 minutes for 3 doses. If symptoms persist 5 minutes after 1st dose call 911. (Patient not taking: Reported on 12/30/2019) 25 tablet 0     Family History  family history includes Diabetes in his mother, sister, sister, and sister; Heart Disease in his father and mother; Hypertension in his mother.    Social History   reports that he quit smoking about 8 years ago. He has never used smokeless tobacco. He reports that he does not drink alcohol or use drugs.     Past Medical History  Past Medical History:   Diagnosis Date     Anemia in chronic renal disease  "3/9/2015     Antiplatelet or antithrombotic long-term use      Atrial fibrillation and flutter      CAD (coronary artery disease)     Stemi in 12/11, s/p angioplasty     CRD (chronic renal disease), stage IV (H) 03/09/2015    hx ATN with dialysis complicating cardiogenic shock  1/2012     GI bleed     massive lower GI bleed secondary to a cecal ulcer, s/p ileocecal resection in 12/11     Heart failure     Biventricular systolic HF, complicated by ARDS requiring tracheostomy     Hyperlipidemia LDL goal <100 4/28/2013     Hypertension      Ischemic cardiomyopathy 4/28/2013    TTE revealing 40% EF     Myocardial infarction (H)      Other and unspecified nonspecific immunological findings     Anti JKa     Pulmonary edema     episodes of flash pulmonary edema in 12/11     Subclinical hypothyroidism        Past Surgical History:   Procedure Laterality Date     CV RIGHT HEART CATH N/A 11/12/2019    Procedure: CV RIGHT HEART CATH;  Surgeon: Fox Gerard MD;  Location:  HEART CARDIAC CATH LAB     ESOPHAGOSCOPY, GASTROSCOPY, DUODENOSCOPY (EGD), COMBINED  12/25/2011    Procedure:COMBINED ESOPHAGOSCOPY, GASTROSCOPY, DUODENOSCOPY (EGD); Surgeon:SAMARIA PUENTE; Location:[facility]     LAPAROTOMY EXPLORATORY  12/31/2011    Procedure:LAPAROTOMY EXPLORATORY; Explore laparotomy, Illeocectomy, Diverting Illeostomy; Surgeon:GIA NAJERA; Location:[facility] OR     ORIF right elbow fracture  age 12     TAKEDOWN ILEOSTOMY  6/5/2014    Procedure: TAKEDOWN ILEOSTOMY;  Surgeon: Gia Najera MD;  Location: [facility] OR     TRACHEOSTOMY  12/22/2011    Procedure:TRACHEOSTOMY; Tracheostomy; 80XLTCP-Proximal Extension-Cuffed 8.0 mm I.D.; Surgeon:LIZABETH DYE; Location:[facility] OR       Physical Exam  /76   Pulse 90   Ht 1.727 m (5' 8\")   Wt 99.7 kg (219 lb 11.2 oz)   BMI 33.41 kg/m     Body mass index is 33.41 kg/m .    GENERAL : In no apparent distress  SKIN: Normal color, normal temperature, " texture.  No  suspicious lesions or rashes  EYES: PERRLA.  No proptosis.  MOUTH: Moist, pink; pharynx clear  NECK: No visible masses. No palpable adenopathy, or masses. No goiter.  RESP: normal respiratory effort.  cough   ABDOMEN: soft, nontender to palpation   NEURO: awake, alert, responds appropriately to questions.  Moves all extremities; Gait normal.     EXTREMITIES: No ulcers or open wounds.     RESULTS    Lab Results   Component Value Date    A1C 5.7 07/15/2019    A1C 13.4 04/08/2019    A1C 7.3 02/22/2018    A1C 6.9 08/16/2016    A1C 5.7 03/26/2014       Creatinine   Date Value Ref Range Status   12/23/2019 2.70 (H) 0.66 - 1.25 mg/dL Final     GFR Estimate   Date Value Ref Range Status   12/23/2019 24 (L) >60 mL/min/[1.73_m2] Final     Comment:     Non  GFR Calc  Starting 12/18/2018, serum creatinine based estimated GFR (eGFR) will be   calculated using the Chronic Kidney Disease Epidemiology Collaboration   (CKD-EPI) equation.       Hemoglobin A1C   Date Value Ref Range Status   07/15/2019 5.7 (H) 0 - 5.6 % Final     Comment:     Normal <5.7% Prediabetes 5.7-6.4%  Diabetes 6.5% or higher - adopted from ADA   consensus guidelines.       Potassium   Date Value Ref Range Status   12/23/2019 3.8 3.4 - 5.3 mmol/L Final     ALT   Date Value Ref Range Status   12/18/2019 13 0 - 70 U/L Final     AST   Date Value Ref Range Status   12/18/2019 6 0 - 45 U/L Final     CK Total   Date Value Ref Range Status   05/11/2012 4.1 (A) 20 - 200 U/L      TSH   Date Value Ref Range Status   07/23/2019 4.64 (H) 0.40 - 4.00 mU/L Final     T4 Free   Date Value Ref Range Status   07/23/2019 0.98 0.76 - 1.46 ng/dL Final       TSH   Date Value Ref Range Status   07/23/2019 4.64 (H) 0.40 - 4.00 mU/L Final   07/01/2019 6.42 (H) 0.40 - 4.00 mU/L Final   06/27/2019 5.82 (H) 0.40 - 4.00 mU/L Final   04/08/2019 3.55 0.40 - 4.00 mU/L Final   06/01/2018 6.07 (H) 0.40 - 4.00 mU/L Final     T4 Free   Date Value Ref Range Status    07/23/2019 0.98 0.76 - 1.46 ng/dL Final   07/01/2019 0.93 0.76 - 1.46 ng/dL Final   06/01/2018 0.96 0.76 - 1.46 ng/dL Final   11/21/2016 0.89 0.76 - 1.46 ng/dL Final   08/04/2014 1.28 0.76 - 1.46 ng/dL Final     Comment:     Effective 7/30/2014, the reference range for this assay has changed to reflect   new instrumentation/methodology.         Creatinine   Date Value Ref Range Status   12/23/2019 2.70 (H) 0.66 - 1.25 mg/dL Final       Recent Labs   Lab Test 07/22/19  0927 04/08/19  1831  07/06/15  0835 06/03/14  1012   CHOL 87 175   < > 129 158   HDL 30* 30*   < > 34* 36*   LDL 43 Cannot estimate LDL when triglyceride exceeds 400 mg/dL   < > 79 98   TRIG 69 408*   < > 81 122   CHOLHDLRATIO  --   --   --  3.8 4.4    < > = values in this interval not displayed.     No results found for: [id number], [id number], [id number]    ASSESSMENT/PLAN:    1. Diabetes type II: with coronary artery disease, microalbuminuria and CKD IV (non-insulin therapies for diabetes management are not indicated at this point)     Cole self titrated off insulin again 1.5 months ago. His BG are within a reasonable range off insulin, given the fasting data I have. I do not have any afternoon data, and I suspect he may be having some prandial hyperglycemia that would be amenable to resumption of insulin. In the past off insulin, he stopped checking BG, and ended up in the hospital with A1c >13, so he needs to continue close monitoring.     -continue to monitor BG at least 1-2 times daily, alternating timing to obtain data across the course of the day   -he may benefit from use of Novolog AC/HS, if not resumption of Lantus, in the future to maintain glycemic control.    -referral to ophthalmology placed 10/2019, needs to schedule. Discussed again today.    2. Thyroid: pt carries a history of subclinical hypothyroidism   -most recent TSH 4.64 7/23/19, which is improved from prior testing, with normal free T4.    -will continue to monitor for  sx, and trend values      Refills today:  1. None needed per patient     Follow up:  1. With [facility] staff in 3 months.   2. Diabetes Educator follow up: PRN    The patient is  enrolled in [employer] services    This patient has met [place] community measures for diabetes control: no (D5: Control blood pressure , Lower bad cholesterol Maintain blood sugar, Be tobacco-free, Take aspirin as recommended)    This patient is eligible for graduation from [facility] clinic: no    I spent 25 minutes with this patient face to face and explained the conditions and plans (more than 50% of time was counseling/coordination of care, diabetes management, follow up plan for worsening hyper and hypoglycemia) . The patient understood and is satisfied with today's visit.     RAISA Stuart, PAMattC  [facility]   Pager [phone]        Again, thank you for allowing me to participate in the care of your patient.      Sincerely,    Meron Jackson PA-C

## 2019-12-30 NOTE — PATIENT INSTRUCTIONS
1. You do not need to resume Lantus at this time  2. Continue to check your blood sugars; alternate the time of day you check.

## 2020-01-01 ENCOUNTER — PATIENT OUTREACH (OUTPATIENT)
Dept: CARDIOLOGY | Facility: CLINIC | Age: 60
End: 2020-01-01

## 2020-01-01 ENCOUNTER — VIRTUAL VISIT (OUTPATIENT)
Dept: INTERNAL MEDICINE | Facility: CLINIC | Age: 60
End: 2020-01-01
Payer: MEDICAID

## 2020-01-01 ENCOUNTER — APPOINTMENT (OUTPATIENT)
Dept: PHYSICAL THERAPY | Facility: CLINIC | Age: 60
End: 2020-01-01
Payer: MEDICAID

## 2020-01-01 ENCOUNTER — TELEPHONE (OUTPATIENT)
Dept: CARDIOLOGY | Facility: CLINIC | Age: 60
End: 2020-01-01

## 2020-01-01 ENCOUNTER — ALLIED HEALTH/NURSE VISIT (OUTPATIENT)
Dept: PHARMACY | Facility: CLINIC | Age: 60
End: 2020-01-01
Payer: MEDICAID

## 2020-01-01 ENCOUNTER — APPOINTMENT (OUTPATIENT)
Dept: PHYSICAL THERAPY | Facility: CLINIC | Age: 60
End: 2020-01-01
Attending: PHYSICIAN ASSISTANT
Payer: MEDICAID

## 2020-01-01 ENCOUNTER — OFFICE VISIT (OUTPATIENT)
Dept: CARDIOLOGY | Facility: CLINIC | Age: 60
End: 2020-01-01
Attending: PHYSICIAN ASSISTANT
Payer: MEDICAID

## 2020-01-01 ENCOUNTER — PRE VISIT (OUTPATIENT)
Dept: GASTROENTEROLOGY | Facility: CLINIC | Age: 60
End: 2020-01-01

## 2020-01-01 ENCOUNTER — APPOINTMENT (OUTPATIENT)
Dept: PHYSICAL THERAPY | Facility: CLINIC | Age: 60
End: 2020-01-01
Attending: STUDENT IN AN ORGANIZED HEALTH CARE EDUCATION/TRAINING PROGRAM
Payer: MEDICAID

## 2020-01-01 ENCOUNTER — HOSPITAL ENCOUNTER (OUTPATIENT)
Facility: CLINIC | Age: 60
Setting detail: OBSERVATION
Discharge: HOME | End: 2020-11-13
Attending: EMERGENCY MEDICINE | Admitting: STUDENT IN AN ORGANIZED HEALTH CARE EDUCATION/TRAINING PROGRAM
Payer: MEDICAID

## 2020-01-01 ENCOUNTER — APPOINTMENT (OUTPATIENT)
Dept: OCCUPATIONAL THERAPY | Facility: CLINIC | Age: 60
End: 2020-01-01
Attending: PHYSICIAN ASSISTANT
Payer: MEDICAID

## 2020-01-01 ENCOUNTER — CARE COORDINATION (OUTPATIENT)
Dept: CARDIOLOGY | Facility: CLINIC | Age: 60
End: 2020-01-01

## 2020-01-01 ENCOUNTER — PATIENT OUTREACH (OUTPATIENT)
Dept: CARE COORDINATION | Facility: CLINIC | Age: 60
End: 2020-01-01

## 2020-01-01 ENCOUNTER — VIRTUAL VISIT (OUTPATIENT)
Dept: CARDIOLOGY | Facility: CLINIC | Age: 60
End: 2020-01-01
Attending: NURSE PRACTITIONER
Payer: MEDICAID

## 2020-01-01 ENCOUNTER — VIRTUAL VISIT (OUTPATIENT)
Dept: CARDIOLOGY | Facility: CLINIC | Age: 60
End: 2020-01-01
Attending: INTERNAL MEDICINE
Payer: MEDICAID

## 2020-01-01 ENCOUNTER — VIRTUAL VISIT (OUTPATIENT)
Dept: GASTROENTEROLOGY | Facility: CLINIC | Age: 60
End: 2020-01-01
Attending: NURSE PRACTITIONER
Payer: MEDICAID

## 2020-01-01 ENCOUNTER — APPOINTMENT (OUTPATIENT)
Dept: GENERAL RADIOLOGY | Facility: CLINIC | Age: 60
End: 2020-01-01
Attending: EMERGENCY MEDICINE
Payer: MEDICAID

## 2020-01-01 ENCOUNTER — DOCUMENTATION ONLY (OUTPATIENT)
Dept: CARE COORDINATION | Facility: CLINIC | Age: 60
End: 2020-01-01

## 2020-01-01 ENCOUNTER — HEALTH MAINTENANCE LETTER (OUTPATIENT)
Age: 60
End: 2020-01-01

## 2020-01-01 ENCOUNTER — MYC MEDICAL ADVICE (OUTPATIENT)
Dept: CARDIOLOGY | Facility: CLINIC | Age: 60
End: 2020-01-01

## 2020-01-01 ENCOUNTER — APPOINTMENT (OUTPATIENT)
Dept: ULTRASOUND IMAGING | Facility: CLINIC | Age: 60
End: 2020-01-01
Attending: STUDENT IN AN ORGANIZED HEALTH CARE EDUCATION/TRAINING PROGRAM
Payer: MEDICAID

## 2020-01-01 ENCOUNTER — APPOINTMENT (OUTPATIENT)
Dept: PHYSICAL THERAPY | Facility: CLINIC | Age: 60
End: 2020-01-01
Attending: INTERNAL MEDICINE
Payer: MEDICAID

## 2020-01-01 ENCOUNTER — HOSPITAL ENCOUNTER (INPATIENT)
Facility: CLINIC | Age: 60
LOS: 5 days | Discharge: HOME | End: 2020-08-18
Attending: EMERGENCY MEDICINE | Admitting: HOSPITALIST
Payer: MEDICAID

## 2020-01-01 VITALS
SYSTOLIC BLOOD PRESSURE: 98 MMHG | DIASTOLIC BLOOD PRESSURE: 65 MMHG | HEIGHT: 68 IN | WEIGHT: 218 LBS | HEART RATE: 86 BPM | OXYGEN SATURATION: 100 % | BODY MASS INDEX: 33.04 KG/M2

## 2020-01-01 VITALS
HEART RATE: 104 BPM | WEIGHT: 209 LBS | OXYGEN SATURATION: 99 % | SYSTOLIC BLOOD PRESSURE: 111 MMHG | DIASTOLIC BLOOD PRESSURE: 77 MMHG | HEIGHT: 68 IN | BODY MASS INDEX: 31.67 KG/M2

## 2020-01-01 VITALS
HEIGHT: 68 IN | HEART RATE: 99 BPM | BODY MASS INDEX: 31.37 KG/M2 | OXYGEN SATURATION: 100 % | WEIGHT: 207 LBS | SYSTOLIC BLOOD PRESSURE: 116 MMHG | DIASTOLIC BLOOD PRESSURE: 78 MMHG

## 2020-01-01 VITALS
HEIGHT: 68 IN | BODY MASS INDEX: 30.8 KG/M2 | TEMPERATURE: 95.5 F | RESPIRATION RATE: 20 BRPM | WEIGHT: 203.2 LBS | SYSTOLIC BLOOD PRESSURE: 95 MMHG | DIASTOLIC BLOOD PRESSURE: 64 MMHG | HEART RATE: 85 BPM | OXYGEN SATURATION: 98 %

## 2020-01-01 VITALS
HEART RATE: 81 BPM | DIASTOLIC BLOOD PRESSURE: 66 MMHG | HEIGHT: 68 IN | TEMPERATURE: 95 F | SYSTOLIC BLOOD PRESSURE: 106 MMHG | WEIGHT: 218.7 LBS | RESPIRATION RATE: 16 BRPM | BODY MASS INDEX: 33.15 KG/M2 | OXYGEN SATURATION: 98 %

## 2020-01-01 VITALS
TEMPERATURE: 97.4 F | WEIGHT: 193.4 LBS | HEIGHT: 68 IN | SYSTOLIC BLOOD PRESSURE: 100 MMHG | OXYGEN SATURATION: 96 % | BODY MASS INDEX: 29.31 KG/M2 | DIASTOLIC BLOOD PRESSURE: 78 MMHG | RESPIRATION RATE: 18 BRPM | HEART RATE: 70 BPM

## 2020-01-01 VITALS
HEART RATE: 93 BPM | SYSTOLIC BLOOD PRESSURE: 116 MMHG | WEIGHT: 217 LBS | DIASTOLIC BLOOD PRESSURE: 82 MMHG | OXYGEN SATURATION: 100 % | BODY MASS INDEX: 32.99 KG/M2

## 2020-01-01 VITALS — BODY MASS INDEX: 32.89 KG/M2 | WEIGHT: 217 LBS | HEIGHT: 68 IN

## 2020-01-01 VITALS — BODY MASS INDEX: 29.22 KG/M2 | WEIGHT: 192.8 LBS | HEIGHT: 68 IN

## 2020-01-01 DIAGNOSIS — E11.8 TYPE 2 DIABETES MELLITUS WITH COMPLICATION (H): ICD-10-CM

## 2020-01-01 DIAGNOSIS — E78.2 MIXED HYPERLIPIDEMIA: ICD-10-CM

## 2020-01-01 DIAGNOSIS — K92.2 LOWER GI BLEEDING: ICD-10-CM

## 2020-01-01 DIAGNOSIS — I50.22 CHRONIC SYSTOLIC CONGESTIVE HEART FAILURE (H): Primary | ICD-10-CM

## 2020-01-01 DIAGNOSIS — I50.23 ACUTE ON CHRONIC SYSTOLIC HEART FAILURE (H): ICD-10-CM

## 2020-01-01 DIAGNOSIS — I50.22 CHRONIC SYSTOLIC CONGESTIVE HEART FAILURE (H): ICD-10-CM

## 2020-01-01 DIAGNOSIS — I25.5 ISCHEMIC CARDIOMYOPATHY: ICD-10-CM

## 2020-01-01 DIAGNOSIS — N18.4 CKD (CHRONIC KIDNEY DISEASE) STAGE 4, GFR 15-29 ML/MIN (H): ICD-10-CM

## 2020-01-01 DIAGNOSIS — Z78.9 TAKES DIETARY SUPPLEMENTS: ICD-10-CM

## 2020-01-01 DIAGNOSIS — I48.91 ATRIAL FIBRILLATION, UNSPECIFIED TYPE (H): Chronic | ICD-10-CM

## 2020-01-01 DIAGNOSIS — N18.4 CKD (CHRONIC KIDNEY DISEASE) STAGE 4, GFR 15-29 ML/MIN (H): Primary | ICD-10-CM

## 2020-01-01 DIAGNOSIS — I50.23 ACUTE ON CHRONIC SYSTOLIC CONGESTIVE HEART FAILURE (H): ICD-10-CM

## 2020-01-01 DIAGNOSIS — E87.8 ABNORMAL BLOOD ELECTROLYTE LEVEL: ICD-10-CM

## 2020-01-01 DIAGNOSIS — Z20.828 CONTACT WITH AND (SUSPECTED) EXPOSURE TO OTHER VIRAL COMMUNICABLE DISEASES: ICD-10-CM

## 2020-01-01 DIAGNOSIS — D63.1 ANEMIA IN STAGE 4 CHRONIC KIDNEY DISEASE (H): Primary | ICD-10-CM

## 2020-01-01 DIAGNOSIS — N18.4 ANEMIA IN STAGE 4 CHRONIC KIDNEY DISEASE (H): Primary | ICD-10-CM

## 2020-01-01 DIAGNOSIS — Z87.19 HISTORY OF GASTROINTESTINAL BLEEDING: ICD-10-CM

## 2020-01-01 DIAGNOSIS — I25.10 CORONARY ARTERY DISEASE INVOLVING NATIVE CORONARY ARTERY OF NATIVE HEART WITHOUT ANGINA PECTORIS: ICD-10-CM

## 2020-01-01 DIAGNOSIS — D62 ANEMIA DUE TO BLOOD LOSS, ACUTE: ICD-10-CM

## 2020-01-01 DIAGNOSIS — Z71.89 GOALS OF CARE, COUNSELING/DISCUSSION: ICD-10-CM

## 2020-01-01 DIAGNOSIS — Z20.822 COVID-19 VIRUS NOT DETECTED: ICD-10-CM

## 2020-01-01 DIAGNOSIS — E11.69 TYPE 2 DIABETES MELLITUS WITH OTHER SPECIFIED COMPLICATION, WITHOUT LONG-TERM CURRENT USE OF INSULIN (H): ICD-10-CM

## 2020-01-01 DIAGNOSIS — I48.0 PAROXYSMAL A-FIB (H): ICD-10-CM

## 2020-01-01 DIAGNOSIS — K92.1 GASTROINTESTINAL HEMORRHAGE WITH MELENA: ICD-10-CM

## 2020-01-01 DIAGNOSIS — E03.8 SUBCLINICAL HYPOTHYROIDISM: Chronic | ICD-10-CM

## 2020-01-01 DIAGNOSIS — N91.2 AMENIA: Primary | ICD-10-CM

## 2020-01-01 DIAGNOSIS — D62 ANEMIA DUE TO ACUTE BLOOD LOSS: ICD-10-CM

## 2020-01-01 DIAGNOSIS — I50.23 ACUTE ON CHRONIC SYSTOLIC HEART FAILURE (H): Primary | ICD-10-CM

## 2020-01-01 DIAGNOSIS — N91.2 AMENIA: ICD-10-CM

## 2020-01-01 DIAGNOSIS — D64.9 ACUTE ANEMIA: ICD-10-CM

## 2020-01-01 DIAGNOSIS — I25.5 ISCHEMIC CARDIOMYOPATHY: Primary | Chronic | ICD-10-CM

## 2020-01-01 DIAGNOSIS — E78.5 HYPERLIPIDEMIA LDL GOAL <100: Primary | ICD-10-CM

## 2020-01-01 DIAGNOSIS — E61.1 IRON DEFICIENCY: ICD-10-CM

## 2020-01-01 DIAGNOSIS — R25.2 CRAMP OF LIMB: Primary | ICD-10-CM

## 2020-01-01 DIAGNOSIS — I25.5 ISCHEMIC CARDIOMYOPATHY: Chronic | ICD-10-CM

## 2020-01-01 DIAGNOSIS — E87.5 HYPERKALEMIA: ICD-10-CM

## 2020-01-01 DIAGNOSIS — D62 ANEMIA DUE TO ACUTE BLOOD LOSS: Primary | ICD-10-CM

## 2020-01-01 LAB
ABO + RH BLD: NORMAL
ALBUMIN SERPL-MCNC: 2.7 G/DL (ref 3.4–5)
ALBUMIN SERPL-MCNC: 2.9 G/DL (ref 3.4–5)
ALBUMIN SERPL-MCNC: 2.9 G/DL (ref 3.4–5)
ALBUMIN SERPL-MCNC: 3.1 G/DL (ref 3.4–5)
ALBUMIN UR-MCNC: NEGATIVE MG/DL
ALP SERPL-CCNC: 144 U/L (ref 40–150)
ALP SERPL-CCNC: 151 U/L (ref 40–150)
ALP SERPL-CCNC: 159 U/L (ref 40–150)
ALT SERPL W P-5'-P-CCNC: 14 U/L (ref 0–70)
ALT SERPL W P-5'-P-CCNC: 17 U/L (ref 0–70)
ALT SERPL W P-5'-P-CCNC: 18 U/L (ref 0–70)
ANION GAP SERPL CALCULATED.3IONS-SCNC: 10 MMOL/L (ref 3–14)
ANION GAP SERPL CALCULATED.3IONS-SCNC: 12 MMOL/L (ref 3–14)
ANION GAP SERPL CALCULATED.3IONS-SCNC: 2 MMOL/L (ref 3–14)
ANION GAP SERPL CALCULATED.3IONS-SCNC: 4 MMOL/L (ref 3–14)
ANION GAP SERPL CALCULATED.3IONS-SCNC: 5 MMOL/L (ref 3–14)
ANION GAP SERPL CALCULATED.3IONS-SCNC: 6 MMOL/L (ref 3–14)
ANION GAP SERPL CALCULATED.3IONS-SCNC: 7 MMOL/L (ref 3–14)
ANION GAP SERPL CALCULATED.3IONS-SCNC: 8 MMOL/L (ref 3–14)
ANION GAP SERPL CALCULATED.3IONS-SCNC: 9 MMOL/L (ref 3–14)
ANION GAP SERPL CALCULATED.3IONS-SCNC: 9 MMOL/L (ref 3–14)
APPEARANCE UR: CLEAR
AST SERPL W P-5'-P-CCNC: 12 U/L (ref 0–45)
AST SERPL W P-5'-P-CCNC: 12 U/L (ref 0–45)
AST SERPL W P-5'-P-CCNC: 18 U/L (ref 0–45)
BASOPHILS # BLD AUTO: 0 10E9/L (ref 0–0.2)
BASOPHILS NFR BLD AUTO: 0.4 %
BASOPHILS NFR BLD AUTO: 0.4 %
BASOPHILS NFR BLD AUTO: 0.6 %
BILIRUB DIRECT SERPL-MCNC: 0.9 MG/DL (ref 0–0.2)
BILIRUB DIRECT SERPL-MCNC: 1.9 MG/DL (ref 0–0.2)
BILIRUB SERPL-MCNC: 1.8 MG/DL (ref 0.2–1.3)
BILIRUB SERPL-MCNC: 2.2 MG/DL (ref 0.2–1.3)
BILIRUB SERPL-MCNC: 2.8 MG/DL (ref 0.2–1.3)
BILIRUB SERPL-MCNC: 4.2 MG/DL (ref 0.2–1.3)
BILIRUB UR QL STRIP: NEGATIVE
BLD GP AB SCN SERPL QL: NORMAL
BLD GP AB SCN SERPL QL: NORMAL
BLD PROD TYP BPU: NORMAL
BLD UNIT ID BPU: 0
BLOOD BANK CMNT PATIENT-IMP: NORMAL
BLOOD BANK CMNT PATIENT-IMP: NORMAL
BLOOD PRODUCT CODE: NORMAL
BPU ID: NORMAL
BUN SERPL-MCNC: 46 MG/DL (ref 7–30)
BUN SERPL-MCNC: 49 MG/DL (ref 7–30)
BUN SERPL-MCNC: 50 MG/DL (ref 7–30)
BUN SERPL-MCNC: 50 MG/DL (ref 7–30)
BUN SERPL-MCNC: 51 MG/DL (ref 7–30)
BUN SERPL-MCNC: 54 MG/DL (ref 7–30)
BUN SERPL-MCNC: 55 MG/DL (ref 7–30)
BUN SERPL-MCNC: 56 MG/DL (ref 7–30)
BUN SERPL-MCNC: 57 MG/DL (ref 7–30)
BUN SERPL-MCNC: 58 MG/DL (ref 7–30)
BUN SERPL-MCNC: 59 MG/DL (ref 7–30)
BUN SERPL-MCNC: 59 MG/DL (ref 7–30)
BUN SERPL-MCNC: 60 MG/DL (ref 7–30)
BUN SERPL-MCNC: 61 MG/DL (ref 7–30)
BUN SERPL-MCNC: 61 MG/DL (ref 7–30)
BUN SERPL-MCNC: 62 MG/DL (ref 7–30)
BUN SERPL-MCNC: 67 MG/DL (ref 7–30)
BUN SERPL-MCNC: 68 MG/DL (ref 7–30)
BUN SERPL-MCNC: 69 MG/DL (ref 7–30)
BUN SERPL-MCNC: 69 MG/DL (ref 7–30)
BUN SERPL-MCNC: 70 MG/DL (ref 7–30)
BUN SERPL-MCNC: 70 MG/DL (ref 7–30)
BUN SERPL-MCNC: 71 MG/DL (ref 7–30)
BUN SERPL-MCNC: 72 MG/DL (ref 7–30)
BUN SERPL-MCNC: 73 MG/DL (ref 7–30)
BUN SERPL-MCNC: 77 MG/DL (ref 7–30)
BUN SERPL-MCNC: 78 MG/DL (ref 7–30)
BUN SERPL-MCNC: 79 MG/DL (ref 7–30)
BUN SERPL-MCNC: 85 MG/DL (ref 7–30)
BUN SERPL-MCNC: 85 MG/DL (ref 7–30)
BUN SERPL-MCNC: 86 MG/DL (ref 7–30)
BUN SERPL-MCNC: 86 MG/DL (ref 7–30)
BUN SERPL-MCNC: 93 MG/DL (ref 7–30)
CALCIUM SERPL-MCNC: 10 MG/DL (ref 8.5–10.1)
CALCIUM SERPL-MCNC: 10.1 MG/DL (ref 8.5–10.1)
CALCIUM SERPL-MCNC: 10.2 MG/DL (ref 8.5–10.1)
CALCIUM SERPL-MCNC: 10.4 MG/DL (ref 8.5–10.1)
CALCIUM SERPL-MCNC: 8.1 MG/DL (ref 8.5–10.1)
CALCIUM SERPL-MCNC: 8.1 MG/DL (ref 8.5–10.1)
CALCIUM SERPL-MCNC: 8.2 MG/DL (ref 8.5–10.1)
CALCIUM SERPL-MCNC: 8.3 MG/DL (ref 8.5–10.1)
CALCIUM SERPL-MCNC: 8.4 MG/DL (ref 8.5–10.1)
CALCIUM SERPL-MCNC: 8.5 MG/DL (ref 8.5–10.1)
CALCIUM SERPL-MCNC: 8.6 MG/DL (ref 8.5–10.1)
CALCIUM SERPL-MCNC: 8.7 MG/DL (ref 8.5–10.1)
CALCIUM SERPL-MCNC: 9 MG/DL (ref 8.5–10.1)
CALCIUM SERPL-MCNC: 9 MG/DL (ref 8.5–10.1)
CALCIUM SERPL-MCNC: 9.1 MG/DL (ref 8.5–10.1)
CALCIUM SERPL-MCNC: 9.2 MG/DL (ref 8.5–10.1)
CALCIUM SERPL-MCNC: 9.2 MG/DL (ref 8.5–10.1)
CALCIUM SERPL-MCNC: 9.3 MG/DL (ref 8.5–10.1)
CALCIUM SERPL-MCNC: 9.3 MG/DL (ref 8.5–10.1)
CALCIUM SERPL-MCNC: 9.5 MG/DL (ref 8.5–10.1)
CALCIUM SERPL-MCNC: 9.5 MG/DL (ref 8.5–10.1)
CALCIUM SERPL-MCNC: 9.6 MG/DL (ref 8.5–10.1)
CALCIUM SERPL-MCNC: 9.6 MG/DL (ref 8.5–10.1)
CALCIUM SERPL-MCNC: 9.8 MG/DL (ref 8.5–10.1)
CALCIUM SERPL-MCNC: 9.9 MG/DL (ref 8.5–10.1)
CHLORIDE SERPL-SCNC: 100 MMOL/L (ref 94–109)
CHLORIDE SERPL-SCNC: 101 MMOL/L (ref 94–109)
CHLORIDE SERPL-SCNC: 102 MMOL/L (ref 94–109)
CHLORIDE SERPL-SCNC: 103 MMOL/L (ref 94–109)
CHLORIDE SERPL-SCNC: 104 MMOL/L (ref 94–109)
CHLORIDE SERPL-SCNC: 105 MMOL/L (ref 94–109)
CHLORIDE SERPL-SCNC: 105 MMOL/L (ref 94–109)
CHLORIDE SERPL-SCNC: 106 MMOL/L (ref 94–109)
CHLORIDE SERPL-SCNC: 106 MMOL/L (ref 94–109)
CHLORIDE SERPL-SCNC: 107 MMOL/L (ref 94–109)
CHLORIDE SERPL-SCNC: 108 MMOL/L (ref 94–109)
CHLORIDE SERPL-SCNC: 91 MMOL/L (ref 94–109)
CHLORIDE SERPL-SCNC: 94 MMOL/L (ref 94–109)
CHLORIDE SERPL-SCNC: 96 MMOL/L (ref 94–109)
CHLORIDE SERPL-SCNC: 97 MMOL/L (ref 94–109)
CHLORIDE SERPL-SCNC: 97 MMOL/L (ref 94–109)
CHLORIDE SERPL-SCNC: 98 MMOL/L (ref 94–109)
CHLORIDE SERPL-SCNC: 98 MMOL/L (ref 94–109)
CHLORIDE SERPL-SCNC: 99 MMOL/L (ref 94–109)
CHOLEST SERPL-MCNC: 110 MG/DL
CK SERPL-CCNC: 65 U/L (ref 30–300)
CO2 SERPL-SCNC: 20 MMOL/L (ref 20–32)
CO2 SERPL-SCNC: 21 MMOL/L (ref 20–32)
CO2 SERPL-SCNC: 22 MMOL/L (ref 20–32)
CO2 SERPL-SCNC: 23 MMOL/L (ref 20–32)
CO2 SERPL-SCNC: 24 MMOL/L (ref 20–32)
CO2 SERPL-SCNC: 24 MMOL/L (ref 20–32)
CO2 SERPL-SCNC: 25 MMOL/L (ref 20–32)
CO2 SERPL-SCNC: 25 MMOL/L (ref 20–32)
CO2 SERPL-SCNC: 26 MMOL/L (ref 20–32)
CO2 SERPL-SCNC: 27 MMOL/L (ref 20–32)
CO2 SERPL-SCNC: 27 MMOL/L (ref 20–32)
CO2 SERPL-SCNC: 28 MMOL/L (ref 20–32)
CO2 SERPL-SCNC: 28 MMOL/L (ref 20–32)
CO2 SERPL-SCNC: 29 MMOL/L (ref 20–32)
CO2 SERPL-SCNC: 30 MMOL/L (ref 20–32)
CO2 SERPL-SCNC: 31 MMOL/L (ref 20–32)
CO2 SERPL-SCNC: 31 MMOL/L (ref 20–32)
CO2 SERPL-SCNC: 32 MMOL/L (ref 20–32)
CO2 SERPL-SCNC: 33 MMOL/L (ref 20–32)
CO2 SERPL-SCNC: 35 MMOL/L (ref 20–32)
COLOR UR AUTO: YELLOW
CREAT SERPL-MCNC: 2.05 MG/DL (ref 0.66–1.25)
CREAT SERPL-MCNC: 2.1 MG/DL (ref 0.66–1.25)
CREAT SERPL-MCNC: 2.13 MG/DL (ref 0.66–1.25)
CREAT SERPL-MCNC: 2.17 MG/DL (ref 0.66–1.25)
CREAT SERPL-MCNC: 2.18 MG/DL (ref 0.66–1.25)
CREAT SERPL-MCNC: 2.25 MG/DL (ref 0.66–1.25)
CREAT SERPL-MCNC: 2.26 MG/DL (ref 0.66–1.25)
CREAT SERPL-MCNC: 2.27 MG/DL (ref 0.66–1.25)
CREAT SERPL-MCNC: 2.28 MG/DL (ref 0.66–1.25)
CREAT SERPL-MCNC: 2.29 MG/DL (ref 0.66–1.25)
CREAT SERPL-MCNC: 2.29 MG/DL (ref 0.66–1.25)
CREAT SERPL-MCNC: 2.32 MG/DL (ref 0.66–1.25)
CREAT SERPL-MCNC: 2.33 MG/DL (ref 0.66–1.25)
CREAT SERPL-MCNC: 2.33 MG/DL (ref 0.66–1.25)
CREAT SERPL-MCNC: 2.37 MG/DL (ref 0.66–1.25)
CREAT SERPL-MCNC: 2.4 MG/DL (ref 0.66–1.25)
CREAT SERPL-MCNC: 2.4 MG/DL (ref 0.66–1.25)
CREAT SERPL-MCNC: 2.45 MG/DL (ref 0.66–1.25)
CREAT SERPL-MCNC: 2.46 MG/DL (ref 0.66–1.25)
CREAT SERPL-MCNC: 2.5 MG/DL (ref 0.66–1.25)
CREAT SERPL-MCNC: 2.53 MG/DL (ref 0.66–1.25)
CREAT SERPL-MCNC: 2.56 MG/DL (ref 0.66–1.25)
CREAT SERPL-MCNC: 2.57 MG/DL (ref 0.66–1.25)
CREAT SERPL-MCNC: 2.58 MG/DL (ref 0.66–1.25)
CREAT SERPL-MCNC: 2.59 MG/DL (ref 0.66–1.25)
CREAT SERPL-MCNC: 2.61 MG/DL (ref 0.66–1.25)
CREAT SERPL-MCNC: 2.61 MG/DL (ref 0.66–1.25)
CREAT SERPL-MCNC: 2.64 MG/DL (ref 0.66–1.25)
CREAT SERPL-MCNC: 2.71 MG/DL (ref 0.66–1.25)
CREAT SERPL-MCNC: 2.76 MG/DL (ref 0.66–1.25)
CREAT SERPL-MCNC: 2.76 MG/DL (ref 0.66–1.25)
CREAT SERPL-MCNC: 2.82 MG/DL (ref 0.66–1.25)
CREAT SERPL-MCNC: 2.87 MG/DL (ref 0.66–1.25)
CREAT SERPL-MCNC: 2.9 MG/DL (ref 0.66–1.25)
CREAT SERPL-MCNC: 2.91 MG/DL (ref 0.66–1.25)
CREAT SERPL-MCNC: 2.91 MG/DL (ref 0.66–1.25)
CREAT SERPL-MCNC: 2.95 MG/DL (ref 0.66–1.25)
CREAT SERPL-MCNC: 3.22 MG/DL (ref 0.66–1.25)
CREAT SERPL-MCNC: 3.27 MG/DL (ref 0.66–1.25)
DIFFERENTIAL METHOD BLD: ABNORMAL
EOSINOPHIL # BLD AUTO: 0.1 10E9/L (ref 0–0.7)
EOSINOPHIL NFR BLD AUTO: 0.8 %
EOSINOPHIL NFR BLD AUTO: 0.9 %
EOSINOPHIL NFR BLD AUTO: 1.6 %
ERYTHROCYTE [DISTWIDTH] IN BLOOD BY AUTOMATED COUNT: 17.6 % (ref 10–15)
ERYTHROCYTE [DISTWIDTH] IN BLOOD BY AUTOMATED COUNT: 18.5 % (ref 10–15)
ERYTHROCYTE [DISTWIDTH] IN BLOOD BY AUTOMATED COUNT: 21.1 % (ref 10–15)
ERYTHROCYTE [DISTWIDTH] IN BLOOD BY AUTOMATED COUNT: 21.2 % (ref 10–15)
ERYTHROCYTE [DISTWIDTH] IN BLOOD BY AUTOMATED COUNT: 22.4 % (ref 10–15)
ERYTHROCYTE [DISTWIDTH] IN BLOOD BY AUTOMATED COUNT: 22.4 % (ref 10–15)
ERYTHROCYTE [DISTWIDTH] IN BLOOD BY AUTOMATED COUNT: 22.5 % (ref 10–15)
ERYTHROCYTE [DISTWIDTH] IN BLOOD BY AUTOMATED COUNT: 22.7 % (ref 10–15)
ERYTHROCYTE [DISTWIDTH] IN BLOOD BY AUTOMATED COUNT: 22.9 % (ref 10–15)
ERYTHROCYTE [DISTWIDTH] IN BLOOD BY AUTOMATED COUNT: 23.3 % (ref 10–15)
ERYTHROCYTE [DISTWIDTH] IN BLOOD BY AUTOMATED COUNT: 23.5 % (ref 10–15)
ERYTHROCYTE [DISTWIDTH] IN BLOOD BY AUTOMATED COUNT: 27.5 % (ref 10–15)
FERRITIN SERPL-MCNC: 11 NG/ML (ref 26–388)
FERRITIN SERPL-MCNC: 25 NG/ML (ref 26–388)
FERRITIN SERPL-MCNC: 9 NG/ML (ref 26–388)
GFR SERPL CREATININE-BSD FRML MDRD: 19 ML/MIN/{1.73_M2}
GFR SERPL CREATININE-BSD FRML MDRD: 19 ML/MIN/{1.73_M2}
GFR SERPL CREATININE-BSD FRML MDRD: 22 ML/MIN/{1.73_M2}
GFR SERPL CREATININE-BSD FRML MDRD: 23 ML/MIN/{1.73_M2}
GFR SERPL CREATININE-BSD FRML MDRD: 24 ML/MIN/{1.73_M2}
GFR SERPL CREATININE-BSD FRML MDRD: 25 ML/MIN/{1.73_M2}
GFR SERPL CREATININE-BSD FRML MDRD: 26 ML/MIN/{1.73_M2}
GFR SERPL CREATININE-BSD FRML MDRD: 27 ML/MIN/{1.73_M2}
GFR SERPL CREATININE-BSD FRML MDRD: 27 ML/MIN/{1.73_M2}
GFR SERPL CREATININE-BSD FRML MDRD: 28 ML/MIN/{1.73_M2}
GFR SERPL CREATININE-BSD FRML MDRD: 29 ML/MIN/{1.73_M2}
GFR SERPL CREATININE-BSD FRML MDRD: 30 ML/MIN/{1.73_M2}
GFR SERPL CREATININE-BSD FRML MDRD: 31 ML/MIN/{1.73_M2}
GFR SERPL CREATININE-BSD FRML MDRD: 31 ML/MIN/{1.73_M2}
GFR SERPL CREATININE-BSD FRML MDRD: 32 ML/MIN/{1.73_M2}
GFR SERPL CREATININE-BSD FRML MDRD: 33 ML/MIN/{1.73_M2}
GFR SERPL CREATININE-BSD FRML MDRD: 34 ML/MIN/{1.73_M2}
GLUCOSE BLDC GLUCOMTR-MCNC: 102 MG/DL (ref 70–99)
GLUCOSE BLDC GLUCOMTR-MCNC: 105 MG/DL (ref 70–99)
GLUCOSE BLDC GLUCOMTR-MCNC: 105 MG/DL (ref 70–99)
GLUCOSE BLDC GLUCOMTR-MCNC: 106 MG/DL (ref 70–99)
GLUCOSE BLDC GLUCOMTR-MCNC: 109 MG/DL (ref 70–99)
GLUCOSE BLDC GLUCOMTR-MCNC: 113 MG/DL (ref 70–99)
GLUCOSE BLDC GLUCOMTR-MCNC: 117 MG/DL (ref 70–99)
GLUCOSE BLDC GLUCOMTR-MCNC: 124 MG/DL (ref 70–99)
GLUCOSE BLDC GLUCOMTR-MCNC: 125 MG/DL (ref 70–99)
GLUCOSE BLDC GLUCOMTR-MCNC: 128 MG/DL (ref 70–99)
GLUCOSE BLDC GLUCOMTR-MCNC: 132 MG/DL (ref 70–99)
GLUCOSE BLDC GLUCOMTR-MCNC: 133 MG/DL (ref 70–99)
GLUCOSE BLDC GLUCOMTR-MCNC: 136 MG/DL (ref 70–99)
GLUCOSE BLDC GLUCOMTR-MCNC: 140 MG/DL (ref 70–99)
GLUCOSE BLDC GLUCOMTR-MCNC: 143 MG/DL (ref 70–99)
GLUCOSE BLDC GLUCOMTR-MCNC: 144 MG/DL (ref 70–99)
GLUCOSE BLDC GLUCOMTR-MCNC: 145 MG/DL (ref 70–99)
GLUCOSE BLDC GLUCOMTR-MCNC: 148 MG/DL (ref 70–99)
GLUCOSE BLDC GLUCOMTR-MCNC: 148 MG/DL (ref 70–99)
GLUCOSE BLDC GLUCOMTR-MCNC: 149 MG/DL (ref 70–99)
GLUCOSE BLDC GLUCOMTR-MCNC: 150 MG/DL (ref 70–99)
GLUCOSE BLDC GLUCOMTR-MCNC: 150 MG/DL (ref 70–99)
GLUCOSE BLDC GLUCOMTR-MCNC: 153 MG/DL (ref 70–99)
GLUCOSE BLDC GLUCOMTR-MCNC: 154 MG/DL (ref 70–99)
GLUCOSE BLDC GLUCOMTR-MCNC: 155 MG/DL (ref 70–99)
GLUCOSE BLDC GLUCOMTR-MCNC: 157 MG/DL (ref 70–99)
GLUCOSE BLDC GLUCOMTR-MCNC: 174 MG/DL (ref 70–99)
GLUCOSE BLDC GLUCOMTR-MCNC: 174 MG/DL (ref 70–99)
GLUCOSE BLDC GLUCOMTR-MCNC: 179 MG/DL (ref 70–99)
GLUCOSE BLDC GLUCOMTR-MCNC: 180 MG/DL (ref 70–99)
GLUCOSE BLDC GLUCOMTR-MCNC: 182 MG/DL (ref 70–99)
GLUCOSE BLDC GLUCOMTR-MCNC: 195 MG/DL (ref 70–99)
GLUCOSE BLDC GLUCOMTR-MCNC: 195 MG/DL (ref 70–99)
GLUCOSE BLDC GLUCOMTR-MCNC: 216 MG/DL (ref 70–99)
GLUCOSE BLDC GLUCOMTR-MCNC: 223 MG/DL (ref 70–99)
GLUCOSE BLDC GLUCOMTR-MCNC: 225 MG/DL (ref 70–99)
GLUCOSE BLDC GLUCOMTR-MCNC: 237 MG/DL (ref 70–99)
GLUCOSE BLDC GLUCOMTR-MCNC: 311 MG/DL (ref 70–99)
GLUCOSE BLDC GLUCOMTR-MCNC: 90 MG/DL (ref 70–99)
GLUCOSE BLDC GLUCOMTR-MCNC: 96 MG/DL (ref 70–99)
GLUCOSE BLDC GLUCOMTR-MCNC: 98 MG/DL (ref 70–99)
GLUCOSE SERPL-MCNC: 101 MG/DL (ref 70–99)
GLUCOSE SERPL-MCNC: 105 MG/DL (ref 70–99)
GLUCOSE SERPL-MCNC: 109 MG/DL (ref 70–99)
GLUCOSE SERPL-MCNC: 110 MG/DL (ref 70–99)
GLUCOSE SERPL-MCNC: 110 MG/DL (ref 70–99)
GLUCOSE SERPL-MCNC: 111 MG/DL (ref 70–99)
GLUCOSE SERPL-MCNC: 114 MG/DL (ref 70–99)
GLUCOSE SERPL-MCNC: 116 MG/DL (ref 70–99)
GLUCOSE SERPL-MCNC: 117 MG/DL (ref 70–99)
GLUCOSE SERPL-MCNC: 118 MG/DL (ref 70–99)
GLUCOSE SERPL-MCNC: 119 MG/DL (ref 70–99)
GLUCOSE SERPL-MCNC: 121 MG/DL (ref 70–99)
GLUCOSE SERPL-MCNC: 122 MG/DL (ref 70–99)
GLUCOSE SERPL-MCNC: 123 MG/DL (ref 70–99)
GLUCOSE SERPL-MCNC: 124 MG/DL (ref 70–99)
GLUCOSE SERPL-MCNC: 124 MG/DL (ref 70–99)
GLUCOSE SERPL-MCNC: 125 MG/DL (ref 70–99)
GLUCOSE SERPL-MCNC: 125 MG/DL (ref 70–99)
GLUCOSE SERPL-MCNC: 128 MG/DL (ref 70–99)
GLUCOSE SERPL-MCNC: 129 MG/DL (ref 70–99)
GLUCOSE SERPL-MCNC: 134 MG/DL (ref 70–99)
GLUCOSE SERPL-MCNC: 135 MG/DL (ref 70–99)
GLUCOSE SERPL-MCNC: 136 MG/DL (ref 70–99)
GLUCOSE SERPL-MCNC: 136 MG/DL (ref 70–99)
GLUCOSE SERPL-MCNC: 137 MG/DL (ref 70–99)
GLUCOSE SERPL-MCNC: 139 MG/DL (ref 70–99)
GLUCOSE SERPL-MCNC: 141 MG/DL (ref 70–99)
GLUCOSE SERPL-MCNC: 142 MG/DL (ref 70–99)
GLUCOSE SERPL-MCNC: 145 MG/DL (ref 70–99)
GLUCOSE SERPL-MCNC: 145 MG/DL (ref 70–99)
GLUCOSE SERPL-MCNC: 147 MG/DL (ref 70–99)
GLUCOSE SERPL-MCNC: 148 MG/DL (ref 70–99)
GLUCOSE SERPL-MCNC: 148 MG/DL (ref 70–99)
GLUCOSE SERPL-MCNC: 156 MG/DL (ref 70–99)
GLUCOSE SERPL-MCNC: 163 MG/DL (ref 70–99)
GLUCOSE SERPL-MCNC: 165 MG/DL (ref 70–99)
GLUCOSE SERPL-MCNC: 175 MG/DL (ref 70–99)
GLUCOSE SERPL-MCNC: 207 MG/DL (ref 70–99)
GLUCOSE SERPL-MCNC: 93 MG/DL (ref 70–99)
GLUCOSE UR STRIP-MCNC: NEGATIVE MG/DL
HAPTOGLOB SERPL-MCNC: 112 MG/DL (ref 32–197)
HBA1C MFR BLD: 6.5 % (ref 0–5.6)
HBA1C MFR BLD: 6.8 % (ref 0–5.6)
HCT VFR BLD AUTO: 21.9 % (ref 40–53)
HCT VFR BLD AUTO: 24.1 % (ref 40–53)
HCT VFR BLD AUTO: 24.7 % (ref 40–53)
HCT VFR BLD AUTO: 25 % (ref 40–53)
HCT VFR BLD AUTO: 28.1 % (ref 40–53)
HCT VFR BLD AUTO: 33.3 % (ref 40–53)
HCT VFR BLD AUTO: 40 % (ref 40–53)
HCT VFR BLD AUTO: 41.1 % (ref 40–53)
HCT VFR BLD AUTO: 41.4 % (ref 40–53)
HCT VFR BLD AUTO: 43.1 % (ref 40–53)
HCT VFR BLD AUTO: 43.4 % (ref 40–53)
HCT VFR BLD AUTO: 43.7 % (ref 40–53)
HDLC SERPL-MCNC: 36 MG/DL
HGB BLD-MCNC: 12.2 G/DL (ref 13.3–17.7)
HGB BLD-MCNC: 12.6 G/DL (ref 13.3–17.7)
HGB BLD-MCNC: 12.7 G/DL (ref 13.3–17.7)
HGB BLD-MCNC: 12.9 G/DL (ref 13.3–17.7)
HGB BLD-MCNC: 13 G/DL (ref 13.3–17.7)
HGB BLD-MCNC: 13.1 G/DL (ref 13.3–17.7)
HGB BLD-MCNC: 5.9 G/DL (ref 13.3–17.7)
HGB BLD-MCNC: 6.2 G/DL (ref 13.3–17.7)
HGB BLD-MCNC: 6.5 G/DL (ref 13.3–17.7)
HGB BLD-MCNC: 6.9 G/DL (ref 13.3–17.7)
HGB BLD-MCNC: 7.1 G/DL (ref 13.3–17.7)
HGB BLD-MCNC: 7.1 G/DL (ref 13.3–17.7)
HGB BLD-MCNC: 7.2 G/DL (ref 13.3–17.7)
HGB BLD-MCNC: 7.4 G/DL (ref 13.3–17.7)
HGB BLD-MCNC: 7.6 G/DL (ref 13.3–17.7)
HGB BLD-MCNC: 7.6 G/DL (ref 13.3–17.7)
HGB BLD-MCNC: 7.7 G/DL (ref 13.3–17.7)
HGB BLD-MCNC: 7.8 G/DL (ref 13.3–17.7)
HGB BLD-MCNC: 9.1 G/DL (ref 13.3–17.7)
HGB UR QL STRIP: NEGATIVE
IMM GRANULOCYTES # BLD: 0 10E9/L (ref 0–0.4)
IMM GRANULOCYTES NFR BLD: 0.3 %
IMM GRANULOCYTES NFR BLD: 0.4 %
IMM GRANULOCYTES NFR BLD: 0.4 %
INR PPP: 1.24 (ref 0.86–1.14)
INR PPP: 1.28 (ref 0.86–1.14)
INR PPP: 1.31 (ref 0.86–1.14)
INR PPP: 1.31 (ref 0.86–1.14)
INR PPP: 1.43 (ref 0.86–1.14)
INTERPRETATION ECG - MUSE: NORMAL
IRON SATN MFR SERPL: 17 % (ref 15–46)
IRON SATN MFR SERPL: 3 % (ref 15–46)
IRON SATN MFR SERPL: 3 % (ref 15–46)
IRON SATN MFR SERPL: 5 % (ref 15–46)
IRON SERPL-MCNC: 12 UG/DL (ref 35–180)
IRON SERPL-MCNC: 16 UG/DL (ref 35–180)
IRON SERPL-MCNC: 21 UG/DL (ref 35–180)
IRON SERPL-MCNC: 81 UG/DL (ref 35–180)
KETONES UR STRIP-MCNC: NEGATIVE MG/DL
LDH SERPL L TO P-CCNC: 221 U/L (ref 85–227)
LDLC SERPL CALC-MCNC: 64 MG/DL
LEUKOCYTE ESTERASE UR QL STRIP: NEGATIVE
LYMPHOCYTES # BLD AUTO: 0.3 10E9/L (ref 0.8–5.3)
LYMPHOCYTES # BLD AUTO: 0.5 10E9/L (ref 0.8–5.3)
LYMPHOCYTES # BLD AUTO: 0.5 10E9/L (ref 0.8–5.3)
LYMPHOCYTES NFR BLD AUTO: 4.1 %
LYMPHOCYTES NFR BLD AUTO: 5.7 %
LYMPHOCYTES NFR BLD AUTO: 7.4 %
MAGNESIUM SERPL-MCNC: 2.4 MG/DL (ref 1.6–2.3)
MAGNESIUM SERPL-MCNC: 2.4 MG/DL (ref 1.6–2.3)
MAGNESIUM SERPL-MCNC: 2.5 MG/DL (ref 1.6–2.3)
MAGNESIUM SERPL-MCNC: 2.6 MG/DL (ref 1.6–2.3)
MAGNESIUM SERPL-MCNC: 2.7 MG/DL (ref 1.6–2.3)
MAGNESIUM SERPL-MCNC: 2.8 MG/DL (ref 1.6–2.3)
MAGNESIUM SERPL-MCNC: 2.9 MG/DL (ref 1.6–2.3)
MAGNESIUM SERPL-MCNC: 2.9 MG/DL (ref 1.6–2.3)
MAGNESIUM SERPL-MCNC: 3.1 MG/DL (ref 1.6–2.3)
MCH RBC QN AUTO: 17.9 PG (ref 26.5–33)
MCH RBC QN AUTO: 18.4 PG (ref 26.5–33)
MCH RBC QN AUTO: 19.7 PG (ref 26.5–33)
MCH RBC QN AUTO: 20.4 PG (ref 26.5–33)
MCH RBC QN AUTO: 23.3 PG (ref 26.5–33)
MCH RBC QN AUTO: 24.1 PG (ref 26.5–33)
MCH RBC QN AUTO: 25.6 PG (ref 26.5–33)
MCH RBC QN AUTO: 26.1 PG (ref 26.5–33)
MCH RBC QN AUTO: 26.3 PG (ref 26.5–33)
MCH RBC QN AUTO: 27.2 PG (ref 26.5–33)
MCHC RBC AUTO-ENTMCNC: 25.7 G/DL (ref 31.5–36.5)
MCHC RBC AUTO-ENTMCNC: 26.3 G/DL (ref 31.5–36.5)
MCHC RBC AUTO-ENTMCNC: 26.9 G/DL (ref 31.5–36.5)
MCHC RBC AUTO-ENTMCNC: 27 G/DL (ref 31.5–36.5)
MCHC RBC AUTO-ENTMCNC: 27.3 G/DL (ref 31.5–36.5)
MCHC RBC AUTO-ENTMCNC: 28.4 G/DL (ref 31.5–36.5)
MCHC RBC AUTO-ENTMCNC: 29.9 G/DL (ref 31.5–36.5)
MCHC RBC AUTO-ENTMCNC: 30 G/DL (ref 31.5–36.5)
MCHC RBC AUTO-ENTMCNC: 30 G/DL (ref 31.5–36.5)
MCHC RBC AUTO-ENTMCNC: 30.5 G/DL (ref 31.5–36.5)
MCHC RBC AUTO-ENTMCNC: 30.7 G/DL (ref 31.5–36.5)
MCHC RBC AUTO-ENTMCNC: 30.7 G/DL (ref 31.5–36.5)
MCV RBC AUTO: 70 FL (ref 78–100)
MCV RBC AUTO: 70 FL (ref 78–100)
MCV RBC AUTO: 73 FL (ref 78–100)
MCV RBC AUTO: 75 FL (ref 78–100)
MCV RBC AUTO: 85 FL (ref 78–100)
MCV RBC AUTO: 85 FL (ref 78–100)
MCV RBC AUTO: 86 FL (ref 78–100)
MCV RBC AUTO: 86 FL (ref 78–100)
MCV RBC AUTO: 87 FL (ref 78–100)
MCV RBC AUTO: 89 FL (ref 78–100)
MONOCYTES # BLD AUTO: 0.8 10E9/L (ref 0–1.3)
MONOCYTES # BLD AUTO: 0.9 10E9/L (ref 0–1.3)
MONOCYTES # BLD AUTO: 0.9 10E9/L (ref 0–1.3)
MONOCYTES NFR BLD AUTO: 12.7 %
MONOCYTES NFR BLD AUTO: 12.7 %
MONOCYTES NFR BLD AUTO: 9.6 %
NEUTROPHILS # BLD AUTO: 5.1 10E9/L (ref 1.6–8.3)
NEUTROPHILS # BLD AUTO: 5.5 10E9/L (ref 1.6–8.3)
NEUTROPHILS # BLD AUTO: 7.5 10E9/L (ref 1.6–8.3)
NEUTROPHILS NFR BLD AUTO: 78.1 %
NEUTROPHILS NFR BLD AUTO: 80.8 %
NEUTROPHILS NFR BLD AUTO: 83.1 %
NITRATE UR QL: NEGATIVE
NONHDLC SERPL-MCNC: 74 MG/DL
NRBC # BLD AUTO: 0 10*3/UL
NRBC # BLD AUTO: 0.1 10*3/UL
NRBC # BLD AUTO: 0.2 10*3/UL
NRBC BLD AUTO-RTO: 0 /100
NRBC BLD AUTO-RTO: 2 /100
NRBC BLD AUTO-RTO: 2 /100
NT-PROBNP SERPL-MCNC: 5524 PG/ML (ref 0–900)
NT-PROBNP SERPL-MCNC: 6974 PG/ML (ref 0–125)
NUM BPU REQUESTED: 2
NUM BPU REQUESTED: 3
PH UR STRIP: 5.5 PH (ref 5–7)
PHOSPHATE SERPL-MCNC: 4.7 MG/DL (ref 2.5–4.5)
PLATELET # BLD AUTO: 169 10E9/L (ref 150–450)
PLATELET # BLD AUTO: 236 10E9/L (ref 150–450)
PLATELET # BLD AUTO: 244 10E9/L (ref 150–450)
PLATELET # BLD AUTO: 246 10E9/L (ref 150–450)
PLATELET # BLD AUTO: 251 10E9/L (ref 150–450)
PLATELET # BLD AUTO: 270 10E9/L (ref 150–450)
PLATELET # BLD AUTO: 276 10E9/L (ref 150–450)
PLATELET # BLD AUTO: 286 10E9/L (ref 150–450)
PLATELET # BLD AUTO: 295 10E9/L (ref 150–450)
PLATELET # BLD AUTO: 318 10E9/L (ref 150–450)
PLATELET # BLD AUTO: 326 10E9/L (ref 150–450)
PLATELET # BLD AUTO: 359 10E9/L (ref 150–450)
POTASSIUM SERPL-SCNC: 3 MMOL/L (ref 3.4–5.3)
POTASSIUM SERPL-SCNC: 3 MMOL/L (ref 3.4–5.3)
POTASSIUM SERPL-SCNC: 3.4 MMOL/L (ref 3.4–5.3)
POTASSIUM SERPL-SCNC: 3.5 MMOL/L (ref 3.4–5.3)
POTASSIUM SERPL-SCNC: 3.6 MMOL/L (ref 3.4–5.3)
POTASSIUM SERPL-SCNC: 3.7 MMOL/L (ref 3.4–5.3)
POTASSIUM SERPL-SCNC: 3.8 MMOL/L (ref 3.4–5.3)
POTASSIUM SERPL-SCNC: 3.9 MMOL/L (ref 3.4–5.3)
POTASSIUM SERPL-SCNC: 4 MMOL/L (ref 3.4–5.3)
POTASSIUM SERPL-SCNC: 4.1 MMOL/L (ref 3.4–5.3)
POTASSIUM SERPL-SCNC: 4.2 MMOL/L (ref 3.4–5.3)
POTASSIUM SERPL-SCNC: 4.3 MMOL/L (ref 3.4–5.3)
POTASSIUM SERPL-SCNC: 4.3 MMOL/L (ref 3.4–5.3)
POTASSIUM SERPL-SCNC: 4.5 MMOL/L (ref 3.4–5.3)
POTASSIUM SERPL-SCNC: 4.6 MMOL/L (ref 3.4–5.3)
POTASSIUM SERPL-SCNC: 4.6 MMOL/L (ref 3.4–5.3)
POTASSIUM SERPL-SCNC: 4.7 MMOL/L (ref 3.4–5.3)
POTASSIUM SERPL-SCNC: 4.9 MMOL/L (ref 3.4–5.3)
POTASSIUM SERPL-SCNC: 4.9 MMOL/L (ref 3.4–5.3)
POTASSIUM SERPL-SCNC: 5.2 MMOL/L (ref 3.4–5.3)
POTASSIUM SERPL-SCNC: 5.3 MMOL/L (ref 3.4–5.3)
POTASSIUM SERPL-SCNC: 5.6 MMOL/L (ref 3.4–5.3)
POTASSIUM SERPL-SCNC: 5.9 MMOL/L (ref 3.4–5.3)
POTASSIUM SERPL-SCNC: 6.3 MMOL/L (ref 3.4–5.3)
POTASSIUM SERPL-SCNC: NORMAL MMOL/L (ref 3.4–5.3)
PROT SERPL-MCNC: 7.2 G/DL (ref 6.8–8.8)
PROT SERPL-MCNC: 7.3 G/DL (ref 6.8–8.8)
PROT SERPL-MCNC: 8 G/DL (ref 6.8–8.8)
RBC # BLD AUTO: 2.53 10E12/L (ref 4.4–5.9)
RBC # BLD AUTO: 2.94 10E12/L (ref 4.4–5.9)
RBC # BLD AUTO: 3.47 10E12/L (ref 4.4–5.9)
RBC # BLD AUTO: 3.54 10E12/L (ref 4.4–5.9)
RBC # BLD AUTO: 3.85 10E12/L (ref 4.4–5.9)
RBC # BLD AUTO: 4.46 10E12/L (ref 4.4–5.9)
RBC # BLD AUTO: 4.64 10E12/L (ref 4.4–5.9)
RBC # BLD AUTO: 4.67 10E12/L (ref 4.4–5.9)
RBC # BLD AUTO: 4.83 10E12/L (ref 4.4–5.9)
RBC # BLD AUTO: 4.94 10E12/L (ref 4.4–5.9)
RBC # BLD AUTO: 4.99 10E12/L (ref 4.4–5.9)
RBC # BLD AUTO: 5.11 10E12/L (ref 4.4–5.9)
SARS-COV-2 RNA SPEC QL NAA+PROBE: NOT DETECTED
SARS-COV-2 RNA SPEC QL NAA+PROBE: NOT DETECTED
SODIUM SERPL-SCNC: 132 MMOL/L (ref 133–144)
SODIUM SERPL-SCNC: 133 MMOL/L (ref 133–144)
SODIUM SERPL-SCNC: 134 MMOL/L (ref 133–144)
SODIUM SERPL-SCNC: 135 MMOL/L (ref 133–144)
SODIUM SERPL-SCNC: 136 MMOL/L (ref 133–144)
SODIUM SERPL-SCNC: 137 MMOL/L (ref 133–144)
SODIUM SERPL-SCNC: 138 MMOL/L (ref 133–144)
SODIUM SERPL-SCNC: 139 MMOL/L (ref 133–144)
SODIUM SERPL-SCNC: 140 MMOL/L (ref 133–144)
SOURCE: NORMAL
SP GR UR STRIP: 1.01 (ref 1–1.03)
SPECIMEN EXP DATE BLD: NORMAL
SPECIMEN EXP DATE BLD: NORMAL
SPECIMEN SOURCE: NORMAL
SPECIMEN SOURCE: NORMAL
TIBC SERPL-MCNC: 432 UG/DL (ref 240–430)
TIBC SERPL-MCNC: 447 UG/DL (ref 240–430)
TIBC SERPL-MCNC: 467 UG/DL (ref 240–430)
TIBC SERPL-MCNC: 554 UG/DL (ref 240–430)
TRANSFUSION STATUS PATIENT QL: NORMAL
TRIGL SERPL-MCNC: 50 MG/DL
TROPONIN I SERPL-MCNC: 0.02 UG/L (ref 0–0.04)
UROBILINOGEN UR STRIP-MCNC: NORMAL MG/DL (ref 0–2)
WBC # BLD AUTO: 6.5 10E9/L (ref 4–11)
WBC # BLD AUTO: 6.6 10E9/L (ref 4–11)
WBC # BLD AUTO: 6.8 10E9/L (ref 4–11)
WBC # BLD AUTO: 6.8 10E9/L (ref 4–11)
WBC # BLD AUTO: 7.3 10E9/L (ref 4–11)
WBC # BLD AUTO: 7.4 10E9/L (ref 4–11)
WBC # BLD AUTO: 7.6 10E9/L (ref 4–11)
WBC # BLD AUTO: 7.8 10E9/L (ref 4–11)
WBC # BLD AUTO: 8 10E9/L (ref 4–11)
WBC # BLD AUTO: 8.1 10E9/L (ref 4–11)
WBC # BLD AUTO: 9 10E9/L (ref 4–11)
WBC # BLD AUTO: 9.1 10E9/L (ref 4–11)

## 2020-01-01 PROCEDURE — 25000132 ZZH RX MED GY IP 250 OP 250 PS 637: Performed by: STUDENT IN AN ORGANIZED HEALTH CARE EDUCATION/TRAINING PROGRAM

## 2020-01-01 PROCEDURE — 99285 EMERGENCY DEPT VISIT HI MDM: CPT | Mod: 25 | Performed by: EMERGENCY MEDICINE

## 2020-01-01 PROCEDURE — 36430 TRANSFUSION BLD/BLD COMPNT: CPT | Performed by: EMERGENCY MEDICINE

## 2020-01-01 PROCEDURE — G0463 HOSPITAL OUTPT CLINIC VISIT: HCPCS | Mod: ZF

## 2020-01-01 PROCEDURE — 96374 THER/PROPH/DIAG INJ IV PUSH: CPT | Mod: ZF

## 2020-01-01 PROCEDURE — P9016 RBC LEUKOCYTES REDUCED: HCPCS | Performed by: EMERGENCY MEDICINE

## 2020-01-01 PROCEDURE — 85018 HEMOGLOBIN: CPT | Performed by: STUDENT IN AN ORGANIZED HEALTH CARE EDUCATION/TRAINING PROGRAM

## 2020-01-01 PROCEDURE — 99443 PR PHYSICIAN TELEPHONE EVALUATION 21-30 MIN: CPT | Performed by: NURSE PRACTITIONER

## 2020-01-01 PROCEDURE — 96375 TX/PRO/DX INJ NEW DRUG ADDON: CPT | Performed by: EMERGENCY MEDICINE

## 2020-01-01 PROCEDURE — 86900 BLOOD TYPING SEROLOGIC ABO: CPT | Performed by: EMERGENCY MEDICINE

## 2020-01-01 PROCEDURE — 80061 LIPID PANEL: CPT | Performed by: PATHOLOGY

## 2020-01-01 PROCEDURE — C9113 INJ PANTOPRAZOLE SODIUM, VIA: HCPCS | Performed by: PHYSICIAN ASSISTANT

## 2020-01-01 PROCEDURE — 99233 SBSQ HOSP IP/OBS HIGH 50: CPT | Mod: GC | Performed by: INTERNAL MEDICINE

## 2020-01-01 PROCEDURE — 80048 BASIC METABOLIC PNL TOTAL CA: CPT | Performed by: PHYSICIAN ASSISTANT

## 2020-01-01 PROCEDURE — 83735 ASSAY OF MAGNESIUM: CPT | Performed by: HOSPITALIST

## 2020-01-01 PROCEDURE — C9113 INJ PANTOPRAZOLE SODIUM, VIA: HCPCS | Performed by: EMERGENCY MEDICINE

## 2020-01-01 PROCEDURE — 80048 BASIC METABOLIC PNL TOTAL CA: CPT | Performed by: STUDENT IN AN ORGANIZED HEALTH CARE EDUCATION/TRAINING PROGRAM

## 2020-01-01 PROCEDURE — 83550 IRON BINDING TEST: CPT | Performed by: EMERGENCY MEDICINE

## 2020-01-01 PROCEDURE — 96376 TX/PRO/DX INJ SAME DRUG ADON: CPT | Performed by: EMERGENCY MEDICINE

## 2020-01-01 PROCEDURE — 83735 ASSAY OF MAGNESIUM: CPT | Performed by: STUDENT IN AN ORGANIZED HEALTH CARE EDUCATION/TRAINING PROGRAM

## 2020-01-01 PROCEDURE — 25000125 ZZHC RX 250: Performed by: INTERNAL MEDICINE

## 2020-01-01 PROCEDURE — 25000128 H RX IP 250 OP 636: Mod: ZF | Performed by: PHYSICIAN ASSISTANT

## 2020-01-01 PROCEDURE — 25000131 ZZH RX MED GY IP 250 OP 636 PS 637: Performed by: STUDENT IN AN ORGANIZED HEALTH CARE EDUCATION/TRAINING PROGRAM

## 2020-01-01 PROCEDURE — 99443 ZZC PHYSICIAN TELEPHONE EVALUATION 21-30 MIN: CPT | Mod: ZP | Performed by: NURSE PRACTITIONER

## 2020-01-01 PROCEDURE — 250N000013 HC RX MED GY IP 250 OP 250 PS 637: Performed by: PHYSICIAN ASSISTANT

## 2020-01-01 PROCEDURE — 99443 ZZC PHYSICIAN TELEPHONE EVALUATION 21-30 MIN: CPT | Performed by: PHYSICIAN ASSISTANT

## 2020-01-01 PROCEDURE — 250N000013 HC RX MED GY IP 250 OP 250 PS 637: Performed by: PEDIATRICS

## 2020-01-01 PROCEDURE — 36415 COLL VENOUS BLD VENIPUNCTURE: CPT | Performed by: STUDENT IN AN ORGANIZED HEALTH CARE EDUCATION/TRAINING PROGRAM

## 2020-01-01 PROCEDURE — 25000128 H RX IP 250 OP 636: Performed by: STUDENT IN AN ORGANIZED HEALTH CARE EDUCATION/TRAINING PROGRAM

## 2020-01-01 PROCEDURE — 12000004 ZZH R&B IMCU UMMC

## 2020-01-01 PROCEDURE — 86901 BLOOD TYPING SEROLOGIC RH(D): CPT | Performed by: EMERGENCY MEDICINE

## 2020-01-01 PROCEDURE — 80048 BASIC METABOLIC PNL TOTAL CA: CPT | Performed by: HOSPITALIST

## 2020-01-01 PROCEDURE — 96365 THER/PROPH/DIAG IV INF INIT: CPT | Performed by: EMERGENCY MEDICINE

## 2020-01-01 PROCEDURE — 86850 RBC ANTIBODY SCREEN: CPT | Performed by: EMERGENCY MEDICINE

## 2020-01-01 PROCEDURE — 80048 BASIC METABOLIC PNL TOTAL CA: CPT | Performed by: PATHOLOGY

## 2020-01-01 PROCEDURE — 21400000 ZZH R&B CCU UMMC

## 2020-01-01 PROCEDURE — 80048 BASIC METABOLIC PNL TOTAL CA: CPT | Performed by: INTERNAL MEDICINE

## 2020-01-01 PROCEDURE — 85027 COMPLETE CBC AUTOMATED: CPT | Performed by: INTERNAL MEDICINE

## 2020-01-01 PROCEDURE — 85027 COMPLETE CBC AUTOMATED: CPT | Performed by: PATHOLOGY

## 2020-01-01 PROCEDURE — 40000141 ZZH STATISTIC PERIPHERAL IV START W/O US GUIDANCE: Mod: ZF

## 2020-01-01 PROCEDURE — 12000001 ZZH R&B MED SURG/OB UMMC

## 2020-01-01 PROCEDURE — 999N001017 HC STATISTIC GLUCOSE BY METER IP

## 2020-01-01 PROCEDURE — 36415 COLL VENOUS BLD VENIPUNCTURE: CPT | Performed by: HOSPITALIST

## 2020-01-01 PROCEDURE — 99213 OFFICE O/P EST LOW 20 MIN: CPT | Mod: ZP | Performed by: PHYSICIAN ASSISTANT

## 2020-01-01 PROCEDURE — U0003 INFECTIOUS AGENT DETECTION BY NUCLEIC ACID (DNA OR RNA); SEVERE ACUTE RESPIRATORY SYNDROME CORONAVIRUS 2 (SARS-COV-2) (CORONAVIRUS DISEASE [COVID-19]), AMPLIFIED PROBE TECHNIQUE, MAKING USE OF HIGH THROUGHPUT TECHNOLOGIES AS DESCRIBED BY CMS-2020-01-R: HCPCS | Performed by: EMERGENCY MEDICINE

## 2020-01-01 PROCEDURE — 99207 PR APP CREDIT; MD BILLING SHARED VISIT: CPT | Performed by: PEDIATRICS

## 2020-01-01 PROCEDURE — 80053 COMPREHEN METABOLIC PANEL: CPT | Performed by: PHYSICIAN ASSISTANT

## 2020-01-01 PROCEDURE — 25800030 ZZH RX IP 258 OP 636: Performed by: STUDENT IN AN ORGANIZED HEALTH CARE EDUCATION/TRAINING PROGRAM

## 2020-01-01 PROCEDURE — 258N000003 HC RX IP 258 OP 636: Performed by: STUDENT IN AN ORGANIZED HEALTH CARE EDUCATION/TRAINING PROGRAM

## 2020-01-01 PROCEDURE — 25000125 ZZHC RX 250: Performed by: EMERGENCY MEDICINE

## 2020-01-01 PROCEDURE — 83735 ASSAY OF MAGNESIUM: CPT | Performed by: EMERGENCY MEDICINE

## 2020-01-01 PROCEDURE — 82550 ASSAY OF CK (CPK): CPT | Performed by: PHYSICIAN ASSISTANT

## 2020-01-01 PROCEDURE — 93010 ELECTROCARDIOGRAM REPORT: CPT | Performed by: INTERNAL MEDICINE

## 2020-01-01 PROCEDURE — 36415 COLL VENOUS BLD VENIPUNCTURE: CPT | Performed by: INTERNAL MEDICINE

## 2020-01-01 PROCEDURE — 99441 PR PHYSICIAN TELEPHONE EVALUATION 5-10 MIN: CPT | Performed by: INTERNAL MEDICINE

## 2020-01-01 PROCEDURE — 97110 THERAPEUTIC EXERCISES: CPT | Mod: GP

## 2020-01-01 PROCEDURE — 36415 COLL VENOUS BLD VENIPUNCTURE: CPT | Performed by: EMERGENCY MEDICINE

## 2020-01-01 PROCEDURE — 99232 SBSQ HOSP IP/OBS MODERATE 35: CPT | Mod: GC | Performed by: HOSPITALIST

## 2020-01-01 PROCEDURE — 96366 THER/PROPH/DIAG IV INF ADDON: CPT | Performed by: EMERGENCY MEDICINE

## 2020-01-01 PROCEDURE — 97140 MANUAL THERAPY 1/> REGIONS: CPT | Mod: GO

## 2020-01-01 PROCEDURE — 83880 ASSAY OF NATRIURETIC PEPTIDE: CPT | Performed by: EMERGENCY MEDICINE

## 2020-01-01 PROCEDURE — 99443: CPT | Performed by: NURSE PRACTITIONER

## 2020-01-01 PROCEDURE — 86922 COMPATIBILITY TEST ANTIGLOB: CPT | Performed by: EMERGENCY MEDICINE

## 2020-01-01 PROCEDURE — 99223 1ST HOSP IP/OBS HIGH 75: CPT | Mod: GC | Performed by: INTERNAL MEDICINE

## 2020-01-01 PROCEDURE — 00000146 ZZHCL STATISTIC GLUCOSE BY METER IP

## 2020-01-01 PROCEDURE — 99214 OFFICE O/P EST MOD 30 MIN: CPT | Mod: ZP | Performed by: PHYSICIAN ASSISTANT

## 2020-01-01 PROCEDURE — 85025 COMPLETE CBC W/AUTO DIFF WBC: CPT | Performed by: EMERGENCY MEDICINE

## 2020-01-01 PROCEDURE — 93010 ELECTROCARDIOGRAM REPORT: CPT | Mod: Z6 | Performed by: EMERGENCY MEDICINE

## 2020-01-01 PROCEDURE — 97116 GAIT TRAINING THERAPY: CPT | Mod: GP | Performed by: REHABILITATION PRACTITIONER

## 2020-01-01 PROCEDURE — 97530 THERAPEUTIC ACTIVITIES: CPT | Mod: GP | Performed by: PHYSICAL THERAPIST

## 2020-01-01 PROCEDURE — 99207 PR NO CHARGE LOS: CPT | Mod: TEL | Performed by: NURSE PRACTITIONER

## 2020-01-01 PROCEDURE — 96376 TX/PRO/DX INJ SAME DRUG ADON: CPT

## 2020-01-01 PROCEDURE — 83615 LACTATE (LD) (LDH) ENZYME: CPT | Performed by: PHYSICIAN ASSISTANT

## 2020-01-01 PROCEDURE — 83036 HEMOGLOBIN GLYCOSYLATED A1C: CPT | Performed by: STUDENT IN AN ORGANIZED HEALTH CARE EDUCATION/TRAINING PROGRAM

## 2020-01-01 PROCEDURE — 97530 THERAPEUTIC ACTIVITIES: CPT | Mod: GP

## 2020-01-01 PROCEDURE — 99441 PR PHYSICIAN TELEPHONE EVALUATION 5-10 MIN: CPT | Performed by: NURSE PRACTITIONER

## 2020-01-01 PROCEDURE — 83735 ASSAY OF MAGNESIUM: CPT | Performed by: PHYSICIAN ASSISTANT

## 2020-01-01 PROCEDURE — 84100 ASSAY OF PHOSPHORUS: CPT | Performed by: STUDENT IN AN ORGANIZED HEALTH CARE EDUCATION/TRAINING PROGRAM

## 2020-01-01 PROCEDURE — 83735 ASSAY OF MAGNESIUM: CPT | Performed by: INTERNAL MEDICINE

## 2020-01-01 PROCEDURE — 83540 ASSAY OF IRON: CPT | Performed by: EMERGENCY MEDICINE

## 2020-01-01 PROCEDURE — 85027 COMPLETE CBC AUTOMATED: CPT | Performed by: PHYSICIAN ASSISTANT

## 2020-01-01 PROCEDURE — 81003 URINALYSIS AUTO W/O SCOPE: CPT | Performed by: PHYSICIAN ASSISTANT

## 2020-01-01 PROCEDURE — 99223 1ST HOSP IP/OBS HIGH 75: CPT | Mod: AI | Performed by: HOSPITALIST

## 2020-01-01 PROCEDURE — 85018 HEMOGLOBIN: CPT | Performed by: PHYSICIAN ASSISTANT

## 2020-01-01 PROCEDURE — 99215 OFFICE O/P EST HI 40 MIN: CPT | Mod: ZP | Performed by: PHYSICIAN ASSISTANT

## 2020-01-01 PROCEDURE — 82728 ASSAY OF FERRITIN: CPT | Performed by: EMERGENCY MEDICINE

## 2020-01-01 PROCEDURE — 83036 HEMOGLOBIN GLYCOSYLATED A1C: CPT | Performed by: PATHOLOGY

## 2020-01-01 PROCEDURE — 93005 ELECTROCARDIOGRAM TRACING: CPT | Performed by: EMERGENCY MEDICINE

## 2020-01-01 PROCEDURE — C9803 HOPD COVID-19 SPEC COLLECT: HCPCS | Performed by: EMERGENCY MEDICINE

## 2020-01-01 PROCEDURE — 99214 OFFICE O/P EST MOD 30 MIN: CPT | Mod: ZP | Performed by: NURSE PRACTITIONER

## 2020-01-01 PROCEDURE — C9113 INJ PANTOPRAZOLE SODIUM, VIA: HCPCS | Performed by: STUDENT IN AN ORGANIZED HEALTH CARE EDUCATION/TRAINING PROGRAM

## 2020-01-01 PROCEDURE — 93975 VASCULAR STUDY: CPT

## 2020-01-01 PROCEDURE — 97161 PT EVAL LOW COMPLEX 20 MIN: CPT | Mod: GP | Performed by: PHYSICAL THERAPIST

## 2020-01-01 PROCEDURE — 120N000002 HC R&B MED SURG/OB UMMC

## 2020-01-01 PROCEDURE — 36415 COLL VENOUS BLD VENIPUNCTURE: CPT | Performed by: PATHOLOGY

## 2020-01-01 PROCEDURE — 93005 ELECTROCARDIOGRAM TRACING: CPT

## 2020-01-01 PROCEDURE — 99239 HOSP IP/OBS DSCHRG MGMT >30: CPT | Mod: GC | Performed by: INTERNAL MEDICINE

## 2020-01-01 PROCEDURE — 250N000011 HC RX IP 250 OP 636: Performed by: EMERGENCY MEDICINE

## 2020-01-01 PROCEDURE — 82247 BILIRUBIN TOTAL: CPT | Performed by: EMERGENCY MEDICINE

## 2020-01-01 PROCEDURE — G0378 HOSPITAL OBSERVATION PER HR: HCPCS

## 2020-01-01 PROCEDURE — 99207 PR NO CHG PAT LEFT NOT BEING SEEN: CPT | Mod: TEL | Performed by: NURSE PRACTITIONER

## 2020-01-01 PROCEDURE — 84484 ASSAY OF TROPONIN QUANT: CPT | Performed by: EMERGENCY MEDICINE

## 2020-01-01 PROCEDURE — 25000132 ZZH RX MED GY IP 250 OP 250 PS 637: Performed by: EMERGENCY MEDICINE

## 2020-01-01 PROCEDURE — 97110 THERAPEUTIC EXERCISES: CPT | Mod: GP | Performed by: REHABILITATION PRACTITIONER

## 2020-01-01 PROCEDURE — 97116 GAIT TRAINING THERAPY: CPT | Mod: GP

## 2020-01-01 PROCEDURE — 83010 ASSAY OF HAPTOGLOBIN QUANT: CPT | Performed by: PHYSICIAN ASSISTANT

## 2020-01-01 PROCEDURE — 99442 ZZC PHYSICIAN TELEPHONE EVALUATION 11-20 MIN: CPT | Mod: ZP | Performed by: INTERNAL MEDICINE

## 2020-01-01 PROCEDURE — 84132 ASSAY OF SERUM POTASSIUM: CPT | Performed by: HOSPITALIST

## 2020-01-01 PROCEDURE — 99218 PR INITIAL OBSERVATION CARE,LEVEL I: CPT | Mod: AI | Performed by: INTERNAL MEDICINE

## 2020-01-01 PROCEDURE — 84132 ASSAY OF SERUM POTASSIUM: CPT | Performed by: EMERGENCY MEDICINE

## 2020-01-01 PROCEDURE — 99207 PR CDG-CODE CATEGORY CHANGED: CPT | Performed by: STUDENT IN AN ORGANIZED HEALTH CARE EDUCATION/TRAINING PROGRAM

## 2020-01-01 PROCEDURE — 93975 VASCULAR STUDY: CPT | Mod: 26 | Performed by: RADIOLOGY

## 2020-01-01 PROCEDURE — 36415 COLL VENOUS BLD VENIPUNCTURE: CPT | Performed by: PHYSICIAN ASSISTANT

## 2020-01-01 PROCEDURE — 97535 SELF CARE MNGMENT TRAINING: CPT | Mod: GP | Performed by: REHABILITATION PRACTITIONER

## 2020-01-01 PROCEDURE — 97116 GAIT TRAINING THERAPY: CPT | Mod: GP | Performed by: PHYSICAL THERAPIST

## 2020-01-01 PROCEDURE — 93010 ELECTROCARDIOGRAM REPORT: CPT | Mod: ZP | Performed by: INTERNAL MEDICINE

## 2020-01-01 PROCEDURE — 82040 ASSAY OF SERUM ALBUMIN: CPT | Performed by: EMERGENCY MEDICINE

## 2020-01-01 PROCEDURE — 82248 BILIRUBIN DIRECT: CPT | Performed by: STUDENT IN AN ORGANIZED HEALTH CARE EDUCATION/TRAINING PROGRAM

## 2020-01-01 PROCEDURE — 99291 CRITICAL CARE FIRST HOUR: CPT | Mod: 25 | Performed by: EMERGENCY MEDICINE

## 2020-01-01 PROCEDURE — 99223 1ST HOSP IP/OBS HIGH 75: CPT | Performed by: PHYSICIAN ASSISTANT

## 2020-01-01 PROCEDURE — 99441 PR PHYSICIAN TELEPHONE EVALUATION 5-10 MIN: CPT | Mod: 95 | Performed by: INTERNAL MEDICINE

## 2020-01-01 PROCEDURE — 85025 COMPLETE CBC W/AUTO DIFF WBC: CPT | Performed by: PATHOLOGY

## 2020-01-01 PROCEDURE — 83550 IRON BINDING TEST: CPT | Performed by: PATHOLOGY

## 2020-01-01 PROCEDURE — 97535 SELF CARE MNGMENT TRAINING: CPT | Mod: GP | Performed by: PHYSICAL THERAPIST

## 2020-01-01 PROCEDURE — 71045 X-RAY EXAM CHEST 1 VIEW: CPT

## 2020-01-01 PROCEDURE — 250N000011 HC RX IP 250 OP 636: Performed by: PHYSICIAN ASSISTANT

## 2020-01-01 PROCEDURE — 25000128 H RX IP 250 OP 636: Performed by: HOSPITALIST

## 2020-01-01 PROCEDURE — 84132 ASSAY OF SERUM POTASSIUM: CPT | Performed by: STUDENT IN AN ORGANIZED HEALTH CARE EDUCATION/TRAINING PROGRAM

## 2020-01-01 PROCEDURE — 83540 ASSAY OF IRON: CPT | Performed by: PATHOLOGY

## 2020-01-01 PROCEDURE — G0463 HOSPITAL OUTPT CLINIC VISIT: HCPCS | Mod: 25,ZF

## 2020-01-01 PROCEDURE — 99217 PR OBSERVATION CARE DISCHARGE: CPT | Performed by: STUDENT IN AN ORGANIZED HEALTH CARE EDUCATION/TRAINING PROGRAM

## 2020-01-01 PROCEDURE — 25800025 ZZH RX 258: Performed by: EMERGENCY MEDICINE

## 2020-01-01 PROCEDURE — 82248 BILIRUBIN DIRECT: CPT | Performed by: PHYSICIAN ASSISTANT

## 2020-01-01 PROCEDURE — 97165 OT EVAL LOW COMPLEX 30 MIN: CPT | Mod: GO

## 2020-01-01 PROCEDURE — 86902 BLOOD TYPE ANTIGEN DONOR EA: CPT | Performed by: EMERGENCY MEDICINE

## 2020-01-01 PROCEDURE — 99285 EMERGENCY DEPT VISIT HI MDM: CPT | Performed by: EMERGENCY MEDICINE

## 2020-01-01 PROCEDURE — 82248 BILIRUBIN DIRECT: CPT | Performed by: EMERGENCY MEDICINE

## 2020-01-01 PROCEDURE — 99207 ZZC CDG-MDM COMPONENT: MEETS MODERATE - UP CODED: CPT | Performed by: HOSPITALIST

## 2020-01-01 PROCEDURE — 96375 TX/PRO/DX INJ NEW DRUG ADDON: CPT

## 2020-01-01 PROCEDURE — 97161 PT EVAL LOW COMPLEX 20 MIN: CPT | Mod: GP

## 2020-01-01 PROCEDURE — 82728 ASSAY OF FERRITIN: CPT | Performed by: PATHOLOGY

## 2020-01-01 PROCEDURE — 25000128 H RX IP 250 OP 636: Performed by: EMERGENCY MEDICINE

## 2020-01-01 PROCEDURE — 80053 COMPREHEN METABOLIC PANEL: CPT | Performed by: PATHOLOGY

## 2020-01-01 PROCEDURE — 85018 HEMOGLOBIN: CPT | Performed by: HOSPITALIST

## 2020-01-01 PROCEDURE — 99233 SBSQ HOSP IP/OBS HIGH 50: CPT | Mod: GC | Performed by: HOSPITALIST

## 2020-01-01 PROCEDURE — 80048 BASIC METABOLIC PNL TOTAL CA: CPT | Performed by: EMERGENCY MEDICINE

## 2020-01-01 PROCEDURE — 93005 ELECTROCARDIOGRAM TRACING: CPT | Mod: ZF

## 2020-01-01 PROCEDURE — 99605 MTMS BY PHARM NP 15 MIN: CPT | Performed by: PHARMACIST

## 2020-01-01 PROCEDURE — 258N000003 HC RX IP 258 OP 636: Performed by: EMERGENCY MEDICINE

## 2020-01-01 PROCEDURE — 80053 COMPREHEN METABOLIC PANEL: CPT | Performed by: EMERGENCY MEDICINE

## 2020-01-01 PROCEDURE — 85610 PROTHROMBIN TIME: CPT | Performed by: EMERGENCY MEDICINE

## 2020-01-01 PROCEDURE — 250N000011 HC RX IP 250 OP 636: Performed by: STUDENT IN AN ORGANIZED HEALTH CARE EDUCATION/TRAINING PROGRAM

## 2020-01-01 PROCEDURE — 85027 COMPLETE CBC AUTOMATED: CPT | Performed by: STUDENT IN AN ORGANIZED HEALTH CARE EDUCATION/TRAINING PROGRAM

## 2020-01-01 RX ORDER — TORSEMIDE 20 MG/1
80 TABLET ORAL
Qty: 360 TABLET | Refills: 4 | Status: SHIPPED | OUTPATIENT
Start: 2020-01-01

## 2020-01-01 RX ORDER — NICOTINE POLACRILEX 4 MG
15-30 LOZENGE BUCCAL
Status: DISCONTINUED | OUTPATIENT
Start: 2020-01-01 | End: 2020-01-01 | Stop reason: HOSPADM

## 2020-01-01 RX ORDER — FUROSEMIDE 10 MG/ML
40 INJECTION INTRAMUSCULAR; INTRAVENOUS ONCE
Status: DISCONTINUED | OUTPATIENT
Start: 2020-01-01 | End: 2020-01-01

## 2020-01-01 RX ORDER — METOLAZONE 2.5 MG/1
2.5 TABLET ORAL
Qty: 39 TABLET | Refills: 3 | Status: SHIPPED | OUTPATIENT
Start: 2020-01-01 | End: 2020-01-01

## 2020-01-01 RX ORDER — NICOTINE POLACRILEX 4 MG
15-30 LOZENGE BUCCAL
Status: DISCONTINUED | OUTPATIENT
Start: 2020-01-01 | End: 2020-01-01

## 2020-01-01 RX ORDER — POTASSIUM CHLORIDE 1500 MG/1
20 TABLET, EXTENDED RELEASE ORAL DAILY
Qty: 90 TABLET | Refills: 3 | Status: SHIPPED | OUTPATIENT
Start: 2020-01-01 | End: 2020-01-01

## 2020-01-01 RX ORDER — DEXTROSE MONOHYDRATE 25 G/50ML
25-50 INJECTION, SOLUTION INTRAVENOUS
Status: DISCONTINUED | OUTPATIENT
Start: 2020-01-01 | End: 2020-01-01 | Stop reason: HOSPADM

## 2020-01-01 RX ORDER — ATORVASTATIN CALCIUM 40 MG/1
40 TABLET, FILM COATED ORAL DAILY
Qty: 90 TABLET | Refills: 3 | Status: SHIPPED | OUTPATIENT
Start: 2020-01-01 | End: 2020-01-01 | Stop reason: SINTOL

## 2020-01-01 RX ORDER — POTASSIUM CHLORIDE 750 MG/1
40 TABLET, EXTENDED RELEASE ORAL ONCE
Status: COMPLETED | OUTPATIENT
Start: 2020-01-01 | End: 2020-01-01

## 2020-01-01 RX ORDER — AMOXICILLIN 250 MG
1 CAPSULE ORAL 2 TIMES DAILY PRN
Status: DISCONTINUED | OUTPATIENT
Start: 2020-01-01 | End: 2020-01-01 | Stop reason: HOSPADM

## 2020-01-01 RX ORDER — LIDOCAINE 40 MG/G
CREAM TOPICAL
Status: DISCONTINUED | OUTPATIENT
Start: 2020-01-01 | End: 2020-01-01 | Stop reason: HOSPADM

## 2020-01-01 RX ORDER — VITAMIN B COMPLEX
25 TABLET ORAL 2 TIMES DAILY
Status: DISCONTINUED | OUTPATIENT
Start: 2020-01-01 | End: 2020-01-01 | Stop reason: HOSPADM

## 2020-01-01 RX ORDER — CALCIUM CARBONATE 500 MG/1
500 TABLET, CHEWABLE ORAL 3 TIMES DAILY PRN
Status: DISCONTINUED | OUTPATIENT
Start: 2020-01-01 | End: 2020-01-01 | Stop reason: HOSPADM

## 2020-01-01 RX ORDER — FUROSEMIDE 10 MG/ML
40 INJECTION INTRAMUSCULAR; INTRAVENOUS ONCE
Status: COMPLETED | OUTPATIENT
Start: 2020-01-01 | End: 2020-01-01

## 2020-01-01 RX ORDER — POTASSIUM CHLORIDE 1500 MG/1
TABLET, EXTENDED RELEASE ORAL
Qty: 360 TABLET | Refills: 3 | Status: ON HOLD | OUTPATIENT
Start: 2020-01-01 | End: 2020-01-01

## 2020-01-01 RX ORDER — NALOXONE HYDROCHLORIDE 0.4 MG/ML
.1-.4 INJECTION, SOLUTION INTRAMUSCULAR; INTRAVENOUS; SUBCUTANEOUS
Status: DISCONTINUED | OUTPATIENT
Start: 2020-01-01 | End: 2020-01-01 | Stop reason: HOSPADM

## 2020-01-01 RX ORDER — POTASSIUM CHLORIDE 1500 MG/1
40 TABLET, EXTENDED RELEASE ORAL 2 TIMES DAILY
Qty: 360 TABLET | Refills: 3 | Status: SHIPPED | OUTPATIENT
Start: 2020-01-01 | End: 2020-01-01

## 2020-01-01 RX ORDER — TORSEMIDE 20 MG/1
140 TABLET ORAL
Qty: 360 TABLET | Refills: 4 | Status: SHIPPED | OUTPATIENT
Start: 2020-01-01 | End: 2020-01-01

## 2020-01-01 RX ORDER — VITAMIN B COMPLEX
50 TABLET ORAL 2 TIMES DAILY
Status: DISCONTINUED | OUTPATIENT
Start: 2020-01-01 | End: 2020-01-01

## 2020-01-01 RX ORDER — TORSEMIDE 20 MG/1
60 TABLET ORAL
Qty: 360 TABLET | Refills: 4 | Status: SHIPPED | OUTPATIENT
Start: 2020-01-01 | End: 2020-01-01

## 2020-01-01 RX ORDER — BUMETANIDE 0.25 MG/ML
1 INJECTION INTRAMUSCULAR; INTRAVENOUS ONCE
Status: DISCONTINUED | OUTPATIENT
Start: 2020-01-01 | End: 2020-01-01

## 2020-01-01 RX ORDER — METOLAZONE 2.5 MG/1
5 TABLET ORAL ONCE
Status: COMPLETED | OUTPATIENT
Start: 2020-01-01 | End: 2020-01-01

## 2020-01-01 RX ORDER — BUMETANIDE 0.25 MG/ML
5 INJECTION INTRAMUSCULAR; INTRAVENOUS ONCE
Status: COMPLETED | OUTPATIENT
Start: 2020-01-01 | End: 2020-01-01

## 2020-01-01 RX ORDER — TORSEMIDE 20 MG/1
80 TABLET ORAL
Status: DISCONTINUED | OUTPATIENT
Start: 2020-01-01 | End: 2020-01-01 | Stop reason: HOSPADM

## 2020-01-01 RX ORDER — FUROSEMIDE 10 MG/ML
60 INJECTION INTRAMUSCULAR; INTRAVENOUS ONCE
Status: COMPLETED | OUTPATIENT
Start: 2020-01-01 | End: 2020-01-01

## 2020-01-01 RX ORDER — METOLAZONE 2.5 MG/1
2.5 TABLET ORAL DAILY
Status: DISCONTINUED | OUTPATIENT
Start: 2020-01-01 | End: 2020-01-01 | Stop reason: HOSPADM

## 2020-01-01 RX ORDER — TORSEMIDE 20 MG/1
80 TABLET ORAL
Qty: 240 TABLET | Refills: 4 | Status: SHIPPED | OUTPATIENT
Start: 2020-01-01 | End: 2020-01-01

## 2020-01-01 RX ORDER — METHYLPREDNISOLONE SODIUM SUCCINATE 125 MG/2ML
125 INJECTION, POWDER, LYOPHILIZED, FOR SOLUTION INTRAMUSCULAR; INTRAVENOUS
Status: DISCONTINUED | OUTPATIENT
Start: 2020-01-01 | End: 2020-01-01 | Stop reason: HOSPADM

## 2020-01-01 RX ORDER — TORSEMIDE 20 MG/1
60 TABLET ORAL
Qty: 180 TABLET | Refills: 4 | Status: SHIPPED | OUTPATIENT
Start: 2020-01-01 | End: 2020-01-01

## 2020-01-01 RX ORDER — ASPIRIN 81 MG/1
81 TABLET, CHEWABLE ORAL DAILY
Status: DISCONTINUED | OUTPATIENT
Start: 2020-01-01 | End: 2020-01-01

## 2020-01-01 RX ORDER — POTASSIUM CHLORIDE 1500 MG/1
20 TABLET, EXTENDED RELEASE ORAL 2 TIMES DAILY
Qty: 60 TABLET | Refills: 3 | Status: SHIPPED | OUTPATIENT
Start: 2020-01-01 | End: 2020-01-01

## 2020-01-01 RX ORDER — POTASSIUM CHLORIDE 750 MG/1
40 TABLET, EXTENDED RELEASE ORAL 2 TIMES DAILY
Status: DISCONTINUED | OUTPATIENT
Start: 2020-01-01 | End: 2020-01-01 | Stop reason: HOSPADM

## 2020-01-01 RX ORDER — FERROUS SULFATE 325(65) MG
325 TABLET ORAL
Qty: 24 TABLET | Refills: 0 | Status: ON HOLD | OUTPATIENT
Start: 2020-01-01 | End: 2021-01-01

## 2020-01-01 RX ORDER — PANTOPRAZOLE SODIUM 40 MG/1
40 TABLET, DELAYED RELEASE ORAL 2 TIMES DAILY
Qty: 60 TABLET | Refills: 0 | Status: SHIPPED | OUTPATIENT
Start: 2020-01-01 | End: 2021-01-01

## 2020-01-01 RX ORDER — POTASSIUM CHLORIDE 1500 MG/1
40 TABLET, EXTENDED RELEASE ORAL 2 TIMES DAILY
Qty: 360 TABLET | Refills: 3 | Status: SHIPPED | OUTPATIENT
Start: 2020-01-01

## 2020-01-01 RX ORDER — AMOXICILLIN 250 MG
2 CAPSULE ORAL 2 TIMES DAILY
Status: DISCONTINUED | OUTPATIENT
Start: 2020-01-01 | End: 2020-01-01 | Stop reason: HOSPADM

## 2020-01-01 RX ORDER — POLYETHYLENE GLYCOL 3350 17 G/17G
17 POWDER, FOR SOLUTION ORAL DAILY
Status: DISCONTINUED | OUTPATIENT
Start: 2020-01-01 | End: 2020-01-01 | Stop reason: HOSPADM

## 2020-01-01 RX ORDER — POTASSIUM CHLORIDE 750 MG/1
10 TABLET, EXTENDED RELEASE ORAL ONCE
Status: DISCONTINUED | OUTPATIENT
Start: 2020-01-01 | End: 2020-01-01

## 2020-01-01 RX ORDER — POTASSIUM CHLORIDE 1500 MG/1
60 TABLET, EXTENDED RELEASE ORAL ONCE
Status: COMPLETED | OUTPATIENT
Start: 2020-01-01 | End: 2020-01-01

## 2020-01-01 RX ORDER — TORSEMIDE 20 MG/1
100 TABLET ORAL
Qty: 300 TABLET | Refills: 4 | Status: SHIPPED | OUTPATIENT
Start: 2020-01-01 | End: 2020-01-01

## 2020-01-01 RX ORDER — TORSEMIDE 20 MG/1
60 TABLET ORAL
Status: DISCONTINUED | OUTPATIENT
Start: 2020-01-01 | End: 2020-01-01 | Stop reason: HOSPADM

## 2020-01-01 RX ORDER — ASPIRIN 81 MG/1
81 TABLET, CHEWABLE ORAL DAILY
Status: DISCONTINUED | OUTPATIENT
Start: 2020-01-01 | End: 2020-01-01 | Stop reason: HOSPADM

## 2020-01-01 RX ORDER — ONDANSETRON 2 MG/ML
4 INJECTION INTRAMUSCULAR; INTRAVENOUS EVERY 6 HOURS PRN
Status: DISCONTINUED | OUTPATIENT
Start: 2020-01-01 | End: 2020-01-01 | Stop reason: HOSPADM

## 2020-01-01 RX ORDER — ONDANSETRON 4 MG/1
4 TABLET, ORALLY DISINTEGRATING ORAL EVERY 6 HOURS PRN
Status: DISCONTINUED | OUTPATIENT
Start: 2020-01-01 | End: 2020-01-01 | Stop reason: HOSPADM

## 2020-01-01 RX ORDER — FUROSEMIDE 40 MG
80 TABLET ORAL DAILY
Status: DISCONTINUED | OUTPATIENT
Start: 2020-01-01 | End: 2020-01-01

## 2020-01-01 RX ORDER — FERROUS SULFATE 325(65) MG
325 TABLET ORAL
Qty: 14 TABLET | Refills: 0 | Status: SHIPPED | OUTPATIENT
Start: 2020-01-01 | End: 2020-01-01

## 2020-01-01 RX ORDER — POTASSIUM CHLORIDE 1500 MG/1
40 TABLET, EXTENDED RELEASE ORAL 2 TIMES DAILY
Qty: 56 TABLET | Refills: 0 | Status: SHIPPED | OUTPATIENT
Start: 2020-01-01 | End: 2020-01-01

## 2020-01-01 RX ORDER — POTASSIUM CHLORIDE 1500 MG/1
20 TABLET, EXTENDED RELEASE ORAL 2 TIMES DAILY
Qty: 360 TABLET | Refills: 3 | Status: SHIPPED | OUTPATIENT
Start: 2020-01-01 | End: 2020-01-01

## 2020-01-01 RX ORDER — TORSEMIDE 20 MG/1
80 TABLET ORAL
Qty: 112 TABLET | Refills: 0 | Status: SHIPPED | OUTPATIENT
Start: 2020-01-01 | End: 2020-01-01

## 2020-01-01 RX ORDER — FAMOTIDINE 20 MG
1 TABLET ORAL 2 TIMES DAILY
Status: DISCONTINUED | OUTPATIENT
Start: 2020-01-01 | End: 2020-01-01

## 2020-01-01 RX ORDER — DEXTROSE MONOHYDRATE 100 MG/ML
INJECTION, SOLUTION INTRAVENOUS CONTINUOUS
Status: DISCONTINUED | OUTPATIENT
Start: 2020-01-01 | End: 2020-01-01

## 2020-01-01 RX ORDER — METOLAZONE 2.5 MG/1
2.5 TABLET ORAL DAILY
Status: DISCONTINUED | OUTPATIENT
Start: 2020-01-01 | End: 2020-01-01

## 2020-01-01 RX ORDER — TORSEMIDE 20 MG/1
120 TABLET ORAL
Qty: 360 TABLET | Refills: 4 | Status: ON HOLD | OUTPATIENT
Start: 2020-01-01 | End: 2020-01-01

## 2020-01-01 RX ORDER — DIPHENHYDRAMINE HYDROCHLORIDE 50 MG/ML
50 INJECTION INTRAMUSCULAR; INTRAVENOUS
Status: DISCONTINUED | OUTPATIENT
Start: 2020-01-01 | End: 2020-01-01 | Stop reason: HOSPADM

## 2020-01-01 RX ORDER — DEXTROSE MONOHYDRATE 25 G/50ML
25 INJECTION, SOLUTION INTRAVENOUS ONCE
Status: COMPLETED | OUTPATIENT
Start: 2020-01-01 | End: 2020-01-01

## 2020-01-01 RX ORDER — DEXTROSE MONOHYDRATE 25 G/50ML
25-50 INJECTION, SOLUTION INTRAVENOUS
Status: DISCONTINUED | OUTPATIENT
Start: 2020-01-01 | End: 2020-01-01

## 2020-01-01 RX ORDER — TORSEMIDE 20 MG/1
120 TABLET ORAL
Qty: 360 TABLET | Refills: 4 | Status: SHIPPED | OUTPATIENT
Start: 2020-01-01 | End: 2020-01-01

## 2020-01-01 RX ORDER — AMOXICILLIN 250 MG
1 CAPSULE ORAL 2 TIMES DAILY
Status: DISCONTINUED | OUTPATIENT
Start: 2020-01-01 | End: 2020-01-01 | Stop reason: HOSPADM

## 2020-01-01 RX ORDER — METOLAZONE 2.5 MG/1
2.5 TABLET ORAL
Qty: 39 TABLET | Refills: 3 | Status: SHIPPED | OUTPATIENT
Start: 2020-01-01

## 2020-01-01 RX ORDER — POTASSIUM CHLORIDE 750 MG/1
20 TABLET, EXTENDED RELEASE ORAL ONCE
Status: COMPLETED | OUTPATIENT
Start: 2020-01-01 | End: 2020-01-01

## 2020-01-01 RX ADMIN — DOCUSATE SODIUM 50 MG AND SENNOSIDES 8.6 MG 2 TABLET: 8.6; 5 TABLET, FILM COATED ORAL at 07:46

## 2020-01-01 RX ADMIN — POTASSIUM CHLORIDE 40 MEQ: 750 TABLET, EXTENDED RELEASE ORAL at 07:38

## 2020-01-01 RX ADMIN — METOPROLOL SUCCINATE 25 MG: 25 TABLET, EXTENDED RELEASE ORAL at 08:01

## 2020-01-01 RX ADMIN — MELATONIN 25 MCG: at 09:14

## 2020-01-01 RX ADMIN — ASPIRIN 81 MG CHEWABLE TABLET 81 MG: 81 TABLET CHEWABLE at 08:45

## 2020-01-01 RX ADMIN — ATORVASTATIN CALCIUM 80 MG: 80 TABLET, FILM COATED ORAL at 20:20

## 2020-01-01 RX ADMIN — DEXTROSE MONOHYDRATE 25 G: 500 INJECTION PARENTERAL at 11:42

## 2020-01-01 RX ADMIN — FUROSEMIDE 60 MG: 10 INJECTION, SOLUTION INTRAVENOUS at 14:52

## 2020-01-01 RX ADMIN — ASPIRIN 81 MG CHEWABLE TABLET 81 MG: 81 TABLET CHEWABLE at 08:32

## 2020-01-01 RX ADMIN — DOCUSATE SODIUM 50 MG AND SENNOSIDES 8.6 MG 1 TABLET: 8.6; 5 TABLET, FILM COATED ORAL at 20:41

## 2020-01-01 RX ADMIN — MELATONIN 25 MCG: at 19:50

## 2020-01-01 RX ADMIN — MELATONIN 25 MCG: at 08:01

## 2020-01-01 RX ADMIN — B-COMPLEX W/ C & FOLIC ACID TAB 1 TABLET: TAB at 09:14

## 2020-01-01 RX ADMIN — PANTOPRAZOLE SODIUM 40 MG: 40 INJECTION, POWDER, FOR SOLUTION INTRAVENOUS at 20:41

## 2020-01-01 RX ADMIN — B-COMPLEX W/ C & FOLIC ACID TAB 1 TABLET: TAB at 08:24

## 2020-01-01 RX ADMIN — BUMETANIDE 5 MG: 0.25 INJECTION INTRAMUSCULAR; INTRAVENOUS at 08:44

## 2020-01-01 RX ADMIN — POTASSIUM CHLORIDE 20 MEQ: 750 TABLET, EXTENDED RELEASE ORAL at 06:31

## 2020-01-01 RX ADMIN — METOPROLOL SUCCINATE 25 MG: 25 TABLET, EXTENDED RELEASE ORAL at 08:24

## 2020-01-01 RX ADMIN — B-COMPLEX W/ C & FOLIC ACID TAB 1 TABLET: TAB at 07:46

## 2020-01-01 RX ADMIN — POTASSIUM CHLORIDE 40 MEQ: 750 TABLET, EXTENDED RELEASE ORAL at 20:00

## 2020-01-01 RX ADMIN — POTASSIUM CHLORIDE 40 MEQ: 1.5 POWDER, FOR SOLUTION ORAL at 19:53

## 2020-01-01 RX ADMIN — B-COMPLEX W/ C & FOLIC ACID TAB 1 TABLET: TAB at 07:53

## 2020-01-01 RX ADMIN — ATORVASTATIN CALCIUM 80 MG: 80 TABLET, FILM COATED ORAL at 20:00

## 2020-01-01 RX ADMIN — INSULIN ASPART 2 UNITS: 100 INJECTION, SOLUTION INTRAVENOUS; SUBCUTANEOUS at 12:39

## 2020-01-01 RX ADMIN — FUROSEMIDE 40 MG: 10 INJECTION, SOLUTION INTRAMUSCULAR; INTRAVENOUS at 09:55

## 2020-01-01 RX ADMIN — IRON SUCROSE 200 MG: 20 INJECTION, SOLUTION INTRAVENOUS at 15:32

## 2020-01-01 RX ADMIN — POLYETHYLENE GLYCOL 3350 17 G: 17 POWDER, FOR SOLUTION ORAL at 20:41

## 2020-01-01 RX ADMIN — PANTOPRAZOLE SODIUM 80 MG: 40 INJECTION, POWDER, FOR SOLUTION INTRAVENOUS at 15:19

## 2020-01-01 RX ADMIN — SODIUM BICARBONATE 50 MEQ: 84 INJECTION, SOLUTION INTRAVENOUS at 11:50

## 2020-01-01 RX ADMIN — PANTOPRAZOLE SODIUM 40 MG: 40 INJECTION, POWDER, FOR SOLUTION INTRAVENOUS at 07:45

## 2020-01-01 RX ADMIN — B-COMPLEX W/ C & FOLIC ACID TAB 1 TABLET: TAB at 07:37

## 2020-01-01 RX ADMIN — FUROSEMIDE 40 MG: 10 INJECTION, SOLUTION INTRAMUSCULAR; INTRAVENOUS at 10:42

## 2020-01-01 RX ADMIN — PANTOPRAZOLE SODIUM 40 MG: 40 INJECTION, POWDER, FOR SOLUTION INTRAVENOUS at 19:57

## 2020-01-01 RX ADMIN — MELATONIN 25 MCG: at 20:27

## 2020-01-01 RX ADMIN — METOPROLOL SUCCINATE 25 MG: 25 TABLET, EXTENDED RELEASE ORAL at 07:53

## 2020-01-01 RX ADMIN — IRON SUCROSE 200 MG: 20 INJECTION, SOLUTION INTRAVENOUS at 16:05

## 2020-01-01 RX ADMIN — VITAMIN D, TAB 1000IU (100/BT) 25 MCG: 25 TAB at 07:46

## 2020-01-01 RX ADMIN — METOLAZONE 2.5 MG: 2.5 TABLET ORAL at 09:05

## 2020-01-01 RX ADMIN — MELATONIN 25 MCG: at 20:00

## 2020-01-01 RX ADMIN — MELATONIN 25 MCG: at 07:37

## 2020-01-01 RX ADMIN — MELATONIN 25 MCG: at 21:38

## 2020-01-01 RX ADMIN — INSULIN ASPART 2 UNITS: 100 INJECTION, SOLUTION INTRAVENOUS; SUBCUTANEOUS at 19:50

## 2020-01-01 RX ADMIN — POTASSIUM CHLORIDE 40 MEQ: 750 TABLET, EXTENDED RELEASE ORAL at 07:46

## 2020-01-01 RX ADMIN — MELATONIN 25 MCG: at 08:45

## 2020-01-01 RX ADMIN — ASPIRIN 81 MG CHEWABLE TABLET 81 MG: 81 TABLET CHEWABLE at 08:25

## 2020-01-01 RX ADMIN — MELATONIN 25 MCG: at 08:25

## 2020-01-01 RX ADMIN — Medication 80 MG: at 16:36

## 2020-01-01 RX ADMIN — POTASSIUM CHLORIDE 40 MEQ: 1.5 POWDER, FOR SOLUTION ORAL at 08:25

## 2020-01-01 RX ADMIN — B-COMPLEX W/ C & FOLIC ACID TAB 1 TABLET: TAB at 09:05

## 2020-01-01 RX ADMIN — MELATONIN 25 MCG: at 08:32

## 2020-01-01 RX ADMIN — ATORVASTATIN CALCIUM 80 MG: 80 TABLET, FILM COATED ORAL at 20:05

## 2020-01-01 RX ADMIN — FUROSEMIDE 20 MG/HR: 10 INJECTION, SOLUTION INTRAVENOUS at 12:53

## 2020-01-01 RX ADMIN — INSULIN ASPART 1 UNITS: 100 INJECTION, SOLUTION INTRAVENOUS; SUBCUTANEOUS at 08:21

## 2020-01-01 RX ADMIN — INSULIN ASPART 2 UNITS: 100 INJECTION, SOLUTION INTRAVENOUS; SUBCUTANEOUS at 12:02

## 2020-01-01 RX ADMIN — B-COMPLEX W/ C & FOLIC ACID TAB 1 TABLET: TAB at 08:32

## 2020-01-01 RX ADMIN — METOPROLOL SUCCINATE 25 MG: 25 TABLET, EXTENDED RELEASE ORAL at 07:37

## 2020-01-01 RX ADMIN — PANTOPRAZOLE SODIUM 40 MG: 40 INJECTION, POWDER, FOR SOLUTION INTRAVENOUS at 09:04

## 2020-01-01 RX ADMIN — POTASSIUM CHLORIDE 20 MEQ: 750 TABLET, EXTENDED RELEASE ORAL at 18:27

## 2020-01-01 RX ADMIN — TORSEMIDE 80 MG: 20 TABLET ORAL at 08:22

## 2020-01-01 RX ADMIN — POTASSIUM CHLORIDE 20 MEQ: 750 TABLET, EXTENDED RELEASE ORAL at 10:13

## 2020-01-01 RX ADMIN — PANTOPRAZOLE SODIUM 40 MG: 40 INJECTION, POWDER, FOR SOLUTION INTRAVENOUS at 19:50

## 2020-01-01 RX ADMIN — ASPIRIN 81 MG CHEWABLE TABLET 81 MG: 81 TABLET CHEWABLE at 07:37

## 2020-01-01 RX ADMIN — INSULIN ASPART 2 UNITS: 100 INJECTION, SOLUTION INTRAVENOUS; SUBCUTANEOUS at 12:10

## 2020-01-01 RX ADMIN — IRON SUCROSE 200 MG: 20 INJECTION, SOLUTION INTRAVENOUS at 17:24

## 2020-01-01 RX ADMIN — MELATONIN 25 MCG: at 07:53

## 2020-01-01 RX ADMIN — POTASSIUM CHLORIDE 20 MEQ: 750 TABLET, EXTENDED RELEASE ORAL at 18:57

## 2020-01-01 RX ADMIN — POTASSIUM CHLORIDE 40 MEQ: 750 TABLET, EXTENDED RELEASE ORAL at 07:54

## 2020-01-01 RX ADMIN — POTASSIUM CHLORIDE 40 MEQ: 750 TABLET, EXTENDED RELEASE ORAL at 20:17

## 2020-01-01 RX ADMIN — IRON SUCROSE 200 MG: 20 INJECTION, SOLUTION INTRAVENOUS at 18:25

## 2020-01-01 RX ADMIN — Medication 80 MG: at 07:46

## 2020-01-01 RX ADMIN — MELATONIN 25 MCG: at 09:05

## 2020-01-01 RX ADMIN — IRON SUCROSE 300 MG: 20 INJECTION, SOLUTION INTRAVENOUS at 16:33

## 2020-01-01 RX ADMIN — TORSEMIDE 80 MG: 20 TABLET ORAL at 17:35

## 2020-01-01 RX ADMIN — PANTOPRAZOLE SODIUM 40 MG: 40 INJECTION, POWDER, FOR SOLUTION INTRAVENOUS at 11:30

## 2020-01-01 RX ADMIN — METOLAZONE 5 MG: 2.5 TABLET ORAL at 09:19

## 2020-01-01 RX ADMIN — FUROSEMIDE 40 MG: 10 INJECTION, SOLUTION INTRAVENOUS at 09:04

## 2020-01-01 RX ADMIN — PANTOPRAZOLE SODIUM 40 MG: 40 INJECTION, POWDER, FOR SOLUTION INTRAVENOUS at 08:32

## 2020-01-01 RX ADMIN — FUROSEMIDE 20 MG/HR: 10 INJECTION, SOLUTION INTRAVENOUS at 08:00

## 2020-01-01 RX ADMIN — METOLAZONE 2.5 MG: 2.5 TABLET ORAL at 08:45

## 2020-01-01 RX ADMIN — POTASSIUM CHLORIDE 40 MEQ: 750 TABLET, EXTENDED RELEASE ORAL at 20:41

## 2020-01-01 RX ADMIN — POTASSIUM CHLORIDE 40 MEQ: 750 TABLET, EXTENDED RELEASE ORAL at 20:04

## 2020-01-01 RX ADMIN — FUROSEMIDE 20 MG/HR: 10 INJECTION, SOLUTION INTRAVENOUS at 09:20

## 2020-01-01 RX ADMIN — POTASSIUM CHLORIDE 20 MEQ: 750 TABLET, EXTENDED RELEASE ORAL at 09:56

## 2020-01-01 RX ADMIN — CALCIUM CARBONATE (ANTACID) CHEW TAB 500 MG 500 MG: 500 CHEW TAB at 00:24

## 2020-01-01 RX ADMIN — METOLAZONE 2.5 MG: 2.5 TABLET ORAL at 08:32

## 2020-01-01 RX ADMIN — B-COMPLEX W/ C & FOLIC ACID TAB 1 TABLET: TAB at 08:01

## 2020-01-01 RX ADMIN — FUROSEMIDE 20 MG/HR: 10 INJECTION, SOLUTION INTRAVENOUS at 09:00

## 2020-01-01 RX ADMIN — B-COMPLEX W/ C & FOLIC ACID TAB 1 TABLET: TAB at 08:45

## 2020-01-01 RX ADMIN — INSULIN ASPART 1 UNITS: 100 INJECTION, SOLUTION INTRAVENOUS; SUBCUTANEOUS at 17:29

## 2020-01-01 RX ADMIN — SODIUM CHLORIDE 8 MG/HR: 9 INJECTION, SOLUTION INTRAVENOUS at 15:31

## 2020-01-01 RX ADMIN — INSULIN ASPART 2 UNITS: 100 INJECTION, SOLUTION INTRAVENOUS; SUBCUTANEOUS at 12:19

## 2020-01-01 RX ADMIN — FUROSEMIDE 80 MG: 40 TABLET ORAL at 08:32

## 2020-01-01 RX ADMIN — ASPIRIN 81 MG CHEWABLE TABLET 81 MG: 81 TABLET CHEWABLE at 11:16

## 2020-01-01 RX ADMIN — FUROSEMIDE 80 MG: 10 INJECTION, SOLUTION INTRAMUSCULAR; INTRAVENOUS at 06:36

## 2020-01-01 RX ADMIN — INSULIN ASPART 1 UNITS: 100 INJECTION, SOLUTION INTRAVENOUS; SUBCUTANEOUS at 17:12

## 2020-01-01 RX ADMIN — POTASSIUM CHLORIDE 40 MEQ: 750 TABLET, EXTENDED RELEASE ORAL at 08:24

## 2020-01-01 RX ADMIN — POTASSIUM CHLORIDE 40 MEQ: 750 TABLET, EXTENDED RELEASE ORAL at 20:20

## 2020-01-01 RX ADMIN — B-COMPLEX W/ C & FOLIC ACID TAB 1 TABLET: TAB at 17:30

## 2020-01-01 RX ADMIN — MELATONIN 25 MCG: at 20:18

## 2020-01-01 RX ADMIN — POTASSIUM CHLORIDE 20 MEQ: 750 TABLET, EXTENDED RELEASE ORAL at 08:32

## 2020-01-01 RX ADMIN — MELATONIN 25 MCG: at 20:05

## 2020-01-01 RX ADMIN — FUROSEMIDE 80 MG: 10 INJECTION, SOLUTION INTRAMUSCULAR; INTRAVENOUS at 11:12

## 2020-01-01 RX ADMIN — TORSEMIDE 80 MG: 20 TABLET ORAL at 12:21

## 2020-01-01 RX ADMIN — HUMAN INSULIN 9.9 UNITS: 100 INJECTION, SOLUTION SUBCUTANEOUS at 11:44

## 2020-01-01 RX ADMIN — B-COMPLEX W/ C & FOLIC ACID TAB 1 TABLET: TAB at 22:30

## 2020-01-01 RX ADMIN — DEXTROSE MONOHYDRATE: 100 INJECTION, SOLUTION INTRAVENOUS at 11:41

## 2020-01-01 RX ADMIN — FUROSEMIDE 40 MG: 10 INJECTION, SOLUTION INTRAVENOUS at 11:16

## 2020-01-01 RX ADMIN — ASPIRIN 81 MG CHEWABLE TABLET 81 MG: 81 TABLET CHEWABLE at 08:01

## 2020-01-01 RX ADMIN — POTASSIUM CHLORIDE 40 MEQ: 750 TABLET, EXTENDED RELEASE ORAL at 09:45

## 2020-01-01 RX ADMIN — INSULIN ASPART 1 UNITS: 100 INJECTION, SOLUTION INTRAVENOUS; SUBCUTANEOUS at 12:21

## 2020-01-01 RX ADMIN — MELATONIN 25 MCG: at 20:20

## 2020-01-01 RX ADMIN — MELATONIN 25 MCG: at 19:57

## 2020-01-01 RX ADMIN — METOLAZONE 5 MG: 2.5 TABLET ORAL at 09:57

## 2020-01-01 RX ADMIN — POTASSIUM CHLORIDE 60 MEQ: 1500 TABLET, EXTENDED RELEASE ORAL at 11:16

## 2020-01-01 RX ADMIN — VITAMIN D, TAB 1000IU (100/BT) 25 MCG: 25 TAB at 20:41

## 2020-01-01 RX ADMIN — ATORVASTATIN CALCIUM 80 MG: 80 TABLET, FILM COATED ORAL at 19:53

## 2020-01-01 RX ADMIN — FUROSEMIDE 40 MG: 10 INJECTION, SOLUTION INTRAVENOUS at 19:50

## 2020-01-01 RX ADMIN — MELATONIN 25 MCG: at 19:53

## 2020-01-01 RX ADMIN — METOLAZONE 2.5 MG: 2.5 TABLET ORAL at 11:16

## 2020-01-01 RX ADMIN — ASPIRIN 81 MG CHEWABLE TABLET 81 MG: 81 TABLET CHEWABLE at 07:53

## 2020-01-01 RX ADMIN — ASPIRIN 81 MG CHEWABLE TABLET 81 MG: 81 TABLET CHEWABLE at 08:23

## 2020-01-01 RX ADMIN — MELATONIN 25 MCG: at 08:24

## 2020-01-01 ASSESSMENT — ACTIVITIES OF DAILY LIVING (ADL)
ADLS_ACUITY_SCORE: 12
ADLS_ACUITY_SCORE: 12
ADLS_ACUITY_SCORE: 13
ADLS_ACUITY_SCORE: 13
ADLS_ACUITY_SCORE: 12
ADLS_ACUITY_SCORE: 12
BATHING: 0-->INDEPENDENT
COGNITION: 0 - NO COGNITION ISSUES REPORTED
ADLS_ACUITY_SCORE: 12
ADLS_ACUITY_SCORE: 13
ADLS_ACUITY_SCORE: 12
AMBULATION: 0-->INDEPENDENT
ADLS_ACUITY_SCORE: 12
ADLS_ACUITY_SCORE: 12
ADLS_ACUITY_SCORE: 13
ADLS_ACUITY_SCORE: 12
ADLS_ACUITY_SCORE: 13
ADLS_ACUITY_SCORE: 13
RETIRED_EATING: 0-->INDEPENDENT
ADLS_ACUITY_SCORE: 13
ADLS_ACUITY_SCORE: 13
ADLS_ACUITY_SCORE: 12
TOILETING: 0-->INDEPENDENT
ADLS_ACUITY_SCORE: 13
ADLS_ACUITY_SCORE: 12
RETIRED_COMMUNICATION: 0-->UNDERSTANDS/COMMUNICATES WITHOUT DIFFICULTY
ADLS_ACUITY_SCORE: 12
ADLS_ACUITY_SCORE: 12
ADLS_ACUITY_SCORE: 13
WHICH_OF_THE_ABOVE_FUNCTIONAL_RISKS_HAD_A_RECENT_ONSET_OR_CHANGE?: NONE;AMBULATION
ADLS_ACUITY_SCORE: 12
DRESS: 0-->INDEPENDENT
ADLS_ACUITY_SCORE: 13
ADLS_ACUITY_SCORE: 12
ADLS_ACUITY_SCORE: 12
ADLS_ACUITY_SCORE: 13
ADLS_ACUITY_SCORE: 13
ADLS_ACUITY_SCORE: 12
ADLS_ACUITY_SCORE: 13
ADLS_ACUITY_SCORE: 13
FALL_HISTORY_WITHIN_LAST_SIX_MONTHS: NO
ADLS_ACUITY_SCORE: 12
ADLS_ACUITY_SCORE: 12
ADLS_ACUITY_SCORE: 13
ADLS_ACUITY_SCORE: 13
ADLS_ACUITY_SCORE: 12
SWALLOWING: 0-->SWALLOWS FOODS/LIQUIDS WITHOUT DIFFICULTY
TRANSFERRING: 0-->INDEPENDENT
ADLS_ACUITY_SCORE: 13
ADLS_ACUITY_SCORE: 13
ADLS_ACUITY_SCORE: 12
ADLS_ACUITY_SCORE: 13
ADLS_ACUITY_SCORE: 12
ADLS_ACUITY_SCORE: 13
ADLS_ACUITY_SCORE: 12
ADLS_ACUITY_SCORE: 12

## 2020-01-01 ASSESSMENT — ENCOUNTER SYMPTOMS
DIFFICULTY URINATING: 0
SHORTNESS OF BREATH: 0
ABDOMINAL PAIN: 0
ABDOMINAL PAIN: 0
CONFUSION: 0
SHORTNESS OF BREATH: 0
COLOR CHANGE: 0
FEVER: 0
NECK STIFFNESS: 0
DIFFICULTY URINATING: 0
EYE REDNESS: 0
FEVER: 0
ARTHRALGIAS: 0
HEADACHES: 0
ARTHRALGIAS: 0
LIGHT-HEADEDNESS: 0
BLOOD IN STOOL: 0
EYE REDNESS: 0
NECK STIFFNESS: 0
COLOR CHANGE: 0
HEADACHES: 0
FATIGUE: 1
CONFUSION: 0

## 2020-01-01 ASSESSMENT — MIFFLIN-ST. JEOR
SCORE: 1693.94
SCORE: 1758.81
SCORE: 1722.52
SCORE: 1739.5
SCORE: 1702.11
SCORE: 1696.21
SCORE: 1713.45
SCORE: 1754.27
SCORE: 1766.5
SCORE: 1692.58
SCORE: 1724.33
SCORE: 1651.76
SCORE: 1763.8
SCORE: 1727.05
SCORE: 1737.49
SCORE: 1719.34
SCORE: 1763.34
SCORE: 1649.04
SCORE: 1662.64

## 2020-01-01 ASSESSMENT — PAIN SCALES - GENERAL
PAINLEVEL: NO PAIN (0)

## 2020-01-03 ENCOUNTER — INFUSION THERAPY VISIT (OUTPATIENT)
Dept: INFUSION THERAPY | Facility: CLINIC | Age: 60
End: 2020-01-03
Attending: INTERNAL MEDICINE
Payer: MEDICAID

## 2020-01-03 VITALS
OXYGEN SATURATION: 96 % | HEART RATE: 85 BPM | TEMPERATURE: 97.6 F | SYSTOLIC BLOOD PRESSURE: 107 MMHG | DIASTOLIC BLOOD PRESSURE: 71 MMHG | RESPIRATION RATE: 16 BRPM

## 2020-01-03 DIAGNOSIS — D62 ANEMIA DUE TO ACUTE BLOOD LOSS: Primary | ICD-10-CM

## 2020-01-03 DIAGNOSIS — D63.1 ANEMIA IN STAGE 4 CHRONIC KIDNEY DISEASE (H): ICD-10-CM

## 2020-01-03 DIAGNOSIS — N18.4 ANEMIA IN STAGE 4 CHRONIC KIDNEY DISEASE (H): ICD-10-CM

## 2020-01-03 DIAGNOSIS — E61.1 IRON DEFICIENCY: ICD-10-CM

## 2020-01-03 PROCEDURE — 96366 THER/PROPH/DIAG IV INF ADDON: CPT

## 2020-01-03 PROCEDURE — 25000128 H RX IP 250 OP 636: Mod: ZF | Performed by: INTERNAL MEDICINE

## 2020-01-03 PROCEDURE — 25800030 ZZH RX IP 258 OP 636: Mod: ZF | Performed by: INTERNAL MEDICINE

## 2020-01-03 PROCEDURE — 96365 THER/PROPH/DIAG IV INF INIT: CPT

## 2020-01-03 RX ORDER — HEPARIN SODIUM (PORCINE) LOCK FLUSH IV SOLN 100 UNIT/ML 100 UNIT/ML
5 SOLUTION INTRAVENOUS
Status: CANCELLED | OUTPATIENT
Start: 2020-01-03

## 2020-01-03 RX ORDER — HEPARIN SODIUM,PORCINE 10 UNIT/ML
5 VIAL (ML) INTRAVENOUS
Status: CANCELLED | OUTPATIENT
Start: 2020-01-03

## 2020-01-03 RX ADMIN — IRON SUCROSE 300 MG: 20 INJECTION, SOLUTION INTRAVENOUS at 14:20

## 2020-01-03 NOTE — PROGRESS NOTES
"Nursing Note  Cole Jesus presents today to [facility] for:   Chief Complaint   Patient presents with     Infusion     Venofer     During today's [facility] appointment, orders from Dr. Chong were completed.  Frequency: once    Progress note:  Patient identification verified by name and date of birth.  Assessment completed.  Vitals recorded in Doc Flowsheets.  Patient was provided with education regarding medication/procedure and possible side effects.  Patient verbalized understanding.     present during visit today: Not Applicable.    Treatment Conditions: Non-applicable.    Premedications: were not ordered.    Drug Waste Record: No    Infusion length and rate: infusion given over approximately 90 minutes  193 ml/hr.    Labs: were not ordered for this appointment.    Vascular access: peripheral IV placed today.    Post Infusion Assessment:  Patient tolerated infusion without incident.  No evidence of extravasations.  Access discontinued per protocol.     Discharge Plan:   Follow up plan of care with: ordering provider.  Discharge instructions were reviewed with patient.  Patient/representative verbalized understanding of discharge instructions and all questions answered.  Patient discharged from [facility] in stable condition.    Dianna Kelly RN    Administrations This Visit     iron sucrose (VENOFER) 300 mg in sodium chloride 0.9 % 250 mL intermittent infusion     Admin Date  01/03/2020 Action  New Bag Dose  300 mg Rate  193.3 mL/hr Route  Intravenous Administered By  Dianna Kelly RN              Vital signs:  Temp: 97.6  F (36.4  C) Temp src: Oral BP: 115/78 Pulse: 82   Resp: 16 SpO2: 96 % O2 Device: None (Room air)        Estimated body mass index is 33.41 kg/m  as calculated from the following:    Height as of 12/30/19: 1.727 m (5' 8\").    Weight as of 12/30/19: 99.7 kg (219 lb 11.2 oz).          "

## 2020-01-07 ENCOUNTER — PATIENT OUTREACH (OUTPATIENT)
Dept: CARDIOLOGY | Facility: CLINIC | Age: 60
End: 2020-01-07

## 2020-01-07 DIAGNOSIS — I25.5 ISCHEMIC CARDIOMYOPATHY: ICD-10-CM

## 2020-01-07 DIAGNOSIS — I50.22 CHRONIC SYSTOLIC CONGESTIVE HEART FAILURE (H): Primary | ICD-10-CM

## 2020-01-11 RX ORDER — SACUBITRIL AND VALSARTAN 49; 51 MG/1; MG/1
TABLET, FILM COATED ORAL
Status: CANCELLED | OUTPATIENT
Start: 2020-01-11

## 2020-01-11 NOTE — TELEPHONE ENCOUNTER
ENTRESTO 49-51 MG per tablet   Last Written Prescription Date:  unknown  Last Fill Quantity: unknown,   # refills: unknown  Last Office Visit : 12/5/19  Future Office visit:  1/16/20      Routing refill request to provider for review/approval because: historical

## 2020-01-14 ENCOUNTER — OFFICE VISIT (OUTPATIENT)
Dept: OPHTHALMOLOGY | Facility: CLINIC | Age: 60
End: 2020-01-14
Payer: MEDICAID

## 2020-01-14 DIAGNOSIS — H52.4 PRESBYOPIA OF BOTH EYES: ICD-10-CM

## 2020-01-14 DIAGNOSIS — H25.13 NUCLEAR SCLEROSIS OF BOTH EYES: ICD-10-CM

## 2020-01-14 DIAGNOSIS — E11.9 DIABETES MELLITUS TYPE 2 WITHOUT RETINOPATHY (H): Primary | ICD-10-CM

## 2020-01-14 ASSESSMENT — CONF VISUAL FIELD
OS_NORMAL: 1
METHOD: COUNTING FINGERS
OD_NORMAL: 1

## 2020-01-14 ASSESSMENT — VISUAL ACUITY
OS_SC: 20/20
METHOD: SNELLEN - LINEAR
OD_SC+: -2
OD_SC: 20/20

## 2020-01-14 ASSESSMENT — REFRACTION_MANIFEST
OS_SPHERE: +0.50
OS_CYLINDER: +0.25
OD_AXIS: 144
OS_ADD: +1.75
OD_SPHERE: PLANO
OD_CYLINDER: +0.50
OS_AXIS: 026
OD_ADD: +1.75

## 2020-01-14 ASSESSMENT — TONOMETRY
OS_IOP_MMHG: 18
OD_IOP_MMHG: 17
IOP_METHOD: ICARE

## 2020-01-14 ASSESSMENT — SLIT LAMP EXAM - LIDS
COMMENTS: NORMAL
COMMENTS: NORMAL

## 2020-01-14 ASSESSMENT — CUP TO DISC RATIO
OS_RATIO: 0.6
OD_RATIO: 0.5

## 2020-01-14 ASSESSMENT — EXTERNAL EXAM - RIGHT EYE: OD_EXAM: NORMAL

## 2020-01-14 ASSESSMENT — EXTERNAL EXAM - LEFT EYE: OS_EXAM: NORMAL

## 2020-01-14 NOTE — NURSING NOTE
Chief Complaints and History of Present Illnesses   Patient presents with     Annual Eye Exam     Diabetic Eye Exam     Chief Complaint(s) and History of Present Illness(es)     Annual Eye Exam     Laterality: both eyes    Onset: years ago    Quality: blurred vision    Severity: mild    Frequency: constantly    Context: near vision    Course: stable    Treatments tried: no treatments    Pain scale: 0/10              Diabetic Eye Exam     Vision: is stable    Treatments tried: no treatments    Pain scale: 0/10              Comments     Blurry vision at near, readers working well. Patient denies eye pain or irritation. Does not use any eye drops.      A1C does not know, but is good  Lab Results       Component                Value               Date                       A1C                      5.7                 07/15/2019                 A1C                      13.4                04/08/2019                 A1C                      7.3                 02/22/2018                 A1C                      6.9                 08/16/2016                 A1C                      5.7                 03/26/2014            Sarah Gamez COT 8:31 AM January 14, 2020

## 2020-01-14 NOTE — PROGRESS NOTES
Alen Trujillo M.D.  Cardiovascular Medicine    I personally saw and examined this patient, discussed care with housestaff and other consultants, reviewed current laboratories and imaging studies, and conveyed impression and diagnostic/therapeutic plan to patient.    Problem List  Atrial fibrillation,   Lower GI bleeding  Hyperlipidemia   Ischemic cardiomyopathy  Chronic systolic congestive heart failure   Essential hypertension with goal blood pressure less than 130/85  Chronic renal failure  Iron deficiency anemia  Diabetes/insulin resistance  Acute inferior MI treated with     History    Mr. Cole Jesus is a pleasant 59-year-old gentleman with a past medical history of STEMI, acute occlusion of pRCA s/p balloon angioplasty and stenting in 6/2019 (XIENCE JAMILA 3.5 X 28 KEVYN), STEMI s/p DESx3 for total occlusion of RCA and Lcx (2012), GIB due to AV malformation (in the setting of warfarin and DAPT), ischemic cardiomyopathy, HFrEF (last EF=30-35%), severe pHTN, severe mitral regurgitation, atrial fibrillation not on AC due to GIB in the past, CKD stage IV, who presented for elective RHC. Found to be in low cardiac output failure (CI ~ 1.5) , elevated CWP (~43 mmHg). Started on nipride and lasix IV.        Objective     Constitutional: alert, oriented, normal gait and station, normal mentation.  Oral: moist mucous membranes  Lymph: without pathologic adenopathy  Chest: clear to ausculation and percussion save basilar rales  Cor: No evidence of left or right ventricular activity.  Rhythm is irregular.  [id number], S2 split physiologically. Murmurs t present Increased JVP with + HJR  Abdomen: without tenderness, rebound, guarding, masses, ascites  Extremities: Edema not present  Neuro: no focal defects, normal mentation  Skin: without open lesions  Psych: oriented, verbal, mental status in tact    Wt Readings from Last 24 Encounters:   01/16/20 98.9 kg (218 lb)   12/30/19 99.7 kg (219 lb 11.2 oz)   12/23/19 98.6 kg  (217 lb 4.8 oz)   12/20/19 97.3 kg (214 lb 6.4 oz)   12/18/19 101.3 kg (223 lb 6.4 oz)   12/12/19 101 kg (222 lb 11.2 oz)   12/05/19 99.8 kg (220 lb)   11/21/19 98.9 kg (218 lb)   11/17/19 95.6 kg (210 lb 11.2 oz)   11/07/19 103.9 kg (229 lb)   10/31/19 105.7 kg (233 lb)   10/07/19 107.3 kg (236 lb 9.6 oz)   09/16/19 106.1 kg (234 lb)   08/26/19 107 kg (236 lb)   07/24/19 107.7 kg (237 lb 6.4 oz)   07/22/19 108.2 kg (238 lb 8.6 oz)   07/15/19 108.2 kg (238 lb 9.6 oz)   07/06/19 104.8 kg (231 lb 0.7 oz)   06/27/19 106.3 kg (234 lb 6.4 oz)   04/23/19 109.6 kg (241 lb 11.2 oz)   04/08/19 110.6 kg (243 lb 12.8 oz)   04/08/19 110.2 kg (243 lb)   02/06/19 109 kg (240 lb 6.4 oz)   09/12/18 113.9 kg (251 lb)         Meds  Current Outpatient Medications   Medication     ACCU-CHEK GUIDE test strip     Alcohol Swabs PADS     aspirin (ASA) 81 MG chewable tablet     atorvastatin (LIPITOR) 80 MG tablet     blood glucose monitoring (ACCU-CHEK FASTCLIX) lancets     cholecalciferol (VITAMIN  -D) 1000 UNITS capsule     clopidogrel (PLAVIX) 75 MG tablet     ENTRESTO 49-51 MG per tablet     furosemide (LASIX) 20 MG tablet     furosemide (LASIX) 80 MG tablet     insulin pen needle (32G X 4 MM) 32G X 4 MM miscellaneous     pantoprazole (PROTONIX) 40 MG EC tablet     Sharps Container MISC     vitamin  B complex with vitamin C (VITAMIN  B COMPLEX) TABS     nitroGLYcerin (NITROSTAT) 0.4 MG sublingual tablet     No current facility-administered medications for this visit.      Off of insulin    He is taking Entresto 49/51    Results for MERCY SHAFER (MRN [id number]) as of 1/16/2020 11:44   Ref. Range 6/1/2018 08:11 9/12/2018 08:06 10/31/2018 08:20 2/6/2019 11:28 4/8/2019 18:31 4/8/2019 22:07 4/9/2019 06:35 4/10/2019 06:44 6/27/2019 12:26 6/28/2019 08:40 7/1/2019 08:22 7/3/2019 16:49 7/5/2019 04:30 7/6/2019 05:32 7/22/2019 09:27 7/22/2019 16:30 7/23/2019 04:31 7/24/2019 06:11 8/26/2019 19:19 9/9/2019 10:53 9/16/2019 18:54 10/7/2019 09:25  10/31/2019 07:32 11/7/2019 11:03 11/12/2019 08:30 11/13/2019 05:45 11/13/2019 16:17 11/13/2019 20:15 11/14/2019 04:05 11/14/2019 15:53 11/15/2019 03:34 11/15/2019 06:01 11/15/2019 15:32 11/16/2019 05:14 11/16/2019 16:07 11/17/2019 04:53 11/21/2019 10:14 12/5/2019 11:30 12/12/2019 10:57 12/18/2019 16:07 12/19/2019 05:45 12/20/2019 05:04 12/21/2019 08:44 12/23/2019 11:50 1/16/2020 10:33   GFR Estimate Latest Ref Range: >60 mL/min/1.73_m2 24 (L) 24 (L) 25 (L) 27 (L) 31 (L) 31 (L) 32 (L) 30 (L) 26 (L) 25 (L) 27 (L) 28 (L) 29 (L) 31 (L) 21 (L) 22 (L) 22 (L) 24 (L) 24 (L) 21 (L) 22 (L) 25 (L) 25 (L) 26 (L) 27 (L) 28 (L) 28 (L) 28 (L) 28 (L) 28 (L) 26 (L) 26 (L) 25 (L) 25 (L) 24 (L) 25 (L) 24 (L) 20 (L) 19 (L) 20 (L) 22 (L) 23 (L) 23 (L) 24 (L) 24 (L)     Labs    Results for MERCY SHAFER (MRN [id number]) as of 1/16/2020 11:44   Ref. Range 1/16/2020 10:33   Sodium Latest Ref Range: 133 - 144 mmol/L 140   Potassium Latest Ref Range: 3.4 - 5.3 mmol/L 3.8   Chloride Latest Ref Range: 94 - 109 mmol/L 107   Carbon Dioxide Latest Ref Range: 20 - 32 mmol/L 28   Urea Nitrogen Latest Ref Range: 7 - 30 mg/dL 48 (H)   Creatinine Latest Ref Range: 0.66 - 1.25 mg/dL 2.72 (H)   GFR Estimate Latest Ref Range: >60 mL/min/1.73_m2 24 (L)   GFR Estimate If Black Latest Ref Range: >60 mL/min/1.73_m2 28 (L)   Calcium Latest Ref Range: 8.5 - 10.1 mg/dL 8.6   Anion Gap Latest Ref Range: 3 - 14 mmol/L 6   Albumin Latest Ref Range: 3.4 - 5.0 g/dL 3.9   Protein Total Latest Ref Range: 6.8 - 8.8 g/dL 8.1   Bilirubin Total Latest Ref Range: 0.2 - 1.3 mg/dL 1.0   Alkaline Phosphatase Latest Ref Range: 40 - 150 U/L 114   ALT Latest Ref Range: 0 - 70 U/L 16   AST Latest Ref Range: 0 - 45 U/L 8   N-Terminal Pro Bnp Latest Ref Range: 0 - 125 pg/mL 5,746 (H)   Glucose Latest Ref Range: 70 - 99 mg/dL 125 (H)   WBC Latest Ref Range: 4.0 - 11.0 10e9/L 7.8   Hemoglobin Latest Ref Range: 13.3 - 17.7 g/dL 10.3 (L)   Hematocrit Latest Ref Range: 40.0 - 53.0 %  35.1 (L)   Platelet Count Latest Ref Range: 150 - 450 10e9/L 268   RBC Count Latest Ref Range: 4.4 - 5.9 10e12/L 3.75 (L)   MCV Latest Ref Range: 78 - 100 fl 94   MCH Latest Ref Range: 26.5 - 33.0 pg 27.5   MCHC Latest Ref Range: 31.5 - 36.5 g/dL 29.3 (L)   RDW Latest Ref Range: 10.0 - 15.0 % 18.6 (H)   INR Latest Ref Range: 0.86 - 1.14  1.25 (H)     INR Latest Ref Range: 0.86 - 1.14  1.23 (H)       Imaging     MRN: [id number]  : 1960  Study Date: 2019 01:21 PM  Age: 59 yrs  Gender: Male  Patient Location: [facility]  Reason For Study: Heart Failure - Left  Ordering Physician: TESS BERG  Referring Physician: ASHLEY KNAPP  Performed By: Christina Peck RDCS     BSA: 2.1 m2  Height: 68 in  Weight: 223 lb  HR: 79  BP: 100/75 mmHg  _____________________________________________________________________________  __        Procedure  Limited Portable Echo Adult. Contrast Optison. Technically difficult study.  Poor acoustic windows. Optison (NDC #6133-6234-18) given intravenously.  Patient was given 5 ml mixture of 3 ml Optison and 6 ml saline. 4 ml wasted.  _____________________________________________________________________________  __        Interpretation Summary  Limited, technically difficult study.  Moderately (EF 30-35%) reduced left ventricular function is present.  The right ventricle cannot be assessed.  Small posterior pericardial effusion without echocardiographic evidence of  tamponade.  This study was compared with the study from 19: There has been no  change.  _____________________________________________________________________________  __        Left Ventricle  Moderately (EF 30-35%) reduced left ventricular function is present. Moderate  diffuse hypokinesis is present.     Right Ventricle  The right ventricle cannot be assessed.     Mitral Valve  Moderate mitral insufficiency is present.     Tricuspid Valve  The right ventricular systolic pressure is approximated at 64.3  mmHg plus the  right atrial pressure.        Pulmonic Valve  The pulmonic valve is normal.     Vessels  IVC diameter >2.1 cm collapsing <50% with sniff suggests a high RA pressure  estimated at 15 mmHg or greater.     Pericardium  Prominent epicardial fat is noted. Small posterior pericardial effusion  without echocardiographic evidence of tamponade.     Compared to Previous Study  This study was compared with the study from 19 . There has been no  change.     _____________________________________________________________________________  __  MMode/2D Measurements & Calculations  TAPSE: 1.1 cm        Doppler Measurements & Calculations  TR max shavonne: 400.6 cm/sec  TR max P.3 mmHg        _____________________________________________________________________________  __        Name: MERCY SHAFER  MRN: [id number]  : 1960  Study Date: 2019 01:07 PM  Age: 59 yrs  Gender: Male  [place] [facility]  Reason For Study: Cardiomyopathy  Ordering Physician: JEFF KAN  Referring Physician: ASHLEY KNAPP  Performed By: Christina Peck RDCS     BSA: 2.1 m2  Height: 68 in  Weight: 214 lb  HR: 86  BP: 85/59 mmHg  _____________________________________________________________________________  __        Procedure  Complete Portable Echo Adult. Contrast Optison. Optison (NDC #5698-8723-73)  given intravenously. Patient was given 5 ml mixture of 3 ml Optison and 6 ml  saline. 4 ml wasted.  _____________________________________________________________________________  __        Interpretation Summary  Moderately (EF 30-35%) reduced left ventricular function is present. Severe  left ventricular dilation is present based on LVEDV of 109 mL/m^2 and LVIDd  6.7 cm.  Global right ventricular function is moderately reduced. Moderate right  ventricular dilation is present.  Moderate mitral regurgitation.  Estimated pulmonary artery pressure of 42 mmHg.  Small posterior pericardial effusion without any  significant echocardiographic  evidence of tamponade.  Dilation of the inferior vena cava is present with normal respiratory  variation in diameter.     This study was compared with the study from 11/7/19 . Compared to previous  study, estimated PA pressure had decreased. IVC size remains same but  collapsing now.  _____________________________________________________________________________  __        Left Ventricle  Left ventricular wall thickness is normal. Severe left ventricular dilation is  present. Indexed LVEDV is 109 mL/m^2. LVIDd 6.7 cm. Moderately (EF 30-35%)  reduced left ventricular function is present. Diastolic function not assessed  due to atrial fibrillation. LVEF 30% by Biplane (Cruz's method). Moderate  diffuse hypokinesis is present.     Right Ventricle  Right ventricular wall thickness is normal. Moderate right ventricular  dilation is present. Global right ventricular function is moderately reduced.  A right heart catheter is noted in the right ventricle.     Atria  Moderate biatrial enlargement is present. Cannot assess inter-atrial septum. A  catheter is noted in the right atrium.     Mitral Valve  Moderate mitral insufficiency is present. EROA 0.3 cm^2. Regurgitant volume  34  mL.        Aortic Valve  The valve leaflets are not well visualized. On Doppler interrogation, there is  no significant stenosis or regurgitation.     Tricuspid Valve  The tricuspid valve is normal. Trace tricuspid insufficiency is present. Right  ventricular systolic pressure is 34mmHg above the right atrial pressure.     Pulmonic Valve  The pulmonic valve cannot be assessed.     Vessels  The thoracic aorta cannot be assessed. The aorta root cannot be assessed.  Dilation of the inferior vena cava is present with normal respiratory  variation in diameter. IVC diameter and respiratory changes fall into an  intermediate range suggesting an RA pressure of 8 mmHg.     Pericardium  Small posterior pericardial effusion  without any significant echocardiographic  evidence of tamponade.        Compared to Previous Study  This study was compared with the study from 19 . Compared to previous  study, estimated PA pressure had decreased.  _____________________________________________________________________________  __  MMode/2D Measurements & Calculations  IVSd: 0.81 cm     LVIDd: 6.7 cm  LVPWd: 0.83 cm  LV mass(C)d: 231.5 grams  LV mass(C)dI: 110.1 grams/m2  Ao root diam: 1.1 cm     EF(MOD-bp): 30.1 %  RWT: 0.25  TAPSE: 0.72 cm        Doppler Measurements & Calculations  MR ERO: 0.25 cm2  MR volume: 32.5 ml  TV max P.6 mmHg  TR max shavonne: 313.9 cm/sec  TR max P.6 mmHg    Heart Catherization    Hemodynamics     Right Heart Pressures     RA    A wave: 26 mmHg  V-wave: 21 mmHg  Mean: 19 mmHg  HR: 86 BPM    RV:     Systolic: 100mmHg  EDP: 19 mmHg  HR: 45 BPM    PA    Systolic: 98 mmHg  Diastolic: 50 mmHg  Mean: 68 mmHg  HR: 75 BPM     PCW:   A wave: 38 mmHg  V wave: 43 mmHg  Mean: 42 mmHg  HR: 75 BPM    CO(Cecilia): 3.45 L/min  CI(Cecilia): 1.61 L/min/m2    CO(TD) 3.2 L/min  CI(TD): 1.49 L/min/m2  Right sided filling pressures are severely elevated.Left sided filling pressures are severely elevated. Severely elevated pulmonary artery hypertension.Reduced cardiac output level.Hemodynamic data has been modified in Epic per physician review.         Catherization/angiogram 2019    Referring Provider:RASHID GREGORY    Cath Lab Report -- Comprehensive Report  Cath Lab Report -- Comprehensive Report    [facility]  [address] [place] [address]  [phone]    Cath Lab Report -- Comprehensive Report  Patient: MERCY SHAFER  MR #: [id number]  Study Date: 15-Bobby-2019  Acct #: [id number]  : 1960  Gender: Male  Height: 68.1 in  Weight: 239.6 lb  BSA: 2.21 m squared  Room:  Test time: 12:23 - 13:04  Fluoro time: 8.8 min    Cathing Cardiologist:  RASHID GREGORY  MD  Circulator:  VICK TURNER RN  Scrub:  JOSE DAVIDKATIEFABIEN  Monitor:  NOT APPLICABLE  Monitor:  FABIAN ACOSTA  Referring Physician:  DELIA BENNETT MD    PROCEDURES PERFORMED:    --  Left heart catheterization.  --  Fluoro 0-60 Minutes.  --  Medication/Infusion/Drip.  --  Selective Lt Coronary Angiography.  --  Selective Rt Coronary Angiography.  --  TR Band.  --  Interventional IVUS.  --  PTCA.  --  Drug Eluting Stent Placement.  --  Intervention on proximal RCA: balloon angioplasty.    PROCEDURE COMPLETION: An IABP was not used. No mechanical ventricular support  was required. TIMING: Test started at 12:23. Test concluded at 13:04. RADIATION  EXPOSURE: Fluoroscopy time: 8.8 min.    SUMMARY:    --  1ST LESION INTERVENTIONS:  --  A balloon angioplasty was performed on the lesion in the proximal RCA.  --  A XIENCE JAMILA 3.5 X 28 drug-eluting stent was placed across the lesion and  deployed at a maximum inflation pressure of 12 nick.    CORONARY VESSELS: Proximal RCA:    PROCEDURE: The risks and alternatives of the procedures and conscious sedation  were explained to the patient and informed consent was obtained. The patient  was brought to the cath lab and placed on the table. The planned puncture sites  were prepped and draped in the usual sterile fashion. Cardiac catheterization  performed emergently.    --  ACT measurement.    --  Right radial artery access. The vessel was accessed, a wire was threaded  into the vessel, and a was advanced over the wire into the vessel.    --  Left heart catheterization. A catheter was placed in the left ventricle.    --  Fluoro 0-60 Minutes.    --  Medication/Infusion/Drip.    --  Selective Lt Coronary Angiography.    --  Selective Rt Coronary Angiography.    --  TR Band.    LESION INTERVENTION: A balloon angioplasty was performed on the lesion in the  proximal RCA. There was no dissection.    --  Balloon angioplasty was performed, using a NC TREK 2 X 12 balloon,  with 1  inflations and a maximum inflation pressure of 12 nick.    --  A XIENCE JAMILA 3.5 X 28 drug-eluting stent was placed across the lesion and  deployed at a maximum inflation pressure of 12 nick.    --  Intravascular ultrasound was performed.    --  Balloon angioplasty was performed, using a NC TREK 4.5 X 20 balloon, with 3  inflations and a maximum inflation pressure of 12 nick.    --  Balloon angioplasty was performed, using a NC TREK 5 X 15 balloon, with 3  inflations and a maximum inflation pressure of 12 nick.    CARDIAC INTERVENTIONS  --  Interventional IVUS.    --  PTCA.    --  Drug Eluting Stent Placement.    MEDICATIONS GIVEN: Verapamil (Isoptin, Calan, Covera), 2.5 mg, IA, at 12:23.  Atropine, 0.5 mg, IV, at 12:28. Atropine, 0.5 mg, IV, at 12:29. Verapamil  (Isoptin, Calan, Covera), 2.5 mg, IA, at 13:03. Eptifibatide (Integrilin), 19  mg, IV, last dose at 12:40. Eptifibatide (Integrilin), 19 mg, IV, last dose at  12:50. 1% Lidocaine, 1 ml, subcutaneously, at 12:23. Neosynephrine, 200 mcg,  IV, at 12:28. Angiomax (Bivalirudin), 16.5 ml, IV, at 12:29. Angiomax Drip,  infusion rate of 38.2 ml/hr, IV, at 13:11. Neosynephrine, 300 mcg, IV, at  12:34. Normal Saline (total), 500 ml, IV, at 13:02.  CONTRAST GIVEN: Omnipaque 100 ml.    Prepared and signed by    WELLINGTON SEGURA  Signed 2019 14:32:13    HEMODYNAMIC TABLES    Pressures:  Baseline  Pressures:  - HR: 107  Pressures:  - Rhythm:  Pressures:  -- Aortic Pressure (S/D/M): 123/88/78  Pressures:  -- Left Ventricle (s/edp): 116/23/--    Outputs:  Baseline  Outputs:  -- CALCULATIONS[age]  Outputs:  -- CALCULATIONS: Body Surface Area: 2.21  Outputs:  -- CALCULATIONS: Height in cm: 173.00  Outputs:  -- CALCULATIONS: Sex: Male  Outputs:  -- CALCULATIONS: Weight in k.90      Assessment/Plan     The patient has had no further GI bleeding (site not determined despite upper and lower GI endoscopy).  Patient does have chronic AF and  should be on Warfarin with discontinuance of one of his anti-platelet agents.    He has evidence of impaired RV function as evidence by November heart catherization and severe pulmonary hypertension (both if left untreated would preclude candidacy for renal transplantation)    Plan:  1. RHC February 10-12 with probable leave in SG and admit     I thoroughly discussed the risks and benefits of the contemplated catherization including bleeding, vessel rupture, death, arrhythmia, embolism, stroke, renal failure perforation of pulmonary artery, aortic,lung or heart.  I discussed how the procedure would be performed and alternative diagnostic and therapeutic management strategies.  All questions were answered.

## 2020-01-14 NOTE — Clinical Note
Thank you for the referral.No diabetic retinopathy noted.Recommended repeat evaluation in 1 year.Please contact me with any questions.Chad Babcock, AVRIL on 1/14/2020 at 9:15 AM

## 2020-01-14 NOTE — PROGRESS NOTES
History  HPI     Annual Eye Exam     In both eyes.  This started years ago.  Charactertized as  blurred vision.  Severity is mild.  Occurring constantly.  Context:  near vision.  Since onset it is stable.  Treatments tried include no treatments.  Pain was noted as 0/10.              Diabetic Eye Exam     Vision is stable.  Treatments tried include no treatments.  Pain was noted as 0/10.              Comments     Blurry vision at near, readers working well. Patient denies eye pain or irritation. Does not use any eye drops.      A1C does not know, but is good  Lab Results       Component                Value               Date                       A1C                      5.7                 07/15/2019                 A1C                      13.4                04/08/2019                 A1C                      7.3                 02/22/2018                 A1C                      6.9                 08/16/2016                 A1C                      5.7                 03/26/2014            Sarah Gamez COT 8:31 AM January 14, 2020             Last edited by Sarah Gamez on 1/14/2020  8:31 AM. (History)          Assessment/Plan  (E11.9) Diabetes mellitus type 2 without retinopathy (H)  (primary encounter diagnosis)  Comment: No retinopathy  Plan:  Educated patient on clinical findings and the importance of continued management with primary care physician. Continue management as directed and return to clinic in 1 year for dilated exam, or sooner, as needed. Copy of chart sent to Meron Jackson.    (H25.13) Nuclear sclerosis of both eyes  Comment: Not visually significant  Plan:  No treatment indicated at this time. Monitor annually.    (H52.4) Presbyopia of both eyes  Comment: Presbyopia, happy with reading glasses  Plan: REFRACTION         Dispensed spectacle prescription for as-needed wear or continue with reading glasses (+2.00). Educated patient on possibility of adaptation period, if symptoms do not  improve return to clinic for further testing.    Return to clinic in 1 year for comprehensive eye exam.    Complete documentation of historical and exam elements from today's encounter can  be found in the full encounter summary report (not reduplicated in this progress  note). I personally obtained the chief complaint(s) and history of present illness. I  confirmed and edited as necessary the review of systems, past medical/surgical  history, family history, social history, and examination findings as documented by  others; and I examined the patient myself. I personally reviewed the relevant tests,  images, and reports as documented above. I formulated and edited as necessary the  assessment and plan and discussed the findings and management plan with the  patient and family.    Chad Babcock OD, FAAO

## 2020-01-16 ENCOUNTER — TELEPHONE (OUTPATIENT)
Dept: CARDIOLOGY | Facility: CLINIC | Age: 60
End: 2020-01-16

## 2020-01-16 ENCOUNTER — OFFICE VISIT (OUTPATIENT)
Dept: CARDIOLOGY | Facility: CLINIC | Age: 60
End: 2020-01-16
Attending: INTERNAL MEDICINE
Payer: MEDICAID

## 2020-01-16 ENCOUNTER — MYC MEDICAL ADVICE (OUTPATIENT)
Dept: CARDIOLOGY | Facility: CLINIC | Age: 60
End: 2020-01-16

## 2020-01-16 VITALS
BODY MASS INDEX: 33.04 KG/M2 | HEIGHT: 68 IN | SYSTOLIC BLOOD PRESSURE: 101 MMHG | HEART RATE: 86 BPM | WEIGHT: 218 LBS | DIASTOLIC BLOOD PRESSURE: 68 MMHG | OXYGEN SATURATION: 96 %

## 2020-01-16 DIAGNOSIS — I25.5 ISCHEMIC CARDIOMYOPATHY: ICD-10-CM

## 2020-01-16 DIAGNOSIS — I48.91 ATRIAL FIBRILLATION, UNSPECIFIED TYPE (H): Chronic | ICD-10-CM

## 2020-01-16 DIAGNOSIS — I50.22 CHRONIC SYSTOLIC CONGESTIVE HEART FAILURE (H): ICD-10-CM

## 2020-01-16 LAB
ALBUMIN SERPL-MCNC: 3.9 G/DL (ref 3.4–5)
ALP SERPL-CCNC: 114 U/L (ref 40–150)
ALT SERPL W P-5'-P-CCNC: 16 U/L (ref 0–70)
ANION GAP SERPL CALCULATED.3IONS-SCNC: 6 MMOL/L (ref 3–14)
AST SERPL W P-5'-P-CCNC: 8 U/L (ref 0–45)
BILIRUB SERPL-MCNC: 1 MG/DL (ref 0.2–1.3)
BUN SERPL-MCNC: 48 MG/DL (ref 7–30)
CALCIUM SERPL-MCNC: 8.6 MG/DL (ref 8.5–10.1)
CHLORIDE SERPL-SCNC: 107 MMOL/L (ref 94–109)
CO2 SERPL-SCNC: 28 MMOL/L (ref 20–32)
CREAT SERPL-MCNC: 2.72 MG/DL (ref 0.66–1.25)
ERYTHROCYTE [DISTWIDTH] IN BLOOD BY AUTOMATED COUNT: 18.6 % (ref 10–15)
GFR SERPL CREATININE-BSD FRML MDRD: 24 ML/MIN/{1.73_M2}
GLUCOSE SERPL-MCNC: 125 MG/DL (ref 70–99)
HCT VFR BLD AUTO: 35.1 % (ref 40–53)
HGB BLD-MCNC: 10.3 G/DL (ref 13.3–17.7)
INR PPP: 1.25 (ref 0.86–1.14)
MCH RBC QN AUTO: 27.5 PG (ref 26.5–33)
MCHC RBC AUTO-ENTMCNC: 29.3 G/DL (ref 31.5–36.5)
MCV RBC AUTO: 94 FL (ref 78–100)
NT-PROBNP SERPL-MCNC: 5746 PG/ML (ref 0–125)
PLATELET # BLD AUTO: 268 10E9/L (ref 150–450)
POTASSIUM SERPL-SCNC: 3.8 MMOL/L (ref 3.4–5.3)
PROT SERPL-MCNC: 8.1 G/DL (ref 6.8–8.8)
RBC # BLD AUTO: 3.75 10E12/L (ref 4.4–5.9)
SODIUM SERPL-SCNC: 140 MMOL/L (ref 133–144)
WBC # BLD AUTO: 7.8 10E9/L (ref 4–11)

## 2020-01-16 PROCEDURE — 85027 COMPLETE CBC AUTOMATED: CPT | Performed by: INTERNAL MEDICINE

## 2020-01-16 PROCEDURE — 85610 PROTHROMBIN TIME: CPT | Performed by: INTERNAL MEDICINE

## 2020-01-16 PROCEDURE — 80053 COMPREHEN METABOLIC PANEL: CPT | Performed by: INTERNAL MEDICINE

## 2020-01-16 PROCEDURE — G0463 HOSPITAL OUTPT CLINIC VISIT: HCPCS | Mod: ZF

## 2020-01-16 PROCEDURE — 83880 ASSAY OF NATRIURETIC PEPTIDE: CPT | Performed by: INTERNAL MEDICINE

## 2020-01-16 PROCEDURE — 99214 OFFICE O/P EST MOD 30 MIN: CPT | Mod: ZP | Performed by: INTERNAL MEDICINE

## 2020-01-16 PROCEDURE — 36415 COLL VENOUS BLD VENIPUNCTURE: CPT | Performed by: INTERNAL MEDICINE

## 2020-01-16 RX ORDER — LIDOCAINE 40 MG/G
CREAM TOPICAL
Status: CANCELLED | OUTPATIENT
Start: 2020-01-16

## 2020-01-16 RX ORDER — SACUBITRIL AND VALSARTAN 49; 51 MG/1; MG/1
1 TABLET, FILM COATED ORAL 2 TIMES DAILY
Qty: 240 TABLET | Refills: 3 | Status: SHIPPED | OUTPATIENT
Start: 2020-01-16 | End: 2020-01-24

## 2020-01-16 ASSESSMENT — PAIN SCALES - GENERAL: PAINLEVEL: NO PAIN (0)

## 2020-01-16 ASSESSMENT — MIFFLIN-ST. JEOR: SCORE: 1768.34

## 2020-01-16 NOTE — TELEPHONE ENCOUNTER
[facility] Call Center    Phone Message    May a detailed message be left on voicemail: yes    Reason for Call: Medication Question or concern regarding medication   Prescription Clarification  Name of Medication: ENTRESTO 49-51 MG per tablet   Prescribing Provider: Dr. Alen Trujillo   Pharmacy:  on [address] [facility]   What on the order needs clarification? Pt requesting a call back from Macario Joe, he needs clarification on taking this medication. Thank you.          Action Taken: Message routed to:  [facility] ([facility]):  Cardiology

## 2020-01-16 NOTE — PATIENT INSTRUCTIONS
You were seen today in the Cardiovascular Clinic at the [facility].   Cardiology Providers you saw during your visit: Dr. Alen Trujillo      Recommendations:   1. Please have a Right Heart Cath and likely admit week of Feb. 10th         Right Heart Cath Instructions:    This test measures how well your heart is pumping blood to your body and will assess the volume and pressures in your heart.     You are scheduled for a Right Heart Catheterization at the [facility], [address] [place] [address], [place] [address]. You are to check in at the Gold Waiting Area.     When you arrive you will be escorted back to the pre procedure area called 2A. Here they will insert an IV, draw labs, and obtain a short medical history. Please bring an updated list of your current medications.    A physician will come and talk with you about the procedure and obtain consent.    A nurse from the Cardiac Catheterization Lab will then escort you to the procedure area. You will receive a shot to numb the site where the catheter (tube) will enter your body.  This may be in the neck or groin - but likely your neck.  You will not receive sedation or anesthesia.     After the procedure you will recover for a short period (1-2 hours) on 6B or the cath lab.     Pre procedure instructions:     1.  Do not eat any solid food or milk products for 6 hours prior to the exam. You may drink clear liquids until 2 hours prior to the exam. Clear liquids include the following: water, Jell-O, clear broth, apple juice or any non-carbonated drink that you can see through (no pop!).   2. Do not drink alcohol or smoke 24 hours prior to test.  3. Hold these meds:  Do not take your oral diabetic medication or short acting insulin the day of the procedure.      4. You may take your other morning medications as prescribed with a sip of water. You may hold your diuretic that morning.       If you have further questions,  "please utilize [employer] to contact us.   If your question concerns the above instructions, contact:    Livia Joe RN BSN CHFN  Cardiology Care Coordinator - [facility]  Questions and [phone]  First press #1 for the University and then press #3 for \"Medical Advise\" to reach us Cardiology Nurses.          Results for MERCY SHAFER (MRN [id number]) as of 2020 11:58   Ref. Range 2020 10:33   Sodium Latest Ref Range: 133 - 144 mmol/L 140   Potassium Latest Ref Range: 3.4 - 5.3 mmol/L 3.8   Chloride Latest Ref Range: 94 - 109 mmol/L 107   Carbon Dioxide Latest Ref Range: 20 - 32 mmol/L 28   Urea Nitrogen Latest Ref Range: 7 - 30 mg/dL 48 (H)   Creatinine Latest Ref Range: 0.66 - 1.25 mg/dL 2.72 (H)   GFR Estimate Latest Ref Range: >60 mL/min/1.73_m2 24 (L)   GFR Estimate If Black Latest Ref Range: >60 mL/min/1.73_m2 28 (L)   Calcium Latest Ref Range: 8.5 - 10.1 mg/dL 8.6   Anion Gap Latest Ref Range: 3 - 14 mmol/L 6   Albumin Latest Ref Range: 3.4 - 5.0 g/dL 3.9   Protein Total Latest Ref Range: 6.8 - 8.8 g/dL 8.1   Bilirubin Total Latest Ref Range: 0.2 - 1.3 mg/dL 1.0   Alkaline Phosphatase Latest Ref Range: 40 - 150 U/L 114   ALT Latest Ref Range: 0 - 70 U/L 16   AST Latest Ref Range: 0 - 45 U/L 8   N-Terminal Pro Bnp Latest Ref Range: 0 - 125 pg/mL 5,746 (H)   Glucose Latest Ref Range: 70 - 99 mg/dL 125 (H)   WBC Latest Ref Range: 4.0 - 11.0 10e9/L 7.8   Hemoglobin Latest Ref Range: 13.3 - 17.7 g/dL 10.3 (L)   Hematocrit Latest Ref Range: 40.0 - 53.0 % 35.1 (L)   Platelet Count Latest Ref Range: 150 - 450 10e9/L 268   RBC Count Latest Ref Range: 4.4 - 5.9 10e12/L 3.75 (L)   MCV Latest Ref Range: 78 - 100 fl 94   MCH Latest Ref Range: 26.5 - 33.0 pg 27.5   MCHC Latest Ref Range: 31.5 - 36.5 g/dL 29.3 (L)   RDW Latest Ref Range: 10.0 - 15.0 % 18.6 (H)         Thank you for your visit today!      Please call if you have any questions or " "concerns.  Zach Phelan RN Cardiology Care Coordinator  Questions and [phone]  First press #1 for the charity: water and then press #3 for \"Medical Questions\" to reach us Cardiology Nurses.     On Call Cardiologist for after hours or on weekends: [phone]   option #4 and ask to speak to the on-call Cardiologist. Inform them you are a Dr. Sullivan - Heart Failure patient at the [facility].    For emergencies call 911.   "

## 2020-01-16 NOTE — TELEPHONE ENCOUNTER
Prior Authorization Retail Medication Request    Medication/Dose:  Entresto 49/51mg BID  ICD code (if different than what is on RX):  Chronic systolic congestive heart failure (H) [I50.22]   Previously Tried and Failed:  ACEi. Patient has been on entresto for years now         Pharmacy Information (if different than what is on RX)  Name:  [facility] pharmacy

## 2020-01-16 NOTE — NURSING NOTE
Chief Complaint   Patient presents with     Follow Up     1 month return HF-- ok per Zach A     Vitals were taken and medications were reconciled.     Jazmin Alston CMA    11:02 AM

## 2020-01-16 NOTE — LETTER
1/16/2020      RE: Cole Jesus  [address]  [facility] [address]       Dear Colleague,    Thank you for the opportunity to participate in the care of your patient, Cole Jesus, at the [facility]. Please see a copy of my visit note below.    Alen Trujillo M.D.  Cardiovascular Medicine    I personally saw and examined this patient, discussed care with housestaff and other consultants, reviewed current laboratories and imaging studies, and conveyed impression and diagnostic/therapeutic plan to patient.    Problem List  Atrial fibrillation,   Lower GI bleeding  Hyperlipidemia   Ischemic cardiomyopathy  Chronic systolic congestive heart failure   Essential hypertension with goal blood pressure less than 130/85  Chronic renal failure  Iron deficiency anemia  Diabetes/insulin resistance  Acute inferior MI treated with     History    Mr. Cole Jesus is a pleasant 59-year-old gentleman with a past medical history of STEMI, acute occlusion of pRCA s/p balloon angioplasty and stenting in 6/2019 (XIENCE JAMILA 3.5 X 28 KEVYN), STEMI s/p DESx3 for total occlusion of RCA and Lcx (2012), GIB due to AV malformation (in the setting of warfarin and DAPT), ischemic cardiomyopathy, HFrEF (last EF=30-35%), severe pHTN, severe mitral regurgitation, atrial fibrillation not on AC due to GIB in the past, CKD stage IV, who presented for elective RHC. Found to be in low cardiac output failure (CI ~ 1.5) , elevated CWP (~43 mmHg). Started on nipride and lasix IV.        Objective     Constitutional: alert, oriented, normal gait and station, normal mentation.  Oral: moist mucous membranes  Lymph: without pathologic adenopathy  Chest: clear to ausculation and percussion save basilar rales  Cor: No evidence of left or right ventricular activity.  Rhythm is irregular.  [id number], S2 split physiologically. Murmurs t present Increased JVP with + HJR  Abdomen: without  tenderness, rebound, guarding, masses, ascites  Extremities: Edema not present  Neuro: no focal defects, normal mentation  Skin: without open lesions  Psych: oriented, verbal, mental status in tact    Wt Readings from Last 24 Encounters:   01/16/20 98.9 kg (218 lb)   12/30/19 99.7 kg (219 lb 11.2 oz)   12/23/19 98.6 kg (217 lb 4.8 oz)   12/20/19 97.3 kg (214 lb 6.4 oz)   12/18/19 101.3 kg (223 lb 6.4 oz)   12/12/19 101 kg (222 lb 11.2 oz)   12/05/19 99.8 kg (220 lb)   11/21/19 98.9 kg (218 lb)   11/17/19 95.6 kg (210 lb 11.2 oz)   11/07/19 103.9 kg (229 lb)   10/31/19 105.7 kg (233 lb)   10/07/19 107.3 kg (236 lb 9.6 oz)   09/16/19 106.1 kg (234 lb)   08/26/19 107 kg (236 lb)   07/24/19 107.7 kg (237 lb 6.4 oz)   07/22/19 108.2 kg (238 lb 8.6 oz)   07/15/19 108.2 kg (238 lb 9.6 oz)   07/06/19 104.8 kg (231 lb 0.7 oz)   06/27/19 106.3 kg (234 lb 6.4 oz)   04/23/19 109.6 kg (241 lb 11.2 oz)   04/08/19 110.6 kg (243 lb 12.8 oz)   04/08/19 110.2 kg (243 lb)   02/06/19 109 kg (240 lb 6.4 oz)   09/12/18 113.9 kg (251 lb)         Meds  Current Outpatient Medications   Medication     ACCU-CHEK GUIDE test strip     Alcohol Swabs PADS     aspirin (ASA) 81 MG chewable tablet     atorvastatin (LIPITOR) 80 MG tablet     blood glucose monitoring (ACCU-CHEK FASTCLIX) lancets     cholecalciferol (VITAMIN  -D) 1000 UNITS capsule     clopidogrel (PLAVIX) 75 MG tablet     ENTRESTO 49-51 MG per tablet     furosemide (LASIX) 20 MG tablet     furosemide (LASIX) 80 MG tablet     insulin pen needle (32G X 4 MM) 32G X 4 MM miscellaneous     pantoprazole (PROTONIX) 40 MG EC tablet     Sharps Container MISC     vitamin  B complex with vitamin C (VITAMIN  B COMPLEX) TABS     nitroGLYcerin (NITROSTAT) 0.4 MG sublingual tablet     No current facility-administered medications for this visit.      Off of insulin    He is taking Entresto 49/51    Results for MERCY SHAFER (MRN [id number]) as of 1/16/2020 11:44   Ref. Range 6/1/2018 08:11  9/12/2018 08:06 10/31/2018 08:20 2/6/2019 11:28 4/8/2019 18:31 4/8/2019 22:07 4/9/2019 06:35 4/10/2019 06:44 6/27/2019 12:26 6/28/2019 08:40 7/1/2019 08:22 7/3/2019 16:49 7/5/2019 04:30 7/6/2019 05:32 7/22/2019 09:27 7/22/2019 16:30 7/23/2019 04:31 7/24/2019 06:11 8/26/2019 19:19 9/9/2019 10:53 9/16/2019 18:54 10/7/2019 09:25 10/31/2019 07:32 11/7/2019 11:03 11/12/2019 08:30 11/13/2019 05:45 11/13/2019 16:17 11/13/2019 20:15 11/14/2019 04:05 11/14/2019 15:53 11/15/2019 03:34 11/15/2019 06:01 11/15/2019 15:32 11/16/2019 05:14 11/16/2019 16:07 11/17/2019 04:53 11/21/2019 10:14 12/5/2019 11:30 12/12/2019 10:57 12/18/2019 16:07 12/19/2019 05:45 12/20/2019 05:04 12/21/2019 08:44 12/23/2019 11:50 1/16/2020 10:33   GFR Estimate Latest Ref Range: >60 mL/min/1.73_m2 24 (L) 24 (L) 25 (L) 27 (L) 31 (L) 31 (L) 32 (L) 30 (L) 26 (L) 25 (L) 27 (L) 28 (L) 29 (L) 31 (L) 21 (L) 22 (L) 22 (L) 24 (L) 24 (L) 21 (L) 22 (L) 25 (L) 25 (L) 26 (L) 27 (L) 28 (L) 28 (L) 28 (L) 28 (L) 28 (L) 26 (L) 26 (L) 25 (L) 25 (L) 24 (L) 25 (L) 24 (L) 20 (L) 19 (L) 20 (L) 22 (L) 23 (L) 23 (L) 24 (L) 24 (L)     Labs    Results for MERCY SHAFER (MRN [id number]) as of 1/16/2020 11:44   Ref. Range 1/16/2020 10:33   Sodium Latest Ref Range: 133 - 144 mmol/L 140   Potassium Latest Ref Range: 3.4 - 5.3 mmol/L 3.8   Chloride Latest Ref Range: 94 - 109 mmol/L 107   Carbon Dioxide Latest Ref Range: 20 - 32 mmol/L 28   Urea Nitrogen Latest Ref Range: 7 - 30 mg/dL 48 (H)   Creatinine Latest Ref Range: 0.66 - 1.25 mg/dL 2.72 (H)   GFR Estimate Latest Ref Range: >60 mL/min/1.73_m2 24 (L)   GFR Estimate If Black Latest Ref Range: >60 mL/min/1.73_m2 28 (L)   Calcium Latest Ref Range: 8.5 - 10.1 mg/dL 8.6   Anion Gap Latest Ref Range: 3 - 14 mmol/L 6   Albumin Latest Ref Range: 3.4 - 5.0 g/dL 3.9   Protein Total Latest Ref Range: 6.8 - 8.8 g/dL 8.1   Bilirubin Total Latest Ref Range: 0.2 - 1.3 mg/dL 1.0   Alkaline Phosphatase Latest Ref Range: 40 - 150 U/L 114   ALT  Latest Ref Range: 0 - 70 U/L 16   AST Latest Ref Range: 0 - 45 U/L 8   N-Terminal Pro Bnp Latest Ref Range: 0 - 125 pg/mL 5,746 (H)   Glucose Latest Ref Range: 70 - 99 mg/dL 125 (H)   WBC Latest Ref Range: 4.0 - 11.0 10e9/L 7.8   Hemoglobin Latest Ref Range: 13.3 - 17.7 g/dL 10.3 (L)   Hematocrit Latest Ref Range: 40.0 - 53.0 % 35.1 (L)   Platelet Count Latest Ref Range: 150 - 450 10e9/L 268   RBC Count Latest Ref Range: 4.4 - 5.9 10e12/L 3.75 (L)   MCV Latest Ref Range: 78 - 100 fl 94   MCH Latest Ref Range: 26.5 - 33.0 pg 27.5   MCHC Latest Ref Range: 31.5 - 36.5 g/dL 29.3 (L)   RDW Latest Ref Range: 10.0 - 15.0 % 18.6 (H)   INR Latest Ref Range: 0.86 - 1.14  1.25 (H)     INR Latest Ref Range: 0.86 - 1.14  1.23 (H)       Imaging     MRN: [id number]  : 1960  Study Date: 2019 01:21 PM  Age: 59 yrs  Gender: Male  Patient [facility]: [facility]  Reason For Study: Heart Failure - Left  Ordering Physician: TESS BERG  Referring Physician: ASHLEY KNAPP  Performed By: Christina Peck RDCS     BSA: 2.1 m2  Height: 68 in  Weight: 223 lb  HR: 79  BP: 100/75 mmHg  _____________________________________________________________________________  __        Procedure  Limited Portable Echo Adult. Contrast Optison. Technically difficult study.  Poor acoustic windows. Optison (NDC #3847-8131-09) given intravenously.  Patient was given 5 ml mixture of 3 ml Optison and 6 ml saline. 4 ml wasted.  _____________________________________________________________________________  __        Interpretation Summary  Limited, technically difficult study.  Moderately (EF 30-35%) reduced left ventricular function is present.  The right ventricle cannot be assessed.  Small posterior pericardial effusion without echocardiographic evidence of  tamponade.  This study was compared with the study from 19: There has been no  change.  _____________________________________________________________________________  __        Left  Ventricle  Moderately (EF 30-35%) reduced left ventricular function is present. Moderate  diffuse hypokinesis is present.     Right Ventricle  The right ventricle cannot be assessed.     Mitral Valve  Moderate mitral insufficiency is present.     Tricuspid Valve  The right ventricular systolic pressure is approximated at 64.3 mmHg plus the  right atrial pressure.        Pulmonic Valve  The pulmonic valve is normal.     Vessels  IVC diameter >2.1 cm collapsing <50% with sniff suggests a high RA pressure  estimated at 15 mmHg or greater.     Pericardium  Prominent epicardial fat is noted. Small posterior pericardial effusion  without echocardiographic evidence of tamponade.     Compared to Previous Study  This study was compared with the study from 19 . There has been no  change.     _____________________________________________________________________________  __  MMode/2D Measurements & Calculations  TAPSE: 1.1 cm        Doppler Measurements & Calculations  TR max shavonne: 400.6 cm/sec  TR max P.3 mmHg        _____________________________________________________________________________  __        Name: MERCY SHAFER  MRN: [id number]  : 1960  Study Date: 2019 01:07 PM  Age: 59 yrs  Gender: Male  [place] [facility] [place] For Study: Cardiomyopathy  Ordering Physician: JEFF KAN  Referring Physician: ASHLEY KNAPP  Performed By: Christina Peck RDCS     BSA: 2.1 m2  Height: 68 in  Weight: 214 lb  HR: 86  BP: 85/59 mmHg  _____________________________________________________________________________  __        Procedure  Complete Portable Echo Adult. Contrast Optison. Optison (NDC #8501-1785-66)  given intravenously. Patient was given 5 ml mixture of 3 ml Optison and 6 ml  saline. 4 ml wasted.  _____________________________________________________________________________  __        Interpretation Summary  Moderately (EF 30-35%) reduced left ventricular function is present.  Severe  left ventricular dilation is present based on LVEDV of 109 mL/m^2 and LVIDd  6.7 cm.  Global right ventricular function is moderately reduced. Moderate right  ventricular dilation is present.  Moderate mitral regurgitation.  Estimated pulmonary artery pressure of 42 mmHg.  Small posterior pericardial effusion without any significant echocardiographic  evidence of tamponade.  Dilation of the inferior vena cava is present with normal respiratory  variation in diameter.     This study was compared with the study from 11/7/19 . Compared to previous  study, estimated PA pressure had decreased. IVC size remains same but  collapsing now.  _____________________________________________________________________________  __        Left Ventricle  Left ventricular wall thickness is normal. Severe left ventricular dilation is  present. Indexed LVEDV is 109 mL/m^2. LVIDd 6.7 cm. Moderately (EF 30-35%)  reduced left ventricular function is present. Diastolic function not assessed  due to atrial fibrillation. LVEF 30% by Biplane (Cruz's method). Moderate  diffuse hypokinesis is present.     Right Ventricle  Right ventricular wall thickness is normal. Moderate right ventricular  dilation is present. Global right ventricular function is moderately reduced.  A right heart catheter is noted in the right ventricle.     Atria  Moderate biatrial enlargement is present. Cannot assess inter-atrial septum. A  catheter is noted in the right atrium.     Mitral Valve  Moderate mitral insufficiency is present. EROA 0.3 cm^2. Regurgitant volume  34  mL.        Aortic Valve  The valve leaflets are not well visualized. On Doppler interrogation, there is  no significant stenosis or regurgitation.     Tricuspid Valve  The tricuspid valve is normal. Trace tricuspid insufficiency is present. Right  ventricular systolic pressure is 34mmHg above the right atrial pressure.     Pulmonic Valve  The pulmonic valve cannot be assessed.      Vessels  The thoracic aorta cannot be assessed. The aorta root cannot be assessed.  Dilation of the inferior vena cava is present with normal respiratory  variation in diameter. IVC diameter and respiratory changes fall into an  intermediate range suggesting an RA pressure of 8 mmHg.     Pericardium  Small posterior pericardial effusion without any significant echocardiographic  evidence of tamponade.        Compared to Previous Study  This study was compared with the study from 19 . Compared to previous  study, estimated PA pressure had decreased.  _____________________________________________________________________________  __  MMode/2D Measurements & Calculations  IVSd: 0.81 cm     LVIDd: 6.7 cm  LVPWd: 0.83 cm  LV mass(C)d: 231.5 grams  LV mass(C)dI: 110.1 grams/m2  Ao root diam: 1.1 cm     EF(MOD-bp): 30.1 %  RWT: 0.25  TAPSE: 0.72 cm        Doppler Measurements & Calculations  MR ERO: 0.25 cm2  MR volume: 32.5 ml  TV max P.6 mmHg  TR max shavonne: 313.9 cm/sec  TR max P.6 mmHg    Heart Catherization    Hemodynamics     Right Heart Pressures     RA    A wave: 26 mmHg  V-wave: 21 mmHg  Mean: 19 mmHg  HR: 86 BPM    RV:     Systolic: 100mmHg  EDP: 19 mmHg  HR: 45 BPM    PA    Systolic: 98 mmHg  Diastolic: 50 mmHg  Mean: 68 mmHg  HR: 75 BPM     PCW:   A wave: 38 mmHg  V wave: 43 mmHg  Mean: 42 mmHg  HR: 75 BPM    CO(Cecilia): 3.45 L/min  CI(Cecilia): 1.61 L/min/m2    CO(TD) 3.2 L/min  CI(TD): 1.49 L/min/m2  Right sided filling pressures are severely elevated.Left sided filling pressures are severely elevated. Severely elevated pulmonary artery hypertension.Reduced cardiac output level.Hemodynamic data has been modified in Epic per physician review.         Catherization/angiogram 2019    Referring Provider:RASHID GREGORY    Cath Lab Report -- Comprehensive Report  Cath Lab Report -- Comprehensive Report    [facility]  [address]   [place], [place] [address] [phone]  [phone]    Cath Lab Report -- Comprehensive Report  Patient: MERCY SHAFER  MR #: [id number]  Study Date: 15-Bobby-2019  Acct #: [id number]  : 1960  Gender: Male  Height: 68.1 in  Weight: 239.6 lb  BSA: 2.21 m squared  Room:  Test time: 12:23 - 13:04  Fluoro time: 8.8 min    Cathing Cardiologist:  RASHID GREGORY MD  Circulator:  VICK TURNER RN  Scrub:  FABIEN SOLARES (KLO)  Monitor:  NOT APPLICABLE  Monitor:  FABIAN ACOSTA  Referring Physician:  DELIA BENNETT MD    PROCEDURES PERFORMED:    --  Left heart catheterization.  --  Fluoro 0-60 Minutes.  --  Medication/Infusion/Drip.  --  Selective Lt Coronary Angiography.  --  Selective Rt Coronary Angiography.  --  TR Band.  --  Interventional IVUS.  --  PTCA.  --  Drug Eluting Stent Placement.  --  Intervention on proximal RCA: balloon angioplasty.    PROCEDURE COMPLETION: An IABP was not used. No mechanical ventricular support  was required. TIMING: Test started at 12:23. Test concluded at 13:04. RADIATION  EXPOSURE: Fluoroscopy time: 8.8 min.    SUMMARY:    --  1ST LESION INTERVENTIONS:  --  A balloon angioplasty was performed on the lesion in the proximal RCA.  --  A XIENCE JAMILA 3.5 X 28 drug-eluting stent was placed across the lesion and  deployed at a maximum inflation pressure of 12 nick.    CORONARY VESSELS: Proximal RCA:    PROCEDURE: The risks and alternatives of the procedures and conscious sedation  were explained to the patient and informed consent was obtained. The patient  was brought to the cath lab and placed on the table. The planned puncture sites  were prepped and draped in the usual sterile fashion. Cardiac catheterization  performed emergently.    --  ACT measurement.    --  Right radial artery access. The vessel was accessed, a wire was threaded  into the vessel, and a was advanced over the wire into the vessel.    --  Left heart catheterization. A catheter was placed in the left ventricle.    --  Fluoro 0-60  Minutes.    --  Medication/Infusion/Drip.    --  Selective Lt Coronary Angiography.    --  Selective Rt Coronary Angiography.    --  TR Band.    LESION INTERVENTION: A balloon angioplasty was performed on the lesion in the  proximal RCA. There was no dissection.    --  Balloon angioplasty was performed, using a NC TREK 2 X 12 balloon, with 1  inflations and a maximum inflation pressure of 12 nick.    --  A XIENCE JAMILA 3.5 X 28 drug-eluting stent was placed across the lesion and  deployed at a maximum inflation pressure of 12 nick.    --  Intravascular ultrasound was performed.    --  Balloon angioplasty was performed, using a NC TREK 4.5 X 20 balloon, with 3  inflations and a maximum inflation pressure of 12 nick.    --  Balloon angioplasty was performed, using a NC TREK 5 X 15 balloon, with 3  inflations and a maximum inflation pressure of 12 nick.    CARDIAC INTERVENTIONS  --  Interventional IVUS.    --  PTCA.    --  Drug Eluting Stent Placement.    MEDICATIONS GIVEN: Verapamil (Isoptin, Calan, Covera), 2.5 mg, IA, at 12:23.  Atropine, 0.5 mg, IV, at 12:28. Atropine, 0.5 mg, IV, at 12:29. Verapamil  (Isoptin, Calan, Covera), 2.5 mg, IA, at 13:03. Eptifibatide (Integrilin), 19  mg, IV, last dose at 12:40. Eptifibatide (Integrilin), 19 mg, IV, last dose at  12:50. 1% Lidocaine, 1 ml, subcutaneously, at 12:23. Neosynephrine, 200 mcg,  IV, at 12:28. Angiomax (Bivalirudin), 16.5 ml, IV, at 12:29. Angiomax Drip,  infusion rate of 38.2 ml/hr, IV, at 13:11. Neosynephrine, 300 mcg, IV, at  12:34. Normal Saline (total), 500 ml, IV, at 13:02.  CONTRAST GIVEN: Omnipaque 100 ml.    Prepared and signed by    WELLINGTON SEGURA  Signed 19-Jun-2019 14:32:13    HEMODYNAMIC TABLES    Pressures:  Baseline  Pressures:  - HR: 107  Pressures:  - Rhythm:  Pressures:  -- Aortic Pressure (S/D/M): 123/88/78  Pressures:  -- Left Ventricle (s/edp): 116/23/--    Outputs:  Baseline  Outputs:  -- CALCULATIONS[age]  Outputs:  --  CALCULATIONS: Body Surface Area: 2.21  Outputs:  -- CALCULATIONS: Height in cm: 173.00  Outputs:  -- CALCULATIONS: Sex: Male  Outputs:  -- CALCULATIONS: Weight in k.90      Assessment/Plan     The patient has had no further GI bleeding (site not determined despite upper and lower GI endoscopy).  Patient does have chronic AF and should be on Warfarin with discontinuance of one of his anti-platelet agents.    He has evidence of impaired RV function as evidence by November heart catherization and severe pulmonary hypertension (both if left untreated would preclude candidacy for renal transplantation)    Plan:  1. RHC February 10- with probable leave in SG and admit     I thoroughly discussed the risks and benefits of the contemplated catherization including bleeding, vessel rupture, death, arrhythmia, embolism, stroke, renal failure perforation of pulmonary artery, aortic,lung or heart.  I discussed how the procedure would be performed and alternative diagnostic and therapeutic management strategies.  All questions were answered.      Please do not hesitate to contact me if you have any questions/concerns.     Sincerely,     Alen Trujillo MD

## 2020-01-17 NOTE — TELEPHONE ENCOUNTER
PA Initiation    Medication: Entresto 49/51mg -   Insurance Company: [place] Medicaid ([facility] - Phone [phone] Fax [phone]  Pharmacy Filling the Rx: [facility] - [place], [place] - [address] [facility]  Filling Pharmacy Phone: [phone]  Filling Pharmacy Fax: [phone]  Start Date: 1/17/2020

## 2020-01-17 NOTE — TELEPHONE ENCOUNTER
Called Cole back after discussing with Dr Trujillo's team.  Cole had been taking it once a day, directed him to continue to take it once a day.    Ingris Martin RN

## 2020-01-21 ENCOUNTER — TELEPHONE (OUTPATIENT)
Dept: CARDIOLOGY | Facility: CLINIC | Age: 60
End: 2020-01-21

## 2020-01-21 NOTE — TELEPHONE ENCOUNTER
Prior Authorization Specialty Medication Request    Medication/Dose: Entresto 49-51 MG Tabs  ICD code (if different than what is on RX):    Previously Tried and Failed:      Important Lab Values:   Rationale:     Insurance Name: [place]  Insurance ID: [id number]  Insurance Phone Number:     Pharmacy Information (if different than what is on RX)  Name:    Phone:

## 2020-01-22 NOTE — TELEPHONE ENCOUNTER
"PRIOR AUTHORIZATION DENIED    Medication: Entresto 49/51mg - DENIED    Denial Date: 1/18/2020    Denial Rational:         Appeal Information:     **Please advise if appeal is necessary and place a letter of medical necessity with clinical rationale under the \"letters\" tab in patient's chart and route back to [facility] [[id number]]        "

## 2020-01-24 ENCOUNTER — PATIENT OUTREACH (OUTPATIENT)
Dept: CARDIOLOGY | Facility: CLINIC | Age: 60
End: 2020-01-24

## 2020-01-24 DIAGNOSIS — I50.22 CHRONIC SYSTOLIC CONGESTIVE HEART FAILURE (H): ICD-10-CM

## 2020-01-24 DIAGNOSIS — I25.5 ISCHEMIC CARDIOMYOPATHY: ICD-10-CM

## 2020-01-24 RX ORDER — FUROSEMIDE 20 MG
20 TABLET ORAL 2 TIMES DAILY
Qty: 180 TABLET | Refills: 1 | Status: ON HOLD | OUTPATIENT
Start: 2020-01-24 | End: 2020-02-13

## 2020-01-24 RX ORDER — FUROSEMIDE 80 MG
80 TABLET ORAL
Qty: 60 TABLET | Refills: 1 | Status: CANCELLED | OUTPATIENT
Start: 2020-01-24

## 2020-01-24 RX ORDER — FUROSEMIDE 80 MG
80 TABLET ORAL
Qty: 180 TABLET | Refills: 1 | Status: ON HOLD | OUTPATIENT
Start: 2020-01-24 | End: 2020-02-13

## 2020-01-24 NOTE — TELEPHONE ENCOUNTER
furosemide (LASIX) 80 MG tablet     Last Written Prescription Date:  12/5/19  Last Fill Quantity: 60,   # refills: 1  Last Office Visit : 1/16/20  Future Office visit:  none    Routing refill request to provider for review/approval because:failed protocol  Normal serum creatinine on file in past 12 months          Recent Labs   Lab Test 01/16/20  1033   CR 2.72*        Thank-you, Kathleen FAIR RN Medication Refill Team

## 2020-01-24 NOTE — PROGRESS NOTES
Called and spoke with patient and pharmacy. Patient and pharmacy state that he recently picked up a supply of Entresto with no copay. Pharmacy stated no refills ordered at this time and requested a refill prescription to be sent. Sent prescription with refills.

## 2020-02-10 ENCOUNTER — APPOINTMENT (OUTPATIENT)
Dept: LAB | Facility: CLINIC | Age: 60
End: 2020-02-10
Attending: INTERNAL MEDICINE
Payer: MEDICAID

## 2020-02-10 ENCOUNTER — APPOINTMENT (OUTPATIENT)
Dept: GENERAL RADIOLOGY | Facility: CLINIC | Age: 60
End: 2020-02-10
Attending: STUDENT IN AN ORGANIZED HEALTH CARE EDUCATION/TRAINING PROGRAM
Payer: MEDICAID

## 2020-02-10 ENCOUNTER — HOSPITAL ENCOUNTER (INPATIENT)
Facility: CLINIC | Age: 60
LOS: 4 days | Discharge: HOME | End: 2020-02-14
Attending: INTERNAL MEDICINE | Admitting: INTERNAL MEDICINE
Payer: MEDICAID

## 2020-02-10 ENCOUNTER — APPOINTMENT (OUTPATIENT)
Dept: CARDIOLOGY | Facility: CLINIC | Age: 60
End: 2020-02-10
Attending: STUDENT IN AN ORGANIZED HEALTH CARE EDUCATION/TRAINING PROGRAM
Payer: MEDICAID

## 2020-02-10 ENCOUNTER — APPOINTMENT (OUTPATIENT)
Dept: MEDSURG UNIT | Facility: CLINIC | Age: 60
End: 2020-02-10
Attending: INTERNAL MEDICINE
Payer: MEDICAID

## 2020-02-10 DIAGNOSIS — I25.5 ISCHEMIC CARDIOMYOPATHY: ICD-10-CM

## 2020-02-10 DIAGNOSIS — I50.22 CHRONIC SYSTOLIC CONGESTIVE HEART FAILURE (H): Primary | ICD-10-CM

## 2020-02-10 DIAGNOSIS — I50.22 CHRONIC SYSTOLIC CONGESTIVE HEART FAILURE (H): ICD-10-CM

## 2020-02-10 PROBLEM — I50.9 HEART FAILURE (H): Status: ACTIVE | Noted: 2020-02-10

## 2020-02-10 LAB
ALBUMIN SERPL-MCNC: 3.9 G/DL (ref 3.4–5)
ALP SERPL-CCNC: 120 U/L (ref 40–150)
ALT SERPL W P-5'-P-CCNC: 17 U/L (ref 0–70)
ANION GAP SERPL CALCULATED.3IONS-SCNC: 8 MMOL/L (ref 3–14)
AST SERPL W P-5'-P-CCNC: 4 U/L (ref 0–45)
BASE EXCESS BLDV CALC-SCNC: 1.8 MMOL/L
BASE EXCESS BLDV CALC-SCNC: 2.9 MMOL/L
BASE EXCESS BLDV CALC-SCNC: 3.6 MMOL/L
BASOPHILS # BLD AUTO: 0.1 10E9/L (ref 0–0.2)
BASOPHILS NFR BLD AUTO: 0.9 %
BILIRUB SERPL-MCNC: 1.5 MG/DL (ref 0.2–1.3)
BUN SERPL-MCNC: 56 MG/DL (ref 7–30)
CALCIUM SERPL-MCNC: 9 MG/DL (ref 8.5–10.1)
CHLORIDE SERPL-SCNC: 105 MMOL/L (ref 94–109)
CO2 SERPL-SCNC: 26 MMOL/L (ref 20–32)
CREAT SERPL-MCNC: 3.16 MG/DL (ref 0.66–1.25)
DIFFERENTIAL METHOD BLD: ABNORMAL
EOSINOPHIL # BLD AUTO: 0.3 10E9/L (ref 0–0.7)
EOSINOPHIL NFR BLD AUTO: 4.5 %
ERYTHROCYTE [DISTWIDTH] IN BLOOD BY AUTOMATED COUNT: 17.4 % (ref 10–15)
GFR SERPL CREATININE-BSD FRML MDRD: 20 ML/MIN/{1.73_M2}
GLUCOSE BLDC GLUCOMTR-MCNC: 143 MG/DL (ref 70–99)
GLUCOSE BLDC GLUCOMTR-MCNC: 154 MG/DL (ref 70–99)
GLUCOSE BLDC GLUCOMTR-MCNC: 216 MG/DL (ref 70–99)
GLUCOSE SERPL-MCNC: 136 MG/DL (ref 70–99)
HCO3 BLDV-SCNC: 27 MMOL/L (ref 21–28)
HCO3 BLDV-SCNC: 28 MMOL/L (ref 21–28)
HCO3 BLDV-SCNC: 28 MMOL/L (ref 21–28)
HCT VFR BLD AUTO: 38.2 % (ref 40–53)
HGB BLD-MCNC: 11.4 G/DL (ref 13.3–17.7)
IMM GRANULOCYTES # BLD: 0 10E9/L (ref 0–0.4)
IMM GRANULOCYTES NFR BLD: 0.3 %
LYMPHOCYTES # BLD AUTO: 0.7 10E9/L (ref 0.8–5.3)
LYMPHOCYTES NFR BLD AUTO: 9.1 %
MCH RBC QN AUTO: 26.1 PG (ref 26.5–33)
MCHC RBC AUTO-ENTMCNC: 29.8 G/DL (ref 31.5–36.5)
MCV RBC AUTO: 88 FL (ref 78–100)
MONOCYTES # BLD AUTO: 0.8 10E9/L (ref 0–1.3)
MONOCYTES NFR BLD AUTO: 10.1 %
NEUTROPHILS # BLD AUTO: 5.6 10E9/L (ref 1.6–8.3)
NEUTROPHILS NFR BLD AUTO: 75.1 %
NRBC # BLD AUTO: 0 10*3/UL
NRBC BLD AUTO-RTO: 0 /100
O2/TOTAL GAS SETTING VFR VENT: 21 %
OXYHGB MFR BLDV: 33 %
OXYHGB MFR BLDV: 38 %
OXYHGB MFR BLDV: 42 %
PCO2 BLDV: 41 MM HG (ref 40–50)
PCO2 BLDV: 42 MM HG (ref 40–50)
PCO2 BLDV: 44 MM HG (ref 40–50)
PH BLDV: 7.41 PH (ref 7.32–7.43)
PH BLDV: 7.42 PH (ref 7.32–7.43)
PH BLDV: 7.44 PH (ref 7.32–7.43)
PLATELET # BLD AUTO: 286 10E9/L (ref 150–450)
PO2 BLDV: 24 MM HG (ref 25–47)
PO2 BLDV: 26 MM HG (ref 25–47)
PO2 BLDV: 27 MM HG (ref 25–47)
POTASSIUM SERPL-SCNC: 4.2 MMOL/L (ref 3.4–5.3)
PROT SERPL-MCNC: 8.1 G/DL (ref 6.8–8.8)
RBC # BLD AUTO: 4.36 10E12/L (ref 4.4–5.9)
SODIUM SERPL-SCNC: 140 MMOL/L (ref 133–144)
WBC # BLD AUTO: 7.5 10E9/L (ref 4–11)

## 2020-02-10 PROCEDURE — 93306 TTE W/DOPPLER COMPLETE: CPT | Mod: 26 | Performed by: INTERNAL MEDICINE

## 2020-02-10 PROCEDURE — 40000196 ZZH STATISTIC RAPCV CVP MONITORING

## 2020-02-10 PROCEDURE — 40000275 ZZH STATISTIC RCP TIME EA 10 MIN

## 2020-02-10 PROCEDURE — 80053 COMPREHEN METABOLIC PANEL: CPT | Performed by: INTERNAL MEDICINE

## 2020-02-10 PROCEDURE — 25000125 ZZHC RX 250: Performed by: STUDENT IN AN ORGANIZED HEALTH CARE EDUCATION/TRAINING PROGRAM

## 2020-02-10 PROCEDURE — 82805 BLOOD GASES W/O2 SATURATION: CPT | Performed by: INTERNAL MEDICINE

## 2020-02-10 PROCEDURE — 25000132 ZZH RX MED GY IP 250 OP 250 PS 637: Performed by: STUDENT IN AN ORGANIZED HEALTH CARE EDUCATION/TRAINING PROGRAM

## 2020-02-10 PROCEDURE — 4A1239Z MONITORING OF CARDIAC OUTPUT, PERCUTANEOUS APPROACH: ICD-10-PCS | Performed by: INTERNAL MEDICINE

## 2020-02-10 PROCEDURE — 71045 X-RAY EXAM CHEST 1 VIEW: CPT

## 2020-02-10 PROCEDURE — 40000556 ZZH STATISTIC PERIPHERAL IV START W US GUIDANCE

## 2020-02-10 PROCEDURE — 93010 ELECTROCARDIOGRAM REPORT: CPT | Performed by: INTERNAL MEDICINE

## 2020-02-10 PROCEDURE — 25000131 ZZH RX MED GY IP 250 OP 636 PS 637: Performed by: STUDENT IN AN ORGANIZED HEALTH CARE EDUCATION/TRAINING PROGRAM

## 2020-02-10 PROCEDURE — 82805 BLOOD GASES W/O2 SATURATION: CPT | Performed by: STUDENT IN AN ORGANIZED HEALTH CARE EDUCATION/TRAINING PROGRAM

## 2020-02-10 PROCEDURE — 20000004 ZZH R&B ICU UMMC

## 2020-02-10 PROCEDURE — 4A023N6 MEASUREMENT OF CARDIAC SAMPLING AND PRESSURE, RIGHT HEART, PERCUTANEOUS APPROACH: ICD-10-PCS | Performed by: INTERNAL MEDICINE

## 2020-02-10 PROCEDURE — 27210794 ZZH OR GENERAL SUPPLY STERILE: Performed by: INTERNAL MEDICINE

## 2020-02-10 PROCEDURE — 93451 RIGHT HEART CATH: CPT | Mod: 26 | Performed by: INTERNAL MEDICINE

## 2020-02-10 PROCEDURE — 85025 COMPLETE CBC W/AUTO DIFF WBC: CPT | Performed by: INTERNAL MEDICINE

## 2020-02-10 PROCEDURE — 93005 ELECTROCARDIOGRAM TRACING: CPT

## 2020-02-10 PROCEDURE — 93306 TTE W/DOPPLER COMPLETE: CPT

## 2020-02-10 PROCEDURE — 4A133B3 MONITORING OF ARTERIAL PRESSURE, PULMONARY, PERCUTANEOUS APPROACH: ICD-10-PCS | Performed by: INTERNAL MEDICINE

## 2020-02-10 PROCEDURE — 00000146 ZZHCL STATISTIC GLUCOSE BY METER IP

## 2020-02-10 PROCEDURE — 25000125 ZZHC RX 250: Performed by: INTERNAL MEDICINE

## 2020-02-10 PROCEDURE — 25500064 ZZH RX 255 OP 636: Performed by: INTERNAL MEDICINE

## 2020-02-10 PROCEDURE — 25000128 H RX IP 250 OP 636: Performed by: STUDENT IN AN ORGANIZED HEALTH CARE EDUCATION/TRAINING PROGRAM

## 2020-02-10 PROCEDURE — 93451 RIGHT HEART CATH: CPT | Performed by: INTERNAL MEDICINE

## 2020-02-10 PROCEDURE — 40000172 ZZH STATISTIC PROCEDURE PREP ONLY

## 2020-02-10 PROCEDURE — 99223 1ST HOSP IP/OBS HIGH 75: CPT | Mod: AI | Performed by: INTERNAL MEDICINE

## 2020-02-10 PROCEDURE — 36415 COLL VENOUS BLD VENIPUNCTURE: CPT | Performed by: INTERNAL MEDICINE

## 2020-02-10 PROCEDURE — 40000048 ZZH STATISTIC DAILY SWAN MONITORING

## 2020-02-10 PROCEDURE — 02HQ32Z INSERTION OF MONITORING DEVICE INTO RIGHT PULMONARY ARTERY, PERCUTANEOUS APPROACH: ICD-10-PCS | Performed by: INTERNAL MEDICINE

## 2020-02-10 RX ORDER — ISOSORBIDE DINITRATE 10 MG/1
10 TABLET ORAL 3 TIMES DAILY
Status: DISCONTINUED | OUTPATIENT
Start: 2020-02-10 | End: 2020-02-10

## 2020-02-10 RX ORDER — DEXTROSE MONOHYDRATE 25 G/50ML
25-50 INJECTION, SOLUTION INTRAVENOUS
Status: DISCONTINUED | OUTPATIENT
Start: 2020-02-10 | End: 2020-02-14 | Stop reason: HOSPADM

## 2020-02-10 RX ORDER — CLOPIDOGREL BISULFATE 75 MG/1
75 TABLET ORAL DAILY
Status: DISCONTINUED | OUTPATIENT
Start: 2020-02-10 | End: 2020-02-14 | Stop reason: HOSPADM

## 2020-02-10 RX ORDER — VITAMIN B COMPLEX
1000 TABLET ORAL 2 TIMES DAILY
Status: DISCONTINUED | OUTPATIENT
Start: 2020-02-10 | End: 2020-02-14 | Stop reason: HOSPADM

## 2020-02-10 RX ORDER — FUROSEMIDE 10 MG/ML
80 INJECTION INTRAMUSCULAR; INTRAVENOUS EVERY 8 HOURS
Status: DISCONTINUED | OUTPATIENT
Start: 2020-02-10 | End: 2020-02-10

## 2020-02-10 RX ORDER — BUMETANIDE 0.25 MG/ML
4 INJECTION INTRAMUSCULAR; INTRAVENOUS ONCE
Status: COMPLETED | OUTPATIENT
Start: 2020-02-10 | End: 2020-02-10

## 2020-02-10 RX ORDER — DOBUTAMINE HYDROCHLORIDE 200 MG/100ML
2.5-2 INJECTION INTRAVENOUS CONTINUOUS
Status: DISCONTINUED | OUTPATIENT
Start: 2020-02-10 | End: 2020-02-10

## 2020-02-10 RX ORDER — NICOTINE POLACRILEX 4 MG
15-30 LOZENGE BUCCAL
Status: DISCONTINUED | OUTPATIENT
Start: 2020-02-10 | End: 2020-02-14 | Stop reason: HOSPADM

## 2020-02-10 RX ORDER — HYDRALAZINE HYDROCHLORIDE 25 MG/1
25 TABLET, FILM COATED ORAL 3 TIMES DAILY
Status: DISCONTINUED | OUTPATIENT
Start: 2020-02-10 | End: 2020-02-10

## 2020-02-10 RX ORDER — NITROGLYCERIN 20 MG/100ML
.01-2 INJECTION INTRAVENOUS CONTINUOUS
Status: DISCONTINUED | OUTPATIENT
Start: 2020-02-10 | End: 2020-02-12

## 2020-02-10 RX ORDER — HEPARIN SODIUM 5000 [USP'U]/.5ML
5000 INJECTION, SOLUTION INTRAVENOUS; SUBCUTANEOUS EVERY 12 HOURS
Status: DISCONTINUED | OUTPATIENT
Start: 2020-02-10 | End: 2020-02-14 | Stop reason: HOSPADM

## 2020-02-10 RX ORDER — LIDOCAINE 40 MG/G
CREAM TOPICAL
Status: COMPLETED | OUTPATIENT
Start: 2020-02-10 | End: 2020-02-10

## 2020-02-10 RX ORDER — ASPIRIN 81 MG/1
81 TABLET, CHEWABLE ORAL DAILY
Status: DISCONTINUED | OUTPATIENT
Start: 2020-02-10 | End: 2020-02-14 | Stop reason: HOSPADM

## 2020-02-10 RX ORDER — PANTOPRAZOLE SODIUM 40 MG/1
40 TABLET, DELAYED RELEASE ORAL 2 TIMES DAILY
Status: DISCONTINUED | OUTPATIENT
Start: 2020-02-10 | End: 2020-02-14 | Stop reason: HOSPADM

## 2020-02-10 RX ORDER — ATORVASTATIN CALCIUM 20 MG/1
80 TABLET, FILM COATED ORAL DAILY
Status: DISCONTINUED | OUTPATIENT
Start: 2020-02-10 | End: 2020-02-14 | Stop reason: HOSPADM

## 2020-02-10 RX ORDER — BUMETANIDE 0.25 MG/ML
2 INJECTION INTRAMUSCULAR; INTRAVENOUS ONCE
Status: COMPLETED | OUTPATIENT
Start: 2020-02-10 | End: 2020-02-10

## 2020-02-10 RX ADMIN — BUMETANIDE 0.5 MG/HR: 0.25 INJECTION INTRAMUSCULAR; INTRAVENOUS at 17:39

## 2020-02-10 RX ADMIN — B-COMPLEX W/ C & FOLIC ACID TAB 1 TABLET: TAB at 17:38

## 2020-02-10 RX ADMIN — NITROGLYCERIN 0.07 MCG/KG/MIN: 20 INJECTION INTRAVENOUS at 16:08

## 2020-02-10 RX ADMIN — INSULIN ASPART 2 UNITS: 100 INJECTION, SOLUTION INTRAVENOUS; SUBCUTANEOUS at 17:38

## 2020-02-10 RX ADMIN — LIDOCAINE: 40 CREAM TOPICAL at 08:46

## 2020-02-10 RX ADMIN — CLOPIDOGREL BISULFATE 75 MG: 75 TABLET, FILM COATED ORAL at 15:35

## 2020-02-10 RX ADMIN — ASPIRIN 81 MG CHEWABLE TABLET 81 MG: 81 TABLET CHEWABLE at 15:34

## 2020-02-10 RX ADMIN — ATORVASTATIN CALCIUM 80 MG: 20 TABLET, FILM COATED ORAL at 15:35

## 2020-02-10 RX ADMIN — BUMETANIDE 2 MG: 0.25 INJECTION INTRAMUSCULAR; INTRAVENOUS at 16:08

## 2020-02-10 RX ADMIN — HUMAN ALBUMIN MICROSPHERES AND PERFLUTREN 5 ML: 10; .22 INJECTION, SOLUTION INTRAVENOUS at 17:30

## 2020-02-10 RX ADMIN — MELATONIN 1000 UNITS: at 20:56

## 2020-02-10 RX ADMIN — HEPARIN SODIUM 5000 UNITS: 5000 INJECTION, SOLUTION INTRAVENOUS; SUBCUTANEOUS at 16:37

## 2020-02-10 RX ADMIN — BUMETANIDE 4 MG: 0.25 INJECTION INTRAMUSCULAR; INTRAVENOUS at 12:57

## 2020-02-10 RX ADMIN — PANTOPRAZOLE SODIUM 40 MG: 40 TABLET, DELAYED RELEASE ORAL at 20:56

## 2020-02-10 ASSESSMENT — ACTIVITIES OF DAILY LIVING (ADL)
ADLS_ACUITY_SCORE: 12
ADLS_ACUITY_SCORE: 12

## 2020-02-10 NOTE — H&P
History and Physical    Cards 2              Critical Care  Alen JOSE DE JESUSShankar Trujillo    I personally saw and examined this patient and agree with the findings, assessment, and plan as outlined by the housestaff this day.  The patient remains in the ICU secondary to advanced circulatory disease characterized by shock, the need for parenteral  inotropic agents and/or vasopressors to maintain blood pressure and organ perfusion., the careful assessment of renal function in response to low cardiac output state and need for substantial volumes of fluid for drug administration.  I personally assessed.  the logic and continuity of care plan over the preceding twenty four hours, assessed the continuing need for parenteral and oral medications and their appropriate dosing in the context of circulatory status and renal function.  I reviewed all interim laboratory and imaging.  I coordinated care with nursing staff and consultants. I disucssed the patient's status with the patient.      Impression:  1. 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 Anemia with microcytosis  4. Acute and chronic renal failure  5. Diabetes  6. Allergy to hydralazine  7. Iron deficiency    Plan:  1. Parenteral iron replacement  2. TNG gtt for venous and some afterload reduction  3. Consider dobutamine   4. Parenteral diuretic  5. Continue hemodyamic monitring.              Results for [facility] SHAFER (MRN [id number]) as of 2/10/2020 17:55   Ref. Range 12/20/2019 12:08 12/20/2019 17:08 12/20/2019 18:20 12/20/2019 21:20 12/21/2019 02:17 12/21/2019 08:26 12/21/2019 08:44 12/21/2019 12:15 12/23/2019 11:50 12/30/2019 00:00 1/16/2020 10:33 2/10/2020 08:17 2/10/2020 14:47 2/10/2020 15:19   Creatinine Latest Ref Range: 0.66 - 1.25 mg/dL       2.82 (H)  2.70 (H)  2.72 (H) 3.16 (H)         The patient has contraindications to traditional advanced HF therapy secondary to known/confirmed intolerance to hydralazine, renal problems precluding  ACE.  His renal function would tolerate limited low dose NP or one could use parenteral TNG.  .     Alen Trujillo M.D.           Date of Service: 02/10/20  Date of Admission: 2/10/2020  Patient Name: Cole Jesus  : 1960  MRN: [id number]       Chief complaint:   Shortness of breath      History of Present Illness:   59 year old male with history of occluded pRCA s/p balloon angioplasty and stenting in 2019, STEMI s/p DESx3 for total occlusion of RCA and Lcx (), GIB due to AV malformation (in the setting of warfarin and DAPT) s/p EGD with clip (2019), ischemic cardiomyopathy, HFrEF (last EF=30-35% 19), severe pHTN, moderate mitral regurgitation, atrial fibrillation, CKD stage IV who presented for planned RHC.     Patient follows with  on outpatient basis and was scheduled for outpatient RHC today. RHC showed RA 22, RV EDP 20, /50/65, PCWP 40, PVR 8.5, CO 3.5, CI 1.6. Patient reported compliance with his medications. Reported taking his medications with good compliance. No reported issues with urination. He reported that he has been able to walk 1 block, 1 flight of stairs without any issues. No reported chest pain.      Review of Symptoms:     Comprehensive 10 point review of systems was negative unless otherwise noted in the HPI.     Past Medical History:     Past Medical History:   Diagnosis Date     Anemia in chronic renal disease 3/9/2015     Antiplatelet or antithrombotic long-term use      Atrial fibrillation and flutter      CAD (coronary artery disease)     Stemi in , s/p angioplasty     CRD (chronic renal disease), stage IV (H) 2015    hx ATN with dialysis complicating cardiogenic shock  2012     GI bleed     massive lower GI bleed secondary to a cecal ulcer, s/p ileocecal resection in      Heart failure     Biventricular systolic HF, complicated by ARDS requiring tracheostomy     Hyperlipidemia LDL goal <100 2013     Hypertension       Ischemic cardiomyopathy 4/28/2013    TTE revealing 40% EF     Myocardial infarction (H)      Other and unspecified nonspecific immunological findings     Anti JKa     Pulmonary edema     episodes of flash pulmonary edema in 12/11     Subclinical hypothyroidism        Past Surgical History:   Procedure Laterality Date     CV RIGHT HEART CATH N/A 11/12/2019    Procedure: CV RIGHT HEART CATH;  Surgeon: Fox Gerard MD;  Location:  HEART CARDIAC CATH LAB     ESOPHAGOSCOPY, GASTROSCOPY, DUODENOSCOPY (EGD), COMBINED  12/25/2011    Procedure:COMBINED ESOPHAGOSCOPY, GASTROSCOPY, DUODENOSCOPY (EGD); Surgeon:SAMARIA PUENTE; Location: GI     LAPAROTOMY EXPLORATORY  12/31/2011    Procedure:LAPAROTOMY EXPLORATORY; Explore laparotomy, Illeocectomy, Diverting Illeostomy; Surgeon:GIA NAJERA; Location:[facility] OR     ORIF right elbow fracture  age 12     TAKEDOWN ILEOSTOMY  6/5/2014    Procedure: TAKEDOWN ILEOSTOMY;  Surgeon: Gia Najera MD;  Location: [facility] OR     TRACHEOSTOMY  12/22/2011    Procedure:TRACHEOSTOMY; Tracheostomy; 80XLTCP-Proximal Extension-Cuffed 8.0 mm I.D.; Surgeon:LIZABETH DYE; Location:[facility] OR        Allergies:     Allergies   Allergen Reactions     Hydralazine      Black spots     Simvastatin Other (See Comments)     Leg muscle weakness     Tylenol [Acetaminophen] Palpitations        Outpatient Medications:     No current facility-administered medications on file prior to encounter.   aspirin (ASA) 81 MG chewable tablet, Take 81 mg by mouth daily  atorvastatin (LIPITOR) 80 MG tablet, Take 1 tablet (80 mg) by mouth daily  cholecalciferol (VITAMIN  -D) 1000 UNITS capsule, Take 2 capsules (2,000 Units) by mouth daily (Patient taking differently: Take 1 capsule by mouth 2 times daily )  clopidogrel (PLAVIX) 75 MG tablet, Take 1 tablet (75 mg) by mouth daily  pantoprazole (PROTONIX) 40 MG EC tablet, Take 1 tablet (40 mg) by mouth 2 times daily  vitamin  B  complex with vitamin C (VITAMIN  B COMPLEX) TABS, Take 1 tablet by mouth daily  ACCU-CHEK GUIDE test strip, USE TO TEST BLOOD SUGAR FOUR TIMES A DAY BEFORE MEALS AND AT BEDTIME  Alcohol Swabs PADS, 1 applicator 4 times daily (before meals and nightly)  blood glucose monitoring (ACCU-CHEK FASTCLIX) lancets, USE TO TEST BLOOD SUGAR FOUR TIMES A DAY AT MEALS AND AT BEDTIME  insulin pen needle (32G X 4 MM) 32G X 4 MM miscellaneous, Use 5 pen needles daily or as directed.  nitroGLYcerin (NITROSTAT) 0.4 MG sublingual tablet, Place 1 tablet (0.4 mg) under the tongue every 5 minutes as needed for chest pain For chest pain place 1 tablet under the tongue every 5 minutes for 3 doses. If symptoms persist 5 minutes after 1st dose call 911. (Patient not taking: Reported on 2019)  Sharps Container MISC, 1 Device every 30 days  [DISCONTINUED] ISOSORBIDE DINITRATE PO, Take 15 mg by mouth 3 times daily         Family History:     Family History   Problem Relation Age of Onset     Diabetes Mother      Hypertension Mother      Heart Disease Mother      Heart Disease Father          of heart attack, left when he was young     Diabetes Sister      Diabetes Sister      Diabetes Sister      Glaucoma No family hx of      Macular Degeneration No family hx of         Social History:     Social History     Tobacco Use     Smoking status: Former Smoker     Packs/day: 2.00     Years: 40.00     Pack years: 80.00     Types: Cigarettes     Last attempt to quit: 12/3/2011     Years since quittin.1     Smokeless tobacco: Never Used   Substance Use Topics     Alcohol use: No     Alcohol/week: 0.0 standard drinks     Drug use: No        Physical Exam:   Blood pressure 108/76, pulse 94, temperature 97.4  F (36.3  C), temperature source Axillary, resp. rate 21, SpO2 96 %.  Temp (24hrs), Av.3  F (36.3  C), Min:97.3  F (36.3  C), Max:97.3  F (36.3  C)    Gen: no acute distress  HEENT: no scleral icterus, pupils equal and reactive to  light, moist mucous membranes, no nasal discharge.  NECK: no adenopathy, no asymmetry, masses, or scars, thyroid normal to palpation and no bruits, JVP not elevated  CARDIOVASCULAR: normal rate, irregular rhythm. S1/S2 normal, +3/6 systolic murmur   RESPIRATORY: clear to auscultation bilaterally, no rales, rhonchi or wheezes, no use of accessory muscles, no retractions, respirations unlabored   ABDOMEN: soft, non-tender abdomen without rebound or guarding, no hepatosplenomegaly, no palpable masses, bowel sounds present  EXTREMITIES: peripheral pulses normal, no peripheral edema, warm, capillary refill < 2 seconds  Skin: no ecchymoses, no rashes  NEURO: oriented to person, place, year, and situation; CN II-XII grossly intact; 5/5 strength throughout; sensation to light touch intact throughout; coordination intact; negative Romberg; gait normal  PSYCH: affect appropriate      Data:   CMP  Recent Labs   Lab 02/10/20  0817      POTASSIUM 4.2   CHLORIDE 105   CO2 26   ANIONGAP 8   *   BUN 56*   CR 3.16*   GFRESTIMATED 20*   GFRESTBLACK 23*   RONNY 9.0   PROTTOTAL 8.1   ALBUMIN 3.9   BILITOTAL 1.5*   ALKPHOS 120   AST 4   ALT 17     CBC  Recent Labs   Lab 02/10/20  0817   WBC 7.5   RBC 4.36*   HGB 11.4*   HCT 38.2*   MCV 88   MCH 26.1*   MCHC 29.8*   RDW 17.4*        INRNo lab results found in last 7 days.  Arterial Blood Gas  Recent Labs   Lab 02/10/20  1447   O2PER 21        EKG:    Atrial fibrillation     MICRO:      Imaging:    CXR pending     ECHO   Interpretation Summary  Limited, technically difficult study.  Moderately (EF 30-35%) reduced left ventricular function is present.  The right ventricle cannot be assessed.  Small posterior pericardial effusion without echocardiographic evidence of  tamponade.  This study was compared with the study from 11/14/19: There has been no  change.     Assessment/Plan:   59 year old male with history of occluded pRCA s/p balloon angioplasty and stenting in  6/2019, STEMI s/p DESx3 for total occlusion of RCA and Lcx (2012), GIB due to AV malformation (in the setting of warfarin and DAPT) s/p EGD with clip (7/5/2019), ischemic cardiomyopathy, HFrEF (last EF=30-35% 11/12/19), severe pHTN, moderate mitral regurgitation, atrial fibrillation, CKD stage IV who presented for planned RHC.     #Acute decompensated heart failure with reduced ejection fraction (EF 30%)  #Ambulatory cardiogenic shock   #Severe pulmonary hypertension, WHO group 2   #Moderate mitral regurgitation   Patient with known ischemic CM. RHC numbers on admission: RA 22, RV EDP 20, /50/65, PCWP 40, PVR 8.5, CO 3.5, CI 1.6. [place] catheter in place. For afterload reduction, unable to give nipride or ACEi given renal function and unable to give hydralazine given history of drug skin eruption.   - Bumex 4mg IV x1 then start bumex gtt   - Hemodynamics q6   - Start nitroglycerin gtt for afterload reduction   - Hold entresto given renal function   - Limited TTE to evaluate RV function  - ICD: will readdress during this admission     #STEMI s/p DESx3 (2012)  #CAD s/p PCI proximal RCA (6/2019)  - Continue plavix and aspirin     #Paroxysmal Afib   CHADSVASC 3 (HF, DM, CAD). Not on a/c due to history of GI bleed   - Will readdress anticoagulation prior to discharge     # T2DM  -PTA regimen of 7 units insulin glargine every morning    #Acute Kidney Injury   #CKD IV  Baseline serum creatinine between 2.7. Follows with   Creatinine on admission 3.16.   - Diuresis as above   - Renal consult     Fluids:   Electrolytes: K>4, Mg>2  Nutrition: Cardiac, 2L fluid restriction  Analgesia: None  Sedation: None  DVT ppx: heparin subcutaneous   Stress Ulcer ppx:  Protonix  Glycemic Control: SSI  Catheters: None  Lines: None  Therapies: PT  Consults: Renal     Amparo Jean-Baptiste   Cardiology fellow

## 2020-02-10 NOTE — PROGRESS NOTES
Patient admitted to  around 1430 from cath lab after Frederick placement. See flowsheet for hemodynamic monitoring numbers. Nitroglycerin and Bumex gtt started. Continue to closely monitor intake and output. Taking good PO. Insulin sliding scale ordered.

## 2020-02-10 NOTE — CONSULTS
Nephrology Initial Consult  February 10, 2020      Cole Jesus MRN:[id number] YOB: 1960  Date of Admission:2/10/2020  Primary care provider: Silas Enciso  Requesting physician: Alen Trujillo, *    ASSESSMENT AND RECOMMENDATIONS:   Cole Jesus is a 59 year old male with a h/o CAD (s/p PCI to pRCA 6/2019, STEMI s/p KEVYN x 3 to RCA and LCx in 2012), ICM (LVEF 30-35% 11/2019), severe pHTN, Moderate MR, Afib (off AC), GIB due to AVM s/p clipping, CKD IV, admitted after planned RHC for management of ambulatory cardiogenic shock and decompensated HFrEF.     Serum creatinine was in 3.1-3.2 range in December during admission for acute blood loss anemia, had since returned to 2.7-2.8. Clinically appears hypervolemic on exam, consistent with RHC findings (PCWP 40, PA mean 65). No hypotension reported, no nephrotoxins, anemia improving and no signs of ongoing blood loss. Responded well to one dose of IV bumex since admission.       EVA on CKD IV  Likely due to volume status in setting of decompensated HF/cardiogenic shock  -Agree with diuresis and monitoring renal response to therapy  -Discussed dialysis modalities and indications briefly with patient today  -Agree with holding entresto in setting of EVA and diuresis  -Reasonable to continue vitamin D3 supplement for hypovitaminosis D as long as calcium remains normal    Iron deficiency Anemia  Hemoglobin steadily improving with no signs of ongoing bleeding but not currently on iron supplementation. Last iron studies consistent with WILLIAM.   -Would repeat iron studies (last in 12/2019) and start daily iron supplement     Recommendations were communicated to primary team.    Patient was seen and examined with attending physician, Dr. Corbin.    Jada Alexander MD, MPAS  PGY3, Internal Medicine   [phone]        REASON FOR CONSULT: EVA on CKD    HISTORY OF PRESENT ILLNESS:  Admitting provider and nursing notes reviewed  Cole  Ann Marie is a 59 year old male with a h/o CAD (s/p PCI to pRCA 2019, STEMI s/p KEVYN x 3 to RCA and LCx in ), ICM (LVEF 30-35% 2019), severe pHTN, Moderate MR, Afib (off AC), GIB due to AVM s/p clipping, CKD IV, admitted after planned RHC for management of ambulatory cardiogenic shock and decompensated HFrEF. Patient follows with  for pHTN and Dr. Vigil for CKD. Baseline Cr 2.1-2.3, due to unresolving ATN after cardiogenic shock in . Creatinine slowly increasing to 2.3-3.2 in past few months, GFR 20s. Previously on ARB for proteinuria, held in setting of GIB and hypotension in 2019. He was started on entresto in 2019; his DM and HTN have been well controlled at home.     Patient reports compliance with his medications, no significant change in edema, urine output or chest pain. He has some dyspnea but is able to climb a 1 flight of stairs without any issues. He reports weights at home 216-217lbs (he thinks its 3 lbs lighter than our scales), BP running 100s/60s and takes lasix 100mg BID. He denies any NSAID use, quit drinking and smoking in . He reports his mother started dialysis (?PD) after developing ESRD related to long-standing DM and  within a year of starting dialysis. He has experience with other family/friends who have been on dialysis in the past. He is hopeful his kidneys will recover and there will be a cure for CKD if he progresses to the point of needing dialysis.     Per notes, RHC today showed RA 22, RV EDP 20, /50/65, PCWP 40, PVR 8.5, CO 3.5, CI 1.6. After admission to ICU, he was given bumex 4mg IV x 1 with 700cc urine output in response.     PAST MEDICAL HISTORY:  Past Medical History:   Diagnosis Date     Anemia in chronic renal disease 3/9/2015     Antiplatelet or antithrombotic long-term use      Atrial fibrillation and flutter      CAD (coronary artery disease)     Stemi in , s/p angioplasty     CRD (chronic renal disease), stage IV (H)  03/09/2015    hx ATN with dialysis complicating cardiogenic shock  1/2012     GI bleed     massive lower GI bleed secondary to a cecal ulcer, s/p ileocecal resection in 12/11     Heart failure     Biventricular systolic HF, complicated by ARDS requiring tracheostomy     Hyperlipidemia LDL goal <100 4/28/2013     Hypertension      Ischemic cardiomyopathy 4/28/2013    TTE revealing 40% EF     Myocardial infarction (H)      Other and unspecified nonspecific immunological findings     Anti JKa     Pulmonary edema     episodes of flash pulmonary edema in 12/11     Subclinical hypothyroidism        PAST MEDICAL HISTORY:  Past Surgical History:   Procedure Laterality Date     CV RIGHT HEART CATH N/A 11/12/2019    Procedure: CV RIGHT HEART CATH;  Surgeon: Fox Gerard MD;  Location: [facility]     ESOPHAGOSCOPY, GASTROSCOPY, DUODENOSCOPY (EGD), COMBINED  12/25/2011    Procedure:COMBINED ESOPHAGOSCOPY, GASTROSCOPY, DUODENOSCOPY (EGD); Surgeon:SAMARIA PUENTE; Location[facility]     LAPAROTOMY EXPLORATORY  12/31/2011    Procedure:LAPAROTOMY EXPLORATORY; Explore laparotomy, Illeocectomy, Diverting Illeostomy; Surgeon:GIA NAJERA; Location:[facility]     ORIF right elbow fracture  age 12     TAKEDOWN ILEOSTOMY  6/5/2014    Procedure: TAKEDOWN ILEOSTOMY;  Surgeon: Gia Najera MD;  Location: [facility]     TRACHEOSTOMY  12/22/2011    Procedure:TRACHEOSTOMY; Tracheostomy; 80XLTCP-Proximal Extension-Cuffed 8.0 mm I.D.; Surgeon:LIZABETH DYE; Location:[facility]        MEDICATIONS:  PTA Meds  Prior to Admission medications    Medication Sig Last Dose Taking? Auth Provider   aspirin (ASA) 81 MG chewable tablet Take 81 mg by mouth daily 2/9/2020 at 0900 Yes Unknown, Entered By History   atorvastatin (LIPITOR) 80 MG tablet Take 1 tablet (80 mg) by mouth daily 2/9/2020 at 1900 Yes Alen Trujillo MD   cholecalciferol (VITAMIN  -D) 1000 UNITS capsule Take 2 capsules (2,000  Units) by mouth daily  Patient taking differently: Take 1 capsule by mouth 2 times daily  2/9/2020 at 1900 Yes Darren Florez MD   clopidogrel (PLAVIX) 75 MG tablet Take 1 tablet (75 mg) by mouth daily 2/9/2020 at 0900 Yes Alen Trujillo MD   furosemide (LASIX) 20 MG tablet Take 1 tablet (20 mg) by mouth 2 times daily Take a total of 100 mg twice a day 2/9/2020 at 1900 Yes Alen Trujillo MD   furosemide (LASIX) 80 MG tablet Take 1 tablet (80 mg) by mouth 2 times daily Total of 100 mg twice a day 2/9/2020 at 1900 Yes Alen Trujillo MD   pantoprazole (PROTONIX) 40 MG EC tablet Take 1 tablet (40 mg) by mouth 2 times daily 2/9/2020 at 1900 Yes Alen Trujillo MD   sacubitril-valsartan (ENTRESTO) 49-51 MG per tablet Take 1 tablet by mouth 2 times daily 2/9/2020 at 0900 Yes Alen Trujillo MD   vitamin  B complex with vitamin C (VITAMIN  B COMPLEX) TABS Take 1 tablet by mouth daily 2/9/2020 at 0900 Yes Reported, Patient   ACCU-CHEK GUIDE test strip USE TO TEST BLOOD SUGAR FOUR TIMES A DAY BEFORE MEALS AND AT BEDTIME   Smiley Argueta PA-C   Alcohol Swabs PADS 1 applicator 4 times daily (before meals and nightly)   Arina Haynes APRN CNP   blood glucose monitoring (ACCU-CHEK FASTCLIX) lancets USE TO TEST BLOOD SUGAR FOUR TIMES A DAY AT MEALS AND AT BEDTIME   Smiley Argueta PA-C   insulin pen needle (32G X 4 MM) 32G X 4 MM miscellaneous Use 5 pen needles daily or as directed.   Arina Haynes APRN CNP   nitroGLYcerin (NITROSTAT) 0.4 MG sublingual tablet Place 1 tablet (0.4 mg) under the tongue every 5 minutes as needed for chest pain For chest pain place 1 tablet under the tongue every 5 minutes for 3 doses. If symptoms persist 5 minutes after 1st dose call 911.  Patient not taking: Reported on 12/30/2019 never used  Salo Jones MD   Sharps Container MISC 1 Device every 30 days   Arina Haynes APRN CNP      Current Meds    aspirin  81 mg Oral Daily      atorvastatin  80 mg Oral Daily     cholecalciferol  1,000 Units Oral BID     clopidogrel  75 mg Oral Daily     insulin aspart  1-7 Units Subcutaneous TID AC     insulin aspart  1-5 Units Subcutaneous At Bedtime     [START ON 2020] insulin glargine  7 Units Subcutaneous QAM AC     pantoprazole  40 mg Oral BID     vitamin B complex with vitamin C  1 tablet Oral Daily     Infusion Meds      ALLERGIES:    Allergies   Allergen Reactions     Hydralazine      Black spots     Simvastatin Other (See Comments)     Leg muscle weakness     Tylenol [Acetaminophen] Palpitations       REVIEW OF SYSTEMS:  A comprehensive of systems was negative except as noted above.    SOCIAL HISTORY:   Social History     Socioeconomic History     Marital status: Significant other     Spouse name: Not on file     Number of children: Not on file     Years of education: Not on file     Highest education level: Not on file   Occupational History     Not on file   Social Needs     Financial resource strain: Not on file     Food insecurity:     Worry: Not on file     Inability: Not on file     Transportation needs:     Medical: Not on file     Non-medical: Not on file   Tobacco Use     Smoking status: Former Smoker     Packs/day: 2.00     Years: 40.00     Pack years: 80.00     Types: Cigarettes     Last attempt to quit: 12/3/2011     Years since quittin.1     Smokeless tobacco: Never Used   Substance and Sexual Activity     Alcohol use: No     Alcohol/week: 0.0 standard drinks     Drug use: No     Sexual activity: Yes     Partners: Female   Lifestyle     Physical activity:     Days per week: Not on file     Minutes per session: Not on file     Stress: Not on file   Relationships     Social connections:     Talks on phone: Not on file     Gets together: Not on file     Attends [employer] service: Not on file     Active member of club or organization: Not on file     Attends meetings of clubs or organizations: Not on file     Relationship status:  Not on file     Intimate partner violence:     Fear of current or ex partner: Not on file     Emotionally abused: Not on file     Physically abused: Not on file     Forced sexual activity: Not on file   Other Topics Concern     Parent/sibling w/ CABG, MI or angioplasty before 65F 55M? Yes      Service Not Asked     Blood Transfusions Not Asked     Caffeine Concern Not Asked     Occupational Exposure Not Asked     Hobby Hazards Not Asked     Sleep Concern Not Asked     Stress Concern Not Asked     Weight Concern Not Asked     Special Diet Not Asked     Back Care Not Asked     Exercise Yes     Comment: limited walking     Bike Helmet Not Asked     Seat Belt Not Asked     Self-Exams Not Asked   Social History Narrative     Not on file         FAMILY MEDICAL HISTORY:   Family History   Problem Relation Age of Onset     Diabetes Mother      Hypertension Mother      Heart Disease Mother      Heart Disease Father          of heart attack, left when he was young     Diabetes Sister      Diabetes Sister      Diabetes Sister      Glaucoma No family hx of      Macular Degeneration No family hx of          PHYSICAL EXAM:   Temp  Av.4  F (36.3  C)  Min: 97.3  F (36.3  C)  Max: 97.4  F (36.3  C)      Pulse  Av.6  Min: 84  Max: 100 Resp  Av.6  Min: 11  Max: 21  SpO2  Av.3 %  Min: 96 %  Max: 100 %    CVP (mmHg): 17 mmHg  BP (!) 128/107   Pulse 94   Temp 97.4  F (36.3  C) (Axillary)   Resp 21   SpO2 96%    Date 02/10/20 0700 - 20 0659   Shift 7700-2056 9286-4294 8724-2626 24 Hour Total   INTAKE   Shift Total       OUTPUT   Urine 400   400   Shift Total 400   400   Weight (kg)          General: Alert, sitting up in chair, in no acute distress.   Skin: Warm, dry, no rashes or lesions on limited exam  HEENT:  EOMI, anicteric sclera. Pupils equal. No neck masses. Oral mucosa pink and moist with no lesions.  Respiratory: Non-labored breathing, decreased BS right base with few crackles, no  wheezing, on RA.  Cardiovascular: Irregular, ~tachycardic, 3/6 systolic murmur. JVP at earlobe, +JVD  Gastrointestinal:  Abdomen soft, non-distended, nontender. No palpable masses.  Extremities: 2+ BLE edema to knees. Warm, dry. Normal tone.  MSK: No deformities, strength grossly normal.   Neurologic: A&O x 3, speech normal, memory intact  Psych: appropriate mood and affect, cooperative and pleasant       LABS:   CMP  Recent Labs   Lab 02/10/20  0817      POTASSIUM 4.2   CHLORIDE 105   CO2 26   ANIONGAP 8   *   BUN 56*   CR 3.16*   GFRESTIMATED 20*   GFRESTBLACK 23*   RONNY 9.0   PROTTOTAL 8.1   ALBUMIN 3.9   BILITOTAL 1.5*   ALKPHOS 120   AST 4   ALT 17     CBC  Recent Labs   Lab 02/10/20  0817   HGB 11.4*   WBC 7.5   RBC 4.36*   HCT 38.2*   MCV 88   MCH 26.1*   MCHC 29.8*   RDW 17.4*          Recent Labs   Lab Test 06/01/18  0823 12/19/16  1819 05/31/16  1418 02/16/16  0926 02/01/16  1406 09/10/15  1226 07/29/15  1141 03/31/15  1420 12/01/14  1514 08/23/13  0722 04/19/12  1403 03/08/12  1413   UTPG 1.35* 3.10* 1.85* 0.93* 1.06* 0.22* 0.16 0.20 0.17 PUVOID 0.23* 0.07     PTH  Recent Labs   Lab Test 06/01/18  0811 02/01/16  1407 12/01/14  1525 08/23/13  0727 04/19/12  1308 03/08/12  1413   PTHI 123* 215* 129* 22 29 55     IRON STUDIES  Recent Labs   Lab Test 12/19/19  0545 12/12/19  1057 10/31/19  0732 10/07/19  0925 09/16/19  1854 08/26/19  1919 07/23/19  0431 07/22/19  0927 06/27/19  1226 06/01/18  0811 02/16/16  0927 09/10/15  1235 12/01/14  1525 08/23/13  0727 04/19/12  1308 03/08/12  1418 12/20/11  0410   IRON 37 31* 59 27* 36 17* 15*  --  84 73 51 24* 48 41 131 50 20*    366 396 388 412 447* 361  --   --  342 301 418 455* 416 366 319 348   IRONSAT 11* 8* 15 7* 9* 4* 4*  --   --  21 17 6* 11* 10* 36 16 6*   FRANK 35 37  --  48 103 16*  --  8*  --  30  --  17* 51 73 1570* 500* 936*       IMAGING:  TTE 12/2019: Limited, technically difficult study.  Moderately (EF 30-35%) reduced left  ventricular function is present.  The right ventricle cannot be assessed.  Small posterior pericardial effusion without echocardiographic evidence of  tamponade.  This study was compared with the study from 11/14/19: There has been no  change.

## 2020-02-11 ENCOUNTER — APPOINTMENT (OUTPATIENT)
Dept: GENERAL RADIOLOGY | Facility: CLINIC | Age: 60
End: 2020-02-11
Attending: STUDENT IN AN ORGANIZED HEALTH CARE EDUCATION/TRAINING PROGRAM
Payer: MEDICAID

## 2020-02-11 LAB
ABO + RH BLD: NORMAL
ABO + RH BLD: NORMAL
ANION GAP SERPL CALCULATED.3IONS-SCNC: 8 MMOL/L (ref 3–14)
ANION GAP SERPL CALCULATED.3IONS-SCNC: 9 MMOL/L (ref 3–14)
BASE EXCESS BLDV CALC-SCNC: 2.6 MMOL/L
BASE EXCESS BLDV CALC-SCNC: 3.4 MMOL/L
BASE EXCESS BLDV CALC-SCNC: 3.5 MMOL/L
BASE EXCESS BLDV CALC-SCNC: 3.8 MMOL/L
BLD GP AB SCN SERPL QL: NORMAL
BLOOD BANK CMNT PATIENT-IMP: NORMAL
BUN SERPL-MCNC: 53 MG/DL (ref 7–30)
BUN SERPL-MCNC: 56 MG/DL (ref 7–30)
CALCIUM SERPL-MCNC: 8.4 MG/DL (ref 8.5–10.1)
CALCIUM SERPL-MCNC: 8.4 MG/DL (ref 8.5–10.1)
CHLORIDE SERPL-SCNC: 103 MMOL/L (ref 94–109)
CHLORIDE SERPL-SCNC: 106 MMOL/L (ref 94–109)
CO2 SERPL-SCNC: 27 MMOL/L (ref 20–32)
CO2 SERPL-SCNC: 27 MMOL/L (ref 20–32)
CREAT SERPL-MCNC: 2.76 MG/DL (ref 0.66–1.25)
CREAT SERPL-MCNC: 2.86 MG/DL (ref 0.66–1.25)
ERYTHROCYTE [DISTWIDTH] IN BLOOD BY AUTOMATED COUNT: 17.2 % (ref 10–15)
FOLATE SERPL-MCNC: 48.6 NG/ML
GFR SERPL CREATININE-BSD FRML MDRD: 23 ML/MIN/{1.73_M2}
GFR SERPL CREATININE-BSD FRML MDRD: 24 ML/MIN/{1.73_M2}
GLUCOSE BLDC GLUCOMTR-MCNC: 137 MG/DL (ref 70–99)
GLUCOSE BLDC GLUCOMTR-MCNC: 167 MG/DL (ref 70–99)
GLUCOSE BLDC GLUCOMTR-MCNC: 183 MG/DL (ref 70–99)
GLUCOSE BLDC GLUCOMTR-MCNC: 204 MG/DL (ref 70–99)
GLUCOSE SERPL-MCNC: 133 MG/DL (ref 70–99)
GLUCOSE SERPL-MCNC: 176 MG/DL (ref 70–99)
HCO3 BLDV-SCNC: 27 MMOL/L (ref 21–28)
HCO3 BLDV-SCNC: 28 MMOL/L (ref 21–28)
HCT VFR BLD AUTO: 30.6 % (ref 40–53)
HGB BLD-MCNC: 9.4 G/DL (ref 13.3–17.7)
INTERPRETATION ECG - MUSE: NORMAL
IRON SATN MFR SERPL: 5 % (ref 15–46)
IRON SERPL-MCNC: 18 UG/DL (ref 35–180)
MAGNESIUM SERPL-MCNC: 2.3 MG/DL (ref 1.6–2.3)
MAGNESIUM SERPL-MCNC: 2.4 MG/DL (ref 1.6–2.3)
MCH RBC QN AUTO: 25.9 PG (ref 26.5–33)
MCHC RBC AUTO-ENTMCNC: 30.7 G/DL (ref 31.5–36.5)
MCV RBC AUTO: 84 FL (ref 78–100)
O2/TOTAL GAS SETTING VFR VENT: 21 %
OXYHGB MFR BLDV: 52 %
OXYHGB MFR BLDV: 52 %
OXYHGB MFR BLDV: 55 %
OXYHGB MFR BLDV: 60 %
PCO2 BLDV: 40 MM HG (ref 40–50)
PCO2 BLDV: 41 MM HG (ref 40–50)
PCO2 BLDV: 42 MM HG (ref 40–50)
PCO2 BLDV: 43 MM HG (ref 40–50)
PH BLDV: 7.43 PH (ref 7.32–7.43)
PH BLDV: 7.43 PH (ref 7.32–7.43)
PH BLDV: 7.45 PH (ref 7.32–7.43)
PH BLDV: 7.45 PH (ref 7.32–7.43)
PHOSPHATE SERPL-MCNC: 2.8 MG/DL (ref 2.5–4.5)
PLATELET # BLD AUTO: 233 10E9/L (ref 150–450)
PO2 BLDV: 31 MM HG (ref 25–47)
PO2 BLDV: 32 MM HG (ref 25–47)
PO2 BLDV: 33 MM HG (ref 25–47)
PO2 BLDV: 35 MM HG (ref 25–47)
POTASSIUM BLD-SCNC: 3.4 MMOL/L (ref 3.4–5.3)
POTASSIUM SERPL-SCNC: 3.4 MMOL/L (ref 3.4–5.3)
POTASSIUM SERPL-SCNC: 3.4 MMOL/L (ref 3.4–5.3)
RBC # BLD AUTO: 3.63 10E12/L (ref 4.4–5.9)
SODIUM SERPL-SCNC: 138 MMOL/L (ref 133–144)
SODIUM SERPL-SCNC: 141 MMOL/L (ref 133–144)
SPECIMEN EXP DATE BLD: NORMAL
TIBC SERPL-MCNC: 345 UG/DL (ref 240–430)
VIT B12 SERPL-MCNC: 1015 PG/ML (ref 193–986)
WBC # BLD AUTO: 7.3 10E9/L (ref 4–11)

## 2020-02-11 PROCEDURE — 85027 COMPLETE CBC AUTOMATED: CPT | Performed by: STUDENT IN AN ORGANIZED HEALTH CARE EDUCATION/TRAINING PROGRAM

## 2020-02-11 PROCEDURE — 40000196 ZZH STATISTIC RAPCV CVP MONITORING

## 2020-02-11 PROCEDURE — 71045 X-RAY EXAM CHEST 1 VIEW: CPT

## 2020-02-11 PROCEDURE — 80048 BASIC METABOLIC PNL TOTAL CA: CPT | Performed by: STUDENT IN AN ORGANIZED HEALTH CARE EDUCATION/TRAINING PROGRAM

## 2020-02-11 PROCEDURE — 25000131 ZZH RX MED GY IP 250 OP 636 PS 637: Performed by: STUDENT IN AN ORGANIZED HEALTH CARE EDUCATION/TRAINING PROGRAM

## 2020-02-11 PROCEDURE — 40000048 ZZH STATISTIC DAILY SWAN MONITORING

## 2020-02-11 PROCEDURE — 25000128 H RX IP 250 OP 636: Performed by: STUDENT IN AN ORGANIZED HEALTH CARE EDUCATION/TRAINING PROGRAM

## 2020-02-11 PROCEDURE — 83735 ASSAY OF MAGNESIUM: CPT | Performed by: STUDENT IN AN ORGANIZED HEALTH CARE EDUCATION/TRAINING PROGRAM

## 2020-02-11 PROCEDURE — 86850 RBC ANTIBODY SCREEN: CPT | Performed by: INTERNAL MEDICINE

## 2020-02-11 PROCEDURE — 00000146 ZZHCL STATISTIC GLUCOSE BY METER IP

## 2020-02-11 PROCEDURE — 20000004 ZZH R&B ICU UMMC

## 2020-02-11 PROCEDURE — 25800030 ZZH RX IP 258 OP 636: Performed by: STUDENT IN AN ORGANIZED HEALTH CARE EDUCATION/TRAINING PROGRAM

## 2020-02-11 PROCEDURE — 82805 BLOOD GASES W/O2 SATURATION: CPT | Performed by: STUDENT IN AN ORGANIZED HEALTH CARE EDUCATION/TRAINING PROGRAM

## 2020-02-11 PROCEDURE — 82607 VITAMIN B-12: CPT | Performed by: STUDENT IN AN ORGANIZED HEALTH CARE EDUCATION/TRAINING PROGRAM

## 2020-02-11 PROCEDURE — 25000132 ZZH RX MED GY IP 250 OP 250 PS 637: Performed by: STUDENT IN AN ORGANIZED HEALTH CARE EDUCATION/TRAINING PROGRAM

## 2020-02-11 PROCEDURE — 84100 ASSAY OF PHOSPHORUS: CPT | Performed by: STUDENT IN AN ORGANIZED HEALTH CARE EDUCATION/TRAINING PROGRAM

## 2020-02-11 PROCEDURE — 82746 ASSAY OF FOLIC ACID SERUM: CPT | Performed by: STUDENT IN AN ORGANIZED HEALTH CARE EDUCATION/TRAINING PROGRAM

## 2020-02-11 PROCEDURE — 84132 ASSAY OF SERUM POTASSIUM: CPT | Performed by: STUDENT IN AN ORGANIZED HEALTH CARE EDUCATION/TRAINING PROGRAM

## 2020-02-11 PROCEDURE — 83540 ASSAY OF IRON: CPT | Performed by: STUDENT IN AN ORGANIZED HEALTH CARE EDUCATION/TRAINING PROGRAM

## 2020-02-11 PROCEDURE — 83550 IRON BINDING TEST: CPT | Performed by: STUDENT IN AN ORGANIZED HEALTH CARE EDUCATION/TRAINING PROGRAM

## 2020-02-11 PROCEDURE — 86900 BLOOD TYPING SEROLOGIC ABO: CPT | Performed by: INTERNAL MEDICINE

## 2020-02-11 PROCEDURE — 86901 BLOOD TYPING SEROLOGIC RH(D): CPT | Performed by: INTERNAL MEDICINE

## 2020-02-11 RX ORDER — ISOSORBIDE DINITRATE 10 MG/1
10 TABLET ORAL 3 TIMES DAILY
Status: DISCONTINUED | OUTPATIENT
Start: 2020-02-11 | End: 2020-02-12

## 2020-02-11 RX ORDER — POTASSIUM CHLORIDE 750 MG/1
40 TABLET, EXTENDED RELEASE ORAL ONCE
Status: COMPLETED | OUTPATIENT
Start: 2020-02-11 | End: 2020-02-11

## 2020-02-11 RX ORDER — METHYLPREDNISOLONE SODIUM SUCCINATE 125 MG/2ML
125 INJECTION, POWDER, LYOPHILIZED, FOR SOLUTION INTRAMUSCULAR; INTRAVENOUS
Status: DISCONTINUED | OUTPATIENT
Start: 2020-02-11 | End: 2020-02-14 | Stop reason: HOSPADM

## 2020-02-11 RX ORDER — BUMETANIDE 0.25 MG/ML
2 INJECTION INTRAMUSCULAR; INTRAVENOUS ONCE
Status: COMPLETED | OUTPATIENT
Start: 2020-02-11 | End: 2020-02-11

## 2020-02-11 RX ORDER — FUROSEMIDE 10 MG/ML
80 INJECTION INTRAMUSCULAR; INTRAVENOUS EVERY 12 HOURS
Status: DISCONTINUED | OUTPATIENT
Start: 2020-02-11 | End: 2020-02-12

## 2020-02-11 RX ORDER — DIPHENHYDRAMINE HYDROCHLORIDE 50 MG/ML
50 INJECTION INTRAMUSCULAR; INTRAVENOUS
Status: DISCONTINUED | OUTPATIENT
Start: 2020-02-11 | End: 2020-02-14 | Stop reason: HOSPADM

## 2020-02-11 RX ADMIN — MELATONIN 1000 UNITS: at 21:31

## 2020-02-11 RX ADMIN — MELATONIN 1000 UNITS: at 07:48

## 2020-02-11 RX ADMIN — NITROGLYCERIN 0.5 MCG/KG/MIN: 20 INJECTION INTRAVENOUS at 06:22

## 2020-02-11 RX ADMIN — INSULIN ASPART 1 UNITS: 100 INJECTION, SOLUTION INTRAVENOUS; SUBCUTANEOUS at 17:31

## 2020-02-11 RX ADMIN — ISOSORBIDE DINITRATE 10 MG: 10 TABLET ORAL at 23:30

## 2020-02-11 RX ADMIN — POTASSIUM CHLORIDE 40 MEQ: 750 TABLET, EXTENDED RELEASE ORAL at 17:29

## 2020-02-11 RX ADMIN — ASPIRIN 81 MG CHEWABLE TABLET 81 MG: 81 TABLET CHEWABLE at 07:45

## 2020-02-11 RX ADMIN — PANTOPRAZOLE SODIUM 40 MG: 40 TABLET, DELAYED RELEASE ORAL at 07:45

## 2020-02-11 RX ADMIN — B-COMPLEX W/ C & FOLIC ACID TAB 1 TABLET: TAB at 07:48

## 2020-02-11 RX ADMIN — FUROSEMIDE 80 MG: 10 INJECTION, SOLUTION INTRAVENOUS at 17:29

## 2020-02-11 RX ADMIN — HEPARIN SODIUM 5000 UNITS: 5000 INJECTION, SOLUTION INTRAVENOUS; SUBCUTANEOUS at 03:54

## 2020-02-11 RX ADMIN — INSULIN ASPART 2 UNITS: 100 INJECTION, SOLUTION INTRAVENOUS; SUBCUTANEOUS at 11:37

## 2020-02-11 RX ADMIN — PANTOPRAZOLE SODIUM 40 MG: 40 TABLET, DELAYED RELEASE ORAL at 20:14

## 2020-02-11 RX ADMIN — BUMETANIDE 2 MG: 0.25 INJECTION INTRAMUSCULAR; INTRAVENOUS at 01:09

## 2020-02-11 RX ADMIN — INSULIN GLARGINE 7 UNITS: 100 INJECTION, SOLUTION SUBCUTANEOUS at 07:47

## 2020-02-11 RX ADMIN — IRON SUCROSE 200 MG: 20 INJECTION, SOLUTION INTRAVENOUS at 08:03

## 2020-02-11 RX ADMIN — CLOPIDOGREL BISULFATE 75 MG: 75 TABLET, FILM COATED ORAL at 07:45

## 2020-02-11 RX ADMIN — ATORVASTATIN CALCIUM 80 MG: 20 TABLET, FILM COATED ORAL at 07:45

## 2020-02-11 RX ADMIN — NITROGLYCERIN 1 MCG/KG/MIN: 20 INJECTION INTRAVENOUS at 22:19

## 2020-02-11 ASSESSMENT — ACTIVITIES OF DAILY LIVING (ADL)
ADLS_ACUITY_SCORE: 12

## 2020-02-11 NOTE — PLAN OF CARE
Patient A/Ox4, breathing comfortably on RA. SBP <100 on nitroglycerin gtt at 0.5 mcg/kg/min. MAP >65. Afib  at rest, but with activity rates increase to 140-150. Hemodynamics improving. CVP 11, PAP 75-25, CI 2.2, SVR 1200, SvO2 52%. Urinating independently. Bumex gtt increased to 0.75 mg/hr. No BM. SBA standing at bedside. Continue to monitor patient and update team with further changes. See flowsheet for assessment and further details.

## 2020-02-11 NOTE — PLAN OF CARE
OT 4E: Hold - Cardiac Rehab consult received, awaiting further medical work-up and plan of care. Will hold evaluation until medical plan established.

## 2020-02-11 NOTE — PROGRESS NOTES
Cardiology Progress Note    Assessment & Plan   59 year old male with history of occluded pRCA s/p balloon angioplasty and stenting in 6/2019, STEMI s/p DESx3 for total occlusion of RCA and Lcx (2012), GIB due to AV malformation (in the setting of warfarin and DAPT) s/p EGD with clip (7/5/2019), ischemic cardiomyopathy, HFrEF (last EF=30-35% 11/12/19), severe pHTN, moderate mitral regurgitation, atrial fibrillation, CKD stage IV who presented for planned RHC, found to be in ambulatory cardiogenic shock.      #Acute decompensated heart failure with reduced ejection fraction (EF 30%)  #Ambulatory cardiogenic shock   #Severe pulmonary hypertension, WHO group 2   #Moderate mitral regurgitation   Patient with known ischemic CM. RHC numbers on admission: RA 22, RV EDP 20, /50/65, PCWP 40, PVR 8.5, CO 3.5, CI 1.6. [place] catheter in place. For afterload reduction, unable to give nipride or ACEi given renal function and unable to give hydralazine given history of drug skin eruption. Final read of limited TTE pending   - Bumex gtt, will readjust dose pending afternoon UO   - Hemodynamics q6   - Nitroglycerin for afterload reduction    - Hold entresto given renal function   - ICD: will readdress during this admission      #STEMI s/p DESx3 (2012)  #CAD s/p PCI proximal RCA (6/2019)  - Continue plavix and aspirin      #Paroxysmal Afib   CHADSVASC 3 (HF, DM, CAD). Not on a/c due to history of GI bleed   - Will readdress anticoagulation prior to discharge      # T2DM  -PTA regimen of 7 units insulin glargine every morning     #Acute Kidney Injury   #CKD IV  Baseline serum creatinine between 2.7. Follows with   Creatinine on admission 3.16. Renal consulted. Creatinine improving with diuresis   - Diuresis as above      Fluids:   Electrolytes: K>4, Mg>2  Nutrition: Cardiac, 2L fluid restriction  Analgesia: None  Sedation: None  DVT ppx: heparin subcutaneous   Stress Ulcer ppx:  Protonix  Glycemic  Control: SSI  Catheters: None  Lines: None  Therapies: PT  Consults: Renal      Amparo Jean-Baptiste   Cardiology fellow     Interval History   Hemodynamically stable overnight. No new complaints this AM  Resting comfortably in bed     Physical Exam   Temp: 97.5  F (36.4  C) Temp src: Oral BP: 106/87 Pulse: 95 Heart Rate: 116 Resp: 15 SpO2: 94 % O2 Device: None (Room air)    Vitals:    02/11/20 0245   Weight: 97.6 kg (215 lb 3.2 oz)     Vital Signs with Ranges  Temp:  [97.3  F (36.3  C)-97.9  F (36.6  C)] 97.5  F (36.4  C)  Pulse:  [] 95  Heart Rate:  [] 116  Resp:  [8-31] 15  BP: ()/() 106/87  SpO2:  [91 %-100 %] 94 %  I/O last 3 completed shifts:  In: 1208.65 [P.O.:960; I.V.:248.65]  Out: 2700 [Urine:2700]    Heart Rate: 116, Blood pressure 106/87, pulse 95, temperature 97.5  F (36.4  C), temperature source Oral, resp. rate 15, weight 97.6 kg (215 lb 3.2 oz), SpO2 94 %.  215 lbs 3.2 oz    Gen: no acute distress  HEENT: no scleral icterus, pupils equal and reactive to light, moist mucous membranes, no nasal discharge.  NECK: no adenopathy, no asymmetry, masses, or scars, thyroid normal to palpation and no bruits, JVP not elevated  CARDIOVASCULAR: normal rate, irregular rhythm. S1/S2 normal, +3/6 systolic murmur   RESPIRATORY: clear to auscultation bilaterally, no rales, rhonchi or wheezes, no use of accessory muscles, no retractions, respirations unlabored   ABDOMEN: soft, non-tender abdomen without rebound or guarding, no hepatosplenomegaly, no palpable masses, bowel sounds present  EXTREMITIES: peripheral pulses normal, no peripheral edema, warm, capillary refill < 2 seconds  Skin: no ecchymoses, no rashes  NEURO: oriented to person, place, year, and situation; CN II-XII grossly intact; 5/5 strength throughout; sensation to light touch intact throughout; coordination intact; negative Romberg; gait normal  PSYCH: affect appropriate     Medications     bumetanide 0.75 mg/hr (02/11/20 0800)      nitroGLYcerin 0.5 mcg/kg/min (02/11/20 0800)       aspirin  81 mg Oral Daily     atorvastatin  80 mg Oral Daily     clopidogrel  75 mg Oral Daily     heparin ANTICOAGULANT  5,000 Units Subcutaneous Q12H     insulin aspart  1-7 Units Subcutaneous TID AC     insulin aspart  1-5 Units Subcutaneous At Bedtime     insulin glargine  7 Units Subcutaneous QAM AC     iron sucrose (VENOFER) intermittent infusion (200 mg)  200 mg Intravenous Q24H     pantoprazole  40 mg Oral BID     sodium chloride (PF)  10 mL Intravenous Once     vitamin B complex with vitamin C  1 tablet Oral Daily     Vitamin D3  1,000 Units Oral BID       Data   Recent Labs   Lab 02/11/20  0347 02/10/20  0817   WBC 7.3 7.5   HGB 9.4* 11.4*   MCV 84 88    286    140   POTASSIUM 3.4 4.2   CHLORIDE 106 105   CO2 27 26   BUN 56* 56*   CR 2.86* 3.16*   ANIONGAP 8 8   RONNY 8.4* 9.0   * 136*   ALBUMIN  --  3.9   PROTTOTAL  --  8.1   BILITOTAL  --  1.5*   ALKPHOS  --  120   ALT  --  17   AST  --  4       Recent Results (from the past 24 hour(s))   Cardiac Catheterization    Narrative      Right sided filling pressures are moderately elevated.    Severely elevated pulmonary artery hypertension.    Left sided filling pressures are severely elevated.    Reduced cardiac output level.    Hemodynamic data has been modified in Epic per physician review.      XR Chest Port 1 View    Narrative    EXAMINATION: XR CHEST PORT 1 VW, 2/10/2020 3:09 PM    INDICATION: post swan placement    COMPARISON: Chest radiograph 12/19/2019    FINDINGS: Single AP upright radiograph the chest.     Trachea is midline. Cardiac silhouette is enlarged. The mediastinal  borders are clear. Right IJ [place]-Messi catheter with tip projecting  over the distal right main pulmonary artery. Chronic blunting of the  costophrenic angles. Bilateral bibasilar streaky opacities. Mild  perihilar interstitial opacities. Small bilateral pleural effusions.  No acute osseous abnormality. Soft  tissues are grossly unremarkable.      Impression    IMPRESSION:  1. Cardiomegaly with mild interstitial pulmonary edema.  2. Bilateral pleural effusions.  3. Right IJ [place]-Messi catheter with tip projecting at the distal right  main pulmonary artery.    I have personally reviewed the examination and initial interpretation  and I agree with the findings.    AYANA MENENDEZ, DO   XR Chest Port 1 View    Narrative    XR CHEST PORT 1 VW  2/11/2020 1:59 AM      HISTORY: SG catheter monitoring    COMPARISON: 2/10/2020, 12/19/2019    FINDINGS: AP chest radiograph. [place]-Messi catheter projects at the  proximal right main pulmonary artery. Trachea is midline, stable  cardiomegaly. Small bilateral pleural effusions with overlying basilar  opacities. Bilateral perihilar streaky opacities. No pneumothorax. No  acute osseous or abdominal abnormality.      Impression    IMPRESSION:  1. Stable cardiomegaly with mild pulmonary edema.  2. [place]-Messi catheter tip projects over the proximal right main  pulmonary artery.  3. Stable small bilateral pleural effusions.    I have personally reviewed the examination and initial interpretation  and I agree with the findings.    DOROTA TA MD

## 2020-02-12 ENCOUNTER — APPOINTMENT (OUTPATIENT)
Dept: GENERAL RADIOLOGY | Facility: CLINIC | Age: 60
End: 2020-02-12
Attending: STUDENT IN AN ORGANIZED HEALTH CARE EDUCATION/TRAINING PROGRAM
Payer: MEDICAID

## 2020-02-12 LAB
ANION GAP SERPL CALCULATED.3IONS-SCNC: 5 MMOL/L (ref 3–14)
ANION GAP SERPL CALCULATED.3IONS-SCNC: 6 MMOL/L (ref 3–14)
BASE EXCESS BLDV CALC-SCNC: 0.6 MMOL/L
BASE EXCESS BLDV CALC-SCNC: 1.5 MMOL/L
BASE EXCESS BLDV CALC-SCNC: 1.6 MMOL/L
BASE EXCESS BLDV CALC-SCNC: 1.9 MMOL/L
BASE EXCESS BLDV CALC-SCNC: 4.7 MMOL/L
BUN SERPL-MCNC: 50 MG/DL (ref 7–30)
BUN SERPL-MCNC: 50 MG/DL (ref 7–30)
CA-I BLD-MCNC: 4.6 MG/DL (ref 4.4–5.2)
CALCIUM SERPL-MCNC: 8.4 MG/DL (ref 8.5–10.1)
CALCIUM SERPL-MCNC: 8.5 MG/DL (ref 8.5–10.1)
CHLORIDE SERPL-SCNC: 105 MMOL/L (ref 94–109)
CHLORIDE SERPL-SCNC: 107 MMOL/L (ref 94–109)
CO2 SERPL-SCNC: 27 MMOL/L (ref 20–32)
CO2 SERPL-SCNC: 28 MMOL/L (ref 20–32)
CREAT SERPL-MCNC: 2.75 MG/DL (ref 0.66–1.25)
CREAT SERPL-MCNC: 2.81 MG/DL (ref 0.66–1.25)
ERYTHROCYTE [DISTWIDTH] IN BLOOD BY AUTOMATED COUNT: 17 % (ref 10–15)
GFR SERPL CREATININE-BSD FRML MDRD: 23 ML/MIN/{1.73_M2}
GFR SERPL CREATININE-BSD FRML MDRD: 24 ML/MIN/{1.73_M2}
GLUCOSE BLDC GLUCOMTR-MCNC: 124 MG/DL (ref 70–99)
GLUCOSE BLDC GLUCOMTR-MCNC: 139 MG/DL (ref 70–99)
GLUCOSE BLDC GLUCOMTR-MCNC: 150 MG/DL (ref 70–99)
GLUCOSE BLDC GLUCOMTR-MCNC: 173 MG/DL (ref 70–99)
GLUCOSE SERPL-MCNC: 131 MG/DL (ref 70–99)
GLUCOSE SERPL-MCNC: 164 MG/DL (ref 70–99)
HCO3 BLDV-SCNC: 24 MMOL/L (ref 21–28)
HCO3 BLDV-SCNC: 26 MMOL/L (ref 21–28)
HCO3 BLDV-SCNC: 26 MMOL/L (ref 21–28)
HCO3 BLDV-SCNC: 27 MMOL/L (ref 21–28)
HCO3 BLDV-SCNC: 29 MMOL/L (ref 21–28)
HCT VFR BLD AUTO: 30 % (ref 40–53)
HGB BLD-MCNC: 9.3 G/DL (ref 13.3–17.7)
MAGNESIUM SERPL-MCNC: 2.3 MG/DL (ref 1.6–2.3)
MCH RBC QN AUTO: 25.9 PG (ref 26.5–33)
MCHC RBC AUTO-ENTMCNC: 31 G/DL (ref 31.5–36.5)
MCV RBC AUTO: 84 FL (ref 78–100)
O2/TOTAL GAS SETTING VFR VENT: 21 %
OXYHGB MFR BLDV: 46 %
OXYHGB MFR BLDV: 51 %
OXYHGB MFR BLDV: 52 %
OXYHGB MFR BLDV: 53 %
OXYHGB MFR BLDV: 80 %
PCO2 BLDV: 33 MM HG (ref 40–50)
PCO2 BLDV: 40 MM HG (ref 40–50)
PCO2 BLDV: 41 MM HG (ref 40–50)
PCO2 BLDV: 42 MM HG (ref 40–50)
PCO2 BLDV: 43 MM HG (ref 40–50)
PH BLDV: 7.41 PH (ref 7.32–7.43)
PH BLDV: 7.42 PH (ref 7.32–7.43)
PH BLDV: 7.42 PH (ref 7.32–7.43)
PH BLDV: 7.45 PH (ref 7.32–7.43)
PH BLDV: 7.47 PH (ref 7.32–7.43)
PHOSPHATE SERPL-MCNC: 2.6 MG/DL (ref 2.5–4.5)
PLATELET # BLD AUTO: 232 10E9/L (ref 150–450)
PO2 BLDV: 29 MM HG (ref 25–47)
PO2 BLDV: 30 MM HG (ref 25–47)
PO2 BLDV: 31 MM HG (ref 25–47)
PO2 BLDV: 31 MM HG (ref 25–47)
PO2 BLDV: 47 MM HG (ref 25–47)
POTASSIUM SERPL-SCNC: 3.6 MMOL/L (ref 3.4–5.3)
POTASSIUM SERPL-SCNC: 4.2 MMOL/L (ref 3.4–5.3)
RBC # BLD AUTO: 3.59 10E12/L (ref 4.4–5.9)
SODIUM SERPL-SCNC: 139 MMOL/L (ref 133–144)
SODIUM SERPL-SCNC: 139 MMOL/L (ref 133–144)
WBC # BLD AUTO: 7.8 10E9/L (ref 4–11)

## 2020-02-12 PROCEDURE — 40000014 ZZH STATISTIC ARTERIAL MONITORING DAILY

## 2020-02-12 PROCEDURE — 00000146 ZZHCL STATISTIC GLUCOSE BY METER IP

## 2020-02-12 PROCEDURE — 25000128 H RX IP 250 OP 636: Performed by: STUDENT IN AN ORGANIZED HEALTH CARE EDUCATION/TRAINING PROGRAM

## 2020-02-12 PROCEDURE — 40000048 ZZH STATISTIC DAILY SWAN MONITORING

## 2020-02-12 PROCEDURE — 20000004 ZZH R&B ICU UMMC

## 2020-02-12 PROCEDURE — 85027 COMPLETE CBC AUTOMATED: CPT | Performed by: STUDENT IN AN ORGANIZED HEALTH CARE EDUCATION/TRAINING PROGRAM

## 2020-02-12 PROCEDURE — 40000894 ZZH STATISTIC OT IP EVAL DEFER: Performed by: OCCUPATIONAL THERAPIST

## 2020-02-12 PROCEDURE — 84100 ASSAY OF PHOSPHORUS: CPT | Performed by: STUDENT IN AN ORGANIZED HEALTH CARE EDUCATION/TRAINING PROGRAM

## 2020-02-12 PROCEDURE — 25000132 ZZH RX MED GY IP 250 OP 250 PS 637: Performed by: STUDENT IN AN ORGANIZED HEALTH CARE EDUCATION/TRAINING PROGRAM

## 2020-02-12 PROCEDURE — 82805 BLOOD GASES W/O2 SATURATION: CPT | Performed by: STUDENT IN AN ORGANIZED HEALTH CARE EDUCATION/TRAINING PROGRAM

## 2020-02-12 PROCEDURE — 83735 ASSAY OF MAGNESIUM: CPT | Performed by: STUDENT IN AN ORGANIZED HEALTH CARE EDUCATION/TRAINING PROGRAM

## 2020-02-12 PROCEDURE — 25800030 ZZH RX IP 258 OP 636: Performed by: STUDENT IN AN ORGANIZED HEALTH CARE EDUCATION/TRAINING PROGRAM

## 2020-02-12 PROCEDURE — 71045 X-RAY EXAM CHEST 1 VIEW: CPT

## 2020-02-12 PROCEDURE — 40000196 ZZH STATISTIC RAPCV CVP MONITORING

## 2020-02-12 PROCEDURE — 80048 BASIC METABOLIC PNL TOTAL CA: CPT | Performed by: STUDENT IN AN ORGANIZED HEALTH CARE EDUCATION/TRAINING PROGRAM

## 2020-02-12 PROCEDURE — 82330 ASSAY OF CALCIUM: CPT | Performed by: STUDENT IN AN ORGANIZED HEALTH CARE EDUCATION/TRAINING PROGRAM

## 2020-02-12 RX ORDER — FUROSEMIDE 10 MG/ML
80 INJECTION INTRAMUSCULAR; INTRAVENOUS EVERY 8 HOURS
Status: DISCONTINUED | OUTPATIENT
Start: 2020-02-12 | End: 2020-02-12

## 2020-02-12 RX ORDER — ISOSORBIDE DINITRATE 10 MG/1
20 TABLET ORAL 3 TIMES DAILY
Status: DISCONTINUED | OUTPATIENT
Start: 2020-02-12 | End: 2020-02-12

## 2020-02-12 RX ORDER — ISOSORBIDE DINITRATE 10 MG/1
30 TABLET ORAL 3 TIMES DAILY
Status: DISCONTINUED | OUTPATIENT
Start: 2020-02-13 | End: 2020-02-14 | Stop reason: HOSPADM

## 2020-02-12 RX ORDER — FUROSEMIDE 10 MG/ML
80 INJECTION INTRAMUSCULAR; INTRAVENOUS EVERY 12 HOURS
Status: DISCONTINUED | OUTPATIENT
Start: 2020-02-12 | End: 2020-02-14

## 2020-02-12 RX ORDER — ISOSORBIDE DINITRATE 10 MG/1
10 TABLET ORAL ONCE
Status: COMPLETED | OUTPATIENT
Start: 2020-02-12 | End: 2020-02-12

## 2020-02-12 RX ORDER — POTASSIUM CHLORIDE 750 MG/1
40 TABLET, EXTENDED RELEASE ORAL ONCE
Status: COMPLETED | OUTPATIENT
Start: 2020-02-12 | End: 2020-02-12

## 2020-02-12 RX ORDER — POTASSIUM CHLORIDE 1500 MG/1
80 TABLET, EXTENDED RELEASE ORAL ONCE
Status: COMPLETED | OUTPATIENT
Start: 2020-02-12 | End: 2020-02-12

## 2020-02-12 RX ADMIN — B-COMPLEX W/ C & FOLIC ACID TAB 1 TABLET: TAB at 08:55

## 2020-02-12 RX ADMIN — MELATONIN 1000 UNITS: at 08:56

## 2020-02-12 RX ADMIN — NITROGLYCERIN 0.7 MCG/KG/MIN: 20 INJECTION INTRAVENOUS at 07:03

## 2020-02-12 RX ADMIN — FUROSEMIDE 80 MG: 10 INJECTION, SOLUTION INTRAVENOUS at 13:14

## 2020-02-12 RX ADMIN — INSULIN ASPART 1 UNITS: 100 INJECTION, SOLUTION INTRAVENOUS; SUBCUTANEOUS at 12:19

## 2020-02-12 RX ADMIN — MELATONIN 1000 UNITS: at 20:54

## 2020-02-12 RX ADMIN — FUROSEMIDE 80 MG: 10 INJECTION, SOLUTION INTRAVENOUS at 06:06

## 2020-02-12 RX ADMIN — ISOSORBIDE DINITRATE 20 MG: 10 TABLET ORAL at 08:55

## 2020-02-12 RX ADMIN — PANTOPRAZOLE SODIUM 40 MG: 40 TABLET, DELAYED RELEASE ORAL at 19:49

## 2020-02-12 RX ADMIN — FUROSEMIDE 80 MG: 10 INJECTION, SOLUTION INTRAVENOUS at 20:54

## 2020-02-12 RX ADMIN — IRON SUCROSE 200 MG: 20 INJECTION, SOLUTION INTRAVENOUS at 09:18

## 2020-02-12 RX ADMIN — PANTOPRAZOLE SODIUM 40 MG: 40 TABLET, DELAYED RELEASE ORAL at 08:55

## 2020-02-12 RX ADMIN — ASPIRIN 81 MG CHEWABLE TABLET 81 MG: 81 TABLET CHEWABLE at 08:55

## 2020-02-12 RX ADMIN — CLOPIDOGREL BISULFATE 75 MG: 75 TABLET, FILM COATED ORAL at 08:55

## 2020-02-12 RX ADMIN — POTASSIUM CHLORIDE 80 MEQ: 1500 TABLET, EXTENDED RELEASE ORAL at 06:29

## 2020-02-12 RX ADMIN — ISOSORBIDE DINITRATE 20 MG: 10 TABLET ORAL at 19:49

## 2020-02-12 RX ADMIN — ISOSORBIDE DINITRATE 20 MG: 10 TABLET ORAL at 14:45

## 2020-02-12 RX ADMIN — POTASSIUM CHLORIDE 40 MEQ: 750 TABLET, EXTENDED RELEASE ORAL at 00:55

## 2020-02-12 RX ADMIN — ATORVASTATIN CALCIUM 80 MG: 20 TABLET, FILM COATED ORAL at 08:55

## 2020-02-12 RX ADMIN — ISOSORBIDE DINITRATE 10 MG: 10 TABLET ORAL at 06:06

## 2020-02-12 RX ADMIN — INSULIN GLARGINE 7 UNITS: 100 INJECTION, SOLUTION SUBCUTANEOUS at 07:13

## 2020-02-12 RX ADMIN — ISOSORBIDE DINITRATE 10 MG: 10 TABLET ORAL at 20:54

## 2020-02-12 ASSESSMENT — ACTIVITIES OF DAILY LIVING (ADL)
ADLS_ACUITY_SCORE: 12

## 2020-02-12 NOTE — PLAN OF CARE
ICU End of Shift Summary. See flowsheets for vital signs and detailed assessment.    Changes this shift: Afib  at rest, increases to 140-150 with activity. Hemodynamics improving. Dr. Ndiaye wedged 30 at 0500. Added isordil and slowly titrating down on nitroglycerin gtt, goal SBP < 100. Lasix increased from BID to TID. Adequate urine output. Refusing subcutaneous heparin, MD aware.     Plan: Continue to titrate nitroglycerin gtt. Encourage mobility.

## 2020-02-12 NOTE — PLAN OF CARE
"ICU End of Shift Summary. See flowsheets for vital signs and detailed assessment.    Changes this shift: Nitroglycerine titrated and stopped at 0941 after SBP consistently < 100 with increased isosorbide dose. Ambulated unit x 3 without issue. Maintains good appetite with sliding scale insulin coverage. Output of 1200ml this shift with #2 unmeasured urine occurrences during bowel movements.      1440: d/w cards 2 refusal of Heparin sub-q dose stating \"that stuff caused me to bleed out.\" OK to hold, team will address bedside with pt.     Education: heparin risks/benefits, nutrition labels, low sodium food options, healthy food exchanges appropriate activity, ICD/vest risks/benefits.     Plan:  DONOVAN q 6hrs, SBP < 100, 2gm NA/2L FR restricted diet.   "

## 2020-02-12 NOTE — PROGRESS NOTES
Nephrology Progress Note  02/12/2020         Assessment & Recommendations:   Cole Jesus is a 59 year old male with a h/o CAD (s/p PCI to pRCA 6/2019, STEMI s/p KEVYN x 3 to RCA and LCx in 2012), ICM (LVEF 30-35% 11/2019), severe pHTN, Moderate MR, Afib (off AC), GIB due to AVM s/p clipping, CKD IV, admitted after planned RHC for management of ambulatory cardiogenic shock and decompensated HFrEF. RHC findings significant for PCWP 40 and PA mean 65.     EVA on CKD IV  Due to decompensated HF/cardiogenic shock. Renal function continues to improve with diuresis. Net 2.3L negative yesterday with weight down 1kg (97.6->96.6). BP acceptable.   -Agree with diuresis and monitoring renal response to therapy  -Agree with holding entresto in setting of EVA and diuresis  -Continue to follow renal function daily    Acid Base,Electrolytes :   Na    - Normal  K      - Normal  CO2  - Normal      BMD : Ca 8.5, albumin 3.9 , Phos 2.6.   Hypovitaminosis D  -Reasonable to continue vitamin D3 supplement as long as calcium remains normal     Iron deficiency Anemia  Hemoglobin stable with no signs of ongoing bleeding; not currently on iron supplementation at home. Repeat iron studies consistent with WILLIAM.   -Agree with IV iron supplementation       Patient was seen and examined with attending physician, Dr. Solo.     Jada Alexander MD, MPAS  PGY3, Internal Medicine   [phone]      Interval History :   Nursing and provider notes from last 24 hours reviewed.  Continued diuresis. Started IV iron.      Subjective:   Feeling better today, ambulated all around 4th floor. Had significant cramping and weakness with diuresis with bumex yesterday; improved today.     Review of Systems:   No chest pain or dyspnea, no tremor or mental status changes. Remainder of ROS as noted above.     Physical Exam:   I/O last 3 completed shifts:  In: 1674.3 [P.O.:1100; I.V.:574.3]  Out: 3220 [Urine:3220]   BP 91/63   Pulse 86   Temp 97.9  F (36.6   C) (Oral)   Resp 14   Wt 96.6 kg (212 lb 14.4 oz)   SpO2 94%   BMI 32.37 kg/m     Vitals:    02/11/20 0245 02/12/20 0400   Weight: 97.6 kg (215 lb 3.2 oz) 96.6 kg (212 lb 14.4 oz)     General: Alert, sitting up in chair, in no acute distress.   Skin: Warm, dry, no rashes or lesions on limited exam  Respiratory: Non-labored breathing, decreased BS right base with few crackles, no wheezing, on RA.  Cardiovascular: Irregular, regular rate, 3/6 systolic murmur at apex. JVP ~13cm. No JVD  Gastrointestinal:  Abdomen soft, non-distended, nontender. No palpable masses.  Extremities: Trace BLE edema to knees. Warm, dry. Normal tone.  MSK: No deformities, strength grossly normal.   Neurologic: A&O x 3, speech normal, memory intact  Psych: appropriate mood and affect, cooperative and pleasant        Labs:   All labs reviewed by me  Electrolytes/Renal -   Recent Labs   Lab Test 02/12/20  0359 02/11/20  2203 02/11/20  1540 02/11/20  0347  07/23/19  0431     --  138 141   < > 140   POTASSIUM 3.6 3.4 3.4 3.4   < > 4.0   CHLORIDE 105  --  103 106   < > 106   CO2 28  --  27 27   < > 26   BUN 50*  --  53* 56*   < > 49*   CR 2.75*  --  2.76* 2.86*   < > 2.92*   *  --  176* 133*   < > 85   RONNY 8.5  --  8.4* 8.4*   < > 8.6   MAG 2.3  --  2.3 2.4*   < > 2.4*   PHOS 2.6  --   --  2.8  --  3.5    < > = values in this interval not displayed.       CBC -   Recent Labs   Lab Test 02/12/20  0359 02/11/20  0347 02/10/20  0817   WBC 7.8 7.3 7.5   HGB 9.3* 9.4* 11.4*    233 286       LFTs -   Recent Labs   Lab Test 02/10/20  0817 01/16/20  1033 12/18/19  1607   ALKPHOS 120 114 101   BILITOTAL 1.5* 1.0 1.4*   ALT 17 16 13   AST 4 8 6   PROTTOTAL 8.1 8.1 7.8   ALBUMIN 3.9 3.9 3.8       Iron Panel -   Recent Labs   Lab Test 02/11/20  0347 12/19/19  0545 12/12/19  1057   IRON 18* 37 31*   IRONSAT 5* 11* 8*   FRANK  --  35 37       Imaging:  CXR 2/12/20:  1. [place]-Messi catheter tip projects over the distal right main  pulmonary  artery/central right lower lobe pulmonary artery.  2. Cardiomegaly with slightly increased moderate pulmonary edema.   3. Bilateral pleural effusions, right greater than left with overlying  basilar atelectasis/consolidation.    Current Medications:    aspirin  81 mg Oral Daily     atorvastatin  80 mg Oral Daily     clopidogrel  75 mg Oral Daily     furosemide  80 mg Intravenous Q8H     heparin ANTICOAGULANT  5,000 Units Subcutaneous Q12H     insulin aspart  1-7 Units Subcutaneous TID AC     insulin aspart  1-5 Units Subcutaneous At Bedtime     insulin glargine  7 Units Subcutaneous QAM AC     iron sucrose (VENOFER) intermittent infusion (200 mg)  200 mg Intravenous Q24H     isosorbide dinitrate  20 mg Oral TID     pantoprazole  40 mg Oral BID     sodium chloride (PF)  10 mL Intravenous Once     vitamin B complex with vitamin C  1 tablet Oral Daily     Vitamin D3  1,000 Units Oral BID       nitroGLYcerin 0.5 mcg/kg/min (02/12/20 0805)

## 2020-02-12 NOTE — PLAN OF CARE
4E OT/CR DEFER  Discharge Planner OT   Patient plan for discharge: home  Current status: OT 4E: eval orders received.  Per chart review, discussion with interdisciplinary team and discussion/observation with pt. Pt. is indep. with BADLs, ambulating without AD, S. Pt. with no IP acute OT needs at this time. OT orders completed.   Barriers to return to prior living situation: medical readiness  Recommendations for discharge: home, cont. HEP/amb.         Entered by: Paula Eller 02/12/2020 10:26 AM

## 2020-02-12 NOTE — PROGRESS NOTES
Cardiology Progress Note    Assessment & Plan   59 year old male with history of occluded pRCA s/p balloon angioplasty and stenting in 6/2019, STEMI s/p DESx3 for total occlusion of RCA and Lcx (2012), GIB due to AV malformation (in the setting of warfarin and DAPT) s/p EGD with clip (7/5/2019), ischemic cardiomyopathy, HFrEF (last EF=30-35% 11/12/19), severe pHTN, moderate mitral regurgitation, atrial fibrillation, CKD stage IV who presented for planned RHC, found to be in ambulatory cardiogenic shock.      #Acute decompensated heart failure with reduced ejection fraction (EF 30%)  #Ambulatory cardiogenic shock   #Severe pulmonary hypertension, WHO group 2   #Moderate mitral regurgitation   Patient with known ischemic CM. RHC numbers on admission: RA 22, RV EDP 20, /50/65, PCWP 40, PVR 8.5, CO 3.5, CI 1.6. [place] catheter in place. For afterload reduction, unable to give nipride or ACEi given renal function and unable to give hydralazine given history of drug skin eruption.   - Lasix 80mg IV TID   - Hemodynamics q6   - Wean nitroglycerin gtt and start isordil 20mg TID  - Hold entresto given renal function   - ICD: will readdress during this admission      #STEMI s/p DESx3 (2012)  #CAD s/p PCI proximal RCA (6/2019)  - Continue plavix and aspirin      #Paroxysmal Afib   CHADSVASC 3 (HF, DM, CAD). Not on a/c due to history of GI bleed      # T2DM  -PTA regimen of 7 units insulin glargine every morning     #Acute Kidney Injury   #CKD IV  Baseline serum creatinine between 2.7. Follows with   Creatinine on admission 3.16. Renal consulted. Creatinine improving with diuresis   - Diuresis as above      Fluids:   Electrolytes: K>4, Mg>2  Nutrition: Cardiac, 2L fluid restriction  Analgesia: None  Sedation: None  DVT ppx: heparin subcutaneous   Stress Ulcer ppx:  Protonix  Glycemic Control: SSI  Catheters: None  Lines: None  Therapies: PT  Consults: Renal      Amparo Jean-Baptiste   Cardiology fellow     Interval  History   Reported feeling better after discontinuing bumex gtt. Reported improvement in cramps.     Physical Exam   Temp: 97.9  F (36.6  C) Temp src: Oral BP: (!) 82/53 Pulse: 86 Heart Rate: 99 Resp: 23 SpO2: 95 % O2 Device: None (Room air)    Vitals:    02/11/20 0245 02/12/20 0400   Weight: 97.6 kg (215 lb 3.2 oz) 96.6 kg (212 lb 14.4 oz)     Vital Signs with Ranges  Temp:  [97  F (36.1  C)-98.6  F (37  C)] 97.9  F (36.6  C)  Pulse:  [] 86  Heart Rate:  [] 99  Resp:  [8-33] 23  BP: ()/(53-95) 82/53  SpO2:  [91 %-100 %] 95 %  I/O last 3 completed shifts:  In: 1674.3 [P.O.:1100; I.V.:574.3]  Out: 3220 [Urine:3220]    Heart Rate: 99, Blood pressure (!) 82/53, pulse 86, temperature 97.9  F (36.6  C), temperature source Oral, resp. rate 23, weight 96.6 kg (212 lb 14.4 oz), SpO2 95 %.  212 lbs 14.4 oz    Gen: no acute distress  HEENT: no scleral icterus, pupils equal and reactive to light, moist mucous membranes, no nasal discharge.  NECK: no adenopathy, no asymmetry, masses, or scars, thyroid normal to palpation and no bruits, JVP not elevated  CARDIOVASCULAR: normal rate, irregular rhythm. S1/S2 normal, +3/6 systolic murmur   RESPIRATORY: clear to auscultation bilaterally, no rales, rhonchi or wheezes, no use of accessory muscles, no retractions, respirations unlabored   ABDOMEN: soft, non-tender abdomen without rebound or guarding, no hepatosplenomegaly, no palpable masses, bowel sounds present  EXTREMITIES: peripheral pulses normal, no peripheral edema, warm, capillary refill < 2 seconds  Skin: no ecchymoses, no rashes  NEURO: oriented to person, place, year, and situation; CN II-XII grossly intact; 5/5 strength throughout; sensation to light touch intact throughout; coordination intact; negative Romberg; gait normal  PSYCH: affect appropriate     Medications     nitroGLYcerin 0.7 mcg/kg/min (02/12/20 2137)       aspirin  81 mg Oral Daily     atorvastatin  80 mg Oral Daily     clopidogrel  75 mg  Oral Daily     furosemide  80 mg Intravenous Q8H     heparin ANTICOAGULANT  5,000 Units Subcutaneous Q12H     insulin aspart  1-7 Units Subcutaneous TID AC     insulin aspart  1-5 Units Subcutaneous At Bedtime     insulin glargine  7 Units Subcutaneous QAM AC     iron sucrose (VENOFER) intermittent infusion (200 mg)  200 mg Intravenous Q24H     isosorbide dinitrate  20 mg Oral TID     pantoprazole  40 mg Oral BID     sodium chloride (PF)  10 mL Intravenous Once     vitamin B complex with vitamin C  1 tablet Oral Daily     Vitamin D3  1,000 Units Oral BID       Data   Recent Labs   Lab 02/12/20  0359 02/11/20  2203 02/11/20  1540 02/11/20  0347 02/10/20  0817   WBC 7.8  --   --  7.3 7.5   HGB 9.3*  --   --  9.4* 11.4*   MCV 84  --   --  84 88     --   --  233 286     --  138 141 140   POTASSIUM 3.6 3.4 3.4 3.4 4.2   CHLORIDE 105  --  103 106 105   CO2 28  --  27 27 26   BUN 50*  --  53* 56* 56*   CR 2.75*  --  2.76* 2.86* 3.16*   ANIONGAP 6  --  9 8 8   RONNY 8.5  --  8.4* 8.4* 9.0   *  --  176* 133* 136*   ALBUMIN  --   --   --   --  3.9   PROTTOTAL  --   --   --   --  8.1   BILITOTAL  --   --   --   --  1.5*   ALKPHOS  --   --   --   --  120   ALT  --   --   --   --  17   AST  --   --   --   --  4       Recent Results (from the past 24 hour(s))   XR Chest Port 1 View    Narrative    Exam: AP chest radiograph 2/12/2020 1:45 AM.    HISTORY: [place]-Messi catheter monitoring.    COMPARISON: 2/11/2020, 2/10/2020.    FINDINGS: AP chest radiograph. [place]-Messi catheter tip projects over  the distal right main pulmonary artery/central right lower lobe  pulmonary artery. Trachea is midline, cardiomegaly. Increased  perihilar interstitial opacities. Small bilateral pleural effusions,  slightly increased from prior with overlying basilar and retrocardiac  opacities. No acute osseous or abdominal abnormality.      Impression    IMPRESSION:  1. [place]-Messi catheter tip projects over the distal right main  pulmonary  artery/central right lower lobe pulmonary artery.  2. Cardiomegaly with slightly increased moderate pulmonary edema.   3. Bilateral pleural effusions, right greater than left with overlying  basilar atelectasis/consolidation.    I have personally reviewed the examination and initial interpretation  and I agree with the findings.    AYANA MENENDEZ, DO

## 2020-02-12 NOTE — PROGRESS NOTES
Nephrology Progress Note  02/11/2020         Assessment & Recommendations:     Cole Jesus is a 59 year old male with a h/o CAD (s/p PCI to pRCA 6/2019, STEMI s/p KEVYN x 3 to RCA and LCx in 2012), ICM (LVEF 30-35% 11/2019), severe pHTN, Moderate MR, Afib (off AC), GIB due to AVM s/p clipping, CKD IV, admitted after planned RHC for management of ambulatory cardiogenic shock and decompensated HFrEF.   RHC 2/10/20: RHC today showed RA 22, RV EDP 20, /50/65, PCWP 40, PVR 8.5, CO 3.5, CI 1.6.      EVA on CKD IV  Etiology : Cardiorenal ( decompensated HF/cardiogenic shock)   Cr improving , continue diuresis   Agree with holding entresto in setting of EVA and diuresis    Volume status - remains hypervolemic , improving . Weight 2/11: 97.6 kg  BP : 103/75    Anemia : 11.4 --> 9.4. No active bleeding. Continue to monitor    Acid Base,Electrolytes :   Na    - Normal  K      - Normal  CO2  - Normal     BMD : Ca 8.4, albumin 3.9 , Phos 2.8. Continue vitamin D3 supplement for hypovitaminosis D as long as calcium remains normal     Iron deficiency Anemia  Hemoglobin steadily improving with no signs of ongoing bleeding but not currently on iron supplementation. Last iron studies consistent with WILLIAM.   -Would repeat iron studies (last in 12/2019) and start daily iron supplement            Interval History :   Nursing and provider notes from last 24 hours reviewed.  In the last 24 hours Cole Jesus CR Improved  SOB improved. No CP  Review of Systems:   Constitutional : No fever. Chills  Neuro: No confusion, headache, focal weakness  Chest: SOB improved. No cough  Cardiovascular : No Chest pain   Extremities:  ++ B/l LLE  swelling  Gastrointestinal : No abdominal pain, nausea,vomiting. No diarrhea, constipation. No black stool .   : No flank pain , hematuria or voiding difficulty   Behavior: No agitation  Skin : No rash     Physical Exam:   I/O last 3 completed shifts:  In: 1621.05 [P.O.:1230; I.V.:391.05]  Out: 3300  [Urine:3300]   /68   Pulse 86   Temp 98.6  F (37  C) (Oral)   Resp 17   Wt 97.6 kg (215 lb 3.2 oz)   SpO2 96%   BMI 32.72 kg/m       Constitutional: No distress  HEENT: Neck supple, oral exam normal  Eyes: PERRLA, EOMI, conjunctive are normal  Neurological: Alert awake oriented x3, moving all extremities well.  Chest: Decreased air entry with basal rales.   Cardiovascular: S1-S2 heard, RRR, no murmurs.  Extremities: Distal CMS intact,  Edema  : 2+ B/ LE edema  . Peripheries are warm  Abdomen: Soft nontender, no guarding or rigidity, bowel sounds normal. No flank pain  Skin: No rash  Behavior: Mood is stable   No  HD access     Labs:   All labs reviewed by me  Electrolytes/Renal -   Recent Labs   Lab Test 02/11/20  1540 02/11/20  0347 02/10/20  0817  11/17/19  0453  07/23/19  0431  07/06/19  0532    141 140   < > 138   < > 140   < > 141   POTASSIUM 3.4 3.4 4.2   < > 3.6   < > 4.0   < > 4.3   CHLORIDE 103 106 105   < > 104   < > 106   < > 111*   CO2 27 27 26   < > 26   < > 26   < > 26   BUN 53* 56* 56*   < > 56*   < > 49*   < > 64*   CR 2.76* 2.86* 3.16*   < > 2.66*   < > 2.92*   < > 2.20*   * 133* 136*   < > 122*   < > 85   < > 95   RONNY 8.4* 8.4* 9.0   < > 8.8   < > 8.6   < > 8.2*   MAG 2.3 2.4*  --   --  2.7*   < > 2.4*   < > 2.5*   PHOS  --  2.8  --   --   --   --  3.5  --  2.8    < > = values in this interval not displayed.       CBC -   Recent Labs   Lab Test 02/11/20  0347 02/10/20  0817 01/16/20  1033   WBC 7.3 7.5 7.8   HGB 9.4* 11.4* 10.3*    286 268       LFTs -   Recent Labs   Lab Test 02/10/20  0817 01/16/20  1033 12/18/19  1607   ALKPHOS 120 114 101   BILITOTAL 1.5* 1.0 1.4*   ALT 17 16 13   AST 4 8 6   PROTTOTAL 8.1 8.1 7.8   ALBUMIN 3.9 3.9 3.8       Iron Panel -   Recent Labs   Lab Test 02/11/20  0347 12/19/19  0545 12/12/19  1057   IRON 18* 37 31*   IRONSAT 5* 11* 8*   FRANK  --  35 37         Imaging:  Pertinent test results reviewed    Current Medications:    aspirin   81 mg Oral Daily     atorvastatin  80 mg Oral Daily     clopidogrel  75 mg Oral Daily     furosemide  80 mg Intravenous Q12H     heparin ANTICOAGULANT  5,000 Units Subcutaneous Q12H     insulin aspart  1-7 Units Subcutaneous TID AC     insulin aspart  1-5 Units Subcutaneous At Bedtime     insulin glargine  7 Units Subcutaneous QAM AC     iron sucrose (VENOFER) intermittent infusion (200 mg)  200 mg Intravenous Q24H     pantoprazole  40 mg Oral BID     sodium chloride (PF)  10 mL Intravenous Once     vitamin B complex with vitamin C  1 tablet Oral Daily     Vitamin D3  1,000 Units Oral BID       nitroGLYcerin 0.75 mcg/kg/min (02/11/20 9096)     Sanjay Austin MD

## 2020-02-13 ENCOUNTER — APPOINTMENT (OUTPATIENT)
Dept: GENERAL RADIOLOGY | Facility: CLINIC | Age: 60
End: 2020-02-13
Attending: STUDENT IN AN ORGANIZED HEALTH CARE EDUCATION/TRAINING PROGRAM
Payer: MEDICAID

## 2020-02-13 ENCOUNTER — APPOINTMENT (OUTPATIENT)
Dept: OCCUPATIONAL THERAPY | Facility: CLINIC | Age: 60
End: 2020-02-13
Attending: STUDENT IN AN ORGANIZED HEALTH CARE EDUCATION/TRAINING PROGRAM
Payer: MEDICAID

## 2020-02-13 ENCOUNTER — APPOINTMENT (OUTPATIENT)
Dept: GENERAL RADIOLOGY | Facility: CLINIC | Age: 60
End: 2020-02-13
Attending: INTERNAL MEDICINE
Payer: MEDICAID

## 2020-02-13 LAB
ANION GAP SERPL CALCULATED.3IONS-SCNC: 4 MMOL/L (ref 3–14)
ANION GAP SERPL CALCULATED.3IONS-SCNC: 4 MMOL/L (ref 3–14)
BASE DEFICIT BLDV-SCNC: 0.1 MMOL/L
BASE EXCESS BLDV CALC-SCNC: 4.1 MMOL/L
BUN SERPL-MCNC: 47 MG/DL (ref 7–30)
BUN SERPL-MCNC: 48 MG/DL (ref 7–30)
CALCIUM SERPL-MCNC: 8.4 MG/DL (ref 8.5–10.1)
CALCIUM SERPL-MCNC: 9.1 MG/DL (ref 8.5–10.1)
CHLORIDE SERPL-SCNC: 102 MMOL/L (ref 94–109)
CHLORIDE SERPL-SCNC: 107 MMOL/L (ref 94–109)
CO2 SERPL-SCNC: 27 MMOL/L (ref 20–32)
CO2 SERPL-SCNC: 30 MMOL/L (ref 20–32)
CREAT SERPL-MCNC: 2.69 MG/DL (ref 0.66–1.25)
CREAT SERPL-MCNC: 2.77 MG/DL (ref 0.66–1.25)
ERYTHROCYTE [DISTWIDTH] IN BLOOD BY AUTOMATED COUNT: 17.2 % (ref 10–15)
GFR SERPL CREATININE-BSD FRML MDRD: 23 ML/MIN/{1.73_M2}
GFR SERPL CREATININE-BSD FRML MDRD: 24 ML/MIN/{1.73_M2}
GLUCOSE BLDC GLUCOMTR-MCNC: 127 MG/DL (ref 70–99)
GLUCOSE BLDC GLUCOMTR-MCNC: 145 MG/DL (ref 70–99)
GLUCOSE BLDC GLUCOMTR-MCNC: 153 MG/DL (ref 70–99)
GLUCOSE BLDC GLUCOMTR-MCNC: 156 MG/DL (ref 70–99)
GLUCOSE SERPL-MCNC: 126 MG/DL (ref 70–99)
GLUCOSE SERPL-MCNC: 132 MG/DL (ref 70–99)
HCO3 BLDV-SCNC: 25 MMOL/L (ref 21–28)
HCO3 BLDV-SCNC: 29 MMOL/L (ref 21–28)
HCT VFR BLD AUTO: 32.6 % (ref 40–53)
HGB BLD-MCNC: 9.6 G/DL (ref 13.3–17.7)
MCH RBC QN AUTO: 25.3 PG (ref 26.5–33)
MCHC RBC AUTO-ENTMCNC: 29.4 G/DL (ref 31.5–36.5)
MCV RBC AUTO: 86 FL (ref 78–100)
O2/TOTAL GAS SETTING VFR VENT: 21 %
O2/TOTAL GAS SETTING VFR VENT: 21 %
OXYHGB MFR BLDV: 50 %
OXYHGB MFR BLDV: 54 %
PCO2 BLDV: 39 MM HG (ref 40–50)
PCO2 BLDV: 45 MM HG (ref 40–50)
PH BLDV: 7.41 PH (ref 7.32–7.43)
PH BLDV: 7.42 PH (ref 7.32–7.43)
PLATELET # BLD AUTO: 231 10E9/L (ref 150–450)
PO2 BLDV: 30 MM HG (ref 25–47)
PO2 BLDV: 32 MM HG (ref 25–47)
POTASSIUM SERPL-SCNC: 3.5 MMOL/L (ref 3.4–5.3)
POTASSIUM SERPL-SCNC: 3.9 MMOL/L (ref 3.4–5.3)
RADIOLOGIST FLAGS: ABNORMAL
RBC # BLD AUTO: 3.8 10E12/L (ref 4.4–5.9)
SODIUM SERPL-SCNC: 136 MMOL/L (ref 133–144)
SODIUM SERPL-SCNC: 139 MMOL/L (ref 133–144)
WBC # BLD AUTO: 8.2 10E9/L (ref 4–11)

## 2020-02-13 PROCEDURE — 82805 BLOOD GASES W/O2 SATURATION: CPT | Performed by: STUDENT IN AN ORGANIZED HEALTH CARE EDUCATION/TRAINING PROGRAM

## 2020-02-13 PROCEDURE — 25800030 ZZH RX IP 258 OP 636: Performed by: STUDENT IN AN ORGANIZED HEALTH CARE EDUCATION/TRAINING PROGRAM

## 2020-02-13 PROCEDURE — 80048 BASIC METABOLIC PNL TOTAL CA: CPT | Performed by: INTERNAL MEDICINE

## 2020-02-13 PROCEDURE — 25000132 ZZH RX MED GY IP 250 OP 250 PS 637: Performed by: STUDENT IN AN ORGANIZED HEALTH CARE EDUCATION/TRAINING PROGRAM

## 2020-02-13 PROCEDURE — 25000128 H RX IP 250 OP 636: Performed by: STUDENT IN AN ORGANIZED HEALTH CARE EDUCATION/TRAINING PROGRAM

## 2020-02-13 PROCEDURE — 36415 COLL VENOUS BLD VENIPUNCTURE: CPT | Performed by: STUDENT IN AN ORGANIZED HEALTH CARE EDUCATION/TRAINING PROGRAM

## 2020-02-13 PROCEDURE — 40000986 XR CHEST PORT 1 VW

## 2020-02-13 PROCEDURE — 86039 ANTINUCLEAR ANTIBODIES (ANA): CPT | Performed by: STUDENT IN AN ORGANIZED HEALTH CARE EDUCATION/TRAINING PROGRAM

## 2020-02-13 PROCEDURE — 40000196 ZZH STATISTIC RAPCV CVP MONITORING

## 2020-02-13 PROCEDURE — 97535 SELF CARE MNGMENT TRAINING: CPT | Mod: GO | Performed by: OCCUPATIONAL THERAPIST

## 2020-02-13 PROCEDURE — 86038 ANTINUCLEAR ANTIBODIES: CPT | Performed by: STUDENT IN AN ORGANIZED HEALTH CARE EDUCATION/TRAINING PROGRAM

## 2020-02-13 PROCEDURE — 00000146 ZZHCL STATISTIC GLUCOSE BY METER IP

## 2020-02-13 PROCEDURE — 40000048 ZZH STATISTIC DAILY SWAN MONITORING

## 2020-02-13 PROCEDURE — 20000004 ZZH R&B ICU UMMC

## 2020-02-13 PROCEDURE — 80048 BASIC METABOLIC PNL TOTAL CA: CPT | Performed by: STUDENT IN AN ORGANIZED HEALTH CARE EDUCATION/TRAINING PROGRAM

## 2020-02-13 PROCEDURE — 97530 THERAPEUTIC ACTIVITIES: CPT | Mod: GO | Performed by: OCCUPATIONAL THERAPIST

## 2020-02-13 PROCEDURE — 85027 COMPLETE CBC AUTOMATED: CPT | Performed by: STUDENT IN AN ORGANIZED HEALTH CARE EDUCATION/TRAINING PROGRAM

## 2020-02-13 PROCEDURE — 25000132 ZZH RX MED GY IP 250 OP 250 PS 637: Performed by: INTERNAL MEDICINE

## 2020-02-13 PROCEDURE — 97165 OT EVAL LOW COMPLEX 30 MIN: CPT | Mod: GO | Performed by: OCCUPATIONAL THERAPIST

## 2020-02-13 PROCEDURE — 36415 COLL VENOUS BLD VENIPUNCTURE: CPT | Performed by: INTERNAL MEDICINE

## 2020-02-13 PROCEDURE — 71045 X-RAY EXAM CHEST 1 VIEW: CPT

## 2020-02-13 RX ORDER — POTASSIUM CHLORIDE 1500 MG/1
60 TABLET, EXTENDED RELEASE ORAL ONCE
Status: DISCONTINUED | OUTPATIENT
Start: 2020-02-13 | End: 2020-02-13

## 2020-02-13 RX ORDER — POTASSIUM CHLORIDE 750 MG/1
20 TABLET, EXTENDED RELEASE ORAL ONCE
Status: COMPLETED | OUTPATIENT
Start: 2020-02-13 | End: 2020-02-13

## 2020-02-13 RX ORDER — LISINOPRIL 2.5 MG/1
2.5 TABLET ORAL DAILY
Status: DISCONTINUED | OUTPATIENT
Start: 2020-02-13 | End: 2020-02-14 | Stop reason: HOSPADM

## 2020-02-13 RX ORDER — FUROSEMIDE 80 MG
80 TABLET ORAL
Qty: 180 TABLET | Refills: 1 | Status: SHIPPED | OUTPATIENT
Start: 2020-02-13 | End: 2020-02-19

## 2020-02-13 RX ORDER — ISOSORBIDE DINITRATE 30 MG/1
30 TABLET ORAL 3 TIMES DAILY
Qty: 90 TABLET | Refills: 0 | Status: SHIPPED | OUTPATIENT
Start: 2020-02-13 | End: 2020-03-13

## 2020-02-13 RX ORDER — POTASSIUM CHLORIDE 750 MG/1
60 TABLET, EXTENDED RELEASE ORAL ONCE
Status: COMPLETED | OUTPATIENT
Start: 2020-02-13 | End: 2020-02-13

## 2020-02-13 RX ORDER — LISINOPRIL 2.5 MG/1
2.5 TABLET ORAL DAILY
Qty: 30 TABLET | Refills: 0 | Status: SHIPPED | OUTPATIENT
Start: 2020-02-14 | End: 2020-02-19

## 2020-02-13 RX ADMIN — LISINOPRIL 2.5 MG: 2.5 TABLET ORAL at 10:03

## 2020-02-13 RX ADMIN — ISOSORBIDE DINITRATE 30 MG: 10 TABLET ORAL at 14:10

## 2020-02-13 RX ADMIN — INSULIN ASPART 1 UNITS: 100 INJECTION, SOLUTION INTRAVENOUS; SUBCUTANEOUS at 18:09

## 2020-02-13 RX ADMIN — PANTOPRAZOLE SODIUM 40 MG: 40 TABLET, DELAYED RELEASE ORAL at 08:22

## 2020-02-13 RX ADMIN — INSULIN GLARGINE 7 UNITS: 100 INJECTION, SOLUTION SUBCUTANEOUS at 08:48

## 2020-02-13 RX ADMIN — ATORVASTATIN CALCIUM 80 MG: 20 TABLET, FILM COATED ORAL at 08:21

## 2020-02-13 RX ADMIN — PANTOPRAZOLE SODIUM 40 MG: 40 TABLET, DELAYED RELEASE ORAL at 20:08

## 2020-02-13 RX ADMIN — MELATONIN 1000 UNITS: at 20:08

## 2020-02-13 RX ADMIN — FUROSEMIDE 80 MG: 10 INJECTION, SOLUTION INTRAVENOUS at 08:23

## 2020-02-13 RX ADMIN — ISOSORBIDE DINITRATE 30 MG: 10 TABLET ORAL at 08:20

## 2020-02-13 RX ADMIN — INSULIN ASPART 1 UNITS: 100 INJECTION, SOLUTION INTRAVENOUS; SUBCUTANEOUS at 13:06

## 2020-02-13 RX ADMIN — POTASSIUM CHLORIDE 20 MEQ: 750 TABLET, EXTENDED RELEASE ORAL at 22:01

## 2020-02-13 RX ADMIN — MELATONIN 1000 UNITS: at 10:03

## 2020-02-13 RX ADMIN — IRON SUCROSE 200 MG: 20 INJECTION, SOLUTION INTRAVENOUS at 08:49

## 2020-02-13 RX ADMIN — CLOPIDOGREL BISULFATE 75 MG: 75 TABLET, FILM COATED ORAL at 08:19

## 2020-02-13 RX ADMIN — POTASSIUM CHLORIDE 60 MEQ: 750 TABLET, EXTENDED RELEASE ORAL at 05:19

## 2020-02-13 RX ADMIN — ASPIRIN 81 MG CHEWABLE TABLET 81 MG: 81 TABLET CHEWABLE at 08:22

## 2020-02-13 RX ADMIN — FUROSEMIDE 80 MG: 10 INJECTION, SOLUTION INTRAVENOUS at 20:08

## 2020-02-13 RX ADMIN — ISOSORBIDE DINITRATE 30 MG: 10 TABLET ORAL at 20:07

## 2020-02-13 RX ADMIN — B-COMPLEX W/ C & FOLIC ACID TAB 1 TABLET: TAB at 08:19

## 2020-02-13 ASSESSMENT — ACTIVITIES OF DAILY LIVING (ADL)
ADLS_ACUITY_SCORE: 12
IADL_COMMENTS: SO CAN ASSIST AS NEEDED.
ADLS_ACUITY_SCORE: 12
PREVIOUS_RESPONSIBILITIES: MEAL PREP;HOUSEKEEPING;LAUNDRY;SHOPPING;YARDWORK;MEDICATION MANAGEMENT;FINANCES;DRIVING
ADLS_ACUITY_SCORE: 12

## 2020-02-13 NOTE — PLAN OF CARE
Summary: Patient is alert and oriented x 4. PERRL. Follows commands. Moves all extremities. Denies pain. A. Fib. SBP 90s-110s. HR in the 80s-90s. Afebrile. +pulses. +2 dependent edema. [place] catheter repositioned to 49 cm. Lung sounds clear. RA. K+ 3.5. Replaced. SvO2 low 50s. Cardiac index 2.1. Low sodium diet. No BM overnight. Voids adequately using urinal. Lasix 80 mg given overnight.     Safety concerns: Call light within reach. Patient refused subQ heparin shot. MD aware.    Plan of care: D/c swan catheter. Transfer to general care as appropriate     Iban Berry RN gave verbal report to ELIDA Loomis regarding the care of Cole Jesus

## 2020-02-13 NOTE — CONSULTS
[facility] Inpatient Consult Dermatology Note    Impression/Plan:  1. 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 there is no information in his chart to suggest that he had a definitive diagnosis of drug-induced lupus with hydralazine.  No antihistone antibody testing was found in his chart.  He denied any systemic symptoms of lupus with his initial episode in 2012.     We are most likely to suspect that he had a simple lichenoid drug eruption with this medication which is not life-threatening and can oftentimes be treated through with supportive cares including topical steroids.    While we feel that it is safe for him to be restarted on hydralazine (though with possibility of recurrence of his uncomplicated lichenoid drug eruption which we could treat through) the patient was very adamant at bedside today that he is not willing to retry this medication    All labs including prior dermatopathology report were reviewed with Dr. Daniels who also serves as dermatopathologist here at [facility]    Thank you for the dermatology consultation. Please do not hesitate to contact the dermatology resident/faculty on call for any additional questions or concerns. We will sign off.    Consuelo Wallace MD  Dermatology Resident  [phone]    Attending Note  The outside medical records we were able to review suggest that patient had a non-life threatening cutaneous reaction to a medication, possibly hydralazine.  Lichenoid drug eruption (as suggested by the outside path report) is generally medically manageable, and does not necessarily require drug discontinuation if this is a crucial medication.  If patient receives hydralazine and develops rash, please alert our team and we  "will re-evaluate and provide recommendations.    I have seen and examined this patient and agree with the assessment and plan as documented in the resident's note.    Jose A Daniels MD  Dermatology Attending      Dermatology Problem List:  1. H/o simple lichenoid drug eruption with hydralazine, 2012 (bx at [facility])    Date of Admission: Jan 16, 2020   Encounter Date: 02/13/2020    Reason for Consultation:   \"okay to start hydralazine? - hx of drug induced lupus\"    History of Present Illness:  Mr. Cole Jesus is a 59 year old male with h/o CAD (s/p PCI to pRCA 6/2019, STEMI s/p KEVYN x 3 to RCA and LCx in 2012), ICM (LVEF 30-35% 11/2019), severe pHTN, Moderate MR, Afib (off AC), GIB due to AVM s/p clipping, CKD IV, admitted 2/10/20 for a planned RHC but found to be in cardiogenic shock and decompensated HFrEF.He is unable to receive nipride or ACEi d/t renal function (baseline Cr 2.7, 3.16 on admit, improving with diuresis) per cards team. Currently on lisinopril and isordril. On ASA an plavix for anticoagulation.     Dermatology was consulted by cardiology for evaluation of the safety of hydralazine in this pt with a history of ?drug-induced lupus from hydralazine. The original notes from the derm team who biopsied in 2012 are not available via care everywhere. The assumption of the need for avoidance of this med was made from the pts allergy list which states \"black spots\" in 2013 and \"black spots, verified allergic skin reaction on skin biopsy\" in 2020. Nowhere in his chart do I see a note from rheumatology or dermatology nor a diagnosis of any form of lupus.  Shave biopsy done from the back in [place] in 2012 at the time of his initial eruption with hydralazine was notable for lichenoid dermatitis which is nonspecific and many diagnoses fit under this umbrella.  Upon questioning, the patient states that he got dark spots on his face, back, torso which were pruritic.  He denies ever having any mucosal " involvement including of the eyes lips or nose.  He did not have other systemic symptoms at this point either.  Even if okay to proceed with this medication from our standpoint the patient was adamant that he does not want to start this medication again as he has a history of being not told to take warfarin and being restarted on this with resultant GI bleed.  He was also told per his report that he was never to take hydralazine ever again though the reasoning behind this was not given to us by the patient today, so unsure if this was a suggestion or a mandatory avoidance. He reports that he has no rash today.  He has no other concerns about his skin at today's encounter.    ----------------------------------  Shave biopsy 9/14/12  - Coreen Yeboah/Zohreh aviles ([place], [place]) ([facility])  SPECIMEN(S):    1. SKIN, LEFT UPPER BACK, SHAVE BIOPSY        Requisition CLINICAL HISTORY: 1 CM WELL-DEMARCATED VIOLACEOUS LICHENIFIED PLAQUE-  DISCRETE AND CONFLUENT.  PRESENT SINCE 3/12 ON FOREHEAD, FLANKS, AND  BACK.  ITCHY.  PSORIASIS, LICHEN PLANUS, LICHENOID DERMATITIS, ?DRUG.       FINAL DIAGNOSIS:    Skin, left upper back, shave biopsy:  Lichenoid interface dermatitis.      COMMENT:  Histologic sections demonstrate a prominent patchy band-like lymphohistiocytic infiltrate in the papillary dermis, with associated  superficial perivascular lymphohistiocytic inflammation.  There is overlying hyperkeratosis, focal parakeratosis and marked basal vacuolar degeneration, along with many cytoid bodies and prominent pigment  incontinence.  Rare eosinophils are present.  Melan-A stain highlights scattered melanocytes in the basal layer of the epidermis.  PAS-D stain is negative for fungal microorganisms.  This histologic pattern can be seen in the context of lichenoid keratosis, lichen planus, or lichenoid  drug eruption.  Clinical correlation is necessary.   FLACO in process     Past Medical History:    Patient Active Problem List   Diagnosis     ST elevation myocardial infarction (STEMI) of inferior wall (H)     Lower GI bleeding     S/P colon resection     Atrial fibrillation (H)     Type 2 diabetes, HbA1C goal < 8% (H)     Chronic systolic congestive heart failure (HCC)     Ischemic cardiomyopathy     Hyperlipidemia LDL goal <100     Vitamin D deficiency     h/o atrial flutter, ablation 8/20/13     Hx of ileostomy     CKD (chronic kidney disease) stage 4, GFR 15-29 ml/min (H)     Anemia in chronic renal disease     Subclinical hypothyroidism     Severe pulmonary arterial systolic hypertension (H)     Hyperglycemia     Weakness     Acute anemia     Anemia due to acute blood loss     Iron deficiency     Atrial fibrillation, unspecified type (H)     Essential hypertension with goal blood pressure less than 130/85     Other ill-defined heart diseases     Low cardiac output syndrome (H)     Gastrointestinal bleed     Heart failure (H)     Past Medical History:   Diagnosis Date     Anemia in chronic renal disease 3/9/2015     Antiplatelet or antithrombotic long-term use      Atrial fibrillation and flutter      CAD (coronary artery disease)     Stemi in 12/11, s/p angioplasty     CRD (chronic renal disease), stage IV (H) 03/09/2015    hx ATN with dialysis complicating cardiogenic shock  1/2012     GI bleed     massive lower GI bleed secondary to a cecal ulcer, s/p ileocecal resection in 12/11     Heart failure     Biventricular systolic HF, complicated by ARDS requiring tracheostomy     Hyperlipidemia LDL goal <100 4/28/2013     Hypertension      Ischemic cardiomyopathy 4/28/2013    TTE revealing 40% EF     Myocardial infarction (H)      Other and unspecified nonspecific immunological findings     Anti JKa     Pulmonary edema     episodes of flash pulmonary edema in 12/11     Subclinical hypothyroidism      Past Surgical History:   Procedure Laterality Date     CV RIGHT HEART CATH N/A 11/12/2019    Procedure: CV  RIGHT HEART CATH;  Surgeon: Fox Gerard MD;  Location:  HEART CARDIAC CATH LAB     CV RIGHT HEART CATH N/A 2/10/2020    Procedure: CV RIGHT HEART CATH;  Surgeon: Fox Gerard MD;  Location:  HEART CARDIAC CATH LAB     ESOPHAGOSCOPY, GASTROSCOPY, DUODENOSCOPY (EGD), COMBINED  2011    Procedure:COMBINED ESOPHAGOSCOPY, GASTROSCOPY, DUODENOSCOPY (EGD); Surgeon:SAMARIA PUENTE; Location: GI     LAPAROTOMY EXPLORATORY  2011    Procedure:LAPAROTOMY EXPLORATORY; Explore laparotomy, Illeocectomy, Diverting Illeostomy; Surgeon:GIA NAJERA; Location:[facility] OR     ORIF right elbow fracture  age 12     TAKEDOWN ILEOSTOMY  2014    Procedure: TAKEDOWN ILEOSTOMY;  Surgeon: Gia Najera MD;  Location: [facility] OR     TRACHEOSTOMY  2011    Procedure:TRACHEOSTOMY; Tracheostomy; 80XLTCP-Proximal Extension-Cuffed 8.0 mm I.D.; Surgeon:LIZABETH DYE; Location: OR         Social History:  Patient reports that he quit smoking about 8 years ago. His smoking use included cigarettes. He has a 80.00 pack-year smoking history. He has never used smokeless tobacco. He reports that he does not drink alcohol or use drugs.    Family History:  Family History   Problem Relation Age of Onset     Diabetes Mother      Hypertension Mother      Heart Disease Mother      Heart Disease Father          of heart attack, left when he was young     Diabetes Sister      Diabetes Sister      Diabetes Sister      Glaucoma No family hx of      Macular Degeneration No family hx of        Medications:  Current Facility-Administered Medications   Medication     aspirin (ASA) chewable tablet 81 mg     atorvastatin (LIPITOR) tablet 80 mg     clopidogrel (PLAVIX) tablet 75 mg     glucose gel 15-30 g    Or     dextrose 50 % injection 25-50 mL    Or     glucagon injection 1 mg     diphenhydrAMINE (BENADRYL) injection 50 mg     EPINEPHrine (ADRENALIN) kit 0.3 mg      furosemide (LASIX) injection 80 mg     heparin ANTICOAGULANT injection 5,000 Units     insulin aspart (NovoLOG) inj (RAPID ACTING)     insulin aspart (NovoLOG) inj (RAPID ACTING)     insulin glargine (LANTUS PEN) injection 7 Units     iron sucrose (VENOFER) 200 mg in sodium chloride 0.9 % 100 mL intermittent infusion     isosorbide dinitrate (ISORDIL) tablet 30 mg     lisinopril (PRINIVIL/Zestril) tablet 2.5 mg     methylPREDNISolone sodium succinate (solu-MEDROL) injection 125 mg     pantoprazole (PROTONIX) EC tablet 40 mg     ranitidine (ZANTAC) injection 50 mg     vitamin B complex with vitamin C (STRESS TAB) tablet 1 tablet     Vitamin D3 (CHOLECALCIFEROL) 25 mcg (1000 units) tablet 1,000 Units          Allergies   Allergen Reactions     Hydralazine      Black spots, verified allergic reaction by skin biopsy     Simvastatin Other (See Comments)     Leg muscle weakness     Tylenol [Acetaminophen] Palpitations     Review of Systems:  -As per HPI    Physical exam:  Vitals: /73   Pulse 86   Temp 97.9  F (36.6  C) (Oral)   Resp 19   Wt 97.3 kg (214 lb 6.4 oz)   SpO2 96%   BMI 32.60 kg/m    GEN: This is a well developed, well-nourished male in no acute distress, in a pleasant mood.    SKIN: Focused examination of the face, arms, abdomen, legs was performed.  -Laurent skin type: III  -Limited skin exam was unremarkable  -Prior surgical scars noted  -No other lesions of concern on areas examined.     Laboratory:  Results for orders placed or performed during the hospital encounter of 02/10/20 (from the past 24 hour(s))   Basic metabolic panel   Result Value Ref Range    Sodium 139 133 - 144 mmol/L    Potassium 4.2 3.4 - 5.3 mmol/L    Chloride 107 94 - 109 mmol/L    Carbon Dioxide 27 20 - 32 mmol/L    Anion Gap 5 3 - 14 mmol/L    Glucose 164 (H) 70 - 99 mg/dL    Urea Nitrogen 50 (H) 7 - 30 mg/dL    Creatinine 2.81 (H) 0.66 - 1.25 mg/dL    GFR Estimate 23 (L) >60 mL/min/[1.73_m2]    GFR Estimate If Black  27 (L) >60 mL/min/[1.73_m2]    Calcium 8.4 (L) 8.5 - 10.1 mg/dL   Blood gas venous with oxyhemoglobin   Result Value Ref Range    Ph Venous 7.41 7.32 - 7.43 pH    PCO2 Venous 42 40 - 50 mm Hg    PO2 Venous 30 25 - 47 mm Hg    Bicarbonate Venous 27 21 - 28 mmol/L    FIO2 21     Oxyhemoglobin Venous 51 %    Base Excess Venous 1.9 mmol/L   Calcium ionized whole blood   Result Value Ref Range    Calcium Ionized Whole Blood 4.6 4.4 - 5.2 mg/dL   Glucose by meter   Result Value Ref Range    Glucose 139 (H) 70 - 99 mg/dL   Glucose by meter   Result Value Ref Range    Glucose 150 (H) 70 - 99 mg/dL   Blood gas venous with oxyhemoglobin   Result Value Ref Range    Ph Venous 7.42 7.32 - 7.43 pH    PCO2 Venous 40 40 - 50 mm Hg    PO2 Venous 31 25 - 47 mm Hg    Bicarbonate Venous 26 21 - 28 mmol/L    FIO2 21     Oxyhemoglobin Venous 52 %    Base Excess Venous 1.5 mmol/L   XR Chest Port 1 View   Result Value Ref Range    Radiologist flags Malpositioned pulmonary arterial catheter (Urgent)     Narrative    XR CHEST PORT 1 VW  2/13/2020 1:33 AM      HISTORY: SG catheter monitoring    COMPARISON: 2/12/2020, 2/11/2020    FINDINGS: [facility] chest radiograph. [place]-Messi catheter tip projects over  the subsegmental branches of the inferior right pulmonary artery.  Trachea is midline, cardiomegaly. Stable bilateral pleural effusions  overlying basilar opacities. Bilateral perihilar streaky opacities. No  pneumothorax. No acute osseous or upper abdominal abnormality.      Impression    IMPRESSION:  1. [place]-Messi catheter tip projects over the subsegmental branches of  the right pulmonary artery. Recommend retraction.  2. Stable cardiomegaly with moderate pulmonary edema.  3. Bilateral pleural effusions with overlying basilar retrocardiac  atelectasis/consolidation.    [Urgent Result: Malpositioned pulmonary arterial catheter]    Finding was identified on 2/13/2020 1:37 AM.     Dr. Ndiaye was contacted by Dr. Bateman at 2/13/2020 1:42 AM  and  verbalized understanding of the urgent finding.      I have personally reviewed the examination and initial interpretation  and I agree with the findings.    KARSTEN LOVE MD   XR Chest Port 1 View    Narrative    Exam: [facility] chest radiograph 2/13/2020 2:17 AM.    HISTORY: [place] placement.    COMPARISON: 2/13/2020 at 0103 hours. 2/12/2020.    FINDINGS: [facility] chest radiograph. No substantial change in [place]-Messi  catheter tip placement, tip continues to project over the distal  inferior subsegmental branch of the right pulmonary artery. Stable  cardiomegaly with moderate pulmonary edema and bilateral pleural  effusions.      Impression    IMPRESSION:  1. No substantial change in placement of [place]-Messi catheter, the tip  continues to project over the inferior subsegmental branch of the  right main pulmonary artery.  2. Otherwise stable chest.    I have personally reviewed the examination and initial interpretation  and I agree with the findings.    KARSTEN LOVE MD   CBC with platelets   Result Value Ref Range    WBC 8.2 4.0 - 11.0 10e9/L    RBC Count 3.80 (L) 4.4 - 5.9 10e12/L    Hemoglobin 9.6 (L) 13.3 - 17.7 g/dL    Hematocrit 32.6 (L) 40.0 - 53.0 %    MCV 86 78 - 100 fl    MCH 25.3 (L) 26.5 - 33.0 pg    MCHC 29.4 (L) 31.5 - 36.5 g/dL    RDW 17.2 (H) 10.0 - 15.0 %    Platelet Count 231 150 - 450 10e9/L   Basic metabolic panel   Result Value Ref Range    Sodium 139 133 - 144 mmol/L    Potassium 3.5 3.4 - 5.3 mmol/L    Chloride 107 94 - 109 mmol/L    Carbon Dioxide 27 20 - 32 mmol/L    Anion Gap 4 3 - 14 mmol/L    Glucose 132 (H) 70 - 99 mg/dL    Urea Nitrogen 47 (H) 7 - 30 mg/dL    Creatinine 2.69 (H) 0.66 - 1.25 mg/dL    GFR Estimate 24 (L) >60 mL/min/[1.73_m2]    GFR Estimate If Black 28 (L) >60 mL/min/[1.73_m2]    Calcium 8.4 (L) 8.5 - 10.1 mg/dL   Blood gas venous with oxyhemoglobin   Result Value Ref Range    Ph Venous 7.42 7.32 - 7.43 pH    PCO2 Venous 45 40 - 50 mm Hg    PO2 Venous 30 25 - 47 mm Hg     Bicarbonate Venous 29 (H) 21 - 28 mmol/L    FIO2 21     Oxyhemoglobin Venous 50 %    Base Excess Venous 4.1 mmol/L   XR Chest Port 1 View    Narrative    Exam: [facility] chest radiograph 2/13/2020 4:24 AM.    COMPARISON: Same day at 0210 hours and 0103 hours. 2/12/2020.    HISTORY: [place] placement.    FINDINGS: [facility] chest radiograph. [place]-Messi catheter tip has been  somewhat retracted, now projecting over the mid right main pulmonary  artery. Stable cardiomegaly, moderate pulmonary edema, and bilateral  pleural effusions with overlying basilar opacities.      Impression    IMPRESSION:  1. [place]-Messi catheter tip is visible retracted, now projecting over  the mid/distal right main pulmonary artery.  2. Otherwise stable chest.    I have personally reviewed the examination and initial interpretation  and I agree with the findings.    KARSTEN LOVE MD   XR Chest Port 1 View    Narrative    Exam: [facility] chest radiograph 2/13/2020 6:59 AM.    COMPARISON: Same day at 0417 hours, same day at 0210 hours, 2/12/2020.    HISTORY: [place] repositioning.    FINDINGS: A chest radiograph. [place]-Messi catheter tip now projects over  the main pulmonary artery. Stable cardiomegaly with mild pulmonary  edema. Bilateral pleural effusions with overlying basilar atelectasis.      Impression    IMPRESSION:  1. [place]-Messi catheter tip projects over the main pulmonary artery.  2. Otherwise stable chest.    I have personally reviewed the examination and initial interpretation  and I agree with the findings.    KARSTEN LOVE MD   Glucose by meter   Result Value Ref Range    Glucose 127 (H) 70 - 99 mg/dL   Blood gas venous with oxyhemoglobin   Result Value Ref Range    Ph Venous 7.41 7.32 - 7.43 pH    PCO2 Venous 39 (L) 40 - 50 mm Hg    PO2 Venous 32 25 - 47 mm Hg    Bicarbonate Venous 25 21 - 28 mmol/L    FIO2 21     Oxyhemoglobin Venous 54 %    Base Deficit Venous 0.1 mmol/L   Glucose by meter   Result Value Ref Range    Glucose 156 (H) 70 - 99 mg/dL        Dr. Jose A Daniels staffed the patient.    Staff Involved:  Resident/Staff

## 2020-02-13 NOTE — DISCHARGE SUMMARY
[facility] Discharge Summary    Cole Jesus MRN# [id number]   Age: 59 year old YOB: 1960     Date of Admission:  2/10/2020  Date of Discharge::  2/14/2020 10:48 AM  Admitting Physician:  Alen Trujillo MD  Discharge Physician:  Deisy Benjamin MD          Admission Diagnoses:   Chronic systolic congestive heart failure (H) [I50.22]          Discharge Diagnosis:   Principal Problem:    Chronic systolic congestive heart failure (HCC)  Active Problems:    Heart failure (H)            Procedures:   Right heart catheterization           Medications Prior to Admission:     No medications prior to admission.             Discharge Medications:     Current Discharge Medication List      START taking these medications    Details   isosorbide dinitrate (ISORDIL) 30 MG tablet Take 1 tablet (30 mg) by mouth 3 times daily  Qty: 90 tablet, Refills: 0    Associated Diagnoses: Chronic systolic congestive heart failure (H)      lisinopril (PRINIVIL/ZESTRIL) 2.5 MG tablet Take 1 tablet (2.5 mg) by mouth daily  Qty: 30 tablet, Refills: 0    Associated Diagnoses: Chronic systolic congestive heart failure (H)         CONTINUE these medications which have CHANGED    Details   furosemide (LASIX) 80 MG tablet Take 1 tablet (80 mg) by mouth 2 times daily  Qty: 180 tablet, Refills: 1    Associated Diagnoses: Ischemic cardiomyopathy         CONTINUE these medications which have NOT CHANGED    Details   aspirin (ASA) 81 MG chewable tablet Take 81 mg by mouth daily      atorvastatin (LIPITOR) 80 MG tablet Take 1 tablet (80 mg) by mouth daily  Qty: 90 tablet, Refills: 3    Associated Diagnoses: Hyperlipidemia LDL goal <100      cholecalciferol (VITAMIN  -D) 1000 UNITS capsule Take 2 capsules (2,000 Units) by mouth daily  Qty: 60 capsule, Refills: 3    Associated Diagnoses: Vitamin D deficiency      clopidogrel (PLAVIX) 75 MG tablet Take 1 tablet (75 mg) by mouth daily  Qty: 90 tablet, Refills: 3    Associated  Diagnoses: ST elevation myocardial infarction (STEMI) of inferior wall (H)      pantoprazole (PROTONIX) 40 MG EC tablet Take 1 tablet (40 mg) by mouth 2 times daily  Qty: 180 tablet, Refills: 2    Comments: Please keep appt 10/31/19  Associated Diagnoses: Lower GI bleeding      vitamin  B complex with vitamin C (VITAMIN  B COMPLEX) TABS Take 1 tablet by mouth daily    Associated Diagnoses: Atrial tachycardia (H); Renal insufficiency; Ischemic cardiomyopathy      ACCU-CHEK GUIDE test strip USE TO TEST BLOOD SUGAR FOUR TIMES A DAY BEFORE MEALS AND AT BEDTIME  Qty: 400 each, Refills: 3    Associated Diagnoses: Type 2 diabetes mellitus without complication, without long-term current use of insulin (H)      Alcohol Swabs PADS 1 applicator 4 times daily (before meals and nightly)  Qty: 100 each, Refills: 3    Associated Diagnoses: Type 2 diabetes mellitus without complication, without long-term current use of insulin (H)      blood glucose monitoring (ACCU-CHEK FASTCLIX) lancets USE TO TEST BLOOD SUGAR FOUR TIMES A DAY AT MEALS AND AT BEDTIME  Qty: 400 each, Refills: 3    Associated Diagnoses: Type 2 diabetes mellitus without complication, without long-term current use of insulin (H)      insulin pen needle (32G X 4 MM) 32G X 4 MM miscellaneous Use 5 pen needles daily or as directed.  Qty: 200 each, Refills: 3    Associated Diagnoses: Type 2 diabetes mellitus without complication, without long-term current use of insulin (H)      nitroGLYcerin (NITROSTAT) 0.4 MG sublingual tablet Place 1 tablet (0.4 mg) under the tongue every 5 minutes as needed for chest pain For chest pain place 1 tablet under the tongue every 5 minutes for 3 doses. If symptoms persist 5 minutes after 1st dose call 911.  Qty: 25 tablet, Refills: 0    Associated Diagnoses: Type 2 diabetes mellitus with complication, with long-term current use of insulin (H); ASCVD (arteriosclerotic cardiovascular disease)      Sharps Container MISC 1 Device every 30  days  Qty: 1 each, Refills: 3    Associated Diagnoses: Type 2 diabetes mellitus without complication, without long-term current use of insulin (H)         STOP taking these medications       sacubitril-valsartan (ENTRESTO) 49-51 MG per tablet Comments:   Reason for Stopping:                     Consultations:   Dermatology           Brief History of Illness:   59 year old male with history of occluded pRCA s/p balloon angioplasty and stenting in 6/2019, STEMI s/p DESx3 for total occlusion of RCA and Lcx (2012), GIB due to AV malformation (in the setting of warfarin and DAPT) s/p EGD with clip (7/5/2019), ischemic cardiomyopathy, HFrEF (last EF=30-35% 11/12/19), severe pHTN, moderate mitral regurgitation, atrial fibrillation, CKD stage IV who presented for planned RHC. RHC numbers on admission: RA 22, RV EDP 20, /50/65, PCWP 40, PVR 8.5, CO 3.5, CI 1.6.           Hospital Course:   59 year old male with history of occluded pRCA s/p balloon angioplasty and stenting in 6/2019, STEMI s/p DESx3 for total occlusion of RCA and Lcx (2012), GIB due to AV malformation (in the setting of warfarin and DAPT) s/p EGD with clip (7/5/2019), ischemic cardiomyopathy, HFrEF (last EF=30-35% 11/12/19), severe pHTN, moderate mitral regurgitation, atrial fibrillation, CKD stage IV who presented for planned RHC, found to be in ambulatory cardiogenic shock.      #Acute decompensated heart failure with reduced ejection fraction (EF 30%)  #Ambulatory cardiogenic shock   #Severe pulmonary hypertension, WHO group 2   #Moderate mitral regurgitation   Patient with known ischemic CM. RHC numbers on admission: RA 22, RV EDP 20, /50/65, PCWP 40, PVR 8.5, CO 3.5, CI 1.6. [place] catheter in place. For afterload reduction, unable to give nipride or ACEi given renal function and unable to give hydralazine given history of drug skin eruption. Dermatology consulted and reviewed path. Noted non -specific drug reaction but patient still refusing  to take hydralazine   - Discharge on lasix 80mg PO BID   - Continue isordil 30mg TID and lisinopril 2.5mg po daily   - Hold entresto given renal function   - ICD: after extensive discussion and explanation of risks and benefits, patient continues to refuse for now and wants to think about it   - Follow-up with  on Wednesday with repeat labs prior.      #STEMI s/p DESx3 (2012)  #CAD s/p PCI proximal RCA (6/2019)  - Continue plavix and aspirin      #Paroxysmal Afib   CHADSVASC 3 (HF, DM, CAD). Not on a/c due to history of GI bleed      # T2DM  -PTA regimen of 7 units insulin glargine every morning     #Acute Kidney Injury   #CKD IV  Baseline serum creatinine between 2.7. Follows with   Creatinine on admission 3.16. Renal consulted. Creatinine improving with diuresis   - Diuresis as above   - Follow-up with            Discharge Instructions and Follow-Up:   Discharge diet: 2g salt   Discharge activity: Activity as tolerated   Discharge follow-up: Follow up with Dr. Trujillo in 4-5 days   Wound care: None           Discharge Disposition:   Discharged to home

## 2020-02-13 NOTE — PROGRESS NOTES
Nephrology Progress Note  02/13/2020         Assessment & Recommendations:   Cole Jesus is a 59 year old male with a h/o CAD (s/p PCI to pRCA 6/2019, STEMI s/p KEVYN x 3 to RCA and LCx in 2012), ICM (LVEF 30-35% 11/2019), severe pHTN, Moderate MR, Afib (off AC), GIB due to AVM s/p clipping, CKD IV, admitted after planned RHC for management of ambulatory cardiogenic shock and decompensated HFrEF. RHC findings significant for PCWP 40 and PA mean 65.     EVA on CKD IV  Due to decompensated HF/cardiogenic shock. Renal function continues to improve with diuresis. BP acceptable.   -Agree with continuing diuresis and monitoring renal response to therapy  -Agree with starting low dose lisinopril; anticipate up to 25% increase in creatinine   -Agree with continuing to hold entresto in setting of EVA and diuresis  -Continue to follow renal function daily    Acid Base,Electrolytes :   Na    - Normal  K      - Normal (supplemented this morning)  CO2  - Normal      BMD : Ca 8.4, albumin 3.9 , Phos 2.6.   Hypovitaminosis D  -Reasonable to continue vitamin D3 supplement as long as calcium remains normal     Iron deficiency Anemia  Hemoglobin stable with no signs of ongoing bleeding; not currently on iron supplementation at home. Repeat iron studies consistent with WILLIAM.   -Agree with IV iron supplementation; may need oral iron upon discharge      Recommendations discussed with primary team, Carmen Jean-Baptiste and Ronnie.     Patient was seen and examined with attending physician, Dr. Solo.     Jada Alexander MD, MPAS  PGY3, Internal Medicine   [phone]      Interval History :   Nursing and provider notes from last 24 hours reviewed.  Removing swan and transferring to general floor today.     Subjective:   Feeling better still; wants to go home today. Feels he can manage his medications and care at home.     Review of Systems:   No chest pain or dyspnea with activity, edema improving, no abdominal fullness. Remainder of  ROS as noted above.     Physical Exam:   I/O last 3 completed shifts:  In: 1792.9 [P.O.:1560; I.V.:232.9]  Out: 2450 [Urine:2450]   BP (!) 89/65   Pulse 86   Temp 97.6  F (36.4  C) (Oral)   Resp 22   Wt 97.3 kg (214 lb 6.4 oz)   SpO2 96%   BMI 32.60 kg/m     Vitals:    02/11/20 0245 02/12/20 0400 02/13/20 0400   Weight: 97.6 kg (215 lb 3.2 oz) 96.6 kg (212 lb 14.4 oz) 97.3 kg (214 lb 6.4 oz)     General: Alert, sitting up in chair, in no acute distress.   Skin: Warm, dry, no rashes or lesions on limited exam  Neck: No JVD, Wells in place RIJ  Respiratory: Non-labored breathing, decreased BS right base, no wheezing, on RA.  Cardiovascular: Irregular, regular rate, 3/6 systolic murmur at apex.    Gastrointestinal:  Abdomen soft, non-distended, nontender.    Extremities: Trace BLE edema to mid-calf. Warm, dry. Normal tone.  Neurologic: A&O x 3, speech normal, memory intact, no tremor or abnormal movements   Psych: appropriate mood and affect, cooperative, insight fair       Labs:   All labs reviewed by me  Electrolytes/Renal -   Recent Labs   Lab Test 02/13/20  0344 02/12/20  1603 02/12/20  0359  02/11/20  1540 02/11/20  0347  07/23/19  0431    139 139  --  138 141   < > 140   POTASSIUM 3.5 4.2 3.6   < > 3.4 3.4   < > 4.0   CHLORIDE 107 107 105  --  103 106   < > 106   CO2 27 27 28  --  27 27   < > 26   BUN 47* 50* 50*  --  53* 56*   < > 49*   CR 2.69* 2.81* 2.75*  --  2.76* 2.86*   < > 2.92*   * 164* 131*  --  176* 133*   < > 85   RONNY 8.4* 8.4* 8.5  --  8.4* 8.4*   < > 8.6   MAG  --   --  2.3  --  2.3 2.4*   < > 2.4*   PHOS  --   --  2.6  --   --  2.8  --  3.5    < > = values in this interval not displayed.       CBC -   Recent Labs   Lab Test 02/13/20  0344 02/12/20  0359 02/11/20  0347   WBC 8.2 7.8 7.3   HGB 9.6* 9.3* 9.4*    232 233       LFTs -   Recent Labs   Lab Test 02/10/20  0817 01/16/20  1033 12/18/19  1607   ALKPHOS 120 114 101   BILITOTAL 1.5* 1.0 1.4*   ALT 17 16 13   AST 4 8 6    PROTTOTAL 8.1 8.1 7.8   ALBUMIN 3.9 3.9 3.8       Iron Panel -   Recent Labs   Lab Test 02/11/20  0347 12/19/19  0545 12/12/19  1057   IRON 18* 37 31*   IRONSAT 5* 11* 8*   FRANK  --  35 37       Imaging:  CXR 2/12/20:  1. [place]-Messi catheter tip projects over the distal right main  pulmonary artery/central right lower lobe pulmonary artery.  2. Cardiomegaly with slightly increased moderate pulmonary edema.   3. Bilateral pleural effusions, right greater than left with overlying  basilar atelectasis/consolidation.    Current Medications:    aspirin  81 mg Oral Daily     atorvastatin  80 mg Oral Daily     clopidogrel  75 mg Oral Daily     furosemide  80 mg Intravenous Q12H     heparin ANTICOAGULANT  5,000 Units Subcutaneous Q12H     insulin aspart  1-7 Units Subcutaneous TID AC     insulin aspart  1-5 Units Subcutaneous At Bedtime     insulin glargine  7 Units Subcutaneous QAM AC     iron sucrose (VENOFER) intermittent infusion (200 mg)  200 mg Intravenous Q24H     isosorbide dinitrate  30 mg Oral TID     pantoprazole  40 mg Oral BID     vitamin B complex with vitamin C  1 tablet Oral Daily     Vitamin D3  1,000 Units Oral BID

## 2020-02-13 NOTE — PROGRESS NOTES
Cardiology Progress Note    Assessment & Plan   59 year old male with history of occluded pRCA s/p balloon angioplasty and stenting in 6/2019, STEMI s/p DESx3 for total occlusion of RCA and Lcx (2012), GIB due to AV malformation (in the setting of warfarin and DAPT) s/p EGD with clip (7/5/2019), ischemic cardiomyopathy, HFrEF (last EF=30-35% 11/12/19), severe pHTN, moderate mitral regurgitation, atrial fibrillation, CKD stage IV who presented for planned RHC, found to be in ambulatory cardiogenic shock.      #Acute decompensated heart failure with reduced ejection fraction (EF 30%)  #Ambulatory cardiogenic shock   #Severe pulmonary hypertension, WHO group 2   #Moderate mitral regurgitation   Patient with known ischemic CM. RHC numbers on admission: RA 22, RV EDP 20, /50/65, PCWP 40, PVR 8.5, CO 3.5, CI 1.6. [place] catheter in place. For afterload reduction, unable to give nipride or ACEi given renal function and unable to give hydralazine given history of drug skin eruption.   - Lasix 80mg IV BID   - Remove SG catheter today, transfer to floors   - Increase isordil to 30mg TID   - Start lisinopril 2.5mg po daily   - Hold entresto given renal function   - ICD: after extensive discussion and explanation of risks and benefits, patient continues to refuse for now and wants to think about it      #STEMI s/p DESx3 (2012)  #CAD s/p PCI proximal RCA (6/2019)  - Continue plavix and aspirin      #Paroxysmal Afib   CHADSVASC 3 (HF, DM, CAD). Not on a/c due to history of GI bleed      # T2DM  -PTA regimen of 7 units insulin glargine every morning     #Acute Kidney Injury   #CKD IV  Baseline serum creatinine between 2.7. Follows with   Creatinine on admission 3.16. Renal consulted. Creatinine improving with diuresis   - Diuresis as above      Fluids:   Electrolytes: K>4, Mg>2  Nutrition: Cardiac, 2L fluid restriction  Analgesia: None  Sedation: None  DVT ppx: heparin subcutaneous   Stress Ulcer  ppx:  Protonix  Glycemic Control: SSI  Catheters: None  Lines: None  Therapies: PT  Consults: Renal      Amparo Jean-Baptiste   Cardiology fellow     Interval History   Reported feeling well. Eager to go home     Physical Exam   Temp: 97.6  F (36.4  C) Temp src: Oral BP: (!) 89/65   Heart Rate: 87 Resp: 22 SpO2: 96 % O2 Device: None (Room air)    Vitals:    02/11/20 0245 02/12/20 0400 02/13/20 0400   Weight: 97.6 kg (215 lb 3.2 oz) 96.6 kg (212 lb 14.4 oz) 97.3 kg (214 lb 6.4 oz)     Vital Signs with Ranges  Temp:  [97.6  F (36.4  C)-98.2  F (36.8  C)] 97.6  F (36.4  C)  Heart Rate:  [] 87  Resp:  [8-31] 22  BP: ()/() 89/65  SpO2:  [94 %-100 %] 96 %  I/O last 3 completed shifts:  In: 1792.9 [P.O.:1560; I.V.:232.9]  Out: 2450 [Urine:2450]    Heart Rate: 87, Blood pressure (!) 89/65, pulse 86, temperature 97.6  F (36.4  C), temperature source Oral, resp. rate 22, weight 97.3 kg (214 lb 6.4 oz), SpO2 96 %.  214 lbs 6.4 oz    Gen: no acute distress  HEENT: no scleral icterus, pupils equal and reactive to light, moist mucous membranes, no nasal discharge.  NECK: no adenopathy, no asymmetry, masses, or scars, thyroid normal to palpation and no bruits, JVP not elevated  CARDIOVASCULAR: normal rate, irregular rhythm. S1/S2 normal, +3/6 systolic murmur   RESPIRATORY: clear to auscultation bilaterally, no rales, rhonchi or wheezes, no use of accessory muscles, no retractions, respirations unlabored   ABDOMEN: soft, non-tender abdomen without rebound or guarding, no hepatosplenomegaly, no palpable masses, bowel sounds present  EXTREMITIES: peripheral pulses normal, no peripheral edema, warm, capillary refill < 2 seconds  Skin: no ecchymoses, no rashes  NEURO: oriented to person, place, year, and situation; CN II-XII grossly intact; 5/5 strength throughout; sensation to light touch intact throughout; coordination intact; negative Romberg; gait normal  PSYCH: affect appropriate     Medications       aspirin  81 mg  Oral Daily     atorvastatin  80 mg Oral Daily     clopidogrel  75 mg Oral Daily     furosemide  80 mg Intravenous Q12H     heparin ANTICOAGULANT  5,000 Units Subcutaneous Q12H     insulin aspart  1-7 Units Subcutaneous TID AC     insulin aspart  1-5 Units Subcutaneous At Bedtime     insulin glargine  7 Units Subcutaneous QAM AC     iron sucrose (VENOFER) intermittent infusion (200 mg)  200 mg Intravenous Q24H     isosorbide dinitrate  30 mg Oral TID     pantoprazole  40 mg Oral BID     vitamin B complex with vitamin C  1 tablet Oral Daily     Vitamin D3  1,000 Units Oral BID       Data   Recent Labs   Lab 02/13/20  0344 02/12/20  1603 02/12/20  0359  02/11/20  0347 02/10/20  0817   WBC 8.2  --  7.8  --  7.3 7.5   HGB 9.6*  --  9.3*  --  9.4* 11.4*   MCV 86  --  84  --  84 88     --  232  --  233 286    139 139   < > 141 140   POTASSIUM 3.5 4.2 3.6   < > 3.4 4.2   CHLORIDE 107 107 105   < > 106 105   CO2 27 27 28   < > 27 26   BUN 47* 50* 50*   < > 56* 56*   CR 2.69* 2.81* 2.75*   < > 2.86* 3.16*   ANIONGAP 4 5 6   < > 8 8   RONNY 8.4* 8.4* 8.5   < > 8.4* 9.0   * 164* 131*   < > 133* 136*   ALBUMIN  --   --   --   --   --  3.9   PROTTOTAL  --   --   --   --   --  8.1   BILITOTAL  --   --   --   --   --  1.5*   ALKPHOS  --   --   --   --   --  120   ALT  --   --   --   --   --  17   AST  --   --   --   --   --  4    < > = values in this interval not displayed.       Recent Results (from the past 24 hour(s))   XR Chest Port 1 View   Result Value    Radiologist flags Malpositioned pulmonary arterial catheter (Urgent)    Narrative    XR CHEST PORT 1 VW  2/13/2020 1:33 AM      HISTORY: SG catheter monitoring    COMPARISON: 2/12/2020, 2/11/2020    FINDINGS: AP chest radiograph. [place]-Messi catheter tip projects over  the subsegmental branches of the inferior right pulmonary artery.  Trachea is midline, cardiomegaly. Stable bilateral pleural effusions  overlying basilar opacities. Bilateral perihilar streaky  opacities. No  pneumothorax. No acute osseous or upper abdominal abnormality.      Impression    IMPRESSION:  1. [place]-Mess[place] catheter tip projects over the subsegmental branches of  the right pulmonary artery. Recommend retraction.  2. Stable cardiomegaly with moderate pulmonary edema.  3. Bilateral pleural effusions with overlying basilar retrocardiac  atelectasis/consolidation.    [Urgent Result: Malpositioned pulmonary arterial catheter]    Finding was identified on 2/13/2020 1:37 AM.     Dr. Ndaiye was contacted by Dr. Bateman at 2/13/2020 1:42 AM and  verbalized understanding of the urgent finding.      I have personally reviewed the examination and initial interpretation  and I agree with the findings.    KARSTEN LOVE MD   XR Chest Port 1 View    Narrative    Exam: AP chest radiograph 2/13/2020 2:17 AM.    HISTORY: [place] placement.    COMPARISON: 2/13/2020 at 0103 hours. 2/12/2020.    FINDINGS: AP chest radiograph. No substantial change in [place]-Messi  catheter tip placement, tip continues to project over the distal  inferior subsegmental branch of the right pulmonary artery. Stable  cardiomegaly with moderate pulmonary edema and bilateral pleural  effusions.      Impression    IMPRESSION:  1. No substantial change in placement of [place]-Messi catheter, the tip  continues to project over the inferior subsegmental branch of the  right main pulmonary artery.  2. Otherwise stable chest.    I have personally reviewed the examination and initial interpretation  and I agree with the findings.    KARSTEN LOVE MD   XR Chest Port 1 View    Narrative    Exam: AP chest radiograph 2/13/2020 6:59 AM.    COMPARISON: Same day at 0417 hours, same day at 0210 hours, 2/12/2020.    HISTORY: [place] repositioning.    FINDINGS: A chest radiograph. [place]-Messi catheter tip now projects over  the main pulmonary artery. Stable cardiomegaly with mild pulmonary  edema. Bilateral pleural effusions with overlying basilar atelectasis.       Impression    IMPRESSION:  1. [place]-Messi catheter tip projects over the main pulmonary artery.  2. Otherwise stable chest.    I have personally reviewed the examination and initial interpretation  and I agree with the findings.    KARSTEN LOVE MD

## 2020-02-13 NOTE — PLAN OF CARE
Discharge Planner OT   Patient plan for discharge: home with assist  Current status: pt ambulating greater than 1000 feet SBA, ascend/descend 12 stairs.   Barriers to return to prior living situation: none  Recommendations for discharge: home with assist as needed.   Rationale for recommendations: pt moving well and with sufficient support at home.        Entered by: Kip Knox 02/13/2020 1:50 PM

## 2020-02-13 NOTE — PROGRESS NOTES
"   02/13/20 1300   Quick Adds   Type of Visit Initial Occupational Therapy Evaluation   Living Environment   Lives With significant other   Living Arrangements house   Home Accessibility stairs within home   Number of Stairs, Within Home, Primary 10   Stair Railings, Within Home, Primary railings on both sides of stairs   Self-Care   Usual Activity Tolerance moderate   Current Activity Tolerance moderate   Regular Exercise No   Equipment Currently Used at Home none   Functional Level   Ambulation 0-->independent   Transferring 0-->independent   Toileting 0-->independent   Bathing 0-->independent   Dressing 0-->independent   Eating 0-->independent   Communication 0-->understands/communicates without difficulty   Swallowing 0-->swallows foods/liquids without difficulty   Cognition 0 - no cognition issues reported   Fall history within last six months no   Prior Functional Level Comment Pt endorses \"overdoing it\" at home and fatiguing easily.    General Information   Referring Physician Alen Trujillo   Patient/Family Goals Statement learn to manage fatigue and basic ADL's.    Additional Occupational Profile Info/Pertinent History of Current Problem Cole Jesus is a 59 year old male with a h/o CAD (s/p PCI to pRCA 6/2019, STEMI s/p KEVYN x 3 to RCA and LCx in 2012), ICM (LVEF 30-35% 11/2019), severe pHTN, Moderate MR, Afib (off AC), GIB due to AVM s/p clipping, CKD IV, admitted after planned RHC for management of ambulatory cardiogenic shock and decompensated HFrEF. RHC findings significant for PCWP 40 and PA mean 65.   Precautions/Limitations no known precautions/limitations   General Observations pt motivated in therapy.    Cognitive Status Examination   Orientation orientation to person, place and time   Cognitive Comment no concerns.    Visual Perception   Visual Perception No deficits were identified   Sensory Examination   Sensory Quick Adds No deficits were identified   Range of Motion (ROM)   ROM Quick Adds No " "deficits were identified   Strength   Manual Muscle Testing Quick Adds Other   Strength Comments B UE/LE grossly 4/5    Hand Strength   Hand Strength Comments no concerns.    Coordination   Coordination Comments no concerns.    Transfer Skill: Sit to Stand   Level of [place]: Sit/Stand stand-by assist   Lower Body Dressing   Level of [place]: Dress Lower Body stand-by assist  (pt with LOB x1 during LE dressing able to self correct. )   Instrumental Activities of Daily Living (IADL)   Previous Responsibilities meal prep;housekeeping;laundry;shopping;yardwork;medication management;finances;driving   IADL Comments SO can assist as needed.    Activities of Daily Living Analysis   Impairments Contributing to Impaired Activities of Daily Living   (decreased activity tolerance, poor lifestyle balance. )   General Therapy Interventions   Planned Therapy Interventions ADL retraining;home program guidelines;progressive activity/exercise;risk factor education   Clinical Impression   Criteria for Skilled Therapeutic Interventions Met yes, treatment indicated   OT Diagnosis decreased ADL I   Assessment of Occupational Performance 1-3 Performance Deficits   Identified Performance Deficits community mobility, homemaking, leisure.    Clinical Decision Making (Complexity) Low complexity   Therapy Frequency Other (see comments)  (1-3 visits. )   Anticipated Discharge Disposition Home with Assist   Risks and Benefits of Treatment have been explained. Yes   Patient, Family & other staff in agreement with plan of care Yes   Clinical Impression Comments Pt presents to OT with poor fatigue managment and decreased activity tolerance and balance deficits leading to decreased ADL I. pt to benefit from skilled OT intervention to address the abov problem list. see daily note for treatment provided today.    [employer] AM-PAC TM \"6 Clicks\"   2016, Trustees of [employer], under license to [employer].  All rights " "reserved.   6 Clicks Short Forms Daily Activity Inpatient Short Form   [facility] AM-PAC  \"6 Clicks\" Daily Activity Inpatient Short Form   1. Putting on and taking off regular lower body clothing? 4 - None   2. Bathing (including washing, rinsing, drying)? 4 - None   3. Toileting, which includes using toilet, bedpan or urinal? 4 - None   4. Putting on and taking off regular upper body clothing? 4 - None   5. Taking care of personal grooming such as brushing teeth? 4 - None   6. Eating meals? 4 - None   Daily Activity Raw Score (Score out of 24.Lower scores equate to lower levels of function) 24   Total Evaluation Time   Total Evaluation Time (Minutes) 3     "

## 2020-02-14 VITALS
HEART RATE: 86 BPM | TEMPERATURE: 98.5 F | OXYGEN SATURATION: 98 % | SYSTOLIC BLOOD PRESSURE: 96 MMHG | RESPIRATION RATE: 12 BRPM | DIASTOLIC BLOOD PRESSURE: 67 MMHG | BODY MASS INDEX: 31.71 KG/M2 | WEIGHT: 208.56 LBS

## 2020-02-14 DIAGNOSIS — I50.22 CHRONIC SYSTOLIC CONGESTIVE HEART FAILURE (H): Primary | ICD-10-CM

## 2020-02-14 LAB
ABO + RH BLD: NORMAL
ABO + RH BLD: NORMAL
ANA PAT SER IF-IMP: ABNORMAL
ANA SER QL IF: POSITIVE
ANA TITR SER IF: ABNORMAL {TITER}
ANION GAP SERPL CALCULATED.3IONS-SCNC: 4 MMOL/L (ref 3–14)
BLD GP AB SCN SERPL QL: NORMAL
BLOOD BANK CMNT PATIENT-IMP: NORMAL
BUN SERPL-MCNC: 51 MG/DL (ref 7–30)
CALCIUM SERPL-MCNC: 9 MG/DL (ref 8.5–10.1)
CHLORIDE SERPL-SCNC: 103 MMOL/L (ref 94–109)
CO2 SERPL-SCNC: 30 MMOL/L (ref 20–32)
CREAT SERPL-MCNC: 2.89 MG/DL (ref 0.66–1.25)
ERYTHROCYTE [DISTWIDTH] IN BLOOD BY AUTOMATED COUNT: 17.3 % (ref 10–15)
GFR SERPL CREATININE-BSD FRML MDRD: 22 ML/MIN/{1.73_M2}
GLUCOSE BLDC GLUCOMTR-MCNC: 136 MG/DL (ref 70–99)
GLUCOSE SERPL-MCNC: 117 MG/DL (ref 70–99)
HCT VFR BLD AUTO: 33.1 % (ref 40–53)
HGB BLD-MCNC: 9.8 G/DL (ref 13.3–17.7)
MCH RBC QN AUTO: 25.5 PG (ref 26.5–33)
MCHC RBC AUTO-ENTMCNC: 29.6 G/DL (ref 31.5–36.5)
MCV RBC AUTO: 86 FL (ref 78–100)
PLATELET # BLD AUTO: 232 10E9/L (ref 150–450)
POTASSIUM SERPL-SCNC: 4 MMOL/L (ref 3.4–5.3)
RBC # BLD AUTO: 3.84 10E12/L (ref 4.4–5.9)
SODIUM SERPL-SCNC: 137 MMOL/L (ref 133–144)
SPECIMEN EXP DATE BLD: NORMAL
WBC # BLD AUTO: 7.7 10E9/L (ref 4–11)

## 2020-02-14 PROCEDURE — 86850 RBC ANTIBODY SCREEN: CPT | Performed by: STUDENT IN AN ORGANIZED HEALTH CARE EDUCATION/TRAINING PROGRAM

## 2020-02-14 PROCEDURE — 36415 COLL VENOUS BLD VENIPUNCTURE: CPT | Performed by: INTERNAL MEDICINE

## 2020-02-14 PROCEDURE — 25000128 H RX IP 250 OP 636: Performed by: STUDENT IN AN ORGANIZED HEALTH CARE EDUCATION/TRAINING PROGRAM

## 2020-02-14 PROCEDURE — 25000132 ZZH RX MED GY IP 250 OP 250 PS 637: Performed by: STUDENT IN AN ORGANIZED HEALTH CARE EDUCATION/TRAINING PROGRAM

## 2020-02-14 PROCEDURE — 00000146 ZZHCL STATISTIC GLUCOSE BY METER IP

## 2020-02-14 PROCEDURE — 80048 BASIC METABOLIC PNL TOTAL CA: CPT | Performed by: INTERNAL MEDICINE

## 2020-02-14 PROCEDURE — 86901 BLOOD TYPING SEROLOGIC RH(D): CPT | Performed by: STUDENT IN AN ORGANIZED HEALTH CARE EDUCATION/TRAINING PROGRAM

## 2020-02-14 PROCEDURE — 86900 BLOOD TYPING SEROLOGIC ABO: CPT | Performed by: STUDENT IN AN ORGANIZED HEALTH CARE EDUCATION/TRAINING PROGRAM

## 2020-02-14 PROCEDURE — 85027 COMPLETE CBC AUTOMATED: CPT | Performed by: INTERNAL MEDICINE

## 2020-02-14 PROCEDURE — 25800030 ZZH RX IP 258 OP 636: Performed by: STUDENT IN AN ORGANIZED HEALTH CARE EDUCATION/TRAINING PROGRAM

## 2020-02-14 RX ADMIN — ISOSORBIDE DINITRATE 30 MG: 10 TABLET ORAL at 07:42

## 2020-02-14 RX ADMIN — PANTOPRAZOLE SODIUM 40 MG: 40 TABLET, DELAYED RELEASE ORAL at 07:41

## 2020-02-14 RX ADMIN — ATORVASTATIN CALCIUM 80 MG: 20 TABLET, FILM COATED ORAL at 07:44

## 2020-02-14 RX ADMIN — B-COMPLEX W/ C & FOLIC ACID TAB 1 TABLET: TAB at 07:41

## 2020-02-14 RX ADMIN — INSULIN GLARGINE 7 UNITS: 100 INJECTION, SOLUTION SUBCUTANEOUS at 07:58

## 2020-02-14 RX ADMIN — LISINOPRIL 2.5 MG: 2.5 TABLET ORAL at 07:45

## 2020-02-14 RX ADMIN — IRON SUCROSE 200 MG: 20 INJECTION, SOLUTION INTRAVENOUS at 09:10

## 2020-02-14 RX ADMIN — CLOPIDOGREL BISULFATE 75 MG: 75 TABLET, FILM COATED ORAL at 07:41

## 2020-02-14 RX ADMIN — MELATONIN 1000 UNITS: at 07:42

## 2020-02-14 RX ADMIN — ASPIRIN 81 MG CHEWABLE TABLET 81 MG: 81 TABLET CHEWABLE at 07:42

## 2020-02-14 ASSESSMENT — ACTIVITIES OF DAILY LIVING (ADL)
ADLS_ACUITY_SCORE: 12

## 2020-02-14 NOTE — PLAN OF CARE
ICU End of Shift Summary. See flowsheets for vital signs and detailed assessment.    Changes this shift: FBG managed per sliding scale Novolog. Up to 6th floor with cardiac rehab ambulating without issue, otherwise independent in room. Keller-Messi catheter removed at 1100 without complication. Final DONOVAN calculations: CO 4.8, CI 2.2, SVR 1114. UOP 1150 with 1 unmeasured urine, stool x 1.    Plan:  Discharge home 2/14. Significant other, Haven, to  tomorrow morning when discharge orders are finalized.

## 2020-02-14 NOTE — PROGRESS NOTES
Cardiology Progress Note    Assessment & Plan   59 year old male with history of occluded pRCA s/p balloon angioplasty and stenting in 6/2019, STEMI s/p DESx3 for total occlusion of RCA and Lcx (2012), GIB due to AV malformation (in the setting of warfarin and DAPT) s/p EGD with clip (7/5/2019), ischemic cardiomyopathy, HFrEF (last EF=30-35% 11/12/19), severe pHTN, moderate mitral regurgitation, atrial fibrillation, CKD stage IV who presented for planned RHC, found to be in ambulatory cardiogenic shock.      #Acute decompensated heart failure with reduced ejection fraction (EF 30%)  #Ambulatory cardiogenic shock   #Severe pulmonary hypertension, WHO group 2   #Moderate mitral regurgitation   Patient with known ischemic CM. RHC numbers on admission: RA 22, RV EDP 20, /50/65, PCWP 40, PVR 8.5, CO 3.5, CI 1.6. Randolph catheter in place. For afterload reduction, unable to give nipride or ACEi given renal function and unable to give hydralazine given history of drug skin eruption. Dermatology consulted and reviewed path. Noted non -specific drug reaction but patient still refusing to take hydralazine   - Discharge on lasix 80mg PO BID   - Continue isordil 30mg TID and lisinopril 2.5mg po daily   - Hold entresto given renal function   - ICD: after extensive discussion and explanation of risks and benefits, patient continues to refuse for now and wants to think about it      #STEMI s/p DESx3 (2012)  #CAD s/p PCI proximal RCA (6/2019)  - Continue plavix and aspirin      #Paroxysmal Afib   CHADSVASC 3 (HF, DM, CAD). Not on a/c due to history of GI bleed      # T2DM  -PTA regimen of 7 units insulin glargine every morning     #Acute Kidney Injury   #CKD IV  Baseline serum creatinine between 2.7. Follows with   Creatinine on admission 3.16. Renal consulted. Creatinine improving with diuresis   - Diuresis as above      Fluids:   Electrolytes: K>4, Mg>2  Nutrition: Cardiac, 2L fluid  restriction  Analgesia: None  Sedation: None  DVT ppx: heparin subcutaneous   Stress Ulcer ppx:  Protonix  Glycemic Control: SSI  Catheters: None  Lines: None  Therapies: PT  Consults: Renal      Amparo Jean-Baptiste   Cardiology fellow     Interval History   Reported feeling well. Eager to go home     Physical Exam   Temp: 98.5  F (36.9  C) Temp src: Oral BP: 94/62   Heart Rate: 86 Resp: 29 SpO2: 98 % O2 Device: None (Room air)    Vitals:    02/12/20 0400 02/13/20 0400 02/14/20 0400   Weight: 96.6 kg (212 lb 14.4 oz) 97.3 kg (214 lb 6.4 oz) 94.6 kg (208 lb 8.9 oz)     Vital Signs with Ranges  Temp:  [97.4  F (36.3  C)-98.5  F (36.9  C)] 98.5  F (36.9  C)  Heart Rate:  [] 86  Resp:  [8-30] 29  BP: ()/(52-84) 94/62  SpO2:  [94 %-100 %] 98 %  I/O last 3 completed shifts:  In: 1604 [P.O.:1380; I.V.:224]  Out: 2625 [Urine:2625]    Heart Rate: 86, Blood pressure 94/62, pulse 86, temperature 98.5  F (36.9  C), temperature source Oral, resp. rate 29, weight 94.6 kg (208 lb 8.9 oz), SpO2 98 %.  208 lbs 8.88 oz    Gen: no acute distress  HEENT: no scleral icterus, pupils equal and reactive to light, moist mucous membranes, no nasal discharge.  NECK: no adenopathy, no asymmetry, masses, or scars, thyroid normal to palpation and no bruits, JVP not elevated  CARDIOVASCULAR: normal rate, irregular rhythm. S1/S2 normal, +3/6 systolic murmur   RESPIRATORY: clear to auscultation bilaterally, no rales, rhonchi or wheezes, no use of accessory muscles, no retractions, respirations unlabored   ABDOMEN: soft, non-tender abdomen without rebound or guarding, no hepatosplenomegaly, no palpable masses, bowel sounds present  EXTREMITIES: peripheral pulses normal, no peripheral edema, warm, capillary refill < 2 seconds  Skin: no ecchymoses, no rashes  NEURO: oriented to person, place, year, and situation; CN II-XII grossly intact; 5/5 strength throughout; sensation to light touch intact throughout; coordination intact; negative  Romberg; gait normal  PSYCH: affect appropriate     Medications       aspirin  81 mg Oral Daily     atorvastatin  80 mg Oral Daily     clopidogrel  75 mg Oral Daily     heparin ANTICOAGULANT  5,000 Units Subcutaneous Q12H     insulin aspart  1-7 Units Subcutaneous TID AC     insulin aspart  1-5 Units Subcutaneous At Bedtime     insulin glargine  7 Units Subcutaneous QAM AC     iron sucrose (VENOFER) intermittent infusion (200 mg)  200 mg Intravenous Q24H     isosorbide dinitrate  30 mg Oral TID     lisinopril  2.5 mg Oral Daily     pantoprazole  40 mg Oral BID     vitamin B complex with vitamin C  1 tablet Oral Daily     Vitamin D3  1,000 Units Oral BID       Data   Recent Labs   Lab 02/14/20  0332 02/13/20  1913 02/13/20  0344  02/12/20  0359  02/10/20  0817   WBC 7.7  --  8.2  --  7.8   < > 7.5   HGB 9.8*  --  9.6*  --  9.3*   < > 11.4*   MCV 86  --  86  --  84   < > 88     --  231  --  232   < > 286    136 139   < > 139   < > 140   POTASSIUM 4.0 3.9 3.5   < > 3.6   < > 4.2   CHLORIDE 103 102 107   < > 105   < > 105   CO2 30 30 27   < > 28   < > 26   BUN 51* 48* 47*   < > 50*   < > 56*   CR 2.89* 2.77* 2.69*   < > 2.75*   < > 3.16*   ANIONGAP 4 4 4   < > 6   < > 8   RONNY 9.0 9.1 8.4*   < > 8.5   < > 9.0   * 126* 132*   < > 131*   < > 136*   ALBUMIN  --   --   --   --   --   --  3.9   PROTTOTAL  --   --   --   --   --   --  8.1   BILITOTAL  --   --   --   --   --   --  1.5*   ALKPHOS  --   --   --   --   --   --  120   ALT  --   --   --   --   --   --  17   AST  --   --   --   --   --   --  4    < > = values in this interval not displayed.       No results found for this or any previous visit (from the past 24 hour(s)).

## 2020-02-14 NOTE — PROGRESS NOTES
Nephrology Progress Note  02/14/2020         Assessment & Recommendations:   Cole Jesus is a 59 year old male with a h/o CAD (s/p PCI to pRCA 6/2019, STEMI s/p KEVYN x 3 to RCA and LCx in 2012), ICM (LVEF 30-35% 11/2019), severe pHTN, Moderate MR, Afib (off AC), GIB due to AVM s/p clipping, CKD IV, admitted after planned RHC for management of ambulatory cardiogenic shock and decompensated HFrEF. RHC findings significant for PCWP 40 and PA mean 65.     EVA on CKD IV  Due to decompensated HF/cardiogenic shock. Renal function improved with diuresis, now slight rise in creatinine (anticipated) with starting ACEI. BP acceptable.   -Agree with changing to oral lasix in anticipation of discharge  -Agree with continuing lisinopril 2.5mg daily; anticipate up to 25% increase in creatinine   -If discharged to home today, will need f/u with Dr. Vigil in clinic in next 1-2 weeks. Agree with labs planned for Wednesday to follow renal function and electrolytes. Please call with any questions.     Acid Base,Electrolytes :   Na    - Normal  K      - Normal (supplemented this morning)  CO2  - Normal      BMD : Ca 8.4, albumin 3.9 , Phos 2.6.   Hypovitaminosis D  -Reasonable to continue vitamin D3 supplement as long as calcium remains normal     Iron deficiency Anemia  Hemoglobin stable with no signs of ongoing bleeding; not currently on iron supplementation at home. Repeat iron studies consistent with WILLIAM, now status post IV iron replacement.  -Recommend outpatient CBC follow up and iron supplementation as needed.      Recommendations relayed to primary team, Carmen Jean-Baptiste.    Patient was discussed with attending physician, Dr. Solo.     Jada Alexander MD, MPAS  PGY3, Internal Medicine   [phone]      Interval History :   Nursing and provider notes from last 24 hours reviewed.  No issues overnight.     Subjective:   Feels ready to go home, no concerns. No chest pain or dyspnea. Plans to weigh self at home for new  baseline.     Review of Systems:   No chest pain or dyspnea with activity, no abdominal fullness.     Physical Exam:   I/O last 3 completed shifts:  In: 1604 [P.O.:1380; I.V.:224]  Out: 2625 [Urine:2625]   BP 96/67   Pulse 86   Temp 98.5  F (36.9  C) (Oral)   Resp 12   Wt 94.6 kg (208 lb 8.9 oz)   SpO2 98%   BMI 31.71 kg/m     Vitals:    02/12/20 0400 02/13/20 0400 02/14/20 0400   Weight: 96.6 kg (212 lb 14.4 oz) 97.3 kg (214 lb 6.4 oz) 94.6 kg (208 lb 8.9 oz)     General: Alert, dressed in street clothes, walking in room  Skin: Warm, dry, no rashes or lesions on limited exam  Respiratory: Non-labored breathing, decreased BS right base, no wheezing, on RA.  Cardiovascular: Irregular, regular rate, 3/6 systolic murmur at apex.     Extremities: Trace BLE edema to mid-calf. Warm, dry. Normal tone.  Neurologic: A&O x 3, speech normal, memory intact, no tremor or abnormal movements   Psych: appropriate mood and affect, cooperative, insight fair       Labs:   All labs reviewed by me  Electrolytes/Renal -   Recent Labs   Lab Test 02/14/20  0332 02/13/20  1913 02/13/20  0344  02/12/20  0359  02/11/20  1540 02/11/20  0347  07/23/19  0431    136 139   < > 139  --  138 141   < > 140   POTASSIUM 4.0 3.9 3.5   < > 3.6   < > 3.4 3.4   < > 4.0   CHLORIDE 103 102 107   < > 105  --  103 106   < > 106   CO2 30 30 27   < > 28  --  27 27   < > 26   BUN 51* 48* 47*   < > 50*  --  53* 56*   < > 49*   CR 2.89* 2.77* 2.69*   < > 2.75*  --  2.76* 2.86*   < > 2.92*   * 126* 132*   < > 131*  --  176* 133*   < > 85   RONNY 9.0 9.1 8.4*   < > 8.5  --  8.4* 8.4*   < > 8.6   MAG  --   --   --   --  2.3  --  2.3 2.4*   < > 2.4*   PHOS  --   --   --   --  2.6  --   --  2.8  --  3.5    < > = values in this interval not displayed.       CBC -   Recent Labs   Lab Test 02/14/20  0332 02/13/20  0344 02/12/20  0359   WBC 7.7 8.2 7.8   HGB 9.8* 9.6* 9.3*    231 232       LFTs -   Recent Labs   Lab Test 02/10/20  0817 01/16/20  1033  12/18/19  1607   ALKPHOS 120 114 101   BILITOTAL 1.5* 1.0 1.4*   ALT 17 16 13   AST 4 8 6   PROTTOTAL 8.1 8.1 7.8   ALBUMIN 3.9 3.9 3.8       Iron Panel -   Recent Labs   Lab Test 02/11/20  0347 12/19/19  0545 12/12/19  1057   IRON 18* 37 31*   IRONSAT 5* 11* 8*   FRANK  --  35 37       Current Medications:    aspirin  81 mg Oral Daily     atorvastatin  80 mg Oral Daily     clopidogrel  75 mg Oral Daily     heparin ANTICOAGULANT  5,000 Units Subcutaneous Q12H     insulin aspart  1-7 Units Subcutaneous TID AC     insulin aspart  1-5 Units Subcutaneous At Bedtime     insulin glargine  7 Units Subcutaneous QAM AC     iron sucrose (VENOFER) intermittent infusion (200 mg)  200 mg Intravenous Q24H     isosorbide dinitrate  30 mg Oral TID     lisinopril  2.5 mg Oral Daily     pantoprazole  40 mg Oral BID     vitamin B complex with vitamin C  1 tablet Oral Daily     Vitamin D3  1,000 Units Oral BID

## 2020-02-14 NOTE — DISCHARGE INSTRUCTIONS
Take your medicines every day, as directed    Zack Galvan,     It was nice seeing you briefly this morning, glad you're getting home today. I've scheduled you with Sade Kruger (one of our Heart failure PAs) on Wednesday 2/19/2020 at 8am, labs at 7:30am.  We will see you then.  Thanks  [place] CORE/Heart Failure Nurse    Monitor Your Weight and Symptoms    Contact us if you:      Gain 2 pounds in one day or 5 pounds in one week    Feel more short of breath    Notice more leg swelling    Feel lightheadeded   Change your lifestyle    Limit Salt or Sodium:    2000 mg  Limit Fluids:    2000 mL or approximately 64 ounces  Eat a Heart Healthy Diet    Low in saturated fats  Stay Active:    Aim to move at least 150 minutes every  week         To Contact us    During Business Hours:  [phone], option # 1 (University)  Then option # 4 (medical questions)     After hours, weekends or holidays:   [phone], Option #4  Ask to speak to the On-Call Cardiologist. Inform them you are a CORE/heart failure patient at the [facility].     Use [employer] allows you to communicate directly with your heart team through secure messaging.    [email or web address] can be accessed any time on your phone, computer, or tablet.    If you need assistance, we'd be happy to help!         Keep your Heart Appointments:    [facility]  [address] [facility]  2/19/2020 8am, labs at 7:30am

## 2020-02-14 NOTE — PLAN OF CARE
Mr. Jesus is ready for discharge today.     Atrial fib with rate less than 130s even when up walking. VSS, room air. Took a long walk around the 4th floor without assistance. Pt is eating and voiding.     I have reviewed discharge instructions with this patient including compliance with his sodium and fluid restrictions, importance of follow-up appointments, and his medication regimen. I reviewed signs of MI and stroke and when to call 911 with this patient.

## 2020-02-14 NOTE — PLAN OF CARE
Occupational Therapy Discharge Summary    Reason for therapy discharge:    Discharged to home.    Progress towards therapy goal(s). See goals on Care Plan in [facility] electronic health record for goal details.  Goals partially met.  Barriers to achieving goals:   discharge from facility.    Therapy recommendation(s):    No further therapy is recommended.

## 2020-02-14 NOTE — PLAN OF CARE
Major shift events: Pt given 80 mg lasix and UOP over 12 hour shift was ~ 900mL. Potassium at 2000 was 3.7, replaced with 20 mEq. Potassium at 0400 was 4, MD notified, no orders to replace K at this time. Pt compliant with 2L fluid restriction. Anticipate pt to discharge home today after rounds and discharge order placed.

## 2020-02-14 NOTE — CONSULTS
Cole is already an established patient in the CORE/Heart Failure clinic.  Last appt was with Dian Leblanc NP in [facility] on 19.  I spoke with him at the bedside today about his discharge plans.  I have scheduled Cole a Return CORE/heart failure appt with TORIBIO Basilio on 2020 at 8am, with labs at 7:30am.      He was instructed on the importance of daily weights, 2 gm sodium diet, 2L fluid restriction and compliance with medications upon hospital discharge.  I instructed Cole to call with any questions or concerns, including any weight gain or loss of 2 or more pounds in 24 hours or 5 or more pounds in 1 week. I will follow-up with Cole at the time of appt, sooner if needed.  Thank you for the consult.       Livia Joe, RN BSN CHFN  Cardiology Care Coordinator - [facility]  Questions and [phone]

## 2020-02-17 ENCOUNTER — PATIENT OUTREACH (OUTPATIENT)
Dept: CARE COORDINATION | Facility: CLINIC | Age: 60
End: 2020-02-17

## 2020-02-17 NOTE — PROGRESS NOTES
Date: 2/19/2020 Status: Jose Martin   Time: 8:00 AM Length: 30   Visit Type: UMP CORE RETURN [[id number]] GARTH:     Provider: Ena Kruger PA-C Department:  [facility]     Left message for patient to call back with any questions or concerns in regards to recent discharge

## 2020-02-19 ENCOUNTER — OFFICE VISIT (OUTPATIENT)
Dept: CARDIOLOGY | Facility: CLINIC | Age: 60
End: 2020-02-19
Attending: PHYSICIAN ASSISTANT
Payer: MEDICAID

## 2020-02-19 VITALS
OXYGEN SATURATION: 96 % | BODY MASS INDEX: 31.98 KG/M2 | WEIGHT: 211 LBS | HEIGHT: 68 IN | DIASTOLIC BLOOD PRESSURE: 72 MMHG | SYSTOLIC BLOOD PRESSURE: 110 MMHG | HEART RATE: 72 BPM

## 2020-02-19 DIAGNOSIS — I25.10 CORONARY ARTERY DISEASE INVOLVING NATIVE CORONARY ARTERY OF NATIVE HEART WITHOUT ANGINA PECTORIS: ICD-10-CM

## 2020-02-19 DIAGNOSIS — I25.5 ISCHEMIC CARDIOMYOPATHY: ICD-10-CM

## 2020-02-19 DIAGNOSIS — I50.22 CHRONIC SYSTOLIC CONGESTIVE HEART FAILURE (H): ICD-10-CM

## 2020-02-19 DIAGNOSIS — I48.91 ATRIAL FIBRILLATION, UNSPECIFIED TYPE (H): Primary | ICD-10-CM

## 2020-02-19 DIAGNOSIS — N18.4 CKD (CHRONIC KIDNEY DISEASE) STAGE 4, GFR 15-29 ML/MIN (H): ICD-10-CM

## 2020-02-19 LAB
ANION GAP SERPL CALCULATED.3IONS-SCNC: 8 MMOL/L (ref 3–14)
BUN SERPL-MCNC: 61 MG/DL (ref 7–30)
CALCIUM SERPL-MCNC: 9.2 MG/DL (ref 8.5–10.1)
CHLORIDE SERPL-SCNC: 104 MMOL/L (ref 94–109)
CO2 SERPL-SCNC: 26 MMOL/L (ref 20–32)
CREAT SERPL-MCNC: 2.77 MG/DL (ref 0.66–1.25)
GFR SERPL CREATININE-BSD FRML MDRD: 23 ML/MIN/{1.73_M2}
GLUCOSE SERPL-MCNC: 131 MG/DL (ref 70–99)
POTASSIUM SERPL-SCNC: 4.5 MMOL/L (ref 3.4–5.3)
SODIUM SERPL-SCNC: 137 MMOL/L (ref 133–144)

## 2020-02-19 PROCEDURE — 36415 COLL VENOUS BLD VENIPUNCTURE: CPT | Performed by: PHYSICIAN ASSISTANT

## 2020-02-19 PROCEDURE — 80048 BASIC METABOLIC PNL TOTAL CA: CPT | Performed by: PHYSICIAN ASSISTANT

## 2020-02-19 PROCEDURE — 99214 OFFICE O/P EST MOD 30 MIN: CPT | Mod: ZP | Performed by: PHYSICIAN ASSISTANT

## 2020-02-19 PROCEDURE — G0463 HOSPITAL OUTPT CLINIC VISIT: HCPCS | Mod: ZF

## 2020-02-19 RX ORDER — FUROSEMIDE 80 MG
TABLET ORAL
Qty: 180 TABLET | Refills: 1 | Status: SHIPPED | OUTPATIENT
Start: 2020-02-19 | End: 2020-03-04

## 2020-02-19 ASSESSMENT — MIFFLIN-ST. JEOR: SCORE: 1736.59

## 2020-02-19 ASSESSMENT — PAIN SCALES - GENERAL: PAINLEVEL: NO PAIN (0)

## 2020-02-19 NOTE — NURSING NOTE
Chief Complaint   Patient presents with     Follow Up     Return CORE, 58 yo male with HFrEF presents for follow up with labs prior.      Vitals were taken and medications were reconciled.     Jazmin Alston CMA    7:57 AM

## 2020-02-19 NOTE — PATIENT INSTRUCTIONS
Take your medicines every day, as directed    Changes made today:  o Please increase your lasix to 100 mg twice a day  o Please stop your lisinopril   Monitor Your Weight and Symptoms    Contact us if you:      Gain 2 pounds in one day or 5 pounds in one week    Feel more short of breath    Notice more leg swelling    Feel lightheadeded   Change your lifestyle    Limit Salt or Sodium:    2000 mg  Limit Fluids:    2000 mL or approximately 64 ounces  Eat a Heart Healthy Diet    Low in saturated fats  Stay Active:    Aim to move at least 150 minutes every  week         To Contact us    During Business Hours:  [phone], option # 1 (University)  Then option # 4 (medical questions)     After hours, weekends or holidays:   [phone], Option #4  Ask to speak to the On-Call Cardiologist. Inform them you are a CORE/heart failure patient at the [facility].     Use [employer] allows you to communicate directly with your heart team through secure messaging.    [employer] can be accessed any time on your phone, computer, or tablet.    If you need assistance, we'd be happy to help!         Keep your Heart Appointments:    - Labs in one week  - CORE or Dr. Trujillo in 3 weeks

## 2020-02-19 NOTE — PROGRESS NOTES
HPI:   59 year old male with history of occluded pRCA s/p balloon angioplasty and stenting in 6/2019, STEMI s/p DESx3 for total occlusion of RCA and Lcx (2012), GIB due to AV malformation (in the setting of warfarin and DAPT) s/p EGD with clip (7/5/2019), ischemic cardiomyopathy, HFrEF (last EF=30-35% 11/12/19), severe pHTN, moderate mitral regurgitation, atrial fibrillation, CKD stage IV who presented for planned RHC, found to be in ambulatory cardiogenic shock.     He was recently admitted from 2/10/2020 to 2/14/2020. He had presented for a planned RHC but was admitted for ambulatory cardiogenic shock. RHC numbers on admission: RA 22, RV EDP 20, /50/65, PCWP 40, PVR 8.5, CO 3.5, CI 1.6. His entresto was stopped d/t renal function. He was diuresed from 215 to 208.    [place] was pulled 2/13/2020 with CVP of 12 and Wedge of 26.    Since he has left the hospital he is feeling much better.  He is not having any dyspnea on exertion.  He was able to go up and down the stairs 5 times yesterday without any shortness of breath.  No shortness of breath at rest.  He denies lower extremity edema (resolved from the hospital), abdominal edema, orthopnea, PND.  He denies lightheadedness dizziness palpitations and chest pain.  His home weight when he left the hospital was 208 pounds.  Today his home weight was 207 pounds.    Cardiac Medications  ASA 81 mg daily  Plavix 75 mg daily  Lasix 80 mg BID  Isordil 30 mg TID  Nitroglycerin    PAST MEDICAL HISTORY:  Past Medical History:   Diagnosis Date     Anemia in chronic renal disease 3/9/2015     Antiplatelet or antithrombotic long-term use      Atrial fibrillation and flutter      CAD (coronary artery disease)     Stemi in 12/11, s/p angioplasty     CRD (chronic renal disease), stage IV (H) 03/09/2015    hx ATN with dialysis complicating cardiogenic shock  1/2012     GI bleed     massive lower GI bleed secondary to a cecal ulcer, s/p ileocecal resection in 12/11     Heart failure      Biventricular systolic HF, complicated by ARDS requiring tracheostomy     Hyperlipidemia LDL goal <100 2013     Hypertension      Ischemic cardiomyopathy 2013    TTE revealing 40% EF     Myocardial infarction (H)      Other and unspecified nonspecific immunological findings     Anti JKa     Pulmonary edema     episodes of flash pulmonary edema in      Subclinical hypothyroidism        FAMILY HISTORY:  Family History   Problem Relation Age of Onset     Diabetes Mother      Hypertension Mother      Heart Disease Mother      Heart Disease Father          of heart attack, left when he was young     Diabetes Sister      Diabetes Sister      Diabetes Sister      Glaucoma No family hx of      Macular Degeneration No family hx of        SOCIAL HISTORY:  Social History     Socioeconomic History     Marital status: Significant other     Spouse name: Not on file     Number of children: Not on file     Years of education: Not on file     Highest education level: Not on file   Occupational History     Not on file   Social Needs     Financial resource strain: Not on file     Food insecurity:     Worry: Not on file     Inability: Not on file     Transportation needs:     Medical: Not on file     Non-medical: Not on file   Tobacco Use     Smoking status: Former Smoker     Packs/day: 2.00     Years: 40.00     Pack years: 80.00     Types: Cigarettes     Last attempt to quit: 12/3/2011     Years since quittin.2     Smokeless tobacco: Never Used   Substance and Sexual Activity     Alcohol use: No     Alcohol/week: 0.0 standard drinks     Drug use: No     Sexual activity: Yes     Partners: Female   Lifestyle     Physical activity:     Days per week: Not on file     Minutes per session: Not on file     Stress: Not on file   Relationships     Social connections:     Talks on phone: Not on file     Gets together: Not on file     Attends Bahai service: Not on file     Active member of club or organization: Not  on file     Attends meetings of clubs or organizations: Not on file     Relationship status: Not on file     Intimate partner violence:     Fear of current or ex partner: Not on file     Emotionally abused: Not on file     Physically abused: Not on file     Forced sexual activity: Not on file   Other Topics Concern     Parent/sibling w/ CABG, MI or angioplasty before 65F 55M? Yes      Service Not Asked     Blood Transfusions Not Asked     Caffeine Concern Not Asked     Occupational Exposure Not Asked     Hobby Hazards Not Asked     Sleep Concern Not Asked     Stress Concern Not Asked     Weight Concern Not Asked     Special Diet Not Asked     Back Care Not Asked     Exercise Yes     Comment: limited walking     Bike Helmet Not Asked     Seat Belt Not Asked     Self-Exams Not Asked   Social History Narrative     Not on file       CURRENT MEDICATIONS:  ACCU-CHEK GUIDE test strip, USE TO TEST BLOOD SUGAR FOUR TIMES A DAY BEFORE MEALS AND AT BEDTIME  Alcohol Swabs PADS, 1 applicator 4 times daily (before meals and nightly)  aspirin (ASA) 81 MG chewable tablet, Take 81 mg by mouth daily  atorvastatin (LIPITOR) 80 MG tablet, Take 1 tablet (80 mg) by mouth daily  blood glucose monitoring (ACCU-CHEK FASTCLIX) lancets, USE TO TEST BLOOD SUGAR FOUR TIMES A DAY AT MEALS AND AT BEDTIME  cholecalciferol (VITAMIN  -D) 1000 UNITS capsule, Take 2 capsules (2,000 Units) by mouth daily (Patient taking differently: Take 1 capsule by mouth 2 times daily )  clopidogrel (PLAVIX) 75 MG tablet, Take 1 tablet (75 mg) by mouth daily  insulin pen needle (32G X 4 MM) 32G X 4 MM miscellaneous, Use 5 pen needles daily or as directed.  isosorbide dinitrate (ISORDIL) 30 MG tablet, Take 1 tablet (30 mg) by mouth 3 times daily  pantoprazole (PROTONIX) 40 MG EC tablet, Take 1 tablet (40 mg) by mouth 2 times daily  Sharps Container MISC, 1 Device every 30 days  vitamin  B complex with vitamin C (VITAMIN  B COMPLEX) TABS, Take 1 tablet by mouth  "daily  nitroGLYcerin (NITROSTAT) 0.4 MG sublingual tablet, Place 1 tablet (0.4 mg) under the tongue every 5 minutes as needed for chest pain For chest pain place 1 tablet under the tongue every 5 minutes for 3 doses. If symptoms persist 5 minutes after 1st dose call 911. (Patient not taking: Reported on 12/30/2019)    No current facility-administered medications on file prior to visit.   He our visit with the patient    ROS:   Refer to HPI    EXAM:  /72 (BP Location: Left arm, Patient Position: Chair, Cuff Size: Adult Regular)   Pulse 72   Ht 1.727 m (5' 8\")   Wt 95.7 kg (211 lb)   SpO2 96%   BMI 32.08 kg/m    GENERAL: Appears comfortable, in no acute distress.   HEENT: Eye symmetrical, no discharge or icterus bilaterally. Mucous membranes moist and without lesions.  CV: Irregularly irregular rhythm, +S1S2, soft systolic murmur, rub, or gallop. JVP 10-12 no.   RESPIRATORY: Respirations regular, even, and unlabored. Lungs CTA throughout.   GI: Soft and non distended with normoactive bowel sounds present in all quadrants. No tenderness, rebound, guarding. No hepatomegaly.   EXTREMITIES: No peripheral edema. 2+ bilateral pedal pulses.   NEUROLOGIC: Alert and interacting appropriatly. No focal deficits.   MUSCULOSKELETAL: No joint swelling or tenderness.   SKIN: No jaundice. No rashes or lesions.     Labs, reviewed with patient in clinic today:  CBC RESULTS:  Lab Results   Component Value Date    WBC 7.7 02/14/2020    RBC 3.84 (L) 02/14/2020    HGB 9.8 (L) 02/14/2020    HCT 33.1 (L) 02/14/2020    MCV 86 02/14/2020    MCH 25.5 (L) 02/14/2020    MCHC 29.6 (L) 02/14/2020    RDW 17.3 (H) 02/14/2020     02/14/2020       CMP RESULTS:  Lab Results   Component Value Date     02/19/2020    POTASSIUM 4.5 02/19/2020    CHLORIDE 104 02/19/2020    CO2 26 02/19/2020    ANIONGAP 8 02/19/2020     (H) 02/19/2020    BUN 61 (H) 02/19/2020    CR 2.77 (H) 02/19/2020    GFRESTIMATED 23 (L) 02/19/2020    " GFRESTBLACK 27 (L) 02/19/2020    RONNY 9.2 02/19/2020    BILITOTAL 1.5 (H) 02/10/2020    ALBUMIN 3.9 02/10/2020    ALKPHOS 120 02/10/2020    ALT 17 02/10/2020    AST 4 02/10/2020        INR RESULTS:  Lab Results   Component Value Date    INR 1.25 (H) 01/16/2020       Lab Results   Component Value Date    MAG 2.3 02/12/2020     Lab Results   Component Value Date    NTBNPI 2,194 (H) 07/03/2019     Lab Results   Component Value Date    NTBNP 5,746 (H) 01/16/2020       Diagnostics:  ECHO 2/11/2020  Interpretation Summary  Severely (EF 29%) reduced left ventricular function is present. Severe diffuse  hypokinesis is present.  Global right ventricular function is moderately reduced.  Moderate mitral insufficiency is present.  Mild to moderate tricuspid insufficiency is present.  Pulmonary hypertension present. Estimated pulmonary artery systolic pressure  is 69 (54+15) mmHg .  Dilation of the inferior vena cava is present with abnormal respiratory  variation in diameter.  Small posterior pericardial effusion without echocardiographic evidence of  Tamponade.    2/10/2020RHC  RA --/24/22  /20  /50/65  PCWP --/51/40    DONOVAN 3.5/1.6  TD 3.8/1.8    PVR 8.5    Assessment and Plan:   59 year old male with history of occluded pRCA s/p balloon angioplasty and stenting in 6/2019, STEMI s/p DESx3 for total occlusion of RCA and Lcx (2012), GIB due to AV malformation (in the setting of warfarin and DAPT) s/p EGD with clip (7/5/2019), ischemic cardiomyopathy, HFrEF (last EF=30-35% 11/12/19), severe pHTN, moderate mitral regurgitation, atrial fibrillation, CKD stage IV who presented for planned RHC, found to be in ambulatory cardiogenic shock.     Medication changes included stopping entresto due to renal dysfunction.  He was also started on a very low-dose of lisinopril.    Since leaving the hospital he is feeling significantly improved.  However he still appears hypervolemic on exam.  I am stopping his lisinopril today given  his GFR of 20 and rising potassium.    # Chronic systolic heart failure/HFrEF secondary to ICM    Stage C. NYHA Class III.    Fluid status: hypervolemic- increase lasix to 100 mg BID, not on potassium supplements  ACEi/ARB/ARNI: contraindicated due to renal dysfunction  BB: Recently stopped given ambulatory cardiogenic shock, had only been on 3.125 mg BID  Aldosterone antagonist: contraindicated due to renal dysfunction  SCD prophylaxis: has declined on multiple conversations  NSAID use: contraindicated  Sleep apnea evaluation: will discuss at future appointment  Remote monitoring: N/A     #STEMI s/p DESx3 (2012)  #CAD s/p PCI proximal RCA (6/2019)  - CAD stable, no symptoms. Continue plavix and aspirin  And statin     #Paroxysmal Afib   CHADSVASC 3 (HF, DM, CAD). Not on a/c due to history of GI bleed      #CKD IV  Baseline serum creatinine between 2.7. Follows with . At his baseline today.  - Monitor closely with diuresis  - Follow-up with      # H/o GIB  - No symptoms today    Follow up   - Labs in 1 week  - CORE or Dr. Trujillo in 2-3 weeks      30 minutes spent face-to-face with patient, >50% in counseling and/or coordination of care as described above            CC  SELF, REFERRED

## 2020-02-19 NOTE — LETTER
2/19/2020      RE: Cole Jesus  [address]  [facility] [address]       Dear Colleague,    Thank you for the opportunity to participate in the care of your patient, Cole Jesus, at the [facility]. Please see a copy of my visit note below.    HPI:   59 year old male with history of occluded pRCA s/p balloon angioplasty and stenting in 6/2019, STEMI s/p DESx3 for total occlusion of RCA and Lcx (2012), GIB due to AV malformation (in the setting of warfarin and DAPT) s/p EGD with clip (7/5/2019), ischemic cardiomyopathy, HFrEF (last EF=30-35% 11/12/19), severe pHTN, moderate mitral regurgitation, atrial fibrillation, CKD stage IV who presented for planned RHC, found to be in ambulatory cardiogenic shock.     He was recently admitted from 2/10/2020 to 2/14/2020. He had presented for a planned RHC but was admitted for ambulatory cardiogenic shock. RHC numbers on admission: RA 22, RV EDP 20, /50/65, PCWP 40, PVR 8.5, CO 3.5, CI 1.6.  His entresto was stopped d/t renal function. He was diuresed from 215 to 208.    [place] was pulled 2/13/2020 with CVP of 12 and Wedge of 26.    Since he has left the hospital he is feeling much better.  He is not having any dyspnea on exertion.  He was able to go up and down the stairs 5 times yesterday without any shortness of breath.  No shortness of breath at rest.  He denies lower extremity edema (resolved from the hospital), abdominal edema, orthopnea, PND.  He denies lightheadedness dizziness palpitations and chest pain.  His home weight when he left the hospital was 208 pounds.  Today his home weight was 207 pounds.    Cardiac Medications  ASA 81 mg daily  Plavix 75 mg daily  Lasix 80 mg BID  Isordil 30 mg TID  Nitroglycerin    PAST MEDICAL HISTORY:  Past Medical History:   Diagnosis Date     Anemia in chronic renal disease 3/9/2015     Antiplatelet or antithrombotic long-term use      Atrial fibrillation  and flutter      CAD (coronary artery disease)     Stemi in , s/p angioplasty     CRD (chronic renal disease), stage IV (H) 2015    hx ATN with dialysis complicating cardiogenic shock  2012     GI bleed     massive lower GI bleed secondary to a cecal ulcer, s/p ileocecal resection in      Heart failure     Biventricular systolic HF, complicated by ARDS requiring tracheostomy     Hyperlipidemia LDL goal <100 2013     Hypertension      Ischemic cardiomyopathy 2013    TTE revealing 40% EF     Myocardial infarction (H)      Other and unspecified nonspecific immunological findings     Anti JKa     Pulmonary edema     episodes of flash pulmonary edema in      Subclinical hypothyroidism        FAMILY HISTORY:  Family History   Problem Relation Age of Onset     Diabetes Mother      Hypertension Mother      Heart Disease Mother      Heart Disease Father          of heart attack, left when he was young     Diabetes Sister      Diabetes Sister      Diabetes Sister      Glaucoma No family hx of      Macular Degeneration No family hx of        SOCIAL HISTORY:  Social History     Socioeconomic History     Marital status: Significant other     Spouse name: Not on file     Number of children: Not on file     Years of education: Not on file     Highest education level: Not on file   Occupational History     Not on file   Social Needs     Financial resource strain: Not on file     Food insecurity:     Worry: Not on file     Inability: Not on file     Transportation needs:     Medical: Not on file     Non-medical: Not on file   Tobacco Use     Smoking status: Former Smoker     Packs/day: 2.00     Years: 40.00     Pack years: 80.00     Types: Cigarettes     Last attempt to quit: 12/3/2011     Years since quittin.2     Smokeless tobacco: Never Used   Substance and Sexual Activity     Alcohol use: No     Alcohol/week: 0.0 standard drinks     Drug use: No     Sexual activity: Yes     Partners:  Female   Lifestyle     Physical activity:     Days per week: Not on file     Minutes per session: Not on file     Stress: Not on file   Relationships     Social connections:     Talks on phone: Not on file     Gets together: Not on file     Attends Jehovah's witness service: Not on file     Active member of club or organization: Not on file     Attends meetings of clubs or organizations: Not on file     Relationship status: Not on file     Intimate partner violence:     Fear of current or ex partner: Not on file     Emotionally abused: Not on file     Physically abused: Not on file     Forced sexual activity: Not on file   Other Topics Concern     Parent/sibling w/ CABG, MI or angioplasty before 65F 55M? Yes      Service Not Asked     Blood Transfusions Not Asked     Caffeine Concern Not Asked     Occupational Exposure Not Asked     Hobby Hazards Not Asked     Sleep Concern Not Asked     Stress Concern Not Asked     Weight Concern Not Asked     Special Diet Not Asked     Back Care Not Asked     Exercise Yes     Comment: limited walking     Bike Helmet Not Asked     Seat Belt Not Asked     Self-Exams Not Asked   Social History Narrative     Not on file       CURRENT MEDICATIONS:  ACCU-CHEK GUIDE test strip, USE TO TEST BLOOD SUGAR FOUR TIMES A DAY BEFORE MEALS AND AT BEDTIME  Alcohol Swabs PADS, 1 applicator 4 times daily (before meals and nightly)  aspirin (ASA) 81 MG chewable tablet, Take 81 mg by mouth daily  atorvastatin (LIPITOR) 80 MG tablet, Take 1 tablet (80 mg) by mouth daily  blood glucose monitoring (ACCU-CHEK FASTCLIX) lancets, USE TO TEST BLOOD SUGAR FOUR TIMES A DAY AT MEALS AND AT BEDTIME  cholecalciferol (VITAMIN  -D) 1000 UNITS capsule, Take 2 capsules (2,000 Units) by mouth daily (Patient taking differently: Take 1 capsule by mouth 2 times daily )  clopidogrel (PLAVIX) 75 MG tablet, Take 1 tablet (75 mg) by mouth daily  insulin pen needle (32G X 4 MM) 32G X 4 MM miscellaneous, Use 5 pen needles  "daily or as directed.  isosorbide dinitrate (ISORDIL) 30 MG tablet, Take 1 tablet (30 mg) by mouth 3 times daily  pantoprazole (PROTONIX) 40 MG EC tablet, Take 1 tablet (40 mg) by mouth 2 times daily  Sharps Container MISC, 1 Device every 30 days  vitamin  B complex with vitamin C (VITAMIN  B COMPLEX) TABS, Take 1 tablet by mouth daily  nitroGLYcerin (NITROSTAT) 0.4 MG sublingual tablet, Place 1 tablet (0.4 mg) under the tongue every 5 minutes as needed for chest pain For chest pain place 1 tablet under the tongue every 5 minutes for 3 doses. If symptoms persist 5 minutes after 1st dose call 911. (Patient not taking: Reported on 12/30/2019)    No current facility-administered medications on file prior to visit.   He our visit with the patient    ROS:   Refer to HPI    EXAM:  /72 (BP Location: Left arm, Patient Position: Chair, Cuff Size: Adult Regular)   Pulse 72   Ht 1.727 m (5' 8\")   Wt 95.7 kg (211 lb)   SpO2 96%   BMI 32.08 kg/m     GENERAL: Appears comfortable, in no acute distress.   HEENT: Eye symmetrical, no discharge or icterus bilaterally. Mucous membranes moist and without lesions.  CV: Irregularly irregular rhythm, +S1S2, soft systolic murmur, rub, or gallop. JVP 10-12 no.   RESPIRATORY: Respirations regular, even, and unlabored. Lungs CTA throughout.   GI: Soft and non distended with normoactive bowel sounds present in all quadrants. No tenderness, rebound, guarding. No hepatomegaly.   EXTREMITIES: No peripheral edema. 2+ bilateral pedal pulses.   NEUROLOGIC: Alert and interacting appropriatly. No focal deficits.   MUSCULOSKELETAL: No joint swelling or tenderness.   SKIN: No jaundice. No rashes or lesions.     Labs, reviewed with patient in clinic today:  CBC RESULTS:  Lab Results   Component Value Date    WBC 7.7 02/14/2020    RBC 3.84 (L) 02/14/2020    HGB 9.8 (L) 02/14/2020    HCT 33.1 (L) 02/14/2020    MCV 86 02/14/2020    MCH 25.5 (L) 02/14/2020    MCHC 29.6 (L) 02/14/2020    RDW 17.3 " (H) 02/14/2020     02/14/2020       CMP RESULTS:  Lab Results   Component Value Date     02/19/2020    POTASSIUM 4.5 02/19/2020    CHLORIDE 104 02/19/2020    CO2 26 02/19/2020    ANIONGAP 8 02/19/2020     (H) 02/19/2020    BUN 61 (H) 02/19/2020    CR 2.77 (H) 02/19/2020    GFRESTIMATED 23 (L) 02/19/2020    GFRESTBLACK 27 (L) 02/19/2020    RONNY 9.2 02/19/2020    BILITOTAL 1.5 (H) 02/10/2020    ALBUMIN 3.9 02/10/2020    ALKPHOS 120 02/10/2020    ALT 17 02/10/2020    AST 4 02/10/2020        INR RESULTS:  Lab Results   Component Value Date    INR 1.25 (H) 01/16/2020       Lab Results   Component Value Date    MAG 2.3 02/12/2020     Lab Results   Component Value Date    NTBNPI 2,194 (H) 07/03/2019     Lab Results   Component Value Date    NTBNP 5,746 (H) 01/16/2020       Diagnostics:  ECHO 2/11/2020  Interpretation Summary  Severely (EF 29%) reduced left ventricular function is present. Severe diffuse  hypokinesis is present.  Global right ventricular function is moderately reduced.  Moderate mitral insufficiency is present.  Mild to moderate tricuspid insufficiency is present.  Pulmonary hypertension present. Estimated pulmonary artery systolic pressure  is 69 (54+15) mmHg .  Dilation of the inferior vena cava is present with abnormal respiratory  variation in diameter.  Small posterior pericardial effusion without echocardiographic evidence of  Tamponade.    2/10/2020RHC  RA --/24/22  /20  /50/65  PCWP --/51/40    DONOVAN 3.5/1.6  TD 3.8/1.8    PVR 8.5    Assessment and Plan:   59 year old male with history of occluded pRCA s/p balloon angioplasty and stenting in 6/2019, STEMI s/p DESx3 for total occlusion of RCA and Lcx (2012), GIB due to AV malformation (in the setting of warfarin and DAPT) s/p EGD with clip (7/5/2019), ischemic cardiomyopathy, HFrEF (last EF=30-35% 11/12/19), severe pHTN, moderate mitral regurgitation, atrial fibrillation, CKD stage IV who presented for planned RHC, found  to be in ambulatory cardiogenic shock.     Medication changes included stopping entresto due to renal dysfunction.  He was also started on a very low-dose of lisinopril.    Since leaving the hospital he is feeling significantly improved.  However he still appears hypervolemic on exam.  I am stopping his lisinopril today given his GFR of 20 and rising potassium.    # Chronic systolic heart failure/HFrEF secondary to ICM    Stage C. NYHA Class III.    Fluid status: hypervolemic- increase lasix to 100 mg BID, not on potassium supplements  ACEi/ARB/ARNI: contraindicated due to renal dysfunction  BB: Recently stopped given ambulatory cardiogenic shock, had only been on 3.125 mg BID  Aldosterone antagonist: contraindicated due to renal dysfunction  SCD prophylaxis: has declined on multiple conversations  NSAID use: contraindicated  Sleep apnea evaluation: will discuss at future appointment  Remote monitoring: N/A     #STEMI s/p DESx3 (2012)  #CAD s/p PCI proximal RCA (6/2019)  - CAD stable, no symptoms. Continue plavix and aspirin  And statin     #Paroxysmal Afib   CHADSVASC 3 (HF, DM, CAD). Not on a/c due to history of GI bleed      #CKD IV  Baseline serum creatinine between 2.7. Follows with . At his baseline today.  - Monitor closely with diuresis  - Follow-up with      # H/o GIB  - No symptoms today    Follow up   - Labs in 1 week  - CORE or Dr. Trujillo in 2-3 weeks      30 minutes spent face-to-face with patient, >50% in counseling and/or coordination of care as described above            CC  SELF, REFERRED      Please do not hesitate to contact me if you have any questions/concerns.     Sincerely,     Ena Kruger PA-C

## 2020-02-26 DIAGNOSIS — I50.22 CHRONIC SYSTOLIC CONGESTIVE HEART FAILURE (H): Primary | ICD-10-CM

## 2020-02-26 DIAGNOSIS — I25.5 ISCHEMIC CARDIOMYOPATHY: ICD-10-CM

## 2020-02-26 LAB
ANION GAP SERPL CALCULATED.3IONS-SCNC: 7 MMOL/L (ref 3–14)
BUN SERPL-MCNC: 53 MG/DL (ref 7–30)
CALCIUM SERPL-MCNC: 9.1 MG/DL (ref 8.5–10.1)
CHLORIDE SERPL-SCNC: 103 MMOL/L (ref 94–109)
CO2 SERPL-SCNC: 28 MMOL/L (ref 20–32)
CREAT SERPL-MCNC: 2.65 MG/DL (ref 0.66–1.25)
GFR SERPL CREATININE-BSD FRML MDRD: 25 ML/MIN/{1.73_M2}
GLUCOSE SERPL-MCNC: 127 MG/DL (ref 70–99)
POTASSIUM SERPL-SCNC: 3.6 MMOL/L (ref 3.4–5.3)
SODIUM SERPL-SCNC: 139 MMOL/L (ref 133–144)

## 2020-02-26 NOTE — PROGRESS NOTES
Date: 2/26/2020    Time of Call: 11:00 AM     Diagnosis:  Heart failure     [ VORB ] Ordering provider: Sade ROONEY  Order: BMP in 1 week     Order received by: Patricia Vo RN      Follow-up/additional notes:

## 2020-03-04 ENCOUNTER — PATIENT OUTREACH (OUTPATIENT)
Dept: CARDIOLOGY | Facility: CLINIC | Age: 60
End: 2020-03-04

## 2020-03-04 DIAGNOSIS — I50.22 CHRONIC SYSTOLIC CONGESTIVE HEART FAILURE (H): ICD-10-CM

## 2020-03-04 DIAGNOSIS — I25.5 ISCHEMIC CARDIOMYOPATHY: ICD-10-CM

## 2020-03-04 DIAGNOSIS — E87.6 HYPOKALEMIA: Primary | ICD-10-CM

## 2020-03-04 LAB
ANION GAP SERPL CALCULATED.3IONS-SCNC: 8 MMOL/L (ref 3–14)
BUN SERPL-MCNC: 56 MG/DL (ref 7–30)
CALCIUM SERPL-MCNC: 8.7 MG/DL (ref 8.5–10.1)
CHLORIDE SERPL-SCNC: 105 MMOL/L (ref 94–109)
CO2 SERPL-SCNC: 27 MMOL/L (ref 20–32)
CREAT SERPL-MCNC: 2.87 MG/DL (ref 0.66–1.25)
GFR SERPL CREATININE-BSD FRML MDRD: 22 ML/MIN/{1.73_M2}
GLUCOSE SERPL-MCNC: 147 MG/DL (ref 70–99)
POTASSIUM SERPL-SCNC: 3.4 MMOL/L (ref 3.4–5.3)
SODIUM SERPL-SCNC: 140 MMOL/L (ref 133–144)

## 2020-03-04 RX ORDER — POTASSIUM CHLORIDE 1500 MG/1
20 TABLET, EXTENDED RELEASE ORAL DAILY
Qty: 90 TABLET | Refills: 0 | Status: ON HOLD | OUTPATIENT
Start: 2020-03-04 | End: 2020-04-21

## 2020-03-04 RX ORDER — FUROSEMIDE 80 MG
80 TABLET ORAL 2 TIMES DAILY
Qty: 180 TABLET | Refills: 3 | Status: SHIPPED | OUTPATIENT
Start: 2020-03-04 | End: 2020-03-13

## 2020-03-04 NOTE — PROGRESS NOTES
Called [facility] to review labs. Given following verbal orders:    Date: 3/4/2020    Time of Call: 1:52 PM     Diagnosis:  Heart failure     [ VORB ] Ordering provider: TORIBIO Basilio  Order: Decrease Lasix to 80 mg BID. Repeat labs next week     Order received by: Patricia Vo RN      Follow-up/additional notes: reviewed with Cole who verbalized understanding. Will recheck labs at clinic visit next week.

## 2020-03-12 DIAGNOSIS — I50.22 CHRONIC SYSTOLIC CONGESTIVE HEART FAILURE (H): ICD-10-CM

## 2020-03-12 RX ORDER — ISOSORBIDE DINITRATE 30 MG/1
30 TABLET ORAL 3 TIMES DAILY
Qty: 90 TABLET | Refills: 0 | Status: CANCELLED | OUTPATIENT
Start: 2020-03-12

## 2020-03-13 ENCOUNTER — OFFICE VISIT (OUTPATIENT)
Dept: CARDIOLOGY | Facility: CLINIC | Age: 60
End: 2020-03-13
Attending: NURSE PRACTITIONER
Payer: MEDICAID

## 2020-03-13 VITALS
DIASTOLIC BLOOD PRESSURE: 86 MMHG | BODY MASS INDEX: 33.28 KG/M2 | HEART RATE: 77 BPM | SYSTOLIC BLOOD PRESSURE: 126 MMHG | OXYGEN SATURATION: 95 % | WEIGHT: 219.6 LBS | HEIGHT: 68 IN

## 2020-03-13 DIAGNOSIS — I25.5 ISCHEMIC CARDIOMYOPATHY: ICD-10-CM

## 2020-03-13 DIAGNOSIS — I48.91 ATRIAL FIBRILLATION, UNSPECIFIED TYPE (H): Chronic | ICD-10-CM

## 2020-03-13 DIAGNOSIS — N18.4 CKD (CHRONIC KIDNEY DISEASE) STAGE 4, GFR 15-29 ML/MIN (H): Chronic | ICD-10-CM

## 2020-03-13 DIAGNOSIS — I50.22 CHRONIC SYSTOLIC CONGESTIVE HEART FAILURE (H): ICD-10-CM

## 2020-03-13 DIAGNOSIS — D62 ANEMIA DUE TO ACUTE BLOOD LOSS: ICD-10-CM

## 2020-03-13 LAB
ANION GAP SERPL CALCULATED.3IONS-SCNC: 9 MMOL/L (ref 3–14)
BUN SERPL-MCNC: 54 MG/DL (ref 7–30)
CALCIUM SERPL-MCNC: 9.1 MG/DL (ref 8.5–10.1)
CHLORIDE SERPL-SCNC: 104 MMOL/L (ref 94–109)
CO2 SERPL-SCNC: 25 MMOL/L (ref 20–32)
CREAT SERPL-MCNC: 2.88 MG/DL (ref 0.66–1.25)
ERYTHROCYTE [DISTWIDTH] IN BLOOD BY AUTOMATED COUNT: 21.7 % (ref 10–15)
GFR SERPL CREATININE-BSD FRML MDRD: 22 ML/MIN/{1.73_M2}
GLUCOSE SERPL-MCNC: 145 MG/DL (ref 70–99)
HCT VFR BLD AUTO: 40.9 % (ref 40–53)
HGB BLD-MCNC: 12.2 G/DL (ref 13.3–17.7)
INR PPP: 1.27 (ref 0.86–1.14)
MAGNESIUM SERPL-MCNC: 2.8 MG/DL (ref 1.6–2.3)
MCH RBC QN AUTO: 26.6 PG (ref 26.5–33)
MCHC RBC AUTO-ENTMCNC: 29.8 G/DL (ref 31.5–36.5)
MCV RBC AUTO: 89 FL (ref 78–100)
PLATELET # BLD AUTO: 240 10E9/L (ref 150–450)
POTASSIUM SERPL-SCNC: 4.2 MMOL/L (ref 3.4–5.3)
RBC # BLD AUTO: 4.58 10E12/L (ref 4.4–5.9)
SODIUM SERPL-SCNC: 138 MMOL/L (ref 133–144)
WBC # BLD AUTO: 8.6 10E9/L (ref 4–11)

## 2020-03-13 PROCEDURE — 36415 COLL VENOUS BLD VENIPUNCTURE: CPT | Performed by: NURSE PRACTITIONER

## 2020-03-13 PROCEDURE — 99214 OFFICE O/P EST MOD 30 MIN: CPT | Mod: ZP | Performed by: NURSE PRACTITIONER

## 2020-03-13 PROCEDURE — 83735 ASSAY OF MAGNESIUM: CPT | Performed by: NURSE PRACTITIONER

## 2020-03-13 PROCEDURE — G0463 HOSPITAL OUTPT CLINIC VISIT: HCPCS | Mod: ZF

## 2020-03-13 PROCEDURE — 85610 PROTHROMBIN TIME: CPT | Performed by: NURSE PRACTITIONER

## 2020-03-13 PROCEDURE — 80048 BASIC METABOLIC PNL TOTAL CA: CPT | Performed by: NURSE PRACTITIONER

## 2020-03-13 RX ORDER — ISOSORBIDE DINITRATE 30 MG/1
60 TABLET ORAL 3 TIMES DAILY
Qty: 120 TABLET | Refills: 1 | Status: SHIPPED | OUTPATIENT
Start: 2020-03-13 | End: 2020-04-02

## 2020-03-13 RX ORDER — FUROSEMIDE 80 MG
TABLET ORAL
Qty: 120 TABLET | Refills: 1 | Status: ON HOLD | OUTPATIENT
Start: 2020-03-13 | End: 2020-01-01

## 2020-03-13 RX ORDER — AMOXICILLIN 250 MG
1 CAPSULE ORAL 2 TIMES DAILY
Qty: 60 TABLET | Refills: 1 | Status: ON HOLD | OUTPATIENT
Start: 2020-03-13 | End: 2020-04-21

## 2020-03-13 RX ORDER — FUROSEMIDE 20 MG
TABLET ORAL
Qty: 30 TABLET | Refills: 1 | Status: ON HOLD | OUTPATIENT
Start: 2020-03-13 | End: 2020-01-01

## 2020-03-13 ASSESSMENT — MIFFLIN-ST. JEOR: SCORE: 1775.6

## 2020-03-13 ASSESSMENT — PAIN SCALES - GENERAL: PAINLEVEL: NO PAIN (0)

## 2020-03-13 NOTE — PATIENT INSTRUCTIONS
Take your medicines every day, as directed    Changes made today:  o Increase Lasix to 100 mg by mouth twice daily   o Increase Isordil to 60 mg by mouth three times a day.    Monitor Your Weight and Symptoms    Contact us if you:      Gain 2 pounds in one day or 5 pounds in one week    Feel more short of breath    Notice more leg swelling    Feel lightheadeded   Change your lifestyle    Limit Salt or Sodium:    2000 mg  Limit Fluids:    2000 mL or approximately 64 ounces  Eat a Heart Healthy Diet    Low in saturated fats  Stay Active:    Aim to move at least 150 minutes every  week         To Contact us    During Business Hours:  [phone], option # 1 (University)  Then option # 4 (medical questions)     After hours, weekends or holidays:   [phone], Option #4  Ask to speak to the On-Call Cardiologist. Inform them you are a CORE/heart failure patient at the [facility].     Use [employer] allows you to communicate directly with your heart team through secure messaging.    [employer] can be accessed any time on your phone, computer, or tablet.    If you need assistance, we'd be happy to help!         Keep your Heart Appointments:    Repeat labs in `1 week  Follow up with Dr. Trujillo as scheduled 3/31/20

## 2020-03-13 NOTE — LETTER
3/13/2020    RE: Cole Jesus  [address]  [facility] [address]     Dear Colleague,    Thank you for the opportunity to participate in the care of your patient, Cole Jesus, at the [facility]. Please see a copy of my visit note below.    HPI:   59 year old male with history of occluded pRCA s/p balloon angioplasty and stenting in 2019, CAD (STEMI s/p DESx3 for total occlusion of RCA and Lcx in ), GIB due to AV malformation (in the setting of warfarin and DAPT) s/p EGD with clip (2019), ischemic cardiomyopathy, HFrEF (last EF=30-35% 19), severe pHTN, moderate mitral regurgitation, atrial fibrillation, and CKD stage IV. He presents to clinic for follow up.      He was admitted from 2/10/2020 to 2020 after presenting for oupatient RHC and found to be in ambulatory cardiogenic shock. RHC numbers on admission: RA 22, RV EDP 20, /50/65, PCWP 40, PVR 8.5, CO 3.5, CI 1.6. His entresto was stopped due to renal function. He was diuresed from 215 to 208. [place] was pulled 2020 with CVP of 12 and Wedge of 26.     He complains of abdominal bloating, LE edema, KING, and orthopnea (using only 1 pillow however). He notes that following his niece's  2 weeks ago after eating a great deal of food he felt nauseated. He notes constipation with last adequate BM one week ago. He denies fever, chills, lightheadedness, dizziness, chest pain, palpitations, PND, vomiting, and diarrhea. His weight is elevated today at 219 from a discharge weight of 208 lbs. He attempts to follow a low sodium diet.      PAST MEDICAL HISTORY:  Past Medical History:   Diagnosis Date     Anemia in chronic renal disease 3/9/2015     Antiplatelet or antithrombotic long-term use      Atrial fibrillation and flutter      CAD (coronary artery disease)     Stemi in , s/p angioplasty     CRD (chronic renal disease), stage IV (H) 2015    hx ATN with  dialysis complicating cardiogenic shock  2012     GI bleed     massive lower GI bleed secondary to a cecal ulcer, s/p ileocecal resection in      Heart failure     Biventricular systolic HF, complicated by ARDS requiring tracheostomy     Hyperlipidemia LDL goal <100 2013     Hypertension      Ischemic cardiomyopathy 2013    TTE revealing 40% EF     Myocardial infarction (H)      Other and unspecified nonspecific immunological findings     Anti JKa     Pulmonary edema     episodes of flash pulmonary edema in      Subclinical hypothyroidism        FAMILY HISTORY:  Family History   Problem Relation Age of Onset     Diabetes Mother      Hypertension Mother      Heart Disease Mother      Heart Disease Father          of heart attack, left when he was young     Diabetes Sister      Diabetes Sister      Diabetes Sister      Glaucoma No family hx of      Macular Degeneration No family hx of        SOCIAL HISTORY:  Social History     Socioeconomic History     Marital status: Significant other     Spouse name: Not on file     Number of children: Not on file     Years of education: Not on file     Highest education level: Not on file   Occupational History     Not on file   Social Needs     Financial resource strain: Not on file     Food insecurity     Worry: Not on file     Inability: Not on file     Transportation needs     Medical: Not on file     Non-medical: Not on file   Tobacco Use     Smoking status: Former Smoker     Packs/day: 2.00     Years: 40.00     Pack years: 80.00     Types: Cigarettes     Last attempt to quit: 12/3/2011     Years since quittin.2     Smokeless tobacco: Never Used   Substance and Sexual Activity     Alcohol use: No     Alcohol/week: 0.0 standard drinks     Drug use: No     Sexual activity: Yes     Partners: Female   Lifestyle     Physical activity     Days per week: Not on file     Minutes per session: Not on file     Stress: Not on file   Relationships     Social  connections     Talks on phone: Not on file     Gets together: Not on file     Attends Baptism service: Not on file     Active member of club or organization: Not on file     Attends meetings of clubs or organizations: Not on file     Relationship status: Not on file     Intimate partner violence     Fear of current or ex partner: Not on file     Emotionally abused: Not on file     Physically abused: Not on file     Forced sexual activity: Not on file   Other Topics Concern     Parent/sibling w/ CABG, MI or angioplasty before 65F 55M? Yes      Service Not Asked     Blood Transfusions Not Asked     Caffeine Concern Not Asked     Occupational Exposure Not Asked     Hobby Hazards Not Asked     Sleep Concern Not Asked     Stress Concern Not Asked     Weight Concern Not Asked     Special Diet Not Asked     Back Care Not Asked     Exercise Yes     Comment: limited walking     Bike Helmet Not Asked     Seat Belt Not Asked     Self-Exams Not Asked   Social History Narrative     Not on file       CURRENT MEDICATIONS:  Outpatient Medications Prior to Visit   Medication Sig Dispense Refill     ACCU-CHEK GUIDE test strip USE TO TEST BLOOD SUGAR FOUR TIMES A DAY BEFORE MEALS AND AT BEDTIME 400 each 3     Alcohol Swabs PADS 1 applicator 4 times daily (before meals and nightly) 100 each 3     aspirin (ASA) 81 MG chewable tablet Take 81 mg by mouth daily       atorvastatin (LIPITOR) 80 MG tablet Take 1 tablet (80 mg) by mouth daily 90 tablet 3     blood glucose monitoring (ACCU-CHEK FASTCLIX) lancets USE TO TEST BLOOD SUGAR FOUR TIMES A DAY AT MEALS AND AT BEDTIME 400 each 3     cholecalciferol (VITAMIN  -D) 1000 UNITS capsule Take 2 capsules (2,000 Units) by mouth daily (Patient taking differently: Take 1 capsule by mouth 2 times daily ) 60 capsule 3     clopidogrel (PLAVIX) 75 MG tablet Take 1 tablet (75 mg) by mouth daily 90 tablet 3     insulin pen needle (32G X 4 MM) 32G X 4 MM miscellaneous Use 5 pen needles daily  "or as directed. 200 each 3     pantoprazole (PROTONIX) 40 MG EC tablet Take 1 tablet (40 mg) by mouth 2 times daily 180 tablet 2     potassium chloride ER (KLOR-CON M) 20 MEQ CR tablet Take 1 tablet (20 mEq) by mouth daily 90 tablet 0     Sharps Container MISC 1 Device every 30 days 1 each 3     vitamin  B complex with vitamin C (VITAMIN  B COMPLEX) TABS Take 1 tablet by mouth daily       nitroGLYcerin (NITROSTAT) 0.4 MG sublingual tablet Place 1 tablet (0.4 mg) under the tongue every 5 minutes as needed for chest pain For chest pain place 1 tablet under the tongue every 5 minutes for 3 doses. If symptoms persist 5 minutes after 1st dose call 911. (Patient not taking: Reported on 3/13/2020) 25 tablet 0     furosemide (LASIX) 80 MG tablet Take 1 tablet (80 mg) by mouth 2 times daily 180 tablet 3     isosorbide dinitrate (ISORDIL) 30 MG tablet Take 1 tablet (30 mg) by mouth 3 times daily 90 tablet 0     No facility-administered medications prior to visit.        ROS:   CONSTITUTIONAL: Denies fever, chills, fatigue. Complains of weight gain.   HEENT: Denies headache, vision changes, and changes in speech.   CV: Refer to HPI.   PULMONARY: Complains of KING.   GI:Denies nausea, vomiting, diarrhea, and abdominal pain. Bowel movements are regular.   :Denies urinary alterations, dysuria, urinary frequency, hematuria, and abnormal drainage.   EXT:Denies lower extremity edema.   SKIN:Denies abnormal rashes or lesions.   MUSCULOSKELETAL:Denies upper or lower extremity weakness and pain.   NEUROLOGIC:Denies lightheadedness, dizziness, seizures, or upper or lower extremity paresthesia.     EXAM:  /86 (BP Location: Left arm, Patient Position: Chair, Cuff Size: Adult Regular)   Pulse 77   Ht 1.727 m (5' 8\")   Wt 99.6 kg (219 lb 9.6 oz)   SpO2 95%   BMI 33.39 kg/m    GENERAL: Appears alert and oriented times three.   HEENT: Eye symmetrical and free of discharge bilaterally. Mucous membranes moist and without " lesions.  NECK: Supple and without lymphadenopathy. JVD 12-14 cm  CV: Irregular, controlled, S1S2 present without murmur, rub, or gallop.   RESPIRATORY: Respirations regular, even, and unlabored. Lungs CTA throughout.   GI: Soft and distended with normoactive bowel sounds present in all quadrants. No tenderness, rebound, guarding. No organomegaly.   EXTREMITIES: Trace bilateral LE peripheral edema. 2+ bilateral pedal pulses.   NEUROLOGIC: Alert and orientated x 3. CN II-XII grossly intact. No focal deficits.   MUSCULOSKELETAL: No joint swelling or tenderness.   SKIN: No jaundice. No rashes or lesions.     Labs:  CBC RESULTS:  Lab Results   Component Value Date    WBC 8.6 03/13/2020    RBC 4.58 03/13/2020    HGB 12.2 (L) 03/13/2020    HCT 40.9 03/13/2020    MCV 89 03/13/2020    MCH 26.6 03/13/2020    MCHC 29.8 (L) 03/13/2020    RDW 21.7 (H) 03/13/2020     03/13/2020       CMP RESULTS:  Lab Results   Component Value Date     03/13/2020    POTASSIUM 4.2 03/13/2020    CHLORIDE 104 03/13/2020    CO2 25 03/13/2020    ANIONGAP 9 03/13/2020     (H) 03/13/2020    BUN 54 (H) 03/13/2020    CR 2.88 (H) 03/13/2020    GFRESTIMATED 22 (L) 03/13/2020    GFRESTBLACK 26 (L) 03/13/2020    RONNY 9.1 03/13/2020    BILITOTAL 1.5 (H) 02/10/2020    ALBUMIN 3.9 02/10/2020    ALKPHOS 120 02/10/2020    ALT 17 02/10/2020    AST 4 02/10/2020        INR RESULTS:  Lab Results   Component Value Date    INR 1.27 (H) 03/13/2020       Lab Results   Component Value Date    MAG 2.8 (H) 03/13/2020     Lab Results   Component Value Date    NTBNPI 2,194 (H) 07/03/2019     Lab Results   Component Value Date    NTBNP 5,746 (H) 01/16/2020       Diagnostics:  ECHO 2/11/2020:  Interpretation Summary  Severely (EF 29%) reduced left ventricular function is present. Severe diffuse  hypokinesis is present.  Global right ventricular function is moderately reduced.  Moderate mitral insufficiency is present.  Mild to moderate tricuspid insufficiency  is present.  Pulmonary hypertension present. Estimated pulmonary artery systolic pressure  is 69 (54+15) mmHg .  Dilation of the inferior vena cava is present with abnormal respiratory  variation in diameter.  Small posterior pericardial effusion without echocardiographic evidence of  Tamponade.    2/10/2020[facility]:  RA --/24/22  /20  /50/65  PCWP --/51/40  DONOVAN 3.5/1.6  TD 3.8/1.8  PVR 8.5    Assessment and Plan:   59 year old male with history of occluded pRCA s/p balloon angioplasty and stenting in 6/2019, CAD (STEMI s/p DESx3 for total occlusion of RCA and Lcx in 2012), GIB due to AV malformation (in the setting of warfarin and DAPT) s/p EGD with clip (7/5/2019), , severe pHTN, Chronic SCHF secondary to ICM, moderate mitral regurgitation, atrial fibrillation, and CKD stage IV. He prsents to clinic for follow up.     Chronic systolic heart failure secondary to ICM  Stage C, NYHA Class IIIB  ACEi/ARB contraindicated due to renal dysfunction.Allergy to Hydralazine. Isordil increased to 60 mg po TID.   BB contraindicated due to low cardiac output and recent admission for cardiogenic shock.   Aldosterone antagonist contraindicated due to renal dysfunction  SCD prophylaxis patient declined on multiple conversations.   Fluid status hypervolemic. Increase Lasix to 100 mg po BID.  - Educated if nausea recurs he is to notify us immediately.      CAD. STEMI s/p DESx3 for total occlusion of RCA and Lcx in 2012.  - continue ASA, Plavix, and Lipitor.   - BB deferred in setting of recent cardiogenic shock.     Afib. CHADSVASC-2.   - Continue ASA. Coumadin contraindicated in setting of prior GI bleed.     Severe Pulmonary HTN. WHO Group II, Class III.   - Diuresis as above.   - PVR-8.5 per RHC, not mPCWP-40.   - Follow up with Dr. Trujillo 3/31/20.    Moderate MR.   - Continue to monitor symptoms.     CKD Stage IV.   - Cr stable at 2.88.  - Repeat BMP in 1 week.     Follow up BMP in 1 week and Dr. Trujillo on  3/31/20.    Concepcion Gonsalez, APRN CNP  3/13/2020    CC  SELF, REFERRED

## 2020-03-13 NOTE — NURSING NOTE
Chief Complaint   Patient presents with     Follow Up     Return CORE, 58 yo male with HFrEF presents for follow up with labs prior.      Vitals were taken and medications were reconciled.     Jazmin Alston CMA    7:55 AM

## 2020-03-13 NOTE — PROGRESS NOTES
HPI:   59 year old male with history of occluded pRCA s/p balloon angioplasty and stenting in 2019, CAD (STEMI s/p DESx3 for total occlusion of RCA and Lcx in ), GIB due to AV malformation (in the setting of warfarin and DAPT) s/p EGD with clip (2019), ischemic cardiomyopathy, HFrEF (last EF=30-35% 19), severe pHTN, moderate mitral regurgitation, atrial fibrillation, and CKD stage IV. He presents to clinic for follow up.      He was admitted from 2/10/2020 to 2020 after presenting for oupatient RHC and found to be in ambulatory cardiogenic shock. RHC numbers on admission: RA 22, RV EDP 20, /50/65, PCWP 40, PVR 8.5, CO 3.5, CI 1.6. His entresto was stopped due to renal function. He was diuresed from 215 to 208. [place] was pulled 2020 with CVP of 12 and [place] of 26.     He complains of abdominal bloating, LE edema, KING, and orthopnea (using only 1 pillow however). He notes that following his niece's  2 weeks ago after eating a great deal of food he felt nauseated. He notes constipation with last adequate BM one week ago. He denies fever, chills, lightheadedness, dizziness, chest pain, palpitations, PND, vomiting, and diarrhea. His weight is elevated today at 219 from a discharge weight of 208 lbs. He attempts to follow a low sodium diet.      PAST MEDICAL HISTORY:  Past Medical History:   Diagnosis Date     Anemia in chronic renal disease 3/9/2015     Antiplatelet or antithrombotic long-term use      Atrial fibrillation and flutter      CAD (coronary artery disease)     Stemi in , s/p angioplasty     CRD (chronic renal disease), stage IV (H) 2015    hx ATN with dialysis complicating cardiogenic shock  2012     GI bleed     massive lower GI bleed secondary to a cecal ulcer, s/p ileocecal resection in      Heart failure     Biventricular systolic HF, complicated by ARDS requiring tracheostomy     Hyperlipidemia LDL goal <100 2013     Hypertension      Ischemic  cardiomyopathy 2013    TTE revealing 40% EF     Myocardial infarction (H)      Other and unspecified nonspecific immunological findings     Anti JKa     Pulmonary edema     episodes of flash pulmonary edema in      Subclinical hypothyroidism        FAMILY HISTORY:  Family History   Problem Relation Age of Onset     Diabetes Mother      Hypertension Mother      Heart Disease Mother      Heart Disease Father          of heart attack, left when he was young     Diabetes Sister      Diabetes Sister      Diabetes Sister      Glaucoma No family hx of      Macular Degeneration No family hx of        SOCIAL HISTORY:  Social History     Socioeconomic History     Marital status: Significant other     Spouse name: Not on file     Number of children: Not on file     Years of education: Not on file     Highest education level: Not on file   Occupational History     Not on file   Social Needs     Financial resource strain: Not on file     Food insecurity     Worry: Not on file     Inability: Not on file     Transportation needs     Medical: Not on file     Non-medical: Not on file   Tobacco Use     Smoking status: Former Smoker     Packs/day: 2.00     Years: 40.00     Pack years: 80.00     Types: Cigarettes     Last attempt to quit: 12/3/2011     Years since quittin.2     Smokeless tobacco: Never Used   Substance and Sexual Activity     Alcohol use: No     Alcohol/week: 0.0 standard drinks     Drug use: No     Sexual activity: Yes     Partners: Female   Lifestyle     Physical activity     Days per week: Not on file     Minutes per session: Not on file     Stress: Not on file   Relationships     Social connections     Talks on phone: Not on file     Gets together: Not on file     Attends Confucianism service: Not on file     Active member of club or organization: Not on file     Attends meetings of clubs or organizations: Not on file     Relationship status: Not on file     Intimate partner violence     Fear of  current or ex partner: Not on file     Emotionally abused: Not on file     Physically abused: Not on file     Forced sexual activity: Not on file   Other Topics Concern     Parent/sibling w/ CABG, MI or angioplasty before 65F 55M? Yes      Service Not Asked     Blood Transfusions Not Asked     Caffeine Concern Not Asked     Occupational Exposure Not Asked     Hobby Hazards Not Asked     Sleep Concern Not Asked     Stress Concern Not Asked     Weight Concern Not Asked     Special Diet Not Asked     Back Care Not Asked     Exercise Yes     Comment: limited walking     Bike Helmet Not Asked     Seat Belt Not Asked     Self-Exams Not Asked   Social History Narrative     Not on file       CURRENT MEDICATIONS:  Outpatient Medications Prior to Visit   Medication Sig Dispense Refill     ACCU-CHEK GUIDE test strip USE TO TEST BLOOD SUGAR FOUR TIMES A DAY BEFORE MEALS AND AT BEDTIME 400 each 3     Alcohol Swabs PADS 1 applicator 4 times daily (before meals and nightly) 100 each 3     aspirin (ASA) 81 MG chewable tablet Take 81 mg by mouth daily       atorvastatin (LIPITOR) 80 MG tablet Take 1 tablet (80 mg) by mouth daily 90 tablet 3     blood glucose monitoring (ACCU-CHEK FASTCLIX) lancets USE TO TEST BLOOD SUGAR FOUR TIMES A DAY AT MEALS AND AT BEDTIME 400 each 3     cholecalciferol (VITAMIN  -D) 1000 UNITS capsule Take 2 capsules (2,000 Units) by mouth daily (Patient taking differently: Take 1 capsule by mouth 2 times daily ) 60 capsule 3     clopidogrel (PLAVIX) 75 MG tablet Take 1 tablet (75 mg) by mouth daily 90 tablet 3     insulin pen needle (32G X 4 MM) 32G X 4 MM miscellaneous Use 5 pen needles daily or as directed. 200 each 3     pantoprazole (PROTONIX) 40 MG EC tablet Take 1 tablet (40 mg) by mouth 2 times daily 180 tablet 2     potassium chloride ER (KLOR-CON M) 20 MEQ CR tablet Take 1 tablet (20 mEq) by mouth daily 90 tablet 0     Sharps Container MISC 1 Device every 30 days 1 each 3     vitamin  B  "complex with vitamin C (VITAMIN  B COMPLEX) TABS Take 1 tablet by mouth daily       nitroGLYcerin (NITROSTAT) 0.4 MG sublingual tablet Place 1 tablet (0.4 mg) under the tongue every 5 minutes as needed for chest pain For chest pain place 1 tablet under the tongue every 5 minutes for 3 doses. If symptoms persist 5 minutes after 1st dose call 911. (Patient not taking: Reported on 3/13/2020) 25 tablet 0     furosemide (LASIX) 80 MG tablet Take 1 tablet (80 mg) by mouth 2 times daily 180 tablet 3     isosorbide dinitrate (ISORDIL) 30 MG tablet Take 1 tablet (30 mg) by mouth 3 times daily 90 tablet 0     No facility-administered medications prior to visit.        ROS:   CONSTITUTIONAL: Denies fever, chills, fatigue. Complains of weight gain.   HEENT: Denies headache, vision changes, and changes in speech.   CV: Refer to HPI.   PULMONARY: Complains of KING.   GI:Denies nausea, vomiting, diarrhea, and abdominal pain. Bowel movements are regular.   :Denies urinary alterations, dysuria, urinary frequency, hematuria, and abnormal drainage.   EXT:Denies lower extremity edema.   SKIN:Denies abnormal rashes or lesions.   MUSCULOSKELETAL:Denies upper or lower extremity weakness and pain.   NEUROLOGIC:Denies lightheadedness, dizziness, seizures, or upper or lower extremity paresthesia.     EXAM:  /86 (BP Location: Left arm, Patient Position: Chair, Cuff Size: Adult Regular)   Pulse 77   Ht 1.727 m (5' 8\")   Wt 99.6 kg (219 lb 9.6 oz)   SpO2 95%   BMI 33.39 kg/m    GENERAL: Appears alert and oriented times three.   HEENT: Eye symmetrical and free of discharge bilaterally. Mucous membranes moist and without lesions.  NECK: Supple and without lymphadenopathy. JVD 12-14 cm  CV: Irregular, controlled, S1S2 present without murmur, rub, or gallop.   RESPIRATORY: Respirations regular, even, and unlabored. Lungs CTA throughout.   GI: Soft and distended with normoactive bowel sounds present in all quadrants. No tenderness, " rebound, guarding. No organomegaly.   EXTREMITIES: Trace bilateral LE peripheral edema. 2+ bilateral pedal pulses.   NEUROLOGIC: Alert and orientated x 3. CN II-XII grossly intact. No focal deficits.   MUSCULOSKELETAL: No joint swelling or tenderness.   SKIN: No jaundice. No rashes or lesions.     Labs:  CBC RESULTS:  Lab Results   Component Value Date    WBC 8.6 03/13/2020    RBC 4.58 03/13/2020    HGB 12.2 (L) 03/13/2020    HCT 40.9 03/13/2020    MCV 89 03/13/2020    MCH 26.6 03/13/2020    MCHC 29.8 (L) 03/13/2020    RDW 21.7 (H) 03/13/2020     03/13/2020       CMP RESULTS:  Lab Results   Component Value Date     03/13/2020    POTASSIUM 4.2 03/13/2020    CHLORIDE 104 03/13/2020    CO2 25 03/13/2020    ANIONGAP 9 03/13/2020     (H) 03/13/2020    BUN 54 (H) 03/13/2020    CR 2.88 (H) 03/13/2020    GFRESTIMATED 22 (L) 03/13/2020    GFRESTBLACK 26 (L) 03/13/2020    RONNY 9.1 03/13/2020    BILITOTAL 1.5 (H) 02/10/2020    ALBUMIN 3.9 02/10/2020    ALKPHOS 120 02/10/2020    ALT 17 02/10/2020    AST 4 02/10/2020        INR RESULTS:  Lab Results   Component Value Date    INR 1.27 (H) 03/13/2020       Lab Results   Component Value Date    MAG 2.8 (H) 03/13/2020     Lab Results   Component Value Date    NTBNPI 2,194 (H) 07/03/2019     Lab Results   Component Value Date    NTBNP 5,746 (H) 01/16/2020       Diagnostics:  ECHO 2/11/2020:  Interpretation Summary  Severely (EF 29%) reduced left ventricular function is present. Severe diffuse  hypokinesis is present.  Global right ventricular function is moderately reduced.  Moderate mitral insufficiency is present.  Mild to moderate tricuspid insufficiency is present.  Pulmonary hypertension present. Estimated pulmonary artery systolic pressure  is 69 (54+15) mmHg .  Dilation of the inferior vena cava is present with abnormal respiratory  variation in diameter.  Small posterior pericardial effusion without echocardiographic evidence  of  Tamponade.    2/10/2020[facility]:  RA --/24/22  /20  /50/65  PCWP --/51/40  DONOVAN 3.5/1.6  TD 3.8/1.8  PVR 8.5    Assessment and Plan:   59 year old male with history of occluded pRCA s/p balloon angioplasty and stenting in 6/2019, CAD (STEMI s/p DESx3 for total occlusion of RCA and Lcx in 2012), GIB due to AV malformation (in the setting of warfarin and DAPT) s/p EGD with clip (7/5/2019), , severe pHTN, Chronic SCHF secondary to ICM, moderate mitral regurgitation, atrial fibrillation, and CKD stage IV. He prsents to clinic for follow up.     Chronic systolic heart failure secondary to ICM  Stage C, NYHA Class IIIB  ACEi/ARB contraindicated due to renal dysfunction.Allergy to Hydralazine. Isordil increased to 60 mg po TID.   BB contraindicated due to low cardiac output and recent admission for cardiogenic shock.   Aldosterone antagonist contraindicated due to renal dysfunction  SCD prophylaxis patient declined on multiple conversations.   Fluid status hypervolemic. Increase Lasix to 100 mg po BID.  - Educated if nausea recurs he is to notify us immediately.      CAD. STEMI s/p DESx3 for total occlusion of RCA and Lcx in 2012.  - continue ASA, Plavix, and Lipitor.   - BB deferred in setting of recent cardiogenic shock.     Afib. CHADSVASC-2.   - Continue ASA. Coumadin contraindicated in setting of prior GI bleed.     Severe Pulmonary HTN. WHO Group II, Class III.   - Diuresis as above.   - PVR-8.5 per RHC, not mPCWP-40.   - Follow up with Dr. Trujillo 3/31/20.    Moderate MR.   - Continue to monitor symptoms.     CKD Stage IV.   - Cr stable at 2.88.  - Repeat BMP in 1 week.     Follow up BMP in 1 week and Dr. Trujillo on 3/31/20.      Concepcion Gonsalez, APRN CNP  3/13/2020          CC  SELF, REFERRED

## 2020-03-20 ENCOUNTER — PATIENT OUTREACH (OUTPATIENT)
Dept: CARDIOLOGY | Facility: CLINIC | Age: 60
End: 2020-03-20

## 2020-03-20 NOTE — TELEPHONE ENCOUNTER
Called [facility] to review need for labs. Increased Lasix 1 week ago. Reports his weight really hasn't changed on his home scale. Has not lost weight with increase in dose. No SOB. Still having some KING. He complains of abdominal bloating and LE edema - unchanged in last week since seen in clinic.   Told him we need to get labs as recommended last week. Can't go get labs today. He will get them Monday morning and appt made.     Routing to provider and will follow up on labs on Monday.   Patricia Vo RN

## 2020-03-23 DIAGNOSIS — I50.22 CHRONIC SYSTOLIC CONGESTIVE HEART FAILURE (H): ICD-10-CM

## 2020-03-23 LAB
ANION GAP SERPL CALCULATED.3IONS-SCNC: 5 MMOL/L (ref 3–14)
BUN SERPL-MCNC: 42 MG/DL (ref 7–30)
CALCIUM SERPL-MCNC: 8.9 MG/DL (ref 8.5–10.1)
CHLORIDE SERPL-SCNC: 106 MMOL/L (ref 94–109)
CO2 SERPL-SCNC: 28 MMOL/L (ref 20–32)
CREAT SERPL-MCNC: 2.69 MG/DL (ref 0.66–1.25)
GFR SERPL CREATININE-BSD FRML MDRD: 24 ML/MIN/{1.73_M2}
GLUCOSE SERPL-MCNC: 152 MG/DL (ref 70–99)
POTASSIUM SERPL-SCNC: 4 MMOL/L (ref 3.4–5.3)
SODIUM SERPL-SCNC: 138 MMOL/L (ref 133–144)

## 2020-03-23 RX ORDER — METOLAZONE 2.5 MG/1
TABLET ORAL
Qty: 15 TABLET | Refills: 1 | Status: SHIPPED | OUTPATIENT
Start: 2020-03-23 | End: 2020-01-01

## 2020-03-23 NOTE — PROGRESS NOTES
I conducted a virtual visit with this patient. We discussed the patient's current condition, reviewed interim history since last visit, current functional status, diet, and possible cardiac symptoms including: chest pain, tightness, heaviness, pressure, PND, orthopnea, ankle edema, increasing abdominal girth, weight gain, palpitation, pre-syncope or syncope.  His weight remains elevated and seeming not responsive to furosemide 100 BID.  Laboratories reviewed as below.  The renal function is abnormal as previously noted.      I spent 30 minutes reviewing chart, discussing the patient's condition and preparing report    I spent 15 minutes on the phone from 2:00 to 2:15    I sent in a prescription for zaroxlyn 2.5 mgs # 15 with directions to take only on our direction.  Today he is to take one an lay down for two hours following ingestion.  He is to call us tomorrow with results.  Should he have a good response should have BMP and magnesium checked on Wednesday    We reviewed and suggested:  1. Viral epidemic safety suggestions  2. Continued adherence to medical regimen, diet, and exercise program as previously described  3. Current scheduled visit deferred in light of current situation  4. Call immediately should your health status change, and we will make arrangements to see you immediately        59 year old male with history of occluded pRCA s/p balloon angioplasty and stenting in 6/2019, CAD (STEMI s/p DESx3 for total occlusion of RCA and Lcx in 2012), GIB due to AV malformation (in the setting of warfarin and DAPT) s/p EGD with clip (7/5/2019), ischemic cardiomyopathy, HFrEF (last EF=30-35% 11/12/19), severe pHTN, moderate mitral regurgitation, atrial fibrillation, and CKD stage IV.    Previously discussed on multiple occasions potential options for advanced heart failure, and he has not been interested.  We have discussed the risk of sudden death and potential risk reduction with AICD.  He is not interested in  this either.    He is till considering his advance options and AICD       [facility] numbers on admission: RA 22, RV EDP 20, /50/65, PCWP 40, PVR 8.5, CO 3.5, CI 1.6.    Results for [facility] (MRN [id number]) as of 3/23/2020 13:53   Ref. Range 3/23/2020 08:11   Sodium Latest Ref Range: 133 - 144 mmol/L 138   Potassium Latest Ref Range: 3.4 - 5.3 mmol/L 4.0   Chloride Latest Ref Range: 94 - 109 mmol/L 106   Carbon Dioxide Latest Ref Range: 20 - 32 mmol/L 28   Urea Nitrogen Latest Ref Range: 7 - 30 mg/dL 42 (H)   Creatinine Latest Ref Range: 0.66 - 1.25 mg/dL 2.69 (H)   GFR Estimate Latest Ref Range: >60 mL/min/1.73_m2 24 (L)   GFR Estimate If Black Latest Ref Range: >60 mL/min/1.73_m2 28 (L)   Calcium Latest Ref Range: 8.5 - 10.1 mg/dL 8.9   Anion Gap Latest Ref Range: 3 - 14 mmol/L 5   Glucose Latest Ref Range: 70 - 99 mg/dL 152 (H)       Current Outpatient Medications   Medication     ACCU-CHEK GUIDE test strip     Alcohol Swabs PADS     aspirin (ASA) 81 MG chewable tablet     atorvastatin (LIPITOR) 80 MG tablet     blood glucose monitoring (ACCU-CHEK FASTCLIX) lancets     cholecalciferol (VITAMIN  -D) 1000 UNITS capsule     clopidogrel (PLAVIX) 75 MG tablet     furosemide (LASIX) 20 MG tablet     furosemide (LASIX) 80 MG tablet     insulin pen needle (32G X 4 MM) 32G X 4 MM miscellaneous     isosorbide dinitrate (ISORDIL) 30 MG tablet     nitroGLYcerin (NITROSTAT) 0.4 MG sublingual tablet     pantoprazole (PROTONIX) 40 MG EC tablet     potassium chloride ER (KLOR-CON M) 20 MEQ CR tablet     senna-docusate (SENOKOT-S/PERICOLACE) 8.6-50 MG tablet     Sharps Container MISC     vitamin  B complex with vitamin C (VITAMIN  B COMPLEX) TABS     No current facility-administered medications for this visit.        Wt Readings from Last 24 Encounters:   03/13/20 99.6 kg (219 lb 9.6 oz)   02/19/20 95.7 kg (211 lb)   02/14/20 94.6 kg (208 lb 8.9 oz)   01/16/20 98.9 kg (218 lb)   12/30/19 99.7 kg (219 lb 11.2 oz)    12/23/19 98.6 kg (217 lb 4.8 oz)   12/20/19 97.3 kg (214 lb 6.4 oz)   12/18/19 101.3 kg (223 lb 6.4 oz)   12/12/19 101 kg (222 lb 11.2 oz)   12/05/19 99.8 kg (220 lb)   11/21/19 98.9 kg (218 lb)   11/17/19 95.6 kg (210 lb 11.2 oz)   11/07/19 103.9 kg (229 lb)   10/31/19 105.7 kg (233 lb)   10/07/19 107.3 kg (236 lb 9.6 oz)   09/16/19 106.1 kg (234 lb)   08/26/19 107 kg (236 lb)   07/24/19 107.7 kg (237 lb 6.4 oz)   07/22/19 108.2 kg (238 lb 8.6 oz)   07/15/19 108.2 kg (238 lb 9.6 oz)   07/06/19 104.8 kg (231 lb 0.7 oz)   06/27/19 106.3 kg (234 lb 6.4 oz)   04/23/19 109.6 kg (241 lb 11.2 oz)   04/08/19 110.6 kg (243 lb 12.8 oz)

## 2020-03-24 ENCOUNTER — TELEPHONE (OUTPATIENT)
Dept: CARDIOLOGY | Facility: CLINIC | Age: 60
End: 2020-03-24

## 2020-03-24 DIAGNOSIS — I50.22 CHRONIC SYSTOLIC CONGESTIVE HEART FAILURE (H): Primary | ICD-10-CM

## 2020-03-24 NOTE — TELEPHONE ENCOUNTER
[facility] called to report results after taking Metolazone per Dr Trujillo yesterday. See note below - weight down 4 lbs this morning. Reports he still has some swelling in his legs. Notes his abdominal bloating is better. Reports improvement in his orthopnea last night and he slept better.   Instructed to continue on current Lasix dosage of 100 mg BID and I would review for any further recommendations.     Patricia Vo RN

## 2020-03-24 NOTE — TELEPHONE ENCOUNTER
[facility] Call Center    Phone Message    May a detailed message be left on voicemail: no     Reason for Call: Medication Question or concern regarding medication   Prescription Clarification  Name of Medication: metolazone (ZAROXOLYN) 2.5 MG tablet   Prescribing Provider: Dr. Trujillo   Pharmacy:  [facility]   What on the order needs clarification? Pt reports urinating about 8 times and losing 4 lbs of weight last night. Pt still reports water weight present. Pt wants to know if he needs to still take his lasix since that was stopped. Please call Pt back.          Action Taken: Message routed to:  [facility]    Travel Screening: Not Applicable

## 2020-03-25 NOTE — TELEPHONE ENCOUNTER
Called and spoke with patient. He states that he lost another 4 lbs over night. Patient will come in for labs tomorrow morning. Provider to be notified for further recommendations.

## 2020-03-26 ENCOUNTER — PATIENT OUTREACH (OUTPATIENT)
Dept: CARDIOLOGY | Facility: CLINIC | Age: 60
End: 2020-03-26

## 2020-03-26 ENCOUNTER — VIRTUAL VISIT (OUTPATIENT)
Dept: CARDIOLOGY | Facility: CLINIC | Age: 60
End: 2020-03-26
Payer: MEDICAID

## 2020-03-26 DIAGNOSIS — E61.1 IRON DEFICIENCY: Primary | ICD-10-CM

## 2020-03-26 DIAGNOSIS — D62 ANEMIA DUE TO ACUTE BLOOD LOSS: ICD-10-CM

## 2020-03-26 DIAGNOSIS — I50.9 CHRONIC CONGESTIVE HEART FAILURE, UNSPECIFIED HEART FAILURE TYPE (H): Primary | ICD-10-CM

## 2020-03-26 DIAGNOSIS — I48.91 ATRIAL FIBRILLATION, UNSPECIFIED TYPE (H): Chronic | ICD-10-CM

## 2020-03-26 DIAGNOSIS — I50.22 CHRONIC SYSTOLIC CONGESTIVE HEART FAILURE (H): ICD-10-CM

## 2020-03-26 LAB
ANION GAP SERPL CALCULATED.3IONS-SCNC: 5 MMOL/L (ref 3–14)
BUN SERPL-MCNC: 49 MG/DL (ref 7–30)
CALCIUM SERPL-MCNC: 9.3 MG/DL (ref 8.5–10.1)
CHLORIDE SERPL-SCNC: 100 MMOL/L (ref 94–109)
CO2 SERPL-SCNC: 33 MMOL/L (ref 20–32)
CREAT SERPL-MCNC: 3.14 MG/DL (ref 0.66–1.25)
ERYTHROCYTE [DISTWIDTH] IN BLOOD BY AUTOMATED COUNT: 21.2 % (ref 10–15)
GFR SERPL CREATININE-BSD FRML MDRD: 20 ML/MIN/{1.73_M2}
GLUCOSE SERPL-MCNC: 177 MG/DL (ref 70–99)
HCT VFR BLD AUTO: 39.3 % (ref 40–53)
HGB BLD-MCNC: 11.9 G/DL (ref 13.3–17.7)
INR PPP: 1.27 (ref 0.86–1.14)
MAGNESIUM SERPL-MCNC: 2.6 MG/DL (ref 1.6–2.3)
MCH RBC QN AUTO: 26.2 PG (ref 26.5–33)
MCHC RBC AUTO-ENTMCNC: 30.3 G/DL (ref 31.5–36.5)
MCV RBC AUTO: 87 FL (ref 78–100)
PLATELET # BLD AUTO: 257 10E9/L (ref 150–450)
POTASSIUM SERPL-SCNC: 3.6 MMOL/L (ref 3.4–5.3)
RBC # BLD AUTO: 4.54 10E12/L (ref 4.4–5.9)
SODIUM SERPL-SCNC: 137 MMOL/L (ref 133–144)
WBC # BLD AUTO: 7.7 10E9/L (ref 4–11)

## 2020-03-26 PROCEDURE — 99442 ZZC PHYSICIAN TELEPHONE EVALUATION 11-20 MIN: CPT | Mod: ZP | Performed by: INTERNAL MEDICINE

## 2020-03-26 RX ORDER — POTASSIUM CHLORIDE 1.5 G/1.58G
POWDER, FOR SOLUTION ORAL
Qty: 60 TABLET | Refills: 3 | Status: ON HOLD | OUTPATIENT
Start: 2020-03-26 | End: 2020-01-01

## 2020-03-26 RX ORDER — FERROUS GLUCONATE 324(38)MG
TABLET ORAL
Qty: 90 TABLET | Refills: 1 | Status: SHIPPED | OUTPATIENT
Start: 2020-03-26 | End: 2020-04-02

## 2020-03-26 NOTE — PROGRESS NOTES
Called and spoke with patient and reviewed recommendations from Dr. Delgadillo today. Patient understands and all questions answered. Patient took extra dose of Potassium today and stated taking his iron pill. Patient will have a BMP drawn next week and will recheck iron in 5 weeks.

## 2020-03-26 NOTE — PROGRESS NOTES
Patient was called and lab results reviewed. Creatine elevated from baseline after recent use of Metolazone. Patient lost a total of 8 lbs, feels better. Will notify provider and contact patient with any comments or recommendations. Patient is agreeable to plan.

## 2020-03-26 NOTE — PROGRESS NOTES
I conducted a virtual visit with this patient. We discussed the patient's current condition, reviewed interim history since last visit, current functional status, diet, and possible cardiac symptoms including: chest pain, tightness, heaviness, pressure, PND, orthopnea, ankle edema, increasing abdominal girth, weight gain, palpitation, pre-syncope or syncope.    I spent 15 minutes on phone 12:45-1:00 .    The patient diuresed 8 pounds. Laboratories from today reviewed and show increase in creatinine to 3.14 and borderline potassium    Patient has history of recent GI bleed with negative scopes.  He has profoundly low iron without interim history of melena, bleeding.      He was last stented in June 2019    Risks of recurrent bleeding (stop pantoprazole) and stent obstruction (stop Plavix) discussed    He will come to ER if recurrent GI bleeding or recurrent chest pain    Check renal function next week/BMP    FESO4 325 TID x 1 weeks then go to BID    Take 40meq extra potassium today    Watch salt and fluid intake    No zaroxolyn without our permission    Renew KCL 20 MEQ/day  Renew FESO4     I spent 30 minutes reviewing chart, discussing the patient's condition and preparing report    We reviewed and suggested:  1. Viral epidemic safety suggestions  2. Continued adherence to medical regimen, diet, and exercise program as previously described  3. Current scheduled visit deferred in light of current situation  4. Call immediately should your health status change, and we will make arrangements to see you immediately

## 2020-03-30 ENCOUNTER — VIRTUAL VISIT (OUTPATIENT)
Dept: ENDOCRINOLOGY | Facility: CLINIC | Age: 60
End: 2020-03-30
Payer: MEDICAID

## 2020-03-30 DIAGNOSIS — E11.9 TYPE 2 DIABETES, HBA1C GOAL < 8% (H): Primary | ICD-10-CM

## 2020-03-30 DIAGNOSIS — E03.8 SUBCLINICAL HYPOTHYROIDISM: ICD-10-CM

## 2020-03-30 NOTE — PROGRESS NOTES
Due to the COVID 19 pandemic this visit was converted to a telephone visit in order to help prevent spread of infection in this high risk patient and the general population.  The patient was previously seen by Meron in our clinic.    Start time 8:01 am   Stop time  8:17 am  Total time  minutes 16 minutes     Cole is a 59 year old male with past history of type II DM (since 2011), HTN, HLP, CAD with prior STEMI, CKD stage IV, and HFrEF with pulmonary HTN (ejection fraction 30 to 35%).    He was diagnosed with TIIDM at the time of his myocardial infarction in 2011. He was on insulin beginning in 2012, but glycemic control was excellent, so he stopped insulin when Rx ran out. His A1c were in the prediabetic range subsequently. He was admitted 4/2019 for hyperglycemia that was seen in a routine physical exam, and was placed back on basal bolus insulin regimen. His A1c was >13% at this time.     In the fall of 2019, he started to titrate down the dose of insulin. He was hospitalized 12/18-12/21 for anemia, diagnosed with GIB due to AV malformation.  During hospitalization, he was not given insulin mostly due to n.p.o. status.  The hospitalization was associated with a significant weight loss and insulin was not resumed following hospital discharge.  Due to his CKD, and on iron infusions, as well as recent blood transfusions, A1c not reliable method of monitoring glycemic control.  Most recent A1c was 5.9, at the end of December 2019.    On review of lab results documented in epic, his fasting blood sugar is variable between 127 and 177 (most recently checked on 3/26/2020).  It appears that his blood sugar is lower when checked later during the day, compared with the fasting blood sugar.    I reviewed with the patient the blood glucose numbers documented in his glucometer.  Of note that he checks his blood sugar mostly before breakfast, sometimes after having something to eat.  Occasionally, he checks his blood sugar  and blood pressure at the time he feels lightheaded.  He denies experiencing hypoglycemic episodes.  His blood pressure has been fairly well controlled.  Blood glucose records:  3/29 - 153   3/27 - 140   3/28 - 179    3/24 - 121   3/21 - 134   3/17 - 135   3/16 - 149   3/12  -156   3/10 - 141   3/8 - 148   3/07 - 126   3/05 - 165  3/04 - 166  3/02 - 140      Current Diabetes Regimen:   None, self discontinued insulin November 2019.    Diabetes Care  Retinopathy: last eye exam 1/2020; no DR as per patient   Nephropathy: History of proteinuria, GFR in the 20s.  Taking ACEi/ARB  Neuropathy: feet feel tender, hypersensitive, for many years   Foot Exam: no wounds or ulcers.    Lipids: Taking ASA: yes, and on Plavix, statin: yes  LDL Cholesterol Calculated   Date Value Ref Range Status   11/13/2019 38 <100 mg/dL Final     Comment:     Desirable:       <100 mg/dl      ROS  Systemic symptoms: weight fairly stable; mainly less since suing the diuretics  Eye symptoms: No eye symptoms.  Otolaryngeal symptoms: No otolaryngeal symptoms.  Breast symptoms: No breast symptoms.  Cardiovascular symptoms: No cardiovascular symptoms.    Pulmonary symptoms: No pulmonary symptoms.  Gastrointestinal symptoms: mild constipation since restarting the iron pills    Genitourinary symptoms: urinates 3-4 times a night due to diuretics   Endocrine symptoms: No endocrine symptoms.    Hematologic symptoms: No hematologic symptoms.  Musculoskeletal symptoms: No musculoskeletal symptoms.  Neurological symptoms: No neurological symptoms.  Psychological symptoms: No psychological symptoms.    Skin symptoms: No skin symptoms.    Allergies  Allergies   Allergen Reactions     Hydralazine      Black spots, verified allergic reaction by skin biopsy     Simvastatin Other (See Comments)     Leg muscle weakness     Tylenol [Acetaminophen] Palpitations       Medications    Current Outpatient Medications:      ACCU-CHEK GUIDE test strip, USE TO TEST BLOOD SUGAR  FOUR TIMES A DAY BEFORE MEALS AND AT BEDTIME, Disp: 400 each, Rfl: 3     Alcohol Swabs PADS, 1 applicator 4 times daily (before meals and nightly), Disp: 100 each, Rfl: 3     aspirin (ASA) 81 MG chewable tablet, Take 81 mg by mouth daily, Disp: , Rfl:      atorvastatin (LIPITOR) 80 MG tablet, Take 1 tablet (80 mg) by mouth daily, Disp: 90 tablet, Rfl: 3     blood glucose monitoring (ACCU-CHEK FASTCLIX) lancets, USE TO TEST BLOOD SUGAR FOUR TIMES A DAY AT MEALS AND AT BEDTIME, Disp: 400 each, Rfl: 3     cholecalciferol (VITAMIN  -D) 1000 UNITS capsule, Take 2 capsules (2,000 Units) by mouth daily (Patient taking differently: Take 1 capsule by mouth 2 times daily ), Disp: 60 capsule, Rfl: 3     clopidogrel (PLAVIX) 75 MG tablet, Take 1 tablet (75 mg) by mouth daily, Disp: 90 tablet, Rfl: 3     ferrous gluconate (FERGON) 324 (38 Fe) MG tablet, Take 3 tablets daily times one week then reduce to two tablets daily, Disp: 90 tablet, Rfl: 1     furosemide (LASIX) 20 MG tablet, In combination with the 80 mg to make total of 100 mg by mouth twice daily, Disp: 30 tablet, Rfl: 1     furosemide (LASIX) 80 MG tablet, In combination with the 20 mg to make total of 100 mg by mouth twice daily, Disp: 120 tablet, Rfl: 1     insulin pen needle (32G X 4 MM) 32G X 4 MM miscellaneous, Use 5 pen needles daily or as directed., Disp: 200 each, Rfl: 3     isosorbide dinitrate (ISORDIL) 30 MG tablet, Take 2 tablets (60 mg) by mouth 3 times daily, Disp: 120 tablet, Rfl: 1     metolazone (ZAROXOLYN) 2.5 MG tablet, One tablet only when directed by your doctor, Disp: 15 tablet, Rfl: 1     nitroGLYcerin (NITROSTAT) 0.4 MG sublingual tablet, Place 1 tablet (0.4 mg) under the tongue every 5 minutes as needed for chest pain For chest pain place 1 tablet under the tongue every 5 minutes for 3 doses. If symptoms persist 5 minutes after 1st dose call 911. (Patient not taking: Reported on 3/13/2020), Disp: 25 tablet, Rfl: 0     pantoprazole (PROTONIX) 40  MG EC tablet, Take 1 tablet (40 mg) by mouth 2 times daily, Disp: 180 tablet, Rfl: 2     potassium chloride (KLOR-CON) 20 MEQ packet, Potassium 20MEQ one daily, Disp: 60 tablet, Rfl: 3     potassium chloride ER (KLOR-CON M) 20 MEQ CR tablet, Take 1 tablet (20 mEq) by mouth daily, Disp: 90 tablet, Rfl: 0     senna-docusate (SENOKOT-S/PERICOLACE) 8.6-50 MG tablet, Take 1 tablet by mouth 2 times daily, Disp: 60 tablet, Rfl: 1     Sharps Container MISC, 1 Device every 30 days, Disp: 1 each, Rfl: 3     vitamin  B complex with vitamin C (VITAMIN  B COMPLEX) TABS, Take 1 tablet by mouth daily, Disp: , Rfl:     Family History  family history includes Diabetes in his mother, sister, sister, and sister; Heart Disease in his father and mother; Hypertension in his mother.    Social History   reports that he quit smoking about 8 years ago. His smoking use included cigarettes. He has a 80.00 pack-year smoking history. He has never used smokeless tobacco. He reports that he does not drink alcohol or use drugs.     Past Medical History  Past Medical History:   Diagnosis Date     Anemia in chronic renal disease 3/9/2015     Antiplatelet or antithrombotic long-term use      Atrial fibrillation and flutter      CAD (coronary artery disease)     Stemi in 12/11, s/p angioplasty     CRD (chronic renal disease), stage IV (H) 03/09/2015    hx ATN with dialysis complicating cardiogenic shock  1/2012     GI bleed     massive lower GI bleed secondary to a cecal ulcer, s/p ileocecal resection in 12/11     Heart failure     Biventricular systolic HF, complicated by ARDS requiring tracheostomy     Hyperlipidemia LDL goal <100 4/28/2013     Hypertension      Ischemic cardiomyopathy 4/28/2013    TTE revealing 40% EF     Myocardial infarction (H)      Other and unspecified nonspecific immunological findings     Anti JKa     Pulmonary edema     episodes of flash pulmonary edema in 12/11     Subclinical hypothyroidism        Past Surgical History:    Procedure Laterality Date     CV RIGHT HEART CATH N/A 11/12/2019    Procedure: CV RIGHT HEART CATH;  Surgeon: Fox Gerard MD;  Location: [facility]     CV RIGHT HEART CATH N/A 2/10/2020    Procedure: CV RIGHT HEART CATH;  Surgeon: Fox Gerard MD;  [facility]     ESOPHAGOSCOPY, GASTROSCOPY, DUODENOSCOPY (EGD), COMBINED  12/25/2011    Procedure:COMBINED ESOPHAGOSCOPY, GASTROSCOPY, DUODENOSCOPY (EGD); Surgeon:SAMARIA PUENTE; Location:[facility]     LAPAROTOMY EXPLORATORY  12/31/2011    Procedure:LAPAROTOMY EXPLORATORY; Explore laparotomy, Illeocectomy, Diverting Illeostomy; Surgeon:GIA NAJERA; Location:[facility]     ORIF right elbow fracture  age 12     TAKEDOWN ILEOSTOMY  6/5/2014    Procedure: TAKEDOWN ILEOSTOMY;  Surgeon: Gia Najera MD;  Location: [facility]     TRACHEOSTOMY  12/22/2011    Procedure:TRACHEOSTOMY; Tracheostomy; 80XLTCP-Proximal Extension-Cuffed 8.0 mm I.D.; Surgeon:LIZABETH DYE; Location:[facility]     RESULTS  Lab Results   Component Value Date    A1C 5.7 (H) 07/15/2019    A1C 13.4 (H) 04/08/2019    A1C 7.3 (H) 02/22/2018    A1C 6.9 (H) 08/16/2016    A1C 5.7 03/26/2014    HEMOGLOBINA1 5.9 12/30/2019    HEMOGLOBINA1 6.0 10/07/2019       Hemoglobin   Date Value Ref Range Status   03/26/2020 11.9 (L) 13.3 - 17.7 g/dL Final     Hematocrit   Date Value Ref Range Status   03/26/2020 39.3 (L) 40.0 - 53.0 % Final     Cholesterol   Date Value Ref Range Status   11/13/2019 85 <200 mg/dL Final     Cholesterol/HDL Ratio   Date Value Ref Range Status   07/06/2015 3.8 0.0 - 5.0 Final     HDL Cholesterol   Date Value Ref Range Status   11/13/2019 29 (L) >39 mg/dL Final     LDL Cholesterol Calculated   Date Value Ref Range Status   11/13/2019 38 <100 mg/dL Final     Comment:     Desirable:       <100 mg/dl     VLDL-Cholesterol   Date Value Ref Range Status   07/06/2015 16 0 - 30 mg/dL Final     Triglycerides    Date Value Ref Range Status   11/13/2019 94 <150 mg/dL Final     Albumin Urine mg/L   Date Value Ref Range Status   04/08/2019 404 mg/L Final     TSH   Date Value Ref Range Status   07/23/2019 4.64 (H) 0.40 - 4.00 mU/L Final         Last Basic Metabolic Panel:    Sodium   Date Value Ref Range Status   03/26/2020 137 133 - 144 mmol/L Final     Potassium   Date Value Ref Range Status   03/26/2020 3.6 3.4 - 5.3 mmol/L Final     Chloride   Date Value Ref Range Status   03/26/2020 100 94 - 109 mmol/L Final     Calcium   Date Value Ref Range Status   03/26/2020 9.3 8.5 - 10.1 mg/dL Final     Carbon Dioxide   Date Value Ref Range Status   03/26/2020 33 (H) 20 - 32 mmol/L Final     Urea Nitrogen   Date Value Ref Range Status   03/26/2020 49 (H) 7 - 30 mg/dL Final     Creatinine   Date Value Ref Range Status   03/26/2020 3.14 (H) 0.66 - 1.25 mg/dL Final     GFR Estimate   Date Value Ref Range Status   03/26/2020 20 (L) >60 mL/min/[1.73_m2] Final     Comment:     Non  GFR Calc  Starting 12/18/2018, serum creatinine based estimated GFR (eGFR) will be   calculated using the Chronic Kidney Disease Epidemiology Collaboration   (CKD-EPI) equation.       Glucose   Date Value Ref Range Status   03/26/2020 177 (H) 70 - 99 mg/dL Final       AST   Date Value Ref Range Status   02/10/2020 4 0 - 45 U/L Final     ALT   Date Value Ref Range Status   02/10/2020 17 0 - 70 U/L Final     Albumin Urine mg/g Cr   Date Value Ref Range Status   04/08/2019 1,097.83 (H) 0 - 17 mg/g Cr Final       ASSESSMENT/PLAN:    1. Diabetes type II: with coronary artery disease, proteinuria and CKD IV (non-insulin therapies for diabetes management are not indicated at this point).  A1c is unreliable in the context of anemia.  His morning blood sugar has been intermittently elevated but this may be due to the fact that he does not always check prior to eating.  Advised to check his blood sugar more consistently, fasting and at bedtime, and send  us the blood glucose records.    2. Thyroid: pt carries a history of subclinical hypothyroidism.  Clinically, he does not endorse signs or symptoms suggestive of hypothyroidism. Most recent TSH 4.64 7/23/19, stable compared with the prior results  We will check the TSH with his next lab work.    No orders of the defined types were placed in this encounter.    Return to clinic to be scheduled in 3 months.

## 2020-03-31 ENCOUNTER — VIRTUAL VISIT (OUTPATIENT)
Dept: CARDIOLOGY | Facility: CLINIC | Age: 60
End: 2020-03-31
Attending: INTERNAL MEDICINE
Payer: MEDICAID

## 2020-03-31 DIAGNOSIS — I25.5 ISCHEMIC CARDIOMYOPATHY: ICD-10-CM

## 2020-03-31 PROCEDURE — 99443 ZZC PHYSICIAN TELEPHONE EVALUATION 21-30 MIN: CPT | Mod: ZP | Performed by: INTERNAL MEDICINE

## 2020-04-02 ENCOUNTER — VIRTUAL VISIT (OUTPATIENT)
Dept: INTERNAL MEDICINE | Facility: CLINIC | Age: 60
End: 2020-04-02

## 2020-04-02 ENCOUNTER — CARE COORDINATION (OUTPATIENT)
Dept: CARDIOLOGY | Facility: CLINIC | Age: 60
End: 2020-04-02

## 2020-04-02 DIAGNOSIS — I50.22 CHRONIC SYSTOLIC CONGESTIVE HEART FAILURE (H): ICD-10-CM

## 2020-04-02 DIAGNOSIS — I50.22 CHRONIC SYSTOLIC CONGESTIVE HEART FAILURE (H): Primary | ICD-10-CM

## 2020-04-02 PROCEDURE — 99441 ZZC PHYSICIAN TELEPHONE EVALUATION 5-10 MIN: CPT | Performed by: INTERNAL MEDICINE

## 2020-04-02 RX ORDER — ISOSORBIDE DINITRATE 30 MG/1
30 TABLET ORAL 3 TIMES DAILY
Qty: 270 TABLET | Refills: 3 | Status: ON HOLD | COMMUNITY
Start: 2020-04-02 | End: 2020-04-21

## 2020-04-02 NOTE — PROGRESS NOTES
I returned the patient's call to discuss GI upset which he attributes to isosorbide.  He has been taking iron TID for rather profound iron deficiency likely secondary to recent GI bleed and omeprazole.  He has no interim history of chest pain, tightness, heaviness, pressure, PND, orthopnea, palpitation, pre-syncope or syncope.  He is taking COVD precautions. His weight is stable on home scale in 212-213.    10 minute telephone time.    Plan:  1. Stop iron  2. Decrease Isordil to one per day  3. Do not restart omeprazole  4. Call immediately if recurrent chest pain  5. Will see if parenteral iron can be given (improves cardiac function)  6. Patient will call on Monday to report how his is doing.    Wt Readings from Last 24 Encounters:   03/13/20 99.6 kg (219 lb 9.6 oz)   02/19/20 95.7 kg (211 lb)   02/14/20 94.6 kg (208 lb 8.9 oz)   01/16/20 98.9 kg (218 lb)   12/30/19 99.7 kg (219 lb 11.2 oz)   12/23/19 98.6 kg (217 lb 4.8 oz)   12/20/19 97.3 kg (214 lb 6.4 oz)   12/18/19 101.3 kg (223 lb 6.4 oz)   12/12/19 101 kg (222 lb 11.2 oz)   12/05/19 99.8 kg (220 lb)   11/21/19 98.9 kg (218 lb)   11/17/19 95.6 kg (210 lb 11.2 oz)   11/07/19 103.9 kg (229 lb)   10/31/19 105.7 kg (233 lb)   10/07/19 107.3 kg (236 lb 9.6 oz)   09/16/19 106.1 kg (234 lb)   08/26/19 107 kg (236 lb)   07/24/19 107.7 kg (237 lb 6.4 oz)   07/22/19 108.2 kg (238 lb 8.6 oz)   07/15/19 108.2 kg (238 lb 9.6 oz)   07/06/19 104.8 kg (231 lb 0.7 oz)   06/27/19 106.3 kg (234 lb 6.4 oz)   04/23/19 109.6 kg (241 lb 11.2 oz)   04/08/19 110.6 kg (243 lb 12.8 oz)       Current Outpatient Medications   Medication     ACCU-CHEK GUIDE test strip     Alcohol Swabs PADS     aspirin (ASA) 81 MG chewable tablet     atorvastatin (LIPITOR) 80 MG tablet     blood glucose monitoring (ACCU-CHEK FASTCLIX) lancets     cholecalciferol (VITAMIN  -D) 1000 UNITS capsule     clopidogrel (PLAVIX) 75 MG tablet     ferrous gluconate (FERGON) 324 (38 Fe) MG tablet     furosemide  (LASIX) 20 MG tablet     furosemide (LASIX) 80 MG tablet     insulin pen needle (32G X 4 MM) 32G X 4 MM miscellaneous     isosorbide dinitrate (ISORDIL) 30 MG tablet     metolazone (ZAROXOLYN) 2.5 MG tablet     nitroGLYcerin (NITROSTAT) 0.4 MG sublingual tablet     pantoprazole (PROTONIX) 40 MG EC tablet     potassium chloride (KLOR-CON) 20 MEQ packet     potassium chloride ER (KLOR-CON M) 20 MEQ CR tablet     senna-docusate (SENOKOT-S/PERICOLACE) 8.6-50 MG tablet     Sharps Container MISC     vitamin  B complex with vitamin C (VITAMIN  B COMPLEX) TABS     No current facility-administered medications for this visit.

## 2020-04-02 NOTE — PROGRESS NOTES
Called and spoke with patient after virtual visit with provider. Patient is to stop taking Ferrous Gluconate, decrease Isordil to 30 MG three times daily, and to stop taking Protonix. Patient states that yesterday he had already decreased the Isordil and stopped the Ferrous Gluconate and already feels better today. He will send an update on Monday. All questions answered to patient's satisfaction.

## 2020-04-07 ENCOUNTER — TELEPHONE (OUTPATIENT)
Dept: ENDOCRINOLOGY | Facility: CLINIC | Age: 60
End: 2020-04-07

## 2020-04-07 NOTE — TELEPHONE ENCOUNTER
[facility] for pt to call and schedule 3 month follow up with Smiley or Meron (pt has seen both in the past.)

## 2020-04-10 ENCOUNTER — TELEPHONE (OUTPATIENT)
Dept: CARDIOLOGY | Facility: CLINIC | Age: 60
End: 2020-04-10

## 2020-04-10 NOTE — TELEPHONE ENCOUNTER
[facility] Call Center    Phone Message    May a detailed message be left on voicemail: yes     Reason for Call: Symptoms or Concerns     If patient has red-flag symptoms, warm transfer to triage line    Current symptom or concern: Constipation and general stomach issues from taking isosorbide dinitrate (ISORDIL) 30 MG tablet    Symptoms have been present for:  1 month(s)    Has patient previously been seen for this? Yes, spoke with Dr. Trujillo and Zach Phelan    Are there any new or worsening symptoms? Yes: Cole says his constipation is still happening from the isosorbide dinitrate (ISORDIL) 30 MG tablet. Cole says the lower dosage has helped some, but not much. Cole is concerned that he won't be able to handle much more of this since he is still dealing with the constipation. Please give Cole a call back at your earliest convenience.      Action Taken: Message routed to:  [facility]  Cardio    Travel Screening: Not Applicable

## 2020-04-13 ENCOUNTER — MYC MEDICAL ADVICE (OUTPATIENT)
Dept: CARDIOLOGY | Facility: CLINIC | Age: 60
End: 2020-04-13

## 2020-04-13 DIAGNOSIS — I50.22 CHRONIC SYSTOLIC CONGESTIVE HEART FAILURE (H): Primary | ICD-10-CM

## 2020-04-13 NOTE — TELEPHONE ENCOUNTER
Called and spoke with patient. Patient states that he can not tolerate the Isordil, he gets nauseated and feels poorly. Patient will not take Isordil for now and will update in a few days. Will notify provider for recommendations. Patient is agreeable to plan.

## 2020-04-13 NOTE — TELEPHONE ENCOUNTER
[facility] Call Center    Phone Message    May a detailed message be left on voicemail: yes     Reason for Call: Other: pt calling to speak with someone in regards to regard to his medication isosorbide dinitrate (ISORDIL) 30 MG tablet. He states it is making him sick and he want to know can he not take this.  Please call the pt back to discuss.    Action Taken: Message routed to:  [facility] cardiology    Travel Screening: Not Applicable

## 2020-04-14 ENCOUNTER — MYC MEDICAL ADVICE (OUTPATIENT)
Dept: CARDIOLOGY | Facility: CLINIC | Age: 60
End: 2020-04-14

## 2020-04-16 ENCOUNTER — MYC MEDICAL ADVICE (OUTPATIENT)
Dept: CARDIOLOGY | Facility: CLINIC | Age: 60
End: 2020-04-16

## 2020-04-16 RX ORDER — METOPROLOL SUCCINATE 25 MG/1
25 TABLET, EXTENDED RELEASE ORAL DAILY
Qty: 30 TABLET | Refills: 11 | Status: SHIPPED | OUTPATIENT
Start: 2020-04-16 | End: 2020-01-01

## 2020-04-16 NOTE — TELEPHONE ENCOUNTER
New orders received:  Date: 4/16/2020    Time of Call: 10:40 AM     Diagnosis:  Heart Failure     [ VORB ] Ordering provider: Dr. Trujillo  Order: Discontinue Isordil related to patient not tolerating it, c/o abdominal discomfort. Start Metoprolol XL 25 G daily.     Order received by: Zach VARGAS RN     Follow-up/additional notes: Patient was called and notified and is agreeable to plan. Patient states his abdominal discomfort is almost gone since stopping the Isordil. Offered colace per provider recommendation but patient refused.

## 2020-04-20 ENCOUNTER — PATIENT OUTREACH (OUTPATIENT)
Dept: CARDIOLOGY | Facility: CLINIC | Age: 60
End: 2020-04-20

## 2020-04-20 DIAGNOSIS — I50.22 CHRONIC SYSTOLIC CONGESTIVE HEART FAILURE (H): Primary | ICD-10-CM

## 2020-04-20 NOTE — PROGRESS NOTES
Impression  1. Ischemic cardiomyopathy  2. Severely decreased EF  3. No AICD per patient  4. Intolerance to isosorbide  5. Chronic renal failure  6. Iron defiiency  7. GI bleed  8. Diabetes    I conducted a virtual telephone visit with this patient's permission. The patient did not wish to have video visit,  The duration of the phone call was 25 minttes We discussed the patient's current condition, reviewed interim history since last visit, current functional status, diet, and possible cardiac symptoms including: chest pain, tightness, heaviness, pressure, PND, orthopnea, ankle edema, increasing abdominal girth, weight gain, palpitation, pre-syncope or syncope.  We also reviewed the patient's current medications and cross checked for allergies.    The patient relates a 9 pound weight gain with increasing abdominal girth and 1+ ankle edema. He has no PND orthopnea,palpitation, pre-syncope or syncope.  He relates GI distress to having taken Isosorbide, even though it has been discontinued for several days.  He is intolerant of ISDN and hydralazine. He further treatment of CHF with ACE or ARB is complicated by his renal insufficiency.  We have discussed defibrillator on a number of occasions and he has not been interested; despite risk of sudden death.  He has not been interested in an LVAD    Constitutional:+ weight loss but, fever, chills, night sweats  HEENT: without visual changes, swallow difficulties  Pulmonary: with shortness of breath,but no  cough, wheeze, hemoptysis  Cardiac: without chest pain, KING but has, PND, orthopnea, edema, palpitation, pre-syncope, syncope,  GI: without diarrhea, butno  constipation, jaundice, melena, GERD, hematemesis  : without frequency, urgency, dysuria, hematuria  Skin: rash, bruise, open lesions  Neuro: without TIA, focal neurologic complaints, seizure, trauma  Ortho: without pain, swelling, mobility impairment  Endocrine: diabetes, thyroid, heat/cold intolerance, polyuria,  polyphagia, change bowel habits.  Sleep:no CHICO, periodic breathing, fatigue    Current Outpatient Medications   Medication     ACCU-CHEK GUIDE test strip     Alcohol Swabs PADS     aspirin (ASA) 81 MG chewable tablet     atorvastatin (LIPITOR) 80 MG tablet     blood glucose monitoring (ACCU-CHEK FASTCLIX) lancets     cholecalciferol (VITAMIN  -D) 1000 UNITS capsule     clopidogrel (PLAVIX) 75 MG tablet     furosemide (LASIX) 20 MG tablet     furosemide (LASIX) 80 MG tablet     insulin pen needle (32G X 4 MM) 32G X 4 MM miscellaneous     isosorbide dinitrate (ISORDIL) 30 MG tablet     metolazone (ZAROXOLYN) 2.5 MG tablet     metoprolol succinate ER (TOPROL XL) 25 MG 24 hr tablet     nitroGLYcerin (NITROSTAT) 0.4 MG sublingual tablet     potassium chloride (KLOR-CON) 20 MEQ packet     potassium chloride ER (KLOR-CON M) 20 MEQ CR tablet     senna-docusate (SENOKOT-S/PERICOLACE) 8.6-50 MG tablet     Sharps Container MISC     vitamin  B complex with vitamin C (VITAMIN  B COMPLEX) TABS     No current facility-administered medications for this visit.        Wt Readings from Last 24 Encounters:   03/13/20 99.6 kg (219 lb 9.6 oz)   02/19/20 95.7 kg (211 lb)   02/14/20 94.6 kg (208 lb 8.9 oz)   01/16/20 98.9 kg (218 lb)   12/30/19 99.7 kg (219 lb 11.2 oz)   12/23/19 98.6 kg (217 lb 4.8 oz)   12/20/19 97.3 kg (214 lb 6.4 oz)   12/18/19 101.3 kg (223 lb 6.4 oz)   12/12/19 101 kg (222 lb 11.2 oz)   12/05/19 99.8 kg (220 lb)   11/21/19 98.9 kg (218 lb)   11/17/19 95.6 kg (210 lb 11.2 oz)   11/07/19 103.9 kg (229 lb)   10/31/19 105.7 kg (233 lb)   10/07/19 107.3 kg (236 lb 9.6 oz)   09/16/19 106.1 kg (234 lb)   08/26/19 107 kg (236 lb)   07/24/19 107.7 kg (237 lb 6.4 oz)   07/22/19 108.2 kg (238 lb 8.6 oz)   07/15/19 108.2 kg (238 lb 9.6 oz)   07/06/19 104.8 kg (231 lb 0.7 oz)   06/27/19 106.3 kg (234 lb 6.4 oz)   04/23/19 109.6 kg (241 lb 11.2 oz)   04/08/19 110.6 kg (243 lb 12.8 oz)         I spent 30 minutes reviewing chart,  coordinating care and preparing report    We reviewed and suggested:  1. Viral epidemic safety suggestions  2. Continued adherence to medical regimen, diet, and exercise program as previously described  3. Current scheduled visit deferred in light of current situation  4. Call immediately should your health status change, and we will make arrangements to see you immediately.                  Intolerance/allergy : hydralazine, isordil    Results for MERCY SHAFER (MRN [id number]) as of 4/20/2020 16:14   Ref. Range 2/26/2020 07:37 3/4/2020 07:38 3/13/2020 07:36 3/23/2020 08:11 3/26/2020 07:43   Sodium Latest Ref Range: 133 - 144 mmol/L 139 140 138 138 137   Potassium Latest Ref Range: 3.4 - 5.3 mmol/L 3.6 3.4 4.2 4.0 3.6   Chloride Latest Ref Range: 94 - 109 mmol/L 103 105 104 106 100   Carbon Dioxide Latest Ref Range: 20 - 32 mmol/L 28 27 25 28 33 (H)   Urea Nitrogen Latest Ref Range: 7 - 30 mg/dL 53 (H) 56 (H) 54 (H) 42 (H) 49 (H)   Creatinine Latest Ref Range: 0.66 - 1.25 mg/dL 2.65 (H) 2.87 (H) 2.88 (H) 2.69 (H) 3.14 (H)   GFR Estimate Latest Ref Range: >60 mL/min/1.73_m2 25 (L) 22 (L) 22 (L) 24 (L) 20 (L)   GFR Estimate If Black Latest Ref Range: >60 mL/min/1.73_m2 29 (L) 26 (L) 26 (L) 28 (L) 23 (L)   Calcium Latest Ref Range: 8.5 - 10.1 mg/dL 9.1 8.7 9.1 8.9 9.3   Anion Gap Latest Ref Range: 3 - 14 mmol/L 7 8 9 5 5   Magnesium Latest Ref Range: 1.6 - 2.3 mg/dL   2.8 (H)  2.6 (H)   Glucose Latest Ref Range: 70 - 99 mg/dL 127 (H) 147 (H) 145 (H) 152 (H) 177 (H)   WBC Latest Ref Range: 4.0 - 11.0 10e9/L   8.6  7.7   Hemoglobin Latest Ref Range: 13.3 - 17.7 g/dL   12.2 (L)  11.9 (L)   Hematocrit Latest Ref Range: 40.0 - 53.0 %   40.9  39.3 (L)   Platelet Count Latest Ref Range: 150 - 450 10e9/L   240  257   RBC Count Latest Ref Range: 4.4 - 5.9 10e12/L   4.58  4.54   MCV Latest Ref Range: 78 - 100 fl   89  87   MCH Latest Ref Range: 26.5 - 33.0 pg   26.6  26.2 (L)   MCHC Latest Ref Range: 31.5 - 36.5 g/dL   29.8  (L)  30.3 (L)   RDW Latest Ref Range: 10.0 - 15.0 %   21.7 (H)  21.2 (H)   INR Latest Ref Range: 0.86 - 1.14    1.27 (H)  1.27 (H)       Wt Readings from Last 24 Encounters:   03/13/20 99.6 kg (219 lb 9.6 oz)   02/19/20 95.7 kg (211 lb)   02/14/20 94.6 kg (208 lb 8.9 oz)   01/16/20 98.9 kg (218 lb)   12/30/19 99.7 kg (219 lb 11.2 oz)   12/23/19 98.6 kg (217 lb 4.8 oz)   12/20/19 97.3 kg (214 lb 6.4 oz)   12/18/19 101.3 kg (223 lb 6.4 oz)   12/12/19 101 kg (222 lb 11.2 oz)   12/05/19 99.8 kg (220 lb)   11/21/19 98.9 kg (218 lb)   11/17/19 95.6 kg (210 lb 11.2 oz)   11/07/19 103.9 kg (229 lb)   10/31/19 105.7 kg (233 lb)   10/07/19 107.3 kg (236 lb 9.6 oz)   09/16/19 106.1 kg (234 lb)   08/26/19 107 kg (236 lb)   07/24/19 107.7 kg (237 lb 6.4 oz)   07/22/19 108.2 kg (238 lb 8.6 oz)   07/15/19 108.2 kg (238 lb 9.6 oz)   07/06/19 104.8 kg (231 lb 0.7 oz)   06/27/19 106.3 kg (234 lb 6.4 oz)   04/23/19 109.6 kg (241 lb 11.2 oz)   04/08/19 110.6 kg (243 lb 12.8 oz)       Current Outpatient Medications   Medication     ACCU-CHEK GUIDE test strip     Alcohol Swabs PADS     aspirin (ASA) 81 MG chewable tablet     atorvastatin (LIPITOR) 80 MG tablet     blood glucose monitoring (ACCU-CHEK FASTCLIX) lancets     cholecalciferol (VITAMIN  -D) 1000 UNITS capsule     clopidogrel (PLAVIX) 75 MG tablet     furosemide (LASIX) 20 MG tablet     furosemide (LASIX) 80 MG tablet     insulin pen needle (32G X 4 MM) 32G X 4 MM miscellaneous     isosorbide dinitrate (ISORDIL) 30 MG tablet     metolazone (ZAROXOLYN) 2.5 MG tablet     metoprolol succinate ER (TOPROL XL) 25 MG 24 hr tablet     nitroGLYcerin (NITROSTAT) 0.4 MG sublingual tablet     potassium chloride (KLOR-CON) 20 MEQ packet     potassium chloride ER (KLOR-CON M) 20 MEQ CR tablet     senna-docusate (SENOKOT-S/PERICOLACE) 8.6-50 MG tablet     Sharps Container MISC     vitamin  B complex with vitamin C (VITAMIN  B COMPLEX) TABS     No current facility-administered medications for  this visit.        Name: MERCY SHAFER  MRN: [id number]  : 1960  Study Date: 02/10/2020 04:52 PM  Age: 59 yrs  Gender: Male  Patient [facility]  Reason For Study: Heart Failure  Ordering Physician: ELENI PALACIO  Referring Physician: HARVEY GUNDERSON  Performed By: [facility] Pe[facility]     BSA: 2.1 m2  Height: 68 in  Weight: 218 lb  HR: 98  BP: 93/47 mmHg  _____________________________________________________________________________  __        Procedure  Complete Portable Echo Adult. Contrast Optison. Optison (NDC #0294-3058-54)  given intravenously. Patient was given 5ml mixture of 1.5ml Definity and 8.5ml  saline. 4 ml wasted.  _____________________________________________________________________________  __        Interpretation Summary  Severely (EF 29%) reduced left ventricular function is present. Severe diffuse  hypokinesis is present.  Global right ventricular function is moderately reduced.  Moderate mitral insufficiency is present.  Mild to moderate tricuspid insufficiency is present.  Pulmonary hypertension present. Estimated pulmonary artery systolic pressure  is 69 (54+15) mmHg .  Dilation of the inferior vena cava is present with abnormal respiratory  variation in diameter.  Small posterior pericardial effusion without echocardiographic evidence of  tamponade.  _____________________________________________________________________________  __        Left Ventricle  Severe left ventricular dilation is present. Severely (EF 20-30%) reduced left  ventricular function is present. Left ventricular diastolic function is not  assessable. Severe diffuse hypokinesis is present.     Right Ventricle  Global right ventricular function is moderately reduced. A pacemaker lead is  noted in the right ventricle.     Mitral Valve  Moderate mitral insufficiency is present.     Aortic Valve  Aortic valve is normal in structure and function.        Tricuspid Valve  Mild to moderate tricuspid  insufficiency is present. Estimated pulmonary  artery systolic pressure is 54 mmHg plus right atrial pressure.     Vessels  Dilation of the inferior vena cava is present with abnormal respiratory  variation in diameter. IVC diameter >2.1 cm collapsing <50% with sniff  suggests a high RA pressure estimated at 15 mmHg or greater.     Pericardium  Small posterior pericardial effusion without echocardiographic evidence of  tamponade.     Compared to Previous Study  This study was compared with the study from 19 . There has been no  change.     _____________________________________________________________________________  __  MMode/2D Measurements & Calculations     EF(MOD-bp): 28.7 %  TAPSE: 1.1 cm        Doppler Measurements & Calculations  TV max P.6 mmHg  TR max shavonne: 366.1 cm/sec  TR max P.6 mmHg    We discussed his current condition and the limited number of opportunities to expand his medical therapy.  I suggested that he be admitted :    1. Parenteral diuretics  2. Parenteral iron replacement  3. Inotropic bridging with dobutamine  4. Consider off label use of oral PDE III   5. Discuss transplantation - renal and cardiac  6. Diabetes management

## 2020-04-20 NOTE — PROGRESS NOTES
"Called and spoke with patient. Patient states that he has gained about 9 lbs over the last 2 weeks. He states that his edema in LE's is unchanged, no increase in shortness of breath, states his fluid in in his \"belly\". Of note, patient had a recent adverse reaction to Isordil, states he got a rash on his back and felt nauseated and constipated. Since stopping the Isordil, his denies rash or nausea or constipation. The isordil was switched to Metorprolol XL 25 MG daily. Patient would like to discuss with provider his weight gain and possible use of Metolazone. Patient is agreeable to a Telephone Visit tomorrow. Message sent to provider and schedulers.  "

## 2020-04-21 ENCOUNTER — APPOINTMENT (OUTPATIENT)
Dept: GENERAL RADIOLOGY | Facility: CLINIC | Age: 60
End: 2020-04-21
Attending: STUDENT IN AN ORGANIZED HEALTH CARE EDUCATION/TRAINING PROGRAM
Payer: MEDICAID

## 2020-04-21 ENCOUNTER — VIRTUAL VISIT (OUTPATIENT)
Dept: CARDIOLOGY | Facility: CLINIC | Age: 60
End: 2020-04-21
Attending: INTERNAL MEDICINE
Payer: MEDICAID

## 2020-04-21 ENCOUNTER — HOSPITAL ENCOUNTER (INPATIENT)
Facility: CLINIC | Age: 60
LOS: 5 days | Discharge: HOME | End: 2020-04-26
Attending: INTERNAL MEDICINE | Admitting: INTERNAL MEDICINE
Payer: MEDICAID

## 2020-04-21 DIAGNOSIS — N18.4 ANEMIA IN STAGE 4 CHRONIC KIDNEY DISEASE (H): ICD-10-CM

## 2020-04-21 DIAGNOSIS — D63.1 ANEMIA IN STAGE 4 CHRONIC KIDNEY DISEASE (H): ICD-10-CM

## 2020-04-21 DIAGNOSIS — I25.10 CORONARY ARTERY DISEASE INVOLVING NATIVE CORONARY ARTERY OF NATIVE HEART WITHOUT ANGINA PECTORIS: ICD-10-CM

## 2020-04-21 DIAGNOSIS — E11.9 TYPE 2 DIABETES, HBA1C GOAL < 8% (H): ICD-10-CM

## 2020-04-21 DIAGNOSIS — E61.1 IRON DEFICIENCY: ICD-10-CM

## 2020-04-21 DIAGNOSIS — I27.21 SEVERE PULMONARY ARTERIAL SYSTOLIC HYPERTENSION (H): ICD-10-CM

## 2020-04-21 DIAGNOSIS — I50.22 CHRONIC SYSTOLIC CONGESTIVE HEART FAILURE (H): Primary | ICD-10-CM

## 2020-04-21 DIAGNOSIS — I50.23 ACUTE ON CHRONIC SYSTOLIC HEART FAILURE (H): Primary | ICD-10-CM

## 2020-04-21 DIAGNOSIS — N18.4 CKD (CHRONIC KIDNEY DISEASE) STAGE 4, GFR 15-29 ML/MIN (H): ICD-10-CM

## 2020-04-21 PROBLEM — I50.9 HEART FAILURE WITH ACUTE DECOMPENSATION, TYPE UNKNOWN (H): Status: ACTIVE | Noted: 2020-04-21

## 2020-04-21 LAB
ALBUMIN SERPL-MCNC: 3.4 G/DL (ref 3.4–5)
ALP SERPL-CCNC: 170 U/L (ref 40–150)
ALT SERPL W P-5'-P-CCNC: 31 U/L (ref 0–70)
ANION GAP SERPL CALCULATED.3IONS-SCNC: 10 MMOL/L (ref 3–14)
ANION GAP SERPL CALCULATED.3IONS-SCNC: 6 MMOL/L (ref 3–14)
AST SERPL W P-5'-P-CCNC: 26 U/L (ref 0–45)
BILIRUB DIRECT SERPL-MCNC: 1.2 MG/DL (ref 0–0.2)
BILIRUB SERPL-MCNC: 3.8 MG/DL (ref 0.2–1.3)
BUN SERPL-MCNC: 44 MG/DL (ref 7–30)
BUN SERPL-MCNC: 46 MG/DL (ref 7–30)
CALCIUM SERPL-MCNC: 9.1 MG/DL (ref 8.5–10.1)
CALCIUM SERPL-MCNC: 9.4 MG/DL (ref 8.5–10.1)
CHLORIDE SERPL-SCNC: 100 MMOL/L (ref 94–109)
CHLORIDE SERPL-SCNC: 99 MMOL/L (ref 94–109)
CO2 SERPL-SCNC: 26 MMOL/L (ref 20–32)
CO2 SERPL-SCNC: 30 MMOL/L (ref 20–32)
CREAT SERPL-MCNC: 2.51 MG/DL (ref 0.66–1.25)
CREAT SERPL-MCNC: 2.63 MG/DL (ref 0.66–1.25)
ERYTHROCYTE [DISTWIDTH] IN BLOOD BY AUTOMATED COUNT: 22.7 % (ref 10–15)
FERRITIN SERPL-MCNC: 27 NG/ML (ref 26–388)
GFR SERPL CREATININE-BSD FRML MDRD: 25 ML/MIN/{1.73_M2}
GFR SERPL CREATININE-BSD FRML MDRD: 26 ML/MIN/{1.73_M2}
GLUCOSE BLDC GLUCOMTR-MCNC: 146 MG/DL (ref 70–99)
GLUCOSE BLDC GLUCOMTR-MCNC: 149 MG/DL (ref 70–99)
GLUCOSE BLDC GLUCOMTR-MCNC: 166 MG/DL (ref 70–99)
GLUCOSE SERPL-MCNC: 149 MG/DL (ref 70–99)
GLUCOSE SERPL-MCNC: 181 MG/DL (ref 70–99)
HCT VFR BLD AUTO: 41.5 % (ref 40–53)
HGB BLD-MCNC: 12.5 G/DL (ref 13.3–17.7)
INR PPP: 1.49 (ref 0.86–1.14)
IRON SATN MFR SERPL: 9 % (ref 15–46)
IRON SERPL-MCNC: 37 UG/DL (ref 35–180)
MAGNESIUM SERPL-MCNC: 2.6 MG/DL (ref 1.6–2.3)
MAGNESIUM SERPL-MCNC: 2.7 MG/DL (ref 1.6–2.3)
MCH RBC QN AUTO: 26.4 PG (ref 26.5–33)
MCHC RBC AUTO-ENTMCNC: 30.1 G/DL (ref 31.5–36.5)
MCV RBC AUTO: 88 FL (ref 78–100)
NT-PROBNP SERPL-MCNC: ABNORMAL PG/ML (ref 0–900)
PLATELET # BLD AUTO: 239 10E9/L (ref 150–450)
POTASSIUM SERPL-SCNC: 3.1 MMOL/L (ref 3.4–5.3)
POTASSIUM SERPL-SCNC: 3.7 MMOL/L (ref 3.4–5.3)
PROT SERPL-MCNC: 7.7 G/DL (ref 6.8–8.8)
RBC # BLD AUTO: 4.74 10E12/L (ref 4.4–5.9)
SODIUM SERPL-SCNC: 135 MMOL/L (ref 133–144)
SODIUM SERPL-SCNC: 136 MMOL/L (ref 133–144)
TIBC SERPL-MCNC: 396 UG/DL (ref 240–430)
TRANSFERRIN SERPL-MCNC: 306 MG/DL (ref 210–360)
WBC # BLD AUTO: 7 10E9/L (ref 4–11)

## 2020-04-21 PROCEDURE — 83550 IRON BINDING TEST: CPT | Performed by: INTERNAL MEDICINE

## 2020-04-21 PROCEDURE — 83735 ASSAY OF MAGNESIUM: CPT | Performed by: INTERNAL MEDICINE

## 2020-04-21 PROCEDURE — 71046 X-RAY EXAM CHEST 2 VIEWS: CPT

## 2020-04-21 PROCEDURE — 85027 COMPLETE CBC AUTOMATED: CPT | Performed by: INTERNAL MEDICINE

## 2020-04-21 PROCEDURE — 93010 ELECTROCARDIOGRAM REPORT: CPT | Performed by: INTERNAL MEDICINE

## 2020-04-21 PROCEDURE — 25000128 H RX IP 250 OP 636: Performed by: STUDENT IN AN ORGANIZED HEALTH CARE EDUCATION/TRAINING PROGRAM

## 2020-04-21 PROCEDURE — 83540 ASSAY OF IRON: CPT | Performed by: INTERNAL MEDICINE

## 2020-04-21 PROCEDURE — 25000132 ZZH RX MED GY IP 250 OP 250 PS 637: Performed by: STUDENT IN AN ORGANIZED HEALTH CARE EDUCATION/TRAINING PROGRAM

## 2020-04-21 PROCEDURE — 40000141 ZZH STATISTIC PERIPHERAL IV START W/O US GUIDANCE

## 2020-04-21 PROCEDURE — 80048 BASIC METABOLIC PNL TOTAL CA: CPT | Performed by: INTERNAL MEDICINE

## 2020-04-21 PROCEDURE — 99443 ZZC PHYSICIAN TELEPHONE EVALUATION 21-30 MIN: CPT | Performed by: INTERNAL MEDICINE

## 2020-04-21 PROCEDURE — 82728 ASSAY OF FERRITIN: CPT | Performed by: INTERNAL MEDICINE

## 2020-04-21 PROCEDURE — 99223 1ST HOSP IP/OBS HIGH 75: CPT | Mod: AI | Performed by: INTERNAL MEDICINE

## 2020-04-21 PROCEDURE — 21400000 ZZH R&B CCU UMMC

## 2020-04-21 PROCEDURE — 84466 ASSAY OF TRANSFERRIN: CPT | Performed by: INTERNAL MEDICINE

## 2020-04-21 PROCEDURE — 00000146 ZZHCL STATISTIC GLUCOSE BY METER IP

## 2020-04-21 PROCEDURE — 83880 ASSAY OF NATRIURETIC PEPTIDE: CPT | Performed by: INTERNAL MEDICINE

## 2020-04-21 PROCEDURE — 80076 HEPATIC FUNCTION PANEL: CPT | Performed by: INTERNAL MEDICINE

## 2020-04-21 PROCEDURE — 85610 PROTHROMBIN TIME: CPT | Performed by: INTERNAL MEDICINE

## 2020-04-21 PROCEDURE — 36415 COLL VENOUS BLD VENIPUNCTURE: CPT | Performed by: INTERNAL MEDICINE

## 2020-04-21 PROCEDURE — 40000802 ZZH SITE CHECK

## 2020-04-21 RX ORDER — VITAMIN B COMPLEX
25 TABLET ORAL 2 TIMES DAILY
Status: DISCONTINUED | OUTPATIENT
Start: 2020-04-21 | End: 2020-01-01 | Stop reason: HOSPADM

## 2020-04-21 RX ORDER — AMOXICILLIN 250 MG
1 CAPSULE ORAL 2 TIMES DAILY PRN
Status: DISCONTINUED | OUTPATIENT
Start: 2020-04-21 | End: 2020-01-01 | Stop reason: HOSPADM

## 2020-04-21 RX ORDER — AMOXICILLIN 250 MG
1 CAPSULE ORAL 2 TIMES DAILY PRN
COMMUNITY

## 2020-04-21 RX ORDER — POTASSIUM CHLORIDE 1.5 G/1.58G
40 POWDER, FOR SOLUTION ORAL 2 TIMES DAILY
Status: DISCONTINUED | OUTPATIENT
Start: 2020-04-21 | End: 2020-01-01

## 2020-04-21 RX ORDER — FUROSEMIDE 10 MG/ML
80 INJECTION INTRAMUSCULAR; INTRAVENOUS
Status: DISCONTINUED | OUTPATIENT
Start: 2020-04-21 | End: 2020-01-01

## 2020-04-21 RX ORDER — FAMOTIDINE 20 MG
1 TABLET ORAL 2 TIMES DAILY
COMMUNITY

## 2020-04-21 RX ORDER — ASPIRIN 81 MG/1
81 TABLET, CHEWABLE ORAL DAILY
Status: DISCONTINUED | OUTPATIENT
Start: 2020-01-01 | End: 2020-01-01 | Stop reason: HOSPADM

## 2020-04-21 RX ORDER — NICOTINE POLACRILEX 4 MG
15-30 LOZENGE BUCCAL
Status: DISCONTINUED | OUTPATIENT
Start: 2020-04-21 | End: 2020-01-01 | Stop reason: HOSPADM

## 2020-04-21 RX ORDER — LIDOCAINE 40 MG/G
CREAM TOPICAL
Status: DISCONTINUED | OUTPATIENT
Start: 2020-04-21 | End: 2020-01-01 | Stop reason: HOSPADM

## 2020-04-21 RX ORDER — DEXTROSE MONOHYDRATE 25 G/50ML
25-50 INJECTION, SOLUTION INTRAVENOUS
Status: DISCONTINUED | OUTPATIENT
Start: 2020-04-21 | End: 2020-01-01 | Stop reason: HOSPADM

## 2020-04-21 RX ORDER — AMOXICILLIN 250 MG
2 CAPSULE ORAL 2 TIMES DAILY PRN
Status: DISCONTINUED | OUTPATIENT
Start: 2020-04-21 | End: 2020-01-01 | Stop reason: HOSPADM

## 2020-04-21 RX ORDER — FUROSEMIDE 10 MG/ML
80 INJECTION INTRAMUSCULAR; INTRAVENOUS ONCE
Status: COMPLETED | OUTPATIENT
Start: 2020-04-21 | End: 2020-04-21

## 2020-04-21 RX ORDER — ALUMINA, MAGNESIA, AND SIMETHICONE 2400; 2400; 240 MG/30ML; MG/30ML; MG/30ML
30 SUSPENSION ORAL EVERY 4 HOURS PRN
Status: DISCONTINUED | OUTPATIENT
Start: 2020-04-21 | End: 2020-01-01 | Stop reason: HOSPADM

## 2020-04-21 RX ORDER — POTASSIUM CL/LIDO/0.9 % NACL 10MEQ/0.1L
10 INTRAVENOUS SOLUTION, PIGGYBACK (ML) INTRAVENOUS
Status: DISCONTINUED | OUTPATIENT
Start: 2020-04-21 | End: 2020-01-01 | Stop reason: HOSPADM

## 2020-04-21 RX ORDER — METOPROLOL SUCCINATE 25 MG/1
25 TABLET, EXTENDED RELEASE ORAL DAILY
Status: DISCONTINUED | OUTPATIENT
Start: 2020-01-01 | End: 2020-01-01 | Stop reason: HOSPADM

## 2020-04-21 RX ORDER — MAGNESIUM SULFATE HEPTAHYDRATE 40 MG/ML
2 INJECTION, SOLUTION INTRAVENOUS DAILY PRN
Status: DISCONTINUED | OUTPATIENT
Start: 2020-04-21 | End: 2020-01-01 | Stop reason: HOSPADM

## 2020-04-21 RX ORDER — ATORVASTATIN CALCIUM 80 MG/1
80 TABLET, FILM COATED ORAL DAILY
Status: DISCONTINUED | OUTPATIENT
Start: 2020-04-21 | End: 2020-01-01 | Stop reason: HOSPADM

## 2020-04-21 RX ORDER — POTASSIUM CHLORIDE 1.5 G/1.58G
20-40 POWDER, FOR SOLUTION ORAL
Status: DISCONTINUED | OUTPATIENT
Start: 2020-04-21 | End: 2020-01-01 | Stop reason: HOSPADM

## 2020-04-21 RX ORDER — POTASSIUM CHLORIDE 29.8 MG/ML
20 INJECTION INTRAVENOUS
Status: DISCONTINUED | OUTPATIENT
Start: 2020-04-21 | End: 2020-01-01 | Stop reason: HOSPADM

## 2020-04-21 RX ORDER — POTASSIUM CHLORIDE 7.45 MG/ML
10 INJECTION INTRAVENOUS
Status: DISCONTINUED | OUTPATIENT
Start: 2020-04-21 | End: 2020-01-01 | Stop reason: HOSPADM

## 2020-04-21 RX ORDER — POTASSIUM CHLORIDE 750 MG/1
20-40 TABLET, EXTENDED RELEASE ORAL
Status: DISCONTINUED | OUTPATIENT
Start: 2020-04-21 | End: 2020-01-01 | Stop reason: HOSPADM

## 2020-04-21 RX ORDER — MAGNESIUM SULFATE HEPTAHYDRATE 40 MG/ML
4 INJECTION, SOLUTION INTRAVENOUS EVERY 4 HOURS PRN
Status: DISCONTINUED | OUTPATIENT
Start: 2020-04-21 | End: 2020-01-01 | Stop reason: HOSPADM

## 2020-04-21 RX ADMIN — FUROSEMIDE 80 MG: 10 INJECTION, SOLUTION INTRAMUSCULAR; INTRAVENOUS at 13:50

## 2020-04-21 RX ADMIN — FUROSEMIDE 80 MG: 10 INJECTION, SOLUTION INTRAMUSCULAR; INTRAVENOUS at 17:34

## 2020-04-21 RX ADMIN — FUROSEMIDE 80 MG: 10 INJECTION, SOLUTION INTRAMUSCULAR; INTRAVENOUS at 21:52

## 2020-04-21 RX ADMIN — POTASSIUM CHLORIDE 40 MEQ: 750 TABLET, EXTENDED RELEASE ORAL at 12:43

## 2020-04-21 RX ADMIN — POTASSIUM CHLORIDE 40 MEQ: 1.5 POWDER, FOR SOLUTION ORAL at 20:21

## 2020-04-21 RX ADMIN — ATORVASTATIN CALCIUM 80 MG: 80 TABLET, FILM COATED ORAL at 20:21

## 2020-04-21 RX ADMIN — POTASSIUM CHLORIDE 20 MEQ: 750 TABLET, EXTENDED RELEASE ORAL at 14:42

## 2020-04-21 RX ADMIN — POTASSIUM CHLORIDE 20 MEQ: 1.5 POWDER, FOR SOLUTION ORAL at 17:34

## 2020-04-21 RX ADMIN — MELATONIN 25 MCG: at 20:21

## 2020-04-21 ASSESSMENT — ACTIVITIES OF DAILY LIVING (ADL)
TOILETING: 0-->INDEPENDENT
RETIRED_EATING: 0-->INDEPENDENT
BATHING: 0-->INDEPENDENT
SWALLOWING: 0-->SWALLOWS FOODS/LIQUIDS WITHOUT DIFFICULTY
RETIRED_COMMUNICATION: 0-->UNDERSTANDS/COMMUNICATES WITHOUT DIFFICULTY
COGNITION: 0 - NO COGNITION ISSUES REPORTED
ADLS_ACUITY_SCORE: 13
ADLS_ACUITY_SCORE: 13
FALL_HISTORY_WITHIN_LAST_SIX_MONTHS: NO
ADLS_ACUITY_SCORE: 13
TRANSFERRING: 0-->INDEPENDENT
AMBULATION: 0-->INDEPENDENT
DRESS: 0-->INDEPENDENT

## 2020-04-21 ASSESSMENT — MIFFLIN-ST. JEOR: SCORE: 1766.52

## 2020-04-21 NOTE — H&P
[facility]   Cardiology II Service / Advanced Heart Failure  History & Physical    Cole Jesus  : 1960  MRN # [id number]    ADMIT DATE: 2020    PCP: Silas Enciso MD    CHIEF COMPLAINT:  increased weight gain and abdominal girth    HPI: Cole Jessu is a 60 year old male with a history of HFrEF due to ICM, severe PH, moderate MR, CAD, STEMI s/p DESx3 (), STEMI s/p PCI proximal RCA (2019), CKD IV, paroxysmal Afib not on AC, anemia with hx of GIB due to AVMs who presents with increased weight gain and abdominal girth.    Pt endorses progressive weight gain of approximately 10 lbs from his dry weight of approximately 210 lbs. He has noticed increased LE edema and abdomina girth during this time. He denies CP, SOB, KING, PND but at times as orthopnea (not recently). He takes 100 mg furosemide BID which he has been adherent to. He also took one PRN dose of metolazone 2.5 mg one day PTA at the direction of his doctor and has urinated '3 lbs'.     He was recently started on isosorbide dinitrate approximately two months ago which he self discontinued approximately 6 days PTA for multiple symptoms: constipation, decreased urination, low back itchy rash, dizziness and higher than normal blood pressure. All symptoms have resolved since stopping the medication. Additional ROS is notable for fatigue, generalized weakness, nausea without vomiting that he attributes to this new medication. He also attributes his weight gain to this new medication. He denies fevers/chills, cough, recent travel. His girlfriend was recently sick but is now better.    ROS:   12-pt ROS otherwise negative except as noted above    PMH:  Past Medical History:   Diagnosis Date     Anemia in chronic renal disease 3/9/2015     Antiplatelet or antithrombotic long-term use      Atrial fibrillation and flutter      CAD (coronary artery disease)     Stemi in , s/p angioplasty     CRD (chronic renal  disease), stage IV (H) 03/09/2015    hx ATN with dialysis complicating cardiogenic shock  1/2012     GI bleed     massive lower GI bleed secondary to a cecal ulcer, s/p ileocecal resection in 12/11     Heart failure     Biventricular systolic HF, complicated by ARDS requiring tracheostomy     Hyperlipidemia LDL goal <100 4/28/2013     Hypertension      Ischemic cardiomyopathy 4/28/2013    TTE revealing 40% EF     Myocardial infarction (H)      Other and unspecified nonspecific immunological findings     Anti JKa     Pulmonary edema     episodes of flash pulmonary edema in 12/11     Subclinical hypothyroidism        PSH:  Past Surgical History:   Procedure Laterality Date     CV RIGHT HEART CATH N/A 11/12/2019    Procedure: CV RIGHT HEART CATH;  Surgeon: Fox Gerard MD;  Location:  HEART CARDIAC CATH LAB     CV RIGHT HEART CATH N/A 2/10/2020    Procedure: CV RIGHT HEART CATH;  Surgeon: Fox Gerard MD;  Location:  HEART CARDIAC CATH LAB     ESOPHAGOSCOPY, GASTROSCOPY, DUODENOSCOPY (EGD), COMBINED  12/25/2011    Procedure:COMBINED ESOPHAGOSCOPY, GASTROSCOPY, DUODENOSCOPY (EGD); Surgeon:SAMARIA PUENTE; Location:[facility]     LAPAROTOMY EXPLORATORY  12/31/2011    Procedure:LAPAROTOMY EXPLORATORY; Explore laparotomy, Illeocectomy, Diverting Illeostomy; Surgeon:GIA NAJERA; Location:[facility] OR     ORIF right elbow fracture  age 12     TAKEDOWN ILEOSTOMY  6/5/2014    Procedure: TAKEDOWN ILEOSTOMY;  Surgeon: Gia Najera MD;  Location: [facility]     TRACHEOSTOMY  12/22/2011    Procedure:TRACHEOSTOMY; Tracheostomy; 80XLTCP-Proximal Extension-Cuffed 8.0 mm I.D.; Surgeon:LIZABETH DYE; Location:[facility]       MEDICATIONS:  Prior to Admission Medications   Prescriptions Last Dose Informant Patient Reported? Taking?   ACCU-CHEK GUIDE test strip  Self No No   Sig: USE TO TEST BLOOD SUGAR FOUR TIMES A DAY BEFORE MEALS AND AT BEDTIME   Alcohol Swabs PADS  Self No  No   Si applicator 4 times daily (before meals and nightly)   Sharps Container MISC  Self No No   Si Device every 30 days   Vitamin D, Cholecalciferol, 25 MCG (1000 UT) CAPS 2020 at 0800  Yes Yes   Sig: Take 1 capsule by mouth 2 times daily   aspirin (ASA) 81 MG chewable tablet 2020 at 0800 Self Yes Yes   Sig: Take 81 mg by mouth daily   atorvastatin (LIPITOR) 80 MG tablet 2020 at 2000 Self No Yes   Sig: Take 1 tablet (80 mg) by mouth daily   blood glucose monitoring (ACCU-CHEK FASTCLIX) lancets  Self No No   Sig: USE TO TEST BLOOD SUGAR FOUR TIMES A DAY AT MEALS AND AT BEDTIME   furosemide (LASIX) 20 MG tablet 2020 at 0800  No Yes   Sig: In combination with the 80 mg to make total of 100 mg by mouth twice daily   furosemide (LASIX) 80 MG tablet 2020 at 0800  No Yes   Sig: In combination with the 20 mg to make total of 100 mg by mouth twice daily   insulin pen needle (32G X 4 MM) 32G X 4 MM miscellaneous  Self No No   Sig: Use 5 pen needles daily or as directed.   metolazone (ZAROXOLYN) 2.5 MG tablet 2020 at 2000  No Yes   Sig: One tablet only when directed by your doctor   metoprolol succinate ER (TOPROL XL) 25 MG 24 hr tablet 2020 at 0800  No Yes   Sig: Take 1 tablet (25 mg) by mouth daily   nitroGLYcerin (NITROSTAT) 0.4 MG sublingual tablet  Self No No   Sig: Place 1 tablet (0.4 mg) under the tongue every 5 minutes as needed for chest pain For chest pain place 1 tablet under the tongue every 5 minutes for 3 doses. If symptoms persist 5 minutes after 1st dose call 911.   potassium chloride (KLOR-CON) 20 MEQ packet Past Week at Unknown time  No Yes   Sig: Potassium 20MEQ one daily   senna-docusate (SENOKOT-S/PERICOLACE) 8.6-50 MG tablet Past Week at Unknown time  Yes Yes   Sig: Take 1 tablet by mouth 2 times daily as needed for constipation   vitamin  B complex with vitamin C (VITAMIN  B COMPLEX) TABS 2020 at 0800 Self Yes Yes   Sig: Take 1 tablet by mouth daily       Facility-Administered Medications: None        ALLERGIES:     Allergies   Allergen Reactions     Hydralazine      Black spots, verified allergic reaction by skin biopsy     Simvastatin Other (See Comments)     Leg muscle weakness     Tylenol [Acetaminophen] Palpitations       FAMILY HISTORY:  Family History   Problem Relation Age of Onset     Diabetes Mother      Hypertension Mother      Heart Disease Mother      Heart Disease Father          of heart attack, left when he was young     Diabetes Sister      Diabetes Sister      Diabetes Sister      Glaucoma No family hx of      Macular Degeneration No family hx of        SOCIAL HISTORY:  Social History     Socioeconomic History     Marital status: Significant other     Spouse name: Not on file     Number of children: Not on file     Years of education: Not on file     Highest education level: Not on file   Occupational History     Not on file   Social Needs     Financial resource strain: Not on file     Food insecurity     Worry: Not on file     Inability: Not on file     Transportation needs     Medical: Not on file     Non-medical: Not on file   Tobacco Use     Smoking status: Former Smoker     Packs/day: 2.00     Years: 40.00     Pack years: 80.00     Types: Cigarettes     Last attempt to quit: 12/3/2011     Years since quittin.3     Smokeless tobacco: Never Used   Substance and Sexual Activity     Alcohol use: No     Alcohol/week: 0.0 standard drinks     Drug use: No     Sexual activity: Yes     Partners: Female   Lifestyle     Physical activity     Days per week: Not on file     Minutes per session: Not on file     Stress: Not on file   Relationships     Social connections     Talks on phone: Not on file     Gets together: Not on file     Attends [employer] service: Not on file     Active member of club or organization: Not on file     Attends meetings of clubs or organizations: Not on file     Relationship status: Not on file     Intimate partner  "violence     Fear of current or ex partner: Not on file     Emotionally abused: Not on file     Physically abused: Not on file     Forced sexual activity: Not on file   Other Topics Concern     Parent/sibling w/ CABG, MI or angioplasty before 65F 55M? Yes      Service Not Asked     Blood Transfusions Not Asked     Caffeine Concern Not Asked     Occupational Exposure Not Asked     Hobby Hazards Not Asked     Sleep Concern Not Asked     Stress Concern Not Asked     Weight Concern Not Asked     Special Diet Not Asked     Back Care Not Asked     Exercise Yes     Comment: limited walking     Bike Helmet Not Asked     Seat Belt Not Asked     Self-Exams Not Asked   Social History Narrative     Not on file       PHYSICAL EXAM:  Blood pressure 109/80, temperature 97.5  F (36.4  C), temperature source Oral, resp. rate 16, height 1.727 m (5' 8\"), weight 99.2 kg (218 lb 11.2 oz), SpO2 99 %.    Constitutional: awake, alert, cooperative, no apparent distress  HENT: extra ocular muscles grossly intact, sclera clear  Respiratory: No increased work of breathing, clear to auscultation bilaterally, no crackles or wheezing, no cough  Cardiovascular: irregularly irregular, systolic murmur, 2+ BLE to knees, +JVD  GI: soft, non-distended, non-tender  Skin: warm, dry, no rashes or lesions  Neurologic: Awake, alert, oriented to name, place and time.  Cranial nerves II-XII are grossly intact.     LABS:  BMP  Recent Labs   Lab 04/21/20  1053      POTASSIUM 3.1*   CHLORIDE 99   CO2 30   BUN 44*   CR 2.51*   *   RONNY 9.1     CBC  Recent Labs   Lab 04/21/20  1053   WBC 7.0   HGB 12.5*   HCT 41.5   MCV 88        INR  Recent Labs   Lab 04/21/20  1053   INR 1.49*     LFTs  Recent Labs   Lab 04/21/20  1053   ALKPHOS 170*   AST 26   ALT 31   BILITOTAL 3.8*   PROTTOTAL 7.7   ALBUMIN 3.4      INFNo lab results found in last 7 days.    IMAGING:    Results for orders placed or performed during the hospital encounter of 02/10/20 "   XR Chest Port 1 View    Narrative    EXAMINATION: XR CHEST PORT 1 VW, 2/10/2020 3:09 PM    INDICATION: post swan placement    COMPARISON: Chest radiograph 12/19/2019    FINDINGS: Single AP upright radiograph the chest.     Trachea is midline. Cardiac silhouette is enlarged. The mediastinal  borders are clear. Right IJ [place]-Messi catheter with tip projecting  over the distal right main pulmonary artery. Chronic blunting of the  costophrenic angles. Bilateral bibasilar streaky opacities. Mild  perihilar interstitial opacities. Small bilateral pleural effusions.  No acute osseous abnormality. Soft tissues are grossly unremarkable.      Impression    IMPRESSION:  1. Cardiomegaly with mild interstitial pulmonary edema.  2. Bilateral pleural effusions.  3. Right IJ [place]-Messi catheter with tip projecting at the distal right  main pulmonary artery.    I have personally reviewed the examination and initial interpretation  and I agree with the findings.    AYANA MENENDEZ DO   XR Chest Port 1 View    Narrative    XR CHEST PORT 1 VW  2/11/2020 1:59 AM      HISTORY: SG catheter monitoring    COMPARISON: 2/10/2020, 12/19/2019    FINDINGS: AP chest radiograph. [place]-Messi catheter projects at the  proximal right main pulmonary artery. Trachea is midline, stable  cardiomegaly. Small bilateral pleural effusions with overlying basilar  opacities. Bilateral perihilar streaky opacities. No pneumothorax. No  acute osseous or abdominal abnormality.      Impression    IMPRESSION:  1. Stable cardiomegaly with mild pulmonary edema.  2. [place]-Messi catheter tip projects over the proximal right main  pulmonary artery.  3. Stable small bilateral pleural effusions.    I have personally reviewed the examination and initial interpretation  and I agree with the findings.    DOROTA TA MD   XR Chest Port 1 View    Narrative    Exam: AP chest radiograph 2/12/2020 1:45 AM.    HISTORY: [place]-Messi catheter monitoring.    COMPARISON: 2/11/2020,  2/10/2020.    FINDINGS: AP chest radiograph. [place]-Messi catheter tip projects over  the distal right main pulmonary artery/central right lower lobe  pulmonary artery. Trachea is midline, cardiomegaly. Increased  perihilar interstitial opacities. Small bilateral pleural effusions,  slightly increased from prior with overlying basilar and retrocardiac  opacities. No acute osseous or abdominal abnormality.      Impression    IMPRESSION:  1. [place]-Messi catheter tip projects over the distal right main  pulmonary artery/central right lower lobe pulmonary artery.  2. Cardiomegaly with slightly increased moderate pulmonary edema.   3. Bilateral pleural effusions, right greater than left with overlying  basilar atelectasis/consolidation.    I have personally reviewed the examination and initial interpretation  and I agree with the findings.    AYANA MENENDEZ, DO   XR Chest Port 1 View     Value    Radiologist flags Malpositioned pulmonary arterial catheter (Urgent)    Narrative    XR CHEST PORT 1 VW  2/13/2020 1:33 AM      HISTORY: SG catheter monitoring    COMPARISON: 2/12/2020, 2/11/2020    FINDINGS: AP chest radiograph. [place]-Messi catheter tip projects over  the subsegmental branches of the inferior right pulmonary artery.  Trachea is midline, cardiomegaly. Stable bilateral pleural effusions  overlying basilar opacities. Bilateral perihilar streaky opacities. No  pneumothorax. No acute osseous or upper abdominal abnormality.      Impression    IMPRESSION:  1. [place]-Messi catheter tip projects over the subsegmental branches of  the right pulmonary artery. Recommend retraction.  2. Stable cardiomegaly with moderate pulmonary edema.  3. Bilateral pleural effusions with overlying basilar retrocardiac  atelectasis/consolidation.    [Urgent Result: Malpositioned pulmonary arterial catheter]    Finding was identified on 2/13/2020 1:37 AM.     Dr. Ndiaye was contacted by Dr. Bateman at 2/13/2020 1:42 AM and  verbalized understanding of  the urgent finding.      I have personally reviewed the examination and initial interpretation  and I agree with the findings.    KARSTEN LOVE MD   XR Chest Port 1 View    Narrative    Exam: AP chest radiograph 2/13/2020 2:17 AM.    HISTORY: [place] placement.    COMPARISON: 2/13/2020 at 0103 hours. 2/12/2020.    FINDINGS: AP chest radiograph. No substantial change in [place]-Messi  catheter tip placement, tip continues to project over the distal  inferior subsegmental branch of the right pulmonary artery. Stable  cardiomegaly with moderate pulmonary edema and bilateral pleural  effusions.      Impression    IMPRESSION:  1. No substantial change in placement of [place]-Messi catheter, the tip  continues to project over the inferior subsegmental branch of the  right main pulmonary artery.  2. Otherwise stable chest.    I have personally reviewed the examination and initial interpretation  and I agree with the findings.    KARSTEN LOVE MD   XR Chest Port 1 View    Narrative    Exam: AP chest radiograph 2/13/2020 4:24 AM.    COMPARISON: Same day at 0210 hours and 0103 hours. 2/12/2020.    HISTORY: [place] placement.    FINDINGS: AP chest radiograph. [place]-Messi catheter tip has been  somewhat retracted, now projecting over the mid right main pulmonary  artery. Stable cardiomegaly, moderate pulmonary edema, and bilateral  pleural effusions with overlying basilar opacities.      Impression    IMPRESSION:  1. [place]-Messi catheter tip is visible retracted, now projecting over  the mid/distal right main pulmonary artery.  2. Otherwise stable chest.    I have personally reviewed the examination and initial interpretation  and I agree with the findings.    KARSTEN LOVE MD   XR Chest Port 1 View    Narrative    Exam: AP chest radiograph 2/13/2020 6:59 AM.    COMPARISON: Same day at 0417 hours, same day at 0210 hours, 2/12/2020.    HISTORY: [place] repositioning.    FINDINGS: A chest radiograph. [place]-Messi catheter tip now projects  over  the main pulmonary artery. Stable cardiomegaly with mild pulmonary  edema. Bilateral pleural effusions with overlying basilar atelectasis.      Impression    IMPRESSION:  1. [place]-Messi catheter tip projects over the main pulmonary artery.  2. Otherwise stable chest.    I have personally reviewed the examination and initial interpretation  and I agree with the findings.    KARSTEN LOVE MD   Echo Complete    Narrative    [id number]  [id number]  [id number]  [id number]^PIA^ELENI^EVONNE RENTERIA           [facility],[place]  Echocardiography Laboratory  [address] [place] [id number]     Name: MERCY SHAFER  MRN: [id number]  : 1960  Study Date: 02/10/2020 04:52 PM  Age: 59 yrs  Gender: Male  Patient Location: [facility]  Reason For Study: Heart Failure  Ordering Physician: ELENI PALACIO  Referring Physician: HARVEY GUNDERSON  Performed By: Christina Peck NIKKO     BSA: 2.1 m2  Height: 68 in  Weight: 218 lb  HR: 98  BP: 93/47 mmHg  _____________________________________________________________________________  __        Procedure  Complete Portable Echo Adult. Contrast Optison. Optison (NDC #6145-7463-98)  given intravenously. Patient was given 5ml mixture of 1.5ml Definity and 8.5ml  saline. 4 ml wasted.  _____________________________________________________________________________  __        Interpretation Summary  Severely (EF 29%) reduced left ventricular function is present. Severe diffuse  hypokinesis is present.  Global right ventricular function is moderately reduced.  Moderate mitral insufficiency is present.  Mild to moderate tricuspid insufficiency is present.  Pulmonary hypertension present. Estimated pulmonary artery systolic pressure  is 69 (54+15) mmHg .  Dilation of the inferior vena cava is present with abnormal respiratory  variation in diameter.  Small posterior pericardial effusion without echocardiographic evidence  of  tamponade.  _____________________________________________________________________________  __        Left Ventricle  Severe left ventricular dilation is present. Severely (EF 20-30%) reduced left  ventricular function is present. Left ventricular diastolic function is not  assessable. Severe diffuse hypokinesis is present.     Right Ventricle  Global right ventricular function is moderately reduced. A pacemaker lead is  noted in the right ventricle.     Mitral Valve  Moderate mitral insufficiency is present.     Aortic Valve  Aortic valve is normal in structure and function.        Tricuspid Valve  Mild to moderate tricuspid insufficiency is present. Estimated pulmonary  artery systolic pressure is 54 mmHg plus right atrial pressure.     Vessels  Dilation of the inferior vena cava is present with abnormal respiratory  variation in diameter. IVC diameter >2.1 cm collapsing <50% with sniff  suggests a high RA pressure estimated at 15 mmHg or greater.     Pericardium  Small posterior pericardial effusion without echocardiographic evidence of  tamponade.     Compared to Previous Study  This study was compared with the study from 19 . There has been no  change.     _____________________________________________________________________________  __  MMode/2D Measurements & Calculations     EF(MOD-bp): 28.7 %  TAPSE: 1.1 cm        Doppler Measurements & Calculations  TV max P.6 mmHg  TR max shavonne: 366.1 cm/sec  TR max P.6 mmHg        _____________________________________________________________________________  __        Report approved by: Scotty Houser 2020 01:31 PM      Cardiac Catheterization    Narrative      Right sided filling pressures are moderately elevated.    Severely elevated pulmonary artery hypertension.    Left sided filling pressures are severely elevated.    Reduced cardiac output level.    Hemodynamic data has been modified in Epic per physician review.        ASSESSMENT &  PLAN:  60 year old male with a history of HFrEF due to ICM, severe PH, moderate MR, CAD, STEMI s/p DESx3 (2012), STEMI s/p PCI proximal RCA (6/2019), CKD IV, paroxysmal Afib not on AC, anemia with hx of GIB due to AVMs who presents with increased weight gain and abdominal girth secondary to acute on chronic decompensated HFrEF and acute on chronic decompensated severe PH.    Acute on chronic decompensated HFrEF (EF 30%) due to ICM  Acute on chronic decompensated severe PH, mixed etiology  Moderate mitral regurgitation   Patient with known ICM likely due to metabolic syndrome. RHC (2/10/2020): RA 22, RV EDP 20, /50/65, PCWP 40, PVR 8.5, CO 3.5, CI 1.6. TTE (2/10/2020): EF 29%, severe diffuse hypokinesis, moderately reduced RV, moderate MR, mild-moderate tricuspid insufficiency. Last coronary angiogram 6/2019. ACS unlikely but decompensation possibly due to continued ischemia. Thus, we will evaluate with serial ECG and consider outpatient stress test. Due to his CKD and medication intolerance he is not optimized with goal directed medical therapy. Pt does not want to pursue advanced therapies.  - serial ECG  - BB: PTA metoprolol succinate 25 mg daily  - ACEi/ARB: none due to renal function  - Afterload reduction: hydralazine allergy (skin reaction), isordil (intolerant)  - Aldosterone antagonist: none   - Diuretic:   Volume status: hypervolemic, weight on admission: 218   -start lasix 80 mg Q8H IV   -start potassium 40 meq BID + replacement protocol   -PTA dose lasix 100 mg BID   -BMP Q12H  - Statin: PTA atorvastatin 80 mg daily  - ICD: continues to refuse  - PTA aspirin 81 mg daily  - consider outpatient stress test upon discharge     CAD  STEMI s/p DESx3 (2012)  STEMI s/p PCI proximal RCA (6/2019)  Although pt had a STEMI in 2019 pt was taken off plavix by his cardiologist, Dr. Trujillo due to ongoing GIB due to AVMs.  - PTA aspirin 81 mg daily    CKD IV  Baseline serum creatinine between 2.7-3.1. Follows with    Creatinine on admission 2.51.  - Diuresis as above   - trend BMP    Elevated Liver enzymes  Hyperbilirubinemia  Elevated alkaline phosphatase  Likely due to vascular congestion from HFrEF and/or PAH.  -CTM     Paroxysmal Afib   CHADSVASC 3 (HF, DM, CAD). Not on a/c due to history of GI bleed. Currently rate controlled.  -continue telemetry     T2DM  No longer on basal insulin. Last A1c 5.9 12/30/2019. Mild hyperglycemia on presentation.  -medium sliding scale insulin  -hypoglycemia protocol    Chronic WILLIAM  Hx of GIB secondary to AVMs  Denies melena/hematochezia or other bleeding. Hgb 12.5 on presentation. No longer taking oral iron supplementation. Ferritin 27 c/w WILLIAM.  -will discuss resuming PO iron vs iron infusion    Diet: 2 g Na, 2L restriction  Lines: PIV  Electrolytes: potassium/magnesium protocol  DVT ppx: SCDs  Code status: FULL  Dispo: Cards 2    Patient was discussed with attending physician, MD Ravi Rowley MD   Internal Medicine, PGY-1  Pager: [phone]  4/21/2020

## 2020-04-21 NOTE — PROGRESS NOTES
Admission    Diagnosis: SOB    Accompanied by: Self  Belongings: With pt in room  Admission Profile: In progress  Teaching: orientation to unit, call don't fall, use of console, meal times, visiting hours, when to call for the RN (angina/sob/dizziness, etc.), and enforced importance of safety   Access: PIV  Telemetry: Placed on patient  Height/Weight: Complete    RN/RN skin assessment completed by Emerita LUGO RN and Barbi MAY RN - some scars noted.

## 2020-04-21 NOTE — PLAN OF CARE
Pt admit 4/21 with SOB. VSS. NSR. RA. Denies pain. IVP Lasix ordered TID. K+ (3.1) replaced per protocol. BMP ordered for 1700. 2g Na diet, 2L FR. Continue with plan of care and report changes to treatment team.

## 2020-04-21 NOTE — PHARMACY-ADMISSION MEDICATION HISTORY
Admission medication history interview status for the 4/21/2020 admission is complete. See Epic admission navigator for allergy information, pharmacy, prior to admission medications and immunization status.     Medication history interview sources:  Patient, Surescripts fill history    Changes made to PTA medication list (reason)  Added: None  Deleted: Clopidogrel 75mg daily (therapy complete)   Isosorbide dinitrate 30mg tablet (unable to tolerate)   Potassium chloride 20mEq tablet (patient uses packets)  Changed: Vitamin D 2000units daily -> 1000 units BID    Additional medication history information (including reliability of information, actions taken by pharmacist):  -Patient is a good historian of his medications and is able to describe doses and frequencies   -Patient reports that he stopped taking clopidogrel about a month ago per MD instruction and they also stopped his pantoprazole at that time  -He reports that he was also recently on ferrous gluconate but stopped that due to constipation issues  -He recently stopped isosorbide dinitrate as well due to being unable to tolerate the medication (GI distress)  -He states he normally takes his potassium supplement daily but hasn't taken it in a few days    Prior to Admission medications    Medication Sig Last Dose Taking? Auth Provider   aspirin (ASA) 81 MG chewable tablet Take 81 mg by mouth daily 4/21/2020 at 0800 Yes Unknown, Entered By History   atorvastatin (LIPITOR) 80 MG tablet Take 1 tablet (80 mg) by mouth daily 4/20/2020 at 2000 Yes Alen Trujillo MD   furosemide (LASIX) 20 MG tablet In combination with the 80 mg to make total of 100 mg by mouth twice daily 4/21/2020 at 0800 Yes Concepcion Gonsalez APRN CNP   furosemide (LASIX) 80 MG tablet In combination with the 20 mg to make total of 100 mg by mouth twice daily 4/21/2020 at 0800 Yes Concepcion Gonsalez APRN CNP   metolazone (ZAROXOLYN) 2.5 MG tablet One tablet only when directed by your  doctor 4/20/2020 at 2000 Yes Alen Trujillo MD   metoprolol succinate ER (TOPROL XL) 25 MG 24 hr tablet Take 1 tablet (25 mg) by mouth daily 4/21/2020 at 0800 Yes Alen Trujillo MD   potassium chloride (KLOR-CON) 20 MEQ packet Potassium 20MEQ one daily Past Week at Unknown time Yes Alen Trujillo MD   senna-docusate (SENOKOT-S/PERICOLACE) 8.6-50 MG tablet Take 1 tablet by mouth 2 times daily as needed for constipation Past Week at Unknown time Yes Unknown, Entered By History   vitamin  B complex with vitamin C (VITAMIN  B COMPLEX) TABS Take 1 tablet by mouth daily 4/21/2020 at 0800 Yes Reported, Patient   Vitamin D, Cholecalciferol, 25 MCG (1000 UT) CAPS Take 1 capsule by mouth 2 times daily 4/21/2020 at 0800 Yes Unknown, Entered By History   ACCU-CHEK GUIDE test strip USE TO TEST BLOOD SUGAR FOUR TIMES A DAY BEFORE MEALS AND AT BEDTIME   Smiley Argueta PA-C   Alcohol Swabs PADS 1 applicator 4 times daily (before meals and nightly)   Arina Haynes APRN CNP   blood glucose monitoring (ACCU-CHEK FASTCLIX) lancets USE TO TEST BLOOD SUGAR FOUR TIMES A DAY AT MEALS AND AT BEDTIME   Smiley Argueta PA-C   insulin pen needle (32G X 4 MM) 32G X 4 MM miscellaneous Use 5 pen needles daily or as directed.   Arina Haynes APRN CNP   nitroGLYcerin (NITROSTAT) 0.4 MG sublingual tablet Place 1 tablet (0.4 mg) under the tongue every 5 minutes as needed for chest pain For chest pain place 1 tablet under the tongue every 5 minutes for 3 doses. If symptoms persist 5 minutes after 1st dose call 911.   Salo Jones MD   Sharps Container MISC 1 Device every 30 days   Arina Haynes APRN CNP     Medication history completed by:   Vidhya Rankin, Pharm D  April 21, 2020

## 2020-04-22 NOTE — PLAN OF CARE
D: Admitted s/p increased weight gain and abdominal girth  PMH of HFrEF due to ICM, severe PH, CAD, STEMI s/p DESx3 (2012), STEMI s/p PCI proximal RCA (6/2019), CKD IV, paroxysmal A-fib, anemia w/hx of GIB     I: Monitored vitals and assessed pt status.     Running: Lasix 20 mg/hr, 2ml/hr    A: Pt A0x4. Afebrile. VSS. HRs 80s-90s. Atrial fibrillation. Sats >92% on RA. Pt denies any shortness of breath, chest pain/palpitation, nausea, dizziness, or chills. Pt is on 2g Na+ diet w/2L FR. Urinating adequately in urinal. Pt up independently.      P: Continue to monitor pt status and notify Cards 2 treatment team with any changes.

## 2020-04-22 NOTE — PROGRESS NOTES
Cardiology Progress Note  Cole Jesus MRN: [id number]  Age: 60 year old, : 1960  04/22/20    Assessment & Plan   60 year old male with a history of HFrEF due to ICM, severe PH, moderate MR, CAD, STEMI s/p DESx3 (), STEMI s/p PCI proximal RCA (2019), CKD IV, paroxysmal Afib not on AC, anemia with hx of GIB due to AVMs who presents with increased weight gain and abdominal girth secondary to acute on chronic decompensated HFrEF and acute on chronic decompensated severe PH.    Changes today:  -additional dose of Bumex 80 mg overnight  -metolazone 5 mg once  -start bumex drip 20 mg/hr     Acute on chronic decompensated HFrEF (EF 30%) due to ICM  Acute on chronic decompensated severe PH, mixed etiology  Moderate mitral regurgitation   Patient with known ICM likely due to metabolic syndrome. RHC (2/10/2020): RA 22, RV EDP 20, /50/65, PCWP 40, PVR 8.5, CO 3.5, CI 1.6. TTE (2/10/2020): EF 29%, severe diffuse hypokinesis, moderately reduced RV, moderate MR, mild-moderate tricuspid insufficiency. Last coronary angiogram 2019. ACS unlikely but decompensation possibly due to continued ischemia. Thus, we will evaluate with serial ECG and consider outpatient stress test. Due to his CKD and medication intolerance he is not optimized with goal directed medical therapy. Pt does not want to pursue advanced therapies. Plan to start lasix drip as pt not responding to high intermittent doses. If not responding on drip, plan to change to bumex drip and start low dose inotrope, dobutamine.  - BB: PTA metoprolol succinate 25 mg daily  - ACEi/ARB: none due to renal function  - Afterload reduction: hydralazine allergy (skin reaction), isordil (intolerant)  - Aldosterone antagonist: none   - Diuretic:              Volume status: hypervolemic, weight on admission: 218, weight today: 218              -metolazone 5 mg once    -start bumex drip 20 mg/hr              -potassium 40 meq BID +  replacement protocol              -PTA dose lasix 100 mg BID              -BMP Q12H  - Statin: PTA atorvastatin 80 mg daily  - ICD: continues to refuse  - PTA aspirin 81 mg daily  - consider outpatient stress test upon discharge     CAD  STEMI s/p DESx3 (2012)  STEMI s/p PCI proximal RCA (6/2019)  Although pt had a STEMI in 2019 pt was taken off plavix by his cardiologist, Dr. Trujillo due to ongoing GIB due to AVMs.  - PTA aspirin 81 mg daily     CKD IV- stable  Baseline serum creatinine between 2.7-3.1. Follows with   Creatinine on admission 2.51.  - Diuresis as above   - trend BMP     Elevated Liver enzymes  Hyperbilirubinemia  Elevated alkaline phosphatase  Likely due to vascular congestion from HFrEF and/or PAH.  -CTM     Paroxysmal Afib   CHADSVASC 3 (HF, DM, CAD). Not on a/c due to history of GI bleed. Currently rate controlled.  -continue telemetry     T2DM  No longer on basal insulin. Last A1c 5.9 12/30/2019. Mild hyperglycemia on presentation.  -medium sliding scale insulin  -hypoglycemia protocol     Chronic WILLIAM  Hx of GIB secondary to AVMs  Denies melena/hematochezia or other bleeding. Hgb 12.5 on presentation. No longer taking oral iron supplementation. Ferritin 27 c/w WILLIAM.  -will discuss resuming PO iron vs iron infusion     Diet: 2 g Na, 2L restriction  Lines: PIV  Electrolytes: potassium/magnesium protocol  DVT ppx: SCDs  Code status: FULL  Dispo: Cards 2     Patient was discussed with attending physician, MD Ravi Rowley MD  Internal Medicine, PGY-1  [facility], [place]  Pager: [phone]    Interval History   Pt denies SOB, CP, palpitations, or dizziness. Noticed that he is not peeing that much and has previously done well with metolazone.    Physical Exam   Temp: 97.5  F (36.4  C) Temp src: Oral BP: 112/79 Pulse: 86 Heart Rate: 96 Resp: 16 SpO2: 100 % O2 Device: None (Room air)    Vitals:    04/21/20 1026 04/22/20 0350    Weight: 99.2 kg (218 lb 11.2 oz) 98.9 kg (218 lb 1.6 oz)     Vital Signs with Ranges  Temp:  [97.2  F (36.2  C)-97.5  F (36.4  C)] 97.5  F (36.4  C)  Pulse:  [86] 86  Heart Rate:  [79-96] 96  Resp:  [16] 16  BP: ()/(73-90) 112/79  SpO2:  [90 %-100 %] 100 %  I/O last 3 completed shifts:  In: 770 [P.O.:770]  Out: 600 [Urine:600]     Constitutional: awake, alert, cooperative, no apparent distress  HENT: extra ocular muscles grossly intact, sclera clear  Respiratory: No increased work of breathing, clear to auscultation bilaterally, no crackles or wheezing, no cough  Cardiovascular: irregularly irregular with normal rate, systolic murmur, 2+ BLE to knees, +JVD  GI: soft, non-distended, non-tender  Skin: warm, dry, no rashes or lesions  Neurologic: Awake, alert, oriented to name, place and time.  Cranial nerves II-XII are grossly intact.     Medications     - MEDICATION INSTRUCTIONS -         aspirin  81 mg Oral Daily     atorvastatin  80 mg Oral Daily     furosemide  80 mg Intravenous TID     insulin aspart  1-7 Units Subcutaneous TID AC     insulin aspart  1-5 Units Subcutaneous At Bedtime     metoprolol succinate ER  25 mg Oral Daily     potassium chloride  40 mEq Oral BID     sodium chloride (PF)  3 mL Intracatheter Q8H     vitamin B complex with vitamin C  1 tablet Oral Daily     Vitamin D3  25 mcg Oral BID       Data   Recent Labs   Lab 04/22/20  0452 04/21/20  1620 04/21/20  1053   WBC 8.1  --  7.0   HGB 12.9*  --  12.5*   MCV 87  --  88     --  239   INR  --   --  1.49*    136 135   POTASSIUM 4.2 3.7 3.1*   CHLORIDE 102 100 99   CO2 23 26 30   BUN 50* 46* 44*   CR 2.64* 2.63* 2.51*   ANIONGAP 12 10 6   RONNY 9.3 9.4 9.1   * 149* 181*   ALBUMIN  --   --  3.4   PROTTOTAL  --   --  7.7   BILITOTAL  --   --  3.8*   ALKPHOS  --   --  170*   ALT  --   --  31   AST  --   --  26       Recent Results (from the past 24 hour(s))   XR Chest 2 Views    Narrative    Exam: XR CHEST 2 VW, 4/21/2020 4:18  PM    Indication: SOB    Comparison: 2/13/2020, 2/12/2020, 12/19/2019.    Findings:   PA and lateral views of the chest were obtained. The cardiomediastinal  silhouette is unchanged and the cardiac silhouette remains enlarged.  No pneumothorax. Grossly stable small pleural effusions bilaterally.  Indistinct pulmonary vasculature. Mildly increased bibasilar  interstitial and airspace opacities. The visualized upper abdomen  appears normal. No acute osseous abnormalities.      Impression    Impression:   1. Increased perihilar and bibasilar interstitial and airspace  opacities in the setting of an enlarged cardiac silhouette and  indistinct pulmonary vasculature, likely representing pulmonary edema.  2. Stable small pleural effusions bilaterally.    COURTNEY RAMSAY MD

## 2020-04-22 NOTE — PLAN OF CARE
D: Patient admitted with increased weight gain with plan for diuresis.  I: Replaced 5pm K of 3.7. Gave scheduled IV lasix. Notified cross cover  mid shift with patient's concerns that he's had little UO response to today's IV doses. Bladder scan negative for retention. Gave additional dose IV lasix this evening about 2130 per one time order.  A: SR/ST  with up to 12 PVCs/min with episodic bursts of afib 5-6 beats. BPs stable. RA. Little reponse to IV lasix doses given today and this evening however patient reports extreme cramping with bumex in the past.  P: Monitor response to diuretics.

## 2020-04-22 NOTE — PLAN OF CARE
D: Pt admitted for weight gain. PMHx HFrEF 2/2 ICM, severe PH, moderate MR, CAD, STEMIx2 (2012,2019), CKD, paroxysmal Afib, anemia w/hx of GIB d/t AVMs.     I/A: AVSS, on RA. Afib. 12 lead EKG obtained at midnight, pt refused this am scheduled EKG around 0445 - MD aware. AOx4. Denies pain. 2g Na diet and 2L FR. Small BMx1. Voiding via urinal. 80mg IV lasix given. PIV SL. Up independently. Slept between cares.      P: Continue to monitor and follow POC. Notify team of any changes.

## 2020-04-23 NOTE — PLAN OF CARE
RN [id number]    Neuro: A&Ox4.   Cardiac: Afib- 80s. VSS.   Respiratory: Sating 100% on RA. Lungs clear  GI/: Adequate urine output. BM X1  Diet/appetite: Tolerating 2g NA diet. Eating well.  Activity: Independent  Pain: At acceptable level on current regimen. Denies  Skin: +2 edema in BLE. Abd distended.   LDA's: Lasix gtt    Plan: Continue with POC. Notify primary team with changes.

## 2020-04-23 NOTE — PROGRESS NOTES
Cardiology Progress Note  Cole Jesus MRN: [id number]  Age: 60 year old, : 1960  04/22/20    Assessment & Plan   60 year old male with a history of HFrEF due to ICM, severe PH, moderate MR, CAD, STEMI s/p DESx3 (), STEMI s/p PCI proximal RCA (2019), CKD IV, paroxysmal Afib not on AC, anemia with hx of GIB due to AVMs who presents with increased weight gain and abdominal girth secondary to acute on chronic decompensated HFrEF and acute on chronic decompensated severe PH.    Changes today:  - Ongoing diruesis     Acute on chronic decompensated HFrEF (EF 30%) due to ICM  Acute on chronic decompensated severe PH, mixed etiology  Moderate mitral regurgitation   Patient with known ICM likely due to metabolic syndrome. RHC (2/10/2020): RA 22, RV EDP 20, /50/65, PCWP 40, PVR 8.5, CO 3.5, CI 1.6. TTE (2/10/2020): EF 29%, severe diffuse hypokinesis, moderately reduced RV, moderate MR, mild-moderate tricuspid insufficiency. Last coronary angiogram 2019. ACS unlikely but decompensation possibly due to continued ischemia. Thus, we will evaluate with serial ECG and consider outpatient stress test. Due to his CKD and medication intolerance he is not optimized with goal directed medical therapy. Pt does not want to pursue advanced therapies. Plan to start lasix drip as pt not responding to high intermittent doses. If not responding on drip, plan to change to bumex drip and start low dose inotrope, dobutamine. Patient has made it clear on multiple occasions he is not interested in advanced heart failure therapies.  - BB: PTA metoprolol succinate 25 mg daily  - ACEi/ARB: none due to renal function  - Afterload reduction: hydralazine allergy (skin reaction), isordil (intolerant)  - Aldosterone antagonist: none   - Diuretic:              Volume status: hypervolemic, weight on admission: 218, target weight: 200'slbs              -metolazone 5 mg as needed to maintain net  negative    -start bumex drip 20 mg/hr              -potassium 40 meq BID + replacement protocol              -BMP Q12H  - Statin: PTA atorvastatin 80 mg daily  - ICD: continues to refuse  - PTA aspirin 81 mg daily     CAD  STEMI s/p DESx3 (2012)  STEMI s/p PCI proximal RCA (6/2019)  Although pt had a STEMI in 2019 pt was taken off plavix by his cardiologist, Dr. Trujillo due to ongoing GIB due to AVMs.  - PTA aspirin 81 mg daily     CKD IV- stable  Baseline serum creatinine between 2.7-3.1. Follows with   Creatinine on admission 2.51.  - Diuresis as above   - trend BMP     Elevated Liver enzymes  Hyperbilirubinemia  Elevated alkaline phosphatase  Likely due to vascular congestion from HFrEF and/or PAH.  -CTM     Paroxysmal Afib   CHADSVASC 3 (HF, DM, CAD). Not on a/c due to history of GI bleed. Currently rate controlled.  -continue telemetry     T2DM  No longer on basal insulin. Last A1c 5.9 12/30/2019. Mild hyperglycemia on presentation.  -medium sliding scale insulin  -hypoglycemia protocol     Chronic WILLIAM  Hx of GIB secondary to AVMs  Denies melena/hematochezia or other bleeding. Hgb 12.5 on presentation. No longer taking oral iron supplementation. Ferritin 27 c/w WILLIAM.  -will discuss resuming PO iron vs iron infusion     Diet: 2 g Na, 2L restriction  Lines: PIV  Electrolytes: potassium/magnesium protocol  DVT ppx: SCDs  Code status: FULL  Dispo: Cards 2     Patient was discussed with attending physician, MD Buddy Rowley, [facility]  Cardiology Fellow    Interval History   Pt denies SOB, CP, palpitations, or dizziness. Noticed increased urinary output. Feels he is improving.     Physical Exam   Temp: 96.3  F (35.7  C) Temp src: Axillary BP: (!) 111/90   Heart Rate: 86 Resp: 16 SpO2: 97 % O2 Device: None (Room air)    Vitals:    04/21/20 1026 04/22/20 0350 04/23/20 0427   Weight: 99.2 kg (218 lb 11.2 oz) 98.9 kg (218 lb 1.6 oz) 95 kg (209 lb 6.4 oz)     Vital Signs with  Ranges  Temp:  [96.3  F (35.7  C)-98.2  F (36.8  C)] 96.3  F (35.7  C)  Heart Rate:  [] 86  Resp:  [16] 16  BP: (100-118)/(77-92) 111/90  SpO2:  [97 %-98 %] 97 %  I/O last 3 completed shifts:  In: 768 [P.O.:720; I.V.:48]  Out: 4725 [Urine:4725]     Constitutional: awake, alert, cooperative, no apparent distress  HENT: extra ocular muscles grossly intact, sclera clear  Respiratory: No increased work of breathing, clear to auscultation bilaterally, no crackles or wheezing, no cough  Cardiovascular: irregularly irregular with normal rate, systolic murmur, 2+ BLE to knees, +JVD  GI: soft, non-distended, non-tender  Skin: warm, dry, no rashes or lesions  Neurologic: Awake, alert, oriented to name, place and time.  Cranial nerves II-XII are grossly intact.     Medications     furosemide 20 mg/hr (04/23/20 0920)     - MEDICATION INSTRUCTIONS -         aspirin  81 mg Oral Daily     atorvastatin  80 mg Oral Daily     insulin aspart  1-7 Units Subcutaneous TID AC     insulin aspart  1-5 Units Subcutaneous At Bedtime     metoprolol succinate ER  25 mg Oral Daily     potassium chloride  40 mEq Oral BID     sodium chloride (PF)  3 mL Intracatheter Q8H     vitamin B complex with vitamin C  1 tablet Oral Daily     Vitamin D3  25 mcg Oral BID       Data   Recent Labs   Lab 04/23/20  0447 04/22/20  1640 04/22/20  0452  04/21/20  1053   WBC 7.4  --  8.1  --  7.0   HGB 13.0*  --  12.9*  --  12.5*   MCV 87  --  87  --  88     --  244  --  239   INR  --   --   --   --  1.49*    136 137   < > 135   POTASSIUM 3.9 4.1 4.2   < > 3.1*   CHLORIDE 100 100 102   < > 99   CO2 28 26 23   < > 30   BUN 56* 51* 50*   < > 44*   CR 2.91* 2.76* 2.64*   < > 2.51*   ANIONGAP 10 10 12   < > 6   RONNY 9.8 9.6 9.3   < > 9.1   GLC 93 129* 105*   < > 181*   ALBUMIN  --   --   --   --  3.4   PROTTOTAL  --   --   --   --  7.7   BILITOTAL  --   --   --   --  3.8*   ALKPHOS  --   --   --   --  170*   ALT  --   --   --   --  31   AST  --   --   --    --  26    < > = values in this interval not displayed.       No results found for this or any previous visit (from the past 24 hour(s)).

## 2020-04-23 NOTE — PLAN OF CARE
D: Admitted s/p increased weight gain and abdominal girth  PMH of HFrEF due to ICM, severe PH, CAD, STEMI s/p DESx3 (2012), STEMI s/p PCI proximal RCA (6/2019), CKD IV, paroxysmal A-fib, anemia w/hx of GIB    I: Monitored vitals and assessed pt status.     Running: Lasix 20 mg/hr, 2ml/hr     A: Pt A0x4. Afebrile. VSS. HRs 80s-90s. Atrial fibrillation. Sats >92% on RA. Pt denies any shortness of breath, nausea, dizziness, or chills. BGs before meals, pt on 2g Na+ diet w/2L FR. Urinating adequately.Pt up independently.     P: Continue diuresis. Continue to monitor pt status and notify Cards 2 treatment team with any changes.

## 2020-04-23 NOTE — PLAN OF CARE
D: Pt admitted for weight gain. PMHx HFrEF 2/2 ICM, severe PH, moderate MR, CAD, STEMIx2 (2012,2019), CKD, paroxysmal Afib, anemia w/hx of GIB d/t AVMs.      I/A: AVSS, on RA. Afib. AOx4. Denies pain. 2g Na diet and 2L FR. Voiding via urinal. Lasix gtt at 20mg/hr (2ml/hr) via PIV. Up independently. Slept between cares.      P: Continue to monitor and follow POC. Notify team of any changes.

## 2020-04-23 NOTE — PLAN OF CARE
D-Admitted on 04/21/20 with decompensated heart failure. Declines advanced therapies, including ICD.Telemetry shows atrial fibrillation and pt refusing pneumatic compression device.  I-Started on lasix gtt at 20 mg/hr today. 2 gram Na diet. 2L FR. Educated on purpose of pneumatic compression device, pt still refused.Text paged cardiology cross cover ([id number]), to report pt's refusal of pneumatic compression device.  A-Net I&O -[phone].  P-Continue with current poc. Monitor fluid status and labs. Per MD note, if pt doesn't respond to lasix gtt, with consider switching to bumex gtt and starting inotrope.

## 2020-04-24 NOTE — PLAN OF CARE
D: Patient was admitted on 4/21/20 for heart failure and weight gain. PMH of HFrEF due to ICM, severe PH, CAD, STEMI s/p DESx3 (2012), STEMI s/p PCI proximal RCA (6/2019), CKD IV, paroxysmal A-fib, anemia w/hx of GIB    I: Monitored vitals and assessed patient status. Magnesium will be rechecked at 17:00. It was 2.6 this morning.  Running: Lasix at 20 mg/hr    A: Patient's vital signs  have been stable and he denies pain. His rhythm was Afib  with up to 9 PVC's vs aberrant conduction, and 0-3 couplet PVC;s vs aberrant conduction    I/O this shift:  In: 540 [P.O.:540]  Out: 500 [Urine:500]    Temp:  [96.3  F (35.7  C)-97.7  F (36.5  C)] 97.7  F (36.5  C)  Pulse:  [78-88] 82  Heart Rate:  [78-86] 81  Resp:  [16-18] 16  BP: ()/(77-90) 103/81  SpO2:  [96 %-100 %] 96 %      P: Continue to monitor patient status and report changes to the Cards 2 treatment team.

## 2020-04-24 NOTE — PLAN OF CARE
"BP 97/77 (BP Location: Right arm)   Pulse 78   Temp 97.7  F (36.5  C) (Oral)   Resp 16   Ht 1.727 m (5' 8\")   Wt 91.8 kg (202 lb 6.4 oz)   SpO2 98%   BMI 30.77 kg/m      A&O VSS RA Denies pain. A-fib. Lungs clear. Bowels active. +2 dependent edema to LE. Lasix drip 2ml/hr. Voiding adequate amounts. 2gm sod diet. 2L FR. Up ad dary. Will continue with POC.   "

## 2020-04-24 NOTE — PROGRESS NOTES
"   04/24/20 1026   Quick Adds   Type of Visit Initial PT Evaluation   Living Environment   Lives With significant other   Living Arrangements house   Home Accessibility stairs to enter home   Number of Stairs, Main Entrance 3   Stair Railings, Main Entrance   (1 HR.)   Transportation Anticipated car, drives self;family or friend will provide   Living Environment Comment Pt has tub shower.    Self-Care   Usual Activity Tolerance moderate   Current Activity Tolerance fair   Regular Exercise Yes   Activity/Exercise Type walking  (Recumbent bike.)   Exercise Amount/Frequency 3-5 times/wk  (However recently pt unable 2/2 health issues.)   Equipment Currently Used at Home none   Functional Level Prior   Ambulation 0-->independent   Transferring 0-->independent   Toileting 0-->independent   Bathing 0-->independent   Communication 0-->understands/communicates without difficulty   Swallowing 0-->swallows foods/liquids without difficulty   Cognition 0 - no cognition issues reported   Fall history within last six months no   Which of the above functional risks had a recent onset or change? none   General Information   Onset of Illness/Injury or Date of Surgery - Date 04/21/20   Referring Physician Buddy Dickson MD    Patient/Family Goals Statement Pt would like to return home.   Pertinent History of Current Problem (include personal factors and/or comorbidities that impact the POC) Per chart \"60 year old male with a history of HFrEF due to ICM, severe PH, moderate MR, CAD, STEMI s/p DESx3 (2012), STEMI s/p PCI proximal RCA (6/2019), CKD IV, paroxysmal Afib not on AC, anemia with hx of GIB due to AVMs who presents with increased weight gain and abdominal girth secondary to acute on chronic decompensated HFrEF and acute on chronic decompensated severe PH.\"   Precautions/Limitations no known precautions/limitations   General Observations Upon arrival, pt supine in bed and agreeable to PT session.   General Info Comments Activity " "- as tolerated.   Cognitive Status Examination   Orientation orientation to person, place and time   Level of Consciousness alert   Follows Commands and Answers Questions 100% of the time   Personal Safety and Judgment intact   Memory intact   Pain Assessment   Patient Currently in Pain No   Posture    Posture Forward head position;Protracted shoulders   Range of Motion (ROM)   ROM Comment B LE ROM WFL.   Strength   Strength Comments B LE strength at least >3+/5 based on functional mobility assessment, generalized deconditioning present.   Bed Mobility   Bed Mobility Comments Pt completes supine <> sit IND.   Transfer Skills   Transfer Comments Pt transfers sit <> stand IND.   Gait   Gait Comments Pt ambulates ~200ft 2x with no AD IND. Pt ascends/descends 3 steps with single HR and Mod I.   Balance   Balance no deficits were identified   General Therapy Interventions   Planned Therapy Interventions strengthening;home program guidelines;progressive activity/exercise  (Aerobic exercise.)   Clinical Impression   Criteria for Skilled Therapeutic Intervention yes, treatment indicated   PT Diagnosis Deconditioning   Influenced by the following impairments LE weakness, decreased activity tolerance   Functional limitations due to impairments Impaired functional endurance   Clinical Presentation Stable/Uncomplicated   Clinical Presentation Rationale Clinical judgement   Clinical Decision Making (Complexity) Low complexity   Therapy Frequency 5x/week   Predicted Duration of Therapy Intervention (days/wks) 1 week   Anticipated Equipment Needs at Discharge   (None anticipated.)   Anticipated Discharge Disposition Home with Assist;Home with Outpatient Therapy  (OP CR.)   Risk & Benefits of therapy have been explained Yes   Patient, Family & other staff in agreement with plan of care Yes   [facility] AM-PAC TM \"6 Clicks\"   2016, Trustees of [facility], under license to [employer].  All rights reserved.   6 Clicks " "Short Forms Basic Mobility Inpatient Short Form   [facility] AM-PAC  \"6 Clicks\" V.2 Basic Mobility Inpatient Short Form   1. Turning from your back to your side while in a flat bed without using bedrails? 4 - None   2. Moving from lying on your back to sitting on the side of a flat bed without using bedrails? 4 - None   3. Moving to and from a bed to a chair (including a wheelchair)? 4 - None   4. Standing up from a chair using your arms (e.g., wheelchair, or bedside chair)? 4 - None   5. To walk in hospital room? 4 - None   6. Climbing 3-5 steps with a railing? 4 - None   Basic Mobility Raw Score (Score out of 24.Lower scores equate to lower levels of function) 24   Total Evaluation Time   Total Evaluation Time (Minutes) 8     "

## 2020-04-24 NOTE — PROGRESS NOTES
Cardiology Progress Note  Cole Jesus MRN: [id number]  Age: 60 year old, : 1960  04/24/20    Assessment & Plan   60 year old male with a history of HFrEF due to ICM, severe PH, moderate MR, CAD, STEMI s/p DESx3 (), STEMI s/p PCI proximal RCA (2019), CKD IV, paroxysmal Afib not on AC, anemia with hx of GIB due to AVMs who presents with increased weight gain and abdominal girth secondary to acute on chronic decompensated HFrEF and acute on chronic decompensated severe PH.     Changes today:  - Ongoing diruesis     Acute on chronic decompensated HFrEF (EF 30%) due to ICM  Acute on chronic decompensated severe PH, mixed etiology  Moderate mitral regurgitation   Patient with known ICM likely due to metabolic syndrome. RHC (2/10/2020): RA 22, RV EDP 20, /50/65, PCWP 40, PVR 8.5, CO 3.5, CI 1.6. TTE (2/10/2020): EF 29%, severe diffuse hypokinesis, moderately reduced RV, moderate MR, mild-moderate tricuspid insufficiency. Last coronary angiogram 2019. ACS unlikely but decompensation possibly due to continued ischemia. Thus, we will evaluate with serial ECG and consider outpatient stress test. Due to his CKD and medication intolerance he is not optimized with goal directed medical therapy. Pt does not want to pursue advanced therapies. Plan to start lasix drip as pt not responding to high intermittent doses. If not responding on drip, plan to change to bumex drip and start low dose inotrope, dobutamine. Patient has made it clear on multiple occasions he is not interested in advanced heart failure therapies.  - BB: PTA metoprolol succinate 25 mg daily  - ACEi/ARB: none due to renal function  - Afterload reduction: hydralazine allergy (skin reaction), isordil (intolerant)  - Aldosterone antagonist: none   - Diuretic:              Volume status: hypervolemic, weight on admission: 218, weight today 202; target weight: 190s.                -continue bumex drip 20  mg/hr              -potassium 40 meq BID + replacement protocol              -BMP Q12H  - Statin: PTA atorvastatin 80 mg daily  - ICD: continues to refuse  - PTA aspirin 81 mg daily     CAD  STEMI s/p DESx3 (2012)  STEMI s/p PCI proximal RCA (6/2019)  Although pt had a STEMI in 2019 pt was taken off plavix by his cardiologist, Dr. Trujillo due to ongoing GIB due to AVMs.  - PTA aspirin 81 mg daily  - PTA atorvastatin 80 mg daily     CKD IV- stable  Baseline serum creatinine between 2.7-3.1. Follows with   Creatinine on admission 2.51.  - Diuresis as above   - trend BMP     Elevated Liver enzymes  Hyperbilirubinemia  Elevated alkaline phosphatase  Likely due to vascular congestion from HFrEF and/or PAH.  -CTM     Paroxysmal Afib   CHADSVASC 3 (HF, DM, CAD). Not on a/c due to history of GI bleed. Currently rate controlled.  -continue telemetry     T2DM  No longer on basal insulin. Last A1c 5.9 12/30/2019. Mild hyperglycemia on presentation.  -medium sliding scale insulin  -hypoglycemia protocol     Chronic WILLIAM  Hx of GIB secondary to AVMs  Denies melena/hematochezia or other bleeding. Hgb 12.5 on presentation. No longer taking oral iron supplementation. Ferritin 27 c/w WILLIAM.  -will discuss resuming PO iron     Diet: 2 g Na, 2L restriction  Lines: PIV  Electrolytes: potassium/magnesium protocol  DVT ppx: ambulation  Code status: FULL  Dispo: Cards 2     Patient was discussed with attending physician, MD Ravi Rowley MD  Internal Medicine, PGY-1  [facility], [place]  Pager: [phone]    Interval History   Continuing to feel better with fluid removal. Is breathing better and ambulating without difficulty but does awknowledge that he still has remaining fluid and his new dry weight is likely lower than it has been.    Physical Exam   Temp: 97.7  F (36.5  C) Temp src: Oral BP: 97/77 Pulse: 78 Heart Rate: 78 Resp: 16 SpO2: 98 % O2 Device: None  (Room air)    Vitals:    04/22/20 0350 04/23/20 0427 04/24/20 0500   Weight: 98.9 kg (218 lb 1.6 oz) 95 kg (209 lb 6.4 oz) 91.8 kg (202 lb 6.4 oz)     Vital Signs with Ranges  Temp:  [96.3  F (35.7  C)-97.7  F (36.5  C)] 97.7  F (36.5  C)  Pulse:  [78-88] 78  Heart Rate:  [78-93] 78  Resp:  [16-18] 16  BP: ()/(77-90) 97/77  SpO2:  [97 %-100 %] 98 %  I/O last 3 completed shifts:  In: 1188 [P.O.:1140; I.V.:48]  Out: 4525 [Urine:4525]    Constitutional: awake, alert, cooperative, no apparent distress  HENT: extra ocular muscles grossly intact, sclera clear  Respiratory: No increased work of breathing, clear to auscultation bilaterally, no crackles or wheezing, no cough  Cardiovascular: irregularly irregular with normal rate, systolic murmur, 2+ BLE to lower 1/3 of shins, +JVD  GI: soft, non-distended, non-tender  Skin: warm, dry, no rashes or lesions  Neurologic: Awake, alert, oriented to name, place and time.  Cranial nerves II-XII are grossly intact.     Medications     furosemide 20 mg/hr (04/23/20 2148)     - MEDICATION INSTRUCTIONS -         aspirin  81 mg Oral Daily     atorvastatin  80 mg Oral Daily     insulin aspart  1-7 Units Subcutaneous TID AC     insulin aspart  1-5 Units Subcutaneous At Bedtime     metoprolol succinate ER  25 mg Oral Daily     potassium chloride  40 mEq Oral BID     sodium chloride (PF)  3 mL Intracatheter Q8H     vitamin B complex with vitamin C  1 tablet Oral Daily     Vitamin D3  25 mcg Oral BID       Data   Recent Labs   Lab 04/24/20  0506 04/23/20  1727 04/23/20  0447  04/22/20  0452  04/21/20  1053   WBC 7.3  --  7.4  --  8.1  --  7.0   HGB 12.2*  --  13.0*  --  12.9*  --  12.5*   MCV 86  --  87  --  87  --  88     --  251  --  244  --  239   INR  --   --   --   --   --   --  1.49*    135 138   < > 137   < > 135   POTASSIUM 3.9 3.7 3.9   < > 4.2   < > 3.1*   CHLORIDE 97 96 100   < > 102   < > 99   CO2 30 32 28   < > 23   < > 30   BUN 57* 58* 56*   < > 50*   < >  44*   CR 2.76* 2.91* 2.91*   < > 2.64*   < > 2.51*   ANIONGAP 8 7 10   < > 12   < > 6   RONNY 9.6 10.1 9.8   < > 9.3   < > 9.1   * 175* 93   < > 105*   < > 181*   ALBUMIN  --   --   --   --   --   --  3.4   PROTTOTAL  --   --   --   --   --   --  7.7   BILITOTAL  --   --   --   --   --   --  3.8*   ALKPHOS  --   --   --   --   --   --  170*   ALT  --   --   --   --   --   --  31   AST  --   --   --   --   --   --  26    < > = values in this interval not displayed.

## 2020-04-24 NOTE — PLAN OF CARE
Discharge Planner PT   6C - PT evaluation completed, treatment initiated.  Patient plan for discharge: Home with assist.  Current status: Pt IND with bed mobility, transfers, and ambulation; Mod I with stair navigation. Pt performs x13 minutes total on Nustep bike at L2 for progression of strength/endurance. VSS on RA.  Barriers to return to prior living situation: Medical needs, deconditioning.  Recommendations for discharge: Home with assist, OP CR.  Rationale for recommendations: Current level of function, anticipated progress.

## 2020-04-25 NOTE — PLAN OF CARE
D: Patient was admitted on 4/21/20 for heart failure and weight gain. PMH of HFrEF due to ICM, severe PH, CAD, STEMI s/p DESx3 (2012), STEMI s/p PCI proximal RCA (6/2019), CKD IV, paroxysmal A-fib, anemia w/hx of GIB     I: Monitored vitals and assessed patient status.  Running: Lasix at 20 mg/hr, high concentration     A: Patient's vital signs have been stable and he denies pain. His rhythm was Afib  with 11 bt VT rate 140 vs aberrant conduction. Pt sitting at bedside or chair most of shift. Pleasant and cooperative.      P: Continue to monitor patient status and report changes to the Cards 2 treatment team.

## 2020-04-25 NOTE — PLAN OF CARE
Discharge Planner PT / 6C   Patient plan for discharge: Home with assist.  Current status: Pt IND with bed mob and transfers. Pt ambulates ~200ft 2x no AD IND. Pt engaged in seated/standing LE strengthening exercises, provided handouts. VSS on RA.  Barriers to return to prior living situation: Medical needs, deconditioning.  Recommendations for discharge: Home with assist, OP CR.  Rationale for recommendations: Current level of function, anticipated progress.

## 2020-04-25 NOTE — PROGRESS NOTES
Cardiology Progress Note  Cole Jesus MRN: [id number]  Age: 60 year old, : 1960  04/25/20    Assessment & Plan   60 year old male with a history of HFrEF due to ICM, severe PH, moderate MR, CAD, STEMI s/p DESx3 (), STEMI s/p PCI proximal RCA (2019), CKD IV, paroxysmal Afib not on AC, anemia with hx of GIB due to AVMs who presents with increased weight gain and abdominal girth secondary to acute on chronic decompensated HFrEF and acute on chronic decompensated severe PH.     Changes today:  - discontinue lasix drip  - start torsemide 80 mg BID     Acute on chronic decompensated HFrEF (EF 30%) due to ICM  Acute on chronic decompensated severe PH, mixed etiology  Moderate mitral regurgitation   Patient with known ICM likely due to metabolic syndrome. RHC (2/10/2020): RA 22, RV EDP 20, /50/65, PCWP 40, PVR 8.5, CO 3.5, CI 1.6. TTE (2/10/2020): EF 29%, severe diffuse hypokinesis, moderately reduced RV, moderate MR, mild-moderate tricuspid insufficiency. Last coronary angiogram 2019. ACS unlikely but decompensation possibly due to continued ischemia. Thus, we will evaluate with serial ECG and consider outpatient stress test. Due to his CKD and medication intolerance he is not optimized with goal directed medical therapy. Pt does not want to pursue advanced therapies. Plan to start lasix drip as pt not responding to high intermittent doses. If not responding on drip, plan to change to bumex drip and start low dose inotrope, dobutamine. Patient has made it clear on multiple occasions he is not interested in advanced heart failure therapies.  - BB: PTA metoprolol succinate 25 mg daily  - ACEi/ARB: none due to renal function  - Afterload reduction: hydralazine allergy (skin reaction), isordil (intolerant)  - Aldosterone antagonist: none   - Diuretic:              Volume status: euvolemic, weight on admission: 218, weight today 195 (likely new dry  weight)              -discontinue lasix drip   -start torsemide 80 mg BID              -potassium 40 meq BID + replacement protocol              -BMP Q12H  - Statin: PTA atorvastatin 80 mg daily  - ICD: continues to refuse  - PTA aspirin 81 mg daily     CAD  STEMI s/p DESx3 (2012)  STEMI s/p PCI proximal RCA (6/2019)  Although pt had a STEMI in 2019 pt was taken off plavix by his cardiologist, Dr. Trujillo due to ongoing GIB due to AVMs.  - PTA aspirin 81 mg daily  - PTA atorvastatin 80 mg daily     CKD IV- stable  Baseline serum creatinine between 2.7-3.1. Follows with   Creatinine on admission 2.51.  - Diuresis as above   - trend BMP     Elevated Liver enzymes  Hyperbilirubinemia  Elevated alkaline phosphatase  Likely due to vascular congestion from HFrEF and/or PAH.  -CTM     Paroxysmal Afib   CHADSVASC 3 (HF, DM, CAD). Not on a/c due to history of GI bleed. Currently rate controlled.  -continue telemetry     T2DM  No longer on basal insulin. Last A1c 5.9 12/30/2019. Mild hyperglycemia on presentation.  -medium sliding scale insulin  -hypoglycemia protocol     Chronic WILLIAM  Hx of GIB secondary to AVMs  Denies melena/hematochezia or other bleeding. Hgb 12.5 on presentation. No longer taking oral iron supplementation. Ferritin 27 c/w WILLIAM.  -will discuss resuming PO iron     Diet: 2 g Na, 2L restriction  Lines: PIV  Electrolytes: potassium 40 meq BID + replacement protocol  DVT ppx: ambulation  Code status: FULL  Dispo: home, possibly 4/26     Patient was discussed with attending physician, MD Ravi Rowley MD  Internal Medicine, PGY-1  [facility], [place]  Pager: [phone]    Interval History   NAEO. Would like to go home as he is feeling well.    Physical Exam   Temp: 97.3  F (36.3  C) Temp src: Oral BP: 97/76 Pulse: 79 Heart Rate: 79 Resp: 16 SpO2: 96 % O2 Device: None (Room air)    Vitals:    04/23/20 0427 04/24/20 0500 04/25/20 0604    Weight: 95 kg (209 lb 6.4 oz) 91.8 kg (202 lb 6.4 oz) 88.8 kg (195 lb 12.8 oz)     Vital Signs with Ranges  Temp:  [97.1  F (36.2  C)-97.8  F (36.6  C)] 97.3  F (36.3  C)  Pulse:  [79-85] 79  Heart Rate:  [77-88] 79  Resp:  [16] 16  BP: ()/(76-88) 97/76  SpO2:  [94 %-100 %] 96 %  I/O last 3 completed shifts:  In: 1188 [P.O.:1140; I.V.:48]  Out: 4400 [Urine:4400]    Constitutional: awake, alert, cooperative, no apparent distress  HENT: extra ocular muscles grossly intact, sclera clear  Respiratory: No increased work of breathing, clear to auscultation bilaterally, no crackles or wheezing, no cough  Cardiovascular: irregularly irregular with normal rate, systolic murmur, no BLE edema, minimal JVD  GI: soft, non-distended, non-tender  Skin: warm, dry, no rashes or lesions  Neurologic: Awake, alert, oriented to name, place and time.  Cranial nerves II-XII are grossly intact.     Medications     - MEDICATION INSTRUCTIONS -         aspirin  81 mg Oral Daily     atorvastatin  80 mg Oral Daily     insulin aspart  1-7 Units Subcutaneous TID AC     insulin aspart  1-5 Units Subcutaneous At Bedtime     metoprolol succinate ER  25 mg Oral Daily     potassium chloride  40 mEq Oral BID     sodium chloride (PF)  3 mL Intracatheter Q8H     torsemide  80 mg Oral BID     vitamin B complex with vitamin C  1 tablet Oral Daily     Vitamin D3  25 mcg Oral BID       Data   Recent Labs   Lab 04/25/20  0510 04/24/20  1638 04/24/20  0506  04/23/20  0447  04/21/20  1053   WBC 7.8  --  7.3  --  7.4   < > 7.0   HGB 12.6*  --  12.2*  --  13.0*   < > 12.5*   MCV 85  --  86  --  87   < > 88     --  236  --  251   < > 239   INR  --   --   --   --   --   --  1.49*    137 135   < > 138   < > 135   POTASSIUM 3.7 3.9 3.9   < > 3.9   < > 3.1*   CHLORIDE 96 96 97   < > 100   < > 99   CO2 30 33* 30   < > 28   < > 30   BUN 68* 61* 57*   < > 56*   < > 44*   CR 2.82* 2.87* 2.76*   < > 2.91*   < > 2.51*   ANIONGAP 9 8 8   < > 10   < > 6    RONNY 10.0 10.2* 9.6   < > 9.8   < > 9.1   * 145* 121*   < > 93   < > 181*   ALBUMIN  --   --   --   --   --   --  3.4   PROTTOTAL  --   --   --   --   --   --  7.7   BILITOTAL  --   --   --   --   --   --  3.8*   ALKPHOS  --   --   --   --   --   --  170*   ALT  --   --   --   --   --   --  31   AST  --   --   --   --   --   --  26    < > = values in this interval not displayed.

## 2020-04-25 NOTE — PLAN OF CARE
D: Patient was admitted on 4/21/20 for heart failure and weight gain. PMH of HFrEF due to ICM, severe PH, CAD, STEMI s/p DESx3 (2012), STEMI s/p PCI proximal RCA (6/2019), CKD IV, paroxysmal A-fib, anemia w/hx of GIB    I: Monitored vitals and assessed patient status. Lasix drip was discontinued at 12:14 and he received 80 mg of oral Torsemide  Running: right piv is saline locked    A: Patients vital signs have been stable.. His potassium was replaced per protocol. His magnesium was 2.6 . He denies pain. Rhythm was Afib  with a bundle branch block, 0-13 PVC's/min, 0-2 PVC couplets, and a rare PVC triplet. Magnesium will be rechecked at 17:00 and then every 12 hr.    I/O this shift:  In: 640 [P.O.:640]  Out: 875 [Urine:875]    Temp:  [97.1  F (36.2  C)-97.8  F (36.6  C)] 97.3  F (36.3  C)  Pulse:  [79-85] 79  Heart Rate:  [77-88] 79  Resp:  [16] 16  BP: ()/(76-88) 97/76  SpO2:  [94 %-100 %] 96 %      P: Continue to monitor patient status and report changes to the Cards 2 treatment team.

## 2020-04-26 NOTE — PLAN OF CARE
D: Patient was admitted on 4/21/20 for heart failure and weight gain. PMH of HFrEF due to ICM, severe PH, CAD, STEMI s/p DESx3 (2012), STEMI s/p PCI proximal RCA (6/2019), CKD IV, paroxysmal A-fib, anemia w/hx of GIB     I: Monitored vitals and assessed patient status.     A: Patient's vital signs have been stable and he denies pain. His rhythm was Afib  with BBB. Pleasant and cooperative    Temp:  [97.3  F (36.3  C)-97.8  F (36.6  C)] 97.8  F (36.6  C)  Pulse:  [79-91] 91  Heart Rate:  [78-88] 83  Resp:  [16] 16  BP: ()/(75-88) 97/77  SpO2:  [94 %-99 %] 98 %      P: Continue to monitor Pt status and report changes to treatment team.

## 2020-04-26 NOTE — PLAN OF CARE
DISCHARGE   Discharged to: Home- girlfriend will pick him up downstairs  Via: Automobile  Accompanied by: girlfriend  Discharge Instructions: diet, activity, medications, follow up appointments, when to call the MD, and what to watchout for (i.e. s/s of infection, increasing SOB, palpitations, chest pain,)  Prescriptions: To be filled by discharge pharmacy per pt's request; medication list reviewed & sent with pt  Follow Up Appointments: arranged; information given  Belongings: All sent with pt  IV: out  Telemetry: off  Pt exhibits understanding of above discharge instructions; all questions answered.  Discharge Paperwork: faxed     Patient's rhythm continued to be in Afib. He left unit 6C at 12:10

## 2020-04-27 NOTE — PLAN OF CARE
Physical Therapy Discharge Summary    Reason for therapy discharge:    Discharged to home with outpatient therapy.    Progress towards therapy goal(s). See goals on Care Plan in [facility] electronic health record for goal details.  Goals partially met.  Barriers to achieving goals:   discharge from facility.    Therapy recommendation(s):    Continued therapy is recommended.  Rationale/Recommendations:  OP CR to maximize cardiopulmonary function.

## 2020-04-27 NOTE — PROGRESS NOTES
MTM ENCOUNTER  SUBJECTIVE/OBJECTIVE:                           Cole Jesus is a 60 year old male called for a transitions of care visit. He was discharged from [facility] on 4/26 for acute on chronic decompensated HFrEF (EF 30%) due to ICM.      Patient consented to a telehealth visit: yes  Telemedicine Visit Details  Type of service:  Telephone visit  Start Time: 3:01 pm  End Time: 3:10 pm  Originating Location (pt. Location): Home  Distant Location (provider location):  [facility] [place]  Mode of Communication:  Telephone    Chief Complaint: None - general KELI review    Allergies/ADRs: Reviewed in Epic  Tobacco:  reports that he quit smoking about 8 years ago. His smoking use included cigarettes. He has a 80.00 pack-year smoking history. He has never used smokeless tobacco.  Alcohol: not currently using    Medication Adherence/Access: no issues reported    HFrEF:   Metoprolol succinate 25 mg daily  Torsemide 80 mg BID (change in diuretic)  Potassium 40 mEq BID  Atorvastatin 80 mg daily  Aspirin 81 mg daily  Metolazone 2.5 mg PRN only as directed by CORE clinic  Sublingual nitroglycerin     He did have a phone call with the RN from cardiology earlier today. Plan reviewed with him and he had no medication questions at that time. He has follow-up scheduled with the CORE clinic on Wednesday. Of note, he is not on ACEi/ARB due to renal function. 191 lbs this morning, feels the new medications are working well for him. He has had a bowel movement since being home (yesterday). He has senna-docusate to use PRN. Denies side effects of current medications. No concerns with obtaining medications. He notes that he has not had to use the nitroglycerin, but needs to update his current bottle.    ECHO 2/10/20  EF 29%    Hx of GIB secondary to AVMs:   Ferrous sulfate 325 mg daily (new)    Tolerating okay for now. He is aware he can take this with food if it becomes bothersome to him. No reported concerns.    T2DM:   Testing  supplies    Blood sugars were a little high at the hospital. 180 mg/dL which is high for him. He does have enough testing supplies at home and is aware of how to get refills. He is not using insulin at this time per his report. No reported low blood sugars.He is on aspirin and statin therapy. Per notes, he had an A1c of 5.9 on 12/30/2019.    Hemoglobin A1C   Date Value Ref Range Status   07/15/2019 5.7 (H) 0 - 5.6 % Final     Comment:     Normal <5.7% Prediabetes 5.7-6.4%  Diabetes 6.5% or higher - adopted from ADA   consensus guidelines.       Supplements:   Vitamin B complex daily  Vitamin D 2,000 units daily    No reported concerns or side effects.    Vitamin D Deficiency Screening Results:  Lab Results   Component Value Date    VITDT 56 07/01/2019    VITDT 49 06/01/2018    VITDT 47 02/01/2016    VITDT 44 12/01/2014    VITDT 30 08/23/2013     ASSESSMENT:                            Medication Adherence: good, no issues identified    HFrEF: Improving based on report. Pt appears to understand his medications well and is taking as directed. Weight currently stable and he has follow-up scheduled. He is in need of updated nitroglycerin, and he plans to take care of this.    Hx of GIB due to AVMs: Unchanged.    T2DM: Stable based on report. Unable to fully assess based on lack of provided information, however, his last A1c was at goal. He seems aware of his blood sugars and I encouraged him to continue to monitor and follow-up as planned.    Supplements: Stable.     PLAN:                          Post Discharge Medication Reconciliation Status: discharge medications reconciled, continue medications without change.    1. Refill the nitroglycerin as planned    I spent 9 minutes with this patient today. A copy of the visit note was provided to the patient's primary care provider.    Will follow up as needed based on patient preference. Contact information provided in the event he has questions going forward.    The patient  was sent via [employer] a summary of these recommendations.     Samia PrinceD, BCACP  MTM Pharmacist   [facility]  Phone: [phone]

## 2020-04-27 NOTE — TELEPHONE ENCOUNTER
I called Cole Jesus this morning to follow-up with him post-hospitalization discharged from [facility] 4/26.     He reports feeling well so far. Diuretic changed from Lasix to Torsemide. He weighed 191 lbs this morning. Has a CORE telephone visit on Wednesday and will likely set up labs at that time for later this week.     Medication Review:  I reviewed all his discharge medications with him and he had no questions regarding his medications.     Follow-up appointment review:  I reviewed Cole Jesus follow-up appointments with him and he verbalized understanding. I reviewed the CORE clinic's phone number and to call with any increase of SOB, increased edema or swelling, and weight gain. He verbalizes understanding and agrees with plan of care. Patricia Vo RN

## 2020-04-29 NOTE — PROGRESS NOTES
"Cole Jesus is a 60 year old male who is being evaluated via a billable telephone visit.      The patient has been notified of following:     \"This telephone visit will be conducted via a call between you and your physician/provider. We have found that certain health care needs can be provided without the need for a physical exam.  This service lets us provide the care you need with a short phone conversation.  If a prescription is necessary we can send it directly to your pharmacy.  If lab work is needed we can place an order for that and you can then stop by our lab to have the test done at a later time.    Telephone visits are billed at different rates depending on your insurance coverage. During this emergency period, for some insurers they may be billed the same as an in-person visit.  Please reach out to your insurance provider with any questions.    If during the course of the call the physician/provider feels a telephone visit is not appropriate, you will not be charged for this service.\"    Patient has given verbal consent for Telephone visit?  Yes    How would you like to obtain your AVS? Gloira     Weight and height were given by the patient and medications were reconciled.     Jazmin Alston CMA    11:22 AM                  "

## 2020-04-29 NOTE — PROGRESS NOTES
"Cole Jesus is a 60 year old male who is being evaluated via a billable telephone visit.      The patient has been notified of following:     \"This telephone visit will be conducted via a call between you and your physician/provider. We have found that certain health care needs can be provided without the need for a physical exam.  This service lets us provide the care you need with a short phone conversation.  If a prescription is necessary we can send it directly to your pharmacy.  If lab work is needed we can place an order for that and you can then stop by our lab to have the test done at a later time.    Telephone visits are billed at different rates depending on your insurance coverage. During this emergency period, for some insurers they may be billed the same as an in-person visit.  Please reach out to your insurance provider with any questions.    If during the course of the call the physician/provider feels a telephone visit is not appropriate, you will not be charged for this service.\"    Patient has given verbal consent for Telephone visit?  Yes    How would you like to obtain your AVS? Marcelleharsimone     Weight and height were given by the patient and medications were reconciled.     Jazmin Alston CMA    11:22 AM    Start time: 12:30 PM  End time: 12:49 PM      HPI:   60 year old male with history of occluded pRCA s/p balloon angioplasty and stenting in 6/2019, STEMI s/p DESx3 for total occlusion of RCA and Lcx (2012), GIB due to AV malformation (in the setting of warfarin and DAPT) s/p EGD with clip (7/5/2019), ischemic cardiomyopathy, HFrEF (last EF=30-35% 11/12/19), severe pHTN, moderate mitral regurgitation, atrial fibrillation, CKD stage IV who presented for planned RHC, found to be in ambulatory cardiogenic shock.     He was recently admitted from 4/21-4/26 for hypervolemia. He was diuresed with lasix drip and discharged at a weight of 193 lbs. (He was 218 lbs on admission).     Today he is feeling " pretty well. He has been home  For about 4 days. Since leaving the hospital he maintained a weight of around 192. Discharge weight was 193. He is getting his strength back since leaving the hospital. He is able to go up and down one flight of stairs without a break. No SOB at rest. No swelling in his legs (he can see his shin bones and veins in his feet). No abd swelling. Denies orthopnea or PND. No dizziness, lightheadedness, pre-syncope or syncope. No chest pain. His rash has resolved off of isosorbide.    Home blood pressures for the last 3 days: 110/72, HR 82, 102/69, HR 80, 113/73, HR    Cardiac Medications  ASA 81 mg daily  Torsemide 80 mg BID  Kcl 40 meq BID  Metoprolol succinate 25 mg daily  Nitroglycerin    PAST MEDICAL HISTORY:  Past Medical History:   Diagnosis Date     Anemia in chronic renal disease 3/9/2015     Antiplatelet or antithrombotic long-term use      Atrial fibrillation and flutter      CAD (coronary artery disease)     Stemi in , s/p angioplasty     CRD (chronic renal disease), stage IV (H) 2015    hx ATN with dialysis complicating cardiogenic shock  2012     GI bleed     massive lower GI bleed secondary to a cecal ulcer, s/p ileocecal resection in      Heart failure     Biventricular systolic HF, complicated by ARDS requiring tracheostomy     Hyperlipidemia LDL goal <100 2013     Hypertension      Ischemic cardiomyopathy 2013    TTE revealing 40% EF     Myocardial infarction (H)      Other and unspecified nonspecific immunological findings     Anti JKa     Pulmonary edema     episodes of flash pulmonary edema in      Subclinical hypothyroidism        FAMILY HISTORY:  Family History   Problem Relation Age of Onset     Diabetes Mother      Hypertension Mother      Heart Disease Mother      Heart Disease Father          of heart attack, left when he was young     Diabetes Sister      Diabetes Sister      Diabetes Sister      Glaucoma No family hx of       Macular Degeneration No family hx of        SOCIAL HISTORY:  Social History     Socioeconomic History     Marital status: Significant other     Spouse name: Not on file     Number of children: Not on file     Years of education: Not on file     Highest education level: Not on file   Occupational History     Not on file   Social Needs     Financial resource strain: Not on file     Food insecurity:     Worry: Not on file     Inability: Not on file     Transportation needs:     Medical: Not on file     Non-medical: Not on file   Tobacco Use     Smoking status: Former Smoker     Packs/day: 2.00     Years: 40.00     Pack years: 80.00     Types: Cigarettes     Last attempt to quit: 12/3/2011     Years since quittin.2     Smokeless tobacco: Never Used   Substance and Sexual Activity     Alcohol use: No     Alcohol/week: 0.0 standard drinks     Drug use: No     Sexual activity: Yes     Partners: Female   Lifestyle     Physical activity:     Days per week: Not on file     Minutes per session: Not on file     Stress: Not on file   Relationships     Social connections:     Talks on phone: Not on file     Gets together: Not on file     Attends Anabaptism service: Not on file     Active member of club or organization: Not on file     Attends meetings of clubs or organizations: Not on file     Relationship status: Not on file     Intimate partner violence:     Fear of current or ex partner: Not on file     Emotionally abused: Not on file     Physically abused: Not on file     Forced sexual activity: Not on file   Other Topics Concern     Parent/sibling w/ CABG, MI or angioplasty before 65F 55M? Yes      Service Not Asked     Blood Transfusions Not Asked     Caffeine Concern Not Asked     Occupational Exposure Not Asked     Hobby Hazards Not Asked     Sleep Concern Not Asked     Stress Concern Not Asked     Weight Concern Not Asked     Special Diet Not Asked     Back Care Not Asked     Exercise Yes     Comment: limited  walking     Bike Helmet Not Asked     Seat Belt Not Asked     Self-Exams Not Asked   Social History Narrative     Not on file       CURRENT MEDICATIONS:  ACCU-CHEK GUIDE test strip, USE TO TEST BLOOD SUGAR FOUR TIMES A DAY BEFORE MEALS AND AT BEDTIME  Alcohol Swabs PADS, 1 applicator 4 times daily (before meals and nightly)  aspirin (ASA) 81 MG chewable tablet, Take 81 mg by mouth daily  atorvastatin (LIPITOR) 80 MG tablet, Take 1 tablet (80 mg) by mouth daily  blood glucose monitoring (ACCU-CHEK FASTCLIX) lancets, USE TO TEST BLOOD SUGAR FOUR TIMES A DAY AT MEALS AND AT BEDTIME  ferrous sulfate (FEROSUL) 325 (65 Fe) MG tablet, Take 1 tablet (325 mg) by mouth daily (with breakfast) for 14 days  insulin pen needle (32G X 4 MM) 32G X 4 MM miscellaneous, Use 5 pen needles daily or as directed.  metolazone (ZAROXOLYN) 2.5 MG tablet, One tablet only when directed by your doctor  metoprolol succinate ER (TOPROL XL) 25 MG 24 hr tablet, Take 1 tablet (25 mg) by mouth daily  nitroGLYcerin (NITROSTAT) 0.4 MG sublingual tablet, Place 1 tablet (0.4 mg) under the tongue every 5 minutes as needed for chest pain For chest pain place 1 tablet under the tongue every 5 minutes for 3 doses. If symptoms persist 5 minutes after 1st dose call 911.  potassium chloride ER (KLOR-CON M) 20 MEQ CR tablet, Take 2 tablets (40 mEq) by mouth 2 times daily for 14 days  senna-docusate (SENOKOT-S/PERICOLACE) 8.6-50 MG tablet, Take 1 tablet by mouth 2 times daily as needed for constipation  Sharps Container MISC, 1 Device every 30 days  torsemide (DEMADEX) 20 MG tablet, Take 4 tablets (80 mg) by mouth 2 times daily for 14 days  vitamin  B complex with vitamin C (VITAMIN  B COMPLEX) TABS, Take 1 tablet by mouth daily  Vitamin D, Cholecalciferol, 25 MCG (1000 UT) CAPS, Take 1 capsule by mouth 2 times daily    No current facility-administered medications on file prior to visit.   He our visit with the patient    ROS:   Refer to HPI    EXAM:  There were  no vitals taken for this visit.  N/A phone visit    Labs, reviewed with patient in clinic today:  CBC RESULTS:  Lab Results   Component Value Date    WBC 8.0 04/26/2020    RBC 5.11 04/26/2020    HGB 13.1 (L) 04/26/2020    HCT 43.7 04/26/2020    MCV 86 04/26/2020    MCH 25.6 (L) 04/26/2020    MCHC 30.0 (L) 04/26/2020    RDW 22.4 (H) 04/26/2020     04/26/2020       CMP RESULTS:  Lab Results   Component Value Date     04/26/2020    POTASSIUM 3.9 04/26/2020    CHLORIDE 94 04/26/2020    CO2 32 04/26/2020    ANIONGAP 7 04/26/2020     (H) 04/26/2020    BUN 70 (H) 04/26/2020    CR 2.71 (H) 04/26/2020    GFRESTIMATED 24 (L) 04/26/2020    GFRESTBLACK 28 (L) 04/26/2020    RONNY 9.9 04/26/2020    BILITOTAL 3.8 (H) 04/21/2020    ALBUMIN 3.4 04/21/2020    ALKPHOS 170 (H) 04/21/2020    ALT 31 04/21/2020    AST 26 04/21/2020        INR RESULTS:  Lab Results   Component Value Date    INR 1.49 (H) 04/21/2020       Lab Results   Component Value Date    MAG 2.9 (H) 04/25/2020     Lab Results   Component Value Date    NTBNPI 12,766 (H) 04/21/2020     Lab Results   Component Value Date    NTBNP 5,746 (H) 01/16/2020       Diagnostics:  ECHO 2/11/2020  Interpretation Summary  Severely (EF 29%) reduced left ventricular function is present. Severe diffuse  hypokinesis is present.  Global right ventricular function is moderately reduced.  Moderate mitral insufficiency is present.  Mild to moderate tricuspid insufficiency is present.  Pulmonary hypertension present. Estimated pulmonary artery systolic pressure  is 69 (54+15) mmHg .  Dilation of the inferior vena cava is present with abnormal respiratory  variation in diameter.  Small posterior pericardial effusion without echocardiographic evidence of  Tamponade.    2/10/2020RHC  RA --/24/22  /20  /50/65  PCWP --/51/40    DONOVAN 3.5/1.6  TD 3.8/1.8    PVR 8.5    Assessment and Plan:   59 year old male with history of occluded pRCA s/p balloon angioplasty and  "stenting in 6/2019, STEMI s/p DESx3 for total occlusion of RCA and Lcx (2012), GIB due to AV malformation (in the setting of warfarin and DAPT) s/p EGD with clip (7/5/2019), ischemic cardiomyopathy, HFrEF (last EF=30-35% 11/12/19), severe pHTN, moderate mitral regurgitation, atrial fibrillation, CKD stage IV who was recently admitted for hypervolemia and discharged 4 days ago. Presents for post-hospital follow-up.     New dry weight of 193. Discharged on significantly higher diuretics. Metop was restarted. We will check labs on Friday given the higher diuretic dose and his poor renal function.    We did have a short goals of care conversation. He continues to confirm that an ICD and advanced therapies are not within his goals of care. He did however, sight that he is generally happy with his current quality of life and that \"living has become important to him.\" At this point he does want to remain full code. He also sited that he likely wants his girlfriend to be his medical decision maker. I did encourage him to finalize this with paperwork as soon as he knows this for certain. I also encouraged him to have a very open conversation with her about what he would want and I also offered to have her join us on a future phone call to help guide this conversation. He doesn't want to do this currently, but he does think that this would be helpful in the future. I also offered him a social work visit for help with ACP paper work- he declined for now but he did say he would be interested in the future.       # Chronic systolic heart failure/HFrEF secondary to ICM    Stage C. NYHA Class III.    Fluid status: euvolemic by weights and history recheck labs Friday, on much higher diuretic than his PTA dose  ACEi/ARB/ARNI: contraindicated due to renal dysfunction/hyperkalemia. Allergy to hydralazine and isosorbid.  BB: Metoprolol 25 mg daily- recently restarted in the hospital (had been stopped in the past for low output) monitor " closely  Aldosterone antagonist: contraindicated due to renal dysfunction  SCD prophylaxis: has declined on multiple conversations  NSAID use: contraindicated  Sleep apnea evaluation: will discuss at future appointment  Remote monitoring: N/A     #STEMI s/p DESx3 (2012)  #CAD s/p PCI proximal RCA (6/2019)  - CAD stable, no symptoms. Continue plavix and aspirin. Statin intolerance.     #Paroxysmal Afib   CHADSVASC 3 (HF, DM, CAD). Not on a/c due to history of GI bleed. Not willing to retrial. No symptoms of afib today.     #CKD IV  Baseline serum creatinine around 2.7. Follows with . Was at his BL on discharge  - Monitor closely with diuresis- recheck on Friday    # H/o GIB  - No symptoms today    # GOC. See details above  - Pt confirmed his desire to be FULL CODE  - He confirms that he is not interested in ICD or advanced therapies at this time.    Follow up   - Labs on Friday  - CORE in two weeks  - Dr. Trujillo in about 1 month                CC  SELF, REFERRED

## 2020-04-29 NOTE — PATIENT INSTRUCTIONS
Take your medicines every day, as directed    Changes made today:  o No medicaiton changes   Monitor Your Weight and Symptoms    Contact us if you:      Gain 2 pounds in one day or 5 pounds in one week    Feel more short of breath    Notice more leg swelling    Feel lightheadeded   Change your lifestyle    Limit Salt or Sodium:    2000 mg  Limit Fluids:    2000 mL or approximately 64 ounces  Eat a Heart Healthy Diet    Low in saturated fats  Stay Active:    Aim to move at least 150 minutes every  week         To Contact us    During Business Hours:  [phone], option # 1 (University)  Then option # 4 (medical questions)     After hours, weekends or holidays:   [phone], Option #4  Ask to speak to the On-Call Cardiologist. Inform them you are a CORE/heart failure patient at the [facility].     Use [employer] allows you to communicate directly with your heart team through secure messaging.    farmaciamarket can be accessed any time on your phone, computer, or tablet.    If you need assistance, we'd be happy to help!         Keep your Heart Appointments:    - Labs at the [facility] on Friday Morning  - CORE in 2 weeks  - Dr. Trujillo in about 1 month

## 2020-05-01 NOTE — TELEPHONE ENCOUNTER
Labs resulted. Called Cole to review. Potassium elevated at 5.6. Started on potassium supplement at hospital discharge. Has not taken his second dose yet today and told him not to take this until discussed further with provider. Will review with Sade ROONEY and call him back.     Otherwise he reports he's feeling well. Weight is holding steady at 194 lbs. Reports he's walking all over and was able to walk for an hour today at the grocery store and he was pretty pleased with that.     Patricia Vo RN

## 2020-05-01 NOTE — PATIENT INSTRUCTIONS
Recommendations from today's MTM visit:                                                    MTM (medication therapy management) is a service provided by a clinical pharmacist designed to help you get the most of out of your medicines.   Today we reviewed what your medicines are for, how to know if they are working, that your medicines are safe and how to make your medicine regimen as easy as possible.     1. Continue current medications and follow-up as planned.    It was great to speak with you today.  I value your experience and would be very thankful for your time with providing feedback on our clinic survey. You may receive a survey via email or text message in the next few days.     Next MTM visit: as needed, based on your preference    To schedule another MTM appointment, please call the clinic directly or you may call the MTM scheduling line at [phone] or toll-free at [phone].     My Clinical Pharmacist's contact information:                                                      It was a pleasure talking with you today!  Please feel free to contact me with any questions or concerns you have.      Samia PrinceD, BCACP  MTM Pharmacist   [facility]  Phone: [phone]

## 2020-05-01 NOTE — TELEPHONE ENCOUNTER
Reviewed with Sade Kruger. Called Cole back with following recommendations:  Date: 5/1/2020    Time of Call: 2:12 PM     Diagnosis:  HFrEF     VORB: Ordering provider: Sade ROONEY  Order: HOLD Potassium supplement this afternoon and tomorrow. Sunday, restart at 20 mEq daily. Decrease torsemide to 60 mg BID. BMP Monday. Avoid high potassium foods. Reviewed list with Cole.      Order received by: Patricia Vo RN      Follow-up/additional notes: Reviewed this with Cole who verbalized understanding. Will follow up with labs and weight check on Monday.

## 2020-05-04 NOTE — TELEPHONE ENCOUNTER
Reviewed with Sade ROONEY and called Cole back with following verbal orders:  Date: 5/4/2020    Time of Call: 3:20 PM     Diagnosis:  HFrEF     VORB:  Ordering provider: Sade ROONEY  Order: Increase Torsemide to 80 mg in the AM, 60 mg in the PM. Continue Potassium 20 mEq daily. BMP in one week.      Order received by: Patricia Vo RN      Follow-up/additional notes: Reviewed with Cole who verbalized understanding. He also notes that he has had nausea ongoing for sometime now. Notes mention it prior to last hospitalization but attribute it to Imdur. Now off Imdur but still with nausea. He tells me it was improved while he was in the hospital.   Told him I would review with provider and call him back.

## 2020-05-04 NOTE — TELEPHONE ENCOUNTER
Called Cole to review labs and check on weights. Decreased Torsemide to 60 mg BID on Friday. Changed potassium to 20 mEq daily starting yesterday.   Cole reports his weight went from 194 to 199 lbs. He denies any worsening SOB, KING, increased swelling in legs or belly. He feels well. He wonders if he's gaining some muscle weight as has been walking a lot more.   Told him I would review labs and weights with Sade and call him back.   Patricia Vo RN

## 2020-05-05 NOTE — TELEPHONE ENCOUNTER
Reviewed with Sade ROONEY. Keep close eye on nausea. Recommend taking all meds with food. I called Cole and updated him. Will speak with Sade next week in virtual visit and can discuss more. He told me the nausea was gone today and he felt better. Told him if it returns and gets worse than it has been he should call us. He verbalized understanding.   Patricia Vo RN

## 2020-05-15 NOTE — LETTER
"5/15/2020      RE: Cole Jesus  [address]  [facility] [place] [id number]       Dear Colleague,    Thank you for the opportunity to participate in the care of your patient, Cole Jesus, at the [facility]. Please see a copy of my visit note below.    Advanced Heart Failure Clinic -- CORE Follow Up -- Telephone Encounter  May 15, 2020    HPI:   Mr. Cole Jesus is a 60yr old male with a history of CAD (STEMI due to total occlusion of RCA and LCx s/p KEVYN x3 in 2012; STEMI due to occluded pRCA s/p balloon angioplasty and KEVYN 6/2019), ICM (LVEF 30-35% per TTE 11/2019) with recent cardiogenic shock (CI 1.6 per [facility] 2/2020), severe PH (mPA 65 per [facility] 2/2020), moderate mitral regurgitation, pAFib, CKD, iron deficiency, GIB (due to AVMs in the setting of warfarin and DAPT, s/p clipping 7/2019), and DMII who is being evaluated via telephone for CORE follow up.    He was last evaluated by CORE 4/29 (following hospital discharge), where he was found to be well, and no medication changes were made.  His torsemide was then decreased to 60mg BID on 5/1, but increased to 80/60 on 5/4.  He is being evaluated today for follow up.    Since his last visit, he states that he is doing much better.  His stomach issues have greatly improved, and he has noticed that his appetite has improved.  He no longer has abdominal pain, and denies nausea, vomiting, diarrhea, and constipation.  His weight is up, and was 205# today.  He states that his weight of 192# was not an ideal weight for him, as he thinks he lost a lot of muscle mass when he was hospitalized.  He feels like his current weight gain reflects some fluid but also includes some \"good weight.\"  He states that his weight has risen a couple pounds each day.  He has been eating \"too much,\" and is trying to \"get it under control.\"  He states that since he has not been able to do much, he has been eating more.  He notes " trace ankle swelling through the day.  His breathing status has been good, and he notes mildly increased SOB which he attributes to mild fluid retention.  He denies PND and orthopnea.  He has been walking around his yard some, and has been trying to use his recumbent bike.  He otherwise denies chest pain, palpitations, dizziness, headaches, acute vision changes, fevers, chills, cough, sore throat, and signs of bleeding.      PAST MEDICAL HISTORY:  Past Medical History:   Diagnosis Date     Anemia in chronic renal disease 3/9/2015     Antiplatelet or antithrombotic long-term use      Atrial fibrillation and flutter      CAD (coronary artery disease)     Stemi in , s/p angioplasty     CRD (chronic renal disease), stage IV (H) 2015    hx ATN with dialysis complicating cardiogenic shock  2012     GI bleed     massive lower GI bleed secondary to a cecal ulcer, s/p ileocecal resection in      Heart failure     Biventricular systolic HF, complicated by ARDS requiring tracheostomy     Hyperlipidemia LDL goal <100 2013     Hypertension      Ischemic cardiomyopathy 2013    TTE revealing 40% EF     Myocardial infarction (H)      Other and unspecified nonspecific immunological findings     Anti JKa     Pulmonary edema     episodes of flash pulmonary edema in      Subclinical hypothyroidism        FAMILY HISTORY:  Family History   Problem Relation Age of Onset     Diabetes Mother      Hypertension Mother      Heart Disease Mother      Heart Disease Father          of heart attack, left when he was young     Diabetes Sister      Diabetes Sister      Diabetes Sister      Glaucoma No family hx of      Macular Degeneration No family hx of        SOCIAL HISTORY:  Social History     Socioeconomic History     Marital status: Significant other     Spouse name: None     Number of children: None     Years of education: None     Highest education level: None   Occupational History     None   Social  Needs     Financial resource strain: None     Food insecurity     Worry: None     Inability: None     Transportation needs     Medical: None     Non-medical: None   Tobacco Use     Smoking status: Former Smoker     Packs/day: 2.00     Years: 40.00     Pack years: 80.00     Types: Cigarettes     Last attempt to quit: 12/3/2011     Years since quittin.4     Smokeless tobacco: Never Used   Substance and Sexual Activity     Alcohol use: No     Alcohol/week: 0.0 standard drinks     Drug use: No     Sexual activity: Yes     Partners: Female   Lifestyle     Physical activity     Days per week: None     Minutes per session: None     Stress: None   Relationships     Social connections     Talks on phone: None     Gets together: None     Attends Yazdanism service: None     Active member of club or organization: None     Attends meetings of clubs or organizations: None     Relationship status: None     Intimate partner violence     Fear of current or ex partner: None     Emotionally abused: None     Physically abused: None     Forced sexual activity: None   Other Topics Concern     Parent/sibling w/ CABG, MI or angioplasty before 65F 55M? Yes      Service Not Asked     Blood Transfusions Not Asked     Caffeine Concern Not Asked     Occupational Exposure Not Asked     Hobby Hazards Not Asked     Sleep Concern Not Asked     Stress Concern Not Asked     Weight Concern Not Asked     Special Diet Not Asked     Back Care Not Asked     Exercise Yes     Comment: limited walking     Bike Helmet Not Asked     Seat Belt Not Asked     Self-Exams Not Asked   Social History Narrative     None       CURRENT MEDICATIONS:  Current Outpatient Medications   Medication Sig Dispense Refill     ACCU-CHEK GUIDE test strip USE TO TEST BLOOD SUGAR FOUR TIMES A DAY BEFORE MEALS AND AT BEDTIME 400 each 3     Alcohol Swabs PADS 1 applicator 4 times daily (before meals and nightly) 100 each 3     aspirin (ASA) 81 MG chewable tablet Take 81 mg  by mouth daily       atorvastatin (LIPITOR) 80 MG tablet Take 1 tablet (80 mg) by mouth daily 90 tablet 3     blood glucose monitoring (ACCU-CHEK FASTCLIX) lancets USE TO TEST BLOOD SUGAR FOUR TIMES A DAY AT MEALS AND AT BEDTIME 400 each 3     insulin pen needle (32G X 4 MM) 32G X 4 MM miscellaneous Use 5 pen needles daily or as directed. 200 each 3     metolazone (ZAROXOLYN) 2.5 MG tablet One tablet only when directed by your doctor 15 tablet 1     metoprolol succinate ER (TOPROL XL) 25 MG 24 hr tablet Take 1 tablet (25 mg) by mouth daily 30 tablet 11     nitroGLYcerin (NITROSTAT) 0.4 MG sublingual tablet Place 1 tablet (0.4 mg) under the tongue every 5 minutes as needed for chest pain For chest pain place 1 tablet under the tongue every 5 minutes for 3 doses. If symptoms persist 5 minutes after 1st dose call 911. 25 tablet 0     potassium chloride ER (KLOR-CON M) 20 MEQ CR tablet Take 1 tablet (20 mEq) by mouth daily 90 tablet 3     senna-docusate (SENOKOT-S/PERICOLACE) 8.6-50 MG tablet Take 1 tablet by mouth 2 times daily as needed for constipation       Sharps Container MISC 1 Device every 30 days 1 each 3     torsemide (DEMADEX) 20 MG tablet Take 3 tablets (60 mg) by mouth 2 times daily 180 tablet 4     vitamin  B complex with vitamin C (VITAMIN  B COMPLEX) TABS Take 1 tablet by mouth daily       Vitamin D, Cholecalciferol, 25 MCG (1000 UT) CAPS Take 1 capsule by mouth 2 times daily         ROS:   As per HPI.    Labs:  CBC RESULTS:  Lab Results   Component Value Date    WBC 9.0 05/11/2020    RBC 4.67 05/11/2020    HGB 12.7 (L) 05/11/2020    HCT 41.4 05/11/2020    MCV 89 05/11/2020    MCH 27.2 05/11/2020    MCHC 30.7 (L) 05/11/2020    RDW 23.5 (H) 05/11/2020     05/11/2020       CMP RESULTS:  Lab Results   Component Value Date     05/11/2020    POTASSIUM 4.0 05/11/2020    CHLORIDE 101 05/11/2020    CO2 29 05/11/2020    ANIONGAP 7 05/11/2020     (H) 05/11/2020    BUN 54 (H) 05/11/2020    CR  2.61 (H) 05/11/2020    GFRESTIMATED 25 (L) 05/11/2020    GFRESTBLACK 29 (L) 05/11/2020    RONNY 8.6 05/11/2020    BILITOTAL 3.8 (H) 04/21/2020    ALBUMIN 3.4 04/21/2020    ALKPHOS 170 (H) 04/21/2020    ALT 31 04/21/2020    AST 26 04/21/2020        INR RESULTS:  Lab Results   Component Value Date    INR 1.31 (H) 05/11/2020       Lab Results   Component Value Date    MAG 2.9 (H) 04/25/2020     Lab Results   Component Value Date    NTBNPI 12,766 (H) 04/21/2020     Lab Results   Component Value Date    NTBNP 5,746 (H) 01/16/2020       Assessment and Plan:   Mr. Cole Jesus is a 60yr old male with a history of CAD (STEMI due to total occlusion of RCA and LCx s/p KEVYN x3 in 2012; STEMI due to occluded pRCA s/p balloon angioplasty and KEVYN 6/2019), ICM (LVEF 30-35% per TTE 11/2019) with recent cardiogenic shock (CI 1.6 per [facility] 2/2020), severe PH (mPA 65 per [facility] 2/2020), moderate mitral regurgitation, pAFib, CKD, iron deficiency, GIB (due to AVMs in the setting of warfarin and DAPT, s/p clipping 7/2019), and DMII who is being evaluated via telephone for CORE follow up.    Labs from 5/11 showed stable electrolytes, improved renal function, and stable blood counts.    Mr. Jesus has continued to gain weight since his hospital discharge.  Advised that he increase torsemide to 80mg twice daily, and to increase KCl to 20mEq twice daily.  Will plan for him to repeat labs in 1 week, and will have him follow up with CORE via telephone in 2-4 weeks.  Will also arrange for him to follow up with Dr. Trujillo in 2-3 months.  Asked that he call with any further weight gain.      Chronic systolic heart failure secondary to ICM (LVEF 30-35% per TTE 11/2019)  Stage C, NYHA Class III  - ACEi/ARB/ARNi:  Contraindicated due to renal function  - Afterload reduction:  Allergic to hydral and isordil  - BB:  Metoprolol XL 25mg daily --> note that this was recently restarted (had been held due to low cardiac output).  Will continue to  monitor.  - Aldosterone antagonist:  Contraindicated due to renal function  - SCD ppx:  Declined  - fluid status:  Steady weight gain, will increase torsemide to 80mg twice daily as noted above    CAD  STEMI due to total occlusion of RCA and LCx s/p KEVYN x3 in 2012; STEMI due to occluded pRCA s/p balloon angioplasty and KEVYN 6/2019.  No anginal symptoms.  Discussed heart-healthy dieting and getting more aerobic activity.  Remains on aspirin, clopidogrel, and metoprolol.  Note prior intolerance to statins.    pAFib  Developed GIB while on anticoagulation, and declines retrialling.      CKD  Baseline cr ~2.7, follows with renal team.  Cr stable 5/11, will continue to trend with diuresis.      30 minutes spent with patient, with >50% in counseling and/or coordination of care as described above.      Sonal Stone DNP, FNP-BC, CHFN  Advanced Heart Failure Nurse Practitioner  [facility] [place]  Patient Care Team:  Silas Enciso MD as PCP - General  Dianna Davis APRN CNS as Registered Nurse (Clinical Nurse Specialist)  Alen Trujillo MD as MD (Cardiology)  Coordinator,  Bmt Nurse as Nurse Coordinator (Cardiology)  Kavita Chapman LPN as THOMASN  Hoa Luque, GUILLE CNP as Nurse Practitioner (Cardiology)  Bobbi Guillen, RN as Nurse Coordinator (Cardiology)  Zach Phelan, RN as Specialty Care Coordinator (Cardiology)  Smiley Argueta PA-C as Physician Assistant (Physician Assistant)  Dian Leblanc APRN CNP as Assigned PCP  Samia Chawla [facility] as Pharmacist (Pharmacist Ambulatory Care)  HENRRY KO      Please do not hesitate to contact me if you have any questions/concerns.     Sincerely,     Sonal Stone NP

## 2020-05-15 NOTE — PATIENT INSTRUCTIONS
Take your medicines every day, as directed    Changes made today:  o INCREASE torsemide to 80mg (4 tablets) twice daily  o INCREASE potassium chloride to 20mEq twice daily  o Repeat labs next week  o [place] [facility] -- phone visit is ok  o Follow up [facility] in 2-3 months   Monitor Your Weight and Symptoms    Contact us if you:      Gain 2 pounds in one day or 5 pounds in one week    Feel more short of breath    Notice more leg swelling    Feel lightheadeded   Change your lifestyle    Limit Salt or Sodium:    2000 mg  Limit Fluids:    2000 mL or approximately 64 ounces  Eat a Heart Healthy Diet    Low in saturated fats  Stay Active:    Aim to move at least 150 minutes every  week         To Contact us    During Business Hours:  [phone], option # 1 (University)  Then option # 4 (medical questions)     After hours, weekends or holidays:   [phone], Option #4  Ask to speak to the On-Call Cardiologist. Inform them you are a CORE/heart failure patient at the [facility].     Use [employer] allows you to communicate directly with your heart team through secure messaging.    [employer] can be accessed any time on your phone, computer, or tablet.    If you need assistance, we'd be happy to help!         Keep your Heart Appointments:

## 2020-05-15 NOTE — PROGRESS NOTES
"[facility] -- CORE Follow Up -- Telephone Encounter  May 15, 2020    HPI:   Mr. Cole Jesus is a 60yr old male with a history of CAD (STEMI due to total occlusion of RCA and LCx s/p KEVYN x3 in 2012; STEMI due to occluded pRCA s/p balloon angioplasty and KEVYN 6/2019), ICM (LVEF 30-35% per TTE 11/2019) with recent cardiogenic shock (CI 1.6 per [facility] 2/2020), severe PH (mPA 65 per [facility] 2/2020), moderate mitral regurgitation, pAFib, CKD, iron deficiency, GIB (due to AVMs in the setting of warfarin and DAPT, s/p clipping 7/2019), and DMII who is being evaluated via telephone for CORE follow up.    He was last evaluated by CORE 4/29 (following hospital discharge), where he was found to be well, and no medication changes were made.  His torsemide was then decreased to 60mg BID on 5/1, but increased to 80/60 on 5/4.  He is being evaluated today for follow up.    Since his last visit, he states that he is doing much better.  His stomach issues have greatly improved, and he has noticed that his appetite has improved.  He no longer has abdominal pain, and denies nausea, vomiting, diarrhea, and constipation.  His weight is up, and was 205# today.  He states that his weight of 192# was not an ideal weight for him, as he thinks he lost a lot of muscle mass when he was hospitalized.  He feels like his current weight gain reflects some fluid but also includes some \"good weight.\"  He states that his weight has risen a couple pounds each day.  He has been eating \"too much,\" and is trying to \"get it under control.\"  He states that since he has not been able to do much, he has been eating more.  He notes trace ankle swelling through the day.  His breathing status has been good, and he notes mildly increased SOB which he attributes to mild fluid retention.  He denies PND and orthopnea.  He has been walking around his yard some, and has been trying to use his recumbent bike.  He otherwise denies chest pain, " palpitations, dizziness, headaches, acute vision changes, fevers, chills, cough, sore throat, and signs of bleeding.      PAST MEDICAL HISTORY:  Past Medical History:   Diagnosis Date     Anemia in chronic renal disease 3/9/2015     Antiplatelet or antithrombotic long-term use      Atrial fibrillation and flutter      CAD (coronary artery disease)     Stemi in , s/p angioplasty     CRD (chronic renal disease), stage IV (H) 2015    hx ATN with dialysis complicating cardiogenic shock  2012     GI bleed     massive lower GI bleed secondary to a cecal ulcer, s/p ileocecal resection in      Heart failure     Biventricular systolic HF, complicated by ARDS requiring tracheostomy     Hyperlipidemia LDL goal <100 2013     Hypertension      Ischemic cardiomyopathy 2013    TTE revealing 40% EF     Myocardial infarction (H)      Other and unspecified nonspecific immunological findings     Anti JKa     Pulmonary edema     episodes of flash pulmonary edema in      Subclinical hypothyroidism        FAMILY HISTORY:  Family History   Problem Relation Age of Onset     Diabetes Mother      Hypertension Mother      Heart Disease Mother      Heart Disease Father          of heart attack, left when he was young     Diabetes Sister      Diabetes Sister      Diabetes Sister      Glaucoma No family hx of      Macular Degeneration No family hx of        SOCIAL HISTORY:  Social History     Socioeconomic History     Marital status: Significant other     Spouse name: None     Number of children: None     Years of education: None     Highest education level: None   Occupational History     None   Social Needs     Financial resource strain: None     Food insecurity     Worry: None     Inability: None     Transportation needs     Medical: None     Non-medical: None   Tobacco Use     Smoking status: Former Smoker     Packs/day: 2.00     Years: 40.00     Pack years: 80.00     Types: Cigarettes     Last attempt to  quit: 12/3/2011     Years since quittin.4     Smokeless tobacco: Never Used   Substance and Sexual Activity     Alcohol use: No     Alcohol/week: 0.0 standard drinks     Drug use: No     Sexual activity: Yes     Partners: Female   Lifestyle     Physical activity     Days per week: None     Minutes per session: None     Stress: None   Relationships     Social connections     Talks on phone: None     Gets together: None     Attends [employer] service: None     Active member of club or organization: None     Attends meetings of clubs or organizations: None     Relationship status: None     Intimate partner violence     Fear of current or ex partner: None     Emotionally abused: None     Physically abused: None     Forced sexual activity: None   Other Topics Concern     Parent/sibling w/ CABG, MI or angioplasty before 65F 55M? Yes      Service Not Asked     Blood Transfusions Not Asked     Caffeine Concern Not Asked     Occupational Exposure Not Asked     Hobby Hazards Not Asked     Sleep Concern Not Asked     Stress Concern Not Asked     Weight Concern Not Asked     Special Diet Not Asked     Back Care Not Asked     Exercise Yes     Comment: limited walking     Bike Helmet Not Asked     Seat Belt Not Asked     Self-Exams Not Asked   Social History Narrative     None       CURRENT MEDICATIONS:  Current Outpatient Medications   Medication Sig Dispense Refill     ACCU-CHEK GUIDE test strip USE TO TEST BLOOD SUGAR FOUR TIMES A DAY BEFORE MEALS AND AT BEDTIME 400 each 3     Alcohol Swabs PADS 1 applicator 4 times daily (before meals and nightly) 100 each 3     aspirin (ASA) 81 MG chewable tablet Take 81 mg by mouth daily       atorvastatin (LIPITOR) 80 MG tablet Take 1 tablet (80 mg) by mouth daily 90 tablet 3     blood glucose monitoring (ACCU-CHEK FASTCLIX) lancets USE TO TEST BLOOD SUGAR FOUR TIMES A DAY AT MEALS AND AT BEDTIME 400 each 3     insulin pen needle (32G X 4 MM) 32G X 4 MM miscellaneous Use 5 pen  needles daily or as directed. 200 each 3     metolazone (ZAROXOLYN) 2.5 MG tablet One tablet only when directed by your doctor 15 tablet 1     metoprolol succinate ER (TOPROL XL) 25 MG 24 hr tablet Take 1 tablet (25 mg) by mouth daily 30 tablet 11     nitroGLYcerin (NITROSTAT) 0.4 MG sublingual tablet Place 1 tablet (0.4 mg) under the tongue every 5 minutes as needed for chest pain For chest pain place 1 tablet under the tongue every 5 minutes for 3 doses. If symptoms persist 5 minutes after 1st dose call 911. 25 tablet 0     potassium chloride ER (KLOR-CON M) 20 MEQ CR tablet Take 1 tablet (20 mEq) by mouth daily 90 tablet 3     senna-docusate (SENOKOT-S/PERICOLACE) 8.6-50 MG tablet Take 1 tablet by mouth 2 times daily as needed for constipation       Sharps Container MISC 1 Device every 30 days 1 each 3     torsemide (DEMADEX) 20 MG tablet Take 3 tablets (60 mg) by mouth 2 times daily 180 tablet 4     vitamin  B complex with vitamin C (VITAMIN  B COMPLEX) TABS Take 1 tablet by mouth daily       Vitamin D, Cholecalciferol, 25 MCG (1000 UT) CAPS Take 1 capsule by mouth 2 times daily         ROS:   As per HPI.    Labs:  CBC RESULTS:  Lab Results   Component Value Date    WBC 9.0 05/11/2020    RBC 4.67 05/11/2020    HGB 12.7 (L) 05/11/2020    HCT 41.4 05/11/2020    MCV 89 05/11/2020    MCH 27.2 05/11/2020    MCHC 30.7 (L) 05/11/2020    RDW 23.5 (H) 05/11/2020     05/11/2020       CMP RESULTS:  Lab Results   Component Value Date     05/11/2020    POTASSIUM 4.0 05/11/2020    CHLORIDE 101 05/11/2020    CO2 29 05/11/2020    ANIONGAP 7 05/11/2020     (H) 05/11/2020    BUN 54 (H) 05/11/2020    CR 2.61 (H) 05/11/2020    GFRESTIMATED 25 (L) 05/11/2020    GFRESTBLACK 29 (L) 05/11/2020    RONNY 8.6 05/11/2020    BILITOTAL 3.8 (H) 04/21/2020    ALBUMIN 3.4 04/21/2020    ALKPHOS 170 (H) 04/21/2020    ALT 31 04/21/2020    AST 26 04/21/2020        INR RESULTS:  Lab Results   Component Value Date    INR 1.31 (H)  05/11/2020       Lab Results   Component Value Date    MAG 2.9 (H) 04/25/2020     Lab Results   Component Value Date    NTBNPI 12,766 (H) 04/21/2020     Lab Results   Component Value Date    NTBNP 5,746 (H) 01/16/2020       Assessment and Plan:   Mr. Cole Jesus is a 60yr old male with a history of CAD (STEMI due to total occlusion of RCA and LCx s/p KEVYN x3 in 2012; STEMI due to occluded pRCA s/p balloon angioplasty and KEVYN 6/2019), ICM (LVEF 30-35% per TTE 11/2019) with recent cardiogenic shock (CI 1.6 per [facility] 2/2020), severe PH (mPA 65 per [facility] 2/2020), moderate mitral regurgitation, pAFib, CKD, iron deficiency, GIB (due to AVMs in the setting of warfarin and DAPT, s/p clipping 7/2019), and DMII who is being evaluated via telephone for CORE follow up.    Labs from 5/11 showed stable electrolytes, improved renal function, and stable blood counts.    Mr. Jesus has continued to gain weight since his hospital discharge.  Advised that he increase torsemide to 80mg twice daily, and to increase KCl to 20mEq twice daily.  Will plan for him to repeat labs in 1 week, and will have him follow up with CORE via telephone in 2-4 weeks.  Will also arrange for him to follow up with Dr. Trujillo in 2-3 months.  Asked that he call with any further weight gain.      Chronic systolic heart failure secondary to ICM (LVEF 30-35% per TTE 11/2019)  Stage C, NYHA Class III  - ACEi/ARB/ARNi:  Contraindicated due to renal function  - Afterload reduction:  Allergic to hydral and isordil  - BB:  Metoprolol XL 25mg daily --> note that this was recently restarted (had been held due to low cardiac output).  Will continue to monitor.  - Aldosterone antagonist:  Contraindicated due to renal function  - SCD ppx:  Declined  - fluid status:  Steady weight gain, will increase torsemide to 80mg twice daily as noted above    CAD  STEMI due to total occlusion of RCA and LCx s/p KEVYN x3 in 2012; STEMI due to occluded pRCA s/p balloon angioplasty and  "KEVYN 6/2019.  No anginal symptoms.  Discussed heart-healthy dieting and getting more aerobic activity.  Remains on aspirin, clopidogrel, and metoprolol.  Note prior intolerance to statins.    pAFib  Developed GIB while on anticoagulation, and declines retrialling.      CKD  Baseline cr ~2.7, follows with renal team.  Cr stable 5/11, will continue to trend with diuresis.      Cole Jesus is a 60 year old male who is being evaluated via a billable telephone visit.      The patient has been notified of following:     \"This telephone visit will be conducted via a call between you and your physician/provider. We have found that certain health care needs can be provided without the need for a physical exam.  This service lets us provide the care you need with a short phone conversation.  If a prescription is necessary we can send it directly to your pharmacy.  If lab work is needed we can place an order for that and you can then stop by our lab to have the test done at a later time.    Telephone visits are billed at different rates depending on your insurance coverage. During this emergency period, for some insurers they may be billed the same as an in-person visit.  Please reach out to your insurance provider with any questions.    If during the course of the call the physician/provider feels a telephone visit is not appropriate, you will not be charged for this service.\"    Patient has given verbal consent for Telephone visit? Yes    What phone number would you like to be contacted at? [phone]    How would you like to obtain your AVS? MyChart    Medications were reconciled.     Jazmin Alston CMA    9:04 AM      The above was reviewed with Mr. Jesus, who verbalized understanding and will call with further questions/concerns.    30 minutes spent with patient, with >50% in counseling and/or coordination of care as described above.      Sonal Stone, MARITA, FNP-BC, CHFN  Advanced Heart Failure Nurse " Practitioner  [facility]  Patient Care Team:  Silas Enciso MD as PCP - General  Dianna Davis, APRN CNS as Registered Nurse (Clinical Nurse Specialist)  Alen Trujillo MD as MD (Cardiology)  Coordinator,  Bmt Nurse as Nurse Coordinator (Cardiology)  Kavita Chapman, THOMASN as LPHoa Winters, APRN CNP as Nurse Practitioner (Cardiology)  Bobbi Guillen, RN as Nurse Coordinator (Cardiology)  Zach Phelan RN as Specialty Care Coordinator (Cardiology)  Smiley Argueta PA-C as Physician Assistant (Physician Assistant)  Dian Leblanc, APRN CNP as Assigned PCP  Samia Chawla [facility] as Pharmacist (Pharmacist Ambulatory Care)  HENRRY KO

## 2020-05-20 NOTE — TELEPHONE ENCOUNTER
Called Cole to check in. He reports he is feeling okay. He reports his 'weight jumps around' but has been relatively within the same area. Reports he lost 3lb one day and then gained it back the next day. He reports his feet are a little swollen to his ankles but not above his ankles. Reports this has just  Been going on for a couple days. He thinks he may have drank too much water one day or had some 'salty chicken' that caused this. Overall, he reports he is doing good and feeling well, denies SOB and reports he is able to do his chores and work in his yard. REports he is working hard on the low sodium diet but is not perfect.  Will route to Snoal for further review.

## 2020-05-22 NOTE — TELEPHONE ENCOUNTER
Discussed with sonal kearney NP    Date: 5/22/2020    Time of Call: 11:20 AM     Diagnosis:  Heart failure     [ VORB ] Ordering provider: Sonal Kearney NP    Order: increase torsemide to 100mg BID. INcrease potassium to 40mEq BID. Repeat BMP next week. Keep follow up for 6/3. Continue to limit salt and fluid.     Order received by: Jada Akhtar RN     Follow-up/additional notes: Called Sonia to discuss. No answer. Will try again shortly.  12:01 PM called sonia. No answer on home phone. Left voicemail on cell phone. Will await call back.   1:21 PM  Called Sonia. Discussed medication changes as above. He stated understanding. SCheduled for labs 5/27 at 0730. WIll follow up on results once received. No further questions at this time.

## 2020-05-27 NOTE — TELEPHONE ENCOUNTER
Called Cole to check in after increasing torsemide last week. He reports he is doing better. Reports weight is better, weight today was 201lb. Reports the swelling in his feet is getting better but not all the way gone yet. He reports his breathing is getting better as well. Discussed lab results. Will have provider review. Set for follow up phone call with TORIBIO Berger on 6/3.

## 2020-06-03 NOTE — LETTER
6/3/2020      RE: Cole Jesus  [address]  [facility] [address]       Dear Colleague,    Thank you for the opportunity to participate in the care of your patient, Cole Jesus, at the [facility]. Please see a copy of my visit note below.    Telephone visit  Start time: 10:27 AM  End time: 10:46 AM      HPI:   60 year old male with history of occluded pRCA s/p balloon angioplasty and stenting in 6/2019, STEMI s/p DESx3 for total occlusion of RCA and Lcx (2012), GIB due to AV malformation (in the setting of warfarin and DAPT) s/p EGD with clip (7/5/2019), ischemic cardiomyopathy, HFrEF (last EF=30-35% 11/12/19), severe pHTN, moderate mitral regurgitation, atrial fibrillation, CKD stage IV who presented for planned RHC, found to be in ambulatory cardiogenic shock.     He was recently admitted from 4/21-4/26 for hypervolemia. He was diuresed with lasix drip and discharged at a weight of 193 lbs (one hospital scale) 196 on home scale. (He was 218 lbs on admission).     He last saw [facility] 5/15/2020.  His torsemide was increased to 80 mg BID and Kcl was increased to 20 meq BID.     This visit he is feeling okay. Still feels like he has some fluid on board. Mild to moderate swelling    Today he notes that he has not lost any weight with increased diuretics.  He is up another 3 pounds.  Was 205 pounds at his last visit in 208 pounds today.  He notes that he was 196 pounds on his home scale when he left the hospital.  He has mild to moderate swelling in his legs, describes as puffiness in his ankles.  He has more abdominal swelling than usual, again rated moderate.  He is dyspnea on exertion is pretty stable, he usually has to take a break after about 50 or 60 feet at the beginning of exercise, but if he goes on he feels better and stronger.  He is able to do all of his shopping.  He is able to come up and down the stairs doing laundry.  No  shortness of breath at rest.  He has no orthopnea or PND.  No lightheadedness, dizziness, presyncope, syncope, palpitations, chest pain.  Appetite is slightly decreased from his normal.    He takes his blood pressure at home, was 101/66 today.  Heart rate 81.  Weight 208.    Cardiac Medications  ASA 81 mg daily  Torsemide 100 mg BID  Kcl 40 meq daily  Metoprolol succinate 25 mg daily  Nitroglycerin    PAST MEDICAL HISTORY:  Past Medical History:   Diagnosis Date     Anemia in chronic renal disease 3/9/2015     Antiplatelet or antithrombotic long-term use      Atrial fibrillation and flutter      CAD (coronary artery disease)     Stemi in , s/p angioplasty     CRD (chronic renal disease), stage IV (H) 2015    hx ATN with dialysis complicating cardiogenic shock  2012     GI bleed     massive lower GI bleed secondary to a cecal ulcer, s/p ileocecal resection in      Heart failure     Biventricular systolic HF, complicated by ARDS requiring tracheostomy     Hyperlipidemia LDL goal <100 2013     Hypertension      Ischemic cardiomyopathy 2013    TTE revealing 40% EF     Myocardial infarction (H)      Other and unspecified nonspecific immunological findings     Anti JKa     Pulmonary edema     episodes of flash pulmonary edema in      Subclinical hypothyroidism        FAMILY HISTORY:  Family History   Problem Relation Age of Onset     Diabetes Mother      Hypertension Mother      Heart Disease Mother      Heart Disease Father          of heart attack, left when he was young     Diabetes Sister      Diabetes Sister      Diabetes Sister      Glaucoma No family hx of      Macular Degeneration No family hx of        SOCIAL HISTORY:  Social History     Socioeconomic History     Marital status: Significant other     Spouse name: Not on file     Number of children: Not on file     Years of education: Not on file     Highest education level: Not on file   Occupational History     Not on file    Social Needs     Financial resource strain: Not on file     Food insecurity:     Worry: Not on file     Inability: Not on file     Transportation needs:     Medical: Not on file     Non-medical: Not on file   Tobacco Use     Smoking status: Former Smoker     Packs/day: 2.00     Years: 40.00     Pack years: 80.00     Types: Cigarettes     Last attempt to quit: 12/3/2011     Years since quittin.2     Smokeless tobacco: Never Used   Substance and Sexual Activity     Alcohol use: No     Alcohol/week: 0.0 standard drinks     Drug use: No     Sexual activity: Yes     Partners: Female   Lifestyle     Physical activity:     Days per week: Not on file     Minutes per session: Not on file     Stress: Not on file   Relationships     Social connections:     Talks on phone: Not on file     Gets together: Not on file     Attends Mu-ism service: Not on file     Active member of club or organization: Not on file     Attends meetings of clubs or organizations: Not on file     Relationship status: Not on file     Intimate partner violence:     Fear of current or ex partner: Not on file     Emotionally abused: Not on file     Physically abused: Not on file     Forced sexual activity: Not on file   Other Topics Concern     Parent/sibling w/ CABG, MI or angioplasty before 65F 55M? Yes      Service Not Asked     Blood Transfusions Not Asked     Caffeine Concern Not Asked     Occupational Exposure Not Asked     Hobby Hazards Not Asked     Sleep Concern Not Asked     Stress Concern Not Asked     Weight Concern Not Asked     Special Diet Not Asked     Back Care Not Asked     Exercise Yes     Comment: limited walking     Bike Helmet Not Asked     Seat Belt Not Asked     Self-Exams Not Asked   Social History Narrative     Not on file       CURRENT MEDICATIONS:  ACCU-CHEK GUIDE test strip, USE TO TEST BLOOD SUGAR FOUR TIMES A DAY BEFORE MEALS AND AT BEDTIME  Alcohol Swabs PADS, 1 applicator 4 times daily (before meals and  nightly)  aspirin (ASA) 81 MG chewable tablet, Take 81 mg by mouth daily  atorvastatin (LIPITOR) 80 MG tablet, Take 1 tablet (80 mg) by mouth daily  blood glucose monitoring (ACCU-CHEK FASTCLIX) lancets, USE TO TEST BLOOD SUGAR FOUR TIMES A DAY AT MEALS AND AT BEDTIME  insulin pen needle (32G X 4 MM) 32G X 4 MM miscellaneous, Use 5 pen needles daily or as directed.  metolazone (ZAROXOLYN) 2.5 MG tablet, One tablet only when directed by your doctor  metoprolol succinate ER (TOPROL XL) 25 MG 24 hr tablet, Take 1 tablet (25 mg) by mouth daily  nitroGLYcerin (NITROSTAT) 0.4 MG sublingual tablet, Place 1 tablet (0.4 mg) under the tongue every 5 minutes as needed for chest pain For chest pain place 1 tablet under the tongue every 5 minutes for 3 doses. If symptoms persist 5 minutes after 1st dose call 911.  potassium chloride ER (KLOR-CON M) 20 MEQ CR tablet, Take 2 tablets (40 mEq) by mouth 2 times daily  senna-docusate (SENOKOT-S/PERICOLACE) 8.6-50 MG tablet, Take 1 tablet by mouth 2 times daily as needed for constipation  Sharps Container MISC, 1 Device every 30 days  torsemide (DEMADEX) 20 MG tablet, Take 5 tablets (100 mg) by mouth 2 times daily  vitamin  B complex with vitamin C (VITAMIN  B COMPLEX) TABS, Take 1 tablet by mouth daily  Vitamin D, Cholecalciferol, 25 MCG (1000 UT) CAPS, Take 1 capsule by mouth 2 times daily    No current facility-administered medications on file prior to visit.   He our visit with the patient    ROS:   Refer to HPI    EXAM:  There were no vitals taken for this visit.  N/A phone visit    Labs, reviewed with patient in clinic today:  CBC RESULTS:  Lab Results   Component Value Date    WBC 9.0 05/11/2020    RBC 4.67 05/11/2020    HGB 12.7 (L) 05/11/2020    HCT 41.4 05/11/2020    MCV 89 05/11/2020    MCH 27.2 05/11/2020    MCHC 30.7 (L) 05/11/2020    RDW 23.5 (H) 05/11/2020     05/11/2020       CMP RESULTS:  Lab Results   Component Value Date     05/27/2020    POTASSIUM 3.8  05/27/2020    CHLORIDE 98 05/27/2020    CO2 33 (H) 05/27/2020    ANIONGAP 7 05/27/2020     (H) 05/27/2020    BUN 55 (H) 05/27/2020    CR 2.40 (H) 05/27/2020    GFRESTIMATED 28 (L) 05/27/2020    GFRESTBLACK 32 (L) 05/27/2020    RONNY 9.5 05/27/2020    BILITOTAL 3.8 (H) 04/21/2020    ALBUMIN 3.4 04/21/2020    ALKPHOS 170 (H) 04/21/2020    ALT 31 04/21/2020    AST 26 04/21/2020        INR RESULTS:  Lab Results   Component Value Date    INR 1.31 (H) 05/18/2020       Lab Results   Component Value Date    MAG 2.9 (H) 04/25/2020     Lab Results   Component Value Date    NTBNPI 12,766 (H) 04/21/2020     Lab Results   Component Value Date    NTBNP 5,746 (H) 01/16/2020       Diagnostics:  ECHO 2/11/2020  Interpretation Summary  Severely (EF 29%) reduced left ventricular function is present. Severe diffuse  hypokinesis is present.  Global right ventricular function is moderately reduced.  Moderate mitral insufficiency is present.  Mild to moderate tricuspid insufficiency is present.  Pulmonary hypertension present. Estimated pulmonary artery systolic pressure  is 69 (54+15) mmHg .  Dilation of the inferior vena cava is present with abnormal respiratory  variation in diameter.  Small posterior pericardial effusion without echocardiographic evidence of  Tamponade.    2/10/2020[facility]  RA --/24/22  /20  /50/65  PCWP --/51/40    DONOVAN 3.5/1.6  TD 3.8/1.8    PVR 8.5    Assessment and Plan:   60 year old male with history of occluded pRCA s/p balloon angioplasty and stenting in 6/2019, STEMI s/p DESx3 for total occlusion of RCA and Lcx (2012), GIB due to AV malformation (in the setting of warfarin and DAPT) s/p EGD with clip (7/5/2019), ischemic cardiomyopathy, HFrEF (last EF=30-35% 11/12/19), severe pHTN, moderate mitral regurgitation, atrial fibrillation, CKD stage IV who was recently admitted for hypervolemia and discharged 4 days ago. Presents for post-hospital follow-up.     New dry weight of 193 (hospital  weight)/196 (home weight).  Over the past few weeks he has been struggling with weight gain and symptoms of hypervolemia.  His torsemide has been progressively increased but we have not been having any luck.  He has about 12 pounds up from his discharge weight at 208 today on his home scale I think that likely we are going to need metolazone to reverse this.  Given his intermittent hyponatremia and renal renal function we will check a set of labs first in the meantime I will have him increase his evening dose of torsemide to 140 mg tonight.  Extra 40 mEq of potassium tomorrow.  He will get a BMP at 7:30 AM tomorrow and based on those results we will decide if he is in a place where metolazone is a good idea versus more torsemide.    If we continue to struggle with this and have limited success getting his weight down, weight may need to consider stopping his metoprolol again.  This is been stopped in the past for low output.    # Chronic systolic heart failure/HFrEF secondary to ICM    Stage C. NYHA Class III.    Fluid status: Hypervolemic, increasing torsemide to 140 mg tonight.  We will go back to normal torsemide tomorrow while he waits for plan after labs.  Will likely need a metolazone.  ACEi/ARB/ARNI: contraindicated due to renal dysfunction/hyperkalemia. Allergy to hydralazine and isosorbid.  BB: Metoprolol 25 mg daily- recently restarted in the hospital (had been stopped in the past for low output) monitor closely  Aldosterone antagonist: contraindicated due to renal dysfunction  SCD prophylaxis: has declined on multiple conversations  NSAID use: contraindicated  Sleep apnea evaluation: will discuss at future appointment  Remote monitoring: N/A     #STEMI s/p DESx3 (2012)  #CAD s/p PCI proximal RCA (6/2019)  - CAD stable, no symptoms. Continue plavix and aspirin. Statin intolerance.     #Paroxysmal Afib   CHADSVASC 3 (HF, DM, CAD). Not on a/c due to history of GI bleed. Not willing to retrial. No symptoms of  "afib today.  Continue metoprolol for now.     #CKD IV  Baseline serum creatinine around 2.7.  Has been improving in the last few weeks, 2.4 on last labs  -BMP tomorrow    # H/o GIB  - No symptoms today    # GOC. At a prior visit We did have a short goals of care conversation. He continues to confirm that an ICD and advanced therapies are not within his goals of care. He did however, sight that he is generally happy with his current quality of life and that \"living has become important to him.\" At this point he does want to remain full code. He also sited that he likely wants his girlfriend to be his medical decision maker. I did encourage him to finalize this with paperwork as soon as he knows this for certain. I also encouraged him to have a very open conversation with her about what he would want and I also offered to have her join us on a future phone call to help guide this conversation. He doesn't want to do this currently, but he does think that this would be helpful in the future. I also offered him a social work visit for help with ACP paper work- he declined for now but he did say he would be interested in the future.   - Pt confirmed his desire to be FULL CODE  - He confirms that he is not interested in ICD or advanced therapies at this time.    Follow up   -Labs tomorrow (consider metolazone)  - RN weight check in 1 week  - [facility] in two weeks  - Dr. Trujillo in August as scheduled                CC  SELF, REFERRED      Cole Jesus is a 60 year old male who is being evaluated via a billable telephone visit.      The patient has been notified of following:     \"This telephone visit will be conducted via a call between you and your physician/provider. We have found that certain health care needs can be provided without the need for a physical exam.  This service lets us provide the care you need with a short phone conversation.  If a prescription is necessary we can send it directly to your pharmacy.  If " "lab work is needed we can place an order for that and you can then stop by our lab to have the test done at a later time.    Telephone visits are billed at different rates depending on your insurance coverage. During this emergency period, for some insurers they may be billed the same as an in-person visit.  Please reach out to your insurance provider with any questions.    If during the course of the call the physician/provider feels a telephone visit is not appropriate, you will not be charged for this service.\"    Patient has given verbal consent for Telephone visit?  Yes    What phone number would you like to be contacted at? [phone]      Please do not hesitate to contact me if you have any questions/concerns.     Sincerely,     Ena Kruger PA-C    "

## 2020-06-03 NOTE — PROGRESS NOTES
Telephone visit  Start time: 10:27 AM  End time: 10:46 AM      HPI:   60 year old male with history of occluded pRCA s/p balloon angioplasty and stenting in 6/2019, STEMI s/p DESx3 for total occlusion of RCA and Lcx (2012), GIB due to AV malformation (in the setting of warfarin and DAPT) s/p EGD with clip (7/5/2019), ischemic cardiomyopathy, HFrEF (last EF=30-35% 11/12/19), severe pHTN, moderate mitral regurgitation, atrial fibrillation, CKD stage IV who presented for planned RHC, found to be in ambulatory cardiogenic shock.     He was recently admitted from 4/21-4/26 for hypervolemia. He was diuresed with lasix drip and discharged at a weight of 193 lbs (one hospital scale) 196 on home scale. (He was 218 lbs on admission).     He last saw [facility] clinic 5/15/2020.  His torsemide was increased to 80 mg BID and Kcl was increased to 20 meq BID.     This visit he is feeling okay. Still feels like he has some fluid on board. Mild to moderate swelling    Today he notes that he has not lost any weight with increased diuretics.  He is up another 3 pounds.  Was 205 pounds at his last visit in 208 pounds today.  He notes that he was 196 pounds on his home scale when he left the hospital.  He has mild to moderate swelling in his legs, describes as puffiness in his ankles.  He has more abdominal swelling than usual, again rated moderate.  He is dyspnea on exertion is pretty stable, he usually has to take a break after about 50 or 60 feet at the beginning of exercise, but if he goes on he feels better and stronger.  He is able to do all of his shopping.  He is able to come up and down the stairs doing laundry.  No shortness of breath at rest.  He has no orthopnea or PND.  No lightheadedness, dizziness, presyncope, syncope, palpitations, chest pain.  Appetite is slightly decreased from his normal.    He takes his blood pressure at home, was 101/66 today.  Heart rate 81.  Weight 208.    Cardiac Medications  ASA 81 mg  daily  Torsemide 100 mg BID  Kcl 40 meq daily  Metoprolol succinate 25 mg daily  Nitroglycerin    PAST MEDICAL HISTORY:  Past Medical History:   Diagnosis Date     Anemia in chronic renal disease 3/9/2015     Antiplatelet or antithrombotic long-term use      Atrial fibrillation and flutter      CAD (coronary artery disease)     Stemi in , s/p angioplasty     CRD (chronic renal disease), stage IV (H) 2015    hx ATN with dialysis complicating cardiogenic shock  2012     GI bleed     massive lower GI bleed secondary to a cecal ulcer, s/p ileocecal resection in      Heart failure     Biventricular systolic HF, complicated by ARDS requiring tracheostomy     Hyperlipidemia LDL goal <100 2013     Hypertension      Ischemic cardiomyopathy 2013    TTE revealing 40% EF     Myocardial infarction (H)      Other and unspecified nonspecific immunological findings     Anti JKa     Pulmonary edema     episodes of flash pulmonary edema in      Subclinical hypothyroidism        FAMILY HISTORY:  Family History   Problem Relation Age of Onset     Diabetes Mother      Hypertension Mother      Heart Disease Mother      Heart Disease Father          of heart attack, left when he was young     Diabetes Sister      Diabetes Sister      Diabetes Sister      Glaucoma No family hx of      Macular Degeneration No family hx of        SOCIAL HISTORY:  Social History     Socioeconomic History     Marital status: Significant other     Spouse name: Not on file     Number of children: Not on file     Years of education: Not on file     Highest education level: Not on file   Occupational History     Not on file   Social Needs     Financial resource strain: Not on file     Food insecurity:     Worry: Not on file     Inability: Not on file     Transportation needs:     Medical: Not on file     Non-medical: Not on file   Tobacco Use     Smoking status: Former Smoker     Packs/day: 2.00     Years: 40.00     Pack years:  80.00     Types: Cigarettes     Last attempt to quit: 12/3/2011     Years since quittin.2     Smokeless tobacco: Never Used   Substance and Sexual Activity     Alcohol use: No     Alcohol/week: 0.0 standard drinks     Drug use: No     Sexual activity: Yes     Partners: Female   Lifestyle     Physical activity:     Days per week: Not on file     Minutes per session: Not on file     Stress: Not on file   Relationships     Social connections:     Talks on phone: Not on file     Gets together: Not on file     Attends Religion service: Not on file     Active member of club or organization: Not on file     Attends meetings of clubs or organizations: Not on file     Relationship status: Not on file     Intimate partner violence:     Fear of current or ex partner: Not on file     Emotionally abused: Not on file     Physically abused: Not on file     Forced sexual activity: Not on file   Other Topics Concern     Parent/sibling w/ CABG, MI or angioplasty before 65F 55M? Yes      Service Not Asked     Blood Transfusions Not Asked     Caffeine Concern Not Asked     Occupational Exposure Not Asked     Hobby Hazards Not Asked     Sleep Concern Not Asked     Stress Concern Not Asked     Weight Concern Not Asked     Special Diet Not Asked     Back Care Not Asked     Exercise Yes     Comment: limited walking     Bike Helmet Not Asked     Seat Belt Not Asked     Self-Exams Not Asked   Social History Narrative     Not on file       CURRENT MEDICATIONS:  ACCU-CHEK GUIDE test strip, USE TO TEST BLOOD SUGAR FOUR TIMES A DAY BEFORE MEALS AND AT BEDTIME  Alcohol Swabs PADS, 1 applicator 4 times daily (before meals and nightly)  aspirin (ASA) 81 MG chewable tablet, Take 81 mg by mouth daily  atorvastatin (LIPITOR) 80 MG tablet, Take 1 tablet (80 mg) by mouth daily  blood glucose monitoring (ACCU-CHEK FASTCLIX) lancets, USE TO TEST BLOOD SUGAR FOUR TIMES A DAY AT MEALS AND AT BEDTIME  insulin pen needle (32G X 4 MM) 32G X 4 MM  miscellaneous, Use 5 pen needles daily or as directed.  metolazone (ZAROXOLYN) 2.5 MG tablet, One tablet only when directed by your doctor  metoprolol succinate ER (TOPROL XL) 25 MG 24 hr tablet, Take 1 tablet (25 mg) by mouth daily  nitroGLYcerin (NITROSTAT) 0.4 MG sublingual tablet, Place 1 tablet (0.4 mg) under the tongue every 5 minutes as needed for chest pain For chest pain place 1 tablet under the tongue every 5 minutes for 3 doses. If symptoms persist 5 minutes after 1st dose call 911.  potassium chloride ER (KLOR-CON M) 20 MEQ CR tablet, Take 2 tablets (40 mEq) by mouth 2 times daily  senna-docusate (SENOKOT-S/PERICOLACE) 8.6-50 MG tablet, Take 1 tablet by mouth 2 times daily as needed for constipation  Sharps Container MISC, 1 Device every 30 days  torsemide (DEMADEX) 20 MG tablet, Take 5 tablets (100 mg) by mouth 2 times daily  vitamin  B complex with vitamin C (VITAMIN  B COMPLEX) TABS, Take 1 tablet by mouth daily  Vitamin D, Cholecalciferol, 25 MCG (1000 UT) CAPS, Take 1 capsule by mouth 2 times daily    No current facility-administered medications on file prior to visit.   He our visit with the patient    ROS:   Refer to HPI    EXAM:  There were no vitals taken for this visit.  N/A phone visit    Labs, reviewed with patient in clinic today:  CBC RESULTS:  Lab Results   Component Value Date    WBC 9.0 05/11/2020    RBC 4.67 05/11/2020    HGB 12.7 (L) 05/11/2020    HCT 41.4 05/11/2020    MCV 89 05/11/2020    MCH 27.2 05/11/2020    MCHC 30.7 (L) 05/11/2020    RDW 23.5 (H) 05/11/2020     05/11/2020       CMP RESULTS:  Lab Results   Component Value Date     05/27/2020    POTASSIUM 3.8 05/27/2020    CHLORIDE 98 05/27/2020    CO2 33 (H) 05/27/2020    ANIONGAP 7 05/27/2020     (H) 05/27/2020    BUN 55 (H) 05/27/2020    CR 2.40 (H) 05/27/2020    GFRESTIMATED 28 (L) 05/27/2020    GFRESTBLACK 32 (L) 05/27/2020    RONNY 9.5 05/27/2020    BILITOTAL 3.8 (H) 04/21/2020    ALBUMIN 3.4 04/21/2020     ALKPHOS 170 (H) 04/21/2020    ALT 31 04/21/2020    AST 26 04/21/2020        INR RESULTS:  Lab Results   Component Value Date    INR 1.31 (H) 05/18/2020       Lab Results   Component Value Date    MAG 2.9 (H) 04/25/2020     Lab Results   Component Value Date    NTBNPI 12,766 (H) 04/21/2020     Lab Results   Component Value Date    NTBNP 5,746 (H) 01/16/2020       Diagnostics:  ECHO 2/11/2020  Interpretation Summary  Severely (EF 29%) reduced left ventricular function is present. Severe diffuse  hypokinesis is present.  Global right ventricular function is moderately reduced.  Moderate mitral insufficiency is present.  Mild to moderate tricuspid insufficiency is present.  Pulmonary hypertension present. Estimated pulmonary artery systolic pressure  is 69 (54+15) mmHg .  Dilation of the inferior vena cava is present with abnormal respiratory  variation in diameter.  Small posterior pericardial effusion without echocardiographic evidence of  Tamponade.    2/10/2020[facility]  RA --/24/22  /20  /50/65  PCWP --/51/40    DONOVAN 3.5/1.6  TD 3.8/1.8    PVR 8.5    Assessment and Plan:   60 year old male with history of occluded pRCA s/p balloon angioplasty and stenting in 6/2019, STEMI s/p DESx3 for total occlusion of RCA and Lcx (2012), GIB due to AV malformation (in the setting of warfarin and DAPT) s/p EGD with clip (7/5/2019), ischemic cardiomyopathy, HFrEF (last EF=30-35% 11/12/19), severe pHTN, moderate mitral regurgitation, atrial fibrillation, CKD stage IV who was recently admitted for hypervolemia and discharged 4 days ago. Presents for post-hospital follow-up.     New dry weight of 193 (hospital weight)/196 (home weight).  Over the past few weeks he has been struggling with weight gain and symptoms of hypervolemia.  His torsemide has been progressively increased but we have not been having any luck.  He has about 12 pounds up from his discharge weight at 208 today on his home scale I think that likely we are  going to need metolazone to reverse this.  Given his intermittent hyponatremia and renal renal function we will check a set of labs first in the meantime I will have him increase his evening dose of torsemide to 140 mg tonight.  Extra 40 mEq of potassium tomorrow.  He will get a BMP at 7:30 AM tomorrow and based on those results we will decide if he is in a place where metolazone is a good idea versus more torsemide.    If we continue to struggle with this and have limited success getting his weight down, weight may need to consider stopping his metoprolol again.  This is been stopped in the past for low output.    # Chronic systolic heart failure/HFrEF secondary to ICM    Stage C. NYHA Class III.    Fluid status: Hypervolemic, increasing torsemide to 140 mg tonight.  We will go back to normal torsemide tomorrow while he waits for plan after labs.  Will likely need a metolazone.  ACEi/ARB/ARNI: contraindicated due to renal dysfunction/hyperkalemia. Allergy to hydralazine and isosorbid.  BB: Metoprolol 25 mg daily- recently restarted in the hospital (had been stopped in the past for low output) monitor closely  Aldosterone antagonist: contraindicated due to renal dysfunction  SCD prophylaxis: has declined on multiple conversations  NSAID use: contraindicated  Sleep apnea evaluation: will discuss at future appointment  Remote monitoring: N/A     #STEMI s/p DESx3 (2012)  #CAD s/p PCI proximal RCA (6/2019)  - CAD stable, no symptoms. Continue plavix and aspirin. Statin intolerance.     #Paroxysmal Afib   CHADSVASC 3 (HF, DM, CAD). Not on a/c due to history of GI bleed. Not willing to retrial. No symptoms of afib today.  Continue metoprolol for now.     #CKD IV  Baseline serum creatinine around 2.7.  Has been improving in the last few weeks, 2.4 on last labs  -BMP tomorrow    # H/o GIB  - No symptoms today    # GOC. At a prior visit We did have a short goals of care conversation. He continues to confirm that an ICD and  "advanced therapies are not within his goals of care. He did however, sight that he is generally happy with his current quality of life and that \"living has become important to him.\" At this point he does want to remain full code. He also sited that he likely wants his girlfriend to be his medical decision maker. I did encourage him to finalize this with paperwork as soon as he knows this for certain. I also encouraged him to have a very open conversation with her about what he would want and I also offered to have her join us on a future phone call to help guide this conversation. He doesn't want to do this currently, but he does think that this would be helpful in the future. I also offered him a social work visit for help with ACP paper work- he declined for now but he did say he would be interested in the future.   - Pt confirmed his desire to be FULL CODE  - He confirms that he is not interested in ICD or advanced therapies at this time.    Follow up   -Labs tomorrow (consider metolazone)  - RN weight check in 1 week  - CORE in two weeks  - Dr. Trujillo in August as scheduled                CC  SELF, REFERRED    "

## 2020-06-03 NOTE — PROGRESS NOTES
"Cole Jesus is a 60 year old male who is being evaluated via a billable telephone visit.      The patient has been notified of following:     \"This telephone visit will be conducted via a call between you and your physician/provider. We have found that certain health care needs can be provided without the need for a physical exam.  This service lets us provide the care you need with a short phone conversation.  If a prescription is necessary we can send it directly to your pharmacy.  If lab work is needed we can place an order for that and you can then stop by our lab to have the test done at a later time.    Telephone visits are billed at different rates depending on your insurance coverage. During this emergency period, for some insurers they may be billed the same as an in-person visit.  Please reach out to your insurance provider with any questions.    If during the course of the call the physician/provider feels a telephone visit is not appropriate, you will not be charged for this service.\"    Patient has given verbal consent for Telephone visit?  Yes    What phone number would you like to be contacted at? [phone]    "

## 2020-06-03 NOTE — PATIENT INSTRUCTIONS
Take your medicines every day, as directed    Changes made today:  o Tonight: take 140 mg of torsemide tonight. Take 100 mg of torsemide in the morning and your normal potassium  o Tonight: Take an EXTRA 40 meq of Potassium  o Tomorrow after your labs we will let you know what changes to make in your diuretics- we may be able to use a metolazone   Monitor Your Weight and Symptoms    Contact us if you:      Gain 2 pounds in one day or 5 pounds in one week    Feel more short of breath    Notice more leg swelling    Feel lightheadeded   Change your lifestyle    Limit Salt or Sodium:    2000 mg  Limit Fluids:    2000 mL or approximately 64 ounces  Eat a Heart Healthy Diet    Low in saturated fats  Stay Active:    Aim to move at least 150 minutes every  week         To Contact us    During Business Hours:  [phone], option # 1 (University)  Then option # 4 (medical questions)     After hours, weekends or holidays:   [phone], Option #4  Ask to speak to the On-Call Cardiologist. Inform them you are a CORE/heart failure patient at the [facility].     Use [email or web address] allows you to communicate directly with your heart team through secure messaging.    [employer] can be accessed any time on your phone, computer, or tablet.    If you need assistance, we'd be happy to help!         Keep your Heart Appointments:    - Labs tomorrow- we will make you an appointment for 7:30  - We will let you know what your diuertic plan after we see your labs  - RN weight check next week  - CORE in 2 weeks with labs

## 2020-06-04 NOTE — TELEPHONE ENCOUNTER
Called Cole back with following recommendations:  Date: 6/4/2020    Time of Call: 3:42 PM     Diagnosis:  HFrEF     VORB:Ordering provider: Sade ROONEY  Order: Take Metolaozne 25 mg x1 with 40 mEq Potassium. Increase Torsemide to 120mg in the AM, 100 mg in the PM. Continue current potassium dose. BMP on Monday.      Order received by: Patricia Vo RN      Follow-up/additional notes: reviewed with Cole who verbalized understanding.

## 2020-06-22 NOTE — PROGRESS NOTES
Patient's lab results reviewed by Dr. Trujillo and new orders received.  Date: 6/22/2020    Time of Call: 1:34 PM     Diagnosis:  HF, Hypokalemia     [ VORB ] Ordering provider: Dr. Trujillo  Order: Patient is to take an extra 40 mEq of potassium chloride today and tomorrow and recheck BMP in one week.      Order received by: Zach VARGAS RN     Follow-up/additional notes: Orders entered for BMP and patient called and notified. Patient states that his prescription bottle states 20 mEq twice daily and that he often doesn't take his potassium medicine. Chart review indicates that on 5/22/20 Torsemide was increased and Potassium was increased to 40 mEq twice daily. Patient encouraged to take an extra pill today and tomorrow and to take his scheduled potassium. Given that patient has not been taking Potassium, patient is going to take 20 mEq twice daily for the rest of the week and will recheck BMP on Monday.  Patient is agreeable to plan. Will notify provider.

## 2020-06-26 NOTE — TELEPHONE ENCOUNTER
Lab results reviewed, potassium now at 4.3. Provider notified, patient is to remain on same medical regimen of Potassium Chloride 20 Meq twice daily. Patient is to have follow up labs and CORE appointment in clinic in 2 weeks. Patient does not have video capabilities. Patient was called and is agreeable to plan.

## 2020-06-29 NOTE — TELEPHONE ENCOUNTER
Discussed with TORIBIO Berger.    Date: 6/29/2020    Time of Call: 2:03 PM     Diagnosis:  Heart failure     [ VORB ] Ordering provider: TORIBIO Berger    Order: metolazone 2.5mg once today. Extra 40meq potassium today in additional to normal dosing. BMP on Wednesday and phone call to follow up     Order received by: bryant wang rn     Follow-up/additional notes: called sonia back to discuss. He states understanding. reports he does have a small supply of metolazone at home so doesn't need a new rx yet. Lab appt set up for 7/1 in the AM. Will plan for phone call later that day to check in on weights and review labs. Will decide at that time if we need another metolazone dose or not. Sonia states understanding. No further questions at this time.

## 2020-06-29 NOTE — TELEPHONE ENCOUNTER
"[facility]    Phone Message    May a detailed message be left on voicemail: yes     Reason for Call: Medication Question or concern regarding medication   Prescription Clarification  Name of Medication: metolazone (ZAROXOLYN) 2.5 MG tablet  Prescribing Provider: Dr. Trujillo   Can he take this right now? Vernon calling to ask if he can take a metolazone (ZAROXOLYN) 2.5 MG tablet, because he said there is water build up in his legs. Cole said his weight is up from two or three days ago when it was 209 lbs. Cole's weight is now 212 lbs. Cole's directions on metolazone (ZAROXOLYN) 2.5 MG tablet say \"One tablet only when directed by your doctor,\" so he wanted to know if now would be the time to do that. Please give Cole a call at your earliest convenience to discuss.          Action Taken: Message routed to:  [facility]  Cardio    Travel Screening: Not Applicable                                                                      "

## 2020-06-29 NOTE — TELEPHONE ENCOUNTER
Called Cole back. He reports his weight is up 3lb to 212lb over the last few days, but he isn't sure how much additional weight he gained before that. Reports about a month ago he thinks he was closer to 200lb and he is feeling the fluid. He reports his legs are swollen up to his knee. Reports his breathing is okay but he can notice the fluid also building in his abdomen. He reports normally his legs are not swollen/puffy at all.  He also tells me that his fridge went out so he has been eating more fast food and packaged foods than he normally would. His new fridge should be delivered tomorrow he reports so he can get back to his normal eating patterns.

## 2020-07-01 NOTE — TELEPHONE ENCOUNTER
Called Cole to review labs and see how he was feeling after Metolazone. Reports he lost 10 lbs with it and still feels like he's peeing a lot from it. Swelling in his legs and abdomen is way down. He feels much better. Weight today 202 lbs.   Creatinine is a bit up. Confirmed he is taking Torsemide 100 mg BID and potassium 40 meq BID. Did not take any extra potassium with the metolazone and K is 3.4.   Will review with provider and call back with recommendations.   Has follow up with [facility] 7/10 with labs.

## 2020-07-01 NOTE — TELEPHONE ENCOUNTER
Reviewed with Sade Kruger. Called oCle back with following recommendations:  Date: 7/1/2020    Time of Call: 3:04 PM     Diagnosis:  HFrEF     [ VORB ] Ordering provider: Sade ROONEY  Order: Potassium 40 mEq one time only today. BMP Monday     Order received by: Patricia Vo RN      Follow-up/additional notes: Cole reports he is out of town but will be back and get labs on Wednesday.   Patricia oV RN

## 2020-07-08 NOTE — TELEPHONE ENCOUNTER
Called Cole to review labs. Reports weight is at 205 lbs. Was 202 last week after Metolazone. He is still feeling good. Denies worsening SOB/KING or swelling in legs/abdomen. If anything he thinks they are still improving.     Has CORE follow up next week. Told him I would review labs with provider and if she has any recommendations would call back. Otherwise instructed him if weight goes up before next week he should call us.   Patricia Vo RN

## 2020-07-08 NOTE — TELEPHONE ENCOUNTER
Reviewed with Sade ROONEY. Called Cole back with following recommendations:    Date: 7/8/2020    Time of Call: 4:29 PM     Diagnosis:  HFrEF     VORB: Ordering provider:Sade ROONEY  Order: Starting Saturday, increase Torsemide to 120 mg BID.      Order received by: Patricia Vo RN      Follow-up/additional notes: Reviewed with Cole who verbalized understanding.

## 2020-07-15 NOTE — TELEPHONE ENCOUNTER
Talked to Cole. Reports his days were confused and his significant other reminded him this morning that his appointment was supposed to be yesterday.   Reports he feels about the same as when we talked last week. We rescheduled for next week and I told him if weight trends up at all between now and then to call or mychart us.   Patricia Vo RN

## 2020-07-21 NOTE — PROGRESS NOTES
Please note that this was an inperson visit rather than a virtual visit.    HPI:   60 year old male with history of occluded pRCA s/p balloon angioplasty and stenting in 6/2019, STEMI s/p DESx3 for total occlusion of RCA and Lcx (2012), GIB due to AV malformation (in the setting of warfarin and DAPT) s/p EGD with clip (7/5/2019), ischemic cardiomyopathy, HFrEF (last EF=30-35% 11/12/19), severe pHTN, moderate mitral regurgitation, atrial fibrillation, CKD stage IV who presented for planned RHC, found to be in ambulatory cardiogenic shock.     He was recently admitted from 4/21-4/26 for hypervolemia. He was diuresed with lasix drip and discharged at a weight of 193 lbs (one hospital scale) 196 on home scale. (He was 218 lbs on admission).     He last saw [facility] clinic 6/3/2020  Increased diuretics.     This visit-   He has gained a lot of weight, but He actually feels like his energy is better than it usually is. He has been able to do more yard work. His breathing feels good. Going up and down a flight of stairs does not make him feel SOB, it does make his legs tiered. His biggest complaint is the increased swelling in his legs which is quite significant for him. No orthopnea. No PND. He does have significant LE edema. No abdominal edema. Appetite is back and is good. No nausea. No chest pain. No lightheaded/dizziness/pre-syncope/syncope. Occasional palpitations at his baseline.     Cardiac Medications  ASA 81 mg daily  Torsemide 120 mg BID  Kcl 40 meq BID  Metoprolol succinate 25 mg daily  Atorvastatin 80 mg daily  Metolozaone- last a few weeks ago  Nitroglycerin    PAST MEDICAL HISTORY:  Past Medical History:   Diagnosis Date     Anemia in chronic renal disease 3/9/2015     Antiplatelet or antithrombotic long-term use      Atrial fibrillation and flutter      CAD (coronary artery disease)     Stemi in 12/11, s/p angioplasty     CRD (chronic renal disease), stage IV (H) 03/09/2015    hx ATN with dialysis complicating  cardiogenic shock  2012     GI bleed     massive lower GI bleed secondary to a cecal ulcer, s/p ileocecal resection in      Heart failure     Biventricular systolic HF, complicated by ARDS requiring tracheostomy     Hyperlipidemia LDL goal <100 2013     Hypertension      Ischemic cardiomyopathy 2013    TTE revealing 40% EF     Myocardial infarction (H)      Other and unspecified nonspecific immunological findings     Anti JKa     Pulmonary edema     episodes of flash pulmonary edema in      Subclinical hypothyroidism        FAMILY HISTORY:  Family History   Problem Relation Age of Onset     Diabetes Mother      Hypertension Mother      Heart Disease Mother      Heart Disease Father          of heart attack, left when he was young     Diabetes Sister      Diabetes Sister      Diabetes Sister      Glaucoma No family hx of      Macular Degeneration No family hx of        SOCIAL HISTORY:  Social History     Socioeconomic History     Marital status: Significant other     Spouse name: Not on file     Number of children: Not on file     Years of education: Not on file     Highest education level: Not on file   Occupational History     Not on file   Social Needs     Financial resource strain: Not on file     Food insecurity:     Worry: Not on file     Inability: Not on file     Transportation needs:     Medical: Not on file     Non-medical: Not on file   Tobacco Use     Smoking status: Former Smoker     Packs/day: 2.00     Years: 40.00     Pack years: 80.00     Types: Cigarettes     Last attempt to quit: 12/3/2011     Years since quittin.2     Smokeless tobacco: Never Used   Substance and Sexual Activity     Alcohol use: No     Alcohol/week: 0.0 standard drinks     Drug use: No     Sexual activity: Yes     Partners: Female   Lifestyle     Physical activity:     Days per week: Not on file     Minutes per session: Not on file     Stress: Not on file   Relationships     Social connections:      Talks on phone: Not on file     Gets together: Not on file     Attends [employer] service: Not on file     Active member of club or organization: Not on file     Attends meetings of clubs or organizations: Not on file     Relationship status: Not on file     Intimate partner violence:     Fear of current or ex partner: Not on file     Emotionally abused: Not on file     Physically abused: Not on file     Forced sexual activity: Not on file   Other Topics Concern     Parent/sibling w/ CABG, MI or angioplasty before 65F 55M? Yes      Service Not Asked     Blood Transfusions Not Asked     Caffeine Concern Not Asked     Occupational Exposure Not Asked     Hobby Hazards Not Asked     Sleep Concern Not Asked     Stress Concern Not Asked     Weight Concern Not Asked     Special Diet Not Asked     Back Care Not Asked     Exercise Yes     Comment: limited walking     Bike Helmet Not Asked     Seat Belt Not Asked     Self-Exams Not Asked   Social History Narrative     Not on file       CURRENT MEDICATIONS:  ACCU-CHEK GUIDE test strip, USE TO TEST BLOOD SUGAR FOUR TIMES A DAY BEFORE MEALS AND AT BEDTIME  Alcohol Swabs PADS, 1 applicator 4 times daily (before meals and nightly)  aspirin (ASA) 81 MG chewable tablet, Take 81 mg by mouth daily  atorvastatin (LIPITOR) 80 MG tablet, Take 1 tablet (80 mg) by mouth daily  blood glucose monitoring (ACCU-CHEK FASTCLIX) lancets, USE TO TEST BLOOD SUGAR FOUR TIMES A DAY AT MEALS AND AT BEDTIME  insulin pen needle (32G X 4 MM) 32G X 4 MM miscellaneous, Use 5 pen needles daily or as directed.  metolazone (ZAROXOLYN) 2.5 MG tablet, One tablet only when directed by your doctor  senna-docusate (SENOKOT-S/PERICOLACE) 8.6-50 MG tablet, Take 1 tablet by mouth 2 times daily as needed for constipation  Sharps Container MISC, 1 Device every 30 days  vitamin  B complex with vitamin C (VITAMIN  B COMPLEX) TABS, Take 1 tablet by mouth daily  Vitamin D, Cholecalciferol, 25 MCG (1000 UT) CAPS,  Take 1 capsule by mouth 2 times daily  nitroGLYcerin (NITROSTAT) 0.4 MG sublingual tablet, Place 1 tablet (0.4 mg) under the tongue every 5 minutes as needed for chest pain For chest pain place 1 tablet under the tongue every 5 minutes for 3 doses. If symptoms persist 5 minutes after 1st dose call 911. (Patient not taking: Reported on 6/3/2020)    No current facility-administered medications on file prior to visit.   He our visit with the patient    ROS:   Refer to HPI    EXAM:  /82 (BP Location: Right arm, Patient Position: Chair, Cuff Size: Adult Regular)   Pulse 93   Wt 98.4 kg (217 lb)   SpO2 100%   BMI 32.99 kg/m    GENERAL: Appears comfortable, in no acute distress.   HEENT: Eye symmetrical, no discharge or icterus bilaterally. Mucous membranes moist and without lesions.  CV: RRR, +S1S2, no murmur, rub, or gallop. JVP >12.   RESPIRATORY: Respirations regular, even, and unlabored. Lungs CTA throughout.   GI: Soft and non distended with normoactive bowel sounds. No tenderness, rebound, guarding.   EXTREMITIES: 3+ peripheral edema. All extremities are warm and well perfused  NEUROLOGIC: Alert and interacting appropriately. No focal deficits.   MUSCULOSKELETAL: No joint swelling or tenderness.   SKIN: No jaundice. No rashes or lesions.       Labs, reviewed with patient in clinic today:  CBC RESULTS:  Lab Results   Component Value Date    WBC 9.0 05/11/2020    RBC 4.67 05/11/2020    HGB 12.7 (L) 05/11/2020    HCT 41.4 05/11/2020    MCV 89 05/11/2020    MCH 27.2 05/11/2020    MCHC 30.7 (L) 05/11/2020    RDW 23.5 (H) 05/11/2020     05/11/2020       CMP RESULTS:  Lab Results   Component Value Date     07/22/2020    POTASSIUM 4.2 07/22/2020    CHLORIDE 102 07/22/2020    CO2 29 07/22/2020    ANIONGAP 6 07/22/2020     (H) 07/22/2020    BUN 54 (H) 07/22/2020    CR 2.13 (H) 07/22/2020    GFRESTIMATED 32 (L) 07/22/2020    GFRESTBLACK 37 (L) 07/22/2020    RONNY 8.1 (L) 07/22/2020    BILITOTAL 3.8  (H) 04/21/2020    ALBUMIN 3.4 04/21/2020    ALKPHOS 170 (H) 04/21/2020    ALT 31 04/21/2020    AST 26 04/21/2020        INR RESULTS:  Lab Results   Component Value Date    INR 1.28 (H) 06/04/2020       Lab Results   Component Value Date    MAG 2.9 (H) 04/25/2020     Lab Results   Component Value Date    NTBNPI 12,766 (H) 04/21/2020     Lab Results   Component Value Date    NTBNP 6,974 (H) 07/01/2020       Diagnostics:  ECHO 2/11/2020  Interpretation Summary  Severely (EF 29%) reduced left ventricular function is present. Severe diffuse  hypokinesis is present.  Global right ventricular function is moderately reduced.  Moderate mitral insufficiency is present.  Mild to moderate tricuspid insufficiency is present.  Pulmonary hypertension present. Estimated pulmonary artery systolic pressure  is 69 (54+15) mmHg .  Dilation of the inferior vena cava is present with abnormal respiratory  variation in diameter.  Small posterior pericardial effusion without echocardiographic evidence of  Tamponade.    2/10/2020RHC  RA --/24/22  /20  /50/65  PCWP --/51/40    DONOVAN 3.5/1.6  TD 3.8/1.8    PVR 8.5    Assessment and Plan:   60 year old male with history of occluded pRCA s/p balloon angioplasty and stenting in 6/2019, STEMI s/p DESx3 for total occlusion of RCA and Lcx (2012), GIB due to AV malformation (in the setting of warfarin and DAPT) s/p EGD with clip (7/5/2019), ischemic cardiomyopathy, HFrEF (last EF=30-35% 11/12/19), severe pHTN, moderate mitral regurgitation, atrial fibrillation, CKD stage IV who was recently admitted for hypervolemia and discharged 4 days ago. Presents for CORE follow-up.    After his last hopsitalization, it was determined that his new dry weight of 193 (hospital weight)/196 (home weight).    Over the past month we have been struggling to control the fluid accumulation and he is now up to 217 lbs (213 lbs at home). He has very significant LE edema. Otherwise he looks surprisingly good for  the amount of weight that he has gained- lots of energy, out working more in the garden. Nevertheless, I am worried that we are heading towards an admission. Given how good he looks and feels other than the swelling in his legs, and that he has stable vitals and labs, we will try one more effort her outpatient. He understands that if we are not able to make a significant difference he will need to come into the hospital. IV bumex today in clinic, likely metolazone tomorrow. Stop metoprolol. Labs/weight check Friday.    # Chronic systolic heart failure/HFrEF secondary to ICM    Stage C. NYHA Class III.    Fluid status: Hypervolemic, increasing torsemide to 140 mg BID. IV bumex 5 mg this morning in place of his AM torsemide dose. If weight not improving tomorrow will do a metolazone. Increase Kcl to 60/40. Labs and weight check Friday  ACEi/ARB/ARNI: contraindicated due to renal dysfunction/hyperkalemia. Allergy to hydralazine and isosorbid.  BB: Stopped again given decompensated heart failure, has stopped in the past due to low output  Aldosterone antagonist: contraindicated due to renal dysfunction  SCD prophylaxis: has declined on multiple conversations  NSAID use: contraindicated  Sleep apnea evaluation: will discuss at future appointment  Remote monitoring: N/A     #STEMI s/p DESx3 (2012)  #CAD s/p PCI proximal RCA (6/2019)  - CAD stable, no symptoms. Continue plavix and aspirin. Statin intolerance.     #Paroxysmal Afib   CHADSVASC 3 (HF, DM, CAD). Not on a/c due to history of GI bleed. Not willing to retrial. No symptoms of afib today.    - Stopped metoprolol today given decompensated heart failure     #CKD IV  Baseline serum creatinine around 2.7.  Has been improving in the last few weeks, 2.13 today  -BMP tomorrow    # H/o GIB  - No symptoms today    # GOC. At a prior visit We did have a short goals of care conversation. He continues to confirm that an ICD and advanced therapies are not within his goals of  "care. He did however, sight that he is generally happy with his current quality of life and that \"living has become important to him.\" At this point he does want to remain full code. He also sited that he likely wants his girlfriend to be his medical decision maker. I did encourage him to finalize this with paperwork as soon as he knows this for certain. I also encouraged him to have a very open conversation with her about what he would want and I also offered to have her join us on a future phone call to help guide this conversation. He doesn't want to do this currently, but he does think that this would be helpful in the future. I also offered him a social work visit for help with ACP paper work- he declined for now but he did say he would be interested in the future.   - Pt confirmed his desire to be FULL CODE  - He confirms that he is not interested in ICD or advanced therapies at this time.    Follow up   - Wt check tomorrow- consider metolazone if weight still >213 at home, will likely need over the weekend regardless  - Labs and weight check Friday, if not improving, need to consider admission  - [facility] in one week  - Dr. Trujillo in August as scheduled                CC  SELF, REFERRED    "

## 2020-07-22 NOTE — TELEPHONE ENCOUNTER
Called pharmacy back. Was previously taking 40 mEq BID potassium, increased to 60 meq in AM/40 mEq in potassium. Will monitor K levels. Updated Torsemide prescription sent to match orders from clinic today.   Patricia Vo RN

## 2020-07-22 NOTE — NURSING NOTE
Chief Complaint   Patient presents with     Follow Up     Return CORE, 61 yo male with HFrEF presents for follow up with labs prior      Vitals were taken and medication reconciled.    DIANNA Esteves  7:51 AM

## 2020-07-22 NOTE — PATIENT INSTRUCTIONS
Take your medicines every day, as directed    Changes made today:  o Stop metoprolol  o Don't take your morning dose of torsemide today- we will give you IV bumex instead. You DO need to take your morning potassium  o You should increase your torsemide to 140 mg twice a day. You should start this with your afternoon dose today  o You should increase your potassium to 60 meq (3 tabs) in the morning and 40 meq (2 tabs) In the afternoon  o If your weight is still above 213 pounds tomorrow, take metolazone 2.5 with and EXTRA 60 meq of potassium. If you weight is less than 213 lbs, please call Kelli with your weight in the morning tomorrow     Monitor Your Weight and Symptoms    Contact us if you:      Gain 2 pounds in one day or 5 pounds in one week    Feel more short of breath    Notice more leg swelling    Feel lightheadeded   Change your lifestyle    Limit Salt or Sodium:    2000 mg  Limit Fluids:    2000 mL or approximately 64 ounces  Eat a Heart Healthy Diet    Low in saturated fats  Stay Active:    Aim to move at least 150 minutes every  week         To Contact us    During Business Hours:  [phone], option # 1 (University)  Then option # 4 (medical questions)     After hours, weekends or holidays:   [phone], Option #4  Ask to speak to the On-Call Cardiologist. Inform them you are a CORE/heart failure patient at the [facility].     Use [employer] allows you to communicate directly with your heart team through secure messaging.    [employer] can be accessed any time on your phone, computer, or tablet.    If you need assistance, we'd be happy to help!         Keep your Heart Appointments:    - RN phone call Friday  - Labs Friday  - If no significant improvement in weight, will need to consdier a hospital admission  - [facility] in 1 week

## 2020-07-22 NOTE — NURSING NOTE
Diet: Patient instructed regarding a heart healthy diet, including discussion of reduced fat and sodium intake. Patient demonstrated understanding of this information and agreed to call with further questions or concerns.  Labs: Patient was given results of the laboratory testing obtained today. Patient was instructed to return for the next laboratory testing on Friday with RN phone call . Patient demonstrated understanding of this information and agreed to call with further questions or concerns.   Return Appointment: Patient given instructions regarding scheduling next clinic visit. Patient demonstrated understanding of this information and agreed to call with further questions or concerns.  Medication Change: Patient was educated regarding prescribed medication change, including discussion of the indication, administration, side effects, and when to report to MD or RN. Patient demonstrated understanding of this information and agreed to call with further questions or concerns.  Clinic Administered Medication Documentation      Injectable Medication Documentation    Patient was given Bumex 5 mg IV. Prior to medication administration, verified patients identity using patient s name and date of birth. Please see MAR and medication order for additional information. Patient instructed to remain in clinic for 15 minutes and report any adverse reaction to staff immediately .      Was entire vial of medication used? Yes  Vial/Syringe: Single dose vial  Expiration Date:  07/2021  Was this medication supplied by the patient? No       Patient stated he understood all health information given and agreed to call with further questions or concerns.   Patricia Vo RN

## 2020-07-22 NOTE — TELEPHONE ENCOUNTER
Health Call Center    Phone Message    May a detailed message be left on voicemail: yes     Reason for Call: Medication Question or concern regarding medication   Prescription Clarification  Name of Medication: torsemide (DEMADEX) 20 MG tablet and potassium chloride ER (KLOR-CON M) 20 MEQ CR tablet  Prescribing Provider: Ena Kruger   Pharmacy: [place], [place] - [id number]    What on the order needs clarification? Pharmacy calling about the two prescriptions.  They are wanting to clarify the dosage of the potassium chloride ER (KLOR-CON M) 20 MEQ CR tablet as it is a big increase.  They also are looking for clarification on the directions for the torsemide (DEMADEX) 20 MG tablet as they state it is a little confusing as written.  Please call the pharmacy back to discuss          Action Taken: Message routed to:  [facility] ([facility]):  Cardiology    Travel Screening: Not Applicable

## 2020-07-22 NOTE — LETTER
7/22/2020      RE: Cole Jesus  [address]  [facility] [id number]       Dear Colleague,    Thank you for the opportunity to participate in the care of your patient, Cole Jesus, at the [facility]. Please see a copy of my visit note below.    Please note that this was an inperson visit rather than a virtual visit.    HPI:   60 year old male with history of occluded pRCA s/p balloon angioplasty and stenting in 6/2019, STEMI s/p DESx3 for total occlusion of RCA and Lcx (2012), GIB due to AV malformation (in the setting of warfarin and DAPT) s/p EGD with clip (7/5/2019), ischemic cardiomyopathy, HFrEF (last EF=30-35% 11/12/19), severe pHTN, moderate mitral regurgitation, atrial fibrillation, CKD stage IV who presented for planned RHC, found to be in ambulatory cardiogenic shock.     He was recently admitted from 4/21-4/26 for hypervolemia. He was diuresed with lasix drip and discharged at a weight of 193 lbs (one hospital scale) 196 on home scale. (He was 218 lbs on admission).     He last saw [facility] 6/3/2020  Increased diuretics.     This visit-   He has gained a lot of weight, but He actually feels like his energy is better than it usually is. He has been able to do more yard work. His breathing feels good. Going up and down a flight of stairs does not make him feel SOB, it does make his legs tiered. His biggest complaint is the increased swelling in his legs which is quite significant for him. No orthopnea. No PND. He does have significant LE edema. No abdominal edema. Appetite is back and is good. No nausea. No chest pain. No lightheaded/dizziness/pre-syncope/syncope. Occasional palpitations at his baseline.     Cardiac Medications  ASA 81 mg daily  Torsemide 120 mg BID  Kcl 40 meq BID  Metoprolol succinate 25 mg daily  Atorvastatin 80 mg daily  Metolozaone- last a few weeks ago  Nitroglycerin    PAST MEDICAL HISTORY:  Past Medical  History:   Diagnosis Date     Anemia in chronic renal disease 3/9/2015     Antiplatelet or antithrombotic long-term use      Atrial fibrillation and flutter      CAD (coronary artery disease)     Stemi in , s/p angioplasty     CRD (chronic renal disease), stage IV (H) 2015    hx ATN with dialysis complicating cardiogenic shock  2012     GI bleed     massive lower GI bleed secondary to a cecal ulcer, s/p ileocecal resection in      Heart failure     Biventricular systolic HF, complicated by ARDS requiring tracheostomy     Hyperlipidemia LDL goal <100 2013     Hypertension      Ischemic cardiomyopathy 2013    TTE revealing 40% EF     Myocardial infarction (H)      Other and unspecified nonspecific immunological findings     Anti JKa     Pulmonary edema     episodes of flash pulmonary edema in      Subclinical hypothyroidism        FAMILY HISTORY:  Family History   Problem Relation Age of Onset     Diabetes Mother      Hypertension Mother      Heart Disease Mother      Heart Disease Father          of heart attack, left when he was young     Diabetes Sister      Diabetes Sister      Diabetes Sister      Glaucoma No family hx of      Macular Degeneration No family hx of        SOCIAL HISTORY:  Social History     Socioeconomic History     Marital status: Significant other     Spouse name: Not on file     Number of children: Not on file     Years of education: Not on file     Highest education level: Not on file   Occupational History     Not on file   Social Needs     Financial resource strain: Not on file     Food insecurity:     Worry: Not on file     Inability: Not on file     Transportation needs:     Medical: Not on file     Non-medical: Not on file   Tobacco Use     Smoking status: Former Smoker     Packs/day: 2.00     Years: 40.00     Pack years: 80.00     Types: Cigarettes     Last attempt to quit: 12/3/2011     Years since quittin.2     Smokeless tobacco: Never Used    Substance and Sexual Activity     Alcohol use: No     Alcohol/week: 0.0 standard drinks     Drug use: No     Sexual activity: Yes     Partners: Female   Lifestyle     Physical activity:     Days per week: Not on file     Minutes per session: Not on file     Stress: Not on file   Relationships     Social connections:     Talks on phone: Not on file     Gets together: Not on file     Attends [employer] service: Not on file     Active member of club or organization: Not on file     Attends meetings of clubs or organizations: Not on file     Relationship status: Not on file     Intimate partner violence:     Fear of current or ex partner: Not on file     Emotionally abused: Not on file     Physically abused: Not on file     Forced sexual activity: Not on file   Other Topics Concern     Parent/sibling w/ CABG, MI or angioplasty before 65F 55M? Yes      Service Not Asked     Blood Transfusions Not Asked     Caffeine Concern Not Asked     Occupational Exposure Not Asked     Hobby Hazards Not Asked     Sleep Concern Not Asked     Stress Concern Not Asked     Weight Concern Not Asked     Special Diet Not Asked     Back Care Not Asked     Exercise Yes     Comment: limited walking     Bike Helmet Not Asked     Seat Belt Not Asked     Self-Exams Not Asked   Social History Narrative     Not on file       CURRENT MEDICATIONS:  ACCU-CHEK GUIDE test strip, USE TO TEST BLOOD SUGAR FOUR TIMES A DAY BEFORE MEALS AND AT BEDTIME  Alcohol Swabs PADS, 1 applicator 4 times daily (before meals and nightly)  aspirin (ASA) 81 MG chewable tablet, Take 81 mg by mouth daily  atorvastatin (LIPITOR) 80 MG tablet, Take 1 tablet (80 mg) by mouth daily  blood glucose monitoring (ACCU-CHEK FASTCLIX) lancets, USE TO TEST BLOOD SUGAR FOUR TIMES A DAY AT MEALS AND AT BEDTIME  insulin pen needle (32G X 4 MM) 32G X 4 MM miscellaneous, Use 5 pen needles daily or as directed.  metolazone (ZAROXOLYN) 2.5 MG tablet, One tablet only when directed by  your doctor  senna-docusate (SENOKOT-S/PERICOLACE) 8.6-50 MG tablet, Take 1 tablet by mouth 2 times daily as needed for constipation  Sharps Container MISC, 1 Device every 30 days  vitamin  B complex with vitamin C (VITAMIN  B COMPLEX) TABS, Take 1 tablet by mouth daily  Vitamin D, Cholecalciferol, 25 MCG (1000 UT) CAPS, Take 1 capsule by mouth 2 times daily  nitroGLYcerin (NITROSTAT) 0.4 MG sublingual tablet, Place 1 tablet (0.4 mg) under the tongue every 5 minutes as needed for chest pain For chest pain place 1 tablet under the tongue every 5 minutes for 3 doses. If symptoms persist 5 minutes after 1st dose call 911. (Patient not taking: Reported on 6/3/2020)    No current facility-administered medications on file prior to visit.   He our visit with the patient    ROS:   Refer to HPI    EXAM:  /82 (BP Location: Right arm, Patient Position: Chair, Cuff Size: Adult Regular)   Pulse 93   Wt 98.4 kg (217 lb)   SpO2 100%   BMI 32.99 kg/m    GENERAL: Appears comfortable, in no acute distress.   HEENT: Eye symmetrical, no discharge or icterus bilaterally. Mucous membranes moist and without lesions.  CV: RRR, +S1S2, no murmur, rub, or gallop. JVP >12.   RESPIRATORY: Respirations regular, even, and unlabored. Lungs CTA throughout.   GI: Soft and non distended with normoactive bowel sounds. No tenderness, rebound, guarding.   EXTREMITIES: 3+ peripheral edema. All extremities are warm and well perfused  NEUROLOGIC: Alert and interacting appropriately. No focal deficits.   MUSCULOSKELETAL: No joint swelling or tenderness.   SKIN: No jaundice. No rashes or lesions.       Labs, reviewed with patient in clinic today:  CBC RESULTS:  Lab Results   Component Value Date    WBC 9.0 05/11/2020    RBC 4.67 05/11/2020    HGB 12.7 (L) 05/11/2020    HCT 41.4 05/11/2020    MCV 89 05/11/2020    MCH 27.2 05/11/2020    MCHC 30.7 (L) 05/11/2020    RDW 23.5 (H) 05/11/2020     05/11/2020       CMP RESULTS:  Lab Results    Component Value Date     07/22/2020    POTASSIUM 4.2 07/22/2020    CHLORIDE 102 07/22/2020    CO2 29 07/22/2020    ANIONGAP 6 07/22/2020     (H) 07/22/2020    BUN 54 (H) 07/22/2020    CR 2.13 (H) 07/22/2020    GFRESTIMATED 32 (L) 07/22/2020    GFRESTBLACK 37 (L) 07/22/2020    RONNY 8.1 (L) 07/22/2020    BILITOTAL 3.8 (H) 04/21/2020    ALBUMIN 3.4 04/21/2020    ALKPHOS 170 (H) 04/21/2020    ALT 31 04/21/2020    AST 26 04/21/2020        INR RESULTS:  Lab Results   Component Value Date    INR 1.28 (H) 06/04/2020       Lab Results   Component Value Date    MAG 2.9 (H) 04/25/2020     Lab Results   Component Value Date    NTBNPI 12,766 (H) 04/21/2020     Lab Results   Component Value Date    NTBNP 6,974 (H) 07/01/2020       Diagnostics:  ECHO 2/11/2020  Interpretation Summary  Severely (EF 29%) reduced left ventricular function is present. Severe diffuse  hypokinesis is present.  Global right ventricular function is moderately reduced.  Moderate mitral insufficiency is present.  Mild to moderate tricuspid insufficiency is present.  Pulmonary hypertension present. Estimated pulmonary artery systolic pressure  is 69 (54+15) mmHg .  Dilation of the inferior vena cava is present with abnormal respiratory  variation in diameter.  Small posterior pericardial effusion without echocardiographic evidence of  Tamponade.    2/10/2020[facility]HC  RA --/24/22  /20  /50/65  PCWP --/51/40    DONOVAN 3.5/1.6  TD 3.8/1.8    PVR 8.5    Assessment and Plan:   60 year old male with history of occluded pRCA s/p balloon angioplasty and stenting in 6/2019, STEMI s/p DESx3 for total occlusion of RCA and Lcx (2012), GIB due to AV malformation (in the setting of warfarin and DAPT) s/p EGD with clip (7/5/2019), ischemic cardiomyopathy, HFrEF (last EF=30-35% 11/12/19), severe pHTN, moderate mitral regurgitation, atrial fibrillation, CKD stage IV who was recently admitted for hypervolemia and discharged 4 days ago. Presents for [facility]  follow-up.    After his last hopsitalization, it was determined that his new dry weight of 193 (hospital weight)/196 (home weight).    Over the past month we have been struggling to control the fluid accumulation and he is now up to 217 lbs (213 lbs at home). He has very significant LE edema. Otherwise he looks surprisingly good for the amount of weight that he has gained- lots of energy, out working more in the garden. Nevertheless, I am worried that we are heading towards an admission. Given how good he looks and feels other than the swelling in his legs, and that he has stable vitals and labs, we will try one more effort her outpatient. He understands that if we are not able to make a significant difference he will need to come into the hospital. IV bumex today in clinic, likely metolazone tomorrow. Stop metoprolol. Labs/weight check Friday.    # Chronic systolic heart failure/HFrEF secondary to ICM    Stage C. NYHA Class III.    Fluid status: Hypervolemic, increasing torsemide to 140 mg BID. IV bumex 5 mg this morning in place of his AM torsemide dose. If weight not improving tomorrow will do a metolazone. Increase Kcl to 60/40. Labs and weight check Friday  ACEi/ARB/ARNI: contraindicated due to renal dysfunction/hyperkalemia. Allergy to hydralazine and isosorbid.  BB: Stopped again given decompensated heart failure, has stopped in the past due to low output  Aldosterone antagonist: contraindicated due to renal dysfunction  SCD prophylaxis: has declined on multiple conversations  NSAID use: contraindicated  Sleep apnea evaluation: will discuss at future appointment  Remote monitoring: N/A     #STEMI s/p DESx3 (2012)  #CAD s/p PCI proximal RCA (6/2019)  - CAD stable, no symptoms. Continue plavix and aspirin. Statin intolerance.     #Paroxysmal Afib   CHADSVASC 3 (HF, DM, CAD). Not on a/c due to history of GI bleed. Not willing to retrial. No symptoms of afib today.    - Stopped metoprolol today given  "decompensated heart failure     #CKD IV  Baseline serum creatinine around 2.7.  Has been improving in the last few weeks, 2.13 today  -BMP tomorrow    # H/o GIB  - No symptoms today    # GOC. At a prior visit We did have a short goals of care conversation. He continues to confirm that an ICD and advanced therapies are not within his goals of care. He did however, sight that he is generally happy with his current quality of life and that \"living has become important to him.\" At this point he does want to remain full code. He also sited that he likely wants his girlfriend to be his medical decision maker. I did encourage him to finalize this with paperwork as soon as he knows this for certain. I also encouraged him to have a very open conversation with her about what he would want and I also offered to have her join us on a future phone call to help guide this conversation. He doesn't want to do this currently, but he does think that this would be helpful in the future. I also offered him a social work visit for help with ACP paper work- he declined for now but he did say he would be interested in the future.   - Pt confirmed his desire to be FULL CODE  - He confirms that he is not interested in ICD or advanced therapies at this time.    Follow up   - Wt check tomorrow- consider metolazone if weight still >213 at home, will likely need over the weekend regardless  - Labs and weight check Friday, if not improving, need to consider admission  - [facility] in one week  - Dr. Trujillo in August as scheduled    Please do not hesitate to contact me if you have any questions/concerns.     Sincerely,     Ena Kruger PA-C    "

## 2020-07-23 NOTE — TELEPHONE ENCOUNTER
[facility] Call Center    Phone Message    May a detailed message be left on voicemail: yes     Reason for Call: Other: Cole is returning a missed call from Ellie. He states she had called reqesting his weight for today 7/23/20. Please give him a call back at your earliest convenience. Thank you     Action Taken: Message routed to:  [facility] ([facility] Cardiology    Travel Screening: Not Applicable

## 2020-07-23 NOTE — TELEPHONE ENCOUNTER
Called Cole back. Weight at home today is 212 lbs. Was up peeing all night. Didn't weight himself on his home scale yesterday so he's unsure what it was yesterday. Legs look a little less swollen today. Per directions from clinic yesterday, told him to take the increased dose of Torsemide 140 mg BID with increased dose of potassium 60/40 and get labs tomorrow. Told him I would call tomorrow after we see lab results for a weight check and can make a diuretic plan for the weekend. Will update provider.   Patricia Vo RN

## 2020-07-24 NOTE — TELEPHONE ENCOUNTER
Reviewed with Sade Kruger and called Cole back with following orders:  Date: 7/24/2020    Time of Call: 3:50 PM     Diagnosis:  HFrEF     VORB:  Ordering provider: Sade ROONEY  Order: Metolazone 2.5 mg once with 40 meq potassium tomorrow      Order received by: Patricia Vo RN      Follow-up/additional notes:

## 2020-07-24 NOTE — TELEPHONE ENCOUNTER
Called Cole back to review labs and symptoms.   Weight today at 212 again. Was up peeing all night so can't understand why his weight hasn't changed. Thinks there's something wrong with his scale and he's maybe lost more.   Legs thinned out a little bit. Know there's still some water but feels great. Girlfriend reports his legs look better as well. Routing to provider for review.   Patricia Vo RN

## 2020-07-28 NOTE — LETTER
7/28/2020    RE: Cole Jesus  [address]  [facility] [address]     Dear Colleague,    Thank you for the opportunity to participate in the care of your patient, Cole Jesus, at the [facility] at [facility]. Please see a copy of my visit note below.    Please note that this was an inperson visit rather than a virtual visit.  Start time 7:28 AM  End time 7:45 AM  >50% was spent counseling or coordination of care.       HPI:   60 year old male with history of occluded pRCA s/p balloon angioplasty and stenting in 6/2019, STEMI s/p DESx3 for total occlusion of RCA and Lcx (2012), GIB due to AV malformation (in the setting of warfarin and DAPT) s/p EGD with clip (7/5/2019), ischemic cardiomyopathy, HFrEF (last EF=30-35% 11/12/19), severe pHTN, moderate mitral regurgitation, atrial fibrillation, CKD stage IV who presented for planned RHC, found to be in ambulatory cardiogenic shock.     He was recently admitted from 4/21-4/26 for hypervolemia. He was diuresed with lasix drip and discharged at a weight of 193 lbs (one hospital scale) 196 on home scale. (He was 218 lbs on admission).     He was seen one week ago. He got IV diuretics and then took metolazone over the weekend. His home weight today was 206-207.    He was feeling well over the weekend. No SOB at rest. He was up at the cabin and was on his feet most of the day and felt no KING.  He's not sure how far he was walking but thinks it was a lot. No orthopnea or PND.  His LE edema is improving although still there. He had some cramping on Saturday after the metolazone. No abdominal edema. No lightheadedness or dizziness. No palpitations. No chest pain. Appetite is very good. No post prandial nausea (he had this this winter). He is eating a lot and thinks he has gained some food and muscle weight.    He is feeling better off the metoprolol. This is how he has felt off the metoprolol in the pastas  well    Cardiac Medications  ASA 81 mg daily  Torsemide 140 mg BID  Kcl 60/60  Atorvastatin 80 mg daily  Metolozaone- took on Sat  Nitroglycerin    PAST MEDICAL HISTORY:  Past Medical History:   Diagnosis Date     Anemia in chronic renal disease 3/9/2015     Antiplatelet or antithrombotic long-term use      Atrial fibrillation and flutter      CAD (coronary artery disease)     Stemi in , s/p angioplasty     CRD (chronic renal disease), stage IV (H) 2015    hx ATN with dialysis complicating cardiogenic shock  2012     GI bleed     massive lower GI bleed secondary to a cecal ulcer, s/p ileocecal resection in      Heart failure     Biventricular systolic HF, complicated by ARDS requiring tracheostomy     Hyperlipidemia LDL goal <100 2013     Hypertension      Ischemic cardiomyopathy 2013    TTE revealing 40% EF     Myocardial infarction (H)      Other and unspecified nonspecific immunological findings     Anti JKa     Pulmonary edema     episodes of flash pulmonary edema in      Subclinical hypothyroidism        FAMILY HISTORY:  Family History   Problem Relation Age of Onset     Diabetes Mother      Hypertension Mother      Heart Disease Mother      Heart Disease Father          of heart attack, left when he was young     Diabetes Sister      Diabetes Sister      Diabetes Sister      Glaucoma No family hx of      Macular Degeneration No family hx of        SOCIAL HISTORY:  Social History     Socioeconomic History     Marital status: Significant other     Spouse name: Not on file     Number of children: Not on file     Years of education: Not on file     Highest education level: Not on file   Occupational History     Not on file   Social Needs     Financial resource strain: Not on file     Food insecurity:     Worry: Not on file     Inability: Not on file     Transportation needs:     Medical: Not on file     Non-medical: Not on file   Tobacco Use     Smoking status: Former Smoker      Packs/day: 2.00     Years: 40.00     Pack years: 80.00     Types: Cigarettes     Last attempt to quit: 12/3/2011     Years since quittin.2     Smokeless tobacco: Never Used   Substance and Sexual Activity     Alcohol use: No     Alcohol/week: 0.0 standard drinks     Drug use: No     Sexual activity: Yes     Partners: Female   Lifestyle     Physical activity:     Days per week: Not on file     Minutes per session: Not on file     Stress: Not on file   Relationships     Social connections:     Talks on phone: Not on file     Gets together: Not on file     Attends [employer] service: Not on file     Active member of club or organization: Not on file     Attends meetings of clubs or organizations: Not on file     Relationship status: Not on file     Intimate partner violence:     Fear of current or ex partner: Not on file     Emotionally abused: Not on file     Physically abused: Not on file     Forced sexual activity: Not on file   Other Topics Concern     Parent/sibling w/ CABG, MI or angioplasty before 65F 55M? Yes      Service Not Asked     Blood Transfusions Not Asked     Caffeine Concern Not Asked     Occupational Exposure Not Asked     Hobby Hazards Not Asked     Sleep Concern Not Asked     Stress Concern Not Asked     Weight Concern Not Asked     Special Diet Not Asked     Back Care Not Asked     Exercise Yes     Comment: limited walking     Bike Helmet Not Asked     Seat Belt Not Asked     Self-Exams Not Asked   Social History Narrative     Not on file       CURRENT MEDICATIONS:  ACCU-CHEK GUIDE test strip, USE TO TEST BLOOD SUGAR FOUR TIMES A DAY BEFORE MEALS AND AT BEDTIME  Alcohol Swabs PADS, 1 applicator 4 times daily (before meals and nightly)  aspirin (ASA) 81 MG chewable tablet, Take 81 mg by mouth daily  atorvastatin (LIPITOR) 80 MG tablet, Take 1 tablet (80 mg) by mouth daily  blood glucose monitoring (ACCU-CHEK FASTCLIX) lancets, USE TO TEST BLOOD SUGAR FOUR TIMES A DAY AT MEALS AND AT  "BEDTIME  insulin pen needle (32G X 4 MM) 32G X 4 MM miscellaneous, Use 5 pen needles daily or as directed.  metolazone (ZAROXOLYN) 2.5 MG tablet, One tablet only when directed by your doctor  potassium chloride ER (KLOR-CON M) 20 MEQ CR tablet, Take 60 meq of Kcl in the morning and 40 meq of Kcl in the afternoon.  senna-docusate (SENOKOT-S/PERICOLACE) 8.6-50 MG tablet, Take 1 tablet by mouth 2 times daily as needed for constipation  Sharps Container MISC, 1 Device every 30 days  torsemide (DEMADEX) 20 MG tablet, Take 7 tablets (140 mg) by mouth 2 times daily  vitamin  B complex with vitamin C (VITAMIN  B COMPLEX) TABS, Take 1 tablet by mouth daily  Vitamin D, Cholecalciferol, 25 MCG (1000 UT) CAPS, Take 1 capsule by mouth 2 times daily  nitroGLYcerin (NITROSTAT) 0.4 MG sublingual tablet, Place 1 tablet (0.4 mg) under the tongue every 5 minutes as needed for chest pain For chest pain place 1 tablet under the tongue every 5 minutes for 3 doses. If symptoms persist 5 minutes after 1st dose call 911. (Patient not taking: Reported on 6/3/2020)    No current facility-administered medications on file prior to visit.   He our visit with the patient    ROS:   Refer to HPI    EXAM:  /77 (BP Location: Left arm, Patient Position: Chair, Cuff Size: Adult Regular)   Pulse 104   Ht 1.727 m (5' 8\")   Wt 94.8 kg (209 lb)   SpO2 99%   BMI 31.78 kg/m    GENERAL: Appears comfortable, in no acute distress.   HEENT: Eye symmetrical, no discharge or icterus bilaterally. Mucous membranes moist and without lesions.  CV: tachycardic, regular. +S1S2, no murmur, rub, or gallop. JVP >10  RESPIRATORY: Respirations regular, even, and unlabored. Lungs CTA throughout.   GI: Soft and non distended with normoactive bowel sounds. No tenderness, rebound, guarding.   EXTREMITIES: 3+ peripheral edema although improved compared to last week. All extremities are warm and well perfused  NEUROLOGIC: Alert and interacting appropriately. No focal " deficits.   MUSCULOSKELETAL: No joint swelling or tenderness.   SKIN: No jaundice. No rashes or lesions.       Labs, reviewed with patient in clinic today:  CBC RESULTS:  Lab Results   Component Value Date    WBC 9.0 05/11/2020    RBC 4.67 05/11/2020    HGB 12.7 (L) 05/11/2020    HCT 41.4 05/11/2020    MCV 89 05/11/2020    MCH 27.2 05/11/2020    MCHC 30.7 (L) 05/11/2020    RDW 23.5 (H) 05/11/2020     05/11/2020       CMP RESULTS:  Lab Results   Component Value Date     07/28/2020    POTASSIUM 4.6 07/28/2020    CHLORIDE 100 07/28/2020    CO2 31 07/28/2020    ANIONGAP 5 07/28/2020     (H) 07/28/2020    BUN 57 (H) 07/28/2020    CR 2.18 (H) 07/28/2020    GFRESTIMATED 31 (L) 07/28/2020    GFRESTBLACK 36 (L) 07/28/2020    RONNY 8.5 07/28/2020    BILITOTAL 3.8 (H) 04/21/2020    ALBUMIN 3.4 04/21/2020    ALKPHOS 170 (H) 04/21/2020    ALT 31 04/21/2020    AST 26 04/21/2020        INR RESULTS:  Lab Results   Component Value Date    INR 1.28 (H) 06/04/2020       Lab Results   Component Value Date    MAG 2.9 (H) 04/25/2020     Lab Results   Component Value Date    NTBNPI 12,766 (H) 04/21/2020     Lab Results   Component Value Date    NTBNP 6,974 (H) 07/01/2020       Diagnostics:  ECHO 2/11/2020  Interpretation Summary  Severely (EF 29%) reduced left ventricular function is present. Severe diffuse  hypokinesis is present.  Global right ventricular function is moderately reduced.  Moderate mitral insufficiency is present.  Mild to moderate tricuspid insufficiency is present.  Pulmonary hypertension present. Estimated pulmonary artery systolic pressure  is 69 (54+15) mmHg .  Dilation of the inferior vena cava is present with abnormal respiratory  variation in diameter.  Small posterior pericardial effusion without echocardiographic evidence of  Tamponade.    2/10/2020RHC  RA --/24/22  /20  /50/65  PCWP --/51/40    DONOVAN 3.5/1.6  TD 3.8/1.8    PVR 8.5    Assessment and Plan:   60 year old male with  history of occluded pRCA s/p balloon angioplasty and stenting in 6/2019, STEMI s/p DESx3 for total occlusion of RCA and Lcx (2012), GIB due to AV malformation (in the setting of warfarin and DAPT) s/p EGD with clip (7/5/2019), ischemic cardiomyopathy, HFrEF (last EF=30-35% 11/12/19), severe pHTN, moderate mitral regurgitation, atrial fibrillation, CKD stage IV who presents for follow-up. Presents for CORE follow-up.    After his last hopsitalization, it was determined that his new dry weight of 193 (hospital weight)/196 (home weight).      Over the past month we have been struggling to control the fluid accumulation and he was  now up to 217 lbs (213 lbs at home). He got IV diuretics in clinic and then metolazone a few days later. His metoprolol was also stopped. He is down about 8 lbs. He is already feeling much better. No low flow symptoms. Mor energy. Swelling in his legs is improved, although still significant. He had a lot of cramping with the metolazone so we will give him a few days off, but he will likely need some before the end of this week.    In regards to his tachycardia it is relitively mild. Now off metoprolol. We will need to watch closely, hopefully this will improve as his volume status dose.    # Chronic systolic heart failure/HFrEF secondary to ICM    Stage C. NYHA Class III.    Fluid status: Hypervolemic, continue torsemide to 140 mg BID and Kcl 60 meq BID (icreased last week). If weight still >205 on Thursday, will need another metolazone  ACEi/ARB/ARNI: contraindicated due to renal dysfunction/hyperkalemia. Allergy to hydralazine and isosorbid.  BB: Stopped again given decompensated heart failure, has stopped in the past due to low output, feeling better off  Aldosterone antagonist: contraindicated due to renal dysfunction  SCD prophylaxis: has declined on multiple conversations  NSAID use: contraindicated  Sleep apnea evaluation: will discuss at future appointment  Remote monitoring:  "N/A     #STEMI s/p DESx3 (2012)  #CAD s/p PCI proximal RCA (6/2019)  - CAD stable, no symptoms. Continue plavix and aspirin. Statin intolerance.     #Paroxysmal Afib   CHADSVASC 3 (HF, DM, CAD). Not on a/c due to history of GI bleed. Not willing to retrial. No symptoms of afib today.    - Stopped metoprolol  given decompensated heart failure     #CKD IV  Baseline serum creatinine around 2.7.  Has been improving in the last few weeks, 2.18 today  - BMP next week    # H/o GIB  - No symptoms today    # GOC. At a prior visit We did have a short goals of care conversation. He continues to confirm that an ICD and advanced therapies are not within his goals of care. He did however, sight that he is generally happy with his current quality of life and that \"living has become important to him.\" At this point he does want to remain full code. He also sited that he likely wants his girlfriend to be his medical decision maker. I did encourage him to finalize this with paperwork as soon as he knows this for certain. I also encouraged him to have a very open conversation with her about what he would want and I also offered to have her join us on a future phone call to help guide this conversation. He doesn't want to do this currently, but he does think that this would be helpful in the future. I also offered him a social work visit for help with ACP paper work- he declined for now but he did say he would be interested in the future.   - Pt confirmed his desire to be FULL CODE  - He confirms that he is not interested in ICD or advanced therapies at this time.  - I have encouraged him to continue talking with family about his disease and prognosis as he does not feel that they are always aware    Follow up   - RN phone call Thurs, if weight >205 consider metolazone again with additional Kcl  - BMP in 1 week  - CORE in 2 weeks, virtual or in person okay  - Dr. Trujillo in August as scheduled    Ena Kruger PA-C  [facility] " Cardiology    CC  SELF, REFERRED

## 2020-07-28 NOTE — PROGRESS NOTES
Please note that this was an inperson visit rather than a virtual visit.  Start time 7:28 AM  End time 7:45 AM  >50% was spent counseling or coordination of care.       HPI:   60 year old male with history of occluded pRCA s/p balloon angioplasty and stenting in 6/2019, STEMI s/p DESx3 for total occlusion of RCA and Lcx (2012), GIB due to AV malformation (in the setting of warfarin and DAPT) s/p EGD with clip (7/5/2019), ischemic cardiomyopathy, HFrEF (last EF=30-35% 11/12/19), severe pHTN, moderate mitral regurgitation, atrial fibrillation, CKD stage IV who presented for planned RHC, found to be in ambulatory cardiogenic shock.     He was recently admitted from 4/21-4/26 for hypervolemia. He was diuresed with lasix drip and discharged at a weight of 193 lbs (one hospital scale) 196 on home scale. (He was 218 lbs on admission).     He was seen one week ago. He got IV diuretics and then took metolazone over the weekend. His home weight today was 206-207.    He was feeling well over the weekend. No SOB at rest. He was up at the cabin and was on his feet most of the day and felt no KING.  He's not sure how far he was walking but thinks it was a lot. No orthopnea or PND.  His LE edema is improving although still there. He had some cramping on Saturday after the metolazone. No abdominal edema. No lightheadedness or dizziness. No palpitations. No chest pain. Appetite is very good. No post prandial nausea (he had this this winter). He is eating a lot and thinks he has gained some food and muscle weight.    He is feeling better off the metoprolol. This is how he has felt off the metoprolol in the pastas well    Cardiac Medications  ASA 81 mg daily  Torsemide 140 mg BID  Kcl 60/60  Atorvastatin 80 mg daily  Metolozaone- took on Sat  Nitroglycerin    PAST MEDICAL HISTORY:  Past Medical History:   Diagnosis Date     Anemia in chronic renal disease 3/9/2015     Antiplatelet or antithrombotic long-term use      Atrial  fibrillation and flutter      CAD (coronary artery disease)     Stemi in , s/p angioplasty     CRD (chronic renal disease), stage IV (H) 2015    hx ATN with dialysis complicating cardiogenic shock  2012     GI bleed     massive lower GI bleed secondary to a cecal ulcer, s/p ileocecal resection in      Heart failure     Biventricular systolic HF, complicated by ARDS requiring tracheostomy     Hyperlipidemia LDL goal <100 2013     Hypertension      Ischemic cardiomyopathy 2013    TTE revealing 40% EF     Myocardial infarction (H)      Other and unspecified nonspecific immunological findings     Anti JKa     Pulmonary edema     episodes of flash pulmonary edema in      Subclinical hypothyroidism        FAMILY HISTORY:  Family History   Problem Relation Age of Onset     Diabetes Mother      Hypertension Mother      Heart Disease Mother      Heart Disease Father          of heart attack, left when he was young     Diabetes Sister      Diabetes Sister      Diabetes Sister      Glaucoma No family hx of      Macular Degeneration No family hx of        SOCIAL HISTORY:  Social History     Socioeconomic History     Marital status: Significant other     Spouse name: Not on file     Number of children: Not on file     Years of education: Not on file     Highest education level: Not on file   Occupational History     Not on file   Social Needs     Financial resource strain: Not on file     Food insecurity:     Worry: Not on file     Inability: Not on file     Transportation needs:     Medical: Not on file     Non-medical: Not on file   Tobacco Use     Smoking status: Former Smoker     Packs/day: 2.00     Years: 40.00     Pack years: 80.00     Types: Cigarettes     Last attempt to quit: 12/3/2011     Years since quittin.2     Smokeless tobacco: Never Used   Substance and Sexual Activity     Alcohol use: No     Alcohol/week: 0.0 standard drinks     Drug use: No     Sexual activity: Yes      Partners: Female   Lifestyle     Physical activity:     Days per week: Not on file     Minutes per session: Not on file     Stress: Not on file   Relationships     Social connections:     Talks on phone: Not on file     Gets together: Not on file     Attends Advent service: Not on file     Active member of club or organization: Not on file     Attends meetings of clubs or organizations: Not on file     Relationship status: Not on file     Intimate partner violence:     Fear of current or ex partner: Not on file     Emotionally abused: Not on file     Physically abused: Not on file     Forced sexual activity: Not on file   Other Topics Concern     Parent/sibling w/ CABG, MI or angioplasty before 65F 55M? Yes      Service Not Asked     Blood Transfusions Not Asked     Caffeine Concern Not Asked     Occupational Exposure Not Asked     Hobby Hazards Not Asked     Sleep Concern Not Asked     Stress Concern Not Asked     Weight Concern Not Asked     Special Diet Not Asked     Back Care Not Asked     Exercise Yes     Comment: limited walking     Bike Helmet Not Asked     Seat Belt Not Asked     Self-Exams Not Asked   Social History Narrative     Not on file       CURRENT MEDICATIONS:  ACCU-CHEK GUIDE test strip, USE TO TEST BLOOD SUGAR FOUR TIMES A DAY BEFORE MEALS AND AT BEDTIME  Alcohol Swabs PADS, 1 applicator 4 times daily (before meals and nightly)  aspirin (ASA) 81 MG chewable tablet, Take 81 mg by mouth daily  atorvastatin (LIPITOR) 80 MG tablet, Take 1 tablet (80 mg) by mouth daily  blood glucose monitoring (ACCU-CHEK FASTCLIX) lancets, USE TO TEST BLOOD SUGAR FOUR TIMES A DAY AT MEALS AND AT BEDTIME  insulin pen needle (32G X 4 MM) 32G X 4 MM miscellaneous, Use 5 pen needles daily or as directed.  metolazone (ZAROXOLYN) 2.5 MG tablet, One tablet only when directed by your doctor  potassium chloride ER (KLOR-CON M) 20 MEQ CR tablet, Take 60 meq of Kcl in the morning and 40 meq of Kcl in the  "afternoon.  senna-docusate (SENOKOT-S/PERICOLACE) 8.6-50 MG tablet, Take 1 tablet by mouth 2 times daily as needed for constipation  Sharps Container MISC, 1 Device every 30 days  torsemide (DEMADEX) 20 MG tablet, Take 7 tablets (140 mg) by mouth 2 times daily  vitamin  B complex with vitamin C (VITAMIN  B COMPLEX) TABS, Take 1 tablet by mouth daily  Vitamin D, Cholecalciferol, 25 MCG (1000 UT) CAPS, Take 1 capsule by mouth 2 times daily  nitroGLYcerin (NITROSTAT) 0.4 MG sublingual tablet, Place 1 tablet (0.4 mg) under the tongue every 5 minutes as needed for chest pain For chest pain place 1 tablet under the tongue every 5 minutes for 3 doses. If symptoms persist 5 minutes after 1st dose call 911. (Patient not taking: Reported on 6/3/2020)    No current facility-administered medications on file prior to visit.   He our visit with the patient    ROS:   Refer to HPI    EXAM:  /77 (BP Location: Left arm, Patient Position: Chair, Cuff Size: Adult Regular)   Pulse 104   Ht 1.727 m (5' 8\")   Wt 94.8 kg (209 lb)   SpO2 99%   BMI 31.78 kg/m    GENERAL: Appears comfortable, in no acute distress.   HEENT: Eye symmetrical, no discharge or icterus bilaterally. Mucous membranes moist and without lesions.  CV: tachycardic, regular. +S1S2, no murmur, rub, or gallop. JVP >10  RESPIRATORY: Respirations regular, even, and unlabored. Lungs CTA throughout.   GI: Soft and non distended with normoactive bowel sounds. No tenderness, rebound, guarding.   EXTREMITIES: 3+ peripheral edema although improved compared to last week. All extremities are warm and well perfused  NEUROLOGIC: Alert and interacting appropriately. No focal deficits.   MUSCULOSKELETAL: No joint swelling or tenderness.   SKIN: No jaundice. No rashes or lesions.       Labs, reviewed with patient in clinic today:  CBC RESULTS:  Lab Results   Component Value Date    WBC 9.0 05/11/2020    RBC 4.67 05/11/2020    HGB 12.7 (L) 05/11/2020    HCT 41.4 05/11/2020    " MCV 89 05/11/2020    MCH 27.2 05/11/2020    MCHC 30.7 (L) 05/11/2020    RDW 23.5 (H) 05/11/2020     05/11/2020       CMP RESULTS:  Lab Results   Component Value Date     07/28/2020    POTASSIUM 4.6 07/28/2020    CHLORIDE 100 07/28/2020    CO2 31 07/28/2020    ANIONGAP 5 07/28/2020     (H) 07/28/2020    BUN 57 (H) 07/28/2020    CR 2.18 (H) 07/28/2020    GFRESTIMATED 31 (L) 07/28/2020    GFRESTBLACK 36 (L) 07/28/2020    RONNY 8.5 07/28/2020    BILITOTAL 3.8 (H) 04/21/2020    ALBUMIN 3.4 04/21/2020    ALKPHOS 170 (H) 04/21/2020    ALT 31 04/21/2020    AST 26 04/21/2020        INR RESULTS:  Lab Results   Component Value Date    INR 1.28 (H) 06/04/2020       Lab Results   Component Value Date    MAG 2.9 (H) 04/25/2020     Lab Results   Component Value Date    NTBNPI 12,766 (H) 04/21/2020     Lab Results   Component Value Date    NTBNP 6,974 (H) 07/01/2020       Diagnostics:  ECHO 2/11/2020  Interpretation Summary  Severely (EF 29%) reduced left ventricular function is present. Severe diffuse  hypokinesis is present.  Global right ventricular function is moderately reduced.  Moderate mitral insufficiency is present.  Mild to moderate tricuspid insufficiency is present.  Pulmonary hypertension present. Estimated pulmonary artery systolic pressure  is 69 (54+15) mmHg .  Dilation of the inferior vena cava is present with abnormal respiratory  variation in diameter.  Small posterior pericardial effusion without echocardiographic evidence of  Tamponade.    2/10/2020RHC  RA --/24/22  /20  /50/65  PCWP --/51/40    DONOVAN 3.5/1.6  TD 3.8/1.8    PVR 8.5    Assessment and Plan:   60 year old male with history of occluded pRCA s/p balloon angioplasty and stenting in 6/2019, STEMI s/p DESx3 for total occlusion of RCA and Lcx (2012), GIB due to AV malformation (in the setting of warfarin and DAPT) s/p EGD with clip (7/5/2019), ischemic cardiomyopathy, HFrEF (last EF=30-35% 11/12/19), severe pHTN,  moderate mitral regurgitation, atrial fibrillation, CKD stage IV who presents for follow-up. Presents for CORE follow-up.    After his last hopsitalization, it was determined that his new dry weight of 193 (hospital weight)/196 (home weight).      Over the past month we have been struggling to control the fluid accumulation and he was  now up to 217 lbs (213 lbs at home). He got IV diuretics in clinic and then metolazone a few days later. His metoprolol was also stopped. He is down about 8 lbs. He is already feeling much better. No low flow symptoms. Mor energy. Swelling in his legs is improved, although still significant. He had a lot of cramping with the metolazone so we will give him a few days off, but he will likely need some before the end of this week.    In regards to his tachycardia it is relitively mild. Now off metoprolol. We will need to watch closely, hopefully this will improve as his volume status dose.    # Chronic systolic heart failure/HFrEF secondary to ICM    Stage C. NYHA Class III.    Fluid status: Hypervolemic, continue torsemide to 140 mg BID and Kcl 60 meq BID (icreased last week). If weight still >205 on Thursday, will need another metolazone  ACEi/ARB/ARNI: contraindicated due to renal dysfunction/hyperkalemia. Allergy to hydralazine and isosorbid.  BB: Stopped again given decompensated heart failure, has stopped in the past due to low output, feeling better off  Aldosterone antagonist: contraindicated due to renal dysfunction  SCD prophylaxis: has declined on multiple conversations  NSAID use: contraindicated  Sleep apnea evaluation: will discuss at future appointment  Remote monitoring: N/A     #STEMI s/p DESx3 (2012)  #CAD s/p PCI proximal RCA (6/2019)  - CAD stable, no symptoms. Continue plavix and aspirin. Statin intolerance.     #Paroxysmal Afib   CHADSVASC 3 (HF, DM, CAD). Not on a/c due to history of GI bleed. Not willing to retrial. No symptoms of afib today.    - Stopped  "metoprolol  given decompensated heart failure     #CKD IV  Baseline serum creatinine around 2.7.  Has been improving in the last few weeks, 2.18 today  - BMP next week    # H/o GIB  - No symptoms today    # GOC. At a prior visit We did have a short goals of care conversation. He continues to confirm that an ICD and advanced therapies are not within his goals of care. He did however, sight that he is generally happy with his current quality of life and that \"living has become important to him.\" At this point he does want to remain full code. He also sited that he likely wants his girlfriend to be his medical decision maker. I did encourage him to finalize this with paperwork as soon as he knows this for certain. I also encouraged him to have a very open conversation with her about what he would want and I also offered to have her join us on a future phone call to help guide this conversation. He doesn't want to do this currently, but he does think that this would be helpful in the future. I also offered him a social work visit for help with ACP paper work- he declined for now but he did say he would be interested in the future.   - Pt confirmed his desire to be FULL CODE  - He confirms that he is not interested in ICD or advanced therapies at this time.  - I have encouraged him to continue talking with family about his disease and prognosis as he does not feel that they are always aware    Follow up   - RN phone call Thurs, if weight >205 consider metolazone again with additional Kcl  - BMP in 1 week  - CORE in 2 weeks, virtual or in person okay  - Dr. Trujillo in August as scheduled      Ena Kruger PA-C  [facility] Cardiology    CC  SELF, REFERRED    "

## 2020-07-28 NOTE — NURSING NOTE
Chief Complaint   Patient presents with     Follow Up     Return CORE, 59 yo male with HFrEF presents for follow up with labs prior      Vitals were taken and medications were reconciled.     Jazmin Alston CMA    7:12 AM

## 2020-07-28 NOTE — PATIENT INSTRUCTIONS
Take your medicines every day, as directed    Changes made today:  o Continue current medications  o Based on your symptoms and leg swelling, we will likely have you take another metolazone later this week. Please await our instruction   Monitor Your Weight and Symptoms    Contact us if you:      Gain 2 pounds in one day or 5 pounds in one week    Feel more short of breath    Notice more leg swelling    Feel lightheadeded   Change your lifestyle    Limit Salt or Sodium:    2000 mg  Limit Fluids:    2000 mL or approximately 64 ounces  Eat a Heart Healthy Diet    Low in saturated fats  Stay Active:    Aim to move at least 150 minutes every  week         To Contact us    During Business Hours:  [phone], option # 1 (University)  Then option # 4 (medical questions)     After hours, weekends or holidays:   [phone], Option #4  Ask to speak to the On-Call Cardiologist. Inform them you are a CORE/heart failure patient at the [facility].     Use [employer] allows you to communicate directly with your heart team through secure messaging.    [employer] can be accessed any time on your phone, computer, or tablet.    If you need assistance, we'd be happy to help!         Keep your Heart Appointments:    - RN phone call Thursday to review weights. If still elevated above 205, will have you take another metolazone  - Labs next week  - CORE in 2 weeks, virtual or in person okay  - Dr. Trujillo as scheduled 8/18

## 2020-07-30 NOTE — TELEPHONE ENCOUNTER
Called and spoke to patient who states that his weights for the past week have been steady at 207 Lbs. Patient also states that he has been feeling progressively worse muscle tightness discomfort since increasing Atorvastatin from 40 MG daily to 80 MG daily.  Will notify provider.

## 2020-07-31 NOTE — TELEPHONE ENCOUNTER
New orders received.    Date: 7/31/2020    Time of Call: 9:52 AM     Diagnosis: Hyperlipidemia, HF     [ VORB ] Ordering provider:Ena Kruger PA-C  Order: Decrease Atorvastatin to 40 MG once daily. He should also take another metolazone with 60 meq of Kcl and get labs and review weights on Monday.     Order received by: Zach VARGAS RN     Follow-up/additional notes: New prescription sent to pharmacy, BMP ordered. Patient was called and notified. He will be on the road traveling today so will take the metolazone tomorrow. Today his weight is 208 Lbs, /56. He agrees to get labs early next week.

## 2020-08-03 NOTE — TELEPHONE ENCOUNTER
Called Cole to follow up. Got labs this morning and reviewed results with him. K was 3.6 but he reports he hadn't taken his potassium prior to labs this AM and came home and took 3 tabs.     Weight today 202. Legs feel sore but swelling has improved a lot. He thinks the soreness is from taking the metolazone. He feels pretty good.   Will review with provider and call him back with any changes. Confirms he is still taking Torsemide 140 mg BID. Scheduled for CORE follow up next week.   Patricia Vo RN

## 2020-08-07 NOTE — TELEPHONE ENCOUNTER
Called Cole to check in. He reports weight is stable at 202. He feels good. Calves/leg swelling looks better. He has a couple concerns:    1. Muscle aches/pains. This was an issue a week ago and Atorvastatin was decreased. He tells me he actually only took the decreased dose for 1 day and then after reading about side effects of Atorvastatin he stopped it completely. He had similar reactions to Simvastatin which is now on his allergy list. He reports he is still having the muscle aches/pains but that it is slowly getting better. I advised him if it worsens over the weekend he should be evaluated in the ER    2. He thinks 140 mg BID of Torsemide is too much. He's starting to feel dizzy intermittently and thinks it is caused by this. First he said he'd like to reduce the dose.  He also said he'd maybe like to try going back on Lasix. He thinks this was stopped and switched to Torsemide because he wasn't responding to it as well. Reports back then he also was eating a lot of salt but he's cleaned up his diet a lot.     I told him I would review with Sade Kruger and call him back with any recommendations.   Patricia Vo RN

## 2020-08-07 NOTE — TELEPHONE ENCOUNTER
Reviewed with Sade ROONEY. Called Cole back with following recommendations:  Date: 8/7/2020    Time of Call: 10:04 AM     Diagnosis:  HFrEF     [ VORB ] Ordering provider: Sade ROONEY  Order: Decrease Torsemide to 120 mg BID. Decrease Potassium to 60 mEq in AM, 40 mEq in PM. STOP Lipitor.      Order received by: Patricia Vo RN      Follow-up/additional notes: Reviewed this with Cole who verbalized understanding. Told him he needs to call us if he starts to gain weight on this decreased dose of Torsemide.

## 2020-08-13 PROBLEM — E87.5 HYPERKALEMIA: Status: ACTIVE | Noted: 2020-01-01

## 2020-08-13 NOTE — PHARMACY-ADMISSION MEDICATION HISTORY
Admission Medication History Completed by Pharmacy    See [facility] Admission Navigator for allergy information, preferred outpatient pharmacy, prior to admission medications and immunization status.     Medication History Sources:     Patient via telephone, Surescripts    Changes made to PTA medication list (reason):    Added: None    Deleted: None    Changed: None    Additional Information:    Patient reports he last took most of his medications about 1 week ago, continued taking potassium.    Prior to Admission medications    Medication Sig Last Dose Taking? Auth Provider   aspirin (ASA) 81 MG chewable tablet Take 81 mg by mouth daily Past Week at Unknown time Yes Unknown, Entered By History   metolazone (ZAROXOLYN) 2.5 MG tablet One tablet only when directed by your doctor Past Month at Unknown time Yes Alen Trujillo MD   potassium chloride ER (KLOR-CON M) 20 MEQ CR tablet Take 60 meq of Kcl in the morning and 40 meq of Kcl in the afternoon.  Patient taking differently: Take 40 mEq by mouth 2 times daily Take 60 meq of Kcl in the morning and 40 meq of Kcl in the afternoon. 8/12/2020 at Unknown time Yes Ena Kruger PA-C   senna-docusate (SENOKOT-S/PERICOLACE) 8.6-50 MG tablet Take 1 tablet by mouth 2 times daily as needed for constipation Past Week at Unknown time Yes Unknown, Entered By History   torsemide (DEMADEX) 20 MG tablet Take 6 tablets (120 mg) by mouth 2 times daily Past Week at Unknown time Yes Ena Kruger PA-C   vitamin  B complex with vitamin C (VITAMIN  B COMPLEX) TABS Take 1 tablet by mouth daily Past Week at Unknown time Yes Reported, Patient   Vitamin D, Cholecalciferol, 25 MCG (1000 UT) CAPS Take 1 capsule by mouth 2 times daily Past Week at Unknown time Yes Unknown, Entered By History   ACCU-CHEK GUIDE test strip USE TO TEST BLOOD SUGAR FOUR TIMES A DAY BEFORE MEALS AND AT BEDTIME   Smiley Argueta PA-C   Alcohol Swabs PADS 1 applicator 4 times daily (before meals and  nightly)   Arina Haynes APRN CNP   blood glucose monitoring (ACCU-CHEK FASTCLIX) lancets USE TO TEST BLOOD SUGAR FOUR TIMES A DAY AT MEALS AND AT BEDTIME   Smiley Argueta PA-C   insulin pen needle (32G X 4 MM) 32G X 4 MM miscellaneous Use 5 pen needles daily or as directed.   Arina Haynes APRN CNP   nitroGLYcerin (NITROSTAT) 0.4 MG sublingual tablet Place 1 tablet (0.4 mg) under the tongue every 5 minutes as needed for chest pain For chest pain place 1 tablet under the tongue every 5 minutes for 3 doses. If symptoms persist 5 minutes after 1st dose call 911.  Patient not taking: Reported on 6/3/2020 Unknown at Unknown time  Salo Jones MD   Sharps Container MISC 1 Device every 30 days  Patient not taking: Reported on 8/13/2020 Unknown at Unknown time  Arina Haynes APRN CNP       Date completed: 08/13/20    Medication history completed by: Greer Powers [facility]

## 2020-08-13 NOTE — NURSING NOTE
Chief Complaint   Patient presents with     Follow Up     Return CORE, 61 yo male with HFrEF presents for follow up with labs prior      Vitals were taken and medications reconciled.     Kannan Paez CMA  7:29 AM

## 2020-08-13 NOTE — ED PROVIDER NOTES
[place] EMERGENCY DEPARTMENT ([facility])  August 13, 2020  History   No chief complaint on file.    ALPHONSO Jesus is a 60 year old male who has a PMH of proximal right coronary artery occulusion s/p angio+ stent in 6/2019, STEMI s/p drug eluting stents for RCA and LCX total occlusion (2012), GIB due to AV malformation, ischemic cardiomyopathy, HFrEF (last EF=30-35% 11/12/19), severe pHTN, moderate mitral regurgitation, atrial fibrillation, CKD stage IV, who presents to the Emergency Department via EMS from Cardiology clinic with decreased mobility, muscle cramping, and 16 lbs wt gain.  The patient was in the core clinic today for follow-up.  He stopped taking his torsemide approximately 1 week ago as he was concerned that was causing leg cramping.  He states he was also concerned there was narcotic in the medications.  The patient denies any chest pain or dyspnea.  Denies abdominal pain.  No nausea or vomiting.  Patient denies any fever or cough.  He was seen in the clinic today and sent to the emergency department to be admitted to the cardiology 1 service.  He was also noted to be hyperkalemic in clinic today.  He is been taking his potassium supplement without taking torsemide for the past week.  Denies any other illness or acute medical concerns.    Patient does have a history of GI bleed in the past.  He states his stool was dark for 1 day approximately a week ago.  It has subsequently been brown.  He has noticed no bright red blood per rectum.  No epistaxis.  No other bruising or bleeding.  Patient does have a history of GI bleed.  In 2019, the patient was found to have a single bleeding angiectasia in the second portion of the duodenum.  The area was injected and clipped.  He also has a history of cecal ulcer status post ileocecal resection in 2011.    PAST MEDICAL HISTORY:   Past Medical History:   Diagnosis Date     Anemia in chronic renal disease 3/9/2015     Antiplatelet or  antithrombotic long-term use      Atrial fibrillation and flutter      CAD (coronary artery disease)     Stemi in 12/11, s/p angioplasty     CRD (chronic renal disease), stage IV (H) 03/09/2015    hx ATN with dialysis complicating cardiogenic shock  1/2012     GI bleed     massive lower GI bleed secondary to a cecal ulcer, s/p ileocecal resection in 12/11     Heart failure     Biventricular systolic HF, complicated by ARDS requiring tracheostomy     Hyperlipidemia LDL goal <100 4/28/2013     Hypertension      Ischemic cardiomyopathy 4/28/2013    TTE revealing 40% EF     Myocardial infarction (H)      Other and unspecified nonspecific immunological findings     Anti JKa     Pulmonary edema     episodes of flash pulmonary edema in 12/11     Subclinical hypothyroidism        PAST SURGICAL HISTORY:   Past Surgical History:   Procedure Laterality Date     CV RIGHT HEART CATH N/A 11/12/2019    Procedure: CV RIGHT HEART CATH;  Surgeon: Fox Gerard MD;  Location:  HEART CARDIAC CATH LAB     CV RIGHT HEART CATH N/A 2/10/2020    Procedure: CV RIGHT HEART CATH;  Surgeon: Fox Gerard MD;  Location:  HEART CARDIAC CATH LAB     ESOPHAGOSCOPY, GASTROSCOPY, DUODENOSCOPY (EGD), COMBINED  12/25/2011    Procedure:COMBINED ESOPHAGOSCOPY, GASTROSCOPY, DUODENOSCOPY (EGD); Surgeon:SAMARIA PUENTE; Location:[facility]     LAPAROTOMY EXPLORATORY  12/31/2011    Procedure:LAPAROTOMY EXPLORATORY; Explore laparotomy, Illeocectomy, Diverting Illeostomy; Surgeon:GIA NAJERA; Location:[facility] OR     ORIF right elbow fracture  age 12     TAKEDOWN ILEOSTOMY  6/5/2014    Procedure: TAKEDOWN ILEOSTOMY;  Surgeon: Gia Najera MD;  Location: [facility] OR     TRACHEOSTOMY  12/22/2011    Procedure:TRACHEOSTOMY; Tracheostomy; 80XLTCP-Proximal Extension-Cuffed 8.0 mm I.D.; Surgeon:LIZABETH DYE; Location:[facility] OR       Past medical history, past surgical history, medications, and allergies  were reviewed with the patient. Additional pertinent items: None    FAMILY HISTORY:   Family History   Problem Relation Age of Onset     Diabetes Mother      Hypertension Mother      Heart Disease Mother      Heart Disease Father          of heart attack, left when he was young     Diabetes Sister      Diabetes Sister      Diabetes Sister      Glaucoma No family hx of      Macular Degeneration No family hx of        SOCIAL HISTORY:   Social History     Tobacco Use     Smoking status: Former Smoker     Packs/day: 2.00     Years: 40.00     Pack years: 80.00     Types: Cigarettes     Last attempt to quit: 12/3/2011     Years since quittin.7     Smokeless tobacco: Never Used   Substance Use Topics     Alcohol use: No     Alcohol/week: 0.0 standard drinks     Social history was reviewed with the patient. Additional pertinent items: None      Current Discharge Medication List      CONTINUE these medications which have NOT CHANGED    Details   ACCU-CHEK GUIDE test strip USE TO TEST BLOOD SUGAR FOUR TIMES A DAY BEFORE MEALS AND AT BEDTIME  Qty: 400 each, Refills: 3    Associated Diagnoses: Type 2 diabetes mellitus without complication, without long-term current use of insulin (H)      Alcohol Swabs PADS 1 applicator 4 times daily (before meals and nightly)  Qty: 100 each, Refills: 3    Associated Diagnoses: Type 2 diabetes mellitus without complication, without long-term current use of insulin (H)      aspirin (ASA) 81 MG chewable tablet Take 81 mg by mouth daily      blood glucose monitoring (ACCU-CHEK FASTCLIX) lancets USE TO TEST BLOOD SUGAR FOUR TIMES A DAY AT MEALS AND AT BEDTIME  Qty: 400 each, Refills: 3    Associated Diagnoses: Type 2 diabetes mellitus without complication, without long-term current use of insulin (H)      insulin pen needle (32G X 4 MM) 32G X 4 MM miscellaneous Use 5 pen needles daily or as directed.  Qty: 200 each, Refills: 3    Associated Diagnoses: Type 2 diabetes mellitus without  complication, without long-term current use of insulin (H)      metolazone (ZAROXOLYN) 2.5 MG tablet One tablet only when directed by your doctor  Qty: 15 tablet, Refills: 1    Associated Diagnoses: Ischemic cardiomyopathy      nitroGLYcerin (NITROSTAT) 0.4 MG sublingual tablet Place 1 tablet (0.4 mg) under the tongue every 5 minutes as needed for chest pain For chest pain place 1 tablet under the tongue every 5 minutes for 3 doses. If symptoms persist 5 minutes after 1st dose call 911.  Qty: 25 tablet, Refills: 0    Associated Diagnoses: Type 2 diabetes mellitus with complication, with long-term current use of insulin (H); ASCVD (arteriosclerotic cardiovascular disease)      potassium chloride ER (KLOR-CON M) 20 MEQ CR tablet Take 60 meq of Kcl in the morning and 40 meq of Kcl in the afternoon.  Qty: 360 tablet, Refills: 3    Associated Diagnoses: Acute on chronic systolic heart failure (H)      senna-docusate (SENOKOT-S/PERICOLACE) 8.6-50 MG tablet Take 1 tablet by mouth 2 times daily as needed for constipation      Sharps Container MISC 1 Device every 30 days  Qty: 1 each, Refills: 3    Associated Diagnoses: Type 2 diabetes mellitus without complication, without long-term current use of insulin (H)      torsemide (DEMADEX) 20 MG tablet Take 6 tablets (120 mg) by mouth 2 times daily  Qty: 360 tablet, Refills: 4    Comments: Decrease in dose. Will call for refills.  Associated Diagnoses: Acute on chronic systolic heart failure (H)      vitamin  B complex with vitamin C (VITAMIN  B COMPLEX) TABS Take 1 tablet by mouth daily    Associated Diagnoses: Atrial tachycardia (H); Renal insufficiency; Ischemic cardiomyopathy      Vitamin D, Cholecalciferol, 25 MCG (1000 UT) CAPS Take 1 capsule by mouth 2 times daily                Allergies   Allergen Reactions     Hydralazine      Black spots, verified allergic reaction by skin biopsy     Isosorbide      Per pt got a rash all over his body and won't take it no more      Simvastatin Other (See Comments)     Leg muscle weakness     Tylenol [Acetaminophen] Palpitations        Review of Systems   Constitutional: Negative for fever.   HENT: Negative for congestion.    Eyes: Negative for redness.   Respiratory: Negative for shortness of breath.    Cardiovascular: Positive for leg swelling. Negative for chest pain.   Gastrointestinal: Negative for abdominal pain.   Genitourinary: Negative for difficulty urinating.   Musculoskeletal: Negative for arthralgias and neck stiffness.   Skin: Negative for color change.   Neurological: Negative for headaches.   Psychiatric/Behavioral: Negative for confusion.   All other systems reviewed and are negative.    A complete review of systems was performed with pertinent positives and negatives noted in the HPI, and all other systems negative.    Physical Exam   BP: 99/74  Pulse: 91  Heart Rate: 90  Temp: 97.5  F (36.4  C)  Resp: 20  SpO2: 100 %      Physical Exam  Vitals signs and nursing note reviewed.   Constitutional:       General: He is not in acute distress.     Appearance: He is not diaphoretic.   HENT:      Head: Normocephalic and atraumatic.   Eyes:      General: No scleral icterus.     Pupils: Pupils are equal, round, and reactive to light.   Cardiovascular:      Rate and Rhythm: Normal rate and regular rhythm.      Pulses: Normal pulses.   Pulmonary:      Effort: Pulmonary effort is normal. No respiratory distress.   Abdominal:      General: Bowel sounds are normal.      Tenderness: There is no abdominal tenderness.   Genitourinary:     Rectum: Guaiac result positive (brown stool).   Musculoskeletal:         General: No tenderness.      Right lower le+ Pitting Edema present.      Left lower le+ Pitting Edema present.   Skin:     General: Skin is warm.      Findings: No rash.   Neurological:      Mental Status: He is alert.         ED Course        Procedures             EKG Interpretation:      Interpreted by RADHA DAIVS MD, MD  Time  reviewed: 0955  Symptoms at time of EKG: leg swelling   Rhythm: normal sinus   Rate: 91  Axis: Normal  Ectopy: none  Conduction: normal  ST Segments/ T Waves: No acute ischemic changes and no peaked T waves  Q Waves: none  Comparison to prior: Unchanged    Clinical Impression: non-specific EKG      Results for orders placed or performed during the hospital encounter of 08/13/20   XR Chest Port 1 View     Status: None    Narrative    EXAM: XR CHEST PORT 1 VW  8/13/2020 10:35 AM     HISTORY:  SOA       COMPARISON:  Chest x-ray 4/21/2020    FINDINGS: Single view of the chest. Stable enlarged cardiac  silhouette. Indistinct pulmonary vasculature. Increased perihilar and  lower lung predominant mixed interstitial and airspace opacities.  Small pleural effusions bilaterally appear similar. No pneumothorax.  No acute osseous or upper abdominal abnormality.      Impression    IMPRESSION:   1. Cardiomegaly with increased interstitial and airspace opacities  favored to represent pulmonary edema, recommend follow-up to clearing  to exclude infection.  2. Unchanged small bilateral pleural effusions.    I have personally reviewed the examination and initial interpretation  and I agree with the findings.    BEATRICE RODRIGUEZ MD   CBC with platelets differential     Status: Abnormal   Result Value Ref Range    WBC 9.1 4.0 - 11.0 10e9/L    RBC Count 2.53 (L) 4.4 - 5.9 10e12/L    Hemoglobin 5.9 (LL) 13.3 - 17.7 g/dL    Hematocrit 21.9 (L) 40.0 - 53.0 %    MCV 87 78 - 100 fl    MCH 23.3 (L) 26.5 - 33.0 pg    MCHC 26.9 (L) 31.5 - 36.5 g/dL    RDW 18.5 (H) 10.0 - 15.0 %    Platelet Count 359 150 - 450 10e9/L    Diff Method Automated Method     % Neutrophils 83.1 %    % Lymphocytes 5.7 %    % Monocytes 9.6 %    % Eosinophils 0.8 %    % Basophils 0.4 %    % Immature Granulocytes 0.4 %    Nucleated RBCs 2 (H) 0 /100    Absolute Neutrophil 7.5 1.6 - 8.3 10e9/L    Absolute Lymphocytes 0.5 (L) 0.8 - 5.3 10e9/L    Absolute Monocytes 0.9 0.0  - 1.3 10e9/L    Absolute Eosinophils 0.1 0.0 - 0.7 10e9/L    Absolute Basophils 0.0 0.0 - 0.2 10e9/L    Abs Immature Granulocytes 0.0 0 - 0.4 10e9/L    Absolute Nucleated RBC 0.2    Basic metabolic panel     Status: Abnormal   Result Value Ref Range    Sodium 134 133 - 144 mmol/L    Potassium 6.3 (HH) 3.4 - 5.3 mmol/L    Chloride 108 94 - 109 mmol/L    Carbon Dioxide 20 20 - 32 mmol/L    Anion Gap 6 3 - 14 mmol/L    Glucose 124 (H) 70 - 99 mg/dL    Urea Nitrogen 85 (H) 7 - 30 mg/dL    Creatinine 2.40 (H) 0.66 - 1.25 mg/dL    GFR Estimate 28 (L) >60 mL/min/[1.73_m2]    GFR Estimate If Black 32 (L) >60 mL/min/[1.73_m2]    Calcium 8.4 (L) 8.5 - 10.1 mg/dL   Troponin I     Status: None   Result Value Ref Range    Troponin I ES 0.016 0.000 - 0.045 ug/L   Nt probnp inpatient (BNP)     Status: Abnormal   Result Value Ref Range    N-Terminal Pro BNP Inpatient 5,524 (H) 0 - 900 pg/mL   Glucose by meter     Status: Abnormal   Result Value Ref Range    Glucose 124 (H) 70 - 99 mg/dL   Glucose by meter     Status: Abnormal   Result Value Ref Range    Glucose 195 (H) 70 - 99 mg/dL   Glucose by meter     Status: Abnormal   Result Value Ref Range    Glucose 174 (H) 70 - 99 mg/dL   Glucose by meter     Status: Abnormal   Result Value Ref Range    Glucose 140 (H) 70 - 99 mg/dL   EKG 12-lead, tracing only     Status: None   Result Value Ref Range    Interpretation ECG Click View Image link to view waveform and result    ABO/Rh type and screen     Status: None   Result Value Ref Range    Units Ordered 1     ABO O     RH(D) Pos     Antibody Screen Neg     Test Valid Only At          [facility]    Specimen Expires 08/16/2020     Crossmatch Red Blood Cells    Blood component     Status: None   Result Value Ref Range    Unit Number [id number]     Blood Component Type Red Blood Cells Leukocyte Reduced     Division Number 00     Status of Unit Released to care unit 08/13/2020 1214     Blood  Product Code T2041D50     Unit Status ISS    Results for orders placed or performed in visit on 08/13/20   EKG 12-lead, tracing only (Same Day)     Status: None (Preliminary result)   Result Value Ref Range    Interpretation ECG Click View Image link to view waveform and result    Results for orders placed or performed in visit on 08/13/20   Basic metabolic panel     Status: Abnormal   Result Value Ref Range    Sodium 133 133 - 144 mmol/L    Potassium 5.9 (H) 3.4 - 5.3 mmol/L    Chloride 105 94 - 109 mmol/L    Carbon Dioxide 21 20 - 32 mmol/L    Anion Gap 7 3 - 14 mmol/L    Glucose 125 (H) 70 - 99 mg/dL    Urea Nitrogen 85 (H) 7 - 30 mg/dL    Creatinine 2.45 (H) 0.66 - 1.25 mg/dL    GFR Estimate 27 (L) >60 mL/min/[1.73_m2]    GFR Estimate If Black 31 (L) >60 mL/min/[1.73_m2]    Calcium 8.4 (L) 8.5 - 10.1 mg/dL   CK total     Status: None   Result Value Ref Range    CK Total 65 30 - 300 U/L                 Critical Care Addendum    My initial assessment, based on my review of prehospital provider report, review of vital signs, focused history, physical exam and 12 lead ECG analysis, established that Cole Jesus has and Acute anemia and hyperkalemia, which requires immediate intervention, and therefore he is critically ill.     After the initial assessment, the care team  to provide stabilization care. Due to the critical nature of this patient, I reassessed nursing observations, vital signs, physical exam, 12 lead ECG analysis and interpretation of Laboratory tests multiple times prior to his disposition.  Initiated treatment for hyperkalemia also a transfusion of packed red blood cells.     Time also spent performing documentation, discussion with consultants and coordination of care.     Critical care time (excluding teaching time and procedures): 45 minutes.            Medications   glucose gel 15-30 g ( Oral See Alternative 8/13/20 1323)     Or   dextrose 50 % injection 25-50 mL ( Intravenous See Alternative  8/13/20 1323)     Or   glucagon injection 1 mg (1 mg Subcutaneous Not Given 8/13/20 1323)   dextrose 10% infusion ( Intravenous New Bag 8/13/20 1141)   furosemide (LASIX) injection 40 mg (40 mg Intravenous Given 8/13/20 1042)   pantoprazole (PROTONIX) 40 mg IV push injection (40 mg Intravenous Given 8/13/20 1130)   dextrose 50 % injection 25 g (25 g Intravenous Given 8/13/20 1142)   insulin (regular) (HumuLIN R/NovoLIN R) 1 unit/mL injection 9.9 Units (9.9 Units Intravenous Given 8/13/20 1144)   sodium bicarbonate 8.4 % injection 50 mEq (50 mEq Intravenous Given 8/13/20 1150)             Assessments & Plan (with Medical Decision Making)   60 year old male with history of heart failure to the emergency department from a Core clinic for further evaluation, management, and admission for volume overload status and hyperkalemia.  The patient stopped his torsemide 1 week ago but continues his potassium supplement.  He is up 16 pounds in weight and was found to be hyperkalemic with a potassium of 5.9 in clinic this morning.  Patient largely has no complaints other than fatigue, leg swelling, and being weight up.  The patient's potassium level was confirmed to be elevated at 6.3.  He does not have any EKG changes additionally, patient was found to be anemic with a hemoglobin of 5.9.  He did have 1 day of dark stool approximately a week ago.  He has brown, guaiac positive stool in the emergency department today.  He was given 40 mg of Protonix intravenously.  Blood transfusion was initiated.  Diuresis was also initiated as well as treatment of hyperkalemia with furosemide.  The patient will be admitted to the stepdown unit for further care by the internal medicine service.    I have reviewed the nursing notes.    I have reviewed the findings, diagnosis, plan and need for follow up with the patient.    Current Discharge Medication List          Final diagnoses:   Anemia due to blood loss, acute   Hyperkalemia   Acute on  chronic systolic congestive heart failure (H)       8/13/2020   [facility], [place], EMERGENCY DEPARTMENT     Ludin Gunter MD  08/13/20 3728

## 2020-08-13 NOTE — PLAN OF CARE
Temp:  [97.1  F (36.2  C)-97.8  F (36.6  C)] 97.2  F (36.2  C)  Pulse:  [] 94  Heart Rate:  [84-98] 98  Resp:  [18-20] 18  BP: ()/(59-77) 102/72  SpO2:  [97 %-100 %] 100 %   Shift  [id number]. Pt arrived to unit from ED today.  Neuro: A/Ox4. Pleasant.   Cardiac: SR. Sbp 100s. BLE edema +3.   Respiratory:  RA. Denies shortness of breath. States with walking further distances he will feel labored. Crackles to lung bases.   GI/: last BM reported prior to admission and was brown in color (no blood noted). Diuresing with lasix, received 40mg in ED, additional dose this evening.   Diet/appetite: clears. NPO at midnight for potential scope tomorrow-GI consulted.   Activity: independent repositioning in bed. Ambulated from stretcher to bed with SBA.  Pain: denies.  Skin: 2 PIV saline locked.    Received 1UPRBC for hemoglobin 5.9. Recheck pending at end of shift (cross cover maroon wanted to hold off on 2nd unit until hemoglobin results). Recheck potassium after reversal is 5.3.      Continue with POC. Notify primary team with changes.

## 2020-08-13 NOTE — PROGRESS NOTES
Admission          8/13/2020 1320  -----------------------------------------------------------  Reason for admission: fluid overload and hyperkalemia  Primary team notified of pt arrival.  Admitted from: ED  Via: stretcher  Accompanied by: RN staff  Belongings: Placed in closet; valuables sent home with family.   Admission Profile: complete  Teaching: orientation to unit and call light- call light within reach, call don't fall, use of console, meal times, when to call for the RN, and enforced importance of safety   Access: PIV  Telemetry:Placed on pt  Ht./Wt.: complete  Code Status verified on armband: yes/no- code status not ordered yet  2 RN Skin Assessment Completed By: Batsheva RN and Macario RN  Bed surface reassessed with algorithm and charted: yes  New bed surface ordered: no    Pt status: A/O x4. VSS on RA. 1st unit prbc running.  with d10 running    Temp:  [97.4  F (36.3  C)-97.8  F (36.6  C)] 97.8  F (36.6  C)  Pulse:  [] 95  Heart Rate:  [84-98] 98  Resp:  [18-20] 18  BP: ()/(59-77) 116/71  SpO2:  [97 %-100 %] 100 %

## 2020-08-13 NOTE — ED NOTES
[facility], [place]   ED Nurse to Floor Handoff     Cole Jesus is a 60 year old male who speaks English and lives with a spouse,  in a home  They arrived in the ED by ambulance from clinic    ED Chief Complaint: No chief complaint on file.    ED Dx;   Final diagnoses:   Anemia due to blood loss, acute   Hyperkalemia   Acute on chronic systolic congestive heart failure (H)         Needed?: No    Allergies:   Allergies   Allergen Reactions     Hydralazine      Black spots, verified allergic reaction by skin biopsy     Isosorbide      Per pt got a rash all over his body and won't take it no more     Simvastatin Other (See Comments)     Leg muscle weakness     Tylenol [Acetaminophen] Palpitations   .  Past Medical Hx:   Past Medical History:   Diagnosis Date     Anemia in chronic renal disease 3/9/2015     Antiplatelet or antithrombotic long-term use      Atrial fibrillation and flutter      CAD (coronary artery disease)     Stemi in 12/11, s/p angioplasty     CRD (chronic renal disease), stage IV (H) 03/09/2015    hx ATN with dialysis complicating cardiogenic shock  1/2012     GI bleed     massive lower GI bleed secondary to a cecal ulcer, s/p ileocecal resection in 12/11     Heart failure     Biventricular systolic HF, complicated by ARDS requiring tracheostomy     Hyperlipidemia LDL goal <100 4/28/2013     Hypertension      Ischemic cardiomyopathy 4/28/2013    TTE revealing 40% EF     Myocardial infarction (H)      Other and unspecified nonspecific immunological findings     Anti JKa     Pulmonary edema     episodes of flash pulmonary edema in 12/11     Subclinical hypothyroidism       Baseline Mental status: WDL  Current Mental Status changes: at basesline    Infection present or suspected this encounter: no  Sepsis suspected: No  Isolation type: No active isolations     Activity level - Baseline/Home:  Independent  Activity Level - Current:   Stand with  Assist    Bariatric equipment needed?: No    In the ED these meds were given:   Medications   glucose gel 15-30 g (has no administration in time range)     Or   dextrose 50 % injection 25-50 mL (has no administration in time range)     Or   glucagon injection 1 mg (has no administration in time range)   dextrose 10% infusion ( Intravenous New Bag 8/13/20 1141)   furosemide (LASIX) injection 40 mg (40 mg Intravenous Given 8/13/20 1042)   pantoprazole (PROTONIX) 40 mg IV push injection (40 mg Intravenous Given 8/13/20 1130)   dextrose 50 % injection 25 g (25 g Intravenous Given 8/13/20 1142)   insulin (regular) (HumuLIN R/NovoLIN R) 1 unit/mL injection 9.9 Units (9.9 Units Intravenous Given 8/13/20 1144)   sodium bicarbonate 8.4 % injection 50 mEq (50 mEq Intravenous Given 8/13/20 1150)       Drips running?  No    Home pump  No    Current LDAs  Peripheral IV 08/13/20 Right Lower forearm (Active)   Site Assessment WDL 08/13/20 1013   Line Status Saline locked 08/13/20 1013   Phlebitis Scale 0-->no symptoms 08/13/20 1013   Number of days: 0       Labs results:   Labs Ordered and Resulted from Time of ED Arrival Up to the Time of Departure from the ED   CBC WITH PLATELETS DIFFERENTIAL - Abnormal; Notable for the following components:       Result Value    RBC Count 2.53 (*)     Hemoglobin 5.9 (*)     Hematocrit 21.9 (*)     MCH 23.3 (*)     MCHC 26.9 (*)     RDW 18.5 (*)     Nucleated RBCs 2 (*)     Absolute Lymphocytes 0.5 (*)     All other components within normal limits   BASIC METABOLIC PANEL - Abnormal; Notable for the following components:    Potassium 6.3 (*)     Glucose 124 (*)     Urea Nitrogen 85 (*)     Creatinine 2.40 (*)     GFR Estimate 28 (*)     GFR Estimate If Black 32 (*)     Calcium 8.4 (*)     All other components within normal limits   NT PROBNP INPATIENT - Abnormal; Notable for the following components:    N-Terminal Pro BNP Inpatient 5,524 (*)     All other components within normal limits   GLUCOSE  BY METER - Abnormal; Notable for the following components:    Glucose 124 (*)     All other components within normal limits   TROPONIN I   COVID-19 VIRUS (CORONAVIRUS) BY PCR   POTASSIUM   PERIPHERAL IV CATHETER   CARDIAC CONTINUOUS MONITORING   CARDIAC CONTINUOUS MONITORING   ASSESS FOR HYPOGLYCEMIA SYMPTOMS   NOTIFY PHYSICIAN   RED BLOOD CELL PREPARE ORDER UNIT   ABO/RH TYPE AND SCREEN       Imaging Studies:   Recent Results (from the past 24 hour(s))   XR Chest Port 1 View    Narrative    EXAM: XR CHEST PORT 1 VW  8/13/2020 10:35 AM     HISTORY:  SOA       COMPARISON:  Chest x-ray 4/21/2020    FINDINGS: Single view of the chest. Stable enlarged cardiac  silhouette. Indistinct pulmonary vasculature. Increased perihilar and  lower lung predominant mixed interstitial and airspace opacities.  Small pleural effusions bilaterally appear similar. No pneumothorax.  No acute osseous or upper abdominal abnormality.      Impression    IMPRESSION:   1. Cardiomegaly with increased interstitial and airspace opacities  favored to represent pulmonary edema, recommend follow-up to clearing  to exclude infection.  2. Unchanged small bilateral pleural effusions.    I have personally reviewed the examination and initial interpretation  and I agree with the findings.    BEATRICE RODRIGUEZ MD       Recent vital signs:   /75   Pulse 85   Temp 97.5  F (36.4  C) (Oral)   Resp 20   SpO2 99%     Yogesh Coma Scale Score: 15 (08/13/20 0939)       Cardiac Rhythm: Normal Sinus  Pt needs tele? Yes  Skin/wound Issues: None    Code Status: Full Code    Pain control: pt had none    Nausea control: pt had none    Abnormal labs/tests/findings requiring intervention: see epic     Family present during ED course? No   Family Comments/Social Situation comments: family aware     Tasks needing completion: None    Jane Romero, RN  [phone] [facility]

## 2020-08-13 NOTE — NURSING NOTE
Recommendations for patient to go to ER. Discussed with patient and wife. Provider ok with transport via [facility]. Transport called. Provider called update to ER.

## 2020-08-13 NOTE — ED TRIAGE NOTES
Pt with hx of CHF BIBA from [facility] this morning where he went in for decrease mobility, muscle cramping and SOB. Pt reports stop taking his torsemide over 1 week ago but continued taking potassium. Weight is currently up 16 lbs. BP 99/59

## 2020-08-13 NOTE — ADDENDUM NOTE
Addended by: JASMIN BELTRE on: 5/1/2020 02:16 PM     Modules accepted: Orders     Anesthesia Type: 1% lidocaine with epinephrine

## 2020-08-13 NOTE — PROGRESS NOTES
Please not that this was an inperson visit.     HPI:   60 year old male with history of occluded pRCA s/p balloon angioplasty and stenting in 6/2019, STEMI s/p DESx3 for total occlusion of RCA and Lcx (2012), GIB due to AV malformation (in the setting of warfarin and DAPT) s/p EGD with clip (7/5/2019), ischemic cardiomyopathy, HFrEF (last EF=30-35% 11/12/19), severe pHTN, moderate mitral regurgitation, atrial fibrillation, CKD stage IV who presented for planned RHC, found to be in ambulatory cardiogenic shock.     Last known EDW (based on April admission) was discharged at a weight of 193 lbs (one hospital scale) 196 on home scale.     He is now up to 218 and feeling quite poorly. He came in in wheelchair, which I have never seen from [facility] in the past. He says he can walk minimally before he feels SOB. His edema is actually improved from 1 month ago, but still quite severe. A few weeks ago he was gardening and now feels like he can't do much of anything.  No shortness of breath at rest.  No chest pain, lightheadedness, dizziness, palpitations.  His biggest issue is ongoing cramping.  He feels like the cramping has continued even since he stopped his atorvastatin.  The cramping is primarily behind the knees and in the calves.  He does not report a lot of proximal muscle weakness.  When he stopped his atorvastatin he also stopped his torsemide so he has not been taking for the last week.  He also continue to take his potassium.  He told the core team about the Lipitor stopped but he did not tell the core team about the torsemide stopped.    Cardiac Medications  ASA 81 mg daily  Torsemide 140 mg BID- Selft discontinued 1 week ago  Kcl 60/60  Metolozaone- took on Sat  Nitroglycerin    PAST MEDICAL HISTORY:  Past Medical History:   Diagnosis Date     Anemia in chronic renal disease 3/9/2015     Antiplatelet or antithrombotic long-term use      Atrial fibrillation and flutter      CAD (coronary artery disease)     Stemi  in , s/p angioplasty     CRD (chronic renal disease), stage IV (H) 2015    hx ATN with dialysis complicating cardiogenic shock  2012     GI bleed     massive lower GI bleed secondary to a cecal ulcer, s/p ileocecal resection in      Heart failure     Biventricular systolic HF, complicated by ARDS requiring tracheostomy     Hyperlipidemia LDL goal <100 2013     Hypertension      Ischemic cardiomyopathy 2013    TTE revealing 40% EF     Myocardial infarction (H)      Other and unspecified nonspecific immunological findings     Anti JKa     Pulmonary edema     episodes of flash pulmonary edema in      Subclinical hypothyroidism        FAMILY HISTORY:  Family History   Problem Relation Age of Onset     Diabetes Mother      Hypertension Mother      Heart Disease Mother      Heart Disease Father          of heart attack, left when he was young     Diabetes Sister      Diabetes Sister      Diabetes Sister      Glaucoma No family hx of      Macular Degeneration No family hx of        SOCIAL HISTORY:  Social History     Socioeconomic History     Marital status: Significant other     Spouse name: Not on file     Number of children: Not on file     Years of education: Not on file     Highest education level: Not on file   Occupational History     Not on file   Social Needs     Financial resource strain: Not on file     Food insecurity:     Worry: Not on file     Inability: Not on file     Transportation needs:     Medical: Not on file     Non-medical: Not on file   Tobacco Use     Smoking status: Former Smoker     Packs/day: 2.00     Years: 40.00     Pack years: 80.00     Types: Cigarettes     Last attempt to quit: 12/3/2011     Years since quittin.2     Smokeless tobacco: Never Used   Substance and Sexual Activity     Alcohol use: No     Alcohol/week: 0.0 standard drinks     Drug use: No     Sexual activity: Yes     Partners: Female   Lifestyle     Physical activity:     Days per week:  Not on file     Minutes per session: Not on file     Stress: Not on file   Relationships     Social connections:     Talks on phone: Not on file     Gets together: Not on file     Attends Gnosticist service: Not on file     Active member of club or organization: Not on file     Attends meetings of clubs or organizations: Not on file     Relationship status: Not on file     Intimate partner violence:     Fear of current or ex partner: Not on file     Emotionally abused: Not on file     Physically abused: Not on file     Forced sexual activity: Not on file   Other Topics Concern     Parent/sibling w/ CABG, MI or angioplasty before 65F 55M? Yes      Service Not Asked     Blood Transfusions Not Asked     Caffeine Concern Not Asked     Occupational Exposure Not Asked     Hobby Hazards Not Asked     Sleep Concern Not Asked     Stress Concern Not Asked     Weight Concern Not Asked     Special Diet Not Asked     Back Care Not Asked     Exercise Yes     Comment: limited walking     Bike Helmet Not Asked     Seat Belt Not Asked     Self-Exams Not Asked   Social History Narrative     Not on file       CURRENT MEDICATIONS:  ACCU-CHEK GUIDE test strip, USE TO TEST BLOOD SUGAR FOUR TIMES A DAY BEFORE MEALS AND AT BEDTIME  Alcohol Swabs PADS, 1 applicator 4 times daily (before meals and nightly)  aspirin (ASA) 81 MG chewable tablet, Take 81 mg by mouth daily  blood glucose monitoring (ACCU-CHEK FASTCLIX) lancets, USE TO TEST BLOOD SUGAR FOUR TIMES A DAY AT MEALS AND AT BEDTIME  insulin pen needle (32G X 4 MM) 32G X 4 MM miscellaneous, Use 5 pen needles daily or as directed.  metolazone (ZAROXOLYN) 2.5 MG tablet, One tablet only when directed by your doctor  potassium chloride ER (KLOR-CON M) 20 MEQ CR tablet, Take 60 meq of Kcl in the morning and 40 meq of Kcl in the afternoon.  vitamin  B complex with vitamin C (VITAMIN  B COMPLEX) TABS, Take 1 tablet by mouth daily  Vitamin D, Cholecalciferol, 25 MCG (1000 UT) CAPS, Take  "1 capsule by mouth 2 times daily  nitroGLYcerin (NITROSTAT) 0.4 MG sublingual tablet, Place 1 tablet (0.4 mg) under the tongue every 5 minutes as needed for chest pain For chest pain place 1 tablet under the tongue every 5 minutes for 3 doses. If symptoms persist 5 minutes after 1st dose call 911. (Patient not taking: Reported on 6/3/2020)  senna-docusate (SENOKOT-S/PERICOLACE) 8.6-50 MG tablet, Take 1 tablet by mouth 2 times daily as needed for constipation  Sharps Container MISC, 1 Device every 30 days (Patient not taking: Reported on 8/13/2020)  torsemide (DEMADEX) 20 MG tablet, Take 6 tablets (120 mg) by mouth 2 times daily (Patient not taking: Reported on 8/13/2020)    No current facility-administered medications on file prior to visit.   He our visit with the patient    ROS:   Refer to HPI    EXAM:  BP 98/65 (BP Location: Right arm, Patient Position: Chair, Cuff Size: Adult Regular)   Pulse 86   Ht 1.727 m (5' 8\")   Wt 98.9 kg (218 lb)   SpO2 100%   BMI 33.15 kg/m    GENERAL: Ill-appearing, he appears weak, very low energy, sitting in a wheelchair, speaking in full sentences and able to communicate all needs.  Difficult time explaining his symptoms and logic to his medicine changes over the last week, this is a change for  HEENT: Eye symmetrical, no discharge or icterus bilaterally. Mucous membranes moist and without lesions.  CV: tachycardic, regular. +S1S2, no murmur, rub, or gallop. JVP >10  RESPIRATORY: Respirations regular, even, and unlabored. Lungs CTA throughout.   GI: Soft and non distended with normoactive bowel sounds. No tenderness, rebound, guarding.   EXTREMITIES: 3+ peripheral edema, worse compared to last week.  All extremities are warm and well perfused  NEUROLOGIC: Alert and interacting appropriately. No focal deficits.   MUSCULOSKELETAL: No joint swelling or tenderness.   SKIN: No jaundice. No rashes or lesions.       Labs, reviewed with patient in clinic today:  CBC RESULTS:  Lab " Results   Component Value Date    WBC 9.0 05/11/2020    RBC 4.67 05/11/2020    HGB 12.7 (L) 05/11/2020    HCT 41.4 05/11/2020    MCV 89 05/11/2020    MCH 27.2 05/11/2020    MCHC 30.7 (L) 05/11/2020    RDW 23.5 (H) 05/11/2020     05/11/2020       CMP RESULTS:  Lab Results   Component Value Date     08/03/2020    POTASSIUM 3.6 08/03/2020    CHLORIDE 97 08/03/2020    CO2 32 08/03/2020    ANIONGAP 7 08/03/2020     (H) 08/03/2020    BUN 70 (H) 08/03/2020    CR 2.26 (H) 08/03/2020    GFRESTIMATED 30 (L) 08/03/2020    GFRESTBLACK 35 (L) 08/03/2020    RONNY 8.5 08/03/2020    BILITOTAL 3.8 (H) 04/21/2020    ALBUMIN 3.4 04/21/2020    ALKPHOS 170 (H) 04/21/2020    ALT 31 04/21/2020    AST 26 04/21/2020        INR RESULTS:  Lab Results   Component Value Date    INR 1.28 (H) 06/04/2020       Lab Results   Component Value Date    MAG 2.9 (H) 04/25/2020     Lab Results   Component Value Date    NTBNPI 12,766 (H) 04/21/2020     Lab Results   Component Value Date    NTBNP 6,974 (H) 07/01/2020       Diagnostics:  EKG 8/13/2020, personally reviewed, normal sinus rhythm with a rate of 84, no blocks, no significant ST elevations or depressions.  No T wave changes.  No peak T waves.  His QTC is 475.  This EKG was obtained for hyperkalemia with a K of 5.9.    ECHO 2/11/2020  Interpretation Summary  Severely (EF 29%) reduced left ventricular function is present. Severe diffuse  hypokinesis is present.  Global right ventricular function is moderately reduced.  Moderate mitral insufficiency is present.  Mild to moderate tricuspid insufficiency is present.  Pulmonary hypertension present. Estimated pulmonary artery systolic pressure  is 69 (54+15) mmHg .  Dilation of the inferior vena cava is present with abnormal respiratory  variation in diameter.  Small posterior pericardial effusion without echocardiographic evidence of  Tamponade.    2/10/2020RHC  RA --/24/22  /20  /50/65  PCWP --/51/40    DONOVAN 3.5/1.6  TD  3.8/1.8    PVR 8.5    Assessment and Plan:   60 year old male with history of occluded pRCA s/p balloon angioplasty and stenting in 6/2019, STEMI s/p DESx3 for total occlusion of RCA and Lcx (2012), GIB due to AV malformation (in the setting of warfarin and DAPT) s/p EGD with clip (7/5/2019), ischemic cardiomyopathy, HFrEF (last EF=30-35% 11/12/19), severe pHTN, moderate mitral regurgitation, atrial fibrillation, CKD stage IV who presents for follow-up. Presents for CORE follow-up.    After his last hopsitalization, it was determined that his new dry weight of 193 (hospital weight)/196 (home weight).  Over the past month we have been struggling to control the fluid accumulation.  About 2 weeks ago, I gave him IV Bumex in clinic and then followed with metolazone a few days later.  We have been increasing his standing torsemide doses.  We initially did have some success with this, we were able to get his weight down to 206, but were limited by severe cramping.  Unfortunately about a week ago, he stopped his torsemide on his own, did not tell core clinic.  He continued taking his potassium.  He is now severely hypervolemic and hyperkalemic.  His weight is up to 218.  Different than 2 weeks ago, he is also feeling quite poorly.  He came in in a wheelchair which I have never seen from burn before.  He is having a harder time explaining his logic and course of his symptoms over the last week which is also changed for him.     I have confirmed with him on multiple occasions that advanced therapies in an ICD are not within his goals of care.  Dr. Trujillo is had these conversations as well.  Always reasonable to readdress in the hospital, but I do not anticipate that these of change.  I did address his immediate goals of care given that he is so frustrated by the torsemide.  We discussed that without the torsemide he will get worse quite rapidly and we should talk about hospice.  He was not ready for this conversation and  "would like to be admitted to the hospital if that will be medically best for him.    He will go to the hospital.  Plan for cards 1 admission.  Dr. Velázquez and fellow, Garrett, aware of patient.  He will go via the ER as there are no beds currently, and he needs his potassium addressed.    # Chronic systolic heart failure/HFrEF secondary to ICM    Stage C. NYHA Class III.    Fluid status: Hypervolemic, admitting to hopsital  ACEi/ARB/ARNI: contraindicated due to renal dysfunction/hyperkalemia. Allergy to hydralazine and isosorbid.  BB: Stopped given decompensated heart failure, he has not tolerated multiple trials to restart, even when quite dry  Aldosterone antagonist: contraindicated due to renal dysfunction  SCD prophylaxis: has declined on multiple conversations  NSAID use: contraindicated  Sleep apnea evaluation: will discuss at future appointment  Remote monitoring: N/A    # Hyperkalemia potassium of 5.9 in the setting of self discontinuing his torsemide but continuing his potassium supplementation.  EKG without peaked T waves.  -Potassium management per ER     #STEMI s/p DESx3 (2012)  #CAD s/p PCI proximal RCA (6/2019)  - CAD stable, no symptoms. Continue plavix and aspirin. Statin intolerance.     #Paroxysmal Afib   CHADSVASC 3 (HF, DM, CAD). Not on a/c due to history of GI bleed. Not willing to retrial. No symptoms of afib today.    - Stopped at last visit metoprolol  given decompensated heart failure     #CKD IV  Baseline serum creatinine around 2.4-2.7, actually rasonable for him at 2.45 today, but worse than a few weeks ago  - per inpatient team    # H/o GIB  - No symptoms today    # GOC. At a prior visit We did have a short goals of care conversation. He continues to confirm that an ICD and advanced therapies are not within his goals of care. He did however, sight that he is generally happy with his current quality of life and that \"living has become important to him.\" At this point he does want to remain " full code. He also sited that he likely wants his girlfriend to be his medical decision maker. I did encourage him to finalize this with paperwork as soon as he knows this for certain. I also encouraged him to have a very open conversation with her about what he would want and I also offered to have her join us on a future phone call to help guide this conversation. He doesn't want to do this currently, but he does think that this would be helpful in the future. I also offered him a social work visit for help with ACP paper work- he declined for now but he did say he would be interested in the future.   - At prior visit: Pt confirmed his desire to be FULL CODE  - At prior visit: He confirms that he is not interested in ICD or advanced therapies at this time.  - Ongoing: I have encouraged him to continue talking with family about his disease and prognosis as he does not feel that they are always aware  - CODE status to be addressed per inpatient routine    Follow up   - Sending to ER, planned cards 1 admission for hypervolemia      Ena Kruger PA-C  [facility]    CC  SELF, REFERRED

## 2020-08-13 NOTE — H&P
Resident/Fellow Attestation   I, Kathy Khan, was present with the medical student who participated in the service and in the documentation of the note.  I have verified the history and personally performed the physical exam and medical decision making.  I agree with the assessment and plan of care as documented in the note.      This is a shared note, my edits are incorporated.     Kathy Khan MD  PGY1  Date of Service (when I saw the patient): 08/13/20    [facility], [place]    History and Physical - [place] [id number] Service        Date of Admission:  8/13/2020    Assessment & Plan   Cole Jesus is a 60 year old male admitted on 8/13/2020. He has a history of HTN, HLD, proximal RCA occlusion s/p stent (6/2019), STEMI s/p KEVYN in RCA and LCX, HFrEF (EF 30-35% in 11/2019), pulmonary hypertension, moderate mitral regurgitation, atrial fibrillation, recurrent GI bleeds 2/2 AV malformation, and CKD stage IV  is admitted for fluid overload likely 2/2 CHF exacerbation from medication non-adherance, acute on chronic anemia requiring transfusion, and hyperkalemia.      # CHF Exacerbation  # HFrEF (LVEF 29%)  Patient stopped taking torsemide about 1 week ago because of symptoms thought to be related to medication. Noted increasing lower extremity swelling. Patient is +16lbs, BNP 5,524 (unknown baseline), and has significant pitting dependent edema to buttocks. Renal disease is a less likely cause of fluid retention as patient is near baseline. Follows with cardiology frequently, not on bblocker due to unable to tolerate + decompensated HF, not on ACEi d/t hyperkalemia/renal dysfucntion, not on spironolactone d/t renal dysfunction, declined SCD multiple times in the past even though he is full code. Will readdress this with patient during this hospitalization.   - lasix IV 40 mg now & in AM  - Recheck electrolytes at 1900 and in AM, goal MG>2, goal K>4  - restart PTA metolazone  8/14  -  Monitor I/Os  - Daily weights  - consider repeat echo    # Acute on chronic normocytic Anemia requiring transfusion  Patient has chronic anemia 2/2 CKD with baseline Hgb 12-13. Hgb 5.9 on presentation and patient was transfused 1 unit pRBCs. Concern for GI bleed given history of AVMs and episodes on hematochezia/melena although none noted recently, last EGD 7/2019 which showed blood + clot in antrum, single angioectasia in dudodenum.  - GI consult, will eval pt in 8/14 AM  - NPO at midnight  - Pantoprazole 40 mg IV BID  - Recheck Hgb at 1900 and AM  - Transfuse for Hgb <7   - Add on bilirubin     #Hyperkalemia.   Patient continued potassium supplementation after stopping torsemide. K was 6.3 at presentation with no EKG changes noted. Treated with lasix + 9.9 units insulin and dextrose in ED with repeat K being 5.3,   - Recheck K at 1900 and replete to >4  - Hold PTA potassium supplementation  - Continue to monitor electrolytes    #Chronic Kidney Disease, Stage 4.   Cr 2.4 on admission, near baseline 2.1-2.3.   - Continue Vitamin D supplementation  - avoid nephrotoxic medications  - renally dose meds     #Type 2 Diabetes Mellitus.   A1c 6.8. goal <8. Diet controlled.    #Hyperlipidemia.   ASCVD of 12.1% risk of CV event in 10 years, high intensity statin recommended. Patient recently stopped taking atorvastatin because of muscle cramping and fatigue.   - May discuss restarting at lower dose    #Afib, paroxysmal  CHADsVASc of 3, currently in NSR, anticoagulation contraindicated due to recurrent GI bleeds. Beta blocker previously contraindicated 2/2 decompensated HF.       Diet: CLD, then at midnight Fluid restriction 1500 ML FLUID  Clear Liquid Diet  NPO per Anesthesia Guidelines for Procedure/Surgery Except for: Meds    Fluids: dextrose mIVF  DVT Prophylaxis: No anticoagulation due to hx of GI bleeds, SCDs  Ponce Catheter: not present  Code Status: Full Code       Disposition Plan   Expected discharge: 2 - 3 days,  "recommended to prior living arrangement once at dry weight, hemodynamically stable without signs of GI bleeding and stable electrolytes.   Entered: Kathy Khan MD 08/13/2020, 7:49 PM     The patient's care was discussed with the Attending Physician, Dr. Wood.    Rosanne Javed  Medical Student  [facility], [place]  Pager: [phone]  Please see sticky note for cross cover information  ______________________________________________________________________    Chief Complaint    Fatigue, decreased mobility, lower extremity swelling    History is obtained from the patient    History of Present Illness   Cole Jesus is a 60 year old male who has a history of HTN, HLD, proximal RCA occlusion s/p stent (6/2019), STEMI s/p KEVYN in RCA and LCX, HFrEF (EF 30-35% in 11/2019), pulmonary hypertension, moderate mitral regurgitation, atrial fibrillation, recurrent GI bleeds 2/2 AV malformation, and CKD stage IV  is admitted for decreased mobility, 16 lb weight gain and fatigue. Patient presented to cardiology clinic today for follow up and was sent to emergency department for evaluation given the weight gain, deconditioning, and lower extremity swelling.    Patient follows close with cardiology clinic and received 1 mg Bumex on 7/22 due to fluid overload. After which he started to feel light-headed and was advised to decrease his torsemide and potassium on 08/07. He stopped taking his torsemide completely after this because he was dizzy, fatigued, and had increasing dyspnea on exertion which he attributed to the torsemide. He continued to take potassium. He started having cramps, swelling, and that he was \"crippled\" so he was recommended to decrease statin on 7/31 and pt completely discontinued. He has difficulty describing his symptoms but describes the \"crippledness\" as primarily in his legs, that they felt like \"lead\". He noticed improvement after stopping statin but then " noticed increased swelling.     Notes fatigue, dizziness, shortness of breath with activity which starts after short movements (2 feet). He states he noticed one week of hematochezia/melena a while ago, after which his stools returned to brown. He has noticed no hematochezia/melena since then.     Patient is most concerned about his intolerance to activity, fatigue, and muscle cramping which is improving. No shortness of breath at rest, orthopnea, or chest pain reported.     ED Course: Patient was found to be hyperkalemic (6.3). and anemic (hgb 5.9). Patient received 40 mg lasix, 9.9 units regular insulin, 77 mL 10% dextrose,  40 mg IV pantoprazole, 1 unit pRBCs.    Review of Systems    The 5 point Review of Systems is negative other than noted in the HPI or here.     Past Medical History    I have reviewed this patient's medical history and updated it with pertinent information if needed.   Past Medical History:   Diagnosis Date     Anemia in chronic renal disease 3/9/2015     Antiplatelet or antithrombotic long-term use      Atrial fibrillation and flutter      CAD (coronary artery disease)     Stemi in 12/11, s/p angioplasty     CRD (chronic renal disease), stage IV (H) 03/09/2015    hx ATN with dialysis complicating cardiogenic shock  1/2012     GI bleed     massive lower GI bleed secondary to a cecal ulcer, s/p ileocecal resection in 12/11     Heart failure     Biventricular systolic HF, complicated by ARDS requiring tracheostomy     Hyperlipidemia LDL goal <100 4/28/2013     Hypertension      Ischemic cardiomyopathy 4/28/2013    TTE revealing 40% EF     Myocardial infarction (H)      Other and unspecified nonspecific immunological findings     Anti JKa     Pulmonary edema     episodes of flash pulmonary edema in 12/11     Subclinical hypothyroidism         Past Surgical History   I have reviewed this patient's surgical history and updated it with pertinent information if needed.  Past Surgical History:    Procedure Laterality Date     CV RIGHT HEART CATH N/A 2019    Procedure: CV RIGHT HEART CATH;  Surgeon: Fox Gerard MD;  [facility]     CV RIGHT HEART CATH N/A 2/10/2020    Procedure: CV RIGHT HEART CATH;  Surgeon: Fox Gerard MD;  Location:  [facility]     ESOPHAGOSCOPY, GASTROSCOPY, DUODENOSCOPY (EGD), COMBINED  2011    Procedure:COMBINED ESOPHAGOSCOPY, GASTROSCOPY, DUODENOSCOPY (EGD); Surgeon:SAMARIA PUENTE; Location:[facility]     LAPAROTOMY EXPLORATORY  2011    Procedure:LAPAROTOMY EXPLORATORY; Explore laparotomy, Illeocectomy, Diverting Illeostomy; Surgeon:GIA NAJERA; Location:[facility]     ORIF right elbow fracture  age 12     TAKEDOWN ILEOSTOMY  2014    Procedure: TAKEDOWN ILEOSTOMY;  Surgeon: Gia Najera MD;  Location: [facility]     TRACHEOSTOMY  2011    Procedure:TRACHEOSTOMY; Tracheostomy; 80XLTCP-Proximal Extension-Cuffed 8.0 mm I.D.; Surgeon:LIZABETH DYE; Location:[facility]   Upper GI endoscopy with single bleeding angioectasia (2019)  Colonoscopy showed shallow ulceration in the cecum (2011)    Social History   I have reviewed this patient's social history and updated it with pertinent information if needed. Cole Jesus  reports that he quit smoking about 8 years ago. His smoking use included cigarettes. He has a 80.00 pack-year smoking history. He has never used smokeless tobacco. He reports that he does not drink alcohol or use drugs.Cole lives with his girlfriend in a house.     Prior to Admission Medications   Prior to Admission Medications   Prescriptions Last Dose Informant Patient Reported? Taking?   ACCU-CHEK GUIDE test strip  Self No No   Sig: USE TO TEST BLOOD SUGAR FOUR TIMES A DAY BEFORE MEALS AND AT BEDTIME   Alcohol Swabs PADS  Self No No   Si applicator 4 times daily (before meals and nightly)   Sharps Container MISC Unknown at Unknown  time Self No No   Si Device every 30 days   Patient not taking: Reported on 2020   Vitamin D, Cholecalciferol, 25 MCG (1000 UT) CAPS Past Week at Unknown time  Yes Yes   Sig: Take 1 capsule by mouth 2 times daily   aspirin (ASA) 81 MG chewable tablet Past Week at Unknown time Self Yes Yes   Sig: Take 81 mg by mouth daily   blood glucose monitoring (ACCU-CHEK FASTCLIX) lancets  Self No No   Sig: USE TO TEST BLOOD SUGAR FOUR TIMES A DAY AT MEALS AND AT BEDTIME   insulin pen needle (32G X 4 MM) 32G X 4 MM miscellaneous  Self No No   Sig: Use 5 pen needles daily or as directed.   metolazone (ZAROXOLYN) 2.5 MG tablet Past Month at Unknown time  No Yes   Sig: One tablet only when directed by your doctor   nitroGLYcerin (NITROSTAT) 0.4 MG sublingual tablet Unknown at Unknown time Self No No   Sig: Place 1 tablet (0.4 mg) under the tongue every 5 minutes as needed for chest pain For chest pain place 1 tablet under the tongue every 5 minutes for 3 doses. If symptoms persist 5 minutes after 1st dose call 911.   Patient not taking: Reported on 6/3/2020   potassium chloride ER (KLOR-CON M) 20 MEQ CR tablet 2020 at Unknown time  No Yes   Sig: Take 60 meq of Kcl in the morning and 40 meq of Kcl in the afternoon.   Patient taking differently: Take 40 mEq by mouth 2 times daily Take 60 meq of Kcl in the morning and 40 meq of Kcl in the afternoon.   senna-docusate (SENOKOT-S/PERICOLACE) 8.6-50 MG tablet Past Week at Unknown time  Yes Yes   Sig: Take 1 tablet by mouth 2 times daily as needed for constipation   torsemide (DEMADEX) 20 MG tablet Past Week at Unknown time  No Yes   Sig: Take 6 tablets (120 mg) by mouth 2 times daily   vitamin  B complex with vitamin C (VITAMIN  B COMPLEX) TABS Past Week at Unknown time Self Yes Yes   Sig: Take 1 tablet by mouth daily      Facility-Administered Medications: None     Allergies   Allergies   Allergen Reactions     Hydralazine      Black spots, verified allergic reaction by  skin biopsy     Isosorbide      Per pt got a rash all over his body and won't take it no more     Simvastatin Other (See Comments)     Leg muscle weakness     Tylenol [Acetaminophen] Palpitations       Physical Exam   Vital Signs: Temp: 97.2  F (36.2  C) Temp src: Oral BP: 102/72 Pulse: 94 Heart Rate: 98 Resp: 18 SpO2: 100 % O2 Device: None (Room air)    Weight: 212 lbs 4.8 oz    Constitutional: awake, alert, cooperative, no apparent distress, and appears stated age  HEENT: Normocephalic, sclera clear  Respiratory: No increased work of breathing, poor airflow/inspiratory effort, clear to auscultation bilaterally, no crackles or wheezing  Cardiovascular: regular rate and rhythm, normal S1 and S2, no S3/4 noted  GI: obese, soft, nontender  Skin: chronic stasis dermatitis of bilateral lower extremities,   Extremities: 3+ pitting dependent edema to buttocks  Neurologic: Awake, alert, oriented to name, place and time.     Data   Data reviewed today: I reviewed all medications, new labs and imaging results over the last 24 hours. I personally reviewed.    Recent Labs   Lab 08/13/20  1808 08/13/20  1539 08/13/20  1006 08/13/20  0711   WBC  --   --  9.1  --    HGB 6.9*  --  5.9*  --    MCV  --   --  87  --    PLT  --   --  359  --    NA  --  135 134 133   POTASSIUM  --  5.2  Canceled, Test credited  5.3 6.3* 5.9*   CHLORIDE  --  106 108 105   CO2  --  22 20 21   BUN  --  86* 85* 85*   CR  --  2.32* 2.40* 2.45*   ANIONGAP  --  6 6 7   RONNY  --  8.4* 8.4* 8.4*   GLC  --  165* 124* 125*   ALBUMIN  --  2.7*  --   --    BILITOTAL  --  1.8*  --   --    TROPI  --   --  0.016  --      Recent Results (from the past 24 hour(s))   XR Chest Port 1 View    Narrative    EXAM: XR CHEST PORT 1 VW  8/13/2020 10:35 AM     HISTORY:  SOA       COMPARISON:  Chest x-ray 4/21/2020    FINDINGS: Single view of the chest. Stable enlarged cardiac  silhouette. Indistinct pulmonary vasculature. Increased perihilar and  lower lung predominant mixed  interstitial and airspace opacities.  Small pleural effusions bilaterally appear similar. No pneumothorax.  No acute osseous or upper abdominal abnormality.      Impression    IMPRESSION:   1. Cardiomegaly with increased interstitial and airspace opacities  favored to represent pulmonary edema, recommend follow-up to clearing  to exclude infection.  2. Unchanged small bilateral pleural effusions.    I have personally reviewed the examination and initial interpretation  and I agree with the findings.    BEATRICE RODRIGUEZ MD

## 2020-08-13 NOTE — LETTER
8/13/2020      RE: Cole Jesus  [address]  [facility] [address]       Dear Colleague,    Thank you for the opportunity to participate in the care of your patient, Cole Jesus, at the [facility] at [facility]. Please see a copy of my visit note below.    Please not that this was an inperson visit.     HPI:   60 year old male with history of occluded pRCA s/p balloon angioplasty and stenting in 6/2019, STEMI s/p DESx3 for total occlusion of RCA and Lcx (2012), GIB due to AV malformation (in the setting of warfarin and DAPT) s/p EGD with clip (7/5/2019), ischemic cardiomyopathy, HFrEF (last EF=30-35% 11/12/19), severe pHTN, moderate mitral regurgitation, atrial fibrillation, CKD stage IV who presented for planned RHC, found to be in ambulatory cardiogenic shock.     Last known EDW (based on April admission) was discharged at a weight of 193 lbs (one hospital scale) 196 on home scale.     He is now up to 218 and feeling quite poorly. He came in in wheelchair, which I have never seen from [facility] in the past. He says he can walk minimally before he feels SOB. His edema is actually improved from 1 month ago, but still quite severe. A few weeks ago he was gardening and now feels like he can't do much of anything.  No shortness of breath at rest.  No chest pain, lightheadedness, dizziness, palpitations.  His biggest issue is ongoing cramping.  He feels like the cramping has continued even since he stopped his atorvastatin.  The cramping is primarily behind the knees and in the calves.  He does not report a lot of proximal muscle weakness.  When he stopped his atorvastatin he also stopped his torsemide so he has not been taking for the last week.  He also continue to take his potassium.  He told the core team about the Lipitor stopped but he did not tell the core team about the torsemide stopped.    Cardiac Medications  ASA 81 mg daily  Torsemide 140 mg BID- Selft  discontinued 1 week ago  Kcl 60/60  Metolozaone- took on Sat  Nitroglycerin    PAST MEDICAL HISTORY:  Past Medical History:   Diagnosis Date     Anemia in chronic renal disease 3/9/2015     Antiplatelet or antithrombotic long-term use      Atrial fibrillation and flutter      CAD (coronary artery disease)     Stemi in , s/p angioplasty     CRD (chronic renal disease), stage IV (H) 2015    hx ATN with dialysis complicating cardiogenic shock  2012     GI bleed     massive lower GI bleed secondary to a cecal ulcer, s/p ileocecal resection in      Heart failure     Biventricular systolic HF, complicated by ARDS requiring tracheostomy     Hyperlipidemia LDL goal <100 2013     Hypertension      Ischemic cardiomyopathy 2013    TTE revealing 40% EF     Myocardial infarction (H)      Other and unspecified nonspecific immunological findings     Anti JKa     Pulmonary edema     episodes of flash pulmonary edema in      Subclinical hypothyroidism        FAMILY HISTORY:  Family History   Problem Relation Age of Onset     Diabetes Mother      Hypertension Mother      Heart Disease Mother      Heart Disease Father          of heart attack, left when he was young     Diabetes Sister      Diabetes Sister      Diabetes Sister      Glaucoma No family hx of      Macular Degeneration No family hx of        SOCIAL HISTORY:  Social History     Socioeconomic History     Marital status: Significant other     Spouse name: Not on file     Number of children: Not on file     Years of education: Not on file     Highest education level: Not on file   Occupational History     Not on file   Social Needs     Financial resource strain: Not on file     Food insecurity:     Worry: Not on file     Inability: Not on file     Transportation needs:     Medical: Not on file     Non-medical: Not on file   Tobacco Use     Smoking status: Former Smoker     Packs/day: 2.00     Years: 40.00     Pack years: 80.00     Types:  Cigarettes     Last attempt to quit: 12/3/2011     Years since quittin.2     Smokeless tobacco: Never Used   Substance and Sexual Activity     Alcohol use: No     Alcohol/week: 0.0 standard drinks     Drug use: No     Sexual activity: Yes     Partners: Female   Lifestyle     Physical activity:     Days per week: Not on file     Minutes per session: Not on file     Stress: Not on file   Relationships     Social connections:     Talks on phone: Not on file     Gets together: Not on file     Attends L[employer] service: Not on file     Active member of club or organization: Not on file     Attends meetings of clubs or organizations: Not on file     Relationship status: Not on file     Intimate partner violence:     Fear of current or ex partner: Not on file     Emotionally abused: Not on file     Physically abused: Not on file     Forced sexual activity: Not on file   Other Topics Concern     Parent/sibling w/ CABG, MI or angioplasty before 65F 55M? Yes      Service Not Asked     Blood Transfusions Not Asked     Caffeine Concern Not Asked     Occupational Exposure Not Asked     Hobby Hazards Not Asked     Sleep Concern Not Asked     Stress Concern Not Asked     Weight Concern Not Asked     Special Diet Not Asked     Back Care Not Asked     Exercise Yes     Comment: limited walking     Bike Helmet Not Asked     Seat Belt Not Asked     Self-Exams Not Asked   Social History Narrative     Not on file       CURRENT MEDICATIONS:  ACCU-CHEK GUIDE test strip, USE TO TEST BLOOD SUGAR FOUR TIMES A DAY BEFORE MEALS AND AT BEDTIME  Alcohol Swabs PADS, 1 applicator 4 times daily (before meals and nightly)  aspirin (ASA) 81 MG chewable tablet, Take 81 mg by mouth daily  blood glucose monitoring (ACCU-CHEK FASTCLIX) lancets, USE TO TEST BLOOD SUGAR FOUR TIMES A DAY AT MEALS AND AT BEDTIME  insulin pen needle (32G X 4 MM) 32G X 4 MM miscellaneous, Use 5 pen needles daily or as directed.  metolazone (ZAROXOLYN) 2.5 MG tablet,  "One tablet only when directed by your doctor  potassium chloride ER (KLOR-CON M) 20 MEQ CR tablet, Take 60 meq of Kcl in the morning and 40 meq of Kcl in the afternoon.  vitamin  B complex with vitamin C (VITAMIN  B COMPLEX) TABS, Take 1 tablet by mouth daily  Vitamin D, Cholecalciferol, 25 MCG (1000 UT) CAPS, Take 1 capsule by mouth 2 times daily  nitroGLYcerin (NITROSTAT) 0.4 MG sublingual tablet, Place 1 tablet (0.4 mg) under the tongue every 5 minutes as needed for chest pain For chest pain place 1 tablet under the tongue every 5 minutes for 3 doses. If symptoms persist 5 minutes after 1st dose call 911. (Patient not taking: Reported on 6/3/2020)  senna-docusate (SENOKOT-S/PERICOLACE) 8.6-50 MG tablet, Take 1 tablet by mouth 2 times daily as needed for constipation  Sharps Container MISC, 1 Device every 30 days (Patient not taking: Reported on 8/13/2020)  torsemide (DEMADEX) 20 MG tablet, Take 6 tablets (120 mg) by mouth 2 times daily (Patient not taking: Reported on 8/13/2020)    No current facility-administered medications on file prior to visit.   He our visit with the patient    ROS:   Refer to HPI    EXAM:  BP 98/65 (BP Location: Right arm, Patient Position: Chair, Cuff Size: Adult Regular)   Pulse 86   Ht 1.727 m (5' 8\")   Wt 98.9 kg (218 lb)   SpO2 100%   BMI 33.15 kg/m    GENERAL: Ill-appearing, he appears weak, very low energy, sitting in a wheelchair, speaking in full sentences and able to communicate all needs.  Difficult time explaining his symptoms and logic to his medicine changes over the last week, this is a change for  HEENT: Eye symmetrical, no discharge or icterus bilaterally. Mucous membranes moist and without lesions.  CV: tachycardic, regular. +S1S2, no murmur, rub, or gallop. JVP >10  RESPIRATORY: Respirations regular, even, and unlabored. Lungs CTA throughout.   GI: Soft and non distended with normoactive bowel sounds. No tenderness, rebound, guarding.   EXTREMITIES: 3+ peripheral " edema, worse compared to last week.  All extremities are warm and well perfused  NEUROLOGIC: Alert and interacting appropriately. No focal deficits.   MUSCULOSKELETAL: No joint swelling or tenderness.   SKIN: No jaundice. No rashes or lesions.       Labs, reviewed with patient in clinic today:  CBC RESULTS:  Lab Results   Component Value Date    WBC 9.0 05/11/2020    RBC 4.67 05/11/2020    HGB 12.7 (L) 05/11/2020    HCT 41.4 05/11/2020    MCV 89 05/11/2020    MCH 27.2 05/11/2020    MCHC 30.7 (L) 05/11/2020    RDW 23.5 (H) 05/11/2020     05/11/2020       CMP RESULTS:  Lab Results   Component Value Date     08/03/2020    POTASSIUM 3.6 08/03/2020    CHLORIDE 97 08/03/2020    CO2 32 08/03/2020    ANIONGAP 7 08/03/2020     (H) 08/03/2020    BUN 70 (H) 08/03/2020    CR 2.26 (H) 08/03/2020    GFRESTIMATED 30 (L) 08/03/2020    GFRESTBLACK 35 (L) 08/03/2020    RONNY 8.5 08/03/2020    BILITOTAL 3.8 (H) 04/21/2020    ALBUMIN 3.4 04/21/2020    ALKPHOS 170 (H) 04/21/2020    ALT 31 04/21/2020    AST 26 04/21/2020        INR RESULTS:  Lab Results   Component Value Date    INR 1.28 (H) 06/04/2020       Lab Results   Component Value Date    MAG 2.9 (H) 04/25/2020     Lab Results   Component Value Date    NTBNPI 12,766 (H) 04/21/2020     Lab Results   Component Value Date    NTBNP 6,974 (H) 07/01/2020       Diagnostics:  EKG 8/13/2020, personally reviewed, normal sinus rhythm with a rate of 84, no blocks, no significant ST elevations or depressions.  No T wave changes.  No peak T waves.  His QTC is 475.  This EKG was obtained for hyperkalemia with a K of 5.9.    ECHO 2/11/2020  Interpretation Summary  Severely (EF 29%) reduced left ventricular function is present. Severe diffuse  hypokinesis is present.  Global right ventricular function is moderately reduced.  Moderate mitral insufficiency is present.  Mild to moderate tricuspid insufficiency is present.  Pulmonary hypertension present. Estimated pulmonary artery  systolic pressure  is 69 (54+15) mmHg .  Dilation of the inferior vena cava is present with abnormal respiratory  variation in diameter.  Small posterior pericardial effusion without echocardiographic evidence of  Tamponade.    2/10/2020[facility]  RA --/24/22  /20  /50/65  PCWP --/51/40    DONOVAN 3.5/1.6  TD 3.8/1.8    PVR 8.5    Assessment and Plan:   60 year old male with history of occluded pRCA s/p balloon angioplasty and stenting in 6/2019, STEMI s/p DESx3 for total occlusion of RCA and Lcx (2012), GIB due to AV malformation (in the setting of warfarin and DAPT) s/p EGD with clip (7/5/2019), ischemic cardiomyopathy, HFrEF (last EF=30-35% 11/12/19), severe pHTN, moderate mitral regurgitation, atrial fibrillation, CKD stage IV who presents for follow-up. Presents for CORE follow-up.    After his last hopsitalization, it was determined that his new dry weight of 193 (hospital weight)/196 (home weight).  Over the past month we have been struggling to control the fluid accumulation.  About 2 weeks ago, I gave him IV Bumex in clinic and then followed with metolazone a few days later.  We have been increasing his standing torsemide doses.  We initially did have some success with this, we were able to get his weight down to 206, but were limited by severe cramping.  Unfortunately about a week ago, he stopped his torsemide on his own, did not tell core clinic.  He continued taking his potassium.  He is now severely hypervolemic and hyperkalemic.  His weight is up to 218.  Different than 2 weeks ago, he is also feeling quite poorly.  He came in in a wheelchair which I have never seen from [facility] before.  He is having a harder time explaining his logic and course of his symptoms over the last week which is also changed for him.     I have confirmed with him on multiple occasions that advanced therapies in an ICD are not within his goals of care.  Dr. Trujillo is had these conversations as well.  Always reasonable to  readdress in the hospital, but I do not anticipate that these of change.  I did address his immediate goals of care given that he is so frustrated by the torsemide.  We discussed that without the torsemide he will get worse quite rapidly and we should talk about hospice.  He was not ready for this conversation and would like to be admitted to the hospital if that will be medically best for him.    He will go to the hospital.  Plan for cards 1 admission.  Dr. Velázquez and fellow, Garrett, aware of patient.  He will go via the ER as there are no beds currently, and he needs his potassium addressed.    # Chronic systolic heart failure/HFrEF secondary to ICM    Stage C. NYHA Class III.    Fluid status: Hypervolemic, admitting to hopsital  ACEi/ARB/ARNI: contraindicated due to renal dysfunction/hyperkalemia. Allergy to hydralazine and isosorbid.  BB: Stopped given decompensated heart failure, he has not tolerated multiple trials to restart, even when quite dry  Aldosterone antagonist: contraindicated due to renal dysfunction  SCD prophylaxis: has declined on multiple conversations  NSAID use: contraindicated  Sleep apnea evaluation: will discuss at future appointment  Remote monitoring: N/A    # Hyperkalemia potassium of 5.9 in the setting of self discontinuing his torsemide but continuing his potassium supplementation.  EKG without peaked T waves.  -Potassium management per ER     #STEMI s/p DESx3 (2012)  #CAD s/p PCI proximal RCA (6/2019)  - CAD stable, no symptoms. Continue plavix and aspirin. Statin intolerance.     #Paroxysmal Afib   CHADSVASC 3 (HF, DM, CAD). Not on a/c due to history of GI bleed. Not willing to retrial. No symptoms of afib today.    - Stopped at last visit metoprolol  given decompensated heart failure     #CKD IV  Baseline serum creatinine around 2.4-2.7, actually rasonable for him at 2.45 today, but worse than a few weeks ago  - per inpatient team    # H/o GIB  - No symptoms today    # GOC. At a  "prior visit We did have a short goals of care conversation. He continues to confirm that an ICD and advanced therapies are not within his goals of care. He did however, sight that he is generally happy with his current quality of life and that \"living has become important to him.\" At this point he does want to remain full code. He also sited that he likely wants his girlfriend to be his medical decision maker. I did encourage him to finalize this with paperwork as soon as he knows this for certain. I also encouraged him to have a very open conversation with her about what he would want and I also offered to have her join us on a future phone call to help guide this conversation. He doesn't want to do this currently, but he does think that this would be helpful in the future. I also offered him a social work visit for help with ACP paper work- he declined for now but he did say he would be interested in the future.   - At prior visit: Pt confirmed his desire to be FULL CODE  - At prior visit: He confirms that he is not interested in ICD or advanced therapies at this time.  - Ongoing: I have encouraged him to continue talking with family about his disease and prognosis as he does not feel that they are always aware  - CODE status to be addressed per inpatient routine    Follow up   - Sending to ER, planned cards 1 admission for hypervolemia      Ena Kruger PA-C  [facility]        Please do not hesitate to contact me if you have any questions/concerns.     Sincerely,     Ena Kruger PA-C    "

## 2020-08-13 NOTE — ED NOTES
Bed: IN01  Expected date:   Expected time:   Means of arrival:   Comments:  [facility]- weight up 25 pounds, hyperkalemia.  Cardiology 1 admit.     Ludin Gunter MD  08/13/20 0188

## 2020-08-14 NOTE — CONSULTS
GASTROENTEROLOGY CONSULTATION      Date of Admission:  8/13/2020           Reason for Consultation:   We were asked by Dr. Khan to evaluate this patient with hemoglobin drop and melena           ASSESSMENT AND RECOMMENDATIONS:   Assessment:  60 year old male with a history of coronary artery disease status post stenting, HFrEF, and prior GI bleed in the setting of a duodenal AVM in 2019, who was admitted with a heart failure exacerbation and acute on chronic anemia, with concern for gastrointestinal bleeding.  With his report of melena approximately 2 weeks ago with normal stools since then, as well as his stable hemoglobin here after transfusion, it is unlikely that he is still having any gastrointestinal bleeding.  The yield of endoscopy in the setting is quite low, and after discussion with the patient will defer endoscopy for now.  Would recommend daily hemoglobin checks while inpatient, transfusion as necessary.  In the future, should he have another episode of melena or hematochezia, he should present for evaluation sooner to improve the yield of any potential endoscopy.     Recommendations  - no endoscopic plans at this time  - trend hemoglobin daily while inpatient  - transfuse as necessary  - monitor bowel movements for further melena/hematochezia  - we will sign off - please call GI if any signs of gastrointestinal bleeding for consideration of endoscopy    Gastroenterology outpatient follow up recommendations: follow up with primary care provider, referral to GI if further evidence of GI bleeding    Thank you for involving us in this patient's care. Please do not hesitate to contact the GI service with any questions or concerns.     Pt care plan discussed with Dr. Gooden, GI staff physician.    Jim Barry MD  -------------------------------------------------------------------------------------------------------------------           History of Present Illness:   Cole Jesus is a 60 year  old male with a history of coronary artery disease status post stenting, HFrEF, and prior GI bleed in the setting of a duodenal AVM in 2019, who was admitted with a heart failure exacerbation and acute on chronic anemia, with concern for gastrointestinal bleeding.    Approximately 2 weeks ago, he had 2 days of dark black, tarry stools consistent with melena.  No hematochezia or hematemesis.  Subsequently, his stools have returned back to normal, brown, well-formed, without any evidence of blood.  In the interim, he has developed the onset of shortness of breath and cramping.  He felt this was related to his diuretic, so discontinued this, with subsequent gain in weight of approximately 15 pounds.  He presented to the emergency department, where he was found to be mildly hypoxic, fluid overloaded, and to have a hemoglobin of 5.9.  He was transfused 1 unit of PRBCs, with subsequent hemoglobin 7.1.  Recheck this morning was again 7.1.  He has not had any bowel movements since arrival here.    Prior endoscopy in July 2019 noted blood and clot in the gastric antrum, in addition to a single AVM in the second portion of the duodenum which was treated.  He tells me he has not had any other episodes of gastrointestinal bleeding symptoms since July 2019.            Past Medical History:   Reviewed and edited as appropriate  Past Medical History:   Diagnosis Date     Anemia in chronic renal disease 3/9/2015     Antiplatelet or antithrombotic long-term use      Atrial fibrillation and flutter      CAD (coronary artery disease)     Stemi in 12/11, s/p angioplasty     CRD (chronic renal disease), stage IV (H) 03/09/2015    hx ATN with dialysis complicating cardiogenic shock  1/2012     GI bleed     massive lower GI bleed secondary to a cecal ulcer, s/p ileocecal resection in 12/11     Heart failure     Biventricular systolic HF, complicated by ARDS requiring tracheostomy     Hyperlipidemia LDL goal <100 4/28/2013     Hypertension       Ischemic cardiomyopathy 4/28/2013    TTE revealing 40% EF     Myocardial infarction (H)      Other and unspecified nonspecific immunological findings     Anti JKa     Pulmonary edema     episodes of flash pulmonary edema in 12/11     Subclinical hypothyroidism             Past Surgical History:   Reviewed and edited as appropriate   Past Surgical History:   Procedure Laterality Date     CV RIGHT HEART CATH N/A 11/12/2019    Procedure: CV RIGHT HEART CATH;  Surgeon: Fox Gerard MD;  Location:  HEART CARDIAC CATH LAB     CV RIGHT HEART CATH N/A 2/10/2020    Procedure: CV RIGHT HEART CATH;  Surgeon: Fox Gerard MD;  Location:  HEART CARDIAC CATH LAB     ESOPHAGOSCOPY, GASTROSCOPY, DUODENOSCOPY (EGD), COMBINED  12/25/2011    Procedure:COMBINED ESOPHAGOSCOPY, GASTROSCOPY, DUODENOSCOPY (EGD); Surgeon:SAMARIA PUENTE; Location: GI     LAPAROTOMY EXPLORATORY  12/31/2011    Procedure:LAPAROTOMY EXPLORATORY; Explore laparotomy, Illeocectomy, Diverting Illeostomy; Surgeon:GIA NAJERA; Location:[facility] OR     ORIF right elbow fracture  age 12     TAKEDOWN ILEOSTOMY  6/5/2014    Procedure: TAKEDOWN ILEOSTOMY;  Surgeon: Gia Najera MD;  Location: [facility] OR     TRACHEOSTOMY  12/22/2011    Procedure:TRACHEOSTOMY; Tracheostomy; 80XLTCP-Proximal Extension-Cuffed 8.0 mm I.D.; Surgeon:LIZABETH DYE; Location:[facility] OR            Previous Endoscopy:     Results for orders placed or performed during the hospital encounter of 12/03/11   COLONOSCOPY   Result Value Ref Range    COLONOSCOPY       [facility], [address] [address] [phone]     Endoscopy Department  _______________________________________________________________________________  Patient Name: Cole Dunn         Procedure Date: 12/29/2011 10:12:SS   A12/P12  MRN: [id number]                       Account Number: [id number]                   YOB: 1960              Admit Type: Inpatient                       Age: 51                               Room:                                       Gender: Male                          Note Status: Finalized                      Attending MD: Seb Sims MD         Pause for the Cause: Pause for the cause   completed  _______________________________________________________________________________     Procedure:                Colonoscopy  Indications:              Hematochezia, Pt in ICU, had bleeding from cecum                             48h ago, injected with epi, now bleeding again.  Providers:                Seb Sims MD, Carlos Castillo RN  Referring MD:             Javier Gan Md, MD  Medicines:                Midazolam 3 mg IV, Fentanyl 100 micrograms IV  Complications:            No immediate complications  _______________________________________________________________________________  Procedure:                Pre-Anesthesia Assessment:                            - Prior to the procedure, a History and Physical                             was performed, and patient medications and                             allergies were reviewed. The patient is unable to                             give consent secondary to the patient's altered                             mental status. The risks and benefits of the                             procedure and the sedation options and risks were                             discussed with the patient's spouse. All questions                             were answered and informed consent was obtained.                             Patient identification and proposed procedure were                             verified by the physician and the nurse in the                             procedure room. Mental Status Examination: sedated.                             Airway Examination: normal oropharyngeal airway and                             neck  mobility. Respiratory Examination: clear to                             auscultation. CV Examination: normal. Prophylactic                             Antibiotics: The patient does not require                             prophylactic antibiotics. Prior Anticoagulants: The                             patient has taken no previous anticoagulant or                             antiplatelet agents. ASA Grade Assessment: II - A                             patient with mild systemic disease. After reviewing                             the risks and benefits, the patient was deemed in                             satisfactory condition to undergo the procedure.                             The anesthesia plan was to use moderate sedation /                             analgesia (conscious sedation). Immediately prior                             to administration of medications, the patient was                             re-assessed for adequacy to receive sedatives. The                             heart rate, respiratory rate, oxygen saturations,                             blood pressure, adequacy of pulmonary ventilation,                             and response to care were monitored throughout the                             procedure. The physical status of the patient was                             re-assessed after the procedure.                            After obtaining informed consent, the colonoscope                             was passed under direct vision. Throughout the                             procedure, the patient's blood pressure, pulse, and                             oxygen saturations were monitored continuously. The                             Colonoscope was introduced through the anus and                             advanced to the cecum, identified by appendiceal                             orifice & ileocecal valve. The colonoscopy was                             performed without difficulty.  The patient tolerated                             the procedure well. The quality of the bowel                             preparation was fair.                                                                                   Findings:       The perianal and digital rectal examinations were normal. Red blood was        found in the entire colon. A single (solitary) 8 mm ulcer was found in        the cecum. Ulcer had a vessel, spurting bleeding was present. Three        hemostatic clips were successfully placed. Bleeding had stopped at the        end of the procedure. Area was successfully injected with 3.5 mL of a        1:10,000 solution of epinephrine for drug delivery.                                                                                   Impression:               - A single (solitary) ulcer in cecum with a                             bleeding vessel, treated by placement of three                             clips and epinephrine injection.  Recommendation:           - Monitor hgb/hct                            - Avoid anticoagulation.                            - Clear liquid diet.                            - Call GI team if evidence of re-bleeding                                                                                     electronically signed by DORINDA Sims  ________________  Seb Sims MD  Signed Date: 12/29/2011 18:12:ROSITA A12/P12  Number of Addenda: 0  I was physically present for the entire viewing portion of the exam.  __________________________  Signature of teaching physician  B4c/D4c  Note Initiated On: 12/29/2011 10:12:SS A12/P12  Scope Withdrawal Time: 0 hours 0 minutes 0 seconds   Total Procedure Duration: 0 hours 0 minutes 0 seconds             Social History:   Reviewed and edited as appropriate  Social History     Socioeconomic History     Marital status: Significant other     Spouse name: Not on file     Number of children: Not on file     Years of education: Not on file      Highest education level: Not on file   Occupational History     Not on file   Social Needs     Financial resource strain: Not on file     Food insecurity     Worry: Not on file     Inability: Not on file     Transportation needs     Medical: Not on file     Non-medical: Not on file   Tobacco Use     Smoking status: Former Smoker     Packs/day: 2.00     Years: 40.00     Pack years: 80.00     Types: Cigarettes     Last attempt to quit: 12/3/2011     Years since quittin.7     Smokeless tobacco: Never Used   Substance and Sexual Activity     Alcohol use: No     Alcohol/week: 0.0 standard drinks     Drug use: No     Sexual activity: Yes     Partners: Female   Lifestyle     Physical activity     Days per week: Not on file     Minutes per session: Not on file     Stress: Not on file   Relationships     Social connections     Talks on phone: Not on file     Gets together: Not on file     Attends [employer] service: Not on file     Active member of club or organization: Not on file     Attends meetings of clubs or organizations: Not on file     Relationship status: Not on file     Intimate partner violence     Fear of current or ex partner: Not on file     Emotionally abused: Not on file     Physically abused: Not on file     Forced sexual activity: Not on file   Other Topics Concern     Parent/sibling w/ CABG, MI or angioplasty before 65F 55M? Yes      Service Not Asked     Blood Transfusions Not Asked     Caffeine Concern Not Asked     Occupational Exposure Not Asked     Hobby Hazards Not Asked     Sleep Concern Not Asked     Stress Concern Not Asked     Weight Concern Not Asked     Special Diet Not Asked     Back Care Not Asked     Exercise Yes     Comment: limited walking     Bike Helmet Not Asked     Seat Belt Not Asked     Self-Exams Not Asked   Social History Narrative     Not on file            Family History:   Reviewed and edited as appropriate  Family History   Problem Relation Age of Onset     Diabetes  Mother      Hypertension Mother      Heart Disease Mother      Heart Disease Father          of heart attack, left when he was young     Diabetes Sister      Diabetes Sister      Diabetes Sister      Glaucoma No family hx of      Macular Degeneration No family hx of      No known history of colorectal cancer, liver disease, or inflammatory bowel disease.         Allergies:   Reviewed and edited as appropriate     Allergies   Allergen Reactions     Hydralazine      Black spots, verified allergic reaction by skin biopsy     Isosorbide      Per pt got a rash all over his body and won't take it no more     Simvastatin Other (See Comments)     Leg muscle weakness     Tylenol [Acetaminophen] Palpitations            Medications:     Current Facility-Administered Medications   Medication     dextrose 10% infusion     glucose gel 15-30 g    Or     dextrose 50 % injection 25-50 mL    Or     glucagon injection 1 mg     diphenhydrAMINE (BENADRYL) injection 50 mg     EPINEPHrine (ADRENALIN) kit 0.3 mg     famotidine (PEPCID) injection 20 mg     insulin aspart (NovoLOG) injection (RAPID ACTING)     iron sucrose (VENOFER) 200 mg in sodium chloride 0.9 % 100 mL intermittent infusion     lidocaine (LMX4) cream     lidocaine 1 % 0.1-1 mL     melatonin tablet 1 mg     methylPREDNISolone sodium succinate (solu-MEDROL) injection 125 mg     metolazone (ZAROXOLYN) tablet 2.5 mg     naloxone (NARCAN) injection 0.1-0.4 mg     pantoprazole (PROTONIX) 40 mg IV push injection     senna-docusate (SENOKOT-S/PERICOLACE) 8.6-50 MG per tablet 1 tablet     sodium chloride (PF) 0.9% PF flush 3 mL     sodium chloride (PF) 0.9% PF flush 3 mL     vitamin B complex with vitamin C (STRESS TAB) tablet 1 tablet     Vitamin D3 (CHOLECALCIFEROL) tablet 25 mcg             Review of Systems:     A complete 10 point review of systems was performed and is negative except as noted in the HPI           Physical Exam:   /84   Pulse 96   Temp 97.6  F  "(36.4  C) (Oral)   Resp 16   Ht 1.727 m (5' 8\")   Wt 96.5 kg (212 lb 11.9 oz)   SpO2 99%   BMI 32.35 kg/m    Wt:   Wt Readings from Last 2 Encounters:   08/14/20 96.5 kg (212 lb 11.9 oz)   08/13/20 98.9 kg (218 lb)      Constitutional: No acute distress, resting comfortably in bed  Eyes: Sclera anicteric  Ears/nose/mouth/throat: Moist mucus membranes, hearing intact  Neck: supple  CV: Pitting edema bilaterally   Respiratory: Breathing comfortably  Abd: Soft, nontender, nondistended, bowel sounds present  Skin: warm, perfused, no jaundice  Neuro: AAO x 3  Psych: Normal affect  MSK: No gross deformities         Data:   Labs and imaging below were independently reviewed and interpreted    BMP  Recent Labs   Lab 08/14/20  0447 08/13/20  1539 08/13/20  1006 08/13/20  0711    135 134 133   POTASSIUM 4.5 5.2  Canceled, Test credited  5.3 6.3* 5.9*   CHLORIDE 107 106 108 105   RONNY 8.3* 8.4* 8.4* 8.4*   CO2 24 22 20 21   BUN 77* 86* 85* 85*   CR 2.28* 2.32* 2.40* 2.45*   * 165* 124* 125*     CBC  Recent Labs   Lab 08/14/20 0447 08/13/20  1006   WBC 6.6  --  9.1   RBC 2.94*  --  2.53*   HGB 7.1*   < > 5.9*   HCT 25.0*  --  21.9*   MCV 85  --  87   MCH 24.1*  --  23.3*   MCHC 28.4*  --  26.9*   RDW 17.6*  --  18.5*     --  359    < > = values in this interval not displayed.     INRNo lab results found in last 7 days.  LFTs  Recent Labs   Lab 08/13/20  1539   BILITOTAL 1.8*   ALBUMIN 2.7*      PANCNo lab results found in last 7 days.    Imaging: Reviewed    "

## 2020-08-14 NOTE — PROVIDER NOTIFICATION
-------------------CRITICAL LAB VALUE-------------------    Lab Value: 6.9 hemoglobin  Time of notification: 7:29 PM  MD notified: text paged #[phone] cross cover  Patient status:  Temp:  [97.1  F (36.2  C)-97.8  F (36.6  C)] 97.2  F (36.2  C)  Pulse:  [] 94  Heart Rate:  [84-98] 98  Resp:  [18-20] 18  BP: ()/(59-77) 102/72  SpO2:  [97 %-100 %] 100 %  Orders received: night shift RN assuming care, will implement associated orders

## 2020-08-14 NOTE — PROGRESS NOTES
Resident/Fellow Attestation   I, Kathy Khan, was present with the medical student who participated in the service and in the documentation of the note.  I have verified the history and personally performed the physical exam and medical decision making.  I agree with the assessment and plan of care as documented in the note.      This is a shared note, my edits are incorporated.     Kathy Khan MD  PGY1  Date of Service (when I saw the patient): 08/14/20    [facility], [place]    Progress Note - Maroon 2 Service        Date of Admission:  8/13/2020    Assessment & Plan   Cole Jesus is a 60 year old male admitted on 8/13/2020. He has a history of HTN, HLD, proximal RCA occlusion s/p stent (6/2019), STEMI s/p KEVYN in RCA and LCX, HFrEF (% in 2/20), pulmonary hypertension, moderate mitral regurgitation, atrial fibrillation, recurrent GI bleeds 2/2 AV malformation, and CKD stage IV  is admitted for fluid overload likely 2/2 CHF exacerbation from medication non-adherance, acute on chronic anemia requiring transfusion, and hyperkalemia. Patient has been hemodynamically stable.     UPDATES 08/14/2020:  - Low Na diet, fluid restrict 1.5 L  - Lasix 40 mg IV this AM  - Lymphedema consult  - Daily weights, strict I/Os  - discontinue telemitry  - iron studies ordered    # CHF Exacerbation  # HFrEF (LVEF 29%)  Patient stopped taking torsemide about 1 week ago because of symptoms thought to be related to medication. Noted increasing lower extremity swelling. Patient is +16lbs, BNP 5,524 (unknown baseline), and has significant pitting dependent edema to buttocks. Renal disease is a less likely cause of fluid retention as patient is near baseline. Follows with cardiology frequently, not on bblocker due to unable to tolerate + decompensated HF, not on ACEi d/t hyperkalemia/renal dysfucntion, not on spironolactone d/t renal dysfunction, declined SCD multiple times in the past even though he  is full code. CXR on 8/13/20 with increased intersitial & airspace opacities, likely 2/2 fluid overload.  Net negative ~1L yesterday.   - Diuresis    > lasix IV 40 mg prn for goal of net negative 1.5-2L/24 hours   > PTA metolazone  - Recheck electrolytes at 1800 and in AM, goal MG>2, goal K>4  - Monitor I/Os,. Daily weights  - Consider repeat echo when compensated   - Lymphedema consult   - Repeat CXR at goal weight to rule out infection and ensure clearing of opacities    # Acute on chronic normocytic anemia requiring transfusion  Patient has chronic anemia 2/2 CKD and WILLIAM with baseline Hgb 12-13. Hgb 5.9 on presentation and patient was transfused 2 units pRBCs. Concern for GI bleed given history of AVMs and episodes on hematochezia/melena although none noted recently. Last EGD 7/2019 which showed blood + clot in antrum, single angioectasia in dudodenum. Possible patient had GI bleed 2 weeks ago, evidenced by melena that resolved and Hgb likely has not yet recovered due to CKD and WILLIAM.   - GI consulted, no need for procedural evaluation at this time  - Pantoprazole 40 mg IV BID, will discuss home ppi tomorrow  - Recheck Hgb at 1800 and AM  - Transfuse for Hgb <7   - Iron studies added on to pre-transfusion sample     #Hyperkalemia.   Patient continued potassium supplementation after stopping torsemide. K was 6.3 at presentation with no EKG changes noted. Treated with lasix + 9.9 units insulin and dextrose in ED with repeat K being 5.3. Today K was 4.5 which is at goal.  - Recheck K at 1800 and AM   - Hold PTA potassium supplementation  - Continue to monitor electrolytes  - discontinue telemitry     #Chronic Kidney Disease, Stage 4.   Cr 2.4 on admission, 2.8 today. Near baseline 2.1-2.3.   - Continue Vitamin D supplementation  - Avoid nephrotoxic medications  - Renally dose meds  - Follow up with nephrology outpatient (needs scheduling)     #Type 2 Diabetes Mellitus.   A1c 6.8. goal <8. Diet  controlled.    #Hypoalbuminemia.   Likely 2/2 CKD Stage 4. Urine Albumin/Cr ratio >1,000 in 2019. There could be a component of malnutrition as well. Low albumin likely causing decreased oncotic pressure and contributing to weight gain/edema.   - Continue treating CHF exacerbation  - Monitor daily weights   - Outpatient nephrology follow up for CKD     #Hyperlipidemia.   ASCVD of 12.1% risk of CV event in 10 years, high intensity statin recommended. Patient recently stopped taking atorvastatin because of muscle cramping and fatigue.   - May discuss restarting at lower dose     #Afib, paroxysmal  CHADsVASc of 3, currently in NSR, anticoagulation contraindicated due to recurrent GI bleeds. Beta blocker previously contraindicated 2/2 decompensated HF.     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 ?Drug-induced lupus at the time with cessation of the drug and no follow up studies. Dermatology consulted in 02/20 hospitalization to evaluate hx of hydralazine reaction and ordered FLACO. Titer returned positive and no follow up occurred after discharge.  - Follow up with PCP for +FLACO Ab     Diet: low Na diet, fluid restriction 1500 ML FLUID  Fluids: PO, fluid restrict  DVT Prophylaxis: No anticoagulation due to hx of GI bleeds, SCDs  Ponce Catheter: not present  Code Status: Full Code      Disposition Plan   Expected discharge: 2 - 3 days, recommended to prior living arrangement once return to baseline weight, anemia stable-improving.  Entered: Rosanne Burt 08/14/2020, 7:34 AM     The patient's care was discussed with the Attending Physician, Dr. Wood.    Rosanne Burt  Medical Student  [facility] 2 Service  [facility], [employer]  Pager: [phone]  Please see sticky note for cross cover information  ______________________________________________________________________    Interval History   Patient required 2nd unit of pRBCs overnight  to bring Hgb to 7.1. He reports no fatigue, headaches, lightheadedness or dizziness.  No hematochezia/melena, nausea/vomiting, or hematuria.    Feels well overall. No SOB or chest pain. Legs feel less tense.  Encouraged elevation of legs.    Data reviewed today: I reviewed all medications, new labs and imaging results over the last 24 hours. I personally reviewed no images or EKG's today.    Physical Exam   Vital Signs: Temp: 97.2  F (36.2  C) Temp src: Oral BP: 96/69 Pulse: 87 Heart Rate: 98 Resp: 16 SpO2: 100 % O2 Device: None (Room air)    Weight: 212 lbs 11.9 oz  Constitutional: awake, alert, lying in bed  Respiratory: no increased work of breathing, slow airflow, clear to auscultation bilaterally throughout  Cardiovascular: Regular rate and rhythm, grade II/IV holosystolic murmur, no S3/4  GI: obese, soft, nontender, multiple well healed incisional scars  Skin: no abnormal bruising or bleeding, no rashes noted  Musculoskeletal: 2+ dependent pitting edema to thighs - improved from previous exam    Data   Recent Labs   Lab 08/14/20  0447 08/14/20  0046 08/13/20  1808 08/13/20  1539 08/13/20  1006   WBC 6.6  --   --   --  9.1   HGB 7.1* 7.1* 6.9*  --  5.9*   MCV 85  --   --   --  87     --   --   --  359     --   --  135 134   POTASSIUM 4.5  --   --  5.2  Canceled, Test credited  5.3 6.3*   CHLORIDE 107  --   --  106 108   CO2 24  --   --  22 20   BUN 77*  --   --  86* 85*   CR 2.28*  --   --  2.32* 2.40*   ANIONGAP 6  --   --  6 6   RONNY 8.3*  --   --  8.4* 8.4*   *  --   --  165* 124*   ALBUMIN  --   --   --  2.7*  --    BILITOTAL  --   --   --  1.8*  --    TROPI  --   --   --   --  0.016

## 2020-08-14 NOTE — CONSULTS
Social Work Services Progress Note    Hospital Day: 1  Date of Initial Social Work Evaluation:  Not completed.  Collaborated with:  Pt. Chart Review.     Data:  SW consulted for advanced directive. SW communicated with pt via phone to introduce self, role, and purpose of phone call. SW provided education on HCD and how to validate it. Pt requested blank copy of form and stated he would pursue notarization in community. SW provided HCD form to pt in room, per his request.     Intervention:  Education on Advanced Care Planning.     Assessment:  Pt was pleasant in interaction with SW when receiving HCD. SW inquired if pt had any questions, to which pt declined.     Plan:    Anticipated Disposition:  Home    Barriers to d/c plan:  Medical Stability.    Follow Up:  No SW f/u indicated at this time.     SHARRON Moralez, SW  Acute Care Float   [facility]

## 2020-08-14 NOTE — PLAN OF CARE
Neuro: A/Ox4. Pleasant.   Cardiac: SR. Sbp 100s. BLE edema +3.   Respiratory:  RA. Denies shortness of breath. Crackles to lung bases.   GI/: last BM reported prior to admission and was brown in color (no blood noted). Diuresing with lasix.   Diet/appetite: Was clears. NPO since midnight (ex juice at 0400) for potential scope -GI consulted.   Activity: independent repositioning in bed. [facility]  Pain: denies.  Skin: 2 PIV saline locked.     Received 1UPRBC for hemoglobin 6.9- recheck 7.1.      Continue with POC. Notify primary team with changes

## 2020-08-15 NOTE — PROGRESS NOTES
08/15/20 0844   Quick Adds   Type of Visit Initial PT Evaluation  (edema)   Living Environment   Lives With significant other   Living Arrangements house   Home Accessibility stairs to enter home;stairs within home   Number of Stairs, Main Entrance 9   Stair Railings, Main Entrance railings safe and in good condition   Number of Stairs, Within Home, Primary 7   Stair Railings, Within Home, Primary railings safe and in good condition   Transportation Anticipated family or friend will provide   Living Environment Comment Pt lives in a rambler home, tub/shower combo   Self-Care   Usual Activity Tolerance moderate   Current Activity Tolerance moderate   Regular Exercise Yes   Activity/Exercise Type   (exercise bike)   Exercise Amount/Frequency 30 mins;daily   Equipment Currently Used at Home none   Functional Level Prior   Ambulation 0-->independent   Transferring 0-->independent   Toileting 0-->independent   Bathing 0-->independent   Communication 0-->understands/communicates without difficulty   Swallowing 0-->swallows foods/liquids without difficulty   Cognition 0 - no cognition issues reported   Fall history within last six months no   Which of the above functional risks had a recent onset or change? ambulation   General Information   Onset of Illness/Injury or Date of Surgery - Date 08/14/20   Referring Physician Kathy Khan MD    Pertinent History of Current Problem (include personal factors and/or comorbidities that impact the POC) Pt is a 60 year old male admitted on 8/13/2020. He has a history of HTN, HLD, proximal RCA occlusion s/p stent (6/2019), STEMI s/p KEVYN in RCA and LCX, HFrEF (% in 2/20), pulmonary hypertension, moderate mitral regurgitation, atrial fibrillation, recurrent GI bleeds 2/2 AV malformation, and CKD stage IV  is admitted for fluid overload likely 2/2 CHF exacerbation from medication non-adherance, acute on chronic anemia requiring transfusion, and hyperkalemia. Patient has been  hemodynamically stable.`   General Observations Pt sitting EOB upon entry, agreeable to PT session, RN ok'd session.    General Info Comments Activity: Up ad dary   Edema General Information   Onset of Edema   (acute on chronic)   Affected Body Part(s) Right LE;Left LE   Etiology Comments CKD, hypoalbuminemia, hypervolemia, HF   Edema Precautions Cardiac Edema/CHF;Renal Insufficiency   General Comments/Previous Edema Treatment/Edema Equipment No history of wearing compression socks.    Edema Examination/Assessment   Skin Condition Pitting;Dryness   Skin Condition Comments Pt has 1-2+ moderately firm pitting edema in BLEs from MTPs<>knee crease, most prominent ankles<>knee crease. Skin appears dry, missing L great toenail. Healed scars on L shin.    Radial Pulse Symmetrical   Dorsal Pedal Pulse Decreased  (normal on RLE, decreased on LLE)   Edema Assessment Comments Pt endorses history of LE edema that comes and goes. Reports poor tolerance to Bumex-increased cramping and muscle aches, good reduction to LE edema with Lasix. Pt's girlfriend could assist with donning/doffing comperm at home. Pt unable to reach his feet.    Cognitive Status Examination   Orientation orientation to person, place and time   Level of Consciousness alert   Follows Commands and Answers Questions 100% of the time;able to follow multistep instructions   Personal Safety and Judgment intact   Memory intact   Posture    Posture Protracted shoulders;Forward head position   Range of Motion (ROM)   ROM Comment BLE ROM WFL   Strength   Strength Comments BLE strength >3/5 per observation of functional mobility   Bed Mobility   Bed Mobility Comments Supine>sitting independent   Transfer Skills   Transfer Comments Sit>stand independent.    Gait   Gait Comments Pt ambulated 20 ft with no AD, SBA. Pt demonstrates increased R trunk lean in SLS on RLE during gait cycle. Minimal path deviations noted.    Balance   Balance Comments Pt stands with normal AMY  "and NBOS EC with increased postural sway but no LOB.    Sensory Examination   Sensory Perception   (baseline neuropathy in BLEs)   General Therapy Interventions   Planned Therapy Interventions balance training;gait training   Edema: Planned Interventions Fit for compression garment   Clinical Impression   Criteria for Skilled Therapeutic Intervention yes, treatment indicated   PT Diagnosis impaired functional mobility   Edema: Patient Presentation Edema   Influenced by the following impairments impaired balance, decreased activity tolerance   Functional limitations due to impairments difficulty with ambulation/community mobility   Clinical Presentation Stable/Uncomplicated   Clinical Presentation Rationale level of impairments, clinical judgement   Clinical Decision Making (Complexity) Low complexity   Therapy Frequency 5x/week   Predicted Duration of Therapy Intervention (days/wks) 1 week   Anticipated Equipment Needs at Discharge   (LE compression garment)   Anticipated Discharge Disposition Home with Assist   Risk & Benefits of therapy have been explained Yes   Patient, Family & other staff in agreement with plan of care Yes   [facility] TM \"6 Clicks\"   2016, Trustees of [facility], under license to [employer].  All rights reserved.   6 Clicks Short Forms Basic Mobility Inpatient Short Form   [facility]  \"6 Clicks\" V.2 Basic Mobility Inpatient Short Form   1. Turning from your back to your side while in a flat bed without using bedrails? 4 - None   2. Moving from lying on your back to sitting on the side of a flat bed without using bedrails? 4 - None   3. Moving to and from a bed to a chair (including a wheelchair)? 4 - None   4. Standing up from a chair using your arms (e.g., wheelchair, or bedside chair)? 4 - None   5. To walk in hospital room? 4 - None   6. Climbing 3-5 steps with a railing? 4 - None   Basic Mobility Raw Score (Score out of 24.Lower scores equate to lower " levels of function) 24   Total Evaluation Time   Total Evaluation Time (Minutes) 6

## 2020-08-15 NOTE — PLAN OF CARE
Neuro: A&Ox4  Cardiac: No tele. VSS, afebrile. BLE edema +2 - 3.   Respiratory: Sating >92% on RA.    GI/: Adequate UOP, diuresis w/ Lasix IV x1 & Metolazone 2.5mg. No BM. *No scope per GI.  Diet/appetite: 2g Na+ diet w/1.5L fluid restrict. Eating well.  Activity: Independent/SBA in room & hallways.  Pain: denies.  Skin/LDA's: RPIVx2: SL'ed      Plan: Infused Iron Sucrose x1, tolerated well. Monitor Hgb & electrolytes, being drawn end of shift. Continue with POC. Notify primary team with changes

## 2020-08-15 NOTE — PROGRESS NOTES
1900-2230:   Neuro: A/Ox4.  Cardiac: No tele orders. AVSS.   Respiratory: O2 sats stable on RA  GI/: voiding frequently, on lasix. No BM this shift, last BM prior to admission.   Diet/appetite: 2g Na diet. 1.5L FR.    Activity:  SBA   Pain: denies pain this shift.    Skin: No new deficits noted.  LDA's: PIV SL.     Plan: Transfer to 7D. Continue with POC. Notify primary team with changes.

## 2020-08-15 NOTE — PROGRESS NOTES
Telephone visit 20 minutes with patient's permission    Plan:   Video visit next     1. Ischemic cardiomyopathy  2. Severely decreased EF  3. No AICD per patient  4. Intolerance to isosorbide  5. Chronic renal failure  6. Iron defiiency  7. GI bleed  8. Diabetes  9. ACE induced hyperkalemia  10. Intolerant of beta blockade  11. Acute and chronic congestive heart failure accentuated by: severe, iron deficiency anemia and discontinuance of diuretics  12. Atrial fibrillation    Patient recently admitted with increasing shortness of breath, weight gain secondary to diuretic suspension and profound MICHELLE without recent evidence of bleeding.    Has received parenteral iron and diuresed back to region of usual weight.       Wt Readings from Last 24 Encounters:   08/15/20 94.5 kg (208 lb 4.8 oz)   20 98.9 kg (218 lb)   20 94.8 kg (209 lb)   20 98.4 kg (217 lb)   20 87.5 kg (192 lb 12.8 oz)   20 87.7 kg (193 lb 6.4 oz)   20 99.6 kg (219 lb 9.6 oz)   20 95.7 kg (211 lb)   20 94.6 kg (208 lb 8.9 oz)   20 98.9 kg (218 lb)   19 99.7 kg (219 lb 11.2 oz)   19 98.6 kg (217 lb 4.8 oz)   19 97.3 kg (214 lb 6.4 oz)   19 101.3 kg (223 lb 6.4 oz)   19 101 kg (222 lb 11.2 oz)   19 99.8 kg (220 lb)   19 98.9 kg (218 lb)   19 95.6 kg (210 lb 11.2 oz)   19 103.9 kg (229 lb)   10/31/19 105.7 kg (233 lb)   10/07/19 107.3 kg (236 lb 9.6 oz)   19 106.1 kg (234 lb)   19 107 kg (236 lb)   19 107.7 kg (237 lb 6.4 oz)       Echocardiogram: 2020    Name: MERCY SHAFER  MRN: [id number]  : 1960  Study Date: 02/10/2020 04:52 PM  Age: 59 yrs  Gender: Male  Patient Location: [facility]  Reason For Study: Heart Failure  Ordering Physician: ELENI PALACIO  Referring Physician: HARVEY GUNDERSON  Performed By: Christina Peck RDCS     BSA: 2.1 m2  Height: 68 in  Weight: 218 lb  HR: 98  BP: 93/47  mmHg    Interpretation Summary  Severely (EF 29%) reduced left ventricular function is present. Severe diffuse hypokinesis is present.  Global right ventricular function is moderately reduced.  Moderate mitral insufficiency is present.  Mild to moderate tricuspid insufficiency is present.  Pulmonary hypertension present. Estimated pulmonary artery systolic pressure is 69 (54+15) mmHg .  Dilation of the inferior vena cava is present with abnormal respiratory  variation in diameter.  Small posterior pericardial effusion without echocardiographic evidence of  tamponade.  _____________________________________________________________________________  __        Left Ventricle  Severe left ventricular dilation is present. Severely (EF 20-30%) reduced left  ventricular function is present. Left ventricular diastolic function is not  assessable. Severe diffuse hypokinesis is present.     Right Ventricle  Global right ventricular function is moderately reduced. A pacemaker lead is  noted in the right ventricle.     Mitral Valve  Moderate mitral insufficiency is present.     Aortic Valve  Aortic valve is normal in structure and function.        Tricuspid Valve  Mild to moderate tricuspid insufficiency is present. Estimated pulmonary  artery systolic pressure is 54 mmHg plus right atrial pressure.     Vessels  Dilation of the inferior vena cava is present with abnormal respiratory  variation in diameter. IVC diameter >2.1 cm collapsing <50% with sniff  suggests a high RA pressure estimated at 15 mmHg or greater.     Pericardium  Small posterior pericardial effusion without echocardiographic evidence of  tamponade.     Compared to Previous Study  This study was compared with the study from 12.19.19 . There has been no  change.     _____________________________________________________________________________  __  MMode/2D Measurements & Calculations     EF(MOD-bp): 28.7 %  TAPSE: 1.1 cm        Doppler Measurements &  Calculations  TV max P.6 mmHg  TR max shavonne: 366.1 cm/sec  TR max P.6 mmHg        _____________________________________________________________________________  __     Laboratories:      Results for MERCY SHAFER (MRN [id number]) as of 2020 10:27   Ref. Range 2020 10:06   Ferritin Latest Ref Range: 26 - 388 ng/mL 9 (L)   Iron Latest Ref Range: 35 - 180 ug/dL 16 (L)   Iron Binding Cap Latest Ref Range: 240 - 430 ug/dL 554 (H)   Iron Saturation Index Latest Ref Range: 15 - 46 % 3 (L)         Results for MERCY SHAFER (MRN [id number]) as of 2020 10:27   Ref. Range 2020 05:55 2020 06:02   Sodium Latest Ref Range: 133 - 144 mmol/L 140 139   Potassium Latest Ref Range: 3.4 - 5.3 mmol/L 4.2 3.4   Chloride Latest Ref Range: 94 - 109 mmol/L 107 104   Carbon Dioxide Latest Ref Range: 20 - 32 mmol/L 26 27   Urea Nitrogen Latest Ref Range: 7 - 30 mg/dL 59 (H) 57 (H)   Creatinine Latest Ref Range: 0.66 - 1.25 mg/dL 2.29 (H) 2.17 (H)   GFR Estimate Latest Ref Range: >60 mL/min/1.73_m2 29 (L) 31 (L)   GFR Estimate If Black Latest Ref Range: >60 mL/min/1.73_m2 34 (L) 36 (L)   Calcium Latest Ref Range: 8.5 - 10.1 mg/dL 8.3 (L) 8.5   Anion Gap Latest Ref Range: 3 - 14 mmol/L 7 8   Magnesium Latest Ref Range: 1.6 - 2.3 mg/dL 2.4 (H) 2.5 (H)   Glucose Latest Ref Range: 70 - 99 mg/dL 122 (H) 109 (H)   Hemoglobin Latest Ref Range: 13.3 - 17.7 g/dL 7.6 (L) 7.8 (L)

## 2020-08-15 NOTE — PLAN OF CARE
Discharge Planner PT   Patient plan for discharge: home with assist from SO  Current status: PT/Lymphedema evaluation completed. Pt has 1-2+ pitting edema in BLEs, acute on chronic edema. Donned size F comperm from MTPs<>knee crease with overlap at feet only. Pt to wear compression 23/24 hours if tolerated, remove compression if patient reports increased pain, N/T, SOB or compression becomes soiled. Pt transfers independently. Ambulated 250 ft around unit with SBA, mild gait deviations but overall steady and safe with independent ambulation. HR 88-93 beats/minute with activity, SpO2 %.  Barriers to return to prior living situation: medical status  Recommendations for discharge: home with assist  Rationale for recommendations: near baseline mobility level, pt will benefit from IP PT/lymphedema to reduce LE edema and avoid deconditioning during IP stay.        Entered by: Rosanne Corona 08/15/2020 9:15 AM

## 2020-08-15 NOTE — PLAN OF CARE
Vital signs:  Temp: 96.1  F (35.6  C) Temp src: Oral BP: 93/63 Pulse: 88 Heart Rate: 94 Resp: 18 SpO2: 99 % O2 Device: None (Room air)       Pt transferred from  at 2230. Ambulated from wheelchair to bed without difficulty. Denies pain, nausea, numbness, tinging, shortness of breath.  On 2 gram sodium diet and 1.5 L fluid restriction. Up independently in room, voiding well via urinal. Monitor bm for further melena.

## 2020-08-15 NOTE — PLAN OF CARE
Alert and oriented x 4. Denies pain and nausea. Blood glucose 174 and 144. Patient declined coverage stating that he does not use insulin at home. Voiding in the urinal. Took a shower.

## 2020-08-15 NOTE — PLAN OF CARE
Shift: 07:00- 19:30  -Lymphedema consult came by and Comperm compression applied to bilateral LE. Pitting edema +3. Pt tolerating comperm wrap fine so far. Denies pain.   -Weight today 94.5kg. Overall, pt weight is trending down. Weight down by 4 lbs since admission. Lasix 40mg IV given today. Pt putting out good urine output. Urine output 1275ml dayshift post lasix   -Hgb 7.2. Pt received day 2 out of 5 iron sucrose infusion for anemia treatment.   -Pt tolerating 1500ml fluid restrictions well. He is chewing on ice chips a lot to maintain oral moisture.

## 2020-08-16 NOTE — PLAN OF CARE
Discharge Planner PT   Patient plan for discharge: home with HEPs   Current status: Good reductions in LE edema-encourage wearing during the day/as tolerated to maintain reductions. Pt ambulated 250 ft x 2 with one LOB, able to regain balance without assist.   Barriers to return to prior living situation: medical status  Recommendations for discharge: home with assist from girlfriend, HEPs. Compression socks to avoid re-fill of LE edema.   Rationale for recommendations: improve strength, balance and endurance       Entered by: Rosanne Corona 08/16/2020 9:06 AM

## 2020-08-16 NOTE — PROGRESS NOTES
[facility], [place]    Progress Note - Josiah 2 Service        Date of Admission:  8/13/2020    Assessment & Plan   Cole Jesus is a 60 year old male admitted on 8/13/2020. He has a history of HTN, HLD, proximal RCA occlusion s/p stent (6/2019), STEMI s/p KEVYN in RCA and LCX, HFrEF (EF 29% in 2/20), pulmonary hypertension, moderate mitral regurgitation, atrial fibrillation, recurrent GI bleeds 2/2 AV malformation, and CKD stage IV (detailed PMHx as noted in progress note of 8/15/20)  is admitted for fluid overload likely 2/2 CHF exacerbation from medication non-adherance, acute on chronic anemia requiring transfusion, and hyperkalemia. Patient has been hemodynamically stable.     UPDATES 08/16/2020:  - IV iron sucrose Day 3/5 (started on 8/14)  - 40 mg lasix + 2.5 mg metolazone this AM   - 60 mEq KCl this AM  - Recheck K & Mg at 1600  - restart home Aspirin 81 mg Qday    # CHF Exacerbation  # HFrEF (LVEF 29%)  Patient stopped taking torsemide about 1 week ago because of symptoms thought to be related to medication. Noted increasing lower extremity swelling. Patient is +16lbs, BNP 5,524 (unknown baseline), and has significant pitting dependent edema to buttocks. Renal disease is a less likely cause of fluid retention as patient is near baseline. Follows with cardiology frequently, not on bblocker due to unable to tolerate + decompensated HF, not on ACEi d/t hyperkalemia/renal dysfucntion, not on spironolactone d/t renal dysfunction, declined ICD multiple times in the past even though he is full code. Discussed ICD benefits/risks with patient, who is not interested. CXR on 8/13/20 with increased intersitial & airspace opacities, likely 2/2 fluid overload.  Net negative ~1.8L yesterday. R>L lower extremity swelling continues to improve.   - Diuresis    > lasix IV 40 mg prn for goal of net negative 1.5-2L/24 hours   > PTA metolazone - will hold if previous day exceeded net negative  goal  - Recheck electrolytes at 1800 and in AM, goal MG>2, goal K>4  - Monitor I/Os, Daily weights  - Comperm compression wraps to bilateral lower extremities  - Consider repeat echo when compensated - patient is in agreement with  - Repeat CXR at goal weight to rule out infection and ensure clearing of opacities - patient is in agreement with   - Start aspirin 81 mg qday     # Acute on chronic normocytic anemia requiring transfusion  Patient has chronic anemia 2/2 CKD and WILLIAM with baseline Hgb 12-13. Hgb 5.9 on presentation and patient was transfused 2 units pRBCs. Concern for GI bleed given history of AVMs and episodes on hematochezia/melena although none noted recently. Last EGD 7/2019 which showed blood + clot in antrum, single angioectasia in dudodenum. Possible patient had GI bleed 2 weeks ago, evidenced by melena that resolved and Hgb likely has not yet recovered due to CKD and WILLIAM. Iron studies on admission were quite low, will supplement. Hgb stable at 7.7 this am. Plan to start aspirin today and will monitor Hgb and clinical signs of bleeding.   - Monitor for signs of GI bleed after starting aspirin, consult GI if bleeding  - Stopped IV pantoprazole on 8/15 since there has been no evidence of active GI bleed.  - Recheck Hgb in AM  - Transfuse for Hgb <7   -  200 mg IV iron sucrose - day 3/5 (started on 8/14)    #Hyperkalemia.   Patient continued potassium supplementation after stopping torsemide. K was 6.3 at presentation with no EKG changes noted. Treated with lasix + 9.9 units insulin and dextrose in ED with repeat K being 5.3. On 8/14 K had normalized to 4.5 and telemitry was discontinued. Hyperkalemia has resolved. K was low overnight 2/2 diuresis - will supplement to >4 and monitor for development of hyperkalemia.   - Recheck K at 1800 and in AM, replete PRN  - Hold PTA potassium supplementation  - Continue to monitor electrolytes     #Chronic Kidney Disease, Stage 4.   Cr 2.45 on admission, 2.25 on  8/16.  Near baseline 2.1-2.3. Cr has been stable with current diuresis regimen. Will continue to monitor qday for increase.   - Continue Vitamin D supplementation  - Avoid nephrotoxic medications  - Renally dose meds  - Follow up with nephrology outpatient (needs scheduling)     #Type 2 Diabetes Mellitus.   A1C 6.8. goal <8. Diet controlled at home. Blood glucose since admission at goal <180. Stopped sliding scale insulin on 8/15 as glucose levels have been at goal and patient does not use at home.   - fasting blood glucose in AM   - Instructed patient to monitor for signs of hypoglycemia    #Hypoalbuminemia.   Likely 2/2 CKD Stage 4. Urine Albumin/Cr ratio >1,000 in 2019. There could be a component of malnutrition as well. Low albumin likely causing decreased oncotic pressure and contributing to weight gain/edema.   - Continue treating CHF exacerbation  - Monitor daily weights   - Outpatient nephrology follow up for CKD     #Hyperlipidemia.   ASCVD of 12.1% risk of CV event in 10 years, high intensity statin recommended. Patient recently stopped taking atorvastatin because of muscle cramping and fatigue.   - Discussed benefits/risks of statins on 8/14. Patient is not interested in restarting because of side effects.     #Afib, paroxysmal  CHADsVASc of 3, currently in NSR, anticoagulation contraindicated due to recurrent GI bleeds. Beta blocker previously contraindicated 2/2 decompensated HF.  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 Dermatology consulted in 02/20 hospitalization to evaluate hx of hydralazine reaction and ordered FLACO. Titer returned positive and no follow up occurred after discharge.  - Follow up with PCP for +FLACO Ab     Diet: low Na diet, fluid restriction 1500 ML FLUID  Fluids: PO, fluid restrict  DVT  "Prophylaxis: No anticoagulation due to hx of GI bleeds, SCDs  Ponce Catheter: not present  Code Status: Full Code      Disposition Plan   Expected discharge: 2 - 3 days, recommended to prior living arrangement once return to baseline weight, anemia stable-improving.  Entered: Rosanne Burt 08/16/2020, 7:17 AM     The patient's care was discussed with the Attending Physician, Dr. Wood.    Rosanne Burt  Medical Student  [facility] 2 Service  [facility], [place]  Pager: [phone]  Please see sticky note for cross cover information    Resident/Fellow Attestation   I, Kathy Khan, was present with the medical student who participated in the service and in the documentation of the note.  I have verified the history and personally performed the physical exam and medical decision making.  I agree with the assessment and plan of care as documented in the note.      This is a shared note, my edits are incorporated.     Kathy Khan MD  PGY1  Date of Service (when I saw the patient): 08/16/20  ________________________________________________    Interval History   Patient refused AM labs because he feels the veins in his arms are hard from the blood draws. Pt was upset with frequent lab draws and says he can \"diurese at home\". I discussed that we are shifting a lot of fluid, almost 20 lbs of fluid, which can lead to electrolyte abnormalities that can be dangerous which is why we are checking his blood often. We can not safely continue diuresis unless he is agreeable to lab draws. He is agreeable after explanation provided.      Feeling well and wondering when he can go home. Discussed that his \"dry weight' is ~195 and he is 204 lbs this am. Our goal is to get him closer to his dry weight to make him feel better (SOB + edema), improve his heart and kidney function. Decreased shortness of breath with ambulation, improved from yesterday, none at rest. Denies lightheadedness or dizziness. Feels " mobility is improving as leg swelling has gone down. No melena/hematochezia.    Data reviewed today: I reviewed all medications, new labs and imaging results over the last 24 hours. I personally reviewed no images or EKG's today.    Physical Exam   Vital Signs: Temp: 96.3  F (35.7  C) Temp src: Oral BP: 98/59 Pulse: 94   Resp: 18 SpO2: 97 % O2 Device: None (Room air)    Weight: 204 lbs 8 oz   Constitutional: awake, alert, lying in bed  Respiratory: no increased work of breathing, slow airflow, clear to auscultation bilaterally throughout  Cardiovascular: Regular rate and rhythm, grade II/IV holosystolic murmur, no S3/4  GI: obese, soft, nontender, multiple well healed incisional scars  Skin: bilateral antecubital fossa bruising with no redness, swelling, or increased warmth; no redness, increased warmth of lower extremities  Musculoskeletal: right slightly > left 1+ dependent pitting edema of lower extremities to knees. Wearing Compression stockings. No calf tenderness to palpation.     Data   Recent Labs   Lab 08/16/20  0920 08/15/20  1924 08/15/20  0634 08/14/20  1951 08/14/20  0447  08/13/20  1539 08/13/20  1006   WBC  --   --   --   --  6.6  --   --  9.1   HGB 7.7*  --  7.2* 7.4* 7.1*   < >  --  5.9*   MCV  --   --   --   --  85  --   --  87   PLT  --   --   --   --  286  --   --  359     --  140 136 138  --  135 134   POTASSIUM 3.0* 3.6 3.7 3.8 4.5  --  5.2  Canceled, Test credited  5.3 6.3*   CHLORIDE 104  --  106 105 107  --  106 108   CO2 30  --  26 25 24  --  22 20   BUN 62*  --  72* 73* 77*  --  86* 85*   CR 2.25*  --  2.33* 2.27* 2.28*  --  2.32* 2.40*   ANIONGAP 5  --  8 6 6  --  6 6   RONNY 8.4*  --  8.2* 8.3* 8.3*  --  8.4* 8.4*   *  --  137* 147* 123*  --  165* 124*   ALBUMIN  --   --   --   --   --   --  2.7*  --    BILITOTAL  --   --   --   --   --   --  1.8*  --    TROPI  --   --   --   --   --   --   --  0.016    < > = values in this interval not displayed.

## 2020-08-16 NOTE — PLAN OF CARE
[id number]: Soft BPs; 91/60 lowest--does not meet parameters to notify; pt denies sx of dizziness, lightheadedness. OVSS. O2 saturations >92% on RA. Denied pain, nausea, SOB. 2+ edema in BLE; wearing comperm compression stockings until 2200, then asked for them to be removed because uncomfortable. Educated patient that PT wants them to be worn 23/24 hours per day; patient stated understanding, but refused to continue wearing, stating he would put them back on tomorrow AM. K 3.6 and Mg 2.5 @ 1930. R PIV; saline locked. Occasionally ate ice chips; 1.5 L fluid restriction and 2 gm sodium diet. Adequate urine output. No BM. Slept well overnight.    Pt refused AM labs. Educated the patient about why labs are drawn every morning in the hospital. Patient continued to refuse.

## 2020-08-17 NOTE — PLAN OF CARE
Discharge Planner PT   Patient plan for discharge: Home with assist from SO  Current status: Pt amb ~ 275 feet x 2 with CGA. Pt amb up and down 4 stairs x 3 trials with SBA. Pt participated in standing LE Therex x 10 reps. Pt demonstrating fair tolerance to compression, suggested ON during the day OFF at night wearing schedule. Pt to wear cream color tube stocking from MTPs to knee crease, additional length overlapping at toes, remove at night. Fully remove compression if pain, numbness and tingling, general discomfort, SOB or soiled.   Barriers to return to prior living situation: medical status, decreased strength, impaired balance  Recommendations for discharge: Home with assist from SO and home exercise program  Rationale for recommendations: Pt is mobilizing well, anticipate pt will be safe to discharge home when medically ready for discharge. Pt may need to wear compression to BLE ongoing if edema persists.        Entered by: Alley Amador 08/17/2020 4:25 PM

## 2020-08-17 NOTE — PLAN OF CARE
Weight down by 2 lbs from yesterday. Pt received lasix 60mg IV  today. Potassium level 4.2, Magnesium 2.4. Creatinine 2.29. Continue to monitor urine output and electrolytes closely. Plan to discharge pt once he is at his dry weight.

## 2020-08-17 NOTE — PLAN OF CARE
[id number]: VSS. Afebrile. O2 saturations >92% on RA. Denied pain, nausea, dizziness, SOB. 2+ edema BLE; Comperm in place at start of shift; at 2030 refused to continue wearing them; stated would wear them in the AM. R PIV; saline locked. Gave 40 mEq K after afternoon K resulted as 3.6; next K recheck in AM. Good appetite. Eating ice chips. Adequate urine output. No BM this shift. Slept well through the night.

## 2020-08-17 NOTE — PROGRESS NOTES
[facility], [place]    Progress Note - Josiah 2 Service        Date of Admission:  8/13/2020    Assessment & Plan   Cole Jesus is a 60 year old male admitted on 8/13/2020. He has a history of HTN, HLD, proximal RCA occlusion s/p stent (6/2019), STEMI s/p KEVYN in RCA and LCX, HFrEF (EF 29% in 2/20), pulmonary hypertension, moderate mitral regurgitation, atrial fibrillation, recurrent GI bleeds 2/2 AV malformation, and CKD stage IV (detailed PMHx as noted in progress note of 8/15/20)  is admitted for fluid overload likely 2/2 CHF exacerbation from medication non-adherance, acute on chronic anemia requiring transfusion, and hyperkalemia. Patient has been hemodynamically stable.     UPDATES 08/17/2020:  - IV iron sucrose Day 4/5 (started on 8/14)  - 60 mg lasix + 2.5 mg metolazone this AM   - I/Os check at 2200, redose lasix at 40 if net negative is <1L on the day  - continue PTA Aspirin 81 mg daily    # CHF Exacerbation  # HFrEF (LVEF 29%)  Patient stopped taking torsemide about 1 week ago because of symptoms thought to be related to medication. Noted increasing lower extremity swelling. Patient is +16lbs, BNP 5,524 (unknown baseline), and has significant pitting dependent edema to buttocks. Renal disease is a less likely cause of fluid retention as patient is near baseline. Follows with cardiology frequently, not on bblocker due to unable to tolerate + decompensated HF, not on ACEi d/t hyperkalemia/renal dysfucntion, not on spironolactone d/t renal dysfunction, declined ICD multiple times in the past even though he is full code. Discussed ICD benefits/risks with patient, who is not interested. CXR on 8/13/20 with increased intersitial & airspace opacities, likely 2/2 fluid overload.  Net negative ~1.8L yesterday. R>L lower extremity swelling continues to improve.   - Diuresis    > lasix IV 60 mg prn for goal of net negative 1.5-2L/24 hours   > PTA metolazone - will hold if previous day  exceeded net negative goal  - Recheck electrolytes qAM, goal MG>2, goal K>4  - Monitor I/Os, Daily weights  - Comperm compression wraps to bilateral lower extremities  - Consider repeat echo when compensated - patient agreeable to this plan  - Repeat CXR at goal weight to rule out infection and ensure clearing of opacities - patient is in agreement with   - aspirin 81 mg qday     # Acute on chronic normocytic anemia requiring transfusion  Patient has chronic anemia 2/2 CKD and WILLIAM with baseline Hgb 12-13. Hgb 5.9 on presentation and patient was transfused 2 units pRBCs. Concern for GI bleed given history of AVMs and episodes on hematochezia/melena although none noted recently. Last EGD 7/2019 which showed blood + clot in antrum, single angioectasia in dudodenum. Possible patient had GI bleed 2 weeks ago, evidenced by melena that resolved and Hgb likely has not yet recovered due to CKD and WILLIAM. Iron studies on admission were quite low, will supplement. Hgb stable at 7.7 this am. Plan to start aspirin today and will monitor Hgb and clinical signs of bleeding.   - Monitor for signs of GI bleed after starting aspirin, consult GI if bleeding  - Stopped IV pantoprazole on 8/15 since there has been no evidence of active GI bleed.  - Recheck Hgb in AM  - Transfuse for Hgb <7   -  200 mg IV iron sucrose - day 4/5 (started on 8/14)    #Hyperkalemia.   Patient continued potassium supplementation after stopping torsemide. K was 6.3 at presentation with no EKG changes noted. Treated with lasix + 9.9 units insulin and dextrose in ED with repeat K being 5.3. On 8/14 K had normalized to 4.5 and telemitry was discontinued. Hyperkalemia has resolved. K was low overnight 2/2 diuresis - will supplement to >4 and monitor for development of hyperkalemia.   - K in AMs, replete PRN  - Hold PTA potassium supplementation  - Continue to monitor electrolytes     #Chronic Kidney Disease, Stage 4.   #Hypoalbuminemia.   Cr 2.45 on admission, 2.25  on 8/16.  Near baseline 2.1-2.3. Cr has been stable with current diuresis regimen. Likely 2/2 CKD Stage 4. Urine Albumin/Cr ratio >1,000 in 2019.   - Continue Vitamin D supplementation  - Avoid nephrotoxic medications  - Renally dose meds  - Follow up with nephrology outpatient (needs scheduling)     #Type 2 Diabetes Mellitus.   A1C 6.8. goal <8. Diet controlled at home. Blood glucose since admission at goal <180. Stopped sliding scale insulin on 8/15 as glucose levels have been at goal and patient does not use at home.   - fasting blood glucose in AM   - Instructed patient to monitor for signs of hypoglycemia     #Hyperlipidemia.   ASCVD of 12.1% risk of CV event in 10 years, high intensity statin recommended. Patient recently stopped taking atorvastatin because of muscle cramping and fatigue.   - Discussed benefits/risks of statins on 8/14. Patient is not interested in restarting because of side effects.     #Afib, paroxysmal  CHADsVASc of 3, currently in NSR, anticoagulation contraindicated due to recurrent GI bleeds. Beta blocker previously contraindicated 2/2 decompensated HF.  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 Dermatology consulted in 02/20 hospitalization to evaluate hx of hydralazine reaction and ordered FLACO. Titer returned positive and no follow up occurred after discharge.  - Follow up with PCP for +FLACO Ab     Diet: low Na diet, fluid restriction 1500 ML FLUID  Fluids: PO, fluid restrict  DVT Prophylaxis: No anticoagulation due to hx of GI bleeds, SCDs  Ponce Catheter: not present  Code Status: Full Code      Disposition Plan   Expected discharge: 2 - 3 days, recommended to prior living arrangement once return to baseline weight, anemia stable-improving.  Entered: Kathy Khan MD 08/17/2020, 7:42 AM     The  "patient's care was discussed with the Attending Physician, Dr. Wood.    Kathy Khan MD  [facility] 2 Service  Internal Medicine, PGY-1  [facility], [place]  Pager: [phone]    ________________________________________________    Interval History   NNR; no acute events overnight.     Patient continues to feel better each day stating he can walk farther and farther before he is short of breath. He states his legs feel better and do not feel like \"lead\" anymore. He is eating, urinating, and having regular BMs. He is looking forward to discharge and really dislikes being in the hospital.     4 point ROS is negative.    Data reviewed today: I reviewed all medications, new labs and imaging results over the last 24 hours. I personally reviewed no images or EKG's today.    Physical Exam   Vital Signs: Temp: 95.5  F (35.3  C) Temp src: Oral BP: 105/67(Sitting vitals.) Pulse: 84   Resp: 18 SpO2: 99 % O2 Device: None (Room air)    Weight: 204 lbs 8 oz   Constitutional: awake, alert, lying in bed  Respiratory: no increased work of breathing, slow airflow, clear to auscultation bilaterally throughout  Cardiovascular: Regular rate and rhythm, grade II/IV holosystolic murmur, no S3/4  GI: obese, soft, nontender, multiple well healed incisional scars  Skin: bilateral antecubital fossa bruising with no redness, swelling, or increased warmth; no redness, increased warmth of lower extremities  Musculoskeletal: 1+ dependent pitting edema of lower extremities to knees. Wearing Compression stockings. No calf tenderness to palpation.     Data   Recent Labs   Lab 08/17/20  0555 08/16/20  1553 08/16/20  0920  08/15/20  0634  08/14/20  0447  08/13/20  1539 08/13/20  1006   WBC  --   --   --   --   --   --  6.6  --   --  9.1   HGB 7.6*  --  7.7*  --  7.2*   < > 7.1*   < >  --  5.9*   MCV  --   --   --   --   --   --  85  --   --  87   PLT  --   --   --   --   --   --  286  --   --  359     --  138  --  140   " < > 138  --  135 134   POTASSIUM 4.2 3.6 3.0*   < > 3.7   < > 4.5  --  5.2  Canceled, Test credited  5.3 6.3*   CHLORIDE 107  --  104  --  106   < > 107  --  106 108   CO2 26  --  30  --  26   < > 24  --  22 20   BUN 59*  --  62*  --  72*   < > 77*  --  86* 85*   CR 2.29*  --  2.25*  --  2.33*   < > 2.28*  --  2.32* 2.40*   ANIONGAP 7  --  5  --  8   < > 6  --  6 6   RONNY 8.3*  --  8.4*  --  8.2*   < > 8.3*  --  8.4* 8.4*   *  --  114*  --  137*   < > 123*  --  165* 124*   ALBUMIN  --   --   --   --   --   --   --   --  2.7*  --    BILITOTAL  --   --   --   --   --   --   --   --  1.8*  --    TROPI  --   --   --   --   --   --   --   --   --  0.016    < > = values in this interval not displayed.

## 2020-08-17 NOTE — PLAN OF CARE
"BP 95/59 (BP Location: Left arm)   Pulse 75   Temp 95.5  F (35.3  C) (Oral)   Resp 20   Ht 1.727 m (5' 8\")   Wt 91.9 kg (202 lb 11.2 oz)   SpO2 100%   BMI 30.82 kg/m      [id number]. A&Ox4, admitted on 08/13/20 for hyperkalemia, CHF exacerbation fluid overload and anemia. Iron sucrose 200 mg/100 ml daily. On iv scheduled Lasix for diuretics and daily weight. Compression stocking on during the day and off at night. Soft BP and asymptomatic. PIV saline locked. Denied pain, SOB, chest pain and nausea/vomiting. No change this shift. Will continue on diuretics and daily weight.   "

## 2020-08-17 NOTE — PLAN OF CARE
Shift 07:00-19:00  -Weight down from yesterday 2lbs. Bilateral LE edema pitting +3. Additional lasix 40mg IV and metolazone added. Pt making adequate urine output. Potassium level 3. Pt received potassium 60meq PO this morning. Potassium level recheck this evening 3.6. Pt will need additional potassium 40meq tonight. Day 3 out of 5 Iron sucrose infusion completed without any issues.

## 2020-08-18 NOTE — PLAN OF CARE
PT-Edema- 7D- Pt demonstrating good tolerance and good reduction with current edema management, pt anticipates discharge today, has no further questions regarding discharge. Pt given additional supply of compression.     Physical Therapy Discharge Summary    Reason for therapy discharge:    Discharged to home.    Progress towards therapy goal(s). See goals on Care Plan in [facility] electronic health record for goal details.  Goals met    Therapy recommendation(s):    Continue home exercise program.

## 2020-08-18 NOTE — PLAN OF CARE
[id number]: Soft BP (lowest 80/57--provider aware). OVSS. O2 saturations >92% on RA. Denied pain, nausea, SOB, dizziness. R PIV; saline locked. Good urine output. No BM this shift. Up independent.

## 2020-08-18 NOTE — PROVIDER NOTIFICATION
Paged Crosscover: pt BP 87/57 left arm and 80/57 right arm. Denies lightheadedness, dizziness.    Provider called back to state they are aware and to continue to monitor, notify if decreases further

## 2020-08-18 NOTE — LETTER
2020      RE: Mercy Jesus  [address]  [facility] [address]       Dear Colleague,    Thank you for the opportunity to participate in the care of your patient, Mercy Jesus, at the [facility]. Please see a copy of my visit note below.    Telephone visit 20 minutes with patient's permission    Plan:   Video visit next     1. Ischemic cardiomyopathy  2. Severely decreased EF  3. No AICD per patient  4. Intolerance to isosorbide  5. Chronic renal failure  6. Iron defiiency  7. GI bleed  8. Diabetes  9. ACE induced hyperkalemia  10. Intolerant of beta blockade  11. Acute and chronic congestive heart failure accentuated by: severe, iron deficiency anemia and discontinuance of diuretics  12. Atrial fibrillation    Patient recently admitted with increasing shortness of breath, weight gain secondary to diuretic suspension and profound MICHELLE without recent evidence of bleeding.    Has received parenteral iron and diuresed back to region of usual weight.       Wt Readings from Last 24 Encounters:   08/15/20 94.5 kg (208 lb 4.8 oz)   20 98.9 kg (218 lb)   20 94.8 kg (209 lb)   20 98.4 kg (217 lb)   20 87.5 kg (192 lb 12.8 oz)   20 87.7 kg (193 lb 6.4 oz)   20 99.6 kg (219 lb 9.6 oz)   20 95.7 kg (211 lb)   20 94.6 kg (208 lb 8.9 oz)   20 98.9 kg (218 lb)   19 99.7 kg (219 lb 11.2 oz)   19 98.6 kg (217 lb 4.8 oz)   19 97.3 kg (214 lb 6.4 oz)   19 101.3 kg (223 lb 6.4 oz)   19 101 kg (222 lb 11.2 oz)   19 99.8 kg (220 lb)   19 98.9 kg (218 lb)   19 95.6 kg (210 lb 11.2 oz)   19 103.9 kg (229 lb)   10/31/19 105.7 kg (233 lb)   10/07/19 107.3 kg (236 lb 9.6 oz)   19 106.1 kg (234 lb)   19 107 kg (236 lb)   19 107.7 kg (237 lb 6.4 oz)       Echocardiogram: 2020    Name: MERCY JESUS  MRN: [id number]  :  1960  Study Date: 02/10/2020 04:52 PM  Age: 59 yrs  Gender: Male  Patient Location: [facility]  Reason For Study: Heart Failure  Ordering Physician: ELENI PALACIO  Referring Physician: HARVEY GUNDERSON  Performed By: Christina Peck NIKKO     BSA: 2.1 m2  Height: 68 in  Weight: 218 lb  HR: 98  BP: 93/47 mmHg    Interpretation Summary  Severely (EF 29%) reduced left ventricular function is present. Severe diffuse hypokinesis is present.  Global right ventricular function is moderately reduced.  Moderate mitral insufficiency is present.  Mild to moderate tricuspid insufficiency is present.  Pulmonary hypertension present. Estimated pulmonary artery systolic pressure is 69 (54+15) mmHg .  Dilation of the inferior vena cava is present with abnormal respiratory  variation in diameter.  Small posterior pericardial effusion without echocardiographic evidence of  tamponade.  _____________________________________________________________________________  __        Left Ventricle  Severe left ventricular dilation is present. Severely (EF 20-30%) reduced left  ventricular function is present. Left ventricular diastolic function is not  assessable. Severe diffuse hypokinesis is present.     Right Ventricle  Global right ventricular function is moderately reduced. A pacemaker lead is  noted in the right ventricle.     Mitral Valve  Moderate mitral insufficiency is present.     Aortic Valve  Aortic valve is normal in structure and function.        Tricuspid Valve  Mild to moderate tricuspid insufficiency is present. Estimated pulmonary  artery systolic pressure is 54 mmHg plus right atrial pressure.     Vessels  Dilation of the inferior vena cava is present with abnormal respiratory  variation in diameter. IVC diameter >2.1 cm collapsing <50% with sniff  suggests a high RA pressure estimated at 15 mmHg or greater.     Pericardium  Small posterior pericardial effusion without echocardiographic evidence of  tamponade.      Compared to Previous Study  This study was compared with the study from 19 . There has been no  change.     _____________________________________________________________________________  __  MMode/2D Measurements & Calculations     EF(MOD-bp): 28.7 %  TAPSE: 1.1 cm        Doppler Measurements & Calculations  TV max P.6 mmHg  TR max shavonne: 366.1 cm/sec  TR max P.6 mmHg        _____________________________________________________________________________  __     Laboratories:      Results for MERCY SHAFER (MRN [id number]) as of 2020 10:27   Ref. Range 2020 10:06   Ferritin Latest Ref Range: 26 - 388 ng/mL 9 (L)   Iron Latest Ref Range: 35 - 180 ug/dL 16 (L)   Iron Binding Cap Latest Ref Range: 240 - 430 ug/dL 554 (H)   Iron Saturation Index Latest Ref Range: 15 - 46 % 3 (L)         Results for MERCY SHAFER (MRN [id number]) as of 2020 10:27   Ref. Range 2020 05:55 2020 06:02   Sodium Latest Ref Range: 133 - 144 mmol/L 140 139   Potassium Latest Ref Range: 3.4 - 5.3 mmol/L 4.2 3.4   Chloride Latest Ref Range: 94 - 109 mmol/L 107 104   Carbon Dioxide Latest Ref Range: 20 - 32 mmol/L 26 27   Urea Nitrogen Latest Ref Range: 7 - 30 mg/dL 59 (H) 57 (H)   Creatinine Latest Ref Range: 0.66 - 1.25 mg/dL 2.29 (H) 2.17 (H)   GFR Estimate Latest Ref Range: >60 mL/min/1.73_m2 29 (L) 31 (L)   GFR Estimate If Black Latest Ref Range: >60 mL/min/1.73_m2 34 (L) 36 (L)   Calcium Latest Ref Range: 8.5 - 10.1 mg/dL 8.3 (L) 8.5   Anion Gap Latest Ref Range: 3 - 14 mmol/L 7 8   Magnesium Latest Ref Range: 1.6 - 2.3 mg/dL 2.4 (H) 2.5 (H)   Glucose Latest Ref Range: 70 - 99 mg/dL 122 (H) 109 (H)   Hemoglobin Latest Ref Range: 13.3 - 17.7 g/dL 7.6 (L) 7.8 (L)

## 2020-08-18 NOTE — DISCHARGE SUMMARY
[facility], [place]  Discharge Summary - Medicine & Pediatrics       Date of Admission:  8/13/2020  Date of Discharge:  8/18/2020  Discharging Provider: Dr. 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 Trujillo, at cardiology clinic, already scheduled for   8/28/20 via video visit.  for hospital follow- up and possible need to   adjust medications. The following labs/tests are recommended: BMP and   Magnesium.    Follow-up with your primary care physician within the next 2 to 4 weeks   for recheck of hemoglobin and iron studies to decide whether more   supplementation is needed.    Appointments on [place] and/or [facility] (with [facility] or [facility]   provider or service). Call [phone] if you haven't heard regarding   these appointments within 7 days of discharge.             Discharge Disposition   Discharged to home  Condition at discharge: Stable    Hospital Course   Cole Jesus was admitted on 8/13/2020 for acute heart failure exacerbation and profound anemia.  The following problems were addressed during his hospitalization:    Acute heart failure exacerbation due to volume overload after stopping home diuretics   HFrEF (LVEF 29%)  Patient stopped taking torsemide about 1 week prior to admission because of symptoms thought to be related to medication. Noted increasing lower extremity  swelling. Patient was +16lbs, BNP 5,524 (unknown baseline), and had significant pitting dependent edema to buttocks. Renal disease is a less likely cause of fluid retention as patient is near baseline. Follows with cardiology frequently, not on bblocker due to unable to tolerate + decompensated HF, not on ACEi d/t hyperkalemia/renal dysfucntion, not on spironolactone d/t renal dysfunction, declined ICD multiple times in the past even though he is full code. Discussed ICD benefits/risks with patient, who is not interested. CXR on 8/13/20 with increased intersitial & airspace opacities, likely 2/2 fluid overload. Diuresed well over the course of the hospitalization with lasix IV 60 mg and metolazone 2.5 mg. Symptoms improved and patient was 5 lbs above his dry weight at the time of discharge.  - continue to monitor weight daily upon discharge  - Repeat CXR as outpatient when at goal weight ensure clearing of opacities - patient is in agreement with   - aspirin 81 mg qday      Acute on chronic normocytic anemia requiring transfusion - stable  Patient has chronic anemia 2/2 CKD and WILLIAM with baseline Hgb 12-13. Hgb 5.9 on presentation and patient was transfused 2 units pRBCs. Concern for GI bleed given history of AVMs and episodes on hematochezia/melena although none noted recently. Last EGD 7/2019 which showed blood + clot in antrum, single angioectasia in dudodenum. Gastroenterology was consulted and recommended medical management as this was likely subacute to chronic drift of Hb due to AVMs as above. Possible patient had GI bleed 2 weeks ago, evidenced by melena that resolved and Hgb likely has not yet recovered due to CKD and WILLIAM. Iron studies on admission were quite low. He received 5 days of IV iron while inpatient. Restarted aspirin without issues. Hemoglobin has remained stable without signs of GI bleeding while inpatient.   - Follow-up with primary care physician within the next 2 to 4 weeks for recheck of  hemoglobin and iron studies to decide whether further iron supplementation is needed   - Gastroenterology follow up as previously planned if needed      Hyperkalemia due to increased intake and discontinuing loop diuretics - resolved   Patient continued potassium supplementation after stopping torsemide. K was 6.3 at presentation with no EKG changes noted. Treated with lasix + 9.9 units insulin and dextrose in ED with repeat K being 5.3. On 8/14 K had normalized to 4.5 and telemitry was discontinued. Hyperkalemia resolved and supplementation was resumed with diuresis.   - restarted K supplement at reduced dose  - Continue to monitor electrolytes     Chronic Kidney Disease stage IV   Hypoalbuminemia  Cr 2.45 on admission, 2.25 on 8/16.  Near baseline 2.1-2.3. Cr has been stable with current diuresis regimen. Likely 2/2 CKD Stage 4. Urine Albumin/Cr ratio >1,000 in 2019.   - Continue Vitamin D supplementation  - Avoid nephrotoxic medications  - Renally dose meds  - Follow up with nephrology outpatient (needs scheduling)     Type 2 Diabetes Mellitus   A1C 6.8. goal <8. Diet controlled at home. Blood glucose since admission at goal <180. Stopped sliding scale insulin on 8/15 as glucose levels have been at goal and patient does not use at home.      Hyperlipidemia   ASCVD of 12.1% risk of CV event in 10 years, high intensity statin recommended. Patient recently stopped taking atorvastatin because of muscle cramping and fatigue. Discussed benefits/risks of statins on 8/14. Patient is not interested in restarting because of side effects.     Paroxysmal atrial fibrillation  CHADsVASc of 3, currently in NSR, anticoagulation contraindicated due to recurrent GI bleeds. Beta blocker previously contraindicated 2/2 decompensated HF.  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 ?Drug-induced lupus at the time with  "cessation of the drug and no follow up studies. Dermatology consulted in 02/20 hospitalization to evaluate hx of hydralazine reaction and ordered FLACO. Titer returned positive and no follow up occurred after discharge.  - Follow up with PCP for +FLACO Ab      Consultations This Hospital Stay   ADVANCE DIRECTIVE IP CONSULT  GI LUMINAL ADULT IP CONSULT  MEDICATION HISTORY IP PHARMACY CONSULT  CARDIOLOGY HEART FAILURE (HF) IP CONSULT  LYMPHEDEMA THERAPY IP CONSULT    Code Status   Prior       The patient was discussed with MD Johanny Rose[address]  Pager: [phone]  ______________________________________________________________________    Physical Exam   Vital Signs: BP 95/64 (BP Location: Left arm)   Pulse 85   Temp 95.5  F (35.3  C) (Oral)   Resp 20   Ht 1.727 m (5' 8\")   Wt 92.2 kg (203 lb 3.2 oz)   SpO2 98%   BMI 30.90 kg/m    Weight: 203 lbs 3.2 oz  Constitutional: awake, alert, sitting up in bed, stood up out of bed unassisted and able to walk without shortness of breath  Respiratory: no increased work of breathing, slow airflow, clear to auscultation bilaterally throughout  Cardiovascular: Regular rate and rhythm, grade II/IV holosystolic murmur, no S3/4  GI: obese, soft, nontender, multiple well healed incisional scars  Skin: bilateral antecubital fossa bruising with no redness, swelling, or increased warmth; no redness, increased warmth of lower extremities  Musculoskeletal: 1+ dependent pitting edema of lower extremities to knees. No calf tenderness to palpation.       Primary Care Physician   Silas Enciso    Discharge Orders      Reason for your hospital stay    You were hospitalized with volume overload caused by your heart failure.  We used IV diuretics to help you pee out the extra fluid.  We recommend restarting your home torsemide and metolazone.  You should check your weight daily and if it increases more " than 2 to 4 pounds in a day or 2 you should call your cardiology clinic.  Your appointment that was scheduled for 18 August has now been rescheduled for August 28.    When you were admitted to the hospital, we also found that you have anemia or low red blood cell counts.  This is likely due to ongoing slow losses through your GI tract.  You received a blood transfusion and then your hemoglobin counts remained stable throughout the rest of her hospital stay.  We did give you 5 days of IV iron supplementation to replenish your body stores of iron to be able to make new red blood cells.     Adult [facility] Follow-up and recommended labs and tests    Follow up with Dr. Trujillo, at cardiology clinic, already scheduled for 8/28/20 via video visit.  for hospital follow- up and possible need to adjust medications. The following labs/tests are recommended: BMP and Magnesium.    Follow-up with your primary care physician within the next 2 to 4 weeks for recheck of hemoglobin and iron studies to decide whether more supplementation is needed.    Appointments on [place] and/or [facility] (with [facility] or [facility] provider or service). Call [phone] if you haven't heard regarding these appointments within 7 days of discharge.     Activity    Your activity upon discharge: activity as tolerated     Monitor and record    weight every day and if it increases 3-4 lbs over a day or two please call your cardiologist to adjust your diuretics     Diet    Follow this diet upon discharge: Heart healthy diet       Significant Results and Procedures   Most Recent 3 CBC's:  Recent Labs   Lab Test 08/18/20  0602 08/17/20  0555 08/16/20  0920  08/14/20  0447  08/13/20  1006 05/11/20  0753   WBC  --   --   --   --  6.6  --  9.1 9.0   HGB 7.8* 7.6* 7.7*   < > 7.1*   < > 5.9* 12.7*   MCV  --   --   --   --  85  --  87 89   PLT  --   --   --   --  286  --  359 169    < > = values in this interval not displayed.     Most Recent 3 BMP's:  Recent  Labs   Lab Test 08/18/20  0602 08/17/20  0555 08/16/20  1553 08/16/20  0920    140  --  138   POTASSIUM 3.4 4.2 3.6 3.0*   CHLORIDE 104 107  --  104   CO2 27 26  --  30   BUN 57* 59*  --  62*   CR 2.17* 2.29*  --  2.25*   ANIONGAP 8 7  --  5   RONNY 8.5 8.3*  --  8.4*   * 122*  --  114*     Most Recent 3 INR's:  Recent Labs   Lab Test 06/04/20  0732 05/18/20  0728 05/11/20  0753   INR 1.28* 1.31* 1.31*   ,   Results for orders placed or performed during the hospital encounter of 08/13/20   XR Chest Port 1 View    Narrative    EXAM: XR CHEST PORT 1 VW  8/13/2020 10:35 AM     HISTORY:  SOA       COMPARISON:  Chest x-ray 4/21/2020    FINDINGS: Single view of the chest. Stable enlarged cardiac  silhouette. Indistinct pulmonary vasculature. Increased perihilar and  lower lung predominant mixed interstitial and airspace opacities.  Small pleural effusions bilaterally appear similar. No pneumothorax.  No acute osseous or upper abdominal abnormality.      Impression    IMPRESSION:   1. Cardiomegaly with increased interstitial and airspace opacities  favored to represent pulmonary edema, recommend follow-up to clearing  to exclude infection.  2. Unchanged small bilateral pleural effusions.    I have personally reviewed the examination and initial interpretation  and I agree with the findings.    BEATRICE RODRIGUEZ MD       Discharge Medications   Discharge Medication List as of 8/18/2020  2:03 PM      CONTINUE these medications which have CHANGED    Details   potassium chloride ER (KLOR-CON M) 20 MEQ CR tablet Take 1 tablet (20 mEq) by mouth 2 times daily, Disp-360 tablet,R-3, E-Prescribe      torsemide (DEMADEX) 20 MG tablet Take 3 tablets (60 mg) by mouth 2 times daily, Disp-360 tablet,R-4, E-Prescribe         CONTINUE these medications which have NOT CHANGED    Details   ACCU-CHEK GUIDE test strip USE TO TEST BLOOD SUGAR FOUR TIMES A DAY BEFORE MEALS AND AT BEDTIME, Disp-400 each, R-3, E-Prescribe      Alcohol  Swabs PADS 1 applicator 4 times daily (before meals and nightly), Disp-100 each, R-3, E-Prescribe      aspirin (ASA) 81 MG chewable tablet Take 81 mg by mouth daily, Historical      blood glucose monitoring (ACCU-CHEK FASTCLIX) lancets USE TO TEST BLOOD SUGAR FOUR TIMES A DAY AT MEALS AND AT BEDTIME, Disp-400 each, R-3, E-Prescribe      insulin pen needle (32G X 4 MM) 32G X 4 MM miscellaneous Use 5 pen needles daily or as directed.Disp-200 each, [id number]      metolazone (ZAROXOLYN) 2.5 MG tablet One tablet only when directed by your doctor, Disp-15 tablet,R-1, E-Prescribe      nitroGLYcerin (NITROSTAT) 0.4 MG sublingual tablet Place 1 tablet (0.4 mg) under the tongue every 5 minutes as needed for chest pain For chest pain place 1 tablet under the tongue every 5 minutes for 3 doses. If symptoms persist 5 minutes after 1st dose call 911., Disp-25 tablet, R-0, Local Print      senna-docusate (SENOKOT-S/PERICOLACE) 8.6-50 MG tablet Take 1 tablet by mouth 2 times daily as needed for constipation, Historical      Sharps Container MISC 1 Device every 30 days, Disp-1 each, R-3, E-Prescribe      vitamin  B complex with vitamin C (VITAMIN  B COMPLEX) TABS Take 1 tablet by mouth daily, Historical      Vitamin D, Cholecalciferol, 25 MCG (1000 UT) CAPS Take 1 capsule by mouth 2 times daily, Historical           Allergies   Allergies   Allergen Reactions     Hydralazine      Black spots, verified allergic reaction by skin biopsy     Isosorbide      Per pt got a rash all over his body and won't take it no more     Simvastatin Other (See Comments)     Leg muscle weakness     Tylenol [Acetaminophen] Palpitations

## 2020-08-18 NOTE — PLAN OF CARE
Focus: Discharge   Situation: Pt transition to oral diuretic, furosemide 80mg today. His weight has been slowly coming off daily. Weight today 92.1kg. VS stable. Afebrile. Denies feeling SOB. Reviewed discharge instruction with patient regarding his diuretics med and parameter to notify  cardiology with weight increase.   Background: Pt admitted for fluid overload related to heart failure.   Assessment: Pt stated understanding of discharge plan with his diuretic and potassium and clinic follow up  Plan: Discharge to home

## 2020-08-19 NOTE — PROGRESS NOTES
Patient was called three times and no answer so post 24 hr DC follow up calls will be closed out    Home number does not have a machine attached and would continuously ring.   Mobile number is unable to put the call through as it is a restricted line.

## 2020-08-21 NOTE — TELEPHONE ENCOUNTER
I called Cole Ann Marie this morning to follow-up with him post-hospitalization discharged from [facility] 8/18/2020.     Discharged on 60 mg twice daily Torsemide. Home scale 202 on day of discharge and today is 196 lbs. We reviewed that if he continues to have weight loss or has weight gain that he needs to call us.     Medication Review:  I reviewed all his discharge medications with him and he had no questions regarding his medications.     Follow-up appointment review:  I reviewed Cole Jesus follow-up appointments with him and he verbalized understanding. I reviewed the CORE clinic's phone number and to call with any increase of SOB, increased edema or swelling, and weight gain. He verbalizes understanding and agrees with plan of care. Patricia Vo RN

## 2020-08-28 NOTE — PATIENT INSTRUCTIONS
Take your medicines every day, as directed    Changes made today:  o Increase Torsemide to 80 mg twice a day  o Increase Potassium to 40 mg twice a day   Monitor Your Weight and Symptoms    Contact us if you:      Gain 2 pounds in one day or 5 pounds in one week    Feel more short of breath    Notice more leg swelling    Feel lightheadeded   Change your lifestyle    Limit Salt or Sodium:    2000 mg  Limit Fluids:    2000 mL or approximately 64 ounces  Eat a Heart Healthy Diet    Low in saturated fats  Stay Active:    Aim to move at least 150 minutes every  week         To Contact us    During Business Hours:  [phone], option # 1 (University)  Then option # 4 (medical questions)     After hours, weekends or holidays:   [phone], Option #4  Ask to speak to the On-Call Cardiologist. Inform them you are a CORE/heart failure patient at the [place].     Use [employer] allows you to communicate directly with your heart team through secure messaging.    [employer] can be accessed any time on your phone, computer, or tablet.    If you need assistance, we'd be happy to help!         Keep your Heart Appointments:    BMP in 1 week    Next CORE appointment in 2 weeks-Video appointment is okay     In

## 2020-08-28 NOTE — NURSING NOTE
Diet: Patient instructed regarding a heart healthy diet, including discussion of reduced fat and sodium intake. Patient demonstrated understanding of this information and agreed to call with further questions or concerns.  Labs: Patient was given results of the laboratory testing obtained today. Patient was instructed to return for the next laboratory testing in in 1 week. Patient demonstrated understanding of this information and agreed to call with further questions or concerns.   Return Appointment: Patient given instructions regarding scheduling next clinic visit. Patient demonstrated understanding of this information and agreed to call with further questions or concerns.  Medication Change: Patient was educated regarding prescribed medication change, including discussion of the indication, administration, side effects, and when to report to MD or RN. Patient demonstrated understanding of this information and agreed to call with further questions or concerns.  Patient stated he understood all health information given and agreed to call with further questions or concerns.  Diya Mackay RN

## 2020-08-28 NOTE — NURSING NOTE
Chief Complaint   Patient presents with     Follow Up     p core reutrn     Vitals were taken and medications were reconciled.   Emily Case  7:22 AM

## 2020-08-28 NOTE — PROGRESS NOTES
HPI:   60 year old male with history of occluded pRCA s/p balloon angioplasty and stenting in 6/2019, STEMI s/p DESx3 for total occlusion of RCA and Lcx (2012), GIB due to AV malformation (in the setting of warfarin and DAPT) s/p EGD with clip (7/5/2019), ischemic cardiomyopathy, HFrEF (last EF=30-35% 11/12/19), severe pHTN, moderate mitral regurgitation, atrial fibrillation, CKD stage IV who presented for planned RHC, found to be in ambulatory cardiogenic shock.     Recent hospitalization 8/13-8/18-Acute HF exacerbation due to volume overload. He had stopped his meds due to some aches and pains he had in his arms and legs. He said he assumed it was due to the water pill so he stopped it. He was also noted to be anemic during the hospitalization- possible GI bleed.    Patient weighs 202 lbs at home, he says he was down a couple more pounds after the hospital visit but now he has been eating more.When he got home ( from [facility]) he was 202 lbs. He has been working in the yard. He says he can tolerate it. No SOB. He says swelling is so much less, pretty much gone. He wears compression socks. Appetite is good. Sleeping better. He takes 60 mg BID of Torsemide. No metolazone since the hospitalization. He says at the last visit he was in a wheelchair. He says he feels so much better. He says things have completely changed.     Cardiac Medications  ASA 81 mg daily  Torsemide 60 mg BID-  Kcl 20 mEq BID  Metolozaone-   Nitroglycerin    PAST MEDICAL HISTORY:  Past Medical History:   Diagnosis Date     Anemia in chronic renal disease 3/9/2015     Antiplatelet or antithrombotic long-term use      Atrial fibrillation and flutter      CAD (coronary artery disease)     Stemi in 12/11, s/p angioplasty     CRD (chronic renal disease), stage IV (H) 03/09/2015    hx ATN with dialysis complicating cardiogenic shock  1/2012     GI bleed     massive lower GI bleed secondary to a cecal ulcer, s/p ileocecal resection in 12/11     Heart  failure     Biventricular systolic HF, complicated by ARDS requiring tracheostomy     Hyperlipidemia LDL goal <100 2013     Hypertension      Ischemic cardiomyopathy 2013    TTE revealing 40% EF     Myocardial infarction (H)      Other and unspecified nonspecific immunological findings     Anti JKa     Pulmonary edema     episodes of flash pulmonary edema in      Subclinical hypothyroidism        FAMILY HISTORY:  Family History   Problem Relation Age of Onset     Diabetes Mother      Hypertension Mother      Heart Disease Mother      Heart Disease Father          of heart attack, left when he was young     Diabetes Sister      Diabetes Sister      Diabetes Sister      Glaucoma No family hx of      Macular Degeneration No family hx of        SOCIAL HISTORY:  Social History     Socioeconomic History     Marital status: Significant other     Spouse name: Not on file     Number of children: Not on file     Years of education: Not on file     Highest education level: Not on file   Occupational History     Not on file   Social Needs     Financial resource strain: Not on file     Food insecurity:     Worry: Not on file     Inability: Not on file     Transportation needs:     Medical: Not on file     Non-medical: Not on file   Tobacco Use     Smoking status: Former Smoker     Packs/day: 2.00     Years: 40.00     Pack years: 80.00     Types: Cigarettes     Last attempt to quit: 12/3/2011     Years since quittin.2     Smokeless tobacco: Never Used   Substance and Sexual Activity     Alcohol use: No     Alcohol/week: 0.0 standard drinks     Drug use: No     Sexual activity: Yes     Partners: Female   Lifestyle     Physical activity:     Days per week: Not on file     Minutes per session: Not on file     Stress: Not on file   Relationships     Social connections:     Talks on phone: Not on file     Gets together: Not on file     Attends Scientologist service: Not on file     Active member of club or  organization: Not on file     Attends meetings of clubs or organizations: Not on file     Relationship status: Not on file     Intimate partner violence:     Fear of current or ex partner: Not on file     Emotionally abused: Not on file     Physically abused: Not on file     Forced sexual activity: Not on file   Other Topics Concern     Parent/sibling w/ CABG, MI or angioplasty before 65F 55M? Yes      Service Not Asked     Blood Transfusions Not Asked     Caffeine Concern Not Asked     Occupational Exposure Not Asked     Hobby Hazards Not Asked     Sleep Concern Not Asked     Stress Concern Not Asked     Weight Concern Not Asked     Special Diet Not Asked     Back Care Not Asked     Exercise Yes     Comment: limited walking     Bike Helmet Not Asked     Seat Belt Not Asked     Self-Exams Not Asked   Social History Narrative     Not on file       CURRENT MEDICATIONS:  ACCU-CHEK GUIDE test strip, USE TO TEST BLOOD SUGAR FOUR TIMES A DAY BEFORE MEALS AND AT BEDTIME  Alcohol Swabs PADS, 1 applicator 4 times daily (before meals and nightly)  aspirin (ASA) 81 MG chewable tablet, Take 81 mg by mouth daily  blood glucose monitoring (ACCU-CHEK FASTCLIX) lancets, USE TO TEST BLOOD SUGAR FOUR TIMES A DAY AT MEALS AND AT BEDTIME  insulin pen needle (32G X 4 MM) 32G X 4 MM miscellaneous, Use 5 pen needles daily or as directed.  metolazone (ZAROXOLYN) 2.5 MG tablet, One tablet only when directed by your doctor  potassium chloride ER (KLOR-CON M) 20 MEQ CR tablet, Take 1 tablet (20 mEq) by mouth 2 times daily  senna-docusate (SENOKOT-S/PERICOLACE) 8.6-50 MG tablet, Take 1 tablet by mouth 2 times daily as needed for constipation  torsemide (DEMADEX) 20 MG tablet, Take 3 tablets (60 mg) by mouth 2 times daily  vitamin  B complex with vitamin C (VITAMIN  B COMPLEX) TABS, Take 1 tablet by mouth daily  Vitamin D, Cholecalciferol, 25 MCG (1000 UT) CAPS, Take 1 capsule by mouth 2 times daily  nitroGLYcerin (NITROSTAT) 0.4 MG  "sublingual tablet, Place 1 tablet (0.4 mg) under the tongue every 5 minutes as needed for chest pain For chest pain place 1 tablet under the tongue every 5 minutes for 3 doses. If symptoms persist 5 minutes after 1st dose call 911. (Patient not taking: Reported on 6/3/2020)  Sharps Container MISC, 1 Device every 30 days (Patient not taking: Reported on 8/13/2020)    No current facility-administered medications on file prior to visit.   He our visit with the patient    ROS:   Refer to HPI    EXAM:  /78 (BP Location: Right arm, Patient Position: Chair, Cuff Size: Adult Regular)   Pulse 99   Ht 1.727 m (5' 8\")   Wt 93.9 kg (207 lb)   SpO2 100%   BMI 31.47 kg/m    GENERAL: Ill-appearing, he appears weak, very low energy, sitting in a wheelchair, speaking in full sentences and able to communicate all needs.  Difficult time explaining his symptoms and logic to his medicine changes over the last week, this is a change for  HEENT: Eye symmetrical, no discharge or icterus bilaterally. Mucous membranes moist and without lesions.  CV: tachycardic, regular. +S1S2, no murmur, rub, or gallop. JVP not seen at 90 degrees  RESPIRATORY: Respirations regular, even, and unlabored. Lungs CTA throughout.   GI: Soft and non distended with normoactive bowel sounds. No tenderness, rebound, guarding.   EXTREMITIES: 2+ peripheral edema, worse compared to last week.  All extremities are warm and well perfused  NEUROLOGIC: Alert and interacting appropriately. No focal deficits.   MUSCULOSKELETAL: No joint swelling or tenderness.   SKIN: No jaundice. No rashes or lesions.       Labs, reviewed with patient in clinic today:  CBC RESULTS:  Lab Results   Component Value Date    WBC 6.6 08/14/2020    RBC 2.94 (L) 08/14/2020    HGB 7.8 (L) 08/18/2020    HCT 25.0 (L) 08/14/2020    MCV 85 08/14/2020    MCH 24.1 (L) 08/14/2020    MCHC 28.4 (L) 08/14/2020    RDW 17.6 (H) 08/14/2020     08/14/2020       CMP RESULTS:  Lab Results "   Component Value Date     08/18/2020    POTASSIUM 3.4 08/18/2020    CHLORIDE 104 08/18/2020    CO2 27 08/18/2020    ANIONGAP 8 08/18/2020     (H) 08/18/2020    BUN 57 (H) 08/18/2020    CR 2.17 (H) 08/18/2020    GFRESTIMATED 31 (L) 08/18/2020    GFRESTBLACK 36 (L) 08/18/2020    RONNY 8.5 08/18/2020    BILITOTAL 1.8 (H) 08/13/2020    ALBUMIN 2.7 (L) 08/13/2020    ALKPHOS 170 (H) 04/21/2020    ALT 31 04/21/2020    AST 26 04/21/2020        INR RESULTS:  Lab Results   Component Value Date    INR 1.28 (H) 06/04/2020       Lab Results   Component Value Date    MAG 2.5 (H) 08/18/2020     Lab Results   Component Value Date    NTBNPI 5,524 (H) 08/13/2020     Lab Results   Component Value Date    NTBNP 6,974 (H) 07/01/2020       Diagnostics:  EKG 8/13/2020, personally reviewed, normal sinus rhythm with a rate of 84, no blocks, no significant ST elevations or depressions.  No T wave changes.  No peak T waves.  His QTC is 475.  This EKG was obtained for hyperkalemia with a K of 5.9.    ECHO 2/11/2020  Interpretation Summary  Severely (EF 29%) reduced left ventricular function is present. Severe diffuse  hypokinesis is present.  Global right ventricular function is moderately reduced.  Moderate mitral insufficiency is present.  Mild to moderate tricuspid insufficiency is present.  Pulmonary hypertension present. Estimated pulmonary artery systolic pressure  is 69 (54+15) mmHg .  Dilation of the inferior vena cava is present with abnormal respiratory  variation in diameter.  Small posterior pericardial effusion without echocardiographic evidence of  Tamponade.    2/10/2020[facility]  RA --/24/22  /20  /50/65  PCWP --/51/40    DONOVAN 3.5/1.6  TD 3.8/1.8    PVR 8.5    Assessment and Plan:   60 year old male with history of occluded pRCA s/p balloon angioplasty and stenting in 6/2019, STEMI s/p DESx3 for total occlusion of RCA and Lcx (2012), GIB due to AV malformation (in the setting of warfarin and DAPT) s/p EGD with  clip (7/5/2019), ischemic cardiomyopathy, HFrEF (last EF=30-35% 11/12/19), severe pHTN, moderate mitral regurgitation, atrial fibrillation, CKD stage IV who presents for follow-up. Presents for CORE follow-up.      # Chronic systolic heart failure/HFrEF secondary to ICM    Stage C. NYHA Class III.    Fluid status: hypervolemic  ACEi/ARB/ARNI: contraindicated due to renal dysfunction/hyperkalemia. Allergy to hydralazine and isosorbid.  BB: Stopped given decompensated heart failure, he has not tolerated multiple trials to restart, even when quite dry  Aldosterone antagonist: contraindicated due to renal dysfunction  SCD prophylaxis: has declined on multiple conversations  NSAID use: contraindicated  Sleep apnea evaluation: will discuss at future appointment  Remote monitoring: N/A    # Potassium of 3.8- has had history of hyperkalemia     #STEMI s/p DESx3 (2012)  #CAD s/p PCI proximal RCA (6/2019)  - CAD stable, no symptoms. Continue plavix and aspirin. Statin intolerance.     #Paroxysmal Afib   CHADSVASC 3 (HF, DM, CAD). Not on a/c due to history of GI bleed. Not willing to retrial. No symptoms of afib today.    - Stopped at last visit metoprolol  given decompensated heart failure     #CKD IV  Baseline serum creatinine around 2.4-2.7, 2.10 today,      # H/o GIB  - No symptoms today    # GOC. At a prior visit We did have a short goals of care conversation. He continues to confirm that an ICD and advanced therapies are not within his goals of care.     Plan-   Torsemide 80 mg BID   Potassium 40 mEq BID   BMP in one week  CORE - virtual 2 weeks       Dian HAN, CANDIE  [facility]    CC  SELF, REFERRED

## 2020-08-28 NOTE — LETTER
8/28/2020      RE: Cole Jesus  [address]  [place] [place] [id number]       Dear Colleague,    Thank you for the opportunity to participate in the care of your patient, Cole Jesus, at the [facility]. Please see a copy of my visit note below.        HPI:   60 year old male with history of occluded pRCA s/p balloon angioplasty and stenting in 6/2019, STEMI s/p DESx3 for total occlusion of RCA and Lcx (2012), GIB due to AV malformation (in the setting of warfarin and DAPT) s/p EGD with clip (7/5/2019), ischemic cardiomyopathy, HFrEF (last EF=30-35% 11/12/19), severe pHTN, moderate mitral regurgitation, atrial fibrillation, CKD stage IV who presented for planned RHC, found to be in ambulatory cardiogenic shock.     Recent hospitalization 8/13-8/18-Acute HF exacerbation due to volume overload. He had stopped his meds due to some aches and pains he had in his arms and legs. He said he assumed it was due to the water pill so he stopped it. He was also noted to be anemic during the hospitalization- possible GI bleed.    Patient weighs 202 lbs at home, he says he was down a couple more pounds after the hospital visit but now he has been eating more.When he got home ( from [facility]) he was 202 lbs. He has been working in the yard. He says he can tolerate it. No SOB. He says swelling is so much less, pretty much gone. He wears compression socks. Appetite is good. Sleeping better. He takes 60 mg BID of Torsemide. No metolazone since the hospitalization. He says at the last visit he was in a wheelchair. He says he feels so much better. He says things have completely changed.     Cardiac Medications  ASA 81 mg daily  Torsemide 60 mg BID-  Kcl 20 mEq BID  Metolozaone-   Nitroglycerin    PAST MEDICAL HISTORY:  Past Medical History:   Diagnosis Date     Anemia in chronic renal disease 3/9/2015     Antiplatelet or antithrombotic long-term use      Atrial  fibrillation and flutter      CAD (coronary artery disease)     Stemi in , s/p angioplasty     CRD (chronic renal disease), stage IV (H) 2015    hx ATN with dialysis complicating cardiogenic shock  2012     GI bleed     massive lower GI bleed secondary to a cecal ulcer, s/p ileocecal resection in      Heart failure     Biventricular systolic HF, complicated by ARDS requiring tracheostomy     Hyperlipidemia LDL goal <100 2013     Hypertension      Ischemic cardiomyopathy 2013    TTE revealing 40% EF     Myocardial infarction (H)      Other and unspecified nonspecific immunological findings     Anti JKa     Pulmonary edema     episodes of flash pulmonary edema in      Subclinical hypothyroidism        FAMILY HISTORY:  Family History   Problem Relation Age of Onset     Diabetes Mother      Hypertension Mother      Heart Disease Mother      Heart Disease Father          of heart attack, left when he was young     Diabetes Sister      Diabetes Sister      Diabetes Sister      Glaucoma No family hx of      Macular Degeneration No family hx of        SOCIAL HISTORY:  Social History     Socioeconomic History     Marital status: Significant other     Spouse name: Not on file     Number of children: Not on file     Years of education: Not on file     Highest education level: Not on file   Occupational History     Not on file   Social Needs     Financial resource strain: Not on file     Food insecurity:     Worry: Not on file     Inability: Not on file     Transportation needs:     Medical: Not on file     Non-medical: Not on file   Tobacco Use     Smoking status: Former Smoker     Packs/day: 2.00     Years: 40.00     Pack years: 80.00     Types: Cigarettes     Last attempt to quit: 12/3/2011     Years since quittin.2     Smokeless tobacco: Never Used   Substance and Sexual Activity     Alcohol use: No     Alcohol/week: 0.0 standard drinks     Drug use: No     Sexual activity: Yes      Partners: Female   Lifestyle     Physical activity:     Days per week: Not on file     Minutes per session: Not on file     Stress: Not on file   Relationships     Social connections:     Talks on phone: Not on file     Gets together: Not on file     Attends Gnosticism service: Not on file     Active member of club or organization: Not on file     Attends meetings of clubs or organizations: Not on file     Relationship status: Not on file     Intimate partner violence:     Fear of current or ex partner: Not on file     Emotionally abused: Not on file     Physically abused: Not on file     Forced sexual activity: Not on file   Other Topics Concern     Parent/sibling w/ CABG, MI or angioplasty before 65F 55M? Yes      Service Not Asked     Blood Transfusions Not Asked     Caffeine Concern Not Asked     Occupational Exposure Not Asked     Hobby Hazards Not Asked     Sleep Concern Not Asked     Stress Concern Not Asked     Weight Concern Not Asked     Special Diet Not Asked     Back Care Not Asked     Exercise Yes     Comment: limited walking     Bike Helmet Not Asked     Seat Belt Not Asked     Self-Exams Not Asked   Social History Narrative     Not on file       CURRENT MEDICATIONS:  ACCU-CHEK GUIDE test strip, USE TO TEST BLOOD SUGAR FOUR TIMES A DAY BEFORE MEALS AND AT BEDTIME  Alcohol Swabs PADS, 1 applicator 4 times daily (before meals and nightly)  aspirin (ASA) 81 MG chewable tablet, Take 81 mg by mouth daily  blood glucose monitoring (ACCU-CHEK FASTCLIX) lancets, USE TO TEST BLOOD SUGAR FOUR TIMES A DAY AT MEALS AND AT BEDTIME  insulin pen needle (32G X 4 MM) 32G X 4 MM miscellaneous, Use 5 pen needles daily or as directed.  metolazone (ZAROXOLYN) 2.5 MG tablet, One tablet only when directed by your doctor  potassium chloride ER (KLOR-CON M) 20 MEQ CR tablet, Take 1 tablet (20 mEq) by mouth 2 times daily  senna-docusate (SENOKOT-S/PERICOLACE) 8.6-50 MG tablet, Take 1 tablet by mouth 2 times daily as  "needed for constipation  torsemide (DEMADEX) 20 MG tablet, Take 3 tablets (60 mg) by mouth 2 times daily  vitamin  B complex with vitamin C (VITAMIN  B COMPLEX) TABS, Take 1 tablet by mouth daily  Vitamin D, Cholecalciferol, 25 MCG (1000 UT) CAPS, Take 1 capsule by mouth 2 times daily  nitroGLYcerin (NITROSTAT) 0.4 MG sublingual tablet, Place 1 tablet (0.4 mg) under the tongue every 5 minutes as needed for chest pain For chest pain place 1 tablet under the tongue every 5 minutes for 3 doses. If symptoms persist 5 minutes after 1st dose call 911. (Patient not taking: Reported on 6/3/2020)  Sharps Container MISC, 1 Device every 30 days (Patient not taking: Reported on 8/13/2020)    No current facility-administered medications on file prior to visit.   He our visit with the patient    ROS:   Refer to HPI    EXAM:  /78 (BP Location: Right arm, Patient Position: Chair, Cuff Size: Adult Regular)   Pulse 99   Ht 1.727 m (5' 8\")   Wt 93.9 kg (207 lb)   SpO2 100%   BMI 31.47 kg/m    GENERAL: Ill-appearing, he appears weak, very low energy, sitting in a wheelchair, speaking in full sentences and able to communicate all needs.  Difficult time explaining his symptoms and logic to his medicine changes over the last week, this is a change for  HEENT: Eye symmetrical, no discharge or icterus bilaterally. Mucous membranes moist and without lesions.  CV: tachycardic, regular. +S1S2, no murmur, rub, or gallop. JVP not seen at 90 degrees  RESPIRATORY: Respirations regular, even, and unlabored. Lungs CTA throughout.   GI: Soft and non distended with normoactive bowel sounds. No tenderness, rebound, guarding.   EXTREMITIES: 2+ peripheral edema, worse compared to last week.  All extremities are warm and well perfused  NEUROLOGIC: Alert and interacting appropriately. No focal deficits.   MUSCULOSKELETAL: No joint swelling or tenderness.   SKIN: No jaundice. No rashes or lesions.       Labs, reviewed with patient in clinic " today:  CBC RESULTS:  Lab Results   Component Value Date    WBC 6.6 08/14/2020    RBC 2.94 (L) 08/14/2020    HGB 7.8 (L) 08/18/2020    HCT 25.0 (L) 08/14/2020    MCV 85 08/14/2020    MCH 24.1 (L) 08/14/2020    MCHC 28.4 (L) 08/14/2020    RDW 17.6 (H) 08/14/2020     08/14/2020       CMP RESULTS:  Lab Results   Component Value Date     08/18/2020    POTASSIUM 3.4 08/18/2020    CHLORIDE 104 08/18/2020    CO2 27 08/18/2020    ANIONGAP 8 08/18/2020     (H) 08/18/2020    BUN 57 (H) 08/18/2020    CR 2.17 (H) 08/18/2020    GFRESTIMATED 31 (L) 08/18/2020    GFRESTBLACK 36 (L) 08/18/2020    RONNY 8.5 08/18/2020    BILITOTAL 1.8 (H) 08/13/2020    ALBUMIN 2.7 (L) 08/13/2020    ALKPHOS 170 (H) 04/21/2020    ALT 31 04/21/2020    AST 26 04/21/2020        INR RESULTS:  Lab Results   Component Value Date    INR 1.28 (H) 06/04/2020       Lab Results   Component Value Date    MAG 2.5 (H) 08/18/2020     Lab Results   Component Value Date    NTBNPI 5,524 (H) 08/13/2020     Lab Results   Component Value Date    NTBNP 6,974 (H) 07/01/2020       Diagnostics:  EKG 8/13/2020, personally reviewed, normal sinus rhythm with a rate of 84, no blocks, no significant ST elevations or depressions.  No T wave changes.  No peak T waves.  His QTC is 475.  This EKG was obtained for hyperkalemia with a K of 5.9.    ECHO 2/11/2020  Interpretation Summary  Severely (EF 29%) reduced left ventricular function is present. Severe diffuse  hypokinesis is present.  Global right ventricular function is moderately reduced.  Moderate mitral insufficiency is present.  Mild to moderate tricuspid insufficiency is present.  Pulmonary hypertension present. Estimated pulmonary artery systolic pressure  is 69 (54+15) mmHg .  Dilation of the inferior vena cava is present with abnormal respiratory  variation in diameter.  Small posterior pericardial effusion without echocardiographic evidence of  Tamponade.    2/10/2020[facility]  RA --/24/22  /20  PA  100/50/65  PCWP --/51/40    DONOVAN 3.5/1.6  TD 3.8/1.8    PVR 8.5    Assessment and Plan:   60 year old male with history of occluded pRCA s/p balloon angioplasty and stenting in 6/2019, STEMI s/p DESx3 for total occlusion of RCA and Lcx (2012), GIB due to AV malformation (in the setting of warfarin and DAPT) s/p EGD with clip (7/5/2019), ischemic cardiomyopathy, HFrEF (last EF=30-35% 11/12/19), severe pHTN, moderate mitral regurgitation, atrial fibrillation, CKD stage IV who presents for follow-up. Presents for CORE follow-up.      # Chronic systolic heart failure/HFrEF secondary to ICM    Stage C. NYHA Class III.    Fluid status: hypervolemic  ACEi/ARB/ARNI: contraindicated due to renal dysfunction/hyperkalemia. Allergy to hydralazine and isosorbid.  BB: Stopped given decompensated heart failure, he has not tolerated multiple trials to restart, even when quite dry  Aldosterone antagonist: contraindicated due to renal dysfunction  SCD prophylaxis: has declined on multiple conversations  NSAID use: contraindicated  Sleep apnea evaluation: will discuss at future appointment  Remote monitoring: N/A    # Potassium of 3.8- has had history of hyperkalemia     #STEMI s/p DESx3 (2012)  #CAD s/p PCI proximal RCA (6/2019)  - CAD stable, no symptoms. Continue plavix and aspirin. Statin intolerance.     #Paroxysmal Afib   CHADSVASC 3 (HF, DM, CAD). Not on a/c due to history of GI bleed. Not willing to retrial. No symptoms of afib today.    - Stopped at last visit metoprolol  given decompensated heart failure     #CKD IV  Baseline serum creatinine around 2.4-2.7, 2.10 today,      # H/o GIB  - No symptoms today    # GOC. At a prior visit We did have a short goals of care conversation. He continues to confirm that an ICD and advanced therapies are not within his goals of care.     Plan-   Torsemide 80 mg BID   Potassium 40 mEq BID   BMP in one week  CORE - virtual 2 weeks     Dian Leblanc APRN, CNP  [facility]      Please do  not hesitate to contact me if you have any questions/concerns.     Sincerely,     GUILLE Hansen CNP

## 2020-09-08 NOTE — TELEPHONE ENCOUNTER
Called pt to review lab results and assess wt and symptoms since increasing diuretic.  Pt states his weight today is 208lbs. Denies SOB and swelling. Feeling better. States he did urinate a lot with the increased diuretic. Reminded him of next video appt on 9/15.    Diya Mackay RN

## 2020-09-15 NOTE — PROGRESS NOTES
"Cole Jesus is a 60 year old male who is being evaluated via a billable telephone visit.      The patient has been notified of following:     \"This telephone visit will be conducted via a call between you and your physician/provider. We have found that certain health care needs can be provided without the need for a physical exam.  This service lets us provide the care you need with a short phone conversation.  If a prescription is necessary we can send it directly to your pharmacy.  If lab work is needed we can place an order for that and you can then stop by our lab to have the test done at a later time.    If during the course of the call the physician/provider feels a telephone visit is not appropriate, you will not be charged for this service.\"    Patient has given verbal consent for Telephone visit?  Yes      Total time of telephone visit is 30 minutes    What phone number would you like to be contacted at? [phone]    How would you like to obtain your AVS? Mail a copy        "

## 2020-09-15 NOTE — LETTER
"9/15/2020      RE: Cole Jesus  [address]  [facility] [address]       Dear Colleague,    Thank you for the opportunity to participate in the care of your patient, Cole Jesus, at the [facility]. Please see a copy of my visit note below.    Cole Jesus is a 60 year old male who is being evaluated via a billable telephone visit.      The patient has been notified of following:     \"This telephone visit will be conducted via a call between you and your physician/provider. We have found that certain health care needs can be provided without the need for a physical exam.  This service lets us provide the care you need with a short phone conversation.  If a prescription is necessary we can send it directly to your pharmacy.  If lab work is needed we can place an order for that and you can then stop by our lab to have the test done at a later time.    If during the course of the call the physician/provider feels a telephone visit is not appropriate, you will not be charged for this service.\"    Patient has given verbal consent for Telephone visit?  Yes      Total time of telephone visit is 30 minutes    What phone number would you like to be contacted at? [phone]    How would you like to obtain your AVS? Mail a copy          Additional note opened in error.     HPI: 60 yr old male patient called for follow up of  HFrEF. He was last seen by Dian Leblanc NP on 8/28/29 . His wt is up from 208 - 217. He feels like he is retaining fluid, but denies SOB, orthopnea, LE edema. He does think his belly is more bloated. He states he is taking his medications as directed and is on Torsemide 80mg BID.     PAST MEDICAL HISTORY:  Past Medical History:   Diagnosis Date     Anemia in chronic renal disease 3/9/2015     Antiplatelet or antithrombotic long-term use      Atrial fibrillation and flutter      CAD (coronary artery disease)     Stemi in 12/11, " s/p angioplasty     CRD (chronic renal disease), stage IV (H) 2015    hx ATN with dialysis complicating cardiogenic shock  2012     GI bleed     massive lower GI bleed secondary to a cecal ulcer, s/p ileocecal resection in      Heart failure     Biventricular systolic HF, complicated by ARDS requiring tracheostomy     Hyperlipidemia LDL goal <100 2013     Hypertension      Ischemic cardiomyopathy 2013    TTE revealing 40% EF     Myocardial infarction (H)      Other and unspecified nonspecific immunological findings     Anti JKa     Pulmonary edema     episodes of flash pulmonary edema in      Subclinical hypothyroidism        FAMILY HISTORY:  Family History   Problem Relation Age of Onset     Diabetes Mother      Hypertension Mother      Heart Disease Mother      Heart Disease Father          of heart attack, left when he was young     Diabetes Sister      Diabetes Sister      Diabetes Sister      Glaucoma No family hx of      Macular Degeneration No family hx of        SOCIAL HISTORY:  Social History     Socioeconomic History     Marital status: Significant other     Spouse name: Not on file     Number of children: Not on file     Years of education: Not on file     Highest education level: Not on file   Occupational History     Not on file   Social Needs     Financial resource strain: Not on file     Food insecurity     Worry: Not on file     Inability: Not on file     Transportation needs     Medical: Not on file     Non-medical: Not on file   Tobacco Use     Smoking status: Former Smoker     Packs/day: 2.00     Years: 40.00     Pack years: 80.00     Types: Cigarettes     Last attempt to quit: 12/3/2011     Years since quittin.7     Smokeless tobacco: Never Used   Substance and Sexual Activity     Alcohol use: No     Alcohol/week: 0.0 standard drinks     Drug use: No     Sexual activity: Yes     Partners: Female   Lifestyle     Physical activity     Days per week: Not on file      Minutes per session: Not on file     Stress: Not on file   Relationships     Social connections     Talks on phone: Not on file     Gets together: Not on file     Attends Evangelical service: Not on file     Active member of club or organization: Not on file     Attends meetings of clubs or organizations: Not on file     Relationship status: Not on file     Intimate partner violence     Fear of current or ex partner: Not on file     Emotionally abused: Not on file     Physically abused: Not on file     Forced sexual activity: Not on file   Other Topics Concern     Parent/sibling w/ CABG, MI or angioplasty before 65F 55M? Yes      Service Not Asked     Blood Transfusions Not Asked     Caffeine Concern Not Asked     Occupational Exposure Not Asked     Hobby Hazards Not Asked     Sleep Concern Not Asked     Stress Concern Not Asked     Weight Concern Not Asked     Special Diet Not Asked     Back Care Not Asked     Exercise Yes     Comment: limited walking     Bike Helmet Not Asked     Seat Belt Not Asked     Self-Exams Not Asked   Social History Narrative     Not on file       CURRENT MEDICATIONS:  Current Outpatient Medications   Medication Sig Dispense Refill     ACCU-CHEK GUIDE test strip USE TO TEST BLOOD SUGAR FOUR TIMES A DAY BEFORE MEALS AND AT BEDTIME 400 each 3     Alcohol Swabs PADS 1 applicator 4 times daily (before meals and nightly) 100 each 3     aspirin (ASA) 81 MG chewable tablet Take 81 mg by mouth daily       blood glucose monitoring (ACCU-CHEK FASTCLIX) lancets USE TO TEST BLOOD SUGAR FOUR TIMES A DAY AT MEALS AND AT BEDTIME 400 each 3     insulin pen needle (32G X 4 MM) 32G X 4 MM miscellaneous Use 5 pen needles daily or as directed. 200 each 3     metolazone (ZAROXOLYN) 2.5 MG tablet One tablet only when directed by your doctor 15 tablet 1     potassium chloride ER (KLOR-CON M) 20 MEQ CR tablet Take 2 tablets (40 mEq) by mouth 2 times daily 360 tablet 3     senna-docusate  (SENOKOT-S/PERICOLACE) 8.6-50 MG tablet Take 1 tablet by mouth 2 times daily as needed for constipation       torsemide (DEMADEX) 20 MG tablet Take 4 tablets (80 mg) by mouth 2 times daily 360 tablet 4     vitamin  B complex with vitamin C (VITAMIN  B COMPLEX) TABS Take 1 tablet by mouth daily       Vitamin D, Cholecalciferol, 25 MCG (1000 UT) CAPS Take 1 capsule by mouth 2 times daily       nitroGLYcerin (NITROSTAT) 0.4 MG sublingual tablet Place 1 tablet (0.4 mg) under the tongue every 5 minutes as needed for chest pain For chest pain place 1 tablet under the tongue every 5 minutes for 3 doses. If symptoms persist 5 minutes after 1st dose call 911. (Patient not taking: Reported on 6/3/2020) 25 tablet 0     Sharps Container MISC 1 Device every 30 days (Patient not taking: Reported on 8/13/2020) 1 each 3       ROS:   Constitutional: No fever, chills, or sweats. No weight gain/loss.   ENT: No visual disturbance, ear ache, epistaxis, sore throat.   Allergies/Immunologic: Negative.   Respiratory: No cough, hemoptysis.   Cardiovascular: As per HPI.   GI: No nausea, vomiting, hematemesis, melena, or hematochezia.   : No urinary frequency, dysuria, or hematuria.   Integument: Negative.   Psychiatric: Negative.   Neuro: Negative.   Endocrinology: Negative.   Musculoskeletal: Negative.    EXAM:  Pt is alert and oriented over the phone conversation. He does not sound SOB with normal conversation.     Labs:  CBC RESULTS:  Lab Results   Component Value Date    WBC 6.6 08/14/2020    RBC 2.94 (L) 08/14/2020    HGB 7.8 (L) 08/18/2020    HCT 25.0 (L) 08/14/2020    MCV 85 08/14/2020    MCH 24.1 (L) 08/14/2020    MCHC 28.4 (L) 08/14/2020    RDW 17.6 (H) 08/14/2020     08/14/2020       CMP RESULTS:  Lab Results   Component Value Date     09/04/2020    POTASSIUM 4.5 09/04/2020    CHLORIDE 107 09/04/2020    CO2 27 09/04/2020    ANIONGAP 6 09/04/2020     (H) 09/04/2020    BUN 61 (H) 09/04/2020    CR 2.33 (H)  09/04/2020    GFRESTIMATED 29 (L) 09/04/2020    GFRESTBLACK 33 (L) 09/04/2020    RONNY 8.4 (L) 09/04/2020    BILITOTAL 1.8 (H) 08/13/2020    ALBUMIN 2.7 (L) 08/13/2020    ALKPHOS 170 (H) 04/21/2020    ALT 31 04/21/2020    AST 26 04/21/2020        INR RESULTS:  Lab Results   Component Value Date    INR 1.28 (H) 06/04/2020       Lab Results   Component Value Date    MAG 2.6 (H) 08/28/2020     Lab Results   Component Value Date    NTBNPI 5,524 (H) 08/13/2020     Lab Results   Component Value Date    NTBNP 6,974 (H) 07/01/2020       Assessment and Plan:   1. Chronic systolic heart failure secondary to ischemic cardiomyopathy..     Stage C  NYHA Class III    Pt has been intolerant of all the guideline directed medications to treat his heart failure.  ACEi/ARB no  BB no  Aldosterone antagonist no  SCD prophylaxis patient declined  % BiV pacing: N/A  Fluid status hypervolemic based on wt gain - will add metolozone 2.5mg today, pt also instructed to take an extra potassium today.    2. CAD: occluded pRCA s/p balloon angioplasty and stenting in 6/2019, STEMI s/p DESx3 for total occlusion of RCA and Lcx (2012); no anginal sx - on ASA / plavix - he is BB/ and statin intolerant    3. Paroxysmal Afib   CHADSVASC 3 (HF, DM, CAD). Not on a/c due to history of GI bleed. Not willing to retrial. No symptoms of afib today.         4.CKD- stage  IV  Baseline serum creatinine around 2.4-2.7,- given increase in diuretics, will check labs in 1 week if he continues on metolazone daily.      Follow-up 2 weeks    CC  Patient Care Team:  Silas Enciso MD as PCP - Dianna Montero APRN CNS as Registered Nurse (Clinical Nurse Specialist)  Alen Trujillo MD as MD (Cardiology)  Coordinator,  Bmt Nurse as Nurse Coordinator (Cardiology)  Kavita Chapman LPN as LPBELLE  Aamot, Zach FAIR RN as Specialty Care Coordinator (Cardiology)  Smiley Argueta PA-C as Physician Assistant (Physician Assistant)  Silas Enciso  MD Jono as Assigned PCP  Patricia Vo, RN as Specialty Care Coordinator (Cardiology)  Ena Kruger PA-C as Cardiologist (Physician Assistant)  Diya Mackay, RN as Specialty Care Coordinator (Cardiology)  CHAIM ANDERSON      Please do not hesitate to contact me if you have any questions/concerns.     Sincerely,     GUILLE Walton CNP

## 2020-09-16 NOTE — TELEPHONE ENCOUNTER
"Called Cole this afternoon to follow-up with him regarding his video visit with Evelin Lancaster NP yesterday and plan of care.    Cole was instructed last night to take a metolazone 2.5mg.  He reports he did and as a result he's lost about 4lbs.  States his weight today is 213lbs.  States he \"was in the bathroom a lot\" and that his breathing feels much better.   Reports he's still taking torsemide 80mg BID.  We agreed I'll speak with Evelin and will get back to him with continued plan of care.       1601: Discussed with Evelin Lancaster and called Cole back.  Cole verbalizes understanding and agrees with plan of care. Livia Joe RN      Date: 9/16/2020  Time of Call: 4:01 PM  Diagnosis:  HF  VORB - Ordering provider: Evelin Lancaster NP  Order: Take another metolazone 2.5mg tonight  Order received by: Livia Joe RN       Will check in tomorrow with updated plan of care.          "

## 2020-09-16 NOTE — PROGRESS NOTES
HPI: 60 yr old male patient called for follow up of  HFrEF. He was last seen by Dian Leblanc NP on 29 . His wt is up from 208 - 217. He feels like he is retaining fluid, but denies SOB, orthopnea, LE edema. He does think his belly is more bloated. He states he is taking his medications as directed and is on Torsemide 80mg BID.     PAST MEDICAL HISTORY:  Past Medical History:   Diagnosis Date     Anemia in chronic renal disease 3/9/2015     Antiplatelet or antithrombotic long-term use      Atrial fibrillation and flutter      CAD (coronary artery disease)     Stemi in , s/p angioplasty     CRD (chronic renal disease), stage IV (H) 2015    hx ATN with dialysis complicating cardiogenic shock  2012     GI bleed     massive lower GI bleed secondary to a cecal ulcer, s/p ileocecal resection in      Heart failure     Biventricular systolic HF, complicated by ARDS requiring tracheostomy     Hyperlipidemia LDL goal <100 2013     Hypertension      Ischemic cardiomyopathy 2013    TTE revealing 40% EF     Myocardial infarction (H)      Other and unspecified nonspecific immunological findings     Anti JKa     Pulmonary edema     episodes of flash pulmonary edema in      Subclinical hypothyroidism        FAMILY HISTORY:  Family History   Problem Relation Age of Onset     Diabetes Mother      Hypertension Mother      Heart Disease Mother      Heart Disease Father          of heart attack, left when he was young     Diabetes Sister      Diabetes Sister      Diabetes Sister      Glaucoma No family hx of      Macular Degeneration No family hx of        SOCIAL HISTORY:  Social History     Socioeconomic History     Marital status: Significant other     Spouse name: Not on file     Number of children: Not on file     Years of education: Not on file     Highest education level: Not on file   Occupational History     Not on file   Social Needs     Financial resource strain: Not on file     Food  insecurity     Worry: Not on file     Inability: Not on file     Transportation needs     Medical: Not on file     Non-medical: Not on file   Tobacco Use     Smoking status: Former Smoker     Packs/day: 2.00     Years: 40.00     Pack years: 80.00     Types: Cigarettes     Last attempt to quit: 12/3/2011     Years since quittin.7     Smokeless tobacco: Never Used   Substance and Sexual Activity     Alcohol use: No     Alcohol/week: 0.0 standard drinks     Drug use: No     Sexual activity: Yes     Partners: Female   Lifestyle     Physical activity     Days per week: Not on file     Minutes per session: Not on file     Stress: Not on file   Relationships     Social connections     Talks on phone: Not on file     Gets together: Not on file     Attends Judaism service: Not on file     Active member of club or organization: Not on file     Attends meetings of clubs or organizations: Not on file     Relationship status: Not on file     Intimate partner violence     Fear of current or ex partner: Not on file     Emotionally abused: Not on file     Physically abused: Not on file     Forced sexual activity: Not on file   Other Topics Concern     Parent/sibling w/ CABG, MI or angioplasty before 65F 55M? Yes      Service Not Asked     Blood Transfusions Not Asked     Caffeine Concern Not Asked     Occupational Exposure Not Asked     Hobby Hazards Not Asked     Sleep Concern Not Asked     Stress Concern Not Asked     Weight Concern Not Asked     Special Diet Not Asked     Back Care Not Asked     Exercise Yes     Comment: limited walking     Bike Helmet Not Asked     Seat Belt Not Asked     Self-Exams Not Asked   Social History Narrative     Not on file       CURRENT MEDICATIONS:  Current Outpatient Medications   Medication Sig Dispense Refill     ACCU-CHEK GUIDE test strip USE TO TEST BLOOD SUGAR FOUR TIMES A DAY BEFORE MEALS AND AT BEDTIME 400 each 3     Alcohol Swabs PADS 1 applicator 4 times daily (before meals  and nightly) 100 each 3     aspirin (ASA) 81 MG chewable tablet Take 81 mg by mouth daily       blood glucose monitoring (ACCU-CHEK FASTCLIX) lancets USE TO TEST BLOOD SUGAR FOUR TIMES A DAY AT MEALS AND AT BEDTIME 400 each 3     insulin pen needle (32G X 4 MM) 32G X 4 MM miscellaneous Use 5 pen needles daily or as directed. 200 each 3     metolazone (ZAROXOLYN) 2.5 MG tablet One tablet only when directed by your doctor 15 tablet 1     potassium chloride ER (KLOR-CON M) 20 MEQ CR tablet Take 2 tablets (40 mEq) by mouth 2 times daily 360 tablet 3     senna-docusate (SENOKOT-S/PERICOLACE) 8.6-50 MG tablet Take 1 tablet by mouth 2 times daily as needed for constipation       torsemide (DEMADEX) 20 MG tablet Take 4 tablets (80 mg) by mouth 2 times daily 360 tablet 4     vitamin  B complex with vitamin C (VITAMIN  B COMPLEX) TABS Take 1 tablet by mouth daily       Vitamin D, Cholecalciferol, 25 MCG (1000 UT) CAPS Take 1 capsule by mouth 2 times daily       nitroGLYcerin (NITROSTAT) 0.4 MG sublingual tablet Place 1 tablet (0.4 mg) under the tongue every 5 minutes as needed for chest pain For chest pain place 1 tablet under the tongue every 5 minutes for 3 doses. If symptoms persist 5 minutes after 1st dose call 911. (Patient not taking: Reported on 6/3/2020) 25 tablet 0     Sharps Container MISC 1 Device every 30 days (Patient not taking: Reported on 8/13/2020) 1 each 3       ROS:   Constitutional: No fever, chills, or sweats. No weight gain/loss.   ENT: No visual disturbance, ear ache, epistaxis, sore throat.   Allergies/Immunologic: Negative.   Respiratory: No cough, hemoptysis.   Cardiovascular: As per HPI.   GI: No nausea, vomiting, hematemesis, melena, or hematochezia.   : No urinary frequency, dysuria, or hematuria.   Integument: Negative.   Psychiatric: Negative.   Neuro: Negative.   Endocrinology: Negative.   Musculoskeletal: Negative.    EXAM:  Pt is alert and oriented over the phone conversation. He does not  sound SOB with normal conversation.     Labs:  CBC RESULTS:  Lab Results   Component Value Date    WBC 6.6 08/14/2020    RBC 2.94 (L) 08/14/2020    HGB 7.8 (L) 08/18/2020    HCT 25.0 (L) 08/14/2020    MCV 85 08/14/2020    MCH 24.1 (L) 08/14/2020    MCHC 28.4 (L) 08/14/2020    RDW 17.6 (H) 08/14/2020     08/14/2020       CMP RESULTS:  Lab Results   Component Value Date     09/04/2020    POTASSIUM 4.5 09/04/2020    CHLORIDE 107 09/04/2020    CO2 27 09/04/2020    ANIONGAP 6 09/04/2020     (H) 09/04/2020    BUN 61 (H) 09/04/2020    CR 2.33 (H) 09/04/2020    GFRESTIMATED 29 (L) 09/04/2020    GFRESTBLACK 33 (L) 09/04/2020    RNONY 8.4 (L) 09/04/2020    BILITOTAL 1.8 (H) 08/13/2020    ALBUMIN 2.7 (L) 08/13/2020    ALKPHOS 170 (H) 04/21/2020    ALT 31 04/21/2020    AST 26 04/21/2020        INR RESULTS:  Lab Results   Component Value Date    INR 1.28 (H) 06/04/2020       Lab Results   Component Value Date    MAG 2.6 (H) 08/28/2020     Lab Results   Component Value Date    NTBNPI 5,524 (H) 08/13/2020     Lab Results   Component Value Date    NTBNP 6,974 (H) 07/01/2020       Assessment and Plan:   1. Chronic systolic heart failure secondary to ischemic cardiomyopathy..     Stage C  NYHA Class III    Pt has been intolerant of all the guideline directed medications to treat his heart failure.  ACEi/ARB no  BB no  Aldosterone antagonist no  SCD prophylaxis patient declined  % BiV pacing: N/A  Fluid status hypervolemic based on wt gain - will add metolozone 2.5mg today, pt also instructed to take an extra potassium today.    2. CAD: occluded pRCA s/p balloon angioplasty and stenting in 6/2019, STEMI s/p DESx3 for total occlusion of RCA and Lcx (2012); no anginal sx - on ASA / plavix - he is BB/ and statin intolerant    3. Paroxysmal Afib   CHADSVASC 3 (HF, DM, CAD). Not on a/c due to history of GI bleed. Not willing to retrial. No symptoms of afib today.         4.CKD- stage  IV  Baseline serum creatinine around  2.4-2.7,- given increase in diuretics, will check labs in 1 week if he continues on metolazone daily.      Follow-up 2 weeks    CC  Patient Care Team:  Silas Enciso MD as PCP - General  Dianna Davis APRN CNS as Registered Nurse (Clinical Nurse Specialist)  Alen Trujillo MD as MD (Cardiology)  Coordinator,  Bmt Nurse as Nurse Coordinator (Cardiology)  Kavita Chapman LPN as LPN  Zach Phelan RN as Specialty Care Coordinator (Cardiology)  Smiley Argueta PA-C as Physician Assistant (Physician Assistant)  Silas Enciso MD as Assigned PCP  Patricia Vo, RN as Specialty Care Coordinator (Cardiology)  Ena Kruger PA-C as Cardiologist (Physician Assistant)  Diya Mackay, RN as Specialty Care Coordinator (Cardiology)  CHAIM ANDERSON

## 2020-09-17 NOTE — TELEPHONE ENCOUNTER
"Called Cole again today to check in after he was instructed to take another 2.5mg metolazone yesterday by Evelin Lancaster NP.  Cole states \"I'm feeling pretty good.\" States his weight came down another 3lbs from yesterday, he is now 210lbs.  States he was able to go for a long walk last night without losing his breath.  Will review with Evelin and call Cole back if additional plans needed. Cole verbalizes understanding and agrees with plan of care. Livia Joe RN    "

## 2020-09-18 NOTE — TELEPHONE ENCOUNTER
Called to check in with Cole this morning.  His weight has come down even more without taking any additional metolazone, he is 205lbs this morning.  He states he's feeling well and is still urinating a good amount. Agrees to have labs on Tuesday.  Will phone check in with him after we see results to review weights, symptoms, labs and if more metolazone is needed at that time. Reviewed plan with Evelin Lancaster NP. Cole verbalizes understanding and agrees with plan of care. Livia Joe RN      Date: 9/18/2020  Time of Call: 9:38 AM  Diagnosis:  HF  VORB - Ordering provider: GUILLE Alfred   Order: BMP next week  Order received by: Livia Joe RN

## 2020-09-22 NOTE — TELEPHONE ENCOUNTER
"Results reviewed by Evelin Lancaster, NP and called to patient. Cole states he's feeling \"pretty well'. Weight this morning is 208lbs. States he thinks it would have stayed closer to 205lb from last week but he was out of town this weekend and had less access to his low sodium meals.  States he's been able to go on long walks, which we couldn't have done last week.  Agreed to Return appt in 1 month, scheduled. Will call us back if weight begins to climb again. Cole verbalizes understanding and agrees with plan of care. Livia Joe RN      "

## 2020-10-13 NOTE — PROGRESS NOTES
"Cole Jesus is a 60 year old male who is being evaluated via a billable telephone visit.      The patient has been notified of following:     \"This telephone visit will be conducted via a call between you and your physician/provider. We have found that certain health care needs can be provided without the need for a physical exam.  This service lets us provide the care you need with a short phone conversation.  If a prescription is necessary we can send it directly to your pharmacy.  If lab work is needed we can place an order for that and you can then stop by our lab to have the test done at a later time.    Telephone visits are billed at different rates depending on your insurance coverage. During this emergency period, for some insurers they may be billed the same as an in-person visit.  Please reach out to your insurance provider with any questions.    If during the course of the call the physician/provider feels a telephone visit is not appropriate, you will not be charged for this service.\"    Patient has given verbal consent for Telephone visit?  Yes    What phone number would you like to be contacted at? [phone]    How would you like to obtain your AVS? Mail a copy                    "

## 2020-10-13 NOTE — PROGRESS NOTES
HPI: 60 yr old male patient called for follow up of  HFrEF. He was last seen by me on 9/15/20. AT that time we increased his diuretics for wt gain/ypervolemia. He recently traveled out of town, stayed at a hotel and ate out quit . His wt is up from 208 -to 220#  He feels like he is retaining fluid, but denies SOB at rest, but is SOB with minimal activity. Denies orthopnea, LE edema. He does think his belly is more bloated. He states he is taking his medications as directed and is on Torsemide 80mg BID.     PAST MEDICAL HISTORY:  Past Medical History:   Diagnosis Date     Anemia in chronic renal disease 3/9/2015     Antiplatelet or antithrombotic long-term use      Atrial fibrillation and flutter      CAD (coronary artery disease)     Stemi in , s/p angioplasty     CRD (chronic renal disease), stage IV (H) 2015    hx ATN with dialysis complicating cardiogenic shock  2012     GI bleed     massive lower GI bleed secondary to a cecal ulcer, s/p ileocecal resection in      Heart failure     Biventricular systolic HF, complicated by ARDS requiring tracheostomy     Hyperlipidemia LDL goal <100 2013     Hypertension      Ischemic cardiomyopathy 2013    TTE revealing 40% EF     Myocardial infarction (H)      Other and unspecified nonspecific immunological findings     Anti JKa     Pulmonary edema     episodes of flash pulmonary edema in      Subclinical hypothyroidism        FAMILY HISTORY:  Family History   Problem Relation Age of Onset     Diabetes Mother      Hypertension Mother      Heart Disease Mother      Heart Disease Father          of heart attack, left when he was young     Diabetes Sister      Diabetes Sister      Diabetes Sister      Glaucoma No family hx of      Macular Degeneration No family hx of        SOCIAL HISTORY:  Social History     Socioeconomic History     Marital status: Significant other     Spouse name: Not on file     Number of children: Not on file     Years  of education: Not on file     Highest education level: Not on file   Occupational History     Not on file   Social Needs     Financial resource strain: Not on file     Food insecurity     Worry: Not on file     Inability: Not on file     Transportation needs     Medical: Not on file     Non-medical: Not on file   Tobacco Use     Smoking status: Former Smoker     Packs/day: 2.00     Years: 40.00     Pack years: 80.00     Types: Cigarettes     Last attempt to quit: 12/3/2011     Years since quittin.7     Smokeless tobacco: Never Used   Substance and Sexual Activity     Alcohol use: No     Alcohol/week: 0.0 standard drinks     Drug use: No     Sexual activity: Yes     Partners: Female   Lifestyle     Physical activity     Days per week: Not on file     Minutes per session: Not on file     Stress: Not on file   Relationships     Social connections     Talks on phone: Not on file     Gets together: Not on file     Attends Gnosticism service: Not on file     Active member of club or organization: Not on file     Attends meetings of clubs or organizations: Not on file     Relationship status: Not on file     Intimate partner violence     Fear of current or ex partner: Not on file     Emotionally abused: Not on file     Physically abused: Not on file     Forced sexual activity: Not on file   Other Topics Concern     Parent/sibling w/ CABG, MI or angioplasty before 65F 55M? Yes      Service Not Asked     Blood Transfusions Not Asked     Caffeine Concern Not Asked     Occupational Exposure Not Asked     Hobby Hazards Not Asked     Sleep Concern Not Asked     Stress Concern Not Asked     Weight Concern Not Asked     Special Diet Not Asked     Back Care Not Asked     Exercise Yes     Comment: limited walking     Bike Helmet Not Asked     Seat Belt Not Asked     Self-Exams Not Asked   Social History Narrative     Not on file       CURRENT MEDICATIONS:  Current Outpatient Medications   Medication Sig Dispense Refill      ACCU-CHEK GUIDE test strip USE TO TEST BLOOD SUGAR FOUR TIMES A DAY BEFORE MEALS AND AT BEDTIME 400 each 3     Alcohol Swabs PADS 1 applicator 4 times daily (before meals and nightly) 100 each 3     aspirin (ASA) 81 MG chewable tablet Take 81 mg by mouth daily       blood glucose monitoring (ACCU-CHEK FASTCLIX) lancets USE TO TEST BLOOD SUGAR FOUR TIMES A DAY AT MEALS AND AT BEDTIME 400 each 3     insulin pen needle (32G X 4 MM) 32G X 4 MM miscellaneous Use 5 pen needles daily or as directed. 200 each 3     metolazone (ZAROXOLYN) 2.5 MG tablet One tablet only when directed by your doctor 15 tablet 1     potassium chloride ER (KLOR-CON M) 20 MEQ CR tablet Take 2 tablets (40 mEq) by mouth 2 times daily 360 tablet 3     senna-docusate (SENOKOT-S/PERICOLACE) 8.6-50 MG tablet Take 1 tablet by mouth 2 times daily as needed for constipation       torsemide (DEMADEX) 20 MG tablet Take 4 tablets (80 mg) by mouth 2 times daily 360 tablet 4     vitamin  B complex with vitamin C (VITAMIN  B COMPLEX) TABS Take 1 tablet by mouth daily       Vitamin D, Cholecalciferol, 25 MCG (1000 UT) CAPS Take 1 capsule by mouth 2 times daily       nitroGLYcerin (NITROSTAT) 0.4 MG sublingual tablet Place 1 tablet (0.4 mg) under the tongue every 5 minutes as needed for chest pain For chest pain place 1 tablet under the tongue every 5 minutes for 3 doses. If symptoms persist 5 minutes after 1st dose call 911. (Patient not taking: Reported on 10/13/2020) 25 tablet 0     Sharps Container MISC 1 Device every 30 days (Patient not taking: Reported on 8/13/2020) 1 each 3       ROS:   Constitutional: No fever, chills, or sweats. No weight gain/loss.   ENT: No visual disturbance, ear ache, epistaxis, sore throat.   Allergies/Immunologic: Negative.   Respiratory: No cough, hemoptysis.   Cardiovascular: As per HPI.   GI: No nausea, vomiting, hematemesis, melena, or hematochezia.   : No urinary frequency, dysuria, or hematuria.   Integument: Negative.    Psychiatric: Negative.   Neuro: Negative.   Endocrinology: Negative.   Musculoskeletal: Negative.    EXAM:  Pt is alert and oriented over the phone conversation. He does not sound SOB with normal conversation.     Labs:  CBC RESULTS:  Lab Results   Component Value Date    WBC 6.6 08/14/2020    RBC 2.94 (L) 08/14/2020    HGB 7.8 (L) 08/18/2020    HCT 25.0 (L) 08/14/2020    MCV 85 08/14/2020    MCH 24.1 (L) 08/14/2020    MCHC 28.4 (L) 08/14/2020    RDW 17.6 (H) 08/14/2020     08/14/2020       CMP RESULTS:  Lab Results   Component Value Date     09/22/2020    POTASSIUM 4.2 09/22/2020    CHLORIDE 99 09/22/2020    CO2 31 09/22/2020    ANIONGAP 8 09/22/2020     (H) 09/22/2020    BUN 71 (H) 09/22/2020    CR 2.29 (H) 09/22/2020    GFRESTIMATED 29 (L) 09/22/2020    GFRESTBLACK 34 (L) 09/22/2020    RONNY 8.3 (L) 09/22/2020    BILITOTAL 1.8 (H) 08/13/2020    ALBUMIN 2.7 (L) 08/13/2020    ALKPHOS 170 (H) 04/21/2020    ALT 31 04/21/2020    AST 26 04/21/2020        INR RESULTS:  Lab Results   Component Value Date    INR 1.28 (H) 06/04/2020       Lab Results   Component Value Date    MAG 2.6 (H) 08/28/2020     Lab Results   Component Value Date    NTBNPI 5,524 (H) 08/13/2020     Lab Results   Component Value Date    NTBNP 6,974 (H) 07/01/2020       Assessment and Plan:   1. Chronic systolic heart failure secondary to ischemic cardiomyopathy..     Stage C  NYHA Class III    Pt has been intolerant of all the guideline directed medications to treat his heart failure.  ACEi/ARB no  BB no  Aldosterone antagonist no  SCD prophylaxis patient declined  % BiV pacing: N/A  Fluid status hypervolemic based on wt gain - will add metolozone 2.5mg daily - in addition to his torsemide 80mg BID. He will do this for 3 days. The RNCC will call on Friday to assess wt/symptoms.    2. CAD: occluded pRCA s/p balloon angioplasty and stenting in 6/2019, STEMI s/p DESx3 for total occlusion of RCA and Lcx (2012); no anginal sx - on ASA /  plavix - he is BB/ and statin intolerant    3. Paroxysmal Afib   CHADSVASC 3 (HF, DM, CAD). Not on a/c due to history of GI bleed. Not willing to retrial. No symptoms of afib today.       4.CKD- stage  IV  Baseline serum creatinine around 2.4-2.7,- given increase in diuretics, will check labs in 1 week if he continues on metolazone daily.      Follow-up 2 weeks - he was instructed to get his labs done to assess electrolytes/Cr.        CC  Patient Care Team:  Silas Enciso MD as PCP - General  Dianna Davis APRN CNS as Registered Nurse (Clinical Nurse Specialist)  Alen Trujillo MD as MD (Cardiology)  Coordinator,  Bmt Nurse as Nurse Coordinator (Cardiology)  Kavita Chapman LPN as LPN  Zach Phelan RN as Specialty Care Coordinator (Cardiology)  Smiley Argueta PA-C as Physician Assistant (Physician Assistant)  Patricia Vo, RN as Specialty Care Coordinator (Cardiology)  Ena Kruger PA-C as Cardiologist (Physician Assistant)  Diya Mackay, RN as Specialty Care Coordinator (Cardiology)  Dian Leblanc APRN CNP as Assigned PCP  Ingris Martin, RN as Specialty Care Coordinator (Cardiology)  DIAN LEBLANC

## 2020-10-13 NOTE — PATIENT INSTRUCTIONS
Take your medicines every day, as directed    Changes made today:  o Metolazone 2.5 mg daily for 3 days  o    Monitor Your Weight and Symptoms    Contact us if you:      Gain 2 pounds in one day or 5 pounds in one week    Feel more short of breath    Notice more leg swelling    Feel lightheadeded   Change your lifestyle    Limit Salt or Sodium:    2000 mg  Limit Fluids:    2000 mL or approximately 64 ounces  Eat a Heart Healthy Diet    Low in saturated fats  Stay Active:    Aim to move at least 150 minutes every  week         To Contact us    During Business Hours:  [phone], option # 1 (University)  Then option # 4 (medical questions)     After hours, weekends or holidays:   [phone], Option #4  Ask to speak to the On-Call Cardiologist. Inform them you are a CORE/heart failure patient at the [facility].     Use [employer] allows you to communicate directly with your heart team through secure messaging.    [employer] can be accessed any time on your phone, computer, or tablet.    If you need assistance, we'd be happy to help!         Keep your Heart Appointments:    Labs this week  Core in 2 weeks

## 2020-10-13 NOTE — LETTER
"10/13/2020      RE: Cole Jesus  [address]  [place] [facility] [address]       Dear Colleague,    Thank you for the opportunity to participate in the care of your patient, Cole Jesus, at the [facility] HEART [facility] at [facility]. Please see a copy of my visit note below.    Cole Jesus is a 60 year old male who is being evaluated via a billable telephone visit.      The patient has been notified of following:     \"This telephone visit will be conducted via a call between you and your physician/provider. We have found that certain health care needs can be provided without the need for a physical exam.  This service lets us provide the care you need with a short phone conversation.  If a prescription is necessary we can send it directly to your pharmacy.  If lab work is needed we can place an order for that and you can then stop by our lab to have the test done at a later time.    Telephone visits are billed at different rates depending on your insurance coverage. During this emergency period, for some insurers they may be billed the same as an in-person visit.  Please reach out to your insurance provider with any questions.    If during the course of the call the physician/provider feels a telephone visit is not appropriate, you will not be charged for this service.\"    Patient has given verbal consent for Telephone visit?  Yes    What phone number would you like to be contacted at? [phone]    How would you like to obtain your AVS? Mail a copy        HPI: 60 yr old male patient called for follow up of  HFrEF. He was last seen by me on 9/15/20. AT that time we increased his diuretics for wt gain/ypervolemia. He recently traveled out of town, stayed at a hotel and ate out quit . His wt is up from 208 -to 220#  He feels like he is retaining fluid, but denies SOB at rest, but is SOB with minimal activity. Denies orthopnea, LE edema. He does think his " belly is more bloated. He states he is taking his medications as directed and is on Torsemide 80mg BID.     PAST MEDICAL HISTORY:  Past Medical History:   Diagnosis Date     Anemia in chronic renal disease 3/9/2015     Antiplatelet or antithrombotic long-term use      Atrial fibrillation and flutter      CAD (coronary artery disease)     Stemi in , s/p angioplasty     CRD (chronic renal disease), stage IV (H) 2015    hx ATN with dialysis complicating cardiogenic shock  2012     GI bleed     massive lower GI bleed secondary to a cecal ulcer, s/p ileocecal resection in      Heart failure     Biventricular systolic HF, complicated by ARDS requiring tracheostomy     Hyperlipidemia LDL goal <100 2013     Hypertension      Ischemic cardiomyopathy 2013    TTE revealing 40% EF     Myocardial infarction (H)      Other and unspecified nonspecific immunological findings     Anti JKa     Pulmonary edema     episodes of flash pulmonary edema in      Subclinical hypothyroidism        FAMILY HISTORY:  Family History   Problem Relation Age of Onset     Diabetes Mother      Hypertension Mother      Heart Disease Mother      Heart Disease Father          of heart attack, left when he was young     Diabetes Sister      Diabetes Sister      Diabetes Sister      Glaucoma No family hx of      Macular Degeneration No family hx of        SOCIAL HISTORY:  Social History     Socioeconomic History     Marital status: Significant other     Spouse name: Not on file     Number of children: Not on file     Years of education: Not on file     Highest education level: Not on file   Occupational History     Not on file   Social Needs     Financial resource strain: Not on file     Food insecurity     Worry: Not on file     Inability: Not on file     Transportation needs     Medical: Not on file     Non-medical: Not on file   Tobacco Use     Smoking status: Former Smoker     Packs/day: 2.00     Years: 40.00      Pack years: 80.00     Types: Cigarettes     Last attempt to quit: 12/3/2011     Years since quittin.7     Smokeless tobacco: Never Used   Substance and Sexual Activity     Alcohol use: No     Alcohol/week: 0.0 standard drinks     Drug use: No     Sexual activity: Yes     Partners: Female   Lifestyle     Physical activity     Days per week: Not on file     Minutes per session: Not on file     Stress: Not on file   Relationships     Social connections     Talks on phone: Not on file     Gets together: Not on file     Attends Sikhism service: Not on file     Active member of club or organization: Not on file     Attends meetings of clubs or organizations: Not on file     Relationship status: Not on file     Intimate partner violence     Fear of current or ex partner: Not on file     Emotionally abused: Not on file     Physically abused: Not on file     Forced sexual activity: Not on file   Other Topics Concern     Parent/sibling w/ CABG, MI or angioplasty before 65F 55M? Yes      Service Not Asked     Blood Transfusions Not Asked     Caffeine Concern Not Asked     Occupational Exposure Not Asked     Hobby Hazards Not Asked     Sleep Concern Not Asked     Stress Concern Not Asked     Weight Concern Not Asked     Special Diet Not Asked     Back Care Not Asked     Exercise Yes     Comment: limited walking     Bike Helmet Not Asked     Seat Belt Not Asked     Self-Exams Not Asked   Social History Narrative     Not on file       CURRENT MEDICATIONS:  Current Outpatient Medications   Medication Sig Dispense Refill     ACCU-CHEK GUIDE test strip USE TO TEST BLOOD SUGAR FOUR TIMES A DAY BEFORE MEALS AND AT BEDTIME 400 each 3     Alcohol Swabs PADS 1 applicator 4 times daily (before meals and nightly) 100 each 3     aspirin (ASA) 81 MG chewable tablet Take 81 mg by mouth daily       blood glucose monitoring (ACCU-CHEK FASTCLIX) lancets USE TO TEST BLOOD SUGAR FOUR TIMES A DAY AT MEALS AND AT BEDTIME 400 each 3      insulin pen needle (32G X 4 MM) 32G X 4 MM miscellaneous Use 5 pen needles daily or as directed. 200 each 3     metolazone (ZAROXOLYN) 2.5 MG tablet One tablet only when directed by your doctor 15 tablet 1     potassium chloride ER (KLOR-CON M) 20 MEQ CR tablet Take 2 tablets (40 mEq) by mouth 2 times daily 360 tablet 3     senna-docusate (SENOKOT-S/PERICOLACE) 8.6-50 MG tablet Take 1 tablet by mouth 2 times daily as needed for constipation       torsemide (DEMADEX) 20 MG tablet Take 4 tablets (80 mg) by mouth 2 times daily 360 tablet 4     vitamin  B complex with vitamin C (VITAMIN  B COMPLEX) TABS Take 1 tablet by mouth daily       Vitamin D, Cholecalciferol, 25 MCG (1000 UT) CAPS Take 1 capsule by mouth 2 times daily       nitroGLYcerin (NITROSTAT) 0.4 MG sublingual tablet Place 1 tablet (0.4 mg) under the tongue every 5 minutes as needed for chest pain For chest pain place 1 tablet under the tongue every 5 minutes for 3 doses. If symptoms persist 5 minutes after 1st dose call 911. (Patient not taking: Reported on 10/13/2020) 25 tablet 0     Sharps Container MISC 1 Device every 30 days (Patient not taking: Reported on 8/13/2020) 1 each 3       ROS:   Constitutional: No fever, chills, or sweats. No weight gain/loss.   ENT: No visual disturbance, ear ache, epistaxis, sore throat.   Allergies/Immunologic: Negative.   Respiratory: No cough, hemoptysis.   Cardiovascular: As per HPI.   GI: No nausea, vomiting, hematemesis, melena, or hematochezia.   : No urinary frequency, dysuria, or hematuria.   Integument: Negative.   Psychiatric: Negative.   Neuro: Negative.   Endocrinology: Negative.   Musculoskeletal: Negative.    EXAM:  Pt is alert and oriented over the phone conversation. He does not sound SOB with normal conversation.     Labs:  CBC RESULTS:  Lab Results   Component Value Date    WBC 6.6 08/14/2020    RBC 2.94 (L) 08/14/2020    HGB 7.8 (L) 08/18/2020    HCT 25.0 (L) 08/14/2020    MCV 85 08/14/2020    MCH  24.1 (L) 08/14/2020    MCHC 28.4 (L) 08/14/2020    RDW 17.6 (H) 08/14/2020     08/14/2020       CMP RESULTS:  Lab Results   Component Value Date     09/22/2020    POTASSIUM 4.2 09/22/2020    CHLORIDE 99 09/22/2020    CO2 31 09/22/2020    ANIONGAP 8 09/22/2020     (H) 09/22/2020    BUN 71 (H) 09/22/2020    CR 2.29 (H) 09/22/2020    GFRESTIMATED 29 (L) 09/22/2020    GFRESTBLACK 34 (L) 09/22/2020    RONNY 8.3 (L) 09/22/2020    BILITOTAL 1.8 (H) 08/13/2020    ALBUMIN 2.7 (L) 08/13/2020    ALKPHOS 170 (H) 04/21/2020    ALT 31 04/21/2020    AST 26 04/21/2020        INR RESULTS:  Lab Results   Component Value Date    INR 1.28 (H) 06/04/2020       Lab Results   Component Value Date    MAG 2.6 (H) 08/28/2020     Lab Results   Component Value Date    NTBNPI 5,524 (H) 08/13/2020     Lab Results   Component Value Date    NTBNP 6,974 (H) 07/01/2020       Assessment and Plan:   1. Chronic systolic heart failure secondary to ischemic cardiomyopathy..     Stage C  NYHA Class III    Pt has been intolerant of all the guideline directed medications to treat his heart failure.  ACEi/ARB no  BB no  Aldosterone antagonist no  SCD prophylaxis patient declined  % BiV pacing: N/A  Fluid status hypervolemic based on wt gain - will add metolozone 2.5mg daily - in addition to his torsemide 80mg BID. He will do this for 3 days. The RNCC will call on Friday to assess wt/symptoms.    2. CAD: occluded pRCA s/p balloon angioplasty and stenting in 6/2019, STEMI s/p DESx3 for total occlusion of RCA and Lcx (2012); no anginal sx - on ASA / plavix - he is BB/ and statin intolerant    3. Paroxysmal Afib   CHADSVASC 3 (HF, DM, CAD). Not on a/c due to history of GI bleed. Not willing to retrial. No symptoms of afib today.       4.CKD- stage  IV  Baseline serum creatinine around 2.4-2.7,- given increase in diuretics, will check labs in 1 week if he continues on metolazone daily.      Follow-up 2 weeks - he was instructed to get his labs  done to assess electrolytes/Cr.      CC  Patient Care Team:  Silas Enciso MD as PCP - General  Dianna Davis APRN CNS as Registered Nurse (Clinical Nurse Specialist)  Alen Trujillo MD as MD (Cardiology)  Coordinator,  Bmt Nurse as Nurse Coordinator (Cardiology)  Kavita Chapman LPN as LPN  Zach Phelan RN as Specialty Care Coordinator (Cardiology)  Smiley Argueta PA-C as Physician Assistant (Physician Assistant)  Patricia Vo, RN as Specialty Care Coordinator (Cardiology)  Ena Kruger PA-C as Cardiologist (Physician Assistant)  Diya Mackay, RN as Specialty Care Coordinator (Cardiology)  Dian Leblanc APRN CNP as Assigned PCP  Ingris Martin, RN as Specialty Care Coordinator (Cardiology)  DIAN LEBLANC      Please do not hesitate to contact me if you have any questions/concerns.     Sincerely,     GUILLE Walton CNP

## 2020-10-19 NOTE — TELEPHONE ENCOUNTER
Called Cole to follow up on his weights and symptoms since taking metolazone 2.5 mg for 3 days.  He stated that he lost 10 lbs with that and is feeling better although notes that his belly is still bloated.  He was unable to get labs last week as his ride was out of town and he had no transportation to the lab.  He agreed to get them done on Friday morning 10/23- as that is the earliest that his ride will be able to take him.  Scheduled follow up with Evelin on 10/27

## 2020-10-27 NOTE — LETTER
10/27/2020      RE: Cole Jesus  [address]  [facility] [address]       Dear Colleague,    Thank you for the opportunity to participate in the care of your patient, Cole Jesus, at the [facility]. Please see a copy of my visit note below.    PT was called and did not answer phone.       Please do not hesitate to contact me if you have any questions/concerns.     Sincerely,     GUILLE Walton CNP

## 2020-10-30 NOTE — TELEPHONE ENCOUNTER
Updated Evelin FINNEY NP, no adjustments until lab results.  Updated Cole, who stated understanding.  Will watch for results on Tuesday

## 2020-10-30 NOTE — TELEPHONE ENCOUNTER
Called Cole to reschedule.  He tells me his weight is up to 212 lb- tells me that he's usually is about 210.  Sometimes he gets a little winded.  His legs and stomach are a little swollen, although he also says that he is not having any symptoms.      Asked him to get labs on Tuesday- he has not rechecked since end of September and then rescheduled with Evelin on 11/10

## 2020-11-03 NOTE — TELEPHONE ENCOUNTER
Cole's labs are back.  He is unsure what his weight is today- did not take in the usual manner- took it fully dressed after eating a large lunch, so it was 222 lbs. Asked him to take it again tomorrow and let us know the results.  He is feeling more bloated and sluggish since last week.  We spent a little time talking about why he seems to be having more trouble with his heart lately- talked about it being a chronic progressive condition that is effected by many factors.      Will discuss with provider

## 2020-11-05 NOTE — TELEPHONE ENCOUNTER
Discussed with Evelin and fausto Galvan:    Date: 11/5/2020    Time of Call: 4:41 PM     Diagnosis:  Heart failure     [ VORB ] Ordering provider: Evelin Lancaster NP    Order: CBC and BMP next week, metolazone 2.5 mg three times a week      Order received by: Ingris Martin RN       Follow-up/additional notes: clarified with Evelin Lancaster NP that this is three times a week, until otherwise directed .  Cole stated understanding.

## 2020-11-10 NOTE — LETTER
"11/10/2020      RE: Cole Jesus  [address]  [place] [address]       Dear Colleague,    Thank you for the opportunity to participate in the care of your patient, Cole Jesus, at the [facility] HEART [facility] at [facility]. Please see a copy of my visit note below.    Cole Jesus is a 60 year old male who is being evaluated via a billable telephone visit.      The patient has been notified of following:     \"This telephone visit will be conducted via a call between you and your physician/provider. We have found that certain health care needs can be provided without the need for a physical exam.  This service lets us provide the care you need with a short phone conversation.  If a prescription is necessary we can send it directly to your pharmacy.  If lab work is needed we can place an order for that and you can then stop by our lab to have the test done at a later time.    Telephone visits are billed at different rates depending on your insurance coverage. During this emergency period, for some insurers they may be billed the same as an in-person visit.  Please reach out to your insurance provider with any questions.    If during the course of the call the physician/provider feels a telephone visit is not appropriate, you will not be charged for this service.\"    Patient has given verbal consent for Telephone visit?  Yes    What phone number would you like to be contacted at? [phone]     How would you like to obtain your AVS? Gloria       PT called for follow up of HFrEF. He was last seen via telephone visit on 10/13/20. Metolazone was added prn to his medications due to fluid retention. He did take as directed and his wt dropped (208# per report today) and his breathing improved. He missed his follow up visit. He did have labs on 11/3/20 - it was noted his cr had increased - thus he was instructed to hold metolazone (was taking " "prn). Today he states his last dose of metolazone was 5 days ago. He feels well. He denies SOB, orthopnea, PND, cough. He does have LE edema but states it is better. His biggest concern today is back pain which he states is a long standing issue and he recently \"tweeked\" his back.  He did not get labs today prior to apt, but is scheduled for repeat labs on 11/12/20.     No medication changes were made - other than I told him to continue to hold metolazone and only take as directed by CORE clinic.     Assessment and Plan:   1. Chronic systolic heart failure secondary to ischemic cardiomyopathy..     Stage C  NYHA Class III   Unfortunately, Pt has been intolerant of all the guideline directed medications to treat his heart failure.  ACEi/ARB no  BB no  Aldosterone antagonist no  SCD prophylaxis patient declined  % BiV pacing: N/A  Fluid status - by history appears only mildly hypervolemic (LE edema).      2. CAD: occluded pRCA s/p balloon angioplasty and stenting in 6/2019, STEMI s/p DESx3 for total occlusion of RCA and Lcx (2012); no anginal sx - on ASA / plavix - he is BB/ and statin intolerant     3. Paroxysmal Afib   CHADSVASC 3 (HF, DM, CAD). Not on a/c due to history of GI bleed. Not willing to retrial. No symptoms of afib today.      Will assess labs on Thursday and if stable follow up in 1 month.       Please do not hesitate to contact me if you have any questions/concerns.     Sincerely,     GUILLE Walton CNP    "

## 2020-11-10 NOTE — PROGRESS NOTES
"Cole Jesus is a 60 year old male who is being evaluated via a billable telephone visit.      The patient has been notified of following:     \"This telephone visit will be conducted via a call between you and your physician/provider. We have found that certain health care needs can be provided without the need for a physical exam.  This service lets us provide the care you need with a short phone conversation.  If a prescription is necessary we can send it directly to your pharmacy.  If lab work is needed we can place an order for that and you can then stop by our lab to have the test done at a later time.    Telephone visits are billed at different rates depending on your insurance coverage. During this emergency period, for some insurers they may be billed the same as an in-person visit.  Please reach out to your insurance provider with any questions.    If during the course of the call the physician/provider feels a telephone visit is not appropriate, you will not be charged for this service.\"    Patient has given verbal consent for Telephone visit?  Yes    What phone number would you like to be contacted at? [phone]     How would you like to obtain your AVS? Gloria DELGADO called for follow up of HFrEF. He was last seen via telephone visit on 10/13/20. Metolazone was added prn to his medications due to fluid retention. He did take as directed and his wt dropped (208# per report today) and his breathing improved. He missed his follow up visit. He did have labs on 11/3/20 - it was noted his cr had increased - thus he was instructed to hold metolazone (was taking prn). Today he states his last dose of metolazone was 5 days ago. He feels well. He denies SOB, orthopnea, PND, cough. He does have LE edema but states it is better. His biggest concern today is back pain which he states is a long standing issue and he recently \"tweeked\" his back.  He did not get labs today prior to apt, but is scheduled for " repeat labs on 11/12/20.     No medication changes were made - other than I told him to continue to hold metolazone and only take as directed by CORE clinic.     Assessment and Plan:   1. Chronic systolic heart failure secondary to ischemic cardiomyopathy..     Stage C  NYHA Class III   Unfortunately, Pt has been intolerant of all the guideline directed medications to treat his heart failure.  ACEi/ARB no  BB no  Aldosterone antagonist no  SCD prophylaxis patient declined  % BiV pacing: N/A  Fluid status - by history appears only mildly hypervolemic (LE edema).      2. CAD: occluded pRCA s/p balloon angioplasty and stenting in 6/2019, STEMI s/p DESx3 for total occlusion of RCA and Lcx (2012); no anginal sx - on ASA / plavix - he is BB/ and statin intolerant     3. Paroxysmal Afib   CHADSVASC 3 (HF, DM, CAD). Not on a/c due to history of GI bleed. Not willing to retrial. No symptoms of afib today.      Will assess labs on Thursday and if stable follow up in 1 month.      Total time of telephone visit was 25 mintues  Evelin Lancaster , CNP, CHFN

## 2020-11-11 NOTE — PATIENT INSTRUCTIONS
Take your medicines every day, as directed    Changes made today:  o No Changes today, will make decisions based on labs on Thursday   o    Monitor Your Weight and Symptoms    Contact us if you:      Gain 2 pounds in one day or 5 pounds in one week    Feel more short of breath    Notice more leg swelling    Feel lightheadeded   Change your lifestyle    Limit Salt or Sodium:    2000 mg  Limit Fluids:    2000 mL or approximately 64 ounces  Eat a Heart Healthy Diet    Low in saturated fats  Stay Active:    Aim to move at least 150 minutes every  week         To Contact us    During Business Hours:  [phone], option # 1 (University)  Then option # 4 (medical questions)     After hours, weekends or holidays:   [phone], Option #4  Ask to speak to the On-Call Cardiologist. Inform them you are a CORE/heart failure patient at the [facility].     Use [employer] allows you to communicate directly with your heart team through secure messaging.    [employer] can be accessed any time on your phone, computer, or tablet.    If you need assistance, we'd be happy to help!         Keep your Heart Appointments:    Follow up in 1 month with CORE

## 2020-11-12 PROBLEM — K92.1 GASTROINTESTINAL HEMORRHAGE WITH MELENA: Status: ACTIVE | Noted: 2020-01-01

## 2020-11-12 NOTE — TELEPHONE ENCOUNTER
Critical value:    Hgb 6.5    Read back by [employer] in Lab.  [id number]    Message sent to Evelin Lancaster NP at 0057

## 2020-11-12 NOTE — TELEPHONE ENCOUNTER
Per Evelin Lancaster, send to ED for Hgb levels.  Called Cole, discussed lab results and recommendation to go to the emergency Room.  He will work on finding a ride, will follow up with him in the next hour to make sure he's gotten a ride.

## 2020-11-12 NOTE — TELEPHONE ENCOUNTER
Health Call Center    Phone Message    May a detailed message be left on voicemail: yes     Reason for Call: Other: Diana calling from the [facility] lab to report a critical finding with Cole's Hemoglobin lab. Diana would like a call back from Evelin Lancaster as soon as possible. Please give Diana a call back at your earliest convenience.     Action Taken: Message routed to:  [facility] ([facility]):  Cardio    Travel Screening: Not Applicable

## 2020-11-12 NOTE — ED TRIAGE NOTES
Hemoglobin 6.5 drawn this AM. Awake and alert, complains of feeling a little tired. Denies CP/dizziness/fever. Denies blood in stool.

## 2020-11-12 NOTE — TELEPHONE ENCOUNTER
I  Notified Ingris LEIGH RN for the core team regarding this message and she will give the lab a call. I routed this message to the Core team pool for further review.     Blair LIANG

## 2020-11-12 NOTE — ED PROVIDER NOTES
[place] EMERGENCY DEPARTMENT ([facility])  November 12, 2020  History     Chief Complaint   Patient presents with     Anemia     Hemoglobin 6.5 drawn this AM. Awake and alert, complains of feeling a little tired. Denies CP/dizziness/fever. Denies blood in stool.     The history is provided by the patient and medical records.     Cole Jesus is a 60 year old male with a past medical history significant for DM 2, HLD, hypothyroidism, GI bleed, STEMI, A. fib, ischemic cardiomyopathy, chronic systolic CHF, severe pulmonary HTN, CKD stage IV, anemia, CAD, and long-term use of blood thinners who presents the ED today for evaluation of low hemoglobin.     Per review of patient's chart, patient had labs drawn today and his hemoglobin level came back at 6.5 so he was told to come to the ED.    Here in the ED patient reports feeling generally okay, just somewhat tired. He denies chest pain, pressure, or palpitations. Denies lightheadedness. He states he has gotten somewhat short of breath while walking but had attributed that to water retention. Patient denies recent black or bloody stool. Denies new abdominal pain. Patient is not currently anticoagulated. He does not remember the details of his GI bleed, but states he had a colectomy emergently after a heart attack/arrest.    I have reviewed the Medications, Allergies, Past Medical and Surgical History, and Social History in the [facility] system.  PAST MEDICAL HISTORY:   Past Medical History:   Diagnosis Date     Anemia in chronic renal disease 3/9/2015     Antiplatelet or antithrombotic long-term use      Atrial fibrillation and flutter      CAD (coronary artery disease)     Stemi in 12/11, s/p angioplasty     CRD (chronic renal disease), stage IV (H) 03/09/2015    hx ATN with dialysis complicating cardiogenic shock  1/2012     GI bleed     massive lower GI bleed secondary to a cecal ulcer, s/p ileocecal resection in 12/11     Heart failure     Biventricular  systolic HF, complicated by ARDS requiring tracheostomy     Hyperlipidemia LDL goal <100 2013     Hypertension      Ischemic cardiomyopathy 2013    TTE revealing 40% EF     Myocardial infarction (H)      Other and unspecified nonspecific immunological findings     Anti JKa     Pulmonary edema     episodes of flash pulmonary edema in      Subclinical hypothyroidism        PAST SURGICAL HISTORY:   Past Surgical History:   Procedure Laterality Date     CV RIGHT HEART CATH N/A 2019    Procedure: CV RIGHT HEART CATH;  Surgeon: Fox Gerard MD;  Location:  HEART CARDIAC CATH LAB     CV RIGHT HEART CATH N/A 2/10/2020    Procedure: CV RIGHT HEART CATH;  Surgeon: Fox Gerard MD;  Location:  HEART CARDIAC CATH LAB     ESOPHAGOSCOPY, GASTROSCOPY, DUODENOSCOPY (EGD), COMBINED  2011    Procedure:COMBINED ESOPHAGOSCOPY, GASTROSCOPY, DUODENOSCOPY (EGD); Surgeon:SAMARIA PUENTE; [facility]     LAPAROTOMY EXPLORATORY  2011    Procedure:LAPAROTOMY EXPLORATORY; Explore laparotomy, Illeocectomy, Diverting Illeostomy; Surgeon:GIA NAJERA; Location:[facility] OR     ORIF right elbow fracture  age 12     TAKEDOWN ILEOSTOMY  2014    Procedure: TAKEDOWN ILEOSTOMY;  Surgeon: Gia Najera MD;  Location: [facility] OR     TRACHEOSTOMY  2011    Procedure:TRACHEOSTOMY; Tracheostomy; 80XLTCP-Proximal Extension-Cuffed 8.0 mm I.D.; Surgeon:LIZABETH DYE; Location:[facility] OR       Past medical history, past surgical history, medications, and allergies were reviewed with the patient. Additional pertinent items: None    FAMILY HISTORY:   Family History   Problem Relation Age of Onset     Diabetes Mother      Hypertension Mother      Heart Disease Mother      Heart Disease Father          of heart attack, left when he was young     Diabetes Sister      Diabetes Sister      Diabetes Sister      Glaucoma No family hx of      Macular  Degeneration No family hx of        SOCIAL HISTORY:   Social History     Tobacco Use     Smoking status: Former Smoker     Packs/day: 2.00     Years: 40.00     Pack years: 80.00     Types: Cigarettes     Quit date: 12/3/2011     Years since quittin.9     Smokeless tobacco: Never Used   Substance Use Topics     Alcohol use: No     Alcohol/week: 0.0 standard drinks     Social history was reviewed with the patient. Additional pertinent items: None      Patient's Medications   New Prescriptions    No medications on file   Previous Medications    ACCU-CHEK GUIDE TEST STRIP    USE TO TEST BLOOD SUGAR FOUR TIMES A DAY BEFORE MEALS AND AT BEDTIME    ALCOHOL SWABS PADS    1 applicator 4 times daily (before meals and nightly)    ASPIRIN (ASA) 81 MG CHEWABLE TABLET    Take 81 mg by mouth daily    BLOOD GLUCOSE MONITORING (ACCU-CHEK FASTCLIX) LANCETS    USE TO TEST BLOOD SUGAR FOUR TIMES A DAY AT MEALS AND AT BEDTIME    INSULIN PEN NEEDLE (32G X 4 MM) 32G X 4 MM MISCELLANEOUS    Use 5 pen needles daily or as directed.    METOLAZONE (ZAROXOLYN) 2.5 MG TABLET    Take 1 tablet (2.5 mg) by mouth three times a week    NITROGLYCERIN (NITROSTAT) 0.4 MG SUBLINGUAL TABLET    Place 1 tablet (0.4 mg) under the tongue every 5 minutes as needed for chest pain For chest pain place 1 tablet under the tongue every 5 minutes for 3 doses. If symptoms persist 5 minutes after 1st dose call 911.    POTASSIUM CHLORIDE ER (KLOR-CON M) 20 MEQ CR TABLET    Take 2 tablets (40 mEq) by mouth 2 times daily    SENNA-DOCUSATE (SENOKOT-S/PERICOLACE) 8.6-50 MG TABLET    Take 1 tablet by mouth 2 times daily as needed for constipation    SHARPS CONTAINER MISC    1 Device every 30 days    TORSEMIDE (DEMADEX) 20 MG TABLET    Take 4 tablets (80 mg) by mouth 2 times daily    VITAMIN  B COMPLEX WITH VITAMIN C (VITAMIN  B COMPLEX) TABS    Take 1 tablet by mouth daily    VITAMIN D, CHOLECALCIFEROL, 25 MCG (1000 UT) CAPS    Take 1 capsule by mouth 2 times daily  "  Modified Medications    No medications on file   Discontinued Medications    No medications on file          Allergies   Allergen Reactions     Hydralazine      Black spots, verified allergic reaction by skin biopsy     Isosorbide      Per pt got a rash all over his body and won't take it no more     Simvastatin Other (See Comments)     Leg muscle weakness     Tylenol [Acetaminophen] Palpitations        Review of Systems   Constitutional: Positive for fatigue. Negative for fever.   HENT: Negative for congestion.    Eyes: Negative for redness.   Respiratory: Negative for shortness of breath.    Cardiovascular: Negative for chest pain.   Gastrointestinal: Negative for abdominal pain and blood in stool.   Genitourinary: Negative for difficulty urinating.   Musculoskeletal: Negative for arthralgias and neck stiffness.   Skin: Negative for color change.   Neurological: Negative for light-headedness and headaches.   Psychiatric/Behavioral: Negative for confusion.         Physical Exam   BP: 92/60  Pulse: 87  Temp: 97.8  F (36.6  C)  Resp: 16  Height: 172.7 cm (5' 8\")  Weight: 98 kg (216 lb)  SpO2: 98 %      Physical Exam  Vitals signs and nursing note reviewed.   Constitutional:       General: He is not in acute distress.  HENT:      Head: Atraumatic.      Mouth/Throat:      Mouth: Mucous membranes are moist.   Eyes:      General: No scleral icterus.     Extraocular Movements: Extraocular movements intact.   Neck:      Musculoskeletal: Neck supple.   Cardiovascular:      Rate and Rhythm: Normal rate and regular rhythm.      Heart sounds: Normal heart sounds.   Pulmonary:      Effort: Pulmonary effort is normal. No respiratory distress.   Chest:      Chest wall: No tenderness.   Abdominal:      Palpations: Abdomen is soft.      Tenderness: There is no abdominal tenderness. There is no guarding or rebound.   Genitourinary:     Rectum: Guaiac result positive.   Musculoskeletal:      Right lower leg: No edema.      Left " lower leg: No edema.   Skin:     General: Skin is warm.      Coloration: Skin is not pale.   Neurological:      General: No focal deficit present.      Mental Status: He is alert and oriented to person, place, and time.         ED Course   11:47 AM  The patient was seen and examined by Cody Li MD in Room ED29.       Procedures                           Results for orders placed or performed during the hospital encounter of 11/12/20 (from the past 24 hour(s))   CBC with platelets differential   Result Value Ref Range    WBC 6.8 4.0 - 11.0 10e9/L    RBC Count 3.47 (L) 4.4 - 5.9 10e12/L    Hemoglobin 6.2 (LL) 13.3 - 17.7 g/dL    Hematocrit 24.1 (L) 40.0 - 53.0 %    MCV 70 (L) 78 - 100 fl    MCH 17.9 (L) 26.5 - 33.0 pg    MCHC 25.7 (L) 31.5 - 36.5 g/dL    RDW 21.2 (H) 10.0 - 15.0 %    Platelet Count 318 150 - 450 10e9/L    Diff Method Automated Method     % Neutrophils 80.8 %    % Lymphocytes 4.1 %    % Monocytes 12.7 %    % Eosinophils 1.6 %    % Basophils 0.4 %    % Immature Granulocytes 0.4 %    Nucleated RBCs 2 (H) 0 /100    Absolute Neutrophil 5.5 1.6 - 8.3 10e9/L    Absolute Lymphocytes 0.3 (L) 0.8 - 5.3 10e9/L    Absolute Monocytes 0.9 0.0 - 1.3 10e9/L    Absolute Eosinophils 0.1 0.0 - 0.7 10e9/L    Absolute Basophils 0.0 0.0 - 0.2 10e9/L    Abs Immature Granulocytes 0.0 0 - 0.4 10e9/L    Absolute Nucleated RBC 0.1    ABO/Rh type and screen   Result Value Ref Range    Units Ordered 1     ABO O     RH(D) Pos     Antibody Screen Neg     Test Valid Only At          [facility]    Specimen Expires 11/15/2020     Crossmatch Red Blood Cells    Blood component   Result Value Ref Range    Unit Number [id number]     Blood Component Type Red Blood Cells Leukocyte Reduced     Division Number 00     Status of Unit Released to care unit 11/12/2020 1353     Blood Product Code B9517Y00     Unit Status ISS    Blood component   Result Value Ref Range    Unit Number  [id number]     Blood Component Type Red Blood Cells Leukocyte Reduced     Division Number 00     Status of Unit Ready for patient 11/12/2020 1349     Blood Product Code C3123P08     Unit Status JULIO CESAR      *Note: Due to a large number of results and/or encounters for the requested time period, some results have not been displayed. A complete set of results can be found in Results Review.     Medications   pantoprazole (PROTONIX) IV push injection 80 mg (has no administration in time range)   pantoprazole (PROTONIX) 80 mg in sodium chloride 0.9 % 100 mL infusion (has no administration in time range)             Assessments & Plan (with Medical Decision Making)   The patient has a drop in hemoglobin which was thought to be due to chronic disease and chronic renal insufficiency.  His hemoglobin dropped less than a gram.  On recheck it was lower at 6.1.  Arrangements were made for transfusion and the patient felt well enough to go home.  However he then had an episode of hypotension.  He was minimally symptomatic but his pressure was 70/50.  Repeat blood pressure was sustained.  Performed a rectal exam which was positive, concerning for possible GI bleed and not as the above reason for his anemia.  His first unit of transfusion was begun and his pressure responded well.  He was started on a PPI as second IV established.  He will need diuretics after transfusions however at this time appears to be hypovolemic.  He will be admitted to the internal medicine service.    I have reviewed the nursing notes.    I have reviewed the findings, diagnosis, plan and need for follow up with the patient.    New Prescriptions    No medications on file       Final diagnoses:   Gastrointestinal hemorrhage with melena   Miki DE LA O, am serving as a trained medical scribe to document services personally performed by , MD, based on the provider's statements to me.     DE LA O MD, was physically present and have reviewed and verified the  accuracy of this note documented by Miki Rivas.    11/12/2020   [facility] EMERGENCY DEPARTMENT     Cody Li MD  11/12/20 [id number]

## 2020-11-12 NOTE — H&P
[facility]     History and Physical - Hospitalist Service, Gold Night        Date of Admission:  11/12/2020    Assessment & Plan   Cole Jesus is a 60 year old male admitted on 11/12/2020. He has a history of DMII, CKD stage IV, anemia, CAD, chronic systolic CHF, STEMI, afib, ischemic cardiomyopathy, severe pulmonary HTN, HLD, GI bleed, who presented to the ED after routine labs revealed severe anemia with hgb 6.5.    Acute on chronic anemia   Hx of GI bleed 2/2 AVMs   Hx of iron deficiency anemia  Routine labs this AM revealed Hgb 6.5 therefore presented to the ED. BUN 93. Symptomatic with fatigue and generalized weakness. Received 2 unit pRBC and IV PPI. Last EGD 7/2019 which showed blood + clot in antrum, single angioectasia in dudodenum. Iron studies consistent with iron deficiency 8/2020 and subsequently received 5 days IV iron while inpatient. Likely component of CKD, iron deficiency and possible recurrent GI bleed contributing to anemia. Stool guaiac positive in the ED.  - GI consult for possible EGD   - Check iron studies, add on to prior to tx blood   - IV Protonix BID    - Repeat hgb after tx then trend every 8 hours   - Check INR   - NPO at [facility] for possible EGD   - Hold in IVF given severe LV dysfunction   - Hold PTA ASA    - UA to assess for hematuria   - Consider giving IV iron following pRBC     CKD stage IV  Follows with Dr. Vigil, last seen 12/2019. Baseline creatine 2.3-2.5 in recent months. Creatinine at baseline on admission.   - Trend Cr   - Continue vitamin D supplement   - Avoid nephrotoxins   - Renally dose medications     HFrEF (LVEF 29%)  ICM   Severe PH   [facility] and Dr. Trujillo. Last Echo 2/2020 with Severely reduced LV function with diffuse hypokinesis LVEF 29%. Moderate mitral insufficiency, mod tricuspid insufficiency, pulmonary HTN. Not on bblocker due to unable to tolerate + decompensated HF, not on ACEi d/t  Please inform pt that cultures so far growing strep infection. No fungal organisms thus far.   - will start course of augmentin for two weeks since he has cavitary PNA and persistent infection hyperkalemia/renal dysfucntion, not on spironolactone d/t renal dysfunction, declined ICD multiple times in the past even though he is full code. Appears slightly hypervolemic on exam, reported dry weight 205 lbs, weight on admission 216lbs.   - Continue tosemide tonight given volume with pRBC  - Heart failure team consulted  - Defer to cardiology on consideration of BB trial   - Daily weights     CAD  Hx of STEMI s/p DESx3 for total occlusion of RCA and Lcx (2012). occluded pRCA s/p balloon angioplasty and stenting in 6/2019. No anginal signs or symptoms on admission. PTA on ASA. Intolerant of BB and statin therapy per cardiology notes.   - Hold ASA given concern for GI bleed as above   - Baseline EKG     Paroxysmal Afib   CHADSVASC 3 (HF, DM, CAD). Not on a/c due to history of GI bleed.    - Not on rate control   - Tele monitoring   - Hold anticoagulation discussions given concern for GI bleed    Hyperbilirubinemia   Elevated alk phos   Tblil 2.2, alk phos 151. Likely secondary to vascular congestion from HFrEF and PAH.   - Monitor     DMII  A1c 6.8. Diet controlled at home.   - Moderate carb diet   - Glucose checks AC and HS   - Hypoglycemic protocol   - Correctional insulin PRN     HLD  - Intolerant of statin        Diet: Fluid restriction 2000 ML FLUID  NPO per Anesthesia Guidelines for Procedure/Surgery Except for: Meds  DVT Prophylaxis: Pneumatic Compression Devices  Ponce Catheter: not present  Code Status: Full Code         Disposition Plan   Expected discharge: 2 - 3 days, recommended to prior living arrangement once hemoglobin stable and diuresis complete.  Entered: TORIBIO Whitley 11/12/2020, 7:52 PM     The patient's care was discussed with the Attending Physician, Dr. Kendrick, Bedside Nurse and Patient.    TORIBIO Whitley  [facility]   Contact information available via [facility] Paging/Directory  Please see sign in/sign out for up to date coverage  "information    ______________________________________________________________________    Chief Complaint   Low hemoglobin     History is obtained from the patient and review of medical records.     History of Present Illness   Cole Jesus is a 60 year old male who has a history of DMII, CKD stage IV, anemia, CAD, chronic systolic CHF, STEMI, afib, ischemic cardiomyopathy, severe pulmonary HTN, HLD, GI bleed, who presented to the ED after routine labs revealed severe anemia with hgb 6.5.    Mr. Jesus had routine labs completed this AM and was notified to present to the ED because his hemoglobin was low. Patient reports he has been feeling sluggist and tired for \"quite a while.\" He reports he took metolazone as directed by the [facility] about one week ago and has felt tired and \"too weak\" every since. He therefore did not take subsequent doses of metolazone. He reports his weight is up to 216 lbs from goal weight of 205 lbs. Compliant with 2L fluid restriction.  Endorses some KING \"when I walk to the car,\" otherwise denies dyspnea, chest pain, cough, lightheadedness, dizziness. Reports hx of GI bleed, no recent melena, hematochezia or hematemesis. Endorses he has felt \"queezy\" at times but his appetite has remained adequate.      Denies sick contacts, exposure to COVID19, fevers, chills, vomiting, hematemesis, abdominal pain, dysuria.     Review of Systems    The 10 point Review of Systems is negative other than noted in the HPI.    Past Medical History    I have reviewed this patient's medical history and updated it with pertinent information if needed.   Past Medical History:   Diagnosis Date     Anemia in chronic renal disease 3/9/2015     Antiplatelet or antithrombotic long-term use      Atrial fibrillation and flutter      CAD (coronary artery disease)     Stemi in 12/11, s/p angioplasty     CRD (chronic renal disease), stage IV (H) 03/09/2015    hx ATN with dialysis complicating cardiogenic shock  " 1/2012     GI bleed     massive lower GI bleed secondary to a cecal ulcer, s/p ileocecal resection in 12/11     Heart failure     Biventricular systolic HF, complicated by ARDS requiring tracheostomy     Hyperlipidemia LDL goal <100 4/28/2013     Hypertension      Ischemic cardiomyopathy 4/28/2013    TTE revealing 40% EF     Myocardial infarction (H)      Other and unspecified nonspecific immunological findings     Anti JKa     Pulmonary edema     episodes of flash pulmonary edema in 12/11     Subclinical hypothyroidism        Past Surgical History   I have reviewed this patient's surgical history and updated it with pertinent information if needed.  Past Surgical History:   Procedure Laterality Date     CV RIGHT HEART CATH N/A 11/12/2019    Procedure: CV RIGHT HEART CATH;  Surgeon: Fox Gerard MD;  Location:  HEART CARDIAC CATH LAB     CV RIGHT HEART CATH N/A 2/10/2020    Procedure: CV RIGHT HEART CATH;  Surgeon: Fox Gerard MD;  Location:  HEART CARDIAC CATH LAB     ESOPHAGOSCOPY, GASTROSCOPY, DUODENOSCOPY (EGD), COMBINED  12/25/2011    Procedure:COMBINED ESOPHAGOSCOPY, GASTROSCOPY, DUODENOSCOPY (EGD); Surgeon:SAMARIA PUENTE; Location: GI     LAPAROTOMY EXPLORATORY  12/31/2011    Procedure:LAPAROTOMY EXPLORATORY; Explore laparotomy, Illeocectomy, Diverting Illeostomy; Surgeon:GIA NAJERA; Location:[facility] OR     ORIF right elbow fracture  age 12     TAKEDOWN ILEOSTOMY  6/5/2014    Procedure: TAKEDOWN ILEOSTOMY;  Surgeon: Gia Najera MD;  Location: [facility] OR     TRACHEOSTOMY  12/22/2011    Procedure:TRACHEOSTOMY; Tracheostomy; 80XLTCP-Proximal Extension-Cuffed 8.0 mm I.D.; Surgeon:LIZABETH DYE; Location: OR       Social History   I have reviewed this patient's social history and updated it with pertinent information if needed.  Social History     Tobacco Use     Smoking status: Former Smoker     Packs/day: 2.00     Years: 40.00      Pack years: 80.00     Types: Cigarettes     Quit date: 12/3/2011     Years since quittin.9     Smokeless tobacco: Never Used   Substance Use Topics     Alcohol use: No     Alcohol/week: 0.0 standard drinks     Drug use: No       Family History   I have reviewed this patient's family history and updated it with pertinent information if needed.  Family History   Problem Relation Age of Onset     Diabetes Mother      Hypertension Mother      Heart Disease Mother      Heart Disease Father          of heart attack, left when he was young     Diabetes Sister      Diabetes Sister      Diabetes Sister      Glaucoma No family hx of      Macular Degeneration No family hx of        Prior to Admission Medications   Prior to Admission Medications   Prescriptions Last Dose Informant Patient Reported? Taking?   ACCU-CHEK GUIDE test strip Unknown Self No No   Sig: USE TO TEST BLOOD SUGAR FOUR TIMES A DAY BEFORE MEALS AND AT BEDTIME   Alcohol Swabs PADS Unknown Self No No   Si applicator 4 times daily (before meals and nightly)   Sharps Container MISC Unknown Self No No   Si Device every 30 days   Vitamin D, Cholecalciferol, 25 MCG (1000 UT) CAPS 2020  Yes Yes   Sig: Take 1 capsule by mouth 2 times daily   aspirin (ASA) 81 MG chewable tablet 2020 Self Yes Yes   Sig: Take 81 mg by mouth daily   blood glucose monitoring (ACCU-CHEK FASTCLIX) lancets Unknown Self No No   Sig: USE TO TEST BLOOD SUGAR FOUR TIMES A DAY AT MEALS AND AT BEDTIME   insulin pen needle (32G X 4 MM) 32G X 4 MM miscellaneous Unknown Self No No   Sig: Use 5 pen needles daily or as directed.   metolazone (ZAROXOLYN) 2.5 MG tablet Past Week  No Yes   Sig: Take 1 tablet (2.5 mg) by mouth three times a week   potassium chloride ER (KLOR-CON M) 20 MEQ CR tablet 2020  No Yes   Sig: Take 2 tablets (40 mEq) by mouth 2 times daily   senna-docusate (SENOKOT-S/PERICOLACE) 8.6-50 MG tablet 2020  Yes Yes   Sig: Take 1 tablet by mouth 2  times daily as needed for constipation   torsemide (DEMADEX) 20 MG tablet 11/12/2020  No Yes   Sig: Take 4 tablets (80 mg) by mouth 2 times daily   vitamin  B complex with vitamin C (VITAMIN  B COMPLEX) TABS 11/12/2020 Self Yes Yes   Sig: Take 1 tablet by mouth daily      Facility-Administered Medications: None     Allergies   Allergies   Allergen Reactions     Hydralazine      Black spots, verified allergic reaction by skin biopsy     Isosorbide      Per pt got a rash all over his body and won't take it no more     Simvastatin Other (See Comments)     Leg muscle weakness     Tylenol [Acetaminophen] Palpitations       Physical Exam   Vital Signs: Temp: 95.2  F (35.1  C) Temp src: Oral BP: 102/72 Pulse: 94   Resp: 16 SpO2: 98 % O2 Device: None (Room air)    Weight: 216 lbs 0 oz  GENERAL: Alert and oriented x 3. NAD.   HEENT: Anicteric sclera. Pupils equal and round. Mucous membranes moist and without lesions.   CV: RRR. S1, S2. 3/6 systolic murmur appreciated.   RESPIRATORY: Effort normal on RA. Lungs sounds diminished at right base otherwise CTAB with no wheezing, rales, rhonchi.   GI: Abdomen soft, obese, and non distended, bowel sounds present. Hard scar tissue palpated at right lateral umbilical. No tenderness, rebound, guarding.   MUSCULOSKELETAL: No joint swelling or tenderness. Moves all extremities.   NEUROLOGICAL: No focal deficits.   EXTREMITIES: 2+ pitting edema of bilateral lower extremities. Intact bilateral pedal pulses.   SKIN: No jaundice. No rashes.       Data   Data reviewed today: I reviewed all medications, new labs and imaging results over the last 24 hours. I personally reviewed no images or EKG's today.    Recent Labs   Lab 11/12/20  1212 11/12/20  0706   WBC 6.8 7.6   HGB 6.2* 6.5*   MCV 70* 70*    326   INR 1.43*  --     132*   POTASSIUM 3.9 4.2   CHLORIDE 100 99   CO2 25 26   BUN 93* 72*   CR 2.53* 2.56*   ANIONGAP 8 8   RONNY 8.5 8.5   * 136*   ALBUMIN 2.9*  --     PROTTOTAL 7.2  --    BILITOTAL 2.2*  --    ALKPHOS 151*  --    ALT 18  --    AST 18  --

## 2020-11-13 NOTE — CONSULTS
Care Management Initial Consult    General Information  Assessment completed with: Patient  Type of CM/SW Visit: Initial Assessment  Primary Care Provider verified and updated as needed: Yes   Readmission within the last 30 days: no previous admission in last 30 days    Reason for Consult: discharge planning  Advance Care Planning: No ACP documents on file, declines at this time.     Communication Assessment  Patient's communication style: spoken language (English or Bilingual)    Hearing Difficulty or Deaf: no   Wear Glasses or Blind: yes    Cognitive  Cognitive/Neuro/Behavioral: WDL                      Living Environment:   People in home: significant other, Haven  Current living Arrangements: house      Able to return to prior arrangements: Yes    Family/Social Support:  Care provided by: self  Provides care for: no one  Description of Support System: Supportive, involved.     Current Resources:   Skilled Home Care Services: None.   Community Resources: None  Equipment currently used at home: None.   Supplies currently used at home: None    Lifestyle & Psychosocial Needs:        Socioeconomic History     Marital status: Significant other     Spouse name: Not on file     Number of children: Not on file     Years of education: Not on file     Highest education level: Not on file     Tobacco Use     Smoking status: Former Smoker     Packs/day: 2.00     Years: 40.00     Pack years: 80.00     Types: Cigarettes     Quit date: 12/3/2011     Years since quittin.9     Smokeless tobacco: Never Used   Substance and Sexual Activity     Alcohol use: No     Alcohol/week: 0.0 standard drinks     Drug use: No     Sexual activity: Yes     Partners: Female       Additional Information:  Care management assessment triggered due to increased unplanned readmission risk score. Writer contacted patient via telephone to complete assessment. Patient confirms that he lives locally, independently w/his significant other, Haven.  Patient confirms that his significant other will provide transportation at discharge. Patient voices no further discharge needs at this time.     Jenny Razo, RNCC, BSN    [facility]  [address] [place] [id number]    [email or web address]    Office: [phone] Pager: [phone]  To contact the weekend RNCC, page [phone].

## 2020-11-13 NOTE — PLAN OF CARE
Cares 4698-7180. Alert, oriented. Up indep. VSS on RA and TELE with NSR and occasional PVCs. Denies pain/nausea. EGD complete today. Hgb stable. Diet advanced to regular, tolerating well. BMx1, formed and brown. Plan for discharge home today after iron infusion.

## 2020-11-13 NOTE — PROGRESS NOTES
[facility]     Medicine Progress Note - Hospitalist Service, Gold 11       Date of Admission:  11/12/2020  Assessment & Plan          Acute on chronic anemia, possibly secondary to GI bleed  History of GI bleed secondary to AVM  History of iron deficiency anemia  Admitted to the hospital with acute on chronic anemia, generalized weakness and was found to have positive guaiac in emergency department.  Was given 2 units packed red blood cells started on IV PPI.  He has history of bleeding from AVM on EGD 7/2019.  At this time does not appear to be having brisk GI bleed however might have slow bleed from AVM given history of such. Will look for hemolysis too given elevated indirect bilirubin.  -Appreciate gastroenterology consultation.  -Continue n.p.o. for now.  -IV PPI twice daily.  -Every 8 hour Hgb.  -Hold aspirin and prophylactic anticoagulation.  -Add on haptoglobin and LDH.  -Can consider IV iron this admission.    CKD stage IV  Follows with Dr. Vigil, last seen 12/2019. Baseline creatine 2.3-2.5 in recent months. Creatinine at baseline on admission.   - Daily BMP.  - Continue vitamin D supplement   - Avoid nephrotoxins.  - Renally dose medications.    HFrEF (LVEF 29%)  ICM   Severe pulmonary hypertension  [facility]ed [facility] and Dr. Trujillo. Last Echo 2/2020 with Severely reduced LV function with diffuse hypokinesis LVEF 29%. Moderate mitral insufficiency, mod tricuspid insufficiency, pulmonary HTN. Not on bblocker due to unable to tolerate + decompensated HF, not on ACEi d/t hyperkalemia/renal dysfucntion, not on spironolactone d/t renal dysfunction, declined ICD multiple times in the past even though he is full code.   Has significant pitting bilateral lower extremity edema and weight is up.  - Continue tosemide tonight given volume with pRBC.  - Heart failure team consulted.  - Defer to cardiology on consideration of BB trial.  - Daily weights  .     CAD  Hx of STEMI s/p DESx3 for total occlusion of RCA and Lcx (2012). occluded pRCA s/p balloon angioplasty and stenting in 6/2019. No anginal signs or symptoms on admission. PTA on ASA. Intolerant of BB and statin therapy per cardiology notes.   - Hold ASA given concern for GI bleed as above.     Paroxysmal Afib   CHADSVASC 3 (HF, DM, CAD). Not on a/c due to history of GI bleed.    - Not on rate control.  - Tele monitoring.  - Hold anticoagulation discussions given concern for GI bleed.     Hyperbilirubinemia   Elevated alk phos   Both direct and indirect bilirubin elevated without significant pathology noted on abdominal ultrasound.  -LFTs daily.     DMII  A1c 6.8. Diet controlled at home.   - Moderate carb diet.  - Glucose checks AC and HS.  - Hypoglycemic protocol.  - Correctional insulin PRN.     HLD  - Intolerant of statin.        Diet: Fluid restriction 2000 ML FLUID  NPO per Anesthesia Guidelines for Procedure/Surgery Except for: Meds    DVT Prophylaxis: Deferred given concern for GI bleed.  Ponce Catheter: not present  Code Status: Full Code           Disposition Plan   Expected discharge: 2 - 3 days, recommended to prior living arrangement once hemoglobin stable and Work-up per gastroenterology for possible GI bleed..  Entered: Edward Carranza MD 11/13/2020, 7:47 AM       The patient's care was discussed with the Patient.    Edward Carranza MD  Hospitalist Service, [address]   Contact information available via [facility] Paging/Directory  Please see sign in/sign out for up to date coverage information  ______________________________________________________________________    Interval History   Patient was interviewed this morning while sitting upright in his bed.  He denies abdominal pain, shortness of breath, black stool, blood in his stool, nausea, vomiting, fevers, chills.    Data reviewed today: I reviewed all medications, new labs and imaging results  over the last 24 hours. I personally reviewed no images or EKG's today.    Physical Exam   Vital Signs: Temp: 95.1  F (35.1  C) Temp src: Oral BP: 93/66 Pulse: 71   Resp: 18 SpO2: 98 % O2 Device: None (Room air)    Weight: 218 lbs 11.14 oz  Constitutional: Awake, alert, sitting at edge of bed, appears comfortable, appears stated age.  Respiratory: Normal work of breathing, no accessory muscle use, lungs clear to auscultation bilaterally.  Cardiovascular: Regular rate and rhythm, S1, S2.  Bilateral pitting lower extremity edema.  GI: Nontender, nondistended, no rebound, no guarding.  Skin: Warm.  Musculoskeletal: there is no redness, warmth, or swelling of the joints  Neurologic: Moves all extremities equally, sensation intact.  Neuropsychiatric: General: normal, calm and normal eye contact  Level of consciousness: alert / normal  Affect: normal  Orientation: oriented to self, place, time and situation  Memory and insight: normal, memory for past and recent events intact and thought process normal    Data   Recent Labs   Lab 11/13/20  0555 11/12/20  2131 11/12/20  1212 11/12/20  0706   WBC 6.8  --  6.8 7.6   HGB 7.6* 7.8* 6.2* 6.5*   MCV 73*  --  70* 70*     --  318 326   INR  --   --  1.43*  --      --  134 132*   POTASSIUM 4.5  --  3.9 4.2   CHLORIDE 103  --  100 99   CO2 26  --  25 26   BUN 69*  --  93* 72*   CR 2.46*  --  2.53* 2.56*   ANIONGAP 8  --  8 8   RONNY 8.7  --  8.5 8.5   *  --  128* 136*   ALBUMIN 2.9*  --  2.9*  --    PROTTOTAL 7.3  --  7.2  --    BILITOTAL 4.2*  --  2.2*  --    ALKPHOS 159*  --  151*  --    ALT 17  --  18  --    AST 12  --  18  --

## 2020-11-13 NOTE — SUMMARY OF CARE
Pt came with the following items:  1. Bag  2.Cell phone  3.Wallet  4.underwear  5 phone code  6.glasses

## 2020-11-13 NOTE — CONSULTS
GASTROENTEROLOGY CONSULTATION      Date of Admission:  11/12/2020          Chief Complaint:   We were asked by Dr. Leon of [place] [id number] to evaluate this patient with acute on chronic WILLIAM.          ASSESSMENT AND RECOMMENDATIONS:   Assessment:  Cole Jesus is a 60 year old male with a history of DMII, CKD stage IV, anemia, CAD S/P pRCA s/p balloon angioplasty and stenting in 6/2019, STEMI s/p DESx3 for total occlusion of RCA and Lcx (2012), chronic systolic CHF EF 20-30% on TTE 2/2020, afib, ischemic cardiomyopathy, severe pulmonary HTN, HLD, recurrent GI bleed is admitted on 11/12 after he was found to have anemia of 6.5 in a clinic visit for fatigue.   The patient was transfused 2 units of PRBCs, hemoglobin up to 7.8, PlT 295, INR 1.4, TB 4.2 (DB 1.9), AST 17, ALT 12, , Albumin 2.9, creatinine 2.7, BUN 93. The patient was found to have WILLIAM on 11/12/20 with iron 12, ferritin of 11. Patient had U/S abdomen on 11/13 that showed coarse liver, splenomegaly and moderate ascites.   - most recent EGD 7/2019 showed bleeding AVM in the duodenum that was treated with clips, most recent colonoscopy in 2011 before ileocecectomy. Colonoscopy as an outpatient was recommended by GI in 2019, but was not done by the patient.     # Acute on chronic iron deficiency anemia   - baseline hemoglobin was 12-13 g/dl up to May 2020  - hemoglobin ~ 7 g/dl over the last 6 months  - vitally stable, no evidence of overt GI bleeding per patient since August 2020  - patient is not on blood thinners, only ASA 81 mg, no NSAIDs  - DD: GI blood loss from AVM given CKD IV and Hx of AVM, PUD, esophagitis, variceal bleeding, portal hypertensive gastropathy.     # Suspicion of chronic liver disease/liver cirrhosis   - Coarse liver echotexure, splenomegaly and moderate ascites  - Elevated bilirubin TB 4.2 (DB 1.9), high INR 1.4, albumin 2.9  - Wide DD: liver cirrhosis from heart failure, liver congestion from HF and moderate tricuspid  regurgitation and pulmonary hypertension, NAFLD,       # CAD S/P PCI in 2019  # Cardiomyopathy with EF of 20-30% on TTE on 2/2020   # Severe pulmonary HTN diagnosed no RHC  - managed by primary and cardiology     Other chronic medical conditions'  # Type 2 DM  # Stage IV chronic kidney disease      Recommendations  - discussed with the patient, patient refused inpatient evaluation for iron deficiency anemia  - Recommendations for outpatient GI follow up and outpatient upper and lower endoscopy  - Recommend paracentesis to evaluate for liver cirrhosis with diagnostic for SAAG and total protein to differentiate intrinsic cirrhosis vs. Cardiac cirrhosis.     Gastroenterology follow up recommendations: Outpatient follow up with JOSE in 1-2 weeks      Patient care plan discussed with Dr. Aranda, Jose Prieto MD, GI staff physician. Thank you for involving us in this patient's care. Please do not hesitate to contact the GI service with any questions or concerns.     Caron Curtis M.D  GI fellow  [id number]  Department of Gastroenterology   -------------------------------------------------------------------------------------------------------------------   History is obtained from the patient and the medical record.          History of Present Illness:   Cole Jesus is a 60 year old male with a history of DMII, CKD stage IV, anemia, CAD, chronic systolic CHF, STEMI, afib, ischemic cardiomyopathy, severe pulmonary HTN, HLD, recurrent GI bleed is admitted on 11/12 after he was found to have anemia of 6.5 in a clinic visit for fatigue.   The patient was transfused 2 units of PRBCs, hemoglobin up to 7.8, PlT 295, INR 1.4, TB 4.2, DB 1.9, AST 17, ALT 12, , Albumin 2.9, creatinine 2.7, BUN 93. The patient was found to have WILLIAM on 11/12/20 with iron 12, ferritin of 11. Patient had U/S abdomen on 11/13 that showed coarse liver, splenomegaly and moderate ascites.   The patient was interviewed, he denied overt GI bleeding  of melena, hematochezia and hematemesis. He said she gets a BM daily, brown. No vomiting, dysphagia or abdominal pain at home. No NSAIDS, alcohol or smoking.   The patient was seen by GI before, he had at home cardiac arrest from LAD STEMI in 2011 that was complicated by severe lower GI bleeding from cecal ulceration seen in colonoscopy 12/25 that was treated endoscopically, but recurred with anticoagulation and had another colonoscopy on 12/29 with hemostasis, but bleeding recurred again and she needed ileocecectomy during that hospitalization 12/31/2011 and a takedown of ileostomy in 2014. The patient was seen by GI in 7/2019 for anemia and melena while on ASA/plavix and warfarin and was found to have bleeding AVM in the second portion of duodenum that was treated with two clips and epi injection with hemostasis on 7/5/2019. IN addition, the patient had multiple presentation with anemia and melena over the last year with multiple ED visits. Patient had anticoagulation stopped and is only on ASA in 2020. He was seen by GI inpatient in 8/2020 for anemia and melena two weeks prior to GI consult, but no endoscopy was done.             Past Medical History:   Reviewed and edited as appropriate  Past Medical History:   Diagnosis Date     Anemia in chronic renal disease 3/9/2015     Antiplatelet or antithrombotic long-term use      Atrial fibrillation and flutter      CAD (coronary artery disease)     Stemi in 12/11, s/p angioplasty     CRD (chronic renal disease), stage IV (H) 03/09/2015    hx ATN with dialysis complicating cardiogenic shock  1/2012     GI bleed     massive lower GI bleed secondary to a cecal ulcer, s/p ileocecal resection in 12/11     Heart failure     Biventricular systolic HF, complicated by ARDS requiring tracheostomy     Hyperlipidemia LDL goal <100 4/28/2013     Hypertension      Ischemic cardiomyopathy 4/28/2013    TTE revealing 40% EF     Myocardial infarction (H)      Other and unspecified  nonspecific immunological findings     Anti JKa     Pulmonary edema     episodes of flash pulmonary edema in 12/11     Subclinical hypothyroidism             Past Surgical History:   Reviewed and edited as appropriate   Past Surgical History:   Procedure Laterality Date     CV RIGHT HEART CATH N/A 11/12/2019    Procedure: CV RIGHT HEART CATH;  Surgeon: Fox Gerard MD;  Location:  HEART CARDIAC CATH LAB     CV RIGHT HEART CATH N/A 2/10/2020    Procedure: CV RIGHT HEART CATH;  Surgeon: Fox Gerard MD;  Location:  HEART CARDIAC CATH LAB     ESOPHAGOSCOPY, GASTROSCOPY, DUODENOSCOPY (EGD), COMBINED  12/25/2011    Procedure:COMBINED ESOPHAGOSCOPY, GASTROSCOPY, DUODENOSCOPY (EGD); Surgeon:SAMARIA PUENTE; Location: GI     LAPAROTOMY EXPLORATORY  12/31/2011    Procedure:LAPAROTOMY EXPLORATORY; Explore laparotomy, Illeocectomy, Diverting Illeostomy; Surgeon:GIA NAJERA; Location:[facility] OR     ORIF right elbow fracture  age 12     TAKEDOWN ILEOSTOMY  6/5/2014    Procedure: TAKEDOWN ILEOSTOMY;  Surgeon: Gai Najera MD;  Location: [facility] OR     TRACHEOSTOMY  12/22/2011    Procedure:TRACHEOSTOMY; Tracheostomy; 80XLTCP-Proximal Extension-Cuffed 8.0 mm I.D.; Surgeon:LZIABETH DYE; Location:[facility] OR            Previous Endoscopy:     EGD 7/5/2019  Impression:          - A single bleeding angioectasia in the second portion                        of the duodenum. This is the source of the patient's                        bleeding and anemia. Injected, and two clips were placed                        with excellent hemostasis.                        - Normal esophagus.                        - Red blood and a mobile clot in the gastric antrum,                        refluxed from duodenum.                        - No specimens collected.   Recommendation:      - [facility] care.                        - Clear liquid diet  today.                        - OK to stop PPI                        - Restart anticoagulation today.                        - Monitor stool output, expect melena for the next 24-36                        hours                        - If there is concern for recurrent bleeding, please                        page GI team.                        - Trend Hgb, goal >7 from GI perspective, closely                        monitor INR levels                        - Further recommendations per inpatient GI team       Results for orders placed or performed during the hospital encounter of 12/03/11   COLONOSCOPY   Result Value Ref Range    COLONOSCOPY       [facility], [place]   [address] [place] [address] [phone]     Endoscopy Department  _______________________________________________________________________________  Patient Name: Cole Dunn         Procedure Date: 12/29/2011 10:12:SS   A12/P12  MRN: [id number]                       Account Number: [id number]                  YOB: 1960              Admit Type: Inpatient                       Age: 51                               Room:                                       Gender: Male                          Note Status: Finalized                      Attending MD: Seb Sims MD         Pause for the Cause: Pause for the cause   completed  _______________________________________________________________________________     Procedure:                Colonoscopy  Indications:              Hematochezia, Pt in ICU, had bleeding from cecum                             48h ago, injected with epi, now bleeding again.  Providers:                Seb Sims MD, Carlos Castillo, RN  Referring MD:             Javier Gan Md, MD  Medicines:                Midazolam 3 mg IV, Fentanyl 100 micrograms IV  Complications:            No immediate  complications  _______________________________________________________________________________  Procedure:                Pre-Anesthesia Assessment:                            - Prior to the procedure, a History and Physical                             was performed, and patient medications and                             allergies were reviewed. The patient is unable to                             give consent secondary to the patient's altered                             mental status. The risks and benefits of the                             procedure and the sedation options and risks were                             discussed with the patient's spouse. All questions                             were answered and informed consent was obtained.                             Patient identification and proposed procedure were                             verified by the physician and the nurse in the                             procedure room. Mental Status Examination: sedated.                             Airway Examination: normal oropharyngeal airway and                             neck mobility. Respiratory Examination: clear to                             auscultation. CV Examination: normal. Prophylactic                             Antibiotics: The patient does not require                             prophylactic antibiotics. Prior Anticoagulants: The                             patient has taken no previous anticoagulant or                             antiplatelet agents. ASA Grade Assessment: II - A                             patient with mild systemic disease. After reviewing                             the risks and benefits, the patient was deemed in                             satisfactory condition to undergo the procedure.                             The anesthesia plan was to use moderate sedation /                             analgesia (conscious sedation). Immediately prior                              to administration of medications, the patient was                             re-assessed for adequacy to receive sedatives. The                             heart rate, respiratory rate, oxygen saturations,                             blood pressure, adequacy of pulmonary ventilation,                             and response to care were monitored throughout the                             procedure. The physical status of the patient was                             re-assessed after the procedure.                            After obtaining informed consent, the colonoscope                             was passed under direct vision. Throughout the                             procedure, the patient's blood pressure, pulse, and                             oxygen saturations were monitored continuously. The                             Colonoscope was introduced through the anus and                             advanced to the cecum, identified by appendiceal                             orifice & ileocecal valve. The colonoscopy was                             performed without difficulty. The patient tolerated                             the procedure well. The quality of the bowel                             preparation was fair.                                                                                   Findings:       The perianal and digital rectal examinations were normal. Red blood was        found in the entire colon. A single (solitary) 8 mm ulcer was found in        the cecum. Ulcer had a vessel, spurting bleeding was present. Three        hemostatic clips were successfully placed. Bleeding had stopped at the        end of the procedure. Area was successfully injected with 3.5 mL of a        1:10,000 solution of epinephrine for drug delivery.                                                                                   Impression:               - A single (solitary) ulcer in cecum with a                              bleeding vessel, treated by placement of three                             clips and epinephrine injection.  Recommendation:           - Monitor hgb/hct                            - Avoid anticoagulation.                            - Clear liquid diet.                            - Call GI team if evidence of re-bleeding                                                                                     electronically signed by DORINDA Sims  ________________  Seb Sims MD  Signed Date: 2011 18:12:SS A12/P12  Number of Addenda: 0  I was physically present for the entire viewing portion of the exam.  __________________________  Signature of teaching physician  B4c/D4c  Note Initiated On: 2011 10:12:SS A12/P12  Scope Withdrawal Time: 0 hours 0 minutes 0 seconds   Total Procedure Duration: 0 hours 0 minutes 0 seconds             Social History:   Reviewed and edited as appropriate  Social History     Socioeconomic History     Marital status: Significant other     Spouse name: Not on file     Number of children: Not on file     Years of education: Not on file     Highest education level: Not on file   Occupational History     Not on file   Social Needs     Financial resource strain: Not on file     Food insecurity     Worry: Not on file     Inability: Not on file     Transportation needs     Medical: Not on file     Non-medical: Not on file   Tobacco Use     Smoking status: Former Smoker     Packs/day: 2.00     Years: 40.00     Pack years: 80.00     Types: Cigarettes     Quit date: 12/3/2011     Years since quittin.9     Smokeless tobacco: Never Used   Substance and Sexual Activity     Alcohol use: No     Alcohol/week: 0.0 standard drinks     Drug use: No     Sexual activity: Yes     Partners: Female   Lifestyle     Physical activity     Days per week: Not on file     Minutes per session: Not on file     Stress: Not on file   Relationships     Social connections     Talks on phone: Not on  file     Gets together: Not on file     Attends Gnosticist service: Not on file     Active member of club or organization: Not on file     Attends meetings of clubs or organizations: Not on file     Relationship status: Not on file     Intimate partner violence     Fear of current or ex partner: Not on file     Emotionally abused: Not on file     Physically abused: Not on file     Forced sexual activity: Not on file   Other Topics Concern     Parent/sibling w/ CABG, MI or angioplasty before 65F 55M? Yes      Service Not Asked     Blood Transfusions Not Asked     Caffeine Concern Not Asked     Occupational Exposure Not Asked     Hobby Hazards Not Asked     Sleep Concern Not Asked     Stress Concern Not Asked     Weight Concern Not Asked     Special Diet Not Asked     Back Care Not Asked     Exercise Yes     Comment: limited walking     Bike Helmet Not Asked     Seat Belt Not Asked     Self-Exams Not Asked   Social History Narrative     Not on file            Family History:   Reviewed and edited as appropriate  Family History   Problem Relation Age of Onset     Diabetes Mother      Hypertension Mother      Heart Disease Mother      Heart Disease Father          of heart attack, left when he was young     Diabetes Sister      Diabetes Sister      Diabetes Sister      Glaucoma No family hx of      Macular Degeneration No family hx of            Allergies:   Reviewed and edited as appropriate     Allergies   Allergen Reactions     Hydralazine      Black spots, verified allergic reaction by skin biopsy     Isosorbide      Per pt got a rash all over his body and won't take it no more     Simvastatin Other (See Comments)     Leg muscle weakness     Tylenol [Acetaminophen] Palpitations            Medications:     Current Facility-Administered Medications   Medication     glucose gel 15-30 g    Or     dextrose 50 % injection 25-50 mL    Or     glucagon injection 1 mg     insulin aspart (NovoLOG) injection (RAPID  "ACTING)     insulin aspart (NovoLOG) injection (RAPID ACTING)     iron sucrose (VENOFER) 300 mg in sodium chloride 0.9 % 250 mL intermittent infusion     lidocaine (LMX4) cream     lidocaine 1 % 0.1-1 mL     melatonin tablet 1 mg     ondansetron (ZOFRAN-ODT) ODT tab 4 mg    Or     ondansetron (ZOFRAN) injection 4 mg     pantoprazole (PROTONIX) IV push injection 40 mg     polyethylene glycol (MIRALAX) Packet 17 g     potassium chloride ER (KLOR-CON M) CR tablet 40 mEq     senna-docusate (SENOKOT-S/PERICOLACE) 8.6-50 MG per tablet 1 tablet    Or     senna-docusate (SENOKOT-S/PERICOLACE) 8.6-50 MG per tablet 2 tablet     sodium chloride (PF) 0.9% PF flush 3 mL     sodium chloride (PF) 0.9% PF flush 3 mL     torsemide (DEMADEX) tablet 80 mg     vitamin B complex with vitamin C (STRESS TAB) tablet 1 tablet     Vitamin D3 (CHOLECALCIFEROL) tablet 25 mcg             Review of Systems:     A complete review of systems was performed and is negative except as noted in the HPI           Physical Exam:   BP 93/66 (BP Location: Right arm)   Pulse 71   Temp 95.1  F (35.1  C) (Oral)   Resp 18   Ht 1.727 m (5' 8\")   Wt 99.2 kg (218 lb 11.1 oz)   SpO2 98%   BMI 33.25 kg/m    Wt:   Wt Readings from Last 2 Encounters:   11/12/20 99.2 kg (218 lb 11.1 oz)   08/28/20 93.9 kg (207 lb)      Constitutional: cooperative, pleasant, not dyspneic/diaphoretic, no acute distress  Eyes: Sclera anicteric/injected  Ears/nose/mouth/throat: Normal oropharynx without ulcers or exudate, mucus membranes moist, hearing intact  Neck: supple, thyroid normal size  CV: No edema  Respiratory: Unlabored breathing  Lymph: No axillary, submandibular, supraclavicular or inguinal lymphadenopathy  Abd: Distended, +bs, no hepatosplenomegaly, nontender, no peritoneal signs  Skin: warm, perfused, no jaundice  Neuro: AAO x 3, No asterixis  Psych: Normal affect  MSK: Normal gait         Data:   Labs and imaging below were independently reviewed and " interpreted    BMP  Recent Labs   Lab 11/13/20  0555 11/12/20  1212 11/12/20  0706    134 132*   POTASSIUM 4.5 3.9 4.2   CHLORIDE 103 100 99   RONNY 8.7 8.5 8.5   CO2 26 25 26   BUN 69* 93* 72*   CR 2.46* 2.53* 2.56*   * 128* 136*     CBC  Recent Labs   Lab 11/13/20  0555 11/12/20  1212 11/12/20  1212 11/12/20  0706   WBC 6.8  --  6.8 7.6   RBC 3.85*  --  3.47* 3.54*   HGB 7.6*   < > 6.2* 6.5*   HCT 28.1*  --  24.1* 24.7*   MCV 73*  --  70* 70*   MCH 19.7*  --  17.9* 18.4*   MCHC 27.0*  --  25.7* 26.3*   RDW 23.3*  --  21.2* 21.1*     --  318 326    < > = values in this interval not displayed.     INR  Recent Labs   Lab 11/12/20  1212   INR 1.43*     LFTs  Recent Labs   Lab 11/13/20  0555 11/12/20  1212   ALKPHOS 159* 151*   AST 12 18   ALT 17 18   BILITOTAL 4.2* 2.2*   PROTTOTAL 7.3 7.2   ALBUMIN 2.9* 2.9*      PANCNo lab results found in last 7 days.    Imaging:    U/S abdomen on 11/13/20  1.  Patent hepatic vasculature with antegrade flow.  2.  Cholelithiasis without evidence of acute cholecystitis.  3.  Increased echogenicity of the right kidney compatible with medical  renal disease.  4.  Ascites.

## 2020-11-13 NOTE — H&P
[facility]     Cardiology Consult Note-Cards 1      Date of Admission:  11/12/2020  Consult Requested by: Medicine  Reason for Consult: Heart Failure Management     Assessment & Plan: HVSL   Cole Jesus is a 60 year old male admitted on 11/12/2020. He occluded with ischemic cardiomyopathy not on GDMT and prior GI bleeds presenting for evaluation of anemia concerning for possible recurrent GI bleed. Responded well to transfusions, bleed appears slow/stable, decision made to pursue outpatient workup per patient's preferences. Plan for discharge today. No changes in cardiac medications made this admission.     Recommendations:  - follow up with [facility]  - continue current home medication regimen  - goals of care discussion initiated, should be continued outpatient given poor prognosis      The patient's care was discussed with the Attending Physician, Dr. Lincoln Mcdermott, Patient and Primary team.    Garrett De La Rosa MD, MSc  Cardiovascular Disease Fellow  [facility]      ______________________________________________________________________    Reason for consult:  GI  Bleed and History of HFrEF    History is obtained from the patient and electronic health record    History of Present Illness   Cole Jesus is a 60 year old male with history of occluded pRCA s/p balloon angioplasty and stenting in 6/2019, STEMI s/p DESx3 for total occlusion of RCA and Lcx (2012), GIB due to AV malformation (in the setting of warfarin and DAPT) s/p EGD with clip (7/5/2019), ischemic cardiomyopathy, HFrEF (last EF=30-35% 11/12/19), severe pHTN, moderate mitral regurgitation, atrial fibrillation, CKD stage IV.  Recently seen in virtual visit by [facility] on 10/10/20. Dry weight ~ 208 lbs. His metolazone was recently held due to increase in his creatinine, takes intermittently for additional diuresis  Under direction of [facility]  providers.    Patient is beta-blocker and statin intolerant, and has not been on an ACEi or aldosterone antagonist due to his advanced renal dysfunction. He is not on anticoagulation for his Afib de to history of AVM's and GI bleeding and has been unwilling to retry.     He is now admitted for anemia workup as routine labs revealed Hgb 6.2 yesterday. GI consulted for possible EGD given history of GI bleed.  Cardiology consulted to management heart failure medications in setting of possible GI bleed.    He reports chronic dyspnea upon low levels of exertion but denies chest pain, cough, lightheadedness, dizziness, orthopnea or PND. No recent melena, hematochezia or hematemesis. No sick contacts to his knowledge.     Review of Systems   The 10 point Review of Systems is negative other than noted in the HPI or here.     Past Medical History    I have reviewed this patient's medical history and updated it with pertinent information if needed.   Past Medical History:   Diagnosis Date     Anemia in chronic renal disease 3/9/2015     Antiplatelet or antithrombotic long-term use      Atrial fibrillation and flutter      CAD (coronary artery disease)     Stemi in 12/11, s/p angioplasty     CRD (chronic renal disease), stage IV (H) 03/09/2015    hx ATN with dialysis complicating cardiogenic shock  1/2012     GI bleed     massive lower GI bleed secondary to a cecal ulcer, s/p ileocecal resection in 12/11     Heart failure     Biventricular systolic HF, complicated by ARDS requiring tracheostomy     Hyperlipidemia LDL goal <100 4/28/2013     Hypertension      Ischemic cardiomyopathy 4/28/2013    TTE revealing 40% EF     Myocardial infarction (H)      Other and unspecified nonspecific immunological findings     Anti JKa     Pulmonary edema     episodes of flash pulmonary edema in 12/11     Subclinical hypothyroidism        Past Surgical History   I have reviewed this patient's surgical history and updated it with pertinent  information if needed.  Past Surgical History:   Procedure Laterality Date     CV RIGHT HEART CATH N/A 2019    Procedure: CV RIGHT HEART CATH;  Surgeon: Fox Gerard MD;  Location:  HEART CARDIAC CATH LAB     CV RIGHT HEART CATH N/A 2/10/2020    Procedure: CV RIGHT HEART CATH;  Surgeon: Fox Gerard MD;  Location:  HEART CARDIAC CATH LAB     ESOPHAGOSCOPY, GASTROSCOPY, DUODENOSCOPY (EGD), COMBINED  2011    Procedure:COMBINED ESOPHAGOSCOPY, GASTROSCOPY, DUODENOSCOPY (EGD); Surgeon:SAMARIA PUENTE; Location: GI     LAPAROTOMY EXPLORATORY  2011    Procedure:LAPAROTOMY EXPLORATORY; Explore laparotomy, Illeocectomy, Diverting Illeostomy; Surgeon:GIA NAJERA; Location:[facility]     ORIF right elbow fracture  age 12     TAKEDOWN ILEOSTOMY  2014    Procedure: TAKEDOWN ILEOSTOMY;  Surgeon: Gia Najera MD;  [facility]: [facility]     TRACHEOSTOMY  2011    Procedure:TRACHEOSTOMY; Tracheostomy; 80XLTCP-Proximal Extension-Cuffed 8.0 mm I.D.; Surgeon:LIZABETH DYE; Location: OR       Social History   I have reviewed this patient's social history and updated it with pertinent information if needed.  Social History     Tobacco Use     Smoking status: Former Smoker     Packs/day: 2.00     Years: 40.00     Pack years: 80.00     Types: Cigarettes     Quit date: 12/3/2011     Years since quittin.9     Smokeless tobacco: Never Used   Substance Use Topics     Alcohol use: No     Alcohol/week: 0.0 standard drinks     Drug use: No     Family History   I have reviewed this patient's family history and updated it with pertinent information if needed.   I have reviewed this patient's family history and updated it with pertinent information if needed.  Family History   Problem Relation Age of Onset     Diabetes Mother      Hypertension Mother      Heart Disease Mother      Heart Disease Father          of heart attack, left when he  was young     Diabetes Sister      Diabetes Sister      Diabetes Sister      Glaucoma No family hx of      Macular Degeneration No family hx of        Medications   Medications Prior to Admission   Medication Sig Dispense Refill Last Dose     aspirin (ASA) 81 MG chewable tablet Take 81 mg by mouth daily   11/12/2020     metolazone (ZAROXOLYN) 2.5 MG tablet Take 1 tablet (2.5 mg) by mouth three times a week 39 tablet 3 Past Week     potassium chloride ER (KLOR-CON M) 20 MEQ CR tablet Take 2 tablets (40 mEq) by mouth 2 times daily 360 tablet 3 11/12/2020     senna-docusate (SENOKOT-S/PERICOLACE) 8.6-50 MG tablet Take 1 tablet by mouth 2 times daily as needed for constipation   11/11/2020     torsemide (DEMADEX) 20 MG tablet Take 4 tablets (80 mg) by mouth 2 times daily 360 tablet 4 11/12/2020     vitamin  B complex with vitamin C (VITAMIN  B COMPLEX) TABS Take 1 tablet by mouth daily   11/12/2020     Vitamin D, Cholecalciferol, 25 MCG (1000 UT) CAPS Take 1 capsule by mouth 2 times daily   11/12/2020     ACCU-CHEK GUIDE test strip USE TO TEST BLOOD SUGAR FOUR TIMES A DAY BEFORE MEALS AND AT BEDTIME 400 each 3 Unknown     Alcohol Swabs PADS 1 applicator 4 times daily (before meals and nightly) 100 each 3 Unknown     blood glucose monitoring (ACCU-CHEK FASTCLIX) lancets USE TO TEST BLOOD SUGAR FOUR TIMES A DAY AT MEALS AND AT BEDTIME 400 each 3 Unknown     insulin pen needle (32G X 4 MM) 32G X 4 MM miscellaneous Use 5 pen needles daily or as directed. 200 each 3 Unknown     Sharps Container MISC 1 Device every 30 days 1 each 3 Unknown     Current Facility-Administered Medications   Medication     glucose gel 15-30 g    Or     dextrose 50 % injection 25-50 mL    Or     glucagon injection 1 mg     insulin aspart (NovoLOG) injection (RAPID ACTING)     insulin aspart (NovoLOG) injection (RAPID ACTING)     iron sucrose (VENOFER) 300 mg in sodium chloride 0.9 % 250 mL intermittent infusion     lidocaine (LMX4) cream      lidocaine 1 % 0.1-1 mL     melatonin tablet 1 mg     ondansetron (ZOFRAN-ODT) ODT tab 4 mg    Or     ondansetron (ZOFRAN) injection 4 mg     pantoprazole (PROTONIX) IV push injection 40 mg     polyethylene glycol (MIRALAX) Packet 17 g     potassium chloride ER (KLOR-CON M) CR tablet 40 mEq     senna-docusate (SENOKOT-S/PERICOLACE) 8.6-50 MG per tablet 1 tablet    Or     senna-docusate (SENOKOT-S/PERICOLACE) 8.6-50 MG per tablet 2 tablet     sodium chloride (PF) 0.9% PF flush 3 mL     sodium chloride (PF) 0.9% PF flush 3 mL     torsemide (DEMADEX) tablet 80 mg     vitamin B complex with vitamin C (STRESS TAB) tablet 1 tablet     Vitamin D3 (CHOLECALCIFEROL) tablet 25 mcg       Allergies   Allergies   Allergen Reactions     Hydralazine      Black spots, verified allergic reaction by skin biopsy     Isosorbide      Per pt got a rash all over his body and won't take it no more     Simvastatin Other (See Comments)     Leg muscle weakness     Tylenol [Acetaminophen] Palpitations       Physical Exam   Vital Signs: Temp: 95.1  F (35.1  C) Temp src: Oral BP: 93/66 Pulse: 71   Resp: 18 SpO2: 98 % O2 Device: None (Room air)    Weight: 218 lbs 11.14 oz    General Appearance: AOx3, NAD, laying flat in bed  Eyes: PERRLA EOMI  HEENT: supple  Respiratory: CTAB  Cardiovascular: rrr no m/r/g; 2+ distal pulses  GI: soft nt/nd  Lymph/Hematologic: no LAD  Genitourinary: deferred  Skin: warm well perfused  Musculoskeletal: 5/5 strength in all 4 ext  Neurologic: CN II-XII intact  Psychiatric: normal affect, responds appropriately to questions      Data   I personally reviewed the EKG tracing showing sinus tachycardia with 1st degree AV conduction delay, rightward axis (~100 deg), no ST or TW abnormalities.  Results for orders placed or performed during the hospital encounter of 11/12/20 (from the past 24 hour(s))   CBC with platelets differential   Result Value Ref Range    WBC 6.8 4.0 - 11.0 10e9/L    RBC Count 3.47 (L) 4.4 - 5.9 10e12/L     Hemoglobin 6.2 (LL) 13.3 - 17.7 g/dL    Hematocrit 24.1 (L) 40.0 - 53.0 %    MCV 70 (L) 78 - 100 fl    MCH 17.9 (L) 26.5 - 33.0 pg    MCHC 25.7 (L) 31.5 - 36.5 g/dL    RDW 21.2 (H) 10.0 - 15.0 %    Platelet Count 318 150 - 450 10e9/L    Diff Method Automated Method     % Neutrophils 80.8 %    % Lymphocytes 4.1 %    % Monocytes 12.7 %    % Eosinophils 1.6 %    % Basophils 0.4 %    % Immature Granulocytes 0.4 %    Nucleated RBCs 2 (H) 0 /100    Absolute Neutrophil 5.5 1.6 - 8.3 10e9/L    Absolute Lymphocytes 0.3 (L) 0.8 - 5.3 10e9/L    Absolute Monocytes 0.9 0.0 - 1.3 10e9/L    Absolute Eosinophils 0.1 0.0 - 0.7 10e9/L    Absolute Basophils 0.0 0.0 - 0.2 10e9/L    Abs Immature Granulocytes 0.0 0 - 0.4 10e9/L    Absolute Nucleated RBC 0.1    ABO/Rh type and screen   Result Value Ref Range    Units Ordered 3     ABO O     RH(D) Pos     Antibody Screen Neg     Test Valid Only At          [facility]    Specimen Expires 11/15/2020     Crossmatch Red Blood Cells    Blood component   Result Value Ref Range    Unit Number [id number]     Blood Component Type Red Blood Cells Leukocyte Reduced     Division Number 00     Status of Unit Released to care unit     Blood Product Code E2804X57     Unit Status ISS    Blood component   Result Value Ref Range    Unit Number [id number]     Blood Component Type Red Blood Cells Leukocyte Reduced     Division Number 00     Status of Unit Released to care unit     Blood Product Code [id number]     Unit Status ISS    Comprehensive metabolic panel   Result Value Ref Range    Sodium 134 133 - 144 mmol/L    Potassium 3.9 3.4 - 5.3 mmol/L    Chloride 100 94 - 109 mmol/L    Carbon Dioxide 25 20 - 32 mmol/L    Anion Gap 8 3 - 14 mmol/L    Glucose 128 (H) 70 - 99 mg/dL    Urea Nitrogen 93 (H) 7 - 30 mg/dL    Creatinine 2.53 (H) 0.66 - 1.25 mg/dL    GFR Estimate 26 (L) >60 mL/min/[1.73_m2]    GFR Estimate If Black 30 (L) >60 mL/min/[1.73_m2]     Calcium 8.5 8.5 - 10.1 mg/dL    Bilirubin Total 2.2 (H) 0.2 - 1.3 mg/dL    Albumin 2.9 (L) 3.4 - 5.0 g/dL    Protein Total 7.2 6.8 - 8.8 g/dL    Alkaline Phosphatase 151 (H) 40 - 150 U/L    ALT 18 0 - 70 U/L    AST 18 0 - 45 U/L   Blood component   Result Value Ref Range    Unit Number [id number]     Blood Component Type Red Blood Cells Leukocyte Reduced     Division Number 00     Status of Unit Ready for patient 11/12/2020 1655     Blood Product Code [id number]     Unit Status JULIO CESAR    Iron and iron binding capacity   Result Value Ref Range    Iron 12 (L) 35 - 180 ug/dL    Iron Binding Cap 447 (H) 240 - 430 ug/dL    Iron Saturation Index 3 (L) 15 - 46 %   Ferritin   Result Value Ref Range    Ferritin 11 (L) 26 - 388 ng/mL   INR   Result Value Ref Range    INR 1.43 (H) 0.86 - 1.14   EKG 12-lead, complete   Result Value Ref Range    Interpretation ECG Click View Image link to view waveform and result    UA reflex to Microscopic   Result Value Ref Range    Color Urine Yellow     Appearance Urine Clear     Glucose Urine Negative NEG^Negative mg/dL    Bilirubin Urine Negative NEG^Negative    Ketones Urine Negative NEG^Negative mg/dL    Specific Gravity Urine 1.009 1.003 - 1.035    Blood Urine Negative NEG^Negative    pH Urine 5.5 5.0 - 7.0 pH    Protein Albumin Urine Negative NEG^Negative mg/dL    Urobilinogen mg/dL Normal 0.0 - 2.0 mg/dL    Nitrite Urine Negative NEG^Negative    Leukocyte Esterase Urine Negative NEG^Negative    Source Midstream Urine    Magnesium   Result Value Ref Range    Magnesium 2.7 (H) 1.6 - 2.3 mg/dL   Hemoglobin   Result Value Ref Range    Hemoglobin 7.8 (L) 13.3 - 17.7 g/dL   Glucose by meter   Result Value Ref Range    Glucose 125 (H) 70 - 99 mg/dL   Glucose by meter   Result Value Ref Range    Glucose 105 (H) 70 - 99 mg/dL   Comprehensive metabolic panel   Result Value Ref Range    Sodium 137 133 - 144 mmol/L    Potassium 4.5 3.4 - 5.3 mmol/L    Chloride 103 94 - 109 mmol/L    Carbon Dioxide  26 20 - 32 mmol/L    Anion Gap 8 3 - 14 mmol/L    Glucose 101 (H) 70 - 99 mg/dL    Urea Nitrogen 69 (H) 7 - 30 mg/dL    Creatinine 2.46 (H) 0.66 - 1.25 mg/dL    GFR Estimate 27 (L) >60 mL/min/[1.73_m2]    GFR Estimate If Black 31 (L) >60 mL/min/[1.73_m2]    Calcium 8.7 8.5 - 10.1 mg/dL    Bilirubin Total 4.2 (H) 0.2 - 1.3 mg/dL    Albumin 2.9 (L) 3.4 - 5.0 g/dL    Protein Total 7.3 6.8 - 8.8 g/dL    Alkaline Phosphatase 159 (H) 40 - 150 U/L    ALT 17 0 - 70 U/L    AST 12 0 - 45 U/L   CBC with platelets   Result Value Ref Range    WBC 6.8 4.0 - 11.0 10e9/L    RBC Count 3.85 (L) 4.4 - 5.9 10e12/L    Hemoglobin 7.6 (L) 13.3 - 17.7 g/dL    Hematocrit 28.1 (L) 40.0 - 53.0 %    MCV 73 (L) 78 - 100 fl    MCH 19.7 (L) 26.5 - 33.0 pg    MCHC 27.0 (L) 31.5 - 36.5 g/dL    RDW 23.3 (H) 10.0 - 15.0 %    Platelet Count 295 150 - 450 10e9/L     *Note: Due to a large number of results and/or encounters for the requested time period, some results have not been displayed. A complete set of results can be found in Results Review.

## 2020-11-13 NOTE — PROGRESS NOTES
11/13/20 1001   Quick Adds   Type of Visit Initial PT Evaluation   Living Environment   People in home significant other   Current Living Arrangements house   Home Accessibility stairs to enter home;stairs within home   Number of Stairs, Main Entrance 4   Stair Railings, Main Entrance railing on left side (ascending)   Number of Stairs, Within Home, Primary 8   Stair Railings, Within Home, Primary railing on left side (ascending)   Transportation Anticipated family or friend will provide   Living Environment Comments pt reports there are 4 stairs to enter home in back entrance with railing on L. there is a front entrance with stairs as well but more difficult to use. 8 stairs within home to reach basement where laundry is. SO provides transportation.    Self-Care   Usual Activity Tolerance good   Current Activity Tolerance moderate   Regular Exercise Yes   Activity/Exercise Type biking  (stationary)   Exercise Amount/Frequency daily   Equipment Currently Used at Home none   Activity/Exercise/Self-Care Comment pt reports he likes to mall walk for activity.    Disability/Function   Hearing Difficulty or Deaf no   Wear Glasses or Blind yes   Vision Management reading   Concentrating, Remembering or Making Decisions Difficulty no   Difficulty Communicating no   Difficulty Eating/Swallowing no   Walking or Climbing Stairs Difficulty yes   Walking or Climbing Stairs   (due to fatigue)   Dressing/Bathing Difficulty no   Toileting no   Doing Errands Independently Difficulty (such as shopping) yes   Errands Management girlfriend completes   Fall history within last six months no   Change in Functional Status Since Onset of Current Illness/Injury yes   General Information   Onset of Illness/Injury or Date of Surgery 11/12/20   Referring Physician Rocío Soto PA   Patient/Family Therapy Goals Statement (PT) pt did not state any goals   Pertinent History of Current Problem (include personal factors and/or comorbidities  that impact the POC) Cole Jesus is a 60 year old male admitted on 11/12/2020. He has a history of DMII, CKD stage IV, anemia, CAD, chronic systolic CHF, STEMI, afib, ischemic cardiomyopathy, severe pulmonary HTN, HLD, GI bleed, who presented to the ED after routine labs revealed severe anemia with hgb 6.5.   Existing Precautions/Restrictions fall   General Observations activity orders: up with assist   Cognition   Orientation Status (Cognition) oriented x 4   Affect/Mental Status (Cognition) WNL   Follows Commands (Cognition) WNL   Pain Assessment   Patient Currently in Pain No   Integumentary/Edema   Integumentary/Edema Comments 1+ pitting edema in B LE, pt states he has compression stockings at home   Posture    Posture Forward head position;Protracted shoulders   Range of Motion (ROM)   ROM Quick Adds ROM WFL   ROM Comment per observation, B UE and LE WFL   Strength   Manual Muscle Testing Quick Adds Strength WFL   Strength Comments per obervation, B UE and LE strength >3/5   Bed Mobility   Comment (Bed Mobility) pt IND with bed mobility   Transfers   Transfer Safety Comments pt IND with supine>sit and sit<> stand transfers   Gait/Stairs (Locomotion)   [place] Level (Gait) independent   Comment (Gait/Stairs) pt ambulated 10' without device and SBA. pt demostrated slighlty slow gait speed, equal step length and steady without device.    Sensory Examination   Sensory Perception Comments denies numbness and tingling in B LE   Clinical Impression   Criteria for Skilled Therapeutic Intervention yes, treatment indicated   PT Diagnosis (PT) limited activity tolerance   Influenced by the following impairments deconditioning, low hemoglobin   Functional limitations due to impairments ambulation and stairs   Clinical Presentation Stable/Uncomplicated   Clinical Presentation Rationale pt presentation, clinical judgement    Clinical Decision Making (Complexity) low complexity   Therapy Frequency (PT) 4x/week    Predicted Duration of Therapy Intervention (days/wks) 1 week   Planned Therapy Interventions (PT) balance training;gait training;home exercise program;neuromuscular re-education;strengthening   Risk & Benefits of therapy have been explained evaluation/treatment results reviewed;care plan/treatment goals reviewed;risks/benefits reviewed;patient   Clinical Impression Comments PT eval completed, treatment initiated. pt is 59 y/o male with limited activity tolerance due to deconditioning. pt would benefit from skilled PT to improve endurance.    PT Discharge Planning    PT Discharge Recommendation (DC Rec) home   PT Rationale for DC Rec PT: pt is mobilizing near baseline, endurance has already shown improvements. live with SO who is able to provide assistance if needed.   PT Brief overview of current status  IND bed mobility and transfers, SBA ambulation without device   Total Evaluation Time   Total Evaluation Time (Minutes) 5

## 2020-11-13 NOTE — PLAN OF CARE
ASSUMED CARES: [id number].  STATUS: Pt admitted 11/12 for GIB.   NEURO:  A/o x 4.   VS: VSS on RA- Soft BP (90/60's).   ACTIVITY: Up IND.   PAIN: Denies pain.   CARDIAC: +Cardiac Hx- STEMI and Cardiac Arrest (2012). +Tele- Afib.   RESP: Diminished LS.   GI/: Voiding spontaneously. BM x 1- Formed- No blood per report.   DIET: NPO  SKIN: Intact- Scar Midline. Lotion applied to back and stomach for itching.   LDA'S: L PIV SL. R PIV SL.   LABS: Trending Hgb Q8H (7.8). Creat 2.53.   CHANGES THIS SHIFT: No changes noted- Pt able to sleep in between cars.   POC: Cont with POC. Possible EGD today. Plan for discharge home once Hgb stable and diuresis complete. Call light within reach. Will continue to monitor

## 2020-11-13 NOTE — DISCHARGE SUMMARY
[facility]   Hospitalist Discharge Summary      Date of Admission:  11/12/2020  Date of Discharge:  11/13/2020  Discharging Provider: Edward Carranza MD  Discharge Team: Hospitalist Service, Gold 11    Discharge Diagnoses   Acute on chronic anemia, possibly secondary to GI bleed  History of GI bleed secondary to AVM  Iron deficiency anemia  CKD stage IV  Chronic systolic heart failure from ischemic cardiomyopathy  Severe pulmonary hypertension  Coronary artery disease  Paroxysmal atrial fibrillation  Hyperbilirubinemia  Elevated alkaline phosphatase  Type 2 diabetes mellitus  Hyperlipidemia    Follow-ups Needed After Discharge   Follow-up Appointments     Adult [facility] Follow-up and recommended labs and tests      Follow up with primary care provider, Silas Enciso, within 7   days for hospital follow- up.  The following labs/tests are recommended:   hemoglobin, BMP.      Appointments on [place] and/or [facility] (with [facility] or [facility]   provider or service). Call [phone] if you haven't heard regarding   these appointments within 7 days of discharge.             Unresulted Labs Ordered in the Past 30 Days of this Admission     Date and Time Order Name Status Description    11/13/2020 1308 Haptoglobin In process     11/12/2020 1520 Asymptomatic COVID-19 Virus (Coronavirus) by PCR In process           Discharge Disposition   Discharged to home  Condition at discharge: Stable    Hospital Course    Admitted to the hospital with acute on chronic anemia and generalized weakness; hemoglobin was less than 7 and stool guaiac was positive in the ED.  Was treated with packed red blood cell infusion twice daily IV PPI and was evaluated by gastroenterology.  Decision made not to pursue inpatient endoscopy and patient was discharged on twice daily oral PPI.  Given severe iron deficiency patient was given 1 infusion of IV iron and discharged on iron supplement; he has  tolerated this variably in the past. He will follow-up in 7 to 14 days with gastroenterology and will follow up with cardiology in core clinic.      I discussed with him to follow-up with his primary care clinic in the next few days to have a recheck of his hemoglobin.  I also instructed him to hold off on taking aspirin until he follows up with gastroenterology.    -------------------------------------------------------  Acute on chronic anemia, possibly secondary to GI bleed  History of GI bleed secondary to AVM  History of iron deficiency anemia  Admitted to the hospital with acute on chronic anemia, generalized weakness and was found to have positive guaiac in emergency department.  Was given 2 units packed red blood cells started on IV PPI.  He has history of bleeding from AVM on EGD 7/2019.  At this time does not appear to be having brisk GI bleed however might have slow bleed from AVM given history of such.  GI will not pursue endoscopy while inpatient however they will see him in the outpatient setting for follow-up     CKD stage IV  Follows with Dr. Vigil, last seen 12/2019. Baseline creatine 2.3-2.  Received 2 units packed red blood cells in recent months. Creatinine at baseline on admission.   -IV iron infusion x1.    -Discharged on oral iron supplement, Monday Wednesday Friday.    HFrEF (LVEF 29%)  ICM   Severe pulmonary hypertension  Followed by CORE clinic and Dr. Trujillo. Last Echo 2/2020 with Severely reduced LV function with diffuse hypokinesis LVEF 29%. Moderate mitral insufficiency, mod tricuspid insufficiency, pulmonary HTN. Not on bblocker due to unable to tolerate + decompensated HF, not on ACEi d/t hyperkalemia/renal dysfucntion, not on spironolactone d/t renal dysfunction, declined ICD multiple times in the past even though he is full code. Has significant pitting bilateral lower extremity edema and weight is up.  Was evaluated by cardiology while inpatient and they recommended continuing  outpatient medications on discharge.  They will see him in follow-up in core clinic.  -Continue medications on discharge.     CAD  Hx of STEMI s/p DESx3 for total occlusion of RCA and Lcx (2012). occluded pRCA s/p balloon angioplasty and stenting in 6/2019. No anginal signs or symptoms on admission. PTA on ASA. Intolerant of BB and statin therapy per cardiology notes.   - Hold ASA given concern for GI bleed as above.     Paroxysmal Afib   CHADSVASC 3 (HF, DM, CAD). Not on a/c due to history of GI bleed.    - Not on rate control.  - Hold anticoagulation discussions given concern for GI bleed.     Hyperbilirubinemia   Elevated alk phos   Both direct and indirect bilirubin elevated without significant pathology noted on abdominal ultrasound.     DMII  A1c 6.8. Diet controlled at home.      HLD  - Intolerant of statin    Consultations This Hospital Stay   CARDIOLOGY HEART FAILURE (HF) IP CONSULT  GI LUMINAL ADULT IP CONSULT  PHYSICAL THERAPY ADULT IP CONSULT  OCCUPATIONAL THERAPY ADULT IP CONSULT  MEDICATION HISTORY IP PHARMACY CONSULT  CARE MANAGEMENT / SOCIAL WORK IP CONSULT    Code Status   Full Code    Time Spent on this Encounter   I, Edward Carranza MD, personally saw the patient today and spent greater than 30 minutes discharging this patient.       Edward Carranza MD  [facility] [address] [address]  Phone: [phone]  ______________________________________________________________________    Physical Exam   Vital Signs: Temp: 95  F (35  C) Temp src: Oral BP: 106/66 Pulse: 81   Resp: 16 SpO2: 98 % O2 Device: None (Room air)    Weight: 218 lbs 11.14 oz  Constitutional: Awake, alert, sitting at edge of bed, appears comfortable, appears stated age.  Respiratory: Normal work of breathing, no accessory muscle use, lungs clear to auscultation bilaterally.  Cardiovascular: Regular rate and rhythm, S1, S2.  Bilateral pitting lower extremity edema.  GI: Nontender, nondistended, no  rebound, no guarding.  Skin: Warm.  Musculoskeletal: there is no redness, warmth, or swelling of the joints  Neurologic: Moves all extremities equally, sensation intact.  Neuropsychiatric: General: normal, calm and normal eye contact  Level of consciousness: alert / normal  Affect: normal  Orientation: oriented to self, place, time and situation  Memory and insight: normal, memory for past and recent events intact and thought process normal       Primary Care Physician   Silas Enciso    Discharge Orders      Reason for your hospital stay    You were in the hospital for anemia. Your anemia is likely from multiple causes, potentially including some blood loss in your GI tract as well as some chronic anemia related to your kidney disease.     Adult [facility] Follow-up and recommended labs and tests    Follow up with primary care provider, Silas Enciso, within 7 days for hospital follow- up.  The following labs/tests are recommended: hemoglobin, BMP.      Appointments on [place] and/or [facility] (with [facility] or [facility] provider or service). Call [phone] if you haven't heard regarding these appointments within 7 days of discharge.     Activity    Your activity upon discharge: activity as tolerated     When to contact your care team    Call your primary doctor if you have any of the following:lightheadedness, dizziness, bloody stool, black stool, abdominal pain.     Discharge Instructions    Hold aspirin until you follow up with gastroenterology clinic in 7-14 days.     We will start an oral iron supplement that you can take every Monday, Wednesday and Friday.     We will continue a twice per day antacid (PPI) until you see gastroenterology.     Full Code     Diet    Follow this diet upon discharge: Orders Placed This Encounter      Fluid restriction 2000 ML FLUID      Regular Diet Adult       Significant Results and Procedures   Most Recent 3 CBC's:  Recent Labs   Lab Test 11/13/20  1320  11/13/20  0555 11/12/20  2131 11/12/20  1212 11/12/20  0706   WBC  --  6.8  --  6.8 7.6   HGB 7.8* 7.6* 7.8* 6.2* 6.5*   MCV  --  73*  --  70* 70*   PLT  --  295  --  318 326       Discharge Medications   Current Discharge Medication List      START taking these medications    Details   ferrous sulfate (FEROSUL) 325 (65 Fe) MG tablet Take 1 tablet (325 mg) by mouth Every Mon, Wed, Fri Morning  Qty: 24 tablet, Refills: 0    Associated Diagnoses: Anemia in stage 4 chronic kidney disease (H)      pantoprazole (PROTONIX) 40 MG EC tablet Take 1 tablet (40 mg) by mouth 2 times daily  Qty: 60 tablet, Refills: 0    Associated Diagnoses: Gastrointestinal hemorrhage with melena         CONTINUE these medications which have NOT CHANGED    Details   aspirin (ASA) 81 MG chewable tablet Take 81 mg by mouth daily      metolazone (ZAROXOLYN) 2.5 MG tablet Take 1 tablet (2.5 mg) by mouth three times a week  Qty: 39 tablet, Refills: 3    Associated Diagnoses: Ischemic cardiomyopathy      potassium chloride ER (KLOR-CON M) 20 MEQ CR tablet Take 2 tablets (40 mEq) by mouth 2 times daily  Qty: 360 tablet, Refills: 3    Associated Diagnoses: Acute on chronic systolic heart failure (H)      senna-docusate (SENOKOT-S/PERICOLACE) 8.6-50 MG tablet Take 1 tablet by mouth 2 times daily as needed for constipation      torsemide (DEMADEX) 20 MG tablet Take 4 tablets (80 mg) by mouth 2 times daily  Qty: 360 tablet, Refills: 4    Associated Diagnoses: Acute on chronic systolic heart failure (H)      vitamin  B complex with vitamin C (VITAMIN  B COMPLEX) TABS Take 1 tablet by mouth daily    Associated Diagnoses: Atrial tachycardia (H); Renal insufficiency; Ischemic cardiomyopathy      Vitamin D, Cholecalciferol, 25 MCG (1000 UT) CAPS Take 1 capsule by mouth 2 times daily      ACCU-CHEK GUIDE test strip USE TO TEST BLOOD SUGAR FOUR TIMES A DAY BEFORE MEALS AND AT BEDTIME  Qty: 400 each, Refills: 3    Associated Diagnoses: Type 2 diabetes mellitus  without complication, without long-term current use of insulin (H)      Alcohol Swabs PADS 1 applicator 4 times daily (before meals and nightly)  Qty: 100 each, Refills: 3    Associated Diagnoses: Type 2 diabetes mellitus without complication, without long-term current use of insulin (H)      blood glucose monitoring (ACCU-CHEK FASTCLIX) lancets USE TO TEST BLOOD SUGAR FOUR TIMES A DAY AT MEALS AND AT BEDTIME  Qty: 400 each, Refills: 3    Associated Diagnoses: Type 2 diabetes mellitus without complication, without long-term current use of insulin (H)      insulin pen needle (32G X 4 MM) 32G X 4 MM miscellaneous Use 5 pen needles daily or as directed.  Qty: 200 each, Refills: 3    Associated Diagnoses: Type 2 diabetes mellitus without complication, without long-term current use of insulin (H)      Sharps Container MISC 1 Device every 30 days  Qty: 1 each, Refills: 3    Associated Diagnoses: Type 2 diabetes mellitus without complication, without long-term current use of insulin (H)           Allergies   Allergies   Allergen Reactions     Hydralazine      Black spots, verified allergic reaction by skin biopsy     Isosorbide      Per pt got a rash all over his body and won't take it no more     Simvastatin Other (See Comments)     Leg muscle weakness     Tylenol [Acetaminophen] Palpitations

## 2020-11-13 NOTE — UTILIZATION REVIEW
"Admission Status; Secondary Review Determination     Under the authority of the Utilization Management Committee, the utilization review process indicated a secondary review on the above patient.  The review outcome is based on review of the medical records, discussions with staff, and applying clinical experience noted on the date of the review.       (x) Observation Status Appropriate - This patient does not meet hospital inpatient criteria and is placed in observation status. If this patient's primary payer is Medicare and was admitted as an inpatient, Condition Code 44 should be used and patient status changed to \"observation\".     RATIONALE FOR DETERMINATION: 60-year-old male with significant coronary artery disease history, history of GI bleeding secondary to an AV malformation in the setting of warfarin and dual antiplatelet agents with an EGD intervention and clipping in 2019, underlying ischemic cardiomyopathy with severe pulmonary hypertension and stage IV chronic kidney disease.  Patient was found to have hemoglobin of 6.2 during routine lab evaluation and notes that he is somewhat easily tired.  No signs or symptoms of active GI bleeding.  Chemistries reveal marked iron deficiency anemia.  Observation care appropriate to manage patient's marked anemia due to iron deficiency anemia with 3 evaluation by gastroenterology for possible intervention.      The severity of illness, intensity of service provided, expected LOS and risk for adverse outcome make the care appropriate for further observation; however, doesn't meet criteria for hospital inpatient admission. This was discussed with attending physician who concurred with this determination.    The information on this document is developed by the utilization review team in order for the business office to ensure compliance.  This only denotes the appropriateness of proper admission status and does not reflect the quality of care rendered.         The " definitions of Inpatient Status and Observation Status used in making the determination above are those provided in the CMS Coverage Manual, Chapter 1 and Chapter 6, section 70.4.      Sincerely,     Manuel Jerome MD    Physician Advisor  Utilization Review/ Case Management  [facility].

## 2020-11-13 NOTE — PHARMACY-ADMISSION MEDICATION HISTORY
"  Admission Medication History Completed by Pharmacy    See [facility] Admission Navigator for allergy information, preferred outpatient pharmacy, prior to admission medications and immunization status.     Medication History Sources:     Patient    Sure Scripts    Changes made to PTA medication list (reason):    Added: None    Deleted:   o Nitroglycerin 0.4mg tablet: place 1 tablet under the tongue every 5 minutes prn chest pain for up to 3 doses    Changed: None    Additional Information:    Patient was a good historian of his medication and was able to give doses and the last time he took each.    Patient states that he does not have any nitroglycerin on hand to use if needed.    Patient states that he only uses metolazone 2.5mg occasionally or \"when his doctor tells him to\" with his last dose being a few days ago.    Prior to Admission medications    Medication Sig Last Dose Taking? Auth Provider   aspirin (ASA) 81 MG chewable tablet Take 81 mg by mouth daily 11/12/2020 Yes Unknown, Entered By History   metolazone (ZAROXOLYN) 2.5 MG tablet Take 1 tablet (2.5 mg) by mouth three times a week Past Week Yes Evelin Lancaster APRN CNP   potassium chloride ER (KLOR-CON M) 20 MEQ CR tablet Take 2 tablets (40 mEq) by mouth 2 times daily 11/12/2020 Yes Dian Leblanc APRN CNP   senna-docusate (SENOKOT-S/PERICOLACE) 8.6-50 MG tablet Take 1 tablet by mouth 2 times daily as needed for constipation 11/11/2020 Yes Unknown, Entered By History   torsemide (DEMADEX) 20 MG tablet Take 4 tablets (80 mg) by mouth 2 times daily 11/12/2020 Yes Dian Leblanc APRN CNP   vitamin  B complex with vitamin C (VITAMIN  B COMPLEX) TABS Take 1 tablet by mouth daily 11/12/2020 Yes Reported, Patient   Vitamin D, Cholecalciferol, 25 MCG (1000 UT) CAPS Take 1 capsule by mouth 2 times daily 11/12/2020 Yes Unknown, Entered By History   ACCU-CHEK GUIDE test strip USE TO TEST BLOOD SUGAR FOUR TIMES A DAY BEFORE MEALS AND AT BEDTIME Unknown  " Smiley Argueta PA-C   Alcohol Swabs PADS 1 applicator 4 times daily (before meals and nightly) Unknown  Arina Haynes APRN CNP   blood glucose monitoring (ACCU-CHEK FASTCLIX) lancets USE TO TEST BLOOD SUGAR FOUR TIMES A DAY AT MEALS AND AT BEDTIME Unknown  Smiley Argueta PA-C   insulin pen needle (32G X 4 MM) 32G X 4 MM miscellaneous Use 5 pen needles daily or as directed. Unknown  Arina Haynes APRN CNP   Sharps Container MISC 1 Device every 30 days Unknown  Arina Haynes APRN CNP       Date completed: 11/12/20    Medication history completed by: Zina Ray

## 2020-11-13 NOTE — PLAN OF CARE
"Pt was admitted to  for low Hgb levels & hypotensive. Pt arrive on unit at 1930. Cares were resumed until 2330    BP 99/69 (BP Location: Right arm)   Pulse 94   Temp 95.3  F (35.2  C) (Oral)   Resp 18   Ht 1.727 m (5' 8\")   Wt 99.2 kg (218 lb 11.1 oz)   SpO2 98%   BMI 33.25 kg/m       Pt A&O x4. VSS on RA. Pt denies SOB and chest pain. Denies pain & nausea. 2-PIV flushed, SL. Two person skin check done, skin intact. Urinates spontaneously, UA sent to lab, results WDL. No Bm on shift. NPO. , no replacement given per protocol. Hbg check 7.8 & Mg check 2.7. Will continue to monitor for bleeding. Pt appears to be resting between cares.     Events: NPO for possible EGD tomorrow.     Plan of Care: Continuing monitoring patient and notify MD of any changes.    Kelsi Wheeler RN on 11/12/2020 at 10:45 PM      "

## 2020-11-14 NOTE — SUMMARY OF CARE
Discharged to: home   Transportation: wheelchair to ED door, girlfriend picked up  Time: 1840  Prescriptions: filled in discharge pharmacy, given to pt  Belongings: all taken with pt to home  PIV/Access: none  Paperwork: AVS printed, signed by pt and Irene COLLADO RN

## 2020-11-16 NOTE — PROGRESS NOTES
[facility]: Post-Discharge Note  SITUATION                                                      Admission:    Admission Date: 11/12/20   Reason for Admission: Acute on chronic anemia, possibly secondary to GI bleed  Discharge:   Discharge Date: 11/13/20  Discharge Diagnosis: Acute on chronic anemia, possibly secondary to GI bleed  Discharge Service: hospitalist  Discharge Plan: fu GI 7-14 days    BACKGROUND                                                      Admitted to the hospital with acute on chronic anemia and generalized weakness; hemoglobin was less than 7 and stool guaiac was positive in the ED.  Was treated with packed red blood cell infusion twice daily IV PPI and was evaluated by gastroenterology.  Decision made not to pursue inpatient endoscopy and patient was discharged on twice daily oral PPI.  Given severe iron deficiency patient was given 1 infusion of IV iron and discharged on iron supplement; he has tolerated this variably in the past. He will follow-up in 7 to 14 days with gastroenterology and will follow up with cardiology in core clinic.       I discussed with him to follow-up with his primary care clinic in the next few days to have a recheck of his hemoglobin.  I also instructed him to hold off on taking aspirin until he follows up with gastroenterology.    ASSESSMENT      Discharge Assessment  Patient reports symptoms are: Improved  Does the patient have all of their medications?: Yes(took iron supp today)  Does patient know what their new medications are for?: Yes  Does patient have a follow-up appointment scheduled?: No  Does patient have any other questions or concerns?: No    Post-op  Did the patient have surgery or a procedure: No  Fever: No  Chills: No  Eating & Drinking: eating and drinking without complaints/concerns  PO Intake: regular diet  Bowel Function: normal  Urinary Status: voiding without complaint/concerns        PLAN                                                       Outpatient Plan:      No future appointments.        Patricia Jorgensen

## 2020-11-16 NOTE — PLAN OF CARE
Physical Therapy Discharge Summary    Reason for therapy discharge:    Discharged to home.    Progress towards therapy goal(s). See goals on Care Plan in [facility] electronic health record for goal details.  Goals partially met.  Barriers to achieving goals:   discharge from facility and discharge on same date as initial evaluation.    Therapy recommendation(s):    Continued therapy is recommended.  Rationale/Recommendations:  continue HEP.

## 2020-11-20 NOTE — TELEPHONE ENCOUNTER
Cole recently released from hospital for low hemoglobin.  He is feeling well although thinks me may be up a little in weight, although denies SOB and swelling.  He would like to follow up with Evelin sooner rather than later.  Agreed on 11/24- will get labs in AM.

## 2020-11-24 NOTE — PROGRESS NOTES
11/13/20 1500   Quick Adds   Type of Visit Initial Occupational Therapy Evaluation  (edema)   Integumentary/Edema   Integumentary/Edema other (describe)   Edema General Information   Onset of Edema   (acute on chronic)   Affected Body Part(s) Left LE;Right LE   Edema Etiology Other (see comments)  (cardiomyopathy, coronary artery disease)   Etiology Comments reduced heart function, decreased muscle pump activation, hypervolemia   General Comments/Previous Edema Treatment/Edema Equipment Pt has has previous edema treatment during hospital stay.    Edema Examination/Assessment   Skin Condition Pitting;Intact;Dryness   Skin Condition Comments Skin intact but dry, shiny, and taut with firm 2-3+ pitting edema present.    Scar No   Ulcerations No   Pitting Assessment 2-3+   Range of Motion Comprehensive   General Range of Motion no range of motion deficits identified   Strength Comprehensive (MMT)   General Manual Muscle Testing (MMT) Assessment no strength deficits identified   Clinical Impression   Criteria for Skilled Therapeutic Interventions Met (OT) yes   OT Diagnosis Decreased IADL-I   Edema: Patient Presentation Edema   Influenced by the following impairments general deconditioning, fatigue, and BLE edema   Assessment of Occupational Performance 1-3 Performance Deficits   Identified Performance Deficits home management and community mobility   Edema: Planned Interventions Gradient compression bandaging;Education   Clinical Decision Making Complexity (OT) low complexity   Therapy Frequency (OT) 1x eval and treat   Predicted Duration of Therapy 11/13/2020   Risks and Benefits of Treatment have been explained. Yes   Patient, Family & other staff in agreement with plan of care Yes   Total Evaluation Time (Minutes)   Total Evaluation Time (Minutes) 2

## 2020-11-24 NOTE — LETTER
"11/24/2020      RE: Cole Jesus  [address]  [facility] [address]       Dear Colleague,    Thank you for the opportunity to participate in the care of your patient, Cole Jesus, at the [facility] HEART [facility] at [facility]. Please see a copy of my visit note below.    Cole Jesus is a 60 year old male who is being evaluated via a billable telephone visit.      The patient has been notified of following:     \"This telephone visit will be conducted via a call between you and your physician/provider. We have found that certain health care needs can be provided without the need for a physical exam.  This service lets us provide the care you need with a short phone conversation.  If a prescription is necessary we can send it directly to your pharmacy.  If lab work is needed we can place an order for that and you can then stop by our lab to have the test done at a later time.    Telephone visits are billed at different rates depending on your insurance coverage. During this emergency period, for some insurers they may be billed the same as an in-person visit.  Please reach out to your insurance provider with any questions.    If during the course of the call the physician/provider feels a telephone visit is not appropriate, you will not be charged for this service.\"    Patient has given verbal consent for Telephone visit?  Yes    What phone number would you like to be contacted at? [phone]   How would you like to obtain your AVS? Renaet    Vitals - Patient Reported  Weight (Patient Reported): 99.8 kg (220 lb)  Height (Patient Reported): 172.7 cm (5' 8\")  BMI (Based on Pt Reported Ht/Wt): 33.45  Pain Score: No Pain (0)(No SOB)    Vitals were taken and medication reconciled.    DIANNA Esteves  2:58 PM    HPI: 60 yr old male called for follow up of systolic heart failure with an EF of 29% . He was recently hospitalized with anemia and a hgb 6.5. " After blood transfusions and iron infusion he was discharged and scheduled to follow up with GI. Pt denies SOB, orthopnea, PND, chest pain, loss of appetite,  Pt states energy level is slowly improving. He states his legs do get edematous by the end of the day, and that his belly feels hard. He has not been taking his metolazone as the drug store made an error in the refill. He does get SOB with activity, but he can walk about 1 block without difficulty.   His home wt is 220# and his BP (when he has had it checked ) 100/70. His blood glucose is 100-120.     He was supposed to get his labs done last Friday, but will get them done in 2 days.   He has no specific concerns today.   He has not had a flu vaccine and will get one when he goes in for his lab.     PAST MEDICAL HISTORY:  Past Medical History:   Diagnosis Date     Anemia in chronic renal disease 3/9/2015     Antiplatelet or antithrombotic long-term use      Atrial fibrillation and flutter      CAD (coronary artery disease)     Stemi in , s/p angioplasty     CRD (chronic renal disease), stage IV (H) 2015    hx ATN with dialysis complicating cardiogenic shock  2012     GI bleed     massive lower GI bleed secondary to a cecal ulcer, s/p ileocecal resection in      Heart failure     Biventricular systolic HF, complicated by ARDS requiring tracheostomy     Hyperlipidemia LDL goal <100 2013     Hypertension      Ischemic cardiomyopathy 2013    TTE revealing 40% EF     Myocardial infarction (H)      Other and unspecified nonspecific immunological findings     Anti JKa     Pulmonary edema     episodes of flash pulmonary edema in      Subclinical hypothyroidism        FAMILY HISTORY:  Family History   Problem Relation Age of Onset     Diabetes Mother      Hypertension Mother      Heart Disease Mother      Heart Disease Father          of heart attack, left when he was young     Diabetes Sister      Diabetes Sister      Diabetes  Sister      Glaucoma No family hx of      Macular Degeneration No family hx of        SOCIAL HISTORY:  Social History     Socioeconomic History     Marital status: Significant other     Spouse name: None     Number of children: None     Years of education: None     Highest education level: None   Occupational History     None   Social Needs     Financial resource strain: None     Food insecurity     Worry: None     Inability: None     Transportation needs     Medical: None     Non-medical: None   Tobacco Use     Smoking status: Former Smoker     Packs/day: 2.00     Years: 40.00     Pack years: 80.00     Types: Cigarettes     Quit date: 12/3/2011     Years since quittin.9     Smokeless tobacco: Never Used   Substance and Sexual Activity     Alcohol use: No     Alcohol/week: 0.0 standard drinks     Drug use: No     Sexual activity: Yes     Partners: Female   Lifestyle     Physical activity     Days per week: None     Minutes per session: None     Stress: None   Relationships     Social connections     Talks on phone: None     Gets together: None     Attends Advent service: None     Active member of club or organization: None     Attends meetings of clubs or organizations: None     Relationship status: None     Intimate partner violence     Fear of current or ex partner: None     Emotionally abused: None     Physically abused: None     Forced sexual activity: None   Other Topics Concern     Parent/sibling w/ CABG, MI or angioplasty before 65F 55M? Yes      Service Not Asked     Blood Transfusions Not Asked     Caffeine Concern Not Asked     Occupational Exposure Not Asked     Hobby Hazards Not Asked     Sleep Concern Not Asked     Stress Concern Not Asked     Weight Concern Not Asked     Special Diet Not Asked     Back Care Not Asked     Exercise Yes     Comment: limited walking     Bike Helmet Not Asked     Seat Belt Not Asked     Self-Exams Not Asked   Social History Narrative     None       CURRENT  MEDICATIONS:  Current Outpatient Medications   Medication Sig Dispense Refill     ACCU-CHEK GUIDE test strip USE TO TEST BLOOD SUGAR FOUR TIMES A DAY BEFORE MEALS AND AT BEDTIME 400 each 3     Alcohol Swabs PADS 1 applicator 4 times daily (before meals and nightly) 100 each 3     blood glucose monitoring (ACCU-CHEK FASTCLIX) lancets USE TO TEST BLOOD SUGAR FOUR TIMES A DAY AT MEALS AND AT BEDTIME 400 each 3     ferrous sulfate (FEROSUL) 325 (65 Fe) MG tablet Take 1 tablet (325 mg) by mouth Every Mon, Wed, Fri Morning 24 tablet 0     insulin pen needle (32G X 4 MM) 32G X 4 MM miscellaneous Use 5 pen needles daily or as directed. 200 each 3     metolazone (ZAROXOLYN) 2.5 MG tablet Take 1 tablet (2.5 mg) by mouth three times a week 39 tablet 3     pantoprazole (PROTONIX) 40 MG EC tablet Take 1 tablet (40 mg) by mouth 2 times daily 60 tablet 0     potassium chloride ER (KLOR-CON M) 20 MEQ CR tablet Take 2 tablets (40 mEq) by mouth 2 times daily 360 tablet 3     senna-docusate (SENOKOT-S/PERICOLACE) 8.6-50 MG tablet Take 1 tablet by mouth 2 times daily as needed for constipation       Sharps Container MISC 1 Device every 30 days 1 each 3     torsemide (DEMADEX) 20 MG tablet Take 4 tablets (80 mg) by mouth 2 times daily 360 tablet 4     vitamin  B complex with vitamin C (VITAMIN  B COMPLEX) TABS Take 1 tablet by mouth daily       Vitamin D, Cholecalciferol, 25 MCG (1000 UT) CAPS Take 1 capsule by mouth 2 times daily       aspirin (ASA) 81 MG chewable tablet Take 81 mg by mouth daily         ROS:   Constitutional: No fever, chills, or sweats. No weight gain/loss.   ENT: No visual disturbance, ear ache, epistaxis, sore throat.   Allergies/Immunologic: Negative.   Respiratory: No cough, hemoptysis.   Cardiovascular: As per HPI.   GI: No nausea, vomiting, hematemesis, melena, or hematochezia.   : No urinary frequency, dysuria, or hematuria.   Integument: Negative.   Psychiatric: Negative.   Neuro: Negative.   Endocrinology:  Negative.   Musculoskeletal: Negative.    Labs:  CBC RESULTS:  Lab Results   Component Value Date    WBC 6.8 11/13/2020    RBC 3.85 (L) 11/13/2020    HGB 7.8 (L) 11/13/2020    HCT 28.1 (L) 11/13/2020    MCV 73 (L) 11/13/2020    MCH 19.7 (L) 11/13/2020    MCHC 27.0 (L) 11/13/2020    RDW 23.3 (H) 11/13/2020     11/13/2020       CMP RESULTS:  Lab Results   Component Value Date     11/13/2020    POTASSIUM 4.5 11/13/2020    CHLORIDE 103 11/13/2020    CO2 26 11/13/2020    ANIONGAP 8 11/13/2020     (H) 11/13/2020    BUN 69 (H) 11/13/2020    CR 2.46 (H) 11/13/2020    GFRESTIMATED 27 (L) 11/13/2020    GFRESTBLACK 31 (L) 11/13/2020    RONNY 8.7 11/13/2020    BILITOTAL 4.2 (H) 11/13/2020    ALBUMIN 2.9 (L) 11/13/2020    ALKPHOS 159 (H) 11/13/2020    ALT 17 11/13/2020    AST 12 11/13/2020        INR RESULTS:  Lab Results   Component Value Date    INR 1.43 (H) 11/12/2020       Lab Results   Component Value Date    MAG 2.9 (H) 11/13/2020     Lab Results   Component Value Date    NTBNPI 5,524 (H) 08/13/2020     Lab Results   Component Value Date    NTBNP 6,974 (H) 07/01/2020       Assessment and Plan:   1. Chronic systoslic heart failure secondary to ischemic cardiomyopathy.    Stage C  NYHA Class III  ACEi/ARB no  BB no  Aldosterone antagonist no  SCD prophylaxis patient declined  % BiV pacing: N/A  Fluid status mildly hypervolemic based on sx and not taking diuretic    2. Acute on chronic anemia, possibly secondary to GI bleed    3. History of GI bleed secondary to AVM  4. Iron deficiency anemia - on iron supplement    5. CKD stage IV - awaiting updated labs    6. Coronary artery disease: on ASA, not on BB/Statin due to intolerance    7.Paroxysmal atrial fibrillation - On ASA    8. Type 2 diabetes mellitus- diet controlled    9.Hyperlipidemia- intolerant of statins       Follow-up 3 months    CC  Patient Care Team:  Silas Enciso MD as PCP - General  Dianna Davis APRN CNS as Registered Nurse  (Clinical Nurse Specialist)  Alen Trujillo MD as MD (Cardiology)  Coordinator,  Bmt Nurse as Nurse Coordinator (Cardiology)  Kavita Chapman LPN as LPN  Zach Phelan RN as Specialty Care Coordinator (Cardiology)  Smiley Argueta PA-C as Physician Assistant (Physician Assistant)  Patricia Vo, RN as Specialty Care Coordinator (Cardiology)  Ena Kruger PA-C as Cardiologist (Physician Assistant)  Diya Mackay, RN as Specialty Care Coordinator (Cardiology)  Dian Leblanc, GUILLE CNP as Assigned PCP  Ingris Martin, RN as Specialty Care Coordinator (Cardiology)  Shilpi Myers MD as Assigned Endocrinology Provider  Chad Babcock OD as Assigned Surgical Provider  Venus Vigil MD as Assigned Nephrology Provider  Alen Trujillo MD as Assigned Heart and Vascular Provider  BHASKAR MSITH      Please do not hesitate to contact me if you have any questions/concerns.     Sincerely,     GUILLE Walton CNP

## 2020-11-24 NOTE — PROGRESS NOTES
[facility]      OUTPATIENT OCCUPATIONAL THERAPY  EVALUATION  PLAN OF TREATMENT FOR OUTPATIENT REHABILITATION  (COMPLETE FOR INITIAL CLAIMS ONLY)  Patient's Last Name, First Name, M.I.  YOB: 1960  Cole Jesus                          Provider's Name  [facility] Medical Record No.  [id number]                               Onset Date:      Start of Care Date:  (P) 11/13/20     Type:     ___PT   _X_OT   ___SLP Medical Diagnosis:  (P) Chronic anemia                        OT Diagnosis:  Decreased IADL-I   Visits from SOC:  1   _________________________________________________________________________________  Plan of Treatment/Functional Goals    Planned Interventions:     Goals: See Occupational Therapy Goals on Care Plan in [employer] electronic health record.    Therapy Frequency: 1x eval and treat  Predicted Duration of Therapy Intervention: 11/13/2020  _________________________________________________________________________________    I CERTIFY THE NEED FOR THESE SERVICES FURNISHED UNDER        THIS PLAN OF TREATMENT AND WHILE UNDER MY CARE     (Physician co-signature of this document indicates review and certification of the therapy plan).                Certification date from: (P) 11/13/20, Certification date to: (P) 11/13/20                 Initial Assessment        See Occupational Therapy evaluation dated (P) 11/13/20 in Epic electronic health record.

## 2020-11-24 NOTE — PROGRESS NOTES
HPI: 60 yr old male called for follow up of systolic heart failure with an EF of 29% . He was recently hospitalized with anemia and a hgb 6.5. After blood transfusions and iron infusion he was discharged and scheduled to follow up with GI. Pt denies SOB, orthopnea, PND, chest pain, loss of appetite,  Pt states energy level is slowly improving. He states his legs do get edematous by the end of the day, and that his belly feels hard. He has not been taking his metolazone as the drug store made an error in the refill. He does get SOB with activity, but he can walk about 1 block without difficulty.   His home wt is 220# and his BP (when he has had it checked ) 100/70. His blood glucose is 100-120.     He was supposed to get his labs done last Friday, but will get them done in 2 days.   He has no specific concerns today.   He has not had a flu vaccine and will get one when he goes in for his lab.     PAST MEDICAL HISTORY:  Past Medical History:   Diagnosis Date     Anemia in chronic renal disease 3/9/2015     Antiplatelet or antithrombotic long-term use      Atrial fibrillation and flutter      CAD (coronary artery disease)     Stemi in 12/11, s/p angioplasty     CRD (chronic renal disease), stage IV (H) 03/09/2015    hx ATN with dialysis complicating cardiogenic shock  1/2012     GI bleed     massive lower GI bleed secondary to a cecal ulcer, s/p ileocecal resection in 12/11     Heart failure     Biventricular systolic HF, complicated by ARDS requiring tracheostomy     Hyperlipidemia LDL goal <100 4/28/2013     Hypertension      Ischemic cardiomyopathy 4/28/2013    TTE revealing 40% EF     Myocardial infarction (H)      Other and unspecified nonspecific immunological findings     Anti JKa     Pulmonary edema     episodes of flash pulmonary edema in 12/11     Subclinical hypothyroidism        FAMILY HISTORY:  Family History   Problem Relation Age of Onset     Diabetes Mother      Hypertension Mother      Heart Disease  Mother      Heart Disease Father          of heart attack, left when he was young     Diabetes Sister      Diabetes Sister      Diabetes Sister      Glaucoma No family hx of      Macular Degeneration No family hx of        SOCIAL HISTORY:  Social History     Socioeconomic History     Marital status: Significant other     Spouse name: None     Number of children: None     Years of education: None     Highest education level: None   Occupational History     None   Social Needs     Financial resource strain: None     Food insecurity     Worry: None     Inability: None     Transportation needs     Medical: None     Non-medical: None   Tobacco Use     Smoking status: Former Smoker     Packs/day: 2.00     Years: 40.00     Pack years: 80.00     Types: Cigarettes     Quit date: 12/3/2011     Years since quittin.9     Smokeless tobacco: Never Used   Substance and Sexual Activity     Alcohol use: No     Alcohol/week: 0.0 standard drinks     Drug use: No     Sexual activity: Yes     Partners: Female   Lifestyle     Physical activity     Days per week: None     Minutes per session: None     Stress: None   Relationships     Social connections     Talks on phone: None     Gets together: None     Attends Congregational service: None     Active member of club or organization: None     Attends meetings of clubs or organizations: None     Relationship status: None     Intimate partner violence     Fear of current or ex partner: None     Emotionally abused: None     Physically abused: None     Forced sexual activity: None   Other Topics Concern     Parent/sibling w/ CABG, MI or angioplasty before 65F 55M? Yes      Service Not Asked     Blood Transfusions Not Asked     Caffeine Concern Not Asked     Occupational Exposure Not Asked     Hobby Hazards Not Asked     Sleep Concern Not Asked     Stress Concern Not Asked     Weight Concern Not Asked     Special Diet Not Asked     Back Care Not Asked     Exercise Yes     Comment:  limited walking     Bike Helmet Not Asked     Seat Belt Not Asked     Self-Exams Not Asked   Social History Narrative     None       CURRENT MEDICATIONS:  Current Outpatient Medications   Medication Sig Dispense Refill     ACCU-CHEK GUIDE test strip USE TO TEST BLOOD SUGAR FOUR TIMES A DAY BEFORE MEALS AND AT BEDTIME 400 each 3     Alcohol Swabs PADS 1 applicator 4 times daily (before meals and nightly) 100 each 3     blood glucose monitoring (ACCU-CHEK FASTCLIX) lancets USE TO TEST BLOOD SUGAR FOUR TIMES A DAY AT MEALS AND AT BEDTIME 400 each 3     ferrous sulfate (FEROSUL) 325 (65 Fe) MG tablet Take 1 tablet (325 mg) by mouth Every Mon, Wed, Fri Morning 24 tablet 0     insulin pen needle (32G X 4 MM) 32G X 4 MM miscellaneous Use 5 pen needles daily or as directed. 200 each 3     metolazone (ZAROXOLYN) 2.5 MG tablet Take 1 tablet (2.5 mg) by mouth three times a week 39 tablet 3     pantoprazole (PROTONIX) 40 MG EC tablet Take 1 tablet (40 mg) by mouth 2 times daily 60 tablet 0     potassium chloride ER (KLOR-CON M) 20 MEQ CR tablet Take 2 tablets (40 mEq) by mouth 2 times daily 360 tablet 3     senna-docusate (SENOKOT-S/PERICOLACE) 8.6-50 MG tablet Take 1 tablet by mouth 2 times daily as needed for constipation       Sharps Container MISC 1 Device every 30 days 1 each 3     torsemide (DEMADEX) 20 MG tablet Take 4 tablets (80 mg) by mouth 2 times daily 360 tablet 4     vitamin  B complex with vitamin C (VITAMIN  B COMPLEX) TABS Take 1 tablet by mouth daily       Vitamin D, Cholecalciferol, 25 MCG (1000 UT) CAPS Take 1 capsule by mouth 2 times daily       aspirin (ASA) 81 MG chewable tablet Take 81 mg by mouth daily         ROS:   Constitutional: No fever, chills, or sweats. No weight gain/loss.   ENT: No visual disturbance, ear ache, epistaxis, sore throat.   Allergies/Immunologic: Negative.   Respiratory: No cough, hemoptysis.   Cardiovascular: As per HPI.   GI: No nausea, vomiting, hematemesis, melena, or  hematochezia.   : No urinary frequency, dysuria, or hematuria.   Integument: Negative.   Psychiatric: Negative.   Neuro: Negative.   Endocrinology: Negative.   Musculoskeletal: Negative.    Labs:  CBC RESULTS:  Lab Results   Component Value Date    WBC 6.8 11/13/2020    RBC 3.85 (L) 11/13/2020    HGB 7.8 (L) 11/13/2020    HCT 28.1 (L) 11/13/2020    MCV 73 (L) 11/13/2020    MCH 19.7 (L) 11/13/2020    MCHC 27.0 (L) 11/13/2020    RDW 23.3 (H) 11/13/2020     11/13/2020       CMP RESULTS:  Lab Results   Component Value Date     11/13/2020    POTASSIUM 4.5 11/13/2020    CHLORIDE 103 11/13/2020    CO2 26 11/13/2020    ANIONGAP 8 11/13/2020     (H) 11/13/2020    BUN 69 (H) 11/13/2020    CR 2.46 (H) 11/13/2020    GFRESTIMATED 27 (L) 11/13/2020    GFRESTBLACK 31 (L) 11/13/2020    RONNY 8.7 11/13/2020    BILITOTAL 4.2 (H) 11/13/2020    ALBUMIN 2.9 (L) 11/13/2020    ALKPHOS 159 (H) 11/13/2020    ALT 17 11/13/2020    AST 12 11/13/2020        INR RESULTS:  Lab Results   Component Value Date    INR 1.43 (H) 11/12/2020       Lab Results   Component Value Date    MAG 2.9 (H) 11/13/2020     Lab Results   Component Value Date    NTBNPI 5,524 (H) 08/13/2020     Lab Results   Component Value Date    NTBNP 6,974 (H) 07/01/2020       Assessment and Plan:   1. Chronic systoslic heart failure secondary to ischemic cardiomyopathy.    Stage C  NYHA Class III  ACEi/ARB no  BB no  Aldosterone antagonist no  SCD prophylaxis patient declined  % BiV pacing: N/A  Fluid status mildly hypervolemic based on sx and not taking diuretic    2. Acute on chronic anemia, possibly secondary to GI bleed    3. History of GI bleed secondary to AVM  4. Iron deficiency anemia - on iron supplement    5. CKD stage IV - awaiting updated labs    6. Coronary artery disease: on ASA, not on BB/Statin due to intolerance    7.Paroxysmal atrial fibrillation - On ASA    8. Type 2 diabetes mellitus- diet controlled    9.Hyperlipidemia- intolerant of  statins     Total time of telephone visit 20 minutes    Follow-up 3 months    CC  Patient Care Team:  Silas Enciso MD as PCP - General  Dianna Davis APRN CNS as Registered Nurse (Clinical Nurse Specialist)  Alen Trujillo MD as MD (Cardiology)  Coordinator,  Bmt Nurse as Nurse Coordinator (Cardiology)  Kavita Chapman LPN as LPN  Zach Phelan, RN as Specialty Care Coordinator (Cardiology)  Smiley Argueta PA-C as Physician Assistant (Physician Assistant)  Patricia Vo, RN as Specialty Care Coordinator (Cardiology)  Ena Kruger PA-C as Cardiologist (Physician Assistant)  Diya Mackay, RN as Specialty Care Coordinator (Cardiology)  Dian Leblanc APRN CNP as Assigned PCP  Ingris Martin, RN as Specialty Care Coordinator (Cardiology)  Shilpi Myers MD as Assigned Endocrinology Provider  Chad Babcock OD as Assigned Surgical Provider  Venus Vigil MD as Assigned Nephrology Provider  Ronnie, Alen Suggs MD as Assigned Heart and Vascular Provider  BHASKAR SMITH

## 2020-11-24 NOTE — PROGRESS NOTES
"Cole Jesus is a 60 year old male who is being evaluated via a billable telephone visit.      The patient has been notified of following:     \"This telephone visit will be conducted via a call between you and your physician/provider. We have found that certain health care needs can be provided without the need for a physical exam.  This service lets us provide the care you need with a short phone conversation.  If a prescription is necessary we can send it directly to your pharmacy.  If lab work is needed we can place an order for that and you can then stop by our lab to have the test done at a later time.    Telephone visits are billed at different rates depending on your insurance coverage. During this emergency period, for some insurers they may be billed the same as an in-person visit.  Please reach out to your insurance provider with any questions.    If during the course of the call the physician/provider feels a telephone visit is not appropriate, you will not be charged for this service.\"    Patient has given verbal consent for Telephone visit?  Yes    What phone number would you like to be contacted at? [phone]   How would you like to obtain your AVS? Gloria    Vitals - Patient Reported  Weight (Patient Reported): 99.8 kg (220 lb)  Height (Patient Reported): 172.7 cm (5' 8\")  BMI (Based on Pt Reported Ht/Wt): 33.45  Pain Score: No Pain (0)(No SOB)    Vitals were taken and medication reconciled.    DIANNA Esteves  2:58 PM  "

## 2020-11-24 NOTE — PROGRESS NOTES
[facility]      OUTPATIENT PHYSICAL THERAPY EVALUATION  PLAN OF TREATMENT FOR OUTPATIENT REHABILITATION  (COMPLETE FOR INITIAL CLAIMS ONLY)  Patient's Last Name, First Name, M.I.  YOB: 1960  Cole Jesus                        Provider's Name  [facility] Medical Record No.  [id number]                               Onset Date:  11/12/20   Start of Care Date:  11/13/20      Type:     _X_PT   ___OT   ___SLP Medical Diagnosis:  chronic anemia                        PT Diagnosis:  limited activity tolerance   Visits from SOC:  1   _________________________________________________________________________________  Plan of Treatment/Functional Goals    Planned Interventions: balance training, gait training, home exercise program, neuromuscular re-education, strengthening     Goals: See Physical Therapy Goals on Care Plan in [employer] electronic health record.    Therapy Frequency: 4x/week  Predicted Duration of Therapy Intervention: 1 week  _________________________________________________________________________________    I CERTIFY THE NEED FOR THESE SERVICES FURNISHED UNDER        THIS PLAN OF TREATMENT AND WHILE UNDER MY CARE     (Physician co-signature of this document indicates review and certification of the therapy plan).                Certification date from: 11/13/20, Certification date to: 11/20/20    Referring Physician: Rocío Soto PA            Initial Assessment        See Physical Therapy evaluation dated 11/13/20 in Epic electronic health record.

## 2020-11-25 NOTE — ADDENDUM NOTE
Problem: Nutrition  Goal: Tolerates feedings  Outcome: Outcome Not Met at Discharge, placement/referrals/follow up initiated  Note: Infant had multiple emesis overnight. Went NPO.       Addended by: LAQUITA CASPER on: 11/24/2020 08:52 PM     Modules accepted: Orders

## 2020-11-25 NOTE — NURSING NOTE
Diet: Patient instructed regarding a heart failure healthy diet, including discussion of reduced fat and 2000 mg daily sodium restriction, daily weights, medication purpose and compliance, fluid restrictions and resources for patient and family to access for assistance with heart failure management.       Labs: Patient will get labs checked on 11/27    Med Reconcile: Reviewed and verified all current medications with the patient. The updated medication list was printed and given to the patient.     Med changes: no changes    Return Appointment: Patient given instructions regarding scheduling next clinic visit: labs and flu shot on 11/27, follow up with GI (referral placed and scheduling number provided), follow up with core in 3 months    Patient stated he understood all health information given and agreed to call with further questions or concerns.     Ingris Martin RN

## 2020-11-25 NOTE — TELEPHONE ENCOUNTER
REFERRAL INFORMATION:    Referring Provider:  GUILLE Alfred CNP     Referring Clinic:  [facility]     Reason for Visit/Diagnosis: Anemia- GI bleeding      FUTURE VISIT INFORMATION:    Appointment Date: 11/30/2020    Appointment Time: 3 PM      NOTES STATUS DETAILS   OFFICE NOTE from Referring Provider Internal 11/24/2020 Office visit with GUILLE Alfred CNP    OFFICE NOTE from Other Specialist Internal 10/7/19 Office visit with Dr. Silas Enciso ([facility])    7/15/19 Office visit with Dr. Salo Jones ([facility])     HOSPITAL DISCHARGE SUMMARY/  ED VISITS Internal 11/12/2020, 8/13/2020, 12/18/19 ([facility])    OPERATIVE REPORT N/A    MEDICATION LIST Internal         ENDOSCOPY  Internal EGD: 7/3/19, 12/25/11   COLONOSCOPY Internal 12/25/11, 12/29/11   ERCP N/A    EUS N/A    STOOL TESTING N/A    PERTINENT LABS Internal    PATHOLOGY REPORTS (RELATED) N/A    IMAGING (CT, MRI, EGD, MRCP, Small Bowel Follow Through/SBT, MR/CT Enterography) Internal US Abdomen: 11/13/2020

## 2020-11-25 NOTE — PATIENT INSTRUCTIONS
Take your medicines every day, as directed    Changes made today:  o No medication changes today  o    Monitor Your Weight and Symptoms    Contact us if you:      Gain 2 pounds in one day or 5 pounds in one week    Feel more short of breath    Notice more leg swelling    Feel lightheadeded   Change your lifestyle    Limit Salt or Sodium:    2000 mg  Limit Fluids:    2000 mL or approximately 64 ounces  Eat a Heart Healthy Diet    Low in saturated fats  Stay Active:    Aim to move at least 150 minutes every  week         To Contact us    During Business Hours:  [phone], option # 1 (University)  Then option # 4 (medical questions)     After hours, weekends or holidays:   [phone], Option #4  Ask to speak to the On-Call Cardiologist. Inform them you are a CORE/heart failure patient at the [facility].     Use [employer] allows you to communicate directly with your heart team through secure messaging.    [employer] can be accessed any time on your phone, computer, or tablet.    If you need assistance, we'd be happy to help!         Keep your Heart Appointments:    Follow up with GI clinic as recommended by Hospital visit    Follow up with [facility] in [facility] months    Labs and flu shot this Friday.  You can walk into the the pharmacy and ask for a flu shot.

## 2020-11-27 NOTE — TELEPHONE ENCOUNTER
Checked in with Cole regarding symptoms after seeing labs.      He reports that his weight is actually down from 226 to 218.  He is feeling more energetic today and found it easier to walk around clinic this morning.  Feels like his belly is still hard, but not as hard and continues to deny SOB.  He has been able to restart his metolazone, last dose was 2 days ago, planned on taking another tomorrow or later today.  Asked him to hold off until advised by Evelin Lancaster NP.      He did not get his flu shot today but plans on going to his clinic before 12/1 to get that done.  Encouraged him to follow through.

## 2020-11-27 NOTE — TELEPHONE ENCOUNTER
Date: 11/27/2020    Time of Call: 4:44 PM     Diagnosis:  Heart failure     [ VORB ] Ordering provider: Evelin Lancaster NP    Order: hold metolazone for now, take extra potassium 40 mEq today     Order received by: Ingris Martin RN       Follow-up/additional notes: attempted to call Cole 4 times- twice on home and twice on cell.  Cell with no VM set up.  Per Cole he was going out of town and asked that I call cell phone since he would have it on him.  Unable to leave VM on home phone either.

## 2020-11-30 PROBLEM — N17.9 ACUTE RENAL FAILURE SUPERIMPOSED ON STAGE 4 CHRONIC KIDNEY DISEASE (H): Status: ACTIVE | Noted: 2019-06-17

## 2020-11-30 PROBLEM — I10 ESSENTIAL HYPERTENSION: Status: ACTIVE | Noted: 2019-06-17

## 2020-11-30 PROBLEM — I25.10 CORONARY ARTERY DISEASE DUE TO LIPID RICH PLAQUE: Status: ACTIVE | Noted: 2019-06-17

## 2020-11-30 PROBLEM — I25.83 CORONARY ARTERY DISEASE DUE TO LIPID RICH PLAQUE: Status: ACTIVE | Noted: 2019-06-17

## 2020-11-30 PROBLEM — N18.4 ACUTE RENAL FAILURE SUPERIMPOSED ON STAGE 4 CHRONIC KIDNEY DISEASE (H): Status: ACTIVE | Noted: 2019-06-17

## 2020-11-30 NOTE — PROGRESS NOTES
"Cole Jesus is a 60 year old male who is being evaluated via a billable telephone visit.      The patient has been notified of following:     \"This telephone visit will be conducted via a call between you and your physician/provider. We have found that certain health care needs can be provided without the need for a physical exam.  This service lets us provide the care you need with a short phone conversation.  If a prescription is necessary we can send it directly to your pharmacy.  If lab work is needed we can place an order for that and you can then stop by our lab to have the test done at a later time.    Telephone visits are billed at different rates depending on your insurance coverage. During this emergency period, for some insurers they may be billed the same as an in-person visit.  Please reach out to your insurance provider with any questions.    If during the course of the call the physician/provider feels a telephone visit is not appropriate, you will not be charged for this service.\"    Patient has given verbal consent for Telephone visit?  Yes    What phone number would you like to be contacted at? [phone]    How would you like to obtain your AVS? MyChart    Phone call duration: 10  minutes    Silas Houston MD     Gastroenterology Progress Note  [facility]             Reason for Follow Up:   Hospital follow-up for assumed posthemorrhagic anemia         ASSESSMENT AND RECOMMENDATIONS:   Assessment:  60 year old male with a history of significant GI bleeding in the past was recently mated to the hospital and had evidence of posthemorrhagic anemia without overt evidence of GI bleeding.  This follow-up was established to consider upper endoscopy and colonoscopy in this patient with prior bleeding angiodysplasias and significant heart disease in the form of congestive heart failure.     Recommendations:  Currently the patient is not interested in intervention will call back if he has concerns or if " things are worsening and may consider intervention at that time.             History of Present Illness:   Cole Jesus is a 60 year old male with a history of recent evaluation in the hospital by the GI luminal service for drop in hemoglobin had noted to be patient with a history of severe gastrointestinal hemorrhage treated with epinephrine and clip in the past in July 2019    Findings:        The examined esophagus was normal.        Red blood with a mobile clot was found at the antrum.        The exam of the stomach was otherwise normal.        A single angioectasia with active bleeding was found in the second        portion of the duodenum. Area was successfully injected with 3.5 mL of a        1:10,000 solution of epinephrine for hemostasis. To prevent further        bleeding, two hemostatic clips were successfully placed. There was no        bleeding at the end of the procedure.        The exam of the duodenum was otherwise normal.     Given the patient's significant cardiac: Rhytides at the fact that his he was not actively bleeding at the time and the hospitalist recommended that he would be referred to see me as an outpatient in follow-up.  Patient feels great after receiving blood transfusions in the hospital he does not feel like things are getting worse for him and his got a good level of energy denies black or bloody stools vomiting blood.  He denies any concerns at this time I would like to defer all endoscopic evaluation if possible.  I did review with him that he is due for colonoscopy in 2021 since his last was in 2011 at that he said he would think about it.     Past Medical and Surgical History, Social History, Family History - per Epic EMR: REVIEWED         Allergies:   Reviewed and edited as appropriate     Allergies   Allergen Reactions     Hydralazine      Black spots, verified allergic reaction by skin biopsy     Isosorbide      Per pt got a rash all over his body and won't take it no  "more     Simvastatin Other (See Comments)     Leg muscle weakness     Tylenol [Acetaminophen] Palpitations            Medications:     Current Outpatient Medications   Medication Sig Dispense Refill     ACCU-CHEK GUIDE test strip USE TO TEST BLOOD SUGAR FOUR TIMES A DAY BEFORE MEALS AND AT BEDTIME 400 each 3     Alcohol Swabs PADS 1 applicator 4 times daily (before meals and nightly) 100 each 3     aspirin (ASA) 81 MG chewable tablet Take 81 mg by mouth daily       blood glucose monitoring (ACCU-CHEK FASTCLIX) lancets USE TO TEST BLOOD SUGAR FOUR TIMES A DAY AT MEALS AND AT BEDTIME 400 each 3     ferrous sulfate (FEROSUL) 325 (65 Fe) MG tablet Take 1 tablet (325 mg) by mouth Every Mon, Wed, Fri Morning 24 tablet 0     insulin pen needle (32G X 4 MM) 32G X 4 MM miscellaneous Use 5 pen needles daily or as directed. 200 each 3     metolazone (ZAROXOLYN) 2.5 MG tablet Take 1 tablet (2.5 mg) by mouth three times a week 39 tablet 3     pantoprazole (PROTONIX) 40 MG EC tablet Take 1 tablet (40 mg) by mouth 2 times daily 60 tablet 0     potassium chloride ER (KLOR-CON M) 20 MEQ CR tablet Take 2 tablets (40 mEq) by mouth 2 times daily 360 tablet 3     senna-docusate (SENOKOT-S/PERICOLACE) 8.6-50 MG tablet Take 1 tablet by mouth 2 times daily as needed for constipation       Sharps Container MISC 1 Device every 30 days 1 each 3     torsemide (DEMADEX) 20 MG tablet Take 4 tablets (80 mg) by mouth 2 times daily 360 tablet 4     vitamin  B complex with vitamin C (VITAMIN  B COMPLEX) TABS Take 1 tablet by mouth daily       Vitamin D, Cholecalciferol, 25 MCG (1000 UT) CAPS Take 1 capsule by mouth 2 times daily               Review of Systems:     Per HPI           Physical Exam:   Ht 1.727 m (5' 8\")   Wt 98.4 kg (217 lb)   BMI 32.99 kg/m    Wt:   Wt Readings from Last 2 Encounters:   11/30/20 98.4 kg (217 lb)   11/12/20 99.2 kg (218 lb 11.1 oz)      Constitutional: cooperative, pleasant, not dyspneic/diaphoretic, no acute " violet Houston MD  Associate Professor of Medicine  Division of Gastroenterology, Hepatology, and Nutrition  [facility]

## 2020-11-30 NOTE — LETTER
"  11/30/2020      RE: Cole Jesus  [address]  [facility] [address]      Dear Colleague,    Thank you for referring your patient, Cole Jesus, to the [facility] [place]. Please see a copy of my visit note below.    Cole Jesus is a 60 year old male who is being evaluated via a billable telephone visit.      The patient has been notified of following:     \"This telephone visit will be conducted via a call between you and your physician/provider. We have found that certain health care needs can be provided without the need for a physical exam.  This service lets us provide the care you need with a short phone conversation.  If a prescription is necessary we can send it directly to your pharmacy.  If lab work is needed we can place an order for that and you can then stop by our lab to have the test done at a later time.    Telephone visits are billed at different rates depending on your insurance coverage. During this emergency period, for some insurers they may be billed the same as an in-person visit.  Please reach out to your insurance provider with any questions.    If during the course of the call the physician/provider feels a telephone visit is not appropriate, you will not be charged for this service.\"    Patient has given verbal consent for Telephone visit?  Yes    What phone number would you like to be contacted at? [phone]    How would you like to obtain your AVS? MyChart    Phone call duration: 10  minutes        Gastroenterology Progress Note  [facility]         Reason for Follow Up:   Hospital follow-up for assumed posthemorrhagic anemia         ASSESSMENT AND RECOMMENDATIONS:   Assessment:  60 year old male with a history of significant GI bleeding in the past was recently mated to the hospital and had evidence of posthemorrhagic anemia without overt evidence of GI bleeding.  This follow-up was established to consider upper endoscopy and " colonoscopy in this patient with prior bleeding angiodysplasias and significant heart disease in the form of congestive heart failure.     Recommendations:  Currently the patient is not interested in intervention will call back if he has concerns or if things are worsening and may consider intervention at that time.           History of Present Illness:   Cole Jesus is a 60 year old male with a history of recent evaluation in the hospital by the GI luminal service for drop in hemoglobin had noted to be patient with a history of severe gastrointestinal hemorrhage treated with epinephrine and clip in the past in July 2019    Findings:        The examined esophagus was normal.        Red blood with a mobile clot was found at the antrum.        The exam of the stomach was otherwise normal.        A single angioectasia with active bleeding was found in the second        portion of the duodenum. Area was successfully injected with 3.5 mL of a        1:10,000 solution of epinephrine for hemostasis. To prevent further        bleeding, two hemostatic clips were successfully placed. There was no        bleeding at the end of the procedure.        The exam of the duodenum was otherwise normal.     Given the patient's significant cardiac: Rhytides at the fact that his he was not actively bleeding at the time and the hospitalist recommended that he would be referred to see me as an outpatient in follow-up.  Patient feels great after receiving blood transfusions in the hospital he does not feel like things are getting worse for him and his got a good level of energy denies black or bloody stools vomiting blood.  He denies any concerns at this time I would like to defer all endoscopic evaluation if possible.  I did review with him that he is due for colonoscopy in 2021 since his last was in 2011 at that he said he would think about it.     Past Medical and Surgical History, Social History, Family History - per Epic EMR:  REVIEWED         Allergies:   Reviewed and edited as appropriate     Allergies   Allergen Reactions     Hydralazine      Black spots, verified allergic reaction by skin biopsy     Isosorbide      Per pt got a rash all over his body and won't take it no more     Simvastatin Other (See Comments)     Leg muscle weakness     Tylenol [Acetaminophen] Palpitations            Medications:     Current Outpatient Medications   Medication Sig Dispense Refill     ACCU-CHEK GUIDE test strip USE TO TEST BLOOD SUGAR FOUR TIMES A DAY BEFORE MEALS AND AT BEDTIME 400 each 3     Alcohol Swabs PADS 1 applicator 4 times daily (before meals and nightly) 100 each 3     aspirin (ASA) 81 MG chewable tablet Take 81 mg by mouth daily       blood glucose monitoring (ACCU-CHEK FASTCLIX) lancets USE TO TEST BLOOD SUGAR FOUR TIMES A DAY AT MEALS AND AT BEDTIME 400 each 3     ferrous sulfate (FEROSUL) 325 (65 Fe) MG tablet Take 1 tablet (325 mg) by mouth Every Mon, Wed, Fri Morning 24 tablet 0     insulin pen needle (32G X 4 MM) 32G X 4 MM miscellaneous Use 5 pen needles daily or as directed. 200 each 3     metolazone (ZAROXOLYN) 2.5 MG tablet Take 1 tablet (2.5 mg) by mouth three times a week 39 tablet 3     pantoprazole (PROTONIX) 40 MG EC tablet Take 1 tablet (40 mg) by mouth 2 times daily 60 tablet 0     potassium chloride ER (KLOR-CON M) 20 MEQ CR tablet Take 2 tablets (40 mEq) by mouth 2 times daily 360 tablet 3     senna-docusate (SENOKOT-S/PERICOLACE) 8.6-50 MG tablet Take 1 tablet by mouth 2 times daily as needed for constipation       Sharps Container MISC 1 Device every 30 days 1 each 3     torsemide (DEMADEX) 20 MG tablet Take 4 tablets (80 mg) by mouth 2 times daily 360 tablet 4     vitamin  B complex with vitamin C (VITAMIN  B COMPLEX) TABS Take 1 tablet by mouth daily       Vitamin D, Cholecalciferol, 25 MCG (1000 UT) CAPS Take 1 capsule by mouth 2 times daily               Review of Systems:     Per HPI           Physical Exam:  "  Ht 1.727 m (5' 8\")   Wt 98.4 kg (217 lb)   BMI 32.99 kg/m    Wt:   Wt Readings from Last 2 Encounters:   11/30/20 98.4 kg (217 lb)   11/12/20 99.2 kg (218 lb 11.1 oz)      Constitutional: cooperative, pleasant, not dyspneic/diaphoretic, no acute distress      Silas Houston MD  Associate Professor of Medicine  Division of Gastroenterology, Hepatology, and Nutrition  [facility]        "

## 2020-11-30 NOTE — NURSING NOTE
"Chief Complaint   Patient presents with     Consult     consult for anemia / GI bleeding       Vitals:    11/30/20 1437   Weight: 98.4 kg (217 lb)   Height: 1.727 m (5' 8\")       Body mass index is 32.99 kg/m .    Nando Cutler, EMT    "

## 2020-11-30 NOTE — TELEPHONE ENCOUNTER
Checked in with Cole today- he had not gotten the message about the metolazone but did NOT take any this weekend.  His weight is 217 lbs, he is feeling well although notes that he may still have some abdominal fluid.  He feels that the swelling in his legs have gone way down.  He will hold off taking metolazone until directed by the CORE clinic.

## 2020-12-01 NOTE — LETTER
2/22/2017      RE: Cole Jesus  [address]  [facility] [id number]       Dear Colleague,    Thank you for the opportunity to participate in the care of your patient, Cole Jesus, at the [facility]. Please see a copy of my visit note below.    Alen Trujillo M.D.  Cardiovascular Medicine    I personally saw and examined this patient, discussed care with housestaff and other consultants, reviewed current laboratories and imaging studies, and conveyed impression and diagnostic/therapeutic plan to patient.    Problem List  ASHD with angioplasty  CAD with stenting  Atrial fibrillation  History of GI bleed  CKD    History  Patient with know disease returns for follow-up.  He has exertional dyspnea but well preserved function. He has no interim history of angina, PND, orthopnea, arrhythmia or syncope.    REVIEW of SYMPTOMS    Constitutional: without fever, chills, night sweats.  Weight is ___  HEENT: without dry eyes, dry mouth, sinusitis, corryza, visual changes  Endocrine: without polyuria, polydypsia, polyphagia, heat or cold intolerance, changing mental status  Cardiology: without chest pain, tightness, heaviness, pressure, paroxysmal dyspnea, orthopnea, palpitation, pre-syncope or syncope or device discharge (if present)  Pulmonary: without asthma, wheezing, cough, hemoptysis  GI: without nausea, emesis, jaundice, pain, hematemesis, melena  : without frequency, urgency, hematuria, stones, pain, abnormal bleeding, frequency, urgency  Neurologic: without TIA, CVA, trauma, seizure  Dermatologic: without lesions, abrasion rash,   Orthopedic/Rheum: without significant joint pain, impairment, limb, polyserositis, ulceration, Raynauds  Heme: without mass, bruising, frequent infection, anemia  Psychiatric: without substance abuse, hallucination, medication, depression    Exercise tolerance:   Objective  /85 (BP Location: Right arm, Patient  "Position: Chair, Cuff Size: Adult Large)  Pulse 74  Ht 1.727 m (5' 8\")  Wt 106.1 kg (234 lb)  SpO2 95%  BMI 35.58 kg/m2   Constitutional: alert, oriented, normal gait and station, normal mentation.  Oral: moist mucous membrans  Lymph: without pathologic adenopathy  Chest: clear to ausculation and percussion  Cor: No evidence of left or right ventricular activity.  Rhythm is regular.  S1 normal, S2 split physiologically. Murmurs are not present  Abdomen: without tenderness, rebound, guarding, masses, ascites  Extremities: Edema not present  Neuro: no focal defects, normal mentation  Skin: without open lesions  Psych: oriented, verbal, mental status in tact    Wt Readings from Last 5 Encounters:   02/22/17 106.1 kg (234 lb)   12/19/16 107.7 kg (237 lb 6.4 oz)   11/21/16 106.5 kg (234 lb 12.8 oz)   11/02/16 109.3 kg (241 lb)   10/31/16 112.5 kg (248 lb 1.6 oz)       Meds    Current Outpatient Prescriptions on File Prior to Visit:  atorvastatin (LIPITOR) 10 MG tablet Take 1 tablet (10 mg) by mouth daily   losartan (COZAAR) 25 MG tablet Take 1 tablet (25 mg) by mouth daily   pantoprazole (PROTONIX) 40 MG enteric coated tablet Take 1 tablet (40 mg) by mouth 2 times daily   sacubitril-valsartan (ENTRESTO) 24-26 MG per tablet Take 1 tablet by mouth 2 times daily   furosemide (LASIX) 20 MG tablet Take 1 tablet (20 mg) by mouth 2 times daily   clopidogrel (PLAVIX) 75 MG tablet Take 1 tablet (75 mg) by mouth daily   metoprolol (TOPROL-XL) 25 MG 24 hr tablet Take 2 tablets (50 mg) by mouth daily   vitamin  B complex with vitamin C (VITAMIN  B COMPLEX) TABS Take 1 tablet by mouth daily   thiamine 100 MG tablet Take 1 tablet (100 mg) by mouth daily   cholecalciferol (VITAMIN  -D) 1000 UNITS capsule Take 2 capsules (2,000 Units) by mouth daily   [DISCONTINUED] ISOSORBIDE DINITRATE PO Take 15 mg by mouth 3 times daily     Current Facility-Administered Medications on File Prior to Visit:  [COMPLETED] perflutren diluted 1mL to 2mL " with saline (OPTISON) diluted injection 4 mL         Labs  Results for MERCY SHAFER (MRN [id number]) as of 2/22/2017 13:32   Ref. Range 2/22/2017 11:44 2/22/2017 12:02   Sodium Latest Ref Range: 133 - 144 mmol/L  139   Potassium Latest Ref Range: 3.4 - 5.3 mmol/L  4.6   Chloride Latest Ref Range: 94 - 109 mmol/L  104   Carbon Dioxide Latest Ref Range: 20 - 32 mmol/L  28   Urea Nitrogen Latest Ref Range: 7 - 30 mg/dL  24   Creatinine Latest Ref Range: 0.66 - 1.25 mg/dL  2.25 (H)   GFR Estimate Latest Ref Range: >60 mL/min/1.7m2  30 (L)   GFR Estimate If Black Latest Ref Range: >60 mL/min/1.7m2  36 (L)   Calcium Latest Ref Range: 8.5 - 10.1 mg/dL  8.6   Anion Gap Latest Ref Range: 3 - 14 mmol/L  7   Albumin Latest Ref Range: 3.4 - 5.0 g/dL  3.5   Protein Total Latest Ref Range: 6.8 - 8.8 g/dL  8.1   Bilirubin Total Latest Ref Range: 0.2 - 1.3 mg/dL  1.0   Alkaline Phosphatase Latest Ref Range: 40 - 150 U/L  96   ALT Latest Ref Range: 0 - 70 U/L  22   AST Latest Ref Range: 0 - 45 U/L  19   N-Terminal Pro Bnp Latest Ref Range: 0 - 125 pg/mL  3344 (H)   Glucose Latest Ref Range: 70 - 99 mg/dL  132 (H)   WBC Latest Ref Range: 4.0 - 11.0 10e9/L  7.1   Hemoglobin Latest Ref Range: 13.3 - 17.7 g/dL  15.5   Hematocrit Latest Ref Range: 40.0 - 53.0 %  48.4   Platelet Count Latest Ref Range: 150 - 450 10e9/L  215   RBC Count Latest Ref Range: 4.4 - 5.9 10e12/L  5.32   MCV Latest Ref Range: 78 - 100 fl  91   MCH Latest Ref Range: 26.5 - 33.0 pg  29.1   MCHC Latest Ref Range: 31.5 - 36.5 g/dL  32.0   RDW Latest Ref Range: 10.0 - 15.0 %  14.3   ECHO COMPLETE WITH OPTISON Unknown Rpt      Imaging     Interpretation Summary  Severe left ventricular dilation is present.  Inferior wall akinesis is present.  Lateral wall akinesis is present.  Mild to moderate right ventricular dilation is present.  Global right ventricular function is mildly reduced.  Right ventricular systolic pressure is 58mmHg above the right atrial  pressure.  The inferior vena cava is normal.  Small circumferential pericardial effusion is present without any hemodynamic  significance.  Mild decrease in LV function when compared to prior study.  _____________________________________________________________________________  __        Left Ventricle  Severe left ventricular dilation is present. Relative wall thickness is  increased consistent with concentric remodeling. Grade III or advanced  diastolic dysfunction. Inferior wall akinesis is present. Lateral wall  akinesis is present.     Right Ventricle  Mild to moderate right ventricular dilation is present. Global right  ventricular function is mildly reduced.     Atria  Mild to moderate biatrial enlargement is present. The atrial septum is intact  as assessed by color Doppler .     Mitral Valve  Moderate mitral insufficiency is present.        Aortic Valve  Aortic valve is normal in structure and function.     Tricuspid Valve  The tricuspid valve is normal. Trace to mild tricuspid insufficiency is  present. Right ventricular systolic pressure is 58mmHg above the right atrial  pressure.     Pulmonic Valve  The pulmonic valve cannot be assessed.     Vessels  The aorta root is normal. The pulmonary artery cannot be assessed. The  inferior vena cava is normal.     Pericardium  Small circumferential pericardial effusion is present without any hemodynamic  significance.     _____________________________________________________________________________  __  MMode/2D Measurements & Calculations  IVSd: 1.00 cm  LVIDd: 6.8 cm  LVIDs: 6.0 cm  LVPWd: 0.94 cm  FS: 11.7 %  EDV(Teich): 235.5 ml  ESV(Teich): 177.4 ml     LV mass(C)d: 290.9 grams  asc Aorta Diam: 3.3 cm  LVOT diam: 2.5 cm  LVOT area: 4.8 cm2  LA Volume (BP): 87.4 ml  LA Volume Index (BP): 39.7 ml/m2        Doppler Measurements & Calculations  MV E max shavonne: 109.7 cm/sec  MV A max shavonne: 51.0 cm/sec  MV E/A: 2.2  MV dec time: 0.15 sec  TR max shavonne: 381.3 cm/sec  TR  max P.1 mmHg     Lateral E/e': 14.3  Medial E/e': 23.1     Assessment/Plan     Right heart catherization for enhanced treatment options.  Risks and benefits discussed.          Please do not hesitate to contact me if you have any questions/concerns.     Sincerely,     Alen Trujillo MD     Modified Advancement Flap Text: The defect edges were debeveled with a #15 scalpel blade.  Given the location of the defect, shape of the defect and the proximity to free margins a modified advancement flap was deemed most appropriate.  Using a sterile surgical marker, an appropriate advancement flap was drawn incorporating the defect and placing the expected incisions within the relaxed skin tension lines where possible.    The area thus outlined was incised deep to adipose tissue with a #15 scalpel blade.  The skin margins were undermined to an appropriate distance in all directions utilizing iris scissors.

## 2020-12-08 NOTE — TELEPHONE ENCOUNTER
"Checked in with Pancho since holding metolazone.  He's feeling about the same, weight is 218 (on 11/30 he was 217) but notes that he feels like he has fluid on his legs \"They could be better.\"  Has a cough but reports it because he's always dry.      Scheduled follow up  "

## 2020-12-16 NOTE — NURSING NOTE
Chief Complaint   Patient presents with     Hospital F/U     Pt is following up from the hospital      Video Visit Technology for this patient: No video technology available to patient, please call patient over the phone     Pao Briseno LPN at 8:00 AM on 12/16/2020.

## 2020-12-16 NOTE — PROGRESS NOTES
HPI  Phone visit with this 60-year-old follow-up on his hospitalization last month.  At that time he reportedly had GI bleeding secondary to an AV malformation with increased anemia and hemoglobin drop.  He was transfused and stabilized.  Is discharged on oral iron but was intolerant of the iron as it causes abdominal discomfort and nausea.  He is he stopped taking it in the stomach feels better.  He denies any dyspnea chest pain or cardiac symptoms.  He has not had any change in bowel movements denies any black stools bloody stools diarrhea or other symptoms.  Past Medical History:   Diagnosis Date     Anemia in chronic renal disease 03/09/2015     Antiplatelet or antithrombotic long-term use      Atrial fibrillation and flutter      CAD (coronary artery disease)     Stemi in 12/11, s/p angioplasty     CRD (chronic renal disease), stage IV (H) 03/09/2015    hx ATN with dialysis complicating cardiogenic shock  1/2012     GI bleed     massive lower GI bleed secondary to a cecal ulcer, s/p ileocecal resection in 12/11     Heart failure     Biventricular systolic HF, complicated by ARDS requiring tracheostomy     Hyperlipidemia LDL goal <100 04/28/2013     Hypertension      Ischemic cardiomyopathy 04/28/2013    TTE revealing 40% EF     Myocardial infarction (H)     2011 and 6/2018     Other and unspecified nonspecific immunological findings     Anti JKa     Pulmonary edema     episodes of flash pulmonary edema in 12/11     Subclinical hypothyroidism      Past Surgical History:   Procedure Laterality Date     CV RIGHT HEART CATH N/A 11/12/2019    Procedure: CV RIGHT HEART CATH;  Surgeon: Fox Gerard MD;  Location:  HEART CARDIAC CATH LAB     CV RIGHT HEART CATH N/A 2/10/2020    Procedure: CV RIGHT HEART CATH;  Surgeon: Fox Gerard MD;  Location:  HEART CARDIAC CATH LAB     ESOPHAGOSCOPY, GASTROSCOPY, DUODENOSCOPY (EGD), COMBINED  12/25/2011    Procedure:COMBINED ESOPHAGOSCOPY,  GASTROSCOPY, DUODENOSCOPY (EGD); Surgeon:SAMAIRA PUENTE; Location:[facility]     LAPAROTOMY EXPLORATORY  2011    Procedure:LAPAROTOMY EXPLORATORY; Explore laparotomy, Illeocectomy, Diverting Illeostomy; Surgeon:GABI MOSHER; Location:[facility]     ORIF right elbow fracture  age 12     TAKEDOWN ILEOSTOMY  2014    Procedure: TAKEDOWN ILEOSTOMY;  Surgeon: Gabi Mosher MD;  Location: [facility]     TRACHEOSTOMY  2011    Procedure:TRACHEOSTOMY; Tracheostomy; 80XLTCP-Proximal Extension-Cuffed 8.0 mm I.D.; Surgeon:LIZABETH DYE; Location:[facility]     Family History   Problem Relation Age of Onset     Diabetes Mother      Hypertension Mother      Heart Disease Mother      Myocardial Infarction Mother 68         of     Myocardial Infarction Father 54         of heart attack, left when he was young     Heart Disease Father      Diabetes Sister      Ovarian Cancer Sister          of     Diabetes Sister      Diabetes Sister      Crohn's Disease Daughter 36     Glaucoma No family hx of      Macular Degeneration No family hx of      Colon Cancer No family hx of      Ulcerative Colitis No family hx of      Irritable Bowel Syndrome No family hx of      Inflammatory Bowel Disease No family hx of      Social History     Socioeconomic History     Marital status: Significant other     Spouse name: Not on file     Number of children: Not on file     Years of education: Not on file     Highest education level: Not on file   Occupational History     Not on file   Social Needs     Financial resource strain: Not on file     Food insecurity     Worry: Not on file     Inability: Not on file     Transportation needs     Medical: Not on file     Non-medical: Not on file   Tobacco Use     Smoking status: Former Smoker     Packs/day: 2.00     Years: 40.00     Pack years: 80.00     Types: Cigarettes     Quit date: 12/3/2011     Years since quittin.0     Smokeless tobacco: Never Used    Substance and Sexual Activity     Alcohol use: No     Alcohol/week: 0.0 standard drinks     Drug use: No     Sexual activity: Yes     Partners: Female   Lifestyle     Physical activity     Days per week: Not on file     Minutes per session: Not on file     Stress: Not on file   Relationships     Social connections     Talks on phone: Not on file     Gets together: Not on file     Attends Hoahaoism service: Not on file     Active member of club or organization: Not on file     Attends meetings of clubs or organizations: Not on file     Relationship status: Not on file     Intimate partner violence     Fear of current or ex partner: Not on file     Emotionally abused: Not on file     Physically abused: Not on file     Forced sexual activity: Not on file   Other Topics Concern     Parent/sibling w/ CABG, MI or angioplasty before 65F 55M? Yes      Service Not Asked     Blood Transfusions Not Asked     Caffeine Concern Not Asked     Occupational Exposure Not Asked     Hobby Hazards Not Asked     Sleep Concern Not Asked     Stress Concern Not Asked     Weight Concern Not Asked     Special Diet Not Asked     Back Care Not Asked     Exercise Yes     Comment: limited walking     Bike Helmet Not Asked     Seat Belt Not Asked     Self-Exams Not Asked   Social History Narrative     Not on file       Exam:  There were no vitals taken for this visit.        ASSESSMENT  1.  CHF appears well compensated  2. Anemia, secondary to GI bleed needs F/U  3. History of GI bleed secondary to AVM  4. Iron deficiency anemia - intolerant of oral iron supplement  5. CKD stage IV - awaiting updated labs  6. Coronary artery disease: on ASA, not on BB/Statin due to intolerance  7.  Paroxysmal atrial fibrillation - On ASA  8. Type 2 diabetes mellitus- diet controlled  9. Hyperlipidemia- intolerant of statins needs F/U  10 abnormal LFTs needs F/U    Plan  Sandi reassess his labs including iron we discussed iron infusions if he remains iron  deficient and anemic.  Also reevaluate his diabetes and lipids along with liver function studies  (6 minutes)  This note was completed using Dragon voice recognition software.      Silas Enciso MD  General Internal Medicine  [facility]  [phone]

## 2020-12-31 NOTE — TELEPHONE ENCOUNTER
pantoprazole (PROTONIX) 40 MG EC tablet     Last Written Prescription Date:  11/13/20  Last Fill Quantity: 60,   # refills: 0  Last Office Visit : 11/24/20  Future Office visit:  2/10/20    Routing refill request to provider for review/approval because:  Drug not on the [facility] or [facility] refill protocol or controlled substance  Thank-you, Kathleen FAIR RN Medication Refill Team

## 2021-01-01 ENCOUNTER — APPOINTMENT (OUTPATIENT)
Dept: GENERAL RADIOLOGY | Facility: CLINIC | Age: 61
End: 2021-01-01
Payer: MEDICAID

## 2021-01-01 ENCOUNTER — VIRTUAL VISIT (OUTPATIENT)
Dept: INTERNAL MEDICINE | Facility: CLINIC | Age: 61
End: 2021-01-01
Payer: MEDICAID

## 2021-01-01 ENCOUNTER — APPOINTMENT (OUTPATIENT)
Dept: ULTRASOUND IMAGING | Facility: CLINIC | Age: 61
End: 2021-01-01
Payer: MEDICAID

## 2021-01-01 ENCOUNTER — APPOINTMENT (OUTPATIENT)
Dept: GENERAL RADIOLOGY | Facility: CLINIC | Age: 61
End: 2021-01-01
Attending: INTERNAL MEDICINE
Payer: MEDICAID

## 2021-01-01 ENCOUNTER — PATIENT OUTREACH (OUTPATIENT)
Dept: CARDIOLOGY | Facility: CLINIC | Age: 61
End: 2021-01-01

## 2021-01-01 ENCOUNTER — APPOINTMENT (OUTPATIENT)
Dept: OCCUPATIONAL THERAPY | Facility: CLINIC | Age: 61
End: 2021-01-01
Attending: PHYSICIAN ASSISTANT
Payer: MEDICAID

## 2021-01-01 ENCOUNTER — HOSPITAL ENCOUNTER (INPATIENT)
Facility: CLINIC | Age: 61
LOS: 4 days | Discharge: HOME | End: 2021-01-19
Attending: INTERNAL MEDICINE | Admitting: INTERNAL MEDICINE
Payer: MEDICAID

## 2021-01-01 ENCOUNTER — APPOINTMENT (OUTPATIENT)
Dept: GENERAL RADIOLOGY | Facility: CLINIC | Age: 61
End: 2021-01-01
Attending: EMERGENCY MEDICINE
Payer: MEDICAID

## 2021-01-01 ENCOUNTER — TELEPHONE (OUTPATIENT)
Dept: INTERNAL MEDICINE | Facility: CLINIC | Age: 61
End: 2021-01-01

## 2021-01-01 ENCOUNTER — INFUSION THERAPY VISIT (OUTPATIENT)
Dept: INFUSION THERAPY | Facility: CLINIC | Age: 61
End: 2021-01-01
Attending: INTERNAL MEDICINE
Payer: MEDICAID

## 2021-01-01 ENCOUNTER — APPOINTMENT (OUTPATIENT)
Dept: CARDIOLOGY | Facility: CLINIC | Age: 61
End: 2021-01-01
Attending: STUDENT IN AN ORGANIZED HEALTH CARE EDUCATION/TRAINING PROGRAM
Payer: MEDICAID

## 2021-01-01 ENCOUNTER — APPOINTMENT (OUTPATIENT)
Dept: ULTRASOUND IMAGING | Facility: CLINIC | Age: 61
End: 2021-01-01
Attending: STUDENT IN AN ORGANIZED HEALTH CARE EDUCATION/TRAINING PROGRAM
Payer: MEDICAID

## 2021-01-01 ENCOUNTER — APPOINTMENT (OUTPATIENT)
Dept: OCCUPATIONAL THERAPY | Facility: CLINIC | Age: 61
End: 2021-01-01
Payer: MEDICAID

## 2021-01-01 ENCOUNTER — PATIENT OUTREACH (OUTPATIENT)
Dept: CARE COORDINATION | Facility: CLINIC | Age: 61
End: 2021-01-01

## 2021-01-01 ENCOUNTER — APPOINTMENT (OUTPATIENT)
Dept: CARDIOLOGY | Facility: CLINIC | Age: 61
End: 2021-01-01
Payer: MEDICAID

## 2021-01-01 ENCOUNTER — APPOINTMENT (OUTPATIENT)
Dept: CARDIOLOGY | Facility: CLINIC | Age: 61
End: 2021-01-01
Attending: NURSE PRACTITIONER
Payer: MEDICAID

## 2021-01-01 ENCOUNTER — HOSPITAL ENCOUNTER (INPATIENT)
Facility: CLINIC | Age: 61
LOS: 11 days | End: 2021-04-22
Attending: EMERGENCY MEDICINE | Admitting: INTERNAL MEDICINE
Payer: MEDICAID

## 2021-01-01 ENCOUNTER — TELEPHONE (OUTPATIENT)
Dept: NEPHROLOGY | Facility: CLINIC | Age: 61
End: 2021-01-01

## 2021-01-01 ENCOUNTER — DOCUMENTATION ONLY (OUTPATIENT)
Dept: CARE COORDINATION | Facility: CLINIC | Age: 61
End: 2021-01-01

## 2021-01-01 ENCOUNTER — APPOINTMENT (OUTPATIENT)
Dept: GENERAL RADIOLOGY | Facility: CLINIC | Age: 61
End: 2021-01-01
Attending: STUDENT IN AN ORGANIZED HEALTH CARE EDUCATION/TRAINING PROGRAM
Payer: MEDICAID

## 2021-01-01 ENCOUNTER — APPOINTMENT (OUTPATIENT)
Dept: CT IMAGING | Facility: CLINIC | Age: 61
End: 2021-01-01
Attending: NURSE PRACTITIONER
Payer: MEDICAID

## 2021-01-01 ENCOUNTER — VIRTUAL VISIT (OUTPATIENT)
Dept: PHARMACY | Facility: CLINIC | Age: 61
End: 2021-01-01
Attending: NURSE PRACTITIONER
Payer: MEDICAID

## 2021-01-01 ENCOUNTER — ANESTHESIA EVENT (OUTPATIENT)
Dept: INTENSIVE CARE | Facility: CLINIC | Age: 61
End: 2021-01-01
Payer: MEDICAID

## 2021-01-01 ENCOUNTER — ANESTHESIA (OUTPATIENT)
Dept: INTENSIVE CARE | Facility: CLINIC | Age: 61
End: 2021-01-01
Payer: MEDICAID

## 2021-01-01 VITALS
SYSTOLIC BLOOD PRESSURE: 104 MMHG | TEMPERATURE: 97.4 F | OXYGEN SATURATION: 100 % | HEART RATE: 89 BPM | RESPIRATION RATE: 18 BRPM | DIASTOLIC BLOOD PRESSURE: 71 MMHG

## 2021-01-01 VITALS — TEMPERATURE: 97.5 F | HEART RATE: 99 BPM | SYSTOLIC BLOOD PRESSURE: 104 MMHG | DIASTOLIC BLOOD PRESSURE: 73 MMHG

## 2021-01-01 VITALS
TEMPERATURE: 97.2 F | DIASTOLIC BLOOD PRESSURE: 59 MMHG | OXYGEN SATURATION: 83 % | RESPIRATION RATE: 20 BRPM | SYSTOLIC BLOOD PRESSURE: 82 MMHG | HEIGHT: 68 IN | WEIGHT: 204.81 LBS | BODY MASS INDEX: 31.04 KG/M2

## 2021-01-01 VITALS
TEMPERATURE: 97.4 F | WEIGHT: 228.9 LBS | HEIGHT: 68 IN | BODY MASS INDEX: 34.69 KG/M2 | OXYGEN SATURATION: 98 % | DIASTOLIC BLOOD PRESSURE: 74 MMHG | HEART RATE: 90 BPM | RESPIRATION RATE: 16 BRPM | SYSTOLIC BLOOD PRESSURE: 103 MMHG

## 2021-01-01 DIAGNOSIS — I50.82 BIVENTRICULAR HEART FAILURE (H): ICD-10-CM

## 2021-01-01 DIAGNOSIS — J18.9 PNEUMONIA OF BOTH LOWER LOBES DUE TO INFECTIOUS ORGANISM: ICD-10-CM

## 2021-01-01 DIAGNOSIS — N18.4 CKD (CHRONIC KIDNEY DISEASE) STAGE 4, GFR 15-29 ML/MIN (H): Primary | ICD-10-CM

## 2021-01-01 DIAGNOSIS — I50.22 CHRONIC SYSTOLIC CONGESTIVE HEART FAILURE (H): ICD-10-CM

## 2021-01-01 DIAGNOSIS — K92.1 GASTROINTESTINAL HEMORRHAGE WITH MELENA: ICD-10-CM

## 2021-01-01 DIAGNOSIS — I25.83 CORONARY ARTERY DISEASE DUE TO LIPID RICH PLAQUE: ICD-10-CM

## 2021-01-01 DIAGNOSIS — I25.5 ISCHEMIC CARDIOMYOPATHY: ICD-10-CM

## 2021-01-01 DIAGNOSIS — R60.1 ANASARCA: ICD-10-CM

## 2021-01-01 DIAGNOSIS — E61.1 IRON DEFICIENCY: ICD-10-CM

## 2021-01-01 DIAGNOSIS — I50.23 ACUTE ON CHRONIC SYSTOLIC HEART FAILURE (H): ICD-10-CM

## 2021-01-01 DIAGNOSIS — I50.22 CHRONIC SYSTOLIC CONGESTIVE HEART FAILURE (H): Primary | ICD-10-CM

## 2021-01-01 DIAGNOSIS — N18.4 ANEMIA IN STAGE 4 CHRONIC KIDNEY DISEASE (H): ICD-10-CM

## 2021-01-01 DIAGNOSIS — I51.89 OTHER ILL-DEFINED HEART DISEASES: ICD-10-CM

## 2021-01-01 DIAGNOSIS — N18.4 CKD (CHRONIC KIDNEY DISEASE) STAGE 4, GFR 15-29 ML/MIN (H): ICD-10-CM

## 2021-01-01 DIAGNOSIS — I25.10 CORONARY ARTERY DISEASE DUE TO LIPID RICH PLAQUE: ICD-10-CM

## 2021-01-01 DIAGNOSIS — Z11.52 ENCOUNTER FOR SCREENING LABORATORY TESTING FOR SEVERE ACUTE RESPIRATORY SYNDROME CORONAVIRUS 2 (SARS-COV-2): ICD-10-CM

## 2021-01-01 DIAGNOSIS — I50.23 ACUTE ON CHRONIC SYSTOLIC CONGESTIVE HEART FAILURE (H): ICD-10-CM

## 2021-01-01 DIAGNOSIS — D63.1 ANEMIA IN STAGE 4 CHRONIC KIDNEY DISEASE (H): ICD-10-CM

## 2021-01-01 DIAGNOSIS — K92.2 LOWER GI BLEEDING: Chronic | ICD-10-CM

## 2021-01-01 DIAGNOSIS — D62 ANEMIA DUE TO ACUTE BLOOD LOSS: ICD-10-CM

## 2021-01-01 DIAGNOSIS — I27.21 SEVERE PULMONARY ARTERIAL SYSTOLIC HYPERTENSION (H): ICD-10-CM

## 2021-01-01 DIAGNOSIS — E55.9 VITAMIN D DEFICIENCY: ICD-10-CM

## 2021-01-01 DIAGNOSIS — I50.83 HIGH OUTPUT HEART FAILURE (H): ICD-10-CM

## 2021-01-01 DIAGNOSIS — N17.0 ACUTE RENAL FAILURE WITH ACUTE TUBULAR NECROSIS SUPERIMPOSED ON STAGE 4 CHRONIC KIDNEY DISEASE (H): ICD-10-CM

## 2021-01-01 DIAGNOSIS — E11.8 TYPE 2 DIABETES MELLITUS WITH COMPLICATION (H): ICD-10-CM

## 2021-01-01 DIAGNOSIS — Z76.89 ENCOUNTER FOR SUPPORT AND COORDINATION OF TRANSITION OF CARE: Primary | ICD-10-CM

## 2021-01-01 DIAGNOSIS — D62 ANEMIA DUE TO ACUTE BLOOD LOSS: Primary | ICD-10-CM

## 2021-01-01 DIAGNOSIS — I95.9 HYPOTENSION, UNSPECIFIED HYPOTENSION TYPE: ICD-10-CM

## 2021-01-01 DIAGNOSIS — I10 ESSENTIAL HYPERTENSION: ICD-10-CM

## 2021-01-01 DIAGNOSIS — Z98.890 HX OF ILEOSTOMY: ICD-10-CM

## 2021-01-01 DIAGNOSIS — N18.4 CKD (CHRONIC KIDNEY DISEASE) STAGE 4, GFR 15-29 ML/MIN (H): Chronic | ICD-10-CM

## 2021-01-01 DIAGNOSIS — N18.4 ACUTE RENAL FAILURE WITH ACUTE TUBULAR NECROSIS SUPERIMPOSED ON STAGE 4 CHRONIC KIDNEY DISEASE (H): ICD-10-CM

## 2021-01-01 DIAGNOSIS — I48.19 ATRIAL FIBRILLATION, PERSISTENT (H): Primary | ICD-10-CM

## 2021-01-01 DIAGNOSIS — E03.8 SUBCLINICAL HYPOTHYROIDISM: Chronic | ICD-10-CM

## 2021-01-01 DIAGNOSIS — K92.2 GASTROINTESTINAL HEMORRHAGE, UNSPECIFIED GASTROINTESTINAL HEMORRHAGE TYPE: ICD-10-CM

## 2021-01-01 DIAGNOSIS — D64.9 ACUTE ANEMIA: ICD-10-CM

## 2021-01-01 DIAGNOSIS — I48.91 ATRIAL FIBRILLATION, UNSPECIFIED TYPE (H): ICD-10-CM

## 2021-01-01 DIAGNOSIS — Z78.9 TAKES DIETARY SUPPLEMENTS: ICD-10-CM

## 2021-01-01 DIAGNOSIS — Z90.49 S/P COLON RESECTION: ICD-10-CM

## 2021-01-01 DIAGNOSIS — E87.5 HYPERKALEMIA: ICD-10-CM

## 2021-01-01 DIAGNOSIS — R73.9 HYPERGLYCEMIA: ICD-10-CM

## 2021-01-01 DIAGNOSIS — K59.00 CONSTIPATION, UNSPECIFIED CONSTIPATION TYPE: ICD-10-CM

## 2021-01-01 DIAGNOSIS — R53.1 WEAKNESS: ICD-10-CM

## 2021-01-01 DIAGNOSIS — I21.11 STEMI INVOLVING RIGHT CORONARY ARTERY (H): ICD-10-CM

## 2021-01-01 DIAGNOSIS — I50.9: ICD-10-CM

## 2021-01-01 DIAGNOSIS — E78.2 MIXED HYPERLIPIDEMIA: ICD-10-CM

## 2021-01-01 DIAGNOSIS — R94.31 NONSPECIFIC ABNORMAL ELECTROCARDIOGRAM (ECG) (EKG): ICD-10-CM

## 2021-01-01 DIAGNOSIS — I48.92 ATRIAL FLUTTER, CHRONIC (H): Chronic | ICD-10-CM

## 2021-01-01 LAB
1,3 BETA GLUCAN SER-MCNC: NORMAL PG/ML
ABO + RH BLD: NORMAL
ACID FAST STN SPEC QL: NORMAL
ALBUMIN FLD-MCNC: 1.5 G/DL
ALBUMIN SERPL-MCNC: 2.2 G/DL (ref 3.4–5)
ALBUMIN SERPL-MCNC: 2.3 G/DL (ref 3.4–5)
ALBUMIN SERPL-MCNC: 2.3 G/DL (ref 3.4–5)
ALBUMIN SERPL-MCNC: 2.4 G/DL (ref 3.4–5)
ALBUMIN SERPL-MCNC: 2.5 G/DL (ref 3.4–5)
ALBUMIN SERPL-MCNC: 2.5 G/DL (ref 3.4–5)
ALBUMIN SERPL-MCNC: 2.6 G/DL (ref 3.4–5)
ALBUMIN SERPL-MCNC: 2.6 G/DL (ref 3.4–5)
ALBUMIN SERPL-MCNC: 2.7 G/DL (ref 3.4–5)
ALBUMIN SERPL-MCNC: 2.8 G/DL (ref 3.4–5)
ALBUMIN SERPL-MCNC: 2.9 G/DL (ref 3.4–5)
ALBUMIN SERPL-MCNC: 3 G/DL (ref 3.4–5)
ALBUMIN SERPL-MCNC: 3.1 G/DL (ref 3.4–5)
ALBUMIN UR-MCNC: 30 MG/DL
ALBUMIN UR-MCNC: 300 MG/DL
ALP SERPL-CCNC: 101 U/L (ref 40–150)
ALP SERPL-CCNC: 103 U/L (ref 40–150)
ALP SERPL-CCNC: 104 U/L (ref 40–150)
ALP SERPL-CCNC: 106 U/L (ref 40–150)
ALP SERPL-CCNC: 106 U/L (ref 40–150)
ALP SERPL-CCNC: 107 U/L (ref 40–150)
ALP SERPL-CCNC: 107 U/L (ref 40–150)
ALP SERPL-CCNC: 109 U/L (ref 40–150)
ALP SERPL-CCNC: 116 U/L (ref 40–150)
ALP SERPL-CCNC: 116 U/L (ref 40–150)
ALP SERPL-CCNC: 118 U/L (ref 40–150)
ALP SERPL-CCNC: 149 U/L (ref 40–150)
ALP SERPL-CCNC: 99 U/L (ref 40–150)
ALT SERPL W P-5'-P-CCNC: 107 U/L (ref 0–70)
ALT SERPL W P-5'-P-CCNC: 118 U/L (ref 0–70)
ALT SERPL W P-5'-P-CCNC: 16 U/L (ref 0–70)
ALT SERPL W P-5'-P-CCNC: 20 U/L (ref 0–70)
ALT SERPL W P-5'-P-CCNC: 20 U/L (ref 0–70)
ALT SERPL W P-5'-P-CCNC: 37 U/L (ref 0–70)
ALT SERPL W P-5'-P-CCNC: 62 U/L (ref 0–70)
ALT SERPL W P-5'-P-CCNC: 64 U/L (ref 0–70)
ALT SERPL W P-5'-P-CCNC: 65 U/L (ref 0–70)
ALT SERPL W P-5'-P-CCNC: 66 U/L (ref 0–70)
ALT SERPL W P-5'-P-CCNC: 67 U/L (ref 0–70)
ALT SERPL W P-5'-P-CCNC: 72 U/L (ref 0–70)
ALT SERPL W P-5'-P-CCNC: 86 U/L (ref 0–70)
AMMONIA PLAS-SCNC: <10 UMOL/L (ref 10–50)
ANION GAP SERPL CALCULATED.3IONS-SCNC: 10 MMOL/L (ref 3–14)
ANION GAP SERPL CALCULATED.3IONS-SCNC: 11 MMOL/L (ref 3–14)
ANION GAP SERPL CALCULATED.3IONS-SCNC: 12 MMOL/L (ref 3–14)
ANION GAP SERPL CALCULATED.3IONS-SCNC: 13 MMOL/L (ref 3–14)
ANION GAP SERPL CALCULATED.3IONS-SCNC: 14 MMOL/L (ref 3–14)
ANION GAP SERPL CALCULATED.3IONS-SCNC: 15 MMOL/L (ref 3–14)
ANION GAP SERPL CALCULATED.3IONS-SCNC: 16 MMOL/L (ref 3–14)
ANION GAP SERPL CALCULATED.3IONS-SCNC: 16 MMOL/L (ref 3–14)
ANION GAP SERPL CALCULATED.3IONS-SCNC: 17 MMOL/L (ref 3–14)
ANION GAP SERPL CALCULATED.3IONS-SCNC: 17 MMOL/L (ref 3–14)
ANION GAP SERPL CALCULATED.3IONS-SCNC: 18 MMOL/L (ref 3–14)
ANION GAP SERPL CALCULATED.3IONS-SCNC: 6 MMOL/L (ref 3–14)
ANION GAP SERPL CALCULATED.3IONS-SCNC: 6 MMOL/L (ref 3–14)
ANION GAP SERPL CALCULATED.3IONS-SCNC: 7 MMOL/L (ref 3–14)
ANION GAP SERPL CALCULATED.3IONS-SCNC: 8 MMOL/L (ref 3–14)
ANION GAP SERPL CALCULATED.3IONS-SCNC: 9 MMOL/L (ref 3–14)
APPEARANCE FLD: NORMAL
APPEARANCE UR: ABNORMAL
APPEARANCE UR: ABNORMAL
APTT PPP: 52 SEC (ref 22–37)
APTT PPP: 56 SEC (ref 22–37)
APTT PPP: 58 SEC (ref 22–37)
AST SERPL W P-5'-P-CCNC: 112 U/L (ref 0–45)
AST SERPL W P-5'-P-CCNC: 14 U/L (ref 0–45)
AST SERPL W P-5'-P-CCNC: 147 U/L (ref 0–45)
AST SERPL W P-5'-P-CCNC: 191 U/L (ref 0–45)
AST SERPL W P-5'-P-CCNC: 24 U/L (ref 0–45)
AST SERPL W P-5'-P-CCNC: 26 U/L (ref 0–45)
AST SERPL W P-5'-P-CCNC: 36 U/L (ref 0–45)
AST SERPL W P-5'-P-CCNC: 42 U/L (ref 0–45)
AST SERPL W P-5'-P-CCNC: 44 U/L (ref 0–45)
AST SERPL W P-5'-P-CCNC: 47 U/L (ref 0–45)
AST SERPL W P-5'-P-CCNC: 49 U/L (ref 0–45)
AST SERPL W P-5'-P-CCNC: 67 U/L (ref 0–45)
AST SERPL W P-5'-P-CCNC: 88 U/L (ref 0–45)
B-D GLUCAN INTERPRETATION (1,3): NORMAL
BACTERIA SPEC CULT: ABNORMAL
BACTERIA SPEC CULT: ABNORMAL
BACTERIA SPEC CULT: NO GROWTH
BACTERIA SPEC CULT: NORMAL
BASE DEFICIT BLDA-SCNC: 12 MMOL/L
BASE DEFICIT BLDA-SCNC: 13.7 MMOL/L
BASE DEFICIT BLDA-SCNC: 15.6 MMOL/L
BASE DEFICIT BLDA-SCNC: 3 MMOL/L
BASE DEFICIT BLDA-SCNC: 3.7 MMOL/L
BASE DEFICIT BLDA-SCNC: 3.8 MMOL/L
BASE DEFICIT BLDA-SCNC: 4 MMOL/L
BASE DEFICIT BLDA-SCNC: 4.7 MMOL/L
BASE DEFICIT BLDA-SCNC: 4.9 MMOL/L
BASE DEFICIT BLDA-SCNC: 5 MMOL/L
BASE DEFICIT BLDA-SCNC: 5 MMOL/L
BASE DEFICIT BLDA-SCNC: 5.1 MMOL/L
BASE DEFICIT BLDA-SCNC: 6.4 MMOL/L
BASE DEFICIT BLDA-SCNC: 6.6 MMOL/L
BASE DEFICIT BLDA-SCNC: 7.5 MMOL/L
BASE DEFICIT BLDA-SCNC: 8.2 MMOL/L
BASE DEFICIT BLDA-SCNC: 8.2 MMOL/L
BASE DEFICIT BLDA-SCNC: 8.6 MMOL/L
BASE DEFICIT BLDA-SCNC: 9.3 MMOL/L
BASE DEFICIT BLDA-SCNC: 9.4 MMOL/L
BASE DEFICIT BLDV-SCNC: 1.1 MMOL/L
BASE DEFICIT BLDV-SCNC: 1.7 MMOL/L
BASE DEFICIT BLDV-SCNC: 1.8 MMOL/L
BASE DEFICIT BLDV-SCNC: 1.8 MMOL/L
BASE DEFICIT BLDV-SCNC: 12 MMOL/L
BASE DEFICIT BLDV-SCNC: 2.2 MMOL/L
BASE DEFICIT BLDV-SCNC: 2.3 MMOL/L
BASE DEFICIT BLDV-SCNC: 2.4 MMOL/L
BASE DEFICIT BLDV-SCNC: 2.5 MMOL/L
BASE DEFICIT BLDV-SCNC: 2.6 MMOL/L
BASE DEFICIT BLDV-SCNC: 2.8 MMOL/L
BASE DEFICIT BLDV-SCNC: 2.8 MMOL/L
BASE DEFICIT BLDV-SCNC: 3 MMOL/L
BASE DEFICIT BLDV-SCNC: 3.2 MMOL/L
BASE DEFICIT BLDV-SCNC: 3.3 MMOL/L
BASE DEFICIT BLDV-SCNC: 3.4 MMOL/L
BASE DEFICIT BLDV-SCNC: 3.5 MMOL/L
BASE DEFICIT BLDV-SCNC: 3.7 MMOL/L
BASE DEFICIT BLDV-SCNC: 3.9 MMOL/L
BASE DEFICIT BLDV-SCNC: 3.9 MMOL/L
BASE DEFICIT BLDV-SCNC: 4 MMOL/L
BASE DEFICIT BLDV-SCNC: 4 MMOL/L
BASE DEFICIT BLDV-SCNC: 4.1 MMOL/L
BASE DEFICIT BLDV-SCNC: 4.6 MMOL/L
BASE DEFICIT BLDV-SCNC: 4.6 MMOL/L
BASE DEFICIT BLDV-SCNC: 4.7 MMOL/L
BASE DEFICIT BLDV-SCNC: 4.7 MMOL/L
BASE DEFICIT BLDV-SCNC: 4.9 MMOL/L
BASE DEFICIT BLDV-SCNC: 5 MMOL/L
BASE DEFICIT BLDV-SCNC: 5.2 MMOL/L
BASE DEFICIT BLDV-SCNC: 5.7 MMOL/L
BASE DEFICIT BLDV-SCNC: 5.8 MMOL/L
BASE DEFICIT BLDV-SCNC: 6.8 MMOL/L
BASE DEFICIT BLDV-SCNC: 6.8 MMOL/L
BASE DEFICIT BLDV-SCNC: 7 MMOL/L
BASE DEFICIT BLDV-SCNC: 7.6 MMOL/L
BASE DEFICIT BLDV-SCNC: 7.6 MMOL/L
BASE DEFICIT BLDV-SCNC: 8 MMOL/L
BASE DEFICIT BLDV-SCNC: 9.2 MMOL/L
BASOPHILS # BLD AUTO: 0 10E9/L (ref 0–0.2)
BASOPHILS NFR BLD AUTO: 0.1 %
BASOPHILS NFR BLD AUTO: 0.5 %
BASOPHILS NFR FLD MANUAL: 1 %
BILIRUB DIRECT SERPL-MCNC: 14.4 MG/DL (ref 0–0.2)
BILIRUB DIRECT SERPL-MCNC: 5.2 MG/DL (ref 0–0.2)
BILIRUB DIRECT SERPL-MCNC: 5.7 MG/DL (ref 0–0.2)
BILIRUB SERPL-MCNC: 12.2 MG/DL (ref 0.2–1.3)
BILIRUB SERPL-MCNC: 14.2 MG/DL (ref 0.2–1.3)
BILIRUB SERPL-MCNC: 14.4 MG/DL (ref 0.2–1.3)
BILIRUB SERPL-MCNC: 15.2 MG/DL (ref 0.2–1.3)
BILIRUB SERPL-MCNC: 18 MG/DL (ref 0.2–1.3)
BILIRUB SERPL-MCNC: 18 MG/DL (ref 0.2–1.3)
BILIRUB SERPL-MCNC: 2.3 MG/DL (ref 0.2–1.3)
BILIRUB SERPL-MCNC: 20.1 MG/DL (ref 0.2–1.3)
BILIRUB SERPL-MCNC: 4.1 MG/DL (ref 0.2–1.3)
BILIRUB SERPL-MCNC: 4.4 MG/DL (ref 0.2–1.3)
BILIRUB SERPL-MCNC: 6.3 MG/DL (ref 0.2–1.3)
BILIRUB SERPL-MCNC: 7.6 MG/DL (ref 0.2–1.3)
BILIRUB SERPL-MCNC: 9 MG/DL (ref 0.2–1.3)
BILIRUB UR QL STRIP: ABNORMAL
BILIRUB UR QL STRIP: NEGATIVE
BLD GP AB SCN SERPL QL: NORMAL
BLD GP AB SCN SERPL QL: NORMAL
BLD PROD TYP BPU: NORMAL
BLD UNIT ID BPU: 0
BLOOD BANK CMNT PATIENT-IMP: NORMAL
BLOOD BANK CMNT PATIENT-IMP: NORMAL
BLOOD PRODUCT CODE: NORMAL
BPU ID: NORMAL
BUN SERPL-MCNC: 104 MG/DL (ref 7–30)
BUN SERPL-MCNC: 124 MG/DL (ref 7–30)
BUN SERPL-MCNC: 149 MG/DL (ref 7–30)
BUN SERPL-MCNC: 152 MG/DL (ref 7–30)
BUN SERPL-MCNC: 19 MG/DL (ref 7–30)
BUN SERPL-MCNC: 19 MG/DL (ref 7–30)
BUN SERPL-MCNC: 20 MG/DL (ref 7–30)
BUN SERPL-MCNC: 21 MG/DL (ref 7–30)
BUN SERPL-MCNC: 22 MG/DL (ref 7–30)
BUN SERPL-MCNC: 23 MG/DL (ref 7–30)
BUN SERPL-MCNC: 24 MG/DL (ref 7–30)
BUN SERPL-MCNC: 25 MG/DL (ref 7–30)
BUN SERPL-MCNC: 26 MG/DL (ref 7–30)
BUN SERPL-MCNC: 26 MG/DL (ref 7–30)
BUN SERPL-MCNC: 30 MG/DL (ref 7–30)
BUN SERPL-MCNC: 34 MG/DL (ref 7–30)
BUN SERPL-MCNC: 36 MG/DL (ref 7–30)
BUN SERPL-MCNC: 36 MG/DL (ref 7–30)
BUN SERPL-MCNC: 43 MG/DL (ref 7–30)
BUN SERPL-MCNC: 45 MG/DL (ref 7–30)
BUN SERPL-MCNC: 60 MG/DL (ref 7–30)
BUN SERPL-MCNC: 64 MG/DL (ref 7–30)
BUN SERPL-MCNC: 74 MG/DL (ref 7–30)
BUN SERPL-MCNC: 74 MG/DL (ref 7–30)
BUN SERPL-MCNC: 75 MG/DL (ref 7–30)
BUN SERPL-MCNC: 77 MG/DL (ref 7–30)
BUN SERPL-MCNC: 77 MG/DL (ref 7–30)
BUN SERPL-MCNC: 78 MG/DL (ref 7–30)
BUN SERPL-MCNC: 79 MG/DL (ref 7–30)
BUN SERPL-MCNC: 85 MG/DL (ref 7–30)
BUN SERPL-MCNC: 89 MG/DL (ref 7–30)
CA-I BLD-MCNC: 4.1 MG/DL (ref 4.4–5.2)
CA-I BLD-MCNC: 4.1 MG/DL (ref 4.4–5.2)
CA-I BLD-MCNC: 4.3 MG/DL (ref 4.4–5.2)
CA-I BLD-MCNC: 4.4 MG/DL (ref 4.4–5.2)
CA-I BLD-MCNC: 4.5 MG/DL (ref 4.4–5.2)
CA-I BLD-MCNC: 4.5 MG/DL (ref 4.4–5.2)
CA-I BLD-MCNC: 4.6 MG/DL (ref 4.4–5.2)
CA-I BLD-MCNC: 4.6 MG/DL (ref 4.4–5.2)
CA-I BLD-MCNC: 4.7 MG/DL (ref 4.4–5.2)
CA-I BLD-MCNC: 4.7 MG/DL (ref 4.4–5.2)
CA-I BLD-MCNC: 4.8 MG/DL (ref 4.4–5.2)
CA-I BLD-SCNC: 4.1 MG/DL (ref 4.4–5.2)
CA-I BLD-SCNC: 4.3 MG/DL (ref 4.4–5.2)
CA-I SERPL ISE-MCNC: 3.8 MG/DL (ref 4.4–5.2)
CA-I SERPL ISE-MCNC: 4.1 MG/DL (ref 4.4–5.2)
CA-I SERPL ISE-MCNC: 4.1 MG/DL (ref 4.4–5.2)
CA-I SERPL ISE-MCNC: 4.2 MG/DL (ref 4.4–5.2)
CA-I SERPL ISE-MCNC: 4.2 MG/DL (ref 4.4–5.2)
CA-I SERPL ISE-MCNC: 4.3 MG/DL (ref 4.4–5.2)
CA-I SERPL ISE-MCNC: 4.4 MG/DL (ref 4.4–5.2)
CA-I SERPL ISE-MCNC: 4.5 MG/DL (ref 4.4–5.2)
CA-I SERPL ISE-MCNC: 4.6 MG/DL (ref 4.4–5.2)
CALCIUM SERPL-MCNC: 10.2 MG/DL (ref 8.5–10.1)
CALCIUM SERPL-MCNC: 7.5 MG/DL (ref 8.5–10.1)
CALCIUM SERPL-MCNC: 7.6 MG/DL (ref 8.5–10.1)
CALCIUM SERPL-MCNC: 7.7 MG/DL (ref 8.5–10.1)
CALCIUM SERPL-MCNC: 7.8 MG/DL (ref 8.5–10.1)
CALCIUM SERPL-MCNC: 7.9 MG/DL (ref 8.5–10.1)
CALCIUM SERPL-MCNC: 8 MG/DL (ref 8.5–10.1)
CALCIUM SERPL-MCNC: 8.1 MG/DL (ref 8.5–10.1)
CALCIUM SERPL-MCNC: 8.2 MG/DL (ref 8.5–10.1)
CALCIUM SERPL-MCNC: 8.3 MG/DL (ref 8.5–10.1)
CALCIUM SERPL-MCNC: 8.5 MG/DL (ref 8.5–10.1)
CALCIUM SERPL-MCNC: 8.6 MG/DL (ref 8.5–10.1)
CALCIUM SERPL-MCNC: 8.6 MG/DL (ref 8.5–10.1)
CALCIUM SERPL-MCNC: 8.7 MG/DL (ref 8.5–10.1)
CALCIUM SERPL-MCNC: 8.7 MG/DL (ref 8.5–10.1)
CALCIUM SERPL-MCNC: 8.8 MG/DL (ref 8.5–10.1)
CALCIUM SERPL-MCNC: 8.9 MG/DL (ref 8.5–10.1)
CALCIUM SERPL-MCNC: 9 MG/DL (ref 8.5–10.1)
CALCIUM SERPL-MCNC: 9 MG/DL (ref 8.5–10.1)
CALCIUM SERPL-MCNC: 9.2 MG/DL (ref 8.5–10.1)
CALCIUM SERPL-MCNC: 9.3 MG/DL (ref 8.5–10.1)
CALCIUM SERPL-MCNC: 9.4 MG/DL (ref 8.5–10.1)
CALCIUM SERPL-MCNC: 9.6 MG/DL (ref 8.5–10.1)
CHLORIDE SERPL-SCNC: 100 MMOL/L (ref 94–109)
CHLORIDE SERPL-SCNC: 101 MMOL/L (ref 94–109)
CHLORIDE SERPL-SCNC: 102 MMOL/L (ref 94–109)
CHLORIDE SERPL-SCNC: 103 MMOL/L (ref 94–109)
CHLORIDE SERPL-SCNC: 104 MMOL/L (ref 94–109)
CHLORIDE SERPL-SCNC: 105 MMOL/L (ref 94–109)
CHLORIDE SERPL-SCNC: 105 MMOL/L (ref 94–109)
CHLORIDE SERPL-SCNC: 106 MMOL/L (ref 94–109)
CHLORIDE SERPL-SCNC: 107 MMOL/L (ref 94–109)
CHLORIDE SERPL-SCNC: 108 MMOL/L (ref 94–109)
CHLORIDE SERPL-SCNC: 109 MMOL/L (ref 94–109)
CHLORIDE SERPL-SCNC: 110 MMOL/L (ref 94–109)
CHLORIDE SERPL-SCNC: 111 MMOL/L (ref 94–109)
CHLORIDE SERPL-SCNC: 95 MMOL/L (ref 94–109)
CHLORIDE SERPL-SCNC: 98 MMOL/L (ref 94–109)
CHLORIDE SERPL-SCNC: 98 MMOL/L (ref 94–109)
CHLORIDE SERPL-SCNC: 99 MMOL/L (ref 94–109)
CK SERPL-CCNC: 101 U/L (ref 30–300)
CMV DNA SPEC NAA+PROBE-ACNC: NORMAL [IU]/ML
CMV DNA SPEC NAA+PROBE-LOG#: NORMAL {LOG_IU}/ML
CO2 BLDCOV-SCNC: 14 MMOL/L (ref 21–28)
CO2 BLDCOV-SCNC: 16 MMOL/L (ref 21–28)
CO2 BLDCOV-SCNC: 16 MMOL/L (ref 21–28)
CO2 SERPL-SCNC: 11 MMOL/L (ref 20–32)
CO2 SERPL-SCNC: 14 MMOL/L (ref 20–32)
CO2 SERPL-SCNC: 15 MMOL/L (ref 20–32)
CO2 SERPL-SCNC: 15 MMOL/L (ref 20–32)
CO2 SERPL-SCNC: 16 MMOL/L (ref 20–32)
CO2 SERPL-SCNC: 16 MMOL/L (ref 20–32)
CO2 SERPL-SCNC: 17 MMOL/L (ref 20–32)
CO2 SERPL-SCNC: 18 MMOL/L (ref 20–32)
CO2 SERPL-SCNC: 19 MMOL/L (ref 20–32)
CO2 SERPL-SCNC: 20 MMOL/L (ref 20–32)
CO2 SERPL-SCNC: 21 MMOL/L (ref 20–32)
CO2 SERPL-SCNC: 22 MMOL/L (ref 20–32)
CO2 SERPL-SCNC: 23 MMOL/L (ref 20–32)
CO2 SERPL-SCNC: 24 MMOL/L (ref 20–32)
CO2 SERPL-SCNC: 25 MMOL/L (ref 20–32)
CO2 SERPL-SCNC: 26 MMOL/L (ref 20–32)
CO2 SERPL-SCNC: 27 MMOL/L (ref 20–32)
CO2 SERPL-SCNC: 31 MMOL/L (ref 20–32)
COLOR FLD: COLORLESS
COLOR FLD: COLORLESS
COLOR FLD: NORMAL
COLOR UR AUTO: ABNORMAL
COLOR UR AUTO: YELLOW
COPATH REPORT: NORMAL
CORTIS SERPL-MCNC: >150 UG/DL (ref 4–22)
CREAT SERPL-MCNC: 0.65 MG/DL (ref 0.66–1.25)
CREAT SERPL-MCNC: 0.67 MG/DL (ref 0.66–1.25)
CREAT SERPL-MCNC: 0.67 MG/DL (ref 0.66–1.25)
CREAT SERPL-MCNC: 0.7 MG/DL (ref 0.66–1.25)
CREAT SERPL-MCNC: 0.71 MG/DL (ref 0.66–1.25)
CREAT SERPL-MCNC: 0.73 MG/DL (ref 0.66–1.25)
CREAT SERPL-MCNC: 0.75 MG/DL (ref 0.66–1.25)
CREAT SERPL-MCNC: 0.76 MG/DL (ref 0.66–1.25)
CREAT SERPL-MCNC: 0.78 MG/DL (ref 0.66–1.25)
CREAT SERPL-MCNC: 0.9 MG/DL (ref 0.66–1.25)
CREAT SERPL-MCNC: 0.9 MG/DL (ref 0.66–1.25)
CREAT SERPL-MCNC: 0.91 MG/DL (ref 0.66–1.25)
CREAT SERPL-MCNC: 0.92 MG/DL (ref 0.66–1.25)
CREAT SERPL-MCNC: 0.93 MG/DL (ref 0.66–1.25)
CREAT SERPL-MCNC: 0.99 MG/DL (ref 0.66–1.25)
CREAT SERPL-MCNC: 1.01 MG/DL (ref 0.66–1.25)
CREAT SERPL-MCNC: 1.05 MG/DL (ref 0.66–1.25)
CREAT SERPL-MCNC: 1.13 MG/DL (ref 0.66–1.25)
CREAT SERPL-MCNC: 1.2 MG/DL (ref 0.66–1.25)
CREAT SERPL-MCNC: 1.27 MG/DL (ref 0.66–1.25)
CREAT SERPL-MCNC: 1.32 MG/DL (ref 0.66–1.25)
CREAT SERPL-MCNC: 1.49 MG/DL (ref 0.66–1.25)
CREAT SERPL-MCNC: 1.63 MG/DL (ref 0.66–1.25)
CREAT SERPL-MCNC: 1.93 MG/DL (ref 0.66–1.25)
CREAT SERPL-MCNC: 1.94 MG/DL (ref 0.66–1.25)
CREAT SERPL-MCNC: 2.14 MG/DL (ref 0.66–1.25)
CREAT SERPL-MCNC: 2.44 MG/DL (ref 0.66–1.25)
CREAT SERPL-MCNC: 2.46 MG/DL (ref 0.66–1.25)
CREAT SERPL-MCNC: 2.51 MG/DL (ref 0.66–1.25)
CREAT SERPL-MCNC: 2.52 MG/DL (ref 0.66–1.25)
CREAT SERPL-MCNC: 2.58 MG/DL (ref 0.66–1.25)
CREAT SERPL-MCNC: 2.69 MG/DL (ref 0.66–1.25)
CREAT SERPL-MCNC: 2.7 MG/DL (ref 0.66–1.25)
CREAT SERPL-MCNC: 2.76 MG/DL (ref 0.66–1.25)
CREAT SERPL-MCNC: 2.76 MG/DL (ref 0.66–1.25)
CREAT SERPL-MCNC: 2.79 MG/DL (ref 0.66–1.25)
CREAT SERPL-MCNC: 2.87 MG/DL (ref 0.66–1.25)
CREAT SERPL-MCNC: 3.16 MG/DL (ref 0.66–1.25)
CREAT SERPL-MCNC: 3.89 MG/DL (ref 0.66–1.25)
CREAT SERPL-MCNC: 3.92 MG/DL (ref 0.66–1.25)
CRP SERPL-MCNC: 10 MG/L (ref 0–8)
CRP SERPL-MCNC: 34 MG/L (ref 0–8)
CRP SERPL-MCNC: 38 MG/L (ref 0–8)
CRP SERPL-MCNC: 74 MG/L (ref 0–8)
D DIMER PPP FEU-MCNC: 16.8 UG/ML FEU (ref 0–0.5)
D DIMER PPP FEU-MCNC: 17.4 UG/ML FEU (ref 0–0.5)
D DIMER PPP FEU-MCNC: 8.3 UG/ML FEU (ref 0–0.5)
DIFFERENTIAL METHOD BLD: ABNORMAL
EOSINOPHIL # BLD AUTO: 0 10E9/L (ref 0–0.7)
EOSINOPHIL NFR BLD AUTO: 0 %
EOSINOPHIL NFR BLD AUTO: 0.6 %
ERYTHROCYTE [DISTWIDTH] IN BLOOD BY AUTOMATED COUNT: 23.2 % (ref 10–15)
ERYTHROCYTE [DISTWIDTH] IN BLOOD BY AUTOMATED COUNT: 24 % (ref 10–15)
ERYTHROCYTE [DISTWIDTH] IN BLOOD BY AUTOMATED COUNT: 24.1 % (ref 10–15)
ERYTHROCYTE [DISTWIDTH] IN BLOOD BY AUTOMATED COUNT: 24.2 % (ref 10–15)
ERYTHROCYTE [DISTWIDTH] IN BLOOD BY AUTOMATED COUNT: 24.4 % (ref 10–15)
ERYTHROCYTE [DISTWIDTH] IN BLOOD BY AUTOMATED COUNT: 24.5 % (ref 10–15)
ERYTHROCYTE [DISTWIDTH] IN BLOOD BY AUTOMATED COUNT: 24.5 % (ref 10–15)
ERYTHROCYTE [DISTWIDTH] IN BLOOD BY AUTOMATED COUNT: 24.8 % (ref 10–15)
ERYTHROCYTE [DISTWIDTH] IN BLOOD BY AUTOMATED COUNT: 24.9 % (ref 10–15)
ERYTHROCYTE [DISTWIDTH] IN BLOOD BY AUTOMATED COUNT: 24.9 % (ref 10–15)
ERYTHROCYTE [DISTWIDTH] IN BLOOD BY AUTOMATED COUNT: 25 % (ref 10–15)
ERYTHROCYTE [DISTWIDTH] IN BLOOD BY AUTOMATED COUNT: 25.1 % (ref 10–15)
ERYTHROCYTE [DISTWIDTH] IN BLOOD BY AUTOMATED COUNT: 25.1 % (ref 10–15)
ERYTHROCYTE [DISTWIDTH] IN BLOOD BY AUTOMATED COUNT: 25.2 % (ref 10–15)
ERYTHROCYTE [DISTWIDTH] IN BLOOD BY AUTOMATED COUNT: 25.3 % (ref 10–15)
ERYTHROCYTE [DISTWIDTH] IN BLOOD BY AUTOMATED COUNT: 25.4 % (ref 10–15)
ERYTHROCYTE [DISTWIDTH] IN BLOOD BY AUTOMATED COUNT: 25.4 % (ref 10–15)
ERYTHROCYTE [DISTWIDTH] IN BLOOD BY AUTOMATED COUNT: 25.6 % (ref 10–15)
ERYTHROCYTE [DISTWIDTH] IN BLOOD BY AUTOMATED COUNT: 25.7 % (ref 10–15)
ERYTHROCYTE [DISTWIDTH] IN BLOOD BY AUTOMATED COUNT: 26.9 % (ref 10–15)
FACT VIII ACT/NOR PPP: 185 % (ref 55–200)
FIBRINOGEN PPP-MCNC: 107 MG/DL (ref 200–420)
FIBRINOGEN PPP-MCNC: 124 MG/DL (ref 200–420)
FIBRINOGEN PPP-MCNC: 128 MG/DL (ref 200–420)
FIBRINOGEN PPP-MCNC: 142 MG/DL (ref 200–420)
FIBRINOGEN PPP-MCNC: 144 MG/DL (ref 200–420)
FIBRINOGEN PPP-MCNC: 152 MG/DL (ref 200–420)
FIBRINOGEN PPP-MCNC: 157 MG/DL (ref 200–420)
FIBRINOGEN PPP-MCNC: 169 MG/DL (ref 200–420)
FIBRINOGEN PPP-MCNC: 206 MG/DL (ref 200–420)
FIBRINOGEN PPP-MCNC: 93 MG/DL (ref 200–420)
FLUAV H1 2009 PAND RNA SPEC QL NAA+PROBE: NEGATIVE
FLUAV H1 RNA SPEC QL NAA+PROBE: NEGATIVE
FLUAV H3 RNA SPEC QL NAA+PROBE: NEGATIVE
FLUAV RNA RESP QL NAA+PROBE: NEGATIVE
FLUAV RNA RESP QL NAA+PROBE: NEGATIVE
FLUAV RNA SPEC QL NAA+PROBE: NEGATIVE
FLUBV RNA RESP QL NAA+PROBE: NEGATIVE
FLUBV RNA RESP QL NAA+PROBE: NEGATIVE
FLUBV RNA SPEC QL NAA+PROBE: NEGATIVE
GALACTOMANNAN AG SERPL QL IA: NEGATIVE
GALACTOMANNAN AG SERPL-ACNC: 0.05
GFR SERPL CREATININE-BSD FRML MDRD: 15 ML/MIN/{1.73_M2}
GFR SERPL CREATININE-BSD FRML MDRD: 15 ML/MIN/{1.73_M2}
GFR SERPL CREATININE-BSD FRML MDRD: 20 ML/MIN/{1.73_M2}
GFR SERPL CREATININE-BSD FRML MDRD: 22 ML/MIN/{1.73_M2}
GFR SERPL CREATININE-BSD FRML MDRD: 23 ML/MIN/{1.73_M2}
GFR SERPL CREATININE-BSD FRML MDRD: 24 ML/MIN/{1.73_M2}
GFR SERPL CREATININE-BSD FRML MDRD: 24 ML/MIN/{1.73_M2}
GFR SERPL CREATININE-BSD FRML MDRD: 25 ML/MIN/{1.73_M2}
GFR SERPL CREATININE-BSD FRML MDRD: 26 ML/MIN/{1.73_M2}
GFR SERPL CREATININE-BSD FRML MDRD: 26 ML/MIN/{1.73_M2}
GFR SERPL CREATININE-BSD FRML MDRD: 27 ML/MIN/{1.73_M2}
GFR SERPL CREATININE-BSD FRML MDRD: 27 ML/MIN/{1.73_M2}
GFR SERPL CREATININE-BSD FRML MDRD: 32 ML/MIN/{1.73_M2}
GFR SERPL CREATININE-BSD FRML MDRD: 36 ML/MIN/{1.73_M2}
GFR SERPL CREATININE-BSD FRML MDRD: 36 ML/MIN/{1.73_M2}
GFR SERPL CREATININE-BSD FRML MDRD: 44 ML/MIN/{1.73_M2}
GFR SERPL CREATININE-BSD FRML MDRD: 49 ML/MIN/{1.73_M2}
GFR SERPL CREATININE-BSD FRML MDRD: 57 ML/MIN/{1.73_M2}
GFR SERPL CREATININE-BSD FRML MDRD: 60 ML/MIN/{1.73_M2}
GFR SERPL CREATININE-BSD FRML MDRD: 64 ML/MIN/{1.73_M2}
GFR SERPL CREATININE-BSD FRML MDRD: 69 ML/MIN/{1.73_M2}
GFR SERPL CREATININE-BSD FRML MDRD: 75 ML/MIN/{1.73_M2}
GFR SERPL CREATININE-BSD FRML MDRD: 79 ML/MIN/{1.73_M2}
GFR SERPL CREATININE-BSD FRML MDRD: 81 ML/MIN/{1.73_M2}
GFR SERPL CREATININE-BSD FRML MDRD: 87 ML/MIN/{1.73_M2}
GFR SERPL CREATININE-BSD FRML MDRD: 88 ML/MIN/{1.73_M2}
GFR SERPL CREATININE-BSD FRML MDRD: 89 ML/MIN/{1.73_M2}
GFR SERPL CREATININE-BSD FRML MDRD: >90 ML/MIN/{1.73_M2}
GLUCOSE BLD-MCNC: 73 MG/DL (ref 70–99)
GLUCOSE BLD-MCNC: 86 MG/DL (ref 70–99)
GLUCOSE BLDC GLUCOMTR-MCNC: 101 MG/DL (ref 70–99)
GLUCOSE BLDC GLUCOMTR-MCNC: 103 MG/DL (ref 70–99)
GLUCOSE BLDC GLUCOMTR-MCNC: 103 MG/DL (ref 70–99)
GLUCOSE BLDC GLUCOMTR-MCNC: 105 MG/DL (ref 70–99)
GLUCOSE BLDC GLUCOMTR-MCNC: 107 MG/DL (ref 70–99)
GLUCOSE BLDC GLUCOMTR-MCNC: 109 MG/DL (ref 70–99)
GLUCOSE BLDC GLUCOMTR-MCNC: 110 MG/DL (ref 70–99)
GLUCOSE BLDC GLUCOMTR-MCNC: 111 MG/DL (ref 70–99)
GLUCOSE BLDC GLUCOMTR-MCNC: 112 MG/DL (ref 70–99)
GLUCOSE BLDC GLUCOMTR-MCNC: 114 MG/DL (ref 70–99)
GLUCOSE BLDC GLUCOMTR-MCNC: 114 MG/DL (ref 70–99)
GLUCOSE BLDC GLUCOMTR-MCNC: 115 MG/DL (ref 70–99)
GLUCOSE BLDC GLUCOMTR-MCNC: 115 MG/DL (ref 70–99)
GLUCOSE BLDC GLUCOMTR-MCNC: 116 MG/DL (ref 70–99)
GLUCOSE BLDC GLUCOMTR-MCNC: 117 MG/DL (ref 70–99)
GLUCOSE BLDC GLUCOMTR-MCNC: 118 MG/DL (ref 70–99)
GLUCOSE BLDC GLUCOMTR-MCNC: 121 MG/DL (ref 70–99)
GLUCOSE BLDC GLUCOMTR-MCNC: 122 MG/DL (ref 70–99)
GLUCOSE BLDC GLUCOMTR-MCNC: 124 MG/DL (ref 70–99)
GLUCOSE BLDC GLUCOMTR-MCNC: 124 MG/DL (ref 70–99)
GLUCOSE BLDC GLUCOMTR-MCNC: 125 MG/DL (ref 70–99)
GLUCOSE BLDC GLUCOMTR-MCNC: 126 MG/DL (ref 70–99)
GLUCOSE BLDC GLUCOMTR-MCNC: 126 MG/DL (ref 70–99)
GLUCOSE BLDC GLUCOMTR-MCNC: 127 MG/DL (ref 70–99)
GLUCOSE BLDC GLUCOMTR-MCNC: 128 MG/DL (ref 70–99)
GLUCOSE BLDC GLUCOMTR-MCNC: 129 MG/DL (ref 70–99)
GLUCOSE BLDC GLUCOMTR-MCNC: 129 MG/DL (ref 70–99)
GLUCOSE BLDC GLUCOMTR-MCNC: 130 MG/DL (ref 70–99)
GLUCOSE BLDC GLUCOMTR-MCNC: 131 MG/DL (ref 70–99)
GLUCOSE BLDC GLUCOMTR-MCNC: 132 MG/DL (ref 70–99)
GLUCOSE BLDC GLUCOMTR-MCNC: 133 MG/DL (ref 70–99)
GLUCOSE BLDC GLUCOMTR-MCNC: 133 MG/DL (ref 70–99)
GLUCOSE BLDC GLUCOMTR-MCNC: 134 MG/DL (ref 70–99)
GLUCOSE BLDC GLUCOMTR-MCNC: 135 MG/DL (ref 70–99)
GLUCOSE BLDC GLUCOMTR-MCNC: 135 MG/DL (ref 70–99)
GLUCOSE BLDC GLUCOMTR-MCNC: 137 MG/DL (ref 70–99)
GLUCOSE BLDC GLUCOMTR-MCNC: 137 MG/DL (ref 70–99)
GLUCOSE BLDC GLUCOMTR-MCNC: 138 MG/DL (ref 70–99)
GLUCOSE BLDC GLUCOMTR-MCNC: 138 MG/DL (ref 70–99)
GLUCOSE BLDC GLUCOMTR-MCNC: 139 MG/DL (ref 70–99)
GLUCOSE BLDC GLUCOMTR-MCNC: 141 MG/DL (ref 70–99)
GLUCOSE BLDC GLUCOMTR-MCNC: 142 MG/DL (ref 70–99)
GLUCOSE BLDC GLUCOMTR-MCNC: 142 MG/DL (ref 70–99)
GLUCOSE BLDC GLUCOMTR-MCNC: 143 MG/DL (ref 70–99)
GLUCOSE BLDC GLUCOMTR-MCNC: 145 MG/DL (ref 70–99)
GLUCOSE BLDC GLUCOMTR-MCNC: 146 MG/DL (ref 70–99)
GLUCOSE BLDC GLUCOMTR-MCNC: 147 MG/DL (ref 70–99)
GLUCOSE BLDC GLUCOMTR-MCNC: 148 MG/DL (ref 70–99)
GLUCOSE BLDC GLUCOMTR-MCNC: 148 MG/DL (ref 70–99)
GLUCOSE BLDC GLUCOMTR-MCNC: 149 MG/DL (ref 70–99)
GLUCOSE BLDC GLUCOMTR-MCNC: 150 MG/DL (ref 70–99)
GLUCOSE BLDC GLUCOMTR-MCNC: 151 MG/DL (ref 70–99)
GLUCOSE BLDC GLUCOMTR-MCNC: 152 MG/DL (ref 70–99)
GLUCOSE BLDC GLUCOMTR-MCNC: 153 MG/DL (ref 70–99)
GLUCOSE BLDC GLUCOMTR-MCNC: 154 MG/DL (ref 70–99)
GLUCOSE BLDC GLUCOMTR-MCNC: 155 MG/DL (ref 70–99)
GLUCOSE BLDC GLUCOMTR-MCNC: 155 MG/DL (ref 70–99)
GLUCOSE BLDC GLUCOMTR-MCNC: 158 MG/DL (ref 70–99)
GLUCOSE BLDC GLUCOMTR-MCNC: 158 MG/DL (ref 70–99)
GLUCOSE BLDC GLUCOMTR-MCNC: 159 MG/DL (ref 70–99)
GLUCOSE BLDC GLUCOMTR-MCNC: 159 MG/DL (ref 70–99)
GLUCOSE BLDC GLUCOMTR-MCNC: 162 MG/DL (ref 70–99)
GLUCOSE BLDC GLUCOMTR-MCNC: 162 MG/DL (ref 70–99)
GLUCOSE BLDC GLUCOMTR-MCNC: 163 MG/DL (ref 70–99)
GLUCOSE BLDC GLUCOMTR-MCNC: 163 MG/DL (ref 70–99)
GLUCOSE BLDC GLUCOMTR-MCNC: 166 MG/DL (ref 70–99)
GLUCOSE BLDC GLUCOMTR-MCNC: 167 MG/DL (ref 70–99)
GLUCOSE BLDC GLUCOMTR-MCNC: 167 MG/DL (ref 70–99)
GLUCOSE BLDC GLUCOMTR-MCNC: 168 MG/DL (ref 70–99)
GLUCOSE BLDC GLUCOMTR-MCNC: 168 MG/DL (ref 70–99)
GLUCOSE BLDC GLUCOMTR-MCNC: 169 MG/DL (ref 70–99)
GLUCOSE BLDC GLUCOMTR-MCNC: 173 MG/DL (ref 70–99)
GLUCOSE BLDC GLUCOMTR-MCNC: 174 MG/DL (ref 70–99)
GLUCOSE BLDC GLUCOMTR-MCNC: 176 MG/DL (ref 70–99)
GLUCOSE BLDC GLUCOMTR-MCNC: 178 MG/DL (ref 70–99)
GLUCOSE BLDC GLUCOMTR-MCNC: 179 MG/DL (ref 70–99)
GLUCOSE BLDC GLUCOMTR-MCNC: 182 MG/DL (ref 70–99)
GLUCOSE BLDC GLUCOMTR-MCNC: 188 MG/DL (ref 70–99)
GLUCOSE BLDC GLUCOMTR-MCNC: 188 MG/DL (ref 70–99)
GLUCOSE BLDC GLUCOMTR-MCNC: 190 MG/DL (ref 70–99)
GLUCOSE BLDC GLUCOMTR-MCNC: 207 MG/DL (ref 70–99)
GLUCOSE BLDC GLUCOMTR-MCNC: 211 MG/DL (ref 70–99)
GLUCOSE BLDC GLUCOMTR-MCNC: 218 MG/DL (ref 70–99)
GLUCOSE BLDC GLUCOMTR-MCNC: 226 MG/DL (ref 70–99)
GLUCOSE BLDC GLUCOMTR-MCNC: 255 MG/DL (ref 70–99)
GLUCOSE BLDC GLUCOMTR-MCNC: 257 MG/DL (ref 70–99)
GLUCOSE BLDC GLUCOMTR-MCNC: 258 MG/DL (ref 70–99)
GLUCOSE BLDC GLUCOMTR-MCNC: 261 MG/DL (ref 70–99)
GLUCOSE BLDC GLUCOMTR-MCNC: 265 MG/DL (ref 70–99)
GLUCOSE BLDC GLUCOMTR-MCNC: 267 MG/DL (ref 70–99)
GLUCOSE BLDC GLUCOMTR-MCNC: 285 MG/DL (ref 70–99)
GLUCOSE BLDC GLUCOMTR-MCNC: 55 MG/DL (ref 70–99)
GLUCOSE BLDC GLUCOMTR-MCNC: 62 MG/DL (ref 70–99)
GLUCOSE BLDC GLUCOMTR-MCNC: 70 MG/DL (ref 70–99)
GLUCOSE BLDC GLUCOMTR-MCNC: 78 MG/DL (ref 70–99)
GLUCOSE BLDC GLUCOMTR-MCNC: 80 MG/DL (ref 70–99)
GLUCOSE BLDC GLUCOMTR-MCNC: 84 MG/DL (ref 70–99)
GLUCOSE BLDC GLUCOMTR-MCNC: 86 MG/DL (ref 70–99)
GLUCOSE BLDC GLUCOMTR-MCNC: 89 MG/DL (ref 70–99)
GLUCOSE BLDC GLUCOMTR-MCNC: 94 MG/DL (ref 70–99)
GLUCOSE BLDC GLUCOMTR-MCNC: 95 MG/DL (ref 70–99)
GLUCOSE BLDC GLUCOMTR-MCNC: 98 MG/DL (ref 70–99)
GLUCOSE BLDC GLUCOMTR-MCNC: 99 MG/DL (ref 70–99)
GLUCOSE SERPL-MCNC: 100 MG/DL (ref 70–99)
GLUCOSE SERPL-MCNC: 102 MG/DL (ref 70–99)
GLUCOSE SERPL-MCNC: 103 MG/DL (ref 70–99)
GLUCOSE SERPL-MCNC: 104 MG/DL (ref 70–99)
GLUCOSE SERPL-MCNC: 113 MG/DL (ref 70–99)
GLUCOSE SERPL-MCNC: 114 MG/DL (ref 70–99)
GLUCOSE SERPL-MCNC: 117 MG/DL (ref 70–99)
GLUCOSE SERPL-MCNC: 123 MG/DL (ref 70–99)
GLUCOSE SERPL-MCNC: 124 MG/DL (ref 70–99)
GLUCOSE SERPL-MCNC: 126 MG/DL (ref 70–99)
GLUCOSE SERPL-MCNC: 128 MG/DL (ref 70–99)
GLUCOSE SERPL-MCNC: 131 MG/DL (ref 70–99)
GLUCOSE SERPL-MCNC: 134 MG/DL (ref 70–99)
GLUCOSE SERPL-MCNC: 135 MG/DL (ref 70–99)
GLUCOSE SERPL-MCNC: 135 MG/DL (ref 70–99)
GLUCOSE SERPL-MCNC: 136 MG/DL (ref 70–99)
GLUCOSE SERPL-MCNC: 138 MG/DL (ref 70–99)
GLUCOSE SERPL-MCNC: 140 MG/DL (ref 70–99)
GLUCOSE SERPL-MCNC: 140 MG/DL (ref 70–99)
GLUCOSE SERPL-MCNC: 143 MG/DL (ref 70–99)
GLUCOSE SERPL-MCNC: 145 MG/DL (ref 70–99)
GLUCOSE SERPL-MCNC: 145 MG/DL (ref 70–99)
GLUCOSE SERPL-MCNC: 150 MG/DL (ref 70–99)
GLUCOSE SERPL-MCNC: 151 MG/DL (ref 70–99)
GLUCOSE SERPL-MCNC: 153 MG/DL (ref 70–99)
GLUCOSE SERPL-MCNC: 156 MG/DL (ref 70–99)
GLUCOSE SERPL-MCNC: 160 MG/DL (ref 70–99)
GLUCOSE SERPL-MCNC: 160 MG/DL (ref 70–99)
GLUCOSE SERPL-MCNC: 168 MG/DL (ref 70–99)
GLUCOSE SERPL-MCNC: 172 MG/DL (ref 70–99)
GLUCOSE SERPL-MCNC: 174 MG/DL (ref 70–99)
GLUCOSE SERPL-MCNC: 175 MG/DL (ref 70–99)
GLUCOSE SERPL-MCNC: 176 MG/DL (ref 70–99)
GLUCOSE SERPL-MCNC: 176 MG/DL (ref 70–99)
GLUCOSE SERPL-MCNC: 178 MG/DL (ref 70–99)
GLUCOSE SERPL-MCNC: 191 MG/DL (ref 70–99)
GLUCOSE SERPL-MCNC: 200 MG/DL (ref 70–99)
GLUCOSE SERPL-MCNC: 211 MG/DL (ref 70–99)
GLUCOSE SERPL-MCNC: 221 MG/DL (ref 70–99)
GLUCOSE SERPL-MCNC: 229 MG/DL (ref 70–99)
GLUCOSE SERPL-MCNC: 240 MG/DL (ref 70–99)
GLUCOSE SERPL-MCNC: 255 MG/DL (ref 70–99)
GLUCOSE SERPL-MCNC: 268 MG/DL (ref 70–99)
GLUCOSE SERPL-MCNC: 73 MG/DL (ref 70–99)
GLUCOSE SERPL-MCNC: 86 MG/DL (ref 70–99)
GLUCOSE SERPL-MCNC: 92 MG/DL (ref 70–99)
GLUCOSE SERPL-MCNC: 95 MG/DL (ref 70–99)
GLUCOSE SERPL-MCNC: 97 MG/DL (ref 70–99)
GLUCOSE UR STRIP-MCNC: 50 MG/DL
GLUCOSE UR STRIP-MCNC: NEGATIVE MG/DL
GRAM STN SPEC: ABNORMAL
GRAM STN SPEC: NORMAL
HADV DNA SPEC QL NAA+PROBE: NEGATIVE
HADV DNA SPEC QL NAA+PROBE: NEGATIVE
HAPTOGLOB SERPL-MCNC: 12 MG/DL (ref 32–197)
HAV IGM SERPL QL IA: NONREACTIVE
HBA1C MFR BLD: 5.7 % (ref 0–5.6)
HBA1C MFR BLD: 6.5 % (ref 0–5.6)
HBV SURFACE AB SERPL IA-ACNC: 0 M[IU]/ML
HBV SURFACE AG SERPL QL IA: NONREACTIVE
HCO3 BLD-SCNC: 11 MMOL/L (ref 21–28)
HCO3 BLD-SCNC: 14 MMOL/L (ref 21–28)
HCO3 BLD-SCNC: 15 MMOL/L (ref 21–28)
HCO3 BLD-SCNC: 16 MMOL/L (ref 21–28)
HCO3 BLD-SCNC: 17 MMOL/L (ref 21–28)
HCO3 BLD-SCNC: 18 MMOL/L (ref 21–28)
HCO3 BLD-SCNC: 19 MMOL/L (ref 21–28)
HCO3 BLD-SCNC: 20 MMOL/L (ref 21–28)
HCO3 BLD-SCNC: 21 MMOL/L (ref 21–28)
HCO3 BLD-SCNC: 22 MMOL/L (ref 21–28)
HCO3 BLDV-SCNC: 14 MMOL/L (ref 21–28)
HCO3 BLDV-SCNC: 15 MMOL/L (ref 21–28)
HCO3 BLDV-SCNC: 18 MMOL/L (ref 21–28)
HCO3 BLDV-SCNC: 19 MMOL/L (ref 21–28)
HCO3 BLDV-SCNC: 20 MMOL/L (ref 21–28)
HCO3 BLDV-SCNC: 21 MMOL/L (ref 21–28)
HCO3 BLDV-SCNC: 22 MMOL/L (ref 21–28)
HCO3 BLDV-SCNC: 23 MMOL/L (ref 21–28)
HCO3 BLDV-SCNC: 24 MMOL/L (ref 21–28)
HCT VFR BLD AUTO: 22.5 % (ref 40–53)
HCT VFR BLD AUTO: 23 % (ref 40–53)
HCT VFR BLD AUTO: 23.2 % (ref 40–53)
HCT VFR BLD AUTO: 24.2 % (ref 40–53)
HCT VFR BLD AUTO: 24.8 % (ref 40–53)
HCT VFR BLD AUTO: 25.6 % (ref 40–53)
HCT VFR BLD AUTO: 26.2 % (ref 40–53)
HCT VFR BLD AUTO: 26.6 % (ref 40–53)
HCT VFR BLD AUTO: 26.9 % (ref 40–53)
HCT VFR BLD AUTO: 27 % (ref 40–53)
HCT VFR BLD AUTO: 27.8 % (ref 40–53)
HCT VFR BLD AUTO: 28.2 % (ref 40–53)
HCT VFR BLD AUTO: 28.3 % (ref 40–53)
HCT VFR BLD AUTO: 28.7 % (ref 40–53)
HCT VFR BLD AUTO: 29.9 % (ref 40–53)
HCT VFR BLD AUTO: 30.6 % (ref 40–53)
HCT VFR BLD AUTO: 31 % (ref 40–53)
HCT VFR BLD AUTO: 32.1 % (ref 40–53)
HCT VFR BLD AUTO: 32.3 % (ref 40–53)
HCT VFR BLD AUTO: 32.3 % (ref 40–53)
HCT VFR BLD AUTO: 32.9 % (ref 40–53)
HCT VFR BLD AUTO: 33.7 % (ref 40–53)
HCT VFR BLD AUTO: 34.4 % (ref 40–53)
HCT VFR BLD CALC: 37 %PCV (ref 40–53)
HCT VFR BLD CALC: 37 %PCV (ref 40–53)
HCV AB SERPL QL IA: NONREACTIVE
HGB BLD CALC-MCNC: 12.6 G/DL (ref 13.3–17.7)
HGB BLD CALC-MCNC: 12.6 G/DL (ref 13.3–17.7)
HGB BLD-MCNC: 10 G/DL (ref 13.3–17.7)
HGB BLD-MCNC: 10.3 G/DL (ref 13.3–17.7)
HGB BLD-MCNC: 6.8 G/DL (ref 13.3–17.7)
HGB BLD-MCNC: 7 G/DL (ref 13.3–17.7)
HGB BLD-MCNC: 7.2 G/DL (ref 13.3–17.7)
HGB BLD-MCNC: 7.5 G/DL (ref 13.3–17.7)
HGB BLD-MCNC: 7.7 G/DL (ref 13.3–17.7)
HGB BLD-MCNC: 7.9 G/DL (ref 13.3–17.7)
HGB BLD-MCNC: 8.1 G/DL (ref 13.3–17.7)
HGB BLD-MCNC: 8.2 G/DL (ref 13.3–17.7)
HGB BLD-MCNC: 8.2 G/DL (ref 13.3–17.7)
HGB BLD-MCNC: 8.3 G/DL (ref 13.3–17.7)
HGB BLD-MCNC: 8.3 G/DL (ref 13.3–17.7)
HGB BLD-MCNC: 8.5 G/DL (ref 13.3–17.7)
HGB BLD-MCNC: 8.5 G/DL (ref 13.3–17.7)
HGB BLD-MCNC: 8.6 G/DL (ref 13.3–17.7)
HGB BLD-MCNC: 8.6 G/DL (ref 13.3–17.7)
HGB BLD-MCNC: 8.7 G/DL (ref 13.3–17.7)
HGB BLD-MCNC: 8.7 G/DL (ref 13.3–17.7)
HGB BLD-MCNC: 8.8 G/DL (ref 13.3–17.7)
HGB BLD-MCNC: 8.9 G/DL (ref 13.3–17.7)
HGB BLD-MCNC: 8.9 G/DL (ref 13.3–17.7)
HGB BLD-MCNC: 9.4 G/DL (ref 13.3–17.7)
HGB BLD-MCNC: 9.8 G/DL (ref 13.3–17.7)
HGB UR QL STRIP: ABNORMAL
HGB UR QL STRIP: ABNORMAL
HMPV RNA SPEC QL NAA+PROBE: NEGATIVE
HPIV1 RNA SPEC QL NAA+PROBE: NEGATIVE
HPIV2 RNA SPEC QL NAA+PROBE: NEGATIVE
HPIV3 RNA SPEC QL NAA+PROBE: NEGATIVE
HYALINE CASTS #/AREA URNS LPF: 9 /LPF (ref 0–2)
IMM GRANULOCYTES # BLD: 0 10E9/L (ref 0–0.4)
IMM GRANULOCYTES # BLD: 0.1 10E9/L (ref 0–0.4)
IMM GRANULOCYTES # BLD: 0.1 10E9/L (ref 0–0.4)
IMM GRANULOCYTES # BLD: 0.2 10E9/L (ref 0–0.4)
IMM GRANULOCYTES NFR BLD: 0.2 %
IMM GRANULOCYTES NFR BLD: 0.5 %
IMM GRANULOCYTES NFR BLD: 0.7 %
IMM GRANULOCYTES NFR BLD: 0.8 %
INR PPP: 1.31 (ref 0.86–1.14)
INR PPP: 1.33 (ref 0.86–1.14)
INR PPP: 1.39 (ref 0.86–1.14)
INR PPP: 1.47 (ref 0.86–1.14)
INR PPP: 1.5 (ref 0.86–1.14)
INR PPP: 1.81 (ref 0.86–1.14)
INR PPP: 1.98 (ref 0.86–1.14)
INR PPP: 2.61 (ref 0.86–1.14)
INR PPP: 3.11 (ref 0.86–1.14)
INR PPP: 3.56 (ref 0.86–1.14)
INR PPP: 3.85 (ref 0.86–1.14)
INTERPRETATION ECG - MUSE: NORMAL
KETONES UR STRIP-MCNC: NEGATIVE MG/DL
KETONES UR STRIP-MCNC: NEGATIVE MG/DL
KOH PREP SPEC: NORMAL
KOH PREP SPEC: NORMAL
LABORATORY COMMENT REPORT: NORMAL
LACTATE BLD-SCNC: 1.3 MMOL/L (ref 0.7–2)
LACTATE BLD-SCNC: 1.7 MMOL/L (ref 0.7–2)
LACTATE BLD-SCNC: 1.7 MMOL/L (ref 0.7–2)
LACTATE BLD-SCNC: 1.9 MMOL/L (ref 0.7–2)
LACTATE BLD-SCNC: 1.9 MMOL/L (ref 0.7–2)
LACTATE BLD-SCNC: 10.5 MMOL/L (ref 0.7–2)
LACTATE BLD-SCNC: 2.2 MMOL/L (ref 0.7–2)
LACTATE BLD-SCNC: 2.3 MMOL/L (ref 0.7–2)
LACTATE BLD-SCNC: 2.4 MMOL/L (ref 0.7–2)
LACTATE BLD-SCNC: 2.5 MMOL/L (ref 0.7–2)
LACTATE BLD-SCNC: 2.6 MMOL/L (ref 0.7–2)
LACTATE BLD-SCNC: 2.7 MMOL/L (ref 0.7–2)
LACTATE BLD-SCNC: 3 MMOL/L (ref 0.7–2)
LACTATE BLD-SCNC: 3.9 MMOL/L (ref 0.7–2.1)
LACTATE BLD-SCNC: 4.3 MMOL/L (ref 0.7–2)
LACTATE BLD-SCNC: 4.4 MMOL/L (ref 0.7–2)
LACTATE BLD-SCNC: 4.7 MMOL/L (ref 0.7–2)
LACTATE BLD-SCNC: 5.2 MMOL/L (ref 0.7–2)
LACTATE BLD-SCNC: 5.3 MMOL/L (ref 0.7–2)
LACTATE BLD-SCNC: 5.5 MMOL/L (ref 0.7–2)
LACTATE BLD-SCNC: 5.5 MMOL/L (ref 0.7–2)
LACTATE BLD-SCNC: 5.6 MMOL/L (ref 0.7–2)
LACTATE BLD-SCNC: 5.6 MMOL/L (ref 0.7–2)
LACTATE BLD-SCNC: 5.7 MMOL/L (ref 0.7–2)
LACTATE BLD-SCNC: 5.8 MMOL/L (ref 0.7–2)
LACTATE BLD-SCNC: 6 MMOL/L (ref 0.7–2)
LACTATE BLD-SCNC: 6.2 MMOL/L (ref 0.7–2)
LACTATE BLD-SCNC: 6.3 MMOL/L (ref 0.7–2)
LACTATE BLD-SCNC: 6.5 MMOL/L (ref 0.7–2)
LACTATE BLD-SCNC: 6.6 MMOL/L (ref 0.7–2)
LACTATE BLD-SCNC: 7.1 MMOL/L (ref 0.7–2)
LACTATE BLD-SCNC: 7.4 MMOL/L (ref 0.7–2)
LACTATE BLD-SCNC: 8 MMOL/L (ref 0.7–2)
LACTATE BLD-SCNC: 8 MMOL/L (ref 0.7–2)
LDH SERPL L TO P-CCNC: 243 U/L (ref 85–227)
LDH SERPL L TO P-CCNC: 261 U/L (ref 85–227)
LDH SERPL L TO P-CCNC: 288 U/L (ref 85–227)
LDH SERPL L TO P-CCNC: 319 U/L (ref 85–227)
LEUKOCYTE ESTERASE UR QL STRIP: ABNORMAL
LEUKOCYTE ESTERASE UR QL STRIP: NEGATIVE
LIPASE SERPL-CCNC: 36 U/L (ref 73–393)
LYMPHOCYTES # BLD AUTO: 0 10E9/L (ref 0.8–5.3)
LYMPHOCYTES # BLD AUTO: 0.3 10E9/L (ref 0.8–5.3)
LYMPHOCYTES NFR BLD AUTO: 0 %
LYMPHOCYTES NFR BLD AUTO: 4.6 %
Lab: ABNORMAL
Lab: ABNORMAL
Lab: NORMAL
Lab: NORMAL
MAGNESIUM SERPL-MCNC: 2.4 MG/DL (ref 1.6–2.3)
MAGNESIUM SERPL-MCNC: 2.4 MG/DL (ref 1.6–2.3)
MAGNESIUM SERPL-MCNC: 2.5 MG/DL (ref 1.6–2.3)
MAGNESIUM SERPL-MCNC: 2.6 MG/DL (ref 1.6–2.3)
MAGNESIUM SERPL-MCNC: 2.7 MG/DL (ref 1.6–2.3)
MAGNESIUM SERPL-MCNC: 2.8 MG/DL (ref 1.6–2.3)
MAGNESIUM SERPL-MCNC: 3.4 MG/DL (ref 1.6–2.3)
MCH RBC QN AUTO: 19.5 PG (ref 26.5–33)
MCH RBC QN AUTO: 19.8 PG (ref 26.5–33)
MCH RBC QN AUTO: 20 PG (ref 26.5–33)
MCH RBC QN AUTO: 20 PG (ref 26.5–33)
MCH RBC QN AUTO: 20.2 PG (ref 26.5–33)
MCH RBC QN AUTO: 21.7 PG (ref 26.5–33)
MCH RBC QN AUTO: 22 PG (ref 26.5–33)
MCH RBC QN AUTO: 22.2 PG (ref 26.5–33)
MCH RBC QN AUTO: 22.3 PG (ref 26.5–33)
MCH RBC QN AUTO: 22.3 PG (ref 26.5–33)
MCH RBC QN AUTO: 22.7 PG (ref 26.5–33)
MCH RBC QN AUTO: 23.2 PG (ref 26.5–33)
MCH RBC QN AUTO: 23.3 PG (ref 26.5–33)
MCH RBC QN AUTO: 23.5 PG (ref 26.5–33)
MCH RBC QN AUTO: 23.6 PG (ref 26.5–33)
MCH RBC QN AUTO: 24 PG (ref 26.5–33)
MCH RBC QN AUTO: 24.3 PG (ref 26.5–33)
MCH RBC QN AUTO: 25.4 PG (ref 26.5–33)
MCH RBC QN AUTO: 25.4 PG (ref 26.5–33)
MCH RBC QN AUTO: 26.2 PG (ref 26.5–33)
MCH RBC QN AUTO: 26.5 PG (ref 26.5–33)
MCHC RBC AUTO-ENTMCNC: 27.1 G/DL (ref 31.5–36.5)
MCHC RBC AUTO-ENTMCNC: 27.4 G/DL (ref 31.5–36.5)
MCHC RBC AUTO-ENTMCNC: 27.4 G/DL (ref 31.5–36.5)
MCHC RBC AUTO-ENTMCNC: 27.6 G/DL (ref 31.5–36.5)
MCHC RBC AUTO-ENTMCNC: 27.7 G/DL (ref 31.5–36.5)
MCHC RBC AUTO-ENTMCNC: 28.9 G/DL (ref 31.5–36.5)
MCHC RBC AUTO-ENTMCNC: 29.7 G/DL (ref 31.5–36.5)
MCHC RBC AUTO-ENTMCNC: 29.9 G/DL (ref 31.5–36.5)
MCHC RBC AUTO-ENTMCNC: 30.2 G/DL (ref 31.5–36.5)
MCHC RBC AUTO-ENTMCNC: 30.3 G/DL (ref 31.5–36.5)
MCHC RBC AUTO-ENTMCNC: 30.4 G/DL (ref 31.5–36.5)
MCHC RBC AUTO-ENTMCNC: 30.5 G/DL (ref 31.5–36.5)
MCHC RBC AUTO-ENTMCNC: 30.6 G/DL (ref 31.5–36.5)
MCHC RBC AUTO-ENTMCNC: 30.7 G/DL (ref 31.5–36.5)
MCHC RBC AUTO-ENTMCNC: 30.7 G/DL (ref 31.5–36.5)
MCHC RBC AUTO-ENTMCNC: 30.9 G/DL (ref 31.5–36.5)
MCHC RBC AUTO-ENTMCNC: 31 G/DL (ref 31.5–36.5)
MCHC RBC AUTO-ENTMCNC: 31.3 G/DL (ref 31.5–36.5)
MCHC RBC AUTO-ENTMCNC: 31.9 G/DL (ref 31.5–36.5)
MCV RBC AUTO: 72 FL (ref 78–100)
MCV RBC AUTO: 73 FL (ref 78–100)
MCV RBC AUTO: 74 FL (ref 78–100)
MCV RBC AUTO: 75 FL (ref 78–100)
MCV RBC AUTO: 76 FL (ref 78–100)
MCV RBC AUTO: 78 FL (ref 78–100)
MCV RBC AUTO: 79 FL (ref 78–100)
MCV RBC AUTO: 83 FL (ref 78–100)
MCV RBC AUTO: 86 FL (ref 78–100)
MICROBIOLOGIST REVIEW: NORMAL
MITOCHONDRIA M2 IGG SER-ACNC: 1 U/ML
MONOCYTES # BLD AUTO: 0.6 10E9/L (ref 0–1.3)
MONOCYTES # BLD AUTO: 0.7 10E9/L (ref 0–1.3)
MONOCYTES # BLD AUTO: 0.8 10E9/L (ref 0–1.3)
MONOCYTES # BLD AUTO: 1.3 10E9/L (ref 0–1.3)
MONOCYTES NFR BLD AUTO: 11 %
MONOCYTES NFR BLD AUTO: 4.5 %
MONOCYTES NFR BLD AUTO: 5.8 %
MONOCYTES NFR BLD AUTO: 6.5 %
MONOS+MACROS NFR FLD MANUAL: 10 %
MONOS+MACROS NFR FLD MANUAL: 17 %
MUCOUS THREADS #/AREA URNS LPF: PRESENT /LPF
MUCOUS THREADS #/AREA URNS LPF: PRESENT /LPF
NEUTROPHILS # BLD AUTO: 12.4 10E9/L (ref 1.6–8.3)
NEUTROPHILS # BLD AUTO: 13 10E9/L (ref 1.6–8.3)
NEUTROPHILS # BLD AUTO: 18.2 10E9/L (ref 1.6–8.3)
NEUTROPHILS # BLD AUTO: 5.2 10E9/L (ref 1.6–8.3)
NEUTROPHILS NFR BLD AUTO: 83.1 %
NEUTROPHILS NFR BLD AUTO: 92.6 %
NEUTROPHILS NFR BLD AUTO: 93.4 %
NEUTROPHILS NFR BLD AUTO: 94.9 %
NEUTS BAND NFR FLD MANUAL: 70 %
NEUTS BAND NFR FLD MANUAL: 82 %
NEUTS BAND NFR FLD MANUAL: 90 %
NITRATE UR QL: NEGATIVE
NITRATE UR QL: NEGATIVE
NRBC # BLD AUTO: 0 10*3/UL
NRBC # BLD AUTO: 0.1 10*3/UL
NRBC # BLD AUTO: 0.2 10*3/UL
NRBC # BLD AUTO: 0.5 10*3/UL
NRBC BLD AUTO-RTO: 0 /100
NRBC BLD AUTO-RTO: 1 /100
NRBC BLD AUTO-RTO: 1 /100
NRBC BLD AUTO-RTO: 4 /100
NT-PROBNP SERPL-MCNC: ABNORMAL PG/ML (ref 0–900)
NUM BPU REQUESTED: 1
NUM BPU REQUESTED: 2
NUM BPU REQUESTED: 2
O2/TOTAL GAS SETTING VFR VENT: 100 %
O2/TOTAL GAS SETTING VFR VENT: 21 %
O2/TOTAL GAS SETTING VFR VENT: 30 %
O2/TOTAL GAS SETTING VFR VENT: 35 %
O2/TOTAL GAS SETTING VFR VENT: 40 %
O2/TOTAL GAS SETTING VFR VENT: 45 %
O2/TOTAL GAS SETTING VFR VENT: 45 %
O2/TOTAL GAS SETTING VFR VENT: 50 %
O2/TOTAL GAS SETTING VFR VENT: 60 %
O2/TOTAL GAS SETTING VFR VENT: 70 %
O2/TOTAL GAS SETTING VFR VENT: 70 %
O2/TOTAL GAS SETTING VFR VENT: 80 %
O2/TOTAL GAS SETTING VFR VENT: 80 %
O2/TOTAL GAS SETTING VFR VENT: ABNORMAL %
O2/TOTAL GAS SETTING VFR VENT: NORMAL %
OTHER CELLS FLD MANUAL: 30 %
OXYHGB MFR BLD: 45 % (ref 92–100)
OXYHGB MFR BLD: 77 % (ref 92–100)
OXYHGB MFR BLD: 89 % (ref 92–100)
OXYHGB MFR BLD: 92 % (ref 92–100)
OXYHGB MFR BLD: 95 % (ref 92–100)
OXYHGB MFR BLD: 95 % (ref 92–100)
OXYHGB MFR BLD: 96 % (ref 92–100)
OXYHGB MFR BLD: 97 % (ref 92–100)
OXYHGB MFR BLD: 98 % (ref 92–100)
OXYHGB MFR BLDV: 26 %
OXYHGB MFR BLDV: 26 %
OXYHGB MFR BLDV: 27 %
OXYHGB MFR BLDV: 28 %
OXYHGB MFR BLDV: 28 %
OXYHGB MFR BLDV: 29 %
OXYHGB MFR BLDV: 31 %
OXYHGB MFR BLDV: 32 %
OXYHGB MFR BLDV: 36 %
OXYHGB MFR BLDV: 37 %
OXYHGB MFR BLDV: 38 %
OXYHGB MFR BLDV: 39 %
OXYHGB MFR BLDV: 43 %
OXYHGB MFR BLDV: 44 %
OXYHGB MFR BLDV: 46 %
OXYHGB MFR BLDV: 50 %
OXYHGB MFR BLDV: 51 %
OXYHGB MFR BLDV: 52 %
OXYHGB MFR BLDV: 53 %
OXYHGB MFR BLDV: 54 %
OXYHGB MFR BLDV: 54 %
OXYHGB MFR BLDV: 56 %
OXYHGB MFR BLDV: 58 %
PCO2 BLD: 23 MM HG (ref 35–45)
PCO2 BLD: 23 MM HG (ref 35–45)
PCO2 BLD: 24 MM HG (ref 35–45)
PCO2 BLD: 24 MM HG (ref 35–45)
PCO2 BLD: 25 MM HG (ref 35–45)
PCO2 BLD: 25 MM HG (ref 35–45)
PCO2 BLD: 27 MM HG (ref 35–45)
PCO2 BLD: 30 MM HG (ref 35–45)
PCO2 BLD: 31 MM HG (ref 35–45)
PCO2 BLD: 31 MM HG (ref 35–45)
PCO2 BLD: 36 MM HG (ref 35–45)
PCO2 BLD: 37 MM HG (ref 35–45)
PCO2 BLD: 37 MM HG (ref 35–45)
PCO2 BLD: 39 MM HG (ref 35–45)
PCO2 BLD: 40 MM HG (ref 35–45)
PCO2 BLD: 41 MM HG (ref 35–45)
PCO2 BLD: 42 MM HG (ref 35–45)
PCO2 BLD: 44 MM HG (ref 35–45)
PCO2 BLD: 46 MM HG (ref 35–45)
PCO2 BLD: 55 MM HG (ref 35–45)
PCO2 BLDV: 27 MM HG (ref 40–50)
PCO2 BLDV: 28 MM HG (ref 40–50)
PCO2 BLDV: 29 MM HG (ref 40–50)
PCO2 BLDV: 29 MM HG (ref 40–50)
PCO2 BLDV: 30 MM HG (ref 40–50)
PCO2 BLDV: 31 MM HG (ref 40–50)
PCO2 BLDV: 33 MM HG (ref 40–50)
PCO2 BLDV: 34 MM HG (ref 40–50)
PCO2 BLDV: 35 MM HG (ref 40–50)
PCO2 BLDV: 36 MM HG (ref 40–50)
PCO2 BLDV: 37 MM HG (ref 40–50)
PCO2 BLDV: 37 MM HG (ref 40–50)
PCO2 BLDV: 38 MM HG (ref 40–50)
PCO2 BLDV: 38 MM HG (ref 40–50)
PCO2 BLDV: 39 MM HG (ref 40–50)
PCO2 BLDV: 39 MM HG (ref 40–50)
PCO2 BLDV: 41 MM HG (ref 40–50)
PCO2 BLDV: 42 MM HG (ref 40–50)
PCO2 BLDV: 42 MM HG (ref 40–50)
PCO2 BLDV: 46 MM HG (ref 40–50)
PCO2 BLDV: 47 MM HG (ref 40–50)
PCO2 BLDV: 48 MM HG (ref 40–50)
PCO2 BLDV: 48 MM HG (ref 40–50)
PCO2 BLDV: 49 MM HG (ref 40–50)
PCO2 BLDV: 49 MM HG (ref 40–50)
PCO2 BLDV: 50 MM HG (ref 40–50)
PCO2 BLDV: 53 MM HG (ref 40–50)
PCO2 BLDV: 55 MM HG (ref 40–50)
PCO2 BLDV: 57 MM HG (ref 40–50)
PCO2 BLDV: 68 MM HG (ref 40–50)
PH BLD: 7.08 PH (ref 7.35–7.45)
PH BLD: 7.18 PH (ref 7.35–7.45)
PH BLD: 7.26 PH (ref 7.35–7.45)
PH BLD: 7.27 PH (ref 7.35–7.45)
PH BLD: 7.29 PH (ref 7.35–7.45)
PH BLD: 7.31 PH (ref 7.35–7.45)
PH BLD: 7.32 PH (ref 7.35–7.45)
PH BLD: 7.32 PH (ref 7.35–7.45)
PH BLD: 7.33 PH (ref 7.35–7.45)
PH BLD: 7.35 PH (ref 7.35–7.45)
PH BLD: 7.36 PH (ref 7.35–7.45)
PH BLD: 7.38 PH (ref 7.35–7.45)
PH BLD: 7.4 PH (ref 7.35–7.45)
PH BLD: 7.4 PH (ref 7.35–7.45)
PH BLD: 7.41 PH (ref 7.35–7.45)
PH BLD: 7.41 PH (ref 7.35–7.45)
PH BLD: 7.42 PH (ref 7.35–7.45)
PH BLD: 7.48 PH (ref 7.35–7.45)
PH BLDV: 7.11 PH (ref 7.32–7.43)
PH BLDV: 7.21 PH (ref 7.32–7.43)
PH BLDV: 7.25 PH (ref 7.32–7.43)
PH BLDV: 7.26 PH (ref 7.32–7.43)
PH BLDV: 7.26 PH (ref 7.32–7.43)
PH BLDV: 7.27 PH (ref 7.32–7.43)
PH BLDV: 7.29 PH (ref 7.32–7.43)
PH BLDV: 7.3 PH (ref 7.32–7.43)
PH BLDV: 7.31 PH (ref 7.32–7.43)
PH BLDV: 7.32 PH (ref 7.32–7.43)
PH BLDV: 7.33 PH (ref 7.32–7.43)
PH BLDV: 7.33 PH (ref 7.32–7.43)
PH BLDV: 7.34 PH (ref 7.32–7.43)
PH BLDV: 7.36 PH (ref 7.32–7.43)
PH BLDV: 7.36 PH (ref 7.32–7.43)
PH BLDV: 7.37 PH (ref 7.32–7.43)
PH BLDV: 7.38 PH (ref 7.32–7.43)
PH BLDV: 7.39 PH (ref 7.32–7.43)
PH BLDV: 7.39 PH (ref 7.32–7.43)
PH BLDV: 7.41 PH (ref 7.32–7.43)
PH BLDV: 7.43 PH (ref 7.32–7.43)
PH BLDV: 7.44 PH (ref 7.32–7.43)
PH BLDV: 7.45 PH (ref 7.32–7.43)
PH UR STRIP: 5 PH (ref 5–7)
PH UR STRIP: 5.5 PH (ref 5–7)
PHOSPHATE SERPL-MCNC: 2.7 MG/DL (ref 2.5–4.5)
PHOSPHATE SERPL-MCNC: 2.8 MG/DL (ref 2.5–4.5)
PHOSPHATE SERPL-MCNC: 2.9 MG/DL (ref 2.5–4.5)
PHOSPHATE SERPL-MCNC: 3 MG/DL (ref 2.5–4.5)
PHOSPHATE SERPL-MCNC: 3.1 MG/DL (ref 2.5–4.5)
PHOSPHATE SERPL-MCNC: 3.1 MG/DL (ref 2.5–4.5)
PHOSPHATE SERPL-MCNC: 3.2 MG/DL (ref 2.5–4.5)
PHOSPHATE SERPL-MCNC: 3.2 MG/DL (ref 2.5–4.5)
PHOSPHATE SERPL-MCNC: 3.6 MG/DL (ref 2.5–4.5)
PHOSPHATE SERPL-MCNC: 3.6 MG/DL (ref 2.5–4.5)
PHOSPHATE SERPL-MCNC: 3.9 MG/DL (ref 2.5–4.5)
PHOSPHATE SERPL-MCNC: 3.9 MG/DL (ref 2.5–4.5)
PHOSPHATE SERPL-MCNC: 4 MG/DL (ref 2.5–4.5)
PHOSPHATE SERPL-MCNC: 4.1 MG/DL (ref 2.5–4.5)
PHOSPHATE SERPL-MCNC: 4.1 MG/DL (ref 2.5–4.5)
PHOSPHATE SERPL-MCNC: 4.2 MG/DL (ref 2.5–4.5)
PHOSPHATE SERPL-MCNC: 4.2 MG/DL (ref 2.5–4.5)
PHOSPHATE SERPL-MCNC: 4.5 MG/DL (ref 2.5–4.5)
PHOSPHATE SERPL-MCNC: 4.6 MG/DL (ref 2.5–4.5)
PHOSPHATE SERPL-MCNC: 4.6 MG/DL (ref 2.5–4.5)
PHOSPHATE SERPL-MCNC: 4.8 MG/DL (ref 2.5–4.5)
PLATELET # BLD AUTO: 10 10E9/L (ref 150–450)
PLATELET # BLD AUTO: 11 10E9/L (ref 150–450)
PLATELET # BLD AUTO: 11 10E9/L (ref 150–450)
PLATELET # BLD AUTO: 16 10E9/L (ref 150–450)
PLATELET # BLD AUTO: 19 10E9/L (ref 150–450)
PLATELET # BLD AUTO: 23 10E9/L (ref 150–450)
PLATELET # BLD AUTO: 230 10E9/L (ref 150–450)
PLATELET # BLD AUTO: 256 10E9/L (ref 150–450)
PLATELET # BLD AUTO: 260 10E9/L (ref 150–450)
PLATELET # BLD AUTO: 27 10E9/L (ref 150–450)
PLATELET # BLD AUTO: 280 10E9/L (ref 150–450)
PLATELET # BLD AUTO: 29 10E9/L (ref 150–450)
PLATELET # BLD AUTO: 294 10E9/L (ref 150–450)
PLATELET # BLD AUTO: 34 10E9/L (ref 150–450)
PLATELET # BLD AUTO: 35 10E9/L (ref 150–450)
PLATELET # BLD AUTO: 35 10E9/L (ref 150–450)
PLATELET # BLD AUTO: 37 10E9/L (ref 150–450)
PLATELET # BLD AUTO: 38 10E9/L (ref 150–450)
PLATELET # BLD AUTO: 39 10E9/L (ref 150–450)
PLATELET # BLD AUTO: 44 10E9/L (ref 150–450)
PLATELET # BLD AUTO: 46 10E9/L (ref 150–450)
PLATELET # BLD AUTO: 56 10E9/L (ref 150–450)
PLATELET # BLD AUTO: 57 10E9/L (ref 150–450)
PLATELET # BLD EST: ABNORMAL 10*3/UL
PO2 BLD: 100 MM HG (ref 80–105)
PO2 BLD: 100 MM HG (ref 80–105)
PO2 BLD: 130 MM HG (ref 80–105)
PO2 BLD: 134 MM HG (ref 80–105)
PO2 BLD: 143 MM HG (ref 80–105)
PO2 BLD: 164 MM HG (ref 80–105)
PO2 BLD: 182 MM HG (ref 80–105)
PO2 BLD: 211 MM HG (ref 80–105)
PO2 BLD: 52 MM HG (ref 80–105)
PO2 BLD: 59 MM HG (ref 80–105)
PO2 BLD: 60 MM HG (ref 80–105)
PO2 BLD: 60 MM HG (ref 80–105)
PO2 BLD: 62 MM HG (ref 80–105)
PO2 BLD: 68 MM HG (ref 80–105)
PO2 BLD: 73 MM HG (ref 80–105)
PO2 BLD: 73 MM HG (ref 80–105)
PO2 BLD: 84 MM HG (ref 80–105)
PO2 BLD: 88 MM HG (ref 80–105)
PO2 BLD: 92 MM HG (ref 80–105)
PO2 BLD: 92 MM HG (ref 80–105)
PO2 BLDV: 21 MM HG (ref 25–47)
PO2 BLDV: 22 MM HG (ref 25–47)
PO2 BLDV: 23 MM HG (ref 25–47)
PO2 BLDV: 24 MM HG (ref 25–47)
PO2 BLDV: 24 MM HG (ref 25–47)
PO2 BLDV: 25 MM HG (ref 25–47)
PO2 BLDV: 26 MM HG (ref 25–47)
PO2 BLDV: 26 MM HG (ref 25–47)
PO2 BLDV: 27 MM HG (ref 25–47)
PO2 BLDV: 28 MM HG (ref 25–47)
PO2 BLDV: 29 MM HG (ref 25–47)
PO2 BLDV: 30 MM HG (ref 25–47)
PO2 BLDV: 30 MM HG (ref 25–47)
PO2 BLDV: 31 MM HG (ref 25–47)
PO2 BLDV: 32 MM HG (ref 25–47)
PO2 BLDV: 33 MM HG (ref 25–47)
PO2 BLDV: 34 MM HG (ref 25–47)
PO2 BLDV: 34 MM HG (ref 25–47)
PO2 BLDV: 35 MM HG (ref 25–47)
PO2 BLDV: 35 MM HG (ref 25–47)
POTASSIUM BLD-SCNC: 5 MMOL/L (ref 3.4–5.3)
POTASSIUM BLD-SCNC: 6.1 MMOL/L (ref 3.4–5.3)
POTASSIUM BLD-SCNC: 6.4 MMOL/L (ref 3.4–5.3)
POTASSIUM SERPL-SCNC: 2.8 MMOL/L (ref 3.4–5.3)
POTASSIUM SERPL-SCNC: 3 MMOL/L (ref 3.4–5.3)
POTASSIUM SERPL-SCNC: 3 MMOL/L (ref 3.4–5.3)
POTASSIUM SERPL-SCNC: 3.1 MMOL/L (ref 3.4–5.3)
POTASSIUM SERPL-SCNC: 3.2 MMOL/L (ref 3.4–5.3)
POTASSIUM SERPL-SCNC: 3.3 MMOL/L (ref 3.4–5.3)
POTASSIUM SERPL-SCNC: 3.4 MMOL/L (ref 3.4–5.3)
POTASSIUM SERPL-SCNC: 3.5 MMOL/L (ref 3.4–5.3)
POTASSIUM SERPL-SCNC: 3.6 MMOL/L (ref 3.4–5.3)
POTASSIUM SERPL-SCNC: 3.7 MMOL/L (ref 3.4–5.3)
POTASSIUM SERPL-SCNC: 3.8 MMOL/L (ref 3.4–5.3)
POTASSIUM SERPL-SCNC: 3.9 MMOL/L (ref 3.4–5.3)
POTASSIUM SERPL-SCNC: 4 MMOL/L (ref 3.4–5.3)
POTASSIUM SERPL-SCNC: 4.1 MMOL/L (ref 3.4–5.3)
POTASSIUM SERPL-SCNC: 4.1 MMOL/L (ref 3.4–5.3)
POTASSIUM SERPL-SCNC: 4.2 MMOL/L (ref 3.4–5.3)
POTASSIUM SERPL-SCNC: 4.4 MMOL/L (ref 3.4–5.3)
POTASSIUM SERPL-SCNC: 4.5 MMOL/L (ref 3.4–5.3)
POTASSIUM SERPL-SCNC: 4.6 MMOL/L (ref 3.4–5.3)
POTASSIUM SERPL-SCNC: 4.7 MMOL/L (ref 3.4–5.3)
POTASSIUM SERPL-SCNC: 4.8 MMOL/L (ref 3.4–5.3)
POTASSIUM SERPL-SCNC: 4.9 MMOL/L (ref 3.4–5.3)
POTASSIUM SERPL-SCNC: 4.9 MMOL/L (ref 3.4–5.3)
POTASSIUM SERPL-SCNC: 5.4 MMOL/L (ref 3.4–5.3)
POTASSIUM SERPL-SCNC: 5.7 MMOL/L (ref 3.4–5.3)
POTASSIUM SERPL-SCNC: 5.8 MMOL/L (ref 3.4–5.3)
POTASSIUM SERPL-SCNC: 5.9 MMOL/L (ref 3.4–5.3)
PROT FLD-MCNC: 3.1 G/DL
PROT SERPL-MCNC: 5.2 G/DL (ref 6.8–8.8)
PROT SERPL-MCNC: 5.7 G/DL (ref 6.8–8.8)
PROT SERPL-MCNC: 5.7 G/DL (ref 6.8–8.8)
PROT SERPL-MCNC: 5.9 G/DL (ref 6.8–8.8)
PROT SERPL-MCNC: 5.9 G/DL (ref 6.8–8.8)
PROT SERPL-MCNC: 6 G/DL (ref 6.8–8.8)
PROT SERPL-MCNC: 6 G/DL (ref 6.8–8.8)
PROT SERPL-MCNC: 6.5 G/DL (ref 6.8–8.8)
PROT SERPL-MCNC: 6.6 G/DL (ref 6.8–8.8)
PROT SERPL-MCNC: 6.6 G/DL (ref 6.8–8.8)
PROT SERPL-MCNC: 7 G/DL (ref 6.8–8.8)
PROT SERPL-MCNC: 7 G/DL (ref 6.8–8.8)
PROT SERPL-MCNC: 8.1 G/DL (ref 6.8–8.8)
RADIOLOGIST FLAGS: ABNORMAL
RBC # BLD AUTO: 2.92 10E12/L (ref 4.4–5.9)
RBC # BLD AUTO: 2.99 10E12/L (ref 4.4–5.9)
RBC # BLD AUTO: 3.09 10E12/L (ref 4.4–5.9)
RBC # BLD AUTO: 3.17 10E12/L (ref 4.4–5.9)
RBC # BLD AUTO: 3.23 10E12/L (ref 4.4–5.9)
RBC # BLD AUTO: 3.24 10E12/L (ref 4.4–5.9)
RBC # BLD AUTO: 3.24 10E12/L (ref 4.4–5.9)
RBC # BLD AUTO: 3.35 10E12/L (ref 4.4–5.9)
RBC # BLD AUTO: 3.42 10E12/L (ref 4.4–5.9)
RBC # BLD AUTO: 3.43 10E12/L (ref 4.4–5.9)
RBC # BLD AUTO: 3.49 10E12/L (ref 4.4–5.9)
RBC # BLD AUTO: 3.65 10E12/L (ref 4.4–5.9)
RBC # BLD AUTO: 3.74 10E12/L (ref 4.4–5.9)
RBC # BLD AUTO: 3.82 10E12/L (ref 4.4–5.9)
RBC # BLD AUTO: 4.11 10E12/L (ref 4.4–5.9)
RBC # BLD AUTO: 4.22 10E12/L (ref 4.4–5.9)
RBC # BLD AUTO: 4.3 10E12/L (ref 4.4–5.9)
RBC # BLD AUTO: 4.36 10E12/L (ref 4.4–5.9)
RBC # BLD AUTO: 4.39 10E12/L (ref 4.4–5.9)
RBC # BLD AUTO: 4.49 10E12/L (ref 4.4–5.9)
RBC # BLD AUTO: 4.54 10E12/L (ref 4.4–5.9)
RBC # BLD AUTO: 4.57 10E12/L (ref 4.4–5.9)
RBC # BLD AUTO: 4.65 10E12/L (ref 4.4–5.9)
RBC #/AREA URNS AUTO: 3 /HPF (ref 0–2)
RBC #/AREA URNS AUTO: >182 /HPF (ref 0–2)
RETICS # AUTO: 39.5 10E9/L (ref 25–95)
RETICS # AUTO: 67.4 10E9/L (ref 25–95)
RETICS/RBC NFR AUTO: 0.9 % (ref 0.5–2)
RETICS/RBC NFR AUTO: 1.9 % (ref 0.5–2)
RHINOVIRUS RNA SPEC QL NAA+PROBE: NEGATIVE
RSV RNA SPEC QL NAA+PROBE: NEGATIVE
SAO2 % BLDV FROM PO2: 37 %
SAO2 % BLDV FROM PO2: 42 %
SAO2 % BLDV FROM PO2: 51 %
SARS-COV-2 RNA RESP QL NAA+PROBE: NEGATIVE
SMA IGG SER-ACNC: 12 UNITS (ref 0–19)
SODIUM BLD-SCNC: 129 MMOL/L (ref 133–144)
SODIUM BLD-SCNC: 130 MMOL/L (ref 133–144)
SODIUM SERPL-SCNC: 127 MMOL/L (ref 133–144)
SODIUM SERPL-SCNC: 128 MMOL/L (ref 133–144)
SODIUM SERPL-SCNC: 131 MMOL/L (ref 133–144)
SODIUM SERPL-SCNC: 132 MMOL/L (ref 133–144)
SODIUM SERPL-SCNC: 133 MMOL/L (ref 133–144)
SODIUM SERPL-SCNC: 134 MMOL/L (ref 133–144)
SODIUM SERPL-SCNC: 134 MMOL/L (ref 133–144)
SODIUM SERPL-SCNC: 135 MMOL/L (ref 133–144)
SODIUM SERPL-SCNC: 136 MMOL/L (ref 133–144)
SODIUM SERPL-SCNC: 137 MMOL/L (ref 133–144)
SODIUM SERPL-SCNC: 138 MMOL/L (ref 133–144)
SODIUM SERPL-SCNC: 139 MMOL/L (ref 133–144)
SODIUM SERPL-SCNC: 140 MMOL/L (ref 133–144)
SODIUM UR-SCNC: 8 MMOL/L
SOURCE: ABNORMAL
SOURCE: ABNORMAL
SP GR UR STRIP: 1.02 (ref 1–1.03)
SP GR UR STRIP: 1.02 (ref 1–1.03)
SPECIMEN EXP DATE BLD: NORMAL
SPECIMEN EXP DATE BLD: NORMAL
SPECIMEN SOURCE FLD: NORMAL
SPECIMEN SOURCE: ABNORMAL
SPECIMEN SOURCE: NORMAL
SPERM #/AREA URNS HPF: PRESENT /HPF
SQUAMOUS #/AREA URNS AUTO: 1 /HPF (ref 0–1)
SQUAMOUS #/AREA URNS AUTO: <1 /HPF (ref 0–1)
T3FREE SERPL-MCNC: <0.5 PG/ML (ref 2.3–4.2)
T4 FREE SERPL-MCNC: 0.38 NG/DL (ref 0.76–1.46)
TRANSFUSION STATUS PATIENT QL: NORMAL
TRIGL FLD-MCNC: 64 MG/DL
TROPONIN I SERPL-MCNC: 0.02 UG/L (ref 0–0.04)
TROPONIN I SERPL-MCNC: 0.06 UG/L (ref 0–0.04)
TROPONIN I SERPL-MCNC: 0.07 UG/L (ref 0–0.04)
TSH SERPL DL<=0.005 MIU/L-ACNC: 10.26 MU/L (ref 0.4–4)
UROBILINOGEN UR STRIP-MCNC: NORMAL MG/DL (ref 0–2)
UROBILINOGEN UR STRIP-MCNC: NORMAL MG/DL (ref 0–2)
WBC # BLD AUTO: 13.1 10E9/L (ref 4–11)
WBC # BLD AUTO: 13.5 10E9/L (ref 4–11)
WBC # BLD AUTO: 13.9 10E9/L (ref 4–11)
WBC # BLD AUTO: 14 10E9/L (ref 4–11)
WBC # BLD AUTO: 14 10E9/L (ref 4–11)
WBC # BLD AUTO: 14.3 10E9/L (ref 4–11)
WBC # BLD AUTO: 14.4 10E9/L (ref 4–11)
WBC # BLD AUTO: 14.5 10E9/L (ref 4–11)
WBC # BLD AUTO: 14.7 10E9/L (ref 4–11)
WBC # BLD AUTO: 15.1 10E9/L (ref 4–11)
WBC # BLD AUTO: 16.9 10E9/L (ref 4–11)
WBC # BLD AUTO: 17.4 10E9/L (ref 4–11)
WBC # BLD AUTO: 18.4 10E9/L (ref 4–11)
WBC # BLD AUTO: 19.3 10E9/L (ref 4–11)
WBC # BLD AUTO: 19.6 10E9/L (ref 4–11)
WBC # BLD AUTO: 19.7 10E9/L (ref 4–11)
WBC # BLD AUTO: 20.3 10E9/L (ref 4–11)
WBC # BLD AUTO: 20.5 10E9/L (ref 4–11)
WBC # BLD AUTO: 6.3 10E9/L (ref 4–11)
WBC # BLD AUTO: 6.3 10E9/L (ref 4–11)
WBC # BLD AUTO: 6.4 10E9/L (ref 4–11)
WBC # BLD AUTO: 6.8 10E9/L (ref 4–11)
WBC # BLD AUTO: 6.9 10E9/L (ref 4–11)
WBC # FLD AUTO: 181 /UL
WBC # FLD AUTO: 403 /UL
WBC # FLD AUTO: 4667 /UL
WBC #/AREA URNS AUTO: 1 /HPF (ref 0–5)
WBC #/AREA URNS AUTO: >182 /HPF (ref 0–5)
WBC CLUMPS #/AREA URNS HPF: PRESENT /HPF

## 2021-01-01 PROCEDURE — 258N000003 HC RX IP 258 OP 636: Performed by: STUDENT IN AN ORGANIZED HEALTH CARE EDUCATION/TRAINING PROGRAM

## 2021-01-01 PROCEDURE — 258N000003 HC RX IP 258 OP 636: Performed by: INTERNAL MEDICINE

## 2021-01-01 PROCEDURE — 258N000001 HC RX 258: Performed by: STUDENT IN AN ORGANIZED HEALTH CARE EDUCATION/TRAINING PROGRAM

## 2021-01-01 PROCEDURE — 85027 COMPLETE CBC AUTOMATED: CPT | Performed by: PHYSICIAN ASSISTANT

## 2021-01-01 PROCEDURE — 99356 PR PROLONGED SERV,INPATIENT,1ST HR: CPT | Performed by: INTERNAL MEDICINE

## 2021-01-01 PROCEDURE — 85240 CLOT FACTOR VIII AHG 1 STAGE: CPT | Performed by: CLINICAL NURSE SPECIALIST

## 2021-01-01 PROCEDURE — 71045 X-RAY EXAM CHEST 1 VIEW: CPT

## 2021-01-01 PROCEDURE — 255N000002 HC RX 255 OP 636: Performed by: STUDENT IN AN ORGANIZED HEALTH CARE EDUCATION/TRAINING PROGRAM

## 2021-01-01 PROCEDURE — 71260 CT THORAX DX C+: CPT | Mod: 26 | Performed by: STUDENT IN AN ORGANIZED HEALTH CARE EDUCATION/TRAINING PROGRAM

## 2021-01-01 PROCEDURE — 99291 CRITICAL CARE FIRST HOUR: CPT | Mod: 25 | Performed by: INTERNAL MEDICINE

## 2021-01-01 PROCEDURE — 83605 ASSAY OF LACTIC ACID: CPT | Performed by: STUDENT IN AN ORGANIZED HEALTH CARE EDUCATION/TRAINING PROGRAM

## 2021-01-01 PROCEDURE — 250N000011 HC RX IP 250 OP 636: Performed by: STUDENT IN AN ORGANIZED HEALTH CARE EDUCATION/TRAINING PROGRAM

## 2021-01-01 PROCEDURE — 999N000155 HC STATISTIC RAPCV CVP MONITORING

## 2021-01-01 PROCEDURE — 0B9L8ZX DRAINAGE OF LEFT LUNG, VIA NATURAL OR ARTIFICIAL OPENING ENDOSCOPIC, DIAGNOSTIC: ICD-10-PCS | Performed by: INTERNAL MEDICINE

## 2021-01-01 PROCEDURE — 94003 VENT MGMT INPAT SUBQ DAY: CPT

## 2021-01-01 PROCEDURE — 93308 TTE F-UP OR LMTD: CPT

## 2021-01-01 PROCEDURE — 87040 BLOOD CULTURE FOR BACTERIA: CPT | Performed by: INTERNAL MEDICINE

## 2021-01-01 PROCEDURE — 85027 COMPLETE CBC AUTOMATED: CPT | Performed by: CLINICAL NURSE SPECIALIST

## 2021-01-01 PROCEDURE — 200N000002 HC R&B ICU UMMC

## 2021-01-01 PROCEDURE — 86803 HEPATITIS C AB TEST: CPT | Performed by: NURSE PRACTITIONER

## 2021-01-01 PROCEDURE — 999N001023 HC STATISTIC INR NC: Performed by: CLINICAL NURSE SPECIALIST

## 2021-01-01 PROCEDURE — 81001 URINALYSIS AUTO W/SCOPE: CPT | Performed by: STUDENT IN AN ORGANIZED HEALTH CARE EDUCATION/TRAINING PROGRAM

## 2021-01-01 PROCEDURE — 82803 BLOOD GASES ANY COMBINATION: CPT | Performed by: STUDENT IN AN ORGANIZED HEALTH CARE EDUCATION/TRAINING PROGRAM

## 2021-01-01 PROCEDURE — 81003 URINALYSIS AUTO W/O SCOPE: CPT | Performed by: EMERGENCY MEDICINE

## 2021-01-01 PROCEDURE — 74176 CT ABD & PELVIS W/O CONTRAST: CPT | Mod: 26 | Performed by: RADIOLOGY

## 2021-01-01 PROCEDURE — 82805 BLOOD GASES W/O2 SATURATION: CPT | Performed by: STUDENT IN AN ORGANIZED HEALTH CARE EDUCATION/TRAINING PROGRAM

## 2021-01-01 PROCEDURE — 82810 BLOOD GASES O2 SAT ONLY: CPT | Performed by: INTERNAL MEDICINE

## 2021-01-01 PROCEDURE — 250N000009 HC RX 250: Performed by: CLINICAL NURSE SPECIALIST

## 2021-01-01 PROCEDURE — 86900 BLOOD TYPING SEROLOGIC ABO: CPT | Performed by: EMERGENCY MEDICINE

## 2021-01-01 PROCEDURE — 250N000013 HC RX MED GY IP 250 OP 250 PS 637: Performed by: PHYSICIAN ASSISTANT

## 2021-01-01 PROCEDURE — 96376 TX/PRO/DX INJ SAME DRUG ADON: CPT | Performed by: INTERNAL MEDICINE

## 2021-01-01 PROCEDURE — 250N000009 HC RX 250: Performed by: STUDENT IN AN ORGANIZED HEALTH CARE EDUCATION/TRAINING PROGRAM

## 2021-01-01 PROCEDURE — C9113 INJ PANTOPRAZOLE SODIUM, VIA: HCPCS | Performed by: STUDENT IN AN ORGANIZED HEALTH CARE EDUCATION/TRAINING PROGRAM

## 2021-01-01 PROCEDURE — 87102 FUNGUS ISOLATION CULTURE: CPT | Performed by: STUDENT IN AN ORGANIZED HEALTH CARE EDUCATION/TRAINING PROGRAM

## 2021-01-01 PROCEDURE — 85610 PROTHROMBIN TIME: CPT | Performed by: STUDENT IN AN ORGANIZED HEALTH CARE EDUCATION/TRAINING PROGRAM

## 2021-01-01 PROCEDURE — 80048 BASIC METABOLIC PNL TOTAL CA: CPT | Performed by: CLINICAL NURSE SPECIALIST

## 2021-01-01 PROCEDURE — 84132 ASSAY OF SERUM POTASSIUM: CPT | Performed by: EMERGENCY MEDICINE

## 2021-01-01 PROCEDURE — 83735 ASSAY OF MAGNESIUM: CPT | Performed by: STUDENT IN AN ORGANIZED HEALTH CARE EDUCATION/TRAINING PROGRAM

## 2021-01-01 PROCEDURE — 83735 ASSAY OF MAGNESIUM: CPT | Performed by: PHYSICIAN ASSISTANT

## 2021-01-01 PROCEDURE — 250N000009 HC RX 250: Performed by: INTERNAL MEDICINE

## 2021-01-01 PROCEDURE — 82803 BLOOD GASES ANY COMBINATION: CPT | Performed by: INTERNAL MEDICINE

## 2021-01-01 PROCEDURE — 999N000157 HC STATISTIC RCP TIME EA 10 MIN

## 2021-01-01 PROCEDURE — 99291 CRITICAL CARE FIRST HOUR: CPT | Mod: GC | Performed by: INTERNAL MEDICINE

## 2021-01-01 PROCEDURE — 250N000011 HC RX IP 250 OP 636: Performed by: EMERGENCY MEDICINE

## 2021-01-01 PROCEDURE — 36556 INSERT NON-TUNNEL CV CATH: CPT | Performed by: EMERGENCY MEDICINE

## 2021-01-01 PROCEDURE — 85384 FIBRINOGEN ACTIVITY: CPT | Performed by: INTERNAL MEDICINE

## 2021-01-01 PROCEDURE — 82330 ASSAY OF CALCIUM: CPT | Performed by: INTERNAL MEDICINE

## 2021-01-01 PROCEDURE — 97165 OT EVAL LOW COMPLEX 30 MIN: CPT | Mod: GO | Performed by: OCCUPATIONAL THERAPIST

## 2021-01-01 PROCEDURE — 99207 PR APP CREDIT; MD BILLING SHARED VISIT: CPT | Performed by: INTERNAL MEDICINE

## 2021-01-01 PROCEDURE — 250N000011 HC RX IP 250 OP 636

## 2021-01-01 PROCEDURE — G0463 HOSPITAL OUTPT CLINIC VISIT: HCPCS

## 2021-01-01 PROCEDURE — 84100 ASSAY OF PHOSPHORUS: CPT | Performed by: STUDENT IN AN ORGANIZED HEALTH CARE EDUCATION/TRAINING PROGRAM

## 2021-01-01 PROCEDURE — 88305 TISSUE EXAM BY PATHOLOGIST: CPT | Mod: TC | Performed by: STUDENT IN AN ORGANIZED HEALTH CARE EDUCATION/TRAINING PROGRAM

## 2021-01-01 PROCEDURE — 82330 ASSAY OF CALCIUM: CPT | Performed by: STUDENT IN AN ORGANIZED HEALTH CARE EDUCATION/TRAINING PROGRAM

## 2021-01-01 PROCEDURE — 250N000011 HC RX IP 250 OP 636: Performed by: INTERNAL MEDICINE

## 2021-01-01 PROCEDURE — 87040 BLOOD CULTURE FOR BACTERIA: CPT | Performed by: EMERGENCY MEDICINE

## 2021-01-01 PROCEDURE — 96375 TX/PRO/DX INJ NEW DRUG ADDON: CPT | Performed by: EMERGENCY MEDICINE

## 2021-01-01 PROCEDURE — 84132 ASSAY OF SERUM POTASSIUM: CPT | Performed by: INTERNAL MEDICINE

## 2021-01-01 PROCEDURE — 999N001080 HC STATISTIC GLUCOSE ED POCT

## 2021-01-01 PROCEDURE — 85610 PROTHROMBIN TIME: CPT | Performed by: INTERNAL MEDICINE

## 2021-01-01 PROCEDURE — 80053 COMPREHEN METABOLIC PANEL: CPT | Performed by: STUDENT IN AN ORGANIZED HEALTH CARE EDUCATION/TRAINING PROGRAM

## 2021-01-01 PROCEDURE — 71045 X-RAY EXAM CHEST 1 VIEW: CPT | Mod: 26 | Performed by: RADIOLOGY

## 2021-01-01 PROCEDURE — 87015 SPECIMEN INFECT AGNT CONCNTJ: CPT | Performed by: STUDENT IN AN ORGANIZED HEALTH CARE EDUCATION/TRAINING PROGRAM

## 2021-01-01 PROCEDURE — 82330 ASSAY OF CALCIUM: CPT | Performed by: CLINICAL NURSE SPECIALIST

## 2021-01-01 PROCEDURE — 99443 PR PHYSICIAN TELEPHONE EVALUATION 21-30 MIN: CPT | Performed by: NURSE PRACTITIONER

## 2021-01-01 PROCEDURE — 84100 ASSAY OF PHOSPHORUS: CPT | Performed by: CLINICAL NURSE SPECIALIST

## 2021-01-01 PROCEDURE — 82248 BILIRUBIN DIRECT: CPT | Performed by: CLINICAL NURSE SPECIALIST

## 2021-01-01 PROCEDURE — 49082 ABD PARACENTESIS: CPT | Mod: GC | Performed by: INTERNAL MEDICINE

## 2021-01-01 PROCEDURE — 80053 COMPREHEN METABOLIC PANEL: CPT | Performed by: CLINICAL NURSE SPECIALIST

## 2021-01-01 PROCEDURE — 999N000065 XR CHEST PORT 1 VW

## 2021-01-01 PROCEDURE — 258N000003 HC RX IP 258 OP 636: Performed by: CLINICAL NURSE SPECIALIST

## 2021-01-01 PROCEDURE — 250N000009 HC RX 250

## 2021-01-01 PROCEDURE — 83605 ASSAY OF LACTIC ACID: CPT

## 2021-01-01 PROCEDURE — 84132 ASSAY OF SERUM POTASSIUM: CPT | Performed by: NURSE PRACTITIONER

## 2021-01-01 PROCEDURE — 87081 CULTURE SCREEN ONLY: CPT | Performed by: STUDENT IN AN ORGANIZED HEALTH CARE EDUCATION/TRAINING PROGRAM

## 2021-01-01 PROCEDURE — 74177 CT ABD & PELVIS W/CONTRAST: CPT | Mod: 26 | Performed by: STUDENT IN AN ORGANIZED HEALTH CARE EDUCATION/TRAINING PROGRAM

## 2021-01-01 PROCEDURE — 999N001017 HC STATISTIC GLUCOSE BY METER IP

## 2021-01-01 PROCEDURE — P9037 PLATE PHERES LEUKOREDU IRRAD: HCPCS | Performed by: STUDENT IN AN ORGANIZED HEALTH CARE EDUCATION/TRAINING PROGRAM

## 2021-01-01 PROCEDURE — 80048 BASIC METABOLIC PNL TOTAL CA: CPT | Performed by: PHYSICIAN ASSISTANT

## 2021-01-01 PROCEDURE — 90947 DIALYSIS REPEATED EVAL: CPT

## 2021-01-01 PROCEDURE — 87633 RESP VIRUS 12-25 TARGETS: CPT | Performed by: STUDENT IN AN ORGANIZED HEALTH CARE EDUCATION/TRAINING PROGRAM

## 2021-01-01 PROCEDURE — 84439 ASSAY OF FREE THYROXINE: CPT | Performed by: EMERGENCY MEDICINE

## 2021-01-01 PROCEDURE — 85384 FIBRINOGEN ACTIVITY: CPT | Performed by: CLINICAL NURSE SPECIALIST

## 2021-01-01 PROCEDURE — 83735 ASSAY OF MAGNESIUM: CPT | Performed by: CLINICAL NURSE SPECIALIST

## 2021-01-01 PROCEDURE — 999N000045 HC STATISTIC DAILY SWAN MONITORING

## 2021-01-01 PROCEDURE — 87449 NOS EACH ORGANISM AG IA: CPT | Performed by: CLINICAL NURSE SPECIALIST

## 2021-01-01 PROCEDURE — 99285 EMERGENCY DEPT VISIT HI MDM: CPT | Mod: 25 | Performed by: INTERNAL MEDICINE

## 2021-01-01 PROCEDURE — 999N000065 XR ABDOMEN PORT 1 VW

## 2021-01-01 PROCEDURE — 80053 COMPREHEN METABOLIC PANEL: CPT | Performed by: INTERNAL MEDICINE

## 2021-01-01 PROCEDURE — 85027 COMPLETE CBC AUTOMATED: CPT | Performed by: PATHOLOGY

## 2021-01-01 PROCEDURE — 999N000204 HC STATISTICAL VASC ACCESS NURSE TIME, 31-45 MINUTES

## 2021-01-01 PROCEDURE — 85379 FIBRIN DEGRADATION QUANT: CPT | Performed by: CLINICAL NURSE SPECIALIST

## 2021-01-01 PROCEDURE — 93321 DOPPLER ECHO F-UP/LMTD STD: CPT | Mod: 26 | Performed by: STUDENT IN AN ORGANIZED HEALTH CARE EDUCATION/TRAINING PROGRAM

## 2021-01-01 PROCEDURE — 71250 CT THORAX DX C-: CPT | Mod: 26 | Performed by: RADIOLOGY

## 2021-01-01 PROCEDURE — 83605 ASSAY OF LACTIC ACID: CPT | Performed by: INTERNAL MEDICINE

## 2021-01-01 PROCEDURE — 36415 COLL VENOUS BLD VENIPUNCTURE: CPT | Performed by: NURSE PRACTITIONER

## 2021-01-01 PROCEDURE — 82140 ASSAY OF AMMONIA: CPT | Performed by: NURSE PRACTITIONER

## 2021-01-01 PROCEDURE — 99223 1ST HOSP IP/OBS HIGH 75: CPT | Mod: 25 | Performed by: INTERNAL MEDICINE

## 2021-01-01 PROCEDURE — 84300 ASSAY OF URINE SODIUM: CPT | Performed by: STUDENT IN AN ORGANIZED HEALTH CARE EDUCATION/TRAINING PROGRAM

## 2021-01-01 PROCEDURE — 99292 CRITICAL CARE ADDL 30 MIN: CPT | Performed by: INTERNAL MEDICINE

## 2021-01-01 PROCEDURE — 250N000013 HC RX MED GY IP 250 OP 250 PS 637: Performed by: STUDENT IN AN ORGANIZED HEALTH CARE EDUCATION/TRAINING PROGRAM

## 2021-01-01 PROCEDURE — 250N000013 HC RX MED GY IP 250 OP 250 PS 637: Performed by: INTERNAL MEDICINE

## 2021-01-01 PROCEDURE — 02HP32Z INSERTION OF MONITORING DEVICE INTO PULMONARY TRUNK, PERCUTANEOUS APPROACH: ICD-10-PCS | Performed by: INTERNAL MEDICINE

## 2021-01-01 PROCEDURE — 82805 BLOOD GASES W/O2 SATURATION: CPT | Performed by: INTERNAL MEDICINE

## 2021-01-01 PROCEDURE — 82330 ASSAY OF CALCIUM: CPT

## 2021-01-01 PROCEDURE — 89051 BODY FLUID CELL COUNT: CPT | Performed by: STUDENT IN AN ORGANIZED HEALTH CARE EDUCATION/TRAINING PROGRAM

## 2021-01-01 PROCEDURE — 93010 ELECTROCARDIOGRAM REPORT: CPT | Performed by: INTERNAL MEDICINE

## 2021-01-01 PROCEDURE — 999N001086 HC STATISTIC MORPHOLOGY W/INTERP HEMEPATH TC 85060: Performed by: STUDENT IN AN ORGANIZED HEALTH CARE EDUCATION/TRAINING PROGRAM

## 2021-01-01 PROCEDURE — 87186 SC STD MICRODIL/AGAR DIL: CPT | Performed by: STUDENT IN AN ORGANIZED HEALTH CARE EDUCATION/TRAINING PROGRAM

## 2021-01-01 PROCEDURE — 84478 ASSAY OF TRIGLYCERIDES: CPT | Performed by: STUDENT IN AN ORGANIZED HEALTH CARE EDUCATION/TRAINING PROGRAM

## 2021-01-01 PROCEDURE — 80053 COMPREHEN METABOLIC PANEL: CPT | Performed by: EMERGENCY MEDICINE

## 2021-01-01 PROCEDURE — 87305 ASPERGILLUS AG IA: CPT | Performed by: CLINICAL NURSE SPECIALIST

## 2021-01-01 PROCEDURE — 85610 PROTHROMBIN TIME: CPT | Performed by: EMERGENCY MEDICINE

## 2021-01-01 PROCEDURE — 85027 COMPLETE CBC AUTOMATED: CPT | Performed by: STUDENT IN AN ORGANIZED HEALTH CARE EDUCATION/TRAINING PROGRAM

## 2021-01-01 PROCEDURE — 87205 SMEAR GRAM STAIN: CPT | Performed by: STUDENT IN AN ORGANIZED HEALTH CARE EDUCATION/TRAINING PROGRAM

## 2021-01-01 PROCEDURE — 87340 HEPATITIS B SURFACE AG IA: CPT | Performed by: NURSE PRACTITIONER

## 2021-01-01 PROCEDURE — 83690 ASSAY OF LIPASE: CPT | Performed by: CLINICAL NURSE SPECIALIST

## 2021-01-01 PROCEDURE — 88305 TISSUE EXAM BY PATHOLOGIST: CPT | Mod: 26 | Performed by: PATHOLOGY

## 2021-01-01 PROCEDURE — 83605 ASSAY OF LACTIC ACID: CPT | Performed by: EMERGENCY MEDICINE

## 2021-01-01 PROCEDURE — 96365 THER/PROPH/DIAG IV INF INIT: CPT

## 2021-01-01 PROCEDURE — 74177 CT ABD & PELVIS W/CONTRAST: CPT

## 2021-01-01 PROCEDURE — 99222 1ST HOSP IP/OBS MODERATE 55: CPT | Mod: AI | Performed by: INTERNAL MEDICINE

## 2021-01-01 PROCEDURE — 999N000015 HC STATISTIC ARTERIAL MONITORING DAILY

## 2021-01-01 PROCEDURE — 214N000001 HC R&B CCU UMMC

## 2021-01-01 PROCEDURE — 86709 HEPATITIS A IGM ANTIBODY: CPT | Performed by: NURSE PRACTITIONER

## 2021-01-01 PROCEDURE — 272N000468 HC KIT, CATH IV, 20 GA X 6CM, ENDURANCE EXT DWELL

## 2021-01-01 PROCEDURE — 85610 PROTHROMBIN TIME: CPT | Performed by: CLINICAL NURSE SPECIALIST

## 2021-01-01 PROCEDURE — 83735 ASSAY OF MAGNESIUM: CPT | Performed by: EMERGENCY MEDICINE

## 2021-01-01 PROCEDURE — 99233 SBSQ HOSP IP/OBS HIGH 50: CPT | Performed by: INTERNAL MEDICINE

## 2021-01-01 PROCEDURE — 250N000012 HC RX MED GY IP 250 OP 636 PS 637: Performed by: STUDENT IN AN ORGANIZED HEALTH CARE EDUCATION/TRAINING PROGRAM

## 2021-01-01 PROCEDURE — 87075 CULTR BACTERIA EXCEPT BLOOD: CPT | Performed by: STUDENT IN AN ORGANIZED HEALTH CARE EDUCATION/TRAINING PROGRAM

## 2021-01-01 PROCEDURE — 99207 PR APP CREDIT; MD BILLING SHARED VISIT: CPT | Performed by: PHYSICIAN ASSISTANT

## 2021-01-01 PROCEDURE — 99291 CRITICAL CARE FIRST HOUR: CPT | Mod: 25 | Performed by: EMERGENCY MEDICINE

## 2021-01-01 PROCEDURE — 90945 DIALYSIS ONE EVALUATION: CPT | Performed by: INTERNAL MEDICINE

## 2021-01-01 PROCEDURE — 85384 FIBRINOGEN ACTIVITY: CPT | Performed by: STUDENT IN AN ORGANIZED HEALTH CARE EDUCATION/TRAINING PROGRAM

## 2021-01-01 PROCEDURE — 87636 SARSCOV2 & INF A&B AMP PRB: CPT | Performed by: EMERGENCY MEDICINE

## 2021-01-01 PROCEDURE — 96366 THER/PROPH/DIAG IV INF ADDON: CPT | Performed by: EMERGENCY MEDICINE

## 2021-01-01 PROCEDURE — 85379 FIBRIN DEGRADATION QUANT: CPT | Performed by: INTERNAL MEDICINE

## 2021-01-01 PROCEDURE — 88112 CYTOPATH CELL ENHANCE TECH: CPT | Mod: 26 | Performed by: PATHOLOGY

## 2021-01-01 PROCEDURE — 93970 EXTREMITY STUDY: CPT

## 2021-01-01 PROCEDURE — U0005 INFEC AGEN DETEC AMPLI PROBE: HCPCS | Performed by: STUDENT IN AN ORGANIZED HEALTH CARE EDUCATION/TRAINING PROGRAM

## 2021-01-01 PROCEDURE — P9041 ALBUMIN (HUMAN),5%, 50ML: HCPCS | Performed by: STUDENT IN AN ORGANIZED HEALTH CARE EDUCATION/TRAINING PROGRAM

## 2021-01-01 PROCEDURE — 999N001035 HC STATISTIC THROMBIN TIME NC: Performed by: CLINICAL NURSE SPECIALIST

## 2021-01-01 PROCEDURE — 85018 HEMOGLOBIN: CPT

## 2021-01-01 PROCEDURE — 84481 FREE ASSAY (FT-3): CPT | Performed by: STUDENT IN AN ORGANIZED HEALTH CARE EDUCATION/TRAINING PROGRAM

## 2021-01-01 PROCEDURE — 999N000185 HC STATISTIC TRANSPORT TIME EA 15 MIN

## 2021-01-01 PROCEDURE — 36415 COLL VENOUS BLD VENIPUNCTURE: CPT | Performed by: INTERNAL MEDICINE

## 2021-01-01 PROCEDURE — 85045 AUTOMATED RETICULOCYTE COUNT: CPT | Performed by: STUDENT IN AN ORGANIZED HEALTH CARE EDUCATION/TRAINING PROGRAM

## 2021-01-01 PROCEDURE — 84443 ASSAY THYROID STIM HORMONE: CPT | Performed by: EMERGENCY MEDICINE

## 2021-01-01 PROCEDURE — P9016 RBC LEUKOCYTES REDUCED: HCPCS | Performed by: INTERNAL MEDICINE

## 2021-01-01 PROCEDURE — 999N001063 HC STATISTIC THAWING COMPONENT

## 2021-01-01 PROCEDURE — P9041 ALBUMIN (HUMAN),5%, 50ML: HCPCS | Performed by: INTERNAL MEDICINE

## 2021-01-01 PROCEDURE — 93005 ELECTROCARDIOGRAM TRACING: CPT | Performed by: INTERNAL MEDICINE

## 2021-01-01 PROCEDURE — 250N000011 HC RX IP 250 OP 636: Performed by: NURSE PRACTITIONER

## 2021-01-01 PROCEDURE — 86901 BLOOD TYPING SEROLOGIC RH(D): CPT | Performed by: INTERNAL MEDICINE

## 2021-01-01 PROCEDURE — 36415 COLL VENOUS BLD VENIPUNCTURE: CPT | Performed by: PHYSICIAN ASSISTANT

## 2021-01-01 PROCEDURE — P9037 PLATE PHERES LEUKOREDU IRRAD: HCPCS | Performed by: NURSE PRACTITIONER

## 2021-01-01 PROCEDURE — 4A023N6 MEASUREMENT OF CARDIAC SAMPLING AND PRESSURE, RIGHT HEART, PERCUTANEOUS APPROACH: ICD-10-PCS | Performed by: INTERNAL MEDICINE

## 2021-01-01 PROCEDURE — 250N000009 HC RX 250: Performed by: EMERGENCY MEDICINE

## 2021-01-01 PROCEDURE — 87106 FUNGI IDENTIFICATION YEAST: CPT | Performed by: NURSE PRACTITIONER

## 2021-01-01 PROCEDURE — 82533 TOTAL CORTISOL: CPT | Performed by: STUDENT IN AN ORGANIZED HEALTH CARE EDUCATION/TRAINING PROGRAM

## 2021-01-01 PROCEDURE — 94002 VENT MGMT INPAT INIT DAY: CPT

## 2021-01-01 PROCEDURE — 87210 SMEAR WET MOUNT SALINE/INK: CPT | Performed by: STUDENT IN AN ORGANIZED HEALTH CARE EDUCATION/TRAINING PROGRAM

## 2021-01-01 PROCEDURE — 87077 CULTURE AEROBIC IDENTIFY: CPT | Performed by: STUDENT IN AN ORGANIZED HEALTH CARE EDUCATION/TRAINING PROGRAM

## 2021-01-01 PROCEDURE — 87070 CULTURE OTHR SPECIMN AEROBIC: CPT | Performed by: STUDENT IN AN ORGANIZED HEALTH CARE EDUCATION/TRAINING PROGRAM

## 2021-01-01 PROCEDURE — 83036 HEMOGLOBIN GLYCOSYLATED A1C: CPT | Performed by: INTERNAL MEDICINE

## 2021-01-01 PROCEDURE — 80076 HEPATIC FUNCTION PANEL: CPT | Performed by: CLINICAL NURSE SPECIALIST

## 2021-01-01 PROCEDURE — 0B9F8ZX DRAINAGE OF RIGHT LOWER LUNG LOBE, VIA NATURAL OR ARTIFICIAL OPENING ENDOSCOPIC, DIAGNOSTIC: ICD-10-PCS | Performed by: INTERNAL MEDICINE

## 2021-01-01 PROCEDURE — 82803 BLOOD GASES ANY COMBINATION: CPT

## 2021-01-01 PROCEDURE — 93308 TTE F-UP OR LMTD: CPT | Mod: 26 | Performed by: INTERNAL MEDICINE

## 2021-01-01 PROCEDURE — 99233 SBSQ HOSP IP/OBS HIGH 50: CPT | Mod: 24 | Performed by: INTERNAL MEDICINE

## 2021-01-01 PROCEDURE — 250N000011 HC RX IP 250 OP 636: Performed by: PHYSICIAN ASSISTANT

## 2021-01-01 PROCEDURE — 5A1D90Z PERFORMANCE OF URINARY FILTRATION, CONTINUOUS, GREATER THAN 18 HOURS PER DAY: ICD-10-PCS | Performed by: INTERNAL MEDICINE

## 2021-01-01 PROCEDURE — 96375 TX/PRO/DX INJ NEW DRUG ADDON: CPT | Performed by: INTERNAL MEDICINE

## 2021-01-01 PROCEDURE — 87116 MYCOBACTERIA CULTURE: CPT | Performed by: STUDENT IN AN ORGANIZED HEALTH CARE EDUCATION/TRAINING PROGRAM

## 2021-01-01 PROCEDURE — 74018 RADEX ABDOMEN 1 VIEW: CPT | Mod: 26 | Performed by: RADIOLOGY

## 2021-01-01 PROCEDURE — 86902 BLOOD TYPE ANTIGEN DONOR EA: CPT | Performed by: INTERNAL MEDICINE

## 2021-01-01 PROCEDURE — 93970 EXTREMITY STUDY: CPT | Mod: XS

## 2021-01-01 PROCEDURE — 0W9G3ZZ DRAINAGE OF PERITONEAL CAVITY, PERCUTANEOUS APPROACH: ICD-10-PCS | Performed by: INTERNAL MEDICINE

## 2021-01-01 PROCEDURE — 999N000011 HC STATISTIC ANESTHESIA CASE

## 2021-01-01 PROCEDURE — 85730 THROMBOPLASTIN TIME PARTIAL: CPT | Performed by: EMERGENCY MEDICINE

## 2021-01-01 PROCEDURE — 87636 SARSCOV2 & INF A&B AMP PRB: CPT | Performed by: INTERNAL MEDICINE

## 2021-01-01 PROCEDURE — 93005 ELECTROCARDIOGRAM TRACING: CPT

## 2021-01-01 PROCEDURE — 73590 X-RAY EXAM OF LOWER LEG: CPT | Mod: RT

## 2021-01-01 PROCEDURE — 88112 CYTOPATH CELL ENHANCE TECH: CPT | Mod: TC | Performed by: STUDENT IN AN ORGANIZED HEALTH CARE EDUCATION/TRAINING PROGRAM

## 2021-01-01 PROCEDURE — 86706 HEP B SURFACE ANTIBODY: CPT | Performed by: STUDENT IN AN ORGANIZED HEALTH CARE EDUCATION/TRAINING PROGRAM

## 2021-01-01 PROCEDURE — 99232 SBSQ HOSP IP/OBS MODERATE 35: CPT | Mod: 25 | Performed by: INTERNAL MEDICINE

## 2021-01-01 PROCEDURE — 93970 EXTREMITY STUDY: CPT | Mod: 26 | Performed by: RADIOLOGY

## 2021-01-01 PROCEDURE — 85384 FIBRINOGEN ACTIVITY: CPT | Performed by: NURSE PRACTITIONER

## 2021-01-01 PROCEDURE — 87206 SMEAR FLUORESCENT/ACID STAI: CPT | Performed by: STUDENT IN AN ORGANIZED HEALTH CARE EDUCATION/TRAINING PROGRAM

## 2021-01-01 PROCEDURE — 250N000011 HC RX IP 250 OP 636: Performed by: CLINICAL NURSE SPECIALIST

## 2021-01-01 PROCEDURE — 88108 CYTOPATH CONCENTRATE TECH: CPT | Mod: 26 | Performed by: PATHOLOGY

## 2021-01-01 PROCEDURE — 93325 DOPPLER ECHO COLOR FLOW MAPG: CPT | Mod: 26 | Performed by: STUDENT IN AN ORGANIZED HEALTH CARE EDUCATION/TRAINING PROGRAM

## 2021-01-01 PROCEDURE — 83615 LACTATE (LD) (LDH) ENZYME: CPT | Performed by: CLINICAL NURSE SPECIALIST

## 2021-01-01 PROCEDURE — 73590 X-RAY EXAM OF LOWER LEG: CPT | Mod: 26 | Performed by: RADIOLOGY

## 2021-01-01 PROCEDURE — 96366 THER/PROPH/DIAG IV INF ADDON: CPT | Performed by: INTERNAL MEDICINE

## 2021-01-01 PROCEDURE — 36620 INSERTION CATHETER ARTERY: CPT | Performed by: INTERNAL MEDICINE

## 2021-01-01 PROCEDURE — 36556 INSERT NON-TUNNEL CV CATH: CPT | Mod: GC | Performed by: INTERNAL MEDICINE

## 2021-01-01 PROCEDURE — 999N000208 ECHOCARDIOGRAM COMPLETE

## 2021-01-01 PROCEDURE — 93010 ELECTROCARDIOGRAM REPORT: CPT | Mod: 76 | Performed by: EMERGENCY MEDICINE

## 2021-01-01 PROCEDURE — 82810 BLOOD GASES O2 SAT ONLY: CPT | Performed by: STUDENT IN AN ORGANIZED HEALTH CARE EDUCATION/TRAINING PROGRAM

## 2021-01-01 PROCEDURE — 5A1955Z RESPIRATORY VENTILATION, GREATER THAN 96 CONSECUTIVE HOURS: ICD-10-PCS | Performed by: INTERNAL MEDICINE

## 2021-01-01 PROCEDURE — 87205 SMEAR GRAM STAIN: CPT | Performed by: NURSE PRACTITIONER

## 2021-01-01 PROCEDURE — 87158 CULTURE TYPING ADDED METHOD: CPT | Performed by: STUDENT IN AN ORGANIZED HEALTH CARE EDUCATION/TRAINING PROGRAM

## 2021-01-01 PROCEDURE — 999N001018 HC STATISTIC H-CELL BLOCK W/STAIN: Performed by: STUDENT IN AN ORGANIZED HEALTH CARE EDUCATION/TRAINING PROGRAM

## 2021-01-01 PROCEDURE — C9803 HOPD COVID-19 SPEC COLLECT: HCPCS | Performed by: EMERGENCY MEDICINE

## 2021-01-01 PROCEDURE — 96361 HYDRATE IV INFUSION ADD-ON: CPT | Performed by: EMERGENCY MEDICINE

## 2021-01-01 PROCEDURE — 84132 ASSAY OF SERUM POTASSIUM: CPT | Performed by: PHYSICIAN ASSISTANT

## 2021-01-01 PROCEDURE — 83735 ASSAY OF MAGNESIUM: CPT | Performed by: INTERNAL MEDICINE

## 2021-01-01 PROCEDURE — 84100 ASSAY OF PHOSPHORUS: CPT | Performed by: INTERNAL MEDICINE

## 2021-01-01 PROCEDURE — 31624 DX BRONCHOSCOPE/LAVAGE: CPT

## 2021-01-01 PROCEDURE — P9041 ALBUMIN (HUMAN),5%, 50ML: HCPCS

## 2021-01-01 PROCEDURE — 999N001076 HC STATISTIC SODIUM ED POCT

## 2021-01-01 PROCEDURE — 258N000003 HC RX IP 258 OP 636: Performed by: NURSE PRACTITIONER

## 2021-01-01 PROCEDURE — 86140 C-REACTIVE PROTEIN: CPT | Performed by: INTERNAL MEDICINE

## 2021-01-01 PROCEDURE — 82042 OTHER SOURCE ALBUMIN QUAN EA: CPT | Performed by: STUDENT IN AN ORGANIZED HEALTH CARE EDUCATION/TRAINING PROGRAM

## 2021-01-01 PROCEDURE — 93010 ELECTROCARDIOGRAM REPORT: CPT | Mod: 59 | Performed by: EMERGENCY MEDICINE

## 2021-01-01 PROCEDURE — 85027 COMPLETE CBC AUTOMATED: CPT | Performed by: INTERNAL MEDICINE

## 2021-01-01 PROCEDURE — 999N001063 HC STATISTIC THAWING COMPONENT: Performed by: STUDENT IN AN ORGANIZED HEALTH CARE EDUCATION/TRAINING PROGRAM

## 2021-01-01 PROCEDURE — 99232 SBSQ HOSP IP/OBS MODERATE 35: CPT | Performed by: INTERNAL MEDICINE

## 2021-01-01 PROCEDURE — 85025 COMPLETE CBC W/AUTO DIFF WBC: CPT | Performed by: STUDENT IN AN ORGANIZED HEALTH CARE EDUCATION/TRAINING PROGRAM

## 2021-01-01 PROCEDURE — 84132 ASSAY OF SERUM POTASSIUM: CPT | Performed by: STUDENT IN AN ORGANIZED HEALTH CARE EDUCATION/TRAINING PROGRAM

## 2021-01-01 PROCEDURE — 96365 THER/PROPH/DIAG IV INF INIT: CPT | Performed by: EMERGENCY MEDICINE

## 2021-01-01 PROCEDURE — 93005 ELECTROCARDIOGRAM TRACING: CPT | Mod: 76,59 | Performed by: EMERGENCY MEDICINE

## 2021-01-01 PROCEDURE — 80048 BASIC METABOLIC PNL TOTAL CA: CPT | Performed by: PATHOLOGY

## 2021-01-01 PROCEDURE — 93321 DOPPLER ECHO F-UP/LMTD STD: CPT | Mod: 26 | Performed by: INTERNAL MEDICINE

## 2021-01-01 PROCEDURE — 99442 PR PHYSICIAN TELEPHONE EVALUATION 11-20 MIN: CPT | Mod: GC | Performed by: STUDENT IN AN ORGANIZED HEALTH CARE EDUCATION/TRAINING PROGRAM

## 2021-01-01 PROCEDURE — 83615 LACTATE (LD) (LDH) ENZYME: CPT | Performed by: INTERNAL MEDICINE

## 2021-01-01 PROCEDURE — 80048 BASIC METABOLIC PNL TOTAL CA: CPT | Performed by: INTERNAL MEDICINE

## 2021-01-01 PROCEDURE — 250N000013 HC RX MED GY IP 250 OP 250 PS 637: Performed by: NURSE PRACTITIONER

## 2021-01-01 PROCEDURE — 82550 ASSAY OF CK (CPK): CPT | Performed by: STUDENT IN AN ORGANIZED HEALTH CARE EDUCATION/TRAINING PROGRAM

## 2021-01-01 PROCEDURE — 36415 COLL VENOUS BLD VENIPUNCTURE: CPT | Performed by: STUDENT IN AN ORGANIZED HEALTH CARE EDUCATION/TRAINING PROGRAM

## 2021-01-01 PROCEDURE — 250N000013 HC RX MED GY IP 250 OP 250 PS 637

## 2021-01-01 PROCEDURE — 250N000009 HC RX 250: Performed by: PHYSICIAN ASSISTANT

## 2021-01-01 PROCEDURE — 86140 C-REACTIVE PROTEIN: CPT | Performed by: STUDENT IN AN ORGANIZED HEALTH CARE EDUCATION/TRAINING PROGRAM

## 2021-01-01 PROCEDURE — C9803 HOPD COVID-19 SPEC COLLECT: HCPCS | Performed by: INTERNAL MEDICINE

## 2021-01-01 PROCEDURE — 86922 COMPATIBILITY TEST ANTIGLOB: CPT | Performed by: INTERNAL MEDICINE

## 2021-01-01 PROCEDURE — 999N001079 HC STATISTIC HEMATOCRIT ED POCT

## 2021-01-01 PROCEDURE — 272N000002 HC OR SUPPLY OTHER OPNP: Performed by: INTERNAL MEDICINE

## 2021-01-01 PROCEDURE — 76000 FLUOROSCOPY <1 HR PHYS/QHP: CPT | Mod: TC

## 2021-01-01 PROCEDURE — 87107 FUNGI IDENTIFICATION MOLD: CPT | Performed by: STUDENT IN AN ORGANIZED HEALTH CARE EDUCATION/TRAINING PROGRAM

## 2021-01-01 PROCEDURE — 93308 TTE F-UP OR LMTD: CPT | Mod: 26 | Performed by: STUDENT IN AN ORGANIZED HEALTH CARE EDUCATION/TRAINING PROGRAM

## 2021-01-01 PROCEDURE — 85025 COMPLETE CBC W/AUTO DIFF WBC: CPT | Performed by: INTERNAL MEDICINE

## 2021-01-01 PROCEDURE — C1894 INTRO/SHEATH, NON-LASER: HCPCS | Performed by: INTERNAL MEDICINE

## 2021-01-01 PROCEDURE — 85730 THROMBOPLASTIN TIME PARTIAL: CPT | Performed by: INTERNAL MEDICINE

## 2021-01-01 PROCEDURE — 87149 DNA/RNA DIRECT PROBE: CPT | Performed by: STUDENT IN AN ORGANIZED HEALTH CARE EDUCATION/TRAINING PROGRAM

## 2021-01-01 PROCEDURE — 999N001063 HC STATISTIC THAWING COMPONENT: Performed by: NURSE PRACTITIONER

## 2021-01-01 PROCEDURE — 93971 EXTREMITY STUDY: CPT | Mod: LT

## 2021-01-01 PROCEDURE — 258N000003 HC RX IP 258 OP 636: Performed by: EMERGENCY MEDICINE

## 2021-01-01 PROCEDURE — 84484 ASSAY OF TROPONIN QUANT: CPT | Performed by: STUDENT IN AN ORGANIZED HEALTH CARE EDUCATION/TRAINING PROGRAM

## 2021-01-01 PROCEDURE — 82810 BLOOD GASES O2 SAT ONLY: CPT

## 2021-01-01 PROCEDURE — 86900 BLOOD TYPING SEROLOGIC ABO: CPT | Performed by: INTERNAL MEDICINE

## 2021-01-01 PROCEDURE — P9059 PLASMA, FRZ BETWEEN 8-24HOUR: HCPCS | Performed by: STUDENT IN AN ORGANIZED HEALTH CARE EDUCATION/TRAINING PROGRAM

## 2021-01-01 PROCEDURE — 87040 BLOOD CULTURE FOR BACTERIA: CPT | Performed by: NURSE PRACTITIONER

## 2021-01-01 PROCEDURE — 83516 IMMUNOASSAY NONANTIBODY: CPT | Performed by: STUDENT IN AN ORGANIZED HEALTH CARE EDUCATION/TRAINING PROGRAM

## 2021-01-01 PROCEDURE — 97140 MANUAL THERAPY 1/> REGIONS: CPT | Mod: GO | Performed by: OCCUPATIONAL THERAPIST

## 2021-01-01 PROCEDURE — 84484 ASSAY OF TROPONIN QUANT: CPT | Performed by: EMERGENCY MEDICINE

## 2021-01-01 PROCEDURE — 999N001014 HC STATISTIC CYTO WRIGHT STAIN TC: Performed by: STUDENT IN AN ORGANIZED HEALTH CARE EDUCATION/TRAINING PROGRAM

## 2021-01-01 PROCEDURE — 86901 BLOOD TYPING SEROLOGIC RH(D): CPT | Performed by: EMERGENCY MEDICINE

## 2021-01-01 PROCEDURE — 36415 COLL VENOUS BLD VENIPUNCTURE: CPT | Performed by: PATHOLOGY

## 2021-01-01 PROCEDURE — 3E043XZ INTRODUCTION OF VASOPRESSOR INTO CENTRAL VEIN, PERCUTANEOUS APPROACH: ICD-10-PCS | Performed by: INTERNAL MEDICINE

## 2021-01-01 PROCEDURE — 87106 FUNGI IDENTIFICATION YEAST: CPT | Performed by: STUDENT IN AN ORGANIZED HEALTH CARE EDUCATION/TRAINING PROGRAM

## 2021-01-01 PROCEDURE — 83010 ASSAY OF HAPTOGLOBIN QUANT: CPT | Performed by: STUDENT IN AN ORGANIZED HEALTH CARE EDUCATION/TRAINING PROGRAM

## 2021-01-01 PROCEDURE — P9059 PLASMA, FRZ BETWEEN 8-24HOUR: HCPCS | Performed by: NURSE PRACTITIONER

## 2021-01-01 PROCEDURE — 82805 BLOOD GASES W/O2 SATURATION: CPT | Performed by: NURSE PRACTITIONER

## 2021-01-01 PROCEDURE — 93325 DOPPLER ECHO COLOR FLOW MAPG: CPT | Mod: 26 | Performed by: INTERNAL MEDICINE

## 2021-01-01 PROCEDURE — U0003 INFECTIOUS AGENT DETECTION BY NUCLEIC ACID (DNA OR RNA); SEVERE ACUTE RESPIRATORY SYNDROME CORONAVIRUS 2 (SARS-COV-2) (CORONAVIRUS DISEASE [COVID-19]), AMPLIFIED PROBE TECHNIQUE, MAKING USE OF HIGH THROUGHPUT TECHNOLOGIES AS DESCRIBED BY CMS-2020-01-R: HCPCS | Performed by: STUDENT IN AN ORGANIZED HEALTH CARE EDUCATION/TRAINING PROGRAM

## 2021-01-01 PROCEDURE — 999N000128 HC STATISTIC PERIPHERAL IV START W/O US GUIDANCE

## 2021-01-01 PROCEDURE — 31646 BRNCHSC W/THER ASPIR SBSQ: CPT | Mod: GC | Performed by: INTERNAL MEDICINE

## 2021-01-01 PROCEDURE — P9012 CRYOPRECIPITATE EACH UNIT: HCPCS

## 2021-01-01 PROCEDURE — 85060 BLOOD SMEAR INTERPRETATION: CPT | Performed by: STUDENT IN AN ORGANIZED HEALTH CARE EDUCATION/TRAINING PROGRAM

## 2021-01-01 PROCEDURE — 86850 RBC ANTIBODY SCREEN: CPT | Performed by: INTERNAL MEDICINE

## 2021-01-01 PROCEDURE — 93971 EXTREMITY STUDY: CPT | Mod: 26 | Performed by: RADIOLOGY

## 2021-01-01 PROCEDURE — 999N001077 HC STATISTIC POTASSIUM ED POCT

## 2021-01-01 PROCEDURE — 71045 X-RAY EXAM CHEST 1 VIEW: CPT | Mod: 77

## 2021-01-01 PROCEDURE — 93005 ELECTROCARDIOGRAM TRACING: CPT | Mod: 59 | Performed by: EMERGENCY MEDICINE

## 2021-01-01 PROCEDURE — 44500 INTRO GASTROINTESTINAL TUBE: CPT | Performed by: DIETITIAN, REGISTERED

## 2021-01-01 PROCEDURE — 84157 ASSAY OF PROTEIN OTHER: CPT | Performed by: STUDENT IN AN ORGANIZED HEALTH CARE EDUCATION/TRAINING PROGRAM

## 2021-01-01 PROCEDURE — 99285 EMERGENCY DEPT VISIT HI MDM: CPT | Mod: 25 | Performed by: EMERGENCY MEDICINE

## 2021-01-01 PROCEDURE — 83605 ASSAY OF LACTIC ACID: CPT | Performed by: NURSE PRACTITIONER

## 2021-01-01 PROCEDURE — 87070 CULTURE OTHR SPECIMN AEROBIC: CPT | Performed by: NURSE PRACTITIONER

## 2021-01-01 PROCEDURE — 86140 C-REACTIVE PROTEIN: CPT | Performed by: CLINICAL NURSE SPECIALIST

## 2021-01-01 PROCEDURE — 85025 COMPLETE CBC W/AUTO DIFF WBC: CPT | Performed by: EMERGENCY MEDICINE

## 2021-01-01 PROCEDURE — 88108 CYTOPATH CONCENTRATE TECH: CPT | Mod: TC | Performed by: STUDENT IN AN ORGANIZED HEALTH CARE EDUCATION/TRAINING PROGRAM

## 2021-01-01 PROCEDURE — 93306 TTE W/DOPPLER COMPLETE: CPT | Mod: 26 | Performed by: INTERNAL MEDICINE

## 2021-01-01 PROCEDURE — 31624 DX BRONCHOSCOPE/LAVAGE: CPT | Mod: GC | Performed by: INTERNAL MEDICINE

## 2021-01-01 PROCEDURE — 99223 1ST HOSP IP/OBS HIGH 75: CPT | Mod: 24 | Performed by: NURSE PRACTITIONER

## 2021-01-01 PROCEDURE — 258N000001 HC RX 258: Performed by: EMERGENCY MEDICINE

## 2021-01-01 PROCEDURE — 99223 1ST HOSP IP/OBS HIGH 75: CPT | Performed by: INTERNAL MEDICINE

## 2021-01-01 PROCEDURE — 71250 CT THORAX DX C-: CPT

## 2021-01-01 PROCEDURE — 71045 X-RAY EXAM CHEST 1 VIEW: CPT | Mod: 76

## 2021-01-01 PROCEDURE — 84484 ASSAY OF TROPONIN QUANT: CPT | Performed by: INTERNAL MEDICINE

## 2021-01-01 PROCEDURE — 93926 LOWER EXTREMITY STUDY: CPT | Mod: LT

## 2021-01-01 PROCEDURE — 93926 LOWER EXTREMITY STUDY: CPT | Mod: 26 | Performed by: RADIOLOGY

## 2021-01-01 PROCEDURE — 999N001028 HC STATISTIC PTT NC: Performed by: CLINICAL NURSE SPECIALIST

## 2021-01-01 PROCEDURE — 85045 AUTOMATED RETICULOCYTE COUNT: CPT | Performed by: INTERNAL MEDICINE

## 2021-01-01 PROCEDURE — 85060 BLOOD SMEAR INTERPRETATION: CPT | Performed by: PATHOLOGY

## 2021-01-01 PROCEDURE — 99605 MTMS BY PHARM NP 15 MIN: CPT | Mod: TEL | Performed by: PHARMACIST

## 2021-01-01 PROCEDURE — 272N000001 HC OR GENERAL SUPPLY STERILE: Performed by: INTERNAL MEDICINE

## 2021-01-01 PROCEDURE — 99607 MTMS BY PHARM ADDL 15 MIN: CPT | Mod: TEL | Performed by: PHARMACIST

## 2021-01-01 PROCEDURE — 86850 RBC ANTIBODY SCREEN: CPT | Performed by: EMERGENCY MEDICINE

## 2021-01-01 PROCEDURE — 99222 1ST HOSP IP/OBS MODERATE 55: CPT | Performed by: INTERNAL MEDICINE

## 2021-01-01 PROCEDURE — 83880 ASSAY OF NATRIURETIC PEPTIDE: CPT | Performed by: INTERNAL MEDICINE

## 2021-01-01 PROCEDURE — 81015 MICROSCOPIC EXAM OF URINE: CPT | Performed by: EMERGENCY MEDICINE

## 2021-01-01 PROCEDURE — 250N000013 HC RX MED GY IP 250 OP 250 PS 637: Performed by: EMERGENCY MEDICINE

## 2021-01-01 PROCEDURE — 87086 URINE CULTURE/COLONY COUNT: CPT | Performed by: STUDENT IN AN ORGANIZED HEALTH CARE EDUCATION/TRAINING PROGRAM

## 2021-01-01 PROCEDURE — 250N000009 HC RX 250: Performed by: NURSE ANESTHETIST, CERTIFIED REGISTERED

## 2021-01-01 PROCEDURE — 93451 RIGHT HEART CATH: CPT | Performed by: INTERNAL MEDICINE

## 2021-01-01 PROCEDURE — 36556 INSERT NON-TUNNEL CV CATH: CPT | Performed by: INTERNAL MEDICINE

## 2021-01-01 PROCEDURE — 96365 THER/PROPH/DIAG IV INF INIT: CPT | Performed by: INTERNAL MEDICINE

## 2021-01-01 RX ORDER — DOBUTAMINE HYDROCHLORIDE 200 MG/100ML
2.5 INJECTION INTRAVENOUS CONTINUOUS
Status: DISCONTINUED | OUTPATIENT
Start: 2021-01-01 | End: 2021-01-01

## 2021-01-01 RX ORDER — ETOMIDATE 2 MG/ML
0.1 INJECTION INTRAVENOUS
Status: DISCONTINUED | OUTPATIENT
Start: 2021-01-01 | End: 2021-01-01 | Stop reason: CLARIF

## 2021-01-01 RX ORDER — HYDROMORPHONE HYDROCHLORIDE 1 MG/ML
0.5 INJECTION, SOLUTION INTRAMUSCULAR; INTRAVENOUS; SUBCUTANEOUS EVERY 4 HOURS PRN
Status: DISCONTINUED | OUTPATIENT
Start: 2021-01-01 | End: 2021-01-01 | Stop reason: ALTCHOICE

## 2021-01-01 RX ORDER — PANTOPRAZOLE SODIUM 40 MG/1
40 TABLET, DELAYED RELEASE ORAL 2 TIMES DAILY
Qty: 180 TABLET | Refills: 1 | Status: ON HOLD | OUTPATIENT
Start: 2021-01-01 | End: 2021-01-01

## 2021-01-01 RX ORDER — MAGNESIUM HYDROXIDE/ALUMINUM HYDROXICE/SIMETHICONE 120; 1200; 1200 MG/30ML; MG/30ML; MG/30ML
30 SUSPENSION ORAL EVERY 4 HOURS PRN
Status: DISCONTINUED | OUTPATIENT
Start: 2021-01-01 | End: 2021-01-01 | Stop reason: HOSPADM

## 2021-01-01 RX ORDER — FENTANYL CITRATE 50 UG/ML
INJECTION, SOLUTION INTRAMUSCULAR; INTRAVENOUS
Status: DISCONTINUED
Start: 2021-01-01 | End: 2021-01-01 | Stop reason: WASHOUT

## 2021-01-01 RX ORDER — FERROUS SULFATE 325(65) MG
325 TABLET ORAL
Status: DISCONTINUED | OUTPATIENT
Start: 2021-01-01 | End: 2021-01-01 | Stop reason: HOSPADM

## 2021-01-01 RX ORDER — LACTULOSE 10 G/15ML
20 SOLUTION ORAL 3 TIMES DAILY
Status: CANCELLED | OUTPATIENT
Start: 2021-01-01

## 2021-01-01 RX ORDER — DEXTROSE MONOHYDRATE 25 G/50ML
25 INJECTION, SOLUTION INTRAVENOUS ONCE
Status: COMPLETED | OUTPATIENT
Start: 2021-01-01 | End: 2021-01-01

## 2021-01-01 RX ORDER — HEPARIN SODIUM (PORCINE) LOCK FLUSH IV SOLN 100 UNIT/ML 100 UNIT/ML
5 SOLUTION INTRAVENOUS
Status: CANCELLED | OUTPATIENT
Start: 2021-01-01

## 2021-01-01 RX ORDER — MIDAZOLAM HCL IN 0.9 % NACL/PF 1 MG/ML
1-8 PLASTIC BAG, INJECTION (ML) INTRAVENOUS CONTINUOUS
Status: DISCONTINUED | OUTPATIENT
Start: 2021-01-01 | End: 2021-01-01

## 2021-01-01 RX ORDER — POTASSIUM CHLORIDE 29.8 MG/ML
20 INJECTION INTRAVENOUS EVERY 6 HOURS PRN
Status: DISCONTINUED | OUTPATIENT
Start: 2021-01-01 | End: 2021-01-01

## 2021-01-01 RX ORDER — DAPAGLIFLOZIN 5 MG/1
5 TABLET, FILM COATED ORAL DAILY
Qty: 30 TABLET | Refills: 0 | Status: SHIPPED | OUTPATIENT
Start: 2021-01-01 | End: 2021-01-01

## 2021-01-01 RX ORDER — DEXMEDETOMIDINE HYDROCHLORIDE 4 UG/ML
0.2-0.7 INJECTION, SOLUTION INTRAVENOUS CONTINUOUS
Status: DISCONTINUED | OUTPATIENT
Start: 2021-01-01 | End: 2021-01-01

## 2021-01-01 RX ORDER — POTASSIUM CHLORIDE 1500 MG/1
80 TABLET, EXTENDED RELEASE ORAL ONCE
Status: COMPLETED | OUTPATIENT
Start: 2021-01-01 | End: 2021-01-01

## 2021-01-01 RX ORDER — IOPAMIDOL 755 MG/ML
128 INJECTION, SOLUTION INTRAVASCULAR ONCE
Status: COMPLETED | OUTPATIENT
Start: 2021-01-01 | End: 2021-01-01

## 2021-01-01 RX ORDER — PIPERACILLIN SODIUM, TAZOBACTAM SODIUM 4; .5 G/20ML; G/20ML
4.5 INJECTION, POWDER, LYOPHILIZED, FOR SOLUTION INTRAVENOUS EVERY 6 HOURS
Status: DISCONTINUED | OUTPATIENT
Start: 2021-01-01 | End: 2021-01-01

## 2021-01-01 RX ORDER — DEXTROSE MONOHYDRATE 25 G/50ML
25-50 INJECTION, SOLUTION INTRAVENOUS
Status: DISCONTINUED | OUTPATIENT
Start: 2021-01-01 | End: 2021-01-01

## 2021-01-01 RX ORDER — NICOTINE POLACRILEX 4 MG
15-30 LOZENGE BUCCAL
Status: DISCONTINUED | OUTPATIENT
Start: 2021-01-01 | End: 2021-01-01

## 2021-01-01 RX ORDER — B COMPLEX C NO.10/FOLIC ACID 900MCG/5ML
5 LIQUID (ML) ORAL DAILY
Status: DISCONTINUED | OUTPATIENT
Start: 2021-01-01 | End: 2021-01-01

## 2021-01-01 RX ORDER — BUMETANIDE 0.25 MG/ML
6 INJECTION INTRAMUSCULAR; INTRAVENOUS ONCE
Status: COMPLETED | OUTPATIENT
Start: 2021-01-01 | End: 2021-01-01

## 2021-01-01 RX ORDER — MIDAZOLAM HCL IN 0.9 % NACL/PF 1 MG/ML
1-8 PLASTIC BAG, INJECTION (ML) INTRAVENOUS CONTINUOUS
Status: DISCONTINUED | OUTPATIENT
Start: 2021-01-01 | End: 2021-01-01 | Stop reason: HOSPADM

## 2021-01-01 RX ORDER — PHENYLEPHRINE HCL IN 0.9% NACL 50MG/250ML
0.5-6 PLASTIC BAG, INJECTION (ML) INTRAVENOUS CONTINUOUS
Status: DISCONTINUED | OUTPATIENT
Start: 2021-01-01 | End: 2021-01-01

## 2021-01-01 RX ORDER — POTASSIUM CHLORIDE 750 MG/1
20 TABLET, EXTENDED RELEASE ORAL ONCE
Status: COMPLETED | OUTPATIENT
Start: 2021-01-01 | End: 2021-01-01

## 2021-01-01 RX ORDER — FENTANYL CITRATE 50 UG/ML
INJECTION, SOLUTION INTRAMUSCULAR; INTRAVENOUS
Status: COMPLETED
Start: 2021-01-01 | End: 2021-01-01

## 2021-01-01 RX ORDER — MAGNESIUM SULFATE HEPTAHYDRATE 40 MG/ML
2 INJECTION, SOLUTION INTRAVENOUS EVERY 6 HOURS PRN
Status: DISCONTINUED | OUTPATIENT
Start: 2021-01-01 | End: 2021-01-01

## 2021-01-01 RX ORDER — LIDOCAINE HYDROCHLORIDE 20 MG/ML
JELLY TOPICAL
Status: COMPLETED
Start: 2021-01-01 | End: 2021-01-01

## 2021-01-01 RX ORDER — HEPARIN SODIUM,PORCINE 10 UNIT/ML
5 VIAL (ML) INTRAVENOUS
Status: CANCELLED | OUTPATIENT
Start: 2021-01-01

## 2021-01-01 RX ORDER — GLYCOPYRROLATE 0.2 MG/ML
0.2 INJECTION, SOLUTION INTRAMUSCULAR; INTRAVENOUS ONCE
Status: DISCONTINUED | OUTPATIENT
Start: 2021-01-01 | End: 2021-01-01 | Stop reason: HOSPADM

## 2021-01-01 RX ORDER — DEXTROSE MONOHYDRATE 100 MG/ML
INJECTION, SOLUTION INTRAVENOUS CONTINUOUS
Status: DISCONTINUED | OUTPATIENT
Start: 2021-01-01 | End: 2021-01-01

## 2021-01-01 RX ORDER — CLINDAMYCIN PHOSPHATE 900 MG/50ML
900 INJECTION, SOLUTION INTRAVENOUS EVERY 8 HOURS
Status: DISCONTINUED | OUTPATIENT
Start: 2021-01-01 | End: 2021-01-01

## 2021-01-01 RX ORDER — POTASSIUM CHLORIDE 1500 MG/1
60 TABLET, EXTENDED RELEASE ORAL ONCE
Status: DISCONTINUED | OUTPATIENT
Start: 2021-01-01 | End: 2021-01-01

## 2021-01-01 RX ORDER — NALOXONE HYDROCHLORIDE 0.4 MG/ML
0.4 INJECTION, SOLUTION INTRAMUSCULAR; INTRAVENOUS; SUBCUTANEOUS
Status: DISCONTINUED | OUTPATIENT
Start: 2021-01-01 | End: 2021-01-01 | Stop reason: HOSPADM

## 2021-01-01 RX ORDER — DAPAGLIFLOZIN 5 MG/1
5 TABLET, FILM COATED ORAL DAILY
Status: DISCONTINUED | OUTPATIENT
Start: 2021-01-01 | End: 2021-01-01 | Stop reason: HOSPADM

## 2021-01-01 RX ORDER — ACETAMINOPHEN 325 MG/1
650 TABLET ORAL EVERY 4 HOURS PRN
Status: DISCONTINUED | OUTPATIENT
Start: 2021-01-01 | End: 2021-01-01 | Stop reason: HOSPADM

## 2021-01-01 RX ORDER — ONDANSETRON 2 MG/ML
4 INJECTION INTRAMUSCULAR; INTRAVENOUS EVERY 6 HOURS PRN
Status: DISCONTINUED | OUTPATIENT
Start: 2021-01-01 | End: 2021-01-01 | Stop reason: HOSPADM

## 2021-01-01 RX ORDER — AMINO AC/PROTEIN HYDR/WHEY PRO 10G-100/30
2 LIQUID (ML) ORAL 3 TIMES DAILY
Status: DISCONTINUED | OUTPATIENT
Start: 2021-01-01 | End: 2021-01-01

## 2021-01-01 RX ORDER — BUMETANIDE 0.25 MG/ML
4 INJECTION INTRAMUSCULAR; INTRAVENOUS ONCE
Status: COMPLETED | OUTPATIENT
Start: 2021-01-01 | End: 2021-01-01

## 2021-01-01 RX ORDER — BISACODYL 5 MG
10 TABLET, DELAYED RELEASE (ENTERIC COATED) ORAL DAILY PRN
Status: DISCONTINUED | OUTPATIENT
Start: 2021-01-01 | End: 2021-01-01 | Stop reason: HOSPADM

## 2021-01-01 RX ORDER — ALBUMIN, HUMAN INJ 5% 5 %
SOLUTION INTRAVENOUS
Status: COMPLETED
Start: 2021-01-01 | End: 2021-01-01

## 2021-01-01 RX ORDER — LACTULOSE 10 G/15ML
20 SOLUTION ORAL 3 TIMES DAILY
Status: DISCONTINUED | OUTPATIENT
Start: 2021-01-01 | End: 2021-01-01

## 2021-01-01 RX ORDER — NALOXONE HYDROCHLORIDE 0.4 MG/ML
0.2 INJECTION, SOLUTION INTRAMUSCULAR; INTRAVENOUS; SUBCUTANEOUS
Status: DISCONTINUED | OUTPATIENT
Start: 2021-01-01 | End: 2021-01-01

## 2021-01-01 RX ORDER — NALOXONE HYDROCHLORIDE 0.4 MG/ML
0.2 INJECTION, SOLUTION INTRAMUSCULAR; INTRAVENOUS; SUBCUTANEOUS
Status: DISCONTINUED | OUTPATIENT
Start: 2021-01-01 | End: 2021-01-01 | Stop reason: HOSPADM

## 2021-01-01 RX ORDER — DEXTROSE MONOHYDRATE 25 G/50ML
25-50 INJECTION, SOLUTION INTRAVENOUS
Status: DISCONTINUED | OUTPATIENT
Start: 2021-01-01 | End: 2021-01-01 | Stop reason: HOSPADM

## 2021-01-01 RX ORDER — ASPIRIN 81 MG/1
81 TABLET, CHEWABLE ORAL DAILY
Status: DISCONTINUED | OUTPATIENT
Start: 2021-01-01 | End: 2021-01-01 | Stop reason: HOSPADM

## 2021-01-01 RX ORDER — DEXTROSE MONOHYDRATE 100 MG/ML
INJECTION, SOLUTION INTRAVENOUS CONTINUOUS
Status: DISCONTINUED | OUTPATIENT
Start: 2021-01-01 | End: 2021-01-01 | Stop reason: CLARIF

## 2021-01-01 RX ORDER — NALOXONE HYDROCHLORIDE 0.4 MG/ML
0.4 INJECTION, SOLUTION INTRAMUSCULAR; INTRAVENOUS; SUBCUTANEOUS
Status: DISCONTINUED | OUTPATIENT
Start: 2021-01-01 | End: 2021-01-01

## 2021-01-01 RX ORDER — LEVOTHYROXINE SODIUM 25 UG/1
25 TABLET ORAL
Status: DISCONTINUED | OUTPATIENT
Start: 2021-01-01 | End: 2021-01-01

## 2021-01-01 RX ORDER — PHENYLEPHRINE HCL IN 0.9% NACL 50MG/250ML
0.5-6 PLASTIC BAG, INJECTION (ML) INTRAVENOUS CONTINUOUS
Status: DISCONTINUED | OUTPATIENT
Start: 2021-01-01 | End: 2021-01-01 | Stop reason: CLARIF

## 2021-01-01 RX ORDER — SPIRONOLACTONE 25 MG/1
25 TABLET ORAL DAILY
Status: DISCONTINUED | OUTPATIENT
Start: 2021-01-01 | End: 2021-01-01 | Stop reason: HOSPADM

## 2021-01-01 RX ORDER — LIDOCAINE HYDROCHLORIDE 10 MG/ML
INJECTION, SOLUTION EPIDURAL; INFILTRATION; INTRACAUDAL; PERINEURAL
Status: COMPLETED
Start: 2021-01-01 | End: 2021-01-01

## 2021-01-01 RX ORDER — ALBUMIN, HUMAN INJ 5% 5 %
50 SOLUTION INTRAVENOUS ONCE
Status: COMPLETED | OUTPATIENT
Start: 2021-01-01 | End: 2021-01-01

## 2021-01-01 RX ORDER — NOREPINEPHRINE BITARTRATE 0.06 MG/ML
.03-.7 INJECTION, SOLUTION INTRAVENOUS CONTINUOUS
Status: DISCONTINUED | OUTPATIENT
Start: 2021-01-01 | End: 2021-01-01

## 2021-01-01 RX ORDER — IVABRADINE 5 MG/1
5 TABLET, FILM COATED ORAL 2 TIMES DAILY WITH MEALS
Status: DISCONTINUED | OUTPATIENT
Start: 2021-01-01 | End: 2021-01-01 | Stop reason: HOSPADM

## 2021-01-01 RX ORDER — CALCIUM CHLORIDE 100 MG/ML
1 INJECTION INTRAVENOUS; INTRAVENTRICULAR ONCE
Status: COMPLETED | OUTPATIENT
Start: 2021-01-01 | End: 2021-01-01

## 2021-01-01 RX ORDER — BUMETANIDE 0.25 MG/ML
2 INJECTION INTRAMUSCULAR; INTRAVENOUS ONCE
Status: COMPLETED | OUTPATIENT
Start: 2021-01-01 | End: 2021-01-01

## 2021-01-01 RX ORDER — DEXMEDETOMIDINE HYDROCHLORIDE 4 UG/ML
.6-1.2 INJECTION, SOLUTION INTRAVENOUS CONTINUOUS
Status: DISCONTINUED | OUTPATIENT
Start: 2021-01-01 | End: 2021-01-01 | Stop reason: HOSPADM

## 2021-01-01 RX ORDER — BISACODYL 5 MG
15 TABLET, DELAYED RELEASE (ENTERIC COATED) ORAL DAILY PRN
Status: DISCONTINUED | OUTPATIENT
Start: 2021-01-01 | End: 2021-01-01 | Stop reason: HOSPADM

## 2021-01-01 RX ORDER — DESMOPRESSIN ACETATE 4 UG/ML
2 INJECTION, SOLUTION INTRAVENOUS; SUBCUTANEOUS ONCE
Status: COMPLETED | OUTPATIENT
Start: 2021-01-01 | End: 2021-01-01

## 2021-01-01 RX ORDER — BISACODYL 10 MG
10 SUPPOSITORY, RECTAL RECTAL DAILY PRN
Status: DISCONTINUED | OUTPATIENT
Start: 2021-01-01 | End: 2021-01-01 | Stop reason: HOSPADM

## 2021-01-01 RX ORDER — LACTULOSE 10 G/15ML
20 SOLUTION ORAL 2 TIMES DAILY
Status: DISCONTINUED | OUTPATIENT
Start: 2021-01-01 | End: 2021-01-01

## 2021-01-01 RX ORDER — MEROPENEM 1 G/1
1 INJECTION, POWDER, FOR SOLUTION INTRAVENOUS EVERY 8 HOURS
Status: DISCONTINUED | OUTPATIENT
Start: 2021-01-01 | End: 2021-01-01

## 2021-01-01 RX ORDER — ONDANSETRON 4 MG/1
4 TABLET, ORALLY DISINTEGRATING ORAL EVERY 6 HOURS PRN
Status: DISCONTINUED | OUTPATIENT
Start: 2021-01-01 | End: 2021-01-01 | Stop reason: HOSPADM

## 2021-01-01 RX ORDER — AMOXICILLIN 250 MG
2 CAPSULE ORAL AT BEDTIME
Status: DISCONTINUED | OUTPATIENT
Start: 2021-01-01 | End: 2021-01-01

## 2021-01-01 RX ORDER — AMOXICILLIN 250 MG
1 CAPSULE ORAL 2 TIMES DAILY PRN
Status: DISCONTINUED | OUTPATIENT
Start: 2021-01-01 | End: 2021-01-01 | Stop reason: HOSPADM

## 2021-01-01 RX ORDER — CEFEPIME HYDROCHLORIDE 1 G/1
1 INJECTION, POWDER, FOR SOLUTION INTRAMUSCULAR; INTRAVENOUS EVERY 12 HOURS
Status: DISCONTINUED | OUTPATIENT
Start: 2021-01-01 | End: 2021-01-01

## 2021-01-01 RX ORDER — NICOTINE POLACRILEX 4 MG
15-30 LOZENGE BUCCAL
Status: DISCONTINUED | OUTPATIENT
Start: 2021-01-01 | End: 2021-01-01 | Stop reason: HOSPADM

## 2021-01-01 RX ORDER — POTASSIUM CHLORIDE 750 MG/1
20 TABLET, EXTENDED RELEASE ORAL ONCE
Status: CANCELLED | OUTPATIENT
Start: 2021-01-01 | End: 2021-01-01

## 2021-01-01 RX ORDER — TORSEMIDE 20 MG/1
80 TABLET ORAL
Status: DISCONTINUED | OUTPATIENT
Start: 2021-01-01 | End: 2021-01-01 | Stop reason: HOSPADM

## 2021-01-01 RX ORDER — ACETAMINOPHEN 650 MG/1
650 SUPPOSITORY RECTAL EVERY 4 HOURS PRN
Status: DISCONTINUED | OUTPATIENT
Start: 2021-01-01 | End: 2021-01-01 | Stop reason: HOSPADM

## 2021-01-01 RX ORDER — DEXTROSE MONOHYDRATE 100 MG/ML
INJECTION, SOLUTION INTRAVENOUS CONTINUOUS PRN
Status: DISCONTINUED | OUTPATIENT
Start: 2021-01-01 | End: 2021-01-01

## 2021-01-01 RX ORDER — HEPARIN SODIUM 5000 [USP'U]/.5ML
5000 INJECTION, SOLUTION INTRAVENOUS; SUBCUTANEOUS EVERY 12 HOURS
Status: DISCONTINUED | OUTPATIENT
Start: 2021-01-01 | End: 2021-01-01

## 2021-01-01 RX ORDER — POTASSIUM CHLORIDE 1500 MG/1
60 TABLET, EXTENDED RELEASE ORAL ONCE
Status: COMPLETED | OUTPATIENT
Start: 2021-01-01 | End: 2021-01-01

## 2021-01-01 RX ORDER — ALBUMIN, HUMAN INJ 5% 5 %
25 SOLUTION INTRAVENOUS ONCE
Status: COMPLETED | OUTPATIENT
Start: 2021-01-01 | End: 2021-01-01

## 2021-01-01 RX ORDER — DOPAMINE HYDROCHLORIDE 160 MG/100ML
2-5 INJECTION, SOLUTION INTRAVENOUS CONTINUOUS
Status: DISCONTINUED | OUTPATIENT
Start: 2021-01-01 | End: 2021-01-01

## 2021-01-01 RX ORDER — PANTOPRAZOLE SODIUM 40 MG/1
40 TABLET, DELAYED RELEASE ORAL 2 TIMES DAILY
Status: DISCONTINUED | OUTPATIENT
Start: 2021-01-01 | End: 2021-01-01 | Stop reason: HOSPADM

## 2021-01-01 RX ORDER — FLUDROCORTISONE ACETATE 0.1 MG/1
100 TABLET ORAL DAILY
Status: DISCONTINUED | OUTPATIENT
Start: 2021-01-01 | End: 2021-01-01

## 2021-01-01 RX ORDER — GLYCOPYRROLATE 0.2 MG/ML
0.2 INJECTION, SOLUTION INTRAMUSCULAR; INTRAVENOUS ONCE
Status: COMPLETED | OUTPATIENT
Start: 2021-01-01 | End: 2021-01-01

## 2021-01-01 RX ORDER — BISACODYL 5 MG
5 TABLET, DELAYED RELEASE (ENTERIC COATED) ORAL DAILY PRN
Status: DISCONTINUED | OUTPATIENT
Start: 2021-01-01 | End: 2021-01-01 | Stop reason: HOSPADM

## 2021-01-01 RX ORDER — LIDOCAINE 40 MG/G
CREAM TOPICAL
Status: CANCELLED | OUTPATIENT
Start: 2021-01-01

## 2021-01-01 RX ORDER — METHYLPREDNISOLONE SOD SUCC 125 MG
125 VIAL (EA) INJECTION ONCE
Status: CANCELLED
Start: 2021-01-01 | End: 2021-01-01

## 2021-01-01 RX ORDER — LIDOCAINE HYDROCHLORIDE 20 MG/ML
10 JELLY TOPICAL ONCE
Status: COMPLETED | OUTPATIENT
Start: 2021-01-01 | End: 2021-01-01

## 2021-01-01 RX ORDER — SPIRONOLACTONE 25 MG/1
25 TABLET ORAL DAILY
Qty: 30 TABLET | Refills: 0 | Status: SHIPPED | OUTPATIENT
Start: 2021-01-01 | End: 2021-01-01

## 2021-01-01 RX ORDER — FAMOTIDINE 20 MG/1
20 TABLET, FILM COATED ORAL 2 TIMES DAILY
Status: CANCELLED | OUTPATIENT
Start: 2021-01-01

## 2021-01-01 RX ORDER — ALBUMIN, HUMAN INJ 5% 5 %
250 SOLUTION INTRAVENOUS ONCE
Status: COMPLETED | OUTPATIENT
Start: 2021-01-01 | End: 2021-01-01

## 2021-01-01 RX ORDER — BUMETANIDE 0.25 MG/ML
1 INJECTION INTRAMUSCULAR; INTRAVENOUS ONCE
Status: COMPLETED | OUTPATIENT
Start: 2021-01-01 | End: 2021-01-01

## 2021-01-01 RX ORDER — NOREPINEPHRINE BITARTRATE 0.06 MG/ML
.03-.4 INJECTION, SOLUTION INTRAVENOUS CONTINUOUS
Status: DISCONTINUED | OUTPATIENT
Start: 2021-01-01 | End: 2021-01-01

## 2021-01-01 RX ORDER — CALCIUM GLUCONATE 94 MG/ML
1 INJECTION, SOLUTION INTRAVENOUS ONCE
Status: DISCONTINUED | OUTPATIENT
Start: 2021-01-01 | End: 2021-01-01

## 2021-01-01 RX ORDER — FENTANYL CITRATE 50 UG/ML
50-100 INJECTION, SOLUTION INTRAMUSCULAR; INTRAVENOUS EVERY 10 MIN PRN
Status: DISCONTINUED | OUTPATIENT
Start: 2021-01-01 | End: 2021-01-01 | Stop reason: HOSPADM

## 2021-01-01 RX ADMIN — AMIODARONE HYDROCHLORIDE 0.5 MG/MIN: 50 INJECTION, SOLUTION INTRAVENOUS at 04:51

## 2021-01-01 RX ADMIN — PIPERACILLIN SODIUM AND TAZOBACTAM SODIUM 4.5 G: 4; .5 INJECTION, POWDER, LYOPHILIZED, FOR SOLUTION INTRAVENOUS at 21:20

## 2021-01-01 RX ADMIN — Medication 2 PACKET: at 14:07

## 2021-01-01 RX ADMIN — INSULIN ASPART 1 UNITS: 100 INJECTION, SOLUTION INTRAVENOUS; SUBCUTANEOUS at 01:02

## 2021-01-01 RX ADMIN — LACTULOSE 20 G: 20 SOLUTION ORAL at 14:07

## 2021-01-01 RX ADMIN — Medication 0.45 MCG/KG/MIN: at 15:03

## 2021-01-01 RX ADMIN — Medication 4 UNITS/HR: at 18:04

## 2021-01-01 RX ADMIN — LIDOCAINE HYDROCHLORIDE 10 ML: 20 JELLY TOPICAL at 20:45

## 2021-01-01 RX ADMIN — CALCIUM CHLORIDE, MAGNESIUM CHLORIDE, SODIUM CHLORIDE, SODIUM BICARBONATE, POTASSIUM CHLORIDE AND SODIUM PHOSPHATE DIBASIC DIHYDRATE 12.5 ML/KG/HR: 3.68; 3.05; 6.34; 3.09; .314; .187 INJECTION INTRAVENOUS at 00:14

## 2021-01-01 RX ADMIN — CALCIUM CHLORIDE, MAGNESIUM CHLORIDE, DEXTROSE MONOHYDRATE, LACTIC ACID, SODIUM CHLORIDE, SODIUM BICARBONATE AND POTASSIUM CHLORIDE 12.5 ML/KG/HR: 5.15; 2.03; 22; 5.4; 6.46; 3.09; .157 INJECTION INTRAVENOUS at 21:00

## 2021-01-01 RX ADMIN — Medication 2 PACKET: at 19:43

## 2021-01-01 RX ADMIN — CALCIUM CHLORIDE, MAGNESIUM CHLORIDE, DEXTROSE MONOHYDRATE, LACTIC ACID, SODIUM CHLORIDE, SODIUM BICARBONATE AND POTASSIUM CHLORIDE: 5.15; 2.03; 22; 5.4; 6.46; 3.09; .157 INJECTION INTRAVENOUS at 16:27

## 2021-01-01 RX ADMIN — CALCIUM CHLORIDE, MAGNESIUM CHLORIDE, SODIUM CHLORIDE, SODIUM BICARBONATE, POTASSIUM CHLORIDE AND SODIUM PHOSPHATE DIBASIC DIHYDRATE 12.5 ML/KG/HR: 3.68; 3.05; 6.34; 3.09; .314; .187 INJECTION INTRAVENOUS at 15:57

## 2021-01-01 RX ADMIN — CALCIUM CHLORIDE, MAGNESIUM CHLORIDE, SODIUM CHLORIDE, SODIUM BICARBONATE, POTASSIUM CHLORIDE AND SODIUM PHOSPHATE DIBASIC DIHYDRATE 12.5 ML/KG/HR: 3.68; 3.05; 6.34; 3.09; .314; .187 INJECTION INTRAVENOUS at 06:00

## 2021-01-01 RX ADMIN — HYDROCORTISONE SODIUM SUCCINATE 50 MG: 100 INJECTION, POWDER, FOR SOLUTION INTRAMUSCULAR; INTRAVENOUS at 05:15

## 2021-01-01 RX ADMIN — EPINEPHRINE 0.25 MCG/KG/MIN: 1 INJECTION PARENTERAL at 02:59

## 2021-01-01 RX ADMIN — Medication 2.4 UNITS/HR: at 01:44

## 2021-01-01 RX ADMIN — MIDAZOLAM (PF) 1 MG/ML IN 0.9 % SODIUM CHLORIDE INTRAVENOUS SOLUTION 1 MG/HR: at 18:34

## 2021-01-01 RX ADMIN — Medication 0.06 MCG/KG/MIN: at 00:04

## 2021-01-01 RX ADMIN — CALCIUM CHLORIDE, MAGNESIUM CHLORIDE, SODIUM CHLORIDE, SODIUM BICARBONATE, POTASSIUM CHLORIDE AND SODIUM PHOSPHATE DIBASIC DIHYDRATE: 3.68; 3.05; 6.34; 3.09; .314; .187 INJECTION INTRAVENOUS at 11:35

## 2021-01-01 RX ADMIN — CALCIUM CHLORIDE, MAGNESIUM CHLORIDE, SODIUM CHLORIDE, SODIUM BICARBONATE, POTASSIUM CHLORIDE AND SODIUM PHOSPHATE DIBASIC DIHYDRATE 15 ML/KG/HR: 3.68; 3.05; 6.34; 3.09; .314; .187 INJECTION INTRAVENOUS at 03:11

## 2021-01-01 RX ADMIN — Medication 4 UNITS/HR: at 03:38

## 2021-01-01 RX ADMIN — DEXMEDETOMIDINE 0.8 MCG/KG/HR: 100 INJECTION, SOLUTION, CONCENTRATE INTRAVENOUS at 12:08

## 2021-01-01 RX ADMIN — CALCIUM CHLORIDE 1 G: 100 INJECTION, SOLUTION INTRAVENOUS at 05:09

## 2021-01-01 RX ADMIN — LEVOTHYROXINE SODIUM 25 MCG: 0.03 TABLET ORAL at 08:36

## 2021-01-01 RX ADMIN — PANTOPRAZOLE SODIUM 40 MG: 40 INJECTION, POWDER, FOR SOLUTION INTRAVENOUS at 08:22

## 2021-01-01 RX ADMIN — CALCIUM CHLORIDE, MAGNESIUM CHLORIDE, SODIUM CHLORIDE, SODIUM BICARBONATE, POTASSIUM CHLORIDE AND SODIUM PHOSPHATE DIBASIC DIHYDRATE 15 ML/KG/HR: 3.68; 3.05; 6.34; 3.09; .314; .187 INJECTION INTRAVENOUS at 13:15

## 2021-01-01 RX ADMIN — MICAFUNGIN SODIUM 150 MG: 50 INJECTION, POWDER, LYOPHILIZED, FOR SOLUTION INTRAVENOUS at 14:48

## 2021-01-01 RX ADMIN — CALCIUM CHLORIDE, MAGNESIUM CHLORIDE, SODIUM CHLORIDE, SODIUM BICARBONATE, POTASSIUM CHLORIDE AND SODIUM PHOSPHATE DIBASIC DIHYDRATE 12.5 ML/KG/HR: 3.68; 3.05; 6.34; 3.09; .314; .187 INJECTION INTRAVENOUS at 13:00

## 2021-01-01 RX ADMIN — DEXTROSE MONOHYDRATE 25 ML: 500 INJECTION PARENTERAL at 20:46

## 2021-01-01 RX ADMIN — CALCIUM CHLORIDE 1 G: 100 INJECTION, SOLUTION INTRAVENOUS at 10:53

## 2021-01-01 RX ADMIN — LEVOTHYROXINE SODIUM 25 MCG: 0.03 TABLET ORAL at 07:31

## 2021-01-01 RX ADMIN — CALCIUM CHLORIDE, MAGNESIUM CHLORIDE, SODIUM CHLORIDE, SODIUM BICARBONATE, POTASSIUM CHLORIDE AND SODIUM PHOSPHATE DIBASIC DIHYDRATE: 3.68; 3.05; 6.34; 3.09; .314; .187 INJECTION INTRAVENOUS at 19:49

## 2021-01-01 RX ADMIN — CALCIUM CHLORIDE 1 G: 100 INJECTION, SOLUTION INTRAVENOUS at 06:19

## 2021-01-01 RX ADMIN — CALCIUM CHLORIDE, MAGNESIUM CHLORIDE, SODIUM CHLORIDE, SODIUM BICARBONATE, POTASSIUM CHLORIDE AND SODIUM PHOSPHATE DIBASIC DIHYDRATE 12.5 ML/KG/HR: 3.68; 3.05; 6.34; 3.09; .314; .187 INJECTION INTRAVENOUS at 07:36

## 2021-01-01 RX ADMIN — CALCIUM CHLORIDE, MAGNESIUM CHLORIDE, SODIUM CHLORIDE, SODIUM BICARBONATE, POTASSIUM CHLORIDE AND SODIUM PHOSPHATE DIBASIC DIHYDRATE 12.5 ML/KG/HR: 3.68; 3.05; 6.34; 3.09; .314; .187 INJECTION INTRAVENOUS at 09:54

## 2021-01-01 RX ADMIN — HYDROCORTISONE SODIUM SUCCINATE 50 MG: 100 INJECTION, POWDER, FOR SOLUTION INTRAMUSCULAR; INTRAVENOUS at 09:12

## 2021-01-01 RX ADMIN — INSULIN ASPART 1 UNITS: 100 INJECTION, SOLUTION INTRAVENOUS; SUBCUTANEOUS at 12:25

## 2021-01-01 RX ADMIN — IOPAMIDOL 128 ML: 755 INJECTION, SOLUTION INTRAVENOUS at 11:47

## 2021-01-01 RX ADMIN — AMIODARONE HYDROCHLORIDE 0.5 MG/MIN: 50 INJECTION, SOLUTION INTRAVENOUS at 03:56

## 2021-01-01 RX ADMIN — PIPERACILLIN SODIUM AND TAZOBACTAM SODIUM 4.5 G: 4; .5 INJECTION, POWDER, LYOPHILIZED, FOR SOLUTION INTRAVENOUS at 17:06

## 2021-01-01 RX ADMIN — CALCIUM CHLORIDE, MAGNESIUM CHLORIDE, SODIUM CHLORIDE, SODIUM BICARBONATE, POTASSIUM CHLORIDE AND SODIUM PHOSPHATE DIBASIC DIHYDRATE 12.5 ML/KG/HR: 3.68; 3.05; 6.34; 3.09; .314; .187 INJECTION INTRAVENOUS at 22:00

## 2021-01-01 RX ADMIN — Medication 5 ML: at 07:56

## 2021-01-01 RX ADMIN — MEROPENEM 1 G: 1 INJECTION, POWDER, FOR SOLUTION INTRAVENOUS at 13:12

## 2021-01-01 RX ADMIN — CALCIUM CHLORIDE, MAGNESIUM CHLORIDE, SODIUM CHLORIDE, SODIUM BICARBONATE, POTASSIUM CHLORIDE AND SODIUM PHOSPHATE DIBASIC DIHYDRATE 12.5 ML/KG/HR: 3.68; 3.05; 6.34; 3.09; .314; .187 INJECTION INTRAVENOUS at 17:47

## 2021-01-01 RX ADMIN — Medication 0.2 MCG/KG/MIN: at 06:25

## 2021-01-01 RX ADMIN — CALCIUM CHLORIDE 1 G: 100 INJECTION, SOLUTION INTRAVENOUS at 06:31

## 2021-01-01 RX ADMIN — CALCIUM CHLORIDE, MAGNESIUM CHLORIDE, SODIUM CHLORIDE, SODIUM BICARBONATE, POTASSIUM CHLORIDE AND SODIUM PHOSPHATE DIBASIC DIHYDRATE 15 ML/KG/HR: 3.68; 3.05; 6.34; 3.09; .314; .187 INJECTION INTRAVENOUS at 02:39

## 2021-01-01 RX ADMIN — AMIODARONE HYDROCHLORIDE 0.5 MG/MIN: 50 INJECTION, SOLUTION INTRAVENOUS at 05:45

## 2021-01-01 RX ADMIN — Medication 50 MCG: at 20:07

## 2021-01-01 RX ADMIN — Medication 0.4 MCG/KG/MIN: at 14:40

## 2021-01-01 RX ADMIN — HYDROMORPHONE HYDROCHLORIDE 0.5 MG: 1 INJECTION, SOLUTION INTRAMUSCULAR; INTRAVENOUS; SUBCUTANEOUS at 23:49

## 2021-01-01 RX ADMIN — CALCIUM CHLORIDE, MAGNESIUM CHLORIDE, SODIUM CHLORIDE, SODIUM BICARBONATE, POTASSIUM CHLORIDE AND SODIUM PHOSPHATE DIBASIC DIHYDRATE 15 ML/KG/HR: 3.68; 3.05; 6.34; 3.09; .314; .187 INJECTION INTRAVENOUS at 19:56

## 2021-01-01 RX ADMIN — CALCIUM CHLORIDE, MAGNESIUM CHLORIDE, SODIUM CHLORIDE, SODIUM BICARBONATE, POTASSIUM CHLORIDE AND SODIUM PHOSPHATE DIBASIC DIHYDRATE 15 ML/KG/HR: 3.68; 3.05; 6.34; 3.09; .314; .187 INJECTION INTRAVENOUS at 17:22

## 2021-01-01 RX ADMIN — MIDAZOLAM 1 MG: 1 INJECTION INTRAMUSCULAR; INTRAVENOUS at 08:12

## 2021-01-01 RX ADMIN — CALCIUM CHLORIDE, MAGNESIUM CHLORIDE, SODIUM CHLORIDE, SODIUM BICARBONATE, POTASSIUM CHLORIDE AND SODIUM PHOSPHATE DIBASIC DIHYDRATE 15 ML/KG/HR: 3.68; 3.05; 6.34; 3.09; .314; .187 INJECTION INTRAVENOUS at 02:38

## 2021-01-01 RX ADMIN — HUMAN INSULIN 10 UNITS: 100 INJECTION, SOLUTION SUBCUTANEOUS at 04:04

## 2021-01-01 RX ADMIN — CALCIUM CHLORIDE, MAGNESIUM CHLORIDE, SODIUM CHLORIDE, SODIUM BICARBONATE, POTASSIUM CHLORIDE AND SODIUM PHOSPHATE DIBASIC DIHYDRATE 12.5 ML/KG/HR: 3.68; 3.05; 6.34; 3.09; .314; .187 INJECTION INTRAVENOUS at 05:46

## 2021-01-01 RX ADMIN — Medication 0.32 MCG/KG/MIN: at 21:29

## 2021-01-01 RX ADMIN — VANCOMYCIN HYDROCHLORIDE 1500 MG: 10 INJECTION, POWDER, LYOPHILIZED, FOR SOLUTION INTRAVENOUS at 09:53

## 2021-01-01 RX ADMIN — PANTOPRAZOLE SODIUM 40 MG: 40 INJECTION, POWDER, FOR SOLUTION INTRAVENOUS at 08:49

## 2021-01-01 RX ADMIN — Medication 50 MCG: at 23:10

## 2021-01-01 RX ADMIN — HYDROCORTISONE SODIUM SUCCINATE 50 MG: 100 INJECTION, POWDER, FOR SOLUTION INTRAMUSCULAR; INTRAVENOUS at 15:03

## 2021-01-01 RX ADMIN — Medication 2 PACKET: at 07:32

## 2021-01-01 RX ADMIN — PIPERACILLIN SODIUM AND TAZOBACTAM SODIUM 4.5 G: 4; .5 INJECTION, POWDER, LYOPHILIZED, FOR SOLUTION INTRAVENOUS at 20:59

## 2021-01-01 RX ADMIN — Medication 0.03 MCG/KG/MIN: at 21:05

## 2021-01-01 RX ADMIN — Medication 50 MCG: at 20:40

## 2021-01-01 RX ADMIN — PIPERACILLIN SODIUM AND TAZOBACTAM SODIUM 4.5 G: 4; .5 INJECTION, POWDER, LYOPHILIZED, FOR SOLUTION INTRAVENOUS at 02:56

## 2021-01-01 RX ADMIN — Medication: at 04:28

## 2021-01-01 RX ADMIN — CALCIUM CHLORIDE, MAGNESIUM CHLORIDE, SODIUM CHLORIDE, SODIUM BICARBONATE, POTASSIUM CHLORIDE AND SODIUM PHOSPHATE DIBASIC DIHYDRATE 12.5 ML/KG/HR: 3.68; 3.05; 6.34; 3.09; .314; .187 INJECTION INTRAVENOUS at 11:11

## 2021-01-01 RX ADMIN — CALCIUM CHLORIDE, MAGNESIUM CHLORIDE, SODIUM CHLORIDE, SODIUM BICARBONATE, POTASSIUM CHLORIDE AND SODIUM PHOSPHATE DIBASIC DIHYDRATE 15 ML/KG/HR: 3.68; 3.05; 6.34; 3.09; .314; .187 INJECTION INTRAVENOUS at 20:43

## 2021-01-01 RX ADMIN — LACTULOSE 20 G: 20 SOLUTION ORAL at 08:14

## 2021-01-01 RX ADMIN — HYDROCORTISONE SODIUM SUCCINATE 50 MG: 100 INJECTION, POWDER, FOR SOLUTION INTRAMUSCULAR; INTRAVENOUS at 04:35

## 2021-01-01 RX ADMIN — Medication 50 MCG: at 20:16

## 2021-01-01 RX ADMIN — Medication 25 MCG: at 00:57

## 2021-01-01 RX ADMIN — MIDAZOLAM 2 MG: 1 INJECTION INTRAMUSCULAR; INTRAVENOUS at 08:15

## 2021-01-01 RX ADMIN — Medication 4 UNITS/HR: at 14:45

## 2021-01-01 RX ADMIN — Medication 4 UNITS/HR: at 07:56

## 2021-01-01 RX ADMIN — Medication 50 MCG: at 07:40

## 2021-01-01 RX ADMIN — Medication 25 MCG/HR: at 13:20

## 2021-01-01 RX ADMIN — BUMETANIDE 2 MG: 0.25 INJECTION INTRAMUSCULAR; INTRAVENOUS at 22:49

## 2021-01-01 RX ADMIN — HUMAN INSULIN 10 UNITS: 100 INJECTION, SOLUTION SUBCUTANEOUS at 06:22

## 2021-01-01 RX ADMIN — DEXTROSE MONOHYDRATE 25 G: 500 INJECTION PARENTERAL at 04:04

## 2021-01-01 RX ADMIN — HUMAN INSULIN 6 UNITS/HR: 100 INJECTION, SOLUTION SUBCUTANEOUS at 08:47

## 2021-01-01 RX ADMIN — PIPERACILLIN SODIUM AND TAZOBACTAM SODIUM 4.5 G: 4; .5 INJECTION, POWDER, LYOPHILIZED, FOR SOLUTION INTRAVENOUS at 04:15

## 2021-01-01 RX ADMIN — LEVOTHYROXINE SODIUM 25 MCG: 0.03 TABLET ORAL at 08:22

## 2021-01-01 RX ADMIN — EPINEPHRINE 0.13 MCG/KG/MIN: 1 INJECTION PARENTERAL at 23:39

## 2021-01-01 RX ADMIN — CALCIUM CHLORIDE 1 G: 100 INJECTION, SOLUTION INTRAVENOUS at 06:11

## 2021-01-01 RX ADMIN — MEROPENEM 1 G: 1 INJECTION, POWDER, FOR SOLUTION INTRAVENOUS at 05:14

## 2021-01-01 RX ADMIN — CALCIUM CHLORIDE, MAGNESIUM CHLORIDE, SODIUM CHLORIDE, SODIUM BICARBONATE, POTASSIUM CHLORIDE AND SODIUM PHOSPHATE DIBASIC DIHYDRATE 15 ML/KG/HR: 3.68; 3.05; 6.34; 3.09; .314; .187 INJECTION INTRAVENOUS at 16:30

## 2021-01-01 RX ADMIN — MIDAZOLAM (PF) 1 MG/ML IN 0.9 % SODIUM CHLORIDE INTRAVENOUS SOLUTION 1 MG/HR: at 13:10

## 2021-01-01 RX ADMIN — HUMAN INSULIN 3 UNITS/HR: 100 INJECTION, SOLUTION SUBCUTANEOUS at 17:04

## 2021-01-01 RX ADMIN — Medication 50 MCG: at 00:40

## 2021-01-01 RX ADMIN — HEPARIN SODIUM 500 UNITS/HR: 1000 INJECTION INTRAVENOUS; SUBCUTANEOUS at 01:39

## 2021-01-01 RX ADMIN — HUMAN INSULIN 4 UNITS/HR: 100 INJECTION, SOLUTION SUBCUTANEOUS at 05:45

## 2021-01-01 RX ADMIN — Medication: at 08:11

## 2021-01-01 RX ADMIN — CALCIUM CHLORIDE, MAGNESIUM CHLORIDE, SODIUM CHLORIDE, SODIUM BICARBONATE, POTASSIUM CHLORIDE AND SODIUM PHOSPHATE DIBASIC DIHYDRATE 12.5 ML/KG/HR: 3.68; 3.05; 6.34; 3.09; .314; .187 INJECTION INTRAVENOUS at 20:21

## 2021-01-01 RX ADMIN — CALCIUM CHLORIDE, MAGNESIUM CHLORIDE, DEXTROSE MONOHYDRATE, LACTIC ACID, SODIUM CHLORIDE, SODIUM BICARBONATE AND POTASSIUM CHLORIDE 12.5 ML/KG/HR: 5.15; 2.03; 22; 5.4; 6.46; 3.09; .157 INJECTION INTRAVENOUS at 08:29

## 2021-01-01 RX ADMIN — CALCIUM CHLORIDE, MAGNESIUM CHLORIDE, SODIUM CHLORIDE, SODIUM BICARBONATE, POTASSIUM CHLORIDE AND SODIUM PHOSPHATE DIBASIC DIHYDRATE 12.5 ML/KG/HR: 3.68; 3.05; 6.34; 3.09; .314; .187 INJECTION INTRAVENOUS at 09:50

## 2021-01-01 RX ADMIN — PANTOPRAZOLE SODIUM 40 MG: 40 INJECTION, POWDER, FOR SOLUTION INTRAVENOUS at 07:56

## 2021-01-01 RX ADMIN — Medication 5 ML: at 07:31

## 2021-01-01 RX ADMIN — CALCIUM CHLORIDE, MAGNESIUM CHLORIDE, SODIUM CHLORIDE, SODIUM BICARBONATE, POTASSIUM CHLORIDE AND SODIUM PHOSPHATE DIBASIC DIHYDRATE 12.5 ML/KG/HR: 3.68; 3.05; 6.34; 3.09; .314; .187 INJECTION INTRAVENOUS at 19:49

## 2021-01-01 RX ADMIN — HYDROCORTISONE SODIUM SUCCINATE 50 MG: 100 INJECTION, POWDER, FOR SOLUTION INTRAMUSCULAR; INTRAVENOUS at 15:40

## 2021-01-01 RX ADMIN — CALCIUM CHLORIDE, MAGNESIUM CHLORIDE, DEXTROSE MONOHYDRATE, LACTIC ACID, SODIUM CHLORIDE, SODIUM BICARBONATE AND POTASSIUM CHLORIDE 12.5 ML/KG/HR: 5.15; 2.03; 22; 5.4; 6.46; 3.09; .157 INJECTION INTRAVENOUS at 00:00

## 2021-01-01 RX ADMIN — Medication 4 UNITS/HR: at 16:25

## 2021-01-01 RX ADMIN — PIPERACILLIN SODIUM AND TAZOBACTAM SODIUM 4.5 G: 4; .5 INJECTION, POWDER, LYOPHILIZED, FOR SOLUTION INTRAVENOUS at 04:31

## 2021-01-01 RX ADMIN — SPIRONOLACTONE 25 MG: 25 TABLET ORAL at 08:10

## 2021-01-01 RX ADMIN — CALCIUM CHLORIDE, MAGNESIUM CHLORIDE, SODIUM CHLORIDE, SODIUM BICARBONATE, POTASSIUM CHLORIDE AND SODIUM PHOSPHATE DIBASIC DIHYDRATE 12.5 ML/KG/HR: 3.68; 3.05; 6.34; 3.09; .314; .187 INJECTION INTRAVENOUS at 15:39

## 2021-01-01 RX ADMIN — EPINEPHRINE 0.32 MCG/KG/MIN: 1 INJECTION PARENTERAL at 10:32

## 2021-01-01 RX ADMIN — Medication 50 MCG/HR: at 19:44

## 2021-01-01 RX ADMIN — CALCIUM CHLORIDE, MAGNESIUM CHLORIDE, SODIUM CHLORIDE, SODIUM BICARBONATE, POTASSIUM CHLORIDE AND SODIUM PHOSPHATE DIBASIC DIHYDRATE 12.5 ML/KG/HR: 3.68; 3.05; 6.34; 3.09; .314; .187 INJECTION INTRAVENOUS at 03:39

## 2021-01-01 RX ADMIN — Medication 5 ML: at 08:24

## 2021-01-01 RX ADMIN — DEXMEDETOMIDINE 0.8 MCG/KG/HR: 100 INJECTION, SOLUTION, CONCENTRATE INTRAVENOUS at 07:32

## 2021-01-01 RX ADMIN — HYDROCORTISONE SODIUM SUCCINATE 50 MG: 100 INJECTION, POWDER, FOR SOLUTION INTRAMUSCULAR; INTRAVENOUS at 14:34

## 2021-01-01 RX ADMIN — Medication 4 UNITS/HR: at 10:23

## 2021-01-01 RX ADMIN — CALCIUM CHLORIDE, MAGNESIUM CHLORIDE, SODIUM CHLORIDE, SODIUM BICARBONATE, POTASSIUM CHLORIDE AND SODIUM PHOSPHATE DIBASIC DIHYDRATE 12.5 ML/KG/HR: 3.68; 3.05; 6.34; 3.09; .314; .187 INJECTION INTRAVENOUS at 03:51

## 2021-01-01 RX ADMIN — HYDROCORTISONE SODIUM SUCCINATE 50 MG: 100 INJECTION, POWDER, FOR SOLUTION INTRAMUSCULAR; INTRAVENOUS at 22:10

## 2021-01-01 RX ADMIN — AMIODARONE HYDROCHLORIDE 1 MG/MIN: 50 INJECTION, SOLUTION INTRAVENOUS at 23:02

## 2021-01-01 RX ADMIN — Medication 4 UNITS/HR: at 10:10

## 2021-01-01 RX ADMIN — CALCIUM CHLORIDE, MAGNESIUM CHLORIDE, SODIUM CHLORIDE, SODIUM BICARBONATE, POTASSIUM CHLORIDE AND SODIUM PHOSPHATE DIBASIC DIHYDRATE 12.5 ML/KG/HR: 3.68; 3.05; 6.34; 3.09; .314; .187 INJECTION INTRAVENOUS at 11:30

## 2021-01-01 RX ADMIN — PIPERACILLIN SODIUM AND TAZOBACTAM SODIUM 4.5 G: 4; .5 INJECTION, POWDER, LYOPHILIZED, FOR SOLUTION INTRAVENOUS at 20:25

## 2021-01-01 RX ADMIN — CALCIUM CHLORIDE, MAGNESIUM CHLORIDE, SODIUM CHLORIDE, SODIUM BICARBONATE, POTASSIUM CHLORIDE AND SODIUM PHOSPHATE DIBASIC DIHYDRATE 15 ML/KG/HR: 3.68; 3.05; 6.34; 3.09; .314; .187 INJECTION INTRAVENOUS at 10:33

## 2021-01-01 RX ADMIN — MEROPENEM 1 G: 1 INJECTION, POWDER, FOR SOLUTION INTRAVENOUS at 12:46

## 2021-01-01 RX ADMIN — Medication 0.43 MCG/KG/MIN: at 21:34

## 2021-01-01 RX ADMIN — LACTULOSE 20 G: 20 SOLUTION ORAL at 16:00

## 2021-01-01 RX ADMIN — PIPERACILLIN SODIUM AND TAZOBACTAM SODIUM 4.5 G: 4; .5 INJECTION, POWDER, LYOPHILIZED, FOR SOLUTION INTRAVENOUS at 03:45

## 2021-01-01 RX ADMIN — CALCIUM CHLORIDE, MAGNESIUM CHLORIDE, SODIUM CHLORIDE, SODIUM BICARBONATE, POTASSIUM CHLORIDE AND SODIUM PHOSPHATE DIBASIC DIHYDRATE: 3.68; 3.05; 6.34; 3.09; .314; .187 INJECTION INTRAVENOUS at 12:44

## 2021-01-01 RX ADMIN — POTASSIUM CHLORIDE 20 MEQ: 750 TABLET, EXTENDED RELEASE ORAL at 10:00

## 2021-01-01 RX ADMIN — CALCIUM CHLORIDE, MAGNESIUM CHLORIDE, DEXTROSE MONOHYDRATE, LACTIC ACID, SODIUM CHLORIDE, SODIUM BICARBONATE AND POTASSIUM CHLORIDE 12.5 ML/KG/HR: 5.15; 2.03; 22; 5.4; 6.46; 3.09; .157 INJECTION INTRAVENOUS at 04:17

## 2021-01-01 RX ADMIN — CEFEPIME HYDROCHLORIDE 2 G: 2 INJECTION, POWDER, FOR SOLUTION INTRAVENOUS at 08:48

## 2021-01-01 RX ADMIN — HYDROCORTISONE SODIUM SUCCINATE 50 MG: 100 INJECTION, POWDER, FOR SOLUTION INTRAMUSCULAR; INTRAVENOUS at 04:15

## 2021-01-01 RX ADMIN — CALCIUM CHLORIDE 1 G: 100 INJECTION, SOLUTION INTRAVENOUS at 23:15

## 2021-01-01 RX ADMIN — Medication 0.3 MCG/KG/MIN: at 14:36

## 2021-01-01 RX ADMIN — CALCIUM CHLORIDE, MAGNESIUM CHLORIDE, DEXTROSE MONOHYDRATE, LACTIC ACID, SODIUM CHLORIDE, SODIUM BICARBONATE AND POTASSIUM CHLORIDE 12.5 ML/KG/HR: 5.15; 2.03; 22; 5.4; 6.46; 3.09; .157 INJECTION INTRAVENOUS at 14:15

## 2021-01-01 RX ADMIN — Medication 50 MCG: at 04:13

## 2021-01-01 RX ADMIN — Medication 50 MCG: at 10:15

## 2021-01-01 RX ADMIN — Medication 4 UNITS/HR: at 12:26

## 2021-01-01 RX ADMIN — CALCIUM CHLORIDE, MAGNESIUM CHLORIDE, SODIUM CHLORIDE, SODIUM BICARBONATE, POTASSIUM CHLORIDE AND SODIUM PHOSPHATE DIBASIC DIHYDRATE 12.5 ML/KG/HR: 3.68; 3.05; 6.34; 3.09; .314; .187 INJECTION INTRAVENOUS at 14:17

## 2021-01-01 RX ADMIN — DOCUSATE SODIUM 50 MG AND SENNOSIDES 8.6 MG 2 TABLET: 8.6; 5 TABLET, FILM COATED ORAL at 21:19

## 2021-01-01 RX ADMIN — CALCIUM CHLORIDE, MAGNESIUM CHLORIDE, SODIUM CHLORIDE, SODIUM BICARBONATE, POTASSIUM CHLORIDE AND SODIUM PHOSPHATE DIBASIC DIHYDRATE 15 ML/KG/HR: 3.68; 3.05; 6.34; 3.09; .314; .187 INJECTION INTRAVENOUS at 06:20

## 2021-01-01 RX ADMIN — POTASSIUM CHLORIDE 80 MEQ: 1500 TABLET, EXTENDED RELEASE ORAL at 14:26

## 2021-01-01 RX ADMIN — INSULIN ASPART 2 UNITS: 100 INJECTION, SOLUTION INTRAVENOUS; SUBCUTANEOUS at 16:15

## 2021-01-01 RX ADMIN — HUMAN INSULIN 8 UNITS/HR: 100 INJECTION, SOLUTION SUBCUTANEOUS at 01:00

## 2021-01-01 RX ADMIN — CALCIUM CHLORIDE, MAGNESIUM CHLORIDE, SODIUM CHLORIDE, SODIUM BICARBONATE, POTASSIUM CHLORIDE AND SODIUM PHOSPHATE DIBASIC DIHYDRATE 12.5 ML/KG/HR: 3.68; 3.05; 6.34; 3.09; .314; .187 INJECTION INTRAVENOUS at 09:49

## 2021-01-01 RX ADMIN — Medication 0.33 MCG/KG/MIN: at 03:02

## 2021-01-01 RX ADMIN — BUMETANIDE 1 MG/HR: 0.25 INJECTION INTRAMUSCULAR; INTRAVENOUS at 14:51

## 2021-01-01 RX ADMIN — CLINDAMYCIN IN 5 PERCENT DEXTROSE 900 MG: 18 INJECTION, SOLUTION INTRAVENOUS at 09:58

## 2021-01-01 RX ADMIN — Medication 0.4 MCG/KG/MIN: at 20:38

## 2021-01-01 RX ADMIN — CALCIUM CHLORIDE, MAGNESIUM CHLORIDE, DEXTROSE MONOHYDRATE, LACTIC ACID, SODIUM CHLORIDE, SODIUM BICARBONATE AND POTASSIUM CHLORIDE 12.5 ML/KG/HR: 5.15; 2.03; 22; 5.4; 6.46; 3.09; .157 INJECTION INTRAVENOUS at 06:07

## 2021-01-01 RX ADMIN — Medication 0.36 MCG/KG/MIN: at 03:12

## 2021-01-01 RX ADMIN — Medication 50 MCG: at 08:30

## 2021-01-01 RX ADMIN — CALCIUM CHLORIDE, MAGNESIUM CHLORIDE, SODIUM CHLORIDE, SODIUM BICARBONATE, POTASSIUM CHLORIDE AND SODIUM PHOSPHATE DIBASIC DIHYDRATE 12.5 ML/KG/HR: 3.68; 3.05; 6.34; 3.09; .314; .187 INJECTION INTRAVENOUS at 14:00

## 2021-01-01 RX ADMIN — LACTULOSE 20 G: 20 SOLUTION ORAL at 13:24

## 2021-01-01 RX ADMIN — Medication 50 MCG: at 01:06

## 2021-01-01 RX ADMIN — Medication 2.5 UNITS/HR: at 01:51

## 2021-01-01 RX ADMIN — CALCIUM CHLORIDE, MAGNESIUM CHLORIDE, SODIUM CHLORIDE, SODIUM BICARBONATE, POTASSIUM CHLORIDE AND SODIUM PHOSPHATE DIBASIC DIHYDRATE 12.5 ML/KG/HR: 3.68; 3.05; 6.34; 3.09; .314; .187 INJECTION INTRAVENOUS at 23:11

## 2021-01-01 RX ADMIN — DOPAMINE HYDROCHLORIDE 2.5 MCG/KG/MIN: 160 INJECTION, SOLUTION INTRAVENOUS at 17:34

## 2021-01-01 RX ADMIN — PANTOPRAZOLE SODIUM 40 MG: 40 INJECTION, POWDER, FOR SOLUTION INTRAVENOUS at 07:31

## 2021-01-01 RX ADMIN — Medication 50 MCG: at 09:12

## 2021-01-01 RX ADMIN — INSULIN ASPART 1 UNITS: 100 INJECTION, SOLUTION INTRAVENOUS; SUBCUTANEOUS at 08:14

## 2021-01-01 RX ADMIN — Medication 0.33 MCG/KG/MIN: at 13:39

## 2021-01-01 RX ADMIN — HYDROCORTISONE SODIUM SUCCINATE 50 MG: 100 INJECTION, POWDER, FOR SOLUTION INTRAMUSCULAR; INTRAVENOUS at 03:40

## 2021-01-01 RX ADMIN — CALCIUM CHLORIDE, MAGNESIUM CHLORIDE, SODIUM CHLORIDE, SODIUM BICARBONATE, POTASSIUM CHLORIDE AND SODIUM PHOSPHATE DIBASIC DIHYDRATE 12.5 ML/KG/HR: 3.68; 3.05; 6.34; 3.09; .314; .187 INJECTION INTRAVENOUS at 13:17

## 2021-01-01 RX ADMIN — HYDROCORTISONE SODIUM SUCCINATE 50 MG: 100 INJECTION, POWDER, FOR SOLUTION INTRAMUSCULAR; INTRAVENOUS at 02:56

## 2021-01-01 RX ADMIN — Medication 2 PACKET: at 13:13

## 2021-01-01 RX ADMIN — CALCIUM CHLORIDE, MAGNESIUM CHLORIDE, SODIUM CHLORIDE, SODIUM BICARBONATE, POTASSIUM CHLORIDE AND SODIUM PHOSPHATE DIBASIC DIHYDRATE: 3.68; 3.05; 6.34; 3.09; .314; .187 INJECTION INTRAVENOUS at 17:48

## 2021-01-01 RX ADMIN — CALCIUM CHLORIDE 1 G: 100 INJECTION, SOLUTION INTRAVENOUS at 12:40

## 2021-01-01 RX ADMIN — CALCIUM CHLORIDE, MAGNESIUM CHLORIDE, SODIUM CHLORIDE, SODIUM BICARBONATE, POTASSIUM CHLORIDE AND SODIUM PHOSPHATE DIBASIC DIHYDRATE: 3.68; 3.05; 6.34; 3.09; .314; .187 INJECTION INTRAVENOUS at 19:52

## 2021-01-01 RX ADMIN — Medication 0.29 MCG/KG/MIN: at 04:17

## 2021-01-01 RX ADMIN — HYDROCORTISONE SODIUM SUCCINATE 50 MG: 100 INJECTION, POWDER, FOR SOLUTION INTRAMUSCULAR; INTRAVENOUS at 03:30

## 2021-01-01 RX ADMIN — CALCIUM CHLORIDE, MAGNESIUM CHLORIDE, SODIUM CHLORIDE, SODIUM BICARBONATE, POTASSIUM CHLORIDE AND SODIUM PHOSPHATE DIBASIC DIHYDRATE 12.5 ML/KG/HR: 3.68; 3.05; 6.34; 3.09; .314; .187 INJECTION INTRAVENOUS at 07:48

## 2021-01-01 RX ADMIN — INSULIN ASPART 1 UNITS: 100 INJECTION, SOLUTION INTRAVENOUS; SUBCUTANEOUS at 11:50

## 2021-01-01 RX ADMIN — CALCIUM CHLORIDE, MAGNESIUM CHLORIDE, SODIUM CHLORIDE, SODIUM BICARBONATE, POTASSIUM CHLORIDE AND SODIUM PHOSPHATE DIBASIC DIHYDRATE 12.5 ML/KG/HR: 3.68; 3.05; 6.34; 3.09; .314; .187 INJECTION INTRAVENOUS at 06:40

## 2021-01-01 RX ADMIN — Medication 4 UNITS/HR: at 00:03

## 2021-01-01 RX ADMIN — PIPERACILLIN SODIUM AND TAZOBACTAM SODIUM 4.5 G: 4; .5 INJECTION, POWDER, LYOPHILIZED, FOR SOLUTION INTRAVENOUS at 14:34

## 2021-01-01 RX ADMIN — LIDOCAINE HYDROCHLORIDE: 10 INJECTION, SOLUTION EPIDURAL; INFILTRATION; INTRACAUDAL; PERINEURAL at 04:57

## 2021-01-01 RX ADMIN — CALCIUM CHLORIDE, MAGNESIUM CHLORIDE, SODIUM CHLORIDE, SODIUM BICARBONATE, POTASSIUM CHLORIDE AND SODIUM PHOSPHATE DIBASIC DIHYDRATE 12.5 ML/KG/HR: 3.68; 3.05; 6.34; 3.09; .314; .187 INJECTION INTRAVENOUS at 22:20

## 2021-01-01 RX ADMIN — PIPERACILLIN SODIUM AND TAZOBACTAM SODIUM 4.5 G: 4; .5 INJECTION, POWDER, LYOPHILIZED, FOR SOLUTION INTRAVENOUS at 22:09

## 2021-01-01 RX ADMIN — DEXTROSE MONOHYDRATE 25 ML: 500 INJECTION PARENTERAL at 06:24

## 2021-01-01 RX ADMIN — HUMAN ALBUMIN MICROSPHERES AND PERFLUTREN 5 ML: 10; .22 INJECTION, SOLUTION INTRAVENOUS at 09:55

## 2021-01-01 RX ADMIN — Medication 2 PACKET: at 19:51

## 2021-01-01 RX ADMIN — SODIUM CHLORIDE, POTASSIUM CHLORIDE, SODIUM LACTATE AND CALCIUM CHLORIDE 250 ML: 600; 310; 30; 20 INJECTION, SOLUTION INTRAVENOUS at 15:27

## 2021-01-01 RX ADMIN — CALCIUM CHLORIDE 1 G: 100 INJECTION, SOLUTION INTRAVENOUS at 12:23

## 2021-01-01 RX ADMIN — CALCIUM CHLORIDE, MAGNESIUM CHLORIDE, SODIUM CHLORIDE, SODIUM BICARBONATE, POTASSIUM CHLORIDE AND SODIUM PHOSPHATE DIBASIC DIHYDRATE 12.5 ML/KG/HR: 3.68; 3.05; 6.34; 3.09; .314; .187 INJECTION INTRAVENOUS at 08:06

## 2021-01-01 RX ADMIN — LACTULOSE 20 G: 20 SOLUTION ORAL at 21:19

## 2021-01-01 RX ADMIN — DEXMEDETOMIDINE 0.2 MCG/KG/HR: 100 INJECTION, SOLUTION, CONCENTRATE INTRAVENOUS at 00:00

## 2021-01-01 RX ADMIN — PIPERACILLIN SODIUM AND TAZOBACTAM SODIUM 4.5 G: 4; .5 INJECTION, POWDER, LYOPHILIZED, FOR SOLUTION INTRAVENOUS at 09:12

## 2021-01-01 RX ADMIN — CALCIUM CHLORIDE, MAGNESIUM CHLORIDE, SODIUM CHLORIDE, SODIUM BICARBONATE, POTASSIUM CHLORIDE AND SODIUM PHOSPHATE DIBASIC DIHYDRATE 12.5 ML/KG/HR: 3.68; 3.05; 6.34; 3.09; .314; .187 INJECTION INTRAVENOUS at 19:47

## 2021-01-01 RX ADMIN — EPINEPHRINE 0.2 MCG/KG/MIN: 1 INJECTION PARENTERAL at 10:20

## 2021-01-01 RX ADMIN — CALCIUM CHLORIDE, MAGNESIUM CHLORIDE, SODIUM CHLORIDE, SODIUM BICARBONATE, POTASSIUM CHLORIDE AND SODIUM PHOSPHATE DIBASIC DIHYDRATE 15 ML/KG/HR: 3.68; 3.05; 6.34; 3.09; .314; .187 INJECTION INTRAVENOUS at 13:55

## 2021-01-01 RX ADMIN — PIPERACILLIN SODIUM AND TAZOBACTAM SODIUM 4.5 G: 4; .5 INJECTION, POWDER, LYOPHILIZED, FOR SOLUTION INTRAVENOUS at 09:30

## 2021-01-01 RX ADMIN — EPINEPHRINE 0.03 MCG/KG/MIN: 1 INJECTION PARENTERAL at 11:16

## 2021-01-01 RX ADMIN — Medication 0.33 MCG/KG/MIN: at 20:04

## 2021-01-01 RX ADMIN — PIPERACILLIN SODIUM AND TAZOBACTAM SODIUM 4.5 G: 4; .5 INJECTION, POWDER, LYOPHILIZED, FOR SOLUTION INTRAVENOUS at 03:13

## 2021-01-01 RX ADMIN — LEVOTHYROXINE SODIUM 25 MCG: 0.03 TABLET ORAL at 07:56

## 2021-01-01 RX ADMIN — CALCIUM CHLORIDE, MAGNESIUM CHLORIDE, SODIUM CHLORIDE, SODIUM BICARBONATE, POTASSIUM CHLORIDE AND SODIUM PHOSPHATE DIBASIC DIHYDRATE 12.5 ML/KG/HR: 3.68; 3.05; 6.34; 3.09; .314; .187 INJECTION INTRAVENOUS at 07:07

## 2021-01-01 RX ADMIN — DEXTROSE MONOHYDRATE: 100 INJECTION, SOLUTION INTRAVENOUS at 08:00

## 2021-01-01 RX ADMIN — FENTANYL CITRATE: 50 INJECTION, SOLUTION INTRAMUSCULAR; INTRAVENOUS at 08:15

## 2021-01-01 RX ADMIN — Medication 4 UNITS/HR: at 05:13

## 2021-01-01 RX ADMIN — PIPERACILLIN SODIUM AND TAZOBACTAM SODIUM 4.5 G: 4; .5 INJECTION, POWDER, LYOPHILIZED, FOR SOLUTION INTRAVENOUS at 08:52

## 2021-01-01 RX ADMIN — Medication 0.2 MCG/KG/MIN: at 09:09

## 2021-01-01 RX ADMIN — HYDROCORTISONE SODIUM SUCCINATE 50 MG: 100 INJECTION, POWDER, FOR SOLUTION INTRAMUSCULAR; INTRAVENOUS at 09:56

## 2021-01-01 RX ADMIN — CALCIUM CHLORIDE, MAGNESIUM CHLORIDE, SODIUM CHLORIDE, SODIUM BICARBONATE, POTASSIUM CHLORIDE AND SODIUM PHOSPHATE DIBASIC DIHYDRATE: 3.68; 3.05; 6.34; 3.09; .314; .187 INJECTION INTRAVENOUS at 11:11

## 2021-01-01 RX ADMIN — CALCIUM CHLORIDE, MAGNESIUM CHLORIDE, SODIUM CHLORIDE, SODIUM BICARBONATE, POTASSIUM CHLORIDE AND SODIUM PHOSPHATE DIBASIC DIHYDRATE 15 ML/KG/HR: 3.68; 3.05; 6.34; 3.09; .314; .187 INJECTION INTRAVENOUS at 09:45

## 2021-01-01 RX ADMIN — IRON SUCROSE 200 MG: 20 INJECTION, SOLUTION INTRAVENOUS at 11:49

## 2021-01-01 RX ADMIN — PHYTONADIONE 10 MG: 10 INJECTION, EMULSION INTRAMUSCULAR; INTRAVENOUS; SUBCUTANEOUS at 12:09

## 2021-01-01 RX ADMIN — Medication 0.43 MCG/KG/MIN: at 20:16

## 2021-01-01 RX ADMIN — PIPERACILLIN SODIUM AND TAZOBACTAM SODIUM 4.5 G: 4; .5 INJECTION, POWDER, LYOPHILIZED, FOR SOLUTION INTRAVENOUS at 15:22

## 2021-01-01 RX ADMIN — BUMETANIDE 1 MG: 0.25 INJECTION, SOLUTION INTRAMUSCULAR; INTRAVENOUS at 10:23

## 2021-01-01 RX ADMIN — PIPERACILLIN SODIUM AND TAZOBACTAM SODIUM 4.5 G: 4; .5 INJECTION, POWDER, LYOPHILIZED, FOR SOLUTION INTRAVENOUS at 04:07

## 2021-01-01 RX ADMIN — Medication 4 UNITS/HR: at 19:43

## 2021-01-01 RX ADMIN — SPIRONOLACTONE 25 MG: 25 TABLET ORAL at 08:36

## 2021-01-01 RX ADMIN — DEXTROSE MONOHYDRATE: 100 INJECTION, SOLUTION INTRAVENOUS at 04:08

## 2021-01-01 RX ADMIN — CALCIUM CHLORIDE, MAGNESIUM CHLORIDE, SODIUM CHLORIDE, SODIUM BICARBONATE, POTASSIUM CHLORIDE AND SODIUM PHOSPHATE DIBASIC DIHYDRATE 12.5 ML/KG/HR: 3.68; 3.05; 6.34; 3.09; .314; .187 INJECTION INTRAVENOUS at 15:54

## 2021-01-01 RX ADMIN — Medication 4 UNITS/HR: at 18:46

## 2021-01-01 RX ADMIN — BUMETANIDE 0.5 MG/HR: 0.25 INJECTION INTRAMUSCULAR; INTRAVENOUS at 15:19

## 2021-01-01 RX ADMIN — ALBUMIN HUMAN 12.5 G: 0.05 INJECTION, SOLUTION INTRAVENOUS at 01:57

## 2021-01-01 RX ADMIN — HUMAN INSULIN 4 UNITS/HR: 100 INJECTION, SOLUTION SUBCUTANEOUS at 01:20

## 2021-01-01 RX ADMIN — HYDROCORTISONE SODIUM SUCCINATE 50 MG: 100 INJECTION, POWDER, FOR SOLUTION INTRAMUSCULAR; INTRAVENOUS at 15:31

## 2021-01-01 RX ADMIN — PIPERACILLIN SODIUM AND TAZOBACTAM SODIUM 4.5 G: 4; .5 INJECTION, POWDER, LYOPHILIZED, FOR SOLUTION INTRAVENOUS at 16:12

## 2021-01-01 RX ADMIN — EPINEPHRINE 0.2 MCG/KG/MIN: 1 INJECTION PARENTERAL at 19:30

## 2021-01-01 RX ADMIN — Medication 50 MCG: at 18:43

## 2021-01-01 RX ADMIN — MIDAZOLAM 2 MG: 1 INJECTION INTRAMUSCULAR; INTRAVENOUS at 12:24

## 2021-01-01 RX ADMIN — Medication 2 PACKET: at 08:31

## 2021-01-01 RX ADMIN — HUMAN INSULIN 4 UNITS/HR: 100 INJECTION, SOLUTION SUBCUTANEOUS at 12:44

## 2021-01-01 RX ADMIN — CALCIUM CHLORIDE, MAGNESIUM CHLORIDE, SODIUM CHLORIDE, SODIUM BICARBONATE, POTASSIUM CHLORIDE AND SODIUM PHOSPHATE DIBASIC DIHYDRATE 12.5 ML/KG/HR: 3.68; 3.05; 6.34; 3.09; .314; .187 INJECTION INTRAVENOUS at 12:05

## 2021-01-01 RX ADMIN — CALCIUM CHLORIDE, MAGNESIUM CHLORIDE, SODIUM CHLORIDE, SODIUM BICARBONATE, POTASSIUM CHLORIDE AND SODIUM PHOSPHATE DIBASIC DIHYDRATE 12.5 ML/KG/HR: 3.68; 3.05; 6.34; 3.09; .314; .187 INJECTION INTRAVENOUS at 11:10

## 2021-01-01 RX ADMIN — LACTULOSE 20 G: 20 SOLUTION ORAL at 08:22

## 2021-01-01 RX ADMIN — HYDROCORTISONE SODIUM SUCCINATE 50 MG: 100 INJECTION, POWDER, FOR SOLUTION INTRAMUSCULAR; INTRAVENOUS at 20:25

## 2021-01-01 RX ADMIN — HUMAN ALBUMIN MICROSPHERES AND PERFLUTREN 6 ML: 10; .22 INJECTION, SOLUTION INTRAVENOUS at 11:01

## 2021-01-01 RX ADMIN — LACTULOSE 20 G: 20 SOLUTION ORAL at 19:51

## 2021-01-01 RX ADMIN — CALCIUM CHLORIDE, MAGNESIUM CHLORIDE, SODIUM CHLORIDE, SODIUM BICARBONATE, POTASSIUM CHLORIDE AND SODIUM PHOSPHATE DIBASIC DIHYDRATE 15 ML/KG/HR: 3.68; 3.05; 6.34; 3.09; .314; .187 INJECTION INTRAVENOUS at 23:31

## 2021-01-01 RX ADMIN — CALCIUM CHLORIDE 1 G: 100 INJECTION, SOLUTION INTRAVENOUS at 06:06

## 2021-01-01 RX ADMIN — CALCIUM CHLORIDE, MAGNESIUM CHLORIDE, SODIUM CHLORIDE, SODIUM BICARBONATE, POTASSIUM CHLORIDE AND SODIUM PHOSPHATE DIBASIC DIHYDRATE 12.5 ML/KG/HR: 3.68; 3.05; 6.34; 3.09; .314; .187 INJECTION INTRAVENOUS at 17:50

## 2021-01-01 RX ADMIN — CALCIUM CHLORIDE 1 G: 100 INJECTION, SOLUTION INTRAVENOUS at 16:52

## 2021-01-01 RX ADMIN — HYDROCORTISONE SODIUM SUCCINATE 50 MG: 100 INJECTION, POWDER, FOR SOLUTION INTRAMUSCULAR; INTRAVENOUS at 14:55

## 2021-01-01 RX ADMIN — EPINEPHRINE 0.08 MCG/KG/MIN: 1 INJECTION PARENTERAL at 13:15

## 2021-01-01 RX ADMIN — MEROPENEM 1 G: 1 INJECTION, POWDER, FOR SOLUTION INTRAVENOUS at 04:10

## 2021-01-01 RX ADMIN — PANTOPRAZOLE SODIUM 40 MG: 40 INJECTION, POWDER, FOR SOLUTION INTRAVENOUS at 08:36

## 2021-01-01 RX ADMIN — CALCIUM CHLORIDE, MAGNESIUM CHLORIDE, SODIUM CHLORIDE, SODIUM BICARBONATE, POTASSIUM CHLORIDE AND SODIUM PHOSPHATE DIBASIC DIHYDRATE 12.5 ML/KG/HR: 3.68; 3.05; 6.34; 3.09; .314; .187 INJECTION INTRAVENOUS at 18:24

## 2021-01-01 RX ADMIN — AMIODARONE HYDROCHLORIDE 150 MG: 1.5 INJECTION, SOLUTION INTRAVENOUS at 22:43

## 2021-01-01 RX ADMIN — Medication 4 UNITS/HR: at 19:41

## 2021-01-01 RX ADMIN — ALBUMIN HUMAN 50 G: 50 SOLUTION INTRAVENOUS at 03:31

## 2021-01-01 RX ADMIN — CALCIUM CHLORIDE, MAGNESIUM CHLORIDE, SODIUM CHLORIDE, SODIUM BICARBONATE, POTASSIUM CHLORIDE AND SODIUM PHOSPHATE DIBASIC DIHYDRATE 12.5 ML/KG/HR: 3.68; 3.05; 6.34; 3.09; .314; .187 INJECTION INTRAVENOUS at 19:52

## 2021-01-01 RX ADMIN — LACTULOSE 20 G: 20 SOLUTION ORAL at 07:46

## 2021-01-01 RX ADMIN — PIPERACILLIN SODIUM AND TAZOBACTAM SODIUM 4.5 G: 4; .5 INJECTION, POWDER, LYOPHILIZED, FOR SOLUTION INTRAVENOUS at 08:14

## 2021-01-01 RX ADMIN — Medication 0.4 MCG/KG/MIN: at 09:29

## 2021-01-01 RX ADMIN — POTASSIUM CHLORIDE 60 MEQ: 1500 TABLET, EXTENDED RELEASE ORAL at 08:44

## 2021-01-01 RX ADMIN — HUMAN INSULIN 10 UNITS: 100 INJECTION, SOLUTION SUBCUTANEOUS at 23:11

## 2021-01-01 RX ADMIN — Medication 0.33 MCG/KG/MIN: at 13:13

## 2021-01-01 RX ADMIN — SODIUM CHLORIDE, POTASSIUM CHLORIDE, SODIUM LACTATE AND CALCIUM CHLORIDE 1000 ML: 600; 310; 30; 20 INJECTION, SOLUTION INTRAVENOUS at 23:37

## 2021-01-01 RX ADMIN — PIPERACILLIN SODIUM AND TAZOBACTAM SODIUM 4.5 G: 4; .5 INJECTION, POWDER, LYOPHILIZED, FOR SOLUTION INTRAVENOUS at 10:24

## 2021-01-01 RX ADMIN — HYDROCORTISONE SODIUM SUCCINATE 50 MG: 100 INJECTION, POWDER, FOR SOLUTION INTRAMUSCULAR; INTRAVENOUS at 10:24

## 2021-01-01 RX ADMIN — Medication 2 PACKET: at 13:01

## 2021-01-01 RX ADMIN — AMIODARONE HYDROCHLORIDE 0.5 MG/MIN: 50 INJECTION, SOLUTION INTRAVENOUS at 03:11

## 2021-01-01 RX ADMIN — HEPARIN SODIUM 500 UNITS/HR: 1000 INJECTION INTRAVENOUS; SUBCUTANEOUS at 11:15

## 2021-01-01 RX ADMIN — PIPERACILLIN SODIUM AND TAZOBACTAM SODIUM 4.5 G: 4; .5 INJECTION, POWDER, LYOPHILIZED, FOR SOLUTION INTRAVENOUS at 22:52

## 2021-01-01 RX ADMIN — EPINEPHRINE 0.2 MCG/KG/MIN: 1 INJECTION PARENTERAL at 15:33

## 2021-01-01 RX ADMIN — CALCIUM CHLORIDE, MAGNESIUM CHLORIDE, SODIUM CHLORIDE, SODIUM BICARBONATE, POTASSIUM CHLORIDE AND SODIUM PHOSPHATE DIBASIC DIHYDRATE: 3.68; 3.05; 6.34; 3.09; .314; .187 INJECTION INTRAVENOUS at 23:31

## 2021-01-01 RX ADMIN — DEXTROSE MONOHYDRATE 25 G: 500 INJECTION PARENTERAL at 23:11

## 2021-01-01 RX ADMIN — HYDROCORTISONE SODIUM SUCCINATE 50 MG: 100 INJECTION, POWDER, FOR SOLUTION INTRAMUSCULAR; INTRAVENOUS at 22:04

## 2021-01-01 RX ADMIN — HYDROCORTISONE SODIUM SUCCINATE 50 MG: 100 INJECTION, POWDER, FOR SOLUTION INTRAMUSCULAR; INTRAVENOUS at 20:59

## 2021-01-01 RX ADMIN — LACTULOSE 20 G: 20 SOLUTION ORAL at 13:01

## 2021-01-01 RX ADMIN — LEVOTHYROXINE SODIUM 25 MCG: 0.03 TABLET ORAL at 08:30

## 2021-01-01 RX ADMIN — CEFEPIME HYDROCHLORIDE 2 G: 2 INJECTION, POWDER, FOR SOLUTION INTRAVENOUS at 17:16

## 2021-01-01 RX ADMIN — CALCIUM CHLORIDE 1 G: 100 INJECTION, SOLUTION INTRAVENOUS at 01:02

## 2021-01-01 RX ADMIN — LACTULOSE 20 G: 20 SOLUTION ORAL at 08:30

## 2021-01-01 RX ADMIN — CALCIUM CHLORIDE, MAGNESIUM CHLORIDE, SODIUM CHLORIDE, SODIUM BICARBONATE, POTASSIUM CHLORIDE AND SODIUM PHOSPHATE DIBASIC DIHYDRATE 12.5 ML/KG/HR: 3.68; 3.05; 6.34; 3.09; .314; .187 INJECTION INTRAVENOUS at 14:16

## 2021-01-01 RX ADMIN — SODIUM CHLORIDE, POTASSIUM CHLORIDE, SODIUM LACTATE AND CALCIUM CHLORIDE 1000 ML: 600; 310; 30; 20 INJECTION, SOLUTION INTRAVENOUS at 00:46

## 2021-01-01 RX ADMIN — CALCIUM CHLORIDE, MAGNESIUM CHLORIDE, SODIUM CHLORIDE, SODIUM BICARBONATE, POTASSIUM CHLORIDE AND SODIUM PHOSPHATE DIBASIC DIHYDRATE: 3.68; 3.05; 6.34; 3.09; .314; .187 INJECTION INTRAVENOUS at 02:39

## 2021-01-01 RX ADMIN — Medication 5 ML: at 07:47

## 2021-01-01 RX ADMIN — CALCIUM CHLORIDE, MAGNESIUM CHLORIDE, SODIUM CHLORIDE, SODIUM BICARBONATE, POTASSIUM CHLORIDE AND SODIUM PHOSPHATE DIBASIC DIHYDRATE: 3.68; 3.05; 6.34; 3.09; .314; .187 INJECTION INTRAVENOUS at 13:17

## 2021-01-01 RX ADMIN — PANTOPRAZOLE SODIUM 40 MG: 40 INJECTION, POWDER, FOR SOLUTION INTRAVENOUS at 07:46

## 2021-01-01 RX ADMIN — HYDROCORTISONE SODIUM SUCCINATE 50 MG: 100 INJECTION, POWDER, FOR SOLUTION INTRAMUSCULAR; INTRAVENOUS at 09:25

## 2021-01-01 RX ADMIN — CALCIUM CHLORIDE, MAGNESIUM CHLORIDE, SODIUM CHLORIDE, SODIUM BICARBONATE, POTASSIUM CHLORIDE AND SODIUM PHOSPHATE DIBASIC DIHYDRATE 12.5 ML/KG/HR: 3.68; 3.05; 6.34; 3.09; .314; .187 INJECTION INTRAVENOUS at 11:35

## 2021-01-01 RX ADMIN — LACTULOSE 20 G: 20 SOLUTION ORAL at 14:09

## 2021-01-01 RX ADMIN — Medication 2 PACKET: at 07:40

## 2021-01-01 RX ADMIN — INSULIN ASPART 2 UNITS: 100 INJECTION, SOLUTION INTRAVENOUS; SUBCUTANEOUS at 20:20

## 2021-01-01 RX ADMIN — Medication 5 ML: at 08:30

## 2021-01-01 RX ADMIN — HYDROCORTISONE SODIUM SUCCINATE 50 MG: 100 INJECTION, POWDER, FOR SOLUTION INTRAMUSCULAR; INTRAVENOUS at 10:35

## 2021-01-01 RX ADMIN — Medication 2 PACKET: at 19:59

## 2021-01-01 RX ADMIN — HYDROCORTISONE SODIUM SUCCINATE 50 MG: 100 INJECTION, POWDER, FOR SOLUTION INTRAMUSCULAR; INTRAVENOUS at 15:56

## 2021-01-01 RX ADMIN — HUMAN INSULIN 8 UNITS/HR: 100 INJECTION, SOLUTION SUBCUTANEOUS at 17:10

## 2021-01-01 RX ADMIN — CALCIUM CHLORIDE, MAGNESIUM CHLORIDE, DEXTROSE MONOHYDRATE, LACTIC ACID, SODIUM CHLORIDE, SODIUM BICARBONATE AND POTASSIUM CHLORIDE 12.5 ML/KG/HR: 5.15; 2.03; 22; 5.4; 6.46; 3.09; .157 INJECTION INTRAVENOUS at 16:27

## 2021-01-01 RX ADMIN — CALCIUM CHLORIDE, MAGNESIUM CHLORIDE, SODIUM CHLORIDE, SODIUM BICARBONATE, POTASSIUM CHLORIDE AND SODIUM PHOSPHATE DIBASIC DIHYDRATE 12.5 ML/KG/HR: 3.68; 3.05; 6.34; 3.09; .314; .187 INJECTION INTRAVENOUS at 19:37

## 2021-01-01 RX ADMIN — Medication 50 MCG: at 16:18

## 2021-01-01 RX ADMIN — DOBUTAMINE HYDROCHLORIDE 2.5 MCG/KG/MIN: 200 INJECTION INTRAVENOUS at 19:42

## 2021-01-01 RX ADMIN — DEXMEDETOMIDINE 0.4 MCG/KG/HR: 100 INJECTION, SOLUTION, CONCENTRATE INTRAVENOUS at 10:55

## 2021-01-01 RX ADMIN — Medication 50 MCG: at 03:30

## 2021-01-01 RX ADMIN — LACTULOSE 20 G: 20 SOLUTION ORAL at 07:56

## 2021-01-01 RX ADMIN — DOPAMINE HYDROCHLORIDE 5 MCG/KG/MIN: 160 INJECTION, SOLUTION INTRAVENOUS at 05:45

## 2021-01-01 RX ADMIN — CALCIUM CHLORIDE, MAGNESIUM CHLORIDE, SODIUM CHLORIDE, SODIUM BICARBONATE, POTASSIUM CHLORIDE AND SODIUM PHOSPHATE DIBASIC DIHYDRATE 12.5 ML/KG/HR: 3.68; 3.05; 6.34; 3.09; .314; .187 INJECTION INTRAVENOUS at 02:18

## 2021-01-01 RX ADMIN — Medication 0.45 MCG/KG/MIN: at 01:51

## 2021-01-01 RX ADMIN — CALCIUM CHLORIDE, MAGNESIUM CHLORIDE, SODIUM CHLORIDE, SODIUM BICARBONATE, POTASSIUM CHLORIDE AND SODIUM PHOSPHATE DIBASIC DIHYDRATE 15 ML/KG/HR: 3.68; 3.05; 6.34; 3.09; .314; .187 INJECTION INTRAVENOUS at 13:56

## 2021-01-01 RX ADMIN — CALCIUM CHLORIDE, MAGNESIUM CHLORIDE, SODIUM CHLORIDE, SODIUM BICARBONATE, POTASSIUM CHLORIDE AND SODIUM PHOSPHATE DIBASIC DIHYDRATE 12.5 ML/KG/HR: 3.68; 3.05; 6.34; 3.09; .314; .187 INJECTION INTRAVENOUS at 06:04

## 2021-01-01 RX ADMIN — CALCIUM CHLORIDE, MAGNESIUM CHLORIDE, SODIUM CHLORIDE, SODIUM BICARBONATE, POTASSIUM CHLORIDE AND SODIUM PHOSPHATE DIBASIC DIHYDRATE 15 ML/KG/HR: 3.68; 3.05; 6.34; 3.09; .314; .187 INJECTION INTRAVENOUS at 20:44

## 2021-01-01 RX ADMIN — Medication 50 MCG: at 14:24

## 2021-01-01 RX ADMIN — CALCIUM CHLORIDE, MAGNESIUM CHLORIDE, SODIUM CHLORIDE, SODIUM BICARBONATE, POTASSIUM CHLORIDE AND SODIUM PHOSPHATE DIBASIC DIHYDRATE 12.5 ML/KG/HR: 3.68; 3.05; 6.34; 3.09; .314; .187 INJECTION INTRAVENOUS at 00:19

## 2021-01-01 RX ADMIN — SODIUM BICARBONATE 100 MEQ: 84 INJECTION INTRAVENOUS at 14:22

## 2021-01-01 RX ADMIN — CALCIUM CHLORIDE, MAGNESIUM CHLORIDE, SODIUM CHLORIDE, SODIUM BICARBONATE, POTASSIUM CHLORIDE AND SODIUM PHOSPHATE DIBASIC DIHYDRATE 12.5 ML/KG/HR: 3.68; 3.05; 6.34; 3.09; .314; .187 INJECTION INTRAVENOUS at 19:50

## 2021-01-01 RX ADMIN — Medication 0.6 MCG/KG/MIN: at 09:19

## 2021-01-01 RX ADMIN — EPINEPHRINE 0.03 MCG/KG/MIN: 1 INJECTION PARENTERAL at 05:38

## 2021-01-01 RX ADMIN — CALCIUM CHLORIDE, MAGNESIUM CHLORIDE, SODIUM CHLORIDE, SODIUM BICARBONATE, POTASSIUM CHLORIDE AND SODIUM PHOSPHATE DIBASIC DIHYDRATE 12.5 ML/KG/HR: 3.68; 3.05; 6.34; 3.09; .314; .187 INJECTION INTRAVENOUS at 15:56

## 2021-01-01 RX ADMIN — VANCOMYCIN HYDROCHLORIDE 1500 MG: 10 INJECTION, POWDER, LYOPHILIZED, FOR SOLUTION INTRAVENOUS at 10:04

## 2021-01-01 RX ADMIN — HYDROCORTISONE SODIUM SUCCINATE 50 MG: 100 INJECTION, POWDER, FOR SOLUTION INTRAMUSCULAR; INTRAVENOUS at 03:12

## 2021-01-01 RX ADMIN — HYDROCORTISONE SODIUM SUCCINATE 50 MG: 100 INJECTION, POWDER, FOR SOLUTION INTRAMUSCULAR; INTRAVENOUS at 09:40

## 2021-01-01 RX ADMIN — Medication 25 MCG: at 20:00

## 2021-01-01 RX ADMIN — CALCIUM CHLORIDE, MAGNESIUM CHLORIDE, SODIUM CHLORIDE, SODIUM BICARBONATE, POTASSIUM CHLORIDE AND SODIUM PHOSPHATE DIBASIC DIHYDRATE 15 ML/KG/HR: 3.68; 3.05; 6.34; 3.09; .314; .187 INJECTION INTRAVENOUS at 00:05

## 2021-01-01 RX ADMIN — Medication 4 UNITS/HR: at 22:07

## 2021-01-01 RX ADMIN — Medication 5 ML: at 10:28

## 2021-01-01 RX ADMIN — MIDAZOLAM 2 MG: 1 INJECTION INTRAMUSCULAR; INTRAVENOUS at 02:05

## 2021-01-01 RX ADMIN — HYDROCORTISONE SODIUM SUCCINATE 50 MG: 100 INJECTION, POWDER, FOR SOLUTION INTRAMUSCULAR; INTRAVENOUS at 03:11

## 2021-01-01 RX ADMIN — HYDROMORPHONE HYDROCHLORIDE 0.5 MG: 1 INJECTION, SOLUTION INTRAMUSCULAR; INTRAVENOUS; SUBCUTANEOUS at 21:30

## 2021-01-01 RX ADMIN — CALCIUM CHLORIDE, MAGNESIUM CHLORIDE, SODIUM CHLORIDE, SODIUM BICARBONATE, POTASSIUM CHLORIDE AND SODIUM PHOSPHATE DIBASIC DIHYDRATE 15 ML/KG/HR: 3.68; 3.05; 6.34; 3.09; .314; .187 INJECTION INTRAVENOUS at 06:32

## 2021-01-01 RX ADMIN — Medication 4 UNITS/HR: at 23:37

## 2021-01-01 RX ADMIN — Medication 4 UNITS/HR: at 14:04

## 2021-01-01 RX ADMIN — AMIODARONE HYDROCHLORIDE 0.5 MG/MIN: 50 INJECTION, SOLUTION INTRAVENOUS at 05:15

## 2021-01-01 RX ADMIN — Medication 50 MCG: at 08:36

## 2021-01-01 RX ADMIN — HYDROCORTISONE SODIUM SUCCINATE 50 MG: 100 INJECTION, POWDER, FOR SOLUTION INTRAMUSCULAR; INTRAVENOUS at 21:14

## 2021-01-01 RX ADMIN — Medication 2 PACKET: at 08:23

## 2021-01-01 RX ADMIN — Medication 4 UNITS/HR: at 04:40

## 2021-01-01 RX ADMIN — HYDROCORTISONE SODIUM SUCCINATE 50 MG: 100 INJECTION, POWDER, FOR SOLUTION INTRAMUSCULAR; INTRAVENOUS at 21:43

## 2021-01-01 RX ADMIN — PIPERACILLIN SODIUM AND TAZOBACTAM SODIUM 4.5 G: 4; .5 INJECTION, POWDER, LYOPHILIZED, FOR SOLUTION INTRAVENOUS at 15:31

## 2021-01-01 RX ADMIN — HYDROCORTISONE SODIUM SUCCINATE 50 MG: 100 INJECTION, POWDER, FOR SOLUTION INTRAMUSCULAR; INTRAVENOUS at 22:45

## 2021-01-01 RX ADMIN — Medication: at 06:36

## 2021-01-01 RX ADMIN — PANTOPRAZOLE SODIUM 40 MG: 40 INJECTION, POWDER, FOR SOLUTION INTRAVENOUS at 08:14

## 2021-01-01 RX ADMIN — LACTULOSE 20 G: 20 SOLUTION ORAL at 20:22

## 2021-01-01 RX ADMIN — PIPERACILLIN SODIUM AND TAZOBACTAM SODIUM 4.5 G: 4; .5 INJECTION, POWDER, LYOPHILIZED, FOR SOLUTION INTRAVENOUS at 14:56

## 2021-01-01 RX ADMIN — HYDROCORTISONE SODIUM SUCCINATE 50 MG: 100 INJECTION, POWDER, FOR SOLUTION INTRAMUSCULAR; INTRAVENOUS at 22:52

## 2021-01-01 RX ADMIN — HYDROCORTISONE SODIUM SUCCINATE 50 MG: 100 INJECTION, POWDER, FOR SOLUTION INTRAMUSCULAR; INTRAVENOUS at 14:31

## 2021-01-01 RX ADMIN — CALCIUM CHLORIDE, MAGNESIUM CHLORIDE, SODIUM CHLORIDE, SODIUM BICARBONATE, POTASSIUM CHLORIDE AND SODIUM PHOSPHATE DIBASIC DIHYDRATE 12.5 ML/KG/HR: 3.68; 3.05; 6.34; 3.09; .314; .187 INJECTION INTRAVENOUS at 13:40

## 2021-01-01 RX ADMIN — IRON SUCROSE 200 MG: 20 INJECTION, SOLUTION INTRAVENOUS at 13:15

## 2021-01-01 RX ADMIN — Medication 2 PACKET: at 19:42

## 2021-01-01 RX ADMIN — BUMETANIDE 4 MG: 0.25 INJECTION INTRAMUSCULAR; INTRAVENOUS at 23:24

## 2021-01-01 RX ADMIN — Medication 0.23 MCG/KG/MIN: at 21:55

## 2021-01-01 RX ADMIN — LEVOTHYROXINE SODIUM 25 MCG: 0.03 TABLET ORAL at 09:29

## 2021-01-01 RX ADMIN — POTASSIUM CHLORIDE 20 MEQ: 750 TABLET, EXTENDED RELEASE ORAL at 14:33

## 2021-01-01 RX ADMIN — CALCIUM CHLORIDE, MAGNESIUM CHLORIDE, SODIUM CHLORIDE, SODIUM BICARBONATE, POTASSIUM CHLORIDE AND SODIUM PHOSPHATE DIBASIC DIHYDRATE 12.5 ML/KG/HR: 3.68; 3.05; 6.34; 3.09; .314; .187 INJECTION INTRAVENOUS at 04:06

## 2021-01-01 RX ADMIN — CALCIUM CHLORIDE, MAGNESIUM CHLORIDE, SODIUM CHLORIDE, SODIUM BICARBONATE, POTASSIUM CHLORIDE AND SODIUM PHOSPHATE DIBASIC DIHYDRATE 12.5 ML/KG/HR: 3.68; 3.05; 6.34; 3.09; .314; .187 INJECTION INTRAVENOUS at 02:17

## 2021-01-01 RX ADMIN — ALBUMIN HUMAN 50 G: 0.05 INJECTION, SOLUTION INTRAVENOUS at 03:31

## 2021-01-01 RX ADMIN — VANCOMYCIN HYDROCHLORIDE 2500 MG: 1 INJECTION, POWDER, LYOPHILIZED, FOR SOLUTION INTRAVENOUS at 17:48

## 2021-01-01 RX ADMIN — CALCIUM CHLORIDE, MAGNESIUM CHLORIDE, SODIUM CHLORIDE, SODIUM BICARBONATE, POTASSIUM CHLORIDE AND SODIUM PHOSPHATE DIBASIC DIHYDRATE 12.5 ML/KG/HR: 3.68; 3.05; 6.34; 3.09; .314; .187 INJECTION INTRAVENOUS at 16:19

## 2021-01-01 RX ADMIN — PIPERACILLIN SODIUM AND TAZOBACTAM SODIUM 4.5 G: 4; .5 INJECTION, POWDER, LYOPHILIZED, FOR SOLUTION INTRAVENOUS at 22:03

## 2021-01-01 RX ADMIN — CALCIUM CHLORIDE, MAGNESIUM CHLORIDE, SODIUM CHLORIDE, SODIUM BICARBONATE, POTASSIUM CHLORIDE AND SODIUM PHOSPHATE DIBASIC DIHYDRATE 12.5 ML/KG/HR: 3.68; 3.05; 6.34; 3.09; .314; .187 INJECTION INTRAVENOUS at 08:07

## 2021-01-01 RX ADMIN — Medication 2 PACKET: at 08:36

## 2021-01-01 RX ADMIN — CALCIUM CHLORIDE, MAGNESIUM CHLORIDE, SODIUM CHLORIDE, SODIUM BICARBONATE, POTASSIUM CHLORIDE AND SODIUM PHOSPHATE DIBASIC DIHYDRATE: 3.68; 3.05; 6.34; 3.09; .314; .187 INJECTION INTRAVENOUS at 07:48

## 2021-01-01 RX ADMIN — EPINEPHRINE 0.14 MCG/KG/MIN: 1 INJECTION PARENTERAL at 13:32

## 2021-01-01 RX ADMIN — MEROPENEM 1 G: 1 INJECTION, POWDER, FOR SOLUTION INTRAVENOUS at 21:43

## 2021-01-01 RX ADMIN — HYDROCORTISONE SODIUM SUCCINATE 50 MG: 100 INJECTION, POWDER, FOR SOLUTION INTRAMUSCULAR; INTRAVENOUS at 16:18

## 2021-01-01 RX ADMIN — LACTULOSE 20 G: 20 SOLUTION ORAL at 07:31

## 2021-01-01 RX ADMIN — Medication 50 MCG: at 07:31

## 2021-01-01 RX ADMIN — HUMAN INSULIN 8 UNITS/HR: 100 INJECTION, SOLUTION SUBCUTANEOUS at 22:49

## 2021-01-01 RX ADMIN — MIDAZOLAM (PF) 1 MG/ML IN 0.9 % SODIUM CHLORIDE INTRAVENOUS SOLUTION 2 MG/HR: at 09:25

## 2021-01-01 RX ADMIN — CALCIUM CHLORIDE, MAGNESIUM CHLORIDE, SODIUM CHLORIDE, SODIUM BICARBONATE, POTASSIUM CHLORIDE AND SODIUM PHOSPHATE DIBASIC DIHYDRATE 12.5 ML/KG/HR: 3.68; 3.05; 6.34; 3.09; .314; .187 INJECTION INTRAVENOUS at 09:53

## 2021-01-01 RX ADMIN — Medication 3 UNITS/HR: at 09:56

## 2021-01-01 RX ADMIN — Medication 100 MCG/HR: at 10:31

## 2021-01-01 RX ADMIN — HYDROCORTISONE SODIUM SUCCINATE 50 MG: 100 INJECTION, POWDER, FOR SOLUTION INTRAMUSCULAR; INTRAVENOUS at 11:01

## 2021-01-01 RX ADMIN — CALCIUM CHLORIDE, MAGNESIUM CHLORIDE, SODIUM CHLORIDE, SODIUM BICARBONATE, POTASSIUM CHLORIDE AND SODIUM PHOSPHATE DIBASIC DIHYDRATE 15 ML/KG/HR: 3.68; 3.05; 6.34; 3.09; .314; .187 INJECTION INTRAVENOUS at 06:31

## 2021-01-01 RX ADMIN — Medication 4 UNITS/HR: at 08:43

## 2021-01-01 RX ADMIN — HYDROCORTISONE SODIUM SUCCINATE 50 MG: 100 INJECTION, POWDER, FOR SOLUTION INTRAMUSCULAR; INTRAVENOUS at 03:41

## 2021-01-01 RX ADMIN — LACTULOSE 20 G: 20 SOLUTION ORAL at 13:38

## 2021-01-01 RX ADMIN — Medication 2 PACKET: at 13:24

## 2021-01-01 RX ADMIN — PANTOPRAZOLE SODIUM 40 MG: 40 INJECTION, POWDER, FOR SOLUTION INTRAVENOUS at 07:35

## 2021-01-01 RX ADMIN — ROCURONIUM BROMIDE 100 MG: 10 INJECTION INTRAVENOUS at 08:14

## 2021-01-01 RX ADMIN — PANTOPRAZOLE SODIUM 40 MG: 40 TABLET, DELAYED RELEASE ORAL at 08:01

## 2021-01-01 RX ADMIN — BUMETANIDE 6 MG: 0.25 INJECTION INTRAMUSCULAR; INTRAVENOUS at 08:10

## 2021-01-01 RX ADMIN — SODIUM CHLORIDE 500 ML: 9 INJECTION, SOLUTION INTRAVENOUS at 17:16

## 2021-01-01 RX ADMIN — INSULIN ASPART 3 UNITS: 100 INJECTION, SOLUTION INTRAVENOUS; SUBCUTANEOUS at 00:23

## 2021-01-01 RX ADMIN — PIPERACILLIN SODIUM AND TAZOBACTAM SODIUM 4.5 G: 4; .5 INJECTION, POWDER, LYOPHILIZED, FOR SOLUTION INTRAVENOUS at 22:45

## 2021-01-01 RX ADMIN — Medication 0.45 MCG/KG/MIN: at 06:47

## 2021-01-01 RX ADMIN — HYDROCORTISONE SODIUM SUCCINATE 50 MG: 100 INJECTION, POWDER, FOR SOLUTION INTRAMUSCULAR; INTRAVENOUS at 04:07

## 2021-01-01 RX ADMIN — Medication 50 MCG: at 21:53

## 2021-01-01 RX ADMIN — DEXTROSE MONOHYDRATE: 100 INJECTION, SOLUTION INTRAVENOUS at 16:39

## 2021-01-01 RX ADMIN — Medication 0.4 MCG/KG/MIN: at 04:56

## 2021-01-01 RX ADMIN — LACTULOSE 20 G: 20 SOLUTION ORAL at 20:08

## 2021-01-01 RX ADMIN — PANTOPRAZOLE SODIUM 40 MG: 40 INJECTION, POWDER, FOR SOLUTION INTRAVENOUS at 08:26

## 2021-01-01 RX ADMIN — PIPERACILLIN SODIUM AND TAZOBACTAM SODIUM 4.5 G: 4; .5 INJECTION, POWDER, LYOPHILIZED, FOR SOLUTION INTRAVENOUS at 09:40

## 2021-01-01 RX ADMIN — CALCIUM CHLORIDE, MAGNESIUM CHLORIDE, SODIUM CHLORIDE, SODIUM BICARBONATE, POTASSIUM CHLORIDE AND SODIUM PHOSPHATE DIBASIC DIHYDRATE 15 ML/KG/HR: 3.68; 3.05; 6.34; 3.09; .314; .187 INJECTION INTRAVENOUS at 23:30

## 2021-01-01 RX ADMIN — Medication 50 MCG: at 06:15

## 2021-01-01 RX ADMIN — HYDROCORTISONE SODIUM SUCCINATE 50 MG: 100 INJECTION, POWDER, FOR SOLUTION INTRAMUSCULAR; INTRAVENOUS at 21:20

## 2021-01-01 RX ADMIN — PIPERACILLIN SODIUM AND TAZOBACTAM SODIUM 4.5 G: 4; .5 INJECTION, POWDER, LYOPHILIZED, FOR SOLUTION INTRAVENOUS at 11:01

## 2021-01-01 RX ADMIN — CALCIUM CHLORIDE, MAGNESIUM CHLORIDE, DEXTROSE MONOHYDRATE, LACTIC ACID, SODIUM CHLORIDE, SODIUM BICARBONATE AND POTASSIUM CHLORIDE: 5.15; 2.03; 22; 5.4; 6.46; 3.09; .157 INJECTION INTRAVENOUS at 06:08

## 2021-01-01 RX ADMIN — DEXMEDETOMIDINE 0.4 MCG/KG/HR: 100 INJECTION, SOLUTION, CONCENTRATE INTRAVENOUS at 01:12

## 2021-01-01 RX ADMIN — CALCIUM CHLORIDE, MAGNESIUM CHLORIDE, SODIUM CHLORIDE, SODIUM BICARBONATE, POTASSIUM CHLORIDE AND SODIUM PHOSPHATE DIBASIC DIHYDRATE 12.5 ML/KG/HR: 3.68; 3.05; 6.34; 3.09; .314; .187 INJECTION INTRAVENOUS at 02:22

## 2021-01-01 RX ADMIN — LACTULOSE 20 G: 20 SOLUTION ORAL at 19:43

## 2021-01-01 RX ADMIN — CALCIUM CHLORIDE, MAGNESIUM CHLORIDE, SODIUM CHLORIDE, SODIUM BICARBONATE, POTASSIUM CHLORIDE AND SODIUM PHOSPHATE DIBASIC DIHYDRATE 12.5 ML/KG/HR: 3.68; 3.05; 6.34; 3.09; .314; .187 INJECTION INTRAVENOUS at 11:45

## 2021-01-01 RX ADMIN — HYDROCORTISONE SODIUM SUCCINATE 50 MG: 100 INJECTION, POWDER, FOR SOLUTION INTRAMUSCULAR; INTRAVENOUS at 09:02

## 2021-01-01 RX ADMIN — HYDROCORTISONE SODIUM SUCCINATE 50 MG: 100 INJECTION, POWDER, FOR SOLUTION INTRAMUSCULAR; INTRAVENOUS at 16:01

## 2021-01-01 RX ADMIN — Medication 4 UNITS/HR: at 03:02

## 2021-01-01 RX ADMIN — CALCIUM CHLORIDE, MAGNESIUM CHLORIDE, SODIUM CHLORIDE, SODIUM BICARBONATE, POTASSIUM CHLORIDE AND SODIUM PHOSPHATE DIBASIC DIHYDRATE 15 ML/KG/HR: 3.68; 3.05; 6.34; 3.09; .314; .187 INJECTION INTRAVENOUS at 10:32

## 2021-01-01 RX ADMIN — CALCIUM CHLORIDE, MAGNESIUM CHLORIDE, SODIUM CHLORIDE, SODIUM BICARBONATE, POTASSIUM CHLORIDE AND SODIUM PHOSPHATE DIBASIC DIHYDRATE: 3.68; 3.05; 6.34; 3.09; .314; .187 INJECTION INTRAVENOUS at 02:19

## 2021-01-01 RX ADMIN — Medication 50 MCG/HR: at 07:56

## 2021-01-01 RX ADMIN — Medication 0.4 MCG/KG/MIN: at 12:19

## 2021-01-01 RX ADMIN — PHYTONADIONE 10 MG: 10 INJECTION, EMULSION INTRAMUSCULAR; INTRAVENOUS; SUBCUTANEOUS at 08:27

## 2021-01-01 RX ADMIN — Medication 4 UNITS/HR: at 18:19

## 2021-01-01 RX ADMIN — Medication 25 MCG: at 20:30

## 2021-01-01 RX ADMIN — CALCIUM CHLORIDE 1 G: 100 INJECTION INTRAVENOUS; INTRAVENTRICULAR at 08:48

## 2021-01-01 RX ADMIN — HEPARIN SODIUM 500 UNITS/HR: 1000 INJECTION INTRAVENOUS; SUBCUTANEOUS at 11:35

## 2021-01-01 RX ADMIN — PIPERACILLIN SODIUM AND TAZOBACTAM SODIUM 4.5 G: 4; .5 INJECTION, POWDER, LYOPHILIZED, FOR SOLUTION INTRAVENOUS at 09:22

## 2021-01-01 RX ADMIN — LACTULOSE 20 G: 20 SOLUTION ORAL at 08:36

## 2021-01-01 RX ADMIN — AMIODARONE HYDROCHLORIDE 0.5 MG/MIN: 50 INJECTION, SOLUTION INTRAVENOUS at 11:07

## 2021-01-01 RX ADMIN — CALCIUM CHLORIDE, MAGNESIUM CHLORIDE, SODIUM CHLORIDE, SODIUM BICARBONATE, POTASSIUM CHLORIDE AND SODIUM PHOSPHATE DIBASIC DIHYDRATE 15 ML/KG/HR: 3.68; 3.05; 6.34; 3.09; .314; .187 INJECTION INTRAVENOUS at 00:04

## 2021-01-01 RX ADMIN — LACTULOSE 20 G: 20 SOLUTION ORAL at 15:03

## 2021-01-01 RX ADMIN — DESMOPRESSIN ACETATE 2 MCG: 4 SOLUTION INTRAVENOUS at 11:38

## 2021-01-01 RX ADMIN — CALCIUM CHLORIDE, MAGNESIUM CHLORIDE, SODIUM CHLORIDE, SODIUM BICARBONATE, POTASSIUM CHLORIDE AND SODIUM PHOSPHATE DIBASIC DIHYDRATE 12.5 ML/KG/HR: 3.68; 3.05; 6.34; 3.09; .314; .187 INJECTION INTRAVENOUS at 07:35

## 2021-01-01 RX ADMIN — Medication 50 MCG: at 08:11

## 2021-01-01 RX ADMIN — Medication 50 MCG: at 07:47

## 2021-01-01 RX ADMIN — Medication 50 MCG: at 02:16

## 2021-01-01 RX ADMIN — CALCIUM CHLORIDE 1 G: 100 INJECTION, SOLUTION INTRAVENOUS at 17:11

## 2021-01-01 RX ADMIN — CALCIUM CHLORIDE, MAGNESIUM CHLORIDE, DEXTROSE MONOHYDRATE, LACTIC ACID, SODIUM CHLORIDE, SODIUM BICARBONATE AND POTASSIUM CHLORIDE 12.5 ML/KG/HR: 5.15; 2.03; 22; 5.4; 6.46; 3.09; .157 INJECTION INTRAVENOUS at 08:28

## 2021-01-01 RX ADMIN — PIPERACILLIN SODIUM AND TAZOBACTAM SODIUM 4.5 G: 4; .5 INJECTION, POWDER, LYOPHILIZED, FOR SOLUTION INTRAVENOUS at 09:25

## 2021-01-01 RX ADMIN — Medication 2 PACKET: at 20:08

## 2021-01-01 RX ADMIN — MEROPENEM 1 G: 1 INJECTION, POWDER, FOR SOLUTION INTRAVENOUS at 21:14

## 2021-01-01 RX ADMIN — HYDROCORTISONE SODIUM SUCCINATE 50 MG: 100 INJECTION, POWDER, FOR SOLUTION INTRAMUSCULAR; INTRAVENOUS at 08:14

## 2021-01-01 RX ADMIN — PIPERACILLIN SODIUM AND TAZOBACTAM SODIUM 4.5 G: 4; .5 INJECTION, POWDER, LYOPHILIZED, FOR SOLUTION INTRAVENOUS at 03:11

## 2021-01-01 RX ADMIN — Medication 3 UNITS/HR: at 05:45

## 2021-01-01 RX ADMIN — GLYCOPYRROLATE 0.2 MG: 0.2 INJECTION, SOLUTION INTRAMUSCULAR; INTRAVENOUS at 13:12

## 2021-01-01 RX ADMIN — CALCIUM CHLORIDE 1 G: 100 INJECTION, SOLUTION INTRAVENOUS at 05:25

## 2021-01-01 RX ADMIN — DEXTROSE MONOHYDRATE 25 G: 500 INJECTION PARENTERAL at 06:21

## 2021-01-01 RX ADMIN — CALCIUM CHLORIDE 1 G: 100 INJECTION, SOLUTION INTRAVENOUS at 12:15

## 2021-01-01 RX ADMIN — MICAFUNGIN SODIUM 150 MG: 50 INJECTION, POWDER, LYOPHILIZED, FOR SOLUTION INTRAVENOUS at 17:43

## 2021-01-01 RX ADMIN — SPIRONOLACTONE 25 MG: 25 TABLET ORAL at 16:10

## 2021-01-01 RX ADMIN — CALCIUM CHLORIDE, MAGNESIUM CHLORIDE, SODIUM CHLORIDE, SODIUM BICARBONATE, POTASSIUM CHLORIDE AND SODIUM PHOSPHATE DIBASIC DIHYDRATE 12.5 ML/KG/HR: 3.68; 3.05; 6.34; 3.09; .314; .187 INJECTION INTRAVENOUS at 13:59

## 2021-01-01 RX ADMIN — PIPERACILLIN SODIUM AND TAZOBACTAM SODIUM 4.5 G: 4; .5 INJECTION, POWDER, LYOPHILIZED, FOR SOLUTION INTRAVENOUS at 15:03

## 2021-01-01 RX ADMIN — DEXTROSE MONOHYDRATE: 100 INJECTION, SOLUTION INTRAVENOUS at 23:27

## 2021-01-01 RX ADMIN — DEXTROSE MONOHYDRATE: 100 INJECTION, SOLUTION INTRAVENOUS at 06:35

## 2021-01-01 RX ADMIN — EPINEPHRINE 0.12 MCG/KG/MIN: 1 INJECTION PARENTERAL at 06:33

## 2021-01-01 RX ADMIN — Medication 50 MCG/HR: at 05:45

## 2021-01-01 RX ADMIN — HYDROCORTISONE SODIUM SUCCINATE 50 MG: 100 INJECTION, POWDER, FOR SOLUTION INTRAMUSCULAR; INTRAVENOUS at 10:06

## 2021-01-01 RX ADMIN — INSULIN ASPART 5 UNITS: 100 INJECTION, SOLUTION INTRAVENOUS; SUBCUTANEOUS at 05:09

## 2021-01-01 RX ADMIN — DEXMEDETOMIDINE 0.8 MCG/KG/HR: 100 INJECTION, SOLUTION, CONCENTRATE INTRAVENOUS at 02:35

## 2021-01-01 RX ADMIN — Medication 50 MCG: at 08:23

## 2021-01-01 RX ADMIN — MIDAZOLAM 2 MG: 1 INJECTION INTRAMUSCULAR; INTRAVENOUS at 14:26

## 2021-01-01 RX ADMIN — Medication 5 ML: at 08:36

## 2021-01-01 RX ADMIN — Medication 50 MCG: at 11:48

## 2021-01-01 RX ADMIN — CALCIUM CHLORIDE, MAGNESIUM CHLORIDE, DEXTROSE MONOHYDRATE, LACTIC ACID, SODIUM CHLORIDE, SODIUM BICARBONATE AND POTASSIUM CHLORIDE 12.5 ML/KG/HR: 5.15; 2.03; 22; 5.4; 6.46; 3.09; .157 INJECTION INTRAVENOUS at 14:14

## 2021-01-01 RX ADMIN — LACTULOSE 20 G: 20 SOLUTION ORAL at 19:36

## 2021-01-01 RX ADMIN — Medication 4 UNITS/HR: at 22:53

## 2021-01-01 RX ADMIN — ALBUMIN HUMAN 250 ML: 0.05 INJECTION, SOLUTION INTRAVENOUS at 09:21

## 2021-01-01 RX ADMIN — Medication 50 MCG: at 07:34

## 2021-01-01 RX ADMIN — SODIUM CHLORIDE 500 ML: 9 INJECTION, SOLUTION INTRAVENOUS at 18:09

## 2021-01-01 RX ADMIN — DOBUTAMINE HYDROCHLORIDE 2.5 MCG/KG/MIN: 200 INJECTION INTRAVENOUS at 08:38

## 2021-01-01 RX ADMIN — Medication 2 PACKET: at 16:01

## 2021-01-01 RX ADMIN — AMIODARONE HYDROCHLORIDE 0.5 MG/MIN: 50 INJECTION, SOLUTION INTRAVENOUS at 21:47

## 2021-01-01 RX ADMIN — LEVOTHYROXINE SODIUM 25 MCG: 0.03 TABLET ORAL at 07:46

## 2021-01-01 RX ADMIN — HYDROCORTISONE SODIUM SUCCINATE 50 MG: 100 INJECTION, POWDER, FOR SOLUTION INTRAMUSCULAR; INTRAVENOUS at 08:52

## 2021-01-01 RX ADMIN — INSULIN ASPART 1 UNITS: 100 INJECTION, SOLUTION INTRAVENOUS; SUBCUTANEOUS at 15:38

## 2021-01-01 RX ADMIN — SODIUM CHLORIDE 500 ML: 9 INJECTION, SOLUTION INTRAVENOUS at 16:25

## 2021-01-01 RX ADMIN — PIPERACILLIN SODIUM AND TAZOBACTAM SODIUM 4.5 G: 4; .5 INJECTION, POWDER, LYOPHILIZED, FOR SOLUTION INTRAVENOUS at 03:40

## 2021-01-01 RX ADMIN — CALCIUM CHLORIDE, MAGNESIUM CHLORIDE, SODIUM CHLORIDE, SODIUM BICARBONATE, POTASSIUM CHLORIDE AND SODIUM PHOSPHATE DIBASIC DIHYDRATE 12.5 ML/KG/HR: 3.68; 3.05; 6.34; 3.09; .314; .187 INJECTION INTRAVENOUS at 13:58

## 2021-01-01 RX ADMIN — PIPERACILLIN SODIUM AND TAZOBACTAM SODIUM 4.5 G: 4; .5 INJECTION, POWDER, LYOPHILIZED, FOR SOLUTION INTRAVENOUS at 21:18

## 2021-01-01 RX ADMIN — CALCIUM CHLORIDE 2 G: 100 INJECTION, SOLUTION INTRAVENOUS at 13:05

## 2021-01-01 RX ADMIN — HYDROCORTISONE SODIUM SUCCINATE 50 MG: 100 INJECTION, POWDER, FOR SOLUTION INTRAMUSCULAR; INTRAVENOUS at 20:24

## 2021-01-01 RX ADMIN — Medication 50 MCG: at 23:07

## 2021-01-01 RX ADMIN — Medication 1 G: at 22:58

## 2021-01-01 RX ADMIN — SODIUM CHLORIDE 500 ML: 9 INJECTION, SOLUTION INTRAVENOUS at 16:54

## 2021-01-01 RX ADMIN — DOPAMINE HYDROCHLORIDE 5 MCG/KG/MIN: 160 INJECTION, SOLUTION INTRAVENOUS at 16:41

## 2021-01-01 RX ADMIN — DEXMEDETOMIDINE 0.4 MCG/KG/HR: 100 INJECTION, SOLUTION, CONCENTRATE INTRAVENOUS at 20:36

## 2021-01-01 RX ADMIN — Medication: at 02:58

## 2021-01-01 RX ADMIN — EPINEPHRINE 0.14 MCG/KG/MIN: 1 INJECTION PARENTERAL at 00:32

## 2021-01-01 RX ADMIN — PHYTONADIONE 10 MG: 10 INJECTION, EMULSION INTRAMUSCULAR; INTRAVENOUS; SUBCUTANEOUS at 14:10

## 2021-01-01 RX ADMIN — CALCIUM CHLORIDE, MAGNESIUM CHLORIDE, SODIUM CHLORIDE, SODIUM BICARBONATE, POTASSIUM CHLORIDE AND SODIUM PHOSPHATE DIBASIC DIHYDRATE: 3.68; 3.05; 6.34; 3.09; .314; .187 INJECTION INTRAVENOUS at 14:17

## 2021-01-01 RX ADMIN — DOPAMINE HYDROCHLORIDE 5 MCG/KG/MIN: 160 INJECTION, SOLUTION INTRAVENOUS at 13:15

## 2021-01-01 RX ADMIN — CALCIUM CHLORIDE, MAGNESIUM CHLORIDE, SODIUM CHLORIDE, SODIUM BICARBONATE, POTASSIUM CHLORIDE AND SODIUM PHOSPHATE DIBASIC DIHYDRATE 12.5 ML/KG/HR: 3.68; 3.05; 6.34; 3.09; .314; .187 INJECTION INTRAVENOUS at 19:53

## 2021-01-01 RX ADMIN — Medication 50 MCG: at 08:14

## 2021-01-01 RX ADMIN — MIDAZOLAM 2 MG: 1 INJECTION INTRAMUSCULAR; INTRAVENOUS at 08:30

## 2021-01-01 RX ADMIN — SODIUM BICARBONATE: 84 INJECTION, SOLUTION INTRAVENOUS at 03:03

## 2021-01-01 RX ADMIN — Medication 50 MCG: at 12:24

## 2021-01-01 RX ADMIN — DOPAMINE HYDROCHLORIDE 5 MCG/KG/MIN: 160 INJECTION, SOLUTION INTRAVENOUS at 06:34

## 2021-01-01 RX ADMIN — PIPERACILLIN SODIUM AND TAZOBACTAM SODIUM 4.5 G: 4; .5 INJECTION, POWDER, LYOPHILIZED, FOR SOLUTION INTRAVENOUS at 14:40

## 2021-01-01 ASSESSMENT — ACTIVITIES OF DAILY LIVING (ADL)
TOILETING_ISSUES: NO
ADLS_ACUITY_SCORE: 13
ADLS_ACUITY_SCORE: 13
ADLS_ACUITY_SCORE: 18
DOING_ERRANDS_INDEPENDENTLY_DIFFICULTY: NO
ADLS_ACUITY_SCORE: 16
PATIENT_/_FAMILY_COMMUNICATION_STYLE: SPOKEN LANGUAGE (ENGLISH OR BILINGUAL)
ADLS_ACUITY_SCORE: 18
ADLS_ACUITY_SCORE: 16
ADLS_ACUITY_SCORE: 16
DRESSING/BATHING_DIFFICULTY: NO
ADLS_ACUITY_SCORE: 16
ADLS_ACUITY_SCORE: 18
ADLS_ACUITY_SCORE: 12
ADLS_ACUITY_SCORE: 20
ADLS_ACUITY_SCORE: 13
WEAR_GLASSES_OR_BLIND: YES
ADLS_ACUITY_SCORE: 13
WEAR_GLASSES_OR_BLIND: NO
PATIENT_/_FAMILY_COMMUNICATION_STYLE: SPOKEN LANGUAGE (ENGLISH OR BILINGUAL)
DIFFICULTY_COMMUNICATING: NO
ADLS_ACUITY_SCORE: 18
ADLS_ACUITY_SCORE: 12
ADLS_ACUITY_SCORE: 13
ADLS_ACUITY_SCORE: 16
DIFFICULTY_COMMUNICATING: NO
VISION_MANAGEMENT: GLASSES
ADLS_ACUITY_SCORE: 18
WEAR_GLASSES_OR_BLIND: NO
ADLS_ACUITY_SCORE: 16
ADLS_ACUITY_SCORE: 17
ADLS_ACUITY_SCORE: 18
ADLS_ACUITY_SCORE: 18
WALKING_OR_CLIMBING_STAIRS_DIFFICULTY: YES
DEPENDENT_IADLS:: INDEPENDENT
ADLS_ACUITY_SCORE: 18
ADLS_ACUITY_SCORE: 16
WALKING_OR_CLIMBING_STAIRS: OTHER (SEE COMMENTS)
ADLS_ACUITY_SCORE: 18
ADLS_ACUITY_SCORE: 18
ADLS_ACUITY_SCORE: 13
ADLS_ACUITY_SCORE: 18
ADLS_ACUITY_SCORE: 18
ADLS_ACUITY_SCORE: 13
HEARING_DIFFICULTY_OR_DEAF: NO
ADLS_ACUITY_SCORE: 18
ADLS_ACUITY_SCORE: 16
DIFFICULTY_EATING/SWALLOWING: NO
CONCENTRATING,_REMEMBERING_OR_MAKING_DECISIONS_DIFFICULTY: NO
WHICH_OF_THE_ABOVE_FUNCTIONAL_RISKS_HAD_A_RECENT_ONSET_OR_CHANGE?: AMBULATION
ADLS_ACUITY_SCORE: 18
ADLS_ACUITY_SCORE: 16
CONCENTRATING,_REMEMBERING_OR_MAKING_DECISIONS_DIFFICULTY: NO
CONCENTRATING,_REMEMBERING_OR_MAKING_DECISIONS_DIFFICULTY: NO
ADLS_ACUITY_SCORE: 18
ADLS_ACUITY_SCORE: 16
ADLS_ACUITY_SCORE: 16
ADLS_ACUITY_SCORE: 13
ADLS_ACUITY_SCORE: 16
ADLS_ACUITY_SCORE: 18
ADLS_ACUITY_SCORE: 18
TOILETING_ISSUES: NO
ADLS_ACUITY_SCORE: 18
ADLS_ACUITY_SCORE: 13
ADLS_ACUITY_SCORE: 18
ADLS_ACUITY_SCORE: 16
WALKING_OR_CLIMBING_STAIRS_DIFFICULTY: YES
ADLS_ACUITY_SCORE: 12
ADLS_ACUITY_SCORE: 18
ADLS_ACUITY_SCORE: 18
DIFFICULTY_EATING/SWALLOWING: NO
WALKING_OR_CLIMBING_STAIRS: AMBULATION DIFFICULTY, REQUIRES EQUIPMENT
ADLS_ACUITY_SCORE: 18
ADLS_ACUITY_SCORE: 12
DRESSING/BATHING_DIFFICULTY: YES
DIFFICULTY_COMMUNICATING: NO
ADLS_ACUITY_SCORE: 18
ADLS_ACUITY_SCORE: 17
DRESSING/BATHING_MANAGEMENT: SHOWER CHAIR
ADLS_ACUITY_SCORE: 18
ADLS_ACUITY_SCORE: 20
ADLS_ACUITY_SCORE: 16
ADLS_ACUITY_SCORE: 18
ADLS_ACUITY_SCORE: 13
ADLS_ACUITY_SCORE: 18
WALKING_OR_CLIMBING_STAIRS: AMBULATION DIFFICULTY, ASSISTANCE 1 PERSON
ADLS_ACUITY_SCORE: 12
HEARING_DIFFICULTY_OR_DEAF: NO
ADLS_ACUITY_SCORE: 16
ADLS_ACUITY_SCORE: 13
ADLS_ACUITY_SCORE: 16
ADLS_ACUITY_SCORE: 16
FALL_HISTORY_WITHIN_LAST_SIX_MONTHS: NO
FALL_HISTORY_WITHIN_LAST_SIX_MONTHS: NO
DOING_ERRANDS_INDEPENDENTLY_DIFFICULTY: YES
ADLS_ACUITY_SCORE: 16
EQUIPMENT_CURRENTLY_USED_AT_HOME: CANE, STRAIGHT
ADLS_ACUITY_SCORE: 18
DIFFICULTY_EATING/SWALLOWING: NO
WALKING_OR_CLIMBING_STAIRS_DIFFICULTY: YES
ADLS_ACUITY_SCORE: 12
ADLS_ACUITY_SCORE: 12
ADLS_ACUITY_SCORE: 18
ADLS_ACUITY_SCORE: 13
ADLS_ACUITY_SCORE: 17
ADLS_ACUITY_SCORE: 16
ADLS_ACUITY_SCORE: 18
ADLS_ACUITY_SCORE: 18
DRESSING/BATHING: BATHING DIFFICULTY, REQUIRES EQUIPMENT
ADLS_ACUITY_SCORE: 16
WHICH_OF_THE_ABOVE_FUNCTIONAL_RISKS_HAD_A_RECENT_ONSET_OR_CHANGE?: AMBULATION;TRANSFERRING;BATHING
ADLS_ACUITY_SCORE: 13
EQUIPMENT_CURRENTLY_USED_AT_HOME: CANE, STRAIGHT

## 2021-01-01 ASSESSMENT — MIFFLIN-ST. JEOR
SCORE: 1770.8
SCORE: 1731.5
SCORE: 1735.5
SCORE: 1825.5
SCORE: 1750.5
SCORE: 1809.61
SCORE: 1749.5
SCORE: 1825.5
SCORE: 1774.68
SCORE: 1698.5
SCORE: 1750.5
SCORE: 1715.5
SCORE: 1771.5
SCORE: 1812.78
SCORE: 1751.5

## 2021-01-01 ASSESSMENT — ENCOUNTER SYMPTOMS
NECK PAIN: 0
CHILLS: 0
CONFUSION: 0
DIFFICULTY URINATING: 0
TROUBLE SWALLOWING: 0
LIGHT-HEADEDNESS: 0
ABDOMINAL DISTENTION: 1
VOMITING: 0
ADENOPATHY: 0
SHORTNESS OF BREATH: 1
WHEEZING: 0
NAUSEA: 0
NUMBNESS: 0
BACK PAIN: 0
COUGH: 0
FEVER: 0
WEAKNESS: 0

## 2021-01-01 ASSESSMENT — PAIN SCALES - GENERAL: PAINLEVEL: NO PAIN (0)

## 2021-01-06 NOTE — TELEPHONE ENCOUNTER
PANTOPRAZOLE SODIUM 40MG TBEC  Last Written Prescription Date:  11/13/2020  Last Fill Quantity: 60,   # refills: 0  Last Office Visit : 11/24/2020  Future Office visit:  2/10/2021    Routing refill request to provider for review/approval because:  Last order filled out by [facility].    Please send new updated order with updated Provider signature for Pt care.    Thank you      Shilpi Ag RN  Central Triage Red Flags/Med Refills

## 2021-01-15 PROBLEM — R60.1 ANASARCA: Status: ACTIVE | Noted: 2021-01-01

## 2021-01-15 PROBLEM — I50.23 ACUTE ON CHRONIC SYSTOLIC CONGESTIVE HEART FAILURE (H): Status: ACTIVE | Noted: 2021-01-01

## 2021-01-15 NOTE — TELEPHONE ENCOUNTER
Schedulers called Addison to reschedule and appointment and was asked to call Addison as he had some concern.     Called Addison back Has a bunch of water in his testicles over the last month. They've become very swollen slowly over last month.  Doesn't know his weight. Last time he stepped on the scale.   Feels like he has a lot of fluid in his legs and his belly. Sounds SOB over phone and endorses worsening breathing.   I asked him to go step on the scale while we were on the phone so we have an idea where he's at. Weight today 234. Last note in Epic in early December he was 217 lbs. As he was moving to the scale could hear him wheezing.   Hasn't had labs since 12/18 and at that time creatinine was 3.27.   Advised Addison our recommendation would be he go to the ER to be assessed for IV diuresis.   He verbalized understanding and said his significant other can take him there. Patricia Vo RN

## 2021-01-16 NOTE — ED TRIAGE NOTES
Patient came into the ER after getting a call from his doctor for his symptoms.  Pt has had a weight gain of 218lbs in Dec to 234 lbs today.    Pt is also having a lot of swelling in their their testicles for the last month.  They are also feeling short of breath.  Pt is on torsemide and takes 7 pills in the morning and night.

## 2021-01-16 NOTE — ED NOTES
[facility]    ED Nurse to Floor Handoff     Cole Jesus is a 60 year old male who speaks English and lives with others,  in a home  They arrived in the ED by car from home    ED Chief Complaint: Groin Swelling and Shortness of Breath    ED Dx;   Final diagnoses:   Acute on chronic systolic congestive heart failure (H)   Anasarca   Ischemic cardiomyopathy         Needed?: No    Allergies:   Allergies   Allergen Reactions     Hydralazine      Black spots, verified allergic reaction by skin biopsy     Isosorbide      Per pt got a rash all over his body and won't take it no more     Simvastatin Other (See Comments)     Leg muscle weakness     Tylenol [Acetaminophen] Palpitations   .  Past Medical Hx:   Past Medical History:   Diagnosis Date     Anemia in chronic renal disease 03/09/2015     Antiplatelet or antithrombotic long-term use      Atrial fibrillation and flutter      CAD (coronary artery disease)     Stemi in 12/11, s/p angioplasty     CRD (chronic renal disease), stage IV (H) 03/09/2015    hx ATN with dialysis complicating cardiogenic shock  1/2012     GI bleed     massive lower GI bleed secondary to a cecal ulcer, s/p ileocecal resection in 12/11     Heart failure     Biventricular systolic HF, complicated by ARDS requiring tracheostomy     Hyperlipidemia LDL goal <100 04/28/2013     Hypertension      Ischemic cardiomyopathy 04/28/2013    TTE revealing 40% EF     Myocardial infarction (H)     2011 and 6/2018     Other and unspecified nonspecific immunological findings     Anti JKa     Pulmonary edema     episodes of flash pulmonary edema in 12/11     Subclinical hypothyroidism       Baseline Mental status: WDL  Current Mental Status changes: at basesline    Infection present or suspected this encounter: no  Sepsis suspected: No  Isolation type: Special Precautions-COVID-19  Patient tested for COVID 19 prior to admission: YES     Activity level -  Baseline/Home:  Independent  Activity Level - Current:   Independent    Bariatric equipment needed?: No    In the ED these meds were given:   Medications   bumetanide (BUMEX) injection 4 mg (has no administration in time range)   bumetanide (BUMEX) injection 2 mg (2 mg Intravenous Given 1/15/21 2819)       Drips running?  No    Home pump  No    Current LDAs  Peripheral IV 01/15/21 Right Upper forearm (Active)   Site Assessment WDL 01/15/21 2116   Line Status Saline locked 01/15/21 2116   Dressing Intervention New dressing  01/15/21 2116   Phlebitis Scale 0-->no symptoms 01/15/21 2116   Infiltration Scale 0 01/15/21 2116   Number of days: 0       Labs results:   Labs Ordered and Resulted from Time of ED Arrival Up to the Time of Departure from the ED   CBC WITH PLATELETS DIFFERENTIAL - Abnormal; Notable for the following components:       Result Value    Hemoglobin 8.9 (*)     Hematocrit 32.3 (*)     MCV 72 (*)     MCH 19.8 (*)     MCHC 27.6 (*)     RDW 24.5 (*)     Absolute Lymphocytes 0.3 (*)     All other components within normal limits   INR - Abnormal; Notable for the following components:    INR 1.33 (*)     All other components within normal limits   COMPREHENSIVE METABOLIC PANEL - Abnormal; Notable for the following components:    Glucose 124 (*)     Urea Nitrogen 75 (*)     Creatinine 2.76 (*)     GFR Estimate 23 (*)     GFR Estimate If Black 27 (*)     Bilirubin Total 2.3 (*)     Albumin 3.1 (*)     All other components within normal limits   D DIMER QUANTITATIVE - Abnormal; Notable for the following components:    D Dimer 8.3 (*)     All other components within normal limits   NT PROBNP INPATIENT - Abnormal; Notable for the following components:    N-Terminal Pro BNP Inpatient 15,597 (*)     All other components within normal limits   CRP INFLAMMATION - Abnormal; Notable for the following components:    CRP Inflammation 10.0 (*)     All other components within normal limits   MAGNESIUM - Abnormal; Notable  for the following components:    Magnesium 2.7 (*)     All other components within normal limits   TROPONIN I   INFLUENZA A/B & SARS-COV2 PCR MULTIPLEX   PERIPHERAL IV CATHETER   MEASURE WEIGHT       Imaging Studies:   Recent Results (from the past 24 hour(s))   XR Chest Port 1 View    Narrative    EXAM: XR CHEST PORT 1 VW  1/15/2021 9:06 PM     HISTORY:  dyspnea       COMPARISON:  8/13/2020    TECHNIQUE: Single frontal radiograph of the chest    FINDINGS:     Cardiomegaly, stable from prior. Silhouetting of bilateral  hemidiaphragms. Increased peripheral interstitial markings. No  appreciable pneumothorax.       Impression    IMPRESSION: Pulmonary edema and small bilateral pleural effusions.     I have personally reviewed the examination and initial interpretation  and I agree with the findings.    KARSTNE LOVE MD       Recent vital signs:   /82   Pulse 96   Temp 97.6  F (36.4  C) (Oral)   Resp 16   Wt 106.1 kg (234 lb)   SpO2 93%   BMI 35.58 kg/m      Jean Coma Scale Score: 15 (01/15/21 2046)       Cardiac Rhythm: Normal Sinus  Pt needs tele? Yes  Skin/wound Issues: None    Code Status: Full Code    Pain control: fair    Nausea control: pt had none    Abnormal labs/tests/findings requiring intervention: BNP    Family present during ED course? No   Family Comments/Social Situation comments: n/a    Tasks needing completion: None    [place]y [place], RN  [phone] [facility]  [phone] [facility]

## 2021-01-16 NOTE — PROGRESS NOTES
[facility]   Cardiology Consults  Progress Note     Interval History:  - minimal response to total of 6mg bumex last PM.   - this AM has minimal orthopnea or dyspnea. Does still have significant scrotal and lower extremity edema.     Changes today:  - start bumex gtt 1mg/h  - bid BMP  - lymphedema consult      Physical Exam:  Temp:  [97.6  F (36.4  C)] 97.6  F (36.4  C)  Pulse:  [86-97] 91  Resp:  [16-18] 16  BP: ()/(61-90) 103/78  SpO2:  [80 %-99 %] 95 %      Intake/Output Summary (Last 24 hours) at 1/16/2021 1034  Last data filed at 1/16/2021 1025  Gross per 24 hour   Intake --   Output 1600 ml   Net -1600 ml       Wt:   Wt Readings from Last 5 Encounters:   01/15/21 106.1 kg (234 lb)   12/15/20 98.9 kg (218 lb)   11/30/20 98.4 kg (217 lb)   11/12/20 99.2 kg (218 lb 11.1 oz)   08/28/20 93.9 kg (207 lb)     GEN: NAD, awake, alert  Pulm: diminished bases, otherwise CTAB  Cardiac: JVP ~12cm, distant heart sounds, no murmurs  Vascular: 4+ lower extremity edema and palpable pulses  GI: soft, non distended  Neuro: CN II-XII grossly intact    Medications:    aspirin  81 mg Oral Daily     bumetanide  1 mg Intravenous Once     [START ON 1/18/2021] ferrous sulfate  325 mg Oral Q Mon Wed Fri AM     heparin ANTICOAGULANT  5,000 Units Subcutaneous Q12H     insulin aspart  1-7 Units Subcutaneous TID AC     insulin aspart  1-5 Units Subcutaneous At Bedtime     pantoprazole  40 mg Oral BID       - MEDICATION INSTRUCTIONS -         Labs:   CMP  Recent Labs   Lab 01/16/21  0835 01/15/21  2109    134   POTASSIUM 3.8 3.7   CHLORIDE 101 98   CO2 26 27   ANIONGAP 10 9   GLC 92 124*   BUN 77* 75*   CR 2.79* 2.76*   GFRESTIMATED 23* 23*   GFRESTBLACK 27* 27*   RONNY 9.6 9.2   MAG  --  2.7*   PROTTOTAL  --  8.1   ALBUMIN  --  3.1*   BILITOTAL  --  2.3*   ALKPHOS  --  149   AST  --  14   ALT  --  16     CBC  Recent Labs   Lab 01/15/21  2109   WBC 6.3   RBC 4.49   HGB 8.9*   HCT 32.3*   MCV 72*    MCH 19.8*   MCHC 27.6*   RDW 24.5*        INR  Recent Labs   Lab 01/15/21  2109   INR 1.33*     Arterial Blood GasNo lab results found in last 7 days.    Diagnostics:    Echo:    2/10/20  Interpretation Summary  Severely (EF 29%) reduced left ventricular function is present. Severe diffuse  hypokinesis is present.  Global right ventricular function is moderately reduced.  Moderate mitral insufficiency is present.  Mild to moderate tricuspid insufficiency is present.  Pulmonary hypertension present. Estimated pulmonary artery systolic pressure  is 69 (54+15) mmHg .  Dilation of the inferior vena cava is present with abnormal respiratory  variation in diameter.  Small posterior pericardial effusion without echocardiographic evidence of  tamponade.        Cath:   Last right heart cath 2/10/20  RA --/24/22  /20  /50/65  PCWP --/51/40    DONOVAN 3.5/1.6  TD 3.8/1.8    PVR 8.5 Right sided filling pressures are moderately elevated.Left sided filling pressures are severely elevated. Severely elevated pulmonary artery hypertension.Reduced cardiac output level.Hemodynamic data has been modified in Epic per physician review.    ASSESSMENT/PLAN:  60 year old male with PMHx most significant for HFrEF 30% 2/2 ICM, severe PH, moderate MR, STEMI x/p DESx3 (2012) & PCI prox RCA 2019, CKD IV, paroxysmal afib not on AC, anemia with hx of GIB due to AVMs who p/w acute on chronic heart failure exacerbation.     #. Acute on chronic HFrEF 30% 2/2 ICM  [facility]ed [facility] and Dr. Trujillo. Last TTE with diffuse hypokinesis and LVEF of 30%. Has not tolerated BB, not on ACEi due to hyperkalemia and renal dysfunction, not on MRA due to renal dysfunction, has declined ICD multiple times in past despite being full code. Home diuretic dose is torsemide 80 mg BID and metolazone 2.5 3x/week, which he states he has been compliant with. Does endorse dietary indiscretion. Noted to have a wedge of 40 during RHC 1 year ago at a  weight of 212lbs. Suspect dry weight at least 200, if not lower. 234 on admission.  - strict I/Os  - not on ACE/ARB due to renal dysfunction  - has not tolerated BB in past (although unclear what occurred when starting)  - lymphedema consult  - given 6mg bumex this AM.   - start bumex 1mg/h  - bid BMP while diuresing      Chronic Issues  CAD - prior STEMI with DEX x 3 in 2012, PCI prox RCA 2019. C/t ASA 81 daily  WILLIAM - c/t iron  GERD - c/t protonix  CKD 4, with serum creatine about baseline   DM2 - diet controlled, SSI  Afib, paroxysmal CHADs Vasc 3 - not on AC, as has had recurrent GI bleeds     PPX:   -- VTE: Heparin     Patient discussed with Dr. Lincoln Mcdermott, who agrees with above plan.      Jose A Martin PA-C  [facility] Cardiology Team

## 2021-01-16 NOTE — ED PROVIDER NOTES
ED Provider Note  [facility]      History     Chief Complaint   Patient presents with     Groin Swelling     Shortness of Breath     HPI  Cole Jesus is a 60 year old male who presents for dyspnea, abdominal distension, thigh and scrotal swelling, and weight gain of 15 lbs. He has a history of ischemic cardiomyopathy with low EF. He had occlusion of the RCA and past stenting of the RCA and LCX. He had EF of 29% with diffuse hypokinesis, moderate mitral insufficiency, mild to moderate tricuspid deficiency and pulmonary HTN on his last echocardiogram in 2/2020. He has been followed by the [facility]. He has been on oral metolazone and torsemide 80 mg BID. He weighed about 210 lbs at lowest last year and most recent prior weight was 218 lbs. He contacted his cardiology nurse today and was advised to come in. He has dyspnea on exertion, but is comfortable at rest. He denies chest pain or palpitations. He denies abdominal pain. He has had past GI bleeds related to AVM, but has noted no blood in the stool recently. He notes increased abdominal girth and swelling of his LEs down to the calves, but no ankle swelling. He denies fever, chills, URI, sweats, cough, wheezing, or shortness of breath.     Past Medical History:   Diagnosis Date     Anemia in chronic renal disease 03/09/2015     Antiplatelet or antithrombotic long-term use      Atrial fibrillation and flutter      CAD (coronary artery disease)     Stemi in 12/11, s/p angioplasty     CRD (chronic renal disease), stage IV (H) 03/09/2015    hx ATN with dialysis complicating cardiogenic shock  1/2012     GI bleed     massive lower GI bleed secondary to a cecal ulcer, s/p ileocecal resection in 12/11     Heart failure     Biventricular systolic HF, complicated by ARDS requiring tracheostomy     Hyperlipidemia LDL goal <100 04/28/2013     Hypertension      Ischemic cardiomyopathy 04/28/2013    TTE revealing 40% EF     Myocardial  infarction (H)      and 2018     Other and unspecified nonspecific immunological findings     Anti JKa     Pulmonary edema     episodes of flash pulmonary edema in      Subclinical hypothyroidism        Past Surgical History:   Procedure Laterality Date     CV RIGHT HEART CATH N/A 2019    Procedure: CV RIGHT HEART CATH;  Surgeon: Fox Gerard MD;  Location:  HEART CARDIAC CATH LAB     CV RIGHT HEART CATH N/A 2/10/2020    Procedure: CV RIGHT HEART CATH;  Surgeon: Fox Gerard MD;  Location:  HEART CARDIAC CATH LAB     ESOPHAGOSCOPY, GASTROSCOPY, DUODENOSCOPY (EGD), COMBINED  2011    Procedure:COMBINED ESOPHAGOSCOPY, GASTROSCOPY, DUODENOSCOPY (EGD); Surgeon:SAMARIA PUENTE; Location: GI     LAPAROTOMY EXPLORATORY  2011    Procedure:LAPAROTOMY EXPLORATORY; Explore laparotomy, Illeocectomy, Diverting Illeostomy; Surgeon:GIA NAJERA; Location:[facility]     ORIF right elbow fracture  age 12     TAKEDOWN ILEOSTOMY  2014    Procedure: TAKEDOWN ILEOSTOMY;  Surgeon: Gia Najera MD;  Location: [facility] OR     TRACHEOSTOMY  2011    Procedure:TRACHEOSTOMY; Tracheostomy; 80XLTCP-Proximal Extension-Cuffed 8.0 mm I.D.; Surgeon:LIZABETH DYE; Location[facility] OR       Family History   Problem Relation Age of Onset     Diabetes Mother      Hypertension Mother      Heart Disease Mother      Myocardial Infarction Mother 68         of     Myocardial Infarction Father 54         of heart attack, left when he was young     Heart Disease Father      Diabetes Sister      Ovarian Cancer Sister          of     Diabetes Sister      Diabetes Sister      Crohn's Disease Daughter 36     Glaucoma No family hx of      Macular Degeneration No family hx of      Colon Cancer No family hx of      Ulcerative Colitis No family hx of      Irritable Bowel Syndrome No family hx of      Inflammatory Bowel Disease No family hx of         Social History     Tobacco Use     Smoking status: Former Smoker     Packs/day: 2.00     Years: 40.00     Pack years: 80.00     Types: Cigarettes     Quit date: 12/3/2011     Years since quittin.1     Smokeless tobacco: Never Used   Substance Use Topics     Alcohol use: No     Alcohol/week: 0.0 standard drinks           Review of Systems   Constitutional: Negative for chills and fever.   HENT: Negative for congestion and trouble swallowing.    Eyes: Negative for visual disturbance.   Respiratory: Positive for shortness of breath. Negative for cough and wheezing.    Cardiovascular: Positive for leg swelling. Negative for chest pain.   Gastrointestinal: Positive for abdominal distention. Negative for nausea and vomiting.   Genitourinary: Negative for difficulty urinating.   Musculoskeletal: Negative for back pain and neck pain.   Skin: Negative for rash.   Neurological: Negative for weakness, light-headedness and numbness.   Hematological: Negative for adenopathy.   Psychiatric/Behavioral: Negative for confusion.         Physical Exam   BP: 102/62  Pulse: 97  Temp: 97.6  F (36.4  C)  Resp: 18  Weight: 106.1 kg (234 lb)  SpO2: 96 %  Physical Exam  Vitals signs and nursing note reviewed.   Constitutional:       Appearance: He is well-developed.   HENT:      Head: Normocephalic and atraumatic.      Mouth/Throat:      Mouth: Mucous membranes are moist.   Eyes:      Extraocular Movements: Extraocular movements intact.      Pupils: Pupils are equal, round, and reactive to light.   Neck:      Musculoskeletal: Normal range of motion.   Cardiovascular:      Rate and Rhythm: Normal rate and regular rhythm.      Heart sounds: No murmur.   Pulmonary:      Effort: Pulmonary effort is normal.      Breath sounds: Normal breath sounds. No wheezing or rales.   Abdominal:      General: Abdomen is flat. There is distension.      Palpations: Abdomen is soft. There is fluid wave.      Tenderness: There is no abdominal  tenderness.   Musculoskeletal:      Right lower leg: Edema present.      Left lower leg: Edema present.   Skin:     General: Skin is warm and dry.   Neurological:      General: No focal deficit present.      Mental Status: He is alert and oriented to person, place, and time.      Cranial Nerves: No cranial nerve deficit.      Motor: No weakness.   Psychiatric:         Mood and Affect: Mood normal.         Behavior: Behavior normal.       ED Course      Procedures             EKG Interpretation:      Interpreted by DAVINA ZAMUDIO MD  Time reviewed: 2144  Symptoms at time of EKG: dyspnea   Rhythm: normal sinus   Rate: Normal  Axis: Normal  Ectopy: premature ventricular contractions (infrequent)  Conduction: Low voltage diffuse  ST Segments/ T Waves: No ST-T wave changes and No acute ischemic changes  Q Waves: none  Comparison to prior: Unchanged from 11/12/20    Clinical Impression: Low voltage and no acute changes         Results for orders placed or performed during the hospital encounter of 01/15/21   XR Chest Port 1 View     Status: None    Narrative    EXAM: XR CHEST PORT 1 VW  1/15/2021 9:06 PM     HISTORY:  dyspnea       COMPARISON:  8/13/2020    TECHNIQUE: Single frontal radiograph of the chest    FINDINGS:     Cardiomegaly, stable from prior. Silhouetting of bilateral  hemidiaphragms. Increased peripheral interstitial markings. No  appreciable pneumothorax.       Impression    IMPRESSION: Pulmonary edema and small bilateral pleural effusions.     I have personally reviewed the examination and initial interpretation  and I agree with the findings.    KARSTEN LOVE MD   CBC with platelets differential     Status: Abnormal   Result Value Ref Range    WBC 6.3 4.0 - 11.0 10e9/L    RBC Count 4.49 4.4 - 5.9 10e12/L    Hemoglobin 8.9 (L) 13.3 - 17.7 g/dL    Hematocrit 32.3 (L) 40.0 - 53.0 %    MCV 72 (L) 78 - 100 fl    MCH 19.8 (L) 26.5 - 33.0 pg    MCHC 27.6 (L) 31.5 - 36.5 g/dL    RDW 24.5 (H) 10.0 - 15.0 %     Platelet Count 294 150 - 450 10e9/L    Diff Method Automated Method     % Neutrophils 83.1 %    % Lymphocytes 4.6 %    % Monocytes 11.0 %    % Eosinophils 0.6 %    % Basophils 0.5 %    % Immature Granulocytes 0.2 %    Nucleated RBCs 0 0 /100    Absolute Neutrophil 5.2 1.6 - 8.3 10e9/L    Absolute Lymphocytes 0.3 (L) 0.8 - 5.3 10e9/L    Absolute Monocytes 0.7 0.0 - 1.3 10e9/L    Absolute Eosinophils 0.0 0.0 - 0.7 10e9/L    Absolute Basophils 0.0 0.0 - 0.2 10e9/L    Abs Immature Granulocytes 0.0 0 - 0.4 10e9/L    Absolute Nucleated RBC 0.0    INR     Status: Abnormal   Result Value Ref Range    INR 1.33 (H) 0.86 - 1.14   Comprehensive metabolic panel     Status: Abnormal   Result Value Ref Range    Sodium 134 133 - 144 mmol/L    Potassium 3.7 3.4 - 5.3 mmol/L    Chloride 98 94 - 109 mmol/L    Carbon Dioxide 27 20 - 32 mmol/L    Anion Gap 9 3 - 14 mmol/L    Glucose 124 (H) 70 - 99 mg/dL    Urea Nitrogen 75 (H) 7 - 30 mg/dL    Creatinine 2.76 (H) 0.66 - 1.25 mg/dL    GFR Estimate 23 (L) >60 mL/min/[1.73_m2]    GFR Estimate If Black 27 (L) >60 mL/min/[1.73_m2]    Calcium 9.2 8.5 - 10.1 mg/dL    Bilirubin Total 2.3 (H) 0.2 - 1.3 mg/dL    Albumin 3.1 (L) 3.4 - 5.0 g/dL    Protein Total 8.1 6.8 - 8.8 g/dL    Alkaline Phosphatase 149 40 - 150 U/L    ALT 16 0 - 70 U/L    AST 14 0 - 45 U/L   Troponin I     Status: None   Result Value Ref Range    Troponin I ES 0.016 0.000 - 0.045 ug/L   D dimer quantitative     Status: Abnormal   Result Value Ref Range    D Dimer 8.3 (H) 0.0 - 0.50 ug/ml FEU   Nt probnp inpatient (BNP)     Status: Abnormal   Result Value Ref Range    N-Terminal Pro BNP Inpatient 15,597 (H) 0 - 900 pg/mL   CRP inflammation     Status: Abnormal   Result Value Ref Range    CRP Inflammation 10.0 (H) 0.0 - 8.0 mg/L   Symptomatic Influenza A/B & SARS-CoV2 (COVID-19) Virus PCR Multiplex     Status: None    Specimen: Nasopharyngeal   Result Value Ref Range    Flu A/B & SARS-COV-2 PCR Source Nasopharyngeal      SARS-CoV-2 PCR Result NEGATIVE     Influenza A PCR Negative NEG^Negative    Influenza B PCR Negative NEG^Negative    Respiratory Syncytial Virus PCR Negative NEG^Negative    Flu A/B & SARS-CoV-2 PCR Comment (Note)    Magnesium     Status: Abnormal   Result Value Ref Range    Magnesium 2.7 (H) 1.6 - 2.3 mg/dL   EKG 12-lead, tracing only     Status: None (Preliminary result)   Result Value Ref Range    Interpretation ECG Click View Image link to view waveform and result      Medications   bumetanide (BUMEX) injection 4 mg (has no administration in time range)   bumetanide (BUMEX) injection 2 mg (2 mg Intravenous Given 1/15/21 1312)        Assessments & Plan (with Medical Decision Making)   Impression:  Middle aged male with history of dilated ischemic cardiomyopathy presents with significant weight gain, ascites and anasarca despite reported compliance with high dose oral diuretics. His renal status appears stable. Albumin is minimally low. BNP is elevated from baseline. Overall this appears to be exacerbation of CHF. With abdominal 3rd spacing oral absorption may be compromised. There is no obvious evidence of focal DVT or PE on exam. Other than distension, the abdominal exam is benign. Interpretation of the D-dimer is difficult in setting of his CKD. I do not feel further studies for thromboembolic process are indicated at present. He will be admitted to the cardiology 2 service for management of his heart failure.    I have reviewed the nursing notes. I have reviewed the findings, diagnosis, plan and need for follow up with the patient.    New Prescriptions    No medications on file       Final diagnoses:   Acute on chronic systolic congestive heart failure (H)   Anasarca   Ischemic cardiomyopathy       --  Zach Mc  [facility] EMERGENCY DEPARTMENT  1/15/2021     Zach Mc MD  01/15/21 [id number]

## 2021-01-16 NOTE — ED PROVIDER NOTES
ED Provider Note  [facility]      History   No chief complaint on file.    HPI  Cole Jesus is a 60 year old male with a past medical history significant for iron deficiency anemia, DM 2, STEMI, A. fib, HTN, CKD stage IV, HLD and coronary artery disease who presents here to the Emergency Department due to water retention, groin swelling, and shortness of breath.    Per chart review, patient was admitted here from 11/12/2020-11/13/2020 for chronic anemia and generalized weakness.  He was treated with red blood cell infusion IV PPI.  He was evaluated by gastroenterology during his admission.  He did not receive an endoscopy.  He also received 1 infusion of IV iron due to his severe iron deficiency.  He was discharged on twice daily oral PPI as well as iron supplement.  He was told to follow-up with gastroenterology and was discharged.   ***    Past Medical History  Past Medical History:   Diagnosis Date     Anemia in chronic renal disease 03/09/2015     Antiplatelet or antithrombotic long-term use      Atrial fibrillation and flutter      CAD (coronary artery disease)     Stemi in 12/11, s/p angioplasty     CRD (chronic renal disease), stage IV (H) 03/09/2015    hx ATN with dialysis complicating cardiogenic shock  1/2012     GI bleed     massive lower GI bleed secondary to a cecal ulcer, s/p ileocecal resection in 12/11     Heart failure     Biventricular systolic HF, complicated by ARDS requiring tracheostomy     Hyperlipidemia LDL goal <100 04/28/2013     Hypertension      Ischemic cardiomyopathy 04/28/2013    TTE revealing 40% EF     Myocardial infarction (H)     2011 and 6/2018     Other and unspecified nonspecific immunological findings     Anti JKa     Pulmonary edema     episodes of flash pulmonary edema in 12/11     Subclinical hypothyroidism      Past Surgical History:   Procedure Laterality Date     CV RIGHT HEART CATH N/A 11/12/2019    Procedure: CV RIGHT HEART CATH;   Surgeon: Fox Gerard MD;  Location:  HEART CARDIAC CATH LAB     CV RIGHT HEART CATH N/A 2/10/2020    Procedure: CV RIGHT HEART CATH;  Surgeon: Fox Gerard MD;  Location:  HEART CARDIAC CATH LAB     ESOPHAGOSCOPY, GASTROSCOPY, DUODENOSCOPY (EGD), COMBINED  12/25/2011    Procedure:COMBINED ESOPHAGOSCOPY, GASTROSCOPY, DUODENOSCOPY (EGD); Surgeon:SAMARIA PUENTE; [facility]     LAPAROTOMY EXPLORATORY  12/31/2011    Procedure:LAPAROTOMY EXPLORATORY; Explore laparotomy, Illeocectomy, Diverting Illeostomy; Surgeon:GIA NAJERA; Location:[facility] OR     ORIF right elbow fracture  age 12     TAKEDOWN ILEOSTOMY  6/5/2014    Procedure: TAKEDOWN ILEOSTOMY;  Surgeon: Gia Najera MD;  Location: [facility] OR     TRACHEOSTOMY  12/22/2011    Procedure:TRACHEOSTOMY; Tracheostomy; 80XLTCP-Proximal Extension-Cuffed 8.0 mm I.D.; Surgeon:LZIABETH DYE; Location:[facility] OR          ACCU-CHEK GUIDE test strip       Alcohol Swabs PADS       aspirin (ASA) 81 MG chewable tablet       blood glucose monitoring (ACCU-CHEK FASTCLIX) lancets       ferrous sulfate (FEROSUL) 325 (65 Fe) MG tablet       insulin pen needle (32G X 4 MM) 32G X 4 MM miscellaneous       metolazone (ZAROXOLYN) 2.5 MG tablet       pantoprazole (PROTONIX) 40 MG EC tablet       pantoprazole (PROTONIX) 40 MG EC tablet       potassium chloride ER (KLOR-CON M) 20 MEQ CR tablet       senna-docusate (SENOKOT-S/PERICOLACE) 8.6-50 MG tablet       Sharps Container MISC       torsemide (DEMADEX) 20 MG tablet       vitamin  B complex with vitamin C (VITAMIN  B COMPLEX) TABS       Vitamin D, Cholecalciferol, 25 MCG (1000 UT) CAPS      Allergies   Allergen Reactions     Hydralazine      Black spots, verified allergic reaction by skin biopsy     Isosorbide      Per pt got a rash all over his body and won't take it no more     Simvastatin Other (See Comments)     Leg muscle weakness     Tylenol [Acetaminophen]  "Palpitations     Family History  Family History   Problem Relation Age of Onset     Diabetes Mother      Hypertension Mother      Heart Disease Mother      Myocardial Infarction Mother 68         of     Myocardial Infarction Father 54         of heart attack, left when he was young     Heart Disease Father      Diabetes Sister      Ovarian Cancer Sister          of     Diabetes Sister      Diabetes Sister      Crohn's Disease Daughter 36     Glaucoma No family hx of      Macular Degeneration No family hx of      Colon Cancer No family hx of      Ulcerative Colitis No family hx of      Irritable Bowel Syndrome No family hx of      Inflammatory Bowel Disease No family hx of      Social History   Social History     Tobacco Use     Smoking status: Former Smoker     Packs/day: 2.00     Years: 40.00     Pack years: 80.00     Types: Cigarettes     Quit date: 12/3/2011     Years since quittin.1     Smokeless tobacco: Never Used   Substance Use Topics     Alcohol use: No     Alcohol/week: 0.0 standard drinks     Drug use: No      Past medical history, past surgical history, medications, allergies, family history, and social history were reviewed with the patient. No additional pertinent items.       Review of Systems  {Complete vs limited ROS:773191::\"A complete review of systems was performed with pertinent positives and negatives noted in the HPI, and all other systems negative.\"}    Physical Exam      Physical Exam  ***  ED Course      Procedures        {EKG done?:541090::\" \"}    {ed addendum:971707::\" \"}  {trauma activation or Fall?:899680::\" \"}  {Sepsis/Septic Shock/Stemi/Stroke:811683::\" \"}     No results found for any visits on 01/15/21.  Medications - No data to display     Assessments & Plan (with Medical Decision Making)   ***    I have reviewed the nursing notes. I have reviewed the findings, diagnosis, plan and need for follow up with the patient.    New Prescriptions    No medications on file "       Final diagnoses:   None       --  ***  [facility] EMERGENCY DEPARTMENT  1/15/2021

## 2021-01-16 NOTE — H&P
Cardiology History and Physical  Date of service: 1/15/2021  Attending physician: Lincoln Baptiste      Patient: Cole Jesus      : 1960      MRN: [id number]          Assessment/Plan:   60 year old male with PMHx most significant for HFrEF 30% 2/2 ICM, severe PH, moderate MR, STEMI x/p DESx3 () & PCI prox RCA 2019, CKD IV, paroxysmal afib not on AC, anemia with hx of GIB due to AVMs who p/w dyspnea, abdominal distension, thigh and scrotal swelling, weight gain of 15 lbs. He contacted [facility] today and was advised that he should go to the ED for his apparent heart failure exacerbation. In the ED he received a total of 6 mg Bumex (2 mg @ 22:50, 4 mg @ 23:20).  Will will watch his urine output after these doses and potentially add additional loop diuretic/metolazone as needed.    #. Acute on chronic HFrEF 30% 2/2 ICM  Followed by [facility] clinic and Dr. Trujillo. Last TTE with diffuse hypokinesis and LVEF of 30%. Has not tolerated BB, not on ACEi due to hyperkalemia and renal dysfunction, not on MRA due to renal dysfunction, has declined ICD multiple times in past despite being full code. Home diuretic dose is torsemide 80 mg BID and metolazone 2.5 3x/week. Has gained a significant amount of weight: 234 from 218  Just one month ago. He said that he takes all of his medications, no recent episodes CP, he doesn't think he's been eating more salty food recently.   - Diurese to net negative 2-3 liters daily  - Will give another 6 mg Bumex this monring  - Daily weights, strict I/O      Chronic Issues  CAD - prior STEMI with DEX x 3 in , PCI prox RCA 2019. C/t ASA 81 daily  WILLIAM - c/t iron  GERD - c/t protonix  CKD 4, with serum creatine about baseline   DM2 - diet controlled, SSI  Afib, paroxysmal CHADs Vasc 3 - not on AC, as has had recurrent GI bleeds    PPX:   -- VTE: Heparin     Code Status: Full    Dispo: Admit to Cards 1    Patient to be formally staffed in the a.m.    Pablo Lora MD MPH  PGY3  [phone]          Chief complaint:   Shortness of breath, weight gain          History of Present Illness:   60 year old male with PMHx most significant for HFrEF 30% 2/2 ICM, severe PH, moderate MR, STEMI x/p DESx3 (2012) & PCI prox RCA 2019, CKD IV, paroxysmal afib not on AC, anemia with hx of GIB due to AVMs who p/w dyspnea, abdominal distension, thigh and scrotal swelling, weight gain of 15 lbs. He contacted [facility] today and was advised that he should go to the ED for his apparent heart failure exacerbation.  He had a CXR in the ED that showed pulmonary edema and small bilateral pleural effusions. On labs with stable microcytic anemia, INR of 1.33 (not on VKA), BNP of 15,500. Troponin not elevated, no ischemic signs on EKG.  Other notable labs include d-dimer of 8.3, CRP of 10. Respiratory viral panel (including flu and COVID) negative. In the ED he received a total of 6 mg Bumex (2 mg @ 22:50, 4 mg @ 23:20).    He tells me that the main thing he has noticed is that his has more lower extremity edema, the size of his testicles has increased. Not feeling more short of breath or orthopnic.          Review of Symptoms:   10-point ROS reviewed, & found negative w/ exceptions noted in the HPI.        Most Recent Imaging:     Echo:    2/10/20  Interpretation Summary  Severely (EF 29%) reduced left ventricular function is present. Severe diffuse  hypokinesis is present.  Global right ventricular function is moderately reduced.  Moderate mitral insufficiency is present.  Mild to moderate tricuspid insufficiency is present.  Pulmonary hypertension present. Estimated pulmonary artery systolic pressure  is 69 (54+15) mmHg .  Dilation of the inferior vena cava is present with abnormal respiratory  variation in diameter.  Small posterior pericardial effusion without echocardiographic evidence of  tamponade.      Cath:   Last right heart cath 2/10/20  RA --/24/22  /20  /50/65  PCWP --/51/40    DONOVAN  3.5/1.6  TD 3.8/1.8    PVR 8.5 Right sided filling pressures are moderately elevated.Left sided filling pressures are severely elevated. Severely elevated pulmonary artery hypertension.Reduced cardiac output level.Hemodynamic data has been modified in Epic per physician review.                    Past Medical History:     Past Medical History:   Diagnosis Date     Anemia in chronic renal disease 03/09/2015     Antiplatelet or antithrombotic long-term use      Atrial fibrillation and flutter      CAD (coronary artery disease)     Stemi in 12/11, s/p angioplasty     CRD (chronic renal disease), stage IV (H) 03/09/2015    hx ATN with dialysis complicating cardiogenic shock  1/2012     GI bleed     massive lower GI bleed secondary to a cecal ulcer, s/p ileocecal resection in 12/11     Heart failure     Biventricular systolic HF, complicated by ARDS requiring tracheostomy     Hyperlipidemia LDL goal <100 04/28/2013     Hypertension      Ischemic cardiomyopathy 04/28/2013    TTE revealing 40% EF     Myocardial infarction (H)     2011 and 6/2018     Other and unspecified nonspecific immunological findings     Anti JKa     Pulmonary edema     episodes of flash pulmonary edema in 12/11     Subclinical hypothyroidism        Past Surgical History:   Procedure Laterality Date     CV RIGHT HEART CATH N/A 11/12/2019    Procedure: CV RIGHT HEART CATH;  Surgeon: Fox Gerard MD;  Location:  HEART CARDIAC CATH LAB     CV RIGHT HEART CATH N/A 2/10/2020    Procedure: CV RIGHT HEART CATH;  Surgeon: Fox Gerard MD;  Location:  HEART CARDIAC CATH LAB     ESOPHAGOSCOPY, GASTROSCOPY, DUODENOSCOPY (EGD), COMBINED  12/25/2011    Procedure:COMBINED ESOPHAGOSCOPY, GASTROSCOPY, DUODENOSCOPY (EGD); Surgeon:SAMARIA PUENTE; Location: GI     LAPAROTOMY EXPLORATORY  12/31/2011    Procedure:LAPAROTOMY EXPLORATORY; Explore laparotomy, Illeocectomy, Diverting Illeostomy; Surgeon:GABI NAJERA;  Location:[facility] OR     ORIF right elbow fracture  age 12     TAKEDOWN ILEOSTOMY  6/5/2014    Procedure: TAKEDOWN ILEOSTOMY;  Surgeon: Gia Mosher MD;  Location: [facility] OR     TRACHEOSTOMY  12/22/2011    Procedure:TRACHEOSTOMY; Tracheostomy; 80XLTCP-Proximal Extension-Cuffed 8.0 mm I.D.; Surgeon:LIZABETH DYE; Location:[facility] OR            Allergies:     Allergies   Allergen Reactions     Hydralazine      Black spots, verified allergic reaction by skin biopsy     Isosorbide      Per pt got a rash all over his body and won't take it no more     Simvastatin Other (See Comments)     Leg muscle weakness     Tylenol [Acetaminophen] Palpitations             Outpatient Medications:     No current facility-administered medications on file prior to encounter.        ACCU-CHEK GUIDE test strip, USE TO TEST BLOOD SUGAR FOUR TIMES A DAY BEFORE MEALS AND AT BEDTIME       Alcohol Swabs PADS, 1 applicator 4 times daily (before meals and nightly)       aspirin (ASA) 81 MG chewable tablet, Take 81 mg by mouth daily       blood glucose monitoring (ACCU-CHEK FASTCLIX) lancets, USE TO TEST BLOOD SUGAR FOUR TIMES A DAY AT MEALS AND AT BEDTIME       ferrous sulfate (FEROSUL) 325 (65 Fe) MG tablet, Take 1 tablet (325 mg) by mouth Every Mon, Wed, Fri Morning       insulin pen needle (32G X 4 MM) 32G X 4 MM miscellaneous, Use 5 pen needles daily or as directed.       metolazone (ZAROXOLYN) 2.5 MG tablet, Take 1 tablet (2.5 mg) by mouth three times a week       pantoprazole (PROTONIX) 40 MG EC tablet, Take 1 tablet (40 mg) by mouth 2 times daily       pantoprazole (PROTONIX) 40 MG EC tablet, Take 1 tablet (40 mg) by mouth 2 times daily       potassium chloride ER (KLOR-CON M) 20 MEQ CR tablet, Take 2 tablets (40 mEq) by mouth 2 times daily       senna-docusate (SENOKOT-S/PERICOLACE) 8.6-50 MG tablet, Take 1 tablet by mouth 2 times daily as needed for constipation       Sharps Container MISC, 1 Device every 30 days        torsemide (DEMADEX) 20 MG tablet, Take 4 tablets (80 mg) by mouth 2 times daily       vitamin  B complex with vitamin C (VITAMIN  B COMPLEX) TABS, Take 1 tablet by mouth daily       Vitamin D, Cholecalciferol, 25 MCG (1000 UT) CAPS, Take 1 capsule by mouth 2 times daily              Family History:   Non contributory          Social History:   Tobacco: Former smoker, 80 PY hx, quit   EtOH:  Reports no alcohol use  Illicit substances:  none          Physical Exam:   /82   Pulse 96   Temp 97.6  F (36.4  C) (Oral)   Resp 16   Wt 106.1 kg (234 lb)   SpO2 93%   BMI 35.58 kg/m    Temp (24hrs), Av.6  F (36.4  C), Min:97.6  F (36.4  C), Max:97.6  F (36.4  C)      General: No acute disstress, pt laying flat  HEENT: PERRL   CV: RRR, systolic 2/6 murmur heard best at apex. Elevated JVD  Lungs: CTAB, no wheezing/crackles, no cough  Abd: Distended, with anasarca  Ext: BLE edema  Skin: no rashes, cyanosis, or jaundice  Neuro: neuro intact          Data:   Labs (all laboratory studies reviewed by me):     ROUTINE ICU LABS (Last four results)  CMP  Recent Labs   Lab 01/15/21  2109      POTASSIUM 3.7   CHLORIDE 98   CO2 27   ANIONGAP 9   *   BUN 75*   CR 2.76*   GFRESTIMATED 23*   GFRESTBLACK 27*   RONNY 9.2   MAG 2.7*   PROTTOTAL 8.1   ALBUMIN 3.1*   BILITOTAL 2.3*   ALKPHOS 149   AST 14   ALT 16     CBC  Recent Labs   Lab 01/15/21  2109   WBC 6.3   RBC 4.49   HGB 8.9*   HCT 32.3*   MCV 72*   MCH 19.8*   MCHC 27.6*   RDW 24.5*        INR  Recent Labs   Lab 01/15/21  2109   INR 1.33*     Arterial Blood GasNo lab results found in last 7 days.  Labs Reviewed on Admission  Pertinent for:  Lab Results   Component Value Date    TROPI 0.016 01/15/2021    TROPI 0.016 2020    TROPI <0.015 2019    TROPI <0.015 2019    TROPI <0.015 2019                Imaging (all imaging studies reviewed by me):    #. CXR   IMPRESSION: Pulmonary edema and small bilateral pleural effusions.

## 2021-01-17 NOTE — PROVIDER NOTIFICATION
Provider was notified regarding pt refusing meds and BS checks. Also asked about K+ replacement protocol. Day provider will review and get back to day nurse.

## 2021-01-17 NOTE — PLAN OF CARE
D: pt admitted on 1/15/21 for acute on chronic heart failure exacerbation. PMH of HFrEF 30% 2/2 ICM, severe PH, moderate MR, STEMI s/; PCI (2012, 2019) , CKD IV, paroxysmal afib not on AC, anemia with hx of GIB due to AVMs.     I: Monitored vitals and assessed pt status.   Changed:  -started on Spironolactone 25 mg daily   -Bumex decreased to 0.5 mg/hr      Running:   Bumex 0.5 mg/hr      Vitals:  Temp: 97.8  F (36.6  C) Temp src: Oral BP: 98/83 Pulse: 89   Resp: 18 SpO2: 95 % O2 Device: None (Room air)      A:   Neuro: A&O x 4. Neurologically intact. Denies Headache, dizziness,  numbness and tingling.  calls appropriately.   Cardiac: SR with frequent PVC's and PAC's. Denies chest pain   Respiratory: sating > 92% on RA. KING.   Diet/appetite: cardiac diet, good appetite   Endocrine: BS check AC/HS. Pt refuse sliding scale insuline.   GI/: BM x1. Denies abdominal pain and nausea. Good urine output, voids without difficulty   Activity: independent   Pain: denies pain   Skin: no new deficit noted. Wound on RLE, WOC RN consult placed.   LDAs: PIV X 1   Labs:  K 3.0. replaced per order      P: Continue to monitor Pt status and report changes to treatment team.

## 2021-01-17 NOTE — PLAN OF CARE
"Temp: 97.5  F (36.4  C) Temp src: Oral BP: 101/77 Pulse: 96   Resp: 18 SpO2: 95 % O2 Device: None (Room air)      Running: Bumex 1 mg/hr    A:   Neuro: A/Ox4. Calls appropriately. Refused nighttime meds. Said he feels like the Heparin is something he shouldn't have and Protonix gives his heartburn. Also refused blood sugar checks. Reports he has a regime that works well for him so \"no point of checking it\".  Cardiac/Tele:  VVS. SR. Denies chest pain  Respiratory: Room air.   GI/: Continent. Urinal and bedside. Urinating adequately. Had a BM  Diet/Appetite: Cardiac diet.  Skin: No new deficits noted. Dressing on L sheen CDI  LDAs: PIV- infusing  Activity: Standby assist  Pain: Denies pain    P: Continue to monitor and notify team with changes.    "

## 2021-01-17 NOTE — PROGRESS NOTES
Admission     Admitted from:  [facility]  Via: mis   Reason for admission:   Accompanied by: transport   Family aware of admission: Yes   Belongings: Pt declines sending any belongings/valuables to security at this time. medications from home send to security   Access: R PIV  Tele: placed on pt  Height/weight/VS :  obtained  Admission profile:  completed  Teaching: Oriented pt to room, use of call light, unit routine. Educated pt on importance of safety, infection prevention, when to call RN (angina, SOB, lightheadedness/dizziness, etc). Pt verbalized understanding.  Skin assessment:   2 RN full   skin assessment completed by Sohail Benedict RN and Prema EVANS RN  Skin assessment finding: issues found wound on right lower leg.    Interventions/actions: skin interventions provider notified, wound covered with dry gauze.   Will continue to monitor.

## 2021-01-17 NOTE — PROGRESS NOTES
[facility]   Cardiology Consults  Progress Note     Interval History:  - responding well to bumex drip  - reports some improvement in abdominal and LE edema    Changes today:  - decrease bumex to 0.5mg/h  - replete potassium    Physical Exam:  Temp:  [97.1  F (36.2  C)-98.1  F (36.7  C)] 97.4  F (36.3  C)  Pulse:  [] 89  Resp:  [16-18] 16  BP: (101-112)/(76-86) 103/77  SpO2:  [93 %-96 %] 96 %      Intake/Output Summary (Last 24 hours) at 1/17/2021 1146  Last data filed at 1/17/2021 0800  Gross per 24 hour   Intake 454.53 ml   Output 2200 ml   Net -1745.47 ml       Wt:   Wt Readings from Last 5 Encounters:   01/17/21 103.5 kg (228 lb 3.2 oz)   12/15/20 98.9 kg (218 lb)   11/30/20 98.4 kg (217 lb)   11/12/20 99.2 kg (218 lb 11.1 oz)   08/28/20 93.9 kg (207 lb)     GEN: NAD, awake, alert  Pulm: diminished bases, otherwise CTAB  Cardiac: JVP to mid neck, distant heart sounds, no murmurs  Vascular: 4+ lower extremity edema and palpable pulses  GI: soft, non distended  Neuro: CN II-XII grossly intact    Medications:    aspirin  81 mg Oral Daily     [START ON 1/18/2021] ferrous sulfate  325 mg Oral Q Mon Wed Fri AM     insulin aspart  1-7 Units Subcutaneous TID AC     insulin aspart  1-5 Units Subcutaneous At Bedtime     pantoprazole  40 mg Oral BID       bumetanide 0.5 mg/hr (01/17/21 0750)     - MEDICATION INSTRUCTIONS -         Labs:   CMP  Recent Labs   Lab 01/17/21  0659 01/16/21  1611 01/16/21  0835 01/15/21  2109    136 136 134   POTASSIUM 3.0* 3.4 3.8 3.7   CHLORIDE 100 100 101 98   CO2 27 25 26 27   ANIONGAP 10 11 10 9   * 135* 92 124*   BUN 79* 77* 77* 75*   CR 2.70* 2.69* 2.79* 2.76*   GFRESTIMATED 24* 24* 23* 23*   GFRESTBLACK 28* 28* 27* 27*   RONNY 9.4 9.4 9.6 9.2   MAG 2.8*  --   --  2.7*   PROTTOTAL  --   --   --  8.1   ALBUMIN  --   --   --  3.1*   BILITOTAL  --   --   --  2.3*   ALKPHOS  --   --   --  149   AST  --   --   --  14   ALT  --   --   --  16      CBC  Recent Labs   Lab 01/17/21  0659 01/15/21  2109   WBC 6.8 6.3   RBC 4.36* 4.49   HGB 8.8* 8.9*   HCT 32.1* 32.3*   MCV 74* 72*   MCH 20.2* 19.8*   MCHC 27.4* 27.6*   RDW 24.1* 24.5*    294     INR  Recent Labs   Lab 01/15/21  2109   INR 1.33*     Arterial Blood GasNo lab results found in last 7 days.    Diagnostics:    Echo:    2/10/20  Interpretation Summary  Severely (EF 29%) reduced left ventricular function is present. Severe diffuse  hypokinesis is present.  Global right ventricular function is moderately reduced.  Moderate mitral insufficiency is present.  Mild to moderate tricuspid insufficiency is present.  Pulmonary hypertension present. Estimated pulmonary artery systolic pressure  is 69 (54+15) mmHg .  Dilation of the inferior vena cava is present with abnormal respiratory  variation in diameter.  Small posterior pericardial effusion without echocardiographic evidence of  tamponade.        Cath:   Last right heart cath 2/10/20  RA --/24/22  /20  /50/65  PCWP --/51/40    DONOVAN 3.5/1.6  TD 3.8/1.8    PVR 8.5 Right sided filling pressures are moderately elevated.Left sided filling pressures are severely elevated. Severely elevated pulmonary artery hypertension.Reduced cardiac output level.Hemodynamic data has been modified in Epic per physician review.    ASSESSMENT/PLAN:  60 year old male with PMHx most significant for HFrEF 30% 2/2 ICM, severe PH, moderate MR, STEMI x/p DESx3 (2012) & PCI prox RCA 2019, CKD IV, paroxysmal afib not on AC, anemia with hx of GIB due to AVMs who p/w acute on chronic heart failure exacerbation.     #. Acute on chronic HFrEF 30% 2/2 ICM  # Mod MR  #Mild-mod TR  Followed by [facility] clinic and Dr. Trujillo. Last TTE with diffuse hypokinesis and LVEF of 30%. Has not tolerated BB, not on ACEi due to hyperkalemia and renal dysfunction, not on MRA due to renal dysfunction, has declined ICD multiple times in past despite being full code. Home diuretic dose is  torsemide 80 mg BID and metolazone 2.5 3x/week, which he states he has been compliant with. Does endorse dietary indiscretion. Noted to have a wedge of 40 during RHC 1 year ago at a weight of 212lbs. Suspect dry weight at least 200, if not lower. 234 on admission.  - strict I/Os  - not on ACE/ARB due to renal dysfunction  - has not tolerated BB in past (although unclear what occurred when starting)  - lymphedema consult  - bumex 0.5mg/h  - bid BMP while diuresing      #Hypokalemia  2/2 potassium wasting diuretic. Given renal dysfunction, will not be placed on protocol.   - give 60meq potassium this AM. Recheck     Chronic Issues  CAD - prior STEMI with DEX x 3 in 2012, PCI prox RCA 2019. C/t ASA 81 daily  WILLIAM - c/t iron  GERD - c/t protonix  CKD 4, with serum creatine about baseline   DM2 - diet controlled, SSI  Afib, paroxysmal CHADs Vasc 3 - not on AC, as has had recurrent GI bleeds     PPX:   -- VTE: Heparin     Patient discussed with Dr. Lincoln Mcdermott, who agrees with above plan.      Jose A Martin PA-C  [facility] Cardiology Team

## 2021-01-17 NOTE — PLAN OF CARE
OT/Edema/6C: Lymphedema evaluation orders received and appreciated. Will await WOC RN consult/recommendations for RLE wound prior to initiating lymphedema services. Will reschedule evaluation for tomorrow.

## 2021-01-18 NOTE — CONSULTS
Care Management Initial Consult    General Information  Assessment completed with: Patient by phone  Type of CM/SW Visit: Initial Assessment    Primary Care Provider verified and updated as needed: Yes   Readmission within the last 30 days:   no        Advance Care Planning: Advance Care Planning Reviewed: (reviewed by hospital staff)          Communication Assessment  Patient's communication style: spoken language (English or Bilingual)    Hearing Difficulty or Deaf: no   Wear Glasses or Blind: yes    Cognitive  Cognitive/Neuro/Behavioral: WDL      Living Environment:   People in home: significant other     Current living Arrangements: house      Able to return to prior arrangements:  yes       Family/Social Support:  Care provided by: self  Provides care for: no one     Significant Other          Description of Support System: Supportive, Involved         Current Resources:   Skilled Home Care Services:  none  Community Resources: None  Equipment currently used at home: (cane when SOB)  Supplies currently used at home: (lymphadema supplies)    Employment/Financial:  Employment Status:     Not discussed     Financial Concerns: insurance, none           Lifestyle & Psychosocial Needs:        Socioeconomic History     Marital status: Significant other     Spouse name: Not on file     Number of children: Not on file     Years of education: Not on file     Highest education level: Not on file     Tobacco Use     Smoking status: Former Smoker     Packs/day: 2.00     Years: 40.00     Pack years: 80.00     Types: Cigarettes     Quit date: 12/3/2011     Years since quittin.1     Smokeless tobacco: Never Used   Substance and Sexual Activity     Alcohol use: No     Alcohol/week: 0.0 standard drinks     Drug use: No     Sexual activity: Yes     Partners: Female       Functional Status:  Prior to admission patient needed assistance: independent       Values/Beliefs:  Spiritual, Cultural Beliefs, Denominational Practices, Values  that affect care: (not discussed)             Carolee Paciotti, RN, [place]  Float Care Coordinator

## 2021-01-18 NOTE — DISCHARGE INSTRUCTIONS
Take your medicines every day, as directed    Changes made today:  o   o    Monitor Your Weight and Symptoms    Contact us if you:      Gain 2 pounds in one day or 5 pounds in one week    Feel more short of breath    Notice more leg swelling    Feel lightheadeded   Change your lifestyle    Limit Salt or Sodium:    2000 mg  Limit Fluids:    2000 mL or approximately 64 ounces  Eat a Heart Healthy Diet    Low in saturated fats  Stay Active:    Aim to move at least 150 minutes every  week         To Contact us    During Business Hours:  [phone], option # 1 (University)  Then option # 4 (medical questions)     After hours, weekends or holidays:   [phone], Option #4  Ask to speak to the On-Call Cardiologist. Inform them you are a CORE/heart failure patient at the [place].     Use [employer] allows you to communicate directly with your heart team through secure messaging.    [employer] can be accessed any time on your phone, computer, or tablet.    If you need assistance, we'd be happy to help!         Keep your Heart Appointments:    Follow up with Evelin Lancaster NP on January 27th at 4:30, virtually,    Labs on January 26th at 6:30 am

## 2021-01-18 NOTE — PLAN OF CARE
D: pt admitted on 1/15/21 for acute on chronic heart failure exacerbation. PMH of HFrEF 30% 2/2 ICM, severe PH, moderate MR, STEMI s/; PCI (2012, 2019) , CKD IV, paroxysmal afib not on AC, anemia with hx of GIB due to AVMs.      I: Monitored vitals and assessed pt status.   Changed:  - pt had13 beats of aberrant  A-fib vs VT at 8:44 am. Cards 1 provider notified   - R LE wound initial assessment completed by WOC RN. Wound care order placed.     Running:   -Bumex 0.5 mg/hr      Vitals:  Temp: 97.3  F (36.3  C) Temp src: Oral BP: 95/65 Pulse: 81   Resp: 16 SpO2: 97 % O2 Device: None (Room air)      A:   Neuro: A&O x 4. Neurologically intact. Denies Headache, dizziness, numbness and tingling.  calls appropriately.   Cardiac: SR with frequent PVC's and PAC's. 13 beats of aberrant A-fib vs VT @ 8:44 am. Cards 1 provider notified   Denies chest pain   Respiratory: sating > 92% on RA. KING.   Diet/appetite: cardiac diet, good appetite   Endocrine: BS check AC/HS. Pt refuse sliding scale insuline.   GI/: BM x1. Denies abdominal pain and nausea. Good urine output, voids without difficulty   Activity: independent   Pain: denies pain   Skin: no new deficit noted. Right LE wound care completed by WOC RN. Dressing CDI.   LDAs: PIV X 1        P: Continue to monitor Pt status and report changes to treatment team.

## 2021-01-18 NOTE — PROGRESS NOTES
01/18/21 1500   Quick Adds   Type of Visit Initial Occupational Therapy Evaluation   Living Environment   People in home significant other   Current Living Arrangements house   Living Environment Comments patient reports his girlfriend provides assist as needed at home. She works outside the home from about 8am-5pm.    Self-Care   Usual Activity Tolerance good   Current Activity Tolerance moderate   Equipment Currently Used at Home   (pt reports recently using a cane for ambulation.)   Activity/Exercise/Self-Care Comment patient has a stationary bike when home.    Disability/Function   Hearing Difficulty or Deaf no   Wear Glasses or Blind no   Concentrating, Remembering or Making Decisions Difficulty no   Difficulty Communicating no   Difficulty Eating/Swallowing no   Walking or Climbing Stairs Difficulty no   Dressing/Bathing Difficulty no   Toileting issues no   General Information   Referring Physician Jose A Martin PA-C   Patient/Family Therapy Goal Statement (OT) to return home.   General Observations and Info [facility] evalution completed for R LE.    Cognitive Status Examination   Orientation Status orientation to person, place and time   Sensory   Sensory Comments light touch intact.   Pain Assessment   Patient Currently in Pain   (patient reports no pain in LEs at this time. )   Edema General Information   Onset of Edema   (acute on chronic)   Affected Body Part(s) Right LE;Left LE   Etiology Comments cardiac disease, decreased mobility, history of LE edema.    General Comments/Previous Edema Treatment/Edema Equipment Wound dressing in place at anterior R shin.    Edema Examination/Assessment   Skin Condition Pitting;Dryness;Intact   Skin Condition Comments Firm 2+ pitting edema in distal legs, wound dressing in place anterior R shin. Moderate skin dryness.    Skin Integrity R LE wound   Pitting Assessment 2+ pitting MTPs to knee creases.    Range of Motion Comprehensive   General Range of Motion no range  of motion deficits identified   Strength Comprehensive (MMT)   General Manual Muscle Testing (MMT) Assessment no strength deficits identified   Bed Mobility   Bed Mobility supine-sit   Comment (Bed Mobility) IND   Transfers   Transfer Comments IND   Activities of Daily Living   BADL Assessment/Intervention   (patient IND with ambulation to bathroom.)   Clinical Impression   Criteria for Skilled Therapeutic Interventions Met (OT) yes   Edema: Patient Presentation Edema   Influenced by the following impairments decreased functional mobility.    Assessment of Occupational Performance 1-3 Performance Deficits   Edema: Planned Interventions Gradient compression bandaging;Fit for compression garment;Edema exercises;Precautions to prevent infection/exacerbation;Education   Intervention Comments GCB applied from MTPs to knee creases    Clinical Decision Making Complexity (OT) low complexity   Therapy Frequency (OT) 5x/week   Predicted Duration of Therapy 1 week    Risks and Benefits of Treatment have been explained. Yes   Patient, Family & other staff in agreement with plan of care Yes   Comment-Clinical Impression patient care order in place.    OT Discharge Planning    OT Discharge Recommendation (DC Rec) Home with assist   OT Rationale for DC Rec Patient's significant other able to provide assist as needed. Recommend continued use of LE compression to B LEs for long term management of chronic edema.

## 2021-01-18 NOTE — CONSULTS
[facility] Nurse Inpatient Wound Assessment   Reason for consultation: Evaluate and treat RLE wounds    Assessment  R LE wounds due to Unknown Etiology Trauma?  Status: initial assessment    Treatment Plan  R LE wounds: Every other day   Cleanse with microklenz and pat dry  Cut adaptic type dressing to fit wounds  Cover with large primipore dressing    Orders Written  Recommended provider order: None, at this time  WO Nurse follow-up plan:weekly  Nursing to notify the Provider(s) and re-consult the [facility] Nurse if wound(s) deteriorates or new skin concern.    Patient History  According to provider note(s):  60 year old male with PMHx most significant for HFrEF 30% 2/2 ICM, severe PH, moderate MR, STEMI x/p DESx3 (2012) & PCI prox RCA 2019, CKD IV, paroxysmal afib not on AC, anemia with hx of GIB due to AVMs who p/w acute on chronic heart failure exacerbation.      Objective Data  Containment of urine/stool: Continent of bladder and Continent of bowel    Active Diet Order  Orders Placed This Encounter      Combination Diet Low Saturated Fat Na <2400mg Diet; No Caffeine for 24 hours (once tests completed, may have caffeine)      Output:   I/O last 3 completed shifts:  In: 937.73 [P.O.:780; I.V.:157.73]  Out: 2175 [Urine:2175]    Risk Assessment:   Sensory Perception: 4-->no impairment  Moisture: 4-->rarely moist  Activity: 3-->walks occasionally  Mobility: 4-->no limitation  Nutrition: 3-->adequate  Friction and Shear: 3-->no apparent problem  Elijah Score: 21                          Labs:   Recent Labs   Lab 01/18/21  0819 01/15/21  2109 01/15/21  2109   ALBUMIN  --   --  3.1*   HGB 8.9*   < > 8.9*   INR  --   --  1.33*   WBC 6.3   < > 6.3   A1C  --   --  6.5*   CRP  --   --  10.0*    < > = values in this interval not displayed.       Physical Exam  Areas of skin assessed: focused RLE    Wound Location:  Right lower leg      Date of last photo 1/18/21  Wound History: Pt doesn't recall how wound occurred.  He thinks he may  have hit it.      Wound Base: 100 % dermis     Palpation of the wound bed: normal      Drainage: scant     Description of drainage: serous     Measurements (length x width x depth, in cm)    Periwound skin: intact and edematous      Color: normal and consistent with surrounding tissue      Temperature: normal   Odor: none  Pain: denies , none    Interventions  Visual inspection and assessment completed   Wound Care Rationale Promote moist wound healing without tissue dehydration  and Provide protection   Wound Care: done per plan of care  Supplies: floor stock, discussed with RN and discussed with patient  Current off-loading measures: Pillows  Current support surface: Standard  Atmos Air mattress  Education provided to: importance of repositioning, plan of care and wound progress  Discussed plan of care with Patient and Nurse    Poppy Menard RN  CWOCN

## 2021-01-18 NOTE — PROGRESS NOTES
Interventional Cardiology Progress Note      Assessment & Plan:  60 year old male with PMHx most significant for HFrEF 30% 2/2 ICM, severe PH, moderate MR, STEMI x/p DESx3 (2012) & PCI prox RCA 2019, CKD IV, paroxysmal afib not on AC, anemia with hx of GIB due to AVMs who p/w acute on chronic heart failure exacerbation.     # Acute on chronic HFrEF 2/2 ICM (LVEF 30%)  # Mod MR  # Mild-mod TR  Followed [facility] and Dr. Trujillo. Last TTE with diffuse hypokinesis and LVEF of 30%. Has not tolerated BB, not on ACEi due to hyperkalemia and renal dysfunction, not on MRA due to renal dysfunction, has declined ICD multiple times in past despite being full code. Home diuretic dose is torsemide 80 mg BID and metolazone 2.5 3x/week, which he states he has been compliant with. Does endorse dietary indiscretion. Noted to have a wedge of 40 during RHC 1 year ago at a weight of 212lbs. Suspect dry weight at least 200, if not lower. 234 on admission. Established in [facility], last saw Evelin Lancaster CNP in November of 2020.   - ACE/ARB: contraindicated due to renal dysfunction  - BB: has not tolerated in past  - lymphedema consult  - Continue bumex 0.5mg/h  - BID BMP while diuresing   - Strict I/O's  - Daily weights  - [facility] to arrange follow-up at discharge      #Hypokalemia  2/2 potassium wasting diuretic. Given renal dysfunction, will not be placed on protocol. Was given 60 mEq day of admission. K 3.5 today.   - BID BMP while diuresing     Chronic Issues  CAD - prior STEMI with DEX x 3 in 2012, PCI prox RCA 2019. C/t ASA 81 daily  WILLIAM - c/t iron  GERD - c/t protonix  CKD 4, with serum creatine about baseline   DM2 - diet controlled, SSI  Afib, paroxysmal CHADs Vasc 3 - not on AC, as has had recurrent GI bleeds       GI: protonix  DVT: patient ambulatory, low-risk     Diet: Low-sodium/cardiac   Activity: as tolerated   Code Status: Full   Disposition: 2-3 days pending volume status    Patient seen and discussed w/   "Eber.      Rosanne Woodson DNP, APRN, CNP  [facility] [place]  Cards 1      Interval History: No acute events overnight. Denies chest pain, SOB, dizziness, or lightheadedness. Reports swelling in legs has improved. VSS. Tolerating laying flat, on RA.         Most recent vital signs:  /80   Pulse 93   Temp 98.2  F (36.8  C) (Oral)   Resp 18   Ht 1.727 m (5' 8\")   Wt 105.1 kg (231 lb 11.3 oz)   SpO2 99%   BMI 35.23 kg/m    Temp:  [97.4  F (36.3  C)-98.3  F (36.8  C)] 98.2  F (36.8  C)  Pulse:  [] 93  Resp:  [16-22] 18  BP: ()/(72-83) 115/80  SpO2:  [95 %-99 %] 99 %  Wt Readings from Last 2 Encounters:   01/18/21 105.1 kg (231 lb 11.3 oz)   12/15/20 98.9 kg (218 lb)       Intake/Output Summary (Last 24 hours) at 1/18/2021 1111  Last data filed at 1/18/2021 0700  Gross per 24 hour   Intake 709.73 ml   Output 1675 ml   Net -965.27 ml       Physical exam:  General: In bed, in NAD  HEENT: EOMI, PERRLA, no scleral icterus or injection  CARDIAC: RRR, no m/r/g appreciated. Peripheral pulses 2+  RESP: CTAB, no wheezes, rhonchi or crackles appreciated.  GI: NABS, NT/ND, no guarding or rebound  EXTREMITIES: 2-3+ LE edema, pulses 2+.   SKIN: No acute lesions appreciated  NEURO: Alert and oriented X3, CN II-XII grossly intact, no focal neurological deficits noted    Drains and Tubes: No drains or tubes present  Access: No central lines or devices in place    Labs (Past three days):  CBC  Recent Labs   Lab 01/18/21  0819 01/17/21  0659 01/15/21  2109   WBC 6.3 6.8 6.3   RBC 4.57 4.36* 4.49   HGB 8.9* 8.8* 8.9*   HCT 32.9* 32.1* 32.3*   MCV 72* 74* 72*   MCH 19.5* 20.2* 19.8*   MCHC 27.1* 27.4* 27.6*   RDW 24.8* 24.1* 24.5*    256 294     BMP  Recent Labs   Lab 01/18/21  1018 01/18/21  0819 01/17/21  1810 01/17/21  1347 01/17/21  0659 01/17/21  0659 01/16/21  1611 01/15/21  2109 01/15/21  2109   NA  --  137  --  137  --  137 136   < > 134   POTASSIUM 3.5 3.7 3.5 3.0*   < > 3.0* 3.4 "   < > 3.7   CHLORIDE  --  103  --  101  --  100 100   < > 98   CO2  --  26  --  26  --  27 25   < > 27   ANIONGAP  --  8  --  10  --  10 11   < > 9   GLC  --  100*  --  138*  --  103* 135*   < > 124*   BUN  --  74*  --  78*  --  79* 77*   < > 75*   CR  --  2.52*  --  2.58*  --  2.70* 2.69*   < > 2.76*   GFRESTIMATED  --  26*  --  25*  --  24* 24*   < > 23*   GFRESTBLACK  --  30*  --  29*  --  28* 28*   < > 27*   RONNY  --  9.4  --  9.2  --  9.4 9.4   < > 9.2   MAG  --  2.6*  --   --   --  2.8*  --   --  2.7*    < > = values in this interval not displayed.     Troponins:   Lab Results   Component Value Date    TROPI 0.016 01/15/2021    TROPI 0.016 08/13/2020    TROPI <0.015 12/19/2019    TROPI <0.015 07/22/2019    TROPI <0.015 07/03/2019        INR  Recent Labs   Lab 01/15/21  2109   INR 1.33*     Liver panel  Recent Labs   Lab 01/15/21  2109   PROTTOTAL 8.1   ALBUMIN 3.1*   BILITOTAL 2.3*   ALKPHOS 149   AST 14   ALT 16       Imaging/procedure results:  ECHO: 2/2020  Interpretation Summary  Severely (EF 29%) reduced left ventricular function is present. Severe diffuse  hypokinesis is present.  Global right ventricular function is moderately reduced.  Moderate mitral insufficiency is present.  Mild to moderate tricuspid insufficiency is present.  Pulmonary hypertension present. Estimated pulmonary artery systolic pressure  is 69 (54+15) mmHg .  Dilation of the inferior vena cava is present with abnormal respiratory  variation in diameter.  Small posterior pericardial effusion without echocardiographic evidence of  tamponade.      Attending Attestation: Patient seen and examined with Rosanne Woodson NP. The history and physical findings are accurate as recorded. My additional findings, if any, have been incorporated into the body of the note. All labs, imaging studies, ECG and telemetry data have been reviewed personally. The assessment and plan outlined reflect our joint decision making.  - HFrEF recompensating   - will  review CHF regimen: Consider SGL2 inhibitor and ivabradine

## 2021-01-18 NOTE — PLAN OF CARE
End of Shift Summary. See flowsheets for vital signs and detailed assessment.     Changes this shift: A&O x4. Up ind. Calling appropriately. Vitals stable on RA.  Sinus rhythm w/ PVCs and PACs. On bumex gtt, voiding WDL. No BM this shift. Stable on RA with KING. BGs stable not requiring sliding scale insulin at this time.      Plan: Continue to monitor and report changes to team.

## 2021-01-19 NOTE — DISCHARGE SUMMARY
[address] [address]  p: [phone]    Discharge Summary: Cardiology Service    Cole Jesus MRN# [id number]   YOB: 1960 Age: 60 year old       Admission Date: 1/15/2021  Discharge Date: 01/19/21    Discharge Diagnoses:  1. Acute on chronic HFrEF 2/2 ICM (LVEF 30%)  2. Moderate MR  3. Mild-moderate TR  4. Hypokalemia 2/2 diuresis     Brief HPI:  60 year old male with PMHx most significant for HFrEF 30% 2/2 ICM, severe PH, moderate MR, STEMI x/p DESx3 (2012) & PCI prox RCA 2019, CKD IV, paroxysmal afib not on AC, anemia with hx of GIB due to AVMs who p/w dyspnea, abdominal distension, thigh and scrotal swelling, weight gain of 15 lbs. He contacted [facility] today and was advised that he should go to the ED for his apparent heart failure exacerbation.  He had a CXR in the ED that showed pulmonary edema and small bilateral pleural effusions. On labs with stable microcytic anemia, INR of 1.33 (not on VKA), BNP of 15,500. Troponin not elevated, no ischemic signs on EKG.  Other notable labs include d-dimer of 8.3, CRP of 10. Respiratory viral panel (including flu and COVID) negative. In the ED he received a total of 6 mg Bumex (2 mg @ 22:50, 4 mg @ 23:20).     He tells me that the main thing he has noticed is that his has more lower extremity edema, the size of his testicles has increased. Not feeling more short of breath or orthopnic.    Hospital Course by Diagnosis:  # Acute on chronic HFrEF 2/2 ICM (LVEF 30%)  # Mod MR  # Mild-mod TR  Followed by [facility] and Dr. Trujillo. Last TTE with diffuse hypokinesis and LVEF of 30%. Repeat echo this admission unchanged. Has not tolerated BB, not on ACE due to hyperkalemia and renal dysfunction, not on MRA due to renal dysfunction, has declined ICD multiple times in past despite being full code. Home diuretic dose is torsemide 80 mg BID and metolazone 2.5 3x/week, which he states he  has been compliant with. Does endorse dietary indiscretion. Noted to have a wedge of 40 during RHC 1 year ago at a weight of 212lbs. Suspect dry weight at least 200, if not lower. 234 on admission. Established in [facility], last saw Evelin Lancaster CNP in November of 2020. Diuresed well on bumex infusion. Cumulative net negative 6L this admission. Reports feeling back to baseline with improvement in JOSHUA. SGL 2 inhibitor was started to improve diuresis and reduce the risk for future heart failure hospitalizations, as recent data confirmed benefits in patients with advanced renal disease.  In the unlikely case that the patient should develop ketoacidosis he was advised to discontinue the dapagliflozin with nausea and vomiting.    - ACE/ARB: contraindicated due to renal dysfunction  - BB: has not tolerated in past, we strongly recommended ivabradine which he will consider  - MRA: contraindicated due to renal function   - Diuresis: PTA torsemide 80 mg BID and metolazone 2.5 mg 3x/week   - SGLT2- inhibitor: start dapagliflozin 5 mg daily   - Follow-up with [facility] on 1/27/21     #Hypokalemia  2/2 potassium wasting diuretic. Was given 60 mEq day of admission. K 3.1 today, replaced  - Repeat BMP in 1 week   - Resume PTA K replacement 40 mEq daily      Chronic Issues  CAD: prior STEMI with DEX x 3 in 2012, PCI prox RCA 2019. C/t ASA 81 daily  WILLIAM: c/t iron  GERD: c/t protonix  CKD 4: renal function at baseline   DM2 - diet controlled  Paroxysmal Afib: CHADsVasc 3 - not on AC d/t recurrent GI bleeds     Pertinent Procedures:  1. None     Consults:  - None     Medication Changes:  - See below     Discharge medications:   Current Discharge Medication List      START taking these medications    Details   dapagliflozin (FARXIGA) 5 MG TABS tablet Take 1 tablet (5 mg) by mouth daily  Qty: 30 tablet, Refills: 0    Associated Diagnoses: Acute on chronic systolic congestive heart failure (H); Chronic systolic congestive heart  failure (H)         CONTINUE these medications which have NOT CHANGED    Details   metolazone (ZAROXOLYN) 2.5 MG tablet Take 1 tablet (2.5 mg) by mouth three times a week  Qty: 39 tablet, Refills: 3    Associated Diagnoses: Ischemic cardiomyopathy      senna-docusate (SENOKOT-S/PERICOLACE) 8.6-50 MG tablet Take 1 tablet by mouth 2 times daily as needed for constipation      ACCU-CHEK GUIDE test strip USE TO TEST BLOOD SUGAR FOUR TIMES A DAY BEFORE MEALS AND AT BEDTIME  Qty: 400 each, Refills: 3    Associated Diagnoses: Type 2 diabetes mellitus without complication, without long-term current use of insulin (H)      Alcohol Swabs PADS 1 applicator 4 times daily (before meals and nightly)  Qty: 100 each, Refills: 3    Associated Diagnoses: Type 2 diabetes mellitus without complication, without long-term current use of insulin (H)      blood glucose monitoring (ACCU-CHEK FASTCLIX) lancets USE TO TEST BLOOD SUGAR FOUR TIMES A DAY AT MEALS AND AT BEDTIME  Qty: 400 each, Refills: 3    Associated Diagnoses: Type 2 diabetes mellitus without complication, without long-term current use of insulin (H)      insulin pen needle (32G X 4 MM) 32G X 4 MM miscellaneous Use 5 pen needles daily or as directed.  Qty: 200 each, Refills: 3    Associated Diagnoses: Type 2 diabetes mellitus without complication, without long-term current use of insulin (H)      potassium chloride ER (KLOR-CON M) 20 MEQ CR tablet Take 2 tablets (40 mEq) by mouth 2 times daily  Qty: 360 tablet, Refills: 3    Associated Diagnoses: Acute on chronic systolic heart failure (H)      Sharps Container MISC 1 Device every 30 days  Qty: 1 each, Refills: 3    Associated Diagnoses: Type 2 diabetes mellitus without complication, without long-term current use of insulin (H)      torsemide (DEMADEX) 20 MG tablet Take 4 tablets (80 mg) by mouth 2 times daily  Qty: 360 tablet, Refills: 4    Associated Diagnoses: Acute on chronic systolic heart failure (H)      vitamin  B  complex with vitamin C (VITAMIN  B COMPLEX) TABS Take 1 tablet by mouth daily    Associated Diagnoses: Atrial tachycardia (H); Renal insufficiency; Ischemic cardiomyopathy      Vitamin D, Cholecalciferol, 25 MCG (1000 UT) CAPS Take 1 capsule by mouth 2 times daily         STOP taking these medications       aspirin (ASA) 81 MG chewable tablet Comments:   Reason for Stopping:         ferrous sulfate (FEROSUL) 325 (65 Fe) MG tablet Comments:   Reason for Stopping:         pantoprazole (PROTONIX) 40 MG EC tablet Comments:   Reason for Stopping:               Follow-up:  - [facility] on 1/27/21  - PCP in 7-10 days     Labs or imaging requiring follow-up after discharge:  - CBC/BMP in 1 week     Code status:  Full     Condition on discharge  Temp:  [97.3  F (36.3  C)-97.6  F (36.4  C)] 97.5  F (36.4  C)  Pulse:  [72-96] 96  Resp:  [16-18] 16  BP: ()/(61-90) 113/81  SpO2:  [97 %-100 %] 98 %  General: Alert, interactive, NAD  Eyes: sclera anicteric, EOMI  Neck: JVP 0, carotid 2+ bilaterally  Cardiovascular: regular rate and rhythm, normal S1 and S2, no murmurs, gallops, or rubs  Resp: clear to auscultation bilaterally, no rales, wheezes, or rhonchi  GI: Soft, nontender, nondistended. +BS.  No HSM or masses, no rebound or guarding.  Extremities: 1-2+ LE edema, no cyanosis or clubbing, dorsalis pedis and posterior tibialis pulses 2+ bilaterally  Skin: Warm and dry, no jaundice or rash  Neuro: CN 2-12 intact, moves all extremities equally  Psych: Alert & oriented x 3    Imaging with results:  Echocardiogram 1/18/21:  Interpretation Summary  The Ejection Fraction is estimated at 25-30%.  Borderline left ventricular dilation is present.  Mild to moderate right ventricular dilation is present.  Global right ventricular function is moderately reduced.  Moderate to severe mitral insufficiency is present.  Moderate tricuspid insufficiency is present.  Right ventricular systolic pressure is 34mmHg above the right atrial  pressure.  IVC diameter >2.1 cm collapsing <50% with sniff suggests a high RA pressure  estimated at 15 mmHg or greater.  Small circumferential pericardial effusion is present without any hemodynamic  significance.  No significant changes noted.    Patient Care Team:  Silas Enciso MD as PCP - General  Dianna Davis APRN CNS as Registered Nurse (Clinical Nurse Specialist)  Alen Trujillo MD as MD (Cardiology)  Coordinator,  Bmt Nurse as Nurse Coordinator (Cardiology)  Kavita Chapman LPN as LPN  Zach Phelan RN as Specialty Care Coordinator (Cardiology)  Smiley Argueta PA-C as Physician Assistant (Physician Assistant)  Patricia Vo, RN as Specialty Care Coordinator (Cardiology)  Ena Kruger PA-C as Cardiologist (Physician Assistant)  Diya Mackay, RN as Specialty Care Coordinator (Cardiology)  Ingris Martin, RN as Specialty Care Coordinator (Cardiology)  Shilpi Myers MD as Assigned Endocrinology Provider  Chad Babcock OD as Assigned Surgical Provider  Venus Vigil MD as Assigned Nephrology Provider  Silas Houston MD as Assigned Gastroenterology Provider  Silas Enciso MD as Assigned PCP    Rosanne Woodson DNP, APRN, CNP  [facility]  [phone]    Attending Attestation: I saw and evaluated the patient for the discharge with Rosanne Coelho NP. I agree with the findings and plan of care documented in note and summarized our gaona findings with the patient. I spent less than 30 minutes to coordinate and direct this patient's discharge.

## 2021-01-19 NOTE — PROGRESS NOTES
DISCHARGE   Discharged to: Home  Via: Automobile  Accompanied by: Family  Discharge Instructions: principal diagnosis, major findings/procedures, diet, activity, medications, follow up appointments, when to call the MD, and what to watchout for (i.e. s/s of infection, increasing SOB, palpitations, Angina). Pt states understanding of all discharge instructions.   Prescriptions: New dapaglifozin, will  at  discharge pharmacy .   Follow Up Appointments: Jan 26th lab at [facility]. Follow up Evelin Lancaster NP on Jan 27th.   Belongings: All sent with pt. Pt verifies that they are taking all belongings with them.   IV: discontinued.   Telemetry: discontinued.   Pt exhibits understanding of above discharge instructions; all questions answered.  Discharge Paperwork: faxed to discharge planner.

## 2021-01-19 NOTE — PLAN OF CARE
Pt admitted 1/15 for acute on chronic heart failure exacerbation. PMH includes HFrEF 30% 2/2 ICM, severe pHTN, moderate mitral regurgitation, STEMI s/p PCI (2012, 2019), CKD IV, paroxysmal AFib not on AC, anemia with history of GIB due to AV malformations.     Neuro: A&O x 4. Calls appropriately. Slept well overnight.   Cardiac: SR 80's. 's to 110's. Denies dizziness, chest pain, or palpitations.   Respiratory: Sating well on RA. LS clear. Denies cough.   GI/: Good UOP. Last BM 1/18. Denies nausea. Refused evening Protonix.   Endocrine: ACHS BS checks. Pt refusing all insulin.  Diet/Appetite: Low fat/2.4G Na  Skin: Wound on right LE CDI.   LDA: R PIV infusing Bumex at 0.5 mg/hr.   Activity: Up ad dary  Pain: Denies     Plan: Continue to monitor and alert team with any changes or concerns.

## 2021-01-19 NOTE — PHARMACY-ADMISSION MEDICATION HISTORY
Admission Medication History Completed by Pharmacy    See [facility] Admission Navigator for allergy information, preferred outpatient pharmacy, prior to admission medications and immunization status.     Medication History Sources:     Patient, SureScripts    Changes made to PTA medication list (reason):    Added: None    Deleted:   o Aspirin 81 mg: Take one by mouth daily (patient states that aspirin was taken off a while ago)  o Ferrous sulfate 325 mg tablet take one tablet by mouth every Monday, Wednesday, and Friday. (patient states that this hurt his stomach.)  o Pantoprazole 40 mg EC tablet: Take one tablet by mouth twice daily (patient states that this gave him heartburn)    Changed: None    Additional Information:    Patient appeared to be a good historian.     Patient reports that metolazone has been held on and off recently.     Prior to Admission medications    Medication Sig Last Dose Taking? Auth Provider   metolazone (ZAROXOLYN) 2.5 MG tablet Take 1 tablet (2.5 mg) by mouth three times a week Past Week at Unknown time Yes Evelin Lancaster APRN CNP   senna-docusate (SENOKOT-S/PERICOLACE) 8.6-50 MG tablet Take 1 tablet by mouth 2 times daily as needed for constipation Past Week at Unknown time Yes Unknown, Entered By History   ACCU-CHEK GUIDE test strip USE TO TEST BLOOD SUGAR FOUR TIMES A DAY BEFORE MEALS AND AT BEDTIME Unknown at Unknown time  Smiley Argueta PA-C   Alcohol Swabs PADS 1 applicator 4 times daily (before meals and nightly) Unknown at Unknown time  Arina Haynes APRN CNP   blood glucose monitoring (ACCU-CHEK FASTCLIX) lancets USE TO TEST BLOOD SUGAR FOUR TIMES A DAY AT MEALS AND AT BEDTIME Unknown at Unknown time  Smiley Argueta PA-C   insulin pen needle (32G X 4 MM) 32G X 4 MM miscellaneous Use 5 pen needles daily or as directed. Unknown at Unknown time  Arina Haynes APRN CNP   potassium chloride ER (KLOR-CON M) 20 MEQ CR tablet Take 2 tablets (40 mEq) by mouth 2 times daily  1/15/2021 at am  Dian Leblanc APRN CNP   Sharps Container MISC 1 Device every 30 days Unknown at Unknown time  Arina Haynes APRN CNP   torsemide (DEMADEX) 20 MG tablet Take 4 tablets (80 mg) by mouth 2 times daily 1/15/2021 at am  Dian Leblanc APRN CNP   vitamin  B complex with vitamin C (VITAMIN  B COMPLEX) TABS Take 1 tablet by mouth daily 1/15/2021 at am  Reported, Patient   Vitamin D, Cholecalciferol, 25 MCG (1000 UT) CAPS Take 1 capsule by mouth 2 times daily 1/15/2021 at am  Unknown, Entered By History       Date completed: 01/19/21    Medication history completed by: Mohit Izquierdo

## 2021-01-26 NOTE — PROGRESS NOTES
"  PRIMARY CARE CENTER     This patient is being evaluated via a billable telephone visit; THIS VISIT WAS INITIATED BY THE PT, as AN ALTERNATIVE TO IN PERSON VISIT .       The patient has has been notified of following:      \"This billable telephone visit will be conducted via a call between you and your physician/provider. We have found that certain health care needs can be provided without the need for a physical exam.  This service lets us provide the care you need with a short phone conversation.  If a prescription is necessary we can send it directly to your pharmacy.  If lab work is needed we can place an order for that and you can then stop by our lab to have the test done at a later time. We can also place orders for a limited number of imaging tests if they are deemed urgently necessary.     If during the course of the call the physician/provider feels a telephone visit is not appropriate, you will not be charged for this service.\"     Due to efforts to reduce the spread of COVID-19 in the clinic, state, nation, virtual visits are encouraged currently. Patient understands that diagnose and advice is limited by the inability to exam him/her/them face-to-face.    Person(s) spoken to: Pancho    Time call initiated:  2:15  Time call ended:  2:34  Total length of call:19 mins    Staffed with: Dr. Rebollar            HPI:       Cole Jesus is a 60 year old male who presents to clinic for: Hospital F/U (hospital follow up)    Patient presents today for follow-up after recent hospitalization for acute decompensated heart failure.  He had initially presented on January 15 after calling to the heart failure clinic with concerns about worsening shortness of breath and weight gain.  They told him to present to the ED.  On admission he was found to have a BNP of 15,000, chest x-ray showing pulmonary edema and his weight was 15 to 20 pounds above his suspected dry weight.  He was admitted and diuresed aggressively " down to his dry weight and he was discharged on January 19.  Upon discharge, he was started on the tidal flows and 5 mg daily.  He was continued on his prior to arrival potassium supplementation and this PCP visit today's partly to evaluate his potassium.  He became hypokalemic to the low threes during aggressive diuresis.  The cardiology discharge note recommended that patient strongly consider ivabradine.    Today patient states that he feels that he is back to his baseline.  His weight has stayed stable at 220 pounds since discharge.  He has been taking his diuretics torsemide and metolazone as prescribed.  He has been working to adhere to a 2 L daily fluid intake limit.  His shortness of breath is back to its baseline.  He denies GI bleeding.  He does state that he took a single dose of the dapagliflozin, however, she developed belly ache, headache, nausea, muscle twitching so he discontinued this medication.    He otherwise denies fevers, chills, chest pain, worsening shortness of breath, abdominal pain since cessation of SGLT2 inhibitor, lower extremity edema is back to his baseline, his scrotal edema has resolved.    PMHx:  Past Medical History:   Diagnosis Date     Anemia in chronic renal disease 03/09/2015     Antiplatelet or antithrombotic long-term use      Atrial fibrillation and flutter      CAD (coronary artery disease)     Stemi in 12/11, s/p angioplasty     CRD (chronic renal disease), stage IV (H) 03/09/2015    hx ATN with dialysis complicating cardiogenic shock  1/2012     GI bleed     massive lower GI bleed secondary to a cecal ulcer, s/p ileocecal resection in 12/11     Heart failure     Biventricular systolic HF, complicated by ARDS requiring tracheostomy     Hyperlipidemia LDL goal <100 04/28/2013     Hypertension      Ischemic cardiomyopathy 04/28/2013    TTE revealing 40% EF     Myocardial infarction (H)     2011 and 6/2018     Other and unspecified nonspecific immunological findings     Anti  JKa     Pulmonary edema     episodes of flash pulmonary edema in      Subclinical hypothyroidism        PSHx:  Past Surgical History:   Procedure Laterality Date     CV RIGHT HEART CATH MEASUREMENTS RECORDED N/A 2019    Procedure: CV RIGHT HEART CATH;  Surgeon: Fox Gerard MD;  Location:  HEART CARDIAC CATH LAB     CV RIGHT HEART CATH MEASUREMENTS RECORDED N/A 2/10/2020    Procedure: CV RIGHT HEART CATH;  Surgeon: Fox Gerard MD;  Location:  HEART CARDIAC CATH LAB     ESOPHAGOSCOPY, GASTROSCOPY, DUODENOSCOPY (EGD), COMBINED  2011    Procedure:COMBINED ESOPHAGOSCOPY, GASTROSCOPY, DUODENOSCOPY (EGD); Surgeon:SAMARIA PUENTE; Location: GI     LAPAROTOMY EXPLORATORY  2011    Procedure:LAPAROTOMY EXPLORATORY; Explore laparotomy, Illeocectomy, Diverting Illeostomy; Surgeon:GIA NAJERA; Location:[facility] OR     ORIF right elbow fracture  age 12     TAKEDOWN ILEOSTOMY  2014    Procedure: TAKEDOWN ILEOSTOMY;  Surgeon: Gia Najera MD;  Location: [facility] OR     TRACHEOSTOMY  2011    Procedure:TRACHEOSTOMY; Tracheostomy; 80XLTCP-Proximal Extension-Cuffed 8.0 mm I.D.; Surgeon:LIZABETH DYE; [facility]       [facility]  Family History   Problem Relation Age of Onset     Diabetes Mother      Hypertension Mother      Heart Disease Mother      Myocardial Infarction Mother 68         of     Myocardial Infarction Father 54         of heart attack, left when he was young     Heart Disease Father      Diabetes Sister      Ovarian Cancer Sister          of     Diabetes Sister      Diabetes Sister      Crohn's Disease Daughter 36     Glaucoma No family hx of      Macular Degeneration No family hx of      Colon Cancer No family hx of      Ulcerative Colitis No family hx of      Irritable Bowel Syndrome No family hx of      Inflammatory Bowel Disease No family hx of        Allergies:     Allergies   Allergen Reactions      Hydralazine      Black spots, verified allergic reaction by skin biopsy     Isosorbide      Per pt got a rash all over his body and won't take it no more     Simvastatin Other (See Comments)     Leg muscle weakness     Tylenol [Acetaminophen] Palpitations       Meds:    Current Outpatient Medications:      ACCU-CHEK GUIDE test strip, USE TO TEST BLOOD SUGAR FOUR TIMES A DAY BEFORE MEALS AND AT BEDTIME, Disp: 400 each, Rfl: 3     Alcohol Swabs PADS, 1 applicator 4 times daily (before meals and nightly), Disp: 100 each, Rfl: 3     blood glucose monitoring (ACCU-CHEK FASTCLIX) lancets, USE TO TEST BLOOD SUGAR FOUR TIMES A DAY AT MEALS AND AT BEDTIME, Disp: 400 each, Rfl: 3     dapagliflozin (FARXIGA) 5 MG TABS tablet, Take 1 tablet (5 mg) by mouth daily (Patient not taking: Reported on 1/26/2021), Disp: 30 tablet, Rfl: 0     insulin pen needle (32G X 4 MM) 32G X 4 MM miscellaneous, Use 5 pen needles daily or as directed., Disp: 200 each, Rfl: 3     metolazone (ZAROXOLYN) 2.5 MG tablet, Take 1 tablet (2.5 mg) by mouth three times a week, Disp: 39 tablet, Rfl: 3     potassium chloride ER (KLOR-CON M) 20 MEQ CR tablet, Take 2 tablets (40 mEq) by mouth 2 times daily, Disp: 360 tablet, Rfl: 3     senna-docusate (SENOKOT-S/PERICOLACE) 8.6-50 MG tablet, Take 1 tablet by mouth 2 times daily as needed for constipation, Disp: , Rfl:      Sharps Container MISC, 1 Device every 30 days, Disp: 1 each, Rfl: 3     torsemide (DEMADEX) 20 MG tablet, Take 4 tablets (80 mg) by mouth 2 times daily, Disp: 360 tablet, Rfl: 4     vitamin  B complex with vitamin C (VITAMIN  B COMPLEX) TABS, Take 1 tablet by mouth daily, Disp: , Rfl:      Vitamin D, Cholecalciferol, 25 MCG (1000 UT) CAPS, Take 1 capsule by mouth 2 times daily, Disp: , Rfl:     SocHx:  Social History     Socioeconomic History     Marital status: Significant other     Spouse name: Not on file     Number of children: Not on file     Years of education: Not on file     Highest  education level: Not on file   Occupational History     Not on file   Social Needs     Financial resource strain: Not on file     Food insecurity     Worry: Not on file     Inability: Not on file     Transportation needs     Medical: Not on file     Non-medical: Not on file   Tobacco Use     Smoking status: Former Smoker     Packs/day: 2.00     Years: 40.00     Pack years: 80.00     Types: Cigarettes     Quit date: 12/3/2011     Years since quittin.1     Smokeless tobacco: Never Used   Substance and Sexual Activity     Alcohol use: No     Alcohol/week: 0.0 standard drinks     Drug use: No     Sexual activity: Yes     Partners: Female   Lifestyle     Physical activity     Days per week: Not on file     Minutes per session: Not on file     Stress: Not on file   Relationships     Social connections     Talks on phone: Not on file     Gets together: Not on file     Attends Zoroastrian service: Not on file     Active member of club or organization: Not on file     Attends meetings of clubs or organizations: Not on file     Relationship status: Not on file     Intimate partner violence     Fear of current or ex partner: Not on file     Emotionally abused: Not on file     Physically abused: Not on file     Forced sexual activity: Not on file   Other Topics Concern     Parent/sibling w/ CABG, MI or angioplasty before 65F 55M? Yes      Service Not Asked     Blood Transfusions Not Asked     Caffeine Concern Not Asked     Occupational Exposure Not Asked     Hobby Hazards Not Asked     Sleep Concern Not Asked     Stress Concern Not Asked     Weight Concern Not Asked     Special Diet Not Asked     Back Care Not Asked     Exercise Yes     Comment: limited walking     Bike Helmet Not Asked     Seat Belt Not Asked     Self-Exams Not Asked   Social History Narrative     Not on file       Problem, Medication and Allergy Lists were reviewed and are current.  Patient is an established patient of this clinic.         Review of  Systems:   ROS  I have personally reviewed and updated the complete ROS on the day of the visit.           Physical Exam:   There were no vitals taken for this visit.  There is no height or weight on file to calculate BMI.  Vitals were reviewed     Gen: alert, NAD  Resp: Normal respiratory effort, able to speak in complete sentences in single breath  Neuro: No dysarthria, hearing intact   Psych: Normal mood and affect, logical thought process      Results:     Orders Only on 01/26/2021   Component Date Value Ref Range Status     WBC 01/26/2021 6.9  4.0 - 11.0 10e9/L Final     RBC Count 01/26/2021 4.30* 4.4 - 5.9 10e12/L Final     Hemoglobin 01/26/2021 8.6* 13.3 - 17.7 g/dL Final     Hematocrit 01/26/2021 31.0* 40.0 - 53.0 % Final     MCV 01/26/2021 72* 78 - 100 fl Final     MCH 01/26/2021 20.0* 26.5 - 33.0 pg Final     MCHC 01/26/2021 27.7* 31.5 - 36.5 g/dL Final     RDW 01/26/2021 23.2* 10.0 - 15.0 % Final     Platelet Count 01/26/2021 260  150 - 450 10e9/L Final     Sodium 01/26/2021 135  133 - 144 mmol/L Final     Potassium 01/26/2021 3.9  3.4 - 5.3 mmol/L Final     Chloride 01/26/2021 98  94 - 109 mmol/L Final     Carbon Dioxide 01/26/2021 31  20 - 32 mmol/L Final     Anion Gap 01/26/2021 6  3 - 14 mmol/L Final     Glucose 01/26/2021 135* 70 - 99 mg/dL Final     Urea Nitrogen 01/26/2021 85* 7 - 30 mg/dL Final     Creatinine 01/26/2021 2.76* 0.66 - 1.25 mg/dL Final     GFR Estimate 01/26/2021 23* >60 mL/min/[1.73_m2] Final    Comment: Non  GFR Calc  Starting 12/18/2018, serum creatinine based estimated GFR (eGFR) will be   calculated using the Chronic Kidney Disease Epidemiology Collaboration   (CKD-EPI) equation.       GFR Estimate If Black 01/26/2021 27* >60 mL/min/[1.73_m2] Final    Comment:  GFR Calc  Starting 12/18/2018, serum creatinine based estimated GFR (eGFR) will be   calculated using the Chronic Kidney Disease Epidemiology Collaboration   (CKD-EPI) equation.        Calcium 01/26/2021 10.2* 8.5 - 10.1 mg/dL Final       Lab Results   Component Value Date    WBC 6.9 01/26/2021    HGB 8.6 (L) 01/26/2021    HCT 31.0 (L) 01/26/2021     01/26/2021     01/26/2021    POTASSIUM 3.9 01/26/2021    CHLORIDE 98 01/26/2021    CO2 31 01/26/2021    BUN 85 (H) 01/26/2021    CR 2.76 (H) 01/26/2021     (H) 01/26/2021    SED 25 (H) 07/10/2013    DD 8.3 (H) 01/15/2021    NTBNPI 15,597 (H) 01/15/2021    NTBNP 6,974 (H) 07/01/2020    TROPI 0.016 01/15/2021    AST 14 01/15/2021    ALT 16 01/15/2021    ALKPHOS 149 01/15/2021    BILITOTAL 2.3 (H) 01/15/2021    INR 1.33 (H) 01/15/2021       Assessment and Plan     Cole was seen today for hospital f/u.    Diagnoses and all orders for this visit:    Encounter for support and coordination of transition of care  CKD (chronic kidney disease) stage 4, GFR 15-29 ml/min (H)  Iron deficiency  Ischemic cardiomyopathy  Chronic systolic congestive heart failure (HCC)  Anemia due to acute blood loss  Patient is symptomatically feeling fine today and per his account over the phone he appears to be in compensated heart failure.  Labs from this morning show normal potassium, slight hypercalcemia and microcytic anemia below his baseline.  Advised patient that it may be worthwhile to give dapagliflozin one more try to see if SE are persistent, but to first discuss this with cardiology since this drug may be contraindicated based on his CKD 4 and GFR less than 30--he will discuss this with [facility], where he has a visit scheduled for tomorrow January 27, 2021.  He is unable to tolerate metoprolol, and is/ARB and MRA are contraindicated due to his renal dysfunction.  Regarding his chronic iron deficiency anemia, he does not tolerate oral iron and he last received IV Dipesh for November 18, 2020.  Last ferritin 12/18/20 was 25 and this was one month after his last dose of IV iron.  We will arrange for another iron infusion.  - Recheck K, Ca, Cr on  2/18/21 or within 3 weeks  - IV iron sucrose 200 mg x2 ordered via therapy plan  - Patient to try dapagliflozin again if cleared by Cardiology (given GFR<30)      Options for treatment and follow-up care were reviewed with the patient. Cole Jesus engaged in the decision making process and verbalized understanding of the options discussed and agreed with the final plan.    Misael Haynes, DO  Jan 26, 2021    Pt was seen and plan of care discussed with Dr. Rebollar.     I personally reviewed the pertinent medical history and results.  I discussed the patient s diagnosis and treatment plan with the resident and the resident relayed our joint plan to the patient in synchrony. I agree with the information as documented with the following exceptions: none.  Kathleen Rebollar MD  Internal Medicine

## 2021-01-26 NOTE — PROGRESS NOTES
"Medication Therapy Management (MTM) Encounter    ASSESSMENT:                            Medication Adherence/Access: No issues identified    Acute on chronic HFrEF 2/2 ICM (LVEF 30%)/PAF: Follow up with [facility] as scheduled. Would benefit from taking dapagliflozin as directed. Stomach upset is not a known side effect of dapa.   Constipation: stable.   Takes Dietary Supplements: Stable.     PLAN:                            1. Restart dapagliflozin - patient declined.     Follow-up: as needed    SUBJECTIVE/OBJECTIVE:                          Cole Jesus is a 60 year old male called for a transitions of care visit. He was [facility] from [facility] on [facility]/21 for acute on chronic systolic congestive heart failure.      Reason for visit: KELI.    Allergies/ADRs: Reviewed in chart  Tobacco: He reports that he quit smoking about 9 years ago. His smoking use included cigarettes. He has a 80.00 pack-year smoking history. He has never used smokeless tobacco.  Alcohol: none  Caffeine: no caffeine  Activity: He has a stationary incumbent bike that he sometimes uses, but mostly ADLs  Past Medical History: Reviewed in chart    Medication Adherence/Access: no issues reported - reluctant to take new meds due to various intolerances. See below.     Acute on chronic HFrEF 2/2 ICM (LVEF 30%)/PAF: Current medications include dapagliflozin 5 mg daily (started in the hospital; sick to his stomach (stomach ache), only took once w/food, \"if it doesn't work the first time, it doesn't work for me\"), torsemide 80 mg twice daily and metolazone 2.5 mg three times weekly, potassium Cl 40 mEq twice daily. Has not tolerated BB or statin therapy and has not been able to use ACE/ARB due to hyperkalemia. His renal dysfunction rules out potassium-sparing diuretics. It was strongly recommended that he start ivabradine because he cannot tolerate a beta blocker. He is not on anticoagulation due to s/p GIB and is not rate controlled.     His weight " "has been steady, 228 lb at home. He denies new swelling. He denies frothy breathing or SOB.     We discuss the possibility of starting ivabradine. He read the patient information provided to him about the medication and he \"doesn't think its for me\". He does not want to discuss the medication in detail today.     Last Comprehensive Metabolic Panel:  Sodium   Date Value Ref Range Status   01/26/2021 135 133 - 144 mmol/L Final     Potassium   Date Value Ref Range Status   01/26/2021 3.9 3.4 - 5.3 mmol/L Final     Chloride   Date Value Ref Range Status   01/26/2021 98 94 - 109 mmol/L Final     Carbon Dioxide   Date Value Ref Range Status   01/26/2021 31 20 - 32 mmol/L Final     Anion Gap   Date Value Ref Range Status   01/26/2021 6 3 - 14 mmol/L Final     Glucose   Date Value Ref Range Status   01/26/2021 135 (H) 70 - 99 mg/dL Final     Urea Nitrogen   Date Value Ref Range Status   01/26/2021 85 (H) 7 - 30 mg/dL Final     Creatinine   Date Value Ref Range Status   01/26/2021 2.76 (H) 0.66 - 1.25 mg/dL Final     GFR Estimate   Date Value Ref Range Status   01/26/2021 23 (L) >60 mL/min/[1.73_m2] Final     Comment:     Non  GFR Calc  Starting 12/18/2018, serum creatinine based estimated GFR (eGFR) will be   calculated using the Chronic Kidney Disease Epidemiology Collaboration   (CKD-EPI) equation.       Calcium   Date Value Ref Range Status   01/26/2021 10.2 (H) 8.5 - 10.1 mg/dL Final     Estimated Creatinine Clearance: 32.4 mL/min (A) (based on SCr of 2.76 mg/dL (H)).    Constipation: Current medications include senna-docusate 8.6-50 mg as needed. Not using currently but is effective when he needs it.     Takes Dietary Supplements: Current medications include vitamin C/vitamin B daily, vitamin D 2000 units daily. He has no concerns or complaints today.     Lab Results   Component Value Date    VITDT 56 07/01/2019    VITDT 49 06/01/2018    VITDT 47 02/01/2016    VITDT 44 12/01/2014    VITDT 30 08/23/2013 " "    Today's Vitals: There were no vitals taken for this visit. - telemed    BP Readings from Last 1 Encounters:   01/19/21 103/74     Pulse Readings from Last 1 Encounters:   01/19/21 90     Wt Readings from Last 1 Encounters:   01/19/21 228 lb 14.4 oz (103.8 kg)     Ht Readings from Last 1 Encounters:   01/16/21 5' 8\" (1.727 m)     Estimated body mass index is 34.8 kg/m  as calculated from the following:    Height as of 1/16/21: 5' 8\" (1.727 m).    Weight as of 1/19/21: 228 lb 14.4 oz (103.8 kg).    Temp Readings from Last 1 Encounters:   01/19/21 97.4  F (36.3  C) (Oral)     ----------------  Post Discharge Medication Reconciliation Status: discharge medications reconciled, continue medications without change.      I spent 13 minutes with this patient today. A copy of the visit note was provided to the patient's primary care provider.    The patient declined a summary of these recommendations.     Alea Winkler, Pharm.D., [facility]  Medication Therapy Management Pharmacist  Page/VM:  [phone]    Telemedicine Visit Details  Type of service:  Telephone visit  Start Time: 1:03 PM  End Time: 1:16 PM  Originating Location (patient location): [place]  Distant Location (provider location):  [facility] MEDICATION THERAPY MANAGEMENT        Medication Therapy Recommendations  Acute on chronic systolic congestive heart failure (H)    Current Medication: dapagliflozin (FARXIGA) 5 MG TABS tablet   Rationale: Patient prefers not to take - Adherence - Adherence   Recommendation: Provide Adherence Intervention   Status: Declined per Patient               "

## 2021-01-26 NOTE — NURSING NOTE
Chief Complaint   Patient presents with     Hospital F/U     hospital follow up     Kimberly Nissen, EMT at 1:17 PM on 1/26/2021    Video Visit Technology for this patient: No video technology available to patient, please call patient over the phone

## 2021-01-27 NOTE — PROGRESS NOTES
Pancho is a 60 year old who is being evaluated via a billable telephone visit.      What phone number would you like to be contacted at? [phone]  How would you like to obtain your AVS? [employer]

## 2021-01-27 NOTE — LETTER
1/27/2021      RE: Cloe Jesus  [address]  [facility] [address]       Dear Colleague,    Thank you for the opportunity to participate in the care of your patient, Cole Jesus, at the [facility] HEART [facility]. Please see a copy of my visit note below.      Pancho is a 60 year old who is being evaluated via a billable telephone visit.      What phone number would you like to be contacted at? [phone]  How would you like to obtain your AVS? MyChart      HPI: 60 yr old male called for follow up post hospitalization for decompensated heart failure. Pt is followed for ICM with EF of 25-30%. He had 6L diuresed while hospitalized. Since discharge (1/19/20) his wt is stable at 226#. He admits that prior to hospitalization he was eating more salty foods.  He denies SOB/orthopnea/PND. No LE edema. He is fatigued but noted HGB 8.6. He is scheduled to iron infusion.  He does not tolerate BB nor can he take ACE/MRA due to renal disease. He was started on Dapaglifozin during the last hospitalization , but he states he stopped this due to severe stomach cramps that resolved when he stopped the medication.   He is not interested in trying this again.   PAST MEDICAL HISTORY:  Past Medical History:   Diagnosis Date     Anemia in chronic renal disease 03/09/2015     Antiplatelet or antithrombotic long-term use      Atrial fibrillation and flutter      CAD (coronary artery disease)     Stemi in 12/11, s/p angioplasty     CRD (chronic renal disease), stage IV (H) 03/09/2015    hx ATN with dialysis complicating cardiogenic shock  1/2012     GI bleed     massive lower GI bleed secondary to a cecal ulcer, s/p ileocecal resection in 12/11     Heart failure     Biventricular systolic HF, complicated by ARDS requiring tracheostomy     Hyperlipidemia LDL goal <100 04/28/2013     Hypertension      Ischemic cardiomyopathy 04/28/2013    TTE revealing 40% EF      Myocardial infarction (H)      and 2018     Other and unspecified nonspecific immunological findings     Anti JKa     Pulmonary edema     episodes of flash pulmonary edema in      Subclinical hypothyroidism        FAMILY HISTORY:  Family History   Problem Relation Age of Onset     Diabetes Mother      Hypertension Mother      Heart Disease Mother      Myocardial Infarction Mother 68         of     Myocardial Infarction Father 54         of heart attack, left when he was young     Heart Disease Father      Diabetes Sister      Ovarian Cancer Sister          of     Diabetes Sister      Diabetes Sister      Crohn's Disease Daughter 36     Glaucoma No family hx of      Macular Degeneration No family hx of      Colon Cancer No family hx of      Ulcerative Colitis No family hx of      Irritable Bowel Syndrome No family hx of      Inflammatory Bowel Disease No family hx of        SOCIAL HISTORY:  Social History     Socioeconomic History     Marital status: Significant other     Spouse name: None     Number of children: None     Years of education: None     Highest education level: None   Occupational History     None   Social Needs     Financial resource strain: None     Food insecurity     Worry: None     Inability: None     Transportation needs     Medical: None     Non-medical: None   Tobacco Use     Smoking status: Former Smoker     Packs/day: 2.00     Years: 40.00     Pack years: 80.00     Types: Cigarettes     Quit date: 12/3/2011     Years since quittin.1     Smokeless tobacco: Never Used   Substance and Sexual Activity     Alcohol use: No     Alcohol/week: 0.0 standard drinks     Drug use: No     Sexual activity: Yes     Partners: Female   Lifestyle     Physical activity     Days per week: None     Minutes per session: None     Stress: None   Relationships     Social connections     Talks on phone: None     Gets together: None     Attends Pentecostalism service: None     Active member of  club or organization: None     Attends meetings of clubs or organizations: None     Relationship status: None     Intimate partner violence     Fear of current or ex partner: None     Emotionally abused: None     Physically abused: None     Forced sexual activity: None   Other Topics Concern     Parent/sibling w/ CABG, MI or angioplasty before 65F 55M? Yes      Service Not Asked     Blood Transfusions Not Asked     Caffeine Concern Not Asked     Occupational Exposure Not Asked     Hobby Hazards Not Asked     Sleep Concern Not Asked     Stress Concern Not Asked     Weight Concern Not Asked     Special Diet Not Asked     Back Care Not Asked     Exercise Yes     Comment: limited walking     Bike Helmet Not Asked     Seat Belt Not Asked     Self-Exams Not Asked   Social History Narrative     None       CURRENT MEDICATIONS:  Current Outpatient Medications   Medication Sig Dispense Refill     ACCU-CHEK GUIDE test strip USE TO TEST BLOOD SUGAR FOUR TIMES A DAY BEFORE MEALS AND AT BEDTIME 400 each 3     Alcohol Swabs PADS 1 applicator 4 times daily (before meals and nightly) 100 each 3     blood glucose monitoring (ACCU-CHEK FASTCLIX) lancets USE TO TEST BLOOD SUGAR FOUR TIMES A DAY AT MEALS AND AT BEDTIME 400 each 3     insulin pen needle (32G X 4 MM) 32G X 4 MM miscellaneous Use 5 pen needles daily or as directed. 200 each 3     metolazone (ZAROXOLYN) 2.5 MG tablet Take 1 tablet (2.5 mg) by mouth three times a week 39 tablet 3     potassium chloride ER (KLOR-CON M) 20 MEQ CR tablet Take 2 tablets (40 mEq) by mouth 2 times daily 360 tablet 3     senna-docusate (SENOKOT-S/PERICOLACE) 8.6-50 MG tablet Take 1 tablet by mouth 2 times daily as needed for constipation       Sharps Container MISC 1 Device every 30 days 1 each 3     torsemide (DEMADEX) 20 MG tablet Take 4 tablets (80 mg) by mouth 2 times daily 360 tablet 4     vitamin  B complex with vitamin C (VITAMIN  B COMPLEX) TABS Take 1 tablet by mouth daily        Vitamin D, Cholecalciferol, 25 MCG (1000 UT) CAPS Take 1 capsule by mouth 2 times daily         ROS:   Constitutional: No fever, chills, or sweats. No weight gain/loss.   ENT: No visual disturbance, ear ache, epistaxis, sore throat.   Allergies/Immunologic: Negative.   Respiratory: No cough, hemoptysis.   Cardiovascular: As per HPI.   GI: No nausea, vomiting, hematemesis, melena, or hematochezia.   : No urinary frequency, dysuria, or hematuria.   Integument: Negative.   Psychiatric: Negative.   Neuro: Negative.   Endocrinology: Negative.   Musculoskeletal: Negative.    EXAM:  There were no vitals taken for this visit.  Telephone visit.  Labs:  CBC RESULTS:  Lab Results   Component Value Date    WBC 6.9 01/26/2021    RBC 4.30 (L) 01/26/2021    HGB 8.6 (L) 01/26/2021    HCT 31.0 (L) 01/26/2021    MCV 72 (L) 01/26/2021    MCH 20.0 (L) 01/26/2021    MCHC 27.7 (L) 01/26/2021    RDW 23.2 (H) 01/26/2021     01/26/2021       CMP RESULTS:  Lab Results   Component Value Date     01/26/2021    POTASSIUM 3.9 01/26/2021    CHLORIDE 98 01/26/2021    CO2 31 01/26/2021    ANIONGAP 6 01/26/2021     (H) 01/26/2021    BUN 85 (H) 01/26/2021    CR 2.76 (H) 01/26/2021    GFRESTIMATED 23 (L) 01/26/2021    GFRESTBLACK 27 (L) 01/26/2021    RONNY 10.2 (H) 01/26/2021    BILITOTAL 2.3 (H) 01/15/2021    ALBUMIN 3.1 (L) 01/15/2021    ALKPHOS 149 01/15/2021    ALT 16 01/15/2021    AST 14 01/15/2021        INR RESULTS:  Lab Results   Component Value Date    INR 1.33 (H) 01/15/2021       Lab Results   Component Value Date    MAG 2.6 (H) 01/19/2021     Lab Results   Component Value Date    NTBNPI 15,597 (H) 01/15/2021     Lab Results   Component Value Date    NTBNP 6,974 (H) 07/01/2020     Echo: 1/18/20  Interpretation Summary  The Ejection Fraction is estimated at 25-30%.  Borderline left ventricular dilation is present.  Mild to moderate right ventricular dilation is present.  Global right ventricular function is moderately  reduced.  Moderate to severe mitral insufficiency is present.  Moderate tricuspid insufficiency is present.  Right ventricular systolic pressure is 34mmHg above the right atrial pressure.  IVC diameter >2.1 cm collapsing <50% with sniff suggests a high RA pressure  estimated at 15 mmHg or greater.  Small circumferential pericardial effusion is present without any hemodynamic  significance.  No significant changes noted.  Assessment and Plan:   1. Chronic systolic heart failure secondary to ICM (EF 25-30%).    Stage C  NYHA Class III  ACEi/ARB contraindicated due to renal dysfunction  BB no  Aldosterone antagonist contraindicated due to renal dysfunction  SGL2 inhibitor: pt did not tolerate Dapagfliozin - with GFR< 30 - would recommend Empaglifozin in future if pt will agree to re-trial.   SCD prophylaxis patient declined  % BiV pacing: N/A  Fluid status euvolemic by pt report - no exam done (telephone visit)    2. CAD: S/P DEX x 3 2012; Pci RCA 2019. No anginal sx -  Cont ASA    3. Anemia:  HGB 8.6 iron deficiency - planned iron infusion per PCP    4. CKD: stage 4 - stable renal funcion    5. DM2 - home glucose 110-120 per pt report.       Follow-up 1 month  Instructed pt to call CORE prior to 10# wt gain, to prevent repeat hospitalization. He verbalized understanding.     CC  Patient Care Team:  Silas Enciso MD as PCP - General  Dianna Davis APRN CNS as Registered Nurse (Clinical Nurse Specialist)  Alen Trujillo MD as MD (Cardiology)  Coordinator,  Bmt Nurse as Nurse Coordinator (Cardiology)  Kavita Chapman LPN as Zach Curiel RN as Specialty Care Coordinator (Cardiology)  Smiley Argueta PA-C as Physician Assistant (Physician Assistant)  Patricia Vo, RN as Specialty Care Coordinator (Cardiology)  Ena Kruger PA-C as Cardiologist (Physician Assistant)  Diya Mackay, RN as Specialty Care Coordinator (Cardiology)  Ingris Martin, RN as Specialty  Care Coordinator (Cardiology)  Shilpi Myers MD as Assigned Endocrinology Provider  Chad Babcock OD as Assigned Surgical Provider  Venus Vigil MD as Assigned Nephrology Provider  Silas Houston MD as Assigned Gastroenterology Provider  Silas Enciso MD as Assigned PCP  BHASKAR SMITH      Please do not hesitate to contact me if you have any questions/concerns.     Sincerely,     GUILLE Walton CNP

## 2021-01-27 NOTE — PROGRESS NOTES
HPI: 60 yr old male called for follow up post hospitalization for decompensated heart failure. Pt is followed for ICM with EF of 25-30%. He had 6L diuresed while hospitalized. Since discharge (20) his wt is stable at 226#. He admits that prior to hospitalization he was eating more salty foods.  He denies SOB/orthopnea/PND. No LE edema. He is fatigued but noted HGB 8.6. He is scheduled to iron infusion.  He does not tolerate BB nor can he take ACE/MRA due to renal disease. He was started on Dapaglifozin during the last hospitalization , but he states he stopped this due to severe stomach cramps that resolved when he stopped the medication.   He is not interested in trying this again.   PAST MEDICAL HISTORY:  Past Medical History:   Diagnosis Date     Anemia in chronic renal disease 2015     Antiplatelet or antithrombotic long-term use      Atrial fibrillation and flutter      CAD (coronary artery disease)     Stemi in , s/p angioplasty     CRD (chronic renal disease), stage IV (H) 2015    hx ATN with dialysis complicating cardiogenic shock  2012     GI bleed     massive lower GI bleed secondary to a cecal ulcer, s/p ileocecal resection in      Heart failure     Biventricular systolic HF, complicated by ARDS requiring tracheostomy     Hyperlipidemia LDL goal <100 2013     Hypertension      Ischemic cardiomyopathy 2013    TTE revealing 40% EF     Myocardial infarction (H)      and 2018     Other and unspecified nonspecific immunological findings     Anti JKa     Pulmonary edema     episodes of flash pulmonary edema in      Subclinical hypothyroidism        FAMILY HISTORY:  Family History   Problem Relation Age of Onset     Diabetes Mother      Hypertension Mother      Heart Disease Mother      Myocardial Infarction Mother 68         of     Myocardial Infarction Father 54         of heart attack, left when he was young     Heart Disease Father      Diabetes  Sister      Ovarian Cancer Sister          of     Diabetes Sister      Diabetes Sister      Crohn's Disease Daughter 36     Glaucoma No family hx of      Macular Degeneration No family hx of      Colon Cancer No family hx of      Ulcerative Colitis No family hx of      Irritable Bowel Syndrome No family hx of      Inflammatory Bowel Disease No family hx of        SOCIAL HISTORY:  Social History     Socioeconomic History     Marital status: Significant other     Spouse name: None     Number of children: None     Years of education: None     Highest education level: None   Occupational History     None   Social Needs     Financial resource strain: None     Food insecurity     Worry: None     Inability: None     Transportation needs     Medical: None     Non-medical: None   Tobacco Use     Smoking status: Former Smoker     Packs/day: 2.00     Years: 40.00     Pack years: 80.00     Types: Cigarettes     Quit date: 12/3/2011     Years since quittin.1     Smokeless tobacco: Never Used   Substance and Sexual Activity     Alcohol use: No     Alcohol/week: 0.0 standard drinks     Drug use: No     Sexual activity: Yes     Partners: Female   Lifestyle     Physical activity     Days per week: None     Minutes per session: None     Stress: None   Relationships     Social connections     Talks on phone: None     Gets together: None     Attends Mormonism service: None     Active member of club or organization: None     Attends meetings of clubs or organizations: None     Relationship status: None     Intimate partner violence     Fear of current or ex partner: None     Emotionally abused: None     Physically abused: None     Forced sexual activity: None   Other Topics Concern     Parent/sibling w/ CABG, MI or angioplasty before 65F 55M? Yes      Service Not Asked     Blood Transfusions Not Asked     Caffeine Concern Not Asked     Occupational Exposure Not Asked     Hobby Hazards Not Asked     Sleep Concern Not  Asked     Stress Concern Not Asked     Weight Concern Not Asked     Special Diet Not Asked     Back Care Not Asked     Exercise Yes     Comment: limited walking     Bike Helmet Not Asked     Seat Belt Not Asked     Self-Exams Not Asked   Social History Narrative     None       CURRENT MEDICATIONS:  Current Outpatient Medications   Medication Sig Dispense Refill     ACCU-CHEK GUIDE test strip USE TO TEST BLOOD SUGAR FOUR TIMES A DAY BEFORE MEALS AND AT BEDTIME 400 each 3     Alcohol Swabs PADS 1 applicator 4 times daily (before meals and nightly) 100 each 3     blood glucose monitoring (ACCU-CHEK FASTCLIX) lancets USE TO TEST BLOOD SUGAR FOUR TIMES A DAY AT MEALS AND AT BEDTIME 400 each 3     insulin pen needle (32G X 4 MM) 32G X 4 MM miscellaneous Use 5 pen needles daily or as directed. 200 each 3     metolazone (ZAROXOLYN) 2.5 MG tablet Take 1 tablet (2.5 mg) by mouth three times a week 39 tablet 3     potassium chloride ER (KLOR-CON M) 20 MEQ CR tablet Take 2 tablets (40 mEq) by mouth 2 times daily 360 tablet 3     senna-docusate (SENOKOT-S/PERICOLACE) 8.6-50 MG tablet Take 1 tablet by mouth 2 times daily as needed for constipation       Sharps Container MISC 1 Device every 30 days 1 each 3     torsemide (DEMADEX) 20 MG tablet Take 4 tablets (80 mg) by mouth 2 times daily 360 tablet 4     vitamin  B complex with vitamin C (VITAMIN  B COMPLEX) TABS Take 1 tablet by mouth daily       Vitamin D, Cholecalciferol, 25 MCG (1000 UT) CAPS Take 1 capsule by mouth 2 times daily         ROS:   Constitutional: No fever, chills, or sweats. No weight gain/loss.   ENT: No visual disturbance, ear ache, epistaxis, sore throat.   Allergies/Immunologic: Negative.   Respiratory: No cough, hemoptysis.   Cardiovascular: As per HPI.   GI: No nausea, vomiting, hematemesis, melena, or hematochezia.   : No urinary frequency, dysuria, or hematuria.   Integument: Negative.   Psychiatric: Negative.   Neuro: Negative.   Endocrinology:  Negative.   Musculoskeletal: Negative.    EXAM:  There were no vitals taken for this visit.  Telephone visit.  Labs:  CBC RESULTS:  Lab Results   Component Value Date    WBC 6.9 01/26/2021    RBC 4.30 (L) 01/26/2021    HGB 8.6 (L) 01/26/2021    HCT 31.0 (L) 01/26/2021    MCV 72 (L) 01/26/2021    MCH 20.0 (L) 01/26/2021    MCHC 27.7 (L) 01/26/2021    RDW 23.2 (H) 01/26/2021     01/26/2021       CMP RESULTS:  Lab Results   Component Value Date     01/26/2021    POTASSIUM 3.9 01/26/2021    CHLORIDE 98 01/26/2021    CO2 31 01/26/2021    ANIONGAP 6 01/26/2021     (H) 01/26/2021    BUN 85 (H) 01/26/2021    CR 2.76 (H) 01/26/2021    GFRESTIMATED 23 (L) 01/26/2021    GFRESTBLACK 27 (L) 01/26/2021    RONNY 10.2 (H) 01/26/2021    BILITOTAL 2.3 (H) 01/15/2021    ALBUMIN 3.1 (L) 01/15/2021    ALKPHOS 149 01/15/2021    ALT 16 01/15/2021    AST 14 01/15/2021        INR RESULTS:  Lab Results   Component Value Date    INR 1.33 (H) 01/15/2021       Lab Results   Component Value Date    MAG 2.6 (H) 01/19/2021     Lab Results   Component Value Date    NTBNPI 15,597 (H) 01/15/2021     Lab Results   Component Value Date    NTBNP 6,974 (H) 07/01/2020     Echo: 1/18/20  Interpretation Summary  The Ejection Fraction is estimated at 25-30%.  Borderline left ventricular dilation is present.  Mild to moderate right ventricular dilation is present.  Global right ventricular function is moderately reduced.  Moderate to severe mitral insufficiency is present.  Moderate tricuspid insufficiency is present.  Right ventricular systolic pressure is 34mmHg above the right atrial pressure.  IVC diameter >2.1 cm collapsing <50% with sniff suggests a high RA pressure  estimated at 15 mmHg or greater.  Small circumferential pericardial effusion is present without any hemodynamic  significance.  No significant changes noted.  Assessment and Plan:   1. Chronic systolic heart failure secondary to ICM (EF 25-30%).    Stage C  NYHA Class  III  ACEi/ARB contraindicated due to renal dysfunction  BB no  Aldosterone antagonist contraindicated due to renal dysfunction  SGL2 inhibitor: pt did not tolerate Dapagfliozin - with GFR< 30 - would recommend Empaglifozin in future if pt will agree to re-trial.   SCD prophylaxis patient declined  % BiV pacing: N/A  Fluid status euvolemic by pt report - no exam done (telephone visit)    2. CAD: S/P DEX x 3 2012; Pci RCA 2019. No anginal sx -  Cont ASA    3. Anemia:  HGB 8.6 iron deficiency - planned iron infusion per PCP    4. CKD: stage 4 - stable renal funcion    5. DM2 - home glucose 110-120 per pt report.       Follow-up 1 month  Instructed pt to call CORE prior to 10# wt gain, to prevent repeat hospitalization. He verbalized understanding.     CC  Patient Care Team:  Silas Enciso MD as PCP - Dianna Montero APRN CNS as Registered Nurse (Clinical Nurse Specialist)  Alen Trujillo MD as MD (Cardiology)  Coordinator,  Bmt Nurse as Nurse Coordinator (Cardiology)  Kavita Chapman LPN as LPN  Zach Phelan, RN as Specialty Care Coordinator (Cardiology)  Smiley Argueta PA-C as Physician Assistant (Physician Assistant)  Patricia Vo, RN as Specialty Care Coordinator (Cardiology)  Ena Kruger PA-C as Cardiologist (Physician Assistant)  Diya Mackay, RN as Specialty Care Coordinator (Cardiology)  Ingris Martin, RN as Specialty Care Coordinator (Cardiology)  Shilpi Myers MD as Assigned Endocrinology Provider  Chad Babcock OD as Assigned Surgical Provider  Venus Vigil MD as Assigned Nephrology Provider  Silas Houston MD as Assigned Gastroenterology Provider  Silas Enciso MD as Assigned PCP  BHASKAR SMITH

## 2021-01-27 NOTE — PATIENT INSTRUCTIONS
Take your medicines every day, as directed    Changes made today:  o No changes     Monitor Your Weight and Symptoms    Contact us if you:      Gain 2 pounds in one day or 5 pounds in one week    Feel more short of breath    Notice more leg swelling    Feel lightheadeded   Change your lifestyle    Limit Salt or Sodium:    2000 mg  Limit Fluids:    2000 mL or approximately 64 ounces  Eat a Heart Healthy Diet    Low in saturated fats  Stay Active:    Aim to move at least 150 minutes every  week         To Contact us    During Business Hours:  [phone], option # 1 (University)  Then option # 4 (medical questions)     After hours, weekends or holidays:   [phone], Option #4  Ask to speak to the On-Call Cardiologist. Inform them you are a CORE/heart failure patient at the [facility].     Use [employer] allows you to communicate directly with your heart team through secure messaging.    [employer] can be accessed any time on your phone, computer, or tablet.    If you need assistance, we'd be happy to help!         Keep your Heart Appointments:    Follow up with C[facility] clinic in 1 month

## 2021-02-02 NOTE — TELEPHONE ENCOUNTER
----- Message from Misael Haynes DO sent at 2/2/2021  8:35 AM CST -----  Regarding: RE: IM methlyprednisolone  Hi Clark,     No I meant to order that as PRN IV in case of reaction.  I will delete the order alltogether because he has tolerated IV iron in the past.  The protocol just calls for PRN if patient's have more than 2 allergies which he does. Thank you for catching this.     Sreekanth  ----- Message -----  From: Elsa Vo RN  Sent: 2/2/2021   7:15 AM CST  To: Kathleen Rebollar MD, Misael Haynes DO  Subject: IM methlyprednisolone                            Good morning,    We have Mr. Jesus coming for IV venofer on Thursday and I just wanted to confirm that you want the methylprednisolone to be an IM injection?  We usually do not give IM prednisolone, and only give premeds if there is a history of reaction, I just want to make sure that is what you intended in the order therapy plan.    Thank you,  Elsa Vo RN  [facility]  Phone [phone]

## 2021-02-02 NOTE — LETTER
2/2/2021         RE: Cole Jesus  [address]  [facility] [address]        Dear Colleague,    Thank you for referring your patient, Cole Jesus, to the [facility] TREATMENT [facility]. Please see a copy of my visit note below.    Nursing Note  Cole Jesus [facility] for:   Chief Complaint   Patient presents with     Infusion     venofer     During today's [facility] appointment, orders from Dr. Rebollar were completed.  Frequency: today is dose 1 of 2 total    Progress note:  Patient identification verified by name and date of birth.  Assessment completed.  Vitals recorded in Doc Flowsheets.  Patient was provided with education regarding medication/procedure and possible side effects.  Patient verbalized understanding.     present during visit today: Not Applicable.    Treatment Conditions: Non-applicable.    Premedications: were not ordered.    Drug Waste Record: No    Infusion length and rate:  infusion given over approximately 15 minutes  480 ml/hr.    Labs: were not ordered for this appointment.    Vascular access: peripheral IV placed today.    Is the next appt scheduled? yes  Asymptomatic COVID test completed? no    Post Infusion Assessment:  Patient tolerated infusion without incident.     Discharge Plan:   Follow up plan of care with: ongoing infusions at [facility]. and primary care provider,  Discharge instructions were reviewed with patient.  Patient/representative verbalized understanding of discharge instructions and all questions answered.  Patient discharged from [facility] in stable condition.    Jolie Junior RN    Administrations This Visit     iron sucrose (VENOFER) 200 mg in sodium chloride 0.9 % 120 mL intermittent infusion     Admin Date  02/02/2021 Action  New Bag Dose  200 mg Rate  480 mL/hr  Route  Intravenous Administered By  Jolie Junior, ELIDA                /73   Pulse 99   Temp 97.5  F (36.4  C)           Again, thank you for allowing me to participate in the care of your patient.        Sincerely,        [facility]

## 2021-02-02 NOTE — PROGRESS NOTES
Nursing Note  Cole Jesus presents today to [facility] for:   Chief Complaint   Patient presents with     Infusion     venofer     During today's [facility] appointment, orders from Dr. Rebollar were completed.  Frequency: today is dose 1 of 2 total    Progress note:  Patient identification verified by name and date of birth.  Assessment completed.  Vitals recorded in Doc Flowsheets.  Patient was provided with education regarding medication/procedure and possible side effects.  Patient verbalized understanding.     present during visit today: Not Applicable.    Treatment Conditions: Non-applicable.    Premedications: were not ordered.    Drug Waste Record: No    Infusion length and rate:  infusion given over approximately 15 minutes  480 ml/hr.    Labs: were not ordered for this appointment.    Vascular access: peripheral IV placed today.    Is the next appt scheduled? yes  Asymptomatic COVID test completed? no    Post Infusion Assessment:  Patient tolerated infusion without incident.     Discharge Plan:   Follow up plan of care with: ongoing infusions at [facility]. and primary care provider,  Discharge instructions were reviewed with patient.  Patient/representative verbalized understanding of discharge instructions and all questions answered.  Patient discharged from [facility] in stable condition.    Jolie Junior RN    Administrations This Visit     iron sucrose (VENOFER) 200 mg in sodium chloride 0.9 % 120 mL intermittent infusion     Admin Date  02/02/2021 Action  New Bag Dose  200 mg Rate  480 mL/hr Route  Intravenous Administered By  Jolie Junior RN                /73   Pulse 99   Temp 97.5  F (36.4  C)

## 2021-02-04 NOTE — PATIENT INSTRUCTIONS
Dear Cole Jesus    Thank you for choosing [facility] for your infusion.  The following information is a summary of our appointment as well as important reminders.      Patient Education     Patient Education    Iron Sucrose Chewable tablet    Iron Sucrose Solution for injection  Iron Sucrose Solution for injection  What is this medicine?  IRON SUCROSE (TRICIA fernandesohs) is an iron complex. Iron is used to make healthy red blood cells, which carry oxygen and nutrients throughout the body. This medicine is used to treat iron deficiency anemia in people with chronic kidney disease.  This medicine may be used for other purposes; ask your health care provider or pharmacist if you have questions.  What should I tell my health care provider before I take this medicine?  They need to know if you have any of these conditions:    anemia not caused by low iron levels    heart disease    high levels of iron in the blood    kidney disease    liver disease    an unusual or allergic reaction to iron, other medicines, foods, dyes, or preservatives    pregnant or trying to get pregnant    breast-feeding  How should I use this medicine?  This medicine is for infusion into a vein. It is given by a health care professional in a hospital or clinic setting.  Talk to your pediatrician regarding the use of this medicine in children. While this drug may be prescribed for children as young as 2 years for selected conditions, precautions do apply.  Overdosage: If you think you have taken too much of this medicine contact a poison control center or emergency room at once.  NOTE: This medicine is only for you. Do not share this medicine with others.  What if I miss a dose?  It is important not to miss your dose. Call your doctor or health care professional if you are unable to keep an appointment.  What may interact with this medicine?  Do not take this medicine with  any of the following medications:    deferoxamine    dimercaprol    other iron products  This medicine may also interact with the following medications:    chloramphenicol    deferasirox  This list may not describe all possible interactions. Give your health care provider a list of all the medicines, herbs, non-prescription drugs, or dietary supplements you use. Also tell them if you smoke, drink alcohol, or use illegal drugs. Some items may interact with your medicine.  What should I watch for while using this medicine?  Visit your doctor or healthcare professional regularly. Tell your doctor or healthcare professional if your symptoms do not start to get better or if they get worse. You may need blood work done while you are taking this medicine.  You may need to follow a special diet. Talk to your doctor. Foods that contain iron include: whole grains/cereals, dried fruits, beans, or peas, leafy green vegetables, and organ meats (liver, kidney).  What side effects may I notice from receiving this medicine?  Side effects that you should report to your doctor or health care professional as soon as possible:    allergic reactions like skin rash, itching or hives, swelling of the face, lips, or tongue    breathing problems    changes in blood pressure    cough    fast, irregular heartbeat    feeling faint or lightheaded, falls    fever or chills    flushing, sweating, or hot feelings    joint or muscle aches/pains    seizures    swelling of the ankles or feet    unusually weak or tired  Side effects that usually do not require medical attention (report to your doctor or health care professional if they continue or are bothersome):    diarrhea    feeling achy    headache    irritation at site where injected    nausea, vomiting    stomach upset    tiredness  This list may not describe all possible side effects. Call your doctor for medical advice about side effects. You may report side effects to FDA at  1-800-FDA-1088.  Where should I keep my medicine?  This drug is given in a hospital or clinic and will not be stored at home.  NOTE:This sheet is a summary. It may not cover all possible information. If you have questions about this medicine, talk to your doctor, pharmacist, or health care provider. Copyright  2016 [employer]             We look forward in seeing you on your next appointment here at [facility] ([facility]).  Please don t hesitate to call us at [phone] to reschedule any of your appointments or to speak with one of the [facility] registered nurses.  It was a pleasure taking care of you today.    Sincerely,    [facility]  [facility]  [address] [place] [address]  [address]  Phone:  [phone]

## 2021-02-04 NOTE — PROGRESS NOTES
Nursing Note  Cole Jesus presents today to [facility] for:   Chief Complaint   Patient presents with     Infusion     venofer     During [facility]'s [facility] appointment, orders from  were completed.  Frequency: every 2 days X 2 doses. Today is dose 2/2.     Progress note:  Patient identification verified by name and date of birth.  Assessment completed.  Vitals recorded in Doc Flowsheets.  Patient was provided with education regarding medication/procedure and possible side effects.  Patient verbalized understanding.     present during visit today: Not Applicable.    Treatment Conditions: Non-applicable.    Premedications: were not ordered.    Drug Waste Record: No    Infusion length and rate:  infusion given over approximately 15 minutes    Labs: were not ordered for this appointment.    Vascular access: peripheral IV placed today.    Is the next appt scheduled? N/A  Asymptomatic COVID test completed? No, N/A    Post Infusion Assessment:  Patient tolerated infusion without incident.  Blood return noted pre and post infusion.  Site patent and intact, free from redness, edema or discomfort.  No evidence of extravasations.  Access discontinued per protocol.     Discharge Plan:   Follow up plan of care with: ordering provider as scheduled.  Discharge instructions were reviewed with patient.  Patient/representative verbalized understanding of discharge instructions and all questions answered.  Patient discharged from [facility] in stable condition.    Maggi Pugh RN       Administrations This Visit     iron sucrose (VENOFER) 200 mg in sodium chloride 0.9 % 120 mL intermittent infusion     Admin Date  02/04/2021 Action  New Bag Dose  200 mg Route  Intravenous Administered By  Maggi Pugh, RN                /70   Pulse 87   Temp 97.4  F (36.3  C) (Oral)   Resp 18   SpO2 100%

## 2021-02-04 NOTE — LETTER
2/4/2021         RE: Cole Jesus  [address]  [place] [address]        Dear Colleague,    Thank you for referring your patient, Cole Jesus, to the [facility]. Please see a copy of my visit note below.    Nursing Note  Cole Jesus presents [facility] for:   Chief Complaint   Patient presents with     Infusion     venofer     During [facility]'s [facility] appointment, orders from  were completed.  Frequency: every 2 days X 2 doses. Today is dose 2/2.     Progress note:  Patient identification verified by name and date of birth.  Assessment completed.  Vitals recorded in Doc Flowsheets.  Patient was provided with education regarding medication/procedure and possible side effects.  Patient verbalized understanding.     present during visit today: Not Applicable.    Treatment Conditions: Non-applicable.    Premedications: were not ordered.    Drug Waste Record: No    Infusion length and rate:  infusion given over approximately 15 minutes    Labs: were not ordered for this appointment.    Vascular access: peripheral IV placed today.    Is the next appt scheduled? N/A  Asymptomatic COVID test completed? No, N/A    Post Infusion Assessment:  Patient tolerated infusion without incident.  Blood return noted pre and post infusion.  Site patent and intact, free from redness, edema or discomfort.  No evidence of extravasations.  Access discontinued per protocol.     Discharge Plan:   Follow up plan of care with: ordering provider as scheduled.  Discharge instructions were reviewed with patient.  Patient/representative verbalized understanding of discharge instructions and all questions answered.  Patient discharged [facility] in stable condition.    Maggi Pugh RN       Administrations This Visit     iron sucrose (VENOFER) 200  mg in sodium chloride 0.9 % 120 mL intermittent infusion     Admin Date  02/04/2021 Action  New Bag Dose  200 mg Route  Intravenous Administered By  Maggi Pugh, RN                /70   Pulse 87   Temp 97.4  F (36.3  C) (Oral)   Resp 18   SpO2 100%         Again, thank you for allowing me to participate in the care of your patient.        Sincerely,        [facility]

## 2021-02-09 NOTE — PROGRESS NOTES
CLINICAL NUTRITION SERVICES    Reason for Assessment:  Low-sodium (2 g/day) nutrition education    Diet History:  Pt reports history of receiving low-sodium nutrition education in the past. Pt is mindful of his sodium intake by preparing majority of meals from scratch with minimal/no added salt. Admittedly, pt will consume high-sodium foods (eg pizza, chips), though this is only occasionally.     Nutrition Prescription/Recs:  Continue low-sodium diet.      Interventions:  Nutrition Education -  Provided brief verbal review of low-sodium meal planning. Pt denied further need for education/intstruction at this time. RD encouraged patient to ask any further nutrition-related questions before discharge or request outpatient RD appointment if neede.    Ofelia Moreno RD, LD  [id number]  Pgr: [id number]      not examined

## 2021-02-24 NOTE — PROGRESS NOTES
Opened in error  The author of this note documented a reason for not sharing it with the patient.

## 2021-02-24 NOTE — LETTER
2/24/2021      RE: Cole Jesus  [address]  [facility] [address]       Dear Colleague,    Thank you for the opportunity to participate in the care of your patient, Cole Jesus, at the [facility] [place] [facility]. Please see a copy of my visit note below.    Opened in error      Please do not hesitate to contact me if you have any questions/concerns.     Sincerely,     GUILLE Walton CNP

## 2021-03-04 NOTE — TELEPHONE ENCOUNTER
lvm for patient to call us back tp [facility] his missed appt with Dr Vigil  Either with her of her NP Jenny Nesbitt

## 2021-04-11 PROBLEM — I95.9 HYPOTENSION, UNSPECIFIED HYPOTENSION TYPE: Status: ACTIVE | Noted: 2021-01-01

## 2021-04-11 PROBLEM — J18.9 PNEUMONIA OF BOTH LOWER LOBES DUE TO INFECTIOUS ORGANISM: Status: ACTIVE | Noted: 2021-01-01

## 2021-04-11 NOTE — ED PROVIDER NOTES
History     Chief Complaint   Patient presents with     Fatigue     HPI  Cole Jesus is a 60 year old male with a past medical history of chronic renal disease with anemia, lower GI bleeding (status post ileocecal resection), STEMI/ischemic cardiomyopathy/heart failure, atrial flutter status post ablation, hypothyroidism, type 2 diabetes who presents to the emergency department with a chief complaint of generalized weakness and fatigue.  The patient reports that he feels like he needs a blood transfusion as he has felt similarly when he has been anemic in the past.  He is unsure what the etiology of his anemia is.  He denies having been bleeding.  The patient feels that he has lost a lot of weight, has not been able to eat very much recently due to decreased appetite.  He denies abdominal pain, nausea, or vomiting.  His lower extremities are edematous and have open wounds weeping clear fluid, but he states that he feels they are less swollen than usual.  No other skin changes.  He denies any acute shortness of breath.  Patient was found to have systolic blood pressures in the 90s per EMS, they are in the 70s on arrival here.  The patient is awake, alert and oriented. Blood glucose was wnl for EMS.  The patient states he has also had a mild cough recently, denies any other symptoms.    I have reviewed the Medications, Allergies, Past Medical and Surgical History, and Social History in the [facility] system.    Past Medical History:   Diagnosis Date     Anemia in chronic renal disease 03/09/2015     Antiplatelet or antithrombotic long-term use      Atrial fibrillation and flutter      CAD (coronary artery disease)     Stemi in 12/11, s/p angioplasty     CRD (chronic renal disease), stage IV (H) 03/09/2015    hx ATN with dialysis complicating cardiogenic shock  1/2012     GI bleed     massive lower GI bleed secondary to a cecal ulcer, s/p ileocecal resection in 12/11     Heart failure     Biventricular systolic HF,  complicated by ARDS requiring tracheostomy     Hyperlipidemia LDL goal <100 04/28/2013     Hypertension      Ischemic cardiomyopathy 04/28/2013    TTE revealing 40% EF     Myocardial infarction (H)     2011 and 6/2018     Other and unspecified nonspecific immunological findings     Anti JKa     Pulmonary edema     episodes of flash pulmonary edema in 12/11     Subclinical hypothyroidism      Past Surgical History:   Procedure Laterality Date     CV RIGHT HEART CATH MEASUREMENTS RECORDED N/A 11/12/2019    Procedure: CV RIGHT HEART CATH;  Surgeon: Fox Gerard MD;  Location:  [facility] CATH [facility]     CV RIGHT HEART CATH MEASUREMENTS RECORDED N/A 2/10/2020    Procedure: CV RIGHT HEART CATH;  Surgeon: Fox Gerard MD;  Location:  HE[facility] CATH LAB     ESOPHAGOSCOPY, GASTROSCOPY, DUODENOSCOPY (EGD), COMBINED  12/25/2011    Procedure:COMBINED ESOPHAGOSCOPY, GASTROSCOPY, DUODENOSCOPY (EGD); Surgeon:SAMARIA PUENTE; Location:[facility]     LAPAROTOMY EXPLORATORY  12/31/2011    Procedure:LAPAROTOMY EXPLORATORY; Explore laparotomy, Illeocectomy, Diverting Illeostomy; Surgeon:GIA NAJERA; Location:[facility]     ORIF right elbow fracture  age 12     TAKEDOWN ILEOSTOMY  6/5/2014    Procedure: TAKEDOWN ILEOSTOMY;  Surgeon: Gia Najera MD;  Location: [facility]     TRACHEOSTOMY  12/22/2011    Procedure:TRACHEOSTOMY; Tracheostomy; 80XLTCP-Proximal Extension-Cuffed 8.0 mm I.D.; Surgeon:LIZABETH DYE; Location: OR     No current facility-administered medications for this encounter.      Current Outpatient Medications   Medication     ACCU-CHEK GUIDE test strip     Alcohol Swabs PADS     blood glucose monitoring (ACCU-CHEK FASTCLIX) lancets     insulin pen needle (32G X 4 MM) 32G X 4 MM miscellaneous     metolazone (ZAROXOLYN) 2.5 MG tablet     potassium chloride ER (KLOR-CON M) 20 MEQ CR tablet     senna-docusate (SENOKOT-S/PERICOLACE) 8.6-50 MG tablet      Sharps Container MISC     torsemide (DEMADEX) 20 MG tablet     vitamin  B complex with vitamin C (VITAMIN  B COMPLEX) TABS     Vitamin D, Cholecalciferol, 25 MCG (1000 UT) CAPS     Allergies   Allergen Reactions     Hydralazine      Black spots, verified allergic reaction by skin biopsy     Isosorbide      Per pt got a rash all over his body and won't take it no more     Simvastatin Other (See Comments)     Leg muscle weakness     Tylenol [Acetaminophen] Palpitations     Past medical history, past surgical history, medications, and allergies were reviewed with the patient. Additional pertinent items: None    Social History     Socioeconomic History     Marital status: Significant other     Spouse name: Not on file     Number of children: Not on file     Years of education: Not on file     Highest education level: Not on file   Occupational History     Not on file   Social Needs     Financial resource strain: Not on file     Food insecurity     Worry: Not on file     Inability: Not on file     Transportation needs     Medical: Not on file     Non-medical: Not on file   Tobacco Use     Smoking status: Former Smoker     Packs/day: 2.00     Years: 40.00     Pack years: 80.00     Types: Cigarettes     Quit date: 12/3/2011     Years since quittin.3     Smokeless tobacco: Never Used   Substance and Sexual Activity     Alcohol use: No     Alcohol/week: 0.0 standard drinks     Drug use: No     Sexual activity: Yes     Partners: Female   Lifestyle     Physical activity     Days per week: Not on file     Minutes per session: Not on file     Stress: Not on file   Relationships     Social connections     Talks on phone: Not on file     Gets together: Not on file     Attends Yarsani service: Not on file     Active member of club or organization: Not on file     Attends meetings of clubs or organizations: Not on file     Relationship status: Not on file     Intimate partner violence     Fear of current or ex partner: Not on  file     Emotionally abused: Not on file     Physically abused: Not on file     Forced sexual activity: Not on file   Other Topics Concern     Parent/sibling w/ CABG, MI or angioplasty before 65F 55M? Yes      Service Not Asked     Blood Transfusions Not Asked     Caffeine Concern Not Asked     Occupational Exposure Not Asked     Hobby Hazards Not Asked     Sleep Concern Not Asked     Stress Concern Not Asked     Weight Concern Not Asked     Special Diet Not Asked     Back Care Not Asked     Exercise Yes     Comment: limited walking     Bike Helmet Not Asked     Seat Belt Not Asked     Self-Exams Not Asked   Social History Narrative     Not on file     Social history was reviewed with the patient. Additional pertinent items: None    Review of Systems  General: No fevers or chills, positive for generalized weakness/fatigue  Skin: No rash or diaphoresis  Eyes: No eye redness or discharge  Ears/Nose/Throat: No rhinorrhea or nasal congestion  Respiratory: positive for cough, no SOB  Cardiovascular: No chest pain or palpitations  Gastrointestinal: No nausea, vomiting, or diarrhea  Genitourinary: No urinary frequency, hematuria, or dysuria  Musculoskeletal: No arthralgias or myalgias  Neurologic: No numbness, positive for generalized weakness  Hematologic/Lymphatic/Immunologic: Positive for chronic leg swelling (currently improved from baseline), no easy bruising/bleeding  Endocrine: No polyuria/polydypsia    A complete review of systems was performed with pertinent positives and negatives noted in the HPI, and all other systems negative.    Physical Exam   BP: (!) 74/53  Pulse: 84  Resp: 16  SpO2: 94 %      General: Ill-appearing  HEENT: EOMI, anicteric. NCAT, MMM  Neck: no jugular venous distension, supple, nl ROM  Cardiac: Regular rate and rhythm. No murmurs, rubs, or gallops. Normal S1, S2.  Intact peripheral pulses  Pulm: CTAB, no stridor, wheezes, rales, rhonchi  Abd: Soft, nontender, distended.  No masses  palpated.    Skin: Warm and dry to the touch.  No rash  Extremities: Bilateral lower extremity edema is present, there open wounds seeping clear fluid, chronic appearing skin changes are present, but no warmth/induration suggesting cellulitis, no cyanosis, w/w/p  Neuro: A&Ox3, no gross focal deficits    ED Course        [facility]    -Central Line    Date/Time: 4/11/2021 9:53 PM  Performed by: Jenny Ballard MD  Authorized by: Jenny Ballard MD        ANESTHESIA    Anesthesia: Local infiltration  Local Anesthetic:  Lidocaine 1% without epinephrine      PRE-PROCEDURE DETAILS:     Hand hygiene: Hand hygiene performed prior to insertion      Sterile barrier technique: All elements of maximal sterile technique followed      Skin preparation:  ChloraPrep    Skin preparation agent: Skin preparation agent completely dried prior to procedure      PROCEDURE DETAILS:     Location:  L femoral    Patient position:  Flat    Procedural supplies:  Cordis and triple lumen    Catheter size:  7 Fr    Landmarks identified: yes      Ultrasound guidance: yes      Sterile ultrasound techniques: Sterile gel and sterile probe covers were used      Number of attempts:  1    Successful placement: yes      POST PROCEDURE DETAILS:      Post-procedure:  Dressing applied and line sutured    Assessment:  Blood return through all ports and free fluid flow               EKG Interpretation:      Interpreted by Jenny Ballard MD  Time reviewed: 1642  Symptoms at time of EKG: weakness   Rhythm: junctional rhythm with PVCs  Rate: normal, 74 bpm  Axis: normal  Ectopy: PVCs  Conduction: normal  Low voltage EKG   ST Segments/ T Waves: No ST-T wave changes  Q Waves: none  Comparison to prior: Compared to previous EKG, dated January 15, 2021 and November 12, 2020, P waves are no longer seen, no ST/T wave changes apparent    Clinical Impression: abnormal EKG             Critical Care  Addendum    My initial assessment, based on my review of prehospital provider report, review of nursing observations, review of vital signs, focused history and physical exam, established that Cole Jesus has severe hypotension and suspicion for sepsis and need for evaluation and early goal-directed therapy, which requires immediate intervention, and therefore he is critically ill.     After the initial assessment, the care team initiated multiple lab tests, initiated IV fluid administration and initiated medication therapy with antibiotics to provide stabilization care. Due to the critical nature of this patient, I reassessed nursing observations, vital signs, physical exam, interpretation of labs and imaging, mental status, neurologic status and respiratory status multiple times prior to his disposition.     Time also spent performing documentation, reviewing test results, discussion with consultants and coordination of care.     Critical care time (excluding teaching time and procedures): 60 minutes.      The patient has signs of Septic Shock  The patient has signs of septic shock as evidenced by:  1. Presence of Sepsis, AND  2. Persistent hypotension defined by the last 2 BP readings within the ONE HOUR following completion of the 30mL/kg bolus being low (SBP <90 or MAP <65)    Time septic shock diagnosis confirmed =   04/11/21   as this was the time when Persistent Hypotension present (2 consecutive SBP <90 or MAP <65 within ONE hour after 30cc/kg IVF bolus completed)    3 Hour Septic Shock Bundle Completion:  1. Initial Lactic Acid Result:   Recent Labs   Lab Test 04/11/21  1629   LACT 3.9*     2. Blood Cultures before Antibiotics: Yes  3. Broad Spectrum Antibiotics Administered:  yes       Anti-infectives (From admission through now)    Start     Dose/Rate Route Frequency Ordered Stop    04/11/21 1710  ceFEPIme (MAXIPIME) 2 g vial to attach to  ml bag for ADULTS or 50 ml bag for PEDS      2  g  over 30 Minutes Intravenous ONCE 04/11/21 1706 04/11/21 1747    04/11/21 1710  vancomycin (VANCOCIN) 2,500 mg in sodium chloride 0.9 % 500 mL intermittent infusion      2,500 mg  over 2 Hours Intravenous ONCE 04/11/21 1709 04/11/21 2045    04/11/21 1709  vancomycin place cage - receiving intermittent dosing      1 each Intravenous SEE ADMIN INSTRUCTIONS 04/11/21 1710            4. IF patient is in septic shock within 6 hours of time zero, as defined by:  -Initial Hypotension:  2 low BP readings (SBP <90, MAP <65, or decrease > 40 from baseline due to infection) within 3 hrs of each other during the time period of 6hrs before and 6 hrs  after time zero  -Lactate > or = 4  THEN: Fluid volume administered in ED:  Full 30 mL/kg bolus given (see amount below). based on IBW    BMI Readings from Last 1 Encounters:   04/11/21 33.53 kg/m      30 mL/kg fluids based on weight: 3,000 mL  30 mL/kg fluids based on IBW (must be >= 60 inches tall): 2,050 mL    Septic Shock reassessment:  1. Repeat Lactic Acid Level: pending  2. Vasopressors started for Persistent Hypotension defined by the last 2 BP readings within the ONE HOUR following completion of the 30mL/kg bolus being low (SBP <90 or MAP <65).         Labs Ordered and Resulted from Time of ED Arrival Up to the Time of Departure from the ED   CBC WITH PLATELETS DIFFERENTIAL - Abnormal; Notable for the following components:       Result Value    WBC 13.1 (*)     Hemoglobin 10.0 (*)     Hematocrit 33.7 (*)     MCV 74 (*)     MCH 22.0 (*)     MCHC 29.7 (*)     RDW 24.9 (*)     Platelet Count 46 (*)     Nucleated RBCs 1 (*)     Absolute Neutrophil 12.4 (*)     Absolute Lymphocytes 0.0 (*)     All other components within normal limits   INR - Abnormal; Notable for the following components:    INR 1.98 (*)     All other components within normal limits   PARTIAL THROMBOPLASTIN TIME - Abnormal; Notable for the following components:    PTT 52 (*)     All other components within  normal limits   COMPREHENSIVE METABOLIC PANEL - Abnormal; Notable for the following components:    Sodium 127 (*)     Potassium 5.9 (*)     Carbon Dioxide 17 (*)     Anion Gap 15 (*)     Urea Nitrogen 152 (*)     Creatinine 3.89 (*)     GFR Estimate 15 (*)     GFR Estimate If Black 18 (*)     Bilirubin Total 4.1 (*)     Albumin 2.9 (*)     All other components within normal limits   TROPONIN I - Abnormal; Notable for the following components:    Troponin I ES 0.055 (*)     All other components within normal limits   TSH WITH FREE T4 REFLEX - Abnormal; Notable for the following components:    TSH 10.26 (*)     All other components within normal limits   MAGNESIUM - Abnormal; Notable for the following components:    Magnesium 3.4 (*)     All other components within normal limits   T4 FREE - Abnormal; Notable for the following components:    T4 Free 0.38 (*)     All other components within normal limits   ISTAT  GASES LACTATE CHAR POCT - Abnormal; Notable for the following components:    PCO2 Venous 30 (*)     PO2 Venous 23 (*)     Bicarbonate Venous 16 (*)     Lactic Acid 3.9 (*)     All other components within normal limits   ISTAT GASES ELEC ICA GLUC CHAR POCT - Abnormal; Notable for the following components:    PCO2 Venous 31 (*)     Bicarbonate Venous 16 (*)     Sodium 129 (*)     Potassium 6.1 (*)     Calcium Ionized 4.3 (*)     Hemoglobin 12.6 (*)     Hematocrit - POCT 37 (*)     All other components within normal limits   ISTAT GASES ELEC ICA GLUC CHAR POCT - Abnormal; Notable for the following components:    Ph Venous 7.29 (*)     PCO2 Venous 29 (*)     Bicarbonate Venous 14 (*)     Sodium 130 (*)     Potassium 6.4 (*)     Calcium Ionized 4.1 (*)     Hemoglobin 12.6 (*)     Hematocrit - POCT 37 (*)     All other components within normal limits   GLUCOSE MONITOR NURSING POCT   INFLUENZA A/B & SARS-COV2 PCR MULTIPLEX   UA MACROSCOPIC WITH REFLEX TO MICRO AND CULTURE   POTASSIUM   ISTAT CG4 GASES LACTATE CHAR NURSING  POCT   ISTAT CG8 GAS ELEC ICA GLUC CHAR NURSE POCT   CARDIAC CONTINUOUS MONITORING   ISTAT CG8 GAS ELEC ICA GLUC CHAR NURSE POCT   ASSESS FOR HYPOGLYCEMIA SYMPTOMS   NOTIFY PHYSICIAN   ABO/RH TYPE AND SCREEN   ABO/RH TYPE AND SCREEN   BLOOD CULTURE   BLOOD CULTURE            Results for orders placed or performed during the hospital encounter of 04/11/21 (from the past 24 hour(s))   CBC with platelets differential   Result Value Ref Range    WBC 13.1 (H) 4.0 - 11.0 10e9/L    RBC Count 4.54 4.4 - 5.9 10e12/L    Hemoglobin 10.0 (L) 13.3 - 17.7 g/dL    Hematocrit 33.7 (L) 40.0 - 53.0 %    MCV 74 (L) 78 - 100 fl    MCH 22.0 (L) 26.5 - 33.0 pg    MCHC 29.7 (L) 31.5 - 36.5 g/dL    RDW 24.9 (H) 10.0 - 15.0 %    Platelet Count 46 (LL) 150 - 450 10e9/L    Diff Method Automated Method     % Neutrophils 94.9 %    % Lymphocytes 0.0 %    % Monocytes 4.5 %    % Eosinophils 0.0 %    % Basophils 0.1 %    % Immature Granulocytes 0.5 %    Nucleated RBCs 1 (H) 0 /100    Absolute Neutrophil 12.4 (H) 1.6 - 8.3 10e9/L    Absolute Lymphocytes 0.0 (L) 0.8 - 5.3 10e9/L    Absolute Monocytes 0.6 0.0 - 1.3 10e9/L    Absolute Eosinophils 0.0 0.0 - 0.7 10e9/L    Absolute Basophils 0.0 0.0 - 0.2 10e9/L    Abs Immature Granulocytes 0.1 0 - 0.4 10e9/L    Absolute Nucleated RBC 0.1     Platelet Estimate Confirming automated cell count    INR   Result Value Ref Range    INR 1.98 (H) 0.86 - 1.14   Partial thromboplastin time   Result Value Ref Range    PTT 52 (H) 22 - 37 sec   ABO/Rh type and screen   Result Value Ref Range    ABO O     RH(D) Pos     Antibody Screen Neg     Test Valid Only At          [facility]    Specimen Expires 04/14/2021    Comprehensive metabolic panel   Result Value Ref Range    Sodium 127 (L) 133 - 144 mmol/L    Potassium 5.9 (H) 3.4 - 5.3 mmol/L    Chloride 95 94 - 109 mmol/L    Carbon Dioxide 17 (L) 20 - 32 mmol/L    Anion Gap 15 (H) 3 - 14 mmol/L    Glucose 86 70 - 99 mg/dL     Urea Nitrogen 152 (H) 7 - 30 mg/dL    Creatinine 3.89 (H) 0.66 - 1.25 mg/dL    GFR Estimate 15 (L) >60 mL/min/[1.73_m2]    GFR Estimate If Black 18 (L) >60 mL/min/[1.73_m2]    Calcium 8.7 8.5 - 10.1 mg/dL    Bilirubin Total 4.1 (H) 0.2 - 1.3 mg/dL    Albumin 2.9 (L) 3.4 - 5.0 g/dL    Protein Total 7.0 6.8 - 8.8 g/dL    Alkaline Phosphatase 106 40 - 150 U/L    ALT 20 0 - 70 U/L    AST 26 0 - 45 U/L   Troponin I   Result Value Ref Range    Troponin I ES 0.055 (H) 0.000 - 0.045 ug/L   TSH with free T4 reflex   Result Value Ref Range    TSH 10.26 (H) 0.40 - 4.00 mU/L   Magnesium   Result Value Ref Range    Magnesium 3.4 (H) 1.6 - 2.3 mg/dL   T4 free   Result Value Ref Range    T4 Free 0.38 (L) 0.76 - 1.46 ng/dL   ISTAT gases lactate leesa POCT   Result Value Ref Range    Ph Venous 7.33 7.32 - 7.43 pH    PCO2 Venous 30 (L) 40 - 50 mm Hg    PO2 Venous 23 (L) 25 - 47 mm Hg    Bicarbonate Venous 16 (L) 21 - 28 mmol/L    O2 Sat Venous 37 %    Lactic Acid 3.9 (H) 0.7 - 2.1 mmol/L   ISTAT gases elec ica gluc leesa POCT   Result Value Ref Range    Ph Venous 7.32 7.32 - 7.43 pH    PCO2 Venous 31 (L) 40 - 50 mm Hg    PO2 Venous 25 25 - 47 mm Hg    Bicarbonate Venous 16 (L) 21 - 28 mmol/L    O2 Sat Venous 42 %    Sodium 129 (L) 133 - 144 mmol/L    Potassium 6.1 (HH) 3.4 - 5.3 mmol/L    Glucose 86 70 - 99 mg/dL    Calcium Ionized 4.3 (L) 4.4 - 5.2 mg/dL    Hemoglobin 12.6 (L) 13.3 - 17.7 g/dL    Hematocrit - POCT 37 (L) 40.0 - 53.0 %PCV   EKG 12-lead, tracing only   Result Value Ref Range    Interpretation ECG Click View Image link to view waveform and result    XR Chest Port 1 View    Narrative    EXAM: XR CHEST PORT 1 VW  4/11/2021 4:45 PM     HISTORY:  weakness       COMPARISON:  Chest x-ray 1/15/2021    FINDINGS: AP radiograph of the chest. Enlargement of the  cardiomediastinal silhouette. Small bilateral pleural effusions. No  pneumothorax. Mixed interstitial and patchy airspace opacities most  pronounced in the right perihilar  and right lower lung field. The  pulmonary vasculature is indistinct. Visualized upper abdomen is  unremarkable. No acute osseous abnormality.      Impression    IMPRESSION:  1. Perihilar and right lower lobe predominant interstitial and patchy  airspace opacities concerning for pulmonary edema versus infection.  2. Small bilateral pleural effusions.  3. Cardiomegaly.    I have personally reviewed the examination and initial interpretation  and I agree with the findings.    MYRNA FOREMAN MD   Symptomatic Influenza A/B & SARS-CoV2 (COVID-19) Virus PCR Multiplex    Specimen: Nasopharyngeal   Result Value Ref Range    Flu A/B & SARS-COV-2 PCR Source Nasopharyngeal     SARS-CoV-2 PCR Result NEGATIVE     Influenza A PCR Negative NEG^Negative    Influenza B PCR Negative NEG^Negative    Respiratory Syncytial Virus PCR Negative NEG^Negative    Flu A/B & SARS-CoV-2 PCR Comment (Note)    XR Chest Port 1 View    Narrative    EXAM: XR CHEST PORT 1 VW  4/11/2021 6:51 PM     HISTORY:  hypoxia       COMPARISON:  Earlier same day chest x-ray    FINDINGS: AP radiograph of the chest. Stable enlargement of the  cardiomediastinal silhouette. Small bilateral pleural effusions with  streaky bibasilar opacities. Fluid is visualized tracking into the  right major fissure. Continued diffuse interstitial and patchy  airspace opacities. No pneumothorax. Visualized upper abdomen is  unremarkable. Bones are stable.      Impression    IMPRESSION:  1. No significant change in interstitial and patchy airspace opacities  likely representing pulmonary edema versus atypical infection.  2. Small bilateral pleural effusions with fluid tracking into the  right major fissure.  3. Cardiomegaly    I have personally reviewed the examination and initial interpretation  and I agree with the findings.    MYRNA FOREMAN MD   EKG 12-lead, tracing only   Result Value Ref Range    Interpretation ECG Click View Image link to view waveform and result     ISTAT gases elec ica gluc leesa POCT   Result Value Ref Range    Ph Venous 7.29 (L) 7.32 - 7.43 pH    PCO2 Venous 29 (L) 40 - 50 mm Hg    PO2 Venous 30 25 - 47 mm Hg    Bicarbonate Venous 14 (L) 21 - 28 mmol/L    O2 Sat Venous 51 %    Sodium 130 (L) 133 - 144 mmol/L    Potassium 6.4 (HH) 3.4 - 5.3 mmol/L    Glucose 73 70 - 99 mg/dL    Calcium Ionized 4.1 (L) 4.4 - 5.2 mg/dL    Hemoglobin 12.6 (L) 13.3 - 17.7 g/dL    Hematocrit - POCT 37 (L) 40.0 - 53.0 %PCV     *Note: Due to a large number of results and/or encounters for the requested time period, some results have not been displayed. A complete set of results can be found in Results Review.       Labs, vital signs, and imaging studies were reviewed by me.    Medications   vancomycin place cage - receiving intermittent dosing (has no administration in time range)   norepinephrine (LEVOPHED) 16 mg in  mL infusion CENTRAL LINE (0.12 mcg/kg/min × 100 kg Intravenous Rate/Dose Change 4/11/21 2300)   etomidate (AMIDATE) injection 10 mg (has no administration in time range)   amiodarone (NEXTERONE) 900 mg in sodium chloride 0.9 % 500 mL infusion (1 mg/min Intravenous New Bag 4/11/21 2302)   amiodarone (NEXTERONE) 900 mg in sodium chloride 0.9 % 500 mL infusion (has no administration in time range)   calcium gluconate in D5W 100 mL intermittent infusion 1 g (1 g Intravenous Given 4/11/21 2258)   glucose gel 15-30 g (has no administration in time range)     Or   dextrose 50 % injection 25-50 mL (has no administration in time range)     Or   glucagon injection 1 mg (has no administration in time range)   dextrose 10% infusion (has no administration in time range)   dextrose 50 % injection 25 g (has no administration in time range)   insulin regular 1 unit/mL injection 10 Units (has no administration in time range)   0.9% sodium chloride BOLUS (0 mLs Intravenous Stopped 4/11/21 1809)   0.9% sodium chloride BOLUS (0 mLs Intravenous Stopped 4/11/21 1722)   ceFEPIme  (MAXIPIME) 2 g vial to attach to  ml bag for ADULTS or 50 ml bag for PEDS (0 g Intravenous Stopped 4/11/21 1747)   0.9% sodium chloride BOLUS (0 mLs Intravenous Stopped 4/11/21 1722)   vancomycin (VANCOCIN) 2,500 mg in sodium chloride 0.9 % 500 mL intermittent infusion (0 mg Intravenous Stopped 4/11/21 2045)   0.9% sodium chloride BOLUS (0 mLs Intravenous Stopped 4/11/21 1909)   lidocaine (XYLOCAINE) 2 % external gel 10 mL (10 mLs Urethral Given 4/11/21 2045)   amiodarone (NEXTERONE) bolus 150 mg (0 mg Intravenous Stopped 4/11/21 2253)       Assessments & Plan (with Medical Decision Making)   Cole Jesus is a 60 year old male who presents to the emergency department with fatigue.  Also found to be hypotensive on arrival.  Differential diagnosis includes dehydration secondary to poor p.o. intake reported by patient, anemia, electrolyte abnormality, hypoglycemia, ACS, underlying infection (UTI, pneumonia, COVID-19, influenza, other viral syndrome).  Patient has chronic appearing skin wounds to bilateral lower extremities, no clear signs of overlying cellulitis at this time.  Labs, EKG, chest x-ray, urinalysis ordered to further evaluate the patient.    EKG difficult to interpret due to very low voltage complexes, however appears to be junctional rhythm with PVCs, no significant ST/T wave changes compared to prior.    I-STAT's were obtained and were remarkable for lactate level of 3.9, sodium 129, hemoglobin 12.6, potassium 6.1    COVID-19 testing and influenza was negative.    IV fluids were ordered to treat patient's hypotension and elevated lactate.  Patient does have history of CHF and appears to have some edema on exam, however patient states this is decreased from his baseline, has clear lungs on exam, and patient states he has had significant weight loss recently, which all indicates patient may be fluid down compared to his baseline.    Laboratory work-up is remarkable for leukocytosis.     CXR c/w  infection vs pulmonary edema.  Antibiotics were ordered.    Patient's blood pressure failed to improve with IV fluids, Levophed was started.  Blood pressures improved.  Central line was placed as described above.    I have reviewed the nursing notes.    I have reviewed the findings, diagnosis, plan and need for follow up with the patient.    After IV fluids were given, patient did have an increase in his shortness of breath.  Oxygen saturations maintained in the high 90s with supplemental oxygen.  Repeat chest x-ray was obtained and shows no significant change compared to EKG from earlier in patient's ER visit.  We will continue to monitor.    Patient discussed with critical care, to be admitted to their service for further management. Plan was discussed with patient who understands and agrees with plan.     Patient developed intermittent runs of V. tach.  Remained stable during these episodes and quickly converted back to junctional rhythm.  Patient was started on amiodarone.  Defibrillation pads placed on patient's chest.  Etomidate at bedside for sedation in case cardioversion is needed.  Patient was noted earlier to have hyperkalemia (6.1 on i-STAT, 5.9 on lab draw).  Calcium gluconate was given and potassium was shifted.         EKG Interpretation:      Interpreted by Jenny Ballard MD  Time reviewed: 2232  Symptoms at time of EKG: Abnormal rhythm noted on monitor, shortness of breath  Rhythm: Junctional rhythm PVCs  Rate: Tachycardic, 134 bpm  Axis: NORMAL  Ectopy: none  Conduction: normal  ST Segments/ T Waves: No acute ST-T wave changes  Q Waves: none  Comparison to prior: Prior to EKG from earlier today, the tachycardia is new    Clinical Impression: abnormal EKG         EKG Interpretation:      Interpreted by Jenny Ballard MD  Time reviewed: 2233  Symptoms at time of EKG: Abnormal rhythm noted on monitor, shortness of breath  Rhythm: Ventricular tachycardia  Rate: Tachycardic, 145  bpm  Comparison to prior: Compared to EKG from 1 minute prior, the ventricular tachycardia is new and has replaced previously seen junctional rhythm    Clinical Impression: abnormal EKG         EKG Interpretation:      Interpreted by Jenny Ballard MD  Time reviewed: 2249  Symptoms at time of EKG: Shortness of breath, amiodarone just given  Rhythm: Junctional rhythm with PVCs  Rate: Tachycardic, 110 bpm  Ectopy: PVCs  Conduction: normal  ST Segments/ T Waves: No ST-T wave changes  Q Waves: none  Comparison to prior: Compared to previous EKG from 16 minutes prior, previously seen ventricular tachycardia has been replaced with junctional rhythm with PVCs    Clinical Impression: abnormal EKG            New Prescriptions    No medications on file       Final diagnoses:   Hypotension, unspecified hypotension type   Pneumonia of both lower lobes due to infectious organism     Jenny Ballard MD  4/11/2021   [facility] EMERGENCY DEPARTMENT     Jenny Ballard MD  04/11/21 5170

## 2021-04-11 NOTE — Clinical Note
The ECG shows a sinus tachycardia and a bundle branch block. ECG rate  = 104 bpm. Sinus dysrhythmia

## 2021-04-11 NOTE — ED NOTES
Bed: ED03  Expected date: 4/11/21  Expected time: 4:10 PM  Means of arrival: Ambulance  Comments:  Aisha 443    61 y/o Male    Weak  Lethargic   Hypotension (unknown reading)    Triaged:  YELLOW

## 2021-04-11 NOTE — PHARMACY-VANCOMYCIN DOSING SERVICE
Pharmacy Vancomycin Initial Note  Date of Service 2021  Patient's  1960  60 year old, male    Indication: [facility]-Associated Pneumonia    Current estimated CrCl = Estimated Creatinine Clearance: 22.6 mL/min (A) (based on SCr of 3.89 mg/dL (H)).    Creatinine for last 3 days  2021:  4:28 PM Creatinine 3.89 mg/dL    Recent Vancomycin Level(s) for last 3 days  No results found for requested labs within last 72 hours.      Vancomycin IV Administrations (past 72 hours)      No vancomycin orders with administrations in past 72 hours.                Nephrotoxins and other renal medications (From now, onward)    Start     Dose/Rate Route Frequency Ordered Stop    21 1710  vancomycin (VANCOCIN) 2,500 mg in sodium chloride 0.9 % 500 mL intermittent infusion      2,500 mg  over 2 Hours Intravenous ONCE 21 1709      21 1709  vancomycin place cage - receiving intermittent dosing      1 each Intravenous SEE ADMIN INSTRUCTIONS 21 1710            Contrast Orders - past 72 hours (72h ago, onward)    None                Plan:  1.  Give vancomycin 2500mg (25mg/kg) x 1 now, will dose intermittently based on levels for now given EVA on admit.   2.  Goal Trough Level: 15-20 mg/L   3.  Pharmacy will check trough levels as appropriate in 1-3 Days.    4. Serum creatinine levels will be ordered daily for the first week of therapy and at least twice weekly for subsequent weeks.    5. [place] method utilized to dose vancomycin therapy: Method 2    Sridhar Peralta, PharmD, BCPS

## 2021-04-11 NOTE — Clinical Note
Pito Swenson disconnected from pressure bag, lines capped per protocol, prepped with chlorhexidine and draped.

## 2021-04-12 NOTE — PROGRESS NOTES
"CRRT STATUS NOTE    DATA:  Time:  5:40 AM  Pressures WNL:  YES  Filter Status:  WDL    Problems Reported/Alarms Noted:  None    Supplies Present:  YES    ASSESSMENT:  Patient Net Fluid Balance:  +[phone] ([phone])  Vital Signs:  BP 97/80   Pulse 105   Temp 95.8  F (35.4  C) (Axillary)   Resp 26   Ht 1.727 m (5' 8\")   Wt 96.1 kg (211 lb 13.8 oz)   SpO2 95%   BMI 32.21 kg/m      Labs:  K 5.9, Hgb 10.3, Plt 56  Goals of Therapy:  intake=output    INTERVENTIONS:   Initiated CRRT.    PLAN:  Continue to pull fluid per renal orders. Notify CRRT resource RN on [phone] with any question/ concerns. Change circuit q72hr and PRN.    "

## 2021-04-12 NOTE — PROGRESS NOTES
MEDICAL ICU PROGRESS NOTE  04/12/2021      Date of Service (when I saw the patient): 04/12/2021       Mr. Pancho Jesus is a  60 year old male with PMHx most significant for HFrEF 25-30% (as per 2-D echocardiogram on 01/18/21) 2/2 ICM (NYHA Class III/Stage C), severe Pulmonary Hypertension, moderate MR, STEMI x/p DESx3 (2012) & PCI prox RCA 2019, Chronic Kidney Disease stage  IV, Paroxysmal Atrial fibrillation not on AC, Chronic Iron Deficiency Aneia of GIB due to AVMs, previous Lower GI bleeding requiring ileocecal resection,  type II Diabetes Mellitus, Hypothyroidism and Obesity who was admitted today to the 4C MICU unit for treatment of undifferentiated Shock (Septic vs. Cardiogenic)      Neuro:  # No active Issues at the time  - Continue to monitor for any encephalopathic changes  - Neuro-checks as per nursing shift  - No indication for head imaging or additional diagnostic studies given absence of encephalitic/meningeal signs      Cardiovascular  # Shock, septic +/- cardiogenic  Requiring 3 pressors currently, MAP goal 60. Volume status is complicated by vasodilatory effects of sepsis and congestion from heart failure. May need a [place] catheter for accurate titration of volume status, currently is on CRRT with no volume removed. Antibiotics as below in ID section.    # HFrEF 25-30% 2/2 ICM  # Coronary Artery Disease s/p DESx3  # Type 2 NSTEMI  # Severe Pulmonary Hypertension  Maintaining I + O on CRT. Wean epi to avoid ventricular arrythmias.  - Hold on PTA diuretics  - TTE ordered    # Moderate MR  # Paroxysmal Atrial Fibrillation (not on anticoagulation)  On amiodarone for ventricular arrythmias that started after pressors. Already titrated down to 0.5 mg/min, will complete 1 day course.  - On cardiac Telemetry  - Goal: K+ >4.0, Mg2+ > 2.0    Pulmonary:   # Acute Hypoxic Respiratory Failure  - Oxymask 15 L  - Okay for intubation if necessary       GI/Nutrition:  # No active issue  # Previous history of GI  bleeding in the context of Angioectasia/AVM's  # Protein losing nephropathy  - PPI  - Consider Nutrition consult      Renal/Fluids/Electrolytes:  # Acute on Chronic Kidney Disease Stage 4  # High Anion Gap Metabolic Acidosis  # Combined Lactic Acidosis Type A/B  # Hyperkalemia (6.4)  # Hyponatremia (likely related to poor oral intake and hypervolemia)  # Protein Losing Nephropathy     Baseline Scr: 2.1-2.4 mg/dL> Most recently 3.89 mg/dL with associated hyperkalemia, hyponatremia and significant metabolic acidosis (Co2: 17-->17) in the context of lacticemia despite two vasopressors. Etiology could potentially be related to mixed distributive//septic vs. Cardiogenic shock.        Will require urgent RRT to correct acidosis given progressive hemodynamic decompensation.      - Nephrology Consulted and Appreciate recommendations  - Will start CRRT  - Strict I/O's  - Daily Weights  - Avoid NSAID or Nephrotoxic agents       Endocrine:  # Type II Diabetes Mellitus    > Last Hgb A1C: 6.5 (01/15/21)     - Oral Hypoglycemia Protocol Initiated  - Small dose sliding scale insulin        ID:  # Septic Shock  Multiple weeping blisters on lower extremities, some with erythema - likely source of infection. No sputum or cough to suggest pneumonia, no meningeal signs to suggest CNS.   - Initiated Empiric IV Vancomycin and Cefepime  - Stress dose steroids with hydrocortisone and fludrocortisone  - Urine/sputum/blood cultures pending  - Blood Cultures x2 from two different sites      Hematology:    # Chronic Iron Deficiency Anemia  # Thrombocytopenia likely related to underlying sepsis     - Continue Monitoring Hgb  - Hgb goal >7 g/dL  - Holding on heparin products and anticoagulation            General Cares/Prophylaxis:    DVT Prophylaxis: Pneumatic Compression Devices  GI Prophylaxis: PPI  Family Communication: None  Code Status:  Full Code     Lines/tubes/drains:  - Right Femoral Arterial Line/ Left Femoral Triple CVC  - Left  internal jugular Castro Dialysis Catheter     Disposition:  - Medical ICU until further notice       Patient seen and findings/plan discussed with medical ICU staff, Dr. Reyes.    Pete Domingo    ====================================  INTERVAL HISTORY:   Agree with H&P dated today. Later in the morning patient was conversational but confused.    OBJECTIVE:   1. VITAL SIGNS:   Temp:  [92.1  F (33.4  C)-95.8  F (35.4  C)] 95.8  F (35.4  C)  Pulse:  [] 105  Resp:  [12-28] 28  BP: ()/(20-89) 97/80  MAP:  [63 mmHg-74 mmHg] 72 mmHg  Arterial Line BP: (83-96)/(55-65) 96/62  SpO2:  [87 %-100 %] 99 %  Resp: 28    2. INTAKE/ OUTPUT:   I/O last 3 completed shifts:  In: 894.03 [I.V.:894.03]  Out: 25 [Urine:25]    3. PHYSICAL EXAMINATION:  General: AAOx3, although lethargic, arousable to speech. Calm/cooperative. Adequate speech (comprehensible/ well articulated).  HEENT: Normocephalic/Atraumatic, EOMI/MOM, anicteric oral muscosa/sclera. Noticeable JVD.  Pulm/Resp: Clear breath sounds bilaterally without rhonchi, crackles or wheeze, breathing non-labored  CV: RRR, normal S1 and S2. No murmurs/rubs gallops. Peripheral pulses +1. Significant peripheral pitting edema +3 from legs to inguinal/scrotal region.   Abdomen: Soft, non-distended, non-tender, depressible. No pain/tenderness to palpation. No guarding/rebound.  : Ponce catheter in place, minimal urine yellow and clear  Neuro: Moving all four extremities, strength 5/5, grossly non-focal.  Incisions/Skin: Multiple open wounds on legs. Hyperpigmented skin in the legs/chronic venous stasis changes. Nail dystrophy on toes bilaterally.     4. LABS:   Arterial Blood Gases   Recent Labs   Lab 04/12/21  0314   PH 7.29*   PCO2 23*   PO2 92   HCO3 11*     Complete Blood Count   Recent Labs   Lab 04/12/21  0313 04/11/21  2304 04/11/21  1630 04/11/21  1628   WBC 20.5*  --   --  13.1*   HGB 10.3* 12.6* 12.6* 10.0*   PLT 56*  --   --  46*     Basic Metabolic  Panel  Recent Labs   Lab 04/12/21  0500 04/12/21  0313 04/12/21  0002 04/11/21  2304 04/11/21  1630 04/11/21  1628   NA  --  128*  --  130* 129* 127*   POTASSIUM 5.4* 5.8* 5.7* 6.4* 6.1* 5.9*   CHLORIDE  --  99  --   --   --  95   CO2  --  11*  --   --   --  17*   BUN  --  149*  --   --   --  152*   CR  --  3.92*  --   --   --  3.89*   GLC  --  128*  --  73 86 86     Liver Function Tests  Recent Labs   Lab 04/12/21  0313 04/11/21  1628   AST 24 26   ALT 20 20   ALKPHOS 104 106   BILITOTAL 4.4* 4.1*   ALBUMIN 3.0* 2.9*   INR  --  1.98*     Coagulation Profile  Recent Labs   Lab 04/11/21  1628   INR 1.98*   PTT 52*       5. RADIOLOGY:   Recent Results (from the past 24 hour(s))   XR Chest Port 1 View    Narrative    EXAM: XR CHEST PORT 1 VW  4/11/2021 4:45 PM     HISTORY:  weakness       COMPARISON:  Chest x-ray 1/15/2021    FINDINGS: AP radiograph of the chest. Enlargement of the  cardiomediastinal silhouette. Small bilateral pleural effusions. No  pneumothorax. Mixed interstitial and patchy airspace opacities most  pronounced in the right perihilar and right lower lung field. The  pulmonary vasculature is indistinct. Visualized upper abdomen is  unremarkable. No acute osseous abnormality.      Impression    IMPRESSION:  1. Perihilar and right lower lobe predominant interstitial and patchy  airspace opacities concerning for pulmonary edema versus infection.  2. Small bilateral pleural effusions.  3. Cardiomegaly.    I have personally reviewed the examination and initial interpretation  and I agree with the findings.    MYRNA FOREMAN MD   XR Chest Port 1 View    Narrative    EXAM: XR CHEST PORT 1 VW  4/11/2021 6:51 PM     HISTORY:  hypoxia       COMPARISON:  Earlier same day chest x-ray    FINDINGS: AP radiograph of the chest. Stable enlargement of the  cardiomediastinal silhouette. Small bilateral pleural effusions with  streaky bibasilar opacities. Fluid is visualized tracking into the  right major fissure.  Continued diffuse interstitial and patchy  airspace opacities. No pneumothorax. Visualized upper abdomen is  unremarkable. Bones are stable.      Impression    IMPRESSION:  1. No significant change in interstitial and patchy airspace opacities  likely representing pulmonary edema versus atypical infection.  2. Small bilateral pleural effusions with fluid tracking into the  right major fissure.  3. Cardiomegaly    I have personally reviewed the examination and initial interpretation  and I agree with the findings.    MYRNA FOREMAN MD   XR Chest Port 1 View    Narrative    Preliminary Report - The following report is a preliminary  interpretation.      Impression    Impression:   1. Left central venous catheter tip terminating over the mid to high  SVC. No pneumothorax.  2. Cardiomegaly.  3. Bilateral pleural effusions, right greater than left, with  overlying opacities, likely atelectasis

## 2021-04-12 NOTE — PROCEDURES
[facility]    Arterial line placement    Date/Time: 4/12/2021 1:44 AM  Performed by: Tae Ruiz MD  Authorized by: Tae Ruiz MD       INDICATIONS:   Indications: hemodynamic monitoring and multiple ABGs      PRE-PROCEDURE DETAILS:   Skin preparation:  2% Chlorhexidine  PROCEDURE DETAILS:    Location:  R femoral  Needle gauge:  20 G  Placement technique:  Seldinger and ultrasound guided  Number of attempts:  1  Transducer: waveform confirmed      POST PROCEDURE DETAILS:    Post-procedure:  Sutured and sterile dressing applied    PROCEDURE   Patient Tolerance:  Patient tolerated the procedure well with no immediate complications     Dx shock

## 2021-04-12 NOTE — PROGRESS NOTES
Nutrition Note: Interdisciplinary Rounds noted weight loss     Presume weight from January was erroneous as pt's weight has historically been stable. Pt does have both heart failure and CKD4 so he has a high risk for fluid-related weight fluctuations.    Wt Readings from Last 10 Encounters:   04/12/21 96.1 kg (211 lb 13.8 oz)   01/19/21 103.8 kg (228 lb 14.4 oz)   12/15/20 98.9 kg (218 lb)   11/30/20 98.4 kg (217 lb)   11/12/20 99.2 kg (218 lb 11.1 oz)   08/28/20 93.9 kg (207 lb)   08/18/20 92.2 kg (203 lb 3.2 oz)   08/13/20 98.9 kg (218 lb)   07/28/20 94.8 kg (209 lb)   07/22/20 98.4 kg (217 lb)     RD will continue to follow via Rounds, please consult should any nutrition-related issues arise.    Gin Perez, RD, LD  ([facility] dietitian, 2318 (Mon-Fri))

## 2021-04-12 NOTE — PROCEDURES
[facility]    Central line    Date/Time: 4/12/2021 4:43 AM  Performed by: Tae Ruiz MD  Authorized by: Tae Ruiz MD   Indications: vascular access    UNIVERSAL PROTOCOL   Site Marked: Yes  Prior Images Obtained and Reviewed:  NA  Required items: Required blood products, implants, devices and special equipment available    Patient identity confirmed:  Verbally with patient  Patient was reevaluated immediately before administering moderate or deep sedation or anesthesia  Confirmation Checklist:  Patient's identity using two indicators, relevant allergies, procedure was appropriate and matched the consent or emergent situation and correct equipment/implants were available  Time out: Immediately prior to the procedure a time out was called    Universal Protocol: the Joint Commission Universal Protocol was followed    Preparation: Patient was prepped and draped in usual sterile fashion          Preparation: skin prepped with ChloraPrep  Skin prep agent dried: skin prep agent completely dried prior to procedure  Sterile barriers: all five maximum sterile barriers used - cap, mask, sterile gown, sterile gloves, and large sterile sheet  Hand hygiene: hand hygiene performed prior to central venous catheter insertion  Catheter type: cordis  Catheter size: 8.5 Fr  Pre-procedure: landmarks identified  Ultrasound guidance: yes  Number of attempts: 1  Successful placement: yes  Post-procedure: line sutured and dressing applied  Assessment: blood return through all ports,  free fluid flow and placement verified by x-ray    PROCEDURE   Patient Tolerance:  Patient tolerated the procedure well with no immediate complications     Dx renal failure  Length of time physician/provider present for 1:1 monitoring during sedation: 0

## 2021-04-12 NOTE — PLAN OF CARE
ICU End of Shift Summary. See flowsheets for vital signs and detailed assessment.    Changes this shift: Drowsy, disoriented x2-3 intermittently. Afib with BBB and frequent runs of SVT. Amio infusing at 0.5. EKG and bedside Echo completed. MAP goal >60, Levo at 0.25-0.4, Vasopressin at 4. 2L NC oxygen for support, accessory and abdominal muscles used with breathing. Low UOP 0-30cc q4hrs, CRRT running, goal I=O, net positive 1L in last 24 hrs. WOC seen pt at bedside for back and BLE wounds, new dressings. BLE US and XR done. Urine sample sent. Awaiting to collect sputum sample, no productive coughing. Wife, Haven at bedside.     Plan: CRRT I=O. Monitor pressor needs. Monitor respiratory issues. Continue with POC.    Problem: Adult Inpatient Plan of Care  Goal: Optimal Comfort and Wellbeing  Outcome: No Change     Problem: Infection Progression (Sepsis)  Goal: Absence of Infection Signs and Symptoms  Outcome: No Change  Intervention: Promote Recovery  Recent Flowsheet Documentation  Taken 4/12/2021 1600 by Arabella Moise RN

## 2021-04-12 NOTE — PROGRESS NOTES
[facility]  CRITICAL CARE STAFF NOTE    Acute Issues     1. Cardiogenic shock, EVA, lactic acidosis in setting of ischemic CDM. Has been feeling weak and lethargic over the past month. He is unsure if he is taking all of his home medication. Persistently hypotensive, lactic acidosis with likely oliguric ATN. I believe this is secondary to decompensated heart failure and cardiogenic shock. Cardiology has been consulted. Recommend RHC to establish CO and volume status. Placed HD line for emergent CRRT. Discussed goals of care and he wants everything to be done.     The patient was seen and examined with the resident/fellow physician.  We have discussed the patient in detail and I agree with the findings, assessment, and plan as documented when this note was cosigned on this day. The plan was formulated in conjunction with pharmacy, ICU nurses, and respiratory therapist. I have evaluated all laboratory values and imaging studies for the past 24 hours. I have reviewed all the consults that have been ordered and are active for this patient.      Critical Care Time: 30 min.  I spent this time (excluding procedures) personally providing and directing critical care services at the bedside and on the critical care unit.      Tae Ruiz MD   of Medicine  Interventional Pulmonary  Department of Pulmonary, Allergy, Critical Care and Sleep Medicine   [facility], [place]  Pager: [phone]

## 2021-04-12 NOTE — PHARMACY-CONSULT NOTE
Patient is being treated for hyperkalemia. A pharmacy consult was initiated to review the patient's medication list for possible causes of hyperkalemia.  The following medications for this patient may cause or exacerbate hyperkalemia: potassium chloride 40 mEq twice daily (prior to admission medication, not currently resumed)    continue to hold scheduled potassium chloride while hyperkalemic      Sridhar Peralta PharmD, BCPS

## 2021-04-12 NOTE — CONSULTS
[facility] Nurse Inpatient Pressure Injury Assessment   Reason for consultation: Evaluate and treat mid upper back and BLE      ASSESSMENT  Pressure Injury: on mid upper back , present on admission ,   Pressure Injury is Stage 1   Contributing factor of the pressure injury: pressure  Status: initial assessment     BLE wounds due to unknown etiology vs edema blisters  Status: initial assessment   Recommend provider order: None, at this time     TREATMENT PLAN  Mid upper back wound: Every 3 days and PRN cleanse with wound cleanser and pat dry. Paint garrett wound skin with no sting. Conform mepilex over wound.     BLE wound: Daily  and PRN cleanse with wound cleanser and pat dry. Apply Xeroform (order#[id number]) over open weeping wounds and cover with ABD and secure with kerlix. AVOID tape on skin. Utilize mepilex over Xeroform on knees.   Orders Written  [facility] Nurse follow-up plan:weekly  Nursing to notify the Provider(s) and re-consult the WO Nurse if wound(s) deteriorates or new skin concern.    Patient History  According to provider note(s):  Evangelina Ordonez is a  60 year old male with PMHx most significant for HFrEF 25-30% (as per 2-D echocardiogram on 01/18/21) 2/2 ICM (NYHA Class III/Stage C), severe Pulmonary Hypertension, moderate MR, STEMI x/p DESx3 (2012) & PCI prox RCA 2019, Chronic Kidney Disease stage  IV, Paroxysmal Atrial fibrillation not on AC, Chronic Iron Deficiency Aneia of GIB due to AVMs, previous Lower GI bleeding requiring ileocecal resection,  type II Diabetes Mellitus, Hypothyroidism and Obesity who was admitted today to the 4C MICU unit for treatment of undifferentiated Shock (Septic vs. Cardiogenic)     Objective Data  Containment of urine/stool: Anuric and Incontinent pad in bed    Current Diet/ Nutrition:  Orders Placed This Encounter      NPO for Medical/Clinical Reasons Except for: Meds, Ice Chips    Output:   I/O last 3 completed shifts:  In: 2041.41 [I.V.:2016.41; Other:25]  Out: 800  [Urine:65; Other:735]    Risk Assessment:   Sensory Perception: 3-->slightly limited  Moisture: 4-->rarely moist  Activity: 1-->bedfast  Mobility: 2-->very limited  Nutrition: 2-->probably inadequate  Friction and Shear: 2-->potential problem  Elijah Score: 14    Labs:   Recent Labs   Lab 04/12/21  0928 04/12/21  0313   ALBUMIN  --  3.0*   HGB 9.8* 10.3*   INR 2.61*  --    WBC 19.6* 20.5*   A1C 5.7*  --    CRP 74.0*  --        Physical Exam  Skin inspection: focused back, buttock, BLE    Wound Location:  Mid upper back    Date of last Photo 4/12  Wound History: POA  Measurements (length x width x depth, in cm) 5 cm x 4 cm  x  0 cm   Wound Base:  100 % non-blanchable, erythema and maroon  Tunneling N/A  Undermining N/A  Palpation of the wound bed: normal   Periwound skin: intact  Color: normal and consistent with surrounding tissue  Temperature: normal   Drainage:, none  Description of drainage: none  Odor: none  Pain: no grimacing or signs of discomfort, none     Wound Location:  Bilateral LE    Anterior right    Anterior left     Posterior right     Date of last Photo 4/12  Wound History: POA, possible edema vs septic shock blisters   Measurements (length x width x depth, in cm) largest blister on right posterior leg 12 cm x 8.5 cm  x  0.1 cm   Wound Base:  50/50 % blister and dermis  Tunneling N/A  Undermining N/A  Palpation of the wound bed: normal   Periwound skin: intact  Color: normal and consistent with surrounding tissue  Temperature: normal   Drainage:, moderate  Description of drainage: serous and serosanguinous  Odor: none  Pain: during dressing change, aching, tender and burning      Interventions  Current support surface: Standard  Atmos Air mattress, pulsate ordered 4/12  Current off-loading measures: Pillows  Repositioning aid: Pillows  Visual inspection of wound(s) completed   Tube Securement: NA  Wound Care: was done per plan of care.  Supplies: placed at the bedside and floor stock  Educated  provided: plan of care and wound progress  Education provided to: spouse and nurse  Discussed importance of:dressing change frequency  Discussed plan of care with Family and Nurse    Hoa Mendoza RN

## 2021-04-12 NOTE — PHARMACY-VANCOMYCIN DOSING SERVICE
Pharmacy Empiric Dose Change Per Policy  Original Dose Ordered: Intermittent vancomycin dosing  Dose Changed To: IV vancomycin 1500 mg Q24H  This dose change was based on the pharmacist's assessment of this patient's age, weight, concurrent drug therapy, treatment goals, whether patient's creatinine clearance adequately indicates renal function (factoring in age, muscle mass, fluid and clinical status), and, if applicable, prior pharmacokinetic data.    Creatinine Clearance= CRRT started     Will continue to follow and modify dosage according to levels, organ function and clinical condition    Diego Stephenson, SureshD

## 2021-04-12 NOTE — PLAN OF CARE
ICU End of Shift Summary. See flowsheets for vital signs and detailed assessment.    Changes this shift: Pt lethargic/somnolent but remains responsive to voice, following commands and answering questions appropriately.  Continues to be significantly hypotensive, now on 0.4 levo, 2.4 vaso, 0.6 epi.  Tele appears afib with BBB, many PVCs/VT runs.  Bedside echo showing pericardial effusion. Temp 93 on arrival, improved with jerrod hugger.  Oxymask 10-15L, taking large breaths with abdominal muscle use, ABGs showing partially compensated metabolic acidosis.  Belly distended with hypoactive bowel sounds.  Ponce in place, no UO, team aware.  Started on CRRT for hyperkalemia/acidosis after 2x potassium shifts, tolerating CRRT well.      Last lactic 6.6, bicarb 11.  Continues on bicarb drip at 100/hr.    [facility] [place] updated x2.    Plan: Continue to monitor respiratory status, will need WOC consult for lower leg ulcerations.  Increase CRRT pull as tolerated.

## 2021-04-12 NOTE — PHARMACY-CONSULT NOTE
Patient is being treated for hyperkalemia. A pharmacy consult was initiated to review the patient's medication list for possible causes of hyperkalemia.  No medications on this patient's profile are likely to have the side effect of hyperkalemia. His prior to admission potassium supplement (40 mEq BID, last filled 4/8/21) and renal failure likely contributed    Continue current medication regimen.    Diego Stephenson, SureshD

## 2021-04-12 NOTE — PROGRESS NOTES
CRRT INITIATION NOTE    Consent for CRRT Completed:  Emergent.  Patient s Vascular Access: Catheter              Placement Confirmed:  YES  Manufacture:  Medium  Model:  Roula  Length/Al[place]ian Size:  11.5Fr x 16 cm  Flush Volume:  1.2 ml,1.3 ml    DATA  Procedure:  CRRT  Start Time:  0635  Machine#:  1  Parameters:I=O  Filter:  M150  Blood Flow:  180  mL/min  Replacement Solution:  PrismaSol BGK 2/3.5  Replacement Solution Rate:  1300 mL/hr   Dialysate Flow Rate:  1300 mL/hr   Patient Removal Rate:  0 mL/hr  Anticoagulation Type and Rate:  0  Clot Times:  N/A    ASSESSMENT:   How Patient Tolerated Initiation:    Vital Signs:  WNL   Initial Pressures:    Access:  -11    Filter:  101    Return:  65    TMP:  46    Change in Filter Pressure:  22      INTERVENTIONS:  Initiated without issues.    PLAN:  Continue CRRT per orders.  Notify team with concerns.  CRRT resource [id number]

## 2021-04-12 NOTE — PROGRESS NOTES
I was kindly asked to assess Cole Jesus for the need for dialysis to correct his hyperkalemia and current lactic acidosis in the settings of shock. Cole is a 60 year old gentleman with multiple co morbidities including ischemic cardiomyopathy ef 20-30%, DM, pHTN, hypothyroidism and chronic kidney disease.   He presents with worsening hemodynamics and elevated WBC, in shock requiring pressors and has worsening lactic acidosis. The shock is possibly multifactorial.   Cole was awake enough that I was able to discuss the dialysis procedure with. The risks, benefits and alternatives were discussed and he gave me verbal consent. The nurse at bedside and the resident were updated and the resident confirmed with the patient that he is willing to proceed.   At this point, I recommend involving the heart failure team to help with the best options in terms of pressors to manage his hemodynamics. More invasive hemodynamic monitoring may be needed. We also discussed the need to place a temporary dialysis catheter and stabilize the pressure to at least 90 mmHg systolically prior to initiating CRRT.  Will place CRRT orders to get a machine ready once the line is placed and ok to use. I will be at the patient bedside prior to initiating CRRT. Please call back once the dialysis line is in place.   Fredi Banerjee MD

## 2021-04-12 NOTE — PROGRESS NOTES
CRRT note:  Katie was seen and assessed, CRRT separate from the previous encounter.  Appears to be tolerating the CRRT fairly well.  At this point we are not removing fluids however if he continues to show stability we can start removing up to Ins equal outs and later on based on his hemodynamics, we can start managing his volume overload based on his pressor requirement and hemodynamics.

## 2021-04-12 NOTE — PROGRESS NOTES
"CRRT STATUS NOTE    DATA:  Time:  6:12 PM  Pressures WNL:  yes  Filter Status:  WNL    Problems Reported/Alarms Noted:  none    Supplies Present:  yes    ASSESSMENT:  Patient Net Fluid Balance:  4/11:  -20 ml  4/12: +970 ml Since MN  Vital Signs:BP 92/78 (BP Location: Right arm)   Pulse 104   Temp 97.7  F (36.5  C) (Axillary)   Resp 24   Ht 1.727 m (5' 8\")   Wt 96.1 kg (211 lb 13.8 oz)   SpO2 100%   BMI 32.21 kg/m        On 2 pressors   Labs:  Na 132, K 4.9, r 2.87. Lactate 5.2, Hgb 9.8, plts 57, INR 2.6  Goals of Therapy:  I=O    INTERVENTIONS:   Restarted @ 6428 4/12    PLAN:  Continue with goals of therapy.  Change circuit q 72 hrs and prn.  Call Resource RN @ [phone] with concerns.      "

## 2021-04-12 NOTE — CONSULTS
[facility]    Cardiology Consult Note      Date of Admission:  4/11/2021  Reason for Consult: Undifferentiated Shock    Assessment & Plan: HVSL   Cole Jesus is a 60 year old male admitted on 4/11/2021. He has PMH HFrEF 25-30% (01/18/21) 2/2 ICM (NYHA Class III/Stage C) declined ICD in the past, pulmonary hypertension, moderate MR, STEMI s/p DESx3 (2012) & PCI prox RCA 2019, CKD IV, pAF not on AC, h/o GIB due to AVMs requiring ileocecal resection, T2DM who was admitted for treatment of shock.     # Shock (undifferentiated)  # Troponin elevation  # ICM/HFrEF 25-30%  # Moderate MR  # Coronary Artery Disease/STEMI s/p DESx3 (2012) & PCI prox RCA 2019  # pAF (not on anticoagulation d/t GIB)  Patient admitted to the MICU due to concerns for refractory hypotension and septic shock. Although he does have hypotension requiring multiple pressors, elevated lactic acid and h/o CHF, I do not think cardiogenic shock is the predominant issue here. TTE showed moderate left ventricular dilation is present. EF 25-30%, moderate to severe MR, moderate TR, RVSP 41 +RAP. He is below his last known dry weight, hypothermic, WBC is elevated at 20. He does not have obvious JVP and his lower extremity edema could be from CHF vs chronic venous stasis. His LFTs are normal so although he has EVA, he has no evidence of hepatocongestion or poor perfusion of his liver. He also has thrombocytopenia and a BUN of 125 which I do not think are explained by cardiogenic shock. His oxygen requirements have increased, although I suspect this is likely to the sheer volume that he has received (4L NS, 75g albumin) in the setting of EF 30%, rather than the other way around. We agree with full sepsis work up and current pressors (epi, NE, vaso). His troponin is likely elevated from demand ischemia due to shock. From a GDMT standpoint as an outpatient: he has not tolerated BB, not on ACE due to  hyperkalemia and renal dysfunction, not on MRA due to renal dysfunction, not on statin d/t intolerance has declined ICD multiple times in past despite being full code. Home diuretic dose is torsemide 80 mg BID and metolazone 2.5 3x/week, which he states he has been compliant with. He was supposed to start on dapa after last discharge but it does not appear that this happened. If he was on dapa, I would at euglycemic DKA to the differential.    -- start pulling on CRRT when possible given that he does have EF 30% and this will likely help with ongoing volume status, understand that this may be precluded by pressor requirement and sepsis  -- wean epi first given Afib with RVR and ectopy    #Chronic small pericardial effusion  Seen on limited STAT echo overnight without evidence of tamponade. This is not contributing to his hemodynamic instability. Visualized on TTE back in 1/2021. Formal TTE today shows small to moderate pericardial effusion. Fluid collection mainly localized along lateral and posterior wall. Minimal fluid at apex and along RV free wall. No evidence for tamponade physiology which is likely d/t intubation.  -- No evidence of tamponade physiology    The patient's care was discussed with the attending physician and primary team.    Octavia Krueger MD  [facility]  Pager: [phone]  ______________________________________________________________________    History of Present Illness   Cole Jesus is a 60 year old male PMHx HFrEF 25-30% (01/18/21) 2/2 ICM (NYHA Class III/Stage C) declined ICD in the past, Pulmonary Hypertension, moderate MR, STEMI s/p DESx3 (2012) & PCI prox RCA 2019, CKD IV, pAF not on AC, h/o GIB due to AVMs requiring ileocecal resection, T2DM who was admitted for treatment of shock.     He presented with complaints of generalized weakness and fatigue for several weeks. On arrival, patient was hypotensive to the 80/60's MAP's in the low  60's and afebrile. His appetite has decreased as well and he reports subjective weight loss (patient's dry/stable weight closer to 225 lbs). He denies chest pain/palpitations, dyspnea on exertion PND, orthopnea or recent syncopal events.      Initial laboratory work-up showed stable hemoglobin, WBC 13.1, thrombocytopenia (plt 4, from 200s previously), , Cr: 3.89 (Baseline 2.3-2.5), K 6.4, and lactic acid: 6.9. Troponin 0.055. CXR revealed interstitial and patchy airspace opacities and small bilateral pleural effusions tracking into right major fissure.     Since admission the patient has received a total of 4L NS and 75 g albumin. Also currently on amiodarone ggt in addition to vanc/cefepime and CRRT.     He was admitted for CHF exacerbation in 1/2021, and diuresed 15 lbs on bumex infusion to 220lbs. He weighs 211lbs on admission today. He did not follow up with [facility] as recommended. He is on torsemide 80 mg BID and metolazone 2.5 mg 3x/week.    Cardiac Data:  1/18/2021 TTE  The Ejection Fraction is estimated at 25-30%.  Borderline left ventricular dilation is present.  Mild to moderate right ventricular dilation is present.  Global right ventricular function is moderately reduced.  Moderate to severe mitral insufficiency is present.  Moderate tricuspid insufficiency is present.  Right ventricular systolic pressure is 34mmHg above the right atrial pressure.  IVC diameter >2.1 cm collapsing <50% with sniff suggests a high RA pressure  estimated at 15 mmHg or greater.  Small circumferential pericardial effusion is present without any hemodynamic  significance.  No significant changes noted.    [facility] 2/10/2020  RA --/24/22  /20  /50/65  PCWP --/51/40  DONOVAN 3.5/1.6  TD 3.8/1.8  PVR 8.5    Coronary Angiogram- [place]  6/19/2019  CORONARY ANATOMY:    CORONARY ANATOMY DOMINANCE: Right     NATIVE CORONARY ARTERIES:    LEFT MAIN CORONARY ARTERY: Mild luminal irregularities    LEFT ANTERIOR DESCENDING  CORONARY ARTERY: Mild luminal     irregularities    CIRCUMFLEX CORONARY ARTERY: Patent proximal LCx into mid LCx     tsent. There is an eccentric 20-40% stenosis (in stent restenosis     of the proximal stent    RIGHT CORONARY ARTERY: Acute occlusion.  Stents within the mid     RCA to the RPDA     INTERVENTION:     Pre Procedure stenosis (%): 100    TYSON flow before: 0    Predilatation of the lesion with a 2.0×12 mm Trek NC balloon.  12     nick 5 seconds.     Stenting of the RCA with a 3.5×28 mm Xience Juanita drug eluting     stent.  12 nick 8 seconds.      Postdilatation with a 4.5×20 mm Trek NC balloon.  18 atmospheres     20 seconds.  12 nick 16 seconds.     Balloon angioplasty of the old mid RCA stents to treat stent     undersizing and associated thrombosis at this site.  4.5×20 mm     Trek NC balloon was used.  12 nick 11 seconds.    Post Procedure stenosis (%): 0    TYSON flow after: III        FINAL DIAGNOSIS/IMPRESSION:     #. Angiogram performed to workup ST elevation MI with severe     symptomatic bradycardia and cardiogenic shock    #. Acute occlusion of the proximal RCA with TYSON 0 flow in the     vessel    #. Otherwise nonobstructive disease including mild to moderate     in-stent restenosis of the circumflex stent    #. Stenting of the proximal RCA with a 3.5×28 mm Xience Juanita     drug eluting stent.  Post dilated with a 5.0 mm balloon.    #. Balloon angioplasty of the previously placed mid RCA stents     due to stent undersizing.  A 4.5 mm NC balloon was used.      PLAN/RECOMMENDATIONS:    #. Guideline directed medical therapy of cardiovascular risk     factors, CAD and STEMI status post stenting.  Given presence of     Afib, will consider triple therapy for 1 month and then dual     antiplatelt therapy with plavix and warfarin without ASA (per the     WOEST study) for the remainder of 1 year. For the moment, will     continue heparin given residual thrombus within the AV groove RCA     immediately  after the RPDA takeoff and residual thrombus within     the proximal RCA stent.  Continue high intensity statin.  Hold     beta blocker for now given cardiogenic shock.  Echocardiogram to     evaluate for LVEF.  Will attempt to start beta blocker,     ACEi/ARB/Entresto and spironolactone if and when blood pressure     allows.  Patient is a nonsmoker so smoking cessation referral is     not indicated.  Cardiac rehab is recommended.      EKG 4/12/2021      Review of Systems   The 10 point Review of Systems is negative other than noted in the HPI or here.    Past Medical History    I have reviewed this patient's medical history and updated it with pertinent information if needed.   Past Medical History:   Diagnosis Date     Anemia in chronic renal disease 03/09/2015     Antiplatelet or antithrombotic long-term use      Atrial fibrillation and flutter      CAD (coronary artery disease)     Stemi in 12/11, s/p angioplasty     CRD (chronic renal disease), stage IV (H) 03/09/2015    hx ATN with dialysis complicating cardiogenic shock  1/2012     GI bleed     massive lower GI bleed secondary to a cecal ulcer, s/p ileocecal resection in 12/11     Heart failure     Biventricular systolic HF, complicated by ARDS requiring tracheostomy     Hyperlipidemia LDL goal <100 04/28/2013     Hypertension      Ischemic cardiomyopathy 04/28/2013    TTE revealing 40% EF     Myocardial infarction (H)     2011 and 6/2018     Other and unspecified nonspecific immunological findings     Anti JKa     Pulmonary edema     episodes of flash pulmonary edema in 12/11     Subclinical hypothyroidism        Past Surgical History   I have reviewed this patient's surgical history and updated it with pertinent information if needed.  Past Surgical History:   Procedure Laterality Date     CV RIGHT HEART CATH MEASUREMENTS RECORDED N/A 11/12/2019    Procedure: CV RIGHT HEART CATH;  Surgeon: Fox Gerard MD;  Location:  HEART CARDIAC CATH  LAB     CV RIGHT HEART CATH MEASUREMENTS RECORDED N/A 2/10/2020    Procedure: CV RIGHT HEART CATH;  Surgeon: Fox Gerard MD;  Location:  HEART CARDIAC CATH LAB     ESOPHAGOSCOPY, GASTROSCOPY, DUODENOSCOPY (EGD), COMBINED  2011    Procedure:COMBINED ESOPHAGOSCOPY, GASTROSCOPY, DUODENOSCOPY (EGD); Surgeon:SAMARIA PUENTE; Location: GI     LAPAROTOMY EXPLORATORY  2011    Procedure:LAPAROTOMY EXPLORATORY; Explore laparotomy, Illeocectomy, Diverting Illeostomy; Surgeon:GIA NAJERA; Location:[facility] OR     ORIF right elbow fracture  age 12     TAKEDOWN ILEOSTOMY  2014    Procedure: TAKEDOWN ILEOSTOMY;  Surgeon: Gia Najera MD;  Location: [facility] OR     TRACHEOSTOMY  2011    Procedure:TRACHEOSTOMY; Tracheostomy; 80XLTCP-Proximal Extension-Cuffed 8.0 mm I.D.; Surgeon:LIZABETH DYE; Location: OR       Social History   I have reviewed this patient's social history and updated it with pertinent information if needed.  Social History     Tobacco Use     Smoking status: Former Smoker     Packs/day: 2.00     Years: 40.00     Pack years: 80.00     Types: Cigarettes     Quit date: 12/3/2011     Years since quittin.3     Smokeless tobacco: Never Used   Substance Use Topics     Alcohol use: No     Alcohol/week: 0.0 standard drinks     Drug use: No     Family History   I have reviewed this patient's family history and updated it with pertinent information if needed.   I have reviewed this patient's family history and updated it with pertinent information if needed.  Family History   Problem Relation Age of Onset     Diabetes Mother      Hypertension Mother      Heart Disease Mother      Myocardial Infarction Mother 68         of     Myocardial Infarction Father 54         of heart attack, left when he was young     Heart Disease Father      Diabetes Sister      Ovarian Cancer Sister          of     Diabetes Sister      Diabetes Sister       Crohn's Disease Daughter 36     Glaucoma No family hx of      Macular Degeneration No family hx of      Colon Cancer No family hx of      Ulcerative Colitis No family hx of      Irritable Bowel Syndrome No family hx of      Inflammatory Bowel Disease No family hx of        Medications   Current Facility-Administered Medications   Medication     amiodarone (NEXTERONE) 900 mg in sodium chloride 0.9 % 500 mL infusion     calcium chloride 2 g in sodium chloride 0.9 % 100 mL intermittent infusion     calcium chloride 3 g in sodium chloride 0.9 % 200 mL intermittent infusion     calcium chloride 4 g in sodium chloride 0.9 % 200 mL intermittent infusion     calcium chloride in  mL intermittent infusion 1 g     clindamycin (CLEOCIN) infusion 900 mg     dextrose 10% infusion     dextrose 10% infusion     glucose gel 15-30 g    Or     dextrose 50 % injection 25-50 mL    Or     glucagon injection 1 mg     dialysate for CVVHD & CVVHDF (PrismaSol BGK 2/3.5)     EPINEPHrine (ADRENALIN) 5 mg in sodium chloride 0.9 % 250 mL infusion     etomidate (AMIDATE) injection 10 mg     [START ON 4/13/2021] fludrocortisone (FLORINEF) half-tab 50 mcg     hydrocortisone sodium succinate PF (solu-CORTEF) injection 50 mg     HYDROmorphone (PF) (DILAUDID) injection 0.5 mg     insulin aspart (NovoLOG) injection (RAPID ACTING)     magnesium sulfate 2 g in water intermittent infusion     naloxone (NARCAN) injection 0.2 mg    Or     naloxone (NARCAN) injection 0.4 mg    Or     naloxone (NARCAN) injection 0.2 mg    Or     naloxone (NARCAN) injection 0.4 mg     No heparin required     norepinephrine (LEVOPHED) 16 mg in  mL infusion CENTRAL LINE     pantoprazole (PROTONIX) IV push injection 40 mg     piperacillin-tazobactam (ZOSYN) 4.5 g vial to attach to  mL bag     POST-filter replacement solution for CVVHD & CVVHDF (PrismaSol BGK 2/3.5)     potassium chloride 20 mEq in 50 mL intermittent infusion     PRE-filter replacement solution  for CVVHD & CVVHDF (PrismaSol BGK 2/3.5)     sodium chloride (PF) 0.9% PF flush 10 mL     sodium phosphate 20 mmol in D5W 100 mL intermittent infusion     vancomycin place cage - receiving intermittent dosing     vasopressin 40 units in NS 40 mL (PITRESSIN) infusion       Allergies   Allergies   Allergen Reactions     Hydralazine      Black spots, verified allergic reaction by skin biopsy     Isosorbide      Per pt got a rash all over his body and won't take it no more     Simvastatin Other (See Comments)     Leg muscle weakness     Tylenol [Acetaminophen] Palpitations       Physical Exam   Vital Signs: Temp: 95.8  F (35.4  C) Temp src: Axillary BP: 97/80 Pulse: 105   Resp: 28 SpO2: 99 % O2 Device: Oxymask Oxygen Delivery: 12 LPM  Weight: 211 lbs 13.79 oz    General: AAOx3, although lethargic, arousable to speech. Calm/cooperative.  HEENT: Normocephalic/Atraumatic. Prominent carotid pulsation. JVP difficult to assess but not obviously elevated >10 cm.  Pulm/Resp: Coarse BS bilaterally  CV: tachycardic, irregularly normal S1 and S2. No murmurs/rubs gallops.  Abdomen: Soft, non-distended, non-tender, depressible. No pain/tenderness to palpation. No guarding/rebound.  : Ponce in place, no scrotal edema  Extremities: bilateral lower extremity open sores. 1+ edema bilaterally with venous stasis changes. Edema does not extend above calves.  Neuro: Moving all four extremities, strength 5/5, grossly non-focal.    Data        Results for orders placed or performed during the hospital encounter of 04/11/21 (from the past 24 hour(s))   UA reflex to Microscopic and Culture    Specimen: Urine catheter; Catheterized Urine   Result Value Ref Range    Color Urine Yellow     Appearance Urine Slightly Cloudy     Glucose Urine Negative NEG^Negative mg/dL    Bilirubin Urine Negative NEG^Negative    Ketones Urine Negative NEG^Negative mg/dL    Specific Gravity Urine 1.016 1.003 - 1.035    Blood Urine Trace (A) NEG^Negative    pH  Urine 5.0 5.0 - 7.0 pH    Protein Albumin Urine 30 (A) NEG^Negative mg/dL    Urobilinogen mg/dL Normal 0.0 - 2.0 mg/dL    Nitrite Urine Negative NEG^Negative    Leukocyte Esterase Urine Negative NEG^Negative    Source Catheterized Urine     RBC Urine 3 (H) 0 - 2 /HPF    WBC Urine 1 0 - 5 /HPF    Squamous Epithelial /HPF Urine <1 0 - 1 /HPF    Mucous Urine Present (A) NEG^Negative /LPF    sperm Present (A) NEG^Negative /HPF   -Central Line    Narrative    Jenny Ballard MD     4/11/2021 11:17 PM  [facility]    -Central Line    Date/Time: 4/11/2021 9:53 PM  Performed by: Jenny Ballard MD  Authorized by: Jenny Ballard MD        ANESTHESIA    Anesthesia: Local infiltration  Local Anesthetic:  Lidocaine 1% without epinephrine      PRE-PROCEDURE DETAILS:     Hand hygiene: Hand hygiene performed prior to insertion      Sterile barrier technique: All elements of maximal sterile technique   followed      Skin preparation:  ChloraPrep    Skin preparation agent: Skin preparation agent completely dried prior to   procedure      PROCEDURE DETAILS:     Location:  L femoral    Patient position:  Flat    Procedural supplies:  Cordis and triple lumen    Catheter size:  7 Fr    Landmarks identified: yes      Ultrasound guidance: yes      Sterile ultrasound techniques: Sterile gel and sterile probe covers were   used      Number of attempts:  1    Successful placement: yes      POST PROCEDURE DETAILS:      Post-procedure:  Dressing applied and line sutured    Assessment:  Blood return through all ports and free fluid flow   CBC with platelets differential   Result Value Ref Range    WBC 13.1 (H) 4.0 - 11.0 10e9/L    RBC Count 4.54 4.4 - 5.9 10e12/L    Hemoglobin 10.0 (L) 13.3 - 17.7 g/dL    Hematocrit 33.7 (L) 40.0 - 53.0 %    MCV 74 (L) 78 - 100 fl    MCH 22.0 (L) 26.5 - 33.0 pg    MCHC 29.7 (L) 31.5 - 36.5 g/dL    RDW 24.9 (H) 10.0 - 15.0 %    Platelet Count 46 (LL)  150 - 450 10e9/L    Diff Method Automated Method     % Neutrophils 94.9 %    % Lymphocytes 0.0 %    % Monocytes 4.5 %    % Eosinophils 0.0 %    % Basophils 0.1 %    % Immature Granulocytes 0.5 %    Nucleated RBCs 1 (H) 0 /100    Absolute Neutrophil 12.4 (H) 1.6 - 8.3 10e9/L    Absolute Lymphocytes 0.0 (L) 0.8 - 5.3 10e9/L    Absolute Monocytes 0.6 0.0 - 1.3 10e9/L    Absolute Eosinophils 0.0 0.0 - 0.7 10e9/L    Absolute Basophils 0.0 0.0 - 0.2 10e9/L    Abs Immature Granulocytes 0.1 0 - 0.4 10e9/L    Absolute Nucleated RBC 0.1     Platelet Estimate Confirming automated cell count    INR   Result Value Ref Range    INR 1.98 (H) 0.86 - 1.14   Partial thromboplastin time   Result Value Ref Range    PTT 52 (H) 22 - 37 sec   ABO/Rh type and screen   Result Value Ref Range    ABO O     RH(D) Pos     Antibody Screen Neg     Test Valid Only At          [facility]    Specimen Expires 04/14/2021    Comprehensive metabolic panel   Result Value Ref Range    Sodium 127 (L) 133 - 144 mmol/L    Potassium 5.9 (H) 3.4 - 5.3 mmol/L    Chloride 95 94 - 109 mmol/L    Carbon Dioxide 17 (L) 20 - 32 mmol/L    Anion Gap 15 (H) 3 - 14 mmol/L    Glucose 86 70 - 99 mg/dL    Urea Nitrogen 152 (H) 7 - 30 mg/dL    Creatinine 3.89 (H) 0.66 - 1.25 mg/dL    GFR Estimate 15 (L) >60 mL/min/[1.73_m2]    GFR Estimate If Black 18 (L) >60 mL/min/[1.73_m2]    Calcium 8.7 8.5 - 10.1 mg/dL    Bilirubin Total 4.1 (H) 0.2 - 1.3 mg/dL    Albumin 2.9 (L) 3.4 - 5.0 g/dL    Protein Total 7.0 6.8 - 8.8 g/dL    Alkaline Phosphatase 106 40 - 150 U/L    ALT 20 0 - 70 U/L    AST 26 0 - 45 U/L   Troponin I   Result Value Ref Range    Troponin I ES 0.055 (H) 0.000 - 0.045 ug/L   TSH with free T4 reflex   Result Value Ref Range    TSH 10.26 (H) 0.40 - 4.00 mU/L   Magnesium   Result Value Ref Range    Magnesium 3.4 (H) 1.6 - 2.3 mg/dL   Blood culture    Specimen: Arm, Left; Blood    Right Arm   Result Value Ref Range     Specimen Description Blood Right Arm     Culture Micro No growth after 13 hours    T4 free   Result Value Ref Range    T4 Free 0.38 (L) 0.76 - 1.46 ng/dL   ISTAT gases lactate leesa POCT   Result Value Ref Range    Ph Venous 7.33 7.32 - 7.43 pH    PCO2 Venous 30 (L) 40 - 50 mm Hg    PO2 Venous 23 (L) 25 - 47 mm Hg    Bicarbonate Venous 16 (L) 21 - 28 mmol/L    O2 Sat Venous 37 %    Lactic Acid 3.9 (H) 0.7 - 2.1 mmol/L   ISTAT gases elec ica gluc leesa POCT   Result Value Ref Range    Ph Venous 7.32 7.32 - 7.43 pH    PCO2 Venous 31 (L) 40 - 50 mm Hg    PO2 Venous 25 25 - 47 mm Hg    Bicarbonate Venous 16 (L) 21 - 28 mmol/L    O2 Sat Venous 42 %    Sodium 129 (L) 133 - 144 mmol/L    Potassium 6.1 (HH) 3.4 - 5.3 mmol/L    Glucose 86 70 - 99 mg/dL    Calcium Ionized 4.3 (L) 4.4 - 5.2 mg/dL    Hemoglobin 12.6 (L) 13.3 - 17.7 g/dL    Hematocrit - POCT 37 (L) 40.0 - 53.0 %PCV   EKG 12-lead, tracing only   Result Value Ref Range    Interpretation ECG Click View Image link to view waveform and result    XR Chest Port 1 View    Narrative    EXAM: XR CHEST PORT 1 VW  4/11/2021 4:45 PM     HISTORY:  weakness       COMPARISON:  Chest x-ray 1/15/2021    FINDINGS: AP radiograph of the chest. Enlargement of the  cardiomediastinal silhouette. Small bilateral pleural effusions. No  pneumothorax. Mixed interstitial and patchy airspace opacities most  pronounced in the right perihilar and right lower lung field. The  pulmonary vasculature is indistinct. Visualized upper abdomen is  unremarkable. No acute osseous abnormality.      Impression    IMPRESSION:  1. Perihilar and right lower lobe predominant interstitial and patchy  airspace opacities concerning for pulmonary edema versus infection.  2. Small bilateral pleural effusions.  3. Cardiomegaly.    I have personally reviewed the examination and initial interpretation  and I agree with the findings.    MYRNA FOREMAN MD   Blood culture    Specimen: Arm, Right; Blood    Left Arm    Result Value Ref Range    Specimen Description Blood Left Arm     Culture Micro No growth after 13 hours    Symptomatic Influenza A/B & SARS-CoV2 (COVID-19) Virus PCR Multiplex    Specimen: Nasopharyngeal   Result Value Ref Range    Flu A/B & SARS-COV-2 PCR Source Nasopharyngeal     SARS-CoV-2 PCR Result NEGATIVE     Influenza A PCR Negative NEG^Negative    Influenza B PCR Negative NEG^Negative    Respiratory Syncytial Virus PCR Negative NEG^Negative    Flu A/B & SARS-CoV-2 PCR Comment (Note)    XR Chest Port 1 View    Narrative    EXAM: XR CHEST PORT 1 VW  4/11/2021 6:51 PM     HISTORY:  hypoxia       COMPARISON:  Earlier same day chest x-ray    FINDINGS: AP radiograph of the chest. Stable enlargement of the  cardiomediastinal silhouette. Small bilateral pleural effusions with  streaky bibasilar opacities. Fluid is visualized tracking into the  right major fissure. Continued diffuse interstitial and patchy  airspace opacities. No pneumothorax. Visualized upper abdomen is  unremarkable. Bones are stable.      Impression    IMPRESSION:  1. No significant change in interstitial and patchy airspace opacities  likely representing pulmonary edema versus atypical infection.  2. Small bilateral pleural effusions with fluid tracking into the  right major fissure.  3. Cardiomegaly    I have personally reviewed the examination and initial interpretation  and I agree with the findings.    MYRNA FOREMAN MD   EKG 12-lead, tracing only   Result Value Ref Range    Interpretation ECG Click View Image link to view waveform and result    ISTAT gases elec ica gluc leesa POCT   Result Value Ref Range    Ph Venous 7.29 (L) 7.32 - 7.43 pH    PCO2 Venous 29 (L) 40 - 50 mm Hg    PO2 Venous 30 25 - 47 mm Hg    Bicarbonate Venous 14 (L) 21 - 28 mmol/L    O2 Sat Venous 51 %    Sodium 130 (L) 133 - 144 mmol/L    Potassium 6.4 (HH) 3.4 - 5.3 mmol/L    Glucose 73 70 - 99 mg/dL    Calcium Ionized 4.1 (L) 4.4 - 5.2 mg/dL    Hemoglobin 12.6  (L) 13.3 - 17.7 g/dL    Hematocrit - POCT 37 (L) 40.0 - 53.0 %PCV   Glucose by meter   Result Value Ref Range    Glucose 285 (H) 70 - 99 mg/dL   Glucose by meter   Result Value Ref Range    Glucose 162 (H) 70 - 99 mg/dL   Glucose by meter   Result Value Ref Range    Glucose 132 (H) 70 - 99 mg/dL   Potassium   Result Value Ref Range    Potassium 5.7 (H) 3.4 - 5.3 mmol/L   Glucose by meter   Result Value Ref Range    Glucose 124 (H) 70 - 99 mg/dL   Lactate for Sepsis Protocol   Result Value Ref Range    Lactate for Sepsis Protocol 6.3 (HH) 0.7 - 2.0 mmol/L   Glucose by meter   Result Value Ref Range    Glucose 111 (H) 70 - 99 mg/dL   Arterial line placement    Narrative    Tae Ruiz MD     4/12/2021  1:45 AM  [facility]    Arterial line placement    Date/Time: 4/12/2021 1:44 AM  Performed by: Tae Ruiz MD  Authorized by: Tae Ruiz MD       INDICATIONS:   Indications: hemodynamic monitoring and multiple ABGs      PRE-PROCEDURE DETAILS:   Skin preparation:  2% Chlorhexidine  PROCEDURE DETAILS:    Location:  R femoral  Needle gauge:  20 G  Placement technique:  Seldinger and ultrasound guided  Number of attempts:  1  Transducer: waveform confirmed      POST PROCEDURE DETAILS:    Post-procedure:  Sutured and sterile dressing applied    PROCEDURE   Patient Tolerance:  Patient tolerated the procedure well with no immediate   complications     Dx shock   Blood gas venous with oxyhemoglobin   Result Value Ref Range    Ph Venous 7.25 (L) 7.32 - 7.43 pH    PCO2 Venous 33 (L) 40 - 50 mm Hg    PO2 Venous 28 25 - 47 mm Hg    Bicarbonate Venous 14 (L) 21 - 28 mmol/L    FIO2 6 liters     Oxyhemoglobin Venous 38 %    Base Deficit Venous 12.0 mmol/L   Lactic acid whole blood   Result Value Ref Range    Lactic Acid 6.6 (HH) 0.7 - 2.0 mmol/L   Troponin I   Result Value Ref Range    Troponin I ES 0.071 (H) 0.000 - 0.045 ug/L   Glucose by meter   Result Value Ref Range     Glucose 116 (H) 70 - 99 mg/dL   CBC with platelets   Result Value Ref Range    WBC 20.5 (H) 4.0 - 11.0 10e9/L    RBC Count 4.65 4.4 - 5.9 10e12/L    Hemoglobin 10.3 (L) 13.3 - 17.7 g/dL    Hematocrit 34.4 (L) 40.0 - 53.0 %    MCV 74 (L) 78 - 100 fl    MCH 22.2 (L) 26.5 - 33.0 pg    MCHC 29.9 (L) 31.5 - 36.5 g/dL    RDW 24.5 (H) 10.0 - 15.0 %    Platelet Count 56 (L) 150 - 450 10e9/L   Comprehensive metabolic panel   Result Value Ref Range    Sodium 128 (L) 133 - 144 mmol/L    Potassium 5.8 (H) 3.4 - 5.3 mmol/L    Chloride 99 94 - 109 mmol/L    Carbon Dioxide 11 (L) 20 - 32 mmol/L    Anion Gap 18 (H) 3 - 14 mmol/L    Glucose 128 (H) 70 - 99 mg/dL    Urea Nitrogen 149 (H) 7 - 30 mg/dL    Creatinine 3.92 (H) 0.66 - 1.25 mg/dL    GFR Estimate 15 (L) >60 mL/min/[1.73_m2]    GFR Estimate If Black 18 (L) >60 mL/min/[1.73_m2]    Calcium 8.3 (L) 8.5 - 10.1 mg/dL    Bilirubin Total 4.4 (H) 0.2 - 1.3 mg/dL    Albumin 3.0 (L) 3.4 - 5.0 g/dL    Protein Total 7.0 6.8 - 8.8 g/dL    Alkaline Phosphatase 104 40 - 150 U/L    ALT 20 0 - 70 U/L    AST 24 0 - 45 U/L   Blood gas arterial   Result Value Ref Range    pH Arterial 7.29 (L) 7.35 - 7.45 pH    pCO2 Arterial 23 (L) 35 - 45 mm Hg    pO2 Arterial 92 80 - 105 mm Hg    Bicarbonate Arterial 11 (L) 21 - 28 mmol/L    Base Deficit Art 13.7 mmol/L    FIO2 80    Glucose by meter   Result Value Ref Range    Glucose 105 (H) 70 - 99 mg/dL   Glucose by meter   Result Value Ref Range    Glucose 167 (H) 70 - 99 mg/dL   Central line    Narrative    Tae Ruiz MD     4/12/2021  4:44 AM  [facility]    Central line    Date/Time: 4/12/2021 4:43 AM  Performed by: Tae Ruiz MD  Authorized by: Tae Ruiz MD   Indications: vascular access    UNIVERSAL PROTOCOL   Site Marked: Yes  Prior Images Obtained and Reviewed:  NA  Required items: Required blood products, implants, devices and special   equipment available    Patient identity  confirmed:  Verbally with patient  Patient was reevaluated immediately before administering moderate or deep   sedation or anesthesia  Confirmation Checklist:  Patient's identity using two indicators, relevant   allergies, procedure was appropriate and matched the consent or emergent   situation and correct equipment/implants were available  Time out: Immediately prior to the procedure a time out was called    Universal Protocol: the Joint Commission Universal Protocol was followed    Preparation: Patient was prepped and draped in usual sterile fashion          Preparation: skin prepped with ChloraPrep  Skin prep agent dried: skin prep agent completely dried prior to procedure  Sterile barriers: all five maximum sterile barriers used - cap, mask,   sterile gown, sterile gloves, and large sterile sheet  Hand hygiene: hand hygiene performed prior to central venous catheter   insertion  Catheter type: cordis  Catheter size: 8.5 Fr  Pre-procedure: landmarks identified  Ultrasound guidance: yes  Number of attempts: 1  Successful placement: yes  Post-procedure: line sutured and dressing applied  Assessment: blood return through all ports,  free fluid flow and placement   verified by x-ray    PROCEDURE   Patient Tolerance:  Patient tolerated the procedure well with no immediate   complications     Dx renal failure  Length of time physician/provider present for 1:1 monitoring during   sedation: 0   Nephrology General Adult IP Consult: Patient with CKD stage IV and current shock (mixed distributive vs. cardiogenic physiology) with development of progressive renal insufficiency, lactic acidosis and hyperkalemia. PLease evaluate for likely need...    Narrative    Fredi Banerjee MD     4/12/2021  6:49 AM  Transplant Nephrology Initial Consult  April 12, 2021      Cole Jesus MRN:[id number] YOB: 1960  Date of Admission:4/11/2021  Primary care provider: Silas Enciso  Requesting physician: Joseph  Tae Vela MD    ASSESSMENT AND RECOMMENDATIONS:   1.  Acute on chronic renal failure.  2.  Shock possibly mixed picture with sepsis and cardiogenic   component.  3.  Severe acidosis with gap and elevated lactate.  4.  Moderate hyperkalemia  5.  Hyponatremia.  6.  Ischemic cardiomyopathy with depressed EF.  7.  Hyper troponinemia  8.  A. fib with RVR on amiodarone.  9.  Diabetes with complication.  10.  Volume overload    Discussion:  Overall Mr. Jesus is a 60-year-old gentleman with multiple   comorbidities related to his diabetes and ischemic   cardiomyopathy.  He was admitted to the ICU with worsening shock   requiring multiple pressors with evidence of hyperkalemia and   moderate to severe lactic acidosis.  Discussed with the ICU team   will go ahead and attempt CRRT.  I discussed the CRRT with Cole   and he gave me permission earlier that was witnessed by the team   members.  At the time of assessment he is able to answer   questions but not quite strong to sign a consent.  We will start   with gentle CRRT and reassess.  Discussed with the cardiology   team at the bedside there is moderate-sized effusion but they   confirmed no tamponade physiology.    Recommendations were communicated to primary team via direct   discussion at bedside  REASON FOR CONSULT: Severe acidosis, volume overload acute on   chronic renal failure    HISTORY OF PRESENT ILLNESS:  Cole Jesus is a 60 year old with multiple comorbidities   including diabetes, peripheral arterial disease, ischemic   cardiomyopathy with depressed EF, pulmonary hypertension, atrial   fibrillation, obesity and nonadherence.  He presented to the ED   with with generalized weakness.  He was admitted to the ICU and exhibited signs of worsening shock   with rising lactic acid and worsening acidosis.  Bedside echo   revealed pericardial effusion without tamponade physiology per   cardiology.  We were cleared to start CRRT.  At the time of   initiating  CRRT patient is on multiple pressors and we discussed   keeping his systolic blood pressure above 90.  We will start with   no fluid removal and slowly attempt based on his hemodynamic   stability.    PAST MEDICAL HISTORY:  Reviewed with patient on 04/12/2021     Past Medical History:   Diagnosis Date     Anemia in chronic renal disease 03/09/2015     Antiplatelet or antithrombotic long-term use      Atrial fibrillation and flutter      CAD (coronary artery disease)     Stemi in 12/11, s/p angioplasty     CRD (chronic renal disease), stage IV (H) 03/09/2015    hx ATN with dialysis complicating cardiogenic shock  1/2012     GI bleed     massive lower GI bleed secondary to a cecal ulcer, s/p ileocecal   resection in 12/11     Heart failure     Biventricular systolic HF, complicated by ARDS requiring   tracheostomy     Hyperlipidemia LDL goal <100 04/28/2013     Hypertension      Ischemic cardiomyopathy 04/28/2013    TTE revealing 40% EF     Myocardial infarction (H)     2011 and 6/2018     Other and unspecified nonspecific immunological findings     Anti JKa     Pulmonary edema     episodes of flash pulmonary edema in 12/11     Subclinical hypothyroidism        Past Surgical History:   Procedure Laterality Date     CV RIGHT HEART CATH MEASUREMENTS RECORDED N/A 11/12/2019    Procedure: CV RIGHT HEART CATH;  Surgeon: Fox Gerard MD;  Location:  HEART CARDIAC CATH LAB     CV RIGHT HEART CATH MEASUREMENTS RECORDED N/A 2/10/2020    Procedure: CV RIGHT HEART CATH;  Surgeon: Fox Gerard MD;  Location:  HEART CARDIAC CATH LAB     ESOPHAGOSCOPY, GASTROSCOPY, DUODENOSCOPY (EGD), COMBINED    12/25/2011    Procedure:COMBINED ESOPHAGOSCOPY, GASTROSCOPY, DUODENOSCOPY   (EGD); Surgeon:SAMARIA PUENTE; Location: GI     LAPAROTOMY EXPLORATORY  12/31/2011    Procedure:LAPAROTOMY EXPLORATORY; Explore laparotomy,   Illeocectomy, Diverting Illeostomy; Surgeon:GABI NAJERA;  Location:[facility] OR     ORIF right elbow fracture  age 12     TAKEDOWN ILEOSTOMY  6/5/2014    Procedure: TAKEDOWN ILEOSTOMY;  Surgeon: Gia Mosher MD;  Location: [facility] OR     TRACHEOSTOMY  12/22/2011    Procedure:TRACHEOSTOMY; Tracheostomy; 80XLTCP-Proximal   Extension-Cuffed 8.0 mm I.D.; Surgeon:LIZABETH DYE;   Location:[facility] OR        MEDICATIONS:  PTA Meds  Prior to Admission medications    Medication Sig Last Dose Taking? Auth Provider   ACCU-CHEK GUIDE test strip USE TO TEST BLOOD SUGAR FOUR TIMES A   DAY BEFORE MEALS AND AT BEDTIME Unknown at Unknown time  Smiley Argueta PA-C   Alcohol Swabs PADS 1 applicator 4 times daily (before meals and   nightly) Unknown at Unknown time  Arina Haynes APRN CNP   blood glucose monitoring (ACCU-CHEK FASTCLIX) lancets USE TO TEST   BLOOD SUGAR FOUR TIMES A DAY AT MEALS AND AT BEDTIME Unknown at   Unknown time  Smiley Argueta PA-C   insulin pen needle (32G X 4 MM) 32G X 4 MM miscellaneous Use 5   pen needles daily or as directed. Unknown at Unknown time    Arina Haynes APRN CNP   metolazone (ZAROXOLYN) 2.5 MG tablet Take 1 tablet (2.5 mg) by   mouth three times a week Unknown at Unknown time  Evelin Lancaster APRN CNP   potassium chloride ER (KLOR-CON M) 20 MEQ CR tablet Take 2   tablets (40 mEq) by mouth 2 times daily Unknown at Unknown time    Dian Leblanc APRN CNP   senna-docusate (SENOKOT-S/PERICOLACE) 8.6-50 MG tablet Take 1   tablet by mouth 2 times daily as needed for constipation Unknown   at Unknown time  Unknown, Entered By History   Sharps Container MISC 1 Device every 30 days Unknown at Unknown   time  Arina Haynes APRN CNP   torsemide (DEMADEX) 20 MG tablet Take 4 tablets (80 mg) by mouth   2 times daily Unknown at Unknown time  Dian Leblanc APRN CNP   vitamin  B complex with vitamin C (VITAMIN  B COMPLEX) TABS Take   1 tablet by mouth daily Unknown at Unknown time  Reported,   Patient   Vitamin D,  Cholecalciferol, 25 MCG (1000 UT) CAPS Take 1 capsule   by mouth 2 times daily Unknown at Unknown time  Unknown, Entered   By History      Current Meds    ceFEPIme (MAXIPIME) IV  1 g Intravenous Q12H     vancomycin place cage - receiving intermittent dosing  1 each   Intravenous See Admin Instructions     Infusion Meds    amiodarone 0.5 mg/min (21)     dextrose 10% 75 mL/hr at 21 0500     dextrose 10%       CRRT replacement solution 12.5 mL/kg/hr (21)     EPINEPHrine Stopped (21)     - MEDICATION INSTRUCTIONS -       norepinephrine 0.37 mcg/kg/min (21)     CRRT replacement solution 200 mL/hr at 21     CRRT replacement solution 12.5 mL/kg/hr (21)     sodium bicabonate in 5% dextrose for infusion 100 mL/hr at   21 0500     vasopressin 2.4 Units/hr (21)       ALLERGIES:    Allergies   Allergen Reactions     Hydralazine      Black spots, verified allergic reaction by skin biopsy     Isosorbide      Per pt got a rash all over his body and won't take it no more     Simvastatin Other (See Comments)     Leg muscle weakness     Tylenol [Acetaminophen] Palpitations       REVIEW OF SYSTEMS:  A 10 point review of systems was negative except as noted above.    SOCIAL HISTORY:   Social History     Socioeconomic History     Marital status: Significant other     Spouse name: Not on file     Number of children: Not on file     Years of education: Not on file     Highest education level: Not on file   Occupational History     Not on file   Social Needs     Financial resource strain: Not on file     Food insecurity     Worry: Not on file     Inability: Not on file     Transportation needs     Medical: Not on file     Non-medical: Not on file   Tobacco Use     Smoking status: Former Smoker     Packs/day: 2.00     Years: 40.00     Pack years: 80.00     Types: Cigarettes     Quit date: 12/3/2011     Years since quittin.3     Smokeless tobacco:  Never Used   Substance and Sexual Activity     Alcohol use: No     Alcohol/week: 0.0 standard drinks     Drug use: No     Sexual activity: Yes     Partners: Female   Lifestyle     Physical activity     Days per week: Not on file     Minutes per session: Not on file     Stress: Not on file   Relationships     Social connections     Talks on phone: Not on file     Gets together: Not on file     Attends [employer] service: Not on file     Active member of club or organization: Not on file     Attends meetings of clubs or organizations: Not on file     Relationship status: Not on file     Intimate partner violence     Fear of current or ex partner: Not on file     Emotionally abused: Not on file     Physically abused: Not on file     Forced sexual activity: Not on file   Other Topics Concern     Parent/sibling w/ CABG, MI or angioplasty before 65F 55M? Yes      Service Not Asked     Blood Transfusions Not Asked     Caffeine Concern Not Asked     Occupational Exposure Not Asked     Hobby Hazards Not Asked     Sleep Concern Not Asked     Stress Concern Not Asked     Weight Concern Not Asked     Special Diet Not Asked     Back Care Not Asked     Exercise Yes     Comment: limited walking     Bike Helmet Not Asked     Seat Belt Not Asked     Self-Exams Not Asked   Social History Narrative     Not on file     Reviewed with patient     FAMILY MEDICAL HISTORY:   Family History   Problem Relation Age of Onset     Diabetes Mother      Hypertension Mother      Heart Disease Mother      Myocardial Infarction Mother 68         of     Myocardial Infarction Father 54         of heart attack, left when he was young     Heart Disease Father      Diabetes Sister      Ovarian Cancer Sister          of     Diabetes Sister      Diabetes Sister      Crohn's Disease Daughter 36     Glaucoma No family hx of      Macular Degeneration No family hx of      Colon Cancer No family hx of      Ulcerative Colitis No family hx of   "    Irritable Bowel Syndrome No family hx of      Inflammatory Bowel Disease No family hx of      Reviewed with patient     PHYSICAL EXAM:   Temp  Av.3  F (34.1  C)  Min: 92.1  F (33.4  C)  Max: 95.8    F (35.4  C)  Arterial Line BP  Min: 85/61  Max: 96/58  Arterial Line MAP (mmHg)  Av.3 mmHg  Min: 66 mmHg  Max: 74   mmHg      Pulse  Av.5  Min: 63  Max: 136 Resp  Av.3  Min: 12    Max: 26  SpO2  Av %  Min: 87 %  Max: 100 %       BP 97/80   Pulse 105   Temp 95.8  F (35.4  C) (Axillary)     Resp 26   Ht 1.727 m (5' 8\")   Wt 96.1 kg (211 lb 13.8 oz)     SpO2 95%   BMI 32.21 kg/m        Admit Weight: 100 kg (220 lb 8 oz)     GENERAL APPEARANCE: alert and ill appearing   Head NC/AT  EYES: mildly scleral icterus  HENT: mouth  without ulcers or lesions  NECK: supple  Pulmonary: fair air entry and exchange   CV: irregular rhythm, tachy rate, no rub   - Edema bilateral   GI: soft, nontender, normal bowel sounds, no HSM   MS: no evidence of inflammation in joints, no muscle tenderness  : [place]  SKIN: blisters over both lower extremities    NEURO: mentation intact and speech normal but slightly obtunded     LABS:   CMP  Recent Labs   Lab 21  0500 21  0313 21  0002 21  2304 21  1630 21  1628   NA  --  128*  --  130* 129* 127*   POTASSIUM 5.4* 5.8* 5.7* 6.4* 6.1* 5.9*   CHLORIDE  --  99  --   --   --  95   CO2  --  11*  --   --   --  17*   ANIONGAP  --  18*  --   --   --  15*   GLC  --  128*  --  73 86 86   BUN  --  149*  --   --   --  152*   CR  --  3.92*  --   --   --  3.89*   GFRESTIMATED  --  15*  --   --   --  15*   GFRESTBLACK  --  18*  --   --   --  18*   RONNY  --  8.3*  --   --   --  8.7   MAG  --   --   --   --   --  3.4*   PROTTOTAL  --  7.0  --   --   --  7.0   ALBUMIN  --  3.0*  --   --   --  2.9*   BILITOTAL  --  4.4*  --   --   --  4.1*   ALKPHOS  --  104  --   --   --  106   AST  --  24  --   --   --  26   ALT  --  20  --   --   --  20 "     CBC  Recent Labs   Lab 04/12/21  0313 04/11/21  2304 04/11/21  1630 04/11/21  1628   HGB 10.3* 12.6* 12.6* 10.0*   WBC 20.5*  --   --  13.1*   RBC 4.65  --   --  4.54   HCT 34.4*  --   --  33.7*   MCV 74*  --   --  74*   MCH 22.2*  --   --  22.0*   MCHC 29.9*  --   --  29.7*   RDW 24.5*  --   --  24.9*   PLT 56*  --   --  46*     INR  Recent Labs   Lab 04/11/21  1628   INR 1.98*   PTT 52*     ABG  Recent Labs   Lab 04/12/21  0314 04/12/21  0208   PH 7.29*  --    PCO2 23*  --    PO2 92  --    HCO3 11*  --    O2PER 80 6 liters      URINE STUDIES  Recent Labs   Lab Test 04/11/21  1139 11/12/20  2030 07/22/19  1630 04/08/19  2207 12/01/14  1514   COLOR Yellow Yellow Light Yellow Straw Light Yellow   APPEARANCE Slightly Cloudy Clear Clear Clear Clear   URINEGLC Negative Negative Negative >1000* Negative   URINEBILI Negative Negative Negative Negative Negative   URINEKETONE Negative Negative Negative Negative Negative   SG 1.016 1.009 1.006 1.010 1.008   UBLD Trace* Negative Negative Negative Negative   URINEPH 5.0 5.5 6.5 6.0 5.0   PROTEIN 30* Negative Negative 30* Negative   NITRITE Negative Negative Negative Negative Negative   LEUKEST Negative Negative Negative Negative Negative   RBCU 3*  --  0 1 <1   WBCU 1  --  0 <1 <1     Recent Labs   Lab Test 06/01/18  0823 12/19/16  1819 05/31/16  1418 02/16/16  0926 02/01/16  1406 09/10/15  1226 07/29/15  1141 03/31/15  1420 12/01/14  1514 08/23/13  0722   UTPG 1.35* 3.10* 1.85* 0.93* 1.06* 0.22* 0.16 0.20 0.17 PUVOID     PTH  Recent Labs   Lab Test 06/01/18  0811 02/01/16  1407 12/01/14  1525 08/23/13  0727   PTHI 123* 215* 129* 22     IRON STUDIES  Recent Labs   Lab Test 12/18/20  1242 11/12/20  1212 08/13/20  1006 05/01/20  0750 04/21/20  1053 02/11/20  0347 12/19/19  0545 12/12/19  1057 10/31/19  0732 10/07/19  0925 09/16/19  1854 08/26/19  1919 07/23/19  0431 07/22/19  0927 06/27/19  1226 06/01/18  0811 02/16/16  0927 09/10/15  1235 12/01/14  1525 08/23/13  0727    IRON 21* 12* 16* 81 37 18* 37 31* 59 27* 36 17* 15*  --  84 73 51   24* 48 41   * 447* 554* 467* 396 345 339 366 396 388 412 447* 361  --     --  342 301 418 455* 416   IRONSAT 5* 3* 3* 17 9* 5* 11* 8* 15 7* 9* 4* 4*  --   --  21 17   6* 11* 10*   FRANK 25* 11* 9*  --  27  --  35 37  --  48 103 16*  --  8*  --  30    --  17* 51 73       IMAGING:  All imaging studies reviewed by me.     Fredi Banerjee MD       XR Chest Port 1 View    Narrative    Preliminary Report - The following report is a preliminary  interpretation.      Impression    Impression:   1. Left central venous catheter tip terminating over the mid to high  SVC. No pneumothorax.  2. Cardiomegaly.  3. Bilateral pleural effusions, right greater than left, with  overlying opacities, likely atelectasis   Potassium   Result Value Ref Range    Potassium 5.4 (H) 3.4 - 5.3 mmol/L   Glucose by meter   Result Value Ref Range    Glucose 163 (H) 70 - 99 mg/dL   Glucose by meter   Result Value Ref Range    Glucose 162 (H) 70 - 99 mg/dL   Glucose by meter   Result Value Ref Range    Glucose 190 (H) 70 - 99 mg/dL     *Note: Due to a large number of results and/or encounters for the requested time period, some results have not been displayed. A complete set of results can be found in Results Review.

## 2021-04-12 NOTE — CONSULTS
Transplant Nephrology Initial Consult  April 12, 2021      Cole Jesus MRN:[id number] YOB: 1960  Date of Admission:4/11/2021  Primary care provider: Silas Enciso  Requesting physician: Tae Ruiz MD    ASSESSMENT AND RECOMMENDATIONS:   1.  Acute on chronic renal failure.  2.  Shock possibly mixed picture with sepsis and cardiogenic component.  3.  Severe acidosis with gap and elevated lactate.  4.  Moderate hyperkalemia  5.  Hyponatremia.  6.  Ischemic cardiomyopathy with depressed EF.  7.  Hyper troponinemia  8.  A. fib with RVR on amiodarone.  9.  Diabetes with complication.  10.  Volume overload    Discussion:  Overall Mr. Jesus is a 60-year-old gentleman with multiple comorbidities related to his diabetes and ischemic cardiomyopathy.  He was admitted to the ICU with worsening shock requiring multiple pressors with evidence of hyperkalemia and moderate to severe lactic acidosis.  Discussed with the ICU team will go ahead and attempt CRRT.  I discussed the CRRT with Cole and he gave me permission earlier that was witnessed by the team members.  At the time of assessment he is able to answer questions but not quite strong to sign a consent.  We will start with gentle CRRT and reassess.  Discussed with the cardiology team at the bedside there is moderate-sized effusion but they confirmed no tamponade physiology.    Recommendations were communicated to primary team via direct discussion at bedside  REASON FOR CONSULT: Severe acidosis, volume overload acute on chronic renal failure    HISTORY OF PRESENT ILLNESS:  Cole Jesus is a 60 year old with multiple comorbidities including diabetes, peripheral arterial disease, ischemic cardiomyopathy with depressed EF, pulmonary hypertension, atrial fibrillation, obesity and nonadherence.  He presented to the ED with with generalized weakness.  He was admitted to the ICU and exhibited signs of worsening shock with rising lactic  acid and worsening acidosis.  Bedside echo revealed pericardial effusion without tamponade physiology per cardiology.  We were cleared to start CRRT.  At the time of initiating CRRT patient is on multiple pressors and we discussed keeping his systolic blood pressure above 90.  We will start with no fluid removal and slowly attempt based on his hemodynamic stability.    PAST MEDICAL HISTORY:  Reviewed with patient on 04/12/2021     Past Medical History:   Diagnosis Date     Anemia in chronic renal disease 03/09/2015     Antiplatelet or antithrombotic long-term use      Atrial fibrillation and flutter      CAD (coronary artery disease)     Stemi in 12/11, s/p angioplasty     CRD (chronic renal disease), stage IV (H) 03/09/2015    hx ATN with dialysis complicating cardiogenic shock  1/2012     GI bleed     massive lower GI bleed secondary to a cecal ulcer, s/p ileocecal resection in 12/11     Heart failure     Biventricular systolic HF, complicated by ARDS requiring tracheostomy     Hyperlipidemia LDL goal <100 04/28/2013     Hypertension      Ischemic cardiomyopathy 04/28/2013    TTE revealing 40% EF     Myocardial infarction (H)     2011 and 6/2018     Other and unspecified nonspecific immunological findings     Anti JKa     Pulmonary edema     episodes of flash pulmonary edema in 12/11     Subclinical hypothyroidism        Past Surgical History:   Procedure Laterality Date     CV RIGHT HEART CATH MEASUREMENTS RECORDED N/A 11/12/2019    Procedure: CV RIGHT HEART CATH;  Surgeon: Fox Gerard MD;  Location:  HEART CARDIAC CATH LAB     CV RIGHT HEART CATH MEASUREMENTS RECORDED N/A 2/10/2020    Procedure: CV RIGHT HEART CATH;  Surgeon: Fox Gerard MD;  Location:  HEART CARDIAC CATH LAB     ESOPHAGOSCOPY, GASTROSCOPY, DUODENOSCOPY (EGD), COMBINED  12/25/2011    Procedure:COMBINED ESOPHAGOSCOPY, GASTROSCOPY, DUODENOSCOPY (EGD); Surgeon:SAMARIA PUENTE; Location: GI     LAPAROTOMY  EXPLORATORY  12/31/2011    Procedure:LAPAROTOMY EXPLORATORY; Explore laparotomy, Illeocectomy, Diverting Illeostomy; Surgeon:GIA NAJERA; Location:[facility] OR     ORIF right elbow fracture  age 12     TAKEDOWN ILEOSTOMY  6/5/2014    Procedure: TAKEDOWN ILEOSTOMY;  Surgeon: Gia Najera MD;  Location: [facility] OR     TRACHEOSTOMY  12/22/2011    Procedure:TRACHEOSTOMY; Tracheostomy; 80XLTCP-Proximal Extension-Cuffed 8.0 mm I.D.; Surgeon:LIZABETH DYE; Location:[facility] OR        MEDICATIONS:  PTA Meds  Prior to Admission medications    Medication Sig Last Dose Taking? Auth Provider   ACCU-CHEK GUIDE test strip USE TO TEST BLOOD SUGAR FOUR TIMES A DAY BEFORE MEALS AND AT BEDTIME Unknown at Unknown time  Smiley Argueta PA-C   Alcohol Swabs PADS 1 applicator 4 times daily (before meals and nightly) Unknown at Unknown time  Arina Haynes APRN CNP   blood glucose monitoring (ACCU-CHEK FASTCLIX) lancets USE TO TEST BLOOD SUGAR FOUR TIMES A DAY AT MEALS AND AT BEDTIME Unknown at Unknown time  Smiley Argueta PA-C   insulin pen needle (32G X 4 MM) 32G X 4 MM miscellaneous Use 5 pen needles daily or as directed. Unknown at Unknown time  Arina Haynes APRN CNP   metolazone (ZAROXOLYN) 2.5 MG tablet Take 1 tablet (2.5 mg) by mouth three times a week Unknown at Unknown time  Eevlin Lancaster APRN CNP   potassium chloride ER (KLOR-CON M) 20 MEQ CR tablet Take 2 tablets (40 mEq) by mouth 2 times daily Unknown at Unknown time  Dian Leblanc APRN CNP   senna-docusate (SENOKOT-S/PERICOLACE) 8.6-50 MG tablet Take 1 tablet by mouth 2 times daily as needed for constipation Unknown at Unknown time  Unknown, Entered By History   Sharps Container MISC 1 Device every 30 days Unknown at Unknown time  Arina Haynes APRN CNP   torsemide (DEMADEX) 20 MG tablet Take 4 tablets (80 mg) by mouth 2 times daily Unknown at Unknown time  Dian Leblanc APRN CNP   vitamin  B complex with vitamin C  (VITAMIN  B COMPLEX) TABS Take 1 tablet by mouth daily Unknown at Unknown time  Reported, Patient   Vitamin D, Cholecalciferol, 25 MCG (1000 UT) CAPS Take 1 capsule by mouth 2 times daily Unknown at Unknown time  Unknown, Entered By History      Current Meds    ceFEPIme (MAXIPIME) IV  1 g Intravenous Q12H     vancomycin place cage - receiving intermittent dosing  1 each Intravenous See Admin Instructions     Infusion Meds    amiodarone 0.5 mg/min (04/12/21 0600)     dextrose 10% 75 mL/hr at 04/12/21 0500     dextrose 10%       CRRT replacement solution 12.5 mL/kg/hr (04/12/21 0607)     EPINEPHrine Stopped (04/12/21 0600)     - MEDICATION INSTRUCTIONS -       norepinephrine 0.37 mcg/kg/min (04/12/21 0600)     CRRT replacement solution 200 mL/hr at 04/12/21 0608     CRRT replacement solution 12.5 mL/kg/hr (04/12/21 0607)     sodium bicabonate in 5% dextrose for infusion 100 mL/hr at 04/12/21 0500     vasopressin 2.4 Units/hr (04/12/21 0600)       ALLERGIES:    Allergies   Allergen Reactions     Hydralazine      Black spots, verified allergic reaction by skin biopsy     Isosorbide      Per pt got a rash all over his body and won't take it no more     Simvastatin Other (See Comments)     Leg muscle weakness     Tylenol [Acetaminophen] Palpitations       REVIEW OF SYSTEMS:  A 10 point review of systems was negative except as noted above.    SOCIAL HISTORY:   Social History     Socioeconomic History     Marital status: Significant other     Spouse name: Not on file     Number of children: Not on file     Years of education: Not on file     Highest education level: Not on file   Occupational History     Not on file   Social Needs     Financial resource strain: Not on file     Food insecurity     Worry: Not on file     Inability: Not on file     Transportation needs     Medical: Not on file     Non-medical: Not on file   Tobacco Use     Smoking status: Former Smoker     Packs/day: 2.00     Years: 40.00     Pack years:  80.00     Types: Cigarettes     Quit date: 12/3/2011     Years since quittin.3     Smokeless tobacco: Never Used   Substance and Sexual Activity     Alcohol use: No     Alcohol/week: 0.0 standard drinks     Drug use: No     Sexual activity: Yes     Partners: Female   Lifestyle     Physical activity     Days per week: Not on file     Minutes per session: Not on file     Stress: Not on file   Relationships     Social connections     Talks on phone: Not on file     Gets together: Not on file     Attends Restorationism service: Not on file     Active member of club or organization: Not on file     Attends meetings of clubs or organizations: Not on file     Relationship status: Not on file     Intimate partner violence     Fear of current or ex partner: Not on file     Emotionally abused: Not on file     Physically abused: Not on file     Forced sexual activity: Not on file   Other Topics Concern     Parent/sibling w/ CABG, MI or angioplasty before 65F 55M? Yes      Service Not Asked     Blood Transfusions Not Asked     Caffeine Concern Not Asked     Occupational Exposure Not Asked     Hobby Hazards Not Asked     Sleep Concern Not Asked     Stress Concern Not Asked     Weight Concern Not Asked     Special Diet Not Asked     Back Care Not Asked     Exercise Yes     Comment: limited walking     Bike Helmet Not Asked     Seat Belt Not Asked     Self-Exams Not Asked   Social History Narrative     Not on file     Reviewed with patient     FAMILY MEDICAL HISTORY:   Family History   Problem Relation Age of Onset     Diabetes Mother      Hypertension Mother      Heart Disease Mother      Myocardial Infarction Mother 68         of     Myocardial Infarction Father 54         of heart attack, left when he was young     Heart Disease Father      Diabetes Sister      Ovarian Cancer Sister          of     Diabetes Sister      Diabetes Sister      Crohn's Disease Daughter 36     Glaucoma No family hx of      Macular  "Degeneration No family hx of      Colon Cancer No family hx of      Ulcerative Colitis No family hx of      Irritable Bowel Syndrome No family hx of      Inflammatory Bowel Disease No family hx of      Reviewed with patient     PHYSICAL EXAM:   Temp  Av.3  F (34.1  C)  Min: 92.1  F (33.4  C)  Max: 95.8  F (35.4  C)  Arterial Line BP  Min: 85/61  Max: 96/58  Arterial Line MAP (mmHg)  Av.3 mmHg  Min: 66 mmHg  Max: 74 mmHg      Pulse  Av.5  Min: 63  Max: 136 Resp  Av.3  Min: 12  Max: 26  SpO2  Av %  Min: 87 %  Max: 100 %       BP 97/80   Pulse 105   Temp 95.8  F (35.4  C) (Axillary)   Resp 26   Ht 1.727 m (5' 8\")   Wt 96.1 kg (211 lb 13.8 oz)   SpO2 95%   BMI 32.21 kg/m        Admit Weight: 100 kg (220 lb 8 oz)     GENERAL APPEARANCE: alert and ill appearing   Head NC/AT  EYES: mildly scleral icterus  HENT: mouth  without ulcers or lesions  NECK: supple  Pulmonary: fair air entry and exchange   CV: irregular rhythm, tachy rate, no rub   - Edema bilateral   GI: soft, nontender, normal bowel sounds, no HSM   MS: no evidence of inflammation in joints, no muscle tenderness  : [place]  SKIN: blisters over both lower extremities    NEURO: mentation intact and speech normal but slightly obtunded     LABS:   CMP  Recent Labs   Lab 21  0500 21  0313 21  0002 21  2304 21  1630 21  1628   NA  --  128*  --  130* 129* 127*   POTASSIUM 5.4* 5.8* 5.7* 6.4* 6.1* 5.9*   CHLORIDE  --  99  --   --   --  95   CO2  --  11*  --   --   --  17*   ANIONGAP  --  18*  --   --   --  15*   GLC  --  128*  --  73 86 86   BUN  --  149*  --   --   --  152*   CR  --  3.92*  --   --   --  3.89*   GFRESTIMATED  --  15*  --   --   --  15*   GFRESTBLACK  --  18*  --   --   --  18*   RONNY  --  8.3*  --   --   --  8.7   MAG  --   --   --   --   --  3.4*   PROTTOTAL  --  7.0  --   --   --  7.0   ALBUMIN  --  3.0*  --   --   --  2.9*   BILITOTAL  --  4.4*  --   --   --  4.1*   ALKPHOS  --  104 "  --   --   --  106   AST  --  24  --   --   --  26   ALT  --  20  --   --   --  20     CBC  Recent Labs   Lab 04/12/21  0313 04/11/21  2304 04/11/21  1630 04/11/21  1628   HGB 10.3* 12.6* 12.6* 10.0*   WBC 20.5*  --   --  13.1*   RBC 4.65  --   --  4.54   HCT 34.4*  --   --  33.7*   MCV 74*  --   --  74*   MCH 22.2*  --   --  22.0*   MCHC 29.9*  --   --  29.7*   RDW 24.5*  --   --  24.9*   PLT 56*  --   --  46*     INR  Recent Labs   Lab 04/11/21  1628   INR 1.98*   PTT 52*     ABG  Recent Labs   Lab 04/12/21  0314 04/12/21  0208   PH 7.29*  --    PCO2 23*  --    PO2 92  --    HCO3 11*  --    O2PER 80 6 liters      URINE STUDIES  Recent Labs   Lab Test 04/11/21  1139 11/12/20  2030 07/22/19  1630 04/08/19  2207 12/01/14  1514   COLOR Yellow Yellow Light Yellow Straw Light Yellow   APPEARANCE Slightly Cloudy Clear Clear Clear Clear   URINEGLC Negative Negative Negative >1000* Negative   URINEBILI Negative Negative Negative Negative Negative   URINEKETONE Negative Negative Negative Negative Negative   SG 1.016 1.009 1.006 1.010 1.008   UBLD Trace* Negative Negative Negative Negative   URINEPH 5.0 5.5 6.5 6.0 5.0   PROTEIN 30* Negative Negative 30* Negative   NITRITE Negative Negative Negative Negative Negative   LEUKEST Negative Negative Negative Negative Negative   RBCU 3*  --  0 1 <1   WBCU 1  --  0 <1 <1     Recent Labs   Lab Test 06/01/18  0823 12/19/16  1819 05/31/16  1418 02/16/16  0926 02/01/16  1406 09/10/15  1226 07/29/15  1141 03/31/15  1420 12/01/14  1514 08/23/13  0722   UTPG 1.35* 3.10* 1.85* 0.93* 1.06* 0.22* 0.16 0.20 0.17 PUVOID     PTH  Recent Labs   Lab Test 06/01/18  0811 02/01/16  1407 12/01/14  1525 08/23/13  0727   PTHI 123* 215* 129* 22     IRON STUDIES  Recent Labs   Lab Test 12/18/20  1242 11/12/20  1212 08/13/20  1006 05/01/20  0750 04/21/20  1053 02/11/20  0347 12/19/19  0545 12/12/19  1057 10/31/19  0732 10/07/19  0925 09/16/19  1854 08/26/19  1919 07/23/19  0431 07/22/19  0927  06/27/19  1226 06/01/18  0811 02/16/16  0927 09/10/15  1235 12/01/14  1525 08/23/13  0727   IRON 21* 12* 16* 81 37 18* 37 31* 59 27* 36 17* 15*  --  84 73 51 24* 48 41   * 447* 554* 467* 396 345 339 366 396 388 412 447* 361  --   --  342 301 418 455* 416   IRONSAT 5* 3* 3* 17 9* 5* 11* 8* 15 7* 9* 4* 4*  --   --  21 17 6* 11* 10*   FRANK 25* 11* 9*  --  27  --  35 37  --  48 103 16*  --  8*  --  30  --  17* 51 73       IMAGING:  All imaging studies reviewed by me.     Fredi Banerjee MD

## 2021-04-12 NOTE — ED NOTES
Femoral central line placed. Pt placed on 10 lpm O2 via Oxymask for procedure as pt's SpO2 measured below 90% on 2 lpm O2 while supine.

## 2021-04-12 NOTE — CONSULTS
Care Management Initial Consult    General Information  Assessment completed with: Patient, VM-chart review    Type of CM/SW Visit: Initial Assessment    Primary Care Provider verified and updated as needed: No   Readmission within the last 30 days: no previous admission in last 30 days      Reason for Consult: psychosocial concerns  Advance Care Planning: Advance Care Planning Reviewed: no concerns identified        Communication Assessment  Patient's communication style: spoken language (English or Bilingual)    Hearing Difficulty or Deaf: no   Wear Glasses or Blind: no    Cognitive  Cognitive/Neuro/Behavioral: .WDL except  Level of Consciousness: lethargic  Arousal Level: arouses to voice  Orientation: disoriented to, time  Mood/Behavior: calm, cooperative  Best Language: 0 - No aphasia  Speech: logical, hoarse    Living Environment:   People in home: significant other     Current living Arrangements: apartment      Able to return to prior arrangements: yes     Family/Social Support:  Care provided by: self  Provides care for: no one, unable/limited ability to care for self  Marital Status: Lives with Significant Other  Significant Other          Description of Support System: Supportive    Support Assessment: Patient communicates needs well met    Current Resources:   Patient receiving home care services: No     Community Resources: None  Equipment currently used at home: cane, straight  Supplies currently used at home: None    Employment/Financial:  Employment Status: other (see comments)(Not discussed)        Financial Concerns: No concerns identified      Lifestyle & Psychosocial Needs:        Socioeconomic History     Marital status: Significant other     Spouse name: Not on file     Number of children: Not on file     Years of education: Not on file     Highest education level: Not on file     Tobacco Use     Smoking status: Former Smoker     Packs/day: 2.00     Years: 40.00     Pack years: 80.00     Types:  Cigarettes     Quit date: 12/3/2011     Years since quittin.3     Smokeless tobacco: Never Used   Substance and Sexual Activity     Alcohol use: No     Alcohol/week: 0.0 standard drinks     Drug use: No     Sexual activity: Yes     Partners: Female       Functional Status:  Prior to admission patient needed assistance:   Dependent ADLs:: Ambulation-cane  Dependent IADLs:: Independent  Assesssment of Functional Status: Not at  functional baseline    Mental Health Status:  Mental Health Status: No Current Concerns       Chemical Dependency Status:  Chemical Dependency Status: No Current Concerns             Values/Beliefs:  Spiritual, Cultural Beliefs, Restoration Practices, Values that affect care: no  Description of Beliefs that Will Affect Care: , Restoration       Additional Information:  SW met 1:1 with pt. Pt seen in ICU room on CRRT. SW introduced self and role of SW in ICU. Performed basic assessment; pt not feeling up to lengthy conversation today. Pt verbalized needing more help in the home but could not be specific. SW will continue to remain available for patient and family support, discharge planning, other resources and support PRN.    Miranda Dotson, SHARRON, [facility]  ICU    [facility]   P: [phone]  Pager: [phone]

## 2021-04-12 NOTE — PLAN OF CARE
Admitted/transferred from: [facility] ED  Reason for admission/transfer: Hypotension, lactic acidosis  Patient status upon admission/transfer: Hypotensive, lethargic.  Vaso at 2.4, levo at 0.25.  6L O2.  Interventions: Bicarb started, pressors titrated  Plan: CRRT, possible pericardial effusion drainage in cath lab  2 RN skin assessment: completed by Zach Cavanaugh  Result of skin assessment and interventions/actions: Nonblanchable spot on upper back, mepilex placed.  Large R calf wound, L knee wound, and multiple smaller weeping ulcers across lower leg  Height, weight, drug calc weight: Done  Patient belongings (see Flowsheet - Adult Profile for details): None  MDRO education (if applicable): N/A

## 2021-04-12 NOTE — PROGRESS NOTES
Critical Care Services Progress Note:     Cole Jesus remains critically ill with shock  I personally examined and evaluated the patient today.   The patient s prognosis today is tentative  I have evaluated all laboratory values and imaging studies from the past 24 hours.    Key findings and decisions made todayinclude  Mr. Jesus is a 60-year-old male with prior history of CAD s/p KEVYN, HFrEF with EF of 20 to 25%, A fib (not anticoagulated due to history GIB) now with septic shock, pericardial effusion with no multiple evidence of tamponade, EVA on CKD and hypoxic respiratory failure.  1. CNS: Encephalopathy likely multifactorial related to sepsis and metabolic derangements including BUN of 124.  Will minimize sedation/narcotics.  2  CVS: amio, C, gluconate,  A fib.   - Septic shock: Currently on epinephrine, norepinephrine and vasopressin.  Source of sepsis possibly related to multiple open skin areas.  Volume resuscitated.  Likely not preload dependent but: volume assessment challenging in setting of atrial fibrillation.  Continue current pressors.  Plan to wean epi because of arrhythmias.  Keep MAP more than 60.  We will add hydrocortisone and fludrocortisone for CIRCI.   -Atrial fibrillation with aberrant conduction.  Intermittently conducted beats have broad QRS.  On amiodarone.  Will continue.  Replace electrolytes to keep back rhythm Replace lytes to keep Mg >2.  Replace calcium as needed.  -No clear evidence of ST elevation.  Suspect demand ischemia.  - Pericardial effusion but no tamponade physiology.  Formal echo pending.  3 Resp: Hypoxia, concern for impending respiratory failure.  We will have low threshold for intubation.  Continue to monitor for now.  Work of breathing is acceptable.  4.  Prior history of GI bleed.  We will continue to monitor hemoglobins.  Currently has coagulopathy.  Will place on stress ulcer prophylaxis.  5: EVA on CKD: UA without any evidence of active casts.  Will check  CK.  Also check urine sodium.  Currently on CRRT.  Will not ultrafilter for now.  6 ID septic shock.: skin wounds.  Continue broad spectrum antibiotics.  Will add anaerobic coverage.  Will change cefepime to piperacillin/tazobactam.  Also 1 dose of clindamycin.  Obtain lower extremity x-rays to rule out gas-forming organisms.  6 Hematology, Microcytic anemia. Throbocytopenia  -Thrombocytopenia likely related to sepsis and DIC.  Recheck LDH and blood smear to exclude other causes of MHA.  Also check fibrinogen and replace per protocol.  Keep platelets greater than 10 in absence of any bleeding and greater than 30 with any evidence of bleeding.  I personally managed the metabolic abnormalities, antibiotic therapy, nutritional status and vasoactive medications.   Consults ongoing and ordered are Cardiology and epinephrine  Procedures that will happen today are: none     I spent a total of 50 minutes (excluding procedure time) personally providing and directing critical care services at the bedside and on the critical care unit for Cole Jesus.        Emerson Reyes MD

## 2021-04-12 NOTE — H&P
MEDICAL ICU H&P  04/12/2021    Date of Hospital Admission: 04/11/21   Date of ICU Admission: 04/11/21  Reason for Critical Care Admission: Refractory Hypotension   Date of Service (when I saw the patient): 04/12/2021    ASSESSMENT:          Evangelina Ordonez is a  60 year old male with PMHx most significant for HFrEF 25-30% (as per 2-D echocardiogram on 01/18/21) 2/2 ICM (NYHA Class III/Stage C), severe Pulmonary Hypertension, moderate MR, STEMI x/p DESx3 (2012) & PCI prox RCA 2019, Chronic Kidney Disease stage  IV, Paroxysmal Atrial fibrillation not on AC, Chronic Iron Deficiency Aneia of GIB due to AVMs, previous Lower GI bleeding requiring ileocecal resection,  type II Diabetes Mellitus, Hypothyroidism and Obesity who was admitted today to the 4C MICU unit for treatment of undifferentiated Shock (Septic vs. Cardiogenic)     Neuro:  # No active Issues at the time  - Continue to monitor for any encephalopathic changes  - Neuro-checks as per nursing shift  - No indication for head imaging or additional diagnostic studies given absence of encephalitic/meningeal signs     > Could consider further evaluation of clinical status continues to deteriorate with high suspicion of infectious etiology       Cardiovascular/Pulmonary:  # Shock (undifferentiated)    > Septic/Distributive vs. Cardiogenic  # Acute Hypoxic Respiratory Failure type I  # Troponin leak (likely NSTEMI type II)  # HFrEF 25-30% (as per 2-D echocardiogram on 01/18/21) 2/2 ICM (NYHA Class III/Stage C)    > Ischemic Cardiomyopathy    > Previously denied ICD  # Severe Pulmonary Hypertension  # Moderate MR  # Coronary Artery Disease      > STEMI x/p DESx3 (2012) & PCI prox RCA 2019  # Paroxysmal Atrial Fibrillation (not on anticoagulation)         Patient recently admitted to the MICU due to concerns for refractory hypotension, unclear if related to septic/distributive shock versus isolated or combined cardiogenic component. Of note, patient has denied  orthopnea/PND and today presents below his dry weight at (220lbs; baseline 225-226lbs). Of note, patient has not exhibited any features of respiratory distress which would argue against overt pulmonary edema in the context of acute heart failure exacerbation. Nonetheless, assessment of volume status has remained difficult at a glance from his physical examination and overall history (subacute to chronic development of ongoing weakness/fatigue/ dyspnea on exertion) which may rather argue for a component of hypervolemia.       Making matters worse, is patient's well known history of progressive renal insufficiency; now exhibiting parameters of significant EVA and worsening metabolic acidosis. Patient does have history of poor tolerance to neurohormonal blockade which makes complicates our assessment of wether this is a mere one-way street phenomenon involving an overarching infectious etiology or ongoing heart failure.       From a diagnostic standpoint, assuming we cannot exclude a source of infection at a time, the current diagnostic tools at our disposal remain limited given EVA. Ideally, a CT scan with IV contrast  Would help potentially better describe the nature of lung opacities and any possible intraabdominal source of infection. In addition, patient's physical exam remains non-focal which leaves us with the obvious implementation of broad-spectrum antimicrobial therapy until further notice.       After further stabilization while on vasoactive medication and antimicrobial agents, it would be worthwhile considering the possibility of a right heart catheterization to truly describe the burden of patient's known ischemic heart disease and wether it has progressed to a more vulnerable state.    - On cardiac Telemetry  - Continue Vasoactive mediations    > Norepinephrine and Vasopressin       > Will potentially add norepinephrine    > MAP goal > 65 mmHg  - S/p 25g of 5% IV albumin in 500cc D5W  - S/p 2L of 0.9%  NaCl  - Will hold on Amiodarone drip  - Goal: K+ >4.0, Mg2+ > 2.0  - Monitor for potential ventricular arrhythmias considering use of vasopressor  - Hold on PTA diuretics  - Continue to monitor respiratory state (continues well compensated with low oxygen requirements 6-8LPM nasal cannula)    > May require intubation   - Pending 2-D echocardiogram  - Cardiology Heart Failure Consulted and Appreciate Recommendations          GI/Nutrition:  # No active issue  # Previous history of GI bleeding in the context of Angioectasia/AVM's  # Protein losing nephropathy    - No indications for PPI prophylaxis at the time  - Consider Nutrition consult     Renal/Fluids/Electrolytes:  # Acute on Chronic Kidney Disease Stage 4    > Ongoing in the context Diabetic Kidney Disease vs. Diabetic Nephropathy     > History of ATN after development of Cardiogenic Shock secondary to STEMI (2012)  # High Anion Gap Metabolic Acidosis  # Combined Lactic Acidosis Type A/B  # Hyperkalemia (6.4)  # Hyponatremia (likely related to poor oral intake and hypervolemia)  # Protein Losing Nephropathy       Baseline Scr: 2.1-2.4 mg/dL> Most recently 3.89 mg/dL with associated hyperkalemia, hyponatremia and significant metabolic acidosis (Co2: 17-->17) in the context of lacticemia despite two vasopressors. Etiology could potentially be related to mixed distributive//septic vs. Cardiogenic shock.       Will require urgent RRT to correct acidosis given progressive hemodynamic decompensation.     - Nephrology Consulted and Appreciate recommendations  - S/p 2L of 0.9% NaCl and   - Will start CRRT  - Monitor Lactic acidosis and electrolytes (K+, Ca2+, Mg2+, PO4-3)     > Continue to use Hyperkalemia Shift protocol as needed  - Strict I/O's  - Daily Weights  - Avoid NSAID or Nephrotoxic agents      Endocrine:  # Type II Diabetes Mellitus    > Last Hgb A1C: 6.5 (01/15/21)    - Oral Hypoglycemia Protocol Initiated  - Small dose sliding scale insulin      ID:  #  "Septic Shock  # Non-focal symptoms    > Rule out source of infection     - Initiated Empiric IV Vancomycin and Cefepime  - Blood Cultures x2 from two different sites     Hematology:    # Chronic Iron Deficiency Anemia  # Thrombocytopenia likely related to underlying sepsis      - Continue Monitoring Hgb  - Hgb goal >7 g/dL  - Holding on heparin products and anticoagulation         General Cares/Prophylaxis:    DVT Prophylaxis: Pneumatic Compression Devices  GI Prophylaxis: Not indicated   Family Communication: None  Code Status:  Full Code    Lines/tubes/drains:  - Right Femoral Arterial Line/ Left Femoral Triple CVC  - Left internal jugular Castro Dialysis Catheter    Disposition:  - Medical ICU until further notice     Patient seen and findings/plan discussed with medical ICU staff, Tae Pugh  Internal Medicine Residency, PGY-2  [facility]   p. [phone]      -----------------------------------------------------------------------    HISTORY PRESENTING ILLNESS:        Evangelina Ordonez is a  60 year old male with PMHx most significant for HFrEF 25-30% (as per 2-D echocardiogram on 01/18/21) 2/2 ICM (NYHA Class III/Stage C), severe Pulmonary Hypertension, moderate MR, STEMI x/p DESx3 (2012) & PCI prox RCA 2019, Chronic Kidney Disease stage  IV, Paroxysmal Atrial fibrillation not on AC, Chronic Iron Deficiency Aneia of GIB due to AVMs, previous Lower GI bleeding requiring ileocecal resection,  type II Diabetes Mellitus, Hypothyroidism and Obesity who presented to the ED around 4:30PM on 04/11/21 with complaints of generalized weakness and fatigue. Patient reports he has been feeling \"ill/unwell\" for a while now. He felt like he has felt previously in the past when he has been anemic.         On arrival, patient was hypotensive to the 80/60's MAP's in the low 60's and afebrile. He has history of significant lower extremity/scrotal and inguinal edema in the past " when he has been admitted for Heart Failure Exacerbation at the [facility] (01/2021). Today he presented with similar swelling findings and overall feeling fatigued. His appetite has decreased as well and he reports subjective weight loss (patient's dry/stable weight closer to 225 lbs). Nonetheless, he has denied any fever, chills, headaches, visual disturbances, abdominal pain, nausea/vomiting, GI bleeding event or anything reminiscent of it, chest pain/palpitations, urinary complaints, or sick contacts. History otherwise unclear since patient has not been thad to provide most of history and no family members were available at bedside.  Upon further questioning he denied any PND, orthopnea or recent syncopal events.          While at the ED, initial laboratory work-up included CBC with stable hemoglobin (10-12.6 g/dL; baseline 8-9 g/dL), minimal leukocytosis (WBC: 13.1, Abs neutrophil: 12.4), marked thrombocytopenia (PLT: 46, from mid 200's previously). Chemistry showed Scr: 3.89 ([place] closer to 2.3-2.5),  Hyperkalemia (5.9->6.4), Hyponatremia (129), Lactic Acid: 6.9,  elevated AG:15 with CO2: 17, BUN: 152, and LFT's WNL. Troponin at 0.055 and TSH 10.26 with Free-T4 0.38. Urine Unremarkable. CXR revealed interstitial and patchy airspace opacities and small bilateral pleural effusions tracking into right major fissure concerning for pulmonary edema/cardiovascular congestion vs. Atypical infection.        While at the ED, patient received 2L total of 0.9% NaCl (in doses of 500cc), started on Levophed and Vasopressin infusions due to refractory hypotension, Amiodarone bolus of 150 mg IV followed by an infusion, calcium gluconate 1g IV, and Broad-spectrum antibiotics with Vancomycin and Cefepime.        Patient was eventually transferred to the MICU for further cares.     REVIEW OF SYSTEMS:    10 point ROS was performed and detailed in the HPI. Otherwise negative    PAST MEDICAL HISTORY:   Past Medical History:    Diagnosis Date     Anemia in chronic renal disease 03/09/2015     Antiplatelet or antithrombotic long-term use      Atrial fibrillation and flutter      CAD (coronary artery disease)     Stemi in 12/11, s/p angioplasty     CRD (chronic renal disease), stage IV (H) 03/09/2015    hx ATN with dialysis complicating cardiogenic shock  1/2012     GI bleed     massive lower GI bleed secondary to a cecal ulcer, s/p ileocecal resection in 12/11     Heart failure     Biventricular systolic HF, complicated by ARDS requiring tracheostomy     Hyperlipidemia LDL goal <100 04/28/2013     Hypertension      Ischemic cardiomyopathy 04/28/2013    TTE revealing 40% EF     Myocardial infarction (H)     2011 and 6/2018     Other and unspecified nonspecific immunological findings     Anti JKa     Pulmonary edema     episodes of flash pulmonary edema in 12/11     Subclinical hypothyroidism      SURGICAL HISTORY:  Past Surgical History:   Procedure Laterality Date     CV RIGHT HEART CATH MEASUREMENTS RECORDED N/A 11/12/2019    Procedure: CV RIGHT HEART CATH;  Surgeon: Fox Gerard MD;  Location:  HEART CARDIAC CATH LAB     CV RIGHT HEART CATH MEASUREMENTS RECORDED N/A 2/10/2020    Procedure: CV RIGHT HEART CATH;  Surgeon: Fox Gerard MD;  Location:  HEART CARDIAC CATH LAB     ESOPHAGOSCOPY, GASTROSCOPY, DUODENOSCOPY (EGD), COMBINED  12/25/2011    Procedure:COMBINED ESOPHAGOSCOPY, GASTROSCOPY, DUODENOSCOPY (EGD); Surgeon:SAMARIA PUENTE; Location: GI     LAPAROTOMY EXPLORATORY  12/31/2011    Procedure:LAPAROTOMY EXPLORATORY; Explore laparotomy, Illeocectomy, Diverting Illeostomy; Surgeon:GABI MOSHER; Location:[facility] OR     ORIF right elbow fracture  age 12     TAKEDOWN ILEOSTOMY  6/5/2014    Procedure: TAKEDOWN ILEOSTOMY;  Surgeon: Gabi Mosher MD;  Location: [facility] OR     TRACHEOSTOMY  12/22/2011    Procedure:TRACHEOSTOMY; Tracheostomy; 80XLTCP-Proximal Extension-Cuffed  8.0 mm I.D.; Surgeon:LIZABETH DYE; Location: OR     SOCIAL HISTORY:  Social History     Socioeconomic History     Marital status: Significant other     Spouse name: Not on file     Number of children: Not on file     Years of education: Not on file     Highest education level: Not on file   Occupational History     Not on file   Social Needs     Financial resource strain: Not on file     Food insecurity     Worry: Not on file     Inability: Not on file     Transportation needs     Medical: Not on file     Non-medical: Not on file   Tobacco Use     Smoking status: Former Smoker     Packs/day: 2.00     Years: 40.00     Pack years: 80.00     Types: Cigarettes     Quit date: 12/3/2011     Years since quittin.3     Smokeless tobacco: Never Used   Substance and Sexual Activity     Alcohol use: No     Alcohol/week: 0.0 standard drinks     Drug use: No     Sexual activity: Yes     Partners: Female   Lifestyle     Physical activity     Days per week: Not on file     Minutes per session: Not on file     Stress: Not on file   Relationships     Social connections     Talks on phone: Not on file     Gets together: Not on file     Attends Nondenominational service: Not on file     Active member of club or organization: Not on file     Attends meetings of clubs or organizations: Not on file     Relationship status: Not on file     Intimate partner violence     Fear of current or ex partner: Not on file     Emotionally abused: Not on file     Physically abused: Not on file     Forced sexual activity: Not on file   Other Topics Concern     Parent/sibling w/ CABG, MI or angioplasty before 65F 55M? Yes      Service Not Asked     Blood Transfusions Not Asked     Caffeine Concern Not Asked     Occupational Exposure Not Asked     Hobby Hazards Not Asked     Sleep Concern Not Asked     Stress Concern Not Asked     Weight Concern Not Asked     Special Diet Not Asked     Back Care Not Asked     Exercise Yes     Comment:  limited walking     Bike Helmet Not Asked     Seat Belt Not Asked     Self-Exams Not Asked   Social History Narrative     Not on file     FAMILY HISTORY:   Family History   Problem Relation Age of Onset     Diabetes Mother      Hypertension Mother      Heart Disease Mother      Myocardial Infarction Mother 68         of     Myocardial Infarction Father 54         of heart attack, left when he was young     Heart Disease Father      Diabetes Sister      Ovarian Cancer Sister          of     Diabetes Sister      Diabetes Sister      Crohn's Disease Daughter 36     Glaucoma No family hx of      Macular Degeneration No family hx of      Colon Cancer No family hx of      Ulcerative Colitis No family hx of      Irritable Bowel Syndrome No family hx of      Inflammatory Bowel Disease No family hx of      ALLERGIES:   Allergies   Allergen Reactions     Hydralazine      Black spots, verified allergic reaction by skin biopsy     Isosorbide      Per pt got a rash all over his body and won't take it no more     Simvastatin Other (See Comments)     Leg muscle weakness     Tylenol [Acetaminophen] Palpitations     MEDICATIONS:  No current facility-administered medications on file prior to encounter.   ACCU-CHEK GUIDE test strip, USE TO TEST BLOOD SUGAR FOUR TIMES A DAY BEFORE MEALS AND AT BEDTIME  Alcohol Swabs PADS, 1 applicator 4 times daily (before meals and nightly)  blood glucose monitoring (ACCU-CHEK FASTCLIX) lancets, USE TO TEST BLOOD SUGAR FOUR TIMES A DAY AT MEALS AND AT BEDTIME  insulin pen needle (32G X 4 MM) 32G X 4 MM miscellaneous, Use 5 pen needles daily or as directed.  metolazone (ZAROXOLYN) 2.5 MG tablet, Take 1 tablet (2.5 mg) by mouth three times a week  potassium chloride ER (KLOR-CON M) 20 MEQ CR tablet, Take 2 tablets (40 mEq) by mouth 2 times daily  senna-docusate (SENOKOT-S/PERICOLACE) 8.6-50 MG tablet, Take 1 tablet by mouth 2 times daily as needed for constipation  Sharps Container MISC, 1  Device every 30 days  torsemide (DEMADEX) 20 MG tablet, Take 4 tablets (80 mg) by mouth 2 times daily  vitamin  B complex with vitamin C (VITAMIN  B COMPLEX) TABS, Take 1 tablet by mouth daily  Vitamin D, Cholecalciferol, 25 MCG (1000 UT) CAPS, Take 1 capsule by mouth 2 times daily        PHYSICAL EXAMINATION:  Temp:  [92.1  F (33.4  C)] 92.1  F (33.4  C)  Pulse:  [] 116  Resp:  [12-23] 18  BP: (59-98)/(20-89) 84/72  SpO2:  [87 %-100 %] 98 %  General: AAOx3, although lethargic, arousable to speech. Calm/cooperative. Adequate speech (comprehensible/ well articulated).  HEENT: Normocephalic/Atraumatic, EOMI/MOM, anicteric oral muscosa/sclera. Noticeable JVD.  Pulm/Resp: Clear breath sounds bilaterally without rhonchi, crackles or wheeze, breathing non-labored  CV: RRR, normal S1 and S2. No murmurs/rubs gallops. Peripheral pulses +1. Significant peripheral pitting edema +3 from legs to inguinal/scrotal region.   Abdomen: Soft, non-distended, non-tender, depressible. No pain/tenderness to palpation. No guarding/rebound.  : Ponce catheter in place, urine yellow and clear  Neuro: Moving all four extremities, strength 5/5, grossly non-focal.  Incisions/Skin: Hyperpigmented skin in the legs/chronic venous stasis changes. Nail dystrophy on toes bilaterally.     LABORATORY:    Results for orders placed or performed during the hospital encounter of 04/11/21 (from the past 24 hour(s))   UA reflex to Microscopic and Culture    Specimen: Urine catheter; Catheterized Urine   Result Value Ref Range    Color Urine Yellow     Appearance Urine Slightly Cloudy     Glucose Urine Negative NEG^Negative mg/dL    Bilirubin Urine Negative NEG^Negative    Ketones Urine Negative NEG^Negative mg/dL    Specific Gravity Urine 1.016 1.003 - 1.035    Blood Urine Trace (A) NEG^Negative    pH Urine 5.0 5.0 - 7.0 pH    Protein Albumin Urine 30 (A) NEG^Negative mg/dL    Urobilinogen mg/dL Normal 0.0 - 2.0 mg/dL    Nitrite Urine Negative  NEG^Negative    Leukocyte Esterase Urine Negative NEG^Negative    Source Catheterized Urine     RBC Urine 3 (H) 0 - 2 /HPF    WBC Urine 1 0 - 5 /HPF    Squamous Epithelial /HPF Urine <1 0 - 1 /HPF    Mucous Urine Present (A) NEG^Negative /LPF    sperm Present (A) NEG^Negative /HPF   -Central Line    Narrative    Jenny Ballard MD     4/11/2021 11:17 PM  [facility]    -Central Line    Date/Time: 4/11/2021 9:53 PM  Performed by: Jenny Ballard MD  Authorized by: Jenny Ballard MD        ANESTHESIA    Anesthesia: Local infiltration  Local Anesthetic:  Lidocaine 1% without epinephrine      PRE-PROCEDURE DETAILS:     Hand hygiene: Hand hygiene performed prior to insertion      Sterile barrier technique: All elements of maximal sterile technique   followed      Skin preparation:  ChloraPrep    Skin preparation agent: Skin preparation agent completely dried prior to   procedure      PROCEDURE DETAILS:     Location:  L femoral    Patient position:  Flat    Procedural supplies:  Cordis and triple lumen    Catheter size:  7 Fr    Landmarks identified: yes      Ultrasound guidance: yes      Sterile ultrasound techniques: Sterile gel and sterile probe covers were   used      Number of attempts:  1    Successful placement: yes      POST PROCEDURE DETAILS:      Post-procedure:  Dressing applied and line sutured    Assessment:  Blood return through all ports and free fluid flow   CBC with platelets differential   Result Value Ref Range    WBC 13.1 (H) 4.0 - 11.0 10e9/L    RBC Count 4.54 4.4 - 5.9 10e12/L    Hemoglobin 10.0 (L) 13.3 - 17.7 g/dL    Hematocrit 33.7 (L) 40.0 - 53.0 %    MCV 74 (L) 78 - 100 fl    MCH 22.0 (L) 26.5 - 33.0 pg    MCHC 29.7 (L) 31.5 - 36.5 g/dL    RDW 24.9 (H) 10.0 - 15.0 %    Platelet Count 46 (LL) 150 - 450 10e9/L    Diff Method Automated Method     % Neutrophils 94.9 %    % Lymphocytes 0.0 %    % Monocytes 4.5 %    % Eosinophils 0.0 %    %  Basophils 0.1 %    % Immature Granulocytes 0.5 %    Nucleated RBCs 1 (H) 0 /100    Absolute Neutrophil 12.4 (H) 1.6 - 8.3 10e9/L    Absolute Lymphocytes 0.0 (L) 0.8 - 5.3 10e9/L    Absolute Monocytes 0.6 0.0 - 1.3 10e9/L    Absolute Eosinophils 0.0 0.0 - 0.7 10e9/L    Absolute Basophils 0.0 0.0 - 0.2 10e9/L    Abs Immature Granulocytes 0.1 0 - 0.4 10e9/L    Absolute Nucleated RBC 0.1     Platelet Estimate Confirming automated cell count    INR   Result Value Ref Range    INR 1.98 (H) 0.86 - 1.14   Partial thromboplastin time   Result Value Ref Range    PTT 52 (H) 22 - 37 sec   ABO/Rh type and screen   Result Value Ref Range    ABO O     RH(D) Pos     Antibody Screen Neg     Test Valid Only At          [facility]    Specimen Expires 04/14/2021    Comprehensive metabolic panel   Result Value Ref Range    Sodium 127 (L) 133 - 144 mmol/L    Potassium 5.9 (H) 3.4 - 5.3 mmol/L    Chloride 95 94 - 109 mmol/L    Carbon Dioxide 17 (L) 20 - 32 mmol/L    Anion Gap 15 (H) 3 - 14 mmol/L    Glucose 86 70 - 99 mg/dL    Urea Nitrogen 152 (H) 7 - 30 mg/dL    Creatinine 3.89 (H) 0.66 - 1.25 mg/dL    GFR Estimate 15 (L) >60 mL/min/[1.73_m2]    GFR Estimate If Black 18 (L) >60 mL/min/[1.73_m2]    Calcium 8.7 8.5 - 10.1 mg/dL    Bilirubin Total 4.1 (H) 0.2 - 1.3 mg/dL    Albumin 2.9 (L) 3.4 - 5.0 g/dL    Protein Total 7.0 6.8 - 8.8 g/dL    Alkaline Phosphatase 106 40 - 150 U/L    ALT 20 0 - 70 U/L    AST 26 0 - 45 U/L   Troponin I   Result Value Ref Range    Troponin I ES 0.055 (H) 0.000 - 0.045 ug/L   TSH with free T4 reflex   Result Value Ref Range    TSH 10.26 (H) 0.40 - 4.00 mU/L   Magnesium   Result Value Ref Range    Magnesium 3.4 (H) 1.6 - 2.3 mg/dL   T4 free   Result Value Ref Range    T4 Free 0.38 (L) 0.76 - 1.46 ng/dL   ISTAT gases lactate leesa POCT   Result Value Ref Range    Ph Venous 7.33 7.32 - 7.43 pH    PCO2 Venous 30 (L) 40 - 50 mm Hg    PO2 Venous 23 (L) 25 - 47 mm Hg     Bicarbonate Venous 16 (L) 21 - 28 mmol/L    O2 Sat Venous 37 %    Lactic Acid 3.9 (H) 0.7 - 2.1 mmol/L   ISTAT gases elec ica gluc leesa POCT   Result Value Ref Range    Ph Venous 7.32 7.32 - 7.43 pH    PCO2 Venous 31 (L) 40 - 50 mm Hg    PO2 Venous 25 25 - 47 mm Hg    Bicarbonate Venous 16 (L) 21 - 28 mmol/L    O2 Sat Venous 42 %    Sodium 129 (L) 133 - 144 mmol/L    Potassium 6.1 (HH) 3.4 - 5.3 mmol/L    Glucose 86 70 - 99 mg/dL    Calcium Ionized 4.3 (L) 4.4 - 5.2 mg/dL    Hemoglobin 12.6 (L) 13.3 - 17.7 g/dL    Hematocrit - POCT 37 (L) 40.0 - 53.0 %PCV   EKG 12-lead, tracing only   Result Value Ref Range    Interpretation ECG Click View Image link to view waveform and result    XR Chest Port 1 View    Narrative    EXAM: XR CHEST PORT 1 VW  4/11/2021 4:45 PM     HISTORY:  weakness       COMPARISON:  Chest x-ray 1/15/2021    FINDINGS: AP radiograph of the chest. Enlargement of the  cardiomediastinal silhouette. Small bilateral pleural effusions. No  pneumothorax. Mixed interstitial and patchy airspace opacities most  pronounced in the right perihilar and right lower lung field. The  pulmonary vasculature is indistinct. Visualized upper abdomen is  unremarkable. No acute osseous abnormality.      Impression    IMPRESSION:  1. Perihilar and right lower lobe predominant interstitial and patchy  airspace opacities concerning for pulmonary edema versus infection.  2. Small bilateral pleural effusions.  3. Cardiomegaly.    I have personally reviewed the examination and initial interpretation  and I agree with the findings.    MYRNA FOREMAN MD   Symptomatic Influenza A/B & SARS-CoV2 (COVID-19) Virus PCR Multiplex    Specimen: Nasopharyngeal   Result Value Ref Range    Flu A/B & SARS-COV-2 PCR Source Nasopharyngeal     SARS-CoV-2 PCR Result NEGATIVE     Influenza A PCR Negative NEG^Negative    Influenza B PCR Negative NEG^Negative    Respiratory Syncytial Virus PCR Negative NEG^Negative    Flu A/B & SARS-CoV-2 PCR  Comment (Note)    XR Chest Port 1 View    Narrative    EXAM: XR CHEST PORT 1 VW  4/11/2021 6:51 PM     HISTORY:  hypoxia       COMPARISON:  Earlier same day chest x-ray    FINDINGS: AP radiograph of the chest. Stable enlargement of the  cardiomediastinal silhouette. Small bilateral pleural effusions with  streaky bibasilar opacities. Fluid is visualized tracking into the  right major fissure. Continued diffuse interstitial and patchy  airspace opacities. No pneumothorax. Visualized upper abdomen is  unremarkable. Bones are stable.      Impression    IMPRESSION:  1. No significant change in interstitial and patchy airspace opacities  likely representing pulmonary edema versus atypical infection.  2. Small bilateral pleural effusions with fluid tracking into the  right major fissure.  3. Cardiomegaly    I have personally reviewed the examination and initial interpretation  and I agree with the findings.    MYRNA FOREMAN MD   EKG 12-lead, tracing only   Result Value Ref Range    Interpretation ECG Click View Image link to view waveform and result    ISTAT gases elec ica gluc leesa POCT   Result Value Ref Range    Ph Venous 7.29 (L) 7.32 - 7.43 pH    PCO2 Venous 29 (L) 40 - 50 mm Hg    PO2 Venous 30 25 - 47 mm Hg    Bicarbonate Venous 14 (L) 21 - 28 mmol/L    O2 Sat Venous 51 %    Sodium 130 (L) 133 - 144 mmol/L    Potassium 6.4 (HH) 3.4 - 5.3 mmol/L    Glucose 73 70 - 99 mg/dL    Calcium Ionized 4.1 (L) 4.4 - 5.2 mg/dL    Hemoglobin 12.6 (L) 13.3 - 17.7 g/dL    Hematocrit - POCT 37 (L) 40.0 - 53.0 %PCV   Glucose by meter   Result Value Ref Range    Glucose 285 (H) 70 - 99 mg/dL   Glucose by meter   Result Value Ref Range    Glucose 162 (H) 70 - 99 mg/dL   Glucose by meter   Result Value Ref Range    Glucose 132 (H) 70 - 99 mg/dL   Potassium   Result Value Ref Range    Potassium 5.7 (H) 3.4 - 5.3 mmol/L   Glucose by meter   Result Value Ref Range    Glucose 124 (H) 70 - 99 mg/dL   Lactate for Sepsis Protocol    Result Value Ref Range    Lactate for Sepsis Protocol 6.3 (HH) 0.7 - 2.0 mmol/L   Glucose by meter   Result Value Ref Range    Glucose 111 (H) 70 - 99 mg/dL   Arterial line placement    Narrative    Tae Ruiz MD     4/12/2021  1:45 AM  [facility]    Arterial line placement    Date/Time: 4/12/2021 1:44 AM  Performed by: Tae Ruiz MD  Authorized by: Tae Ruiz MD       INDICATIONS:   Indications: hemodynamic monitoring and multiple ABGs      PRE-PROCEDURE DETAILS:   Skin preparation:  2% Chlorhexidine  PROCEDURE DETAILS:    Location:  R femoral  Needle gauge:  20 G  Placement technique:  Seldinger and ultrasound guided  Number of attempts:  1  Transducer: waveform confirmed      POST PROCEDURE DETAILS:    Post-procedure:  Sutured and sterile dressing applied    PROCEDURE   Patient Tolerance:  Patient tolerated the procedure well with no immediate   complications     Dx shock   Blood gas venous with oxyhemoglobin   Result Value Ref Range    Ph Venous 7.25 (L) 7.32 - 7.43 pH    PCO2 Venous 33 (L) 40 - 50 mm Hg    PO2 Venous 28 25 - 47 mm Hg    Bicarbonate Venous 14 (L) 21 - 28 mmol/L    FIO2 6 liters     Oxyhemoglobin Venous 38 %    Base Deficit Venous 12.0 mmol/L   Lactic acid whole blood   Result Value Ref Range    Lactic Acid 6.6 (HH) 0.7 - 2.0 mmol/L     *Note: Due to a large number of results and/or encounters for the requested time period, some results have not been displayed. A complete set of results can be found in Results Review.

## 2021-04-12 NOTE — PROGRESS NOTES
Brief Urology Procedure Note:    I was paged by the ED requesting assistance with harrison catheter placement. On chart review, patient with no prior urologic history.     On exam, patient with edema of bilateral lower extremities and minimal edema of penile foreskin with phimosis and scrotal edema.     Patient was subsequently prepped and draped in standard sterile fashion. A 16 Fr Coude catheter was inserted into a well-lubricated urethra in standard sterile fashion. The catheter was navigated into the meatus and advanced through the urethra without issue. There was return of bright yellow urine and the catheter was hubbed. 10cc of sterile was were instilled into the balloon and the cathter was pulled back to the level of the prostate and secured in place with a stat lock.    A&P  - Remainder of cares per primary team  - When catheter is removed, please remove first thing in the morning in case catheter requires replacement that same day    Anibal Pearl MD  Urology Resident, PGY-2

## 2021-04-13 NOTE — PLAN OF CARE
Problem: Adult Inpatient Plan of Care  Goal: Optimal Comfort and Wellbeing  Outcome: No Change    Problem: Adjustment to Illness (Delirium)  Goal: Optimal Coping  Outcome: Improving     Patient complains of pain in left elbow, bilat lower extremities and penis.  Notable wincing/grimacing with activities including these body parts.  Patient states he is having most pain at penis, and does state he has been told he has Prostate enlargement.  Dilaudid IV given prior to bath and reposition and patient does report some relief of pain.  Patient is oriented to place, time and person but admits he still can't remember coming to the hospital and why admitted; teaching reinforced.      Problem: Glycemic Control Impaired (Sepsis/Septic Shock)  Goal: Blood Glucose Level Within Desired Range  Outcome: Improving   Blood glucose levels within parameters with no insulin coverage overnight.      Problem: Hypothermia (CRRT (Continuous Renal Replacement Therapy))  Goal: Body Temperature Maintained in Desired Range  Outcome: Improving  Patient tolerating CRRT.  Continue to pull I=O.  Lactic acid increased to 5.6 this evening and renal notified.  Will not restart Bicarb gtt or change CRRT solutions at this time.  Will recheck Lactic acid in morning.  Patient has become normothermic.  Warming blanket remains on at low setting.  Oliguric.     Problem: Oral Intake Inadequate (Acute Kidney Injury/Impairment)  Goal: Optimal Nutrition Intake  Outcome: No Change    Patient remains NPO at this time x for ice chips.  Swallow intact.  Abdomen round and distended with hypoactive bowel sounds.  Na level 134.  K 4.6.      ICU End of Shift Summary. See flowsheets for vital signs and detailed assessment.    Changes this shift: Levo gtt titrated down-see flow sheet.  Coags continue to be elevated, Platelets now at 37     Ref. Range 4/13/2021 03:33   INR Latest Ref Range: 0.86 - 1.14  3.56 (H)   PTT Latest Ref Range: 22 - 37 sec 58 (H)   Fibrinogen  Latest Ref Range: 200 - 420 mg/dL 157 (L)   D-Dimer Latest Ref Range: 0.0 - 0.50 ug/ml FEU 17.4 (H)     Dr Rojas notified of above labs.  No signs of active bleeding.  Large bruise at LIJ CVC line site.  No change in drainage under left groin site dressing.  Abdomen round, distended.  Patient sleeping between cares.   Plan:  Continue to support BP with pressors, control rhythm with amiodarone gtt- possibly change to oral when capable.  Pain control as needed.  Reposition q 2 hours for skin breakdown prevention.

## 2021-04-13 NOTE — PROGRESS NOTES
SPIRITUAL HEALTH SERVICES  SPIRITUAL ASSESSMENT Progress Note  [facility] ([place] 4C MICU  On-Call    REASON FOR CHAPLAINCY CARE: Request for chaplaincy care upon admission    Interaction was brief as Pancho was somnolent. Pancho's significant other (Haven) was with him at the time. Following a short conversation about his critical illness situation, they affirmed that Pancho's Mandaen liz is important to him as are the ways of his Native traditions. Prayer was shared.    PLAN: I will inform Fr. Lobo boykin, of care provided.    Ludin Thompson, MPH, MDiv, [facility]  Staff   Pager [phone]  [facility] remains available 24/7 for emergent requests/referrals, either by having the switchboard page the on-call  or by entering an ASAP/STAT consult in Epic (this will also page the on-call ).

## 2021-04-13 NOTE — PLAN OF CARE
ICU End of Shift Summary. See flowsheets for vital signs and detailed assessment.    Changes this shift: MAP goal >60, SBP 80's, MD Silva OK, Levo 0.04 and Vaso 4 infusing. CRRT goal I=O, notify nephrologist if increased need of pressors at standard rate 180c/hr pull. CVP 16, verbal order per Cardiology to stop CVP transducing because inaccurate due to groin line. A fib, -110's, Amio continues infuse. Vit K x1 given.  CT abdomen/pelvis completed. Plasma x1 given without incident, prior to paracentesis procedure. CLD- poor to fair appetite, sips of water/pop and bites of ice cream here and there.     Plan: Monitor CRRT and pressor needs. Monitor patient status for any changes and notify MD with those changes.     Problem: Infection Progression (Sepsis)  Goal: Absence of Infection Signs and Symptoms  Intervention: Promote Recovery  Recent Flowsheet Documentation  Taken 4/13/2021 1600 by Arabella Moise, ELIDA     Problem: Adult Inpatient Plan of Care  Goal: Absence of Hospital-Acquired Illness or Injury  Intervention: Prevent Skin Injury  Recent Flowsheet Documentation  Taken 4/13/2021 1600 by Arabella Moise, RN  Body Position:    turned    right    lower extremity elevated    upper extremity elevated    weight shifting

## 2021-04-13 NOTE — PROGRESS NOTES
"CRRT STATUS NOTE     DATA:  Time:  1700  Pressures WNL:  yes  Filter Status:  WNL     Problems Reported/Alarms Noted:  none     Supplies Present:  yes     ASSESSMENT:  Patient Net Fluid Balance:[id number] Net positive 61 ml's    Vital Signs:  BP (!) 85/64   Pulse 108   Temp 97.1  F (36.2  C) (Axillary)   Resp 22   Ht 1.727 m (5' 8\")   Wt 95.7 kg (210 lb 15.7 oz)   SpO2 99%   BMI 32.08 kg/m    Pt remains on Epi and Vaso drips   Labs: K 4.5. 1.94, Mag 2.5, Phos 4.6, hgb 9.4,  plt 37  Goals of Therapy:   ml/hr meet 1-1.5L net negative      INTERVENTIONS:   Re- circulated for CT then restarted 4/13 1432     PLAN:  Continue with goals of therapy.  Change circuit q 72 hrs and prn.  Call Resource RN @ [phone] with concerns  "

## 2021-04-13 NOTE — PROGRESS NOTES
MEDICAL ICU PROGRESS NOTE  04/13/2021      Date of Service (when I saw the patient): 04/13/2021    Mr. Pancho Jesus is a  60 year old male with PMHx most significant for HFrEF 25-30% 2/2 ICM after STEMI x/p DESx3 (2012) & PCI prox RCA 2019, severe Pulmonary Hypertension, moderate MR,  CKD IV, Paroxysmal A fib not on AC, Chronic Iron Deficiency Anemia 2/2 GIB due to AVMs, previous LGI bleeding requiring ileocecal resection, T2DM, who was admitted 4/12 to the MICU for treatment of septic shock.    Major changes today:  - Continue amio gtt  - Eval hepatitis labs  - CT C/A/P w/con  - Vit K challenge    Neuro:  # Mild encephalopathy  Has asterixis on exam. Largely appears cognitively intact.  - Continue to monitor for any encephalopathic changes  - Neuro-checks as per nursing shift  - No indication for head imaging or additional diagnostic studies given absence of encephalitic/meningeal signs      Cardiovascular  # Shock, septic +/- cardiogenic  Requiring 2 pressors currently, MAP goal 60. Volume status is complicated by vasodilatory effects of sepsis and congestion from heart failure. May need a [place] catheter for accurate titration of volume status, currently is on CRRT with no volume removed. Antibiotics as below in ID section.  - Goal net negative today per renal, will continue to track hemodynamics with a-line    # HFrEF 25-30% 2/2 ICM  # Coronary Artery Disease s/p DESx3  # Type 2 NSTEMI  # Severe Pulmonary Hypertension  Maintaining I + O on CRT. Wean epi to avoid ventricular arrythmias.  - Hold on PTA diuretics  - TTE similar to prior    # Moderate MR  # Paroxysmal Atrial Fibrillation (not on anticoagulation)  On amiodarone for ventricular arrythmias that started after pressors. Continuing past 24 hours due to possibility of adding inotropes.  - On cardiac Telemetry  - Goal: K+ >4.0, Mg2+ > 2.0    Pulmonary:   # Acute Hypoxic Respiratory Failure, resolved  Secondary to sepsis, weaning O2 as  tolerated.    GI/Nutrition:  # Mild hepatopathy  # Previous history of GI bleeding in the context of Angioectasia/AVM's  # Protein losing nephropathy  CT Abd to evaluate liver and biliary tree. Hepatitis panel sent.  - PPI  - Clear liquid diet     Renal/Fluids/Electrolytes:  # Acute on Chronic Kidney Disease Stage 4  # High Anion Gap Metabolic Acidosis  # Combined Lactic Acidosis Type A/B  # Hyperkalemia (6.4)  # Hyponatremia (likely related to poor oral intake and hypervolemia)  # Protein Losing Nephropathy     Baseline Scr: 2.1-2.4 mg/dL> Most recently 3.89 mg/dL with associated hyperkalemia, hyponatremia and significant metabolic acidosis (Co2: 17-->17) in the context of lacticemia despite two vasopressors. Etiology could potentially be related to mixed distributive//septic vs. Cardiogenic shock.      - Nephrology Consulted and Appreciate recommendations  - CRRT  - Strict I/O's  - Bladder scan qshift and straight cath PRN  - Daily Weights  - Avoid NSAID or Nephrotoxic agents       Endocrine:  # Type II Diabetes Mellitus  - Oral Hypoglycemia Protocol Initiated  - Small dose sliding scale insulin     ID:  # Septic Shock  Multiple weeping blisters on lower extremities, some with erythema - likely source of infection. No sputum or cough to suggest pneumonia, no meningeal signs to suggest CNS. We will get a CT C/A/P w/con to evaluate possible alternate sites of infection.  - Initiated Empiric IV Vancomycin and zosyn  - Stress dose steroids with hydrocortisone and fludrocortisone  - Urine/sputum/blood cultures NGTD     Hematology:    # Chronic Iron Deficiency Anemia  # Thrombocytopenia likely related to underlying sepsis  # Coagulopathy 2/2 hepatopathy   - Continue Monitoring Hgb  - Hgb goal >7 g/dL  - Holding on heparin products and anticoagulation      General Cares/Prophylaxis:    DVT Prophylaxis: Pneumatic Compression Devices  GI Prophylaxis: PPI  Family Communication: None  Code Status:  Full  Code     Lines/tubes/drains:  - Right Femoral Arterial Line/ Left Femoral Triple CVC  - Left internal jugular Castro Dialysis Catheter     Disposition:  - Medical ICU until further notice       Patient seen and findings/plan discussed with medical ICU staff, Dr. Reyes.    Pete Domingo    ====================================  INTERVAL HISTORY:   No acute events. Weaning down on pressors, still on levophed and vasopressin. Less confused today.    OBJECTIVE:   1. VITAL SIGNS:   Temp:  [96.6  F (35.9  C)-98  F (36.7  C)] 96.6  F (35.9  C)  Pulse:  [] 107  Resp:  [18-26] 22  BP: (92)/(78) 92/78  MAP:  [53 mmHg-111 mmHg] 64 mmHg  Arterial Line BP: ()/() 85/50  SpO2:  [92 %-100 %] 100 %  Resp: 22    2. INTAKE/ OUTPUT:   I/O last 3 completed shifts:  In: 2293.64 [I.V.:2268.64; Other:25]  Out: 2323 [Urine:85; Other:2238]    3. PHYSICAL EXAMINATION:  General: AAOx3, Calm/cooperative. Adequate speech (comprehensible/ well articulated).  HEENT: Normocephalic/Atraumatic, EOMI/MOM, anicteric oral muscosa/sclera. Noticeable JVD.  Pulm/Resp: Clear breath sounds bilaterally without rhonchi, crackles or wheeze, breathing non-labored  CV: RRR, normal S1 and S2. No murmurs/rubs gallops. Peripheral pulses +1. Significant peripheral pitting edema +3 from legs to inguinal/scrotal region.   Abdomen: Soft, non-distended, non-tender. No pain/tenderness to palpation. No guarding/rebound.  : Not examined  Neuro: Moving all four extremities, strength 5/5, grossly non-focal.  Incisions/Skin: Multiple open wounds on legs, now dressed. Hyperpigmented skin in the legs/chronic venous stasis changes. Nail dystrophy on toes bilaterally.     4. LABS:   Arterial Blood Gases   Recent Labs   Lab 04/12/21  2142 04/12/21  1612 04/12/21  1212 04/12/21  0733   PH 7.40 7.42 7.41 7.38   PCO2 25* 23* 27* 24*   PO2 100 100 134* 164*   HCO3 15* 15* 17* 14*     Complete Blood Count   Recent Labs   Lab 04/13/21  0333 04/12/21  0928  04/12/21  0313 04/11/21  2304 04/11/21  1628 04/11/21  1628   WBC 16.9* 19.6* 20.5*  --   --  13.1*   HGB 9.4* 9.8* 10.3* 12.6*   < > 10.0*   PLT 37* 57* 56*  --   --  46*    < > = values in this interval not displayed.     Basic Metabolic Panel  Recent Labs   Lab 04/13/21  0333 04/12/21  2142 04/12/21  1612 04/12/21  1212 04/12/21  0928    134 132*  --  131*   POTASSIUM 4.4 4.6 4.9 4.6 4.8   CHLORIDE 101 102 101  --  100   CO2 19* 16* 16*  --  15*   BUN 75* 89* 104*  --  124*   CR 2.14* 2.44* 2.87*  --  3.16*   * 104* 131*  --  175*     Liver Function Tests  Recent Labs   Lab 04/13/21  0333 04/12/21  0928 04/12/21  0313 04/11/21  1628   AST 88*  --  24 26   ALT 37  --  20 20   ALKPHOS 101  --  104 106   BILITOTAL 6.3*  --  4.4* 4.1*   ALBUMIN 2.8*  --  3.0* 2.9*   INR 3.56* 2.61*  --  1.98*     Coagulation Profile  Recent Labs   Lab 04/13/21 0333 04/12/21  0928 04/11/21  1628   INR 3.56* 2.61* 1.98*   PTT 58* 56* 52*       5. RADIOLOGY:   Recent Results (from the past 24 hour(s))   US Lower Extremity Venous Bilateral Port    Narrative    EXAMINATION: DOPPLER VENOUS ULTRASOUND OF BILATERAL LOWER EXTREMITIES,  4/12/2021 9:28 AM     COMPARISON: Ultrasound on 1/6/2012    HISTORY: Evaluate thrombosis    TECHNIQUE:  Gray-scale evaluation with compression, spectral flow and  color Doppler assessment of the deep venous system of both legs from  groin to knee, and then at the ankles.    FINDINGS:  The right common femoral, and bilateral femoral, popliteal and  posterior tibial veins demonstrate normal compressibility and blood  flow. The left common femoral vein was not well evaluated secondary to  central line in place.      Impression    IMPRESSION:  No evidence of deep venous thrombosis in either lower extremity. Left  common femoral vein not well evaluated secondary to central line in  place.    I have personally reviewed the examination and initial interpretation  and I agree with the findings.    MICHELE  MD CHARLENE   Echo Complete    Narrative    [id number]  [id number]  [id number]  [id number]^WES^EMANUEL^CHANTELLE     [facility],[place]  Echocardiography Laboratory  [address] [place] [id number]     Name: MERCY SHAFER  MRN: [id number]  : 1960  Study Date: 2021 09:33 AM  Age: 60 yrs  Gender: Male  Patient Location: [facility]  Reason For Study: Shock  Ordering Physician: EMANUEL HARVEY  Performed By: Christina Peck RDCS     BSA: 2.1 m2  Height: 68 in  Weight: 211 lb  HR: 125  BP: 102/64 mmHg  ______________________________________________________________________________  Procedure  Complete Portable Echo Adult. Contrast Optison. Optison (NDC #4358-4448-29)  given intravenously. Patient was given 5 ml mixture of 3 ml Optison and 6 ml  saline. 4 ml wasted.  ______________________________________________________________________________  Interpretation Summary  Moderate left ventricular dilation is present.  The Ejection Fraction is estimated at 25-30%.  Global right ventricular function is moderately reduced.  Moderate to severe mitral insufficiency is present.  Moderate tricuspid insufficiency is present.  Right ventricular systolic pressure is 41mmHg above the right atrial pressure.  IVC diameter >2.1 cm collapsing <50% with sniff suggests a high RA pressure  estimated at 15 mmHg or greater.  Small to moderate pericardial effusion. Fluid collection mainly localized  along lateral and posterior wall. Minimal fluid at apex and along RV free  wall. No evidence for tamponade physiology apart from dilated abnormal IVC.  ______________________________________________________________________________  Left Ventricle  Left ventricular wall thickness is normal. Moderate left ventricular dilation  is present. The Ejection Fraction is estimated at 25-30%. Left ventricular  diastolic function is not assessable. Severe diffuse hypokinesis is present.     Right Ventricle  The right  ventricle is normal size. Global right ventricular function is  moderately reduced.     Atria  The atria cannot be assessed.     Mitral Valve  Mild mitral annular calcification is present. Moderate to severe mitral  insufficiency is present.     Aortic Valve  Mild aortic valve calcification is present.     Tricuspid Valve  Moderate tricuspid insufficiency is present. Right ventricular systolic  pressure is 41mmHg above the right atrial pressure.     Pulmonic Valve  The pulmonic valve is normal.     Vessels  The thoracic aorta is normal. The pulmonary artery cannot be assessed. IVC  diameter >2.1 cm collapsing <50% with sniff suggests a high RA pressure  estimated at 15 mmHg or greater.     Pericardium  Small to moderate pericardial effusion. Fluid collection mainly localized  along lateral and posterior wall. Minimal fluid at apex and along RV free  wall. No evidence for tamponade physiology apart from dilated abnormal IVC.     ______________________________________________________________________________  MMode/2D Measurements & Calculations  asc Aorta Diam: 3.3 cm  LVOT diam: 2.3 cm  LVOT area: 4.1 cm2     Doppler Measurements & Calculations  LV V1 max P.8 mmHg  LV V1 max: 82.7 cm/sec  LV V1 VTI: 9.6 cm  MR ERO: 0.27 cm2  MR volume: 26.7 ml  SV(LVOT): 39.8 ml  SI(LVOT): 19.0 ml/m2  TR max shavonne: 319.0 cm/sec  TR max P.7 mmHg     ______________________________________________________________________________  Report approved by: Scotty Starr 2021 10:22 AM         XR Tibia & Fibula Port Right 2 Views    Narrative    EXAM: XR TIBIA & FIBULA PORT LT 2 VW, XR TIBIA & FIBULA  PORT RT 2 VW  2021 12:22 PM      HISTORY: evaluate necrotizing fascitis    COMPARISON: None    FINDINGS: Portable AP and lateral views of the legs.    No acute osseous abnormality. Degenerative changes of the knees.    Vascular calcifications. No subcutaneous emphysema.       Impression    IMPRESSION: No radiographic evidence  of subcutaneous gas.         TANIYA PACE MD (Joe)   XR Tibia & Fibula Port Left 2 Views    Narrative    EXAM: XR TIBIA & FIBULA PORT LT 2 VW, XR TIBIA & FIBULA  PORT RT 2 VW  4/12/2021 12:22 PM      HISTORY: evaluate necrotizing fascitis    COMPARISON: None    FINDINGS: Portable AP and lateral views of the legs.    No acute osseous abnormality. Degenerative changes of the knees.    Vascular calcifications. No subcutaneous emphysema.       Impression    IMPRESSION: No radiographic evidence of subcutaneous gas.         TANIYA PACE MD (Joe)

## 2021-04-13 NOTE — PROGRESS NOTES
Nephrology Progress Note  04/13/2021       Overall Mr. Jesus is a 60-year-old gentleman with multiple comorbidities related to his diabetes and ischemic cardiomyopathy.  He was admitted to the ICU with worsening shock requiring multiple pressors with evidence of hyperkalemia and moderate to severe lactic acidosis.     Interval History :   Mr Jesus continues on CRRT, much more alert today.  Filling pressures very high so trying to pull 1-1.5L today which should help with filling pressures and heart fx although is on 2 pressors so will watch hemodynamics closely.  K 4.4, still on 2k baths for now, will change to mix of 2k/4k baths to avoid hypokalemia.      Assessment & Recommendations:   EVA on CKD-Baseline Cr 2.5-3, has followed in Neph clinic with Dr Vigil but not since 12/2019.  Cr up to ~4 with BUN of 152 before starting CRRT on 4/11 for hyperkalemia and metabolic acidosis in the setting of shock, sepsis vs cardiogenic.  On 2 pressors but with high filling pressures we will try to pull some fluid today.       -Access is [facility] temp line.    -CRRT 25ml/kg/hr dosing, trying to pull 1-1.5L today, ordered for 50-100cc/hr net negative.      Volume status-Overloaded, CVP ~25 and IVC dilated on ECHO.  Trying to pull fluid as it is likely to improve hemodynamics although he is on 2 pressors.      Electrolytes/pH-K 4.4, bicarb 19 and on the rise with CRRT.     Ca/phos/pth-Ca 9.2, Mg and Phos WNL.     CHF-EF 25-30%, also with possible sepsis component, on Vaso and NE so treating mostly as sepsis for now.      Anemia-Hgb 9.4, on downtrend, acute management per team.     Nutrition-Deferred    Seen and discussed with Dr Buck    Recommendations were communicated to primary team via verbal communication.     GUILLE Silver CNS  Clinical Nurse Specialist  [phone]    Review of Systems:   I reviewed the following systems:  Gen: No fevers or chills  CV: No CP at rest  Resp: No SOB at rest  GI: No N/V    Physical  "Exam:   I/O last 3 completed shifts:  In: 2293.64 [I.V.:2268.64; Other:25]  Out: 2323 [Urine:85; Other:2238]   BP (!) 85/64   Pulse 109   Temp 96.1  F (35.6  C) (Axillary)   Resp 22   Ht 1.727 m (5' 8\")   Wt 95.7 kg (210 lb 15.7 oz)   SpO2 98%   BMI 32.08 kg/m          GENERAL APPEARANCE: alert and ill appearing   Head NC/AT  EYES: mildly scleral icterus  HENT: mouth  without ulcers or lesions  NECK: supple  Pulmonary: fair air entry and exchange   CV: irregular rhythm, tachy rate, no rub   - Edema bilateral   GI: soft, nontender, normal bowel sounds, no HSM   MS: no evidence of inflammation in joints, no muscle tenderness  : Ponce  SKIN: blisters over both lower extremities    NEURO: mentation intact and speech normal but slightly obtunded        Labs:   All labs reviewed by me  Electrolytes/Renal -   Recent Labs   Lab Test 04/13/21 0333 04/12/21  2142 04/12/21  1612 04/12/21  0733 04/12/21  0733    134 132*   < >  --    POTASSIUM 4.4 4.6 4.9   < > 5.0   CHLORIDE 101 102 101   < >  --    CO2 19* 16* 16*   < >  --    BUN 75* 89* 104*   < >  --    CR 2.14* 2.44* 2.87*   < >  --    * 104* 131*   < >  --    RONNY 9.2 9.3 9.0   < >  --    MAG 2.5*  --  2.7*  --  2.8*   PHOS 4.0  --  4.1  --  4.5    < > = values in this interval not displayed.       CBC -   Recent Labs   Lab Test 04/13/21 0333 04/12/21  0928 04/12/21 0313   WBC 16.9* 19.6* 20.5*   HGB 9.4* 9.8* 10.3*   PLT 37* 57* 56*       LFTs -   Recent Labs   Lab Test 04/13/21 0333 04/12/21  0313 04/11/21  1628   ALKPHOS 101 104 106   BILITOTAL 6.3* 4.4* 4.1*   ALT 37 20 20   AST 88* 24 26   PROTTOTAL 6.6* 7.0 7.0   ALBUMIN 2.8* 3.0* 2.9*       Iron Panel -   Recent Labs   Lab Test 12/18/20  1242 11/12/20  1212 08/13/20  1006   IRON 21* 12* 16*   IRONSAT 5* 3* 3*   FRANK 25* 11* 9*           Current Medications:    fludrocortisone  50 mcg Oral or Feeding Tube Daily     hydrocortisone sodium succinate PF  50 mg Intravenous Q6H     insulin aspart  " 1-12 Units Subcutaneous Q4H     pantoprazole (PROTONIX) IV  40 mg Intravenous Daily with breakfast     piperacillin-tazobactam  4.5 g Intravenous Q6H     vancomycin (VANCOCIN) IV  1,500 mg Intravenous Q24H       amiodarone 0.5 mg/min (04/13/21 1200)     CRRT replacement solution 12.5 mL/kg/hr (04/13/21 0828)     - MEDICATION INSTRUCTIONS -       norepinephrine 0.3 mcg/kg/min (04/13/21 1200)     CRRT replacement solution 200 mL/hr at 04/12/21 1627     CRRT replacement solution 12.5 mL/kg/hr (04/13/21 0829)     vasopressin 4 Units/hr (04/13/21 1200)

## 2021-04-14 NOTE — PHARMACY-ADMISSION MEDICATION HISTORY
Admission Medication History Completed by Pharmacy    See [facility] Admission Navigator for allergy information, preferred outpatient pharmacy, prior to admission medications and immunization status.     Medication History Sources:     Chart review, pharmacy fill history    Changes made to PTA medication list (reason):    Added: None    Deleted: None    Changed: None    Additional Information:    Unclear if patient was taking his diuretics as these were not recently filled prior to his admission   o Torsemide: last filled 2/23/21 for 26 days supply  o Metolazone: last filled 11/25/20 for 91 days supply    Patient unable to contribute due to mental status/intubation. Unable to confirm OTC medications. Patient has consistently filled his potassium prescription.    Prior to Admission medications    Medication Sig Last Dose Taking? Auth Provider   metolazone (ZAROXOLYN) 2.5 MG tablet Take 1 tablet (2.5 mg) by mouth three times a week Unknown at Unknown time  Evelin Lancaster APRN CNP   potassium chloride ER (KLOR-CON M) 20 MEQ CR tablet Take 2 tablets (40 mEq) by mouth 2 times daily Unknown at Unknown time  Dian Leblanc APRN CNP   senna-docusate (SENOKOT-S/PERICOLACE) 8.6-50 MG tablet Take 1 tablet by mouth 2 times daily as needed for constipation Unknown at Unknown time  Unknown, Entered By History   torsemide (DEMADEX) 20 MG tablet Take 4 tablets (80 mg) by mouth 2 times daily Unknown at Unknown time  Dian Leblanc APRN CNP   vitamin  B complex with vitamin C (VITAMIN  B COMPLEX) TABS Take 1 tablet by mouth daily Unknown at Unknown time  Reported, Patient   Vitamin D, Cholecalciferol, 25 MCG (1000 UT) CAPS Take 1 capsule by mouth 2 times daily Unknown at Unknown time  Unknown, Entered By History       Date completed: 04/14/21    Medication history completed by: Preet Berntsein [facility]

## 2021-04-14 NOTE — PROGRESS NOTES
DANIEL Kiser updated on patient's VS, pressor gtts, and fluid bolus.  Informed of Dr Hay order to stop pulling fluids per CRRT.  Informed of [employer] monitoring numbers.   voices understanding of changes.  No other orders received for CRRT.

## 2021-04-14 NOTE — PROCEDURES
Small Bowel Feeding Tube Placement Assessment  Reason for Feeding Tube Placement: PO medication, eventual feeding  Cortrak Start Time: 1440   Cortrak End Time: 1450  Medicine Delivered During Procedure: none (sedation titrated by RN)  Placement Successful: yes per Kristen (AXR pending)    Procedure Complications: none  Final Placement Zach at exit of nare: 100 cm    Bridle Placement:   Reason for bridle placement: securement of FT  Medicine delivered during procedure: lubricating jelly  Procedure: Unsuccessful after 15 min; magnets would not connect  Location of top of clip on FT: n/a  Condition of nose/skin at time of bridle placement: Unremarkable  Total Face to Face time with patient: 60 minutes.    Gin Perez, INNA, LD  (MICU dietitian, 7418 (Mon-Fri))

## 2021-04-14 NOTE — PROGRESS NOTES
Patient with soft blood pressures despite increase of Levo gtt.  Dobutamine gtt infusing at 2.5 mcg/kg/min  Patient reported feeling short of breath at beginning of shift and placed on 2L/NC of O2.  Oliguric.  Pulling  ml/hhr of fluids via CRRT.  Dr Irwin and updated on patients status above.  Paracentesis of abdomen done today but unsure of amount of fluid removal.  Will continue with pressors as is with goal of MAP of 60 or above.  Attempt to pull off fluid if possible.  Will follow up with team if BP continues to be soft.

## 2021-04-14 NOTE — PROGRESS NOTES
Nephrology Progress Note  04/14/2021         Overall Mr. Jesus is a 60-year-old gentleman with multiple comorbidities related to his diabetes and ischemic cardiomyopathy.  He was admitted to the ICU with worsening shock requiring multiple pressors with evidence of hyperkalemia and moderate to severe lactic acidosis.      Interval History :   Mr Jesus continues on CRRT, was intubated overnight and given fluid bolus for hypotension.  Still on max dose 2 pressors, not entirely clear whether he has sepsis vs cardiogenic issue driving his hypotension.  Could consider RHC to see hemodynamics, for now matching I=O to maintain BP's and not allow worsening volume overload.       Assessment & Recommendations:   EVA on CKD-Baseline Cr 2.5-3, has followed in Neph clinic with Dr Vigil but not since 12/2019.  Cr up to ~4 with BUN of 152 before starting CRRT on 4/11 for hyperkalemia and metabolic acidosis in the setting of shock, sepsis vs cardiogenic.  On 2 pressors with sepsis vs cardiogenic shock.                 -Access is [facility] temp line.                -CRRT 25ml/kg/hr dosing, given fluid overnight, matching I=O today       Volume status-Overloaded but unclear hemodynamics with regards to sepsis vs cardiogenic issue, planning I=O for now until we get some additional info, possibly S-G to guid treatment.      Electrolytes/pH-K 4.7, bicarb 19 on CRRT, lactate back on the rise.       Ca/phos/pth-Ca 9.2, Mg and Phos WNL.      CHF-EF 25-30%, also with possible sepsis component, on Vaso and NE so treating mostly as sepsis for now.       Anemia-Hgb 9.4, on downtrend, acute management per team.      Nutrition-Deferred     Seen and discussed with Dr Buck     Recommendations were communicated to primary team via verbal communication.     GUILLE Silver CNS  Clinical Nurse Specialist  [phone]    Review of Systems:   I reviewed the following systems:  Gen: No fevers or chills  CV: No CP at rest  Resp: No SOB at  "rest  GI: No N/V    Physical Exam:   I/O last 3 completed shifts:  In: 4560.81 [I.V.:2815.81; IV Piggyback:1500]  Out: 2150 [Urine:20; Other:2130]   BP (!) 74/55   Pulse 99   Temp 95  F (35  C) (Axillary)   Resp 20   Ht 1.727 m (5' 8\")   Wt 97.5 kg (214 lb 15.2 oz)   SpO2 100%   BMI 32.68 kg/m       GENERAL APPEARANCE: Intubated and sedated.   Head NC/AT  EYES: mildly scleral icterus  HENT: mouth  without ulcers or lesions  NECK: supple  Pulmonary: fair air entry and exchange   CV: irregular rhythm, tachy rate, no rub   - Edema bilateral   GI: soft, nontender, normal bowel sounds, no HSM   MS: no evidence of inflammation in joints, no muscle tenderness  : Ponce  SKIN: blisters over both lower extremities    NEURO: Intubated and sedated.     Labs:   All labs reviewed by me  Electrolytes/Renal -   Recent Labs   Lab Test 04/14/21  1135 04/14/21 0435 04/14/21  0021 04/13/21  1617 04/13/21 0333 04/13/21 0333    136 136 133   < > 133   POTASSIUM 4.5 4.7 4.5 4.5   < > 4.4   CHLORIDE 105 104 104 102   < > 101   CO2 14* 19* 18* 18*   < > 19*   BUN 36* 43* 45* 60*   < > 75*   CR 1.27* 1.49* 1.63* 1.94*   < > 2.14*   * 73 97 95   < > 114*   RONNY 7.9* 8.1* 8.2* 8.9   < > 9.2   MAG  --  2.6*  --  2.5*  --  2.5*   PHOS  --  4.8*  --  4.6*  --  4.0    < > = values in this interval not displayed.       CBC -   Recent Labs   Lab Test 04/14/21 0435 04/13/21 0333 04/12/21  0928   WBC 15.1* 16.9* 19.6*   HGB 8.3* 9.4* 9.8*   PLT 34* 37* 57*       LFTs -   Recent Labs   Lab Test 04/14/21 0435 04/13/21  0333 04/12/21  0313   ALKPHOS 99 101 104   BILITOTAL 7.6* 6.3* 4.4*   ALT 64 37 20   * 88* 24   PROTTOTAL 6.0* 6.6* 7.0   ALBUMIN 2.5* 2.8* 3.0*       Iron Panel -   Recent Labs   Lab Test 12/18/20  1242 11/12/20  1212 08/13/20  1006   IRON 21* 12* 16*   IRONSAT 5* 3* 3*   FRANK 25* 11* 9*           Current Medications:    calcium chloride  2 g Intravenous Once     fentaNYL (PF)         fludrocortisone  50 " mcg Oral or Feeding Tube Daily     hydrocortisone sodium succinate PF  50 mg Intravenous Q6H     insulin aspart  1-12 Units Subcutaneous Q4H     lactulose  20 g Oral or Feeding Tube BID     pantoprazole (PROTONIX) IV  40 mg Intravenous Daily with breakfast     phytonadione  10 mg Intravenous Daily     piperacillin-tazobactam  4.5 g Intravenous Q6H     senna-docusate  2 tablet Oral At Bedtime       amiodarone 0.5 mg/min (04/14/21 1200)     CRRT replacement solution 12.5 mL/kg/hr (04/14/21 0953)     DOBUTamine Stopped (04/13/21 2328)     fentaNYL       midazolam 4 mg/hr (04/14/21 1200)     - MEDICATION INSTRUCTIONS -       norepinephrine 0.4 mcg/kg/min (04/14/21 1200)     CRRT replacement solution 200 mL/hr at 04/13/21 1317     CRRT replacement solution 12.5 mL/kg/hr (04/14/21 0954)     vasopressin 4 Units/hr (04/14/21 1200)

## 2021-04-14 NOTE — PROGRESS NOTES
CLINICAL NUTRITION SERVICES - ASSESSMENT NOTE     Nutrition Prescription    RECOMMENDATIONS FOR MDs/PROVIDERS TO ORDER:  Recommended palliative care consult for support of pt's significant other, MICU team agreed.     If RN unable to place OG/NG tube, consider attempt with Cortrak (will need consult with order set). It is likely that a bedside FT would also be difficult if multiple RNs have tried to place OG without success, since FT placement with Cortrak is also blind.    Can also consider consulting Radiology (XR feeding tube placement) for attempt at placement of enteral access.    Malnutrition Status:    Severe malnutrition in the context of acute-on-chronic illness.    Recommendations already ordered by Registered Dietitian (RD):  Discontinued clear liquid diet.   Discussed nutrition POC on MICU rounds. No enteral access yet, lactate rising and pt clinically worsening. Not approp for TF initiation even if enteral access is obtained.     Future/Additional Recommendations:  If able to obtain enteral access and start TF, recommend Osmolite 1.5 Jonatan @ 15 mL/hr and adv by 10 mL q8h until @ goal rate of 55 ml/hr to provide 1980 kcals (26 kcal/kg), 83 g PRO (1.1 g PRO/kg), 1005 mL H2O, 268 g CHO, and 0 g fiber daily. Will also need addition of protein modular, 1 pkt TID to provide a total of 2100 kcal (27 kcal/kg) and 116 g PRO (1.5 g/kg) daily.        REASON FOR ASSESSMENT  Cole Jesus is a 60 year old male assessed by the dietitian for Interdisciplinary Rounds    NUTRITION HISTORY  Pt intubated this AM, 3 RNs unable to place OGT or NGT (coiling in mouth or in back of throat). Charge RN going to attempt a NGT. Pt's significant other is in the room but is upset about pt's situation, did not interview her.     Pt has been seen by RDs at this facility for low sodium diet educations.     CURRENT NUTRITION ORDERS  Diet: Clear Liquid  Intake/Tolerance: DORIAN    LABS  Labs reviewed- lactate is rising, currently at  "8  Hypoglycemia this AM at 60.    MEDICATIONS  Medications reviewed    ANTHROPOMETRICS  Height: 172.7 cm (5' 8\")  Lowest Weight: 96.1 kg (4/12/21)  Most Recent Weight: 97.5 kg (214 lb 15.2 oz)    IBW: 70 kg  BMI: Obesity Grade I BMI 30-34.9  Weight History:   Wt Readings from Last 10 Encounters:   04/14/21 97.5 kg (214 lb 15.2 oz)   01/19/21 103.8 kg (228 lb 14.4 oz)   12/15/20 98.9 kg (218 lb)   11/30/20 98.4 kg (217 lb)   11/12/20 99.2 kg (218 lb 11.1 oz)   08/28/20 93.9 kg (207 lb)   08/18/20 92.2 kg (203 lb 3.2 oz)   08/13/20 98.9 kg (218 lb)   07/28/20 94.8 kg (209 lb)   07/22/20 98.4 kg (217 lb)     Weight from 1/19 likely erroneous as other weights have been stable. [facility], some documentation via virtual visits that there were dietary indiscretions.    Dosing Weight: 77 kg (adj wt)    ASSESSED NUTRITION NEEDS  Estimated Energy Needs: 1147-1328 kcals/day (25 - 30 kcals/kg)  Justification: Maintenance  Estimated Protein Needs: 115-155 g/day (1.5 - 2 g/kg)  Justification: Dialysis and Hypercatabolism with critical illness  Estimated Fluid Needs: Per provider pending fluid status    PHYSICAL FINDINGS  See malnutrition section below.  Multiple blisters and open sores on LE  LE are shiny and taut  Poor skin turgor   Jaundice    MALNUTRITION  % Intake: Unable to assess, presume inadequate with noted significant muscle wasting  % Weight Loss: None noted  Subcutaneous Fat Loss: Facial area: moderate, Upper arm: moderate  Muscle Loss: Facial & jaw region: moderate, Thoracic region (clavicle, acromium bone, deltoid, trapezius, pectoral): severe, Upper arm (bicep, tricep): severe, Lower arm  (forearm): moderate, Upper leg (quadricep, hamstring): severe and Posterior calf: severe (LE difficult to assess given taut skin d/t edema)   Fluid Accumulation/Edema: Moderate- anasarca, severe scrotal edema  Malnutrition Diagnosis: Severe malnutrition in the context of acute-on-chronic illness. "     NUTRITION DIAGNOSIS  Inadequate protein-energy intake related to resp status inhibiting ability to take PO as evidenced by pt NPO x 2 days, pt with severe muscle & fat wasting and significant volume overload (anasarca) c/w severe malnutrition.      INTERVENTIONS  Implementation  Nutrition Education: Not appropriate at this time due to patient condition   Collaboration with other providers - Discussed nutrition POC on MICU rounds. No enteral access currently, unsure if safe for Cortrak given difficulties in passing OG/NG into pt's esophagus.     Goals  Total avg nutritional intake to meet a minimum of 25 kcal/kg and 1.5 g PRO/kg daily (per dosing wt 77 kg).     Monitoring/Evaluation  Progress toward goals will be monitored and evaluated per protocol.    Gin Perez, INNA, LD  (MICU dietitian, 2029 (Mon-Fri))    Addendum  Pt's significant other was in room with Palliative MD when writer was placing a FT. She reported at that time that pt has not eaten well for about a month, but was taking Ensure. Pt was using a walker but able to cook food, go to casino, etc.    WRN

## 2021-04-14 NOTE — ANESTHESIA CARE TRANSFER NOTE
Patient: Cole Jesus    * No procedures listed *    Diagnosis: * No pre-op diagnosis entered *  Diagnosis Additional Information: No value filed.    Anesthesia Type:   No value filed.     Note:    Oropharynx: bite block in place, endotracheal tube in place and ventilatory support  Level of Consciousness: unresponsive  Patient oxygen source: vent.    Independent Airway: airway patency not satisfactory and stable  Dentition: dentition unchanged  Vital Signs Stable: post-procedure vital signs reviewed and stable  Report to RN Given: handoff report given  Patient transferred to: ICU  Comments: Tolerated well  ICU Handoff: Call for PAUSE to initiate/utilize ICU HANDOFF, Identified Patient, Identified Responsible Provider, Reviewed the Pertinent Medical History, Discussed Surgical Course, Reviewed Intra-OP Anesthesia Management and Issues during Anesthesia, Set Expectations for Post Procedure Period and Allowed Opportunity for Questions and Acknowledgement of Understanding      Vitals: (Last set prior to Anesthesia Care Transfer)  CRNA VITALS  4/14/2021 0817 - 4/14/2021 0847      4/14/2021             EKG:  Sinus tachycardia        Electronically Signed By: GUILLE Flynn CRNA  April 14, 2021  8:47 AM

## 2021-04-14 NOTE — PLAN OF CARE
ICU End of Shift Summary. See flowsheets for vital signs and detailed assessment.    Changes this shift: Neuro, sedated. Intubated at 0820 for increased WOB and lethargy. CMV: 40%/R20/P5/. Ca 3.8, replacement given, recheck 4.5. INR 3.85, Vit K dose day #2 of 3 today. Lactic trending up, last lactic 8, Yajaira TERRELL aware of trend. Plasma x2 units given for high INR and invasive procedures today. Bronchoscopy done, sputum sample collected. R heart cath and swan attempted at bedside, R single lumen CVC ok to use by Chelo TERRELL at bedside, swan reports not in right placement, recommend to advance, patient went to cath lab and successful float swan to correct placement. Flotrack CO 3.6 and CI 1.7 this morning, hemodynamic monitoring CO 4.67 and CI 2.16. Paracentesis, not needed today per MD. Pressors: Levo at 0.4 and Vaso at 4, MAP goal >60. Sedation: Versed at 4. Pain: Fentanyl at 25. RASS -2, RASS goal 0/-1, will attempt to wean down sedation after all procedures. Feeding tube placed at bedside by Gin, dietician, abdominal XR completed, pending OK to use. D10 infusing at 40 cc/hr for BG control, BG this AM 80's, 25ml of dextrose 50% given, D10 infusing. BL hip to BL foot +3/4 edema and L arm worsening edema today, removed PIV. CRRT goal I=O, however renal reports try to pull 120cc/hr if not increasing pressor need to attempt to maintain fluid status this AM, MICU at 1800 would like to attempt to pull fluids tonight as tolerated. Hepatology, Palliative and SW consulted, visited with Haven, wife at bedside. Haven at bedside for most of shift being updated and verbalize understanding.     Plan: Monitor respiratory status. Monitor pressor needs and CRRT pull. Monitor lab levels. Continue with POC.     Problem: Adult Inpatient Plan of Care  Goal: Readiness for Transition of Care  Outcome: No Change    Problem: Infection Progression (Sepsis)  Goal: Absence of Infection Signs and Symptoms  Outcome: No  Change    Problem: Hypothermia (CRRT (Continuous Renal Replacement Therapy))  Goal: Body Temperature Maintained in Desired Range  Outcome: No Change     Problem: Renal Function Impairment (Acute Kidney Injury/Impairment)  Goal: Effective Renal Function  Outcome: No Change

## 2021-04-14 NOTE — PROGRESS NOTES
BP continues to be soft- see VS flow sheet, MAPs of 53-60 with Levo gtt of .4 mcg/kg and Vaso of 4u/hr.  Team contacted and verbal order via Dr Hay to stop Dobutamine gtt, discontinue to pull fluids via CRRT and give one liter of LR.  Will send labs following bolus.

## 2021-04-14 NOTE — PROCEDURES
PROCEDURE:   Bronchoscopy with Bronchoalveolar lavage    INDICATION:  Acute Hypoxemic Respiratory Failure    PROCEDURE   Chelo Hale      SUPERVISOR:  Dr Reyes    CONSENT:  The patient's medical record has been reviewed.  The indication for the procedure was reviewed.  The necessary history and physical examination was performed and reviewed.  The risks, benefits and alternatives of the procedure were discussed with the the patient in detail and she had the opportunity to ask questions.  I discussed in particular the potential complications including risks of minor or life-threatening bleeding and/or infection, respiratory failure, vocal cord trauma / paralysis, pneumothorax, and discomfort. Sedation risks were also discussed including abnormal heart rhythms, low blood pressure, and respiratory failure. All questions were answered to the best of my ability.  Verbal and written informed consent was obtained.  The proposed procedure and the patient's identification were verified prior to the procedure by the physician and the nurse.     UNIVERSAL PROTOCOL: Patient Identification was verified, time out was performed. Imaging data reviewed. Barrier precaution done: Hands washed, mask, gloves, gown, and eye protection all used.      MEDICATIONS:   4 ml 1% lidocaine     PROCEDURE: Patient monitored during entire procedure. 4 mL of lidocaine instilled via ET tube with minimal cough.  A flexible bronchoscope was inserted through patients ET tube.  The mila was sharp.  An airway inspection was done to the subsegmental level:      Findings:   - thick mucous plugs in the mila and right lower lobes. This was therapeutically suctioned, The mucosa is healthy.   - No discrete source of bleeding was noted on the exam  - No apparent endobronchial lesion was noted on the exam      Maneuvers / Procedure:    BAL: The bronchoscope was wedged into the RLL bronchus. A total of 80cc of saline was instilled and  a total of 40 cc was aspirated. This was sent for analysis.    The bronchoscope was wedged into the RLL bronchus. A total of 80cc of saline was   instilled and a total of 30 cc was aspirated. This was sent for analysis.      SAMPLES:   70mL of  fluid were sent for analysis.     Dr Reyes was present for the procedure.    COMPLICATIONS: None    Chelo Hale MD

## 2021-04-14 NOTE — PROGRESS NOTES
MEDICAL ICU PROGRESS NOTE  04/14/2021      Date of Service (when I saw the patient): 04/14/2021    Mr. Pancho Jesus is a  60 year old male with PMHx most significant for HFrEF 25-30% 2/2 ICM after STEMI x/p DESx3 (2012) & PCI prox RCA 2019, severe Pulmonary Hypertension, moderate MR,  CKD IV, Paroxysmal A fib not on AC, Chronic Iron Deficiency Anemia 2/2 GIB due to AVMs, previous LGI bleeding requiring ileocecal resection, T2DM, who was admitted 4/12 to the MICU for treatment of septic shock. Overnight, 4/13 he was started on Dobutamine but became hypotensive with Maps in the 50s. Received 2 L fluids. Pressors increased. Today, 4/14, patient was found using abdominal muscles to breath and was increasingly somnolent. Patient was maintaining oxygenation despite this on FIO2 40%. However, was hypotensive with MAPS 50s despite being on 0.4 of levophed and vaso 4. HOB was elevated for WOB, but blood pressure dropped further. He was put back in flat position. Levophed threshold was increased to 0.7. RT was called at bed side to start patient on BIPAP. Stat CXR ordered simultaneously. Wife called and patient intubated.    Major changes today:    - Intubated and sedated with Versed drip and fentanyl drip)  - Discontinue Vancomycin   - Continue Zosyn  - GI consulted for   - Lactulose 20 g BID (titrate to 3-5 BM/d)  - repeat ECHO  - Repeat EKG  - Pledger placed  - Discontinued Phenylephrine  - Vit K x 3 days (day 2)  - DDAVP x 2  - T3 pending  - Palliative care consult  - Calcium chloride 2g  - 250 ml of 5 % albumin  - CXR ordered  - LDH add on  - F-actin/ FLACO pending      Neuro:  # Hepatic encephalopathy Stage 2-3  Has asterixis on exam. Ammonia level Is normal < 10 but can be normal in 50% of people with hepatic encephalopathy. Patient noted to have increasing drowsiness this morning with asterixis.   - Lactulose 20 BID ( titrate for 3-5 BM)    Vent settings:  CMV/AC: RR 20 , FIO2 at 40, PEEP +5,     Sedation:   -  Fentanyl and propofol gtt  - PRN Fentanyl   - BIS score 40-60  - RASS goal 0 to -2       Cardiovascular  # Shock, septic +/- cardiogenic- source unclear..presumed lung by CT?  Requiring 2 pressors currently, MAP goal 60. Volume status is complicated by vasodilatory effects of sepsis and congestion from heart failure.  Blood culture remain NGTD, skin culture NGTD. CT revealing of nodular consolidation with surrounding GGO in superior LLL. May have a pulmonary source? May need a Weston catheter for accurate titration of volume status, currently is on CRRT with no volume removed. TTE 4/11 with small circumferential effusion without tamponade physiology. TTE 4/12 EF 25-35% with reduced global RV function. Antibiotics as below in ID section.  - Goal net negative today per renal, will continue to track hemodynamics with a-line    - Discontinue Vancomycin 4/14 after negative cultures for 3 days  - Continue Zosyn given how sick he is  - Weston catheter placement/ PAP measurement  - EKG stat and ECHO repeat        # HFrEF 25-30% 2/2 ICM  # Coronary Artery Disease s/p DESx3  # Type 2 NSTEMI  # Severe Pulmonary Hypertension  Maintaining I + O on CRT. Wean epi to avoid ventricular arrythmias.  - Hold on PTA diuretics  - TTE similar to prior  -  Patient Net Fluid Balance: Net+2419 since MN Continue CRRT- per nephrology RN:  ml/hr net negative as able, goal 1-1.5L net negative     Continue without pulling fluids given soft BP      # Moderate MR  # Paroxysmal Atrial Fibrillation (not on anticoagulation)  On amiodarone for ventricular arrythmias that started after pressors. Continuing past 24 hours due to possibility of adding inotropes. (epinephrine worsens his arrhythmia- didn't tolerate)  - On cardiac Telemetry  - Goal: K+ >4.0, Mg2+ > 2.0  - Continue Amiodarone gtt    Pulmonary:     # Pulmonary Nodulues  9 mm nodular consolidation with surrounding groundglass attenuation in the superior left lower lobe, favor  infectious/inflammatory  etiology though is indeterminant. Few other pulmonary nodules noted.  - Follow-up CT in 3 months to assess for interval change.  - Continue IV Zosyn    # Acute Hypoxic Respiratory Failure, resolved  Secondary to sepsis, weaning O2 as tolerated.    GI/Nutrition:    # Cardiac Cirrhosis MELD 32  SAAG 1.3, making portal HTN very likely. Ascites protein 3.1, making cardiac cirrhosis very likely.  Ascites fluid noted to be transudative.    Etiology: likely chronic congestion from heart failure Hepatitis C antibody non reactive, Hep A antibody non reactive, Hep Bs Ag non reactive but will add on hep B antibody. Will need hep A and B vaccination    Evaluation: EGD: 2019 no esophageal varices. No evidence of portal gastropathy. Less likely 2/2 portal HTn so dont need EGD at this time.  Ascites: large volume. Fluid transudative according to light's criteria using total protein. Hold off on spirinolactone and lasix given soft pressures requiring pressors. Considering therapeutic para. Continue CRRT  Coagulopathy:  likely hepatic congestion leading to compression of perisinusoidal and endothelial cells that produce F 7. Vit K x 3 days 4/13- 4/15. PTT 58, fibrinogen yesterday 144. FFP x 2 on 4/14    HRS: No. creatinine improving   Hepatic encephalopathy: Grade 2-3 Asterixis on exam. Ammonia < 10 but can be normal in up to half of people with HE. Start lactulose and titrate to 3-5 BMs/day.  SBP: Diagnostic para shows ascitic protein 3.1 and PMN fluid 126. No ppx indicated. Culture pending.  HCC sceening: Abd US q 6 m + AFP. No hepatic mass noted on CT abdomen.  Avoid sea food: Can cause cholera vibrio infection  Diet: 2 g sodium restriction and 2 L fluid restriction    MELD-Na score: 32 at 4/14/2021  4:35 AM  MELD score: 32 at 4/14/2021 12:21 AM  Calculated from:  Serum Creatinine: 1.63 mg/dL at 4/14/2021 12:21 AM  Serum Sodium: 136 mmol/L at 4/14/2021  4:35 AM  Total Bilirubin: 6.3 mg/dL at 4/13/2021   3:33 AM  INR(ratio): 3.56 at 4/13/2021  3:33 AM  Age: 60 years 11 months    - GI/ hepatology consult    # Mild hepatopathy  # Previous history of GI bleeding in the context of Angioectasia/AVM's  # Protein losing nephropathy  CT Abd to evaluate liver and biliary tree. Hepatitis panel sent.  - PPI  - Nutrition consulted for FT placement and nutrition      Renal/Fluids/Electrolytes:  # Acute on Chronic Kidney Disease Stage 4  # High Anion Gap Metabolic Acidosis  # Combined Lactic Acidosis Type A/B  # Hyperkalemia (6.4)  # Hyponatremia (likely related to poor oral intake and hypervolemia)  # Protein Losing Nephropathy     Baseline Scr: 2.1-2.4 mg/dL> Most recently 3.89 mg/dL with associated hyperkalemia, hyponatremia and significant metabolic acidosis (Co2: 17-->17) in the context of lacticemia despite two vasopressors. Etiology could potentially be related to mixed distributive//septic vs. Cardiogenic shock.      - Nephrology Consulted and Appreciate recommendations  - CRRT  - Strict I/O's  - Bladder scan qshift and straight cath PRN  - Daily Weights  - Avoid NSAID or Nephrotoxic agents       Endocrine:    # Hypothyroidism  TSH 10, T4 0.34. Will add T3 to r/o euthyroid sick syndrome.  - If consistent with hypothyroidism, will start synthroid    # Type II Diabetes Mellitus  - Oral Hypoglycemia Protocol Initiated  - Small dose sliding scale insulin       ID:  # Septic Shock  Multiple weeping blisters on lower extremities, some with erythema - likely source of infection. No sputum or cough to suggest pneumonia, no meningeal signs to suggest CNS. We will get a CT C/A/P w/con to evaluate possible alternate sites of infection.  - Initiated Empiric IV Vancomycin and zosyn  - Stress dose steroids with hydrocortisone and fludrocortisone  - Urine/sputum/blood cultures NGTD     Hematology:    # Chronic Iron Deficiency Anemia  # Thrombocytopenia likely related to underlying sepsis  # Coagulopathy 2/2 hepatopathy   - Continue  Monitoring Hgb  - Hgb goal >7 g/dL  - Holding on heparin products and anticoagulation      General Cares/Prophylaxis:    DVT Prophylaxis: SCD   GI Prophylaxis: PPI  Family Communication: None  Code Status:  Full Code     Lines/tubes/drains:  - Right Femoral Arterial Line/ Left Femoral Triple CVC/ PIV/   - Left internal jugular Castro Dialysis Catheter     Disposition:  - Medical ICU until further notice       Patient seen and findings/plan discussed with medical ICU staff, Dr. Reyes.    Yajaira Nzemenoh    ====================================  INTERVAL HISTORY:   Stopped dobutamine due to hypotension with maps in 50s. S/p 2 L LR by overnight team.    OBJECTIVE:   1. VITAL SIGNS:   Temp:  [96  F (35.6  C)-97.5  F (36.4  C)] 97.3  F (36.3  C)  Pulse:  [] 86  Resp:  [10-26] 10  BP: (74-98)/(50-64) 74/55  MAP:  [55 mmHg-193 mmHg] 66 mmHg  Arterial Line BP: ()/() 85/53  SpO2:  [90 %-100 %] 94 %  Resp: 10    2. INTAKE/ OUTPUT:   I/O last 3 completed shifts:  In: 2466.6 [I.V.:2221.6]  Out: 2573 [Urine:30; Other:2543]    3. PHYSICAL EXAMINATION:  General: AOx3, Calm/cooperative. Adequate speech (comprehensible/ well articulated).  HEENT: Normocephalic/Atraumatic, EOMI/MOM, anicteric oral muscosa/sclera. Noticeable JVD.  Pulm/Resp: labored breathing requiring intubation  CV: RRR, normal S1 and S2. No murmurs/rubs gallops. Peripheral pulses +1. Significant peripheral pitting edema +3 from legs to inguinal/scrotal region.   Abdomen: Soft, non-distended, non-tender. No pain/tenderness to palpation. No guarding/rebound.  : Not examined  Neuro: Moving all four extremities, strength 5/5, grossly non-focal.  Incisions/Skin: Multiple open wounds on legs, now dressed. Hyperpigmented skin in the legs/chronic venous stasis changes. Nail dystrophy on toes bilaterally.     4. LABS:   Arterial Blood Gases   Recent Labs   Lab 04/12/21  2142 04/12/21  1612 04/12/21  1212 04/12/21  0733   PH 7.40 7.42 7.41 7.38   PCO2  25* 23* 27* 24*   PO2 100 100 134* 164*   HCO3 15* 15* 17* 14*     Complete Blood Count   Recent Labs   Lab 04/13/21  0333 04/12/21 0928 04/12/21 0313 04/11/21  2304 04/11/21  1628 04/11/21  1628   WBC 16.9* 19.6* 20.5*  --   --  13.1*   HGB 9.4* 9.8* 10.3* 12.6*   < > 10.0*   PLT 37* 57* 56*  --   --  46*    < > = values in this interval not displayed.     Basic Metabolic Panel  Recent Labs   Lab 04/14/21  0021 04/13/21  1617 04/13/21  1012 04/13/21 0333    133 133 133   POTASSIUM 4.5 4.5 4.4 4.4   CHLORIDE 104 102 101 101   CO2 18* 18* 19* 19*   BUN 45* 60* 64* 75*   CR 1.63* 1.94* 1.93* 2.14*   GLC 97 95 113* 114*     Liver Function Tests  Recent Labs   Lab 04/13/21 0333 04/12/21 0928 04/12/21 0313 04/11/21  1628   AST 88*  --  24 26   ALT 37  --  20 20   ALKPHOS 101  --  104 106   BILITOTAL 6.3*  --  4.4* 4.1*   ALBUMIN 2.8*  --  3.0* 2.9*   INR 3.56* 2.61*  --  1.98*     Coagulation Profile  Recent Labs   Lab 04/13/21 0333 04/12/21 0928 04/11/21  1628   INR 3.56* 2.61* 1.98*   PTT 58* 56* 52*       5. RADIOLOGY:   Recent Results (from the past 24 hour(s))   CT Chest/Abdomen/Pelvis w Contrast    Narrative    EXAMINATION: CT CHEST/ABDOMEN/PELVIS W CONTRAST, 4/13/2021 12:29 PM    TECHNIQUE:  Helical CT images from the thoracic inlet through the  symphysis pubis were obtained  with contrast. Contrast dose: iopamidol  (ISOVUE-370) solution 128 mL    COMPARISON: None    HISTORY: Sepsis    FINDINGS: Respiratory motion artifact somewhat degrades quality of the  exam.    Chest:   Possible debris in the mainstem bronchi, difficult to characterize  given respiratory motion artifact. Small bilateral pleural effusions  with some fluid extending into the fissures. There is rounded  consolidation with central calcifications in the right lower lobe,  likely representing rounded atelectasis. Scattered mild mosaic  attenuation, especially in the lung apices. 10 x 8 mm nodule with  adjacent groundglass attenuation  in the superior left lower lobe  (series 5, image 103). 5 x 3 mm solid nodule in the lingula (series 5  image 164). 2 mm solid nodule in the right apex (series 5, image 72).    Left IJ central venous catheter with the tip in the left innominate  vein near its confluence with the SVC. Small amount of air in the  central venous structures, compatible with intravenous access. The  heart is enlarged. Moderate to large pericardial effusion. Mild to  moderate calcified and noncalcified atherosclerotic plaque in the  aortic arch and proximal great vessels. Coronary stents. Ascending  aorta and main pulmonary artery are normal in caliber. No central PE.  Several mildly prominent subcentimeter thoracic lymph nodes, for  example a 10 mm right paratracheal lymph node (series 4, image 105).  No enlarged axillary lymph nodes.     Abdomen and pelvis: Cirrhotic configuration of the liver. No focal  liver lesions. Cholelithiasis and vicarious excretion of contrast into  the gallbladder. No CT evidence of acute cholecystitis. The pancreas  and adrenal glands are unremarkable. Normal appearance of the spleen  with tiny adjacent splenule. Mildly atrophic kidneys. Subcentimeter  renal hypodensity bilaterally, too small characterize and presumably  cysts. No hydronephrosis or hydroureter. Urinary bladder is  decompressed. Small and large bowel are normal in caliber. Posterior  changes of right lower quadrant and ileostomy takedown. Appendix is  surgically absent. Large volume ascites in the abdomen and pelvis. No  free intraperitoneal air. [place] (3 cm) fat-containing ventral  abdominal hernia (series 4, image 446). Abdominal aorta is normal in  caliber with moderate multifocal atheromatous disease which extends to  the common iliac and femoral arteries. Left femoral central venous  catheter with tip terminating in the left external iliac vein. Right  femoral arterial line with tip terminating at the common femoral  artery  bifurcation. Patent abdominal vasculature. No enlarged  inguinal, pelvic or abdominal lymph nodes.    Bones and soft tissues: Diffuse body wall anasarca. Difficult to  completely characterize the ribs given extensive motion artifact  though there are likely bilateral chronic appearing rib fractures.  Remainder of the visualized osseous structures appear grossly  unremarkable.      Impression    IMPRESSION:   1. Cardiomegaly and moderate-to-large pericardial effusion.   2. Small bilateral pleural effusions with bibasilar consolidation,  especially on the right which has the appearance of rounded  atelectasis. Cannot fully exclude underlying infection.  3. 9 mm nodular consolidation with surrounding groundglass attenuation  in the superior left lower lobe, favor infectious/inflammatory  etiology though is indeterminant. Consider follow-up CT in 3 months to  assess for interval change. A few other smaller bilateral pulmonary  nodules are noted.  4. Cirrhotic configuration of the liver with large volume ascites in  the abdomen and pelvis. No focal hepatic mass.  5. Cholelithiasis without CT evidence of acute cholecystitis.  6. Multiple central lines as noted above.    I have personally reviewed the examination and initial interpretation  and I agree with the findings.    DAVINA LEWIS, DO

## 2021-04-14 NOTE — PROGRESS NOTES
"Care Management Follow Up    Length of Stay (days): 3    Expected Discharge Date: 04/20/21     Concerns to be Addressed: coping/stress     Patient plan of care discussed at interdisciplinary rounds: Yes    Anticipated Discharge Disposition:  TBD     Additional Information:  SW met with pt's S.O. Haven at bedside. Pt and Haven have been together for 43 years and had two children together (son is local and supportive, daughter passed away a few years ago). Haven shared her hopes and worries regarding patient's condition. She feels she's getting good information and demonstrates a solid understanding of medical issues. She shared stories about pt and showed SW a photo of them over 40 years ago when they first got together. Haven reports pt said to \"fight for me\" and goals remain restorative; however, she does not feel pt would want any treatment that would only serve to prolong suffering and not bring benefit.     SW provided reflective listening and emotional support, and thanked Haven for providing a glimpse into patient's life and values. SW will continue to remain available for patient and family support, discharge planning, other resources and support PRN.    SHARRON Looney, [facility]  ICU    [facility]   P: [phone]  Pager: [phone]           "

## 2021-04-14 NOTE — CONSULTS
[facility]  Palliative Care Consultation Note    Patient: Cole Jesus  Date of Admission:  4/11/2021    Requesting Clinician / Team: MICU  Reason for consult: Patient and family support    Recommendations:    Palliative care will continue to follow, support pt and family and is available to participate in care conferences.            Thank you for the opportunity to participate in the care of this patient and family. Our team: will continue to follow.     During regular M-F work hours -- if you are not sure who specifically to contact -- please contact us by sending a text page to our team consult pager at [phone].    After regular work hours and on weekends/holidays, you can call our answering service at [phone]. Also, who's on call for us is available in [employer].       Assessments:  Cole Jesus is a 60 year old male with a past medical history significant for  HFrEF 25-30% due to STEMI x/p DESx3 (2012) & PCI prox RCA 2019, severe Pulmonary Hypertension, moderate MR, CKD IV, Paroxysmal A fib, Chronic Iron Deficiency Anemia 2/2 GIB due to AVMs, previous LGI bleeding requiring ileocecal resection, T2DM, who was admitted on 4/12 to the MICU in septic shock of unclear etiology, oliguria and hypotension.His hypotension persisted and on 4/14, he required intubation.  Palliative care consulted for support.     Today, the patient was seen for:  Heart failure  Respiratory failure  Septic shock  EVA on CKR - on CRRT   Support     Prognosis, Goals, & Planning:      Functional Status just prior to hospitalization: 2 (Ambulatory and capable of all selfcare but unable to carry out any work activities; may need help with IADLs up and about > 50% of waking hours)    3 (Capable of only limited self-care; needs help with ADLs; in bed/chair >50% of waking hours)      Prognosis, Goals, and/or Advance Care Planning were addressed today: Yes   Haven tells me  "that she knows that he is very sick and his recovery will take a very long time and he has a long way to go before he can be back at home but she feels that he will be able to recover because 10 years ago he was this sick and recovered so she is hoping for recovery. She understands that there are many unknowns still and his heart and kidneys aren't working but she is still believing that he can recover.       Patient's decision making preferences: unable to assess          Patient has decision-making capacity today for complex decisions: No            I have concerns about the patient/family's health literacy today: No           Patient has a completed Health Care Directive: No.       Code status: Full Code    Coping, Meaning, & Spirituality:   Mood, coping, and/or meaning in the context of serious illness were addressed today: Yes  Haven tells me that Pancho is a very positive person, friendly and makes people laugh. She only wants positive energy in the room, \"no crying\" as that brings in negative energy. They are [place] and do have burning ceremonies and greatly appreciated  visit the other day.     Social:     Living situation: lives with his SO of 40 years, Haven.     [place] family / caregivers: wife, son, 6 grandkids (2 of them are step grandkids from his son). extended family    Occupational history: went on to disability after his first heart attack which Haven thought was in 2018 but records indicate it was 2012    Financial concerns: Haven has been bread wiinner for family and no financial concenrs. Her job is very supportive of her and allowing her the time now to be in the hospital.     History of Present Illness:  History gathered today from: family/loved ones, medical chart    Cole Jesus is a 60 year old male with a past medical history significant for  HFrEF 25-30% due to STEMI x/p DESx3 (2012) & PCI prox RCA 2019, severe Pulmonary Hypertension, moderate MR, CKD IV, Paroxysmal A fib, " Chronic Iron Deficiency Anemia 2/2 GIB due to AVMs, previous LGI bleeding requiring ileocecal resection, T2DM, who was admitted on 4/12 to the MICU in septic shock of unclear etiology, oliguria and hypotension.His hypotension persisted and on 4/14, he required intubation.    Haven told me that day of admission he wasn't looking well so she and their son were able to make him come to the hospital and allowed them to call 911 to bring him in.     Normally he is able to do all ALDs, cooks her breakfast, can go out and shop. He no longer does the yard or shovel but he can clean inside the house according to Haven.        ROS:  Pt intubated and sedated     Past Medical History:  Past Medical History:   Diagnosis Date     Anemia in chronic renal disease 03/09/2015     Antiplatelet or antithrombotic long-term use      Atrial fibrillation and flutter      CAD (coronary artery disease)     Stemi in 12/11, s/p angioplasty     CRD (chronic renal disease), stage IV (H) 03/09/2015    hx ATN with dialysis complicating cardiogenic shock  1/2012     GI bleed     massive lower GI bleed secondary to a cecal ulcer, s/p ileocecal resection in 12/11     Heart failure     Biventricular systolic HF, complicated by ARDS requiring tracheostomy     Hyperlipidemia LDL goal <100 04/28/2013     Hypertension      Ischemic cardiomyopathy 04/28/2013    TTE revealing 40% EF     Myocardial infarction (H)     2011 and 6/2018     Other and unspecified nonspecific immunological findings     Anti JKa     Pulmonary edema     episodes of flash pulmonary edema in 12/11     Subclinical hypothyroidism         Past Surgical History:  Past Surgical History:   Procedure Laterality Date     CV RIGHT HEART CATH MEASUREMENTS RECORDED N/A 11/12/2019    Procedure: CV RIGHT HEART CATH;  Surgeon: Fox Gerard MD;  Location:  HEART CARDIAC CATH LAB     CV RIGHT HEART CATH MEASUREMENTS RECORDED N/A 2/10/2020    Procedure: CV RIGHT HEART CATH;   Surgeon: Fox Gerard MD;  Location:  HEART CARDIAC CATH LAB     ESOPHAGOSCOPY, GASTROSCOPY, DUODENOSCOPY (EGD), COMBINED  2011    Procedure:COMBINED ESOPHAGOSCOPY, GASTROSCOPY, DUODENOSCOPY (EGD); Surgeon:SAMARIA PUENTE; Location: GI     LAPAROTOMY EXPLORATORY  2011    Procedure:LAPAROTOMY EXPLORATORY; Explore laparotomy, Illeocectomy, Diverting Illeostomy; Surgeon:GIA NAJERA; Location:[facility] OR     ORIF right elbow fracture  age 12     TAKEDOWN ILEOSTOMY  2014    Procedure: TAKEDOWN ILEOSTOMY;  Surgeon: Gia Najera MD;  Location: [facility] OR     TRACHEOSTOMY  2011    Procedure:TRACHEOSTOMY; Tracheostomy; 80XLTCP-Proximal Extension-Cuffed 8.0 mm I.D.; Surgeon:LIZABETH DYE; Location:[facility] OR         Family History:  Family History   Problem Relation Age of Onset     Diabetes Mother      Hypertension Mother      Heart Disease Mother      Myocardial Infarction Mother 68         of     Myocardial Infarction Father 54         of heart attack, left when he was young     Heart Disease Father      Diabetes Sister      Ovarian Cancer Sister          of     Diabetes Sister      Diabetes Sister      Crohn's Disease Daughter 36     Glaucoma No family hx of      Macular Degeneration No family hx of      Colon Cancer No family hx of      Ulcerative Colitis No family hx of      Irritable Bowel Syndrome No family hx of      Inflammatory Bowel Disease No family hx of          Allergies:  Allergies   Allergen Reactions     Hydralazine      Black spots, verified allergic reaction by skin biopsy     Isosorbide      Per pt got a rash all over his body and won't take it no more     Simvastatin Other (See Comments)     Leg muscle weakness     Tylenol [Acetaminophen] Palpitations        Medications:  I have reviewed this patient's medication profile and medications from this hospitalization.         Physical Exam:  Vital Signs: Temp: 95  F (35   C)(unable to get accurate read) Temp src: Axillary BP: (!) 74/55 Pulse: 99   Resp: 20 SpO2: 100 % O2 Device: Mechanical Ventilator Oxygen Delivery: 2 LPM  Weight: 214 lbs 15.18 oz  Gen: intubated, sedated, appears stated age, in NAD  Mental status/Psych: sedated; sensorium not intact    Data reviewed:  Reviewed recent labs and pertinent imaging      Thank you for the opportunity to continue to participate in the care of this patient and family.  Please feel free to contact on-call palliative provider with any emergent needs.  We can be reached via team pager [phone] (answered 8-4:30 Monday-Friday); after-hours answering service ([phone]);     Dianna Ernandez MD / Palliative Medicine / Pager [phone] / [facility] Inpatient Team Consult Pager [phone] (answered 8am-430pm M-F) - ok to text page via Zang / After-Hours Answering Service [phone] / Palliative Clinic in the [facility] at the [facility] [place] - [phone] (scheduling); [phone] (triage).  Total time spent was 65 minutes,  >50% of time was spent counseling and/or coordination of care regarding plan of care, support.

## 2021-04-14 NOTE — PROGRESS NOTES
"Bronchoscopy Risk Assessment Guidelines      A. Patient symptoms to consider when assessing pulmonary TB risk are:    I. Cough greater than 3 weeks; and fever, hemoptysis, pleuritic chest    pain, weight loss greater than 10 lbs, night sweats, fatigue, infiltrates on    upper lobes or superior segments of lower lobes, cavitation on chest    x-ray.   B. Patient risk factors to consider when assessing pulmonary TB risk are:    I. Exposure to known TB case, foreign-born persons (within 5 years of    arrival to [place]), residence in a crowded setting (correctional facility,     long-term care center, etc.), persons with HIV or immunosuppression.    Patients with symptoms and risk factors should generally be considered \"suspect risk\" and bronchoscopies should be performed in airborne precautions.    This patient has NO KNOWN RISK of Tuberculosis (proceed with bronchoscopy)    Specimens sent: yes  Complications: None  Scope used: #[id number] [id number]  Attending Physician: Eric Yarbrough Menesch Aloysius M. Tume, RT on 4/14/2021 at 2:23 PM  "

## 2021-04-14 NOTE — PROGRESS NOTES
CRRT STATUS NOTE     DATA:  Time:  0530  Pressures WNL:  yes  Filter Status:  WNL     Problems Reported/Alarms Noted:  none     Supplies Present:  yes     ASSESSMENT:  Patient Net Fluid Balance: Net+2419 since MN  Vital Signs:  Temp: 97.3  F (36.3  C) Temp src: Axillary BP: (!) 74/55 Pulse: 106   Resp: 16 SpO2: 100 % Weight: 95.7 kg (210 lb 15.7 oz)   Remains on Norepi and vaso  Labs: K 4.7, Cr 1.49,  Mag 2.6, Phos 4.8, hgb 8.3,  plt 34   Goals of Therapy:   ml/hr net negative as able, goal 1-1.5L net negative      INTERVENTIONS:   Continue to attempt to meet goals of therapy     PLAN:  Continue with goals of therapy.  Change circuit q 72 hrs and prn.  Call Resource RN @ [phone] with concerns

## 2021-04-14 NOTE — PROCEDURES
[facility]    Procedure: Abdominal paracentesis    Date/Time: 4/14/2021 8:26 AM  Performed by: Socorro Brandt MD  Authorized by: Socorro Brandt MD     UNIVERSAL PROTOCOL   Site Marked: Yes  Prior Images Obtained and Reviewed:  Yes  Required items: Required blood products, implants, devices and special equipment available    Patient identity confirmed:  Verbally with patient, arm band and provided demographic data  NA - No sedation, light sedation, or local anesthesia  Confirmation Checklist:  Patient's identity using two indicators, relevant allergies, procedure was appropriate and matched the consent or emergent situation and correct equipment/implants were available  Time out: Immediately prior to the procedure a time out was called    Universal Protocol: the Joint Commission Universal Protocol was followed    Preparation: Patient was prepped and draped in usual sterile fashion           ANESTHESIA    Local Anesthetic: Lidocaine 1% without epinephrine      SEDATION    Patient Sedated: No    PROCEDURE Describe Procedure: Ultrasound guadance was used to find the appropriate fluid pocket. A good pocket was found on the lateral left side around the level of the umbilicus. A vascular probe and doppler was used to rule out pulsatile vessels at the site.    The paracentesis procedure was uncomplicated. About 50cc of cloudy cristopher fluid was removed. Pt tolerated the procedure well.    Length of time physician/provider present for 1:1 monitoring during sedation: 25

## 2021-04-14 NOTE — PROGRESS NOTES
CRRT STATUS NOTE    DATA:  Time:  1840  Pressures WNL:  YES  Filter Status:  WDL    Problems Reported/Alarms Noted:  None    Supplies Present:  YES    ASSESSMENT:  Patient Net Fluid Balance:  +4.8L since midnight.  Vital Signs:  Temp: 95.1  F (35.1  C) Temp src: Axillary BP: (!) 84/69 Pulse: 105   Resp: 20 SpO2: 100 % O2 Device: Mechanical Ventilator Oxygen Delivery: 2 LPM    Labs:  Last Comprehensive Metabolic Panel:  Sodium   Date Value Ref Range Status   04/14/2021 137 133 - 144 mmol/L Final     Potassium   Date Value Ref Range Status   04/14/2021 4.4 3.4 - 5.3 mmol/L Final     Chloride   Date Value Ref Range Status   04/14/2021 104 94 - 109 mmol/L Final     Carbon Dioxide   Date Value Ref Range Status   04/14/2021 15 (L) 20 - 32 mmol/L Final     Anion Gap   Date Value Ref Range Status   04/14/2021 17 (H) 3 - 14 mmol/L Final     Glucose   Date Value Ref Range Status   04/14/2021 117 (H) 70 - 99 mg/dL Final     Urea Nitrogen   Date Value Ref Range Status   04/14/2021 36 (H) 7 - 30 mg/dL Final     Creatinine   Date Value Ref Range Status   04/14/2021 1.32 (H) 0.66 - 1.25 mg/dL Final     GFR Estimate   Date Value Ref Range Status   04/14/2021 57 (L) >60 mL/min/[1.73_m2] Final     Comment:     Non  GFR Calc  Starting 12/18/2018, serum creatinine based estimated GFR (eGFR) will be   calculated using the Chronic Kidney Disease Epidemiology Collaboration   (CKD-EPI) equation.       Calcium   Date Value Ref Range Status   04/14/2021 9.2 8.5 - 10.1 mg/dL Final      Lab Results   Component Value Date    WBC 15.1 04/14/2021     Lab Results   Component Value Date    RBC 3.82 04/14/2021     Lab Results   Component Value Date    HGB 8.5 04/14/2021     Lab Results   Component Value Date    HCT 28.7 04/14/2021     No components found for: MCT  Lab Results   Component Value Date    MCV 75 04/14/2021     Lab Results   Component Value Date    MCH 21.7 04/14/2021     Lab Results   Component Value Date    MCHC 28.9  04/14/2021     Lab Results   Component Value Date    RDW 25.3 04/14/2021     Lab Results   Component Value Date    PLT 34 04/14/2021        Goals of Therapy:  I=O    INTERVENTIONS:   Restart at 1826.    PLAN:  Continue to monitor. Call CRRT RN with any questions at [phone].

## 2021-04-14 NOTE — PLAN OF CARE
Problem: Sleep Disturbance (Delirium)  Goal: Improved Sleep  Outcome: No Change     Problem: Glycemic Control Impaired (Sepsis/Septic Shock)  Goal: Blood Glucose Level Within Desired Range  Outcome: No Change     Problem: Fluid Imbalance (Acute Kidney Injury/Impairment)  Goal: Optimal Fluid Balance  Outcome: No Change     Problem: Oral Intake Inadequate (Acute Kidney Injury/Impairment)  Goal: Optimal Nutrition Intake  Outcome: No Change     Problem: Renal Function Impairment (Acute Kidney Injury/Impairment)  Goal: Effective Renal Function  Outcome: No Change     ICU End of Shift Summary. See flowsheets for vital signs and detailed assessment.    Changes this shift: patient sleeping between cares but more awake than last night.  Maintaining normothermia with blanket warmer.  Noted patient with harsh prolonged cough after ice chips so have kept to mouth swabs for oral care. Appears to have difficulty initiating a swallow and controlling fluids.  Cough overnight has become more frequent, productive.  Cough is strong with thick creamy yellow sputum; unable to obtain adequate sputum sample.    Blood pressures remain soft, with continuing need for pressor support.  CRRT continues to be filtration only, no fluids being pulled.  Anuric overnight.  Lung sounds are now course throughout with diminished right base.  Did receive total bolus of 2 liters LR.  Remains in Atrial Fib.    Bilat leg wounds unchanged.    ABG this morning showing met acidotic picture.  Lactic acid 7.1.  Blood glucose 55 with dextrose coverage.  Team notified. Nasal cannula back on.      Plan:  continue with pressor support.  Monitoring blood glucose.  Possible speech to see regarding swallowing difficulties.  Initiate bowel regiment to avoid constipation- no BM since 4/11 but hasn't eaten. Involve RT in obtaining sputum for cultures.

## 2021-04-14 NOTE — PROCEDURES
- Procedure- central line- swan  - Indication- central line  - Diagnosis - central line      Prior to the start of the procedure and with procedural staff participation, I verbally confirmed the patient s identity using two indicators, relevant allergies, that the procedure was appropriate and matched the consent or emergent situation, and that the correct equipment/implants were available. Immediately prior to starting the procedure I conducted the Time Out with the procedural staff and re-confirmed the patient s name, procedure, and site/side.      Using sterile technique the RIJ was prepped and plain Lidocaine 1% plain was used as local anesthetic. Under ultrasound imaging using a modified Seldinger technique the right jugular vein was accessed and a sigble lumen 9Fr sheath  was placed. A swan was floated through the sheath with pressure tracings and fluoro however it kept coiling in the RV without good pressure waveforms. He will go down to the cath lab to have it repositioned.        Chelo Hale MD

## 2021-04-15 NOTE — PROGRESS NOTES
CLINICAL NUTRITION SERVICES - BRIEF NOTE      Reason for RD note: Following up on nutrition POC. Had 2 BMs this AM after lactulose. K+ 4.2 (WNL), Phos 4.1 (WNL), Mg++ 2.5 (H).      Interventions:  1. Trophic feeds per MICU rounds. Pt still on 2 pressors and volume overloaded.     2. Ordered EN support via NDT: Osmolite @ 10 mL/hr - do not advance  - 30 mL q4hr fluid flushes for tube patency. Additional fluids and/or adjustments per MD.    - Nephronex multivitamin/mineral to help ensure micronutrient needs being met with suspected hypermetabolic demands and potential interruptions to TF infusions.    Future/Additional Recommendations:  Once pt more stable, recommend advance to goal rate:  Osmolite 1.5 Jonatan @ goal rate of 55 ml/hr to provide 1980 kcals (26 kcal/kg), 83 g PRO (1.1 g PRO/kg), 1005 mL H2O, 268 g CHO, and 0 g fiber daily. Will also need addition of protein modular, 2 pkt TID (6 pkts total daily) to provide a total of 2220 kcal (28 kcal/kg) and 149 g PRO (1.9 g/kg) daily.   - Initiate advance by 10 mL q8hr as tolerated, last advancement can be 15 mL  - Do not advance unless K+ >/= 3.4 and phos >1.9     Nutrition will continue to follow per protocol.    Chloe Peralta RD, LD  Pager: [phone]

## 2021-04-15 NOTE — PLAN OF CARE
CRRT STATUS NOTE    DATA:  Time:  1819  Pressures WNL:  TMP increasing  Filter Status:  Clogging    Problems Reported/Alarms Noted:  None    Supplies Present:  YES    ASSESSMENT:  Patient Net Fluid Balance:  +6ml since midnight      Vital Signs:    Temp: 98  HR: 96  RR: 16  BP: 90/52  MAP: 61  SpO2: 99%      Labs:    Lab Results   Component Value Date    PH 7.41 04/15/2021    PO2 130 (H) 04/15/2021    PCO2 30 (L) 04/15/2021    HCO3 19 (L) 04/15/2021    ELLEN 2.7 11/13/2019          Lab Results   Component Value Date     04/15/2021    Lab Results   Component Value Date    CHLORIDE 107 04/15/2021    Lab Results   Component Value Date    BUN 25 04/15/2021      Lab Results   Component Value Date    POTASSIUM 4.1 04/15/2021    Lab Results   Component Value Date    CO2 18 04/15/2021    Lab Results   Component Value Date    CR 1.01 04/15/2021        Lab Results   Component Value Date    HGB 8.3 (L) 04/15/2021     Lab Results   Component Value Date    PLT 29 (LL) 04/15/2021     Lactic: 2.4  ICa: 4.3    Goals of Therapy:  50ml/hr, goal net negative 1L today    INTERVENTIONS:   None    PLAN:  Continue fluid removal per goals of care as patient tolerates. Check filter daily. Change filter q72 hours and PRN. Please call CRRT resource RN @ [phone] with any questions or concerns.

## 2021-04-15 NOTE — PROGRESS NOTES
Care Management Follow Up    Length of Stay (days): 4    Expected Discharge Date: 04/20/21     Concerns to be Addressed: coping/stress     Patient plan of care discussed at interdisciplinary rounds: Yes    Anticipated Discharge Disposition: TBD      Education Provided on the Conference Plan:    Spoke with pt's significant other Haven today at pt's bedside. I offered a care conference and provided call in details to her.     At this time, Haven feels like family is updated and knows that Pancho wants to continue to fight. She has my contact number and will call if other family members would like to have a conference.     Family in Agreement with the Plan: not at this time, conference was declined. Haven will communicate with MICU team and/or bedside RN, RNCC if she feels that a conference is needed.       Preet Govea RN, BSN  ICU Care Coordinator  Pager: [phone]  Phone:  [phone]

## 2021-04-15 NOTE — PROGRESS NOTES
MEDICAL ICU PROGRESS NOTE  04/15/2021      Date of Service (when I saw the patient): 04/15/2021    Mr. Pancho Jesus is a  60 year old male with PMHx most significant for HFrEF 25-30% 2/2 ICM after STEMI x/p DESx3 (2012) & PCI prox RCA 2019, severe Pulmonary Hypertension, moderate MR,  CKD IV, Paroxysmal A fib not on AC, Chronic Iron Deficiency Anemia 2/2 GIB due to AVMs, previous LGI bleeding requiring ileocecal resection, T2DM, who was admitted 4/12 to the MICU for treatment of septic shock.       Major changes today:    - Lactulose increased to TID  - RR decreased to 16 and FIO2 decreasing  - Started Synthroid 25 mcg every day      Neuro:  # Hepatic encephalopathy Stage 2-3  Has asterixis on exam. Ammonia level Is normal < 10 but can be normal in 50% of people with hepatic encephalopathy. Patient noted to have increasing drowsiness this morning with asterixis.   - Lactulose 20 TID ( titrate for 3-5 BM)    Vent settings:  CMV/AC: RR 16 , FIO2 at 40, PEEP +5,   - Decreased RR to 16 from 20 given ABG  Sedation:   - Fentanyl and Versed gtt  - PRN Fentanyl   - BIS score 40-60  - RASS goal 0 to -2       Cardiovascular  # Shock, septic + cardiogenic---presumed decompensated HF + aspiration PNA  Requiring 2 pressors currently, MAP goal 60. Volume status is complicated by vasodilatory effects of sepsis and congestion from heart failure.  Blood culture remain NGTD, skin culture NGTD. CT revealing of nodular consolidation with surrounding GGO in superior LLL. May have a pulmonary source? May need a [place] catheter for accurate titration of volume status, currently is on CRRT with no volume removed. TTE 4/11 with small circumferential effusion without tamponade physiology. TTE 4/12 EF 25-35% with reduced global RV function. Antibiotics as below in ID section. [place] catheter placed on 4/15. All pressures elevated with RAP 20, RVP 62, PA 64/30, PCW 30 and CVP 21.     - Goal net negative 1 today per renal, will continue  to track hemodynamics with swan. Continue CRRT  - Discontinue Vancomycin 4/14 after negative cultures for 3 days  - Continue Zosyn given how sick he is  - Cultures pending        # HFrEF 25-30% 2/2 ICM  # Coronary Artery Disease s/p DESx3  # Type 2 NSTEMI  # Severe Pulmonary Hypertension  Maintaining I + O on CRT. Wean epi to avoid ventricular arrythmias.  - Hold on PTA diuretics  - TTE similar to prior      # Moderate MR  # Paroxysmal Atrial Fibrillation (not on anticoagulation)  On amiodarone for ventricular arrythmias that started after pressors. Continuing past 24 hours due to possibility of adding inotropes. (epinephrine worsens his arrhythmia- didn't tolerate)  - On cardiac Telemetry  - Goal: K+ >4.0, Mg2+ > 2.0  - Continue Amiodarone gtt    Pulmonary:     # Pulmonary Nodulues  9 mm nodular consolidation with surrounding groundglass attenuation in the superior left lower lobe, favor infectious/inflammatory  etiology though is indeterminant. Few other pulmonary nodules noted.  - Follow-up CT in 3 months to assess for interval change.  - Continue IV Zosyn    # Acute Hypoxic Respiratory Failure- intubated  # Concern for aspiration PNA  Patient was found using abdominal muscles to breath and was increasingly somnolent. Patient was maintaining oxygenation despite this on FIO2 40%. However, was hypotensive with MAPS 50s despite being on 0.4 of levophed and vaso 4. HOB was elevated for WOB, but blood pressure dropped further. He was put back in flat position. Levophed threshold was increased to 0.7. RT was called at bed side to start patient on BIPAP. Stat CXR ordered simultaneously. Wife called and patient intubated. Bronchoscopy done 4/14 and a copious amount of thick sputum was collected. Sputum cell count was > 4K with neutrophilic predominance.   - Repeat cultures pending  - continue zosyn      Ventilation Mode: CMV/AC  (Continuous Mandatory Ventilation/ Assist Control)  FiO2 (%): 35 %  Rate Set  (breaths/minute): 16 breaths/min  Tidal Volume Set (mL): 500 mL  PEEP (cm H2O): 5 cmH2O  Oxygen Concentration (%): 35 %  Resp: 16      GI/Nutrition:    # Cardiac Cirrhosis MELD 32>>28  SAAG 1.3, making portal HTN very likely. Ascites protein 3.1, making cardiac cirrhosis very likely.  Ascites fluid noted to be transudative.    Etiology: likely chronic congestion from heart failure.  Hepatitis C antibody non reactive, Hep A antibody non reactive, Hep Bs Ag non reactive but will add on hep B antibody. Will need hep A and B vaccination    Evaluation: EGD: 2019 no esophageal varices. No evidence of portal gastropathy. No active bleeding, no EGD needed at this time.  Ascites: large volume. Fluid transudative according to light's criteria using total protein. Hold off on spirinolactone and lasix given soft pressures requiring pressors. Considering therapeutic para. Continue CRRT.  Coagulopathy:  likely hepatic congestion leading to compression of perisinusoidal and endothelial cells that produce F 7. Vit K x 3 days 4/13- 4/15.   Fibrinogen goal > 120, INR continue vit K but will reverse with improvement of congestion    HRS: No. Creatinine at baseline  Hepatic encephalopathy: Grade 2-3 Asterixis on exam. Ammonia < 10 but can be normal in up to half of people with HE. Start lactulose and titrate to 3-5 BMs/day.  SBP: Diagnostic para shows ascitic protein 3.1 and PMN fluid 126. No ppx indicated. Culture pending.  HCC sceening: Abd US q 6 m + AFP. No hepatic mass noted on CT abdomen.  Avoid sea food: Can cause cholera vibrio infection  Diet: 2 g sodium restriction and 2 L fluid restriction    MELD-Na score: 27 at 4/15/2021  3:31 PM  MELD score: 27 at 4/15/2021  3:31 PM  Calculated from:  Serum Creatinine: 1.01 mg/dL at 4/15/2021  3:31 PM  Serum Sodium: 137 mmol/L at 4/15/2021  3:31 PM  Total Bilirubin: 9.0 mg/dL at 4/15/2021  4:06 AM  INR(ratio): 3.11 at 4/15/2021  4:06 AM  Age: 61 years    - GI/ hepatology following    #  Mild hepatopathy  # Previous history of GI bleeding in the context of Angioectasia/AVM's  # Protein losing nephropathy  CT Abd to evaluate liver and biliary tree. Hepatitis panel sent.  - PPI  - Nutrition consulted for FT placement and nutrition      Renal/Fluids/Electrolytes:  # Acute on Chronic Kidney Disease Stage 4  # High Anion Gap Metabolic Acidosis  # Combined Lactic Acidosis Type A/B  # Hyperkalemia (6.4)  # Hyponatremia (likely related to poor oral intake and hypervolemia)  # Protein Losing Nephropathy     Baseline Scr: 2.1-2.4 mg/dL> Most recently 3.89 mg/dL with associated hyperkalemia, hyponatremia and significant metabolic acidosis (Co2: 17-->17) in the context of lacticemia despite two vasopressors. Etiology could potentially be related to mixed distributive//septic vs. Cardiogenic shock.      - Nephrology Consulted and Appreciate recommendations  - CRRT  - Strict I/O's  - Bladder scan qshift and straight cath PRN  - Daily Weights  - Avoid NSAID or Nephrotoxic agents       Endocrine:    # Hypothyroidism  TSH 10, T4 0.34. T3 0.3 indicating hypothyroidism.\  - start synthroid 25 mcg     # Type II Diabetes Mellitus  - Oral Hypoglycemia Protocol Initiated  - Small dose sliding scale insulin       ID:  # Shock, septic + cardiogenic---presumed decompensated HF + aspiration PNA  Bronchoscopy done 4/14 and a copious amount of thick sputum was collected. Sputum cell count was > 4K with neutrophilic predominance which is likely the source.Multiple weeping blisters on lower extremities, some with erythema -cultures NGTD. No sputum or cough to suggest pneumonia, no meningeal signs to suggest CNS. We will get a CT C/A/P w/con to evaluate possible alternate sites of infection.  - Continue Zosyn until sputum cultures result  - Stress dose steroids with hydrocortisone and fludrocortisone   - Urine/sputum/blood cultures NGTD     Hematology:    # Chronic Iron Deficiency Anemia  # Thrombocytopenia likely related to  underlying sepsis  # Coagulopathy 2/2 hepatopathy   # Supratherapeutic INR  - continue Vit K, fix underlying cause  - Continue Monitoring Hgb  - Hgb goal >7 g/dL  - Holding on heparin products and anticoagulation      General Cares/Prophylaxis:    DVT Prophylaxis: SCD   GI Prophylaxis: PPI  Family Communication: None  Code Status:  Full Code     Lines/tubes/drains:  - Right Femoral Arterial Line/ Left Femoral Triple CVC/ PIV/ swan  - Left internal jugular [facility] Catheter     Disposition:  - Medical ICU until further notice       Patient seen and findings/plan discussed with medical ICU staff, Dr. Reyes.    Yajaira Nzemenoh    ====================================  INTERVAL HISTORY:   [place] catheter placed 4/14 with elevated pressured noted.Did not pull fluid last night on CRRT but will attempt to pull out 1 L today 4/15. Pulses noted to be faint and not doppler able.     OBJECTIVE:   1. VITAL SIGNS:   Temp:  [94.6  F (34.8  C)-98  F (36.7  C)] 98  F (36.7  C)  Pulse:  [] 96  Resp:  [16-20] 16  MAP:  [47 mmHg-213 mmHg] 61 mmHg  Arterial Line BP: ()/() 90/52  FiO2 (%):  [35 %-40 %] 35 %  SpO2:  [89 %-100 %] 99 %  Ventilation Mode: CMV/AC  (Continuous Mandatory Ventilation/ Assist Control)  FiO2 (%): 35 %  Rate Set (breaths/minute): 16 breaths/min  Tidal Volume Set (mL): 500 mL  PEEP (cm H2O): 5 cmH2O  Oxygen Concentration (%): 35 %  Resp: 16    2. INTAKE/ OUTPUT:   I/O last 3 completed shifts:  In: 3535.04 [I.V.:3355.04; NG/GT:160]  Out: 3213 [Other:3213]    3. PHYSICAL EXAMINATION:  General: intubated and sedated  HEENT: Normocephalic/Atraumatic, intubated, EOMI/MOM, anicteric oral muscosa/sclera.   Pulm/Resp:mechanically ventilated, sound wet   CV: RRR, normal S1 and S2. No murmurs/rubs gallops. Peripheral pulses +1. Significant peripheral pitting edema +3 from legs to inguinal/scrotal region.   Abdomen: Soft, non-distended, non-tender. No pain/tenderness to palpation. No  guarding/rebound.  : Not examined  Neuro: Moving all four extremities, strength 5/5, grossly non-focal.  Incisions/Skin: Multiple open wounds on legs, now dressed. Hyperpigmented skin in the legs/chronic venous stasis changes. Nail dystrophy on toes bilaterally.     4. LABS:   Arterial Blood Gases   Recent Labs   Lab 04/15/21  1403 04/15/21  0433 04/14/21  1543 04/14/21  0859   PH 7.41 7.48* 7.40 7.26*   PCO2 30* 24* 25* 31*   PO2 130* 143* 92 182*   HCO3 19* 18* 15* 14*     Complete Blood Count   Recent Labs   Lab 04/15/21  0406 04/14/21  1645 04/14/21  0435 04/13/21  0333 04/12/21  0928   WBC 14.5*  --  15.1* 16.9* 19.6*   HGB 8.3* 8.5* 8.3* 9.4* 9.8*   PLT 29*  --  34* 37* 57*     Basic Metabolic Panel  Recent Labs   Lab 04/15/21  1531 04/15/21  0406 04/14/21  2150 04/14/21  1543    137 137 137   POTASSIUM 4.1 4.2 4.6 4.4   CHLORIDE 107 105 104 104   CO2 18* 17* 18* 15*   BUN 25 30 34* 36*   CR 1.01 1.13 1.20 1.32*   * 140* 143* 117*     Liver Function Tests  Recent Labs   Lab 04/15/21  0406 04/14/21  0607 04/14/21  0435 04/13/21  0333 04/12/21  0928 04/12/21  0313   *  --  112* 88*  --  24   *  --  64 37  --  20   ALKPHOS 107  --  99 101  --  104   BILITOTAL 9.0*  --  7.6* 6.3*  --  4.4*   ALBUMIN 2.7*  --  2.5* 2.8*  --  3.0*   INR 3.11* 3.85*  --  3.56* 2.61*  --      Coagulation Profile  Recent Labs   Lab 04/15/21  0406 04/14/21  0607 04/13/21  0333 04/12/21  0928 04/11/21  1628   INR 3.11* 3.85* 3.56* 2.61* 1.98*   PTT  --   --  58* 56* 52*       5. RADIOLOGY:   Recent Results (from the past 24 hour(s))   XR Chest Port 1 View    Narrative    Exam: XR CHEST PORT 1 , 4/15/2021 5:55 AM    Indication: ETT eval    Comparison: 4/14/2021    Findings: Single AP chest radiograph. Endotracheal tube approximately  5.5 cm above the mila. Right [place] catheter tip in stable  position over the right pulmonary artery. Left central venous catheter  terminating over the innominate  confluence. Gastric tube projecting  over the esophagus, tip outside field-of-view. The trachea is midline.  Stable enlargement of the cardiothymic silhouette. Persistent right  perihilar opacity. Small to moderate left pleural effusion with  overlying opacities. No pneumothorax.      Impression    Impression:   1. Endotracheal tube in the mid thoracic trachea.   2. Right perihilar opacities, likely atelectasis versus consolidation.  3. Small to moderate left pleural effusion with overlying opacities,  likely atelectasis.    I have personally reviewed the examination and initial interpretation  and I agree with the findings.    XAVIER HOLLINGSWORTH MD     - Intubated and sedated with Versed drip and fentanyl drip)  - Discontinue Vancomycin   - Continue Zosyn  - GI consulted for   - Lactulose 20 g BID (titrate to 3-5 BM/d)  - repeat ECHO  - Repeat EKG  - [place] placed  - Discontinued Phenylephrine  - Vit K x 3 days (day 2)  - DDAVP x 2  - T3 pending  - Palliative care consult  - Calcium chloride 2g  - 250 ml of 5 % albumin  - CXR ordered  - LDH add on  - F-actin/ FLACO pending

## 2021-04-15 NOTE — PROGRESS NOTES
MEDICAL ICU PROGRESS NOTE  04/15/2021      Date of Service (when I saw the patient): 04/15/2021    Mr. Pancho Jesus is a  60 year old male with PMHx most significant for HFrEF 25-30% 2/2 ICM after STEMI x/p DESx3 (2012) & PCI prox RCA 2019, severe Pulmonary Hypertension, moderate MR,  CKD IV, Paroxysmal A fib not on AC, Chronic Iron Deficiency Anemia 2/2 GIB due to AVMs, previous LGI bleeding requiring ileocecal resection, T2DM, who was admitted 4/12 to the MICU for treatment of septic shock. Overnight, 4/13 he was started on Dobutamine but became hypotensive with Maps in the 50s. Received 2 L fluids. Pressors increased. Today, 4/14, patient was found using abdominal muscles to breath and was increasingly somnolent. Patient was maintaining oxygenation despite this on FIO2 40%. However, was hypotensive with MAPS 50s despite being on 0.4 of levophed and vaso 4. HOB was elevated for WOB, but blood pressure dropped further. He was put back in flat position. Levophed threshold was increased to 0.7. RT was called at bed side to start patient on BIPAP. Stat CXR ordered simultaneously. Wife called and patient intubated.    Major changes today:    - Lactulose increased to TID  - RR decreased to 16 and FIO2 decreasing  - Started Synthroid 25 mcg every day      Neuro:  # Hepatic encephalopathy Stage 2-3  Has asterixis on exam. Ammonia level Is normal < 10 but can be normal in 50% of people with hepatic encephalopathy. Patient noted to have increasing drowsiness this morning with asterixis.   - Lactulose 20 TID ( titrate for 3-5 BM)    Vent settings:  CMV/AC: RR 16 , FIO2 at 40, PEEP +5,   - Decreased RR to 16 from 20 given ABG  Sedation:   - Fentanyl and Versed gtt  - PRN Fentanyl   - BIS score 40-60  - RASS goal 0 to -2       Cardiovascular  # Shock, septic + cardiogenic---presumed decompensated HF + aspiration PNA  Requiring 2 pressors currently, MAP goal 60. Volume status is complicated by vasodilatory effects of  sepsis and congestion from heart failure.  Blood culture remain NGTD, skin culture NGTD. CT revealing of nodular consolidation with surrounding GGO in superior LLL. May have a pulmonary source? May need a [place] catheter for accurate titration of volume status, currently is on CRRT with no volume removed. TTE 4/11 with small circumferential effusion without tamponade physiology. TTE 4/12 EF 25-35% with reduced global RV function. Antibiotics as below in ID section. [place] catheter placed on 4/15. All pressures elevated with RAP 20, RVP 62, PA 64/30, PCW 30 and CVP 21.     - Goal net negative 1 today per renal, will continue to track hemodynamics with swan. Continue CRRT  - Discontinue Vancomycin 4/14 after negative cultures for 3 days  - Continue Zosyn given how sick he is  - Cultures pending        # HFrEF 25-30% 2/2 ICM  # Coronary Artery Disease s/p DESx3  # Type 2 NSTEMI  # Severe Pulmonary Hypertension  Maintaining I + O on CRT. Wean epi to avoid ventricular arrythmias.  - Hold on PTA diuretics  - TTE similar to prior      # Moderate MR  # Paroxysmal Atrial Fibrillation (not on anticoagulation)  On amiodarone for ventricular arrythmias that started after pressors. Continuing past 24 hours due to possibility of adding inotropes. (epinephrine worsens his arrhythmia- didn't tolerate)  - On cardiac Telemetry  - Goal: K+ >4.0, Mg2+ > 2.0  - Continue Amiodarone gtt    Pulmonary:     # Pulmonary Nodulues  9 mm nodular consolidation with surrounding groundglass attenuation in the superior left lower lobe, favor infectious/inflammatory  etiology though is indeterminant. Few other pulmonary nodules noted.  - Follow-up CT in 3 months to assess for interval change.  - Continue IV Zosyn    # Acute Hypoxic Respiratory Failure- intubated  # Concern for aspiration PNA  Patient was found using abdominal muscles to breath and was increasingly somnolent. Patient was maintaining oxygenation despite this on FIO2 40%. However, was  hypotensive with MAPS 50s despite being on 0.4 of levophed and vaso 4. HOB was elevated for WOB, but blood pressure dropped further. He was put back in flat position. Levophed threshold was increased to 0.7. RT was called at bed side to start patient on BIPAP. Stat CXR ordered simultaneously. Wife called and patient intubated. Bronchoscopy done 4/14 and a copious amount of thick sputum was collected. Sputum cell count was > 4K with neutrophilic predominance.   - Repeat cultures pending  - continue zosyn      Ventilation Mode: CMV/AC  (Continuous Mandatory Ventilation/ Assist Control)  FiO2 (%): 40 %  Rate Set (breaths/minute): 20 breaths/min  Tidal Volume Set (mL): 500 mL  PEEP (cm H2O): 5 cmH2O  Oxygen Concentration (%): 40 %  Resp: 20      GI/Nutrition:    # Cardiac Cirrhosis MELD 32>>28  SAAG 1.3, making portal HTN very likely. Ascites protein 3.1, making cardiac cirrhosis very likely.  Ascites fluid noted to be transudative.    Etiology: likely chronic congestion from heart failure.  Hepatitis C antibody non reactive, Hep A antibody non reactive, Hep Bs Ag non reactive but will add on hep B antibody. Will need hep A and B vaccination    Evaluation: EGD: 2019 no esophageal varices. No evidence of portal gastropathy. No active bleeding, no EGD needed at this time.  Ascites: large volume. Fluid transudative according to light's criteria using total protein. Hold off on spirinolactone and lasix given soft pressures requiring pressors. Considering therapeutic para. Continue CRRT.  Coagulopathy:  likely hepatic congestion leading to compression of perisinusoidal and endothelial cells that produce F 7. Vit K x 3 days 4/13- 4/15.   Fibrinogen goal > 120, INR continue vit K but will reverse with improvement of congestion    HRS: No. Creatinine at baseline  Hepatic encephalopathy: Grade 2-3 Asterixis on exam. Ammonia < 10 but can be normal in up to half of people with HE. Start lactulose and titrate to 3-5 BMs/day.  SBP:  Diagnostic para shows ascitic protein 3.1 and PMN fluid 126. No ppx indicated. Culture pending.  HCC sceening: Abd US q 6 m + AFP. No hepatic mass noted on CT abdomen.  Avoid sea food: Can cause cholera vibrio infection  Diet: 2 g sodium restriction and 2 L fluid restriction    MELD-Na score: 28 at 4/15/2021  4:06 AM  MELD score: 28 at 4/15/2021  4:06 AM  Calculated from:  Serum Creatinine: 1.13 mg/dL at 4/15/2021  4:06 AM  Serum Sodium: 137 mmol/L at 4/15/2021  4:06 AM  Total Bilirubin: 9.0 mg/dL at 4/15/2021  4:06 AM  INR(ratio): 3.11 at 4/15/2021  4:06 AM  Age: 61 years    - GI/ hepatology following    # Mild hepatopathy  # Previous history of GI bleeding in the context of Angioectasia/AVM's  # Protein losing nephropathy  CT Abd to evaluate liver and biliary tree. Hepatitis panel sent.  - PPI  - Nutrition consulted for FT placement and nutrition      Renal/Fluids/Electrolytes:  # Acute on Chronic Kidney Disease Stage 4  # High Anion Gap Metabolic Acidosis  # Combined Lactic Acidosis Type A/B  # Hyperkalemia (6.4)  # Hyponatremia (likely related to poor oral intake and hypervolemia)  # Protein Losing Nephropathy     Baseline Scr: 2.1-2.4 mg/dL> Most recently 3.89 mg/dL with associated hyperkalemia, hyponatremia and significant metabolic acidosis (Co2: 17-->17) in the context of lacticemia despite two vasopressors. Etiology could potentially be related to mixed distributive//septic vs. Cardiogenic shock.      - Nephrology Consulted and Appreciate recommendations  - CRRT  - Strict I/O's  - Bladder scan qshift and straight cath PRN  - Daily Weights  - Avoid NSAID or Nephrotoxic agents       Endocrine:    # Hypothyroidism  TSH 10, T4 0.34. T3 0.3 indicating hypothyroidism.\  - start synthroid 25 mcg     # Type II Diabetes Mellitus  - Oral Hypoglycemia Protocol Initiated  - Small dose sliding scale insulin       ID:  # Shock, septic + cardiogenic---presumed decompensated HF + aspiration PNA  Bronchoscopy done 4/14 and  a copious amount of thick sputum was collected. Sputum cell count was > 4K with neutrophilic predominance which is likely the source.Multiple weeping blisters on lower extremities, some with erythema -cultures NGTD. No sputum or cough to suggest pneumonia, no meningeal signs to suggest CNS. We will get a CT C/A/P w/con to evaluate possible alternate sites of infection.  - Continue Zosyn until sputum cultures result  - Stress dose steroids with hydrocortisone and fludrocortisone   - Urine/sputum/blood cultures NGTD     Hematology:    # Chronic Iron Deficiency Anemia  # Thrombocytopenia likely related to underlying sepsis  # Coagulopathy 2/2 hepatopathy   # Supratherapeutic INR  - continue Vit K, fix underlying cause  - Continue Monitoring Hgb  - Hgb goal >7 g/dL  - Holding on heparin products and anticoagulation      General Cares/Prophylaxis:    DVT Prophylaxis: SCD   GI Prophylaxis: PPI  Family Communication: None  Code Status:  Full Code     Lines/tubes/drains:  - Right Femoral Arterial Line/ Left Femoral Triple CVC/ PIV/ swan  - Left internal jugular [facility] Catheter     Disposition:  - Medical ICU until further notice       Patient seen and findings/plan discussed with medical ICU staff, Dr. Reyes.    Yajaira Nzemenoh    ====================================  INTERVAL HISTORY:   [place] catheter placed 4/14 with elevated pressured noted.Did not pull fluid last night on CRRT but will attempt to pull out 1 L today 4/15. Pulses noted to be faint and not doppler able.     OBJECTIVE:   1. VITAL SIGNS:   Temp:  [94.6  F (34.8  C)-97.5  F (36.4  C)] 97.5  F (36.4  C)  Pulse:  [] 91  Resp:  [13-30] 20  BP: (84)/(69) 84/69  MAP:  [50 mmHg-94 mmHg] 63 mmHg  Arterial Line BP: ()/(32-92) 91/52  FiO2 (%):  [40 %-60 %] 40 %  SpO2:  [91 %-100 %] 100 %  Ventilation Mode: CMV/AC  (Continuous Mandatory Ventilation/ Assist Control)  FiO2 (%): 40 %  Rate Set (breaths/minute): 20 breaths/min  Tidal Volume Set  (mL): 500 mL  PEEP (cm H2O): 5 cmH2O  Oxygen Concentration (%): 40 %  Resp: 20    2. INTAKE/ OUTPUT:   I/O last 3 completed shifts:  In: 6041.35 [I.V.:3856.35; NG/GT:110; IV Piggyback:1500]  Out: 1185 [Other:1185]    3. PHYSICAL EXAMINATION:  General: intubated and sedated  HEENT: Normocephalic/Atraumatic, intubated, EOMI/MOM, anicteric oral muscosa/sclera.   Pulm/Resp:mechanically ventilated, sound wet   CV: RRR, normal S1 and S2. No murmurs/rubs gallops. Peripheral pulses +1. Significant peripheral pitting edema +3 from legs to inguinal/scrotal region.   Abdomen: Soft, non-distended, non-tender. No pain/tenderness to palpation. No guarding/rebound.  : Not examined  Neuro: Moving all four extremities, strength 5/5, grossly non-focal.  Incisions/Skin: Multiple open wounds on legs, now dressed. Hyperpigmented skin in the legs/chronic venous stasis changes. Nail dystrophy on toes bilaterally.     4. LABS:   Arterial Blood Gases   Recent Labs   Lab 04/15/21  0433 04/14/21  1543 04/14/21  0859 04/14/21  0607   PH 7.48* 7.40 7.26* 7.32*   PCO2 24* 25* 31* 31*   PO2 143* 92 182* 73*   HCO3 18* 15* 14* 16*     Complete Blood Count   Recent Labs   Lab 04/15/21  0406 04/14/21  1645 04/14/21  0435 04/13/21  0333 04/12/21  0928   WBC 14.5*  --  15.1* 16.9* 19.6*   HGB 8.3* 8.5* 8.3* 9.4* 9.8*   PLT 29*  --  34* 37* 57*     Basic Metabolic Panel  Recent Labs   Lab 04/15/21  0406 04/14/21  2150 04/14/21  1543 04/14/21  1135    137 137 136   POTASSIUM 4.2 4.6 4.4 4.5   CHLORIDE 105 104 104 105   CO2 17* 18* 15* 14*   BUN 30 34* 36* 36*   CR 1.13 1.20 1.32* 1.27*   * 143* 117* 102*     Liver Function Tests  Recent Labs   Lab 04/15/21  0406 04/14/21  0607 04/14/21  0435 04/13/21  0333 04/12/21  0928 04/12/21  0313   *  --  112* 88*  --  24   *  --  64 37  --  20   ALKPHOS 107  --  99 101  --  104   BILITOTAL 9.0*  --  7.6* 6.3*  --  4.4*   ALBUMIN 2.7*  --  2.5* 2.8*  --  3.0*   INR 3.11* 3.85*  --   3.56* 2.61*  --      Coagulation Profile  Recent Labs   Lab 04/15/21  0406 21  0607 21  0333 21  0928 21  1628   INR 3.11* 3.85* 3.56* 2.61* 1.98*   PTT  --   --  58* 56* 52*       5. RADIOLOGY:   Recent Results (from the past 24 hour(s))   XR Chest Port 1 View    Narrative    Exam: XR CHEST PORT 1 VW, 2021 9:32 AM    Indication: SOB    Comparison: 2020    Findings:   Endotracheal tube in the mid thoracic trachea. Left IJ central venous  catheter tip at the confluence of the innominate veins. Cardiomegaly.  Loculated right pleural effusion with associated atelectasis. Linear  areas of opacity at the left lung base.      Impression    Impression:   1. Cardiomegaly without overt pulmonary edema.  2. Increasing loculated right pleural effusion with associated  atelectasis.  3. No new airspace opacities identified.  4. New endotracheal tube with its tip in the mid thoracic trachea.     BRADY ACOSTA MD   Echo Limited    Narrative    [id number]  [id number]  [id number]  [id number]^CORNELL^JEN^EDIS     [facility],[place]  Echocardiography Laboratory  [address] [place] [address]     Name: MERCY SHAFER  MRN: [id number]  : 1960  Study Date: 2021 11:11 AM  Age: 60 yrs  Gender: Male  [place] [facility] [place] For Study: Tamponade  Ordering Physician: JEN SARABIA  Performed By: Edward Tomlin     BSA: 2.1 m2  Height: 68 in  Weight: 214 lb  HR: 91  BP: 87/55 mmHg  ______________________________________________________________________________  Procedure  Limited Portable Echo Adult. To assess for pericardial effusion.  ______________________________________________________________________________  Interpretation Summary  Small circumferential pericardial effusion is present without any hemodynamic  significance.  ______________________________________________________________________________  Left Ventricle  The Ejection Fraction is estimated  at 15-20%.     Right Ventricle  Global right ventricular function is moderately to severely reduced.     Vessels  Dilation of the inferior vena cava is present with abnormal respiratory  variation in diameter.     Pericardium  Small circumferential pericardial effusion is present without any hemodynamic  significance.     Miscellaneous  Ascites is noted.  ______________________________________________________________________________  Report approved by: Scotty Starr 04/14/2021 12:22 PM     ______________________________________________________________________________      XR Fluoro Port Time 0/1 Hour    Narrative    This exam was marked as non-reportable because it will not be read by a   radiologist or a [place] non-radiologist provider.         XR Chest Port 1 View   Result Value    Radiologist flags Malpositioned [place] catheter (Urgent)    Narrative    Exam: XR CHEST PORT 1 VW, 4/14/2021 3:12 PM    Indication: verify swan placement    Comparison: Same-day chest radiograph    Findings:   New right IJ approach [place] catheter. There is a loop extending  into the upper abdominal IVC, and the tip is near the right  atrioventricular junction. Endotracheal tube with the tip in the mid  thoracic trachea. Left IJ approach venous line with the tip in the  innominate confluence. Additional hardware presumed exterior to the  patient.    Low lung volumes. Diffuse interstitial and right greater than left  basilar airspace opacity similar to prior. Unchanged right greater  than left pleural effusions. No pneumothorax. No acute findings in the  upper abdomen.      Impression    Impression:   1. New right IJ approach [place]Mess[place] catheter. The tip near the right  atrioventricular junction. Recommend repositioning.  2. Unchanged pleural effusions and pulmonary edema.  3. Right greater than left basilar airspace opacity similar to prior.    [Urgent Result: Malpositioned [place] catheter]    Finding was identified on  4/14/2021 3:16 PM.     Yajaira Rene MD was contacted by Dr. Suleman Norwood DO  (Radiology R4) at 4/14/2021 3:18 PM and verbalized understanding of  the urgent finding.     I have personally reviewed the examination and initial interpretation  and I agree with the findings.    AYANA MENENDEZ DO   Cardiac Catheterization    Narrative      Severely elevated biventricular filling pressures    Elevated PA pressures in the setting of elevated left sided filling   pressures. Normal PVR    High cardiac output      XR Chest Port 1 View    Narrative    EXAM: XR CHEST PORT 1 VW  4/14/2021 6:11 PM     HISTORY:  intub       COMPARISON:  Earlier same day chest x-ray    FINDINGS: AP radiograph of the chest. Endotracheal tube tip projecting  approximately 4.8 cm cephalad to the mila. Right IJ [place]-Messi  catheter with tip overlying the right pulmonary artery. Partial  visualization of enteric tube coursing into the stomach with the tip  inferior to the field of view.    Stable enlargement of the cardiomediastinal silhouette. Small right  pleural effusion. Diffuse interstitial prominence and opacities.  Visualized upper abdomen is unremarkable. Bones are stable.     Impression    IMPRESSION:  1. Endotracheal tube with its tip projecting 4 cm cephalad to the  mila.  2. Right IJ [place]-Messi catheter with tip overlying the right pulmonary  artery.  3. Unchanged diffuse interstitial right side predominant opacities  likely representing pulmonary edema.  4. Unchanged layering right pleural effusion and associated  atelectasis..    I have personally reviewed the examination and initial interpretation  and I agree with the findings.    MYRNA FOREMAN MD   XR Abdomen Port 1 View    Narrative    EXAM: XR ABDOMEN PORT 1 VW  4/14/2021 6:13 PM     HISTORY:  OG placement       COMPARISON:  Same day chest x-ray, abdominal radiograph 12/29/2011    FINDINGS: Supine radiograph of the abdomen.  Feeding tube courses into  the stomach  with the tip overlying the second portion of the duodenum.  Nonspecific, nonobstructive bowel gas pattern. No pneumatosis or  portal venous gas. No acute osseous abnormality.      Impression    IMPRESSION: Feeding tube with tip projecting over the second portion  of the duodenum. Non obstructive bowel gas pattern.    I have personally reviewed the examination and initial interpretation  and I agree with the findings.    MYRNA FOREMAN MD     - Intubated and sedated with Versed drip and fentanyl drip)  - Discontinue Vancomycin   - Continue Zosyn  - GI consulted for   - Lactulose 20 g BID (titrate to 3-5 BM/d)  - repeat ECHO  - Repeat EKG  - [place] placed  - Discontinued Phenylephrine  - Vit K x 3 days (day 2)  - DDAVP x 2  - T3 pending  - Palliative care consult  - Calcium chloride 2g  - 250 ml of 5 % albumin  - CXR ordered  - LDH add on  - F-actin/ FLACO pending

## 2021-04-15 NOTE — PLAN OF CARE
ICU End of Shift Summary. See flowsheets for vital signs and detailed assessment.    Changes this shift: RASS -3 to -4. Versed at 1mg/hr. Fentanyl at 25. Pupils 1mm, sluggish. Amiodarone, vaso and levophed continue. MAP goal > 60. Difficulty finding pulses, MD aware. Good cap refill and warm extremities. Continue to monitor. DONOVAN Q4 hours. Small amount of stool output, lactulose increased to TID. CRRT 0-50, goal of 1L net negative. Pull initially at 180-200mL/hr per Renal, however, patient had episode this afternoon of dropping pressures with MAP in 40s. Difficulty recovering. 250mL of LR given. Now closer to I=O.     Plan: continue to monitor, attempt to pull from CRRT. Notify team of changes.

## 2021-04-15 NOTE — PLAN OF CARE
Problem: Adult Inpatient Plan of Care  Goal: Plan of Care Review  Outcome: No Change    ICU End of Shift Summary. See flowsheets for vital signs and detailed assessment.    Changes this shift: RASS -4, not following commands.  Versed titrated from 3 to 1mg/hr.  Levophed titrated to keep MAP>60, currently running at 0.34.  Temp of 94.6 at 2000, jerrod hugger on, CRRT on, normothermic for the rest of the night.  FiO2 decreased to 35%.  Anuric overnight, 2 small dark BMs after first dose of lactulose, rectal pouch applied.  Replaced calcium.  Titrated D10 for elevated BG.  Platelets 29, INR 3.11, notified resident.    Plan: Monitor respiratory status.  Monitor CRRT, labs.  Continue with plan of care.

## 2021-04-15 NOTE — PHARMACY-CONSULT NOTE
Pharmacy Tube Feeding Consult    Medication reviewed for administration by feeding tube and for potential food/drug interactions.    Recommendation: No changes are needed at this time.     Pharmacy will continue to follow as new medications are ordered.    Batsheva Ren, PharmD, BCCCP

## 2021-04-15 NOTE — PROGRESS NOTES
Nephrology Progress Note  04/15/2021         Overall Mr. Jesus is a 60-year-old gentleman with multiple comorbidities related to his diabetes and ischemic cardiomyopathy.  He was admitted to the ICU with worsening shock requiring multiple pressors with evidence of hyperkalemia and moderate to severe lactic acidosis.      Interval History :   Mr Jesus continues on CRRT, was net positive yesterday and now has S-G which shows elevated filling pressures and normal SVR but requiring 2 high dose pressors so has a mixed picture of cardiogenic/septic shock.  Trying to pull 1L today as long as pressor needs do not increase, can set at ~180-200cc/hr to achieve this.       Assessment & Recommendations:   EVA on CKD-Baseline Cr 2.5-3, has followed in Neph clinic with Dr Vigil but not since 12/2019.  Cr up to ~4 with BUN of 152 before starting CRRT on 4/11 for hyperkalemia and metabolic acidosis in the setting of shock, sepsis vs cardiogenic.  On 2 pressors with sepsis vs cardiogenic shock.                 -Access is [facility] temp line.                -CRRT 25ml/kg/hr dosing, given fluid yesterday and now trying to pull 1L net negative today.      Volume status-Overloaded and S-G suggests a mixed cardiogenic/septic picture, was net positive yesterday with volume expansion, last CVP 18, trying to pull 1L today.  Setting machine at ~180-200cc/hr which should achieve this based on ongoing intake.      Electrolytes/pH-K 4.2, bicarb 17 on CRRT with HAGMA, lactate back on the rise.       Ca/phos/pth-Ca 8.6, Mg and Phos WNL.      CHF-EF 25-30%, also with possible sepsis component, on Vaso and NE so treating mostly as sepsis for now.       Anemia-Hgb 9.4, on downtrend, acute management per team.      Nutrition-Deferred     Seen and discussed with Dr Buck     Recommendations were communicated to primary team via verbal communication.     GUILLE Silver CNS  Clinical Nurse Specialist  [phone]    Review of Systems:   I  "reviewed the following systems:  ROS not done due to vent/sedation.     Physical Exam:   I/O last 3 completed shifts:  In: 4469.61 [I.V.:3784.61; NG/GT:110]  Out: 2321 [Other:2321]   BP (!) 84/69 (BP Location: Right arm)   Pulse 107   Temp 97.5  F (36.4  C) (Oral)   Resp 16   Ht 1.727 m (5' 8\")   Wt 100.2 kg (220 lb 14.4 oz)   SpO2 100%   BMI 33.59 kg/m        GENERAL APPEARANCE: Intubated and sedated.   Head NC/AT  EYES: mildly scleral icterus  HENT: mouth  without ulcers or lesions  NECK: supple  Pulmonary: fair air entry and exchange   CV: irregular rhythm, tachy rate, no rub   - Edema bilateral   GI: soft, nontender, normal bowel sounds, no HSM   MS: no evidence of inflammation in joints, no muscle tenderness  : [place]  SKIN: blisters over both lower extremities    NEURO: Intubated and sedated.     Labs:   All labs reviewed by me  Electrolytes/Renal -   Recent Labs   Lab Test 04/15/21  0406 04/14/21  2150 04/14/21  1543 04/14/21  0435 04/14/21  0435    137 137   < > 136   POTASSIUM 4.2 4.6 4.4   < > 4.7   CHLORIDE 105 104 104   < > 104   CO2 17* 18* 15*   < > 19*   BUN 30 34* 36*   < > 43*   CR 1.13 1.20 1.32*   < > 1.49*   * 143* 117*   < > 73   RONNY 8.6 8.2* 9.2   < > 8.1*   MAG 2.5*  --  2.4*  --  2.6*   PHOS 4.1  --  4.6*  --  4.8*    < > = values in this interval not displayed.       CBC -   Recent Labs   Lab Test 04/15/21  0406 04/14/21  1645 04/14/21  0435 04/13/21  0333   WBC 14.5*  --  15.1* 16.9*   HGB 8.3* 8.5* 8.3* 9.4*   PLT 29*  --  34* 37*       LFTs -   Recent Labs   Lab Test 04/15/21  0406 04/14/21  0435 04/13/21  0333   ALKPHOS 107 99 101   BILITOTAL 9.0* 7.6* 6.3*   * 64 37   * 112* 88*   PROTTOTAL 5.9* 6.0* 6.6*   ALBUMIN 2.7* 2.5* 2.8*       Iron Panel -   Recent Labs   Lab Test 12/18/20  1242 11/12/20  1212 08/13/20  1006   IRON 21* 12* 16*   IRONSAT 5* 3* 3*   FRANK 25* 11* 9*           Current Medications:    B and C vitamin Complex with folic acid  5 mL " Per Feeding Tube Daily     fludrocortisone  50 mcg Oral or Feeding Tube Daily     hydrocortisone sodium succinate PF  50 mg Intravenous Q6H     insulin aspart  1-12 Units Subcutaneous Q4H     lactulose  20 g Oral or Feeding Tube BID     levothyroxine  25 mcg Oral or Feeding Tube QAM AC     pantoprazole (PROTONIX) IV  40 mg Intravenous Daily with breakfast     piperacillin-tazobactam  4.5 g Intravenous Q6H     senna-docusate  2 tablet Oral At Bedtime       amiodarone 0.5 mg/min (04/15/21 1200)     dextrose       dextrose 10% 30 mL/hr at 04/15/21 0008     CRRT replacement solution 12.5 mL/kg/hr (04/15/21 0949)     fentaNYL 25 mcg/hr (04/15/21 1200)     midazolam 2 mg/hr (04/15/21 1200)     - MEDICATION INSTRUCTIONS -       norepinephrine 0.43 mcg/kg/min (04/15/21 1200)     CRRT replacement solution 200 mL/hr at 04/14/21 1748     CRRT replacement solution 12.5 mL/kg/hr (04/15/21 0950)     vasopressin 4 Units/hr (04/15/21 1226)

## 2021-04-15 NOTE — PROGRESS NOTES
"[facility] 4C  Palliative Care Initial Spiritual Assessment    Patient: Cole Jesus  Date of Admission:  4/11/2021  Reason for consult: patient/family coping      Summary and Recommendations:    Patient Cole \"Pancho\" Ann Marie's wife Haven is supportive of him and advocating for restorative cares given recent conversations with Pancho.    Haven reports robust support from both of their families of origin, as well as son and grandchildren.    Pancho's family and personal spiritual practice are central to his sense of meaning, per Haven. [place] ceremony is important to him, and Haven would like to smudge on his behalf during hospitalization.    I will follow for spiritual support while Palliative Care is consulted.    These recommendations have been discussed with patient's wife and Palliative Care team.    Vicki Waite  Palliative   Pager [phone]  [facility] Inpatient Team Consult pager [phone] (M-F 8-4:30)  After-hours Answering Service [phone]      Assessments:   Visit with patient Cole \"Pancho\" Ann Marie's wife Haven at his bedside.    Distress:  Distress in the context of serious illness was assessed today:    Existential/spiritual/emotional distress: Yes; Haven is understandably concerned by Pancho's condition while also expressing confidence in her advocacy for him. She and Pancho have had extensive conversations about what he would want, and she is committed to supporting him in recovering. Haven described Pancho's recovery from cardiac event ten years ago, and his commitment to long rehab course with eventual good recovery. She hopes for the same now.      Cheondoism distress:  No.      Social/economic/relational distress:  Yes; mild. They [place] who Haven anticipates not being able to see while Pancho is hospitalized and/or in a facility.    Coping, Meaning, & Spirituality:   Coping, meaning, and/or spirituality in the " context of serious illness were assessed today:    Spiritual background and preferences: [facility] traditional ways      Beliefs, rituals, and practices: Per Haven, Pancho smudges daily, and he would want smudging while in hospital when possible. He also has a family member who is a pipe carrier, and family may want to do a pipe ceremony during this hospitalization.      Meaning-making: through [facility] traditions, family relationships, life experience, and connection to the land -- per Haevn, they would like to move closer to family (in [place], [place]) and be closer to the wilderness they love      Strengths and resources: family, [facility] community, personal beliefs, personal resilience    Prognosis, Goals, & Planning:     Prognosis, Goals, and/or Advance Care Planning were assessed today: Yes - goals are restorative, per Haven.      Preferred language: English      Patient's decision making preferences: unable to assess      I have concerns about the patient/family's health literacy today: No      Patient has a completed Health Care Directive: No.       Code status per chart review: Full Code      Interventions:  I offered spiritual and emotional support primarily through reflective listening and affirmation of meaning and will arrange smudging per family request.

## 2021-04-15 NOTE — PROGRESS NOTES
CRRT STATUS NOTE    DATA:  Time:  6:00 AM  Pressures WNL:  YES  Filter Status:  WDL    Problems Reported/Alarms Noted:  None.    Supplies Present:  YES    ASSESSMENT:  Patient Net Fluid Balance:  Net -45 ml @ 0600.  Vital Signs:  , BP 93/61, MAP 70  Labs:  K 4.2, Mg 2.5, Phos 4.1, iCa 4.4, Hgb 8.3, Plt 29  Goals of Therapy:  I = O    INTERVENTIONS:   None.    PLAN:  Continue to monitor circuit daily and change set q72 hours or PRN for clotting/clogging. Please call CRRT RN with any quesitons/problems.

## 2021-04-16 NOTE — PROGRESS NOTES
MEDICAL ICU PROGRESS NOTE  04/16/2021      Date of Service (when I saw the patient): 04/16/2021    Mr. Pancho Jesus is a  60 year old male with PMHx most significant for HFrEF 25-30% 2/2 ICM after STEMI x/p DESx3 (2012) & PCI prox RCA 2019, severe Pulmonary Hypertension, moderate MR,  CKD IV, Paroxysmal A fib not on AC, Chronic Iron Deficiency Anemia 2/2 GIB due to AVMs, previous LGI bleeding requiring ileocecal resection, T2DM, who was admitted 4/12 to the MICU for treatment of septic shock.     Major changes today:  - Cautious fluid removal with CRRT  - Wean pressors as able  - Wean Versed as able  - Wean Fentanyl next and If not tolerating, keep at 50   - Calcium chloride IV replacement  - 5 Cryoprecipitate transfusion    - 1 unit(s) pRBC transfusion     Neuro:  # Sedation  Goal Rass 0 to -1  - Fentanyl gtt + bolus  - Versed gtt + bolus    # Hepatic encephalopathy Stage 2-3  Has asterixis on exam, now sedated  - Lactulose 20 TID ( titrate for 3-5 BM) or 800-1200 ml of stool output a day.      Cardiovascular  # Shock, septic + cardiogenic---presumed decompensated HF + aspiration PNA  Requiring 2 pressors currently, MAP goal 60. Volume status is complicated by vasodilatory effects of sepsis and congestion from heart failure.  Blood culture remain NGTD, skin culture NGTD. CT revealing of nodular consolidation with surrounding GGO in superior LLL. Concern for aspiration PNA. Placed a swan catheter for volume monitoring. Currently on CRRT  attempt to mediate fluid status. TTE 4/11 with small circumferential effusion without tamponade physiology. TTE 4/12 EF 25-35% with reduced global RV function. Antibiotics as below in ID section. Elizabeth catheter placed on 4/15. All pressures elevated with RAP 20, RVP 62, PA 64/30, PCW 30 and CVP 21. Despite attempts to remove fluid, we have been unable to adequately remove fluids nor make headway. Patient intracardiac pressures are elevated.     - Goal net negative 1 today per  renal, will continue to track hemodynamics with swan. Continue CRRT  - Discontinue Vancomycin 4/14 after negative cultures for 3 days  - Continue Zosyn given how sick he for 10 days  - CRRT at /hr    - Culture 4/14 from BAL - light Staph Aureus growth   -  Repeat Cultures 4/14 from BAL - NGTD       # HFrEF 25-30% 2/2 ICM  # Coronary Artery Disease s/p DESx3  # Type 2 NSTEMI  # Severe Pulmonary Hypertension  Maintaining I + O on CRT. Wean epi to avoid ventricular arrythmias.  - Hold on PTA diuretics  - TTE similar to prior      # Moderate MR  # Paroxysmal Atrial Fibrillation (not on anticoagulation)  On amiodarone for ventricular arrythmias that started after pressors. Continuing past 24 hours due to possibility of adding inotropes. (epinephrine worsens his arrhythmia- didn't tolerate)  - On cardiac Telemetry  - Goal: K+ >4.0, Mg2+ > 2.0  - Continue Amiodarone gtt    Pulmonary:     # Pulmonary Nodulues  9 mm nodular consolidation with surrounding groundglass attenuation in the superior left lower lobe, favor infectious/inflammatory  etiology though is indeterminant. Few other pulmonary nodules noted.  - Follow-up CT in 3 months to assess for interval change.  - Continue IV Zosyn    # Acute Hypoxic Respiratory Failure- intubated  # Concern for aspiration PNA  Patient was found using abdominal muscles to breath and was increasingly somnolent. Patient was maintaining oxygenation despite this on FIO2 40%. However, was hypotensive with MAPS 50s despite being on 0.4 of levophed and vaso 4. HOB was elevated for WOB, but blood pressure dropped further. He was put back in flat position. Levophed threshold was increased to 0.7. RT was called at bed side to start patient on BIPAP. Stat CXR ordered simultaneously. Wife called and patient intubated. Bronchoscopy done 4/14 and a copious amount of thick sputum was collected. Sputum cell count was > 4K with neutrophilic predominance.   - Repeat cultures pending  - Continue IV  Zosyn      Ventilation Mode: CMV/AC  (Continuous Mandatory Ventilation/ Assist Control)  FiO2 (%): 35 %  Rate Set (breaths/minute): 16 breaths/min  Tidal Volume Set (mL): 500 mL  PEEP (cm H2O): 5 cmH2O  Oxygen Concentration (%): 35 %  Resp: 16      GI/Nutrition:    # Cardiac Cirrhosis MELD 32>>28>>23  SAAG 1.3, making portal HTN very likely. Ascites protein 3.1, making cardiac cirrhosis very likely.  Ascites fluid noted to be transudative.    Etiology: likely chronic congestion from heart failure.  Hepatitis C antibody non reactive, Hep A antibody non reactive, Hep Bs Ag non reactive but will add on hep B antibody. Will need hep A and B vaccination    Evaluation: EGD: 2019 no esophageal varices. No evidence of portal gastropathy. No active bleeding, no EGD needed at this time.  Ascites: large volume. Fluid transudative according to light's criteria using total protein. Hold off on spirinolactone and lasix given soft pressures requiring pressors. Considering therapeutic para. Continue CRRT.  Coagulopathy:  likely hepatic congestion leading to compression of perisinusoidal and endothelial cells that produce F 7. Vit K x 3 days 4/13- 4/15.   Fibrinogen goal > 120 ( Give FFP today), PLT 27 today, transfuse PLT (PLT goal > 30 if not bleeding or 10 if bleeding) , INR continue vit K but will reverse with improvement of congestion    Hepatic encephalopathy: Grade 2-3 Asterixis on exam. Ammonia < 10 but can be normal in up to half of people with HE. Start lactulose and titrate to 3-5 BMs/day.  SBP: Diagnostic para shows ascitic protein 3.1 and PMN fluid 126. No ppx indicated. Culture pending.  HCC sceening: Abd US q 6 m + AFP. No hepatic mass noted on CT abdomen.  Diet: 2 g sodium restriction and 2 L fluid restriction  MELD-Na score: 23 at 4/17/2021  4:22 AM  MELD score: 23 at 4/17/2021  4:22 AM  Calculated from:  Serum Creatinine: 0.91 mg/dL (Rounded to 1 mg/dL) at 4/17/2021  4:22 AM  Serum Sodium: 138 mmol/L (Rounded to  137 mmol/L) at 4/17/2021  4:22 AM  Total Bilirubin: 14.4 mg/dL at 4/17/2021  4:22 AM  INR(ratio): 1.81 at 4/17/2021  4:22 AM  Age: 61 years    - GI/ hepatology following    # Mild hepatopathy  # Previous history of GI bleeding in the context of Angioectasia/AVM's  # Protein losing nephropathy  CT Abd to evaluate liver and biliary tree. Hepatitis panel sent.  - PPI  - Nutrition consulted for FT placement and nutrition      Renal/Fluids/Electrolytes:    Hypocalcemia  - replete with IV Calcium  Chloride      # Acute on Chronic Kidney Disease Stage 4  # High Anion Gap Metabolic Acidosis - Improved  # Combined Lactic Acidosis Type A/B  # Hyperkalemia (6.4)  # Hyponatremia (likely related to poor oral intake and hypervolemia)  # Protein Losing Nephropathy     Baseline Scr: 2.1-2.4 mg/dL> Most recently 3.89 mg/dL with associated hyperkalemia, hyponatremia and significant metabolic acidosis (Co2: 17-->17) in the context of lacticemia despite two vasopressors. Etiology could potentially be related to mixed distributive//septic vs. Cardiogenic shock.      - Nephrology Consulted and Appreciate recommendations  - CRRT Goal ~0.5L net negative, can set to ~170-180cc/hr UF to achieve.   - Strict I/O's  - Bladder scan qshift and straight cath PRN  - Daily Weights  - Avoid NSAID or Nephrotoxic agents       Endocrine:    # Hypothyroidism  TSH 10, T4 0.34. T3 0.3 indicating hypothyroidism.  - start synthroid 25 mcg     # Type II Diabetes Mellitus  - Oral Hypoglycemia Protocol Initiated  - Small dose sliding scale insulin       ID:  # Shock, septic + cardiogenic---presumed decompensated HF + aspiration PNA  Bronchoscopy done 4/14 and a copious amount of thick sputum was collected. Sputum cell count was > 4K with neutrophilic predominance which is likely the source.Multiple weeping blisters on lower extremities, some with erythema -cultures NGTD. No meningeal signs to suggest CNS.  - Continue Zosyn until sputum cultures result  - Stress  dose steroids with hydrocortisone and fludrocortisone  - Urine/sputum/blood cultures NGTD     Hematology:    # Chronic Iron Deficiency Anemia  # Thrombocytopenia likely related to underlying sepsis  # Coagulopathy 2/2 hepatopathy   # Supratherapeutic INR  - continue Vit K, fix underlying cause  - Continue Monitoring Hgb  - Hgb goal >7 g/dL  - Holding on heparin products and anticoagulation   - transfuse pRBC,Cryoprecipitate x 5 units    # Superficial venous thrombosis in basilic vein   - No AC d/t coaggulopathy  - Serial monitoring with US q 3 days     General Cares/Prophylaxis:    DVT Prophylaxis: SCD   GI Prophylaxis: PPI  Family Communication: None  Code Status:  Full Code     Lines/tubes/drains:  - Right Femoral Arterial Line/ Left Femoral Triple CVC/ PIV/ swan  - Left internal jugular Castro Dialysis Catheter     Disposition:  - Medical ICU until further notice       Patient seen and findings/plan discussed with medical ICU staff, Dr. Reyes.    Pete Domingo    ====================================  INTERVAL HISTORY:   5 beats run of SVT    OBJECTIVE:   1. VITAL SIGNS:   Temp:  [97.6  F (36.4  C)-98.7  F (37.1  C)] 98.1  F (36.7  C)  Pulse:  [] 90  Resp:  [16] 16  MAP:  [47 mmHg-213 mmHg] 75 mmHg  Arterial Line BP: ()/() 115/59  FiO2 (%):  [35 %] 35 %  SpO2:  [89 %-100 %] 100 %  Ventilation Mode: CMV/AC  (Continuous Mandatory Ventilation/ Assist Control)  FiO2 (%): 35 %  Rate Set (breaths/minute): 16 breaths/min  Tidal Volume Set (mL): 500 mL  PEEP (cm H2O): 5 cmH2O  Oxygen Concentration (%): 35 %  Resp: 16    2. INTAKE/ OUTPUT:   I/O last 3 completed shifts:  In: 3284.93 [I.V.:2604.93; NG/GT:250; IV Piggyback:250]  Out: 3489 [Other:2864; Stool:625]    3. PHYSICAL EXAMINATION:  General: intubated and sedated  HEENT: Normocephalic/Atraumatic, intubated, EOMI/MOM, icteric oral muscosa/sclera.   Pulm/Resp:mechanically ventilated, crackles present   CV: RRR, normal S1 and S2. No  murmurs/rubs gallops. Peripheral pulses +1. Significant peripheral pitting edema +3 from legs to inguinal/scrotal region.   Abdomen: Soft, non-distended, non-tender. No pain/tenderness to palpation. No guarding/rebound.  : Not examined  Neuro: Not moving extremities as he is sedated at this time  Incisions/Skin: Multiple open wounds on legs, now dressed. Hyperpigmented skin in the legs/chronic venous stasis changes.  Dusky toes, Nail dystrophy on toes bilaterally.     4. LABS:   Arterial Blood Gases   Recent Labs   Lab 04/15/21  1403 04/15/21  0433 04/14/21  1543 04/14/21  0859   PH 7.41 7.48* 7.40 7.26*   PCO2 30* 24* 25* 31*   PO2 130* 143* 92 182*   HCO3 19* 18* 15* 14*     Complete Blood Count   Recent Labs   Lab 04/16/21  0356 04/15/21  0406 04/14/21  1645 04/14/21  0435 04/13/21  0333   WBC 14.7* 14.5*  --  15.1* 16.9*   HGB 8.1* 8.3* 8.5* 8.3* 9.4*   PLT 16* 29*  --  34* 37*     Basic Metabolic Panel  Recent Labs   Lab 04/16/21  0953 04/16/21  0356 04/15/21  2151 04/15/21  1531    137 137 137   POTASSIUM 4.1 4.2 4.2 4.1   CHLORIDE 107 106 107 107   CO2 19* 18* 20 18*   BUN 23 24 26 25   CR 0.93 1.01 1.05 1.01   * 136* 145* 151*     Liver Function Tests  Recent Labs   Lab 04/16/21  0356 04/15/21  0406 04/14/21  0607 04/14/21  0435 04/13/21  0333 04/12/21  0928   * 191*  --  112* 88*  --    * 107*  --  64 37  --    ALKPHOS 118 107  --  99 101  --    BILITOTAL 12.2* 9.0*  --  7.6* 6.3*  --    ALBUMIN 2.5* 2.7*  --  2.5* 2.8*  --    INR  --  3.11* 3.85*  --  3.56* 2.61*     Coagulation Profile  Recent Labs   Lab 04/15/21  0406 04/14/21  0607 04/13/21  0333 04/12/21  0928 04/11/21  1628   INR 3.11* 3.85* 3.56* 2.61* 1.98*   PTT  --   --  58* 56* 52*       5. RADIOLOGY:   Recent Results (from the past 24 hour(s))   XR Chest Port 1 View    Narrative    Exam: XR CHEST PORT 1 VW, 4/16/2021 6:00 AM    Indication: ETT eval; swan    Comparison: 4/15/2021    Findings: Single AP chest  radiograph. Endotracheal tube in the mid  thoracic trachea. Right Cruger-Messi catheter tip terminating over the  distal right pulmonary artery. Enteric tube projecting over the  esophagus, tip outside the field-of-view. Trachea is midline when  accounting for patient rotation. Stable cardiomegaly. Moderate right  pleural effusion with overlying opacities. Trace left pleural  effusion. No pneumothorax. No acute osseous findings.      Impression    Impression:   1. Support devices in stable position when accounting for patient  rotation.  2. Moderate right pleural effusion with overlying dense opacities,  atelectasis versus consolidation.  3. Trace left pleural effusion.    I have personally reviewed the examination and initial interpretation  and I agree with the findings.    AYANA MENENDEZ, DO   US Low Extremity Arterial Duplex Left Port    Narrative    ULTRASOUND LOWER EXTREMITY ARTERIAL DUPLEX LEFT 4/16/2021 2:45 PM    CLINICAL HISTORY: dusky toes.     COMPARISONS: None available.    REFERRING PROVIDER: FREDERICK LYNCH    TECHNIQUE: Left leg arteries evaluated with color Doppler and Doppler  waveform ultrasound.    FINDINGS: LEFT:  Common femoral artery: 94 cm/s, triphasic  Profundus femoral artery: 55 cm/s, triphasic    Superficial femoral artery, origin: 77 cm/s, triphasic  Superficial femoral artery, mid thigh: 35 cm/s, biphasic  Superficial femoral artery, distal thigh: 41 cm/s, biphasic    Popliteal artery: 32 cm/s, triphasic    Posterior tibial artery, ankle: 30 cm/s, arterial monophasic  Anterior tibial artery, ankle: 40 cm/s, biphasic      Impression    IMPRESSION: Dopplerable flow in the left anterior and posterior tibial  arteries at the ankle.    SEVERIANO HILL MD

## 2021-04-16 NOTE — PROGRESS NOTES
"CRRT STATUS NOTE    DATA:  Time:  3:54 PM  Pressures WNL: Yes  Filter Status:  WDL    Problems Reported/Alarms Noted:  None reported    Supplies Present: Yes    ASSESSMENT:  Patient Net Fluid Balance:  Weight even from admission. Net -653cc today.   Vital Signs: BP (!) 84/69 (BP Location: Right arm)   Pulse 86   Temp 98.1  F (36.7  C) (Oral)   Resp 16   Ht 1.727 m (5' 8\")   Wt 100.1 kg (220 lb 12 oz)   SpO2 100%   BMI 33.56 kg/m    Labs:  Plts 16 (MD aware), WBC 14.7, other labs unremarkable.   Goals of Therapy:  Per Order: Goal ~0.5L net negative, can set to ~170-180cc/hr UF to achieve.  High stool output/able to meet goals of care.     INTERVENTIONS:   none    PLAN:  Continue with plan of care and notify CRRT resource for questions/concerns. #[id number]    "

## 2021-04-16 NOTE — PLAN OF CARE
ICU End of Shift Summary. See flowsheets for vital signs and detailed assessment.    Changes this shift: Neuro status relatively unchanged. RASS -2 to -3. Levophed and vaso to keep MAP > 60. Amiodarone continues. DONOVAN Q6. Last at 15:00. Able to find BUE and RLE pulses with doppler. US of LLE completed, no issues noted. Respiratory status unchanged. TF increase Q8 hours until goal of 55mL/hr. Currently at 25mL/hr. See board in room for schedule. Over 1L of stool output today. CRRT set at 0, almost 1L net negative d/t stool output. 1 unit of platelets currently infusing. No signs/symptoms of bleeding. Recheck at midnight.     Plan: continue to monitor and notify team of changes.

## 2021-04-16 NOTE — PROGRESS NOTES
Nephrology Progress Note  04/16/2021       Overall Mr. Jesus is a 60-year-old gentleman with multiple comorbidities related to his diabetes and ischemic cardiomyopathy.  He was admitted to the ICU with worsening shock requiring multiple pressors with evidence of hyperkalemia and moderate to severe lactic acidosis.      Interval History :   Mr Jesus continues on CRRT, was net even yesterday, needed 3.4 L of UF to achieve this.  Had episode of hypotension during pulling overnight suggesting he may need CVP around 14 to maintain hemodynamics until sepsis improves.  Goal is net even to slightly negative today, with just over 3 L of intake we are setting machine at 170 cc/h UF which should achieve this and less intake changes.  Once more stable hemodynamically we will look to pull fluid.      Assessment & Recommendations:   EVA on CKD-Baseline Cr 2.5-3, has followed in Neph clinic with Dr Vigil but not since 12/2019.  Cr up to ~4 with BUN of 152 before starting CRRT on 4/11 for hyperkalemia and metabolic acidosis in the setting of shock, sepsis vs cardiogenic.  On 2 pressors with sepsis vs cardiogenic shock.                 -Access is [facility] temp line.                -CRRT 25ml/kg/hr dosing, given fluid yesterday and now trying to pull 0.5L net negative today.      Volume status-Overloaded and S-G suggests a mixed cardiogenic/septic picture, was net positive yesterday with volume expansion, last CVP 14, trying to pull 0.5L today.  Setting machine at ~170- 180cc/hr which should achieve this based on ongoing intake.      Electrolytes/pH-K 4.1, bicarb 19 on CRRT with HAGMA but improved, VBG 7.38/35/31/21.       Ca/phos/pth-Ca 8.2, Mg and Phos WNL.      CHF-EF 25-30%, also with possible sepsis component, on Vaso and NE so treating mostly as sepsis for now.       Anemia-Hgb 8.1, on downtrend, acute management per team.      Nutrition-Deferred     Seen and discussed with Dr Buck     Recommendations were communicated to  "primary team via verbal communication.     GUILLE Silver CNS  Clinical Nurse Specialist  [phone]    Review of Systems:   I reviewed the following systems:  ROS not done due to vent/sedation.     Physical Exam:   I/O last 3 completed shifts:  In: 3284.93 [I.V.:2604.93; NG/GT:250; IV Piggyback:250]  Out: 3489 [Other:2864; Stool:625]   BP (!) 84/69 (BP Location: Right arm)   Pulse 93   Temp 98.1  F (36.7  C) (Oral)   Resp 16   Ht 1.727 m (5' 8\")   Wt 100.1 kg (220 lb 12 oz)   SpO2 100%   BMI 33.56 kg/m       GENERAL APPEARANCE: Intubated and sedated.   Head NC/AT  EYES: mildly scleral icterus  HENT: mouth  without ulcers or lesions  NECK: supple  Pulmonary: fair air entry and exchange   CV: irregular rhythm, tachy rate, no rub   - Edema bilateral   GI: soft, nontender, normal bowel sounds, no HSM   MS: no evidence of inflammation in joints, no muscle tenderness  : [place]  SKIN: blisters over both lower extremities    NEURO: Intubated and sedated.     Labs:   All labs reviewed by me  Electrolytes/Renal -   Recent Labs   Lab Test 04/16/21  0953 04/16/21  0356 04/15/21  2151 04/15/21  1531 04/15/21  0406    137 137 137 137   POTASSIUM 4.1 4.2 4.2 4.1 4.2   CHLORIDE 107 106 107 107 105   CO2 19* 18* 20 18* 17*   BUN 23 24 26 25 30   CR 0.93 1.01 1.05 1.01 1.13   * 136* 145* 151* 140*   RONNY 8.2* 8.1* 8.0* 8.0* 8.6   MAG  --  2.7*  --  2.4* 2.5*   PHOS  --  3.9  --  4.2 4.1       CBC -   Recent Labs   Lab Test 04/16/21  0356 04/15/21  0406 04/14/21  1645 04/14/21  0435   WBC 14.7* 14.5*  --  15.1*   HGB 8.1* 8.3* 8.5* 8.3*   PLT 16* 29*  --  34*       LFTs -   Recent Labs   Lab Test 04/16/21  0356 04/15/21  0406 04/14/21  0435   ALKPHOS 118 107 99   BILITOTAL 12.2* 9.0* 7.6*   * 107* 64   * 191* 112*   PROTTOTAL 5.7* 5.9* 6.0*   ALBUMIN 2.5* 2.7* 2.5*       Iron Panel -   Recent Labs   Lab Test 12/18/20  1242 11/12/20  1212 08/13/20  1006   IRON 21* 12* 16*   IRONSAT 5* 3* 3* "   FRANK 25* 11* 9*           Current Medications:    B and C vitamin Complex with folic acid  5 mL Per Feeding Tube Daily     fludrocortisone  50 mcg Oral or Feeding Tube Daily     hydrocortisone sodium succinate PF  50 mg Intravenous Q6H     insulin aspart  1-12 Units Subcutaneous Q4H     lactulose  20 g Oral or Feeding Tube TID     levothyroxine  25 mcg Oral or Feeding Tube QAM AC     pantoprazole (PROTONIX) IV  40 mg Intravenous Daily with breakfast     piperacillin-tazobactam  4.5 g Intravenous Q6H     protein modular  2 packet Per Feeding Tube TID     senna-docusate  2 tablet Oral At Bedtime       amiodarone 0.5 mg/min (04/16/21 1300)     dextrose       dextrose 10% 30 mL/hr at 04/15/21 0008     CRRT replacement solution 12.5 mL/kg/hr (04/16/21 1145)     fentaNYL 25 mcg/hr (04/16/21 1300)     midazolam 1 mg/hr (04/16/21 1300)     - MEDICATION INSTRUCTIONS -       norepinephrine 0.4 mcg/kg/min (04/16/21 1345)     CRRT replacement solution 200 mL/hr at 04/15/21 1949     CRRT replacement solution 12.5 mL/kg/hr (04/16/21 1145)     vasopressin 4 Units/hr (04/16/21 1300)

## 2021-04-16 NOTE — PLAN OF CARE
Major Shift Events: Neuro status remains relatively unchanged; RASS of -2 to -3. Pulses continue to be difficult to find; good cap refill and able to doppler at times. DONOVAN q4, see flowsheets for values. Levo/vaso continue for MAP >60. CRRT 0-50, net -340 since midnight. Circuit changed overnight. Increase in stool output via rectal pouch. Platelets 16 this morning, MD aware. 1g calcium given x2.  Plan: Continue to pull on CRRT, continue plan of care.  For vital signs and complete assessments, please see documentation flowsheets.

## 2021-04-16 NOTE — PROGRESS NOTES
CRRT STATUS NOTE    DATA:  Time: 6:09 AM    Pressures WNL:  YES  Filter Status:  WDL    Problems Reported/Alarms Noted:  None    Supplies Present:  YES    ASSESSMENT:  Patient Net Fluid Balance:  At midnight -25mL at 0600 -274mL    Vital Signs:  Temp 97.6  F (36.4  C) (Axillary)  Pulse 95,  Resp 16, SpO2 100% Arterial Line BP:  124/65 [81] mmHg, CVP: 14 mmHg, SVO2: 53 %    Labs:   Recent Labs   Lab Test 04/16/21  0356 04/15/21  2151    137   POTASSIUM 4.2 4.2   CHLORIDE 106 107   CO2 18* 20   ANIONGAP 13 11   * 145*   BUN 24 26   CR 1.01 1.05   RONNY 8.1* 8.0*   ICA 4.3 4.1        Goals of Therapy:  50cc/hr, goal net negative 1L today, thanks.    INTERVENTIONS:   Circuit change and electrolytes replaced per protocol.    PLAN:  Continue with treatment goal. Change circuit Q72hs or PRN if clogging or clotting. Call CRRT RN with questions and concerns at [phone].

## 2021-04-16 NOTE — PROGRESS NOTES
"SPIRITUAL HEALTH SERVICES  SPIRITUAL ASSESSMENT Progress Note (Palliative Focus)  [facility] ([place]) 4C    REFERRAL SOURCE: Palliative care follow up.    Visit with patient Cole \"Pancho\" Ann Marie's wife Haven at bedside. Haven told stories of their meeting and partnership of many decades. She hopes that Pancho will be able to achieve his goal of returning home with the independence of walking using a walker.  She talked about the many adaptations Pancho has made in life due to health challenges, from being a jogger to walking around [facility], to her hope that he might be able to talk smaller walks outdoors after whatever rehab stay follows this hospitalization.    Haven affirmed again how important Pancho's [place] ways are to him. He has a sweetgrass braid taped to his bed rail, and as a sacred object, it should not be disturbed unless absolutely necessary. Haven plans to bring in materials to smudge, and she will ask for  assistance in covering smoke detectors as needed. She is appreciative of emotional/spiritual support.    Plan: I will follow for spiritual support while Palliative Care is consulted.    Vicki Waite  Palliative   Pager [phone]  [facility] Inpatient Team Consult pager [phone] (M-F 8-4:30)  After-hours Answering Service [phone]    "

## 2021-04-16 NOTE — PROVIDER NOTIFICATION
MICU notified of episode of hypotension with MAPs 50-55. CRRT pull decreased to 0ml/hr, levo titrated to 0.5, pt laid flat and placed in Trendelenburg. Lactic sent, result 1.7. Took ~45 min to recover. Per MICU will keep CRRT pull low until pt recovers, continue to monitor.

## 2021-04-16 NOTE — PROGRESS NOTES
Nutrition Services - Brief Note    Stooling, plan to increase TF rate. CRRT continues. Lytes are in good range.    Interventions already implemented by the RD:  Discussed nutrition POC on MICU rounds.     Wrote orders for Osmolite 1.5 Jonatan @ 55 ml/hr = 1980 kcals (26 kcal/kg), 83 g PRO (1.1 g PRO/kg), 1005 mL H2O, 268 g CHO, and 0 g fiber daily + 2 pkt TID (6 pkts total daily) = 2220 kcal (28 kcal/kg) and 149 g PRO (1.9 g/kg) daily.     Recommendations:  Monitor lytes, replace PRN if low, pending renal fxn.    RD will continue to follow.  Gin Perez, INNA, LD  (MICU dietitian, 2996 (Mon-Fri))

## 2021-04-17 NOTE — PLAN OF CARE
ICU End of Shift Summary. See flowsheets for vital signs and detailed assessment.    Changes this shift: Versed gtt off at 1245, fentanyl gtt at 50 mcg/hr. Patient does not open eyes but grimaces with external stim, localizes in uppers and withdraws in lowers. Temp 94-95 this shift from esophageal source, jerrod hugger in place. SR 70s with frequent PVCs. Pressor requirements relatively stable with MAP>60 goal, remains on levophed gtt at 0.2 mcg/kg/min and vaso at 4 units/hr. Significant hypotension after PO med administration x2- SBP 70s, MAP 50s. Recovered slowly without increased pressor needs. Doppler used for all pulses, especially weak on L pedal- toes black/red in this foot, MICU aware. DONOVAN q 6, see flowsheets. Minimal vent settings, 30% and PEEP 5. CRRT goal net negative 2.4L (or 100/hr), tolerating ok. 600 ml stool output with lactulose, no urine output. Insulin gtt started. Skin remains edematous + weeping. Significant other updated at bedside.     Plan:  Continue plan of care. Goal for FR with CRRT using increased pressors if needed. Notify MICU of any changes.

## 2021-04-17 NOTE — PROGRESS NOTES
Vascular Access Services Notes:    Extended dwell IV catheter inserted in the RIGHT UPPER ARM BASILIC VEIN x1 ATTEMPT without complication.    Time spent with the patient - 45 minutes          STEFFANIE Wayne, RN [facility]

## 2021-04-17 NOTE — PLAN OF CARE
CRRT STATUS NOTE    DATA:  Time:  6:00 AM  Pressures WNL:  YES  Filter Status:  WDL    Problems Reported/Alarms Noted:  Filter clotted.    Supplies Present:  YES    ASSESSMENT:  Patient Net Fluid Balance:  Net + 71.09 ml @ 0600.  Vital Signs:  HR 78, BP 91/53, MAP 65  Labs:  K 4, Mg 2.6, Phos 3.6, iCa 4.1, Hgb 6.8, Plt 27  Goals of Therapy: Goal ~0.5L net negative, can set to ~170-180cc/hr UF to achieve.     INTERVENTIONS:   Filter clotted, machine restarted at 0303. 1 unit of RBC and 1 unit of cryo to be given. I jamil replaced w/ 1 gram.     PLAN:  Continue to monitor circuit daily and change set q72 hours or PRN for clotting/clogging. Please call CRRT RN with any quesitons/problems.

## 2021-04-17 NOTE — PLAN OF CARE
ICU End of Shift Summary. See flowsheets for vital signs and detailed assessment.    Changes this shift: RASS -3. Not following commands. Versed @ 1. Fentanyl @ 25. PRN Fentanyl for CPOT of 4-5 overnight. Afebrile. NSR with rare PVC's. 5 beats of VTach this morning. Morning DONOVAN: CVP 17, PA 60/26, CO 4.6, CI 2.1, . BUE and BLE pulses weak via doppler; venous US completed at bedside last evening. Vaso and Levo titrated to MAP goal >60. Morning VB.39/39/24/23 on 30% FiO2. TF's advanced to goal of 55 mL/hr. Rectal pouch with 200 mL output since 0000. Anuric. Net negative 73 mL's since 0000; not meeting goal 2/2 hypotension. CRRT filter clotted; restarted at 0303. Critical HGB of 6.8 and critical Fibrinogen of 93 this morning; MD notified. Ionized Ca+ 4.1; 1 gram given. CXR done.     Plan: Transfuse 1 unit of RBC's. Finish 1 unit of Cryo. Titrate pressors to MAP goal. Continue toward CRRT goal.

## 2021-04-17 NOTE — PROGRESS NOTES
"  Nephrology Progress Note  04/17/2021       Interval History :   Nursing and provider notes from last 24 hours reviewed.  In the last 24 hours Cole Jesus       Assessment & Recommendations:   Cole Jesus is a 61 year old year old male with ahx of diabetes and ischemic cardiomyopathy. Initially admitted with what appeared to be sepsis. Currently appears to have a mixed picture.    Recommendations were communicated to primary team in person    1. EVA on CKD requiring dialysis: currently CRRT. We had aimed for a net negative of 1 L overnight. Rates were turned down because pressor support went up. CVP remains elevated today. Discussed with team. Given his cardiomyopathy with an EF of 15-20 we do need to optimize his fluid status. I believe he has sig third spaced fluid but our CVP has been consistent and remains elevated.    *Obligatory intake has been consistent at ~3.2 L a day. This does include some blood products   *Non CRRT output: stool has been ~1000. No urine output   *This leaves ~2 L required to pull from CRRT. At 100 CCs per hour this will pull 2.4 L over the next 24 hours. At this rate he would be net even. His stool has been variable so I will reassess in the afternoon to determine if we need to adjust further to assure we are negative by tomorrow am.    Phyllis Buck MD   [phone]      Review of Systems:   I reviewed the following systems:  Intubated and sedated      Physical Exam:   I/O last 3 completed shifts:  In: 3208.63 [I.V.:1760.63; Other:9; NG/GT:374]  Out: 3416 [Other:2141; Stool:1275]   BP (!) 80/53   Pulse 69   Temp 96  F (35.6  C) (Tympanic)   Resp 16   Ht 1.727 m (5' 8\")   Wt 98.2 kg (216 lb 7.9 oz)   SpO2 100%   BMI 32.92 kg/m       Gen: intubated and sedated  Lungs: Clear anterior  Card: irregular on exam  Ext: pitting edema B LE  CVP: 19 this am  Access: L IJ    Labs:   All labs reviewed by me  Electrolytes/Renal -   Recent Labs   Lab Test 04/17/21  1040 " 04/17/21  0422 04/16/21  2216 04/16/21  1611 04/16/21  0356 04/16/21  0356    138 137 139   < > 137   POTASSIUM 3.8 4.0 4.0 4.0   < > 4.2   CHLORIDE 108 107 108 109   < > 106   CO2 21 20 18* 20   < > 18*   BUN 25 26 25 23   < > 24   CR 0.78 0.91 0.90 0.92   < > 1.01   * 255* 211* 176*   < > 136*   RONNY 8.6 7.8* 8.0* 8.2*   < > 8.1*   MAG  --  2.6*  --  2.5*  --  2.7*   PHOS  --  3.6  --  3.9  --  3.9    < > = values in this interval not displayed.       CBC -   Recent Labs   Lab Test 04/17/21  1200 04/17/21  0422 04/17/21  0002   WBC 13.9* 13.5* 14.0*   HGB 8.2* 6.8* 7.2*   PLT 23* 27* 35*       LFTs -   Recent Labs   Lab Test 04/17/21  0422 04/16/21  0356 04/15/21  0406   ALKPHOS 107 118 107   BILITOTAL 14.4* 12.2* 9.0*   ALT 86* 118* 107*   AST 67* 147* 191*   PROTTOTAL 5.2* 5.7* 5.9*   ALBUMIN 2.2* 2.5* 2.7*       Iron Panel -   Recent Labs   Lab Test 12/18/20  1242 11/12/20  1212 08/13/20  1006   IRON 21* 12* 16*   IRONSAT 5* 3* 3*   FRANK 25* 11* 9*           Current Medications:    B and C vitamin Complex with folic acid  5 mL Per Feeding Tube Daily     calcium chloride  2 g Intravenous Once     fludrocortisone  50 mcg Oral or Feeding Tube Daily     hydrocortisone sodium succinate PF  50 mg Intravenous Q6H     lactulose  20 g Oral or Feeding Tube TID     levothyroxine  25 mcg Oral or Feeding Tube QAM AC     pantoprazole (PROTONIX) IV  40 mg Intravenous Daily with breakfast     piperacillin-tazobactam  4.5 g Intravenous Q6H     protein modular  2 packet Per Feeding Tube TID     senna-docusate  2 tablet Oral At Bedtime       amiodarone 0.5 mg/min (04/17/21 1400)     dextrose       dextrose       dextrose 10% 30 mL/hr at 04/15/21 0008     CRRT replacement solution 15 mL/kg/hr (04/17/21 1355)     fentaNYL 50 mcg/hr (04/17/21 1400)     insulin (regular)       midazolam Stopped (04/17/21 1245)     - MEDICATION INSTRUCTIONS -       norepinephrine 0.16 mcg/kg/min (04/17/21 1400)     CRRT replacement solution  200 mL/hr at 04/17/21 0219     CRRT replacement solution 15 mL/kg/hr (04/17/21 1356)     vasopressin 4 Units/hr (04/17/21 1400)     Phyllis Buck MD

## 2021-04-18 NOTE — PLAN OF CARE
ICU End of Shift Summary. See flowsheets for vital signs and detailed assessment.    Changes this shift: RASS -2 to -3. Patient opening eyes more this shift. Not following commands. Intermittently restless and CPOT's 4 to 5, PRN Fentanyl given. Fentanyl continuous @ 50. Afebrile. NSR with occasional PVC's. Amio @ 0.5. Levo and Vaso titrated to MAP goal >60. Morning DONOVAN: CVP 20, PA 70/35, CO 3.9, CI 1.8, . Insulin gtt stopped for BG 84 (alg 4). Rectal pouch with 150 ml output. Anuric. Bladder scanned for 26 mL's. CRRT filter clotted overnight; restarted at 0310. Net negative -140 thus far. iCa+ 4.2 and replaced with 1 gram.     Plan: Continue towards CRRT goal. Titrate pressors to MAP >60.

## 2021-04-18 NOTE — PROGRESS NOTES
"CRRT STATUS NOTE    DATA:  Time:  2:39 PM  Pressures WNL:  YES  Filter Status:  WDL    Problems Reported/Alarms Noted:  none    Supplies Present:  YES    ASSESSMENT:  Patient Net Fluid Balance:  Net negative 152 ml since MN 4/18  Vital Signs:  BP (!) 71/29 (BP Location: Left arm)   Pulse 98   Temp 98.4  F (36.9  C) (Esophageal)   Resp 16   Ht 1.727 m (5' 8\")   Wt 98.1 kg (216 lb 4.3 oz)   SpO2 100%   BMI 32.88 kg/m    Pt is on 3 pressors.  Labs:  K=3.6, Hgb= 7.7 this am at 0345.  Goals of Therapy:  Set pt UF to 160 mls/hour    INTERVENTIONS:   none    PLAN:  Continue POC. Call CRRT RN at [phone] with any questions or concerns.    "

## 2021-04-18 NOTE — PROGRESS NOTES
"CRRT STATUS NOTE    DATA:  Time:  0448 AM  Pressures WNL:  YES  Filter Status:  WDL    Problems Reported/Alarms Noted:  None    Supplies Present:  YES    ASSESSMENT:  Patient Net Fluid Balance:  Positive 4,433ml since admit, Net negative 200ml since midnight.  Vital Signs:  BP (!) 71/29 (BP Location: Left arm)   Pulse 89   Temp 97  F (36.1  C)   Resp 18   Ht 1.727 m (5' 8\")   Wt 98.1 kg (216 lb 4.3 oz)   SpO2 97%   BMI 32.88 kg/m    Labs:  Hgb- 7.7, K+ 3.7, Creatinine-0.76, Ionized Calcium-4.2, Platelets 11  Goals of Therapy:  Removal rate of 100 ml/hr     INTERVENTIONS:   Restarted circuit at 0310.    PLAN:  Will continue to monitor and assess for changes. Call CRRT RN with any questions [phone].    "

## 2021-04-18 NOTE — PROGRESS NOTES
Bilateral wrist restraints initiated on patient on 4/18/2021 at 1000    Clinical Justification: Pulling lines, pulling tubes, and pulling equipment  Less Restrictive Alternative: Repositioning, Re-evaluate equipment, Disguise equipment, Pain management, De-escalation, Reorientation, 1:1 patient care  Attending Physician Notified: MD ordered restraint,     Order received: Yes     Family Notification: Spouse/significant other   Criteria explained to patient, significant other  Patient's Response: No evidence of learning  Restraint care Plan initiated: Yes    Batsheva Truong RN

## 2021-04-18 NOTE — PROGRESS NOTES
MEDICAL ICU PROGRESS NOTE  04/18/2021      Date of Service (when I saw the patient): 04/18/2021    Mr. Pancho Jesus is a  60 year old male with PMHx most significant for HFrEF 25-30% 2/2 ICM after STEMI x/p DESx3 (2012) & PCI prox RCA 2019, severe Pulmonary Hypertension, moderate MR,  CKD IV, Paroxysmal A fib not on AC, Chronic Iron Deficiency Anemia 2/2 GIB due to AVMs, previous LGI bleeding requiring ileocecal resection, T2DM, who was admitted 4/12 to the MICU for treatment of septic shock.     Major changes today:  - Started dobutamine 2.5 mcg/kg/min, marginal improvement in CI - discontinued  - Started epinephrine, will titrate up as tolerated and reassess CI  - Continue stress dose steroids and broad spectrum abx  - Continue cautious fluid removal with CRRT per renal recs    Neuro:  # Sedation  Goal Rass -1 to -2  - Fentanyl gtt + bolus  - Versed bolus PRN    # Hepatic encephalopathy Stage 2-3  Had asterixis on exam, now sedated  - Lactulose 20 TID (titrate for 3-5 BM) or 800-1200 ml of stool output a day.      Cardiovascular  # Shock, septic + cardiogenic---presumed decompensated HF + aspiration PNA  Requiring 2 pressors currently, MAP goal 60. Volume status is complicated by vasodilatory effects of sepsis and congestion from heart failure.  Blood culture remain NGTD, skin culture NGTD. CT revealing of nodular consolidation with surrounding GGO in superior LLL. Concern for aspiration PNA. Placed a swan catheter for volume monitoring. Currently on CRRT  attempt to mediate fluid status. TTE 4/11 with small circumferential effusion without tamponade physiology. TTE 4/12 EF 25-35% with reduced global RV function. Antibiotics as below in ID section. [place] catheter placed on 4/15. All pressures elevated with RAP 20, RVP 62, PA 64/30, PCW 30 and CVP 21. Despite attempts to remove fluid, we have been unable to adequately remove fluids nor make headway. Patient intracardiac pressures are elevated.     - Goal net  negative 1 today per renal, will continue to track hemodynamics with swan. Continue CRRT  - Discontinue Vancomycin 4/14 after negative cultures for 3 days  - Continue Zosyn given how sick he for 10 days  - Culture 4/14 from BAL - light MSSA growth   - Repeat Cultures 4/14 from BAL - NGTD       # HFrEF 25-30% 2/2 ICM  # Coronary Artery Disease s/p DESx3  # Type 2 NSTEMI  # Severe Pulmonary Hypertension  Maintaining I + O on CRT. Wean epi to avoid ventricular arrythmias.  - Hold on PTA diuretics  - TTE similar to prior      # Moderate MR  # Paroxysmal Atrial Fibrillation (not on anticoagulation)  On amiodarone for ventricular arrythmias that started after pressors. Will continue as we re-attempt epinephrine administration.  - On cardiac Telemetry  - Goal: K+ >4.0, Mg2+ > 2.0  - Continue Amiodarone gtt    Pulmonary:     # Pulmonary Nodulues  9 mm nodular consolidation with surrounding groundglass attenuation in the superior left lower lobe, favor infectious/inflammatory  etiology though is indeterminant. Few other pulmonary nodules noted.  - Follow-up CT in 3 months to assess for interval change.  - Continue IV Zosyn    # Acute Hypoxic Respiratory Failure- intubated  # Concern for aspiration PNA  Patient was found using abdominal muscles to breath and was increasingly somnolent. Patient was maintaining oxygenation despite this on FIO2 40%. However, was hypotensive with MAPS 50s despite being on 0.4 of levophed and vaso 4. HOB was elevated for WOB, but blood pressure dropped further. He was put back in flat position. Levophed threshold was increased to 0.7. RT was called at bed side to start patient on BIPAP. Stat CXR ordered simultaneously. Wife called and patient intubated. Bronchoscopy done 4/14 and a copious amount of thick sputum was collected. Sputum cell count was > 4K with neutrophilic predominance.   - Repeat cultures pending  - Continue IV Zosyn      Ventilation Mode: CMV/AC  (Continuous Mandatory Ventilation/  Assist Control)  FiO2 (%): 30 %  Rate Set (breaths/minute): 16 breaths/min  Tidal Volume Set (mL): 500 mL  PEEP (cm H2O): 5 cmH2O  Oxygen Concentration (%): 30 %  Resp: 16      GI/Nutrition:    # Cardiac Cirrhosis MELD 32>>28>>23  SAAG 1.3, making portal HTN very likely. Ascites protein 3.1, making cardiac cirrhosis very likely.  Ascites fluid noted to be transudative.    Etiology: likely chronic congestion from heart failure.  Hepatitis C antibody non reactive, Hep A antibody non reactive, Hep Bs Ag non reactive but will add on hep B antibody. Will need hep A and B vaccination    Evaluation: EGD: 2019 no esophageal varices. No evidence of portal gastropathy. No active bleeding, no EGD needed at this time.  Ascites: large volume. Fluid transudative according to light's criteria using total protein. Hold off on spirinolactone and lasix given soft pressures requiring pressors. Considering therapeutic para. Continue CRRT.  Coagulopathy:  likely hepatic congestion leading to compression of perisinusoidal and endothelial cells that produce F 7. Vit K x 3 days 4/13- 4/15.   Fibrinogen goal > 120 ( Give FFP today), PLT 27 today, transfuse PLT (PLT goal > 30 if not bleeding or 10 if bleeding) , INR continue vit K but will reverse with improvement of congestion    Hepatic encephalopathy: Grade 2-3 Asterixis on exam. Ammonia < 10 but can be normal in up to half of people with HE. Start lactulose and titrate to 3-5 BMs/day.  SBP: Diagnostic para shows ascitic protein 3.1 and PMN fluid 126. No ppx indicated. Culture pending.  HCC sceening: Abd US q 6 m + AFP. No hepatic mass noted on CT abdomen.  Diet: 2 g sodium restriction and 2 L fluid restriction  MELD-Na score: 21 at 4/18/2021  8:30 AM  MELD score: 21 at 4/18/2021  8:30 AM  Calculated from:  Serum Creatinine: 0.76 mg/dL (Rounded to 1 mg/dL) at 4/18/2021  3:44 AM  Serum Sodium: 139 mmol/L (Rounded to 137 mmol/L) at 4/18/2021  3:44 AM  Total Bilirubin: 14.2 mg/dL at  4/18/2021  3:44 AM  INR(ratio): 1.50 at 4/18/2021  8:30 AM  Age: 61 years     MELD-Na is 134 accounting for Cr.    - GI/ hepatology following    # Mild hepatopathy  # Previous history of GI bleeding in the context of Angioectasia/AVM's  # Protein losing nephropathy  CT Abd to evaluate liver and biliary tree. Hepatitis panel sent.  - PPI  - Nutrition consulted for FT placement and nutrition      Renal/Fluids/Electrolytes:    Hypocalcemia  - replete with IV Calcium  Chloride      # Acute on Chronic Kidney Disease Stage 4  # High Anion Gap Metabolic Acidosis - Improved  # Combined Lactic Acidosis Type A/B  # Hyperkalemia (6.4)  # Hyponatremia (likely related to poor oral intake and hypervolemia)  # Protein Losing Nephropathy     Baseline Scr: 2.1-2.4 mg/dL> Most recently 3.89 mg/dL with associated hyperkalemia, hyponatremia and significant metabolic acidosis (Co2: 17-->17) in the context of lacticemia despite two vasopressors. Etiology could potentially be related to mixed distributive//septic vs. Cardiogenic shock.      - Nephrology Consulted and Appreciate recommendations  - CRRT Goal ~0.5L net negative, can set to ~170-180cc/hr UF to achieve.   - Strict I/O's  - Bladder scan qshift and straight cath PRN  - Daily Weights  - Avoid NSAID or Nephrotoxic agents       Endocrine:    # Hypothyroidism  TSH 10, T4 0.34. T3 0.3 indicating hypothyroidism.  - start synthroid 25 mcg     # Type II Diabetes Mellitus  - Oral Hypoglycemia Protocol Initiated  - Small dose sliding scale insulin       ID:  # Shock, septic + cardiogenic---presumed decompensated HF + aspiration PNA  Bronchoscopy done 4/14 and a copious amount of thick sputum was collected. Sputum cell count was > 4K with neutrophilic predominance which is likely the source.Multiple weeping blisters on lower extremities, some with erythema -cultures NGTD. No meningeal signs to suggest CNS.  - Continue Zosyn until sputum cultures result  - Stress dose steroids with  hydrocortisone and fludrocortisone  - Urine/sputum/blood cultures NGTD     Hematology:    # Chronic Iron Deficiency Anemia  # Thrombocytopenia likely related to underlying sepsis  # Coagulopathy 2/2 hepatopathy   # Supratherapeutic INR  - continue Vit K, fix underlying cause  - Continue Monitoring Hgb  - Hgb goal >7 g/dL  - Holding on heparin products and anticoagulation   - transfuse pRBC,Cryoprecipitate x 5 units    # Superficial venous thrombosis in basilic vein   - No AC d/t coaggulopathy  - Serial monitoring with US q 3 days     General Cares/Prophylaxis:    DVT Prophylaxis: SCD   GI Prophylaxis: PPI  Family Communication: Partner, Haven  Code Status:  Full Code     Lines/tubes/drains:  - Right Femoral Arterial Line/ Left Femoral Triple CVC/ PIV/ swan  - Left internal jugular [facility] Catheter     Disposition:  - Medical ICU until further notice       Patient seen and findings/plan discussed with medical ICU staff, Dr. Reyes.    Pete Domingo    ====================================  INTERVAL HISTORY:   Mildly negative I/O yesterday on CRRT, but did have a clotted circuit which required brief cessation. Pressors variable but generally trending upwards. CI worsening. Attempted dobutamine this AM with marginal change in CI. Started epinephrine, will continue to monitor.    OBJECTIVE:   1. VITAL SIGNS:   Temp:  [94.5  F (34.7  C)-97.5  F (36.4  C)] 96.4  F (35.8  C)  Pulse:  [68-89] 83  Resp:  [16-18] 16  BP: (71)/(29) 71/29  MAP:  [54 mmHg-80 mmHg] 70 mmHg  Arterial Line BP: ()/(44-70) 102/58  FiO2 (%):  [30 %] 30 %  SpO2:  [95 %-100 %] 100 %  Ventilation Mode: CMV/AC  (Continuous Mandatory Ventilation/ Assist Control)  FiO2 (%): 30 %  Rate Set (breaths/minute): 16 breaths/min  Tidal Volume Set (mL): 500 mL  PEEP (cm H2O): 5 cmH2O  Oxygen Concentration (%): 30 %  Resp: 16    2. INTAKE/ OUTPUT:   I/O last 3 completed shifts:  In: 4360.11 [I.V.:2000.11; NG/GT:632]  Out: 4853 [Other:3868;  Stool:985]    3. PHYSICAL EXAMINATION:  General: intubated and sedated  HEENT: Normocephalic/Atraumatic, intubated, EOMI/MOM, icteric oral muscosa/sclera.   Pulm/Resp:mechanically ventilated, crackles present   CV: RRR, normal S1 and S2. No murmurs/rubs gallops. Peripheral pulses +1. Significant peripheral pitting edema +3 from legs to inguinal/scrotal region.   Abdomen: Soft, non-distended, non-tender. No pain/tenderness to palpation. No guarding/rebound.  : Not examined  Neuro: Not moving extremities as he is sedated at this time  Incisions/Skin: Multiple open wounds on legs, now dressed. Hyperpigmented skin in the legs/chronic venous stasis changes.  Dusky toes, Nail dystrophy on toes bilaterally.     4. LABS:   Arterial Blood Gases   Recent Labs   Lab 04/15/21  1403 04/15/21  0433 04/14/21  1543 04/14/21  0859   PH 7.41 7.48* 7.40 7.26*   PCO2 30* 24* 25* 31*   PO2 130* 143* 92 182*   HCO3 19* 18* 15* 14*     Complete Blood Count   Recent Labs   Lab 04/18/21  0344 04/17/21  1725 04/17/21  1200 04/17/21  0422   WBC 17.4* 14.4* 13.9* 13.5*   HGB 7.7* 7.9* 8.2* 6.8*   PLT 11* 19* 23* 27*     Basic Metabolic Panel  Recent Labs   Lab 04/18/21  0344 04/17/21  2009 04/17/21  1725 04/17/21  1040    140 138 137   POTASSIUM 3.7 3.5 3.7 3.8   CHLORIDE 109 111* 108 108   CO2 21 20 21 21   BUN 22 23 24 25   CR 0.76 0.76 0.76 0.78   * 229* 268* 240*     Liver Function Tests  Recent Labs   Lab 04/18/21  0344 04/17/21  0422 04/16/21  0356 04/15/21  0406 04/14/21  0607 04/13/21  0333 04/13/21  0333   AST 36 67* 147* 191*  --    < > 88*   ALT 72* 86* 118* 107*  --    < > 37   ALKPHOS 116 107 118 107  --    < > 101   BILITOTAL 14.2* 14.4* 12.2* 9.0*  --    < > 6.3*   ALBUMIN 2.3* 2.2* 2.5* 2.7*  --    < > 2.8*   INR  --  1.81*  --  3.11* 3.85*  --  3.56*    < > = values in this interval not displayed.     Coagulation Profile  Recent Labs   Lab 04/17/21  0422 04/15/21  0406 04/14/21  0607 04/13/21  0333  04/12/21  0928 04/11/21  1628   INR 1.81* 3.11* 3.85* 3.56* 2.61* 1.98*   PTT  --   --   --  58* 56* 52*       5. RADIOLOGY:   No results found for this or any previous visit (from the past 24 hour(s)).

## 2021-04-18 NOTE — PLAN OF CARE
ICU End of Shift Summary. See flowsheets for vital signs and detailed assessment.    Changes this shift: Patient much more awake today, opens eyes and nodded yes/no at best. PRN fentanyl and versed used for restlessness. Patient continues to reach for ETT, so bilat wrist restraints placed. Cardiac output/index remain low, Dobutamine trial this AM but too hypotensive, switched to epinephrine gtt, running at max rate. Norepi significantly decreased. Converted to afib rate 90s-110s at 1230, MICU aware. CVP 22, PAs 70s/30s. Remains on minimal vent settings. Less stool output this shift, 450 ml. No urine output. CRRT run exactly as ordered, 160 UF set each hour. Doppler pulses, L foot/toes still dusky/black. Significant bruising of L arm.     Plan:  Continue plan of care. Notify MICU of any changes.

## 2021-04-18 NOTE — PROGRESS NOTES
Nephrology Progress Note  04/18/2021       Mr. Pancho Jesus is a  60 year old male with PMHx most significant for HFrEF 25-30% 2/2 ICM after STEMI x/p DESx3 (2012) & PCI prox RCA 2019, severe Pulmonary Hypertension, moderate MR,  CKD IV, Paroxysmal A fib not on AC, Chronic Iron Deficiency Anemia 2/2 GIB due to AVMs, previous LGI bleeding requiring ileocecal resection, T2DM, who was admitted 4/12 to the MICU for treatment of septic shock.       Interval History :   Nursing and provider notes from last 24 hours reviewed.  We were attempting to pull some fluid and were successful at being -~500 (not including insensible losses.). Stool was over 1000 yesterday and today has been 600. This am they attempted dobutamine but his blood pressures did not tolerate. On CRRT his labs are stable. On the epi he has gone into afib but is rate controlled.      Assessment & Recommendations:   1. EVA: on CRRT   * Obligatory intake has been quite variable as we change over our pressor/inotropic support. Currently We are at about 70-80 per hour. It appears we will have an intake around 3.5L.  Output is via CRRT PLUS his stool output. Assuming he will have at least another 1000 mls of stool today, we need to pull 3 roughly to stay even. Currently we are pulling 150 per hour which gives us about 25 net neg per hour for a total of -600. He appears to be tolerating so we will bump this to 160 per hour which should allow us to be net negative closer to 800 by tomorrow or more if we have more stool.   Recommendations were communicated to primary team via note and to nursing directly        Phyllis Buck MD   [phone]      Review of Systems:   I reviewed the following systems:  Intubated and sedated    Physical Exam:   I/O last 3 completed shifts:  In: 4360.11 [I.V.:2000.11; NG/GT:632]  Out: 4853 [Other:3868; Stool:985]   BP (!) 71/29 (BP Location: Left arm)   Pulse 94   Temp 96.8  F (36  C) (Esophageal)   Resp 16   Ht 1.727 m  "(5' 8\")   Wt 98.1 kg (216 lb 4.3 oz)   SpO2 99%   BMI 32.88 kg/m       Gen: Sedated.   Eyes: reactive  Lungs: coarse BS anterior  Ext: edema B LE      Labs:   All labs reviewed by me  Electrolytes/Renal -   Recent Labs   Lab Test 04/18/21  1015 04/18/21  0344 04/17/21 2009 04/17/21  1725 04/17/21  0422 04/17/21  0422    139 140 138   < > 138   POTASSIUM 3.6 3.7 3.5 3.7   < > 4.0   CHLORIDE 109 109 111* 108   < > 107   CO2 22 21 20 21   < > 20   BUN 20 22 23 24   < > 26   CR 0.73 0.76 0.76 0.76   < > 0.91   * 174* 229* 268*   < > 255*   RONNY 7.9* 7.5* 8.2* 8.3*   < > 7.8*   MAG  --  2.5*  --  2.6*  --  2.6*   PHOS  --  2.7  --  3.1  --  3.6    < > = values in this interval not displayed.       CBC -   Recent Labs   Lab Test 04/18/21  0344 04/17/21  1725 04/17/21  1200   WBC 17.4* 14.4* 13.9*   HGB 7.7* 7.9* 8.2*   PLT 11* 19* 23*       LFTs -   Recent Labs   Lab Test 04/18/21  0344 04/17/21  0422 04/16/21  0356   ALKPHOS 116 107 118   BILITOTAL 14.2* 14.4* 12.2*   ALT 72* 86* 118*   AST 36 67* 147*   PROTTOTAL 5.7* 5.2* 5.7*   ALBUMIN 2.3* 2.2* 2.5*       Iron Panel -   Recent Labs   Lab Test 12/18/20  1242 11/12/20  1212 08/13/20  1006   IRON 21* 12* 16*   IRONSAT 5* 3* 3*   FRANK 25* 11* 9*           Current Medications:    B and C vitamin Complex with folic acid  5 mL Per Feeding Tube Daily     fludrocortisone  50 mcg Oral or Feeding Tube Daily     hydrocortisone sodium succinate PF  50 mg Intravenous Q6H     lactulose  20 g Oral or Feeding Tube TID     levothyroxine  25 mcg Oral or Feeding Tube QAM AC     pantoprazole (PROTONIX) IV  40 mg Intravenous Daily with breakfast     piperacillin-tazobactam  4.5 g Intravenous Q6H     protein modular  2 packet Per Feeding Tube TID     senna-docusate  2 tablet Oral At Bedtime       amiodarone 0.5 mg/min (04/18/21 1200)     dextrose       dextrose       dextrose 10% 30 mL/hr at 04/15/21 0008     CRRT replacement solution 15 mL/kg/hr (04/18/21 0945)     " EPINEPHrine 0.2 mcg/kg/min (04/18/21 1237)     fentaNYL 50 mcg/hr (04/18/21 1200)     insulin (regular) 6 Units/hr (04/18/21 1200)     midazolam Stopped (04/17/21 1245)     - MEDICATION INSTRUCTIONS -       norepinephrine 0.08 mcg/kg/min (04/18/21 1230)     CRRT replacement solution 200 mL/hr at 04/18/21 0239     CRRT replacement solution 15 mL/kg/hr (04/18/21 0945)     vasopressin 4 Units/hr (04/18/21 1200)     Phyllis Buck MD

## 2021-04-18 NOTE — PROVIDER NOTIFICATION
MICU provider notified via telephone regarding [address] numbers. MD suggested increasing fluid removal on CRRT machine. Will monitor and continue POC.

## 2021-04-19 NOTE — PLAN OF CARE
"ICU End of Shift Summary. See flowsheets for vital signs and detailed assessment.    Changes this shift: Sig.other Haven at bedside all day. No CPR ordered after discussion with team and pt shook head \"no\" in conversation with her.   Pt to CT c/a/p, CRRT off 2.5 hrs. Otherwise CRRT removed net -800mL after 2.5 of dopamine added this evening. Epi between 0.24 now down to 0.15. Vaso at 4. Amio 0.5, continues in Afib.   CO 4.6, CI 2.1. CVP 26 before pulling more this evening.   PA 70/30. PCW 32.   Hgb 7.0, 1u transfused. Plt 11, 1 unit(s) transfused.     Plan:   Continue MICU/Cards POC. Recheck CBC.   Care conference wed with Haven and son.   "

## 2021-04-19 NOTE — PROGRESS NOTES
Cardiology Consult    ASSESSMENT:   Mr. Jesus is a 60 yo M who has severe septic shock with a previously known ischemic cardiomyopathy that is unable to adequate augment cardiac output to maintain adequate perfusion for the degree of septic shock he is in. He may benefit from mechanical circulatory support (MCS) however patient has severe thrombocytopenia (11) and coagulopathy with elevated PTT, INR, low fibrinogen that significantly increase the risk of any procedures.     The patient does have a large pericardial effusion, the lack of signs of tamponade are NOT reassuring. He would not present with significant tamponade due to severely elevated biventricular filling pressures. Unfortunately, he would not be a good candidate for pericardiocentesis due to his severe thrombocytopenia. There is significant risk of harm associated with the procedure, will recommend close monitoring of effusion (repeat limited echo in AM).    In terms of his volume status, he has severe anasarca with reduced oncotic pressure (albumin 2.4). His large pericardial effusion in the setting of anasarca may decrease in proportion to reduction in volume. Aggressively decreasing volume would also help with improving the patient's cardiac output, recommend titrating CRRT rate to target net neg 2L per day.     #Shock - Primary severe septic shock with secondary cardiogenic shock (inability of cardiac function to adequate compensate for distributive state)   [facility] 4/14/21 -- RA 20 PA 64/30 (41) PCWP 30 Cecilia CO 4.6/CI 2.2 PVR 2.4 Baer   Pressors: Epi 0.24 mcg/kg/min, Vaso 4 units/hr. Starting Dopamine this afternoon.   Volume: anasarca with severe diffuse hypervolemia including severely elevated biventricular filling pressures. CVP 26, PCWP 32.   Steroids: stress dose -- Fludrocortisone 50mcg daily   Abx: Zosyn 4.5g q6h (4/12 -  ), Vanc (4/13 - 4/14)  Hemodynamics  Date RA PA PCWP CO/CI SVR PVR MAP Therapies   4/19 24 72/28  4.5/2.1 800  71 Epi 0.24,  V 4   4/19 pm 26 70/36 (48) 32 4.7/2.1 731 3.4wu 69 Epi 0.24, V 4     #Large Pericardial effusion  #Acute Congestive hepatopathy and acute liver failure - h/o cardiac cirrhosis  #Hyperbilirubinemia  #DIC? - INR 1.81, PTT 58, Fibrinogen 93, D-Dimer 16.8. However factor VIII assay elevated indicating primary liver dysfunction rather than DIC.  #Troponin elevation 2/2 Type II NSTEMI (demand ischemia)  #CKD stage IV with acute renal failure 2/2 ATN requiring CRRT   4/17: net -500cc   4/18: net even    4/19: net even  #AHRF 2/2 pulmonary edema and aspiration PNA - /5/30%/19-20  #Anasarca  #Superficial venous thrombosis in basilic vein  #Thrombocytopenia - likely related to underlying sepsis  #Leukocytosis      #Acute cardiogenic shock on chronic HFrEF 25-30%  #Pulmonary hypertension - possible mixed pre and post capillary  #Mild to moderate RV dysfunction (acutely while volume overloaded on echocardiogram)   [facility] 2/10/20 -- RA 22, /20, /50 (65), PCWP 40, Cecilia 3.5/1.6 TD 3.8/1.8 PVR 8.5     #CAD (STEMI s/p DESx3 (2012) & PCI prox RCA 2019)  #MR - moderate to severe  #pAF - not on AC due to h/o GIB  #Iron def anemia  #T2DM  #Hypothyroidism  #Obesity    RECOMMEND:  - Pressors: Continue Epi and Vaso as needed to maintain MAP >65. Start dopamine 2.5 mcg/kg/min  - Volume: Recommend titrating CRRT rate to target net neg 2L per day.   - Abx and coagulopathy care per primary team  - Repeat limited echo in AM for evaluation of effusion size    Discussed with Dr. Alexy Mohsan Chaudhry, MD  Division of Cardiology, PGY-4    REASON FOR CONSULT: shock, pericardial effusion    History of Present Illness   Cole Jesus is a 61 year old male who presents with septic shock requiring pressors without a clear source, suspected to be due to a CAP vs secondary to bilateral lower edema wounds. No bacteremia. His clinical course has significant evidence of end organ damage including demand ischemia, ATN needing CRRT,  liver failure. Patient's continuous critical illness has been discussed with the family, patient is currently DNR with a care conference pending in 2 days.    Per family, patient was slowly declining over the years, before he suddenly became ill. Patient has been encephalopathic throughout his admission prior to intubation. He has required high dose pressors and is currently on Epi 0.24 and Vaso of 4. He was previously evaluated by cardiology and thought to be a high risk     Subjective limited by intubation and sedation.    ===============================================================  1/18/2021 TTE  The Ejection Fraction is estimated at 25-30%.  Borderline left ventricular dilation is present.  Mild to moderate right ventricular dilation is present.  Global right ventricular function is moderately reduced.  Moderate to severe mitral insufficiency is present.  Moderate tricuspid insufficiency is present.  Right ventricular systolic pressure is 34mmHg above the right atrial pressure.  IVC diameter >2.1 cm collapsing <50% with sniff suggests a high RA pressure  estimated at 15 mmHg or greater.  Small circumferential pericardial effusion is present without any hemodynamic  significance.  No significant changes noted.    Coronary Angiogram- [place]  6/19/2019  CORONARY ANATOMY:    CORONARY ANATOMY DOMINANCE: Right     NATIVE CORONARY ARTERIES:    LEFT MAIN CORONARY ARTERY: Mild luminal irregularities    LEFT ANTERIOR DESCENDING CORONARY ARTERY: Mild luminal     irregularities    CIRCUMFLEX CORONARY ARTERY: Patent proximal LCx into mid LCx     tsent. There is an eccentric 20-40% stenosis (in stent restenosis     of the proximal stent    RIGHT CORONARY ARTERY: Acute occlusion.  Stents within the mid     RCA to the RPDA     INTERVENTION:     Pre Procedure stenosis (%): 100    TYSON flow before: 0    Predilatation of the lesion with a 2.0×12 mm Trek NC balloon.  12     nick 5 seconds.     Stenting of the RCA with a 3.5×28  mm Xience Juanita drug eluting     stent.  12 nick 8 seconds.      Postdilatation with a 4.5×20 mm Trek NC balloon.  18 atmospheres     20 seconds.  12 nick 16 seconds.     Balloon angioplasty of the old mid RCA stents to treat stent     undersizing and associated thrombosis at this site.  4.5×20 mm     Trek NC balloon was used.  12 nick 11 seconds.    Post Procedure stenosis (%): 0    TYSON flow after: III        FINAL DIAGNOSIS/IMPRESSION:     #. Angiogram performed to workup ST elevation MI with severe     symptomatic bradycardia and cardiogenic shock    #. Acute occlusion of the proximal RCA with TYSON 0 flow in the     vessel    #. Otherwise nonobstructive disease including mild to moderate     in-stent restenosis of the circumflex stent    #. Stenting of the proximal RCA with a 3.5×28 mm Xience Juanita     drug eluting stent.  Post dilated with a 5.0 mm balloon.    #. Balloon angioplasty of the previously placed mid RCA stents     due to stent undersizing.  A 4.5 mm NC balloon was used.      PAST MEDICAL HISTORY:  Past Medical History:   Diagnosis Date     Anemia in chronic renal disease 03/09/2015     Antiplatelet or antithrombotic long-term use      Atrial fibrillation and flutter      CAD (coronary artery disease)     Stemi in 12/11, s/p angioplasty     CRD (chronic renal disease), stage IV (H) 03/09/2015    hx ATN with dialysis complicating cardiogenic shock  1/2012     GI bleed     massive lower GI bleed secondary to a cecal ulcer, s/p ileocecal resection in 12/11     Heart failure     Biventricular systolic HF, complicated by ARDS requiring tracheostomy     Hyperlipidemia LDL goal <100 04/28/2013     Hypertension      Ischemic cardiomyopathy 04/28/2013    TTE revealing 40% EF     Myocardial infarction (H)     2011 and 6/2018     Other and unspecified nonspecific immunological findings     Anti JKa     Pulmonary edema     episodes of flash pulmonary edema in 12/11     Subclinical hypothyroidism        CURRENT  MEDICATIONS:  No current outpatient medications on file.       PAST SURGICAL HISTORY:  Past Surgical History:   Procedure Laterality Date     CV RIGHT HEART CATH MEASUREMENTS RECORDED N/A 11/12/2019    Procedure: CV RIGHT HEART CATH;  Surgeon: Fox Gerard MD;  Location:  HEART CARDIAC CATH LAB     CV RIGHT HEART CATH MEASUREMENTS RECORDED N/A 2/10/2020    Procedure: CV RIGHT HEART CATH;  Surgeon: Fox Gerard MD;  Location:  HEART CARDIAC CATH LAB     CV RIGHT HEART CATH MEASUREMENTS RECORDED Right 4/14/2021    Procedure: Heart Cath Right Heart Cath;  Surgeon: Donna Thompson MD;  Location:  HEART CARDIAC CATH LAB     ESOPHAGOSCOPY, GASTROSCOPY, DUODENOSCOPY (EGD), COMBINED  12/25/2011    Procedure:COMBINED ESOPHAGOSCOPY, GASTROSCOPY, DUODENOSCOPY (EGD); Surgeon:SAMARIA PUENTE; Location: GI     LAPAROTOMY EXPLORATORY  12/31/2011    Procedure:LAPAROTOMY EXPLORATORY; Explore laparotomy, Illeocectomy, Diverting Illeostomy; Surgeon:GIA NAJERA; Location:[facility] OR     ORIF right elbow fracture  age 12     TAKEDOWN ILEOSTOMY  6/5/2014    Procedure: TAKEDOWN ILEOSTOMY;  Surgeon: Gia Najera MD;  Location: [facility] OR     TRACHEOSTOMY  12/22/2011    Procedure:TRACHEOSTOMY; Tracheostomy; 80XLTCP-Proximal Extension-Cuffed 8.0 mm I.D.; Surgeon:LIZABETH DYE; Location:[facility] OR     ALLERGIES  Allergies   Allergen Reactions     Blood Transfusion Related (Informational Only) Other (See Comments)     Patient has a history of a clinically significant antibody against RBC antigens.  A delay in compatible RBCs may occur.     Hydralazine      Black spots, verified allergic reaction by skin biopsy     Isosorbide      Per pt got a rash all over his body and won't take it no more     Simvastatin Other (See Comments)     Leg muscle weakness     Tylenol [Acetaminophen] Palpitations     FAMILY HISTORY:  Family History   Problem Relation Age of Onset      Diabetes Mother      Hypertension Mother      Heart Disease Mother      Myocardial Infarction Mother 68         of     Myocardial Infarction Father 54         of heart attack, left when he was young     Heart Disease Father      Diabetes Sister      Ovarian Cancer Sister          of     Diabetes Sister      Diabetes Sister      Crohn's Disease Daughter 36     Glaucoma No family hx of      Macular Degeneration No family hx of      Colon Cancer No family hx of      Ulcerative Colitis No family hx of      Irritable Bowel Syndrome No family hx of      Inflammatory Bowel Disease No family hx of      SOCIAL HISTORY:  Social History     Socioeconomic History     Marital status: Significant other     Spouse name: Not on file     Number of children: Not on file     Years of education: Not on file     Highest education level: Not on file   Occupational History     Not on file   Social Needs     Financial resource strain: Not on file     Food insecurity     Worry: Not on file     Inability: Not on file     Transportation needs     Medical: Not on file     Non-medical: Not on file   Tobacco Use     Smoking status: Former Smoker     Packs/day: 2.00     Years: 40.00     Pack years: 80.00     Types: Cigarettes     Quit date: 12/3/2011     Years since quittin.3     Smokeless tobacco: Never Used   Substance and Sexual Activity     Alcohol use: No     Alcohol/week: 0.0 standard drinks     Drug use: No     Sexual activity: Yes     Partners: Female   Lifestyle     Physical activity     Days per week: Not on file     Minutes per session: Not on file     Stress: Not on file   Relationships     Social connections     Talks on phone: Not on file     Gets together: Not on file     Attends [employer] service: Not on file     Active member of club or organization: Not on file     Attends meetings of clubs or organizations: Not on file     Relationship status: Not on file     Intimate partner violence     Fear of current or ex  partner: Not on file     Emotionally abused: Not on file     Physically abused: Not on file     Forced sexual activity: Not on file   Other Topics Concern     Parent/sibling w/ CABG, MI or angioplasty before 65F 55M? Yes      Service Not Asked     Blood Transfusions Not Asked     Caffeine Concern Not Asked     Occupational Exposure Not Asked     Hobby Hazards Not Asked     Sleep Concern Not Asked     Stress Concern Not Asked     Weight Concern Not Asked     Special Diet Not Asked     Back Care Not Asked     Exercise Yes     Comment: limited walking     Bike Helmet Not Asked     Seat Belt Not Asked     Self-Exams Not Asked   Social History Narrative     Not on file     Review of Systems   The 10 point Review of Systems is negative other than noted in the HPI or here.    Physical Exam   Temp: 98  F (36.7  C) Temp src: Oral BP: (!) 82/59 Pulse: 91   Resp: 19 SpO2: 99 % O2 Device: Mechanical Ventilator    Vital Signs with Ranges  Temp:  [97.4  F (36.3  C)-98.4  F (36.9  C)] 98  F (36.7  C)  Pulse:  [] 91  Resp:  [16-20] 19  BP: (82)/(59) 82/59  MAP:  [58 mmHg-128 mmHg] 69 mmHg  Arterial Line BP: ()/() 95/56  FiO2 (%):  [30 %] 30 %  SpO2:  [91 %-100 %] 99 %  216 lbs 4.34 oz    GEN: intubated and sedated.  HEENT: no icterus  CV: IRR, normal s1/s2, no murmurs/rubs/s3/s4, no heave. JVP >15.  CHEST: crackles throughout all lung fields  ABD: soft, NT, NABS, mod-severe distention with relatively soft abdomen.  : no flank/suprapubic tenderness  NEURO:  Intermittently moving all extremities. Normal dolls eye reflex. Normal corneal reflex. Additional evaluation limited at this time.  PSYCH: unable to assess.    Data   Recent Labs   Lab 04/19/21  1524 04/19/21  1012 04/19/21  0310 04/18/21  0830 04/18/21  0830 04/18/21  0344 04/17/21  1725 04/17/21  1725 04/17/21  0422 04/17/21  0422 04/14/21  0435 04/14/21  0435   WBC  --   --  19.3*  --   --  17.4*  --  14.4*   < > 13.5*   < > 15.1*   HGB  --   --   7.0*  --   --  7.7*  --  7.9*   < > 6.8*   < > 8.3*   MCV  --   --  79  --   --  78  --  76*   < > 75*   < > 75*   PLT  --   --  11*  --   --  11*  --  19*   < > 27*   < > 34*   INR  --   --  1.47*  --  1.50*  --   --   --   --  1.81*   < >  --     139 138   < >  --  139   < > 138   < > 138   < > 136   POTASSIUM 3.6 3.7 3.8   < >  --  3.7   < > 3.7   < > 4.0   < > 4.7   CHLORIDE 108 109 109   < >  --  109   < > 108   < > 107   < > 104   CO2 23 21 21   < >  --  21   < > 21   < > 20   < > 19*   BUN 21 19 19   < >  --  22   < > 24   < > 26   < > 43*   CR 0.76 0.70 0.76   < >  --  0.76   < > 0.76   < > 0.91   < > 1.49*   ANIONGAP 8 9 8   < >  --  10   < > 10   < > 10   < > 13   RONNY 8.5 7.9* 7.7*   < >  --  7.5*   < > 8.3*   < > 7.8*   < > 8.1*   * 178* 153*   < >  --  174*   < > 268*   < > 255*   < > 73   ALBUMIN  --   --  2.4*  --   --  2.3*  --   --   --  2.2*   < > 2.5*   PROTTOTAL  --   --  5.9*  --   --  5.7*  --   --   --  5.2*   < > 6.0*   BILITOTAL  --   --  15.2*  --   --  14.2*  --   --   --  14.4*   < > 7.6*   ALKPHOS  --   --  103  --   --  116  --   --   --  107   < > 99   ALT  --   --  66  --   --  72*  --   --   --  86*   < > 64   AST  --   --  42  --   --  36  --   --   --  67*   < > 112*   LIPASE  --   --   --   --   --   --   --   --   --   --   --  36*    < > = values in this interval not displayed.     Recent Results (from the past 24 hour(s))   XR Chest Port 1 View    Narrative    EXAM: XR CHEST PORT 1 VW  4/19/2021 6:30 AM     HISTORY:  Volume overload, pneumonia, eval airspace and ETT       COMPARISON:  Chest x-ray 4/18/2021    FINDINGS:   Portable supine view of the chest. Endotracheal tube tip projects over  the midthoracic trachea. Right internal jugular Roggen-Messi catheter  projects over the right main pulmonary artery. Left internal jugular  Central venous catheter tip projects over the proximal SVC. Feeding  tube courses below the diaphragm with the tip axial view.    Trachea is  midline. Stable enlarged cardiac silhouette. No significant  change in the perihilar and bibasilar opacities. Small right pleural  effusion. No significant left pleural effusion. No appreciable  pneumothorax.      Impression    IMPRESSION:   1. No significant change in the streaky perihilar and bibasilar  opacities, likely representing atelectasis/edema.  2. Small right pleural effusion.    I have personally reviewed the examination and initial interpretation  and I agree with the findings.    BRADY ACOSTA MD   CT Chest Abdomen Pelvis w/o Contrast    Narrative    CT CHEST ABDOMEN PELVIS W/O CONTRAST 4/19/2021 12:25 PM    History: Sepsis    Comparison: Chest abdomen pelvis CT 4/13/2021, same day chest  radiograph    Technique: Helical CT acquisition from the lung apices to the pubic  symphysis was obtained without intravenous contrast. Axial, coronal,  and sagittal reconstructions were obtained and reviewed.    Findings:     CHEST:     Lungs: Stable small bilateral pleural effusions. Stable rounded  consolidative opacities with central calcifications the right lower  lobe. Additional bibasilar patchy consolidative opacities. No  pneumothorax. Atelectasis versus scarring in the anterior left upper  lobe.  Airways: Endotracheal tubes in the mid thoracic trachea. Small amount  of debris in the trachea pass the endotracheal tube and in the  mainstem bronchi.  Vessels: Right IJ Enfield-Messi catheter tip is in the right pulmonary  artery. Left IJ CVC tip is in the high SVC. Normal three-vessel arch.  Main pulmonary artery is enlarged measuring up to 3.1 cm, nonspecific  and can be seen in pulmonary hypertension.  Heart: Heart size is normal. No significant change in large  pericardial effusion. Coronary stents.  Lymph nodes: Stable prominent right paratracheal lymph nodes.  Thyroid: Within normal limits.    ABDOMEN PELVIS:    Liver: Shrunken and nodular contour of the liver. No focal hepatic  mass. There is large volume  mostly simple ascites with small amount of  layering more dense fluid down in the pelvis.  Biliary system: Vicarious excretion of contrast into the gallbladder  from prior study. No intrahepatic or extrahepatic biliary ductal  dilatation.  Pancreas: Mild fatty atrophy without focal mass. No ductal dilation.  Stomach: Within normal limits.  Spleen: Within normal limits.  Adrenal glands: Within normal limits.  Kidneys: 6 mm indeterminate hyperdense focus in the superior pole of  the right kidney. Both kidneys appear atrophic. No calculus or  hydronephrosis..  Bladder: Bladder is decompressed.  Reproductive organs: Multiple prostatic calcifications.  Colon: Contrast within the hepatic flexure. Colon is nondilated.  Surgical changes of appendectomy.  Small Bowel: Feeding tube tip is in the second portion of the  duodenum. Small bowel is nondilated  Lymph nodes: No intra-abdominal or pelvic lymphadenopathy.  Vasculature: Atherosclerotic calcification of the abdominal aorta and  branch vessels.    Soft tissues: Diffuse soft tissue anasarca. There is a fat-containing  ventral hernia..   Bones: Multilevel degenerative changes of the spine. Multiple  chronic-appearing bilateral rib fractures. No suspicious osseous  abnormality.      Impression    IMPRESSION:   1. Stable large pericardial effusion.   2. Cirrhotic configuration of the liver with large volume ascites. A  small portion of ascites in the pelvis with hyperdense layering,  suggests blood products.  3. Stable consolidative opacities in the lower lobes, most likely  atelectasis. Superimposed infection is difficult to completely  exclude.  4. Subcentimeter indeterminate focus in the superior pole of the right  kidney. Too small to completely characterize, may represent  hemorrhagic/proteinaceous cyst or solid lesion. Recommend follow-up  exam in 6-12 months to exclude growth/malignancy and to fully  characterize.    I have personally reviewed the examination and  initial interpretation  and I agree with the findings.    MICHELE CHANG MD

## 2021-04-19 NOTE — PROGRESS NOTES
Nephrology Progress Note  04/19/2021         Overall Mr. Jesus is a 60-year-old gentleman with multiple comorbidities related to his diabetes and ischemic cardiomyopathy.  He was admitted to the ICU with worsening shock requiring multiple pressors with evidence of hyperkalemia and moderate to severe lactic acidosis. Started CRRT on 4/11.       Interval History :   Mr Jesus continues on CRRT, was net even yesterday, needed nearly 4L of UF to achieve this status.  Trying to pull ~1L net negative, discussed with team regarding anticoagulation and will start low dose heparin through circuit.  Still on 3 pressors, will try to pull fluid an see how hemodynamics respond.       Assessment & Recommendations:   EVA on CKD-Baseline Cr 2.5-3, has followed in Neph clinic with Dr Vigil but not since 12/2019.  Cr up to ~4 with BUN of 152 before starting CRRT on 4/11 for hyperkalemia and metabolic acidosis in the setting of shock, sepsis vs cardiogenic.  On 2 pressors with sepsis vs cardiogenic shock.                 -Access is [facility] temp line.                -CRRT 25ml/kg/hr dosing, given fluid yesterday and now trying to pull 1L net negative today.      Volume status-Overloaded and S-G suggests a mixed cardiogenic/septic picture, was net even yesterday, CVP remains in low 20's, plan to try to pull ~1L net negative today, does get ~4+ L of intake daily so needs very large UF on 3 pressors to achieve goals.       Electrolytes/pH-K 3.7, bicarb 21 on CRRT with 4k baths.       Ca/phos/pth-Ca 7.9, Mg and Phos WNL.      CHF-EF 25-30%, also with possible sepsis component, on Vaso and NE so treating mostly as sepsis for now.       Anemia-Hgb 7.0, on downtrend, acute management per team.      Nutrition-On Osmolite 1.5 TF.       Seen and discussed with Dr Lin     Recommendations were communicated to primary team via verbal communication.     GUILLE Silver CNS  Clinical Nurse Specialist  [phone]    Review of Systems:  "  I reviewed the following systems:  ROS not done due to vent/sedation.     Physical Exam:   I/O last 3 completed shifts:  In: 4587.62 [I.V.:2677.62; NG/GT:590]  Out: 4477 [Other:3527; Stool:950]   BP (!) 82/59 (BP Location: Left arm)   Pulse 91   Temp 98.4  F (36.9  C) (Oral)   Resp 17   Ht 1.727 m (5' 8\")   Wt 98.1 kg (216 lb 4.3 oz)   SpO2 100%   BMI 32.88 kg/m       GENERAL APPEARANCE: Intubated and sedated.   Head NC/AT  EYES: mildly scleral icterus  HENT: mouth  without ulcers or lesions  NECK: supple  Pulmonary: fair air entry and exchange   CV: irregular rhythm, tachy rate, no rub   - Edema bilateral   GI: soft, nontender, normal bowel sounds, no HSM   MS: no evidence of inflammation in joints, no muscle tenderness  : [place]  SKIN: blisters over both lower extremities    NEURO: Intubated and sedated.     Labs:   All labs reviewed by me  Electrolytes/Renal -   Recent Labs   Lab Test 04/19/21  1012 04/19/21  0310 04/18/21  2213 04/18/21  1550 04/18/21  0344 04/18/21  0344    138 139 138   < > 139   POTASSIUM 3.7 3.8 3.6 3.6   < > 3.7   CHLORIDE 109 109 110* 108   < > 109   CO2 21 21 20 22   < > 21   BUN 19 19 20 20   < > 22   CR 0.70 0.76 0.71 0.75   < > 0.76   * 153* 200* 191*   < > 174*   RONNY 7.9* 7.7* 8.1* 7.6*   < > 7.5*   MAG  --  2.5*  --  2.5*  --  2.5*   PHOS  --  2.8  --  2.9  --  2.7    < > = values in this interval not displayed.       CBC -   Recent Labs   Lab Test 04/19/21  0310 04/18/21  0344 04/17/21  1725   WBC 19.3* 17.4* 14.4*   HGB 7.0* 7.7* 7.9*   PLT 11* 11* 19*       LFTs -   Recent Labs   Lab Test 04/19/21  0310 04/18/21  0344 04/17/21  0422   ALKPHOS 103 116 107   BILITOTAL 15.2* 14.2* 14.4*   ALT 66 72* 86*   AST 42 36 67*   PROTTOTAL 5.9* 5.7* 5.2*   ALBUMIN 2.4* 2.3* 2.2*       Iron Panel -   Recent Labs   Lab Test 12/18/20  1242 11/12/20  1212 08/13/20  1006   IRON 21* 12* 16*   IRONSAT 5* 3* 3*   FRANK 25* 11* 9*           Current Medications:    B and C vitamin " Complex with folic acid  5 mL Per Feeding Tube Daily     fludrocortisone  50 mcg Oral or Feeding Tube Daily     hydrocortisone sodium succinate PF  50 mg Intravenous Q6H     lactulose  20 g Oral or Feeding Tube TID     levothyroxine  25 mcg Oral or Feeding Tube QAM AC     pantoprazole (PROTONIX) IV  40 mg Intravenous Daily with breakfast     piperacillin-tazobactam  4.5 g Intravenous Q6H     protein modular  2 packet Per Feeding Tube TID     senna-docusate  2 tablet Oral At Bedtime       amiodarone 0.5 mg/min (04/19/21 1300)     dextrose       dextrose       dextrose 10% 30 mL/hr at 04/15/21 0008     CRRT replacement solution 12.5 mL/kg/hr (04/19/21 1111)     EPINEPHrine 0.24 mcg/kg/min (04/19/21 1300)     fentaNYL 50 mcg/hr (04/19/21 1300)     heparin (porcine) 20,000 units in 20 mL 500 Units/hr (04/19/21 1300)     insulin (regular) 4 Units/hr (04/19/21 1300)     midazolam Stopped (04/17/21 1245)     norepinephrine Stopped (04/19/21 0750)     CRRT replacement solution 200 mL/hr at 04/19/21 1111     CRRT replacement solution 12.5 mL/kg/hr (04/19/21 1110)     vasopressin 4 Units/hr (04/19/21 1300)

## 2021-04-19 NOTE — PROGRESS NOTES
MEDICAL ICU PROGRESS NOTE  04/19/2021      Date of Service (when I saw the patient): 04/19/2021    Mr. Pancho Jesus is a  60 year old male with PMHx most significant for HFrEF 25-30% 2/2 ICM after STEMI x/p DESx3 (2012) & PCI prox RCA 2019, severe Pulmonary Hypertension, moderate MR,  CKD IV, Paroxysmal A fib not on AC, Chronic Iron Deficiency Anemia 2/2 GIB due to AVMs, previous LGI bleeding requiring ileocecal resection, T2DM, who was admitted 4/12 to the MICU for treatment of septic shock.     Major changes today:  - Up-titrate epinephrine as able  - Transfuse pRBC  - Repeat cultures given up-trending leukocytosis  - CT CAP  - Heparin infusion at 500/hr in CRRT machine  - Continue antibiotics  - Continue stress dose steroids   - F 8 pending  - Increase Epinephrine gtt      Neuro:  # Sedation  Goal Rass -1 to -2  - Fentanyl gtt + bolus  - Versed bolus PRN    # Hepatic encephalopathy Stage 2-3  Had asterixis on exam, now sedated  - Lactulose 20 TID (titrate for 3-5 BM) or 800-1200 ml of stool output a day.      Cardiovascular  # Shock, septic + cardiogenic---presumed decompensated HF + aspiration PNA  Requiring 2 pressors currently, MAP goal 60. Volume status is complicated by vasodilatory effects of sepsis and congestion from heart failure.  Blood culture remain NGTD, skin culture NGTD. CT revealing of nodular consolidation with surrounding GGO in superior LLL. Concern for aspiration PNA. Placed a swan catheter for volume monitoring. Currently on CRRT  attempt to mediate fluid status. TTE 4/11 with small circumferential effusion without tamponade physiology. TTE 4/12 EF 25-35% with reduced global RV function. Antibiotics as below in ID section. [place] catheter placed on 4/15. All pressures elevated with RAP 20, RVP 62, PA 64/30, PCW 30 and CVP 21. Despite attempts to remove fluid, we have been unable to adequately remove fluids nor make headway. Patient intracardiac pressures are elevated.   Patient continues to  have ongoing/worsening leukocytosis concerning for worsening sepsis. Will reevaluate.    - Discontinue Vancomycin 4/14 after negative cultures for 3 days  - Continue Zosyn; consider changing to Tobramycin or Quinolone depending on CT/cx results  - CT CAP  - Pan culture 4/19  - Culture 4/14 from BAL - light MSSA growth   - Repeat Cultures 4/14 from BAL - NGTD   - Epinephrine gtt to improve cardiac contractility given worsening CI      # HFrEF 25-30% 2/2 ICM  # Coronary Artery Disease s/p DESx3  # Type 2 NSTEMI  # Severe Pulmonary Hypertension  Maintaining I + O on CRT. Wean epi to avoid ventricular arrythmias.  - Hold on PTA diuretics  - TTE similar to prior      # Moderate MR  # Paroxysmal Atrial Fibrillation (not on anticoagulation)  On amiodarone for ventricular arrythmias that started after pressors. Will continue as we re-attempt epinephrine administration.  - On cardiac Telemetry  - Goal: K+ >4.0, Mg2+ > 2.0  - Continue Amiodarone gtt    Pulmonary:     # Pulmonary Nodulues  9 mm nodular consolidation with surrounding groundglass attenuation in the superior left lower lobe, favor infectious/inflammatory  etiology though is indeterminant. Few other pulmonary nodules noted.  - Follow-up CT in 3 months to assess for interval change.  - Continue IV Zosyn    # Acute Hypoxic Respiratory Failure- intubated  # Concern for aspiration PNA  Patient was found using abdominal muscles to breath and was increasingly somnolent. Patient was maintaining oxygenation despite this on FIO2 40%. However, was hypotensive with MAPS 50s despite being on 0.4 of levophed and vaso 4. HOB was elevated for WOB, but blood pressure dropped further. He was put back in flat position. Levophed threshold was increased to 0.7. RT was called at bed side to start patient on BIPAP. Stat CXR ordered simultaneously. Wife called and patient intubated. Bronchoscopy done 4/14 and a copious amount of thick sputum was collected. Sputum cell count was > 4K  with neutrophilic predominance.   - Repeat cultures pending  - Continue IV Zosyn      Ventilation Mode: CMV/AC  (Continuous Mandatory Ventilation/ Assist Control)  FiO2 (%): 30 %  Rate Set (breaths/minute): 16 breaths/min  Tidal Volume Set (mL): 500 mL  PEEP (cm H2O): 5 cmH2O  Oxygen Concentration (%): 30 %  Resp: 18      GI/Nutrition:    # Cardiac Cirrhosis MELD 32>>28>>23  SAAG 1.3, making portal HTN very likely. Ascites protein 3.1, making cardiac cirrhosis very likely.  Ascites fluid noted to be transudative.    Etiology: likely chronic congestion from heart failure.  Hepatitis C antibody non reactive, Hep A antibody non reactive, Hep Bs Ag non reactive but will add on hep B antibody. Will need hep A and B vaccination    Evaluation: EGD: 2019 no esophageal varices. No evidence of portal gastropathy. No active bleeding, no EGD needed at this time.  Ascites: large volume. Fluid transudative according to light's criteria using total protein. Hold off on spirinolactone and lasix given soft pressures requiring pressors. Considering therapeutic para. Continue CRRT.  Coagulopathy:  likely hepatic congestion leading to compression of perisinusoidal and endothelial cells that produce F 7. Vit K x 3 days 4/13- 4/15.   Fibrinogen goal > 120 ( Give FFP today), PLT 27 today, transfuse PLT (PLT goal > 30 if not bleeding or 10 if bleeding) , INR continue vit K but will reverse with improvement of congestion    Hepatic encephalopathy: Grade 2-3 Asterixis on exam. Ammonia < 10 but can be normal in up to half of people with HE. Start lactulose and titrate to 3-5 BMs/day.  SBP: Diagnostic para shows ascitic protein 3.1 and PMN fluid 126. No ppx indicated. Culture pending.  HCC sceening: Abd US q 6 m + AFP. No hepatic mass noted on CT abdomen.  Diet: 2 g sodium restriction and 2 L fluid restriction  MELD-Na score: 21 at 4/19/2021  3:10 AM  MELD score: 21 at 4/19/2021  3:10 AM  Calculated from:  Serum Creatinine: 0.76 mg/dL (Rounded  to 1 mg/dL) at 4/19/2021  3:10 AM  Serum Sodium: 138 mmol/L (Rounded to 137 mmol/L) at 4/19/2021  3:10 AM  Total Bilirubin: 15.2 mg/dL at 4/19/2021  3:10 AM  INR(ratio): 1.47 at 4/19/2021  3:10 AM  Age: 61 years     MELD-Na is 134 accounting for Cr.    - GI/ hepatology following    # Mild hepatopathy  # Previous history of GI bleeding in the context of Angioectasia/AVM's  # Protein losing nephropathy  #Malnutrition  CT Abd to evaluate liver and biliary tree. Hepatitis panel sent.  - PPI  - TF at goal     Renal/Fluids/Electrolytes:    Hypocalcemia  - replete with IV Calcium  Chloride      # Acute on Chronic Kidney Disease Stage 4  # High Anion Gap Metabolic Acidosis - Improved  # Combined Lactic Acidosis Type A/B  # Hyperkalemia (6.4)  # Hyponatremia (likely related to poor oral intake and hypervolemia)  # Protein Losing Nephropathy     Baseline Scr: 2.1-2.4 mg/dL> Most recently 3.89 mg/dL with associated hyperkalemia, hyponatremia and significant metabolic acidosis (Co2: 17-->17) in the context of lacticemia despite two vasopressors. Etiology could potentially be related to mixed distributive//septic vs. Cardiogenic shock.      - Nephrology Consulted and Appreciate recommendations  - CRRT with Heparin flushes to prevent thrombosis of circuit  - Strict I/O's  - Bladder scan qshift and straight cath PRN  - Daily Weights  - Avoid NSAID or Nephrotoxic agents       Endocrine:    # Hypothyroidism  TSH 10, T4 0.34. T3 0.3 indicating hypothyroidism.  - start synthroid 25 mcg     # Type II Diabetes Mellitus  - Oral Hypoglycemia Protocol Initiated  - Small dose sliding scale insulin       ID:  # Shock, septic + cardiogenic---presumed decompensated HF + aspiration PNA  Bronchoscopy done 4/14 and a copious amount of thick sputum was collected. Sputum cell count was > 4K with neutrophilic predominance which is likely the source.Multiple weeping blisters on lower extremities, some with erythema -cultures NGTD. No meningeal signs  to suggest CNS.  - Continue Zosyn until sputum cultures result  - Stress dose steroids with hydrocortisone and fludrocortisone  - Urine/sputum/blood cultures NGTD     Hematology:    # Chronic Iron Deficiency Anemia  # Thrombocytopenia likely related to underlying sepsis  # Coagulopathy 2/2 hepatopathy   # Supratherapeutic INR  # Concern for DIC  Elevated PT/PTT, decreasing Fibrinogen and elevated hyperbilirubinemia (which is likely 2/2 hepatic congestion).  - F8 pending   - Continue Monitoring Hgb  - Hgb goal >7 g/dL  - Holding on heparin products and anticoagulation   - transfuse pRBC,Cryoprecipitate x 5 units    # Superficial venous thrombosis in basilic vein   - No AC d/t coaggulopathy  - Serial monitoring with US q 3 days     General Cares/Prophylaxis:    DVT Prophylaxis: SCD   GI Prophylaxis: PPI  Family Communication: Partner, Haven  Code Status:  Full Code     Lines/tubes/drains:  - Right Femoral Arterial Line/ Left Femoral Triple CVC/ PIV/ swan  - Left internal jugular [facility] Catheter     Disposition:  - Medical ICU until further notice       Patient seen and findings/plan discussed with medical ICU staff, Dr. Joseph Gates Nzemecharh    ====================================  INTERVAL HISTORY:   Dobutamine trailed off yesterday and patient became hypotensive. She was then started on Epinephrine drip.    OBJECTIVE:   1. VITAL SIGNS:   Temp:  [96.8  F (36  C)-98.4  F (36.9  C)] 97.4  F (36.3  C)  Pulse:  [] 85  Resp:  [16-19] 18  BP: (82)/(59) 82/59  MAP:  [51 mmHg-128 mmHg] 73 mmHg  Arterial Line BP: ()/() 103/62  FiO2 (%):  [30 %] 30 %  SpO2:  [90 %-100 %] 93 %  Ventilation Mode: CMV/AC  (Continuous Mandatory Ventilation/ Assist Control)  FiO2 (%): 30 %  Rate Set (breaths/minute): 16 breaths/min  Tidal Volume Set (mL): 500 mL  PEEP (cm H2O): 5 cmH2O  Oxygen Concentration (%): 30 %  Resp: 18    2. INTAKE/ OUTPUT:   I/O last 3 completed shifts:  In: 2882.62 [I.V.:9839.62;  NG/GT:590]  Out: 4477 [Other:3527; Stool:950]    3. PHYSICAL EXAMINATION:  General: intubated and waking up; nodding yes to questions (right or wrong) but not following commands  HEENT: Normocephalic/Atraumatic, intubated, EOMI/MOM, icteric oral muscosa/sclera.   Pulm/Resp:mechanically ventilated, crackles present   CV: RRR, normal S1 and S2. No murmurs/rubs gallops. Peripheral pulses +1. Significant peripheral pitting edema +3 from legs to inguinal/scrotal region.   Abdomen: Soft, non-distended, non-tender. No pain/tenderness to palpation. No guarding/rebound.  : Not examined  Neuro: Not moving extremities as he is sedated at this time  Incisions/Skin: Multiple open wounds on legs, now dressed. Hyperpigmented skin in the legs/chronic venous stasis changes.  Dusky toes, Nail dystrophy on toes bilaterally.     4. LABS:   Arterial Blood Gases   Recent Labs   Lab 04/19/21  0448 04/15/21  1403 04/15/21  0433 04/14/21  1543   PH 7.35 7.41 7.48* 7.40   PCO2 39 30* 24* 25*   PO2 88 130* 143* 92   HCO3 21 19* 18* 15*     Complete Blood Count   Recent Labs   Lab 04/19/21  0310 04/18/21  0344 04/17/21  1725 04/17/21  1200   WBC 19.3* 17.4* 14.4* 13.9*   HGB 7.0* 7.7* 7.9* 8.2*   PLT 11* 11* 19* 23*     Basic Metabolic Panel  Recent Labs   Lab 04/19/21  0310 04/18/21  2213 04/18/21  1550 04/18/21  1015    139 138 139   POTASSIUM 3.8 3.6 3.6 3.6   CHLORIDE 109 110* 108 109   CO2 21 20 22 22   BUN 19 20 20 20   CR 0.76 0.71 0.75 0.73   * 200* 191* 160*     Liver Function Tests  Recent Labs   Lab 04/19/21  0310 04/18/21  0830 04/18/21  0344 04/17/21  0422 04/16/21  0356 04/15/21  0406   AST 42  --  36 67* 147* 191*   ALT 66  --  72* 86* 118* 107*   ALKPHOS 103  --  116 107 118 107   BILITOTAL 15.2*  --  14.2* 14.4* 12.2* 9.0*   ALBUMIN 2.4*  --  2.3* 2.2* 2.5* 2.7*   INR 1.47* 1.50*  --  1.81*  --  3.11*     Coagulation Profile  Recent Labs   Lab 04/19/21  0310 04/18/21  0830 04/17/21  0422 04/15/21  0406  04/13/21 0333 04/13/21 0333 04/12/21  0928   INR 1.47* 1.50* 1.81* 3.11*   < > 3.56* 2.61*   PTT  --   --   --   --   --  58* 56*    < > = values in this interval not displayed.       5. RADIOLOGY:   No results found for this or any previous visit (from the past 24 hour(s)).

## 2021-04-19 NOTE — PROGRESS NOTES
"CRRT STATUS NOTE    DATA:  Time:  0554  Pressures WNL:  YES  Filter Status:  WDL    Problems Reported/Alarms Noted:  TMP high.    Supplies Present:  YES    ASSESSMENT:  Patient Net Fluid Balance:  Net positive 4.7L since admission. Net positive 180ml since midnight.  Vital Signs:  BP (!) 82/59 (BP Location: Left arm)   Pulse 97   Temp 97.4  F (36.3  C) (Axillary)   Resp 16   Ht 1.727 m (5' 8\")   Wt 98.1 kg (216 lb 4.3 oz)   SpO2 100%   BMI 32.88 kg/m    Labs: Hemoglobin 7.0, Ionized calcium 4.2, Total Bilirubin 15.2, K+ 3.8, Creatinine 0.76.   Goals of Therapy:  160ml/hr.    INTERVENTIONS:   Circuit restarted at 2359    PLAN:  Continue to monitor and assess for changes. Call CRRT RN with questions or concerns [phone].    "

## 2021-04-19 NOTE — PROGRESS NOTES
[facility] Nurse Inpatient Pressure Injury Assessment   Reason for consultation: Evaluate and treat mid upper back and BLE      ASSESSMENT  BLE wounds due to unknown etiology vs edema blisters  Status:    Recommend provider order: None, at this time     Pressure Injury: on mid upper back , present on admission ,   Status: healed 4/19     TREATMENT PLAN  Mid upper back wound: Every 3 days and PRN cleanse with wound cleanser and pat dry. Paint garrett wound skin with no sting. Conform mepilex over wound.     BLE wound: Daily  and PRN cleanse with wound cleanser and pat dry. Apply Xeroform (order#[id number]) over open weeping wounds and cover with ABD and secure with kerlix. AVOID tape on skin. Utilize mepilex over Xeroform on knees.   Orders Written  WO Nurse follow-up plan:weekly  Nursing to notify the Provider(s) and re-consult the [facility] Nurse if wound(s) deteriorates or new skin concern.    Patient History  According to provider note(s):  Evangelina Ordonez is a  60 year old male with PMHx most significant for HFrEF 25-30% (as per 2-D echocardiogram on 01/18/21) 2/2 ICM (NYHA Class III/Stage C), severe Pulmonary Hypertension, moderate MR, STEMI x/p DESx3 (2012) & PCI prox RCA 2019, Chronic Kidney Disease stage  IV, Paroxysmal Atrial fibrillation not on AC, Chronic Iron Deficiency Aneia of GIB due to AVMs, previous Lower GI bleeding requiring ileocecal resection,  type II Diabetes Mellitus, Hypothyroidism and Obesity who was admitted today to the 4C MICU unit for treatment of undifferentiated Shock (Septic vs. Cardiogenic)     Objective Data  Containment of urine/stool: Anuric and Incontinent pad in bed    Current Diet/ Nutrition:  None    Output:   I/O last 3 completed shifts:  In: 4587.62 [I.V.:2677.62; NG/GT:590]  Out: 4477 [Other:3527; Stool:950]    Risk Assessment:   Sensory Perception: 2-->very limited  Moisture: 3-->occasionally moist  Activity: 1-->bedfast  Mobility: 2-->very limited  Nutrition: 3-->adequate  Friction  and Shear: 2-->potential problem  Elijah Score: 13    Labs:   Recent Labs   Lab 04/19/21  0310 04/17/21  1040 04/17/21  1040   ALBUMIN 2.4*   < >  --    HGB 7.0*   < >  --    INR 1.47*   < >  --    WBC 19.3*   < >  --    CRP  --   --  38.0*    < > = values in this interval not displayed.       Physical Exam  Skin inspection: focused back, buttock, BLE    Wound Location:  Mid upper back  Date of last Photo 4/12 see media tab  Wound History: POA  Measurements (length x width x depth, in cm) resolved 4/19    Wound Location:  Bilateral LE     Anterior right  4/12 4/19     Anterior left  4/12 4/19     Posterior right  4/12 4/19    Date of last Photo 4/19  Wound History: POA, possible edema vs septic shock blisters   Measurements (length x width x depth, in cm) largest blister on right posterior leg 12 cm x 8.5 cm  x  0.1 cm   Wound Base:  50/50 % dark brown slough and dermis  Tunneling N/A  Undermining N/A  Palpation of the wound bed: normal   Periwound skin: intact  Color: normal and consistent with surrounding tissue  Temperature: normal   Drainage:, moderate  Description of drainage: serous and serosanguinous  Odor: none  Pain: during dressing change, aching, tender and burning      Interventions  Current support surface: Standard  Atmos Air mattress, pulsate ordered 4/12  Current off-loading measures: Pillows  Repositioning aid: Pillows  Visual inspection of wound(s) completed   Tube Securement: NA  Wound Care: was done per plan of care.  Supplies: placed at the bedside and floor stock  Educated provided: plan of care and wound progress  Education provided to: spouse and nurse  Discussed importance of:dressing change frequency  Discussed plan of care with Family and Nurse    Poppy Menard RN CWOCN

## 2021-04-19 NOTE — PLAN OF CARE
ICU End of Shift Summary. See flowsheets for vital signs and detailed assessment.    Changes this shift: RASS -2 to -1. Patient opening eyes spontaneously. Following commands. Intermittently restless and CPOT's 4, PRN Fentanyl given. Fentanyl continuous @ 50. Afebrile. Afib with occasional PVC's. Amio @ 0.5. Levo, Vaso and Epi titrated to MAP goal >60. Morning DONOVAN: CVP 22, PA 46/32, CO 3.9, CI 1.8. Insulin gtt continued on algorithm 4. Rectal pouch with 300 ml output. Anuric. CRRT filter clotted; restarted at 2359. Net +65 mL's thus far. iCa+ 4.2 and replaced with 1 gram. Bili critical at 15.2, MD notified.     Plan: Continue towards CRRT goal. Titrate pressors to MAP >60. Hemodynamic monitoring Q6hrs and PRN.

## 2021-04-19 NOTE — PROGRESS NOTES
Care Management Follow Up-Care Conference Planning    Length of Stay (days): 8    Expected Discharge Date: 04/23/21     Concerns to be Addressed: coping/stress     Patient plan of care discussed at interdisciplinary rounds: Yes    Anticipated Discharge Disposition:  TBD     Anticipated Discharge Services:  TBD  Anticipated Discharge DME:  TBD    Additional Information:  Team has requested a care conference for Wed. This RNCC spoke with patient's SO, Haven. She reports that 3 PM would be a good time as their son, who is actually the legal decision maker would be available (he works nights). She will be here in person but has the call-in information for the son. She indicated that they may invite other family members. Notified MICU team, Cards 2 team, Palliative and Renal. Conference will be at 3 PM on Wed. 4/21 in 4C conference room. Pre conference at 2:45 in same room.       Gino Mccauley, RN Care Coordinator [phone]

## 2021-04-20 NOTE — PROGRESS NOTES
MEDICAL ICU PROGRESS NOTE  04/20/2021      Date of Service (when I saw the patient): 04/20/2021    Mr. Pancho Jesus is a  61 year old male with PMHx most significant for HFrEF 25-30% 2/2 ICM after STEMI x/p DESx3 (2012) & PCI prox RCA 2019, severe Pulmonary Hypertension, moderate MR,  CKD IV, Paroxysmal A fib not on AC, Chronic Iron Deficiency Anemia 2/2 GIB due to AVMs, previous LGI bleeding requiring ileocecal resection, T2DM, who was admitted 4/12 to the MICU for treatment of septic shock.     Major changes today:  - Down titrate Vasopressin > then Epinephrine given increasing SVR  - Will up-titrate Dopamine to B1 dose when able  - Repeat Venous US + Duplex  - CRRT with goal -2 net negative   - Repeat ECHO  - Discontinue Zosyn  - Start Meropenam      Neuro:  # Sedation  Goal Rass -1 to -2  - Fentanyl gtt + bolus  - Precedex gtt  - Versed bolus PRN    # Hepatic encephalopathy Stage 2-3  Had asterixis on exam, now sedated  - Lactulose 20 TID (titrate for 3-5 BM) or 800-1200 ml of stool output a day.      Cardiovascular  # Shock, septic + cardiogenic---presumed decompensated HF + aspiration PNA  Requiring 2 pressors currently, MAP goal 60. Volume status is complicated by vasodilatory effects of sepsis and congestion from heart failure.  Blood culture remain NGTD, skin culture NGTD. CT revealing of nodular consolidation with surrounding GGO in superior LLL. Concern for aspiration PNA. Culture growing light staph aureus. CT CAP 4/19 without evidence of pulmonary source. Repeated TTE has shown ongoing evidence of worsening/ enlarging pericardial effusion. CVP has been 20-26 despite ongoing CRRT. Given elevated intracardiac pressures, It may be difficult to assess tamponade physiology. Following aggressive diuresis, we may be able to see this and reconsider pericardiocentesis. Patient did not tolerate dobutamine trial d/t hypotension. Started on Dopamine and Epinephrine while on Vasopressin on 4/19. Will plan to  decrease Vasopressin >> wean epinephrine  Then up-titrate Dopamine given acute increase in SVR from 500 to 1100 and concern for worsening index on low dose Dopamine. Goal B1 agonist effects occur at dose 5-10 of Dopamine. Overall, not a candidate of advanced device therapies given coagulopathy and severity of disease. Bronchoscopy 4/20 grew gram negative rods. IV Zosyn discontinued and Meropenam started.    - Meropenam 4/20-  - Sputum cx BAL 4/20 pending  - Pan culture 4/19 NGTD  - Culture 4/14 from BAL - light MSSA growth   - Repeat Cultures 4/14 from BAL - NGTD   - Epinephrine gtt to improve cardiac contractility given worsening CI  - Dopamine, Epinephrine and Vasopressin      # HFrEF 25-30% 2/2 ICM  # Coronary Artery Disease s/p DESx3  # Type 2 NSTEMI  # Severe Pulmonary Hypertension  Maintaining I + O on CRT. Wean epi to avoid ventricular arrythmias.  - Hold on PTA diuretics  - TTE 4/19 showed large effusion, repeat on TTE on 4/20      # Moderate MR  # Paroxysmal Atrial Fibrillation (not on anticoagulation)  On amiodarone for ventricular arrythmias that started after pressors. Will continue as we re-attempt epinephrine administration.  - On cardiac Telemetry  - Goal: K+ >4.0, Mg2+ > 2.0  - Continue Amiodarone gtt    Pulmonary:     # Pulmonary Nodulues  9 mm nodular consolidation with surrounding groundglass attenuation in the superior left lower lobe, favor infectious/inflammatory  etiology though is indeterminant. Few other pulmonary nodules noted.  - Follow-up CT on 4/19 showed stable consolidative opacities in the lower lobes, most likely atelectasis. However, superimposed infection is difficult to completely exclude per Radiology.  - Continue IV Zosyn    # Acute Hypoxic Respiratory Failure- intubated  # Concern for aspiration PNA  Patient was found using abdominal muscles to breath and was increasingly somnolent. Patient was maintaining oxygenation despite this on FIO2 40%. However, was hypotensive with MAPS  50s despite being on 0.4 of levophed and vaso 4. HOB was elevated for WOB, but blood pressure dropped further. He was put back in flat position. Levophed threshold was increased to 0.7. RT was called at bed side to start patient on BIPAP. Stat CXR ordered simultaneously. Wife called and patient intubated. Bronchoscopy done 4/14 and a copious amount of thick sputum was collected. Sputum cell count was > 4K with neutrophilic predominance.   - Repeat cultures pending  - Continue IV Zosyn      Ventilation Mode: CMV/AC  (Continuous Mandatory Ventilation/ Assist Control)  FiO2 (%): 50 %  Rate Set (breaths/minute): 16 breaths/min  Tidal Volume Set (mL): 450 mL  PEEP (cm H2O): 5 cmH2O  Oxygen Concentration (%): 30 %  Resp: 20      GI/Nutrition:    # Cardiac Cirrhosis   SAAG 1.3, making portal HTN very likely. Ascites protein 3.1, making cardiac cirrhosis very likely.  Ascites fluid noted to be transudative.    Etiology: likely chronic congestion from heart failure.  Hepatitis C antibody non reactive, Hep A antibody non reactive, Hep Bs Ag non reactive but will add on hep B antibody. Will need hep A and B vaccination    Evaluation: EGD: 2019 no esophageal varices. No evidence of portal gastropathy. No active bleeding, no EGD needed at this time.  Ascites: large volume. Fluid transudative according to light's criteria using total protein. Hold off on spirinolactone and lasix given soft pressures requiring pressors. Considering therapeutic para. Continue CRRT.  Coagulopathy:  likely hepatic congestion leading to compression of perisinusoidal and endothelial cells that produce F 7. Vit K x 3 days 4/13- 4/15.   Fibrinogen goal > 120 ( Give FFP today), PLT 27 today, transfuse PLT (PLT goal > 30 if not bleeding or 10 if bleeding) , INR continue vit K but will reverse with improvement of congestion    Hepatic encephalopathy: Grade 2-3 Asterixis on exam. Ammonia < 10 but can be normal in up to half of people with HE. Start lactulose  and titrate to 3-5 BMs/day.  SBP: Diagnostic para shows ascitic protein 3.1 and PMN fluid 126. No ppx indicated. Culture pending.  HCC sceening: Abd US q 6 m + AFP. No hepatic mass noted on CT abdomen.  Diet: 2 g sodium restriction and 2 L fluid restriction  MELD-Na score: 20 at 4/20/2021  3:29 AM  MELD score: 20 at 4/20/2021  3:29 AM  Calculated from:  Serum Creatinine: 0.67 mg/dL (Rounded to 1 mg/dL) at 4/20/2021  3:29 AM  Serum Sodium: 138 mmol/L (Rounded to 137 mmol/L) at 4/20/2021  3:29 AM  Total Bilirubin: 18.0 mg/dL at 4/20/2021  3:29 AM  INR(ratio): 1.31 at 4/20/2021  3:29 AM  Age: 61 years     MELD-Na is 134 accounting for Cr.    - GI/ hepatology following    # Mild hepatopathy  # Previous history of GI bleeding in the context of Angioectasia/AVM's  # Protein losing nephropathy  #Malnutrition  CT Abd on 4/19 per Radiology showed cirrhotic configuration of the liver with large volume ascites. A small portion of ascites in the pelvis with hyperdense layering, suggests blood products.  - PPI  - TF at goal     Renal/Fluids/Electrolytes:    #Hypocalcemia  - replete with IV Calcium Chloride    # Acute on Chronic Kidney Disease Stage 4  # High Anion Gap Metabolic Acidosis - Improved  # Combined Lactic Acidosis Type A/B  # Hyperkalemia (6.4)  # Hyponatremia (likely related to poor oral intake and hypervolemia)  # Protein Losing Nephropathy     Baseline Scr: 2.1-2.4 mg/dL> Most recently 3.89 mg/dL with associated hyperkalemia, hyponatremia and significant metabolic acidosis (Co2: 17-->17) in the context of lacticemia despite two vasopressors. Etiology could potentially be related to mixed distributive//septic vs. Cardiogenic shock.      - Nephrology Consulted and Appreciate recommendations  - CRRT with Heparin flushes to prevent thrombosis of circuit  - Strict I/O's  - Bladder scan qshift and straight cath PRN  - Daily Weights  - Avoid NSAID or Nephrotoxic agents       Endocrine:    # Hypothyroidism  TSH 10, T4  0.34. T3 0.3 indicating hypothyroidism.  - start synthroid 25 mcg     # Type II Diabetes Mellitus  - Oral Hypoglycemia Protocol Initiated  - Small dose sliding scale insulin       ID:  # Shock, septic + cardiogenic---presumed decompensated HF + aspiration PNA  Bronchoscopy done 4/14 and a copious amount of thick sputum was collected. Sputum cell count was > 4K with neutrophilic predominance which is likely the source. Multiple weeping blisters on lower extremities, some with erythema -cultures NGTD. No meningeal signs to suggest CNS.  - Sputum/blood cultures on 4/19 showed NGTD  - Continue Zosyn  - Stress dose steroids with hydrocortisone and fludrocortisone       Hematology:    # Chronic Iron Deficiency Anemia  # Thrombocytopenia likely related to underlying sepsis  # Coagulopathy 2/2 hepatopathy   # Supratherapeutic INR  # Concern for DIC  Elevated PT/PTT, decreasing Fibrinogen and elevated hyperbilirubinemia (which is likely 2/2 hepatic congestion).  - F8 normal as of 4/20  - Continue Monitoring Hgb  - Hgb goal >7 g/dL  - Holding on heparin products and anticoagulation   - transfuse pRBC,Cryoprecipitate x 5 units    # Occlusive superficial venous thrombosis in the left basilic vein at the antecubital fossa  - No AC d/t coagulopathy  - Serial monitoring with US q 3 days     General Cares/Prophylaxis:    DVT Prophylaxis: SCD   GI Prophylaxis: PPI  Family Communication: Partner, Haven  Code Status:  Full Code     Lines/tubes/drains:  - Right Femoral Arterial Line/ Left Femoral Triple CVC/ PIV/ swan  - Left internal jugular [facility] Dialysis Catheter     Disposition:  - Medical ICU until further notice       Patient seen and findings/plan discussed with medical ICU staff, Dr. Joseph Morris    ====================================  INTERVAL HISTORY:   Dopamine was started at 2.5 mcg/kg/min per Cardiology recommendation. MAPs were elevated in low 70s and SVR was elevated at 1100s overnight.   SpO2 dropped to 80  at 0940 and bronch was done to remove secretions. SpO2 improved after.  During the day, MAPs decreased below 60 and Epi was increased to 0.29, Dopamine increased to 7.5 and Vasopressin continued at 4. Planned to max out these 3 pressors if necessary.    OBJECTIVE:   1. VITAL SIGNS:   Temp:  [96  F (35.6  C)-98.4  F (36.9  C)] 96  F (35.6  C)  Pulse:  [] 64  Resp:  [16-20] 20  MAP:  [58 mmHg-132 mmHg] 73 mmHg  Arterial Line BP: ()/() 111/57  FiO2 (%):  [30 %-50 %] 50 %  SpO2:  [83 %-100 %] 99 %  Ventilation Mode: CMV/AC  (Continuous Mandatory Ventilation/ Assist Control)  FiO2 (%): 50 %  Rate Set (breaths/minute): 16 breaths/min  Tidal Volume Set (mL): 450 mL  PEEP (cm H2O): 5 cmH2O  Oxygen Concentration (%): 30 %  Resp: 20    2. INTAKE/ OUTPUT:   I/O last 3 completed shifts:  In: 4690.36 [I.V.:2130.36; NG/GT:690]  Out: 6719 [Other:5969; Stool:750]    3. PHYSICAL EXAMINATION:  General: intubated and waking up; nodding yes to questions (right or wrong) but not following commands  HEENT: Normocephalic/Atraumatic, intubated, EOMI/MOM, icteric oral muscosa/sclera.   Pulm/Resp:mechanically ventilated, crackles present   CV: RRR, normal S1 and S2. No murmurs/rubs gallops. Peripheral pulses +1. Significant peripheral pitting edema +3 from legs to inguinal/scrotal region.   Abdomen: Soft, non-distended, non-tender. No pain/tenderness to palpation. No guarding/rebound.  : Not examined  Neuro: Not moving extremities as he is sedated at this time  Incisions/Skin: Multiple open wounds on legs, now dressed. Hyperpigmented skin in the legs/chronic venous stasis changes.  Dusky toes, Nail dystrophy on toes bilaterally.     4. LABS:   Arterial Blood Gases   Recent Labs   Lab 04/20/21  0130 04/19/21  2200 04/19/21  0448 04/15/21  1403   PH 7.33* 7.31* 7.35 7.41   PCO2 40 42 39 30*   PO2 84 60* 88 130*   HCO3 21 21 21 19*     Complete Blood Count   Recent Labs   Lab 04/20/21  0329 04/19/21 2020 04/19/21 0310  04/18/21  0344   WBC 19.7* 18.4* 19.3* 17.4*   HGB 8.6* 8.7* 7.0* 7.7*   PLT 39* 44* 11* 11*     Basic Metabolic Panel  Recent Labs   Lab 04/20/21  0329 04/19/21  2200 04/19/21  1524 04/19/21  1012    138 138 139   POTASSIUM 3.9 3.7 3.6 3.7   CHLORIDE 106 108 108 109   CO2 21 20 23 21   BUN 21 21 21 19   CR 0.67 0.67 0.76 0.70   * 150* 156* 178*     Liver Function Tests  Recent Labs   Lab 04/20/21  0329 04/19/21  0310 04/18/21  0830 04/18/21  0344 04/17/21  0422   AST 44 42  --  36 67*   ALT 67 66  --  72* 86*   ALKPHOS 106 103  --  116 107   BILITOTAL 18.0* 15.2*  --  14.2* 14.4*   ALBUMIN 2.6* 2.4*  --  2.3* 2.2*   INR 1.31* 1.47* 1.50*  --  1.81*     Coagulation Profile  Recent Labs   Lab 04/20/21  0329 04/19/21  0310 04/18/21  0830 04/17/21  0422   INR 1.31* 1.47* 1.50* 1.81*       5. RADIOLOGY:   Recent Results (from the past 24 hour(s))   CT Chest Abdomen Pelvis w/o Contrast    Narrative    CT CHEST ABDOMEN PELVIS W/O CONTRAST 4/19/2021 12:25 PM    History: Sepsis    Comparison: Chest abdomen pelvis CT 4/13/2021, same day chest  radiograph    Technique: Helical CT acquisition from the lung apices to the pubic  symphysis was obtained without intravenous contrast. Axial, coronal,  and sagittal reconstructions were obtained and reviewed.    Findings:     CHEST:     Lungs: Stable small bilateral pleural effusions. Stable rounded  consolidative opacities with central calcifications the right lower  lobe. Additional bibasilar patchy consolidative opacities. No  pneumothorax. Atelectasis versus scarring in the anterior left upper  lobe.  Airways: Endotracheal tubes in the mid thoracic trachea. Small amount  of debris in the trachea pass the endotracheal tube and in the  mainstem bronchi.  Vessels: Right IJ [place]-Messi catheter tip is in the right pulmonary  artery. Left IJ CVC tip is in the high SVC. Normal three-vessel arch.  Main pulmonary artery is enlarged measuring up to 3.1 cm, nonspecific  and can  be seen in pulmonary hypertension.  Heart: Heart size is normal. No significant change in large  pericardial effusion. Coronary stents.  Lymph nodes: Stable prominent right paratracheal lymph nodes.  Thyroid: Within normal limits.    ABDOMEN PELVIS:    Liver: Shrunken and nodular contour of the liver. No focal hepatic  mass. There is large volume mostly simple ascites with small amount of  layering more dense fluid down in the pelvis.  Biliary system: Vicarious excretion of contrast into the gallbladder  from prior study. No intrahepatic or extrahepatic biliary ductal  dilatation.  Pancreas: Mild fatty atrophy without focal mass. No ductal dilation.  Stomach: Within normal limits.  Spleen: Within normal limits.  Adrenal glands: Within normal limits.  Kidneys: 6 mm indeterminate hyperdense focus in the superior pole of  the right kidney. Both kidneys appear atrophic. No calculus or  hydronephrosis..  Bladder: Bladder is decompressed.  Reproductive organs: Multiple prostatic calcifications.  Colon: Contrast within the hepatic flexure. Colon is nondilated.  Surgical changes of appendectomy.  Small Bowel: Feeding tube tip is in the second portion of the  duodenum. Small bowel is nondilated  Lymph nodes: No intra-abdominal or pelvic lymphadenopathy.  Vasculature: Atherosclerotic calcification of the abdominal aorta and  branch vessels.    Soft tissues: Diffuse soft tissue anasarca. There is a fat-containing  ventral hernia..   Bones: Multilevel degenerative changes of the spine. Multiple  chronic-appearing bilateral rib fractures. No suspicious osseous  abnormality.      Impression    IMPRESSION:   1. Stable large pericardial effusion.   2. Cirrhotic configuration of the liver with large volume ascites. A  small portion of ascites in the pelvis with hyperdense layering,  suggests blood products.  3. Stable consolidative opacities in the lower lobes, most likely  atelectasis. Superimposed infection is difficult to  completely  exclude.  4. Subcentimeter indeterminate focus in the superior pole of the right  kidney. Too small to completely characterize, may represent  hemorrhagic/proteinaceous cyst or solid lesion. Recommend follow-up  exam in 6-12 months to exclude growth/malignancy and to fully  characterize.    I have personally reviewed the examination and initial interpretation  and I agree with the findings.    MICHELE CHANG MD   Echocardiogram Limited    Narrative    [id number]  [id number]  [id number]  [id number]^CARLYN^MOHSAN     [facility],[place]  Echocardiography Laboratory  [address] [place] [address]     Name: MERCY SHAFER  MRN: [id number]  : 1960  Study Date: 2021 04:33 PM  Age: 61 yrs  Gender: Male  [place] [facility] [place]  Reason For Study: Pericardial Effusion  Ordering Physician: CHAUDHRY, MOHSAN  Performed By: Meet Guzman RDCS     ______________________________________________________________________________  Procedure  Limited Portable Echo Adult.  ______________________________________________________________________________  Interpretation Summary  Limited TTE to evaluate for pericardial effusion.  There is a large-sized circumferential pericardial effusion. The maximal depth  is 3.0 cm adjacent to the LV mid anterolateral segment, but fluid is also  present near the RV free wall and near the apex. There is no RA/RV diastolic  collapse and no significant variation in the mitral/tricuspid inflow. The IVC  is dilated and not collapsing but the patient is mechanically ventilated.  This study was compared with the study from 2021. The size of the  effusion appears to have increased, albeit the patient still does not exhibit  signs of tamponade. The ventricular function, MR, and TR are unchanged.     Results discussed with Dr. Ledezma at 21 1710 hrs.  ______________________________________________________________________________  Left  Ventricle  Severely (EF 15-20%) reduced left ventricular function is present. Severe  diffuse hypokinesis is present.     Right Ventricle  Global right ventricular function is severely reduced.     Mitral Valve  Moderate to severe mitral insufficiency is present.     Aortic Valve  The aortic valve cannot be assessed.     Tricuspid Valve  Moderate tricuspid insufficiency is present. There is early systolic hepatic  vein flow reversal. Right ventricular systolic pressure is 32mmHg above the  right atrial pressure.     Pulmonic Valve  The pulmonic valve cannot be assessed.     Pericardium  There is a large-sized circumferential pericardial effusion. The maximal depth  is 3.0 cm adjacent to the LV mid anterolateral segment, but fluid is also  present near the RV free wall and near the apex. There is no RA/RV diastolic  collapse and no significant variation in the mitral/tricuspid inflow. The IVC  is dilated and not collapsing but the patient is mechanically ventilated.     Compared to Previous Study  This study was compared with the study from 2021 . The size of the  effusion appears to have increased, albeit the patient still does not exhibit  signs of tamponade. The ventricular function, MR, and TR are unchanged.     ______________________________________________________________________________  Doppler Measurements & Calculations  TR max shavonne: 286.4 cm/sec  TR max P.8 mmHg     ______________________________________________________________________________  Report approved by: Scotty Sharif 2021 05:15 PM         XR Chest Port 1 View    Narrative    EXAM: XR CHEST PORT 1 VW  2021 6:16 AM      HISTORY: ETT eval    COMPARISON: Chest x-ray 2021, CT 2021    FINDINGS: Portable semiupright AP radiograph of the chest. The patient  is rotated. Right IJ [place]-Messi catheter tip projects over the right  pulmonary artery. The feeding tube courses inferior to the diaphragm  and out of the field of  view. The tip of the endotracheal tube  projects approximately 4 cm above the mila. Left IJ central venous  catheter tip projects over the SVC/brachiocephalic confluence.    The trachea appears midline. The cardiac silhouette is enlarged. Small  right pleural effusion. Right basilar linear opacities. No  pneumothorax. The visualized upper abdomen is unremarkable.       Impression    IMPRESSION:   1. The tip of the endotracheal tube projects approximately 4 cm above  the mila.  2. Small right pleural effusion with adjacent right basilar opacities,  likely atelectasis.  3. Cardiomegaly.  4. Stable position of the left IJ central venous catheter with tip  projecting over the SVC/brachiocephalic confluence.    I have personally reviewed the examination and initial interpretation  and I agree with the findings.    COURTNEY BENTLEY MD

## 2021-04-20 NOTE — PROGRESS NOTES
[facility] Nurse Inpatient Pressure Injury Assessment   Reason for consultation: Evaluate and treat mid upper back and BLE  NEW concern for black areas on heels      ASSESSMENT  BLE wounds due to unknown etiology vs edema blisters  Status:    Recommend provider order: None, at this time     Bilateral heels and feet: evolving ischemic wounds    Pressure Injury: on mid upper back , present on admission ,   Status: healed 4/19     TREATMENT PLAN  Mid upper back wound: Every 3 days and PRN cleanse with wound cleanser and pat dry. Paint garrett wound skin with no sting. Conform mepilex over wound.     BLE wound: Daily  and PRN cleanse with wound cleanser and pat dry. Apply Xeroform (order#[id number]) over open weeping wounds and cover with ABD and secure with kerlix. AVOID tape on skin. Utilize mepilex over Xeroform on knees.     Orders Written  WO Nurse follow-up plan:weekly  Nursing to notify the Provider(s) and re-consult the WO Nurse if wound(s) deteriorates or new skin concern.    Patient History  According to provider note(s):  Evangelina Ordonez is a  60 year old male with PMHx most significant for HFrEF 25-30% (as per 2-D echocardiogram on 01/18/21) 2/2 ICM (NYHA Class III/Stage C), severe Pulmonary Hypertension, moderate MR, STEMI x/p DESx3 (2012) & PCI prox RCA 2019, Chronic Kidney Disease stage  IV, Paroxysmal Atrial fibrillation not on AC, Chronic Iron Deficiency Aneia of GIB due to AVMs, previous Lower GI bleeding requiring ileocecal resection,  type II Diabetes Mellitus, Hypothyroidism and Obesity who was admitted today to the 4C MICU unit for treatment of undifferentiated Shock (Septic vs. Cardiogenic)     Objective Data  Containment of urine/stool: Anuric and Incontinent pad in bed    Current Diet/ Nutrition:  None    Output:   I/O last 3 completed shifts:  In: 4690.36 [I.V.:2130.36; NG/GT:690]  Out: 6719 [Other:5969; Stool:750]    Risk Assessment:   Sensory Perception: 2-->very limited  Moisture: 4-->rarely  moist  Activity: 1-->bedfast  Mobility: 1-->completely immobile  Nutrition: 3-->adequate  Friction and Shear: 1-->problem  Elijah Score: 12    Labs:   Recent Labs   Lab 04/20/21  0329   ALBUMIN 2.6*   HGB 8.6*   INR 1.31*   WBC 19.7*   CRP 34.0*       Physical Exam  Skin inspection: focused back, buttock, BLE    Wound Location:  Mid upper back  Date of last Photo 4/12 see media tab  Wound History: POA  Measurements (length x width x depth, in cm) resolved 4/19    Wound Location:  Bilateral LE     Anterior right  4/12 4/19     Anterior left  4/12 4/19     Posterior right  4/12 4/19    Date of last Photo 4/19  Wound History: POA, possible edema vs septic shock blisters   Measurements (length x width x depth, in cm) largest blister on right posterior leg 12 cm x 8.5 cm  x  0.1 cm   Wound Base:  50/50 % dark brown slough and dermis  Tunneling N/A  Undermining N/A  Palpation of the wound bed: normal   Periwound skin: intact  Color: normal and consistent with surrounding tissue  Temperature: normal   Drainage:, moderate  Description of drainage: serous and serosanguinous  Odor: none  Pain: during dressing change, aching, tender and burning      Wound Location:  Bilateral heels and feet    R heel 4/20    Left Heel 4/20    L lateral foot 4/20    Date of last photo 4/20  Wound History: new and evolving blisters and ischemia to L foot and bilat heels.  Pt has had prevalon boots continuously.  Irregular shape, pt condition and use of pressors consistent with ischemic wounds.  This writer assessed pt yesterday 4/19.  Today noting toes on both feet becoming duskier.   Wound Base: 100 % ischemia     Palpation of the wound bed: normal      Drainage: none     Description of drainage: none     Measurements (length x width x depth, in cm) not measured  Periwound skin: intact and ecchymosis      Color:  dusky      Temperature: normal   Odor: none  Pain: no grimacing or signs of discomfort, none        Interventions  Current support surface: Standard  Atmos Air mattress, pulsate ordered 4/12  Current off-loading measures: Pillows  Repositioning aid: Pillows  Visual inspection of wound(s) completed   Tube Securement: NA  Wound Care: was done per plan of care.  Supplies: placed at the bedside and floor stock  Educated provided: plan of care and wound progress  Education provided to: spouse and nurse  Discussed importance of:dressing change frequency  Discussed plan of care with Family and Nurse    Poppy Menard RN CWOCN

## 2021-04-20 NOTE — PLAN OF CARE
ICU End of Shift Summary. See flowsheets for vital signs and detailed assessment.    Changes this shift: No major changes overnight. Slightly restless at the beginning of the shift, precedex started which was effective. Slept most of the night. Remains in a-fib, rate well controlled. Epi 0.13-0.15 overnight. CO/CI decreased, see flowsheets, MICU notified. CVP 20. Meeting goal on CRRT -1500ml at midnight, -495ml at 0600. Peak pressures elevated into the high 30s-40, large amount of boone, thick secretions suctioned from ETT. Tidal volume decreased d/t peak pressures, improved after vent changes. FiO2 increased to 50%. Art line and Fem CVC dressings changed x2 due to weeping. BLE dressings changed. Left foot/toes dark and dusky, developing a large black blister on the side of left foot. Pulses weak, particularly on left foot. Calcium replaced x1. Hgb stable.     Plan: Continue to monitor, continue POC. Continue pulling fluid on CRRT, wean pressors as able.      Right wrist and Left wrist restraints continued 4/20/2021    Clinical Justification: Pulling lines, pulling tubes, and pulling equipment  Less Restrictive Alternative: 1:1 patient care, Repositioning, Re-evaluate equipment, Disguise equipment, Pain management, De-escalation, Reorientation  Attending Physician Notified: MD ordered restraint, Attending Physician's Name: deion   New orders placed Yes  Length of Order: 1 Day      Sheba Chapman RN

## 2021-04-20 NOTE — PROGRESS NOTES
CRRT STATUS NOTE    DATA:  Time:  5:33 PM  Pressures WNL:  YES  Filter Status:  WDL    Problems Reported/Alarms Noted:  none    Supplies Present:  YES    ASSESSMENT:  Patient Net Fluid Balance:  Net IO Since Admission: 1,803.5 mL [04/20/21 1733]     Intake/Output Summary (Last 24 hours) at 4/20/2021 1733  Last data filed at 4/20/2021 1600  Gross per 24 hour   Intake 3592.67 ml   Output 5936 ml   Net -2343.33 ml      Vitals:  Temp:  [96  F (35.6  C)-97.8  F (36.6  C)] 97.5  F (36.4  C)  Pulse:  [63-96] 85  Resp:  [16-24] 22  MAP:  [55 mmHg-132 mmHg] 73 mmHg  Arterial Line BP: ()/() 107/59  FiO2 (%):  [30 %-50 %] 50 %  SpO2:  [83 %-100 %] 97 %   Labs:    Lab Results   Component Value Date     04/20/2021    POTASSIUM 4.6 04/20/2021    CR 0.65 (L) 04/20/2021    BUN 21 04/20/2021    MAG 2.8 (H) 04/20/2021    PHOS 4.2 04/20/2021    WBC 20.3 (H) 04/20/2021    HGB 8.5 (L) 04/20/2021    HCT 27.8 (L) 04/20/2021    PLT 38 (LL) 04/20/2021     Goals of Therapy:  Net neg 50-100mL/hr    INTERVENTIONS:   Review flow sheets and discuss plan of care with bedside nurse and nephrology team.    PLAN:  Continue fluid removal as tolerated per goals of therapy.  Check filter daily and change filter q 72 hrs and PRN.  Please contact the CRRT resource RN at [phone] with any questions/concerns.

## 2021-04-20 NOTE — PLAN OF CARE
ICU End of Shift Summary. See flowsheets for vital signs and detailed assessment.    Changes this shift: Afebrile. BP labile (patient on 3 pressors, titrating to goal) Afib, converted to NSR about 1200. Patient had V tach (MD notified). Patient was  CMV PEEP 5 Fio40%. Patient mucus plugged, patient desats to high 70s. Bronch.  Follows commands. Sedated with Fent and dex. No UOP. Adequate stool output.     restraints continued 4/20/2021    Clinical Justification: Pulling lines, pulling tubes, and pulling equipment  Less Restrictive Alternative: 1:1 patient care, Repositioning, Re-evaluate equipment, Disguise equipment, Pain management, De-escalation, Reorientation  Attending Physician Notified: MD ordered restraint, Attending Physician's Name: deion   New orders placed NO D/C  Length of Order: 1 Day      GREGORY THOMAS, RN    CRRT: Patient at goal of -1L.     Plan:  Care conference 4/21.

## 2021-04-20 NOTE — PROGRESS NOTES
Cardiology Consult    ASSESSMENT:   Mr. Jesus is a 60 yo M who has severe septic shock with a previously known ischemic cardiomyopathy that is unable to adequate augment cardiac output to maintain adequate perfusion for the degree of septic shock he is in. He may benefit from mechanical circulatory support (MCS) however patient has severe thrombocytopenia (11) and coagulopathy with elevated PTT, INR, low fibrinogen that significantly increase the risk of any procedures.    The patient does have a large pericardial effusion, the lack of signs of tamponade are NOT reassuring. He would not present with significant tamponade due to severely elevated biventricular filling pressures. Unfortunately, he would not be a good candidate for pericardiocentesis due to this thrombocytopenia and coagulopathy. There is significant risk of harm associated with the procedure, will recommend continued close monitoring of effusion.    In terms of his volume status, he has severe anasarca with reduced oncotic pressure (albumin 2.4). His large pericardial effusion in the setting of anasarca may decrease in proportion to reduction in volume. Aggressively decreasing volume would also help with improving the patient's cardiac output, recommend titrating CRRT rate to target net neg 2L per day.     #Shock - Primary severe septic shock with secondary cardiogenic shock (inability of cardiac function to adequate compensate for distributive state)   [facility] 4/14/21 -- RA 20 PA 64/30 (41) PCWP 30 Cecilia CO 4.6/CI 2.2 PVR 2.4 Baer   Pressors: High dose epi ~0.3 mcg/kg/min, Vaso 4 units/hr, Dopamine 5   Volume: Anasarca with severe diffuse hypervolemia including severely elevated biventricular filling pressures. CVP 20, PCWP 30.   Steroids: stress dose -- Fludrocortisone 50mcg daily   Abx: Zosyn 4.5g q6h (4/12 -  ), Vanc (4/13 - 4/14)  Hemodynamics  Date RA PA PCWP CO/CI SVR PVR MAP Therapies   4/19 24 72/28  4.5/2.1 800  71 Epi 0.24, V 4   4/19 pm 26  70/36 (48) 32 4.7/2.1 731 3.4wu 69 Epi 0.24, V 4   4/20 20 65/35(46) 30 3.4/1.6 1083 4.7wi 66 Epi 0.3, V 4, Dopa 2.5     #Large Pericardial effusion  #Acute Congestive hepatopathy and acute liver failure - h/o cardiac cirrhosis  #Hyperbilirubinemia  #DIC? - INR 1.81, PTT 58, Fibrinogen 93, D-Dimer 16.8. However factor VIII assay elevated indicating primary liver dysfunction rather than DIC.  #Troponin elevation 2/2 Type II NSTEMI (demand ischemia)  #CKD stage IV with acute renal failure 2/2 ATN requiring CRRT   4/17: net -500cc   4/18: net even    4/19: net -1.5L  #AHRF 2/2 pulmonary edema and aspiration PNA - /5/30%/19-20  #Acute hypercapnic respiratory failure improved with bronch 4/20  #Respiratory acidosis  #Anasarca  #Superficial venous thrombosis in basilic vein  #Thrombocytopenia - likely related to underlying sepsis  #Leukocytosis      #Acute cardiogenic shock on chronic HFrEF 25-30%  #Pulmonary hypertension - possible mixed pre and post capillary  #Mild to moderate RV dysfunction (acutely while volume overloaded on echocardiogram)   [facility] 2/10/20 -- RA 22, /20, /50 (65), PCWP 40, Cecilia 3.5/1.6 TD 3.8/1.8 PVR 8.5     #CAD (STEMI s/p DESx3 (2012) & PCI prox RCA 2019)  #MR - moderate to severe  #pAF - not on AC due to h/o GIB  #Iron def anemia  #T2DM  #Hypothyroidism  #Obesity    RECOMMEND:  - Pressors: Continue Epi and Vaso as needed to maintain MAP >65. Continue Dopamine at 5.  - Volume: Recommend titrating CRRT rate to target net neg 2L per day.   - Abx and coagulopathy care per primary team  - Small increase in size on today's echo of the pericardial effusion. Again, too high risk with marginal benefit at this time for intervention at this time.    Discussed with Dr. Alexy Mohsan Chaudhry, MD  Division of Cardiology, PGY-4      ===============================================================  Subjective:  Limited by intubation and sedation. Overnight pressors decreased however this morning  pressors requirements increased. He was given platelets, maintained an adequately increased plt count.              History of Present Illness  On Admission  Cole Jesus is a 61 year old male who presents with septic shock requiring pressors without a clear source, suspected to be due to a CAP vs secondary to bilateral lower edema wounds. No bacteremia. His clinical course has significant evidence of end organ damage including demand ischemia, ATN needing CRRT, liver failure. Patient's continuous critical illness has been discussed with the family, patient is currently DNR with a care conference pending in 2 days.    Per family, patient was slowly declining over the years, before he suddenly became ill. Patient has been encephalopathic throughout his admission prior to intubation. He has required high dose pressors and is currently on Epi 0.24 and Vaso of 4. He was previously evaluated by cardiology and thought to be a high risk     Subjective limited by intubation and sedation.    1/18/2021 TTE  The Ejection Fraction is estimated at 25-30%.  Borderline left ventricular dilation is present.  Mild to moderate right ventricular dilation is present.  Global right ventricular function is moderately reduced.  Moderate to severe mitral insufficiency is present.  Moderate tricuspid insufficiency is present.  Right ventricular systolic pressure is 34mmHg above the right atrial pressure.  IVC diameter >2.1 cm collapsing <50% with sniff suggests a high RA pressure  estimated at 15 mmHg or greater.  Small circumferential pericardial effusion is present without any hemodynamic  significance.  No significant changes noted.    Coronary Angiogram- [place]  6/19/2019  CORONARY ANATOMY:    CORONARY ANATOMY DOMINANCE: Right     NATIVE CORONARY ARTERIES:    LEFT MAIN CORONARY ARTERY: Mild luminal irregularities    LEFT ANTERIOR DESCENDING CORONARY ARTERY: Mild luminal     irregularities    CIRCUMFLEX CORONARY ARTERY: Patent  proximal LCx into mid LCx     tsent. There is an eccentric 20-40% stenosis (in stent restenosis     of the proximal stent    RIGHT CORONARY ARTERY: Acute occlusion.  Stents within the mid     RCA to the RPDA     INTERVENTION:     Pre Procedure stenosis (%): 100    TYSON flow before: 0    Predilatation of the lesion with a 2.0×12 mm Trek NC balloon.  12     nick 5 seconds.     Stenting of the RCA with a 3.5×28 mm Xience Juanita drug eluting     stent.  12 nick 8 seconds.      Postdilatation with a 4.5×20 mm Trek NC balloon.  18 atmospheres     20 seconds.  12 nick 16 seconds.     Balloon angioplasty of the old mid RCA stents to treat stent     undersizing and associated thrombosis at this site.  4.5×20 mm     Trek NC balloon was used.  12 nick 11 seconds.    Post Procedure stenosis (%): 0    TYSON flow after: III        FINAL DIAGNOSIS/IMPRESSION:     #. Angiogram performed to workup ST elevation MI with severe     symptomatic bradycardia and cardiogenic shock    #. Acute occlusion of the proximal RCA with TYSON 0 flow in the     vessel    #. Otherwise nonobstructive disease including mild to moderate     in-stent restenosis of the circumflex stent    #. Stenting of the proximal RCA with a 3.5×28 mm Xience Juanita     drug eluting stent.  Post dilated with a 5.0 mm balloon.    #. Balloon angioplasty of the previously placed mid RCA stents     due to stent undersizing.  A 4.5 mm NC balloon was used.      Review of Systems   The 10 point Review of Systems is negative other than noted in the HPI or here.    Physical Exam   Temp: 96  F (35.6  C)(bairhugger applied) Temp src: Axillary  Pulse: 64   Resp: 20 SpO2: 99 % O2 Device: Mechanical Ventilator    Vital Signs with Ranges  Temp:  [96  F (35.6  C)-98  F (36.7  C)] 96  F (35.6  C)  Pulse:  [] 64  Resp:  [16-20] 20  MAP:  [62 mmHg-132 mmHg] 73 mmHg  Arterial Line BP: ()/() 111/57  FiO2 (%):  [30 %-50 %] 50 %  SpO2:  [83 %-100 %] 99 %  208 lbs 8.88 oz    GEN:  intubated and sedated.  HEENT: no icterus  CV: IRR, normal s1/s2, no murmurs/rubs/s3/s4, no heave. JVP >15.  CHEST: crackles throughout all lung fields  ABD: soft, NT, NABS, mod-severe distention with relatively soft abdomen.  : no flank/suprapubic tenderness  NEURO:  Intermittently moving all extremities. Normal dolls eye reflex. Normal corneal reflex. Additional evaluation limited at this time.  PSYCH: unable to assess.    Data   Recent Labs   Lab 04/20/21  1021 04/20/21  0329 04/19/21  2200 04/19/21 2020 04/19/21  0310 04/19/21  0310 04/18/21  0830 04/18/21  0830 04/14/21  0435 04/14/21  0435   WBC  --  19.7*  --  18.4*  --  19.3*  --   --    < > 15.1*   HGB  --  8.6*  --  8.7*  --  7.0*  --   --    < > 8.3*   MCV  --  83  --  83  --  79  --   --    < > 75*   PLT  --  39*  --  44*  --  11*  --   --    < > 34*   INR  --  1.31*  --   --   --  1.47*  --  1.50*   < >  --     138 138  --    < > 138   < >  --    < > 136   POTASSIUM 4.6 3.9 3.7  --    < > 3.8   < >  --    < > 4.7   CHLORIDE 106 106 108  --    < > 109   < >  --    < > 104   CO2 21 21 20  --    < > 21   < >  --    < > 19*   BUN 21 21 21  --    < > 19   < >  --    < > 43*   CR 0.65* 0.67 0.67  --    < > 0.76   < >  --    < > 1.49*   ANIONGAP 9 11 10  --    < > 8   < >  --    < > 13   RONNY 8.1* 7.8* 8.0*  --    < > 7.7*   < >  --    < > 8.1*   * 134* 150*  --    < > 153*   < >  --    < > 73   ALBUMIN  --  2.6*  --   --   --  2.4*  --   --    < > 2.5*   PROTTOTAL  --  6.5*  --   --   --  5.9*  --   --    < > 6.0*   BILITOTAL  --  18.0*  --   --   --  15.2*  --   --    < > 7.6*   ALKPHOS  --  106  --   --   --  103  --   --    < > 99   ALT  --  67  --   --   --  66  --   --    < > 64   AST  --  44  --   --   --  42  --   --    < > 112*   LIPASE  --   --   --   --   --   --   --   --   --  36*    < > = values in this interval not displayed.     Recent Results (from the past 24 hour(s))   Echocardiogram Limited    Narrative     [id number]  [id number]  [id number]  [id number]^CARLYN^MOHSAN     [facility]  Echocardiography Laboratory  [address] [place] [id number]     Name: MERCY SHAFER  MRN: [id number]  : 1960  Study Date: 2021 04:33 PM  Age: 61 yrs  Gender: Male  Patient Location: [facility] [place]  Reason For Study: Pericardial Effusion  Ordering Physician: CHAUDHRY, MOHSAN  Performed By: Meet Guzman RDCS     ______________________________________________________________________________  Procedure  Limited Portable Echo Adult.  ______________________________________________________________________________  Interpretation Summary  Limited TTE to evaluate for pericardial effusion.  There is a large-sized circumferential pericardial effusion. The maximal depth  is 3.0 cm adjacent to the LV mid anterolateral segment, but fluid is also  present near the RV free wall and near the apex. There is no RA/RV diastolic  collapse and no significant variation in the mitral/tricuspid inflow. The IVC  is dilated and not collapsing but the patient is mechanically ventilated.  This study was compared with the study from 2021. The size of the  effusion appears to have increased, albeit the patient still does not exhibit  signs of tamponade. The ventricular function, MR, and TR are unchanged.     Results discussed with Dr. Ledezma at 21 1710 hrs.  ______________________________________________________________________________  Left Ventricle  Severely (EF 15-20%) reduced left ventricular function is present. Severe  diffuse hypokinesis is present.     Right Ventricle  Global right ventricular function is severely reduced.     Mitral Valve  Moderate to severe mitral insufficiency is present.     Aortic Valve  The aortic valve cannot be assessed.     Tricuspid Valve  Moderate tricuspid insufficiency is present. There is early systolic hepatic  vein flow reversal. Right ventricular systolic pressure  is 32mmHg above the  right atrial pressure.     Pulmonic Valve  The pulmonic valve cannot be assessed.     Pericardium  There is a large-sized circumferential pericardial effusion. The maximal depth  is 3.0 cm adjacent to the LV mid anterolateral segment, but fluid is also  present near the RV free wall and near the apex. There is no RA/RV diastolic  collapse and no significant variation in the mitral/tricuspid inflow. The IVC  is dilated and not collapsing but the patient is mechanically ventilated.     Compared to Previous Study  This study was compared with the study from 2021 . The size of the  effusion appears to have increased, albeit the patient still does not exhibit  signs of tamponade. The ventricular function, MR, and TR are unchanged.     ______________________________________________________________________________  Doppler Measurements & Calculations  TR max shavonne: 286.4 cm/sec  TR max P.8 mmHg     ______________________________________________________________________________  Report approved by: Scotty Sharif 2021 05:15 PM         XR Chest Port 1 View    Narrative    EXAM: XR CHEST PORT 1 VW  2021 6:16 AM      HISTORY: ETT eval    COMPARISON: Chest x-ray 2021, CT 2021    FINDINGS: Portable semiupright AP radiograph of the chest. The patient  is rotated. Right IJ [place]-Messi catheter tip projects over the right  pulmonary artery. The feeding tube courses inferior to the diaphragm  and out of the field of view. The tip of the endotracheal tube  projects approximately 4 cm above the mila. Left IJ central venous  catheter tip projects over the SVC/brachiocephalic confluence.    The trachea appears midline. The cardiac silhouette is enlarged. Small  right pleural effusion. Right basilar linear opacities. No  pneumothorax. The visualized upper abdomen is unremarkable.       Impression    IMPRESSION:   1. The tip of the endotracheal tube projects approximately 4 cm  above  the mila.  2. Small right pleural effusion with adjacent right basilar opacities,  likely atelectasis.  3. Cardiomegaly.  4. Stable position of the left IJ central venous catheter with tip  projecting over the SVC/brachiocephalic confluence.    I have personally reviewed the examination and initial interpretation  and I agree with the findings.    COURTNEY BENTLEY MD   XR Chest Port 1 View    Narrative    EXAM: XR CHEST PORT 1 VW  4/20/2021 9:52 AM     HISTORY:  Intubated, unexplained desaturations       COMPARISON:  Chest x-ray from same day at 0601 hours    FINDINGS:   Portable supine radiograph of chest. Endotracheal tube tip projects  within the thoracic trachea. Left internal jugular central venous  catheter tip projects over the proximal SVC. Right internal jugular  [place]-Messi catheter tip projects over the right main pulmonary artery.  Enteric tube courses into the stomach.     Trachea is midline. Stable enlarged cardiac silhouette. No significant  change in the streaky perihilar and right basilar opacities. Small  right pleural effusion. No significant left effusion. No pneumothorax.      Impression    IMPRESSION:   1. No significant change in the streaky perihilar and bibasilar  opacities, likely atelectasis and edema.  2. Stable small right pleural effusion with overlying  atelectasis/consolidation.  3. Stable support devices.    I have personally reviewed the examination and initial interpretation  and I agree with the findings.    TERRANCE ESTEBAN MD   US Upper Extremity Venous Duplex Left    Narrative    EXAMINATION: DOPPLER VENOUS ULTRASOUND OF THE LEFT UPPER EXTREMITY,  4/20/2021 10:57 AM     COMPARISON: Ultrasound 4/16/2021    HISTORY: Follow-up left basilic vein thrombosis    TECHNIQUE:  Gray-scale evaluation with compression, spectral flow and  color Doppler assessment of the deep venous system of the left upper  extremity.    FINDINGS:  Left: Normal blood flow and waveforms are demonstrated  in the internal  jugular, innominate, subclavian, and axillary veins. There is normal  compressibility of the brachial and cephalic veins. Noncompressible  segment of the left basilic vein in the antecubital fossa with full  compressibility in the upper and mid arm.       Impression    IMPRESSION:  1.  No evidence of left upper extremity deep venous thrombosis.  2.  Redemonstrated occlusive superficial venous thrombus in the left  basilic vein at the antecubital fossa, not significantly changed from  4/16/2021.    I have personally reviewed the examination and initial interpretation  and I agree with the findings.    MICHELE CHANG MD

## 2021-04-20 NOTE — PROGRESS NOTES
Nephrology Progress Note  04/20/2021         Overall Mr. Jesus is a 60-year-old gentleman with multiple comorbidities related to his diabetes and ischemic cardiomyopathy.  He was admitted to the ICU with worsening shock requiring multiple pressors with evidence of hyperkalemia and moderate to severe lactic acidosis. Started CRRT on 4/11.       Interval History :   Mr Jesus continues on CRRT, was net negative 1.5L yesterday, on track for closer to 2L off today.  Has high obligate intake and remains on 3 pressors with high filling pressures but able to pull some fluid, having care conference tomorrow.       Assessment & Recommendations:   EVA on CKD-Baseline Cr 2.5-3, has followed in Neph clinic with Dr Vigil but not since 12/2019.  Cr up to ~4 with BUN of 152 before starting CRRT on 4/11 for hyperkalemia and metabolic acidosis in the setting of shock, sepsis vs cardiogenic.  On 2 pressors with sepsis vs cardiogenic shock.                 -Access is LI[facility] temp line.                -CRRT 25ml/kg/hr dosing, 50-100cc/hr net negative and now trying to pull 1L net negative today.      Volume status-Overloaded and S-G suggests a mixed cardiogenic/septic picture, was net even yesterday, CVP remains in low 20's, plan to try to pull ~1L net negative today, does get ~4+ L of intake daily so needs very large UF on 3 pressors to achieve goals.       Electrolytes/pH-K 4.6, bicarb 21 on CRRT with 4k baths.       Ca/phos/pth-Ca 8.1, Mg and Phos WNL.      CHF-EF 25-30%, also with possible sepsis component, on Vaso and NE so treating mostly as sepsis for now.       Anemia-Hgb 8.6, on downtrend, acute management per team.      Nutrition-On Osmolite 1.5 TF.       Seen and discussed with Dr Lin     Recommendations were communicated to primary team via verbal communication.     GUILLE Silver CNS  Clinical Nurse Specialist  [phone]    Review of Systems:   I reviewed the following systems:  ROS not done due to  "vent/sedation.     Physical Exam:   I/O last 3 completed shifts:  In: 4690.36 [I.V.:2130.36; NG/GT:690]  Out: 6719 [Other:5969; Stool:750]   BP (!) 82/59 (BP Location: Left arm)   Pulse 64   Temp 96  F (35.6  C) (Axillary)   Resp 20   Ht 1.727 m (5' 8\")   Wt 94.6 kg (208 lb 8.9 oz)   SpO2 99%   BMI 31.71 kg/m       GENERAL APPEARANCE: Intubated and sedated.   Head NC/AT  EYES: mildly scleral icterus  HENT: mouth  without ulcers or lesions  NECK: supple  Pulmonary: fair air entry and exchange   CV: irregular rhythm, tachy rate, no rub   - Edema bilateral   GI: soft, nontender, normal bowel sounds, no HSM   MS: no evidence of inflammation in joints, no muscle tenderness  : [place]  SKIN: blisters over both lower extremities    NEURO: Intubated and sedated.     Labs:   All labs reviewed by me  Electrolytes/Renal -   Recent Labs   Lab Test 04/20/21  1021 04/20/21 0329 04/19/21 2200 04/19/21  1524 04/19/21 0310 04/19/21  0310    138 138 138   < > 138   POTASSIUM 4.6 3.9 3.7 3.6   < > 3.8   CHLORIDE 106 106 108 108   < > 109   CO2 21 21 20 23   < > 21   BUN 21 21 21 21   < > 19   CR 0.65* 0.67 0.67 0.76   < > 0.76   * 134* 150* 156*   < > 153*   RONNY 8.1* 7.8* 8.0* 8.5   < > 7.7*   MAG  --  2.6*  --  2.5*  --  2.5*   PHOS  --  3.2  --  3.0  --  2.8    < > = values in this interval not displayed.       CBC -   Recent Labs   Lab Test 04/20/21 0329 04/19/21 2020 04/19/21 0310   WBC 19.7* 18.4* 19.3*   HGB 8.6* 8.7* 7.0*   PLT 39* 44* 11*       LFTs -   Recent Labs   Lab Test 04/20/21 0329 04/19/21 0310 04/18/21  0344   ALKPHOS 106 103 116   BILITOTAL 18.0* 15.2* 14.2*   ALT 67 66 72*   AST 44 42 36   PROTTOTAL 6.5* 5.9* 5.7*   ALBUMIN 2.6* 2.4* 2.3*       Iron Panel -   Recent Labs   Lab Test 12/18/20  1242 11/12/20  1212 08/13/20  1006   IRON 21* 12* 16*   IRONSAT 5* 3* 3*   FRANK 25* 11* 9*           Current Medications:    B and C vitamin Complex with folic acid  5 mL Per Feeding Tube Daily     " fludrocortisone  50 mcg Oral or Feeding Tube Daily     hydrocortisone sodium succinate PF  50 mg Intravenous Q6H     lactulose  20 g Oral or Feeding Tube TID     levothyroxine  25 mcg Oral or Feeding Tube QAM AC     pantoprazole (PROTONIX) IV  40 mg Intravenous Daily with breakfast     piperacillin-tazobactam  4.5 g Intravenous Q6H     protein modular  2 packet Per Feeding Tube TID     senna-docusate  2 tablet Oral At Bedtime       amiodarone 0.5 mg/min (04/20/21 0802)     dexmedetomidine 0.2 mcg/kg/hr (04/20/21 0700)     dextrose       dextrose       CRRT replacement solution 12.5 mL/kg/hr (04/20/21 1130)     DOPamine 5 mcg/kg/min (04/20/21 0900)     EPINEPHrine 0.29 mcg/kg/min (04/20/21 1025)     fentaNYL 50 mcg/hr (04/20/21 0700)     heparin (porcine) 20,000 units in 20 mL 500 Units/hr (04/20/21 1106)     insulin (regular) 4 Units/hr (04/20/21 0700)     CRRT replacement solution       CRRT replacement solution 12.5 mL/kg/hr (04/20/21 1130)     vasopressin 4 Units/hr (04/20/21 1025)

## 2021-04-20 NOTE — PROGRESS NOTES
MEDICAL ICU PROGRESS NOTE  04/20/2021      Date of Service (when I saw the patient): 04/20/2021    Mr. Pancho Jeuss is a  60 year old male with PMHx most significant for HFrEF 25-30% 2/2 ICM after STEMI x/p DESx3 (2012) & PCI prox RCA 2019, severe Pulmonary Hypertension, moderate MR,  CKD IV, Paroxysmal A fib not on AC, Chronic Iron Deficiency Anemia 2/2 GIB due to AVMs, previous LGI bleeding requiring ileocecal resection, T2DM, who was admitted 4/12 to the MICU for treatment of septic shock.     Major changes today:  - Down titrate Vasopressin > then Epinephrine given increasing SVR  - Will up-titrate Dopamine to B1 dose when able  - Repeat Venous US + Duplex  - CRRT with goal -2 net negative   - Repeat ECHO        Neuro:  # Sedation  Goal Rass -1 to -2  - Fentanyl gtt + bolus  - Versed bolus PRN    # Hepatic encephalopathy Stage 2-3  Had asterixis on exam, now sedated  - Lactulose 20 TID (titrate for 3-5 BM) or 800-1200 ml of stool output a day.      Cardiovascular  # Shock, septic + cardiogenic---presumed decompensated HF + aspiration PNA  Requiring 2 pressors currently, MAP goal 60. Volume status is complicated by vasodilatory effects of sepsis and congestion from heart failure.  Blood culture remain NGTD, skin culture NGTD. CT revealing of nodular consolidation with surrounding GGO in superior LLL. Concern for aspiration PNA. Culture growing light staph aureus. CT CAP 4/19 without evidence of pulmonary source. Repeated TTE has shown ongoing evidence of worsening/ enlarging pericardial effusion. CVP has been 20-26 despite ongoing CRRT. Given elevated intracardiac pressures, It may be difficult to assess tamponade physiology. Following aggressive diuresis, we may be able to see this and reconsider pericardiocentesis. Patient did not tolerate dobutamine trial d/t hypotension. Started on Dopamine and Epinephrine while on Vasopressin on 4/19. Will plan to decrease Vasopressin >> wean epinephrine  Then up-titrate  Dopamine given acute increase in SVR from 500 to 1100 and concern for worsening index on low dose Dopamine. Goal B1 agonist effects occur at dose 5-10 of Dopamine. Overall, not a candidate of advanced device therapies given coagulopathy and severity of disease.       - Continue Zosyn; consider changing to Tobramycin or Quinolone depending on repeat cultures from 4/19  - Pan culture 4/19 NGTD  - Culture 4/14 from BAL - light MSSA growth   - Repeat Cultures 4/14 from BAL - NGTD   - Epinephrine gtt to improve cardiac contractility given worsening CI  - Dopamine, Epinephrine and Vasopressin    # HFrEF 25-30% 2/2 ICM  # Coronary Artery Disease s/p DESx3  # Type 2 NSTEMI  # Severe Pulmonary Hypertension  Maintaining I + O on CRT. Wean epi to avoid ventricular arrythmias.  - Hold on PTA diuretics  - TTE similar to prior      # Moderate MR  # Paroxysmal Atrial Fibrillation (not on anticoagulation)  On amiodarone for ventricular arrythmias that started after pressors. Will continue as we re-attempt epinephrine administration.  - On cardiac Telemetry  - Goal: K+ >4.0, Mg2+ > 2.0  - Continue Amiodarone gtt    Pulmonary:     # Pulmonary Nodulues  9 mm nodular consolidation with surrounding groundglass attenuation in the superior left lower lobe, favor infectious/inflammatory  etiology though is indeterminant. Few other pulmonary nodules noted.  - Follow-up CT in 3 months to assess for interval change.  - Continue IV Zosyn    # Acute Hypoxic Respiratory Failure- intubated  # Concern for aspiration PNA  Patient was found using abdominal muscles to breath and was increasingly somnolent. Patient was maintaining oxygenation despite this on FIO2 40%. However, was hypotensive with MAPS 50s despite being on 0.4 of levophed and vaso 4. HOB was elevated for WOB, but blood pressure dropped further. He was put back in flat position. Levophed threshold was increased to 0.7. RT was called at bed side to start patient on BIPAP. Stat CXR  ordered simultaneously. Wife called and patient intubated. Bronchoscopy done 4/14 and a copious amount of thick sputum was collected. Sputum cell count was > 4K with neutrophilic predominance. Sputum culture revealed light staph MSSA.  - Repeat cultures pending  - Continue IV Zosyn      Ventilation Mode: CMV/AC  (Continuous Mandatory Ventilation/ Assist Control)  FiO2 (%): 50 %  Rate Set (breaths/minute): 16 breaths/min  Tidal Volume Set (mL): 450 mL  PEEP (cm H2O): 5 cmH2O  Oxygen Concentration (%): 30 %  Resp: 20      GI/Nutrition:    # Cardiac Cirrhosis   SAAG 1.3, making portal HTN very likely. Ascites protein 3.1, making cardiac cirrhosis very likely.  Ascites fluid noted to be transudative.    Etiology: likely chronic congestion from heart failure.  Hepatitis C antibody non reactive, Hep A antibody non reactive, Hep Bs Ag non reactive but will add on hep B antibody. Will need hep A and B vaccination    Evaluation: EGD: 2019 no esophageal varices. No evidence of portal gastropathy. No active bleeding, no EGD needed at this time.  Ascites: large volume. Fluid transudative according to light's criteria using total protein. Hold off on spirinolactone and lasix given soft pressures requiring pressors. Considering therapeutic para. Continue CRRT.  Coagulopathy:  likely hepatic congestion leading to compression of perisinusoidal and endothelial cells that produce F 7. Vit K x 3 days 4/13- 4/15.   Fibrinogen goal > 100 PLT goal > 30    Hepatic encephalopathy: Grade 2-3 Asterixis on exam. Ammonia < 10 but can be normal in up to half of people with HE. Start lactulose and titrate to 3-5 BMs/day.  SBP: Diagnostic para shows ascitic protein 3.1 and PMN fluid 126. No ppx indicated. Culture pending.  HCC sceening: Abd US q 6 m + AFP. No hepatic mass noted on CT abdomen.  Diet: 2 g sodium restriction and 2 L fluid restriction  MELD-Na score: 20 at 4/20/2021  3:29 AM  MELD score: 20 at 4/20/2021  3:29 AM  Calculated  from:  Serum Creatinine: 0.67 mg/dL (Rounded to 1 mg/dL) at 4/20/2021  3:29 AM  Serum Sodium: 138 mmol/L (Rounded to 137 mmol/L) at 4/20/2021  3:29 AM  Total Bilirubin: 18.0 mg/dL at 4/20/2021  3:29 AM  INR(ratio): 1.31 at 4/20/2021  3:29 AM  Age: 61 years     MELD-Na is 134 accounting for Cr.    - GI/ hepatology following    # Mild hepatopathy  # Previous history of GI bleeding in the context of Angioectasia/AVM's  # Protein losing nephropathy  #Malnutrition  CT Abd to evaluate liver and biliary tree.   - PPI  - TF at goal     Renal/Fluids/Electrolytes:    Hypocalcemia  - replete with IV Calcium  Chloride      # Acute on Chronic Kidney Disease Stage 4  # High Anion Gap Metabolic Acidosis - Improved  # Combined Lactic Acidosis Type A/B  # Hyperkalemia (6.4)  # Hyponatremia (likely related to poor oral intake and hypervolemia)  # Protein Losing Nephropathy     Baseline Scr: 2.1-2.4 mg/dL> Most recently 3.89 mg/dL with associated hyperkalemia, hyponatremia and significant metabolic acidosis (Co2: 17-->17) in the context of lacticemia despite two vasopressors. Etiology could potentially be related to mixed distributive//septic vs. Cardiogenic shock.      - Nephrology Consulted and Appreciate recommendations  - CRRT with Heparin flushes to prevent thrombosis of circuit  - Strict I/O's  - Bladder scan qshift and straight cath PRN  - Daily Weights  - Avoid NSAID or Nephrotoxic agents       Endocrine:    # Hypothyroidism  TSH 10, T4 0.34. T3 0.3 indicating hypothyroidism.  - start synthroid 25 mcg     # Type II Diabetes Mellitus  - Oral Hypoglycemia Protocol Initiated  - Small dose sliding scale insulin       ID:  # Shock, septic + cardiogenic---presumed decompensated HF + aspiration PNA  Bronchoscopy done 4/14 and a copious amount of thick sputum was collected. Sputum cell count was > 4K with neutrophilic predominance which is likely the source.Multiple weeping blisters on lower extremities, some with erythema -cultures  NGTD. No meningeal signs to suggest CNS.  - Continue Zosyn until sputum cultures result  - Stress dose steroids with hydrocortisone and fludrocortisone  - Urine/sputum/blood cultures NGTD     Hematology:    # Chronic Iron Deficiency Anemia  # Thrombocytopenia likely related to underlying sepsis  # Coagulopathy 2/2 hepatopathy   # Supratherapeutic INR  # Concern for DIC  Elevated PT/PTT, decreasing Fibrinogen and elevated hyperbilirubinemia (which is likely 2/2 hepatic congestion).  - F8 pending   - Continue Monitoring Hgb  - Hgb goal >7 g/dL  - Holding on heparin products and anticoagulation   - transfuse pRBC,Cryoprecipitate x 5 units    # Superficial venous thrombosis in basilic vein   - No AC d/t coaggulopathy  - Serial monitoring with US q 3 days     General Cares/Prophylaxis:    DVT Prophylaxis: SCD   GI Prophylaxis: PPI  Family Communication: Partner, Haven  Code Status:  Full Code     Lines/tubes/drains:  - Right Femoral Arterial Line/ Left Femoral Triple CVC/ PIV/ swan  - Left internal jugular Castro Dialysis Catheter     Disposition:  - Medical ICU until further notice       Patient seen and findings/plan discussed with medical ICU staff, Dr. Joseph Gates Nzemenoh    ====================================  INTERVAL HISTORY:   Dobutamine trailed off yesterday and patient became hypotensive. She was then started on Epinephrine drip.    OBJECTIVE:   1. VITAL SIGNS:   Temp:  [96  F (35.6  C)-98.4  F (36.9  C)] 96  F (35.6  C)  Pulse:  [] 64  Resp:  [16-20] 20  MAP:  [58 mmHg-132 mmHg] 73 mmHg  Arterial Line BP: ()/() 111/57  FiO2 (%):  [30 %-50 %] 50 %  SpO2:  [83 %-100 %] 99 %  Ventilation Mode: CMV/AC  (Continuous Mandatory Ventilation/ Assist Control)  FiO2 (%): 50 %  Rate Set (breaths/minute): 16 breaths/min  Tidal Volume Set (mL): 450 mL  PEEP (cm H2O): 5 cmH2O  Oxygen Concentration (%): 30 %  Resp: 20    2. INTAKE/ OUTPUT:   I/O last 3 completed shifts:  In: 4690.36 [I.V.:0150.36;  NG/GT:690]  Out: 6719 [Other:5969; Stool:750]    3. PHYSICAL EXAMINATION:  General: intubated and waking up; nodding yes to questions (right or wrong) but not following commands  HEENT: Normocephalic/Atraumatic, intubated, EOMI/MOM, icteric oral muscosa/sclera.   Pulm/Resp:mechanically ventilated, crackles present   CV: RRR, normal S1 and S2. No murmurs/rubs gallops. Peripheral pulses +1. Significant peripheral pitting edema +3 from legs to inguinal/scrotal region.   Abdomen: Soft, non-distended, non-tender. No pain/tenderness to palpation. No guarding/rebound.  : Not examined  Neuro: Not moving extremities as he is sedated at this time  Incisions/Skin: Multiple open wounds on legs, now dressed. Hyperpigmented skin in the legs/chronic venous stasis changes.  Dusky toes, Nail dystrophy on toes bilaterally.     4. LABS:   Arterial Blood Gases   Recent Labs   Lab 04/20/21 0130 04/19/21 2200 04/19/21  0448 04/15/21  1403   PH 7.33* 7.31* 7.35 7.41   PCO2 40 42 39 30*   PO2 84 60* 88 130*   HCO3 21 21 21 19*     Complete Blood Count   Recent Labs   Lab 04/20/21 0329 04/19/21 2020 04/19/21 0310 04/18/21  0344   WBC 19.7* 18.4* 19.3* 17.4*   HGB 8.6* 8.7* 7.0* 7.7*   PLT 39* 44* 11* 11*     Basic Metabolic Panel  Recent Labs   Lab 04/20/21 0329 04/19/21 2200 04/19/21  1524 04/19/21  1012    138 138 139   POTASSIUM 3.9 3.7 3.6 3.7   CHLORIDE 106 108 108 109   CO2 21 20 23 21   BUN 21 21 21 19   CR 0.67 0.67 0.76 0.70   * 150* 156* 178*     Liver Function Tests  Recent Labs   Lab 04/20/21  0329 04/19/21  0310 04/18/21  0830 04/18/21  0344 04/17/21  0422   AST 44 42  --  36 67*   ALT 67 66  --  72* 86*   ALKPHOS 106 103  --  116 107   BILITOTAL 18.0* 15.2*  --  14.2* 14.4*   ALBUMIN 2.6* 2.4*  --  2.3* 2.2*   INR 1.31* 1.47* 1.50*  --  1.81*     Coagulation Profile  Recent Labs   Lab 04/20/21  0329 04/19/21  0310 04/18/21  0830 04/17/21  0422   INR 1.31* 1.47* 1.50* 1.81*       5. RADIOLOGY:   Recent  Results (from the past 24 hour(s))   CT Chest Abdomen Pelvis w/o Contrast    Narrative    CT CHEST ABDOMEN PELVIS W/O CONTRAST 4/19/2021 12:25 PM    History: Sepsis    Comparison: Chest abdomen pelvis CT 4/13/2021, same day chest  radiograph    Technique: Helical CT acquisition from the lung apices to the pubic  symphysis was obtained without intravenous contrast. Axial, coronal,  and sagittal reconstructions were obtained and reviewed.    Findings:     CHEST:     Lungs: Stable small bilateral pleural effusions. Stable rounded  consolidative opacities with central calcifications the right lower  lobe. Additional bibasilar patchy consolidative opacities. No  pneumothorax. Atelectasis versus scarring in the anterior left upper  lobe.  Airways: Endotracheal tubes in the mid thoracic trachea. Small amount  of debris in the trachea pass the endotracheal tube and in the  mainstem bronchi.  Vessels: Right IJ [place]-Messi catheter tip is in the right pulmonary  artery. Left IJ CVC tip is in the high SVC. Normal three-vessel arch.  Main pulmonary artery is enlarged measuring up to 3.1 cm, nonspecific  and can be seen in pulmonary hypertension.  Heart: Heart size is normal. No significant change in large  pericardial effusion. Coronary stents.  Lymph nodes: Stable prominent right paratracheal lymph nodes.  Thyroid: Within normal limits.    ABDOMEN PELVIS:    Liver: Shrunken and nodular contour of the liver. No focal hepatic  mass. There is large volume mostly simple ascites with small amount of  layering more dense fluid down in the pelvis.  Biliary system: Vicarious excretion of contrast into the gallbladder  from prior study. No intrahepatic or extrahepatic biliary ductal  dilatation.  Pancreas: Mild fatty atrophy without focal mass. No ductal dilation.  Stomach: Within normal limits.  Spleen: Within normal limits.  Adrenal glands: Within normal limits.  Kidneys: 6 mm indeterminate hyperdense focus in the superior pole of  the  right kidney. Both kidneys appear atrophic. No calculus or  hydronephrosis..  Bladder: Bladder is decompressed.  Reproductive organs: Multiple prostatic calcifications.  Colon: Contrast within the hepatic flexure. Colon is nondilated.  Surgical changes of appendectomy.  Small Bowel: Feeding tube tip is in the second portion of the  duodenum. Small bowel is nondilated  Lymph nodes: No intra-abdominal or pelvic lymphadenopathy.  Vasculature: Atherosclerotic calcification of the abdominal aorta and  branch vessels.    Soft tissues: Diffuse soft tissue anasarca. There is a fat-containing  ventral hernia..   Bones: Multilevel degenerative changes of the spine. Multiple  chronic-appearing bilateral rib fractures. No suspicious osseous  abnormality.      Impression    IMPRESSION:   1. Stable large pericardial effusion.   2. Cirrhotic configuration of the liver with large volume ascites. A  small portion of ascites in the pelvis with hyperdense layering,  suggests blood products.  3. Stable consolidative opacities in the lower lobes, most likely  atelectasis. Superimposed infection is difficult to completely  exclude.  4. Subcentimeter indeterminate focus in the superior pole of the right  kidney. Too small to completely characterize, may represent  hemorrhagic/proteinaceous cyst or solid lesion. Recommend follow-up  exam in 6-12 months to exclude growth/malignancy and to fully  characterize.    I have personally reviewed the examination and initial interpretation  and I agree with the findings.    MICHELE CHANG MD   Echocardiogram Limited    Narrative    [id number]  [id number]  [id number]  [id number]^CARLYN^MOHSAN     [facility],[place]  Echocardiography Laboratory  [address] [place] [id number]     Name: MERCY SHAFER  MRN: [id number]  : 1960  Study Date: 2021 04:33 PM  Age: 61 yrs  Gender: Male  [place] [facility] [place] Study: Pericardial Effusion  Ordering  Physician: CHAUDHRY, MOHSAN  Performed By: Meet Guzman RDCS     ______________________________________________________________________________  Procedure  Limited Portable Echo Adult.  ______________________________________________________________________________  Interpretation Summary  Limited TTE to evaluate for pericardial effusion.  There is a large-sized circumferential pericardial effusion. The maximal depth  is 3.0 cm adjacent to the LV mid anterolateral segment, but fluid is also  present near the RV free wall and near the apex. There is no RA/RV diastolic  collapse and no significant variation in the mitral/tricuspid inflow. The IVC  is dilated and not collapsing but the patient is mechanically ventilated.  This study was compared with the study from 04/14/2021. The size of the  effusion appears to have increased, albeit the patient still does not exhibit  signs of tamponade. The ventricular function, MR, and TR are unchanged.     Results discussed with Dr. Ledezma at 04/19/21 1710 hrs.  ______________________________________________________________________________  Left Ventricle  Severely (EF 15-20%) reduced left ventricular function is present. Severe  diffuse hypokinesis is present.     Right Ventricle  Global right ventricular function is severely reduced.     Mitral Valve  Moderate to severe mitral insufficiency is present.     Aortic Valve  The aortic valve cannot be assessed.     Tricuspid Valve  Moderate tricuspid insufficiency is present. There is early systolic hepatic  vein flow reversal. Right ventricular systolic pressure is 32mmHg above the  right atrial pressure.     Pulmonic Valve  The pulmonic valve cannot be assessed.     Pericardium  There is a large-sized circumferential pericardial effusion. The maximal depth  is 3.0 cm adjacent to the LV mid anterolateral segment, but fluid is also  present near the RV free wall and near the apex. There is no RA/RV diastolic  collapse and no  significant variation in the mitral/tricuspid inflow. The IVC  is dilated and not collapsing but the patient is mechanically ventilated.     Compared to Previous Study  This study was compared with the study from 2021 . The size of the  effusion appears to have increased, albeit the patient still does not exhibit  signs of tamponade. The ventricular function, MR, and TR are unchanged.     ______________________________________________________________________________  Doppler Measurements & Calculations  TR max shavonne: 286.4 cm/sec  TR max P.8 mmHg     ______________________________________________________________________________  Report approved by: Scotty Sharif 2021 05:15 PM         XR Chest Port 1 View    Narrative    EXAM: XR CHEST PORT 1 VW  2021 6:16 AM      HISTORY: ETT eval    COMPARISON: Chest x-ray 2021, CT 2021    FINDINGS: Portable semiupright AP radiograph of the chest. The patient  is rotated. Right IJ [place]-Messi catheter tip projects over the right  pulmonary artery. The feeding tube courses inferior to the diaphragm  and out of the field of view. The tip of the endotracheal tube  projects approximately 4 cm above the mila. Left IJ central venous  catheter tip projects over the SVC/brachiocephalic confluence.    The trachea appears midline. The cardiac silhouette is enlarged. Small  right pleural effusion. Right basilar linear opacities. No  pneumothorax. The visualized upper abdomen is unremarkable.       Impression    IMPRESSION:   1. The tip of the endotracheal tube projects approximately 4 cm above  the mila.  2. Small right pleural effusion with adjacent right basilar opacities,  likely atelectasis.  3. Cardiomegaly.  4. Stable position of the left IJ central venous catheter with tip  projecting over the SVC/brachiocephalic confluence.    I have personally reviewed the examination and initial interpretation  and I agree with the findings.    COURTNEY BENTLEY MD

## 2021-04-20 NOTE — PROCEDURES
PROCEDURE:   Bronchoscopy     INDICATION:  Acute Hypoxemic Respiratory Failure    PROCEDURE   Chelo Hale      SUPERVISOR:  Dr Ruiz    CONSENT:  The patient's medical record has been reviewed.  The indication for the procedure was reviewed.  The necessary history and physical examination was performed and reviewed.  The procedure was done under emergent consent due to sudden desaturations non responsive to conservative measures.      UNIVERSAL PROTOCOL: Patient Identification was verified, time out was performed. Imaging data reviewed. Barrier precaution done: Hands washed, mask, gloves, gown, and eye protection all used.         PROCEDURE: Patient monitored during entire procedure.   A flexible bronchoscope was inserted through patients ET tube.  The mila was sharp.  An airway inspection was done to the subsegmental level:      Findings:   - A mucus plug was encountered at the end of the ETT and was removed  - The left sided airways had some thin boone secretions, a bronchial wash was performed and sent for analysis.  - No discrete source of bleeding was noted on the exam  - No apparent endobronchial lesion was noted on the exam        Dr Ruiz was present for the procedure.    COMPLICATIONS: None    Chelo Hale MD  Cardiology critical care fellow

## 2021-04-20 NOTE — PROGRESS NOTES
CRRT STATUS NOTE    DATA:  Time:  6:00 AM  Pressures WNL:  YES  Filter Status:  WDL    Problems Reported/Alarms Noted:  None.    Supplies Present:  YES    ASSESSMENT:  Patient Net Fluid Balance:  4/19: Net -1.4L; 4/20: Net -495 ml @ 0600.  Vital Signs:  HR 72-83 (amio), MAP 66-68 (pressors), RR 18-19, SPO2 90-99% (vent), T 97.4-97.8 oral  Labs:  K 3.9, Mg 2.6, Phos 3.2, iCa 4.3, Hgb 8.6, Plt 39   Goals of Therapy: Net negative of 50 ml/hour with goal of removing net negative 1 L/day    INTERVENTIONS:   None.    PLAN:  Continue to monitor circuit daily and change set q72 hours or PRN for clotting/clogging. Please call CRRT RN with any quesitons/problems.

## 2021-04-21 NOTE — PROGRESS NOTES
"SPIRITUAL HEALTH SERVICES  SPIRITUAL ASSESSMENT Progress Note (Palliative Focus)  [facility] ([place]) 4C    REFERRAL SOURCE: Palliative care follow up.    Brief supportive visit with patient Cole \"Pancho\" Ann Marie's wife Haven prior to care conference.     Haven is struggling with being able to see Pancho smile and interact with her while worrying that he will not survive. She is appreciative of emotional/spiritual support from both SW and .    Plan: I will follow for spiritual support while Palliative Care is consulted.    Vicki Waite  Palliative   Pager [phone]  [facility] Inpatient Team Consult pager [phone] (M-F 8-4:30)  After-hours Answering Service [phone]    "

## 2021-04-21 NOTE — PROGRESS NOTES
"CRRT STATUS NOTE    DATA:  Time:  6:25 AM  Pressures WNL:  YES  Filter Status:  WDL    Problems Reported/Alarms Noted:  none    Supplies Present:  YES    ASSESSMENT:  Patient Net Fluid Balance:  Pt was net -1463 ml at midnight 4/20/21 and is net -629 ml so far today 4/21/21  Vital Signs:  BP (!) 82/59 (BP Location: Left arm)   Pulse 82   Temp 97.8  F (36.6  C) (Oral)   Resp 22   Ht 1.727 m (5' 8\")   Wt 92.9 kg (204 lb 12.9 oz)   SpO2 98%   BMI 31.14 kg/m      Labs:  WBC 14.3 Platelets 35  Goals of Therapy:   ml/hr negative, goal met    INTERVENTIONS:   none    PLAN:  Continue to monitor and update CRRT RN at [id number] with concerns and questions, change set q 72 hrs and PRN    "

## 2021-04-21 NOTE — CARE CONFERENCE
Care Conference    Care conference was held on April 21, 2021.  Conference was coordinated by  in the conference room    Attending the conference were: pt's significant other Haven attended care conference in person. Pt's son Cole, brother Yassine, sister Emily, uncle Sami, aunt Bere, and granddaughter Tiffany all participated by phone.  Teams present in person were:  MICU: Dr. Ruiz, Dr. Rene, and medical student Inder  Cardiology: Dr. Perez  Nephrology: GUILLE Waterman CNS  Palliative Care: Dr. Raymond, medical student Christina  ICU Nursing: bedside nurse Sergey Turner, RN; nursing student Mary  [facility] : REVA Looney    Purpose of the conference was to discuss goals of care.    Issues to be addressed were plan of care end of life.      Discussion/Outcomes/Follow-Up: Introductions were made. Dr. Rene provided an overall update after eliciting the family's understanding of pt's condition. Dr. De La Cruz and Brian Snow provided updates on cardiac and nephrology perspective respectively; family had the opportunity to ask questions throughout. Dr. Rene provided a summary and the team's understanding that, from a medical perspective, there are not additional interventions to try that would provide benefit to pt, other than moving pt to comfort cares. Pt's family were appropriately distressed by this news and asked many thoughtful questions. Family was educated on visitor rules for end-of-life and plan to work out amongst themselves who will come to visit patient and when. Team encouraged family to begin visiting soon as pt is so sick that he could pass away even with all current cares.    After conference, pt's significant other Haven processed some of her experience, initially with the full team and then with Palliative and Social Work only. Haven shared that pt had stopped attending doctor's appointments and was focused on enjoying himself and visiting  family, and she feels that he had some sense that he was getting sicker. She worries that he will feel she has given up on him. SW and Palliative provided emotional support, psychoeducation and allowed Haven space to process. SW will remain available for family support and Haven knows to ask RN to page social work if needs arise (contact info below). If needs arise after hours please page the on-call  at [phone].    SHARRON Looney, [facility]  ICU    [facility]   P: [phone]  Pager: [phone]

## 2021-04-21 NOTE — PLAN OF CARE
ICU End of Shift Summary. See flowsheets for vital signs and detailed assessment.    Changes this shift: No major changes overnight, vaso weaned to 3, epi remains at 0.1-0.13, dopamine at 5. FiO2 increased to 60% due to low PaO2, O2 sats becoming more difficult to obtain and slightly unreliable. CVP improving, see flowsheets. Meeting goal of  net negative on CRRT, have been pulling closer to negative 100ml/hr. Groin lines still oozing, dressings changed, BLE dressings changed this AM.     Plan: Continue to monitor, wean vaso as able. Continue pulling fluid on CRRT. Notify team of changes.

## 2021-04-21 NOTE — PROGRESS NOTES
CRRT STATUS NOTE    DATA:  Time:  3:57 PM  Pressures WNL:  YES  Filter Status:  CRRT Filter Status:WDL    Problems Reported/Alarms Noted:  yes    Supplies Present:  YES    ASSESSMENT:  Patient Net Fluid Balance:  Net negative 1200  Vital Signs:  T 96.4 HR80 RR23 BP93/47 MAP62  Labs:  K3.8 ICa4.3 WBC14.3 Hgb8.7  Goals of Therapy:  50-100cc/hr net negative, goal 1-2L net negative overall    INTERVENTIONS:   Restarted this morning after filter clotted    PLAN:  Continue plan of care

## 2021-04-21 NOTE — PROGRESS NOTES
[facility]  Palliative Care Daily Progress Note       Recommendations & Counseling       --Family is coming to visit with Pancho to spend time with him and have not yet decided on when they want to transition to comfort-focused cares only, they would like to continue with current cares and not de-escalate anything yet to allow time for family to come and see him. Family was agreeable with adding medications if they are needed to make sure Pancho has symptom management during this time   --Our team will continue to visit with Pancho and family to provide ongoing support.     --When family has completed visits and tell MICU team they are ready to begin process of transition, strongly recommend initiating sedation with midazolam as well as use of pre-medication with both bolus dose of opiate and benzodiazapine 10-15 minutes prior to extubation-->after pre-medications are given, wean vent to pressure support setting and monitor for ongoing comfort on this setting. If remains comfortable (10min) then proceed with extubation. If not comfortable, restore previous vent settings and use PRN opiate and benzodiazapine orders to obtain comfort prior to extubation. Recommend discontinuation of pressor support at time of extubation to comfort cares only.    Total time spent was 90 minutes,  >50% of time was spent counseling and/or coordination of care regarding goals of care, decision support, support with coping, symptom management and end-of-life.    Sandi Raymond Team Consult Pager [phone] (answered 8am-430pm M-F) - ok to text page via [facility] / After-Hours Answering Service [phone] / Palliative Clinic in the [facility] at the [facility] - [phone] (scheduling); [phone] (triage).      Assessments          61-year-old male with past medical history that includes HFr EF 25 to 30% in the setting of ICM, severe pulmonary hypertension, DM2, CKD 4, paroxysmal A.  "fib and history of GI bleed secondary to AVMs and previously requiring ileocecal resection.  Patient admitted to the MICU on 4/12/2021 for further management of septic shock, patient continues with acute respiratory failure, cardiogenic shock and ATN requiring CRRT all in the setting of ischemic dilated cardiomyopathy with worsening shock now requiring multiple pressors despite optimizing volume status and treatment with broad-spectrum antibiotics and steroids.    Care conference held today 4/21/2021 with patient's wife Haven present in person and patient's son Cole, patient's Sister Emily, brother Yassine, granddaughter Willow, uncle Sami and aunt Bere were present by phone.  The ICU team, nephrology team, pulmonary team, palliative team and social work were present as well.  Clinical course reviewed including discussion that unfortunately despite full cares that Pancho is continuing to clinically decline and is requiring significant blood pressure support which is yielding blood pressures that are barely at goal.  Also discussed that Pancho has had complete renal failure and in the best case scenario (which is not possible now) he would need dialysis indefinitely.  ICU attending noted that he had a discussion with Pancho the day he presented to the emergency room and Pancho told him that he wanted to pursue full aggressive measures and full cares in the ICU but he also noted that \"he did not want to live on machines\".  Haven shared that prior to admission, Pancho had stopped going to follow-up lab appointments and outpatient cares and had requested that they go visit family, and she wondered if he was \"starting to say his goodbyes\" even prior to his hospitalization because of his declining health.  Family asked very appropriate questions and were understandably sad upon receiving this difficult news, and expressed understanding of poor prognosis.  Comfort focused care approach was introduced and discussed at " "length, patient's family including his wife Haven and son Cole were in agreement with the transition to comfort focused cares after family and loved ones have had a chance to come visit Pancho to spend time with him and say goodbyes.  Haven reflected on the significant loss that she and her family have experienced these past years, and noted that the 40+ years that she and Pancho have been  \"flew by\".    Today, the patient was seen for:  Dilated ischemic cardiomyopathy  CKD stage IV now with ATN on CRRT therapy  Worsening shock state  Support with coping  Decision support  Goals of care  Symptom management    Prognosis, Goals, or Advance Care Planning was addressed today with: Yes.  Mood, coping, and/or meaning in the context of serious illness were addressed today: Yes.  Summary/Comments: See above            Interval History:     Chart review/discussion with unit or clinical team members:   On chart review, patient is requiring 3 pressors at this time to maintain blood pressure goals.  Has continued on CRRT.      Per patient or family/caregivers today:  Care conference held today, see above for details.    Key Palliative Symptoms:  # Pain severity the last 12 hours: low  # Dyspnea severity the last 12 hours: none  # Anxiety severity the last 12 hours: low    Patient is on opioids: bowels not assessed today.           Review of Systems:     Besides above, an additional review of systems not completed as patient intubated and sleepy with medications to keep him comfortable on ventilator.          Medications:     I have reviewed this patient's medication profile and medications during this hospitalization.             Physical Exam:   .BP (!) 82/59 (BP Location: Left arm)   Pulse 85   Temp 98  F (36.7  C) (Tympanic)   Resp 24   Ht 1.727 m (5' 8\")   Wt 92.9 kg (204 lb 12.9 oz)   SpO2 94%   BMI 31.14 kg/m    Gen: Intubated, lies with eyes closed but will open eyes and nod head yes or no to questions " and occasionally smile when his wife is talking with him  HEENT: normocephalic, no scleral icterus, ETT in place  CV: RRR at time of exam  Pulm: good air movement bilaterally   Abd: soft, nondistended  LE: Wrapping in place on lower extremities  Skin: no rash on limited exam  Neuro: Mostly sleeping but wakes to voice and able to answer yes/no questions inconsistently  Psych: No agitation at time of exam               Data Reviewed:     Reviewed recent pertinent imaging, comments:   4/21/2021 CT chest/abdomen/pelvis without contrast  IMPRESSION (per radiology):   1. Stable large pericardial effusion.   2. Cirrhotic configuration of the liver with large volume ascites. A  small portion of ascites in the pelvis with hyperdense layering,  suggests blood products.  3. Stable consolidative opacities in the lower lobes, most likely  atelectasis. Superimposed infection is difficult to completely  exclude.  4. Subcentimeter indeterminate focus in the superior pole of the right  kidney. Too small to completely characterize, may represent  hemorrhagic/proteinaceous cyst or solid lesion. Recommend follow-up  exam in 6-12 months to exclude growth/malignancy and to fully  characterize.    Reviewed recent labs, comments:   Today, potassium most recently 4.6, ABG pH 7.36/PCO2 37/PO2 52, WBC 14.3, hemoglobin 8.7, platelets 35

## 2021-04-21 NOTE — PROGRESS NOTES
Cardiology Consult    ASSESSMENT:   Mr. Jesus is a 60 yo M who has severe septic shock with a previously known ischemic cardiomyopathy that is unable to adequate augment cardiac output to maintain adequate perfusion for the degree of septic shock he is in. He may benefit from mechanical circulatory support (MCS) however patient has severe thrombocytopenia (11) and coagulopathy with elevated PTT, INR, low fibrinogen that significantly increase the risk of any procedures.    The patient does have a large pericardial effusion, the lack of signs of tamponade are NOT reassuring. He would not present with significant tamponade due to severely elevated biventricular filling pressures. Unfortunately, he would not be a good candidate for pericardiocentesis due to this thrombocytopenia and coagulopathy. There is significant risk of harm associated with the procedure, will again recommend continued close monitoring of effusion.    In terms of his volume status, he has severe anasarca with reduced oncotic pressure (albumin 2.4). His large pericardial effusion in the setting of anasarca may decrease in proportion to reduction in volume. Aggressively decreasing volume would also help with improving the patient's cardiac output, recommend titrating CRRT rate to target net neg 2L per day.     #Shock - Primary severe septic shock with secondary cardiogenic shock (inability of cardiac function to adequate compensate for distributive state)   [facility] 4/14/21 -- RA 20 PA 64/30 (41) PCWP 30 Cecilia CO 4.6/CI 2.2 PVR 2.4 Baer   Pressors: High dose epi ~0.3 mcg/kg/min, Vaso 4 units/hr, Dopamine 5   Volume: Anasarca with severe diffuse hypervolemia including severely elevated biventricular filling pressures. CVP 20, PCWP 30.   Steroids: stress dose -- Fludrocortisone 50mcg daily   Abx: Zosyn 4.5g q6h (4/12 -  ), Vanc (4/13 - 4/14)  Hemodynamics  Date RA PA PCWP CO/CI SVR PVR MAP Therapies   4/19 24 72/28  4.5/2.1 800  71 Epi 0.24, V 4   4/19 pm  26 70/36 (48) 32 4.7/2.1 731 3.4wu 69 Epi 0.24, V 4   4/20 20 65/35(46) 30 3.4/1.6 1083 4.7wi 66 Epi 0.3, V 4, Dopa 2.5     #Large Pericardial effusion  #Acute Congestive hepatopathy and acute liver failure - h/o cardiac cirrhosis  #Hyperbilirubinemia  #DIC? - INR 1.81, PTT 58, Fibrinogen 93, D-Dimer 16.8. However factor VIII assay elevated indicating primary liver dysfunction rather than DIC.  #Troponin elevation 2/2 Type II NSTEMI (demand ischemia)  #CKD stage IV with acute renal failure 2/2 ATN requiring CRRT   4/17: net -500cc   4/18: net even    4/19: net -1.5L   4/20: net -1.5L  #AHRF 2/2 pulmonary edema and aspiration PNA - /5/30%/19-20  #Acute hypercapnic respiratory failure improved with bronch 4/20  #Respiratory acidosis  #Anasarca  #Superficial venous thrombosis in basilic vein  #Thrombocytopenia - likely related to underlying sepsis  #Leukocytosis      #Acute cardiogenic shock on chronic HFrEF 25-30%  #Pulmonary hypertension - possible mixed pre and post capillary  #Mild to moderate RV dysfunction (acutely while volume overloaded on echocardiogram)   C 2/10/20 -- RA 22, /20, /50 (65), PCWP 40, Cecilia 3.5/1.6 TD 3.8/1.8 PVR 8.5     #CAD (STEMI s/p DESx3 (2012) & PCI prox RCA 2019)  #MR - moderate to severe  #pAF - not on AC due to h/o GIB  #Iron def anemia  #T2DM  #Hypothyroidism  #Obesity    RECOMMEND:  - Pressors: Continue Epi and Vaso as needed to maintain MAP >65. Continue Dopamine at 5.  - Volume: Recommend titrating CRRT rate to target net neg 2L per day.   - Abx and coagulopathy care per primary team  - In terms of pericardial effusion. Again, too high risk with marginal benefit at this time for intervention at this time.  - Plan to attend care conference today at 3:00pm    Discussed with Dr. Alexy Mohsan Chaudhry, MD  Division of Cardiology, PGY-4      ===============================================================  Subjective:  Limited by intubation and sedation. Care  conference pending today.            History of Present Illness  On Admission  Cole Jesus is a 61 year old male who presents with septic shock requiring pressors without a clear source, suspected to be due to a CAP vs secondary to bilateral lower edema wounds. No bacteremia. His clinical course has significant evidence of end organ damage including demand ischemia, ATN needing CRRT, liver failure. Patient's continuous critical illness has been discussed with the family, patient is currently DNR with a care conference pending in 2 days.    Per family, patient was slowly declining over the years, before he suddenly became ill. Patient has been encephalopathic throughout his admission prior to intubation. He has required high dose pressors and is currently on Epi 0.24 and Vaso of 4. He was previously evaluated by cardiology and thought to be a high risk     Subjective limited by intubation and sedation.    1/18/2021 TTE  The Ejection Fraction is estimated at 25-30%.  Borderline left ventricular dilation is present.  Mild to moderate right ventricular dilation is present.  Global right ventricular function is moderately reduced.  Moderate to severe mitral insufficiency is present.  Moderate tricuspid insufficiency is present.  Right ventricular systolic pressure is 34mmHg above the right atrial pressure.  IVC diameter >2.1 cm collapsing <50% with sniff suggests a high RA pressure  estimated at 15 mmHg or greater.  Small circumferential pericardial effusion is present without any hemodynamic  significance.  No significant changes noted.    Coronary Angiogram- [place]  6/19/2019  CORONARY ANATOMY:    CORONARY ANATOMY DOMINANCE: Right     NATIVE CORONARY ARTERIES:    LEFT MAIN CORONARY ARTERY: Mild luminal irregularities    LEFT ANTERIOR DESCENDING CORONARY ARTERY: Mild luminal     irregularities    CIRCUMFLEX CORONARY ARTERY: Patent proximal LCx into mid LCx     tsent. There is an eccentric 20-40% stenosis (in  stent restenosis     of the proximal stent    RIGHT CORONARY ARTERY: Acute occlusion.  Stents within the mid     RCA to the RPDA     INTERVENTION:     Pre Procedure stenosis (%): 100    TYSON flow before: 0    Predilatation of the lesion with a 2.0×12 mm Trek NC balloon.  12     nick 5 seconds.     Stenting of the RCA with a 3.5×28 mm Xience Juanita drug eluting     stent.  12 nick 8 seconds.      Postdilatation with a 4.5×20 mm Trek NC balloon.  18 atmospheres     20 seconds.  12 nick 16 seconds.     Balloon angioplasty of the old mid RCA stents to treat stent     undersizing and associated thrombosis at this site.  4.5×20 mm     Trek NC balloon was used.  12 nick 11 seconds.    Post Procedure stenosis (%): 0    TYSON flow after: III        FINAL DIAGNOSIS/IMPRESSION:     #. Angiogram performed to workup ST elevation MI with severe     symptomatic bradycardia and cardiogenic shock    #. Acute occlusion of the proximal RCA with TYSON 0 flow in the     vessel    #. Otherwise nonobstructive disease including mild to moderate     in-stent restenosis of the circumflex stent    #. Stenting of the proximal RCA with a 3.5×28 mm Xience Juanita     drug eluting stent.  Post dilated with a 5.0 mm balloon.    #. Balloon angioplasty of the previously placed mid RCA stents     due to stent undersizing.  A 4.5 mm NC balloon was used.      Review of Systems   The 10 point Review of Systems is negative other than noted in the HPI or here.    Physical Exam   Temp: 96.4  F (35.8  C) Temp src: Axillary  Pulse: 85   Resp: 25 SpO2: 97 % O2 Device: Mechanical Ventilator    Vital Signs with Ranges  Temp:  [94.9  F (34.9  C)-98.2  F (36.8  C)] 96.4  F (35.8  C)  Pulse:  [73-86] 85  Resp:  [19-25] 25  MAP:  [60 mmHg-238 mmHg] 61 mmHg  Arterial Line BP: ()/() 97/46  FiO2 (%):  [45 %-60 %] 60 %  SpO2:  [91 %-100 %] 97 %  204 lbs 12.92 oz    GEN: intubated and sedated.  HEENT: no icterus  CV: IRR, normal s1/s2, no murmurs/rubs/s3/s4, no  heave. JVP >15.  CHEST: crackles throughout all lung fields  ABD: soft, NT, NABS, mod-severe distention with relatively soft abdomen.  : no flank/suprapubic tenderness  NEURO:  Intermittently moving all extremities. Normal dolls eye reflex. Normal corneal reflex. Additional evaluation limited at this time.  PSYCH: unable to assess.    Data   Recent Labs   Lab 04/21/21  0908 04/21/21  0403 04/20/21  2216 04/20/21  1021 04/20/21  1021 04/20/21  0329 04/19/21  0310 04/19/21 0310   WBC  --  14.3*  --   --  20.3* 19.7*   < > 19.3*   HGB  --  8.7*  --   --  8.5* 8.6*   < > 7.0*   MCV  --  83  --   --  86 83   < > 79   PLT  --  35*  --   --  38* 39*   < > 11*   INR  --  1.39*  --   --   --  1.31*  --  1.47*    136 138   < > 136 138   < > 138   POTASSIUM 3.8 3.9 3.8   < > 4.6 3.9   < > 3.8   CHLORIDE 109 107 108   < > 106 106   < > 109   CO2 20 22 21   < > 21 21   < > 21   BUN 23 21 23   < > 21 21   < > 19   CR 0.90 0.99 0.65*   < > 0.65* 0.67   < > 0.76   ANIONGAP 9 7 8   < > 9 11   < > 8   RONNY 8.0* 7.8* 7.9*   < > 8.1* 7.8*   < > 7.7*   * 140* 160*   < > 176* 134*   < > 153*   ALBUMIN  --  2.3*  --   --  2.6* 2.6*  --  2.4*   PROTTOTAL  --  6.0*  --   --  6.6* 6.5*  --  5.9*   BILITOTAL  --  20.1*  --   --  18.0* 18.0*  --  15.2*   ALKPHOS  --  116  --   --  109 106  --  103   ALT  --  62  --   --  65 67  --  66   AST  --  49*  --   --  47* 44  --  42    < > = values in this interval not displayed.     No results found for this or any previous visit (from the past 24 hour(s)).

## 2021-04-21 NOTE — PROGRESS NOTES
Nephrology Progress Note  04/21/2021         Overall Mr. Jesus is a 60-year-old gentleman with multiple comorbidities related to his diabetes and ischemic cardiomyopathy.  He was admitted to the ICU with worsening shock requiring multiple pressors with evidence of hyperkalemia and moderate to severe lactic acidosis. Started CRRT on 4/11.       Interval History :   Mr Jesus continues on CRRT, was net negative 1L yesterday, similar goal today.  Still on 3 pressors, having care conference today.  With baseline Cr of 3 it is likely he will be HD dependent even if he does recover from sepsis with heart failure.       Assessment & Recommendations:   EVA on CKD-Baseline Cr 2.5-3, has followed in Neph clinic with Dr Vigil but not since 12/2019.  Cr up to ~4 with BUN of 152 before starting CRRT on 4/11 for hyperkalemia and metabolic acidosis in the setting of shock, sepsis vs cardiogenic.  On 2 pressors with sepsis vs cardiogenic shock.                 -Access is [facility] temp line.                -CRRT 25ml/kg/hr dosing, 50-100cc/hr net negative and now trying to pull 1L net negative today.      Volume status-Overloaded and S-G suggests a mixed cardiogenic/septic picture, was net negative 1L yesterday, CVP remains in low 20's, plan to try to pull ~1L net negative today, does get ~4+ L of intake daily so needs very large UF on 3 pressors to achieve goals.       Electrolytes/pH-K 3.8, bicarb 20 on CRRT with 4k baths.       Ca/phos/pth-Ca 8.0, Mg and Phos WNL.      CHF-EF 25-30%, also with possible sepsis component, on Vaso and NE so treating mostly as sepsis for now.       Anemia-Hgb 8.7, on downtrend, acute management per team.      Nutrition-On Osmolite 1.5 TF.       Seen and discussed with Dr Lin     Recommendations were communicated to primary team via verbal communication.        GUILLE Silver CNS  Clinical Nurse Specialist  [phone]    Review of Systems:   I reviewed the following systems:  ROS not  "done due to vent/sedation.     Physical Exam:   I/O last 3 completed shifts:  In: 4296.42 [I.V.:2356.42; NG/GT:620]  Out: 5894 [Other:4794; Stool:1100]   BP (!) 82/59 (BP Location: Left arm)   Pulse 86   Temp 96.4  F (35.8  C) (Axillary)   Resp 23   Ht 1.727 m (5' 8\")   Wt 92.9 kg (204 lb 12.9 oz)   SpO2 100%   BMI 31.14 kg/m       GENERAL APPEARANCE: Intubated and sedated.   Head NC/AT  EYES: mildly scleral icterus  HENT: mouth  without ulcers or lesions  NECK: supple  Pulmonary: fair air entry and exchange   CV: irregular rhythm, tachy rate, no rub   - Edema bilateral   GI: soft, nontender, normal bowel sounds, no HSM   MS: no evidence of inflammation in joints, no muscle tenderness  : Ponce  SKIN: blisters over both lower extremities    NEURO: Intubated and sedated.     Labs:   All labs reviewed by me  Electrolytes/Renal -   Recent Labs   Lab Test 04/21/21  0908 04/21/21  0403 04/20/21  2216 04/20/21  1717 04/20/21  1021    136 138 137 136   POTASSIUM 3.8 3.9 3.8 4.0 4.6   CHLORIDE 109 107 108 106 106   CO2 20 22 21 24 21   BUN 23 21 23 21 21   CR 0.90 0.99 0.65* 0.65* 0.65*   * 140* 160* 123* 176*   RONNY 8.0* 7.8* 7.9* 7.8* 8.1*   MAG  --  2.7*  --  2.6* 2.8*   PHOS  --  3.2  --  3.1 4.2       CBC -   Recent Labs   Lab Test 04/21/21  0403 04/20/21  1021 04/20/21  0329   WBC 14.3* 20.3* 19.7*   HGB 8.7* 8.5* 8.6*   PLT 35* 38* 39*       LFTs -   Recent Labs   Lab Test 04/21/21  0403 04/20/21  1021 04/20/21  0329   ALKPHOS 116 109 106   BILITOTAL 20.1* 18.0* 18.0*   ALT 62 65 67   AST 49* 47* 44   PROTTOTAL 6.0* 6.6* 6.5*   ALBUMIN 2.3* 2.6* 2.6*       Iron Panel -   Recent Labs   Lab Test 12/18/20  1242 11/12/20  1212 08/13/20  1006   IRON 21* 12* 16*   IRONSAT 5* 3* 3*   FRANK 25* 11* 9*           Current Medications:    B and C vitamin Complex with folic acid  5 mL Per Feeding Tube Daily     calcium chloride  2 g Intravenous Once     fludrocortisone  50 mcg Oral or Feeding Tube Daily     " hydrocortisone sodium succinate PF  50 mg Intravenous Q6H     insulin aspart  1-12 Units Subcutaneous Q4H     lactulose  20 g Oral or Feeding Tube TID     levothyroxine  25 mcg Oral or Feeding Tube QAM AC     meropenem  1 g Intravenous Q8H     micafungin  150 mg Intravenous Q24H     pantoprazole (PROTONIX) IV  40 mg Intravenous Daily with breakfast     protein modular  2 packet Per Feeding Tube TID     senna-docusate  2 tablet Oral At Bedtime       amiodarone 0.5 mg/min (04/21/21 1400)     dexmedetomidine 0.4 mcg/kg/hr (04/21/21 1400)     dextrose       dextrose       CRRT replacement solution 12.5 mL/kg/hr (04/21/21 1205)     DOPamine 5 mcg/kg/min (04/21/21 1400)     EPINEPHrine 0.15 mcg/kg/min (04/21/21 1421)     fentaNYL 50 mcg/hr (04/21/21 1400)     heparin (porcine) 20,000 units in 20 mL 500 Units/hr (04/21/21 1300)     CRRT replacement solution 200 mL/hr at 04/21/21 0748     CRRT replacement solution 12.5 mL/kg/hr (04/21/21 1205)     vasopressin 2.5 Units/hr (04/21/21 1400)

## 2021-04-21 NOTE — PROGRESS NOTES
MEDICAL ICU PROGRESS NOTE  04/21/2021      Date of Service (when I saw the patient): 04/21/2021    Mr. Pancho Jesus is a  61 year old male with PMHx most significant for HFrEF 25-30% 2/2 ICM after STEMI x/p DESx3 (2012) & PCI prox RCA 2019, severe Pulmonary Hypertension, moderate MR,  CKD IV, Paroxysmal A fib not on AC, Chronic Iron Deficiency Anemia 2/2 GIB due to AVMs, previous LGI bleeding requiring ileocecal resection, T2DM, who was admitted 4/12 to the MICU for treatment of septic shock.     Major changes today:  - No major changes today  - Family conference    Neuro:  # Sedation  Goal Rass -1 to -2  - Fentanyl gtt + bolus  - Precedex gtt  - Versed bolus PRN      # Hepatic encephalopathy Stage 2-3  Had asterixis on exam, now sedated  - Lactulose 20 TID (titrate for 3-5 BM) or 800-1200 ml of stool output a day.      Cardiovascular  # Shock, septic + cardiogenic---presumed decompensated HF + aspiration PNA  # HFrEF 15-20% 2/2 ICM  # Coronary Artery Disease s/p DESx3  # Type 2 NSTEMI  # Severe Pulmonary Hypertension  Requiring 3 pressors currently, MAP goal 60. Volume status is complicated by vasodilatory effects of sepsis and congestion from heart failure. CT revealing of nodular consolidation with surrounding GGO in superior LLL. Concern for aspiration PNA. Culture growing light staph aureus. CT CAP 4/19 without evidence of pulmonary source. Repeated TTE has shown ongoing evidence of worsening/ enlarging pericardial effusion. CVP has been 20-26 despite ongoing CRRT for several days. Given elevated intracardiac pressures, It may be difficult to assess tamponade physiology. Following aggressive diuresis, we may be able to see this but given severity of disease and and coagulopathy. Patient did not tolerate dobutamine trial d/t hypotension. Started on Dopamine and Epinephrine while on Vasopressin on 4/19. Initial  plan to decrease Vasopressin >> wean epinephrine  Then up-titrate Dopamine given acute increase in  SVR from 500 to 1100 and concern for worsening index on low dose Dopamine and perhaps 2/2 antibiotic change. Goal B1 agonist effects occur at dose 5-10 of Dopamine. When increased to 7.5, patient entered VTach. Overall, not a candidate of advanced device therapies given coagulopathy and severity of disease. Following diuresis, intracardiac pressures somewhat decreased with a CVP of 14. We must mediate balance of removing fluid and maintaining adequate intracardiac pressures, as to not introduce tamponade. Serial TTE with worsening pericardial effusions. Prognosis overall is poor.       - Meropenam 4/20-  - Micafungin 4/20-  - Zosyn 4/12- 4/20  - Vancomycin 4/11- 4/14    Culture Data:  - 4/14 BAL - light MSSA pan-sensitive (1/4 bottles)  - 4/19 BAL - heavy candida growth  - 4/19 BAL gram stain with Rare gram negative rods and Alternarians fungus   - 4/19 Bcx - NGTD    Pressors:  - Dopamine, Epinephrine and Vasopressin  - Goal to wean off Vasopressin then Epinephrine. Unable to titrate Dopamine off given VTa    # Moderate MR  # Paroxysmal Atrial Fibrillation (not on anticoagulation)  On amiodarone for ventricular arrythmias that started after pressors. Will continue as we re-attempt epinephrine administration.  - On cardiac Telemetry  - Goal: K+ >4.0, Mg2+ > 2.0  - Continue Amiodarone gtt    Pulmonary:     # Acute Hypoxic Respiratory Failure- intubated  # Concern for aspiration PNA  Patient was found using abdominal muscles to breath and was increasingly somnolent. Patient was maintaining oxygenation despite this on FIO2 40%. However, was hypotensive with MAPS 50s despite being on 0.4 of levophed and vaso 4. HOB was elevated for WOB, but blood pressure dropped further. He was put back in flat position. Levophed threshold was increased to 0.7. RT was called at bed side to start patient on BIPAP. Stat CXR ordered simultaneously. Wife called and patient intubated. Bronchoscopy done 4/14 and a copious amount of thick  sputum was collected. Sputum cell count was > 4K with neutrophilic predominance. Light growth of MSSA.  - Repeat cultures pending  - Continue IV Zosyn      Ventilation Mode: CMV/AC  (Continuous Mandatory Ventilation/ Assist Control)  FiO2 (%): 60 %  Rate Set (breaths/minute): 22 breaths/min  Tidal Volume Set (mL): 500 mL  PEEP (cm H2O): 5 cmH2O  Oxygen Concentration (%): 60 %  Resp: 22    # Pulmonary Nodulues  9 mm nodular consolidation with surrounding groundglass attenuation in the superior left lower lobe, favor infectious/inflammatory  etiology though is indeterminant. Few other pulmonary nodules noted.  - Follow-up CT on 4/19 showed stable consolidative opacities in the lower lobes, most likely atelectasis. However, superimposed infection is difficult to completely exclude per Radiology.  - Continue IV Zosyn      GI/Nutrition:    # Cardiac Cirrhosis   SAAG 1.3, making portal HTN very likely. Ascites protein 3.1, making cardiac cirrhosis very likely.  Ascites fluid noted to be transudative.    Etiology: likely chronic congestion from heart failure.  Hepatitis C antibody non reactive, Hep A antibody non reactive, Hep Bs Ag non reactive but will add on hep B antibody. Will need hep A and B vaccination    Evaluation: EGD: 2019 no esophageal varices. No evidence of portal gastropathy. No active bleeding, no EGD needed at this time.  Ascites: large volume. Fluid transudative according to light's criteria using total protein. Hold off on spirinolactone and lasix given soft pressures requiring pressors. Considering therapeutic para. Continue CRRT.  Coagulopathy:  likely hepatic congestion leading to compression of perisinusoidal and endothelial cells that produce F 7. Vit K x 3 days 4/13- 4/15.   Fibrinogen goal > 120 ( Give FFP today), PLT 27 today, transfuse PLT (PLT goal > 30 if not bleeding or 10 if bleeding) , INR continue vit K but will reverse with improvement of congestion    Hepatic encephalopathy: Grade 2-3  Asterixis on exam. Ammonia < 10 but can be normal in up to half of people with HE. Start lactulose and titrate to 3-5 BMs/day.  SBP: Diagnostic para shows ascitic protein 3.1 and PMN fluid 126. No ppx indicated. Culture pending.  HCC sceening: Abd US q 6 m + AFP. No hepatic mass noted on CT abdomen.  Diet: 2 g sodium restriction and 2 L fluid restriction  MELD-Na score: 23 at 4/21/2021  4:03 AM  MELD score: 22 at 4/21/2021  4:03 AM  Calculated from:  Serum Creatinine: 0.99 mg/dL (Rounded to 1 mg/dL) at 4/21/2021  4:03 AM  Serum Sodium: 136 mmol/L at 4/21/2021  4:03 AM  Total Bilirubin: 20.1 mg/dL at 4/21/2021  4:03 AM  INR(ratio): 1.39 at 4/21/2021  4:03 AM  Age: 61 years     MELD-Na is 134 accounting for Cr.    - GI/ hepatology following    # Mild hepatopathy  # Previous history of GI bleeding in the context of Angioectasia/AVM's  # Protein losing nephropathy  #Malnutrition  CT Abd on 4/19 per Radiology showed cirrhotic configuration of the liver with large volume ascites. A small portion of ascites in the pelvis with hyperdense layering, suggests blood products.  - PPI  - TF at goal     Renal/Fluids/Electrolytes:    #Hypocalcemia  - replete with IV Calcium Chloride    # Acute on Chronic Kidney Disease Stage 4  # High Anion Gap Metabolic Acidosis - Improved  # Combined Lactic Acidosis Type A/B  # Hyperkalemia (6.4)  # Hyponatremia (likely related to poor oral intake and hypervolemia)  # Protein Losing Nephropathy     Baseline Scr: 2.1-2.4 mg/dL> Most recently 3.89 mg/dL with associated hyperkalemia, hyponatremia and significant metabolic acidosis (Co2: 17-->17) in the context of lacticemia despite two vasopressors. Etiology could potentially be related to mixed distributive//septic vs. Cardiogenic shock.      - Nephrology Consulted and Appreciate recommendations  - CRRT with Heparin flushes to prevent thrombosis of circuit  - Strict I/O's  - Bladder scan qshift and straight cath PRN  - Daily Weights  - Avoid NSAID  or Nephrotoxic agents       Endocrine:    # Hypothyroidism  TSH 10, T4 0.34. T3 0.3 indicating hypothyroidism.  - start synthroid 25 mcg     # Type II Diabetes Mellitus  - Oral Hypoglycemia Protocol Initiated  - Small dose sliding scale insulin       ID:  # Shock, septic + cardiogenic---presumed decompensated HF + aspiration PNA  Bronchoscopy done 4/14 and a copious amount of thick sputum was collected. Sputum cell count was > 4K with neutrophilic predominance which is likely the source. Multiple weeping blisters on lower extremities, some with erythema -cultures NGTD. No meningeal signs to suggest CNS.  - Sputum/blood cultures on 4/19 showed NGTD  - Continue Zosyn  - Stress dose steroids with hydrocortisone and fludrocortisone       Hematology:    # Chronic Iron Deficiency Anemia  # Thrombocytopenia likely related to underlying sepsis  # Coagulopathy 2/2 hepatopathy   # Supratherapeutic INR  # Concern for DIC  Elevated PT/PTT, decreasing Fibrinogen and elevated hyperbilirubinemia (which is likely 2/2 hepatic congestion).  - F8 normal as of 4/20  - Continue Monitoring Hgb  - Hgb goal >7 g/dL  - Holding on heparin products and anticoagulation   - transfuse pRBC,Cryoprecipitate x 5 units    # Occlusive superficial venous thrombosis in the left basilic vein at the antecubital fossa  - No AC d/t coagulopathy  - Serial monitoring with US q 3 days     General Cares/Prophylaxis:    DVT Prophylaxis: SCD   GI Prophylaxis: PPI  Family Communication: Partner, Haven  Code Status:  Full Code     Lines/tubes/drains:  - Right Femoral Arterial Line/ Left Femoral Triple CVC/ PIV/ swan  - Left internal jugular Castro Dialysis Catheter     Disposition:  - Medical ICU until further notice       Patient seen and findings/plan discussed with medical ICU staff, Dr. Joseph Gates Nzmarian    ====================================  INTERVAL HISTORY:   Dopamine was started at 2.5 mcg/kg/min per Cardiology recommendation. MAPs were elevated  in low 70s and SVR was elevated at 1100s overnight.   SpO2 dropped to 80 at 0940 and bronch was done to remove secretions. SpO2 improved after.  During the day, MAPs decreased below 60 and Epi was increased to 0.29, Dopamine increased to 7.5 and Vasopressin continued at 4. Planned to max out these 3 pressors if necessary.    OBJECTIVE:   1. VITAL SIGNS:   Temp:  [97.4  F (36.3  C)-98.2  F (36.8  C)] 97.8  F (36.6  C)  Pulse:  [64-90] 82  Resp:  [22-24] 22  MAP:  [55 mmHg-238 mmHg] 66 mmHg  Arterial Line BP: ()/() 96/52  FiO2 (%):  [45 %-60 %] 60 %  SpO2:  [87 %-100 %] 98 %  Ventilation Mode: CMV/AC  (Continuous Mandatory Ventilation/ Assist Control)  FiO2 (%): 60 %  Rate Set (breaths/minute): 22 breaths/min  Tidal Volume Set (mL): 500 mL  PEEP (cm H2O): 5 cmH2O  Oxygen Concentration (%): 60 %  Resp: 22    2. INTAKE/ OUTPUT:   I/O last 3 completed shifts:  In: 4296.42 [I.V.:2356.42; NG/GT:620]  Out: 5894 [Other:4794; Stool:1100]    3. PHYSICAL EXAMINATION:  General: intubated and waking up; nodding yes to questions, a little more awake today than yesterday, but not following commands  HEENT: Normocephalic/Atraumatic, intubated, EOMI/MOM, icteric oral muscosa/sclera.   Pulm/Resp:mechanically ventilated, crackles present   CV: RRR, normal S1 and S2. No murmurs/rubs gallops. Peripheral pulses +1. Significant peripheral pitting edema +3 from legs to inguinal/scrotal region.   Abdomen: Soft, non-distended, non-tender. No pain/tenderness to palpation. No guarding/rebound.  : Not examined  Neuro: Not moving extremities as he is sedated at this time  Incisions/Skin: Multiple open wounds on legs, now dressed. Hyperpigmented skin in the legs/chronic venous stasis changes.  Dusky toes, Nail dystrophy on toes bilaterally.     4. LABS:   Arterial Blood Gases   Recent Labs   Lab 04/21/21  0217 04/20/21  2216 04/20/21  1320 04/20/21  1218   PH 7.38 7.38 7.32* 7.18*   PCO2 37 36 41 55*   PO2 60* 68* 62* 73*   HCO3 22       Complete Blood Count   Recent Labs   Lab 21  0403 21  1021 21  0329 21   WBC 14.3* 20.3* 19.7* 18.4*   HGB 8.7* 8.5* 8.6* 8.7*   PLT 35* 38* 39* 44*     Basic Metabolic Panel  Recent Labs   Lab 21  0403 21  2216 21  1717 21  1021    138 137 136   POTASSIUM 3.9 3.8 4.0 4.6   CHLORIDE 107 108 106 106   CO2    BUN    CR 0.99 0.65* 0.65* 0.65*   * 160* 123* 176*     Liver Function Tests  Recent Labs   Lab 21  0403 21  1021 21  0329 21  0310 21  0830   AST 49* 47* 44 42  --    ALT 62 65 67 66  --    ALKPHOS 116 109 106 103  --    BILITOTAL 20.1* 18.0* 18.0* 15.2*  --    ALBUMIN 2.3* 2.6* 2.6* 2.4*  --    INR 1.39*  --  1.31* 1.47* 1.50*     Coagulation Profile  Recent Labs   Lab 21  0403 21  0329 21  0310 21  0830   INR 1.39* 1.31* 1.47* 1.50*       5. RADIOLOGY:   Recent Results (from the past 24 hour(s))   Echocardiogram Limited    Narrative    [id number]  [id number]  [id number]  [id number]^CARLYN^MOHSAN     [facility],[place]  Echocardiography Laboratory  [address] [place] [id number]     Name: MERCY SHAFER  MRN: [id number]  : 1960  Study Date: 2021 08:05 AM  Age: 61 yrs  Gender: Male  Patient [place] [facility] [place]  Reason For Study: Pericardial Effusion  Ordering Physician: CHAUDHRY, MOHSAN  Performed By: Alivia Martin RDCS     BSA: 2.1 m2  Height: 68 in  Weight: 216 lb  HR: 77  BP: 82/59 mmHg  ______________________________________________________________________________  Procedure  Limited Portable Echo Adult.  ______________________________________________________________________________  Interpretation Summary  Limited TTE to evaluate for pericardial effusion.  Severely (EF 10-15%) reduced left ventricular function is present.  A large circumferential pericardial effusion is present with maximal diameter  of 2.6  cm inferiorly. There is no evidence of chamber collapse or tamponade.     This study was compared with the study from 4/19/2021. No significant changes  noted.  ______________________________________________________________________________  Left Ventricle  Severely (EF 10-15%) reduced left ventricular function is present.     Right Ventricle  Global right ventricular function is severely reduced.     Mitral Valve  Moderate to severe mitral insufficiency is present.     Tricuspid Valve  Moderate tricuspid insufficiency is present.     Vessels  Unable to assess mean RA pressure given the patient is on a ventilator.     Compared to Previous Study  This study was compared with the study from 4/19/2021 . No significant changes  noted.  ______________________________________________________________________________  Report approved by: MD Viktor Lew 04/20/2021 02:13 PM     ______________________________________________________________________________      XR Chest Port 1 View    Narrative    EXAM: XR CHEST PORT 1 VW  4/20/2021 9:52 AM     HISTORY:  Intubated, unexplained desaturations       COMPARISON:  Chest x-ray from same day at 0601 hours    FINDINGS:   Portable supine radiograph of chest. Endotracheal tube tip projects  within the thoracic trachea. Left internal jugular central venous  catheter tip projects over the proximal SVC. Right internal jugular  Gibbs-Messi catheter tip projects over the right main pulmonary artery.  Enteric tube courses into the stomach.     Trachea is midline. Stable enlarged cardiac silhouette. No significant  change in the streaky perihilar and right basilar opacities. Small  right pleural effusion. No significant left effusion. No pneumothorax.      Impression    IMPRESSION:   1. No significant change in the streaky perihilar and bibasilar  opacities, likely atelectasis and edema.  2. Stable small right pleural effusion with overlying  atelectasis/consolidation.  3.  Stable support devices.    I have personally reviewed the examination and initial interpretation  and I agree with the findings.    TERRANCE ESTEBAN MD   US Upper Extremity Venous Duplex Left    Narrative    EXAMINATION: DOPPLER VENOUS ULTRASOUND OF THE LEFT UPPER EXTREMITY,  4/20/2021 10:57 AM     COMPARISON: Ultrasound 4/16/2021    HISTORY: Follow-up left basilic vein thrombosis    TECHNIQUE:  Gray-scale evaluation with compression, spectral flow and  color Doppler assessment of the deep venous system of the left upper  extremity.    FINDINGS:  Left: Normal blood flow and waveforms are demonstrated in the internal  jugular, innominate, subclavian, and axillary veins. There is normal  compressibility of the brachial and cephalic veins. Noncompressible  segment of the left basilic vein in the antecubital fossa with full  compressibility in the upper and mid arm.       Impression    IMPRESSION:  1.  No evidence of left upper extremity deep venous thrombosis.  2.  Redemonstrated occlusive superficial venous thrombus in the left  basilic vein at the antecubital fossa, not significantly changed from  4/16/2021.    I have personally reviewed the examination and initial interpretation  and I agree with the findings.    MICHELE CHANG MD

## 2021-04-21 NOTE — PROGRESS NOTES
CLINICAL NUTRITION SERVICES - REASSESSMENT NOTE     Nutrition Prescription    RECOMMENDATIONS FOR MDs/PROVIDERS TO ORDER:  None today.    Malnutrition Status:    Severe malnutrition in the context of chronic illness.        EVALUATION OF THE PROGRESS TOWARD GOALS   Diet: NPO  Nutrition Support: Osmolite 1.5 Jonatan @ 55 ml/hr via NDT = 1980 kcals (26 kcal/kg), 83 g PRO (1.1 g PRO/kg), 1005 mL H2O, 268 g CHO, and 0 g fiber daily + 2 pkt TID (6 pkts total daily) = 2220 kcal (28 kcal/kg) and 149 g PRO (1.9 g/kg) daily.   Intake: TF started on 4/15. Pt has received goal TF volumes daily over the past 3 days. TF are pt's sole source of nutrition.      NEW FINDINGS   Weight loss of 4.6 kg (a decrease of 4.7%) over the past week.  Care conf today.     MALNUTRITION  % Intake: Decreased intake does not meet criteria  % Weight Loss: > 2% in 1 week   Subcutaneous Fat Loss: Facial area: moderate, Upper arm: moderate  Muscle Loss: Temporal: moderate, Facial & jaw region: moderate, Thoracic region (clavicle, acromium bone, deltoid, trapezius, pectoral): severe, Upper arm (bicep, tricep): severe, Lower arm (forearm): moderate, Upper leg (quadricep, hamstring): severe and Posterior calf: severe  Fluid Accumulation/Edema: Moderate anasarca  Malnutrition Diagnosis: Severe malnutrition in the context of chronic illness.     Previous Goals   Total avg nutritional intake to meet a minimum of 25 kcal/kg and 1.5 g PRO/kg daily (per dosing wt 77 kg).  Evaluation: Met    Previous Nutrition Diagnosis  Inadequate protein-energy intake  Evaluation: Improving    CURRENT NUTRITION DIAGNOSIS  Predicted inadequate nutrient intake (calories, protein) related to prolonged hospital LOS with risk for interruptions to TF infusion.       INTERVENTIONS  Implementation  None today.    Goals  Total avg nutritional intake to meet a minimum of 25 kcal/kg and 1.5 g PRO/kg daily (per dosing wt 77 kg).    Monitoring/Evaluation  Progress toward goals will be  monitored and evaluated per protocol.    Gin Perez RD, LD  ([facility] dietitian, 8240 (Mon-Fri))

## 2021-04-21 NOTE — PLAN OF CARE
ICU End of Shift Summary. See flowsheets for vital signs and detailed assessment.    Changes this shift: RASS -2, nods and follows commands appropriately. Dex at 0.4, fentanyl at 50. Radial pulses palpable, using doppler for pedal pulses. See flowsheet at 1000 for josé numbers. Good stool output from rectal pouch, 900ml since midnight. Able to meet CRRT goal of net negative 100ml/hr. Continues on multiple pressors to achieve MAP goal of 60, currently epi 0.17, vaso 2.5, dopamine 5.     Plan: Care conference held this afternoon, after discussion with family, plan is to transition to comfort cares and withdraw cares once family has had a change to see patient. Family not able to come until tomorrow 4/22. Per MICU/CC, no escalation of cares overnight.

## 2021-04-22 NOTE — PROGRESS NOTES
"Care Management Follow Up    Length of Stay (days): 11    Expected Discharge Date: 04/22/21     Concerns to be Addressed: end of life     Patient plan of care discussed at interdisciplinary rounds: Yes    Anticipated Discharge Disposition:  Pt passed away in ICU     Additional Information:  RACHAEL was notified by RN that pt's son (also named Cole) was requesting documentation of his father's condition for his workplace. SW met with Cole in pt's room and offered assistance. Cole reports his supervisor is supportive but \"just in case\" would want something documenting Cole's need to be here at this time. SW provided generic letter documenting pt is terminally ill and our request for family to be present at this time. Cole thought this would be sufficient and denied additional needs at this time. SW to remain available to pt and family during this time.    Between visit and writing of note, pt passed away in ICU.    SHARRON Looney, [facility]  ICU    [facility]   P: [phone]  Pager: [phone]           "

## 2021-04-22 NOTE — PROGRESS NOTES
MEDICAL ICU PROGRESS NOTE  04/22/2021      Date of Service (when I saw the patient): 04/22/2021    Mr. Pancho Jesus is a  61 year old male with PMHx most significant for HFrEF 25-30% 2/2 ICM after STEMI x/p DESx3 (2012) & PCI prox RCA 2019, severe Pulmonary Hypertension, moderate MR,  CKD IV, Paroxysmal A fib not on AC, Chronic Iron Deficiency Anemia 2/2 GIB due to AVMs, previous LGI bleeding requiring ileocecal resection, T2DM, who was admitted 4/12 to the MICU for treatment of septic shock.     Major changes today:  - No major changes today  - Transitioning to comfort care  - Patient progressively becoming acidotic and hypotensive      Neuro:  # Sedation  Goal Rass -1 to -2  - Fentanyl gtt + bolus  - Precedex gtt  - Versed bolus PRN      # Hepatic encephalopathy Stage 2-3  Had asterixis on exam, now sedated  - Lactulose 20 TID (titrate for 3-5 BM) or 800-1200 ml of stool output a day.      Cardiovascular  # Shock, septic + cardiogenic---presumed decompensated HF + aspiration PNA  # HFrEF 15-20% 2/2 ICM  # Coronary Artery Disease s/p DESx3  # Type 2 NSTEMI  # Severe Pulmonary Hypertension  Requiring 3 pressors currently, MAP goal 60. Volume status is complicated by vasodilatory effects of sepsis and congestion from heart failure. CT revealing of nodular consolidation with surrounding GGO in superior LLL. Concern for aspiration PNA. Culture growing light staph aureus. CT CAP 4/19 without evidence of pulmonary source. Repeated TTE has shown ongoing evidence of worsening/ enlarging pericardial effusion. CVP has been 20-26 despite ongoing CRRT for several days. Given elevated intracardiac pressures, It may be difficult to assess tamponade physiology. Following aggressive diuresis, we may be able to see this but given severity of disease and and coagulopathy. Patient did not tolerate dobutamine trial d/t hypotension. Started on Dopamine and Epinephrine while on Vasopressin on 4/19. Initial  plan to decrease  Vasopressin >> wean epinephrine  Then up-titrate Dopamine given acute increase in SVR from 500 to 1100 and concern for worsening index on low dose Dopamine and perhaps 2/2 antibiotic change. Goal B1 agonist effects occur at dose 5-10 of Dopamine. When increased to 7.5, patient entered VTach. Overall, not a candidate of advanced device therapies given coagulopathy and severity of disease. Following diuresis, intracardiac pressures somewhat decreased with a CVP of 14. We must mediate balance of removing fluid and maintaining adequate intracardiac pressures, as to not introduce tamponade. Serial TTE with worsening pericardial effusions. Prognosis overall is poor.       - Meropenam 4/20-  - Micafungin 4/20-  - Zosyn 4/12- 4/20  - Vancomycin 4/11- 4/14    Culture Data:  - 4/14 BAL - light MSSA pan-sensitive (1/4 bottles)  - 4/19 BAL - heavy candida growth  - 4/19 BAL gram stain with Rare gram negative rods and Alternarians fungus   - 4/19 Bcx - NGTD    Pressors:  - Dopamine, Epinephrine and Vasopressin  - Goal to wean off Vasopressin then Epinephrine. Unable to titrate Dopamine off given VTac    # Moderate MR  # Paroxysmal Atrial Fibrillation (not on anticoagulation)  On amiodarone for ventricular arrythmias that started after pressors. Will continue as we re-attempt epinephrine administration.  - On cardiac Telemetry  - Goal: K+ >4.0, Mg2+ > 2.0  - Continue Amiodarone gtt    Pulmonary:     # Acute Hypoxic Respiratory Failure- intubated  # Concern for aspiration PNA  Patient was found using abdominal muscles to breath and was increasingly somnolent. Patient was maintaining oxygenation despite this on FIO2 40%. However, was hypotensive with MAPS 50s despite being on 0.4 of levophed and vaso 4. HOB was elevated for WOB, but blood pressure dropped further. He was put back in flat position. Levophed threshold was increased to 0.7. RT was called at bed side to start patient on BIPAP. Stat CXR ordered simultaneously. Wife  called and patient intubated. Bronchoscopy done 4/14 and a copious amount of thick sputum was collected. Sputum cell count was > 4K with neutrophilic predominance. Light growth of MSSA.  - Repeat cultures pending  - Continue IV Zosyn      Ventilation Mode: CMV/AC  (Continuous Mandatory Ventilation/ Assist Control)  FiO2 (%): 60 %  Rate Set (breaths/minute): 18 breaths/min  Tidal Volume Set (mL): 500 mL  PEEP (cm H2O): 5 cmH2O  Oxygen Concentration (%): 60 %  Resp: 23    # Pulmonary Nodulues  9 mm nodular consolidation with surrounding groundglass attenuation in the superior left lower lobe, favor infectious/inflammatory  etiology though is indeterminant. Few other pulmonary nodules noted.  - Follow-up CT on 4/19 showed stable consolidative opacities in the lower lobes, most likely atelectasis. However, superimposed infection is difficult to completely exclude per Radiology.  - Continue IV Zosyn      GI/Nutrition:    # Cardiac Cirrhosis   SAAG 1.3, making portal HTN very likely. Ascites protein 3.1, making cardiac cirrhosis very likely.  Ascites fluid noted to be transudative.    Etiology: likely chronic congestion from heart failure.  Hepatitis C antibody non reactive, Hep A antibody non reactive, Hep Bs Ag non reactive but will add on hep B antibody. Will need hep A and B vaccination    Evaluation: EGD: 2019 no esophageal varices. No evidence of portal gastropathy. No active bleeding, no EGD needed at this time.  Ascites: large volume. Fluid transudative according to light's criteria using total protein. Hold off on spirinolactone and lasix given soft pressures requiring pressors. Considering therapeutic para. Continue CRRT.  Coagulopathy:  likely hepatic congestion leading to compression of perisinusoidal and endothelial cells that produce F 7. Vit K x 3 days 4/13- 4/15.   Fibrinogen goal > 120 ( Give FFP today), PLT 27 today, transfuse PLT (PLT goal > 30 if not bleeding or 10 if bleeding) , INR continue vit K but  will reverse with improvement of congestion    Hepatic encephalopathy: Grade 2-3 Asterixis on exam. Ammonia < 10 but can be normal in up to half of people with HE. Start lactulose and titrate to 3-5 BMs/day.  SBP: Diagnostic para shows ascitic protein 3.1 and PMN fluid 126. No ppx indicated. Culture pending.  HCC sceening: Abd US q 6 m + AFP. No hepatic mass noted on CT abdomen.  Diet: 2 g sodium restriction and 2 L fluid restriction  MELD-Na score: 23 at 4/21/2021  3:56 PM  MELD score: 22 at 4/21/2021  3:56 PM  Calculated from:  Serum Creatinine: 1.01 mg/dL at 4/21/2021  3:56 PM  Serum Sodium: 136 mmol/L at 4/21/2021  3:56 PM  Total Bilirubin: 20.1 mg/dL at 4/21/2021  4:03 AM  INR(ratio): 1.39 at 4/21/2021  4:03 AM  Age: 61 years     MELD-Na is 134 accounting for Cr.    - GI/ hepatology following    # Mild hepatopathy  # Previous history of GI bleeding in the context of Angioectasia/AVM's  # Protein losing nephropathy  #Malnutrition  CT Abd on 4/19 per Radiology showed cirrhotic configuration of the liver with large volume ascites. A small portion of ascites in the pelvis with hyperdense layering, suggests blood products.  - PPI  - TF at goal     Renal/Fluids/Electrolytes:    #Hypocalcemia  - replete with IV Calcium Chloride    # Acute on Chronic Kidney Disease Stage 4  # High Anion Gap Metabolic Acidosis - Improved  # Combined Lactic Acidosis Type A/B  # Hyperkalemia (6.4)  # Hyponatremia (likely related to poor oral intake and hypervolemia)  # Protein Losing Nephropathy     Baseline Scr: 2.1-2.4 mg/dL> Most recently 3.89 mg/dL with associated hyperkalemia, hyponatremia and significant metabolic acidosis (Co2: 17-->17) in the context of lacticemia despite two vasopressors. Etiology could potentially be related to mixed distributive//septic vs. Cardiogenic shock.      - Nephrology Consulted and Appreciate recommendations  - CRRT with Heparin flushes to prevent thrombosis of circuit  - Strict I/O's  - Bladder scan  qshift and straight cath PRN  - Daily Weights  - Avoid NSAID or Nephrotoxic agents       Endocrine:    # Hypothyroidism  TSH 10, T4 0.34. T3 0.3 indicating hypothyroidism.  - start synthroid 25 mcg     # Type II Diabetes Mellitus  - Oral Hypoglycemia Protocol Initiated  - Small dose sliding scale insulin       ID:  # Shock, septic + cardiogenic---presumed decompensated HF + aspiration PNA  Bronchoscopy done 4/14 and a copious amount of thick sputum was collected. Sputum cell count was > 4K with neutrophilic predominance which is likely the source. Multiple weeping blisters on lower extremities, some with erythema -cultures NGTD. No meningeal signs to suggest CNS.  - Sputum/blood cultures on 4/19 showed NGTD  - Continue Zosyn  - Stress dose steroids with hydrocortisone and fludrocortisone       Hematology:    # Chronic Iron Deficiency Anemia  # Thrombocytopenia likely related to underlying sepsis  # Coagulopathy 2/2 hepatopathy   # Supratherapeutic INR  # Concern for DIC  Elevated PT/PTT, decreasing Fibrinogen and elevated hyperbilirubinemia (which is likely 2/2 hepatic congestion).  - F8 normal as of 4/20  - Continue Monitoring Hgb  - Hgb goal >7 g/dL  - Holding on heparin products and anticoagulation   - transfuse pRBC,Cryoprecipitate x 5 units    # Occlusive superficial venous thrombosis in the left basilic vein at the antecubital fossa  - No AC d/t coagulopathy  - Serial monitoring with US q 3 days     General Cares/Prophylaxis:    DVT Prophylaxis: SCD   GI Prophylaxis: PPI  Family Communication: Partner, Haven  Code Status:  Full Code     Lines/tubes/drains:  - Right Femoral Arterial Line/ Left Femoral Triple CVC/ PIV/ swan  - Left internal jugular Castro Dialysis Catheter     Disposition:  - Medical ICU until further notice       Patient seen and findings/plan discussed with medical ICU staff, Dr. Joseph Rene    ====================================  INTERVAL HISTORY:   Worsening acidosis and  hypotension.    OBJECTIVE:   1. VITAL SIGNS:   Temp:  [96.4  F (35.8  C)-98.5  F (36.9  C)] 97.2  F (36.2  C)  Pulse:  [66-86] 71  Resp:  [20-27] 23  MAP:  [39 mmHg-68 mmHg] 65 mmHg  Arterial Line BP: ()/(29-55) 100/52  FiO2 (%):  [60 %] 60 %  SpO2:  [46 %-100 %] 88 %  Ventilation Mode: CMV/AC  (Continuous Mandatory Ventilation/ Assist Control)  FiO2 (%): 60 %  Rate Set (breaths/minute): 18 breaths/min  Tidal Volume Set (mL): 500 mL  PEEP (cm H2O): 5 cmH2O  Oxygen Concentration (%): 60 %  Resp: 23    2. INTAKE/ OUTPUT:   I/O last 3 completed shifts:  In: 4139.75 [I.V.:2359.75; NG/GT:460]  Out: 5653 [Other:4903; Stool:750]    3. PHYSICAL EXAMINATION:  General: intubated and waking up; nodding yes to questions, a little more awake today than yesterday, but not following commands  HEENT: Normocephalic/Atraumatic, intubated, EOMI/MOM, icteric oral muscosa/sclera.   Pulm/Resp:mechanically ventilated, crackles present   CV: RRR, normal S1 and S2. No murmurs/rubs gallops. Peripheral pulses +1. Significant peripheral pitting edema +3 from legs to inguinal/scrotal region.   Abdomen: Soft, non-distended, non-tender. No pain/tenderness to palpation. No guarding/rebound.  : Not examined  Neuro: Not moving extremities as he is sedated at this time  Incisions/Skin: Multiple open wounds on legs, now dressed. Hyperpigmented skin in the legs/chronic venous stasis changes.  Dusky toes, Nail dystrophy on toes bilaterally.     4. LABS:   Arterial Blood Gases   Recent Labs   Lab 04/22/21  0105 04/21/21  1155 04/21/21  0217 04/20/21  2216   PH 7.08* 7.36 7.38 7.38   PCO2 46* 37 37 36   PO2 59* 52* 60* 68*   HCO3 14* 21 22 21     Complete Blood Count   Recent Labs   Lab 04/21/21  0403 04/20/21  1021 04/20/21  0329 04/19/21 2020   WBC 14.3* 20.3* 19.7* 18.4*   HGB 8.7* 8.5* 8.6* 8.7*   PLT 35* 38* 39* 44*     Basic Metabolic Panel  Recent Labs   Lab 04/21/21  1556 04/21/21  0908 04/21/21  0403 04/20/21  2216    137 136 138    POTASSIUM 4.6 3.8 3.9 3.8   CHLORIDE 108 109 107 108   CO2 17* 20 22 21   BUN 24 23 21 23   CR 1.01 0.90 0.99 0.65*   * 168* 140* 160*     Liver Function Tests  Recent Labs   Lab 04/21/21  0403 04/20/21  1021 04/20/21  0329 04/19/21  0310 04/18/21  0830   AST 49* 47* 44 42  --    ALT 62 65 67 66  --    ALKPHOS 116 109 106 103  --    BILITOTAL 20.1* 18.0* 18.0* 15.2*  --    ALBUMIN 2.3* 2.6* 2.6* 2.4*  --    INR 1.39*  --  1.31* 1.47* 1.50*     Coagulation Profile  Recent Labs   Lab 04/21/21  0403 04/20/21  0329 04/19/21  0310 04/18/21  0830   INR 1.39* 1.31* 1.47* 1.50*       5. RADIOLOGY:   Recent Results (from the past 24 hour(s))   XR Chest Port 1 View    Narrative    Exam: XR CHEST PORT 1 VIEW, 4/21/2021 1:26 PM    Indication: PA cath position evaluation    Comparison: 4/20/2021    Findings:   Endotracheal tube in the mid thoracic trachea. Left IJ central venous  catheter tip at the confluence of the innominate veins. Right IJ  [place] catheter tip in the right pulmonary artery. Feeding tube  seen coursing through the mediastinum. Tip projects off the film.    Small right pleural effusion with associated atelectasis. Diffuse  interstitial changes throughout the lung are stable. Stable  cardiomegaly.      Impression    Impression:   1. Pulmonary arterial catheter tip in the right pulmonary artery.  Support devices are unchanged.  2. Small right pleural effusion with associated atelectasis are  unchanged.  3. Stable cardiomegaly and diffuse interstitial changes throughout  both lungs suggesting edema.    BRADY ACOSTA MD

## 2021-04-22 NOTE — DEATH PRONOUNCEMENT
MD DEATH PRONOUNCEMENT    Called to pronounce Cole Jesus dead.    Physical Exam: Unresponsive to noxious stimuli, Spontaneous respirations absent, Breath sounds absent, Carotid pulse absent, Heart sounds absent and Corneal blink reflex absent    Patient was pronounced dead at 13:55 PM, 2021.    Preliminary Cause of Death: Cardiogenic Shock; Cardiac Cirrhosis; CKD Stage IV    Active Problems:    Atrial fibrillation, persistent (H)    Type 2 diabetes mellitus with complication (H)    Chronic systolic congestive heart failure (HCC)    Ischemic cardiomyopathy    Hypotension, unspecified hypotension type    Pneumonia of both lower lobes due to infectious organism       Infectious disease present?: NO    Communicable disease present? (examples: HIV, chicken pox, TB, Ebola, CJD) :  NO    Multi-drug resistant organism present? (example: MRSA): NO    Please consider an autopsy if any of the following exist:  NO Unexpected or unexplained death during or following any dental, medical, or surgical diagnostic treatment procedures.   NO Death of mother at or up to seven days after delivery.     NO All  and pediatric deaths.     NO Death where the cause is sufficiently obscure to delay completion of the death certificate.   NO Deaths in which autopsy would confirm a suspected illness/condition that would affect surviving family members or recipients of transplanted organs.     The following deaths must be reported to the 's Office:  NO A death that may be due entirely or in part to any factors other than natural disease (recent surgery, recent trauma, suspected abuse/neglect).   NO A death that may be an accident, suicide, or homicide.     NO Any sudden, unexpected death in which there is no prior history of significant heart disease or any other condition associated with sudden death.   NO A death under suspicious, unusual, or unexpected circumstances.    NO Any death which is apparently due to  natural causes but in which the  does not have a personal physician familiar with the patient s medical history, social, or environmental situation or the circumstances of the terminal event.   NO Any death apparently due to Sudden Infant Death Syndrome.     NO Deaths that occur during, in association with, or as consequences of a diagnostic, therapeutic, or anesthetic procedure.   NO Any death in which a fracture of a major bone has occurred within the past (6) six months.   NO A death of persons note seen by their physician within 120 days of demise.     NO Any death in which the  was an inmate of a public institution or was in the custody of Law Enforcement personnel.   NO  All unexpected deaths of children   NO Solid organ donors   NO Unidentified bodies   NO Deaths of persons whose bodies are to be cremated or otherwise disposed of so that the bodies will later be unavailable for examination;   NO Deaths unattended by a physician outside of a licensed healthcare facility or licensed residential hospice program   NO Deaths occurring within 24 hours of arrival to a health care facility if death is unexpected.    NO Deaths associated with the decedent s employment.   NO Deaths attributed to acts of terrorism.   NO Any death in which there is uncertainty as to whether it is a medical examiner s care should be discussed with the medical investigator.        Body disposition: Autopsy was discussed with family member:  Family members in person.  Permission for autopsy was declined.

## 2021-04-22 NOTE — PROGRESS NOTES
"[facility]  Palliative Care Daily Progress Note       Recommendations & Counseling     Pancho is continuing to clinically decline with episodes of increased tachypnea today. I worry that we are at a point where patient being as awake as possible and still kept comfortable is not possible, and that more aggressive symptom management and increased sedation is required at this time to avoid suffering and also will assist with preparing for extubation. Anticipate patient will die today even if continued current cares, would be very reasonable for transition to comfort focused cares to happen today--with family consent first before making transition.  --Spoke with son Pancho and he is in agreement with more aggressive symptom management and sedation, he wants team to talk with his mom, Haven, as well. Unable to locate Haven this AM, please do not make changes to medications or transitions without talking with Haven first.      Recommend transitioning from Precedex drip to a midazolam infusion 1-8mg/hr and use midazolam boluses to ensure Pt appears comfortable before ETT is removed.       Continue fentanyl infusion and use fentanyl boluses to ensure patient appears comfortable including not grimacing, not working to breath, no tachypnea. Would target a RR 25 or less (and above 8) to start. Pt should look comfortable before any transition to full comfort cares where ETT is removed. He is currently on 100mcg/hr.      Change to midazolam 2-8mg IV Q10 minutes PRN for anxiety, agitation and to offer sedation for extubation.      Change to fentanyl 50-100mcg IV Q10 minutes PRN for pain or air hunger/dyspnea, RR 25 or higher    When patient's family is ready to transition to full comfort cares including extubation:    1. To prepare for extubate to comfort cares only:   Recommend initiating comfort care orders through the order set \"ICU Care of Patient At End of Life\". Through the " order set, initiate the following (please note differences in medication doses/frecuency recommended below that stray from the pre-set orders in order set):    - Patient care- initiate all patient care orders, nursing preparation orders and RT orders    - prior to extubation orders:    -- glycopyrrolate 0.2mg 30-60 min before extubation   -- continue current fentanyl drip rate and give 100mcg bolus dose 15 min prior to extubation   -- continue current midazolam drip rate and give midazolam 2mg IV 15 minutes prior to extubation              -- after pre-medications are given wean vent to pressure support setting and monitor for ongoing comfort on this setting. If remains comfortable (10min) then proceed with extubation. If not comfortable use PRN midazolam and fentanyl orders to obtain comfort prior to extubation. Encourage use of bolus medications over a continuous infusion as the infusions take some time to reach peak effect.       - post-extubation comfort medications:     --for dyspnea/pain: fentanyl 50-100mcg IV Q10 minutes PRN. Nurse should have 3 syringes full and ready for immediate infusion if necessary.   -- for anxiety/ agitation: midazolam 2-8mg IV Q10 minutes PRN. Nurse should have 3 syringes full and ready for immediate infusion if necessary.   -- for secretions: PRN glycopyrrolate 0.1-0.2mg IV Q 4 hours PRN and 1-2 atropine drops sublingual Q15min PRN    - Medication adjustment/titration recs:  - If needing 3 or more prn fentanyl doses per hour for 2-3 hours, would increase continuous infusion to a rate that equals total hourly use over past 1-2 hours.  -If needing 3 or more prn lorazepam doses per hour for 2-3 hours, would start continuous infusion at a rate that equals total hourly use over past 1-2 hours.   - if only moderate benefit from max dose fentanyl or midazolam would do 50% dose increase  - if minimal to no benefit from max dose fentanyl or midazolam would do 100% dose  increase    --Recommend stopping pressor support with extubation to comfort cares    2. Patient/Family support: ongoing  and social work involvment    3. Disposition:  Family aware that if patient survives in period after extubation, then ongoing comfort cares will be continued, that patient may be transferred to another floor in the hospital and that discharge on hospice to home or facility may be appropriate.      Total time spent was 75 minutes,  >50% of time was spent counseling and/or coordination of care regarding goals of care, support with coping, symptom management.    Sandi Raymond Team Consult Pager [phone] (answered 8am-430pm M-F) - ok to text page via [facility] / After-Hours Answering Service [phone] / Palliative Clinic in the [facility] [place] - [phone] (scheduling); [phone] (triage).      Assessments          61-year-old male with past medical history that includes HFr EF 25 to 30% in the setting of ICM, severe pulmonary hypertension, DM2, CKD 4, paroxysmal A. fib and history of GI bleed secondary to AVMs and previously requiring ileocecal resection.  Patient admitted to the MICU on 4/12/2021 for further management of septic shock, patient continues with acute respiratory failure, cardiogenic shock and ATN requiring CRRT all in the setting of ischemic dilated cardiomyopathy with worsening shock now requiring multiple pressors despite optimizing volume status and treatment with broad-spectrum antibiotics and steroids.    Care conference held 4/21 and family agreed with transition to comfort cares only after family has had a chance to visit with patient.    Today, the patient was seen for:  Dilated ischemic cardiomyopathy  CKD stage IV now with ATN on CRRT therapy  Worsening shock state  Support with coping  Decision support  Goals of care  Symptom management    Prognosis, Goals, or Advance Care Planning was addressed today with: Yes.  Mood, coping, and/or  meaning in the context of serious illness were addressed today: Yes.  Summary/Comments:            Interval History:     Chart review/discussion with unit or clinical team members:   Pt with episodes of tachypnea noted by nursing, becoming more hypoxic. Lactic acid today 10.5, pH on ABG 7.08. Nursing and primary team agree that patient likely to die today even if current cares with ventilation and pressors, CRRT continued.    Per patient or family/caregivers today:  Family notes patient still waking at times with them but less today than yesterday.             Review of Systems:     Besides above, an additional ROS unable to be completed as patient too ill to participate.          Medications:     I have reviewed this patient's medication profile and medications during this hospitalization.           Physical Exam:   Vitals were reviewed  Gen: NAD at time of my exam, lying on back with eyes closed, did not respond to soft voice  HEENT: normocephalic, ETT in place  Pulm: no increased work of breathing at time of exam  Abd: grossly nondistended on visual exam  Skin: no rash on limited exam  Neuro: less wakeful, did not wake to soft voice  Psych: no agitation               Data Reviewed:     Reviewed recent pertinent imaging.    Reviewed recent labs.

## 2021-04-22 NOTE — PLAN OF CARE
ICU End of Shift Summary. See flowsheets for vital signs and detailed assessment.    Changes this shift: Fentanyl increased from 50 to 100 for labored breathing; PRN bumps given as well. Precedex increased from 0.4 to 0.8 overnight; patient pulling high tidal volumes (700's) and overbreathing vent (RR 25 to 26). Dopamine, Epi and Vaso titrated to keep MAP's >60. Net negative 91mL's thus far. PRN artificial tears ointment ordered for dry eyes. Minimal stool output. Pt did not tolerate 0000 turn in bed; significant other called and updated. Significant other and son at bedside for support.     Plan: Discuss further plan of care with family.

## 2021-04-23 LAB
BACTERIA SPEC CULT: ABNORMAL
BACTERIA SPEC CULT: ABNORMAL
SPECIMEN SOURCE: ABNORMAL

## 2021-04-23 NOTE — DISCHARGE SUMMARY
[facility]    Death Summary - Medicine & Pediatrics    Date of Admission:  4/11/2021  Date of Death: 4/22/2021  4:45 PM  Attending Provider: Dr. Tae Ruiz  Service: MICU 2    Discharge Diagnoses   ICM/ End stage Heart Failure (EF 15-20%)  Mixed Cardiogenic and Septic Shock  Cardiac Cirrhosis  AHRF    Cause of death: Mixed Cardiogenic and Septic Shock, End stage CHF and Cardiac cirrhosis    Hospital Course   Mr. Pancho Jesus is a  61 year old male with PMHx most significant for HFrEF 25-30% 2/2 ICM after STEMI x/p DESx3 (2012) & PCI prox RCA 2019, severe Pulmonary Hypertension, moderate MR,  CKD IV, Paroxysmal A fib not on AC, Chronic Iron Deficiency Anemia 2/2 GIB due to AVMs, previous LGI bleeding requiring ileocecal resection, T2DM, who was admitted 4/12 to the MICU for treatment of mixed picture cardiogenic and septic shock. He was treated for aspiration PNA with 11 day course of antibiotics without any improvement. Patient was admitted for 12 days with worsening lactic acid, hypotension requiring three pressors (Vasopressin, Epinephrine and Dopamine). Despite pressors, he demonstrated worsening acidosis, hypotension and Vtach on increasing doses of pressors. On admission, was found to have increasing pericardial effusion without concern for tamponade physiology given increased intracardiac pressures. Patient was placed on CRRT to assist with fluid removal. Was seen by Cardiology who deemed that given his severe coagulopathy and severity of his disease, he was not a candidate for advanced device therapies. Family conference was held given poor prognosis and failure to improve during 12 days course and patient was transitioned to comfort cares.    # Hepatic encephalopathy Stage 2-3  Had asterixis on exam, now sedated  - Lactulose 20 TID (titrate for 3-5 BM) or 800-1200 ml of stool output a day.        Cardiovascular  # Shock, septic + cardiogenic---presumed  decompensated HF + aspiration PNA  # HFrEF 15-20% 2/2 ICM  # Coronary Artery Disease s/p DESx3  # Type 2 NSTEMI  # Severe Pulmonary Hypertension  Requiring 3 pressors currently, MAP goal 60. Volume status is complicated by vasodilatory effects of sepsis and congestion from heart failure. CT revealing of nodular consolidation with surrounding GGO in superior LLL. Concern for aspiration PNA. Culture growing light staph aureus. CT CAP 4/19 without evidence of pulmonary source. Repeated TTE has shown ongoing evidence of worsening/ enlarging pericardial effusion. CVP has been 20-26 despite ongoing CRRT for several days. Given elevated intracardiac pressures, It may be difficult to assess tamponade physiology. Following aggressive diuresis, we may be able to see this but given severity of disease and and coagulopathy. Patient did not tolerate dobutamine trial d/t hypotension. Started on Dopamine and Epinephrine while on Vasopressin on 4/19. Initial  plan to decrease Vasopressin >> wean epinephrine  Then up-titrate Dopamine given acute increase in SVR from 500 to 1100 and concern for worsening index on low dose Dopamine and perhaps 2/2 antibiotic change. Goal B1 agonist effects occur at dose 5-10 of Dopamine. When increased to 7.5, patient entered VTach. Overall, not a candidate of advanced device therapies given coagulopathy and severity of disease. Following diuresis, intracardiac pressures somewhat decreased with a CVP of 14. We must mediate balance of removing fluid and maintaining adequate intracardiac pressures, as to not introduce tamponade. Serial TTE with worsening pericardial effusions. Prognosis overall is poor.         - Meropenam 4/20-  - Micafungin 4/20-  - Zosyn 4/12- 4/20  - Vancomycin 4/11- 4/14     Culture Data:  - 4/14 BAL - light MSSA pan-sensitive (1/4 bottles)  - 4/19 BAL - heavy candida growth  - 4/19 BAL gram stain with Rare gram negative rods and Alternarians fungus   - 4/19 Bcx -  NGTD     Pressors:  - Dopamine, Epinephrine and Vasopressin  - Goal to wean off Vasopressin then Epinephrine. Unable to titrate Dopamine off given VTac     # Moderate MR  # Paroxysmal Atrial Fibrillation (not on anticoagulation)  On amiodarone for ventricular arrythmias that started after pressors. Will continue as we re-attempt epinephrine administration.  - On cardiac Telemetry  - Goal: K+ >4.0, Mg2+ > 2.0  - Continue Amiodarone gtt     # Acute Hypoxic Respiratory Failure- intubated  # Concern for aspiration PNA  Patient was found using abdominal muscles to breath and was increasingly somnolent. Patient was maintaining oxygenation despite this on FIO2 40%. However, was hypotensive with MAPS 50s despite being on 0.4 of levophed and vaso 4. HOB was elevated for WOB, but blood pressure dropped further. He was put back in flat position. Levophed threshold was increased to 0.7. RT was called at bed side to start patient on BIPAP. Stat CXR ordered simultaneously. Wife called and patient intubated. Bronchoscopy done 4/14 and a copious amount of thick sputum was collected. Sputum cell count was > 4K with neutrophilic predominance. Light growth of MSSA.  - Repeat cultures pending  - Continue IV Zosyn        Ventilation Mode: CMV/AC  (Continuous Mandatory Ventilation/ Assist Control)  FiO2 (%): 60 %  Rate Set (breaths/minute): 18 breaths/min  Tidal Volume Set (mL): 500 mL  PEEP (cm H2O): 5 cmH2O  Oxygen Concentration (%): 60 %  Resp: 23     # Pulmonary Nodulues  9 mm nodular consolidation with surrounding groundglass attenuation in the superior left lower lobe, favor infectious/inflammatory  etiology though is indeterminant. Few other pulmonary nodules noted.  - Follow-up CT on 4/19 showed stable consolidative opacities in the lower lobes, most likely atelectasis. However, superimposed infection is difficult to completely exclude per Radiology.  - Continue IV Zosyn        # Cardiac Cirrhosis   SAAG 1.3, making portal HTN  very likely. Ascites protein 3.1, making cardiac cirrhosis very likely.  Ascites fluid noted to be transudative.     Etiology: likely chronic congestion from heart failure.  Hepatitis C antibody non reactive, Hep A antibody non reactive, Hep Bs Ag non reactive but will add on hep B antibody. Will need hep A and B vaccination     Evaluation: EGD: 2019 no esophageal varices. No evidence of portal gastropathy. No active bleeding, no EGD needed at this time.  Ascites: large volume. Fluid transudative according to light's criteria using total protein. Hold off on spirinolactone and lasix given soft pressures requiring pressors. Considering therapeutic para. Continue CRRT.  Coagulopathy:  likely hepatic congestion leading to compression of perisinusoidal and endothelial cells that produce F 7. Vit K x 3 days 4/13- 4/15.   Fibrinogen goal > 120 ( Give FFP today), PLT 27 today, transfuse PLT (PLT goal > 30 if not bleeding or 10 if bleeding) , INR continue vit K but will reverse with improvement of congestion     Hepatic encephalopathy: Grade 2-3 Asterixis on exam. Ammonia < 10 but can be normal in up to half of people with HE. Start lactulose and titrate to 3-5 BMs/day.  SBP: Diagnostic para shows ascitic protein 3.1 and PMN fluid 126. No ppx indicated. Culture pending.  HCC sceening: Abd US q 6 m + AFP. No hepatic mass noted on CT abdomen.  Diet: 2 g sodium restriction and 2 L fluid restriction  MELD-Na score: 23 at 4/21/2021  3:56 PM  MELD score: 22 at 4/21/2021  3:56 PM  Calculated from:  Serum Creatinine: 1.01 mg/dL at 4/21/2021  3:56 PM  Serum Sodium: 136 mmol/L at 4/21/2021  3:56 PM  Total Bilirubin: 20.1 mg/dL at 4/21/2021  4:03 AM  INR(ratio): 1.39 at 4/21/2021  4:03 AM  Age: 61 years      MELD-Na is 134 accounting for Cr.     - GI/ hepatology following     # Mild hepatopathy  # Previous history of GI bleeding in the context of Angioectasia/AVM's  # Protein losing nephropathy  #Malnutrition  CT Abd on 4/19 per  Radiology showed cirrhotic configuration of the liver with large volume ascites. A small portion of ascites in the pelvis with hyperdense layering, suggests blood products.  - PPI  - TF at goal       #Hypocalcemia  - replete with IV Calcium Chloride     # Acute on Chronic Kidney Disease Stage 4  # High Anion Gap Metabolic Acidosis - Improved  # Combined Lactic Acidosis Type A/B  # Hyperkalemia (6.4)  # Hyponatremia (likely related to poor oral intake and hypervolemia)  # Protein Losing Nephropathy     Baseline Scr: 2.1-2.4 mg/dL> Most recently 3.89 mg/dL with associated hyperkalemia, hyponatremia and significant metabolic acidosis (Co2: 17-->17) in the context of lacticemia despite two vasopressors. Etiology could potentially be related to mixed distributive//septic vs. Cardiogenic shock.      - Nephrology Consulted and Appreciate recommendations  - CRRT with Heparin flushes to prevent thrombosis of circuit  - Strict I/O's  - Bladder scan qshift and straight cath PRN  - Daily Weights  - Avoid NSAID or Nephrotoxic agents          # Hypothyroidism  TSH 10, T4 0.34. T3 0.3 indicating hypothyroidism.  - start synthroid 25 mcg      # Type II Diabetes Mellitus  - Oral Hypoglycemia Protocol Initiated  - Small dose sliding scale insulin        # Shock, septic + cardiogenic---presumed decompensated HF + aspiration PNA  Bronchoscopy done 4/14 and a copious amount of thick sputum was collected. Sputum cell count was > 4K with neutrophilic predominance which is likely the source. Multiple weeping blisters on lower extremities, some with erythema -cultures NGTD. No meningeal signs to suggest CNS.  - Sputum/blood cultures on 4/19 showed NGTD  - Continue Zosyn  - Stress dose steroids with hydrocortisone and fludrocortisone        # Chronic Iron Deficiency Anemia  # Thrombocytopenia likely related to underlying sepsis  # Coagulopathy 2/2 hepatopathy   # Supratherapeutic INR  # Concern for DIC  Elevated PT/PTT, decreasing Fibrinogen  and elevated hyperbilirubinemia (which is likely 2/2 hepatic congestion).  - F8 normal as of 4/20  - Continue Monitoring Hgb  - Hgb goal >7 g/dL  - Holding on heparin products and anticoagulation   - transfuse pRBC,Cryoprecipitate x 5 units     # Occlusive superficial venous thrombosis in the left basilic vein at the antecubital fossa  - No AC d/t coagulopathy  - Serial monitoring with US q 3 days       Yajaira Rene MD  [facility]    ______________________________________________________________________      Significant Results and Procedures   Most Recent 3 CBC's:  Recent Labs   Lab Test 04/21/21  0403 04/20/21  1021 04/20/21  0329   WBC 14.3* 20.3* 19.7*   HGB 8.7* 8.5* 8.6*   MCV 83 86 83   PLT 35* 38* 39*     3. PHYSICAL EXAMINATION:  General: intubated and waking up; nodding yes to questions, a little more awake today than yesterday, but not following commands  HEENT: Normocephalic/Atraumatic, intubated, EOMI/MOM, icteric oral muscosa/sclera.   Pulm/Resp:mechanically ventilated, crackles present   CV: RRR, normal S1 and S2. No murmurs/rubs gallops. Peripheral pulses +1. Significant peripheral pitting edema +3 from legs to inguinal/scrotal region.   Abdomen: Soft, non-distended, non-tender. No pain/tenderness to palpation. No guarding/rebound.  : Not examined  Neuro: Not moving extremities as he is sedated at this time  Incisions/Skin: Multiple open wounds on legs, now dressed. Hyperpigmented skin in the legs/chronic venous stasis changes.  Dusky toes, Nail dystrophy on toes bilaterally.        Consultations This Hospital Stay   PHARMACY TO DOSE VANCO  PHARMACY IP CONSULT  PHARMACY IP CONSULT  PHARMACY CRRT IP CONSULT  NEPHROLOGY GENERAL ADULT IP CONSULT  CARDIOLOGY HEART FAILURE (HF) IP CONSULT  PHARMACY IP CONSULT  PHARMACY TO DOSE VANCO  WOUND OSTOMY CONTINENCE NURSE  IP CONSULT  CARE MANAGEMENT / SOCIAL WORK IP CONSULT  WOUND OSTOMY CONTINENCE NURSE  IP  CONSULT  PHARMACY CRRT IP CONSULT  VASCULAR ACCESS CARE ADULT IP CONSULT  GI HEPATOLOGY ADULT IP CONSULT  PALLIATIVE CARE ADULT IP CONSULT  NUTRITION SERVICES ADULT IP CONSULT  MEDICATION HISTORY IP PHARMACY CONSULT  PHARMACY IP CONSULT  PHARMACY IP CONSULT  VASCULAR ACCESS CARE ADULT IP CONSULT  PHARMACY CRRT IP CONSULT  PHARMACY CRRT IP CONSULT  PHARMACY CRRT IP CONSULT  WOUND OSTOMY CONTINENCE NURSE  IP CONSULT  PHARMACY CRRT IP CONSULT  WOUND OSTOMY CONTINENCE NURSE  IP CONSULT    Primary Care Physician   Silas Enciso

## 2021-04-25 LAB
BACTERIA SPEC CULT: NO GROWTH
BACTERIA SPEC CULT: NO GROWTH
SPECIMEN SOURCE: NORMAL
SPECIMEN SOURCE: NORMAL

## 2021-04-26 LAB
BACTERIA SPEC CULT: NO GROWTH
BACTERIA SPEC CULT: NO GROWTH
Lab: NORMAL
Lab: NORMAL
SPECIMEN SOURCE: NORMAL
SPECIMEN SOURCE: NORMAL

## 2021-04-28 LAB
BACTERIA SPEC CULT: NORMAL
BACTERIA SPEC CULT: NORMAL
SPECIMEN SOURCE: NORMAL
SPECIMEN SOURCE: NORMAL

## 2021-05-11 LAB
FUNGUS SPEC CULT: NORMAL
SPECIMEN SOURCE: NORMAL

## 2021-05-12 LAB
BACTERIA SPEC CULT: NORMAL
FUNGUS SPEC CULT: ABNORMAL
FUNGUS SPEC CULT: NORMAL
SPECIMEN SOURCE: ABNORMAL
SPECIMEN SOURCE: NORMAL
SPECIMEN SOURCE: NORMAL

## 2021-06-03 LAB
MYCOBACTERIUM SPEC CULT: ABNORMAL
SPECIMEN SOURCE: ABNORMAL

## 2021-06-10 LAB
MYCOBACTERIUM SPEC CULT: NORMAL
MYCOBACTERIUM SPEC CULT: NORMAL
SPECIMEN SOURCE: NORMAL

## 2021-06-11 LAB
MYCOBACTERIUM SPEC CULT: NORMAL
MYCOBACTERIUM SPEC CULT: NORMAL
SPECIMEN SOURCE: NORMAL

## 2021-10-21 NOTE — DISCHARGE SUMMARY
[facility]   Cardiology Discharge Summary  Discharge Summary     Cole Jesus MRN# [id number]   YOB: 1960 Age: 60 year old     DATE OF ADMISSION:  4/21/2020  DATE OF DISCHARGE: 4/26/2020  ADMITTING PROVIDER: Donna Thompson MD  DISCHARGE PROVIDER: Donna Thompson MD  PRIMARY PROVIDER: Silas Enciso         Reason for Admission:   60 year old male with a history of HFrEF due to ICM, severe PH, moderate MR, CAD, STEMI s/p DESx3 (2012), STEMI s/p PCI proximal RCA (6/2019), CKD IV, paroxysmal Afib not on AC, anemia with hx of GIB due to AVMs who presents with increased weight gain and abdominal girth secondary to acute on chronic decompensated HFrEF and acute on chronic decompensated severe PH.          Discharge Diagnosis:   Acute on chronic decompensated HFrEF (EF 30%) due to ICM  Acute on chronic decompensated severe PH, mixed etiology  Moderate mitral regurgitation   CAD  STEMI s/p DESx3 (2012)  STEMI s/p PCI proximal RCA (6/2019)  CKD IV- stable  Elevated Liver enzymes  Hyperbilirubinemia  Elevated alkaline phosphatase  Paroxysmal Afib   T2DM  Chronic WILLIAM  Hx of GIB secondary to AVMs         Follow Up:   -follow up with Dr. Mccullough 1-2 weeks with [facility]         Hospital Course by Problem:    Acute on chronic decompensated HFrEF (EF 30%) due to ICM  Acute on chronic decompensated severe PH, mixed etiology  Moderate mitral regurgitation   Patient with known ICM likely due to DM, HTN, HLD presenting with volume overload. TTE (2/10/2020): EF 29%, severe diffuse hypokinesis, moderately reduced RV, moderate MR, mild-moderate tricuspid insufficiency. RHC (2/10/2020): RA 22, RV EDP 20, /50/65, PCWP 40, PVR 8.5, CO 3.5, CI 1.6.  Last coronary angiogram 6/2019. ACS unlikely as pt was asymptomatic and ECG unchanged. Due to his CKD and medication intolerance he is not optimized with goal directed medical therapy.  Patient has made it clear on multiple occasions he is  "not interested in advanced heart failure therapies. Pt was diuresis with lasix drip 20 mg/hr for several days until euvolemic. Pt tolerated therapy well without complications and was discharged in stable condition.  - BB: PTA metoprolol succinate 25 mg daily  - ACEi/ARB: none due to renal function  - Afterload reduction: none; hydralazine allergy (skin reaction), isordil (intolerant)  - Aldosterone antagonist: none   - Diuretic:              -Volume status: euvolemic, weight on admission: 218, weight today 193 (new dry weight)              -torsemide 80 mg BID              -potassium 40 meq BID  - Statin: PTA atorvastatin 80 mg daily  - ICD: continues to refuse  - PTA aspirin 81 mg daily     CAD  STEMI s/p DESx3 (2012)  STEMI s/p PCI proximal RCA (6/2019)  Although pt had a STEMI in 2019 pt was taken off plavix by his cardiologist, Dr. Trujillo due to ongoing GIB due to AVMs. No CP during hospital stay without ECG changes.  - PTA aspirin 81 mg daily  - PTA atorvastatin 80 mg daily     CKD IV- stable  Baseline serum creatinine between 2.7-3.1. Follows with   Creatinine on admission 2.51 which remained stable during hospital stay.    Chronic WILLIAM  Hx of GIB secondary to AVMs  Denies melena/hematochezia or other bleeding. Hgb 12.5 on presentation. No longer taking oral iron supplementation. Ferritin 27 c/w WILLIAM.  -start daily ferrous sulfate     Elevated Liver enzymes  Hyperbilirubinemia  Elevated alkaline phosphatase  Likely due to vascular congestion from HFrEF and/or PAH.     Paroxysmal Afib   CHADSVASC 3 (HF, DM, CAD). Not on a/c due to history of GI bleed. Currently rate controlled.  -continue PTA BB     T2DM  No longer on basal insulin. Last A1c 5.9 12/30/2019. Mild hyperglycemia during hospital stay.    Physical Exam on day of Discharge:  Blood pressure 100/78, pulse 70, temperature 97.4  F (36.3  C), temperature source Oral, resp. rate 18, height 1.727 m (5' 8\"), weight 87.7 kg (193 lb 6.4 oz), SpO2 96 " %.  Estimated Dry Weight: 193  Constitutional: awake, alert, cooperative, no apparent distress  HENT: extra ocular muscles grossly intact, sclera clear  Respiratory: No increased work of breathing, clear to auscultation bilaterally, no crackles or wheezing, no cough  Cardiovascular: irregularly irregular with normal rate, systolic murmur, no BLE edema, minimal JVD  GI: soft, non-distended, non-tender  Skin: warm, dry, no rashes or lesions  Neurologic: Awake, alert, oriented to name, place and time.  Cranial nerves II-XII are grossly intact.          Discharge Medications:     Discharge Medication List as of 4/26/2020 11:22 AM      START taking these medications    Details   ferrous sulfate (FEROSUL) 325 (65 Fe) MG tablet Take 1 tablet (325 mg) by mouth daily (with breakfast) for 14 days, Disp-14 tablet,R-0, E-Prescribe      potassium chloride ER (KLOR-CON M) 20 MEQ CR tablet Take 2 tablets (40 mEq) by mouth 2 times daily for 14 days, Disp-56 tablet,R-0, E-Prescribe      torsemide (DEMADEX) 20 MG tablet Take 4 tablets (80 mg) by mouth 2 times daily for 14 days, Disp-112 tablet,R-0, E-Prescribe         CONTINUE these medications which have NOT CHANGED    Details   aspirin (ASA) 81 MG chewable tablet Take 81 mg by mouth daily, Historical      atorvastatin (LIPITOR) 80 MG tablet Take 1 tablet (80 mg) by mouth daily, Disp-90 tablet, R-3, E-Prescribe      metolazone (ZAROXOLYN) 2.5 MG tablet One tablet only when directed by your doctor, Disp-15 tablet,R-1, E-Prescribe      metoprolol succinate ER (TOPROL XL) 25 MG 24 hr tablet Take 1 tablet (25 mg) by mouth daily, Disp-30 tablet,R-11, E-Prescribe      senna-docusate (SENOKOT-S/PERICOLACE) 8.6-50 MG tablet Take 1 tablet by mouth 2 times daily as needed for constipation, Historical      vitamin  B complex with vitamin C (VITAMIN  B COMPLEX) TABS Take 1 tablet by mouth daily, Historical      Vitamin D, Cholecalciferol, 25 MCG (1000 UT) CAPS Take 1 capsule by mouth 2 times  daily, Historical      ACCU-CHEK GUIDE test strip USE TO TEST BLOOD SUGAR FOUR TIMES A DAY BEFORE MEALS AND AT BEDTIME, Disp-400 each, R-3, E-Prescribe      Alcohol Swabs PADS 1 applicator 4 times daily (before meals and nightly), Disp-100 each, R-3, E-Prescribe      blood glucose monitoring (ACCU-CHEK FASTCLIX) lancets USE TO TEST BLOOD SUGAR FOUR TIMES A DAY AT MEALS AND AT BEDTIME, Disp-400 each, R-3, E-Prescribe      insulin pen needle (32G X 4 MM) 32G X 4 MM miscellaneous Use 5 pen needles daily or as directed.Disp-200 each, [id number]      nitroGLYcerin (NITROSTAT) 0.4 MG sublingual tablet Place 1 tablet (0.4 mg) under the tongue every 5 minutes as needed for chest pain For chest pain place 1 tablet under the tongue every 5 minutes for 3 doses. If symptoms persist 5 minutes after 1st dose call 911., Disp-25 tablet, R-0, Local Print      Sharps Container MISC 1 Device every 30 days, Disp-1 each, R-3, E-Prescribe         STOP taking these medications       furosemide (LASIX) 20 MG tablet Comments:   Reason for Stopping:         furosemide (LASIX) 80 MG tablet Comments:   Reason for Stopping:         potassium chloride (KLOR-CON) 20 MEQ packet Comments:   Reason for Stopping:                    Discharge Instructions and Follow-Up:     Discharge Procedure Orders   Basic metabolic panel   Standing Status: Future Standing Exp. Date: 05/26/20     Medication Therapy Management Referral   Referral Priority: Routine Referral Type: Med Therapy Management   Requested Specialty: Pharmacist   Number of Visits Requested: 1     CARDIOVASCULAR CORE CLINIC REFERRAL   Referral Priority: Routine   Number of Visits Requested: 1     Reason for your hospital stay   Order Comments: You were admitted for fluid overload related to your heart failure. You were given medications to remove the fluid and your home medications were changed to keep the fluid from building up.     Activity   Order Comments: Your activity upon discharge:  activity as tolerated     Order Specific Question Answer Comments   Is discharge order? Yes      Monitor and record   Order Comments: blood pressure daily  fluid intake and output daily   weight daily     When to contact your care team   Order Comments: Call [facility], , if you are up two pounds for two days or 5 lbs in a week     Discharge Instructions   Order Comments: Take medications as directed on med list. Follow up in clinic within 1 week with labs     [facility])   Order Comments: Follow up with Dr. Head in 1-2 weeks for a virtual visit, and a lab test in 1 week     Full Code     Order Specific Question Answer Comments   Code status determined by: Discussion with patient/legal decision maker      Diet   Order Comments: Follow this diet upon discharge: Orders Placed This Encounter      Fluid restriction 2000 ML FLUID      Combination Diet 2 gm NA Diet     Order Specific Question Answer Comments   Is discharge order? Yes               Condition on Discharge:     Discharge condition: Stable   Code status on discharge: Full Code        Date of service: 4/26/2020    The patient was discussed with Dr. Thompson.    60 minutes spent in discharge, including >50% in counseling and coordination of care, medication review and plan of care recommended on follow up. Questions were answered.     It was our pleasure to care for Cole Jesus during this hospitalization. Please do not hesitate to contact me should there be questions regarding the hospital course or discharge plan.      Ravi Lindquist MD  Internal Medicine, PGY-1  [facility], [place]  Pager: [phone]                           Excisional Biopsy Additional Text (Leave Blank If You Do Not Want): The margin was drawn around the clinically apparent lesion. An elliptical shape was then drawn on the skin incorporating the lesion and margins.  Incisions were then made along these lines to the appropriate tissue plane and the lesion was extirpated.

## 2023-06-26 NOTE — CONSULTS
Hepatology Consultation    Cole Jesus   MRN# [id number]     Age: 60 year old YOB: 1960       Referring provider: Tae Ruiz  Attending Hepatologist: Dr. Leventhal   Consult requested for: Elevated liver tests       Assessment and Recommendation:   Assessment:   Cole Jesus is a 60 year old male with a past medical history significant for HFrEF 25-30%, CAD, s/p STEMI/KEVYN x3 (2012), PAH,  MR, CKD stage IV, PAF without coagulation, iron deficiency anemia, GIB from AVM (7/20) s/p ileocecal resection (2014), DM2 , hypothyroidism and obesity who was admitted to the ICU for evaluation of shock etiology cardiogenic vs sepsis unclear. He was noted to have ascites and rising bilirubin.       Recommendations:   Congestive Hepatopathy  - rising t. Bili since admission. He has had elevated t. Bili in the past ~2. No history of liver disease, although he does have risk factors for metabolic disease and continued congestive hepatopathy.   - Paracentesis fluid studies 4/13 show no evidence of infection (WKN=254). SAAG=1.3 total protein 3.1 suggestive of cardiac cause of portal hypertension/ascites.   - CT with nodular appearance which may be related to congestion which often causes nodular regenerative hyperplasia.   - Echo 4/14 shows worsened LVEF, now 15-20% and right ventricular function moderately-severely impaired. INR 3.85, common increased in congestive hepatopathy.  Agree with vitamin K given poor nutrition but may not correct.   - Optimizing cardiac function and CRRT to remove fluid  -Cardiology following  -Trend liver tests.     Hyperbilirubinemia  -  Bilirubin 7.6 today, continuing to trend upward since admission on 4/11(bilirubin=4.1). Likely secondary to portal hypertension due to increased systemic volume from right sided HF.    -Not harmful to patient, will resolve as portal hypertension resolves. No other evidence of biliary obstruction or portal/venous thrombosis. Mostly direct bili.  HR=66 bpm, PHHJ=550/51 mmhg, SpO2=95.0 %, Resp=12 B/min, EtCO2=37 mmHg, Apnea=2 Seconds   -Continue to trend CMP     Plan of care discussed with Dr. Leventhal    Will sign off, please call with further questions or concerns.     Thank you for the opportunity to be involved in Cole Jesus care.     GUILLE Snyder, CNP  Inpatient Hepatology [facility]  Text link               History of Present Illness:   Cole Jesus is a 60 year old male with a past medical history of HFrEF (EF 25-30%), CAD s/p stemi x/p DESx3 (2012), pulmonary artery hypertension, CKD stage IV, afib (not on anticoagulation), iron deficiency anemia, GI bleeding from AVM (7/2020) s/p iliocecal resection (2014), DM2, hypothyroidism and obesity who was admitted after having a few weeks of progressive weakness, decreased appetite and abdominal distention. In ER patient found to be hypotensive and hyperkalemic, admitted to the ICU for blood pressure support with concern for cardiogenic v septic shocke and CRRT for hyperkalemia.     Mr. Jesus has never been diagnosed with liver disease. He was noted to have ascites seen during his last hospitalization when he had signs of GIB. Recent history obtained from his wife as patient is intubated. She was not aware of any recent melena or hematochezia, fevers or change in mentation.              Past Medical History:     Past Medical History:   Diagnosis Date     Anemia in chronic renal disease 03/09/2015     Antiplatelet or antithrombotic long-term use      Atrial fibrillation and flutter      CAD (coronary artery disease)     Stemi in 12/11, s/p angioplasty     CRD (chronic renal disease), stage IV (H) 03/09/2015    hx ATN with dialysis complicating cardiogenic shock  1/2012     GI bleed     massive lower GI bleed secondary to a cecal ulcer, s/p ileocecal resection in 12/11     Heart failure     Biventricular systolic HF, complicated by ARDS requiring tracheostomy     Hyperlipidemia LDL goal <100 04/28/2013     Hypertension      Ischemic cardiomyopathy 04/28/2013    TTE revealing 40% EF      Myocardial infarction (H)      and 2018     Other and unspecified nonspecific immunological findings     Anti JKa     Pulmonary edema     episodes of flash pulmonary edema in      Subclinical hypothyroidism               Past Surgical History:     Past Surgical History:   Procedure Laterality Date     CV RIGHT HEART CATH MEASUREMENTS RECORDED N/A 2019    Procedure: CV RIGHT HEART CATH;  Surgeon: Fox Gerard MD;  Location:  HEART CARDIAC CATH LAB     CV RIGHT HEART CATH MEASUREMENTS RECORDED N/A 2/10/2020    Procedure: CV RIGHT HEART CATH;  Surgeon: Fox Gerard MD;  Location:  HEART CARDIAC CATH LAB     ESOPHAGOSCOPY, GASTROSCOPY, DUODENOSCOPY (EGD), COMBINED  2011    Procedure:COMBINED ESOPHAGOSCOPY, GASTROSCOPY, DUODENOSCOPY (EGD); Surgeon:SAMARIA PUENTE; Location: GI     LAPAROTOMY EXPLORATORY  2011    Procedure:LAPAROTOMY EXPLORATORY; Explore laparotomy, Illeocectomy, Diverting Illeostomy; Surgeon:GIA NAJERA; Location:[facility] OR     ORIF right elbow fracture  age 12     TAKEDOWN ILEOSTOMY  2014    Procedure: TAKEDOWN ILEOSTOMY;  Surgeon: Gia Najera MD;  Location: [facility] OR     TRACHEOSTOMY  2011    Procedure:TRACHEOSTOMY; Tracheostomy; 80XLTCP-Proximal Extension-Cuffed 8.0 mm I.D.; Surgeon:LIZABETH DYE; Location: OR              Social History:     Social History     Tobacco Use     Smoking status: Former Smoker     Packs/day: 2.00     Years: 40.00     Pack years: 80.00     Types: Cigarettes     Quit date: 12/3/2011     Years since quittin.3     Smokeless tobacco: Never Used   Substance Use Topics     Alcohol use: No     Alcohol/week: 0.0 standard drinks             Family History:   The family history includes Crohn's Disease (age of onset: 36) in his daughter; Diabetes in his mother, sister, sister, and sister; Heart Disease in his father and mother; Hypertension in his mother;  "Myocardial Infarction (age of onset: 54) in his father; Myocardial Infarction (age of onset: 68) in his mother; Ovarian Cancer in his sister.             Immunizations:     Immunization History   Administered Date(s) Administered     FLU 6-35 months 02/22/2018     Influenza (IIV3) PF 12/19/2016     Influenza Vaccine IM > 6 months Valent IIV4 10/07/2019     Pneumococcal 23 valent 03/26/2014     TDAP Vaccine (Boostrix) 03/26/2014            Allergies:     Allergies   Allergen Reactions     Hydralazine      Black spots, verified allergic reaction by skin biopsy     Isosorbide      Per pt got a rash all over his body and won't take it no more     Simvastatin Other (See Comments)     Leg muscle weakness     Tylenol [Acetaminophen] Palpitations             Medications:   @  Medications Prior to Admission   Medication Sig Dispense Refill Last Dose     metolazone (ZAROXOLYN) 2.5 MG tablet Take 1 tablet (2.5 mg) by mouth three times a week 39 tablet 3 Unknown at Unknown time     potassium chloride ER (KLOR-CON M) 20 MEQ CR tablet Take 2 tablets (40 mEq) by mouth 2 times daily 360 tablet 3 Unknown at Unknown time     senna-docusate (SENOKOT-S/PERICOLACE) 8.6-50 MG tablet Take 1 tablet by mouth 2 times daily as needed for constipation   Unknown at Unknown time     torsemide (DEMADEX) 20 MG tablet Take 4 tablets (80 mg) by mouth 2 times daily 360 tablet 4 Unknown at Unknown time     vitamin  B complex with vitamin C (VITAMIN  B COMPLEX) TABS Take 1 tablet by mouth daily   Unknown at Unknown time     Vitamin D, Cholecalciferol, 25 MCG (1000 UT) CAPS Take 1 capsule by mouth 2 times daily   Unknown at Unknown time   @          Review of Systems:    ROS: Patient intubated and sedated, unable to participate in review of systems.           Physical Exam:   Blood pressure (!) 74/55, pulse 96, temperature 95  F (35  C), temperature source Axillary, resp. rate 20, height 1.727 m (5' 8\"), weight 97.5 kg (214 lb 15.2 oz), SpO2 97 %. Body " mass index is 32.68 kg/m .  Date 04/14/21 0700 - 04/15/21 0659   Shift 0026-9781 3156-3362 4868-1625 24 Hour Total   INTAKE   I.V. 1338.35 62.67  1401.02   Colloid 250   250   Blood Components 325   325   Shift Total(mL/kg) 1913.35(19.62) 62.67(0.64)  1976.02(20.27)   OUTPUT   Urine 0   0   Other 509   509   Blood 0   0   Shift Total(mL/kg) 509(5.22)   509(5.22)   Weight (kg) 97.5 97.5 97.5 97.5     General: Appears to be in no acute distress, currently intubated, no facial muscle wasting  HEENT: PERRL Yes scleral icterus   Lymph:  No cervical lymphadenoapthy  CV: [id number] murmurs, skin warm and dry, +1 peripheral pulses   Lungs: Intubated   Abd: Soft, Distended, Hypoactive bowel sounds  Extrem: Yes +3 peripehral edema in bilateral lower extremities   Skin: Nojaundice         Data:     Lab Results   Component Value Date    WBC 15.1 04/14/2021     Lab Results   Component Value Date    RBC 3.82 04/14/2021     Lab Results   Component Value Date    HGB 8.3 04/14/2021     Lab Results   Component Value Date    HCT 28.7 04/14/2021     No components found for: MCT  Lab Results   Component Value Date    MCV 75 04/14/2021     Lab Results   Component Value Date    MCH 21.7 04/14/2021     Lab Results   Component Value Date    MCHC 28.9 04/14/2021     Lab Results   Component Value Date    RDW 25.3 04/14/2021     Lab Results   Component Value Date    PLT 34 04/14/2021       Last Basic Metabolic Panel:  Lab Results   Component Value Date     04/14/2021      Lab Results   Component Value Date    POTASSIUM 4.5 04/14/2021     Lab Results   Component Value Date    CHLORIDE 105 04/14/2021     Lab Results   Component Value Date    RONNY 7.9 04/14/2021     Lab Results   Component Value Date    CO2 14 04/14/2021     Lab Results   Component Value Date    BUN 36 04/14/2021     Lab Results   Component Value Date    CR 1.27 04/14/2021     Lab Results   Component Value Date     04/14/2021       Liver Function Studies -   Recent  Labs   Lab Test 04/14/21  0435   PROTTOTAL 6.0*   ALBUMIN 2.5*   BILITOTAL 7.6*   ALKPHOS 99   *   ALT 64       Lab Results   Component Value Date    INR 3.85 04/14/2021        Ref. Range 4/13/2021 17:34   Body Fluid Analysis Source Unknown Peritoneal fluid   Color Fluid Unknown Orange   Appearance Fluid Unknown Cloudy   WBC Fluid Latest Units: /uL 181   % Neutrophils Fluid Latest Units: % 70   % Other Cells Fluid Latest Units: % 30   Albumin Fluid Latest Units: g/dL 1.5   Protein Total Fluid Latest Units: g/dL 3.1   Protein Total Fluid Source Unknown Peritoneal fluid   Triglyceride Fluid Latest Units: mg/dL 64              Previous Endoscopy:     Results for orders placed or performed during the hospital encounter of 07/03/19   UPPER GI ENDOSCOPY   Result Value Ref Range    Upper GI Endoscopy       [address] [place] [address] [phone]     Endoscopy Department  _______________________________________________________________________________  Patient Name: Cole Dunn         Procedure Date: 7/5/2019 11:42 AM  MRN: [id number]                       Account Number: [id number]  YOB: 1960              Admit Type: Inpatient  Age: 59                                Gender: Male  Note Status: Finalized                Attending MD: Juliana Alexander MD  Pause for the Cause: performed.       Total Sedation Time:   _______________________________________________________________________________     Procedure:           Upper GI endoscopy  Indications:         Acute post hemorrhagic anemia, Melena  Providers:           Juliana Alexander MD, Carlos Castillo RN, Monserrat LUO MD,                        Keith Blount MD  Patient Profile:     59 year old male with a past medical history of GI bleed                        s/t a cecal ulcer s/ p ileocecal resection in                        2011,ischemic cardiomyopathy on aspirin and Plavix,                        HFrEF  (35% EF on echo 6/17/19),atrial fibrillation on                        warfarin, CKD stage IV, DM type II hypertension,                        hyperlipidemia, subclinical hypothyroidism, and recent                        ostomy s/p RCA stent placement on 6/15/19 who is now                        presenting with dark stools and 5 grams of hemoglobin                        drop in the setting of 3.83 INR  Referring MD:          Requesting Provider: Christian REYNOSO MD  Medicines:           Fentanyl 125 micrograms IV, Midazolam 6 mg IV, Cetacaine                        spray  Complications:       No immediate complications.  _______________________________________________________________________________  Procedure:           Pre-Anesthesia Assessment:                       - Prior to the procedure, a History and Physical was                        performed, and patie nt medications and allergies were                        reviewed. The patient is competent. The risks and                        benefits of the procedure and the sedation options and                        risks were discussed with the patient. All questions                        were answered and informed consent was obtained. Patient                        identification and proposed procedure were verified by                        the physician and the nurse in the procedure room.                        Mental Status Examination: normal. Airway Examination:                        normal oropharyngeal airway and neck mobility.                        Respiratory Examination: clear to auscultation. CV                        Examination: normal. Prophylactic Antibiotics: The                        patient does not require prophylactic antibiotics. Prior                        Anticoagulants: The patient has taken Coumadin                        (warfarin), last dose was 1 day prior  to procedure. ASA                        Grade Assessment: III - A patient  with severe systemic                        disease. After reviewing the risks and benefits, the                        patient was deemed in satisfactory condition to undergo                        the procedure. The anesthesia plan was to use moderate                        sedation / analgesia (conscious sedation). Immediately                        prior to administration of medications, the patient was                        re-assessed for adequacy to receive sedatives. The heart                        rate, respiratory rate, oxygen saturations, blood                        pressure, adequacy of pulmonary ventilation, and                        response to care were monitored throughout the                        procedure. The physical status of the patient was                        re-assessed after the procedure.                       After obtaining informed consent, the endoscope was                         passed under direct vision. Throughout the procedure,                        the patient's blood pressure, pulse, and oxygen                        saturations were monitored continuously. The Endoscope                        was introduced through the mouth, and advanced to the                        second part of duodenum. The upper GI endoscopy was                        accomplished without difficulty. The patient tolerated                        the procedure well.                                                                                   Findings:       The examined esophagus was normal.       Red blood with a mobile clot was found at the antrum.       The exam of the stomach was otherwise normal.       A single angioectasia with active bleeding was found in the second        portion of the duodenum. Area was successfully injected with 3.5 mL of a        1:10,000 solution of epinephrine for hemostasis. To prevent further        bleeding, two hemostatic clips were s uccessfully placed.  There was no        bleeding at the end of the procedure.       The exam of the duodenum was otherwise normal.                                                                                   Moderate Sedation:       Moderate (conscious) sedation was administered by the endoscopy nurse        and supervised by the endoscopist. The patient's oxygen saturation,        heart rate, blood pressure and response to care were monitored. Total        physician intraservice time was 39 minutes.  Impression:          - A single bleeding angioectasia in the second portion                        of the duodenum. This is the source of the patient's                        bleeding and anemia. Injected, and two clips were placed                        with excellent hemostasis.                       - Normal esophagus.                       - Red blood and a mobile clot in the gastric antrum,                        refluxed from duodenum.                       - No specimens colle cted.  Recommendation:      - [facility] patient to [facility] for [facility] care.                       - Clear liquid diet today.                       - OK to stop PPI                       - Restart anticoagulation today.                       - Monitor stool output, expect melena for the next 24-36                        hours                       - If there is concern for recurrent bleeding, please                        page GI team.                       - Trend Hgb, goal >7 from GI perspective, closely                        monitor INR levels                       - Further recommendations per inpatient GI team                                                                                     Electronically signed by: Juliana Alexander MD  __________________  Juliana Alexander MD  7/5/2019 5:22:12 PM  I was physically present for the entire viewing portion of the exam.  __________________________  Signature of teaching physician  Sherron/Ebony  MD Catherine    __________________  MELODIE Blount MD    _____________________  Butch Putnam MD  Number of Addenda: 0    Note Initiated On: 7/5/2019 11:42 AM  Scope In:  Scope Out:           IMAGING:  No results found for this or any previous visit.  Results for orders placed during the hospital encounter of 04/11/21   XR Chest Port 1 View    Narrative Exam: XR CHEST PORT 1 VW, 4/14/2021 9:32 AM    Indication: SOB    Comparison: 4/12/2020    Findings:   Endotracheal tube in the mid thoracic trachea. Left IJ central venous  catheter tip at the confluence of the innominate veins. Cardiomegaly.  Loculated right pleural effusion with associated atelectasis. Linear  areas of opacity at the left lung base.      Impression Impression:   1. Cardiomegaly without overt pulmonary edema.  2. Increasing loculated right pleural effusion with associated  atelectasis.  3. No new airspace opacities identified.  4. New endotracheal tube with its tip in the mid thoracic trachea.     BRADY ACOSTA MD     CT chest/abd w 4/13/21  IMPRESSION:   1. Cardiomegaly and moderate-to-large pericardial effusion.   2. Small bilateral pleural effusions with bibasilar consolidation,  especially on the right which has the appearance of rounded  atelectasis. Cannot fully exclude underlying infection.  3. 9 mm nodular consolidation with surrounding groundglass attenuation  in the superior left lower lobe, favor infectious/inflammatory  etiology though is indeterminant. Consider follow-up CT in 3 months to  assess for interval change. A few other smaller bilateral pulmonary  nodules are noted.  4. Cirrhotic configuration of the liver with large volume ascites in  the abdomen and pelvis. No focal hepatic mass.  5. Cholelithiasis without CT evidence of acute cholecystitis.  6. Multiple central lines as noted above.    US abd w doppler 11/13/20  Impression:   1.  Patent hepatic vasculature with antegrade flow.  2.  Cholelithiasis without evidence of  acute cholecystitis.  3.  Increased echogenicity of the right kidney compatible with medical  renal disease.  4.  Ascites.    Echo 4/14/21  Interpretation Summary  Small circumferential pericardial effusion is present without any hemodynamic  significance.  ______________________________________________________________________________  Left Ventricle  The Ejection Fraction is estimated at 15-20%.     Right Ventricle  Global right ventricular function is moderately to severely reduced.     Vessels  Dilation of the inferior vena cava is present with abnormal respiratory  variation in diameter.     Pericardium  Small circumferential pericardial effusion is present without any hemodynamic  significance.     Miscellaneous  Ascites is noted.  ______________________________________________________________________________

## (undated) DEVICE — WIRE GUIDE 0.025"X150CM EMERALD J TIP 502524

## (undated) DEVICE — INTRO SHEATH 7FRX10CM PINNACLE RSS702

## (undated) DEVICE — KIT RIGHT HEART CATH H965601307191

## (undated) DEVICE — Device

## (undated) DEVICE — SLEEVE REPOSITIONING W/CATH LOCK 60CM 406503

## (undated) DEVICE — KIT RIGHT HEART CATH 60130719

## (undated) DEVICE — PACK HEART RIGHT CUSTOM SAN32RHF18

## (undated) DEVICE — INTRODUCER SHEATH FAST-CATH CATH-LOCK 7FRX12CM 406702

## (undated) DEVICE — INTRODUCER SHEATH FAST-CATH 7FRX12CM 406244

## (undated) DEVICE — INTRO SHEATH MICRO PLATINUM TIP 4FRX40CM 7274

## (undated) DEVICE — INTRODUCER SHEATH 4FRX40CM MICROPUNC PED G47946

## (undated) DEVICE — KIT ARTERIAL LINE 2GA 12CM INTRO NDL ASK-04510-HF

## (undated) RX ORDER — SODIUM NITROPRUSSIDE 25 MG/ML
INJECTION INTRAVENOUS
Status: DISPENSED
Start: 2018-02-27

## (undated) RX ORDER — LIDOCAINE 40 MG/G
CREAM TOPICAL
Status: DISPENSED
Start: 2017-02-28

## (undated) RX ORDER — LIDOCAINE 40 MG/G
CREAM TOPICAL
Status: DISPENSED
Start: 2018-02-27

## (undated) RX ORDER — FUROSEMIDE 10 MG/ML
INJECTION INTRAMUSCULAR; INTRAVENOUS
Status: DISPENSED
Start: 2019-11-12

## (undated) RX ORDER — LIDOCAINE 40 MG/G
CREAM TOPICAL
Status: DISPENSED
Start: 2019-11-12

## (undated) RX ORDER — FENTANYL CITRATE 50 UG/ML
INJECTION, SOLUTION INTRAMUSCULAR; INTRAVENOUS
Status: DISPENSED
Start: 2019-07-05

## (undated) RX ORDER — LIDOCAINE 40 MG/G
CREAM TOPICAL
Status: DISPENSED
Start: 2020-02-10

## (undated) RX ORDER — LIDOCAINE HYDROCHLORIDE 10 MG/ML
INJECTION, SOLUTION EPIDURAL; INFILTRATION; INTRACAUDAL; PERINEURAL
Status: DISPENSED
Start: 2018-02-27